# Patient Record
Sex: FEMALE | Race: WHITE | NOT HISPANIC OR LATINO | Employment: OTHER | URBAN - METROPOLITAN AREA
[De-identification: names, ages, dates, MRNs, and addresses within clinical notes are randomized per-mention and may not be internally consistent; named-entity substitution may affect disease eponyms.]

---

## 2019-08-07 ENCOUNTER — HOSPITAL ENCOUNTER (OUTPATIENT)
Dept: TELEMEDICINE | Facility: HOSPITAL | Age: 54
Discharge: HOME | End: 2019-08-07

## 2019-08-07 DIAGNOSIS — G45.8 ACUTE CEREBROVASCULAR INSUFFICIENCY TRANSIENT FOCAL NEUROLOGIC DEFICIT: ICD-10-CM

## 2019-08-07 PROBLEM — I63.312 THROMBOTIC STROKE INVOLVING LEFT MIDDLE CEREBRAL ARTERY: Status: ACTIVE | Noted: 2019-08-07

## 2019-08-07 PROCEDURE — G0427 INPT/ED TELECONSULT70: HCPCS | Mod: GT,,, | Performed by: PSYCHIATRY & NEUROLOGY

## 2019-08-07 PROCEDURE — G0427 PR INPT TELEHEALTH CON 70/>M: ICD-10-PCS | Mod: GT,,, | Performed by: PSYCHIATRY & NEUROLOGY

## 2019-08-07 NOTE — SUBJECTIVE & OBJECTIVE
Woke up with symptoms?: no    Recent bleeding noted: no  Does the patient take any Blood Thinners? no  Medications: Antiplatelets:  aspirin    Past Medical History: Stroke, HTN, kidney stones    Past Surgical History: no major surgeries within the last 2 weeks    Family History: no relevant history    Social History: no smoking, no drinking, no drugs    Allergies: Allergies have not been reviewed     Review of Systems   Constitutional: Negative for appetite change.   HENT: Negative for congestion.    Eyes: Negative for discharge.   Respiratory: Negative for shortness of breath.    Cardiovascular: Negative for chest pain.   Gastrointestinal: Negative for abdominal pain.   Endocrine: Negative for cold intolerance.   Genitourinary: Negative for difficulty urinating.   Musculoskeletal: Negative for joint swelling.   Skin: Negative for color change.     Objective:   Vitals:  /141 mmHg, HR 87, RR 12, O2 98%    CT READ: Yes  No hemmorhage. No mass effect. No early infarct signs.     Physical Exam   Constitutional: No distress.   HENT:   Head: Normocephalic.   Right Ear: External ear normal.   Left Ear: External ear normal.   Eyes: Conjunctivae are normal.   Neck: Normal range of motion.   Pulmonary/Chest: Effort normal. No stridor.   Abdominal: There is no tenderness.   Skin: Skin is dry. No rash noted.

## 2019-08-07 NOTE — CONSULTS
[facility]  Vascular Neurology  Comprehensive Stroke Center  Tele-Consultation Note      Consults    Consulting Provider: SCARLET HALL  Current Providers  No providers found    Patient Location: [facility] - TELEMEDICINE ED [facility] Emergency Department  Spoke hospital nurse at bedside with patient assisting consultant.     Patient information was obtained from patient.         Assessment/Plan:     STROKE DOCUMENTATION     Acute Stroke Times:   Acute Stroke Times   Last Known Normal Time: 1400  Stroke Team Called Time: 1514  Stroke Team Arrival Time: 1519  CT Interpretation Time: 1519    NIH Scale:  1a. Level of Consciousness: 0-->Alert, keenly responsive  1b. LOC Questions: 0-->Answers both questions correctly  1c. LOC Commands: 0-->Performs both tasks correctly  2. Best Gaze: 0-->Normal  3. Visual: 0-->No visual loss  4. Facial Palsy: 0-->Normal symmetrical movements  5a. Motor Arm, Left: 0-->No drift, limb holds 90 (or 45) degrees for full 10 secs  5b. Motor Arm, Right: 0-->No drift, limb holds 90 (or 45) degrees for full 10 secs  6a. Motor Leg, Left: 0-->No drift, leg holds 30 degree position for full 5 secs  6b. Motor Leg, Right: 1-->Drift, leg falls by the end of the 5-sec period but does not hit bed(unable to ambulate, stand)  7. Limb Ataxia: 0-->Absent  8. Sensory: 0-->Normal, no sensory loss  9. Best Language: 1-->Mild-to-moderate aphasia, some obvious loss of fluency or facility of comprehension, without significant limitation on ideas expressed or form of expression. Reduction of speech and/or comprehension, however, makes conversation. . . (see row details)  10. Dysarthria: 1-->Mild-to-moderate dysarthria, patient slurs at least some words and, at worst, can be understood with some difficulty  11. Extinction and Inattention (formerly Neglect): 0-->No abnormality  Total (NIH Stroke Scale): 3     Modified [place]    [place] Coma Scale:     ABCD2 Score:    [id number] Score:   HAS -BLED Score:   ICH Score:   Hunt & Hull Classification:       Diagnoses:   Acute cerebrovascular insufficiency transient focal neurologic deficit  Transient aphasia and R hemiparesis. IMproved in front of me on video. First unable to ambulate, then with ability to walk. Aphasia resolved initially. At this point, highest on DDx is TIA. Hypertensive emergency w/ focal deficit is also possible, especially since lowering her BP seemed to improve her symptoms which would be atypical for an acute neurovascular event.    Antithrombotics for secondary stroke prevention: Antiplatelets: Aspirin: 325 mg daily  Clopidogrel: 300 mg loading dose x 1, now  Clopidogrel: 75 mg daily    Statins for secondary stroke prevention and hyperlipidemia, if present:   Statins: Atorvastatin- 40 mg daily    Aggressive risk factor modification: HTN     Rehab efforts: The patient has been evaluated by a stroke team provider and the therapy needs have been fully considered based off the presenting complaints and exam findings. The following therapy evaluations are needed: PT evaluate and treat, OT evaluate and treat, SLP evaluate and treat    Diagnostics ordered/pending: CTA Head to assess vasculature , CTA Neck/Arch to assess vasculature, HgbA1C to assess blood glucose levels, Lipid Profile to assess cholesterol levels, MRI head without contrast to assess brain parenchyma    VTE prophylaxis: Enoxaparin 40 mg SQ every 24 hours    BP parameters: TIA: SBP <220 until imaging confirmation of no infarct             There were no vitals taken for this visit.  Alteplase Eligible?: Yes  Alteplase Recommendation: Alteplase not recommended due to Symptoms resolved   Possible Interventional Revascularization Candidate? No; No significant neurological deficit    Disposition Recommendation: transfer to nearest appropriate facility     Subjective:     History of Present Illness:  54F w/ acute onset of R sided  weakness, aphasia, and R sided vision loss. The aforementioned symptoms have never happened before. There are no identified triggers or modifying factors. There have been no recurrent events. There are no other associated symptoms. She has had a prior stroke.          Woke up with symptoms?: no    Recent bleeding noted: no  Does the patient take any Blood Thinners? no  Medications: Antiplatelets:  aspirin    Past Medical History: Stroke, HTN, kidney stones    Past Surgical History: no major surgeries within the last 2 weeks    Family History: no relevant history    Social History: no smoking, no drinking, no drugs    Allergies: Allergies have not been reviewed     Review of Systems   Constitutional: Negative for appetite change.   HENT: Negative for congestion.    Eyes: Negative for discharge.   Respiratory: Negative for shortness of breath.    Cardiovascular: Negative for chest pain.   Gastrointestinal: Negative for abdominal pain.   Endocrine: Negative for cold intolerance.   Genitourinary: Negative for difficulty urinating.   Musculoskeletal: Negative for joint swelling.   Skin: Negative for color change.     Objective:   Vitals:  /141 mmHg, HR 87, RR 12, O2 98%    CT READ: Yes  No hemmorhage. No mass effect. No early infarct signs.     Physical Exam   Constitutional: No distress.   HENT:   Head: Normocephalic.   Right Ear: External ear normal.   Left Ear: External ear normal.   Eyes: Conjunctivae are normal.   Neck: Normal range of motion.   Pulmonary/Chest: Effort normal. No stridor.   Abdominal: There is no tenderness.   Skin: Skin is dry. No rash noted.             Recommended the emergency room physician to have a brief discussion with the patient and/or family if available regarding the risks and benefits of treatment, and to briefly document the occurrence of that discussion in his clinical encounter note.     The attending portion of this evaluation, treatment, and documentation was performed per  Sukhdeep Da Silva MD via audiovisual.    Billing code:  (moderate to severe stroke, large areas of edema, some mimics)    · This patient has a critical neurological condition/illness, with high morbidity and mortality.  · There is a high probability for acute neurological change leading to clinical and possibly life-threatening deterioration requiring highest level of physician preparedness for urgent intervention.  · Care was coordinated with other physicians involved in the patient's care.  · Radiologic studies and laboratory data were reviewed and interpreted, and plan of care was re-assessed based on the results.  · Diagnosis, treatment options and prognosis may have been discussed with the patient and/or family members or caregiver.  · Further advanced medical management and further evaluation is warranted for his care.      In your opinion, this was a: Tier 1 Van Positive    Consult End Time: 3:45 PM     Sukhdeep Da Silva MD  Comprehensive Stroke Center  Vascular Neurology   [facility]

## 2019-08-07 NOTE — HPI
54F w/ acute onset of R sided weakness, aphasia, and R sided vision loss. The aforementioned symptoms have never happened before. There are no identified triggers or modifying factors. There have been no recurrent events. There are no other associated symptoms. She has had a prior stroke.

## 2019-08-07 NOTE — ASSESSMENT & PLAN NOTE
Transient aphasia and R hemiparesis. IMproved in front of me on video. First unable to ambulate, then with ability to walk. Aphasia resolved initially. At this point, highest on DDx is TIA. Hypertensive emergency w/ focal deficit is also possible, especially since lowering her BP seemed to improve her symptoms which would be atypical for an acute neurovascular event.    Antithrombotics for secondary stroke prevention: Antiplatelets: Aspirin: 325 mg daily  Clopidogrel: 300 mg loading dose x 1, now  Clopidogrel: 75 mg daily    Statins for secondary stroke prevention and hyperlipidemia, if present:   Statins: Atorvastatin- 40 mg daily    Aggressive risk factor modification: HTN     Rehab efforts: The patient has been evaluated by a stroke team provider and the therapy needs have been fully considered based off the presenting complaints and exam findings. The following therapy evaluations are needed: PT evaluate and treat, OT evaluate and treat, SLP evaluate and treat    Diagnostics ordered/pending: CTA Head to assess vasculature , CTA Neck/Arch to assess vasculature, HgbA1C to assess blood glucose levels, Lipid Profile to assess cholesterol levels, MRI head without contrast to assess brain parenchyma    VTE prophylaxis: Enoxaparin 40 mg SQ every 24 hours    BP parameters: TIA: SBP <220 until imaging confirmation of no infarct

## 2020-07-13 ENCOUNTER — HISTORICAL (OUTPATIENT)
Dept: ADMINISTRATIVE | Facility: HOSPITAL | Age: 55
End: 2020-07-13

## 2020-08-12 ENCOUNTER — HISTORICAL (OUTPATIENT)
Dept: ADMINISTRATIVE | Facility: HOSPITAL | Age: 55
End: 2020-08-12

## 2021-05-05 ENCOUNTER — HISTORICAL (OUTPATIENT)
Dept: ADMINISTRATIVE | Facility: HOSPITAL | Age: 56
End: 2021-05-05

## 2021-05-05 ENCOUNTER — HOSPITAL ENCOUNTER (INPATIENT)
Facility: HOSPITAL | Age: 56
LOS: 43 days | Discharge: HOME | DRG: 057 | End: 2021-06-17
Attending: EMERGENCY MEDICINE | Admitting: EMERGENCY MEDICINE
Payer: MEDICARE

## 2021-05-05 LAB
ANION GAP SERPL CALCULATED.3IONS-SCNC: 12 MMOL/L
BACTERIA #/AREA URNS HPF: ABNORMAL /HPF
BASOPHILS # BLD AUTO: 0.06 X10E3/UL (ref 0–0.2)
BASOPHILS NFR BLD AUTO: 0.6 % (ref 0–1)
BILIRUB UR QL STRIP: NEGATIVE MG/DL
BUN SERPL-MCNC: 59 MG/DL (ref 7–17)
CALCIUM SERPL-MCNC: 9.4 MG/DL (ref 8.5–10.1)
CHLORIDE SERPL-SCNC: 105 MMOL/L (ref 98–107)
CLARITY UR: ABNORMAL
CLARITY UR: ABNORMAL
CO2 SERPL-SCNC: 30 MMOL/L (ref 21–32)
COLOR UR: YELLOW
COLOR UR: YELLOW
CREAT SERPL-MCNC: 1.7 MG/DL (ref 0.55–1.3)
EOSINOPHIL # BLD AUTO: 0.44 X10E3/UL (ref 0–0.5)
EOSINOPHIL NFR BLD AUTO: 4.1 % (ref 1–4)
ERYTHROCYTE [DISTWIDTH] IN BLOOD BY AUTOMATED COUNT: 14.9 % (ref 11.5–14.5)
GLUCOSE SERPL-MCNC: 175 MG/DL (ref 74–106)
GLUCOSE UR STRIP-MCNC: NORMAL MG/DL
HCT VFR BLD AUTO: 46.1 % (ref 38–47)
HGB BLD-MCNC: 14.9 G/DL (ref 12–16)
KETONES UR STRIP-SCNC: NEGATIVE MG/DL
LEUKOCYTE ESTERASE UR QL STRIP: ABNORMAL LEU/UL
LYMPHOCYTES # BLD AUTO: 3.78 X10E3/UL (ref 1–4.8)
LYMPHOCYTES NFR BLD AUTO: 35.1 % (ref 27–41)
MCH RBC QN AUTO: 30.2 PG (ref 27–31)
MCHC RBC AUTO-ENTMCNC: 32.3 G/DL (ref 32–36)
MCV RBC AUTO: 93 FL (ref 80–96)
MONOCYTES # BLD AUTO: 1.04 X10E3/UL (ref 0–0.8)
MONOCYTES NFR BLD AUTO: 9.6 % (ref 2–6)
MPC BLD CALC-MCNC: 9.7 FL (ref 9.4–12.4)
NEUTROPHILS # BLD AUTO: 5.46 X10E3/UL (ref 1.8–7.7)
NEUTROPHILS NFR BLD AUTO: 50.6 % (ref 53–65)
NITRITE UR QL STRIP: NEGATIVE MG/DL
PH UR STRIP: 7 PH UNITS (ref 5–8)
PLATELET # BLD AUTO: 304 X10E3/UL (ref 150–400)
POTASSIUM SERPL-SCNC: 3.7 MMOL/L (ref 3.5–5.1)
PROT UR QL STRIP: NEGATIVE MG/DL
RBC # BLD AUTO: 4.94 X10E6/UL (ref 4.2–5.4)
RBC # UR STRIP: NEGATIVE ERY/UL
RBC #/AREA URNS HPF: ABNORMAL /HPF (ref 0–3)
SODIUM SERPL-SCNC: 143 MMOL/L (ref 136–145)
SP GR UR STRIP: 1.02 (ref 1–1.03)
SQUAMOUS #/AREA URNS LPF: ABNORMAL /LPF
UROBILINOGEN UR STRIP-ACNC: 0.2 MG/DL
WBC # BLD AUTO: 10.78 X10E3/UL (ref 4.5–11)
WBC #/AREA URNS HPF: ABNORMAL /HPF (ref 0–5)

## 2021-05-05 PROCEDURE — 80048 BASIC METABOLIC PNL TOTAL CA: CPT

## 2021-05-05 PROCEDURE — 81001 URINALYSIS AUTO W/SCOPE: CPT

## 2021-05-05 PROCEDURE — 99990 CHARGE CONVERSION: CPT

## 2021-05-05 PROCEDURE — 94760 N-INVAS EAR/PLS OXIMETRY 1: CPT

## 2021-05-05 PROCEDURE — 87086 URINE CULTURE/COLONY COUNT: CPT

## 2021-05-05 PROCEDURE — 36415 COLL VENOUS BLD VENIPUNCTURE: CPT

## 2021-05-05 PROCEDURE — 82962 GLUCOSE BLOOD TEST: CPT

## 2021-05-05 PROCEDURE — 97167 OT EVAL HIGH COMPLEX 60 MIN: CPT | Mod: GO

## 2021-05-05 PROCEDURE — 85025 COMPLETE CBC W/AUTO DIFF WBC: CPT

## 2021-05-05 PROCEDURE — 87077 CULTURE AEROBIC IDENTIFY: CPT

## 2021-05-05 PROCEDURE — 87186 SC STD MICRODIL/AGAR DIL: CPT

## 2021-05-05 PROCEDURE — 97163 PT EVAL HIGH COMPLEX 45 MIN: CPT | Mod: GP

## 2021-05-06 ENCOUNTER — HISTORICAL (OUTPATIENT)
Dept: ADMINISTRATIVE | Facility: HOSPITAL | Age: 56
End: 2021-05-06

## 2021-05-06 LAB
ANION GAP SERPL CALCULATED.3IONS-SCNC: 10 MMOL/L
BUN SERPL-MCNC: 43 MG/DL (ref 7–17)
CALCIUM SERPL-MCNC: 8.5 MG/DL (ref 8.5–10.1)
CHLORIDE SERPL-SCNC: 107 MMOL/L (ref 98–107)
CO2 SERPL-SCNC: 31 MMOL/L (ref 21–32)
CREAT SERPL-MCNC: 1.3 MG/DL (ref 0.55–1.3)
GLUCOSE SERPL-MCNC: 125 MG/DL (ref 70–105)
GLUCOSE SERPL-MCNC: 133 MG/DL (ref 74–106)
GLUCOSE SERPL-MCNC: 163 MG/DL (ref 70–105)
POTASSIUM SERPL-SCNC: 3.8 MMOL/L (ref 3.5–5.1)
SODIUM SERPL-SCNC: 144 MMOL/L (ref 136–145)

## 2021-05-06 PROCEDURE — 97530 THERAPEUTIC ACTIVITIES: CPT | Mod: GP

## 2021-05-06 PROCEDURE — 36415 COLL VENOUS BLD VENIPUNCTURE: CPT

## 2021-05-06 PROCEDURE — 97112 NEUROMUSCULAR REEDUCATION: CPT | Mod: GO

## 2021-05-06 PROCEDURE — 80048 BASIC METABOLIC PNL TOTAL CA: CPT

## 2021-05-06 PROCEDURE — 94760 N-INVAS EAR/PLS OXIMETRY 1: CPT

## 2021-05-06 PROCEDURE — 97535 SELF CARE MNGMENT TRAINING: CPT | Mod: GO

## 2021-05-06 PROCEDURE — 82962 GLUCOSE BLOOD TEST: CPT

## 2021-05-06 PROCEDURE — 92523 SPEECH SOUND LANG COMPREHEN: CPT | Mod: GN

## 2021-05-06 PROCEDURE — 97116 GAIT TRAINING THERAPY: CPT | Mod: GP

## 2021-05-06 PROCEDURE — 92610 EVALUATE SWALLOWING FUNCTION: CPT | Mod: GN

## 2021-05-06 PROCEDURE — 99990 CHARGE CONVERSION: CPT | Mod: GO

## 2021-05-07 ENCOUNTER — HISTORICAL (OUTPATIENT)
Dept: ADMINISTRATIVE | Facility: HOSPITAL | Age: 56
End: 2021-05-07

## 2021-05-07 LAB
GLUCOSE SERPL-MCNC: 118 MG/DL (ref 70–105)
GLUCOSE SERPL-MCNC: 135 MG/DL (ref 70–105)
GLUCOSE SERPL-MCNC: 203 MG/DL (ref 70–105)
GLUCOSE SERPL-MCNC: 214 MG/DL (ref 70–105)

## 2021-05-07 PROCEDURE — 99990 CHARGE CONVERSION: CPT

## 2021-05-07 PROCEDURE — 36415 COLL VENOUS BLD VENIPUNCTURE: CPT

## 2021-05-07 PROCEDURE — 92526 ORAL FUNCTION THERAPY: CPT | Mod: GN

## 2021-05-07 PROCEDURE — 97535 SELF CARE MNGMENT TRAINING: CPT | Mod: GO

## 2021-05-07 PROCEDURE — 94760 N-INVAS EAR/PLS OXIMETRY 1: CPT

## 2021-05-07 PROCEDURE — 97530 THERAPEUTIC ACTIVITIES: CPT | Mod: GP

## 2021-05-07 PROCEDURE — 82962 GLUCOSE BLOOD TEST: CPT

## 2021-05-07 PROCEDURE — 97112 NEUROMUSCULAR REEDUCATION: CPT | Mod: GP

## 2021-05-08 ENCOUNTER — HISTORICAL (OUTPATIENT)
Dept: ADMINISTRATIVE | Facility: HOSPITAL | Age: 56
End: 2021-05-08

## 2021-05-08 LAB
GLUCOSE SERPL-MCNC: 111 MG/DL (ref 70–105)
GLUCOSE SERPL-MCNC: 113 MG/DL (ref 70–105)
GLUCOSE SERPL-MCNC: 123 MG/DL (ref 70–105)
GLUCOSE SERPL-MCNC: 173 MG/DL (ref 70–105)
REPORT: NORMAL

## 2021-05-08 PROCEDURE — 82962 GLUCOSE BLOOD TEST: CPT

## 2021-05-08 PROCEDURE — 99990 CHARGE CONVERSION: CPT

## 2021-05-08 PROCEDURE — 94760 N-INVAS EAR/PLS OXIMETRY 1: CPT

## 2021-05-08 PROCEDURE — 36415 COLL VENOUS BLD VENIPUNCTURE: CPT

## 2021-05-09 ENCOUNTER — HISTORICAL (OUTPATIENT)
Dept: ADMINISTRATIVE | Facility: HOSPITAL | Age: 56
End: 2021-05-09

## 2021-05-09 PROCEDURE — 99990 CHARGE CONVERSION: CPT

## 2021-05-09 PROCEDURE — 82962 GLUCOSE BLOOD TEST: CPT

## 2021-05-09 PROCEDURE — 94760 N-INVAS EAR/PLS OXIMETRY 1: CPT

## 2021-05-10 ENCOUNTER — HISTORICAL (OUTPATIENT)
Dept: ADMINISTRATIVE | Facility: HOSPITAL | Age: 56
End: 2021-05-10

## 2021-05-10 LAB
GLUCOSE SERPL-MCNC: 101 MG/DL (ref 70–105)
GLUCOSE SERPL-MCNC: 102 MG/DL (ref 70–105)
GLUCOSE SERPL-MCNC: 117 MG/DL (ref 70–105)
GLUCOSE SERPL-MCNC: 119 MG/DL (ref 70–105)
GLUCOSE SERPL-MCNC: 121 MG/DL (ref 70–105)
GLUCOSE SERPL-MCNC: 140 MG/DL (ref 70–105)
GLUCOSE SERPL-MCNC: 141 MG/DL (ref 70–105)
GLUCOSE SERPL-MCNC: 169 MG/DL (ref 70–105)

## 2021-05-10 PROCEDURE — 97530 THERAPEUTIC ACTIVITIES: CPT | Mod: GO

## 2021-05-10 PROCEDURE — 94760 N-INVAS EAR/PLS OXIMETRY 1: CPT

## 2021-05-10 PROCEDURE — 92507 TX SP LANG VOICE COMM INDIV: CPT | Mod: GN

## 2021-05-10 PROCEDURE — 82962 GLUCOSE BLOOD TEST: CPT

## 2021-05-10 PROCEDURE — 99990 CHARGE CONVERSION: CPT | Mod: GN

## 2021-05-10 PROCEDURE — 97112 NEUROMUSCULAR REEDUCATION: CPT | Mod: GO

## 2021-05-10 PROCEDURE — 36415 COLL VENOUS BLD VENIPUNCTURE: CPT

## 2021-05-11 ENCOUNTER — HISTORICAL (OUTPATIENT)
Dept: ADMINISTRATIVE | Facility: HOSPITAL | Age: 56
End: 2021-05-11

## 2021-05-11 LAB
GLUCOSE SERPL-MCNC: 119 MG/DL (ref 70–105)
GLUCOSE SERPL-MCNC: 133 MG/DL (ref 70–105)
GLUCOSE SERPL-MCNC: 142 MG/DL (ref 70–105)
GLUCOSE SERPL-MCNC: 175 MG/DL (ref 70–105)

## 2021-05-11 PROCEDURE — 99990 CHARGE CONVERSION: CPT | Mod: GO

## 2021-05-11 PROCEDURE — 97530 THERAPEUTIC ACTIVITIES: CPT | Mod: GO

## 2021-05-11 PROCEDURE — 94760 N-INVAS EAR/PLS OXIMETRY 1: CPT

## 2021-05-11 PROCEDURE — 97116 GAIT TRAINING THERAPY: CPT | Mod: GP

## 2021-05-11 PROCEDURE — 36415 COLL VENOUS BLD VENIPUNCTURE: CPT

## 2021-05-11 PROCEDURE — 97112 NEUROMUSCULAR REEDUCATION: CPT | Mod: GO

## 2021-05-11 PROCEDURE — 92526 ORAL FUNCTION THERAPY: CPT | Mod: GN

## 2021-05-11 PROCEDURE — 82962 GLUCOSE BLOOD TEST: CPT

## 2021-05-12 ENCOUNTER — HISTORICAL (OUTPATIENT)
Dept: ADMINISTRATIVE | Facility: HOSPITAL | Age: 56
End: 2021-05-12

## 2021-05-12 LAB
ANION GAP SERPL CALCULATED.3IONS-SCNC: 11 MMOL/L
BASOPHILS # BLD AUTO: 0.04 X10E3/UL (ref 0–0.2)
BASOPHILS NFR BLD AUTO: 0.5 % (ref 0–1)
BUN SERPL-MCNC: 25 MG/DL (ref 7–17)
CALCIUM SERPL-MCNC: 9 MG/DL (ref 8.5–10.1)
CHLORIDE SERPL-SCNC: 101 MMOL/L (ref 98–107)
CO2 SERPL-SCNC: 31 MMOL/L (ref 21–32)
CREAT SERPL-MCNC: 1.15 MG/DL (ref 0.55–1.3)
EOSINOPHIL # BLD AUTO: 0.36 X10E3/UL (ref 0–0.5)
EOSINOPHIL NFR BLD AUTO: 4.7 % (ref 1–4)
ERYTHROCYTE [DISTWIDTH] IN BLOOD BY AUTOMATED COUNT: 14.2 % (ref 11.5–14.5)
GLUCOSE SERPL-MCNC: 118 MG/DL (ref 70–105)
GLUCOSE SERPL-MCNC: 127 MG/DL (ref 70–105)
GLUCOSE SERPL-MCNC: 146 MG/DL (ref 70–105)
GLUCOSE SERPL-MCNC: 149 MG/DL (ref 70–105)
GLUCOSE SERPL-MCNC: 168 MG/DL (ref 74–106)
HCT VFR BLD AUTO: 37.8 % (ref 38–47)
HGB BLD-MCNC: 12.5 G/DL (ref 12–16)
LYMPHOCYTES # BLD AUTO: 2.82 X10E3/UL (ref 1–4.8)
LYMPHOCYTES NFR BLD AUTO: 36.4 % (ref 27–41)
MCH RBC QN AUTO: 30.4 PG (ref 27–31)
MCHC RBC AUTO-ENTMCNC: 33.1 G/DL (ref 32–36)
MCV RBC AUTO: 92 FL (ref 80–96)
MONOCYTES # BLD AUTO: 0.78 X10E3/UL (ref 0–0.8)
MONOCYTES NFR BLD AUTO: 10.1 % (ref 2–6)
MPC BLD CALC-MCNC: 10.1 FL (ref 9.4–12.4)
NEUTROPHILS # BLD AUTO: 3.74 X10E3/UL (ref 1.8–7.7)
NEUTROPHILS NFR BLD AUTO: 48.3 % (ref 53–65)
PLATELET # BLD AUTO: 224 X10E3/UL (ref 150–400)
POTASSIUM SERPL-SCNC: 4.3 MMOL/L (ref 3.5–5.1)
RBC # BLD AUTO: 4.11 X10E6/UL (ref 4.2–5.4)
SODIUM SERPL-SCNC: 139 MMOL/L (ref 136–145)
WBC # BLD AUTO: 7.74 X10E3/UL (ref 4.5–11)

## 2021-05-12 PROCEDURE — 82962 GLUCOSE BLOOD TEST: CPT

## 2021-05-12 PROCEDURE — 80048 BASIC METABOLIC PNL TOTAL CA: CPT

## 2021-05-12 PROCEDURE — 99990 CHARGE CONVERSION: CPT | Mod: GO

## 2021-05-12 PROCEDURE — 36415 COLL VENOUS BLD VENIPUNCTURE: CPT

## 2021-05-12 PROCEDURE — 97535 SELF CARE MNGMENT TRAINING: CPT | Mod: GO

## 2021-05-12 PROCEDURE — 94760 N-INVAS EAR/PLS OXIMETRY 1: CPT

## 2021-05-12 PROCEDURE — 92507 TX SP LANG VOICE COMM INDIV: CPT | Mod: GN

## 2021-05-12 PROCEDURE — 97530 THERAPEUTIC ACTIVITIES: CPT | Mod: GO

## 2021-05-12 PROCEDURE — 97116 GAIT TRAINING THERAPY: CPT | Mod: GP

## 2021-05-12 PROCEDURE — 85025 COMPLETE CBC W/AUTO DIFF WBC: CPT

## 2021-05-13 ENCOUNTER — HISTORICAL (OUTPATIENT)
Dept: ADMINISTRATIVE | Facility: HOSPITAL | Age: 56
End: 2021-05-13

## 2021-05-13 LAB
BACTERIA #/AREA URNS HPF: ABNORMAL /HPF
BILIRUB UR QL STRIP: NEGATIVE MG/DL
CLARITY UR: ABNORMAL
CLARITY UR: ABNORMAL
COLOR UR: YELLOW
COLOR UR: YELLOW
GLUCOSE SERPL-MCNC: 103 MG/DL (ref 70–105)
GLUCOSE SERPL-MCNC: 106 MG/DL (ref 70–105)
GLUCOSE SERPL-MCNC: 107 MG/DL (ref 70–105)
GLUCOSE SERPL-MCNC: 184 MG/DL (ref 70–105)
GLUCOSE UR STRIP-MCNC: NORMAL MG/DL
KETONES UR STRIP-SCNC: NEGATIVE MG/DL
LEUKOCYTE ESTERASE UR QL STRIP: ABNORMAL LEU/UL
NITRITE UR QL STRIP: NEGATIVE MG/DL
PH UR STRIP: 7 PH UNITS (ref 5–8)
PROT UR QL STRIP: NEGATIVE MG/DL
RBC # UR STRIP: NEGATIVE ERY/UL
RBC #/AREA URNS HPF: ABNORMAL /HPF (ref 0–3)
SP GR UR STRIP: 1.02 (ref 1–1.03)
SQUAMOUS #/AREA URNS LPF: ABNORMAL /LPF
UROBILINOGEN UR STRIP-ACNC: 4 MG/DL
WBC #/AREA URNS HPF: ABNORMAL /HPF (ref 0–5)

## 2021-05-13 PROCEDURE — 99990 CHARGE CONVERSION: CPT | Mod: GP

## 2021-05-13 PROCEDURE — 87086 URINE CULTURE/COLONY COUNT: CPT

## 2021-05-13 PROCEDURE — 81001 URINALYSIS AUTO W/SCOPE: CPT

## 2021-05-13 PROCEDURE — 36415 COLL VENOUS BLD VENIPUNCTURE: CPT

## 2021-05-13 PROCEDURE — 92526 ORAL FUNCTION THERAPY: CPT | Mod: GN

## 2021-05-13 PROCEDURE — 97535 SELF CARE MNGMENT TRAINING: CPT | Mod: GO

## 2021-05-13 PROCEDURE — 82962 GLUCOSE BLOOD TEST: CPT

## 2021-05-13 PROCEDURE — 97116 GAIT TRAINING THERAPY: CPT | Mod: GP

## 2021-05-13 PROCEDURE — 97112 NEUROMUSCULAR REEDUCATION: CPT | Mod: GP

## 2021-05-13 PROCEDURE — 94760 N-INVAS EAR/PLS OXIMETRY 1: CPT

## 2021-05-14 ENCOUNTER — HISTORICAL (OUTPATIENT)
Dept: ADMINISTRATIVE | Facility: HOSPITAL | Age: 56
End: 2021-05-14

## 2021-05-14 LAB
GLUCOSE SERPL-MCNC: 106 MG/DL (ref 70–105)
GLUCOSE SERPL-MCNC: 115 MG/DL (ref 70–105)
GLUCOSE SERPL-MCNC: 154 MG/DL (ref 70–105)
GLUCOSE SERPL-MCNC: 171 MG/DL (ref 70–105)

## 2021-05-14 PROCEDURE — 82962 GLUCOSE BLOOD TEST: CPT

## 2021-05-14 PROCEDURE — 99990 CHARGE CONVERSION: CPT | Mod: GP

## 2021-05-14 PROCEDURE — 97112 NEUROMUSCULAR REEDUCATION: CPT | Mod: GO

## 2021-05-14 PROCEDURE — 97116 GAIT TRAINING THERAPY: CPT | Mod: GP

## 2021-05-14 PROCEDURE — 97535 SELF CARE MNGMENT TRAINING: CPT | Mod: GO

## 2021-05-14 PROCEDURE — 92526 ORAL FUNCTION THERAPY: CPT | Mod: GN

## 2021-05-14 PROCEDURE — 94760 N-INVAS EAR/PLS OXIMETRY 1: CPT

## 2021-05-14 PROCEDURE — 36415 COLL VENOUS BLD VENIPUNCTURE: CPT

## 2021-05-15 ENCOUNTER — HISTORICAL (OUTPATIENT)
Dept: ADMINISTRATIVE | Facility: HOSPITAL | Age: 56
End: 2021-05-15

## 2021-05-15 DIAGNOSIS — M62.81 MUSCLE WEAKNESS: ICD-10-CM

## 2021-05-15 DIAGNOSIS — G45.8 ACUTE CEREBROVASCULAR INSUFFICIENCY TRANSIENT FOCAL NEUROLOGIC DEFICIT: ICD-10-CM

## 2021-05-15 DIAGNOSIS — I10 ESSENTIAL HYPERTENSION: ICD-10-CM

## 2021-05-15 DIAGNOSIS — E11.40 TYPE 2 DIABETES MELLITUS WITH DIABETIC NEUROPATHY, WITHOUT LONG-TERM CURRENT USE OF INSULIN: ICD-10-CM

## 2021-05-15 DIAGNOSIS — R10.9 FLANK PAIN: Primary | ICD-10-CM

## 2021-05-15 PROCEDURE — 99990 CHARGE CONVERSION: CPT

## 2021-05-15 PROCEDURE — 82962 GLUCOSE BLOOD TEST: CPT

## 2021-05-15 PROCEDURE — 94760 N-INVAS EAR/PLS OXIMETRY 1: CPT

## 2021-05-15 RX ORDER — GLUCAGON 1 MG
1 KIT INJECTION
Status: DISCONTINUED | OUTPATIENT
Start: 2021-05-15 | End: 2021-06-17 | Stop reason: HOSPADM

## 2021-05-15 RX ORDER — CITALOPRAM 20 MG/1
40 TABLET, FILM COATED ORAL DAILY
Status: DISCONTINUED | OUTPATIENT
Start: 2021-05-16 | End: 2021-06-17 | Stop reason: HOSPADM

## 2021-05-15 RX ORDER — CITALOPRAM 40 MG/1
40 TABLET, FILM COATED ORAL DAILY
Status: ON HOLD | COMMUNITY
End: 2021-06-17 | Stop reason: SDUPTHER

## 2021-05-15 RX ORDER — POLYETHYLENE GLYCOL 3350 17 G/17G
17 POWDER, FOR SOLUTION ORAL DAILY
Status: DISCONTINUED | OUTPATIENT
Start: 2021-05-16 | End: 2021-06-17 | Stop reason: HOSPADM

## 2021-05-15 RX ORDER — GABAPENTIN 400 MG/1
800 CAPSULE ORAL 4 TIMES DAILY
Status: DISCONTINUED | OUTPATIENT
Start: 2021-05-16 | End: 2021-06-17 | Stop reason: HOSPADM

## 2021-05-15 RX ORDER — ACETAMINOPHEN 325 MG/1
650 TABLET ORAL EVERY 6 HOURS PRN
Status: DISCONTINUED | OUTPATIENT
Start: 2021-05-16 | End: 2021-06-17 | Stop reason: HOSPADM

## 2021-05-15 RX ORDER — ACETAMINOPHEN 325 MG/1
325 TABLET ORAL EVERY 6 HOURS PRN
Status: ON HOLD | COMMUNITY
End: 2021-06-17 | Stop reason: HOSPADM

## 2021-05-15 RX ORDER — ONDANSETRON 2 MG/ML
4 INJECTION INTRAMUSCULAR; INTRAVENOUS EVERY 6 HOURS PRN
Status: DISCONTINUED | OUTPATIENT
Start: 2021-05-16 | End: 2021-06-17 | Stop reason: HOSPADM

## 2021-05-15 RX ORDER — MICONAZOLE NITRATE 2 %
POWDER (GRAM) TOPICAL 2 TIMES DAILY
Status: DISCONTINUED | OUTPATIENT
Start: 2021-05-16 | End: 2021-06-17 | Stop reason: HOSPADM

## 2021-05-15 RX ORDER — AMOXICILLIN 250 MG/1
500 CAPSULE ORAL EVERY 8 HOURS
Status: DISCONTINUED | OUTPATIENT
Start: 2021-05-16 | End: 2021-05-20

## 2021-05-15 RX ORDER — VALSARTAN 160 MG/1
320 TABLET ORAL DAILY
Status: DISCONTINUED | OUTPATIENT
Start: 2021-05-16 | End: 2021-06-17 | Stop reason: HOSPADM

## 2021-05-15 RX ORDER — LORAZEPAM 0.5 MG/1
0.5 TABLET ORAL 3 TIMES DAILY
Status: DISCONTINUED | OUTPATIENT
Start: 2021-05-16 | End: 2021-06-17 | Stop reason: HOSPADM

## 2021-05-15 RX ORDER — BISACODYL 10 MG
10 SUPPOSITORY, RECTAL RECTAL DAILY PRN
Status: DISCONTINUED | OUTPATIENT
Start: 2021-05-16 | End: 2021-06-17 | Stop reason: HOSPADM

## 2021-05-15 RX ORDER — HYDROCHLOROTHIAZIDE 25 MG/1
25 TABLET ORAL DAILY
Status: ON HOLD | COMMUNITY
End: 2021-06-17 | Stop reason: HOSPADM

## 2021-05-15 RX ORDER — AMLODIPINE BESYLATE 10 MG/1
10 TABLET ORAL DAILY
Status: ON HOLD | COMMUNITY
End: 2021-06-17 | Stop reason: HOSPADM

## 2021-05-15 RX ORDER — ATORVASTATIN CALCIUM 40 MG/1
40 TABLET, FILM COATED ORAL NIGHTLY
Status: ON HOLD | COMMUNITY
End: 2021-06-17 | Stop reason: SDUPTHER

## 2021-05-15 RX ORDER — LORAZEPAM 0.5 MG/1
0.5 TABLET ORAL NIGHTLY
COMMUNITY
End: 2021-08-18 | Stop reason: SDUPTHER

## 2021-05-15 RX ORDER — HYDRALAZINE HYDROCHLORIDE 50 MG/1
50 TABLET, FILM COATED ORAL 3 TIMES DAILY
Status: ON HOLD | COMMUNITY
End: 2021-06-17 | Stop reason: HOSPADM

## 2021-05-15 RX ORDER — GABAPENTIN 800 MG/1
800 TABLET ORAL 4 TIMES DAILY
Status: ON HOLD | COMMUNITY
End: 2021-06-17 | Stop reason: HOSPADM

## 2021-05-15 RX ORDER — HYDRALAZINE HYDROCHLORIDE 25 MG/1
25 TABLET, FILM COATED ORAL EVERY 12 HOURS
Status: DISCONTINUED | OUTPATIENT
Start: 2021-05-16 | End: 2021-06-17 | Stop reason: HOSPADM

## 2021-05-15 RX ORDER — INSULIN ASPART 100 [IU]/ML
0-12 INJECTION, SOLUTION INTRAVENOUS; SUBCUTANEOUS SEE ADMIN INSTRUCTIONS
Status: DISCONTINUED | OUTPATIENT
Start: 2021-05-16 | End: 2021-05-18

## 2021-05-15 RX ORDER — MENTHOL AND ZINC OXIDE .44; 20.625 G/100G; G/100G
OINTMENT TOPICAL 2 TIMES DAILY PRN
Status: DISCONTINUED | OUTPATIENT
Start: 2021-05-15 | End: 2021-06-17 | Stop reason: HOSPADM

## 2021-05-15 RX ORDER — AMLODIPINE BESYLATE 5 MG/1
5 TABLET ORAL DAILY
Status: DISCONTINUED | OUTPATIENT
Start: 2021-05-16 | End: 2021-06-17 | Stop reason: HOSPADM

## 2021-05-15 RX ORDER — VALSARTAN 320 MG/1
320 TABLET ORAL DAILY
Status: ON HOLD | COMMUNITY
End: 2021-06-17 | Stop reason: HOSPADM

## 2021-05-15 RX ORDER — HYDROCHLOROTHIAZIDE 12.5 MG/1
12.5 TABLET ORAL DAILY
Status: DISCONTINUED | OUTPATIENT
Start: 2021-05-16 | End: 2021-06-17 | Stop reason: HOSPADM

## 2021-05-15 RX ORDER — ATORVASTATIN CALCIUM 40 MG/1
40 TABLET, FILM COATED ORAL NIGHTLY
Status: DISCONTINUED | OUTPATIENT
Start: 2021-05-16 | End: 2021-06-17 | Stop reason: HOSPADM

## 2021-05-16 LAB
BASOPHILS # BLD AUTO: 0.03 K/UL (ref 0–0.2)
BASOPHILS NFR BLD AUTO: 0.4 % (ref 0–1)
DIFFERENTIAL METHOD BLD: ABNORMAL
EOSINOPHIL # BLD AUTO: 0.26 K/UL (ref 0–0.5)
EOSINOPHIL NFR BLD AUTO: 3.4 % (ref 1–4)
ERYTHROCYTE [DISTWIDTH] IN BLOOD BY AUTOMATED COUNT: 14.8 % (ref 11.5–14.5)
GLUCOSE SERPL-MCNC: 109 MG/DL (ref 70–105)
GLUCOSE SERPL-MCNC: 117 MG/DL (ref 70–105)
GLUCOSE SERPL-MCNC: 127 MG/DL (ref 70–105)
GLUCOSE SERPL-MCNC: 147 MG/DL (ref 70–105)
GLUCOSE SERPL-MCNC: 161 MG/DL (ref 70–105)
GLUCOSE SERPL-MCNC: 190 MG/DL (ref 70–105)
GLUCOSE SERPL-MCNC: 194 MG/DL (ref 70–105)
GLUCOSE SERPL-MCNC: 195 MG/DL (ref 70–105)
HCT VFR BLD AUTO: 40.1 % (ref 38–47)
HGB BLD-MCNC: 13.1 G/DL (ref 12–16)
LYMPHOCYTES # BLD AUTO: 2.67 K/UL (ref 1–4.8)
LYMPHOCYTES NFR BLD AUTO: 34.6 % (ref 27–41)
MCH RBC QN AUTO: 30.5 PG (ref 27–31)
MCHC RBC AUTO-ENTMCNC: 32.7 G/DL (ref 32–36)
MCV RBC AUTO: 93.3 FL (ref 80–96)
MONOCYTES # BLD AUTO: 0.9 K/UL (ref 0–0.8)
MONOCYTES NFR BLD AUTO: 11.7 % (ref 2–6)
MPC BLD CALC-MCNC: 10 FL (ref 9.4–12.4)
NEUTROPHILS # BLD AUTO: 3.85 K/UL (ref 1.8–7.7)
NEUTROPHILS NFR BLD AUTO: 49.9 % (ref 53–65)
PLATELET # BLD AUTO: 168 K/UL (ref 150–400)
RBC # BLD AUTO: 4.3 M/UL (ref 4.2–5.4)
REPORT: NO GROWTH
REPORT: NORMAL
WBC # BLD AUTO: 7.71 K/UL (ref 4.5–11)

## 2021-05-16 PROCEDURE — 94760 N-INVAS EAR/PLS OXIMETRY 1: CPT

## 2021-05-16 PROCEDURE — 25000003 PHARM REV CODE 250: Performed by: NURSE PRACTITIONER

## 2021-05-16 PROCEDURE — 36415 COLL VENOUS BLD VENIPUNCTURE: CPT | Performed by: EMERGENCY MEDICINE

## 2021-05-16 PROCEDURE — 25000003 PHARM REV CODE 250

## 2021-05-16 PROCEDURE — 11000004 HC SNF PRIVATE

## 2021-05-16 PROCEDURE — 99990 CHARGE CONVERSION: CPT

## 2021-05-16 PROCEDURE — 82962 GLUCOSE BLOOD TEST: CPT

## 2021-05-16 PROCEDURE — 63600175 PHARM REV CODE 636 W HCPCS

## 2021-05-16 PROCEDURE — 27000221 HC OXYGEN, UP TO 24 HOURS

## 2021-05-16 PROCEDURE — 85025 COMPLETE CBC W/AUTO DIFF WBC: CPT | Performed by: EMERGENCY MEDICINE

## 2021-05-16 RX ORDER — CALCIUM CARBONATE 200(500)MG
500 TABLET,CHEWABLE ORAL 3 TIMES DAILY PRN
Status: DISCONTINUED | OUTPATIENT
Start: 2021-05-16 | End: 2021-06-17 | Stop reason: HOSPADM

## 2021-05-16 RX ADMIN — POLYETHYLENE GLYCOL 3350 17 G: 17 POWDER, FOR SOLUTION ORAL at 09:05

## 2021-05-16 RX ADMIN — GABAPENTIN 800 MG: 400 CAPSULE ORAL at 05:05

## 2021-05-16 RX ADMIN — ONDANSETRON 4 MG: 2 INJECTION INTRAMUSCULAR; INTRAVENOUS at 08:05

## 2021-05-16 RX ADMIN — ONDANSETRON 4 MG: 2 INJECTION INTRAMUSCULAR; INTRAVENOUS at 06:05

## 2021-05-16 RX ADMIN — AMOXICILLIN 500 MG: 250 CAPSULE ORAL at 09:05

## 2021-05-16 RX ADMIN — GABAPENTIN 800 MG: 400 CAPSULE ORAL at 09:05

## 2021-05-16 RX ADMIN — LORAZEPAM 0.5 MG: 0.5 TABLET ORAL at 09:05

## 2021-05-16 RX ADMIN — MICONAZOLE NITRATE: 20 POWDER TOPICAL at 09:05

## 2021-05-16 RX ADMIN — AMOXICILLIN 500 MG: 250 CAPSULE ORAL at 02:05

## 2021-05-16 RX ADMIN — ANTACID TABLETS 500 MG: 500 TABLET, CHEWABLE ORAL at 11:05

## 2021-05-16 RX ADMIN — AMOXICILLIN 500 MG: 250 CAPSULE ORAL at 05:05

## 2021-05-16 RX ADMIN — GABAPENTIN 800 MG: 400 CAPSULE ORAL at 02:05

## 2021-05-16 RX ADMIN — ATORVASTATIN CALCIUM 40 MG: 40 TABLET, FILM COATED ORAL at 09:05

## 2021-05-16 RX ADMIN — CITALOPRAM HYDROBROMIDE 40 MG: 20 TABLET ORAL at 09:05

## 2021-05-16 RX ADMIN — LORAZEPAM 0.5 MG: 0.5 TABLET ORAL at 02:05

## 2021-05-17 LAB
GLUCOSE SERPL-MCNC: 122 MG/DL (ref 70–110)
GLUCOSE SERPL-MCNC: 187 MG/DL (ref 70–110)

## 2021-05-17 PROCEDURE — 82962 GLUCOSE BLOOD TEST: CPT

## 2021-05-17 PROCEDURE — 27000221 HC OXYGEN, UP TO 24 HOURS

## 2021-05-17 PROCEDURE — 97116 GAIT TRAINING THERAPY: CPT

## 2021-05-17 PROCEDURE — 99900035 HC TECH TIME PER 15 MIN (STAT)

## 2021-05-17 PROCEDURE — 25000003 PHARM REV CODE 250: Performed by: NURSE PRACTITIONER

## 2021-05-17 PROCEDURE — 92526 ORAL FUNCTION THERAPY: CPT

## 2021-05-17 PROCEDURE — 94760 N-INVAS EAR/PLS OXIMETRY 1: CPT

## 2021-05-17 PROCEDURE — 97530 THERAPEUTIC ACTIVITIES: CPT

## 2021-05-17 PROCEDURE — 11000004 HC SNF PRIVATE

## 2021-05-17 PROCEDURE — 97112 NEUROMUSCULAR REEDUCATION: CPT

## 2021-05-17 PROCEDURE — 99990 CHARGE CONVERSION: CPT

## 2021-05-17 PROCEDURE — 63600175 PHARM REV CODE 636 W HCPCS

## 2021-05-17 PROCEDURE — 25000003 PHARM REV CODE 250

## 2021-05-17 PROCEDURE — 94761 N-INVAS EAR/PLS OXIMETRY MLT: CPT

## 2021-05-17 RX ADMIN — GABAPENTIN 800 MG: 400 CAPSULE ORAL at 09:05

## 2021-05-17 RX ADMIN — INSULIN ASPART 2 UNITS: 100 INJECTION, SOLUTION INTRAVENOUS; SUBCUTANEOUS at 04:05

## 2021-05-17 RX ADMIN — GABAPENTIN 800 MG: 400 CAPSULE ORAL at 05:05

## 2021-05-17 RX ADMIN — VALSARTAN 320 MG: 160 TABLET, FILM COATED ORAL at 09:05

## 2021-05-17 RX ADMIN — AMOXICILLIN 500 MG: 250 CAPSULE ORAL at 02:05

## 2021-05-17 RX ADMIN — CITALOPRAM HYDROBROMIDE 40 MG: 20 TABLET ORAL at 09:05

## 2021-05-17 RX ADMIN — AMOXICILLIN 500 MG: 250 CAPSULE ORAL at 05:05

## 2021-05-17 RX ADMIN — LORAZEPAM 0.5 MG: 0.5 TABLET ORAL at 09:05

## 2021-05-17 RX ADMIN — HYDRALAZINE HYDROCHLORIDE 25 MG: 25 TABLET, FILM COATED ORAL at 09:05

## 2021-05-17 RX ADMIN — AMLODIPINE BESYLATE 5 MG: 5 TABLET ORAL at 09:05

## 2021-05-17 RX ADMIN — MICONAZOLE NITRATE: 20 POWDER TOPICAL at 09:05

## 2021-05-17 RX ADMIN — LORAZEPAM 0.5 MG: 0.5 TABLET ORAL at 02:05

## 2021-05-17 RX ADMIN — ANTACID TABLETS 500 MG: 500 TABLET, CHEWABLE ORAL at 06:05

## 2021-05-17 RX ADMIN — ATORVASTATIN CALCIUM 40 MG: 40 TABLET, FILM COATED ORAL at 09:05

## 2021-05-17 RX ADMIN — GABAPENTIN 800 MG: 400 CAPSULE ORAL at 01:05

## 2021-05-17 RX ADMIN — HYDROCHLOROTHIAZIDE 12.5 MG: 12.5 TABLET ORAL at 09:05

## 2021-05-17 RX ADMIN — POLYETHYLENE GLYCOL 3350 17 G: 17 POWDER, FOR SOLUTION ORAL at 09:05

## 2021-05-17 RX ADMIN — AMOXICILLIN 500 MG: 250 CAPSULE ORAL at 09:05

## 2021-05-18 LAB — GLUCOSE SERPL-MCNC: 115 MG/DL (ref 70–110)

## 2021-05-18 PROCEDURE — 94761 N-INVAS EAR/PLS OXIMETRY MLT: CPT

## 2021-05-18 PROCEDURE — 82962 GLUCOSE BLOOD TEST: CPT

## 2021-05-18 PROCEDURE — 99990 CHARGE CONVERSION: CPT

## 2021-05-18 PROCEDURE — 27000221 HC OXYGEN, UP TO 24 HOURS

## 2021-05-18 PROCEDURE — 96372 THER/PROPH/DIAG INJ SC/IM: CPT

## 2021-05-18 PROCEDURE — 63600175 PHARM REV CODE 636 W HCPCS: Performed by: EMERGENCY MEDICINE

## 2021-05-18 PROCEDURE — 11000004 HC SNF PRIVATE

## 2021-05-18 PROCEDURE — 25000003 PHARM REV CODE 250

## 2021-05-18 RX ORDER — INSULIN ASPART 100 [IU]/ML
0-12 INJECTION, SOLUTION INTRAVENOUS; SUBCUTANEOUS
Status: DISCONTINUED | OUTPATIENT
Start: 2021-05-18 | End: 2021-06-17 | Stop reason: HOSPADM

## 2021-05-18 RX ADMIN — AMOXICILLIN 500 MG: 250 CAPSULE ORAL at 09:05

## 2021-05-18 RX ADMIN — HYDRALAZINE HYDROCHLORIDE 25 MG: 25 TABLET, FILM COATED ORAL at 09:05

## 2021-05-18 RX ADMIN — AMOXICILLIN 500 MG: 250 CAPSULE ORAL at 03:05

## 2021-05-18 RX ADMIN — ATORVASTATIN CALCIUM 40 MG: 40 TABLET, FILM COATED ORAL at 09:05

## 2021-05-18 RX ADMIN — AMOXICILLIN 500 MG: 250 CAPSULE ORAL at 06:05

## 2021-05-18 RX ADMIN — INSULIN ASPART 2 UNITS: 100 INJECTION, SOLUTION INTRAVENOUS; SUBCUTANEOUS at 09:05

## 2021-05-18 RX ADMIN — MICONAZOLE NITRATE: 20 POWDER TOPICAL at 09:05

## 2021-05-18 RX ADMIN — LORAZEPAM 0.5 MG: 0.5 TABLET ORAL at 03:05

## 2021-05-18 RX ADMIN — GABAPENTIN 800 MG: 400 CAPSULE ORAL at 09:05

## 2021-05-18 RX ADMIN — Medication: at 10:05

## 2021-05-18 RX ADMIN — GABAPENTIN 800 MG: 400 CAPSULE ORAL at 04:05

## 2021-05-18 RX ADMIN — LORAZEPAM 0.5 MG: 0.5 TABLET ORAL at 09:05

## 2021-05-19 LAB
ANION GAP SERPL CALCULATED.3IONS-SCNC: 11 MMOL/L (ref 7–16)
BUN SERPL-MCNC: 26 MG/DL (ref 7–18)
BUN/CREAT SERPL: 24 (ref 6–20)
CALCIUM SERPL-MCNC: 9 MG/DL (ref 8.5–10.1)
CHLORIDE SERPL-SCNC: 104 MMOL/L (ref 98–107)
CO2 SERPL-SCNC: 28 MMOL/L (ref 21–32)
CREAT SERPL-MCNC: 1.08 MG/DL (ref 0.55–1.02)
GLUCOSE SERPL-MCNC: 111 MG/DL (ref 74–106)
GLUCOSE SERPL-MCNC: 112 MG/DL (ref 70–105)
GLUCOSE SERPL-MCNC: 115 MG/DL (ref 70–105)
GLUCOSE SERPL-MCNC: 115 MG/DL (ref 70–105)
GLUCOSE SERPL-MCNC: 118 MG/DL (ref 70–105)
GLUCOSE SERPL-MCNC: 118 MG/DL (ref 70–110)
GLUCOSE SERPL-MCNC: 121 MG/DL (ref 70–105)
GLUCOSE SERPL-MCNC: 122 MG/DL (ref 70–105)
GLUCOSE SERPL-MCNC: 123 MG/DL (ref 70–105)
GLUCOSE SERPL-MCNC: 123 MG/DL (ref 70–110)
GLUCOSE SERPL-MCNC: 134 MG/DL (ref 70–105)
GLUCOSE SERPL-MCNC: 173 MG/DL (ref 70–105)
GLUCOSE SERPL-MCNC: 173 MG/DL (ref 70–110)
POTASSIUM SERPL-SCNC: 4.3 MMOL/L (ref 3.5–5.1)
SODIUM SERPL-SCNC: 139 MMOL/L (ref 136–145)

## 2021-05-19 PROCEDURE — 92526 ORAL FUNCTION THERAPY: CPT

## 2021-05-19 PROCEDURE — 97116 GAIT TRAINING THERAPY: CPT | Mod: CQ

## 2021-05-19 PROCEDURE — 97530 THERAPEUTIC ACTIVITIES: CPT

## 2021-05-19 PROCEDURE — 97112 NEUROMUSCULAR REEDUCATION: CPT

## 2021-05-19 PROCEDURE — 25000003 PHARM REV CODE 250

## 2021-05-19 PROCEDURE — 96372 THER/PROPH/DIAG INJ SC/IM: CPT

## 2021-05-19 PROCEDURE — 36415 COLL VENOUS BLD VENIPUNCTURE: CPT | Performed by: EMERGENCY MEDICINE

## 2021-05-19 PROCEDURE — 82962 GLUCOSE BLOOD TEST: CPT

## 2021-05-19 PROCEDURE — 11000004 HC SNF PRIVATE

## 2021-05-19 PROCEDURE — 27000221 HC OXYGEN, UP TO 24 HOURS

## 2021-05-19 PROCEDURE — 94761 N-INVAS EAR/PLS OXIMETRY MLT: CPT

## 2021-05-19 PROCEDURE — 63600175 PHARM REV CODE 636 W HCPCS: Performed by: EMERGENCY MEDICINE

## 2021-05-19 PROCEDURE — 80048 BASIC METABOLIC PNL TOTAL CA: CPT | Performed by: EMERGENCY MEDICINE

## 2021-05-19 RX ADMIN — ATORVASTATIN CALCIUM 40 MG: 40 TABLET, FILM COATED ORAL at 09:05

## 2021-05-19 RX ADMIN — GABAPENTIN 800 MG: 400 CAPSULE ORAL at 09:05

## 2021-05-19 RX ADMIN — GABAPENTIN 800 MG: 400 CAPSULE ORAL at 04:05

## 2021-05-19 RX ADMIN — MICONAZOLE NITRATE: 20 POWDER TOPICAL at 09:05

## 2021-05-19 RX ADMIN — HYDRALAZINE HYDROCHLORIDE 25 MG: 25 TABLET, FILM COATED ORAL at 09:05

## 2021-05-19 RX ADMIN — ACETAMINOPHEN 650 MG: 325 TABLET ORAL at 01:05

## 2021-05-19 RX ADMIN — AMOXICILLIN 500 MG: 250 CAPSULE ORAL at 02:05

## 2021-05-19 RX ADMIN — INSULIN ASPART 4 UNITS: 100 INJECTION, SOLUTION INTRAVENOUS; SUBCUTANEOUS at 09:05

## 2021-05-19 RX ADMIN — CITALOPRAM HYDROBROMIDE 40 MG: 20 TABLET ORAL at 09:05

## 2021-05-19 RX ADMIN — AMOXICILLIN 500 MG: 250 CAPSULE ORAL at 09:05

## 2021-05-19 RX ADMIN — LORAZEPAM 0.5 MG: 0.5 TABLET ORAL at 09:05

## 2021-05-19 RX ADMIN — AMOXICILLIN 500 MG: 250 CAPSULE ORAL at 05:05

## 2021-05-19 RX ADMIN — LORAZEPAM 0.5 MG: 0.5 TABLET ORAL at 02:05

## 2021-05-19 RX ADMIN — INSULIN ASPART 4 UNITS: 100 INJECTION, SOLUTION INTRAVENOUS; SUBCUTANEOUS at 04:05

## 2021-05-19 RX ADMIN — GABAPENTIN 800 MG: 400 CAPSULE ORAL at 01:05

## 2021-05-19 RX ADMIN — POLYETHYLENE GLYCOL 3350 17 G: 17 POWDER, FOR SOLUTION ORAL at 09:05

## 2021-05-20 PROBLEM — I10 HYPERTENSION: Status: ACTIVE | Noted: 2021-05-20

## 2021-05-20 PROBLEM — G47.00 INSOMNIA: Status: ACTIVE | Noted: 2021-05-20

## 2021-05-20 PROBLEM — G60.9 IDIOPATHIC PERIPHERAL NEUROPATHY: Status: ACTIVE | Noted: 2021-05-20

## 2021-05-20 PROBLEM — R13.10 DYSPHAGIA: Status: ACTIVE | Noted: 2021-05-20

## 2021-05-20 PROBLEM — F41.1 GENERALIZED ANXIETY DISORDER: Status: ACTIVE | Noted: 2021-05-20

## 2021-05-20 PROBLEM — N30.00 ACUTE CYSTITIS WITHOUT HEMATURIA: Status: ACTIVE | Noted: 2021-05-20

## 2021-05-20 PROBLEM — H93.13 TINNITUS, BILATERAL: Status: ACTIVE | Noted: 2021-05-20

## 2021-05-20 PROBLEM — E11.49 TYPE 2 DIABETES MELLITUS WITH NEUROLOGIC COMPLICATION: Status: ACTIVE | Noted: 2021-05-20

## 2021-05-20 PROBLEM — F32.A DEPRESSIVE DISORDER: Status: ACTIVE | Noted: 2021-05-20

## 2021-05-20 LAB
BACTERIA #/AREA URNS HPF: ABNORMAL /HPF
BILIRUB UR QL STRIP: NEGATIVE
CLARITY UR: ABNORMAL
COLOR UR: YELLOW
GLUCOSE SERPL-MCNC: 110 MG/DL (ref 70–105)
GLUCOSE SERPL-MCNC: 112 MG/DL (ref 70–110)
GLUCOSE SERPL-MCNC: 134 MG/DL (ref 70–110)
GLUCOSE SERPL-MCNC: 137 MG/DL (ref 70–105)
GLUCOSE SERPL-MCNC: 143 MG/DL (ref 70–105)
GLUCOSE SERPL-MCNC: 144 MG/DL (ref 70–105)
GLUCOSE SERPL-MCNC: 154 MG/DL (ref 70–105)
GLUCOSE SERPL-MCNC: 158 MG/DL (ref 70–105)
GLUCOSE SERPL-MCNC: 187 MG/DL (ref 70–105)
GLUCOSE SERPL-MCNC: 231 MG/DL (ref 70–105)
GLUCOSE SERPL-MCNC: 231 MG/DL (ref 70–110)
GLUCOSE SERPL-MCNC: 234 MG/DL (ref 70–105)
GLUCOSE SERPL-MCNC: 234 MG/DL (ref 70–110)
GLUCOSE SERPL-MCNC: 254 MG/DL (ref 70–105)
GLUCOSE UR STRIP-MCNC: NEGATIVE MG/DL
KETONES UR STRIP-SCNC: NEGATIVE MG/DL
LEUKOCYTE ESTERASE UR QL STRIP: ABNORMAL
NITRITE UR QL STRIP: NEGATIVE
PH UR STRIP: 6 PH UNITS
PROT UR QL STRIP: NEGATIVE
RBC # UR STRIP: ABNORMAL /UL
RBC #/AREA URNS HPF: ABNORMAL /HPF
RENAL EPI CELLS #/AREA URNS LPF: ABNORMAL /LPF
SP GR UR STRIP: 1.02
TRICHOMONAS #/AREA URNS HPF: ABNORMAL /HPF
UROBILINOGEN UR STRIP-ACNC: 1 MG/DL
WBC #/AREA URNS HPF: ABNORMAL /HPF
YEAST #/AREA URNS HPF: ABNORMAL /HPF

## 2021-05-20 PROCEDURE — 87086 URINE CULTURE/COLONY COUNT: CPT | Performed by: EMERGENCY MEDICINE

## 2021-05-20 PROCEDURE — 63600175 PHARM REV CODE 636 W HCPCS: Performed by: EMERGENCY MEDICINE

## 2021-05-20 PROCEDURE — 25000003 PHARM REV CODE 250: Performed by: EMERGENCY MEDICINE

## 2021-05-20 PROCEDURE — 99900035 HC TECH TIME PER 15 MIN (STAT)

## 2021-05-20 PROCEDURE — 81001 URINALYSIS AUTO W/SCOPE: CPT | Performed by: EMERGENCY MEDICINE

## 2021-05-20 PROCEDURE — 92526 ORAL FUNCTION THERAPY: CPT

## 2021-05-20 PROCEDURE — 25000003 PHARM REV CODE 250: Performed by: NURSE PRACTITIONER

## 2021-05-20 PROCEDURE — 97116 GAIT TRAINING THERAPY: CPT

## 2021-05-20 PROCEDURE — 97530 THERAPEUTIC ACTIVITIES: CPT

## 2021-05-20 PROCEDURE — 97535 SELF CARE MNGMENT TRAINING: CPT

## 2021-05-20 PROCEDURE — 27000221 HC OXYGEN, UP TO 24 HOURS

## 2021-05-20 PROCEDURE — 81003 URINALYSIS AUTO W/O SCOPE: CPT | Performed by: EMERGENCY MEDICINE

## 2021-05-20 PROCEDURE — 27000944

## 2021-05-20 PROCEDURE — 94761 N-INVAS EAR/PLS OXIMETRY MLT: CPT

## 2021-05-20 PROCEDURE — 82962 GLUCOSE BLOOD TEST: CPT

## 2021-05-20 PROCEDURE — 97112 NEUROMUSCULAR REEDUCATION: CPT

## 2021-05-20 PROCEDURE — 11000004 HC SNF PRIVATE

## 2021-05-20 PROCEDURE — 25000003 PHARM REV CODE 250

## 2021-05-20 RX ORDER — CEPHALEXIN 250 MG/1
500 CAPSULE ORAL EVERY 6 HOURS
Status: DISCONTINUED | OUTPATIENT
Start: 2021-05-20 | End: 2021-05-20

## 2021-05-20 RX ORDER — PHENAZOPYRIDINE HYDROCHLORIDE 100 MG/1
100 TABLET, FILM COATED ORAL
Status: DISCONTINUED | OUTPATIENT
Start: 2021-05-20 | End: 2021-05-30

## 2021-05-20 RX ORDER — SULFAMETHOXAZOLE AND TRIMETHOPRIM 800; 160 MG/1; MG/1
1 TABLET ORAL 2 TIMES DAILY
Status: DISCONTINUED | OUTPATIENT
Start: 2021-05-20 | End: 2021-05-24

## 2021-05-20 RX ADMIN — ATORVASTATIN CALCIUM 40 MG: 40 TABLET, FILM COATED ORAL at 08:05

## 2021-05-20 RX ADMIN — GABAPENTIN 800 MG: 400 CAPSULE ORAL at 08:05

## 2021-05-20 RX ADMIN — ACETAMINOPHEN 650 MG: 325 TABLET ORAL at 06:05

## 2021-05-20 RX ADMIN — LORAZEPAM 0.5 MG: 0.5 TABLET ORAL at 09:05

## 2021-05-20 RX ADMIN — AMLODIPINE BESYLATE 5 MG: 5 TABLET ORAL at 09:05

## 2021-05-20 RX ADMIN — GABAPENTIN 800 MG: 400 CAPSULE ORAL at 12:05

## 2021-05-20 RX ADMIN — LORAZEPAM 0.5 MG: 0.5 TABLET ORAL at 02:05

## 2021-05-20 RX ADMIN — INSULIN ASPART 6 UNITS: 100 INJECTION, SOLUTION INTRAVENOUS; SUBCUTANEOUS at 08:05

## 2021-05-20 RX ADMIN — VALSARTAN 320 MG: 160 TABLET, FILM COATED ORAL at 09:05

## 2021-05-20 RX ADMIN — SULFAMETHOXAZOLE AND TRIMETHOPRIM 1 TABLET: 800; 160 TABLET ORAL at 08:05

## 2021-05-20 RX ADMIN — INSULIN ASPART 2 UNITS: 100 INJECTION, SOLUTION INTRAVENOUS; SUBCUTANEOUS at 12:05

## 2021-05-20 RX ADMIN — LORAZEPAM 0.5 MG: 0.5 TABLET ORAL at 08:05

## 2021-05-20 RX ADMIN — CITALOPRAM HYDROBROMIDE 40 MG: 20 TABLET ORAL at 09:05

## 2021-05-20 RX ADMIN — PHENAZOPYRIDINE 100 MG: 100 TABLET ORAL at 04:05

## 2021-05-20 RX ADMIN — HYDRALAZINE HYDROCHLORIDE 25 MG: 25 TABLET, FILM COATED ORAL at 08:05

## 2021-05-20 RX ADMIN — HYDROCHLOROTHIAZIDE 12.5 MG: 12.5 TABLET ORAL at 09:05

## 2021-05-20 RX ADMIN — AMOXICILLIN 500 MG: 250 CAPSULE ORAL at 05:05

## 2021-05-20 RX ADMIN — PHENAZOPYRIDINE 100 MG: 100 TABLET ORAL at 12:05

## 2021-05-20 RX ADMIN — GABAPENTIN 800 MG: 400 CAPSULE ORAL at 04:05

## 2021-05-20 RX ADMIN — INSULIN ASPART 2 UNITS: 100 INJECTION, SOLUTION INTRAVENOUS; SUBCUTANEOUS at 04:05

## 2021-05-20 RX ADMIN — POLYETHYLENE GLYCOL 3350 17 G: 17 POWDER, FOR SOLUTION ORAL at 09:05

## 2021-05-20 RX ADMIN — MICONAZOLE NITRATE: 20 POWDER TOPICAL at 09:05

## 2021-05-20 RX ADMIN — GABAPENTIN 800 MG: 400 CAPSULE ORAL at 09:05

## 2021-05-20 RX ADMIN — ACETAMINOPHEN 650 MG: 325 TABLET ORAL at 01:05

## 2021-05-20 RX ADMIN — HYDRALAZINE HYDROCHLORIDE 25 MG: 25 TABLET, FILM COATED ORAL at 09:05

## 2021-05-20 RX ADMIN — MICONAZOLE NITRATE: 20 POWDER TOPICAL at 08:05

## 2021-05-21 LAB
GLUCOSE SERPL-MCNC: 140 MG/DL (ref 70–105)
GLUCOSE SERPL-MCNC: 140 MG/DL (ref 70–105)
GLUCOSE SERPL-MCNC: 249 MG/DL (ref 70–105)
GLUCOSE SERPL-MCNC: 332 MG/DL (ref 70–105)

## 2021-05-21 PROCEDURE — 11000004 HC SNF PRIVATE

## 2021-05-21 PROCEDURE — 27000221 HC OXYGEN, UP TO 24 HOURS

## 2021-05-21 PROCEDURE — 63600175 PHARM REV CODE 636 W HCPCS: Performed by: EMERGENCY MEDICINE

## 2021-05-21 PROCEDURE — 97530 THERAPEUTIC ACTIVITIES: CPT

## 2021-05-21 PROCEDURE — 97116 GAIT TRAINING THERAPY: CPT | Mod: CQ

## 2021-05-21 PROCEDURE — 94761 N-INVAS EAR/PLS OXIMETRY MLT: CPT

## 2021-05-21 PROCEDURE — 25000003 PHARM REV CODE 250

## 2021-05-21 PROCEDURE — 97535 SELF CARE MNGMENT TRAINING: CPT

## 2021-05-21 PROCEDURE — 82962 GLUCOSE BLOOD TEST: CPT

## 2021-05-21 PROCEDURE — 25000003 PHARM REV CODE 250: Performed by: EMERGENCY MEDICINE

## 2021-05-21 PROCEDURE — 25000003 PHARM REV CODE 250: Performed by: NURSE PRACTITIONER

## 2021-05-21 PROCEDURE — 99900035 HC TECH TIME PER 15 MIN (STAT)

## 2021-05-21 PROCEDURE — 27000944

## 2021-05-21 PROCEDURE — 92526 ORAL FUNCTION THERAPY: CPT

## 2021-05-21 PROCEDURE — 97112 NEUROMUSCULAR REEDUCATION: CPT | Mod: CQ

## 2021-05-21 PROCEDURE — 97112 NEUROMUSCULAR REEDUCATION: CPT

## 2021-05-21 RX ADMIN — MICONAZOLE NITRATE: 20 POWDER TOPICAL at 08:05

## 2021-05-21 RX ADMIN — HYDROCHLOROTHIAZIDE 12.5 MG: 12.5 TABLET ORAL at 08:05

## 2021-05-21 RX ADMIN — LORAZEPAM 0.5 MG: 0.5 TABLET ORAL at 08:05

## 2021-05-21 RX ADMIN — PHENAZOPYRIDINE 100 MG: 100 TABLET ORAL at 04:05

## 2021-05-21 RX ADMIN — PHENAZOPYRIDINE 100 MG: 100 TABLET ORAL at 12:05

## 2021-05-21 RX ADMIN — SULFAMETHOXAZOLE AND TRIMETHOPRIM 1 TABLET: 800; 160 TABLET ORAL at 08:05

## 2021-05-21 RX ADMIN — ATORVASTATIN CALCIUM 40 MG: 40 TABLET, FILM COATED ORAL at 08:05

## 2021-05-21 RX ADMIN — HYDRALAZINE HYDROCHLORIDE 25 MG: 25 TABLET, FILM COATED ORAL at 08:05

## 2021-05-21 RX ADMIN — LORAZEPAM 0.5 MG: 0.5 TABLET ORAL at 03:05

## 2021-05-21 RX ADMIN — INSULIN ASPART 8 UNITS: 100 INJECTION, SOLUTION INTRAVENOUS; SUBCUTANEOUS at 08:05

## 2021-05-21 RX ADMIN — CITALOPRAM HYDROBROMIDE 40 MG: 20 TABLET ORAL at 08:05

## 2021-05-21 RX ADMIN — POLYETHYLENE GLYCOL 3350 17 G: 17 POWDER, FOR SOLUTION ORAL at 08:05

## 2021-05-21 RX ADMIN — AMLODIPINE BESYLATE 5 MG: 5 TABLET ORAL at 08:05

## 2021-05-21 RX ADMIN — GABAPENTIN 800 MG: 400 CAPSULE ORAL at 04:05

## 2021-05-21 RX ADMIN — PHENAZOPYRIDINE 100 MG: 100 TABLET ORAL at 07:05

## 2021-05-21 RX ADMIN — GABAPENTIN 800 MG: 400 CAPSULE ORAL at 08:05

## 2021-05-21 RX ADMIN — INSULIN ASPART 4 UNITS: 100 INJECTION, SOLUTION INTRAVENOUS; SUBCUTANEOUS at 04:05

## 2021-05-21 RX ADMIN — GABAPENTIN 800 MG: 400 CAPSULE ORAL at 12:05

## 2021-05-21 RX ADMIN — VALSARTAN 320 MG: 160 TABLET, FILM COATED ORAL at 08:05

## 2021-05-22 LAB
GLUCOSE SERPL-MCNC: 129 MG/DL (ref 70–105)
GLUCOSE SERPL-MCNC: 150 MG/DL (ref 70–105)
GLUCOSE SERPL-MCNC: 308 MG/DL (ref 70–105)
UA COMPLETE W REFLEX CULTURE PNL UR: ABNORMAL

## 2021-05-22 PROCEDURE — 82962 GLUCOSE BLOOD TEST: CPT

## 2021-05-22 PROCEDURE — 25000003 PHARM REV CODE 250: Performed by: EMERGENCY MEDICINE

## 2021-05-22 PROCEDURE — 27000221 HC OXYGEN, UP TO 24 HOURS

## 2021-05-22 PROCEDURE — 11000004 HC SNF PRIVATE

## 2021-05-22 PROCEDURE — 63600175 PHARM REV CODE 636 W HCPCS: Performed by: EMERGENCY MEDICINE

## 2021-05-22 PROCEDURE — 94761 N-INVAS EAR/PLS OXIMETRY MLT: CPT

## 2021-05-22 PROCEDURE — 25000003 PHARM REV CODE 250

## 2021-05-22 PROCEDURE — 25000003 PHARM REV CODE 250: Performed by: NURSE PRACTITIONER

## 2021-05-22 RX ADMIN — LORAZEPAM 0.5 MG: 0.5 TABLET ORAL at 01:05

## 2021-05-22 RX ADMIN — LORAZEPAM 0.5 MG: 0.5 TABLET ORAL at 09:05

## 2021-05-22 RX ADMIN — HYDRALAZINE HYDROCHLORIDE 25 MG: 25 TABLET, FILM COATED ORAL at 08:05

## 2021-05-22 RX ADMIN — GABAPENTIN 800 MG: 400 CAPSULE ORAL at 12:05

## 2021-05-22 RX ADMIN — PHENAZOPYRIDINE 100 MG: 100 TABLET ORAL at 05:05

## 2021-05-22 RX ADMIN — PHENAZOPYRIDINE 100 MG: 100 TABLET ORAL at 07:05

## 2021-05-22 RX ADMIN — INSULIN ASPART 8 UNITS: 100 INJECTION, SOLUTION INTRAVENOUS; SUBCUTANEOUS at 09:05

## 2021-05-22 RX ADMIN — HYDROCHLOROTHIAZIDE 12.5 MG: 12.5 TABLET ORAL at 09:05

## 2021-05-22 RX ADMIN — PHENAZOPYRIDINE 100 MG: 100 TABLET ORAL at 12:05

## 2021-05-22 RX ADMIN — MICONAZOLE NITRATE: 20 POWDER TOPICAL at 08:05

## 2021-05-22 RX ADMIN — HYDRALAZINE HYDROCHLORIDE 25 MG: 25 TABLET, FILM COATED ORAL at 09:05

## 2021-05-22 RX ADMIN — GABAPENTIN 800 MG: 400 CAPSULE ORAL at 08:05

## 2021-05-22 RX ADMIN — POLYETHYLENE GLYCOL 3350 17 G: 17 POWDER, FOR SOLUTION ORAL at 09:05

## 2021-05-22 RX ADMIN — LORAZEPAM 0.5 MG: 0.5 TABLET ORAL at 08:05

## 2021-05-22 RX ADMIN — MICONAZOLE NITRATE: 20 POWDER TOPICAL at 09:05

## 2021-05-22 RX ADMIN — GABAPENTIN 800 MG: 400 CAPSULE ORAL at 09:05

## 2021-05-22 RX ADMIN — ATORVASTATIN CALCIUM 40 MG: 40 TABLET, FILM COATED ORAL at 08:05

## 2021-05-22 RX ADMIN — SULFAMETHOXAZOLE AND TRIMETHOPRIM 1 TABLET: 800; 160 TABLET ORAL at 08:05

## 2021-05-22 RX ADMIN — ACETAMINOPHEN 650 MG: 325 TABLET ORAL at 05:05

## 2021-05-22 RX ADMIN — CITALOPRAM HYDROBROMIDE 40 MG: 20 TABLET ORAL at 09:05

## 2021-05-22 RX ADMIN — AMLODIPINE BESYLATE 5 MG: 5 TABLET ORAL at 09:05

## 2021-05-22 RX ADMIN — GABAPENTIN 800 MG: 400 CAPSULE ORAL at 05:05

## 2021-05-22 RX ADMIN — SULFAMETHOXAZOLE AND TRIMETHOPRIM 1 TABLET: 800; 160 TABLET ORAL at 09:05

## 2021-05-22 RX ADMIN — VALSARTAN 320 MG: 160 TABLET, FILM COATED ORAL at 09:05

## 2021-05-23 LAB
BASOPHILS # BLD AUTO: 0.07 K/UL (ref 0–0.2)
BASOPHILS NFR BLD AUTO: 1 % (ref 0–1)
DIFFERENTIAL METHOD BLD: ABNORMAL
EOSINOPHIL # BLD AUTO: 0.44 K/UL (ref 0–0.5)
EOSINOPHIL NFR BLD AUTO: 6.2 % (ref 1–4)
ERYTHROCYTE [DISTWIDTH] IN BLOOD BY AUTOMATED COUNT: 15.3 % (ref 11.5–14.5)
GLUCOSE SERPL-MCNC: 111 MG/DL (ref 70–105)
GLUCOSE SERPL-MCNC: 145 MG/DL (ref 70–105)
GLUCOSE SERPL-MCNC: 155 MG/DL (ref 70–105)
GLUCOSE SERPL-MCNC: 166 MG/DL (ref 70–105)
HCT VFR BLD AUTO: 36.5 % (ref 38–47)
HGB BLD-MCNC: 11.9 G/DL (ref 12–16)
LYMPHOCYTES # BLD AUTO: 2.3 K/UL (ref 1–4.8)
LYMPHOCYTES NFR BLD AUTO: 32.5 % (ref 27–41)
MCH RBC QN AUTO: 30.7 PG (ref 27–31)
MCHC RBC AUTO-ENTMCNC: 32.6 G/DL (ref 32–36)
MCV RBC AUTO: 94.3 FL (ref 80–96)
MONOCYTES # BLD AUTO: 0.82 K/UL (ref 0–0.8)
MONOCYTES NFR BLD AUTO: 11.6 % (ref 2–6)
MPC BLD CALC-MCNC: 11.2 FL (ref 9.4–12.4)
NEUTROPHILS # BLD AUTO: 3.45 K/UL (ref 1.8–7.7)
NEUTROPHILS NFR BLD AUTO: 48.7 % (ref 53–65)
PLATELET # BLD AUTO: 158 K/UL (ref 150–400)
RBC # BLD AUTO: 3.87 M/UL (ref 4.2–5.4)
WBC # BLD AUTO: 7.08 K/UL (ref 4.5–11)

## 2021-05-23 PROCEDURE — 36415 COLL VENOUS BLD VENIPUNCTURE: CPT | Performed by: EMERGENCY MEDICINE

## 2021-05-23 PROCEDURE — 27000221 HC OXYGEN, UP TO 24 HOURS

## 2021-05-23 PROCEDURE — 25000003 PHARM REV CODE 250: Performed by: EMERGENCY MEDICINE

## 2021-05-23 PROCEDURE — 82962 GLUCOSE BLOOD TEST: CPT

## 2021-05-23 PROCEDURE — 25000003 PHARM REV CODE 250: Performed by: NURSE PRACTITIONER

## 2021-05-23 PROCEDURE — 85025 COMPLETE CBC W/AUTO DIFF WBC: CPT | Performed by: EMERGENCY MEDICINE

## 2021-05-23 PROCEDURE — 11000004 HC SNF PRIVATE

## 2021-05-23 PROCEDURE — 25000003 PHARM REV CODE 250

## 2021-05-23 PROCEDURE — 94761 N-INVAS EAR/PLS OXIMETRY MLT: CPT

## 2021-05-23 PROCEDURE — 99900035 HC TECH TIME PER 15 MIN (STAT)

## 2021-05-23 PROCEDURE — 63600175 PHARM REV CODE 636 W HCPCS: Performed by: EMERGENCY MEDICINE

## 2021-05-23 RX ADMIN — ATORVASTATIN CALCIUM 40 MG: 40 TABLET, FILM COATED ORAL at 08:05

## 2021-05-23 RX ADMIN — HYDROCHLOROTHIAZIDE 12.5 MG: 12.5 TABLET ORAL at 10:05

## 2021-05-23 RX ADMIN — GABAPENTIN 800 MG: 400 CAPSULE ORAL at 05:05

## 2021-05-23 RX ADMIN — Medication: at 03:05

## 2021-05-23 RX ADMIN — VALSARTAN 320 MG: 160 TABLET, FILM COATED ORAL at 09:05

## 2021-05-23 RX ADMIN — LORAZEPAM 0.5 MG: 0.5 TABLET ORAL at 09:05

## 2021-05-23 RX ADMIN — MICONAZOLE NITRATE: 20 POWDER TOPICAL at 10:05

## 2021-05-23 RX ADMIN — HYDRALAZINE HYDROCHLORIDE 25 MG: 25 TABLET, FILM COATED ORAL at 08:05

## 2021-05-23 RX ADMIN — SULFAMETHOXAZOLE AND TRIMETHOPRIM 1 TABLET: 800; 160 TABLET ORAL at 09:05

## 2021-05-23 RX ADMIN — CITALOPRAM HYDROBROMIDE 40 MG: 20 TABLET ORAL at 10:05

## 2021-05-23 RX ADMIN — HYDRALAZINE HYDROCHLORIDE 25 MG: 25 TABLET, FILM COATED ORAL at 10:05

## 2021-05-23 RX ADMIN — AMLODIPINE BESYLATE 5 MG: 5 TABLET ORAL at 10:05

## 2021-05-23 RX ADMIN — LORAZEPAM 0.5 MG: 0.5 TABLET ORAL at 08:05

## 2021-05-23 RX ADMIN — LORAZEPAM 0.5 MG: 0.5 TABLET ORAL at 03:05

## 2021-05-23 RX ADMIN — MICONAZOLE NITRATE: 20 POWDER TOPICAL at 09:05

## 2021-05-23 RX ADMIN — PHENAZOPYRIDINE 100 MG: 100 TABLET ORAL at 05:05

## 2021-05-23 RX ADMIN — ACETAMINOPHEN 650 MG: 325 TABLET ORAL at 03:05

## 2021-05-23 RX ADMIN — SULFAMETHOXAZOLE AND TRIMETHOPRIM 1 TABLET: 800; 160 TABLET ORAL at 08:05

## 2021-05-23 RX ADMIN — PHENAZOPYRIDINE 100 MG: 100 TABLET ORAL at 08:05

## 2021-05-23 RX ADMIN — GABAPENTIN 800 MG: 400 CAPSULE ORAL at 08:05

## 2021-05-23 RX ADMIN — PHENAZOPYRIDINE 100 MG: 100 TABLET ORAL at 12:05

## 2021-05-23 RX ADMIN — GABAPENTIN 800 MG: 400 CAPSULE ORAL at 12:05

## 2021-05-23 RX ADMIN — GABAPENTIN 800 MG: 400 CAPSULE ORAL at 09:05

## 2021-05-23 RX ADMIN — INSULIN ASPART 2 UNITS: 100 INJECTION, SOLUTION INTRAVENOUS; SUBCUTANEOUS at 08:05

## 2021-05-23 RX ADMIN — POLYETHYLENE GLYCOL 3350 17 G: 17 POWDER, FOR SOLUTION ORAL at 09:05

## 2021-05-23 RX ADMIN — INSULIN ASPART 2 UNITS: 100 INJECTION, SOLUTION INTRAVENOUS; SUBCUTANEOUS at 11:05

## 2021-05-24 LAB
GLUCOSE SERPL-MCNC: 105 MG/DL (ref 70–105)
GLUCOSE SERPL-MCNC: 111 MG/DL (ref 70–105)
GLUCOSE SERPL-MCNC: 112 MG/DL (ref 70–105)
GLUCOSE SERPL-MCNC: 114 MG/DL (ref 70–105)

## 2021-05-24 PROCEDURE — 25000003 PHARM REV CODE 250: Performed by: EMERGENCY MEDICINE

## 2021-05-24 PROCEDURE — 97116 GAIT TRAINING THERAPY: CPT

## 2021-05-24 PROCEDURE — 25000003 PHARM REV CODE 250: Performed by: NURSE PRACTITIONER

## 2021-05-24 PROCEDURE — 94761 N-INVAS EAR/PLS OXIMETRY MLT: CPT

## 2021-05-24 PROCEDURE — 97530 THERAPEUTIC ACTIVITIES: CPT

## 2021-05-24 PROCEDURE — 99900035 HC TECH TIME PER 15 MIN (STAT)

## 2021-05-24 PROCEDURE — 92526 ORAL FUNCTION THERAPY: CPT

## 2021-05-24 PROCEDURE — 97112 NEUROMUSCULAR REEDUCATION: CPT

## 2021-05-24 PROCEDURE — 27000944

## 2021-05-24 PROCEDURE — 11000004 HC SNF PRIVATE

## 2021-05-24 PROCEDURE — 82962 GLUCOSE BLOOD TEST: CPT

## 2021-05-24 PROCEDURE — 25000003 PHARM REV CODE 250

## 2021-05-24 PROCEDURE — 27000221 HC OXYGEN, UP TO 24 HOURS

## 2021-05-24 PROCEDURE — 63600175 PHARM REV CODE 636 W HCPCS: Performed by: NURSE PRACTITIONER

## 2021-05-24 RX ADMIN — ACETAMINOPHEN 650 MG: 325 TABLET ORAL at 02:05

## 2021-05-24 RX ADMIN — POLYETHYLENE GLYCOL 3350 17 G: 17 POWDER, FOR SOLUTION ORAL at 10:05

## 2021-05-24 RX ADMIN — HYDROCHLOROTHIAZIDE 12.5 MG: 12.5 TABLET ORAL at 10:05

## 2021-05-24 RX ADMIN — LORAZEPAM 0.5 MG: 0.5 TABLET ORAL at 10:05

## 2021-05-24 RX ADMIN — PHENAZOPYRIDINE 100 MG: 100 TABLET ORAL at 01:05

## 2021-05-24 RX ADMIN — GABAPENTIN 800 MG: 400 CAPSULE ORAL at 01:05

## 2021-05-24 RX ADMIN — PIPERACILLIN AND TAZOBACTAM 2.25 G: 2; .25 INJECTION, POWDER, LYOPHILIZED, FOR SOLUTION INTRAVENOUS at 01:05

## 2021-05-24 RX ADMIN — HYDRALAZINE HYDROCHLORIDE 25 MG: 25 TABLET, FILM COATED ORAL at 10:05

## 2021-05-24 RX ADMIN — PHENAZOPYRIDINE 100 MG: 100 TABLET ORAL at 10:05

## 2021-05-24 RX ADMIN — MICONAZOLE NITRATE: 20 POWDER TOPICAL at 12:05

## 2021-05-24 RX ADMIN — AMLODIPINE BESYLATE 5 MG: 5 TABLET ORAL at 10:05

## 2021-05-24 RX ADMIN — ATORVASTATIN CALCIUM 40 MG: 40 TABLET, FILM COATED ORAL at 08:05

## 2021-05-24 RX ADMIN — GABAPENTIN 800 MG: 400 CAPSULE ORAL at 10:05

## 2021-05-24 RX ADMIN — GABAPENTIN 800 MG: 400 CAPSULE ORAL at 04:05

## 2021-05-24 RX ADMIN — MICONAZOLE NITRATE: 20 POWDER TOPICAL at 08:05

## 2021-05-24 RX ADMIN — VALSARTAN 320 MG: 160 TABLET, FILM COATED ORAL at 10:05

## 2021-05-24 RX ADMIN — SULFAMETHOXAZOLE AND TRIMETHOPRIM 1 TABLET: 800; 160 TABLET ORAL at 10:05

## 2021-05-24 RX ADMIN — PHENAZOPYRIDINE 100 MG: 100 TABLET ORAL at 04:05

## 2021-05-24 RX ADMIN — LORAZEPAM 0.5 MG: 0.5 TABLET ORAL at 08:05

## 2021-05-24 RX ADMIN — GABAPENTIN 800 MG: 400 CAPSULE ORAL at 08:05

## 2021-05-24 RX ADMIN — PIPERACILLIN AND TAZOBACTAM 2.25 G: 2; .25 INJECTION, POWDER, LYOPHILIZED, FOR SOLUTION INTRAVENOUS at 04:05

## 2021-05-24 RX ADMIN — LORAZEPAM 0.5 MG: 0.5 TABLET ORAL at 03:05

## 2021-05-24 RX ADMIN — CITALOPRAM HYDROBROMIDE 40 MG: 20 TABLET ORAL at 10:05

## 2021-05-25 LAB
GLUCOSE SERPL-MCNC: 103 MG/DL (ref 70–105)
GLUCOSE SERPL-MCNC: 113 MG/DL (ref 70–105)
GLUCOSE SERPL-MCNC: 116 MG/DL (ref 70–105)
GLUCOSE SERPL-MCNC: 188 MG/DL (ref 70–105)

## 2021-05-25 PROCEDURE — 63600175 PHARM REV CODE 636 W HCPCS: Performed by: EMERGENCY MEDICINE

## 2021-05-25 PROCEDURE — 11000004 HC SNF PRIVATE

## 2021-05-25 PROCEDURE — 27000944

## 2021-05-25 PROCEDURE — 94761 N-INVAS EAR/PLS OXIMETRY MLT: CPT

## 2021-05-25 PROCEDURE — 97112 NEUROMUSCULAR REEDUCATION: CPT | Mod: CQ

## 2021-05-25 PROCEDURE — 82962 GLUCOSE BLOOD TEST: CPT

## 2021-05-25 PROCEDURE — 97535 SELF CARE MNGMENT TRAINING: CPT

## 2021-05-25 PROCEDURE — 92526 ORAL FUNCTION THERAPY: CPT

## 2021-05-25 PROCEDURE — 63600175 PHARM REV CODE 636 W HCPCS: Performed by: NURSE PRACTITIONER

## 2021-05-25 PROCEDURE — 25000003 PHARM REV CODE 250: Performed by: NURSE PRACTITIONER

## 2021-05-25 PROCEDURE — 25000003 PHARM REV CODE 250

## 2021-05-25 PROCEDURE — 25000003 PHARM REV CODE 250: Performed by: EMERGENCY MEDICINE

## 2021-05-25 PROCEDURE — 27000221 HC OXYGEN, UP TO 24 HOURS

## 2021-05-25 PROCEDURE — 97112 NEUROMUSCULAR REEDUCATION: CPT

## 2021-05-25 RX ADMIN — ATORVASTATIN CALCIUM 40 MG: 40 TABLET, FILM COATED ORAL at 08:05

## 2021-05-25 RX ADMIN — AMLODIPINE BESYLATE 5 MG: 5 TABLET ORAL at 08:05

## 2021-05-25 RX ADMIN — GABAPENTIN 800 MG: 400 CAPSULE ORAL at 08:05

## 2021-05-25 RX ADMIN — INSULIN ASPART 2 UNITS: 100 INJECTION, SOLUTION INTRAVENOUS; SUBCUTANEOUS at 08:05

## 2021-05-25 RX ADMIN — CITALOPRAM HYDROBROMIDE 40 MG: 20 TABLET ORAL at 08:05

## 2021-05-25 RX ADMIN — GABAPENTIN 800 MG: 400 CAPSULE ORAL at 04:05

## 2021-05-25 RX ADMIN — VALSARTAN 320 MG: 160 TABLET, FILM COATED ORAL at 08:05

## 2021-05-25 RX ADMIN — PIPERACILLIN AND TAZOBACTAM 2.25 G: 2; .25 INJECTION, POWDER, LYOPHILIZED, FOR SOLUTION INTRAVENOUS at 12:05

## 2021-05-25 RX ADMIN — POLYETHYLENE GLYCOL 3350 17 G: 17 POWDER, FOR SOLUTION ORAL at 08:05

## 2021-05-25 RX ADMIN — LORAZEPAM 0.5 MG: 0.5 TABLET ORAL at 02:05

## 2021-05-25 RX ADMIN — PIPERACILLIN AND TAZOBACTAM 2.25 G: 2; .25 INJECTION, POWDER, LYOPHILIZED, FOR SOLUTION INTRAVENOUS at 04:05

## 2021-05-25 RX ADMIN — HYDROCHLOROTHIAZIDE 12.5 MG: 12.5 TABLET ORAL at 08:05

## 2021-05-25 RX ADMIN — ACETAMINOPHEN 650 MG: 325 TABLET ORAL at 06:05

## 2021-05-25 RX ADMIN — PIPERACILLIN AND TAZOBACTAM 2.25 G: 2; .25 INJECTION, POWDER, LYOPHILIZED, FOR SOLUTION INTRAVENOUS at 08:05

## 2021-05-25 RX ADMIN — ACETAMINOPHEN 650 MG: 325 TABLET ORAL at 04:05

## 2021-05-25 RX ADMIN — HYDRALAZINE HYDROCHLORIDE 25 MG: 25 TABLET, FILM COATED ORAL at 08:05

## 2021-05-25 RX ADMIN — PHENAZOPYRIDINE 100 MG: 100 TABLET ORAL at 04:05

## 2021-05-25 RX ADMIN — GABAPENTIN 800 MG: 400 CAPSULE ORAL at 02:05

## 2021-05-25 RX ADMIN — PHENAZOPYRIDINE 100 MG: 100 TABLET ORAL at 02:05

## 2021-05-25 RX ADMIN — PHENAZOPYRIDINE 100 MG: 100 TABLET ORAL at 08:05

## 2021-05-25 RX ADMIN — MICONAZOLE NITRATE: 20 POWDER TOPICAL at 08:05

## 2021-05-25 RX ADMIN — LORAZEPAM 0.5 MG: 0.5 TABLET ORAL at 08:05

## 2021-05-26 LAB
ANION GAP SERPL CALCULATED.3IONS-SCNC: 12 MMOL/L (ref 7–16)
BUN SERPL-MCNC: 24 MG/DL (ref 7–18)
BUN/CREAT SERPL: 16 (ref 6–20)
CALCIUM SERPL-MCNC: 8.8 MG/DL (ref 8.5–10.1)
CHLORIDE SERPL-SCNC: 102 MMOL/L (ref 98–107)
CO2 SERPL-SCNC: 30 MMOL/L (ref 21–32)
CREAT SERPL-MCNC: 1.48 MG/DL (ref 0.55–1.02)
GLUCOSE SERPL-MCNC: 112 MG/DL (ref 70–105)
GLUCOSE SERPL-MCNC: 114 MG/DL (ref 70–105)
GLUCOSE SERPL-MCNC: 116 MG/DL (ref 74–106)
GLUCOSE SERPL-MCNC: 143 MG/DL (ref 70–105)
GLUCOSE SERPL-MCNC: 271 MG/DL (ref 70–105)
POTASSIUM SERPL-SCNC: 4.6 MMOL/L (ref 3.5–5.1)
SODIUM SERPL-SCNC: 139 MMOL/L (ref 136–145)

## 2021-05-26 PROCEDURE — 36415 COLL VENOUS BLD VENIPUNCTURE: CPT | Performed by: EMERGENCY MEDICINE

## 2021-05-26 PROCEDURE — 63600175 PHARM REV CODE 636 W HCPCS: Performed by: EMERGENCY MEDICINE

## 2021-05-26 PROCEDURE — 63600175 PHARM REV CODE 636 W HCPCS: Performed by: NURSE PRACTITIONER

## 2021-05-26 PROCEDURE — 97112 NEUROMUSCULAR REEDUCATION: CPT

## 2021-05-26 PROCEDURE — 27000221 HC OXYGEN, UP TO 24 HOURS

## 2021-05-26 PROCEDURE — 80048 BASIC METABOLIC PNL TOTAL CA: CPT | Performed by: EMERGENCY MEDICINE

## 2021-05-26 PROCEDURE — 25000003 PHARM REV CODE 250: Performed by: NURSE PRACTITIONER

## 2021-05-26 PROCEDURE — 92526 ORAL FUNCTION THERAPY: CPT

## 2021-05-26 PROCEDURE — 25000003 PHARM REV CODE 250: Performed by: EMERGENCY MEDICINE

## 2021-05-26 PROCEDURE — 94761 N-INVAS EAR/PLS OXIMETRY MLT: CPT

## 2021-05-26 PROCEDURE — 11000004 HC SNF PRIVATE

## 2021-05-26 PROCEDURE — 97116 GAIT TRAINING THERAPY: CPT

## 2021-05-26 PROCEDURE — 27000944

## 2021-05-26 PROCEDURE — 97530 THERAPEUTIC ACTIVITIES: CPT

## 2021-05-26 PROCEDURE — 82962 GLUCOSE BLOOD TEST: CPT

## 2021-05-26 PROCEDURE — 25000003 PHARM REV CODE 250

## 2021-05-26 RX ADMIN — HYDRALAZINE HYDROCHLORIDE 25 MG: 25 TABLET, FILM COATED ORAL at 09:05

## 2021-05-26 RX ADMIN — LORAZEPAM 0.5 MG: 0.5 TABLET ORAL at 03:05

## 2021-05-26 RX ADMIN — LORAZEPAM 0.5 MG: 0.5 TABLET ORAL at 09:05

## 2021-05-26 RX ADMIN — MICONAZOLE NITRATE: 20 POWDER TOPICAL at 09:05

## 2021-05-26 RX ADMIN — PHENAZOPYRIDINE 100 MG: 100 TABLET ORAL at 08:05

## 2021-05-26 RX ADMIN — PIPERACILLIN AND TAZOBACTAM 2.25 G: 2; .25 INJECTION, POWDER, LYOPHILIZED, FOR SOLUTION INTRAVENOUS at 08:05

## 2021-05-26 RX ADMIN — PIPERACILLIN AND TAZOBACTAM 2.25 G: 2; .25 INJECTION, POWDER, LYOPHILIZED, FOR SOLUTION INTRAVENOUS at 04:05

## 2021-05-26 RX ADMIN — HYDROCHLOROTHIAZIDE 12.5 MG: 12.5 TABLET ORAL at 09:05

## 2021-05-26 RX ADMIN — GABAPENTIN 800 MG: 400 CAPSULE ORAL at 09:05

## 2021-05-26 RX ADMIN — VALSARTAN 320 MG: 160 TABLET, FILM COATED ORAL at 09:05

## 2021-05-26 RX ADMIN — INSULIN ASPART 6 UNITS: 100 INJECTION, SOLUTION INTRAVENOUS; SUBCUTANEOUS at 09:05

## 2021-05-26 RX ADMIN — PHENAZOPYRIDINE 100 MG: 100 TABLET ORAL at 05:05

## 2021-05-26 RX ADMIN — PHENAZOPYRIDINE 100 MG: 100 TABLET ORAL at 12:05

## 2021-05-26 RX ADMIN — POLYETHYLENE GLYCOL 3350 17 G: 17 POWDER, FOR SOLUTION ORAL at 09:05

## 2021-05-26 RX ADMIN — PIPERACILLIN AND TAZOBACTAM 2.25 G: 2; .25 INJECTION, POWDER, LYOPHILIZED, FOR SOLUTION INTRAVENOUS at 12:05

## 2021-05-26 RX ADMIN — ATORVASTATIN CALCIUM 40 MG: 40 TABLET, FILM COATED ORAL at 09:05

## 2021-05-26 RX ADMIN — GABAPENTIN 800 MG: 400 CAPSULE ORAL at 12:05

## 2021-05-26 RX ADMIN — GABAPENTIN 800 MG: 400 CAPSULE ORAL at 05:05

## 2021-05-26 RX ADMIN — AMLODIPINE BESYLATE 5 MG: 5 TABLET ORAL at 09:05

## 2021-05-26 RX ADMIN — CITALOPRAM HYDROBROMIDE 40 MG: 20 TABLET ORAL at 09:05

## 2021-05-27 LAB
GLUCOSE SERPL-MCNC: 117 MG/DL (ref 70–105)
GLUCOSE SERPL-MCNC: 232 MG/DL (ref 70–105)

## 2021-05-27 PROCEDURE — 25000003 PHARM REV CODE 250: Performed by: EMERGENCY MEDICINE

## 2021-05-27 PROCEDURE — 97535 SELF CARE MNGMENT TRAINING: CPT

## 2021-05-27 PROCEDURE — 27000944

## 2021-05-27 PROCEDURE — 63600175 PHARM REV CODE 636 W HCPCS

## 2021-05-27 PROCEDURE — 97112 NEUROMUSCULAR REEDUCATION: CPT

## 2021-05-27 PROCEDURE — 63600175 PHARM REV CODE 636 W HCPCS: Performed by: EMERGENCY MEDICINE

## 2021-05-27 PROCEDURE — 82962 GLUCOSE BLOOD TEST: CPT

## 2021-05-27 PROCEDURE — 25000003 PHARM REV CODE 250

## 2021-05-27 PROCEDURE — 11000004 HC SNF PRIVATE

## 2021-05-27 PROCEDURE — 27000221 HC OXYGEN, UP TO 24 HOURS

## 2021-05-27 PROCEDURE — 97530 THERAPEUTIC ACTIVITIES: CPT

## 2021-05-27 PROCEDURE — 97110 THERAPEUTIC EXERCISES: CPT | Mod: CQ

## 2021-05-27 PROCEDURE — 94761 N-INVAS EAR/PLS OXIMETRY MLT: CPT

## 2021-05-27 RX ADMIN — LORAZEPAM 0.5 MG: 0.5 TABLET ORAL at 03:05

## 2021-05-27 RX ADMIN — INSULIN ASPART 2 UNITS: 100 INJECTION, SOLUTION INTRAVENOUS; SUBCUTANEOUS at 09:05

## 2021-05-27 RX ADMIN — INSULIN ASPART 4 UNITS: 100 INJECTION, SOLUTION INTRAVENOUS; SUBCUTANEOUS at 04:05

## 2021-05-27 RX ADMIN — ATORVASTATIN CALCIUM 40 MG: 40 TABLET, FILM COATED ORAL at 09:05

## 2021-05-27 RX ADMIN — CITALOPRAM HYDROBROMIDE 40 MG: 20 TABLET ORAL at 09:05

## 2021-05-27 RX ADMIN — LORAZEPAM 0.5 MG: 0.5 TABLET ORAL at 09:05

## 2021-05-27 RX ADMIN — PIPERACILLIN AND TAZOBACTAM 2.25 G: 2; .25 INJECTION, POWDER, LYOPHILIZED, FOR SOLUTION INTRAVENOUS at 04:05

## 2021-05-27 RX ADMIN — GABAPENTIN 800 MG: 400 CAPSULE ORAL at 09:05

## 2021-05-27 RX ADMIN — PHENAZOPYRIDINE 100 MG: 100 TABLET ORAL at 04:05

## 2021-05-27 RX ADMIN — GABAPENTIN 800 MG: 400 CAPSULE ORAL at 04:05

## 2021-05-27 RX ADMIN — PIPERACILLIN AND TAZOBACTAM 2.25 G: 2; .25 INJECTION, POWDER, LYOPHILIZED, FOR SOLUTION INTRAVENOUS at 12:05

## 2021-05-27 RX ADMIN — PIPERACILLIN AND TAZOBACTAM 2.25 G: 2; .25 INJECTION, POWDER, LYOPHILIZED, FOR SOLUTION INTRAVENOUS at 08:05

## 2021-05-27 RX ADMIN — HYDRALAZINE HYDROCHLORIDE 25 MG: 25 TABLET, FILM COATED ORAL at 09:05

## 2021-05-27 RX ADMIN — MICONAZOLE NITRATE: 20 POWDER TOPICAL at 09:05

## 2021-05-27 RX ADMIN — ONDANSETRON 4 MG: 2 INJECTION INTRAMUSCULAR; INTRAVENOUS at 09:05

## 2021-05-28 LAB
BASOPHILS # BLD AUTO: 0.06 K/UL (ref 0–0.2)
BASOPHILS NFR BLD AUTO: 0.9 % (ref 0–1)
EOSINOPHIL # BLD AUTO: 0.42 K/UL (ref 0–0.5)
EOSINOPHIL NFR BLD AUTO: 6.3 % (ref 1–4)
ERYTHROCYTE [DISTWIDTH] IN BLOOD BY AUTOMATED COUNT: 15.9 % (ref 11.5–14.5)
GLUCOSE SERPL-MCNC: 122 MG/DL (ref 70–105)
GLUCOSE SERPL-MCNC: 131 MG/DL (ref 70–105)
GLUCOSE SERPL-MCNC: 198 MG/DL (ref 70–105)
GLUCOSE SERPL-MCNC: 201 MG/DL (ref 70–105)
HCT VFR BLD AUTO: 38.9 % (ref 38–47)
HGB BLD-MCNC: 12 G/DL (ref 12–16)
LYMPHOCYTES # BLD AUTO: 2.95 K/UL (ref 1–4.8)
LYMPHOCYTES NFR BLD AUTO: 44.1 % (ref 27–41)
MCH RBC QN AUTO: 30.6 PG (ref 27–31)
MCHC RBC AUTO-ENTMCNC: 30.8 G/DL (ref 32–36)
MCV RBC AUTO: 99.2 FL (ref 80–96)
MONOCYTES # BLD AUTO: 0.75 K/UL (ref 0–0.8)
MONOCYTES NFR BLD AUTO: 11.2 % (ref 2–6)
MPC BLD CALC-MCNC: 11.6 FL (ref 9.4–12.4)
NEUTROPHILS # BLD AUTO: 2.51 K/UL (ref 1.8–7.7)
NEUTROPHILS NFR BLD AUTO: 37.5 % (ref 53–65)
PLATELET # BLD AUTO: 148 K/UL (ref 150–400)
RBC # BLD AUTO: 3.92 M/UL (ref 4.2–5.4)
WBC # BLD AUTO: 6.69 K/UL (ref 4.5–11)

## 2021-05-28 PROCEDURE — 82962 GLUCOSE BLOOD TEST: CPT

## 2021-05-28 PROCEDURE — 25000003 PHARM REV CODE 250: Performed by: NURSE PRACTITIONER

## 2021-05-28 PROCEDURE — 63600175 PHARM REV CODE 636 W HCPCS: Performed by: EMERGENCY MEDICINE

## 2021-05-28 PROCEDURE — 36415 COLL VENOUS BLD VENIPUNCTURE: CPT | Performed by: EMERGENCY MEDICINE

## 2021-05-28 PROCEDURE — 11000004 HC SNF PRIVATE

## 2021-05-28 PROCEDURE — 97112 NEUROMUSCULAR REEDUCATION: CPT

## 2021-05-28 PROCEDURE — 92526 ORAL FUNCTION THERAPY: CPT

## 2021-05-28 PROCEDURE — 94761 N-INVAS EAR/PLS OXIMETRY MLT: CPT

## 2021-05-28 PROCEDURE — 25000003 PHARM REV CODE 250: Performed by: EMERGENCY MEDICINE

## 2021-05-28 PROCEDURE — 27000944

## 2021-05-28 PROCEDURE — 27000221 HC OXYGEN, UP TO 24 HOURS

## 2021-05-28 PROCEDURE — 25000003 PHARM REV CODE 250

## 2021-05-28 PROCEDURE — 97116 GAIT TRAINING THERAPY: CPT

## 2021-05-28 PROCEDURE — 85025 COMPLETE CBC W/AUTO DIFF WBC: CPT | Performed by: EMERGENCY MEDICINE

## 2021-05-28 RX ORDER — CLOPIDOGREL BISULFATE 75 MG/1
75 TABLET ORAL DAILY
Status: DISCONTINUED | OUTPATIENT
Start: 2021-05-28 | End: 2021-06-17 | Stop reason: HOSPADM

## 2021-05-28 RX ADMIN — PIPERACILLIN AND TAZOBACTAM 2.25 G: 2; .25 INJECTION, POWDER, LYOPHILIZED, FOR SOLUTION INTRAVENOUS at 08:05

## 2021-05-28 RX ADMIN — PIPERACILLIN AND TAZOBACTAM 2.25 G: 2; .25 INJECTION, POWDER, LYOPHILIZED, FOR SOLUTION INTRAVENOUS at 12:05

## 2021-05-28 RX ADMIN — PIPERACILLIN AND TAZOBACTAM 2.25 G: 2; .25 INJECTION, POWDER, LYOPHILIZED, FOR SOLUTION INTRAVENOUS at 04:05

## 2021-05-28 RX ADMIN — GABAPENTIN 800 MG: 400 CAPSULE ORAL at 05:05

## 2021-05-28 RX ADMIN — MICONAZOLE NITRATE: 20 POWDER TOPICAL at 08:05

## 2021-05-28 RX ADMIN — LORAZEPAM 0.5 MG: 0.5 TABLET ORAL at 09:05

## 2021-05-28 RX ADMIN — HYDRALAZINE HYDROCHLORIDE 25 MG: 25 TABLET, FILM COATED ORAL at 09:05

## 2021-05-28 RX ADMIN — INSULIN ASPART 4 UNITS: 100 INJECTION, SOLUTION INTRAVENOUS; SUBCUTANEOUS at 09:05

## 2021-05-28 RX ADMIN — POLYETHYLENE GLYCOL 3350 17 G: 17 POWDER, FOR SOLUTION ORAL at 09:05

## 2021-05-28 RX ADMIN — GABAPENTIN 800 MG: 400 CAPSULE ORAL at 12:05

## 2021-05-28 RX ADMIN — PHENAZOPYRIDINE 100 MG: 100 TABLET ORAL at 12:05

## 2021-05-28 RX ADMIN — LORAZEPAM 0.5 MG: 0.5 TABLET ORAL at 03:05

## 2021-05-28 RX ADMIN — INSULIN ASPART 2 UNITS: 100 INJECTION, SOLUTION INTRAVENOUS; SUBCUTANEOUS at 04:05

## 2021-05-28 RX ADMIN — CITALOPRAM HYDROBROMIDE 40 MG: 20 TABLET ORAL at 09:05

## 2021-05-28 RX ADMIN — HYDRALAZINE HYDROCHLORIDE 25 MG: 25 TABLET, FILM COATED ORAL at 08:05

## 2021-05-28 RX ADMIN — CLOPIDOGREL BISULFATE 75 MG: 75 TABLET, FILM COATED ORAL at 12:05

## 2021-05-28 RX ADMIN — HYDROCHLOROTHIAZIDE 12.5 MG: 12.5 TABLET ORAL at 09:05

## 2021-05-28 RX ADMIN — PHENAZOPYRIDINE 100 MG: 100 TABLET ORAL at 05:05

## 2021-05-28 RX ADMIN — VALSARTAN 320 MG: 160 TABLET, FILM COATED ORAL at 09:05

## 2021-05-28 RX ADMIN — AMLODIPINE BESYLATE 5 MG: 5 TABLET ORAL at 09:05

## 2021-05-28 RX ADMIN — GABAPENTIN 800 MG: 400 CAPSULE ORAL at 09:05

## 2021-05-28 RX ADMIN — PHENAZOPYRIDINE 100 MG: 100 TABLET ORAL at 09:05

## 2021-05-28 RX ADMIN — ATORVASTATIN CALCIUM 40 MG: 40 TABLET, FILM COATED ORAL at 08:05

## 2021-05-28 RX ADMIN — MICONAZOLE NITRATE: 20 POWDER TOPICAL at 09:05

## 2021-05-28 RX ADMIN — LORAZEPAM 0.5 MG: 0.5 TABLET ORAL at 08:05

## 2021-05-28 RX ADMIN — ACETAMINOPHEN 650 MG: 325 TABLET ORAL at 02:05

## 2021-05-28 RX ADMIN — GABAPENTIN 800 MG: 400 CAPSULE ORAL at 08:05

## 2021-05-29 LAB
GLUCOSE SERPL-MCNC: 124 MG/DL (ref 70–105)
GLUCOSE SERPL-MCNC: 166 MG/DL (ref 70–105)
GLUCOSE SERPL-MCNC: 184 MG/DL (ref 70–105)

## 2021-05-29 PROCEDURE — 25000003 PHARM REV CODE 250

## 2021-05-29 PROCEDURE — 25000003 PHARM REV CODE 250: Performed by: NURSE PRACTITIONER

## 2021-05-29 PROCEDURE — 63600175 PHARM REV CODE 636 W HCPCS: Performed by: EMERGENCY MEDICINE

## 2021-05-29 PROCEDURE — 25000003 PHARM REV CODE 250: Performed by: EMERGENCY MEDICINE

## 2021-05-29 PROCEDURE — 11000004 HC SNF PRIVATE

## 2021-05-29 PROCEDURE — 94761 N-INVAS EAR/PLS OXIMETRY MLT: CPT

## 2021-05-29 PROCEDURE — 27000221 HC OXYGEN, UP TO 24 HOURS

## 2021-05-29 PROCEDURE — 82962 GLUCOSE BLOOD TEST: CPT

## 2021-05-29 RX ADMIN — LORAZEPAM 0.5 MG: 0.5 TABLET ORAL at 03:05

## 2021-05-29 RX ADMIN — PHENAZOPYRIDINE 100 MG: 100 TABLET ORAL at 09:05

## 2021-05-29 RX ADMIN — PIPERACILLIN AND TAZOBACTAM 2.25 G: 2; .25 INJECTION, POWDER, LYOPHILIZED, FOR SOLUTION INTRAVENOUS at 12:05

## 2021-05-29 RX ADMIN — ATORVASTATIN CALCIUM 40 MG: 40 TABLET, FILM COATED ORAL at 09:05

## 2021-05-29 RX ADMIN — CITALOPRAM HYDROBROMIDE 40 MG: 20 TABLET ORAL at 09:05

## 2021-05-29 RX ADMIN — GABAPENTIN 800 MG: 400 CAPSULE ORAL at 09:05

## 2021-05-29 RX ADMIN — GABAPENTIN 800 MG: 400 CAPSULE ORAL at 01:05

## 2021-05-29 RX ADMIN — POLYETHYLENE GLYCOL 3350 17 G: 17 POWDER, FOR SOLUTION ORAL at 09:05

## 2021-05-29 RX ADMIN — LORAZEPAM 0.5 MG: 0.5 TABLET ORAL at 09:05

## 2021-05-29 RX ADMIN — PIPERACILLIN AND TAZOBACTAM 2.25 G: 2; .25 INJECTION, POWDER, LYOPHILIZED, FOR SOLUTION INTRAVENOUS at 10:05

## 2021-05-29 RX ADMIN — GABAPENTIN 800 MG: 400 CAPSULE ORAL at 05:05

## 2021-05-29 RX ADMIN — INSULIN ASPART 2 UNITS: 100 INJECTION, SOLUTION INTRAVENOUS; SUBCUTANEOUS at 09:05

## 2021-05-29 RX ADMIN — INSULIN ASPART 2 UNITS: 100 INJECTION, SOLUTION INTRAVENOUS; SUBCUTANEOUS at 12:05

## 2021-05-29 RX ADMIN — HYDRALAZINE HYDROCHLORIDE 25 MG: 25 TABLET, FILM COATED ORAL at 09:05

## 2021-05-29 RX ADMIN — CLOPIDOGREL BISULFATE 75 MG: 75 TABLET, FILM COATED ORAL at 09:05

## 2021-05-29 RX ADMIN — HYDROCHLOROTHIAZIDE 12.5 MG: 12.5 TABLET ORAL at 09:05

## 2021-05-29 RX ADMIN — MICONAZOLE NITRATE: 20 POWDER TOPICAL at 10:05

## 2021-05-29 RX ADMIN — AMLODIPINE BESYLATE 5 MG: 5 TABLET ORAL at 09:05

## 2021-05-29 RX ADMIN — MICONAZOLE NITRATE: 20 POWDER TOPICAL at 09:05

## 2021-05-29 RX ADMIN — VALSARTAN 320 MG: 160 TABLET, FILM COATED ORAL at 09:05

## 2021-05-29 RX ADMIN — INSULIN ASPART 2 UNITS: 100 INJECTION, SOLUTION INTRAVENOUS; SUBCUTANEOUS at 03:05

## 2021-05-30 LAB
BASOPHILS # BLD AUTO: 0.04 K/UL (ref 0–0.2)
BASOPHILS NFR BLD AUTO: 0.6 % (ref 0–1)
DIFFERENTIAL METHOD BLD: ABNORMAL
EOSINOPHIL # BLD AUTO: 0.43 K/UL (ref 0–0.5)
EOSINOPHIL NFR BLD AUTO: 6.1 % (ref 1–4)
ERYTHROCYTE [DISTWIDTH] IN BLOOD BY AUTOMATED COUNT: 14.8 % (ref 11.5–14.5)
GLUCOSE SERPL-MCNC: 124 MG/DL (ref 70–105)
GLUCOSE SERPL-MCNC: 126 MG/DL (ref 70–105)
GLUCOSE SERPL-MCNC: 163 MG/DL (ref 70–105)
GLUCOSE SERPL-MCNC: 166 MG/DL (ref 70–105)
HCT VFR BLD AUTO: 34.3 % (ref 38–47)
HGB BLD-MCNC: 11.1 G/DL (ref 12–16)
LYMPHOCYTES # BLD AUTO: 2.54 K/UL (ref 1–4.8)
LYMPHOCYTES NFR BLD AUTO: 36 % (ref 27–41)
MCH RBC QN AUTO: 31.1 PG (ref 27–31)
MCHC RBC AUTO-ENTMCNC: 32.4 G/DL (ref 32–36)
MCV RBC AUTO: 96.1 FL (ref 80–96)
MONOCYTES # BLD AUTO: 0.9 K/UL (ref 0–0.8)
MONOCYTES NFR BLD AUTO: 12.8 % (ref 2–6)
MPC BLD CALC-MCNC: 9.8 FL (ref 9.4–12.4)
NEUTROPHILS # BLD AUTO: 3.14 K/UL (ref 1.8–7.7)
NEUTROPHILS NFR BLD AUTO: 44.5 % (ref 53–65)
PLATELET # BLD AUTO: 115 K/UL (ref 150–400)
RBC # BLD AUTO: 3.57 M/UL (ref 4.2–5.4)
WBC # BLD AUTO: 7.05 K/UL (ref 4.5–11)

## 2021-05-30 PROCEDURE — 63600175 PHARM REV CODE 636 W HCPCS: Performed by: EMERGENCY MEDICINE

## 2021-05-30 PROCEDURE — 94761 N-INVAS EAR/PLS OXIMETRY MLT: CPT

## 2021-05-30 PROCEDURE — 27000221 HC OXYGEN, UP TO 24 HOURS

## 2021-05-30 PROCEDURE — 99900035 HC TECH TIME PER 15 MIN (STAT)

## 2021-05-30 PROCEDURE — 36415 COLL VENOUS BLD VENIPUNCTURE: CPT | Performed by: EMERGENCY MEDICINE

## 2021-05-30 PROCEDURE — 82962 GLUCOSE BLOOD TEST: CPT

## 2021-05-30 PROCEDURE — 27000944

## 2021-05-30 PROCEDURE — 25000003 PHARM REV CODE 250

## 2021-05-30 PROCEDURE — 85025 COMPLETE CBC W/AUTO DIFF WBC: CPT | Performed by: EMERGENCY MEDICINE

## 2021-05-30 PROCEDURE — 25000003 PHARM REV CODE 250: Performed by: NURSE PRACTITIONER

## 2021-05-30 PROCEDURE — 11000004 HC SNF PRIVATE

## 2021-05-30 RX ADMIN — CLOPIDOGREL BISULFATE 75 MG: 75 TABLET, FILM COATED ORAL at 09:05

## 2021-05-30 RX ADMIN — GABAPENTIN 800 MG: 400 CAPSULE ORAL at 05:05

## 2021-05-30 RX ADMIN — HYDRALAZINE HYDROCHLORIDE 25 MG: 25 TABLET, FILM COATED ORAL at 09:05

## 2021-05-30 RX ADMIN — LORAZEPAM 0.5 MG: 0.5 TABLET ORAL at 03:05

## 2021-05-30 RX ADMIN — LORAZEPAM 0.5 MG: 0.5 TABLET ORAL at 09:05

## 2021-05-30 RX ADMIN — MICONAZOLE NITRATE: 20 POWDER TOPICAL at 09:05

## 2021-05-30 RX ADMIN — INSULIN ASPART 2 UNITS: 100 INJECTION, SOLUTION INTRAVENOUS; SUBCUTANEOUS at 09:05

## 2021-05-30 RX ADMIN — AMLODIPINE BESYLATE 5 MG: 5 TABLET ORAL at 09:05

## 2021-05-30 RX ADMIN — ATORVASTATIN CALCIUM 40 MG: 40 TABLET, FILM COATED ORAL at 09:05

## 2021-05-30 RX ADMIN — GABAPENTIN 800 MG: 400 CAPSULE ORAL at 09:05

## 2021-05-30 RX ADMIN — GABAPENTIN 800 MG: 400 CAPSULE ORAL at 01:05

## 2021-05-30 RX ADMIN — HYDROCHLOROTHIAZIDE 12.5 MG: 12.5 TABLET ORAL at 09:05

## 2021-05-30 RX ADMIN — VALSARTAN 320 MG: 160 TABLET, FILM COATED ORAL at 09:05

## 2021-05-30 RX ADMIN — INSULIN ASPART 2 UNITS: 100 INJECTION, SOLUTION INTRAVENOUS; SUBCUTANEOUS at 04:05

## 2021-05-30 RX ADMIN — POLYETHYLENE GLYCOL 3350 17 G: 17 POWDER, FOR SOLUTION ORAL at 09:05

## 2021-05-30 RX ADMIN — CITALOPRAM HYDROBROMIDE 40 MG: 20 TABLET ORAL at 09:05

## 2021-05-30 RX ADMIN — ACETAMINOPHEN 650 MG: 325 TABLET ORAL at 04:05

## 2021-05-31 LAB
GLUCOSE SERPL-MCNC: 110 MG/DL (ref 70–105)
GLUCOSE SERPL-MCNC: 120 MG/DL (ref 70–105)
GLUCOSE SERPL-MCNC: 172 MG/DL (ref 70–105)
GLUCOSE SERPL-MCNC: 205 MG/DL (ref 70–105)

## 2021-05-31 PROCEDURE — 27000221 HC OXYGEN, UP TO 24 HOURS

## 2021-05-31 PROCEDURE — 82962 GLUCOSE BLOOD TEST: CPT

## 2021-05-31 PROCEDURE — 94761 N-INVAS EAR/PLS OXIMETRY MLT: CPT

## 2021-05-31 PROCEDURE — 97116 GAIT TRAINING THERAPY: CPT

## 2021-05-31 PROCEDURE — 97112 NEUROMUSCULAR REEDUCATION: CPT

## 2021-05-31 PROCEDURE — 27000944

## 2021-05-31 PROCEDURE — 25000003 PHARM REV CODE 250: Performed by: NURSE PRACTITIONER

## 2021-05-31 PROCEDURE — 63600175 PHARM REV CODE 636 W HCPCS: Performed by: EMERGENCY MEDICINE

## 2021-05-31 PROCEDURE — 11000004 HC SNF PRIVATE

## 2021-05-31 PROCEDURE — 25000003 PHARM REV CODE 250

## 2021-05-31 PROCEDURE — 99900035 HC TECH TIME PER 15 MIN (STAT)

## 2021-05-31 PROCEDURE — 97530 THERAPEUTIC ACTIVITIES: CPT

## 2021-05-31 RX ADMIN — VALSARTAN 320 MG: 160 TABLET, FILM COATED ORAL at 08:05

## 2021-05-31 RX ADMIN — GABAPENTIN 800 MG: 400 CAPSULE ORAL at 05:05

## 2021-05-31 RX ADMIN — LORAZEPAM 0.5 MG: 0.5 TABLET ORAL at 08:05

## 2021-05-31 RX ADMIN — ATORVASTATIN CALCIUM 40 MG: 40 TABLET, FILM COATED ORAL at 08:05

## 2021-05-31 RX ADMIN — ACETAMINOPHEN 650 MG: 325 TABLET ORAL at 03:05

## 2021-05-31 RX ADMIN — AMLODIPINE BESYLATE 5 MG: 5 TABLET ORAL at 08:05

## 2021-05-31 RX ADMIN — GABAPENTIN 800 MG: 400 CAPSULE ORAL at 08:05

## 2021-05-31 RX ADMIN — INSULIN ASPART 2 UNITS: 100 INJECTION, SOLUTION INTRAVENOUS; SUBCUTANEOUS at 08:05

## 2021-05-31 RX ADMIN — MICONAZOLE NITRATE: 20 POWDER TOPICAL at 08:05

## 2021-05-31 RX ADMIN — POLYETHYLENE GLYCOL 3350 17 G: 17 POWDER, FOR SOLUTION ORAL at 08:05

## 2021-05-31 RX ADMIN — LORAZEPAM 0.5 MG: 0.5 TABLET ORAL at 03:05

## 2021-05-31 RX ADMIN — GABAPENTIN 800 MG: 400 CAPSULE ORAL at 01:05

## 2021-05-31 RX ADMIN — INSULIN ASPART 4 UNITS: 100 INJECTION, SOLUTION INTRAVENOUS; SUBCUTANEOUS at 04:05

## 2021-05-31 RX ADMIN — CLOPIDOGREL BISULFATE 75 MG: 75 TABLET, FILM COATED ORAL at 08:05

## 2021-05-31 RX ADMIN — HYDRALAZINE HYDROCHLORIDE 25 MG: 25 TABLET, FILM COATED ORAL at 09:05

## 2021-05-31 RX ADMIN — HYDRALAZINE HYDROCHLORIDE 25 MG: 25 TABLET, FILM COATED ORAL at 08:05

## 2021-05-31 RX ADMIN — CITALOPRAM HYDROBROMIDE 40 MG: 20 TABLET ORAL at 08:05

## 2021-05-31 RX ADMIN — HYDROCHLOROTHIAZIDE 12.5 MG: 12.5 TABLET ORAL at 08:05

## 2021-06-01 LAB
GLUCOSE SERPL-MCNC: 128 MG/DL (ref 70–105)
GLUCOSE SERPL-MCNC: 175 MG/DL (ref 70–105)
GLUCOSE SERPL-MCNC: 185 MG/DL (ref 70–105)

## 2021-06-01 PROCEDURE — 63600175 PHARM REV CODE 636 W HCPCS: Performed by: EMERGENCY MEDICINE

## 2021-06-01 PROCEDURE — 97535 SELF CARE MNGMENT TRAINING: CPT

## 2021-06-01 PROCEDURE — 82962 GLUCOSE BLOOD TEST: CPT

## 2021-06-01 PROCEDURE — 27000221 HC OXYGEN, UP TO 24 HOURS

## 2021-06-01 PROCEDURE — 25000003 PHARM REV CODE 250: Performed by: NURSE PRACTITIONER

## 2021-06-01 PROCEDURE — 99900035 HC TECH TIME PER 15 MIN (STAT)

## 2021-06-01 PROCEDURE — 36415 COLL VENOUS BLD VENIPUNCTURE: CPT | Performed by: EMERGENCY MEDICINE

## 2021-06-01 PROCEDURE — 92526 ORAL FUNCTION THERAPY: CPT

## 2021-06-01 PROCEDURE — 97112 NEUROMUSCULAR REEDUCATION: CPT

## 2021-06-01 PROCEDURE — 80048 BASIC METABOLIC PNL TOTAL CA: CPT | Performed by: EMERGENCY MEDICINE

## 2021-06-01 PROCEDURE — 11000004 HC SNF PRIVATE

## 2021-06-01 PROCEDURE — 97530 THERAPEUTIC ACTIVITIES: CPT | Mod: CQ

## 2021-06-01 PROCEDURE — 94761 N-INVAS EAR/PLS OXIMETRY MLT: CPT

## 2021-06-01 PROCEDURE — 27000944

## 2021-06-01 PROCEDURE — 25000003 PHARM REV CODE 250

## 2021-06-01 RX ORDER — DIPHENHYDRAMINE HCL 25 MG
25 CAPSULE ORAL ONCE
Status: COMPLETED | OUTPATIENT
Start: 2021-06-01 | End: 2021-06-01

## 2021-06-01 RX ORDER — DIPHENHYDRAMINE HCL 25 MG
25 CAPSULE ORAL EVERY 6 HOURS PRN
Status: DISCONTINUED | OUTPATIENT
Start: 2021-06-01 | End: 2021-06-17 | Stop reason: HOSPADM

## 2021-06-01 RX ADMIN — CITALOPRAM HYDROBROMIDE 40 MG: 20 TABLET ORAL at 09:06

## 2021-06-01 RX ADMIN — DIPHENHYDRAMINE HYDROCHLORIDE 25 MG: 25 CAPSULE ORAL at 02:06

## 2021-06-01 RX ADMIN — GABAPENTIN 800 MG: 400 CAPSULE ORAL at 09:06

## 2021-06-01 RX ADMIN — AMLODIPINE BESYLATE 5 MG: 5 TABLET ORAL at 09:06

## 2021-06-01 RX ADMIN — HYDRALAZINE HYDROCHLORIDE 25 MG: 25 TABLET, FILM COATED ORAL at 09:06

## 2021-06-01 RX ADMIN — GABAPENTIN 800 MG: 400 CAPSULE ORAL at 05:06

## 2021-06-01 RX ADMIN — HYDROCHLOROTHIAZIDE 12.5 MG: 12.5 TABLET ORAL at 09:06

## 2021-06-01 RX ADMIN — POLYETHYLENE GLYCOL 3350 17 G: 17 POWDER, FOR SOLUTION ORAL at 09:06

## 2021-06-01 RX ADMIN — LORAZEPAM 0.5 MG: 0.5 TABLET ORAL at 03:06

## 2021-06-01 RX ADMIN — MICONAZOLE NITRATE: 20 POWDER TOPICAL at 09:06

## 2021-06-01 RX ADMIN — ATORVASTATIN CALCIUM 40 MG: 40 TABLET, FILM COATED ORAL at 09:06

## 2021-06-01 RX ADMIN — GABAPENTIN 800 MG: 400 CAPSULE ORAL at 01:06

## 2021-06-01 RX ADMIN — ACETAMINOPHEN 650 MG: 325 TABLET ORAL at 12:06

## 2021-06-01 RX ADMIN — VALSARTAN 320 MG: 160 TABLET, FILM COATED ORAL at 09:06

## 2021-06-01 RX ADMIN — LORAZEPAM 0.5 MG: 0.5 TABLET ORAL at 09:06

## 2021-06-01 RX ADMIN — CLOPIDOGREL BISULFATE 75 MG: 75 TABLET, FILM COATED ORAL at 09:06

## 2021-06-01 RX ADMIN — DIPHENHYDRAMINE HYDROCHLORIDE 25 MG: 25 CAPSULE ORAL at 12:06

## 2021-06-01 RX ADMIN — INSULIN ASPART 2 UNITS: 100 INJECTION, SOLUTION INTRAVENOUS; SUBCUTANEOUS at 09:06

## 2021-06-01 RX ADMIN — INSULIN ASPART 2 UNITS: 100 INJECTION, SOLUTION INTRAVENOUS; SUBCUTANEOUS at 05:06

## 2021-06-02 LAB
ANION GAP SERPL CALCULATED.3IONS-SCNC: 12 MMOL/L (ref 7–16)
BUN SERPL-MCNC: 16 MG/DL (ref 7–18)
BUN/CREAT SERPL: 15 (ref 6–20)
CALCIUM SERPL-MCNC: 9.1 MG/DL (ref 8.5–10.1)
CHLORIDE SERPL-SCNC: 102 MMOL/L (ref 98–107)
CO2 SERPL-SCNC: 29 MMOL/L (ref 21–32)
CREAT SERPL-MCNC: 1.05 MG/DL (ref 0.55–1.02)
GLUCOSE SERPL-MCNC: 115 MG/DL (ref 70–105)
GLUCOSE SERPL-MCNC: 156 MG/DL (ref 70–105)
GLUCOSE SERPL-MCNC: 160 MG/DL (ref 74–106)
GLUCOSE SERPL-MCNC: 170 MG/DL (ref 70–105)
POTASSIUM SERPL-SCNC: 4.8 MMOL/L (ref 3.5–5.1)
SODIUM SERPL-SCNC: 138 MMOL/L (ref 136–145)

## 2021-06-02 PROCEDURE — 11000004 HC SNF PRIVATE

## 2021-06-02 PROCEDURE — 25000003 PHARM REV CODE 250: Performed by: NURSE PRACTITIONER

## 2021-06-02 PROCEDURE — 97010 HOT OR COLD PACKS THERAPY: CPT

## 2021-06-02 PROCEDURE — 63600175 PHARM REV CODE 636 W HCPCS: Performed by: EMERGENCY MEDICINE

## 2021-06-02 PROCEDURE — 99900035 HC TECH TIME PER 15 MIN (STAT)

## 2021-06-02 PROCEDURE — 97530 THERAPEUTIC ACTIVITIES: CPT | Mod: CQ

## 2021-06-02 PROCEDURE — 97112 NEUROMUSCULAR REEDUCATION: CPT

## 2021-06-02 PROCEDURE — 97116 GAIT TRAINING THERAPY: CPT | Mod: CQ

## 2021-06-02 PROCEDURE — 25000003 PHARM REV CODE 250

## 2021-06-02 PROCEDURE — 94761 N-INVAS EAR/PLS OXIMETRY MLT: CPT

## 2021-06-02 PROCEDURE — 27000221 HC OXYGEN, UP TO 24 HOURS

## 2021-06-02 PROCEDURE — 27000944

## 2021-06-02 PROCEDURE — 82962 GLUCOSE BLOOD TEST: CPT

## 2021-06-02 PROCEDURE — 92526 ORAL FUNCTION THERAPY: CPT

## 2021-06-02 RX ADMIN — LORAZEPAM 0.5 MG: 0.5 TABLET ORAL at 02:06

## 2021-06-02 RX ADMIN — CLOPIDOGREL BISULFATE 75 MG: 75 TABLET, FILM COATED ORAL at 09:06

## 2021-06-02 RX ADMIN — LORAZEPAM 0.5 MG: 0.5 TABLET ORAL at 09:06

## 2021-06-02 RX ADMIN — ACETAMINOPHEN 650 MG: 325 TABLET ORAL at 04:06

## 2021-06-02 RX ADMIN — ATORVASTATIN CALCIUM 40 MG: 40 TABLET, FILM COATED ORAL at 08:06

## 2021-06-02 RX ADMIN — INSULIN ASPART 2 UNITS: 100 INJECTION, SOLUTION INTRAVENOUS; SUBCUTANEOUS at 04:06

## 2021-06-02 RX ADMIN — POLYETHYLENE GLYCOL 3350 17 G: 17 POWDER, FOR SOLUTION ORAL at 09:06

## 2021-06-02 RX ADMIN — MICONAZOLE NITRATE: 20 POWDER TOPICAL at 09:06

## 2021-06-02 RX ADMIN — INSULIN ASPART 2 UNITS: 100 INJECTION, SOLUTION INTRAVENOUS; SUBCUTANEOUS at 08:06

## 2021-06-02 RX ADMIN — VALSARTAN 320 MG: 160 TABLET, FILM COATED ORAL at 09:06

## 2021-06-02 RX ADMIN — MICONAZOLE NITRATE: 20 POWDER TOPICAL at 08:06

## 2021-06-02 RX ADMIN — GABAPENTIN 800 MG: 400 CAPSULE ORAL at 02:06

## 2021-06-02 RX ADMIN — GABAPENTIN 800 MG: 400 CAPSULE ORAL at 09:06

## 2021-06-02 RX ADMIN — HYDRALAZINE HYDROCHLORIDE 25 MG: 25 TABLET, FILM COATED ORAL at 09:06

## 2021-06-02 RX ADMIN — LORAZEPAM 0.5 MG: 0.5 TABLET ORAL at 08:06

## 2021-06-02 RX ADMIN — GABAPENTIN 800 MG: 400 CAPSULE ORAL at 04:06

## 2021-06-02 RX ADMIN — HYDRALAZINE HYDROCHLORIDE 25 MG: 25 TABLET, FILM COATED ORAL at 08:06

## 2021-06-02 RX ADMIN — HYDROCHLOROTHIAZIDE 12.5 MG: 12.5 TABLET ORAL at 09:06

## 2021-06-02 RX ADMIN — GABAPENTIN 800 MG: 400 CAPSULE ORAL at 08:06

## 2021-06-02 RX ADMIN — CITALOPRAM HYDROBROMIDE 40 MG: 20 TABLET ORAL at 09:06

## 2021-06-02 RX ADMIN — AMLODIPINE BESYLATE 5 MG: 5 TABLET ORAL at 09:06

## 2021-06-03 LAB
ANION GAP SERPL CALCULATED.3IONS-SCNC: 9 MMOL/L (ref 7–16)
BACTERIA #/AREA URNS HPF: ABNORMAL /HPF
BASOPHILS # BLD AUTO: 0.02 K/UL (ref 0–0.2)
BASOPHILS NFR BLD AUTO: 0.4 % (ref 0–1)
BILIRUB UR QL STRIP: NEGATIVE
BUN SERPL-MCNC: 19 MG/DL (ref 7–18)
BUN/CREAT SERPL: 17 (ref 6–20)
CALCIUM SERPL-MCNC: 8.9 MG/DL (ref 8.5–10.1)
CHLORIDE SERPL-SCNC: 101 MMOL/L (ref 98–107)
CLARITY UR: ABNORMAL
CO2 SERPL-SCNC: 32 MMOL/L (ref 21–32)
COLOR UR: YELLOW
CREAT SERPL-MCNC: 1.15 MG/DL (ref 0.55–1.02)
DIFFERENTIAL METHOD BLD: ABNORMAL
EOSINOPHIL # BLD AUTO: 0.29 K/UL (ref 0–0.5)
EOSINOPHIL NFR BLD AUTO: 5.4 % (ref 1–4)
ERYTHROCYTE [DISTWIDTH] IN BLOOD BY AUTOMATED COUNT: 15 % (ref 11.5–14.5)
GLUCOSE SERPL-MCNC: 107 MG/DL (ref 70–105)
GLUCOSE SERPL-MCNC: 119 MG/DL (ref 74–106)
GLUCOSE SERPL-MCNC: 126 MG/DL (ref 70–105)
GLUCOSE SERPL-MCNC: 149 MG/DL (ref 70–105)
GLUCOSE SERPL-MCNC: 257 MG/DL (ref 70–105)
GLUCOSE UR STRIP-MCNC: NEGATIVE MG/DL
HCT VFR BLD AUTO: 37.2 % (ref 38–47)
HGB BLD-MCNC: 12 G/DL (ref 12–16)
KETONES UR STRIP-SCNC: NEGATIVE MG/DL
LEUKOCYTE ESTERASE UR QL STRIP: ABNORMAL
LYMPHOCYTES # BLD AUTO: 2.16 K/UL (ref 1–4.8)
LYMPHOCYTES NFR BLD AUTO: 40.4 % (ref 27–41)
MCH RBC QN AUTO: 31 PG (ref 27–31)
MCHC RBC AUTO-ENTMCNC: 32.3 G/DL (ref 32–36)
MCV RBC AUTO: 96.1 FL (ref 80–96)
MONOCYTES # BLD AUTO: 0.96 K/UL (ref 0–0.8)
MONOCYTES NFR BLD AUTO: 18 % (ref 2–6)
MPC BLD CALC-MCNC: 10.5 FL (ref 9.4–12.4)
NEUTROPHILS # BLD AUTO: 1.91 K/UL (ref 1.8–7.7)
NEUTROPHILS NFR BLD AUTO: 35.8 % (ref 53–65)
NITRITE UR QL STRIP: NEGATIVE
PH UR STRIP: 7.5 PH UNITS
PLATELET # BLD AUTO: 110 K/UL (ref 150–400)
POTASSIUM SERPL-SCNC: 4.4 MMOL/L (ref 3.5–5.1)
PROT UR QL STRIP: NEGATIVE
RBC # BLD AUTO: 3.87 M/UL (ref 4.2–5.4)
RBC # UR STRIP: NEGATIVE /UL
RBC #/AREA URNS HPF: ABNORMAL /HPF
SODIUM SERPL-SCNC: 138 MMOL/L (ref 136–145)
SP GR UR STRIP: 1.02
SQUAMOUS #/AREA URNS LPF: ABNORMAL /LPF
UROBILINOGEN UR STRIP-ACNC: 0.2 MG/DL
WBC # BLD AUTO: 5.34 K/UL (ref 4.5–11)
WBC #/AREA URNS HPF: ABNORMAL /HPF

## 2021-06-03 PROCEDURE — 94761 N-INVAS EAR/PLS OXIMETRY MLT: CPT

## 2021-06-03 PROCEDURE — 80048 BASIC METABOLIC PNL TOTAL CA: CPT | Performed by: EMERGENCY MEDICINE

## 2021-06-03 PROCEDURE — 81003 URINALYSIS AUTO W/O SCOPE: CPT | Performed by: EMERGENCY MEDICINE

## 2021-06-03 PROCEDURE — 85025 COMPLETE CBC W/AUTO DIFF WBC: CPT | Performed by: EMERGENCY MEDICINE

## 2021-06-03 PROCEDURE — 25000003 PHARM REV CODE 250: Performed by: EMERGENCY MEDICINE

## 2021-06-03 PROCEDURE — 82962 GLUCOSE BLOOD TEST: CPT

## 2021-06-03 PROCEDURE — 87086 URINE CULTURE/COLONY COUNT: CPT | Performed by: EMERGENCY MEDICINE

## 2021-06-03 PROCEDURE — 63600175 PHARM REV CODE 636 W HCPCS: Performed by: EMERGENCY MEDICINE

## 2021-06-03 PROCEDURE — 97112 NEUROMUSCULAR REEDUCATION: CPT | Mod: CQ

## 2021-06-03 PROCEDURE — 97112 NEUROMUSCULAR REEDUCATION: CPT

## 2021-06-03 PROCEDURE — 27000944

## 2021-06-03 PROCEDURE — 97116 GAIT TRAINING THERAPY: CPT | Mod: CQ

## 2021-06-03 PROCEDURE — 97535 SELF CARE MNGMENT TRAINING: CPT

## 2021-06-03 PROCEDURE — 81001 URINALYSIS AUTO W/SCOPE: CPT | Performed by: EMERGENCY MEDICINE

## 2021-06-03 PROCEDURE — 27000221 HC OXYGEN, UP TO 24 HOURS

## 2021-06-03 PROCEDURE — 25000003 PHARM REV CODE 250: Performed by: NURSE PRACTITIONER

## 2021-06-03 PROCEDURE — 36415 COLL VENOUS BLD VENIPUNCTURE: CPT | Performed by: EMERGENCY MEDICINE

## 2021-06-03 PROCEDURE — 25000003 PHARM REV CODE 250

## 2021-06-03 PROCEDURE — 92526 ORAL FUNCTION THERAPY: CPT

## 2021-06-03 PROCEDURE — 11000004 HC SNF PRIVATE

## 2021-06-03 RX ORDER — LORAZEPAM 0.5 MG/1
0.5 TABLET ORAL EVERY 6 HOURS PRN
Status: DISCONTINUED | OUTPATIENT
Start: 2021-06-03 | End: 2021-06-17 | Stop reason: HOSPADM

## 2021-06-03 RX ORDER — LORAZEPAM 0.5 MG/1
0.5 TABLET ORAL NIGHTLY
Status: DISCONTINUED | OUTPATIENT
Start: 2021-06-03 | End: 2021-06-17 | Stop reason: HOSPADM

## 2021-06-03 RX ADMIN — AMLODIPINE BESYLATE 5 MG: 5 TABLET ORAL at 08:06

## 2021-06-03 RX ADMIN — PIPERACILLIN AND TAZOBACTAM 2.25 G: 2; .25 INJECTION, POWDER, LYOPHILIZED, FOR SOLUTION INTRAVENOUS at 06:06

## 2021-06-03 RX ADMIN — HYDROCHLOROTHIAZIDE 12.5 MG: 12.5 TABLET ORAL at 08:06

## 2021-06-03 RX ADMIN — MICONAZOLE NITRATE: 20 POWDER TOPICAL at 08:06

## 2021-06-03 RX ADMIN — CLOPIDOGREL BISULFATE 75 MG: 75 TABLET, FILM COATED ORAL at 08:06

## 2021-06-03 RX ADMIN — LORAZEPAM 0.5 MG: 0.5 TABLET ORAL at 08:06

## 2021-06-03 RX ADMIN — CITALOPRAM HYDROBROMIDE 40 MG: 20 TABLET ORAL at 08:06

## 2021-06-03 RX ADMIN — HYDRALAZINE HYDROCHLORIDE 25 MG: 25 TABLET, FILM COATED ORAL at 08:06

## 2021-06-03 RX ADMIN — GABAPENTIN 800 MG: 400 CAPSULE ORAL at 12:06

## 2021-06-03 RX ADMIN — LORAZEPAM 0.5 MG: 0.5 TABLET ORAL at 03:06

## 2021-06-03 RX ADMIN — ATORVASTATIN CALCIUM 40 MG: 40 TABLET, FILM COATED ORAL at 08:06

## 2021-06-03 RX ADMIN — GABAPENTIN 800 MG: 400 CAPSULE ORAL at 08:06

## 2021-06-03 RX ADMIN — PIPERACILLIN AND TAZOBACTAM 2.25 G: 2; .25 INJECTION, POWDER, LYOPHILIZED, FOR SOLUTION INTRAVENOUS at 04:06

## 2021-06-03 RX ADMIN — ACETAMINOPHEN 650 MG: 325 TABLET ORAL at 05:06

## 2021-06-03 RX ADMIN — VALSARTAN 320 MG: 160 TABLET, FILM COATED ORAL at 08:06

## 2021-06-03 RX ADMIN — POLYETHYLENE GLYCOL 3350 17 G: 17 POWDER, FOR SOLUTION ORAL at 08:06

## 2021-06-03 RX ADMIN — INSULIN ASPART 4 UNITS: 100 INJECTION, SOLUTION INTRAVENOUS; SUBCUTANEOUS at 08:06

## 2021-06-03 RX ADMIN — GABAPENTIN 800 MG: 400 CAPSULE ORAL at 04:06

## 2021-06-04 LAB
GLUCOSE SERPL-MCNC: 113 MG/DL (ref 70–105)
GLUCOSE SERPL-MCNC: 120 MG/DL (ref 70–105)
GLUCOSE SERPL-MCNC: 127 MG/DL (ref 70–105)
GLUCOSE SERPL-MCNC: 156 MG/DL (ref 70–105)

## 2021-06-04 PROCEDURE — 11000004 HC SNF PRIVATE

## 2021-06-04 PROCEDURE — 97535 SELF CARE MNGMENT TRAINING: CPT

## 2021-06-04 PROCEDURE — 25000003 PHARM REV CODE 250: Performed by: NURSE PRACTITIONER

## 2021-06-04 PROCEDURE — 92526 ORAL FUNCTION THERAPY: CPT

## 2021-06-04 PROCEDURE — 94761 N-INVAS EAR/PLS OXIMETRY MLT: CPT

## 2021-06-04 PROCEDURE — 63600175 PHARM REV CODE 636 W HCPCS: Performed by: EMERGENCY MEDICINE

## 2021-06-04 PROCEDURE — 25000003 PHARM REV CODE 250

## 2021-06-04 PROCEDURE — 27000221 HC OXYGEN, UP TO 24 HOURS

## 2021-06-04 PROCEDURE — 97530 THERAPEUTIC ACTIVITIES: CPT

## 2021-06-04 PROCEDURE — 27000944

## 2021-06-04 PROCEDURE — 97110 THERAPEUTIC EXERCISES: CPT

## 2021-06-04 PROCEDURE — 25000003 PHARM REV CODE 250: Performed by: EMERGENCY MEDICINE

## 2021-06-04 PROCEDURE — 82962 GLUCOSE BLOOD TEST: CPT

## 2021-06-04 PROCEDURE — 97116 GAIT TRAINING THERAPY: CPT

## 2021-06-04 RX ADMIN — LACTOBACILLUS TAB 2 TABLET: TAB at 05:06

## 2021-06-04 RX ADMIN — POLYETHYLENE GLYCOL 3350 17 G: 17 POWDER, FOR SOLUTION ORAL at 09:06

## 2021-06-04 RX ADMIN — LACTOBACILLUS TAB 2 TABLET: TAB at 11:06

## 2021-06-04 RX ADMIN — LORAZEPAM 0.5 MG: 0.5 TABLET ORAL at 09:06

## 2021-06-04 RX ADMIN — LACTOBACILLUS TAB 2 TABLET: TAB at 07:06

## 2021-06-04 RX ADMIN — MICONAZOLE NITRATE: 20 POWDER TOPICAL at 01:06

## 2021-06-04 RX ADMIN — GABAPENTIN 800 MG: 400 CAPSULE ORAL at 05:06

## 2021-06-04 RX ADMIN — GABAPENTIN 800 MG: 400 CAPSULE ORAL at 09:06

## 2021-06-04 RX ADMIN — VALSARTAN 320 MG: 160 TABLET, FILM COATED ORAL at 09:06

## 2021-06-04 RX ADMIN — PIPERACILLIN AND TAZOBACTAM 2.25 G: 2; .25 INJECTION, POWDER, LYOPHILIZED, FOR SOLUTION INTRAVENOUS at 05:06

## 2021-06-04 RX ADMIN — HYDROCHLOROTHIAZIDE 12.5 MG: 12.5 TABLET ORAL at 09:06

## 2021-06-04 RX ADMIN — PIPERACILLIN AND TAZOBACTAM 2.25 G: 2; .25 INJECTION, POWDER, LYOPHILIZED, FOR SOLUTION INTRAVENOUS at 01:06

## 2021-06-04 RX ADMIN — HYDRALAZINE HYDROCHLORIDE 25 MG: 25 TABLET, FILM COATED ORAL at 09:06

## 2021-06-04 RX ADMIN — PIPERACILLIN AND TAZOBACTAM 2.25 G: 2; .25 INJECTION, POWDER, LYOPHILIZED, FOR SOLUTION INTRAVENOUS at 09:06

## 2021-06-04 RX ADMIN — AMLODIPINE BESYLATE 5 MG: 5 TABLET ORAL at 09:06

## 2021-06-04 RX ADMIN — ATORVASTATIN CALCIUM 40 MG: 40 TABLET, FILM COATED ORAL at 09:06

## 2021-06-04 RX ADMIN — GABAPENTIN 800 MG: 400 CAPSULE ORAL at 01:06

## 2021-06-04 RX ADMIN — LORAZEPAM 0.5 MG: 0.5 TABLET ORAL at 03:06

## 2021-06-04 RX ADMIN — CITALOPRAM HYDROBROMIDE 40 MG: 20 TABLET ORAL at 09:06

## 2021-06-04 RX ADMIN — CLOPIDOGREL BISULFATE 75 MG: 75 TABLET, FILM COATED ORAL at 09:06

## 2021-06-04 RX ADMIN — ACETAMINOPHEN 650 MG: 325 TABLET ORAL at 06:06

## 2021-06-04 RX ADMIN — INSULIN ASPART 2 UNITS: 100 INJECTION, SOLUTION INTRAVENOUS; SUBCUTANEOUS at 09:06

## 2021-06-04 RX ADMIN — MICONAZOLE NITRATE: 20 POWDER TOPICAL at 09:06

## 2021-06-05 LAB
GLUCOSE SERPL-MCNC: 105 MG/DL (ref 70–105)
GLUCOSE SERPL-MCNC: 140 MG/DL (ref 70–105)
GLUCOSE SERPL-MCNC: 147 MG/DL (ref 70–105)

## 2021-06-05 PROCEDURE — 25000003 PHARM REV CODE 250: Performed by: NURSE PRACTITIONER

## 2021-06-05 PROCEDURE — 25000003 PHARM REV CODE 250: Performed by: EMERGENCY MEDICINE

## 2021-06-05 PROCEDURE — 27000221 HC OXYGEN, UP TO 24 HOURS

## 2021-06-05 PROCEDURE — 63600175 PHARM REV CODE 636 W HCPCS: Performed by: EMERGENCY MEDICINE

## 2021-06-05 PROCEDURE — 11000004 HC SNF PRIVATE

## 2021-06-05 PROCEDURE — 82962 GLUCOSE BLOOD TEST: CPT

## 2021-06-05 PROCEDURE — 25000003 PHARM REV CODE 250

## 2021-06-05 PROCEDURE — 94761 N-INVAS EAR/PLS OXIMETRY MLT: CPT

## 2021-06-05 PROCEDURE — 27000944

## 2021-06-05 RX ADMIN — HYDROCHLOROTHIAZIDE 12.5 MG: 12.5 TABLET ORAL at 08:06

## 2021-06-05 RX ADMIN — GABAPENTIN 800 MG: 400 CAPSULE ORAL at 05:06

## 2021-06-05 RX ADMIN — LACTOBACILLUS TAB 2 TABLET: TAB at 05:06

## 2021-06-05 RX ADMIN — POLYETHYLENE GLYCOL 3350 17 G: 17 POWDER, FOR SOLUTION ORAL at 08:06

## 2021-06-05 RX ADMIN — CLOPIDOGREL BISULFATE 75 MG: 75 TABLET, FILM COATED ORAL at 08:06

## 2021-06-05 RX ADMIN — LORAZEPAM 0.5 MG: 0.5 TABLET ORAL at 08:06

## 2021-06-05 RX ADMIN — LORAZEPAM 0.5 MG: 0.5 TABLET ORAL at 03:06

## 2021-06-05 RX ADMIN — GABAPENTIN 800 MG: 400 CAPSULE ORAL at 10:06

## 2021-06-05 RX ADMIN — HYDRALAZINE HYDROCHLORIDE 25 MG: 25 TABLET, FILM COATED ORAL at 08:06

## 2021-06-05 RX ADMIN — CITALOPRAM HYDROBROMIDE 40 MG: 20 TABLET ORAL at 08:06

## 2021-06-05 RX ADMIN — MICONAZOLE NITRATE: 20 POWDER TOPICAL at 10:06

## 2021-06-05 RX ADMIN — ATORVASTATIN CALCIUM 40 MG: 40 TABLET, FILM COATED ORAL at 10:06

## 2021-06-05 RX ADMIN — ANTACID TABLETS 500 MG: 500 TABLET, CHEWABLE ORAL at 05:06

## 2021-06-05 RX ADMIN — PIPERACILLIN AND TAZOBACTAM 2.25 G: 2; .25 INJECTION, POWDER, LYOPHILIZED, FOR SOLUTION INTRAVENOUS at 01:06

## 2021-06-05 RX ADMIN — AMLODIPINE BESYLATE 5 MG: 5 TABLET ORAL at 08:06

## 2021-06-05 RX ADMIN — VALSARTAN 320 MG: 160 TABLET, FILM COATED ORAL at 08:06

## 2021-06-05 RX ADMIN — LORAZEPAM 0.5 MG: 0.5 TABLET ORAL at 10:06

## 2021-06-05 RX ADMIN — LACTOBACILLUS TAB 2 TABLET: TAB at 08:06

## 2021-06-05 RX ADMIN — GABAPENTIN 800 MG: 400 CAPSULE ORAL at 08:06

## 2021-06-06 LAB
BASOPHILS # BLD AUTO: 0.03 K/UL (ref 0–0.2)
BASOPHILS NFR BLD AUTO: 0.4 % (ref 0–1)
EOSINOPHIL # BLD AUTO: 0.34 K/UL (ref 0–0.5)
EOSINOPHIL NFR BLD AUTO: 5 % (ref 1–4)
ERYTHROCYTE [DISTWIDTH] IN BLOOD BY AUTOMATED COUNT: 15.2 % (ref 11.5–14.5)
GLUCOSE SERPL-MCNC: 105 MG/DL (ref 70–105)
GLUCOSE SERPL-MCNC: 123 MG/DL (ref 70–105)
GLUCOSE SERPL-MCNC: 138 MG/DL (ref 70–105)
GLUCOSE SERPL-MCNC: 155 MG/DL (ref 70–105)
HCT VFR BLD AUTO: 37.8 % (ref 38–47)
HGB BLD-MCNC: 12.2 G/DL (ref 12–16)
LYMPHOCYTES # BLD AUTO: 2.78 K/UL (ref 1–4.8)
LYMPHOCYTES NFR BLD AUTO: 40.7 % (ref 27–41)
MCH RBC QN AUTO: 30.8 PG (ref 27–31)
MCHC RBC AUTO-ENTMCNC: 32.3 G/DL (ref 32–36)
MCV RBC AUTO: 95.5 FL (ref 80–96)
MONOCYTES # BLD AUTO: 0.74 K/UL (ref 0–0.8)
MONOCYTES NFR BLD AUTO: 10.8 % (ref 2–6)
MPC BLD CALC-MCNC: 9.9 FL (ref 9.4–12.4)
NEUTROPHILS # BLD AUTO: 2.94 K/UL (ref 1.8–7.7)
NEUTROPHILS NFR BLD AUTO: 43.1 % (ref 53–65)
PLATELET # BLD AUTO: 142 K/UL (ref 150–400)
RBC # BLD AUTO: 3.96 M/UL (ref 4.2–5.4)
UA COMPLETE W REFLEX CULTURE PNL UR: ABNORMAL
UA COMPLETE W REFLEX CULTURE PNL UR: ABNORMAL
WBC # BLD AUTO: 6.83 K/UL (ref 4.5–11)

## 2021-06-06 PROCEDURE — 25000003 PHARM REV CODE 250: Performed by: EMERGENCY MEDICINE

## 2021-06-06 PROCEDURE — 25000003 PHARM REV CODE 250

## 2021-06-06 PROCEDURE — 63600175 PHARM REV CODE 636 W HCPCS: Performed by: EMERGENCY MEDICINE

## 2021-06-06 PROCEDURE — 63600175 PHARM REV CODE 636 W HCPCS: Performed by: NURSE PRACTITIONER

## 2021-06-06 PROCEDURE — 25000003 PHARM REV CODE 250: Performed by: NURSE PRACTITIONER

## 2021-06-06 PROCEDURE — 36415 COLL VENOUS BLD VENIPUNCTURE: CPT | Performed by: EMERGENCY MEDICINE

## 2021-06-06 PROCEDURE — 11000004 HC SNF PRIVATE

## 2021-06-06 PROCEDURE — 94761 N-INVAS EAR/PLS OXIMETRY MLT: CPT

## 2021-06-06 PROCEDURE — 27000944

## 2021-06-06 PROCEDURE — 85025 COMPLETE CBC W/AUTO DIFF WBC: CPT | Performed by: EMERGENCY MEDICINE

## 2021-06-06 PROCEDURE — 27000221 HC OXYGEN, UP TO 24 HOURS

## 2021-06-06 PROCEDURE — 82962 GLUCOSE BLOOD TEST: CPT

## 2021-06-06 RX ORDER — HYDROMORPHONE HYDROCHLORIDE 2 MG/ML
1 INJECTION, SOLUTION INTRAMUSCULAR; INTRAVENOUS; SUBCUTANEOUS ONCE
Status: DISCONTINUED | OUTPATIENT
Start: 2021-06-06 | End: 2021-06-06

## 2021-06-06 RX ORDER — KETOROLAC TROMETHAMINE 30 MG/ML
15 INJECTION, SOLUTION INTRAMUSCULAR; INTRAVENOUS ONCE
Status: COMPLETED | OUTPATIENT
Start: 2021-06-06 | End: 2021-06-06

## 2021-06-06 RX ORDER — PHENAZOPYRIDINE HYDROCHLORIDE 100 MG/1
100 TABLET, FILM COATED ORAL
Status: DISCONTINUED | OUTPATIENT
Start: 2021-06-06 | End: 2021-06-07

## 2021-06-06 RX ORDER — HYDROMORPHONE HYDROCHLORIDE 2 MG/ML
1 INJECTION, SOLUTION INTRAMUSCULAR; INTRAVENOUS; SUBCUTANEOUS ONCE
Status: COMPLETED | OUTPATIENT
Start: 2021-06-06 | End: 2021-06-06

## 2021-06-06 RX ORDER — DOCUSATE SODIUM 100 MG/1
100 CAPSULE, LIQUID FILLED ORAL DAILY
Status: DISCONTINUED | OUTPATIENT
Start: 2021-06-06 | End: 2021-06-17 | Stop reason: HOSPADM

## 2021-06-06 RX ORDER — TAMSULOSIN HYDROCHLORIDE 0.4 MG/1
0.4 CAPSULE ORAL DAILY
Status: DISCONTINUED | OUTPATIENT
Start: 2021-06-06 | End: 2021-06-06

## 2021-06-06 RX ADMIN — PIPERACILLIN AND TAZOBACTAM 2.25 G: 2; .25 INJECTION, POWDER, LYOPHILIZED, FOR SOLUTION INTRAVENOUS at 01:06

## 2021-06-06 RX ADMIN — POLYETHYLENE GLYCOL 3350 17 G: 17 POWDER, FOR SOLUTION ORAL at 09:06

## 2021-06-06 RX ADMIN — HYDRALAZINE HYDROCHLORIDE 25 MG: 25 TABLET, FILM COATED ORAL at 09:06

## 2021-06-06 RX ADMIN — DOCUSATE SODIUM 100 MG: 100 CAPSULE, LIQUID FILLED ORAL at 10:06

## 2021-06-06 RX ADMIN — LACTOBACILLUS TAB 2 TABLET: TAB at 09:06

## 2021-06-06 RX ADMIN — HYDROMORPHONE HYDROCHLORIDE 1 MG: 2 INJECTION, SOLUTION INTRAMUSCULAR; INTRAVENOUS; SUBCUTANEOUS at 01:06

## 2021-06-06 RX ADMIN — VALSARTAN 320 MG: 160 TABLET, FILM COATED ORAL at 09:06

## 2021-06-06 RX ADMIN — PHENAZOPYRIDINE 100 MG: 100 TABLET ORAL at 05:06

## 2021-06-06 RX ADMIN — LACTOBACILLUS TAB 2 TABLET: TAB at 05:06

## 2021-06-06 RX ADMIN — LORAZEPAM 0.5 MG: 0.5 TABLET ORAL at 08:06

## 2021-06-06 RX ADMIN — PROMETHAZINE HYDROCHLORIDE 25 MG: 25 INJECTION INTRAMUSCULAR; INTRAVENOUS at 03:06

## 2021-06-06 RX ADMIN — GABAPENTIN 800 MG: 400 CAPSULE ORAL at 08:06

## 2021-06-06 RX ADMIN — KETOROLAC TROMETHAMINE 15 MG: 30 INJECTION, SOLUTION INTRAMUSCULAR at 10:06

## 2021-06-06 RX ADMIN — ACETAMINOPHEN 650 MG: 325 TABLET ORAL at 10:06

## 2021-06-06 RX ADMIN — PIPERACILLIN AND TAZOBACTAM 2.25 G: 2; .25 INJECTION, POWDER, LYOPHILIZED, FOR SOLUTION INTRAVENOUS at 10:06

## 2021-06-06 RX ADMIN — GABAPENTIN 800 MG: 400 CAPSULE ORAL at 09:06

## 2021-06-06 RX ADMIN — GABAPENTIN 800 MG: 400 CAPSULE ORAL at 12:06

## 2021-06-06 RX ADMIN — CITALOPRAM HYDROBROMIDE 40 MG: 20 TABLET ORAL at 09:06

## 2021-06-06 RX ADMIN — MICONAZOLE NITRATE: 20 POWDER TOPICAL at 09:06

## 2021-06-06 RX ADMIN — LORAZEPAM 0.5 MG: 0.5 TABLET ORAL at 09:06

## 2021-06-06 RX ADMIN — LACTOBACILLUS TAB 2 TABLET: TAB at 12:06

## 2021-06-06 RX ADMIN — LORAZEPAM 0.5 MG: 0.5 TABLET ORAL at 02:06

## 2021-06-06 RX ADMIN — HYDRALAZINE HYDROCHLORIDE 25 MG: 25 TABLET, FILM COATED ORAL at 08:06

## 2021-06-06 RX ADMIN — INSULIN ASPART 2 UNITS: 100 INJECTION, SOLUTION INTRAVENOUS; SUBCUTANEOUS at 04:06

## 2021-06-06 RX ADMIN — MICONAZOLE NITRATE: 20 POWDER TOPICAL at 08:06

## 2021-06-06 RX ADMIN — ATORVASTATIN CALCIUM 40 MG: 40 TABLET, FILM COATED ORAL at 08:06

## 2021-06-06 RX ADMIN — CLOPIDOGREL BISULFATE 75 MG: 75 TABLET, FILM COATED ORAL at 09:06

## 2021-06-06 RX ADMIN — HYDROCHLOROTHIAZIDE 12.5 MG: 12.5 TABLET ORAL at 09:06

## 2021-06-06 RX ADMIN — GABAPENTIN 800 MG: 400 CAPSULE ORAL at 05:06

## 2021-06-06 RX ADMIN — PIPERACILLIN AND TAZOBACTAM 2.25 G: 2; .25 INJECTION, POWDER, LYOPHILIZED, FOR SOLUTION INTRAVENOUS at 05:06

## 2021-06-06 RX ADMIN — AMLODIPINE BESYLATE 5 MG: 5 TABLET ORAL at 09:06

## 2021-06-07 LAB
GLUCOSE SERPL-MCNC: 103 MG/DL (ref 70–105)
GLUCOSE SERPL-MCNC: 108 MG/DL (ref 70–105)
GLUCOSE SERPL-MCNC: 119 MG/DL (ref 70–105)
GLUCOSE SERPL-MCNC: 122 MG/DL (ref 70–105)
GLUCOSE SERPL-MCNC: 189 MG/DL (ref 70–105)

## 2021-06-07 PROCEDURE — 97116 GAIT TRAINING THERAPY: CPT

## 2021-06-07 PROCEDURE — 27000944

## 2021-06-07 PROCEDURE — 97112 NEUROMUSCULAR REEDUCATION: CPT

## 2021-06-07 PROCEDURE — 25000003 PHARM REV CODE 250: Performed by: NURSE PRACTITIONER

## 2021-06-07 PROCEDURE — 97535 SELF CARE MNGMENT TRAINING: CPT

## 2021-06-07 PROCEDURE — 25000003 PHARM REV CODE 250

## 2021-06-07 PROCEDURE — 11000004 HC SNF PRIVATE

## 2021-06-07 PROCEDURE — 63600175 PHARM REV CODE 636 W HCPCS: Performed by: EMERGENCY MEDICINE

## 2021-06-07 PROCEDURE — 25000003 PHARM REV CODE 250: Performed by: EMERGENCY MEDICINE

## 2021-06-07 PROCEDURE — 94761 N-INVAS EAR/PLS OXIMETRY MLT: CPT

## 2021-06-07 PROCEDURE — 92526 ORAL FUNCTION THERAPY: CPT

## 2021-06-07 PROCEDURE — 27000221 HC OXYGEN, UP TO 24 HOURS

## 2021-06-07 RX ORDER — HYDROCODONE BITARTRATE AND ACETAMINOPHEN 5; 325 MG/1; MG/1
1 TABLET ORAL EVERY 6 HOURS PRN
Status: DISCONTINUED | OUTPATIENT
Start: 2021-06-07 | End: 2021-06-17 | Stop reason: HOSPADM

## 2021-06-07 RX ORDER — PHENAZOPYRIDINE HYDROCHLORIDE 100 MG/1
200 TABLET, FILM COATED ORAL
Status: DISCONTINUED | OUTPATIENT
Start: 2021-06-07 | End: 2021-06-15

## 2021-06-07 RX ORDER — PHENAZOPYRIDINE HYDROCHLORIDE 100 MG/1
100 TABLET, FILM COATED ORAL ONCE
Status: COMPLETED | OUTPATIENT
Start: 2021-06-07 | End: 2021-06-07

## 2021-06-07 RX ADMIN — ACETAMINOPHEN 650 MG: 325 TABLET ORAL at 04:06

## 2021-06-07 RX ADMIN — AMLODIPINE BESYLATE 5 MG: 5 TABLET ORAL at 08:06

## 2021-06-07 RX ADMIN — MICONAZOLE NITRATE: 20 POWDER TOPICAL at 08:06

## 2021-06-07 RX ADMIN — PIPERACILLIN AND TAZOBACTAM 2.25 G: 2; .25 INJECTION, POWDER, LYOPHILIZED, FOR SOLUTION INTRAVENOUS at 01:06

## 2021-06-07 RX ADMIN — LORAZEPAM 0.5 MG: 0.5 TABLET ORAL at 02:06

## 2021-06-07 RX ADMIN — LORAZEPAM 0.5 MG: 0.5 TABLET ORAL at 08:06

## 2021-06-07 RX ADMIN — DOCUSATE SODIUM 100 MG: 100 CAPSULE, LIQUID FILLED ORAL at 08:06

## 2021-06-07 RX ADMIN — PHENAZOPYRIDINE 100 MG: 100 TABLET ORAL at 12:06

## 2021-06-07 RX ADMIN — PHENAZOPYRIDINE 100 MG: 100 TABLET ORAL at 04:06

## 2021-06-07 RX ADMIN — CLOPIDOGREL BISULFATE 75 MG: 75 TABLET, FILM COATED ORAL at 08:06

## 2021-06-07 RX ADMIN — MICONAZOLE NITRATE: 20 POWDER TOPICAL at 09:06

## 2021-06-07 RX ADMIN — HYDROCODONE BITARTRATE AND ACETAMINOPHEN 1 TABLET: 5; 325 TABLET ORAL at 08:06

## 2021-06-07 RX ADMIN — ATORVASTATIN CALCIUM 40 MG: 40 TABLET, FILM COATED ORAL at 08:06

## 2021-06-07 RX ADMIN — HYDRALAZINE HYDROCHLORIDE 25 MG: 25 TABLET, FILM COATED ORAL at 08:06

## 2021-06-07 RX ADMIN — LACTOBACILLUS TAB 2 TABLET: TAB at 12:06

## 2021-06-07 RX ADMIN — PIPERACILLIN AND TAZOBACTAM 2.25 G: 2; .25 INJECTION, POWDER, LYOPHILIZED, FOR SOLUTION INTRAVENOUS at 10:06

## 2021-06-07 RX ADMIN — PIPERACILLIN AND TAZOBACTAM 2.25 G: 2; .25 INJECTION, POWDER, LYOPHILIZED, FOR SOLUTION INTRAVENOUS at 05:06

## 2021-06-07 RX ADMIN — GABAPENTIN 800 MG: 400 CAPSULE ORAL at 12:06

## 2021-06-07 RX ADMIN — GABAPENTIN 800 MG: 400 CAPSULE ORAL at 08:06

## 2021-06-07 RX ADMIN — LACTOBACILLUS TAB 2 TABLET: TAB at 08:06

## 2021-06-07 RX ADMIN — PHENAZOPYRIDINE 100 MG: 100 TABLET ORAL at 05:06

## 2021-06-07 RX ADMIN — GABAPENTIN 800 MG: 400 CAPSULE ORAL at 04:06

## 2021-06-07 RX ADMIN — HYDROCHLOROTHIAZIDE 12.5 MG: 12.5 TABLET ORAL at 08:06

## 2021-06-07 RX ADMIN — VALSARTAN 320 MG: 160 TABLET, FILM COATED ORAL at 08:06

## 2021-06-07 RX ADMIN — HYDROCODONE BITARTRATE AND ACETAMINOPHEN 1 TABLET: 5; 325 TABLET ORAL at 02:06

## 2021-06-07 RX ADMIN — LACTOBACILLUS TAB 2 TABLET: TAB at 04:06

## 2021-06-07 RX ADMIN — PHENAZOPYRIDINE 100 MG: 100 TABLET ORAL at 08:06

## 2021-06-07 RX ADMIN — CITALOPRAM HYDROBROMIDE 40 MG: 20 TABLET ORAL at 08:06

## 2021-06-08 PROBLEM — R10.9 FLANK PAIN: Status: ACTIVE | Noted: 2021-06-08
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
## 2021-06-08 LAB
GLUCOSE SERPL-MCNC: 111 MG/DL (ref 70–105)
GLUCOSE SERPL-MCNC: 111 MG/DL (ref 70–105)
GLUCOSE SERPL-MCNC: 123 MG/DL (ref 70–105)
GLUCOSE SERPL-MCNC: 91 MG/DL (ref 70–105)

## 2021-06-08 PROCEDURE — 25000003 PHARM REV CODE 250: Performed by: EMERGENCY MEDICINE

## 2021-06-08 PROCEDURE — 94761 N-INVAS EAR/PLS OXIMETRY MLT: CPT

## 2021-06-08 PROCEDURE — 25000003 PHARM REV CODE 250: Performed by: NURSE PRACTITIONER

## 2021-06-08 PROCEDURE — 63600175 PHARM REV CODE 636 W HCPCS: Performed by: EMERGENCY MEDICINE

## 2021-06-08 PROCEDURE — 27000944

## 2021-06-08 PROCEDURE — 11000004 HC SNF PRIVATE

## 2021-06-08 PROCEDURE — 97112 NEUROMUSCULAR REEDUCATION: CPT

## 2021-06-08 PROCEDURE — 97116 GAIT TRAINING THERAPY: CPT

## 2021-06-08 PROCEDURE — 92526 ORAL FUNCTION THERAPY: CPT

## 2021-06-08 PROCEDURE — 97530 THERAPEUTIC ACTIVITIES: CPT

## 2021-06-08 PROCEDURE — 27000221 HC OXYGEN, UP TO 24 HOURS

## 2021-06-08 PROCEDURE — 25000003 PHARM REV CODE 250

## 2021-06-08 RX ADMIN — LORAZEPAM 0.5 MG: 0.5 TABLET ORAL at 03:06

## 2021-06-08 RX ADMIN — DOCUSATE SODIUM 100 MG: 100 CAPSULE, LIQUID FILLED ORAL at 08:06

## 2021-06-08 RX ADMIN — HYDROCODONE BITARTRATE AND ACETAMINOPHEN 1 TABLET: 5; 325 TABLET ORAL at 10:06

## 2021-06-08 RX ADMIN — GABAPENTIN 800 MG: 400 CAPSULE ORAL at 05:06

## 2021-06-08 RX ADMIN — GABAPENTIN 800 MG: 400 CAPSULE ORAL at 10:06

## 2021-06-08 RX ADMIN — PHENAZOPYRIDINE 200 MG: 100 TABLET ORAL at 05:06

## 2021-06-08 RX ADMIN — PIPERACILLIN AND TAZOBACTAM 2.25 G: 2; .25 INJECTION, POWDER, LYOPHILIZED, FOR SOLUTION INTRAVENOUS at 09:06

## 2021-06-08 RX ADMIN — PIPERACILLIN AND TAZOBACTAM 2.25 G: 2; .25 INJECTION, POWDER, LYOPHILIZED, FOR SOLUTION INTRAVENOUS at 05:06

## 2021-06-08 RX ADMIN — POLYETHYLENE GLYCOL 3350 17 G: 17 POWDER, FOR SOLUTION ORAL at 08:06

## 2021-06-08 RX ADMIN — HYDRALAZINE HYDROCHLORIDE 25 MG: 25 TABLET, FILM COATED ORAL at 08:06

## 2021-06-08 RX ADMIN — CITALOPRAM HYDROBROMIDE 40 MG: 20 TABLET ORAL at 08:06

## 2021-06-08 RX ADMIN — MICONAZOLE NITRATE: 20 POWDER TOPICAL at 08:06

## 2021-06-08 RX ADMIN — ATORVASTATIN CALCIUM 40 MG: 40 TABLET, FILM COATED ORAL at 10:06

## 2021-06-08 RX ADMIN — HYDROCODONE BITARTRATE AND ACETAMINOPHEN 1 TABLET: 5; 325 TABLET ORAL at 04:06

## 2021-06-08 RX ADMIN — AMLODIPINE BESYLATE 5 MG: 5 TABLET ORAL at 08:06

## 2021-06-08 RX ADMIN — MICONAZOLE NITRATE: 20 POWDER TOPICAL at 09:06

## 2021-06-08 RX ADMIN — GABAPENTIN 800 MG: 400 CAPSULE ORAL at 12:06

## 2021-06-08 RX ADMIN — LORAZEPAM 0.5 MG: 0.5 TABLET ORAL at 10:06

## 2021-06-08 RX ADMIN — CLOPIDOGREL BISULFATE 75 MG: 75 TABLET, FILM COATED ORAL at 08:06

## 2021-06-08 RX ADMIN — PIPERACILLIN AND TAZOBACTAM 2.25 G: 2; .25 INJECTION, POWDER, LYOPHILIZED, FOR SOLUTION INTRAVENOUS at 01:06

## 2021-06-08 RX ADMIN — LACTOBACILLUS TAB 2 TABLET: TAB at 05:06

## 2021-06-08 RX ADMIN — LORAZEPAM 0.5 MG: 0.5 TABLET ORAL at 08:06

## 2021-06-08 RX ADMIN — VALSARTAN 320 MG: 160 TABLET, FILM COATED ORAL at 08:06

## 2021-06-08 RX ADMIN — HYDRALAZINE HYDROCHLORIDE 25 MG: 25 TABLET, FILM COATED ORAL at 10:06

## 2021-06-08 RX ADMIN — PHENAZOPYRIDINE 200 MG: 100 TABLET ORAL at 12:06

## 2021-06-08 RX ADMIN — LACTOBACILLUS TAB 2 TABLET: TAB at 12:06

## 2021-06-08 RX ADMIN — ACETAMINOPHEN 650 MG: 325 TABLET ORAL at 12:06

## 2021-06-08 RX ADMIN — GABAPENTIN 800 MG: 400 CAPSULE ORAL at 08:06

## 2021-06-08 RX ADMIN — LACTOBACILLUS TAB 2 TABLET: TAB at 08:06

## 2021-06-08 RX ADMIN — PHENAZOPYRIDINE 200 MG: 100 TABLET ORAL at 08:06

## 2021-06-08 RX ADMIN — HYDROCODONE BITARTRATE AND ACETAMINOPHEN 1 TABLET: 5; 325 TABLET ORAL at 08:06

## 2021-06-08 RX ADMIN — HYDROCHLOROTHIAZIDE 12.5 MG: 12.5 TABLET ORAL at 08:06

## 2021-06-09 LAB
ANION GAP SERPL CALCULATED.3IONS-SCNC: 11 MMOL/L (ref 7–16)
BUN SERPL-MCNC: 23 MG/DL (ref 7–18)
BUN/CREAT SERPL: 19 (ref 6–20)
CALCIUM SERPL-MCNC: 8.7 MG/DL (ref 8.5–10.1)
CHLORIDE SERPL-SCNC: 102 MMOL/L (ref 98–107)
CO2 SERPL-SCNC: 31 MMOL/L (ref 21–32)
CREAT SERPL-MCNC: 1.19 MG/DL (ref 0.55–1.02)
GLUCOSE SERPL-MCNC: 100 MG/DL (ref 74–106)
GLUCOSE SERPL-MCNC: 126 MG/DL (ref 70–105)
GLUCOSE SERPL-MCNC: 127 MG/DL (ref 70–105)
GLUCOSE SERPL-MCNC: 171 MG/DL (ref 70–105)
GLUCOSE SERPL-MCNC: 95 MG/DL (ref 70–105)
POTASSIUM SERPL-SCNC: 4.6 MMOL/L (ref 3.5–5.1)
SODIUM SERPL-SCNC: 139 MMOL/L (ref 136–145)

## 2021-06-09 PROCEDURE — 27000944

## 2021-06-09 PROCEDURE — 82962 GLUCOSE BLOOD TEST: CPT

## 2021-06-09 PROCEDURE — 11000004 HC SNF PRIVATE

## 2021-06-09 PROCEDURE — 97112 NEUROMUSCULAR REEDUCATION: CPT

## 2021-06-09 PROCEDURE — 97530 THERAPEUTIC ACTIVITIES: CPT

## 2021-06-09 PROCEDURE — 25000003 PHARM REV CODE 250: Performed by: EMERGENCY MEDICINE

## 2021-06-09 PROCEDURE — 97116 GAIT TRAINING THERAPY: CPT

## 2021-06-09 PROCEDURE — 25000003 PHARM REV CODE 250: Performed by: NURSE PRACTITIONER

## 2021-06-09 PROCEDURE — 97535 SELF CARE MNGMENT TRAINING: CPT

## 2021-06-09 PROCEDURE — 63600175 PHARM REV CODE 636 W HCPCS: Performed by: EMERGENCY MEDICINE

## 2021-06-09 PROCEDURE — 36415 COLL VENOUS BLD VENIPUNCTURE: CPT | Performed by: EMERGENCY MEDICINE

## 2021-06-09 PROCEDURE — 25000003 PHARM REV CODE 250

## 2021-06-09 PROCEDURE — 80048 BASIC METABOLIC PNL TOTAL CA: CPT | Performed by: EMERGENCY MEDICINE

## 2021-06-09 PROCEDURE — 94761 N-INVAS EAR/PLS OXIMETRY MLT: CPT

## 2021-06-09 PROCEDURE — 92526 ORAL FUNCTION THERAPY: CPT

## 2021-06-09 PROCEDURE — 27000221 HC OXYGEN, UP TO 24 HOURS

## 2021-06-09 RX ADMIN — HYDRALAZINE HYDROCHLORIDE 25 MG: 25 TABLET, FILM COATED ORAL at 09:06

## 2021-06-09 RX ADMIN — LORAZEPAM 0.5 MG: 0.5 TABLET ORAL at 09:06

## 2021-06-09 RX ADMIN — LORAZEPAM 0.5 MG: 0.5 TABLET ORAL at 03:06

## 2021-06-09 RX ADMIN — GABAPENTIN 800 MG: 400 CAPSULE ORAL at 09:06

## 2021-06-09 RX ADMIN — LACTOBACILLUS TAB 2 TABLET: TAB at 08:06

## 2021-06-09 RX ADMIN — VALSARTAN 320 MG: 160 TABLET, FILM COATED ORAL at 09:06

## 2021-06-09 RX ADMIN — CITALOPRAM HYDROBROMIDE 40 MG: 20 TABLET ORAL at 09:06

## 2021-06-09 RX ADMIN — GABAPENTIN 800 MG: 400 CAPSULE ORAL at 05:06

## 2021-06-09 RX ADMIN — PIPERACILLIN AND TAZOBACTAM 2.25 G: 2; .25 INJECTION, POWDER, LYOPHILIZED, FOR SOLUTION INTRAVENOUS at 03:06

## 2021-06-09 RX ADMIN — PIPERACILLIN AND TAZOBACTAM 2.25 G: 2; .25 INJECTION, POWDER, LYOPHILIZED, FOR SOLUTION INTRAVENOUS at 09:06

## 2021-06-09 RX ADMIN — HYDROCHLOROTHIAZIDE 12.5 MG: 12.5 TABLET ORAL at 09:06

## 2021-06-09 RX ADMIN — PHENAZOPYRIDINE 200 MG: 100 TABLET ORAL at 12:06

## 2021-06-09 RX ADMIN — GABAPENTIN 800 MG: 400 CAPSULE ORAL at 08:06

## 2021-06-09 RX ADMIN — PHENAZOPYRIDINE 200 MG: 100 TABLET ORAL at 08:06

## 2021-06-09 RX ADMIN — HYDROCODONE BITARTRATE AND ACETAMINOPHEN 1 TABLET: 5; 325 TABLET ORAL at 10:06

## 2021-06-09 RX ADMIN — ATORVASTATIN CALCIUM 40 MG: 40 TABLET, FILM COATED ORAL at 08:06

## 2021-06-09 RX ADMIN — CLOPIDOGREL BISULFATE 75 MG: 75 TABLET, FILM COATED ORAL at 09:06

## 2021-06-09 RX ADMIN — MICONAZOLE NITRATE: 20 POWDER TOPICAL at 09:06

## 2021-06-09 RX ADMIN — AMLODIPINE BESYLATE 5 MG: 5 TABLET ORAL at 09:06

## 2021-06-09 RX ADMIN — INSULIN ASPART 2 UNITS: 100 INJECTION, SOLUTION INTRAVENOUS; SUBCUTANEOUS at 09:06

## 2021-06-09 RX ADMIN — ACETAMINOPHEN 650 MG: 325 TABLET ORAL at 08:06

## 2021-06-09 RX ADMIN — PHENAZOPYRIDINE 200 MG: 100 TABLET ORAL at 05:06

## 2021-06-09 RX ADMIN — PIPERACILLIN AND TAZOBACTAM 2.25 G: 2; .25 INJECTION, POWDER, LYOPHILIZED, FOR SOLUTION INTRAVENOUS at 05:06

## 2021-06-09 RX ADMIN — GABAPENTIN 800 MG: 400 CAPSULE ORAL at 12:06

## 2021-06-09 RX ADMIN — LACTOBACILLUS TAB 2 TABLET: TAB at 05:06

## 2021-06-09 RX ADMIN — DOCUSATE SODIUM 100 MG: 100 CAPSULE, LIQUID FILLED ORAL at 09:06

## 2021-06-09 RX ADMIN — LACTOBACILLUS TAB 2 TABLET: TAB at 12:06

## 2021-06-09 RX ADMIN — POLYETHYLENE GLYCOL 3350 17 G: 17 POWDER, FOR SOLUTION ORAL at 09:06

## 2021-06-09 RX ADMIN — HYDROCODONE BITARTRATE AND ACETAMINOPHEN 1 TABLET: 5; 325 TABLET ORAL at 04:06

## 2021-06-09 RX ADMIN — HYDRALAZINE HYDROCHLORIDE 25 MG: 25 TABLET, FILM COATED ORAL at 08:06

## 2021-06-10 PROBLEM — Z79.899 DVT PROPHYLAXIS: Status: ACTIVE | Noted: 2021-06-10

## 2021-06-10 LAB
ANION GAP SERPL CALCULATED.3IONS-SCNC: 13 MMOL/L (ref 7–16)
BASOPHILS # BLD AUTO: 0.03 K/UL (ref 0–0.2)
BASOPHILS NFR BLD AUTO: 0.5 % (ref 0–1)
BUN SERPL-MCNC: 25 MG/DL (ref 7–18)
BUN/CREAT SERPL: 20 (ref 6–20)
CALCIUM SERPL-MCNC: 8.9 MG/DL (ref 8.5–10.1)
CHLORIDE SERPL-SCNC: 101 MMOL/L (ref 98–107)
CO2 SERPL-SCNC: 29 MMOL/L (ref 21–32)
CREAT SERPL-MCNC: 1.25 MG/DL (ref 0.55–1.02)
DIFFERENTIAL METHOD BLD: ABNORMAL
EOSINOPHIL # BLD AUTO: 0.29 K/UL (ref 0–0.5)
EOSINOPHIL NFR BLD AUTO: 5.3 % (ref 1–4)
ERYTHROCYTE [DISTWIDTH] IN BLOOD BY AUTOMATED COUNT: 14.8 % (ref 11.5–14.5)
GLUCOSE SERPL-MCNC: 129 MG/DL (ref 70–105)
GLUCOSE SERPL-MCNC: 149 MG/DL (ref 74–106)
GLUCOSE SERPL-MCNC: 157 MG/DL (ref 70–105)
GLUCOSE SERPL-MCNC: 226 MG/DL (ref 70–105)
GLUCOSE SERPL-MCNC: 98 MG/DL (ref 70–105)
HCT VFR BLD AUTO: 36.1 % (ref 38–47)
HGB BLD-MCNC: 11.6 G/DL (ref 12–16)
LYMPHOCYTES # BLD AUTO: 2.21 K/UL (ref 1–4.8)
LYMPHOCYTES NFR BLD AUTO: 40.3 % (ref 27–41)
MCH RBC QN AUTO: 31.2 PG (ref 27–31)
MCHC RBC AUTO-ENTMCNC: 32.1 G/DL (ref 32–36)
MCV RBC AUTO: 97 FL (ref 80–96)
MONOCYTES # BLD AUTO: 0.62 K/UL (ref 0–0.8)
MONOCYTES NFR BLD AUTO: 11.3 % (ref 2–6)
MPC BLD CALC-MCNC: 9.2 FL (ref 9.4–12.4)
NEUTROPHILS # BLD AUTO: 2.33 K/UL (ref 1.8–7.7)
NEUTROPHILS NFR BLD AUTO: 42.6 % (ref 53–65)
PLATELET # BLD AUTO: 126 K/UL (ref 150–400)
POTASSIUM SERPL-SCNC: 4.6 MMOL/L (ref 3.5–5.1)
RBC # BLD AUTO: 3.72 M/UL (ref 4.2–5.4)
SODIUM SERPL-SCNC: 138 MMOL/L (ref 136–145)
WBC # BLD AUTO: 5.48 K/UL (ref 4.5–11)

## 2021-06-10 PROCEDURE — 92526 ORAL FUNCTION THERAPY: CPT

## 2021-06-10 PROCEDURE — 97112 NEUROMUSCULAR REEDUCATION: CPT

## 2021-06-10 PROCEDURE — 36415 COLL VENOUS BLD VENIPUNCTURE: CPT | Performed by: EMERGENCY MEDICINE

## 2021-06-10 PROCEDURE — 85025 COMPLETE CBC W/AUTO DIFF WBC: CPT | Performed by: EMERGENCY MEDICINE

## 2021-06-10 PROCEDURE — 25000003 PHARM REV CODE 250: Performed by: NURSE PRACTITIONER

## 2021-06-10 PROCEDURE — 25000003 PHARM REV CODE 250

## 2021-06-10 PROCEDURE — 97116 GAIT TRAINING THERAPY: CPT

## 2021-06-10 PROCEDURE — 97535 SELF CARE MNGMENT TRAINING: CPT

## 2021-06-10 PROCEDURE — 11000004 HC SNF PRIVATE

## 2021-06-10 PROCEDURE — 25000003 PHARM REV CODE 250: Performed by: EMERGENCY MEDICINE

## 2021-06-10 PROCEDURE — 97530 THERAPEUTIC ACTIVITIES: CPT

## 2021-06-10 PROCEDURE — 63600175 PHARM REV CODE 636 W HCPCS: Performed by: EMERGENCY MEDICINE

## 2021-06-10 PROCEDURE — 99900035 HC TECH TIME PER 15 MIN (STAT)

## 2021-06-10 PROCEDURE — 27000944

## 2021-06-10 PROCEDURE — 97140 MANUAL THERAPY 1/> REGIONS: CPT

## 2021-06-10 PROCEDURE — 82962 GLUCOSE BLOOD TEST: CPT

## 2021-06-10 PROCEDURE — 94761 N-INVAS EAR/PLS OXIMETRY MLT: CPT

## 2021-06-10 PROCEDURE — 27000221 HC OXYGEN, UP TO 24 HOURS

## 2021-06-10 RX ADMIN — LORAZEPAM 0.5 MG: 0.5 TABLET ORAL at 08:06

## 2021-06-10 RX ADMIN — DIPHENHYDRAMINE HYDROCHLORIDE 25 MG: 25 CAPSULE ORAL at 12:06

## 2021-06-10 RX ADMIN — HYDROCODONE BITARTRATE AND ACETAMINOPHEN 1 TABLET: 5; 325 TABLET ORAL at 02:06

## 2021-06-10 RX ADMIN — HYDRALAZINE HYDROCHLORIDE 25 MG: 25 TABLET, FILM COATED ORAL at 09:06

## 2021-06-10 RX ADMIN — HYDROCODONE BITARTRATE AND ACETAMINOPHEN 1 TABLET: 5; 325 TABLET ORAL at 12:06

## 2021-06-10 RX ADMIN — HYDRALAZINE HYDROCHLORIDE 25 MG: 25 TABLET, FILM COATED ORAL at 08:06

## 2021-06-10 RX ADMIN — LACTOBACILLUS TAB 2 TABLET: TAB at 12:06

## 2021-06-10 RX ADMIN — PHENAZOPYRIDINE 200 MG: 100 TABLET ORAL at 05:06

## 2021-06-10 RX ADMIN — LORAZEPAM 0.5 MG: 0.5 TABLET ORAL at 09:06

## 2021-06-10 RX ADMIN — GABAPENTIN 800 MG: 400 CAPSULE ORAL at 05:06

## 2021-06-10 RX ADMIN — LACTOBACILLUS TAB 2 TABLET: TAB at 08:06

## 2021-06-10 RX ADMIN — HYDROCODONE BITARTRATE AND ACETAMINOPHEN 1 TABLET: 5; 325 TABLET ORAL at 08:06

## 2021-06-10 RX ADMIN — GABAPENTIN 800 MG: 400 CAPSULE ORAL at 08:06

## 2021-06-10 RX ADMIN — VALSARTAN 320 MG: 160 TABLET, FILM COATED ORAL at 09:06

## 2021-06-10 RX ADMIN — LORAZEPAM 0.5 MG: 0.5 TABLET ORAL at 03:06

## 2021-06-10 RX ADMIN — INSULIN ASPART 4 UNITS: 100 INJECTION, SOLUTION INTRAVENOUS; SUBCUTANEOUS at 04:06

## 2021-06-10 RX ADMIN — MICONAZOLE NITRATE: 20 POWDER TOPICAL at 08:06

## 2021-06-10 RX ADMIN — HYDROCHLOROTHIAZIDE 12.5 MG: 12.5 TABLET ORAL at 09:06

## 2021-06-10 RX ADMIN — MICONAZOLE NITRATE: 20 POWDER TOPICAL at 09:06

## 2021-06-10 RX ADMIN — AMLODIPINE BESYLATE 5 MG: 5 TABLET ORAL at 09:06

## 2021-06-10 RX ADMIN — CITALOPRAM HYDROBROMIDE 40 MG: 20 TABLET ORAL at 09:06

## 2021-06-10 RX ADMIN — PIPERACILLIN AND TAZOBACTAM 2.25 G: 2; .25 INJECTION, POWDER, LYOPHILIZED, FOR SOLUTION INTRAVENOUS at 03:06

## 2021-06-10 RX ADMIN — DOCUSATE SODIUM 100 MG: 100 CAPSULE, LIQUID FILLED ORAL at 09:06

## 2021-06-10 RX ADMIN — PIPERACILLIN AND TAZOBACTAM 2.25 G: 2; .25 INJECTION, POWDER, LYOPHILIZED, FOR SOLUTION INTRAVENOUS at 09:06

## 2021-06-10 RX ADMIN — CLOPIDOGREL BISULFATE 75 MG: 75 TABLET, FILM COATED ORAL at 09:06

## 2021-06-10 RX ADMIN — INSULIN ASPART 2 UNITS: 100 INJECTION, SOLUTION INTRAVENOUS; SUBCUTANEOUS at 08:06

## 2021-06-10 RX ADMIN — PHENAZOPYRIDINE 200 MG: 100 TABLET ORAL at 12:06

## 2021-06-10 RX ADMIN — GABAPENTIN 800 MG: 400 CAPSULE ORAL at 09:06

## 2021-06-10 RX ADMIN — PHENAZOPYRIDINE 200 MG: 100 TABLET ORAL at 08:06

## 2021-06-10 RX ADMIN — LACTOBACILLUS TAB 2 TABLET: TAB at 05:06

## 2021-06-10 RX ADMIN — POLYETHYLENE GLYCOL 3350 17 G: 17 POWDER, FOR SOLUTION ORAL at 09:06

## 2021-06-10 RX ADMIN — DIPHENHYDRAMINE HYDROCHLORIDE 25 MG: 25 CAPSULE ORAL at 08:06

## 2021-06-10 RX ADMIN — ATORVASTATIN CALCIUM 40 MG: 40 TABLET, FILM COATED ORAL at 08:06

## 2021-06-10 RX ADMIN — GABAPENTIN 800 MG: 400 CAPSULE ORAL at 12:06

## 2021-06-11 LAB
BACTERIA #/AREA URNS HPF: ABNORMAL /HPF
BILIRUB UR QL STRIP: ABNORMAL
CLARITY UR: CLEAR
COLOR UR: ABNORMAL
GLUCOSE SERPL-MCNC: 100 MG/DL (ref 70–105)
GLUCOSE SERPL-MCNC: 145 MG/DL (ref 70–105)
GLUCOSE SERPL-MCNC: 146 MG/DL (ref 70–105)
GLUCOSE SERPL-MCNC: 227 MG/DL (ref 70–105)
GLUCOSE UR STRIP-MCNC: 100 MG/DL
KETONES UR STRIP-SCNC: ABNORMAL MG/DL
LEUKOCYTE ESTERASE UR QL STRIP: ABNORMAL
NITRITE UR QL STRIP: POSITIVE
PH UR STRIP: 6 PH UNITS
PROT UR QL STRIP: ABNORMAL
RBC # UR STRIP: NEGATIVE /UL
RBC #/AREA URNS HPF: ABNORMAL /HPF
SP GR UR STRIP: 1.01
SQUAMOUS #/AREA URNS LPF: ABNORMAL /LPF
UROBILINOGEN UR STRIP-ACNC: 2 MG/DL
WBC #/AREA URNS HPF: ABNORMAL /HPF

## 2021-06-11 PROCEDURE — 11000004 HC SNF PRIVATE

## 2021-06-11 PROCEDURE — 25000003 PHARM REV CODE 250: Performed by: EMERGENCY MEDICINE

## 2021-06-11 PROCEDURE — 99900035 HC TECH TIME PER 15 MIN (STAT)

## 2021-06-11 PROCEDURE — 97530 THERAPEUTIC ACTIVITIES: CPT

## 2021-06-11 PROCEDURE — 25000003 PHARM REV CODE 250: Performed by: NURSE PRACTITIONER

## 2021-06-11 PROCEDURE — 97535 SELF CARE MNGMENT TRAINING: CPT

## 2021-06-11 PROCEDURE — 87186 SC STD MICRODIL/AGAR DIL: CPT | Performed by: EMERGENCY MEDICINE

## 2021-06-11 PROCEDURE — 92526 ORAL FUNCTION THERAPY: CPT

## 2021-06-11 PROCEDURE — 63600175 PHARM REV CODE 636 W HCPCS: Performed by: EMERGENCY MEDICINE

## 2021-06-11 PROCEDURE — 82962 GLUCOSE BLOOD TEST: CPT

## 2021-06-11 PROCEDURE — 27000221 HC OXYGEN, UP TO 24 HOURS

## 2021-06-11 PROCEDURE — 27000944

## 2021-06-11 PROCEDURE — 97112 NEUROMUSCULAR REEDUCATION: CPT

## 2021-06-11 PROCEDURE — 81001 URINALYSIS AUTO W/SCOPE: CPT | Performed by: EMERGENCY MEDICINE

## 2021-06-11 PROCEDURE — 97110 THERAPEUTIC EXERCISES: CPT

## 2021-06-11 PROCEDURE — 81003 URINALYSIS AUTO W/O SCOPE: CPT | Performed by: EMERGENCY MEDICINE

## 2021-06-11 PROCEDURE — 25000003 PHARM REV CODE 250

## 2021-06-11 PROCEDURE — 94761 N-INVAS EAR/PLS OXIMETRY MLT: CPT

## 2021-06-11 PROCEDURE — 97116 GAIT TRAINING THERAPY: CPT

## 2021-06-11 RX ADMIN — LACTOBACILLUS TAB 2 TABLET: TAB at 12:06

## 2021-06-11 RX ADMIN — INSULIN ASPART 4 UNITS: 100 INJECTION, SOLUTION INTRAVENOUS; SUBCUTANEOUS at 08:06

## 2021-06-11 RX ADMIN — CITALOPRAM HYDROBROMIDE 40 MG: 20 TABLET ORAL at 08:06

## 2021-06-11 RX ADMIN — CLOPIDOGREL BISULFATE 75 MG: 75 TABLET, FILM COATED ORAL at 08:06

## 2021-06-11 RX ADMIN — LACTOBACILLUS TAB 2 TABLET: TAB at 04:06

## 2021-06-11 RX ADMIN — POLYETHYLENE GLYCOL 3350 17 G: 17 POWDER, FOR SOLUTION ORAL at 08:06

## 2021-06-11 RX ADMIN — LORAZEPAM 0.5 MG: 0.5 TABLET ORAL at 08:06

## 2021-06-11 RX ADMIN — HYDROCODONE BITARTRATE AND ACETAMINOPHEN 1 TABLET: 5; 325 TABLET ORAL at 08:06

## 2021-06-11 RX ADMIN — HYDROCODONE BITARTRATE AND ACETAMINOPHEN 1 TABLET: 5; 325 TABLET ORAL at 02:06

## 2021-06-11 RX ADMIN — GABAPENTIN 800 MG: 400 CAPSULE ORAL at 04:06

## 2021-06-11 RX ADMIN — VALSARTAN 320 MG: 160 TABLET, FILM COATED ORAL at 08:06

## 2021-06-11 RX ADMIN — HYDRALAZINE HYDROCHLORIDE 25 MG: 25 TABLET, FILM COATED ORAL at 08:06

## 2021-06-11 RX ADMIN — PHENAZOPYRIDINE 200 MG: 100 TABLET ORAL at 04:06

## 2021-06-11 RX ADMIN — PHENAZOPYRIDINE 200 MG: 100 TABLET ORAL at 12:06

## 2021-06-11 RX ADMIN — GABAPENTIN 800 MG: 400 CAPSULE ORAL at 08:06

## 2021-06-11 RX ADMIN — GABAPENTIN 800 MG: 400 CAPSULE ORAL at 12:06

## 2021-06-11 RX ADMIN — HYDROCODONE BITARTRATE AND ACETAMINOPHEN 1 TABLET: 5; 325 TABLET ORAL at 05:06

## 2021-06-11 RX ADMIN — MICONAZOLE NITRATE: 20 POWDER TOPICAL at 08:06

## 2021-06-11 RX ADMIN — DOCUSATE SODIUM 100 MG: 100 CAPSULE, LIQUID FILLED ORAL at 08:06

## 2021-06-11 RX ADMIN — ATORVASTATIN CALCIUM 40 MG: 40 TABLET, FILM COATED ORAL at 08:06

## 2021-06-11 RX ADMIN — LACTOBACILLUS TAB 2 TABLET: TAB at 08:06

## 2021-06-11 RX ADMIN — LORAZEPAM 0.5 MG: 0.5 TABLET ORAL at 03:06

## 2021-06-11 RX ADMIN — AMLODIPINE BESYLATE 5 MG: 5 TABLET ORAL at 08:06

## 2021-06-11 RX ADMIN — HYDROCHLOROTHIAZIDE 12.5 MG: 12.5 TABLET ORAL at 08:06

## 2021-06-11 RX ADMIN — PHENAZOPYRIDINE 200 MG: 100 TABLET ORAL at 08:06

## 2021-06-11 RX ADMIN — DIPHENHYDRAMINE HYDROCHLORIDE 25 MG: 25 CAPSULE ORAL at 05:06

## 2021-06-11 RX ADMIN — MICONAZOLE NITRATE: 20 POWDER TOPICAL at 09:06

## 2021-06-12 LAB
GLUCOSE SERPL-MCNC: 107 MG/DL (ref 70–105)
GLUCOSE SERPL-MCNC: 192 MG/DL (ref 70–105)
GLUCOSE SERPL-MCNC: 224 MG/DL (ref 70–105)

## 2021-06-12 PROCEDURE — 25000003 PHARM REV CODE 250: Performed by: NURSE PRACTITIONER

## 2021-06-12 PROCEDURE — 27000221 HC OXYGEN, UP TO 24 HOURS

## 2021-06-12 PROCEDURE — 25000003 PHARM REV CODE 250

## 2021-06-12 PROCEDURE — 63600175 PHARM REV CODE 636 W HCPCS: Performed by: EMERGENCY MEDICINE

## 2021-06-12 PROCEDURE — 25000003 PHARM REV CODE 250: Performed by: EMERGENCY MEDICINE

## 2021-06-12 PROCEDURE — 99900035 HC TECH TIME PER 15 MIN (STAT)

## 2021-06-12 PROCEDURE — 82962 GLUCOSE BLOOD TEST: CPT

## 2021-06-12 PROCEDURE — 94761 N-INVAS EAR/PLS OXIMETRY MLT: CPT

## 2021-06-12 PROCEDURE — 27000944

## 2021-06-12 PROCEDURE — 11000004 HC SNF PRIVATE

## 2021-06-12 RX ADMIN — GABAPENTIN 800 MG: 400 CAPSULE ORAL at 09:06

## 2021-06-12 RX ADMIN — GABAPENTIN 800 MG: 400 CAPSULE ORAL at 04:06

## 2021-06-12 RX ADMIN — DOCUSATE SODIUM 100 MG: 100 CAPSULE, LIQUID FILLED ORAL at 08:06

## 2021-06-12 RX ADMIN — LACTOBACILLUS TAB 2 TABLET: TAB at 08:06

## 2021-06-12 RX ADMIN — ATORVASTATIN CALCIUM 40 MG: 40 TABLET, FILM COATED ORAL at 09:06

## 2021-06-12 RX ADMIN — HYDROCHLOROTHIAZIDE 12.5 MG: 12.5 TABLET ORAL at 08:06

## 2021-06-12 RX ADMIN — HYDRALAZINE HYDROCHLORIDE 25 MG: 25 TABLET, FILM COATED ORAL at 08:06

## 2021-06-12 RX ADMIN — CITALOPRAM HYDROBROMIDE 40 MG: 20 TABLET ORAL at 08:06

## 2021-06-12 RX ADMIN — LORAZEPAM 0.5 MG: 0.5 TABLET ORAL at 09:06

## 2021-06-12 RX ADMIN — AMLODIPINE BESYLATE 5 MG: 5 TABLET ORAL at 08:06

## 2021-06-12 RX ADMIN — INSULIN ASPART 4 UNITS: 100 INJECTION, SOLUTION INTRAVENOUS; SUBCUTANEOUS at 05:06

## 2021-06-12 RX ADMIN — CLOPIDOGREL BISULFATE 75 MG: 75 TABLET, FILM COATED ORAL at 08:06

## 2021-06-12 RX ADMIN — LORAZEPAM 0.5 MG: 0.5 TABLET ORAL at 08:06

## 2021-06-12 RX ADMIN — MICONAZOLE NITRATE: 20 POWDER TOPICAL at 09:06

## 2021-06-12 RX ADMIN — PHENAZOPYRIDINE 200 MG: 100 TABLET ORAL at 08:06

## 2021-06-12 RX ADMIN — MICONAZOLE NITRATE: 20 POWDER TOPICAL at 08:06

## 2021-06-12 RX ADMIN — GABAPENTIN 800 MG: 400 CAPSULE ORAL at 08:06

## 2021-06-12 RX ADMIN — INSULIN ASPART 2 UNITS: 100 INJECTION, SOLUTION INTRAVENOUS; SUBCUTANEOUS at 09:06

## 2021-06-12 RX ADMIN — HYDRALAZINE HYDROCHLORIDE 25 MG: 25 TABLET, FILM COATED ORAL at 09:06

## 2021-06-12 RX ADMIN — POLYETHYLENE GLYCOL 3350 17 G: 17 POWDER, FOR SOLUTION ORAL at 08:06

## 2021-06-12 RX ADMIN — LACTOBACILLUS TAB 2 TABLET: TAB at 04:06

## 2021-06-12 RX ADMIN — HYDROCODONE BITARTRATE AND ACETAMINOPHEN 1 TABLET: 5; 325 TABLET ORAL at 09:06

## 2021-06-12 RX ADMIN — VALSARTAN 320 MG: 160 TABLET, FILM COATED ORAL at 08:06

## 2021-06-12 RX ADMIN — PHENAZOPYRIDINE 200 MG: 100 TABLET ORAL at 04:06

## 2021-06-13 LAB
BASOPHILS # BLD AUTO: 0.03 K/UL (ref 0–0.2)
BASOPHILS NFR BLD AUTO: 0.5 % (ref 0–1)
DIFFERENTIAL METHOD BLD: ABNORMAL
EOSINOPHIL # BLD AUTO: 0.28 K/UL (ref 0–0.5)
EOSINOPHIL NFR BLD AUTO: 4.7 % (ref 1–4)
ERYTHROCYTE [DISTWIDTH] IN BLOOD BY AUTOMATED COUNT: 14.5 % (ref 11.5–14.5)
GLUCOSE SERPL-MCNC: 112 MG/DL (ref 70–105)
GLUCOSE SERPL-MCNC: 129 MG/DL (ref 70–105)
GLUCOSE SERPL-MCNC: 209 MG/DL (ref 70–105)
GLUCOSE SERPL-MCNC: 229 MG/DL (ref 70–105)
HCT VFR BLD AUTO: 35.2 % (ref 38–47)
HGB BLD-MCNC: 11.1 G/DL (ref 12–16)
LYMPHOCYTES # BLD AUTO: 2.32 K/UL (ref 1–4.8)
LYMPHOCYTES NFR BLD AUTO: 38.9 % (ref 27–41)
MCH RBC QN AUTO: 30.7 PG (ref 27–31)
MCHC RBC AUTO-ENTMCNC: 31.5 G/DL (ref 32–36)
MCV RBC AUTO: 97.5 FL (ref 80–96)
MONOCYTES # BLD AUTO: 0.85 K/UL (ref 0–0.8)
MONOCYTES NFR BLD AUTO: 14.2 % (ref 2–6)
MPC BLD CALC-MCNC: 9.6 FL (ref 9.4–12.4)
NEUTROPHILS # BLD AUTO: 2.49 K/UL (ref 1.8–7.7)
NEUTROPHILS NFR BLD AUTO: 41.7 % (ref 53–65)
PLATELET # BLD AUTO: 135 K/UL (ref 150–400)
RBC # BLD AUTO: 3.61 M/UL (ref 4.2–5.4)
WBC # BLD AUTO: 5.97 K/UL (ref 4.5–11)

## 2021-06-13 PROCEDURE — 25000003 PHARM REV CODE 250

## 2021-06-13 PROCEDURE — 36415 COLL VENOUS BLD VENIPUNCTURE: CPT | Performed by: EMERGENCY MEDICINE

## 2021-06-13 PROCEDURE — 25000003 PHARM REV CODE 250: Performed by: EMERGENCY MEDICINE

## 2021-06-13 PROCEDURE — 25000003 PHARM REV CODE 250: Performed by: NURSE PRACTITIONER

## 2021-06-13 PROCEDURE — 27000221 HC OXYGEN, UP TO 24 HOURS

## 2021-06-13 PROCEDURE — 27000944

## 2021-06-13 PROCEDURE — 85025 COMPLETE CBC W/AUTO DIFF WBC: CPT | Performed by: EMERGENCY MEDICINE

## 2021-06-13 PROCEDURE — 11000004 HC SNF PRIVATE

## 2021-06-13 PROCEDURE — 94761 N-INVAS EAR/PLS OXIMETRY MLT: CPT

## 2021-06-13 PROCEDURE — 63600175 PHARM REV CODE 636 W HCPCS: Performed by: EMERGENCY MEDICINE

## 2021-06-13 RX ADMIN — ATORVASTATIN CALCIUM 40 MG: 40 TABLET, FILM COATED ORAL at 09:06

## 2021-06-13 RX ADMIN — PHENAZOPYRIDINE 200 MG: 100 TABLET ORAL at 04:06

## 2021-06-13 RX ADMIN — PHENAZOPYRIDINE 200 MG: 100 TABLET ORAL at 08:06

## 2021-06-13 RX ADMIN — HYDROCHLOROTHIAZIDE 12.5 MG: 12.5 TABLET ORAL at 08:06

## 2021-06-13 RX ADMIN — HYDRALAZINE HYDROCHLORIDE 25 MG: 25 TABLET, FILM COATED ORAL at 09:06

## 2021-06-13 RX ADMIN — LORAZEPAM 0.5 MG: 0.5 TABLET ORAL at 03:06

## 2021-06-13 RX ADMIN — LACTOBACILLUS TAB 2 TABLET: TAB at 12:06

## 2021-06-13 RX ADMIN — POLYETHYLENE GLYCOL 3350 17 G: 17 POWDER, FOR SOLUTION ORAL at 09:06

## 2021-06-13 RX ADMIN — HYDROCODONE BITARTRATE AND ACETAMINOPHEN 1 TABLET: 5; 325 TABLET ORAL at 04:06

## 2021-06-13 RX ADMIN — INSULIN ASPART 4 UNITS: 100 INJECTION, SOLUTION INTRAVENOUS; SUBCUTANEOUS at 09:06

## 2021-06-13 RX ADMIN — PHENAZOPYRIDINE 200 MG: 100 TABLET ORAL at 12:06

## 2021-06-13 RX ADMIN — MICONAZOLE NITRATE: 20 POWDER TOPICAL at 09:06

## 2021-06-13 RX ADMIN — HYDROCODONE BITARTRATE AND ACETAMINOPHEN 1 TABLET: 5; 325 TABLET ORAL at 02:06

## 2021-06-13 RX ADMIN — AMLODIPINE BESYLATE 5 MG: 5 TABLET ORAL at 08:06

## 2021-06-13 RX ADMIN — VALSARTAN 320 MG: 160 TABLET, FILM COATED ORAL at 08:06

## 2021-06-13 RX ADMIN — GABAPENTIN 800 MG: 400 CAPSULE ORAL at 08:06

## 2021-06-13 RX ADMIN — HYDROCODONE BITARTRATE AND ACETAMINOPHEN 1 TABLET: 5; 325 TABLET ORAL at 09:06

## 2021-06-13 RX ADMIN — INSULIN ASPART 4 UNITS: 100 INJECTION, SOLUTION INTRAVENOUS; SUBCUTANEOUS at 04:06

## 2021-06-13 RX ADMIN — LACTOBACILLUS TAB 2 TABLET: TAB at 08:06

## 2021-06-13 RX ADMIN — LORAZEPAM 0.5 MG: 0.5 TABLET ORAL at 09:06

## 2021-06-13 RX ADMIN — HYDRALAZINE HYDROCHLORIDE 25 MG: 25 TABLET, FILM COATED ORAL at 08:06

## 2021-06-13 RX ADMIN — CITALOPRAM HYDROBROMIDE 40 MG: 20 TABLET ORAL at 08:06

## 2021-06-13 RX ADMIN — GABAPENTIN 800 MG: 400 CAPSULE ORAL at 12:06

## 2021-06-13 RX ADMIN — CLOPIDOGREL BISULFATE 75 MG: 75 TABLET, FILM COATED ORAL at 08:06

## 2021-06-13 RX ADMIN — GABAPENTIN 800 MG: 400 CAPSULE ORAL at 04:06

## 2021-06-13 RX ADMIN — LORAZEPAM 0.5 MG: 0.5 TABLET ORAL at 08:06

## 2021-06-13 RX ADMIN — LACTOBACILLUS TAB 2 TABLET: TAB at 04:06

## 2021-06-13 RX ADMIN — GABAPENTIN 800 MG: 400 CAPSULE ORAL at 09:06

## 2021-06-13 RX ADMIN — DOCUSATE SODIUM 100 MG: 100 CAPSULE, LIQUID FILLED ORAL at 08:06

## 2021-06-14 LAB
GLUCOSE SERPL-MCNC: 113 MG/DL (ref 70–105)
GLUCOSE SERPL-MCNC: 140 MG/DL (ref 70–105)
GLUCOSE SERPL-MCNC: 180 MG/DL (ref 70–105)
GLUCOSE SERPL-MCNC: 186 MG/DL (ref 70–105)
UA COMPLETE W REFLEX CULTURE PNL UR: ABNORMAL
UA COMPLETE W REFLEX CULTURE PNL UR: ABNORMAL

## 2021-06-14 PROCEDURE — 27000944

## 2021-06-14 PROCEDURE — 11000004 HC SNF PRIVATE

## 2021-06-14 PROCEDURE — 97112 NEUROMUSCULAR REEDUCATION: CPT | Mod: CQ

## 2021-06-14 PROCEDURE — 82962 GLUCOSE BLOOD TEST: CPT

## 2021-06-14 PROCEDURE — 25000003 PHARM REV CODE 250

## 2021-06-14 PROCEDURE — 25000003 PHARM REV CODE 250: Performed by: EMERGENCY MEDICINE

## 2021-06-14 PROCEDURE — 97530 THERAPEUTIC ACTIVITIES: CPT | Mod: CQ

## 2021-06-14 PROCEDURE — 97116 GAIT TRAINING THERAPY: CPT | Mod: CQ

## 2021-06-14 PROCEDURE — 99900035 HC TECH TIME PER 15 MIN (STAT)

## 2021-06-14 PROCEDURE — 27000221 HC OXYGEN, UP TO 24 HOURS

## 2021-06-14 PROCEDURE — 92526 ORAL FUNCTION THERAPY: CPT

## 2021-06-14 PROCEDURE — 25000003 PHARM REV CODE 250: Performed by: NURSE PRACTITIONER

## 2021-06-14 PROCEDURE — 63600175 PHARM REV CODE 636 W HCPCS: Performed by: EMERGENCY MEDICINE

## 2021-06-14 PROCEDURE — 94761 N-INVAS EAR/PLS OXIMETRY MLT: CPT

## 2021-06-14 RX ADMIN — ATORVASTATIN CALCIUM 40 MG: 40 TABLET, FILM COATED ORAL at 08:06

## 2021-06-14 RX ADMIN — LORAZEPAM 0.5 MG: 0.5 TABLET ORAL at 08:06

## 2021-06-14 RX ADMIN — PHENAZOPYRIDINE 200 MG: 100 TABLET ORAL at 04:06

## 2021-06-14 RX ADMIN — GABAPENTIN 800 MG: 400 CAPSULE ORAL at 04:06

## 2021-06-14 RX ADMIN — PHENAZOPYRIDINE 200 MG: 100 TABLET ORAL at 08:06

## 2021-06-14 RX ADMIN — PHENAZOPYRIDINE 200 MG: 100 TABLET ORAL at 12:06

## 2021-06-14 RX ADMIN — LACTOBACILLUS TAB 2 TABLET: TAB at 04:06

## 2021-06-14 RX ADMIN — GABAPENTIN 800 MG: 400 CAPSULE ORAL at 08:06

## 2021-06-14 RX ADMIN — LORAZEPAM 0.5 MG: 0.5 TABLET ORAL at 02:06

## 2021-06-14 RX ADMIN — LACTOBACILLUS TAB 2 TABLET: TAB at 08:06

## 2021-06-14 RX ADMIN — AMLODIPINE BESYLATE 5 MG: 5 TABLET ORAL at 08:06

## 2021-06-14 RX ADMIN — HYDRALAZINE HYDROCHLORIDE 25 MG: 25 TABLET, FILM COATED ORAL at 08:06

## 2021-06-14 RX ADMIN — LACTOBACILLUS TAB 2 TABLET: TAB at 12:06

## 2021-06-14 RX ADMIN — HYDROCODONE BITARTRATE AND ACETAMINOPHEN 1 TABLET: 5; 325 TABLET ORAL at 08:06

## 2021-06-14 RX ADMIN — DOCUSATE SODIUM 100 MG: 100 CAPSULE, LIQUID FILLED ORAL at 08:06

## 2021-06-14 RX ADMIN — CLOPIDOGREL BISULFATE 75 MG: 75 TABLET, FILM COATED ORAL at 08:06

## 2021-06-14 RX ADMIN — GABAPENTIN 800 MG: 400 CAPSULE ORAL at 12:06

## 2021-06-14 RX ADMIN — INSULIN ASPART 2 UNITS: 100 INJECTION, SOLUTION INTRAVENOUS; SUBCUTANEOUS at 08:06

## 2021-06-14 RX ADMIN — HYDROCHLOROTHIAZIDE 12.5 MG: 12.5 TABLET ORAL at 08:06

## 2021-06-14 RX ADMIN — INSULIN ASPART 2 UNITS: 100 INJECTION, SOLUTION INTRAVENOUS; SUBCUTANEOUS at 04:06

## 2021-06-14 RX ADMIN — CITALOPRAM HYDROBROMIDE 40 MG: 20 TABLET ORAL at 08:06

## 2021-06-14 RX ADMIN — VALSARTAN 320 MG: 160 TABLET, FILM COATED ORAL at 08:06

## 2021-06-14 RX ADMIN — MICONAZOLE NITRATE: 20 POWDER TOPICAL at 08:06

## 2021-06-14 RX ADMIN — POLYETHYLENE GLYCOL 3350 17 G: 17 POWDER, FOR SOLUTION ORAL at 08:06

## 2021-06-15 LAB
GLUCOSE SERPL-MCNC: 120 MG/DL (ref 70–105)
GLUCOSE SERPL-MCNC: 138 MG/DL (ref 70–105)
GLUCOSE SERPL-MCNC: 171 MG/DL (ref 70–105)
GLUCOSE SERPL-MCNC: 178 MG/DL (ref 70–105)

## 2021-06-15 PROCEDURE — 97535 SELF CARE MNGMENT TRAINING: CPT

## 2021-06-15 PROCEDURE — 97116 GAIT TRAINING THERAPY: CPT | Mod: CQ

## 2021-06-15 PROCEDURE — 80048 BASIC METABOLIC PNL TOTAL CA: CPT | Performed by: EMERGENCY MEDICINE

## 2021-06-15 PROCEDURE — 25000003 PHARM REV CODE 250: Performed by: NURSE PRACTITIONER

## 2021-06-15 PROCEDURE — 63600175 PHARM REV CODE 636 W HCPCS: Performed by: EMERGENCY MEDICINE

## 2021-06-15 PROCEDURE — 94761 N-INVAS EAR/PLS OXIMETRY MLT: CPT

## 2021-06-15 PROCEDURE — 25000003 PHARM REV CODE 250: Performed by: EMERGENCY MEDICINE

## 2021-06-15 PROCEDURE — 97112 NEUROMUSCULAR REEDUCATION: CPT

## 2021-06-15 PROCEDURE — 27000944

## 2021-06-15 PROCEDURE — 36415 COLL VENOUS BLD VENIPUNCTURE: CPT | Performed by: EMERGENCY MEDICINE

## 2021-06-15 PROCEDURE — 97530 THERAPEUTIC ACTIVITIES: CPT | Mod: CQ

## 2021-06-15 PROCEDURE — 97035 APP MDLTY 1+ULTRASOUND EA 15: CPT

## 2021-06-15 PROCEDURE — 25000003 PHARM REV CODE 250

## 2021-06-15 PROCEDURE — 82962 GLUCOSE BLOOD TEST: CPT

## 2021-06-15 PROCEDURE — 99900035 HC TECH TIME PER 15 MIN (STAT)

## 2021-06-15 PROCEDURE — 27000221 HC OXYGEN, UP TO 24 HOURS

## 2021-06-15 PROCEDURE — 11000004 HC SNF PRIVATE

## 2021-06-15 RX ORDER — NITROFURANTOIN 25; 75 MG/1; MG/1
100 CAPSULE ORAL NIGHTLY
Status: DISCONTINUED | OUTPATIENT
Start: 2021-06-15 | End: 2021-06-17 | Stop reason: HOSPADM

## 2021-06-15 RX ADMIN — LORAZEPAM 0.5 MG: 0.5 TABLET ORAL at 09:06

## 2021-06-15 RX ADMIN — POLYETHYLENE GLYCOL 3350 17 G: 17 POWDER, FOR SOLUTION ORAL at 08:06

## 2021-06-15 RX ADMIN — INSULIN ASPART 2 UNITS: 100 INJECTION, SOLUTION INTRAVENOUS; SUBCUTANEOUS at 11:06

## 2021-06-15 RX ADMIN — GABAPENTIN 800 MG: 400 CAPSULE ORAL at 04:06

## 2021-06-15 RX ADMIN — GABAPENTIN 800 MG: 400 CAPSULE ORAL at 08:06

## 2021-06-15 RX ADMIN — VALSARTAN 320 MG: 160 TABLET, FILM COATED ORAL at 08:06

## 2021-06-15 RX ADMIN — CLOPIDOGREL BISULFATE 75 MG: 75 TABLET, FILM COATED ORAL at 08:06

## 2021-06-15 RX ADMIN — LORAZEPAM 0.5 MG: 0.5 TABLET ORAL at 08:06

## 2021-06-15 RX ADMIN — HYDRALAZINE HYDROCHLORIDE 25 MG: 25 TABLET, FILM COATED ORAL at 08:06

## 2021-06-15 RX ADMIN — LACTOBACILLUS TAB 2 TABLET: TAB at 08:06

## 2021-06-15 RX ADMIN — NITROFURANTOIN MONOHYDRATE/MACROCRYSTALLINE 100 MG: 25; 75 CAPSULE ORAL at 09:06

## 2021-06-15 RX ADMIN — HYDROCHLOROTHIAZIDE 12.5 MG: 12.5 TABLET ORAL at 08:06

## 2021-06-15 RX ADMIN — LACTOBACILLUS TAB 2 TABLET: TAB at 04:06

## 2021-06-15 RX ADMIN — LORAZEPAM 0.5 MG: 0.5 TABLET ORAL at 03:06

## 2021-06-15 RX ADMIN — AMLODIPINE BESYLATE 5 MG: 5 TABLET ORAL at 08:06

## 2021-06-15 RX ADMIN — PHENAZOPYRIDINE 200 MG: 100 TABLET ORAL at 08:06

## 2021-06-15 RX ADMIN — HYDROCODONE BITARTRATE AND ACETAMINOPHEN 1 TABLET: 5; 325 TABLET ORAL at 09:06

## 2021-06-15 RX ADMIN — DOCUSATE SODIUM 100 MG: 100 CAPSULE, LIQUID FILLED ORAL at 08:06

## 2021-06-15 RX ADMIN — LACTOBACILLUS TAB 2 TABLET: TAB at 12:06

## 2021-06-15 RX ADMIN — HYDROCODONE BITARTRATE AND ACETAMINOPHEN 1 TABLET: 5; 325 TABLET ORAL at 05:06

## 2021-06-15 RX ADMIN — INSULIN ASPART 2 UNITS: 100 INJECTION, SOLUTION INTRAVENOUS; SUBCUTANEOUS at 09:06

## 2021-06-15 RX ADMIN — GABAPENTIN 800 MG: 400 CAPSULE ORAL at 09:06

## 2021-06-15 RX ADMIN — HYDRALAZINE HYDROCHLORIDE 25 MG: 25 TABLET, FILM COATED ORAL at 09:06

## 2021-06-15 RX ADMIN — MICONAZOLE NITRATE: 20 POWDER TOPICAL at 08:06

## 2021-06-15 RX ADMIN — HYDROCODONE BITARTRATE AND ACETAMINOPHEN 1 TABLET: 5; 325 TABLET ORAL at 03:06

## 2021-06-15 RX ADMIN — GABAPENTIN 800 MG: 400 CAPSULE ORAL at 12:06

## 2021-06-15 RX ADMIN — ATORVASTATIN CALCIUM 40 MG: 40 TABLET, FILM COATED ORAL at 09:06

## 2021-06-15 RX ADMIN — MICONAZOLE NITRATE: 20 POWDER TOPICAL at 09:06

## 2021-06-15 RX ADMIN — CITALOPRAM HYDROBROMIDE 40 MG: 20 TABLET ORAL at 08:06

## 2021-06-16 LAB
ANION GAP SERPL CALCULATED.3IONS-SCNC: 13 MMOL/L (ref 7–16)
BUN SERPL-MCNC: 24 MG/DL (ref 7–18)
BUN/CREAT SERPL: 20 (ref 6–20)
CALCIUM SERPL-MCNC: 8.8 MG/DL (ref 8.5–10.1)
CHLORIDE SERPL-SCNC: 100 MMOL/L (ref 98–107)
CO2 SERPL-SCNC: 30 MMOL/L (ref 21–32)
CREAT SERPL-MCNC: 1.2 MG/DL (ref 0.55–1.02)
GLUCOSE SERPL-MCNC: 102 MG/DL (ref 70–105)
GLUCOSE SERPL-MCNC: 121 MG/DL (ref 74–106)
GLUCOSE SERPL-MCNC: 181 MG/DL (ref 70–105)
GLUCOSE SERPL-MCNC: 188 MG/DL (ref 70–105)
GLUCOSE SERPL-MCNC: 200 MG/DL (ref 70–105)
POTASSIUM SERPL-SCNC: 4.4 MMOL/L (ref 3.5–5.1)
SODIUM SERPL-SCNC: 139 MMOL/L (ref 136–145)

## 2021-06-16 PROCEDURE — 25000003 PHARM REV CODE 250: Performed by: EMERGENCY MEDICINE

## 2021-06-16 PROCEDURE — 97116 GAIT TRAINING THERAPY: CPT | Mod: CQ

## 2021-06-16 PROCEDURE — 92526 ORAL FUNCTION THERAPY: CPT

## 2021-06-16 PROCEDURE — 97530 THERAPEUTIC ACTIVITIES: CPT | Mod: CQ

## 2021-06-16 PROCEDURE — 97112 NEUROMUSCULAR REEDUCATION: CPT

## 2021-06-16 PROCEDURE — 97535 SELF CARE MNGMENT TRAINING: CPT

## 2021-06-16 PROCEDURE — 11000004 HC SNF PRIVATE

## 2021-06-16 PROCEDURE — 99900035 HC TECH TIME PER 15 MIN (STAT)

## 2021-06-16 PROCEDURE — 97035 APP MDLTY 1+ULTRASOUND EA 15: CPT

## 2021-06-16 PROCEDURE — 27000944

## 2021-06-16 PROCEDURE — 25000003 PHARM REV CODE 250: Performed by: NURSE PRACTITIONER

## 2021-06-16 PROCEDURE — 63600175 PHARM REV CODE 636 W HCPCS: Performed by: EMERGENCY MEDICINE

## 2021-06-16 PROCEDURE — 25000003 PHARM REV CODE 250

## 2021-06-16 PROCEDURE — 27000221 HC OXYGEN, UP TO 24 HOURS

## 2021-06-16 PROCEDURE — 94761 N-INVAS EAR/PLS OXIMETRY MLT: CPT

## 2021-06-16 PROCEDURE — 82962 GLUCOSE BLOOD TEST: CPT

## 2021-06-16 RX ADMIN — GABAPENTIN 800 MG: 400 CAPSULE ORAL at 08:06

## 2021-06-16 RX ADMIN — HYDRALAZINE HYDROCHLORIDE 25 MG: 25 TABLET, FILM COATED ORAL at 08:06

## 2021-06-16 RX ADMIN — VALSARTAN 320 MG: 160 TABLET, FILM COATED ORAL at 08:06

## 2021-06-16 RX ADMIN — LORAZEPAM 0.5 MG: 0.5 TABLET ORAL at 08:06

## 2021-06-16 RX ADMIN — ATORVASTATIN CALCIUM 40 MG: 40 TABLET, FILM COATED ORAL at 08:06

## 2021-06-16 RX ADMIN — HYDROCHLOROTHIAZIDE 12.5 MG: 12.5 TABLET ORAL at 08:06

## 2021-06-16 RX ADMIN — GABAPENTIN 800 MG: 400 CAPSULE ORAL at 01:06

## 2021-06-16 RX ADMIN — LACTOBACILLUS TAB 2 TABLET: TAB at 07:06

## 2021-06-16 RX ADMIN — MICONAZOLE NITRATE: 20 POWDER TOPICAL at 08:06

## 2021-06-16 RX ADMIN — LACTOBACILLUS TAB 2 TABLET: TAB at 11:06

## 2021-06-16 RX ADMIN — AMLODIPINE BESYLATE 5 MG: 5 TABLET ORAL at 08:06

## 2021-06-16 RX ADMIN — NITROFURANTOIN MONOHYDRATE/MACROCRYSTALLINE 100 MG: 25; 75 CAPSULE ORAL at 08:06

## 2021-06-16 RX ADMIN — INSULIN ASPART 2 UNITS: 100 INJECTION, SOLUTION INTRAVENOUS; SUBCUTANEOUS at 11:06

## 2021-06-16 RX ADMIN — CITALOPRAM HYDROBROMIDE 40 MG: 20 TABLET ORAL at 08:06

## 2021-06-16 RX ADMIN — HYDROCODONE BITARTRATE AND ACETAMINOPHEN 1 TABLET: 5; 325 TABLET ORAL at 08:06

## 2021-06-16 RX ADMIN — INSULIN ASPART 2 UNITS: 100 INJECTION, SOLUTION INTRAVENOUS; SUBCUTANEOUS at 08:06

## 2021-06-16 RX ADMIN — CLOPIDOGREL BISULFATE 75 MG: 75 TABLET, FILM COATED ORAL at 08:06

## 2021-06-16 RX ADMIN — HYDROCODONE BITARTRATE AND ACETAMINOPHEN 1 TABLET: 5; 325 TABLET ORAL at 03:06

## 2021-06-16 RX ADMIN — ACETAMINOPHEN 650 MG: 325 TABLET ORAL at 04:06

## 2021-06-16 RX ADMIN — POLYETHYLENE GLYCOL 3350 17 G: 17 POWDER, FOR SOLUTION ORAL at 08:06

## 2021-06-16 RX ADMIN — GABAPENTIN 800 MG: 400 CAPSULE ORAL at 04:06

## 2021-06-16 RX ADMIN — LORAZEPAM 0.5 MG: 0.5 TABLET ORAL at 02:06

## 2021-06-16 RX ADMIN — LACTOBACILLUS TAB 2 TABLET: TAB at 04:06

## 2021-06-16 RX ADMIN — INSULIN ASPART 2 UNITS: 100 INJECTION, SOLUTION INTRAVENOUS; SUBCUTANEOUS at 04:06

## 2021-06-16 RX ADMIN — DOCUSATE SODIUM 100 MG: 100 CAPSULE, LIQUID FILLED ORAL at 08:06

## 2021-06-17 VITALS
BODY MASS INDEX: 33.05 KG/M2 | SYSTOLIC BLOOD PRESSURE: 122 MMHG | HEART RATE: 81 BPM | RESPIRATION RATE: 18 BRPM | TEMPERATURE: 98 F | HEIGHT: 64 IN | WEIGHT: 193.56 LBS | DIASTOLIC BLOOD PRESSURE: 83 MMHG | OXYGEN SATURATION: 94 %

## 2021-06-17 PROBLEM — N30.00 ACUTE CYSTITIS WITHOUT HEMATURIA: Status: RESOLVED | Noted: 2021-05-20 | Resolved: 2021-06-17

## 2021-06-17 LAB
ANION GAP SERPL CALCULATED.3IONS-SCNC: 10 MMOL/L (ref 7–16)
BASOPHILS # BLD AUTO: 0.03 K/UL (ref 0–0.2)
BASOPHILS NFR BLD AUTO: 0.5 % (ref 0–1)
BUN SERPL-MCNC: 20 MG/DL (ref 7–18)
BUN/CREAT SERPL: 19 (ref 6–20)
CALCIUM SERPL-MCNC: 9.2 MG/DL (ref 8.5–10.1)
CHLORIDE SERPL-SCNC: 100 MMOL/L (ref 98–107)
CO2 SERPL-SCNC: 30 MMOL/L (ref 21–32)
CREAT SERPL-MCNC: 1.03 MG/DL (ref 0.55–1.02)
DIFFERENTIAL METHOD BLD: ABNORMAL
EOSINOPHIL # BLD AUTO: 0.35 K/UL (ref 0–0.5)
EOSINOPHIL NFR BLD AUTO: 5.9 % (ref 1–4)
ERYTHROCYTE [DISTWIDTH] IN BLOOD BY AUTOMATED COUNT: 14.4 % (ref 11.5–14.5)
GLUCOSE SERPL-MCNC: 101 MG/DL (ref 70–105)
GLUCOSE SERPL-MCNC: 117 MG/DL (ref 74–106)
GLUCOSE SERPL-MCNC: 162 MG/DL (ref 70–105)
HCT VFR BLD AUTO: 35.4 % (ref 38–47)
HGB BLD-MCNC: 11.4 G/DL (ref 12–16)
LYMPHOCYTES # BLD AUTO: 2.16 K/UL (ref 1–4.8)
LYMPHOCYTES NFR BLD AUTO: 36.3 % (ref 27–41)
MCH RBC QN AUTO: 31.1 PG (ref 27–31)
MCHC RBC AUTO-ENTMCNC: 32.2 G/DL (ref 32–36)
MCV RBC AUTO: 96.5 FL (ref 80–96)
MONOCYTES # BLD AUTO: 0.8 K/UL (ref 0–0.8)
MONOCYTES NFR BLD AUTO: 13.4 % (ref 2–6)
MPC BLD CALC-MCNC: 9.7 FL (ref 9.4–12.4)
NEUTROPHILS # BLD AUTO: 2.61 K/UL (ref 1.8–7.7)
NEUTROPHILS NFR BLD AUTO: 43.9 % (ref 53–65)
PLATELET # BLD AUTO: 119 K/UL (ref 150–400)
POTASSIUM SERPL-SCNC: 4.4 MMOL/L (ref 3.5–5.1)
RBC # BLD AUTO: 3.67 M/UL (ref 4.2–5.4)
SODIUM SERPL-SCNC: 136 MMOL/L (ref 136–145)
WBC # BLD AUTO: 5.95 K/UL (ref 4.5–11)

## 2021-06-17 PROCEDURE — 94761 N-INVAS EAR/PLS OXIMETRY MLT: CPT

## 2021-06-17 PROCEDURE — 99900035 HC TECH TIME PER 15 MIN (STAT)

## 2021-06-17 PROCEDURE — 97116 GAIT TRAINING THERAPY: CPT | Mod: CQ

## 2021-06-17 PROCEDURE — 97112 NEUROMUSCULAR REEDUCATION: CPT

## 2021-06-17 PROCEDURE — 63600175 PHARM REV CODE 636 W HCPCS: Performed by: EMERGENCY MEDICINE

## 2021-06-17 PROCEDURE — 25000003 PHARM REV CODE 250: Performed by: EMERGENCY MEDICINE

## 2021-06-17 PROCEDURE — 97110 THERAPEUTIC EXERCISES: CPT | Mod: CQ

## 2021-06-17 PROCEDURE — 27000221 HC OXYGEN, UP TO 24 HOURS

## 2021-06-17 PROCEDURE — 80048 BASIC METABOLIC PNL TOTAL CA: CPT | Performed by: EMERGENCY MEDICINE

## 2021-06-17 PROCEDURE — 25000003 PHARM REV CODE 250: Performed by: NURSE PRACTITIONER

## 2021-06-17 PROCEDURE — 36415 COLL VENOUS BLD VENIPUNCTURE: CPT | Performed by: EMERGENCY MEDICINE

## 2021-06-17 PROCEDURE — 25000003 PHARM REV CODE 250

## 2021-06-17 PROCEDURE — 82962 GLUCOSE BLOOD TEST: CPT

## 2021-06-17 PROCEDURE — 85025 COMPLETE CBC W/AUTO DIFF WBC: CPT | Performed by: EMERGENCY MEDICINE

## 2021-06-17 RX ORDER — LORAZEPAM 0.5 MG/1
TABLET ORAL
Qty: 40 TABLET | Refills: 0 | Status: ON HOLD | OUTPATIENT
Start: 2021-06-17 | End: 2022-10-19 | Stop reason: HOSPADM

## 2021-06-17 RX ORDER — GABAPENTIN 400 MG/1
800 CAPSULE ORAL 4 TIMES DAILY
Qty: 240 CAPSULE | Refills: 11 | Status: ON HOLD | OUTPATIENT
Start: 2021-06-17 | End: 2022-03-01 | Stop reason: HOSPADM

## 2021-06-17 RX ORDER — CLOPIDOGREL BISULFATE 75 MG/1
75 TABLET ORAL DAILY
Qty: 30 TABLET | Refills: 11 | Status: SHIPPED | OUTPATIENT
Start: 2021-06-18 | End: 2021-08-18 | Stop reason: SDUPTHER

## 2021-06-17 RX ORDER — NITROFURANTOIN 25; 75 MG/1; MG/1
100 CAPSULE ORAL NIGHTLY
Qty: 30 CAPSULE | Refills: 0 | Status: SHIPPED | OUTPATIENT
Start: 2021-06-17 | End: 2021-08-18 | Stop reason: SDUPTHER

## 2021-06-17 RX ORDER — HYDROCHLOROTHIAZIDE 12.5 MG/1
12.5 TABLET ORAL DAILY
Qty: 30 TABLET | Refills: 11 | Status: ON HOLD | OUTPATIENT
Start: 2021-06-18 | End: 2022-03-01 | Stop reason: HOSPADM

## 2021-06-17 RX ORDER — HYDRALAZINE HYDROCHLORIDE 25 MG/1
25 TABLET, FILM COATED ORAL EVERY 12 HOURS
Qty: 60 TABLET | Refills: 11 | Status: SHIPPED | OUTPATIENT
Start: 2021-06-17 | End: 2022-04-12 | Stop reason: CLARIF

## 2021-06-17 RX ORDER — AMLODIPINE BESYLATE 5 MG/1
5 TABLET ORAL DAILY
Qty: 30 TABLET | Refills: 11 | Status: SHIPPED | OUTPATIENT
Start: 2021-06-18 | End: 2022-02-15 | Stop reason: CLARIF

## 2021-06-17 RX ORDER — DOCUSATE SODIUM 100 MG/1
100 CAPSULE, LIQUID FILLED ORAL DAILY
Qty: 30 CAPSULE | Refills: 0 | Status: ON HOLD | OUTPATIENT
Start: 2021-06-18 | End: 2022-11-10 | Stop reason: HOSPADM

## 2021-06-17 RX ORDER — MICONAZOLE NITRATE 2 %
POWDER (GRAM) TOPICAL 2 TIMES DAILY
Qty: 85 G | Refills: 0 | Status: SHIPPED | OUTPATIENT
Start: 2021-06-17 | End: 2022-04-12 | Stop reason: CLARIF

## 2021-06-17 RX ORDER — BISACODYL 10 MG
10 SUPPOSITORY, RECTAL RECTAL DAILY PRN
Qty: 12 SUPPOSITORY | Refills: 0 | Status: ON HOLD | OUTPATIENT
Start: 2021-06-17 | End: 2022-03-01 | Stop reason: HOSPADM

## 2021-06-17 RX ORDER — VALSARTAN 320 MG/1
320 TABLET ORAL DAILY
Qty: 90 TABLET | Refills: 3 | Status: SHIPPED | OUTPATIENT
Start: 2021-06-18 | End: 2022-10-19

## 2021-06-17 RX ORDER — HYDROCODONE BITARTRATE AND ACETAMINOPHEN 5; 325 MG/1; MG/1
1 TABLET ORAL
Qty: 7 TABLET | Refills: 0 | Status: SHIPPED | OUTPATIENT
Start: 2021-06-17 | End: 2022-02-15 | Stop reason: CLARIF

## 2021-06-17 RX ORDER — ATORVASTATIN CALCIUM 40 MG/1
40 TABLET, FILM COATED ORAL NIGHTLY
Qty: 30 TABLET | Refills: 0 | Status: SHIPPED | OUTPATIENT
Start: 2021-06-17 | End: 2022-02-15 | Stop reason: CLARIF

## 2021-06-17 RX ORDER — CITALOPRAM 40 MG/1
40 TABLET, FILM COATED ORAL DAILY
Qty: 30 TABLET | Refills: 0 | Status: ON HOLD | OUTPATIENT
Start: 2021-06-17 | End: 2022-03-01 | Stop reason: HOSPADM

## 2021-06-17 RX ADMIN — DOCUSATE SODIUM 100 MG: 100 CAPSULE, LIQUID FILLED ORAL at 09:06

## 2021-06-17 RX ADMIN — POLYETHYLENE GLYCOL 3350 17 G: 17 POWDER, FOR SOLUTION ORAL at 09:06

## 2021-06-17 RX ADMIN — VALSARTAN 320 MG: 160 TABLET, FILM COATED ORAL at 09:06

## 2021-06-17 RX ADMIN — HYDROCHLOROTHIAZIDE 12.5 MG: 12.5 TABLET ORAL at 09:06

## 2021-06-17 RX ADMIN — CITALOPRAM HYDROBROMIDE 40 MG: 20 TABLET ORAL at 09:06

## 2021-06-17 RX ADMIN — HYDRALAZINE HYDROCHLORIDE 25 MG: 25 TABLET, FILM COATED ORAL at 09:06

## 2021-06-17 RX ADMIN — MICONAZOLE NITRATE: 20 POWDER TOPICAL at 09:06

## 2021-06-17 RX ADMIN — CLOPIDOGREL BISULFATE 75 MG: 75 TABLET, FILM COATED ORAL at 09:06

## 2021-06-17 RX ADMIN — INSULIN ASPART 2 UNITS: 100 INJECTION, SOLUTION INTRAVENOUS; SUBCUTANEOUS at 11:06

## 2021-06-17 RX ADMIN — AMLODIPINE BESYLATE 5 MG: 5 TABLET ORAL at 09:06

## 2021-06-17 RX ADMIN — GABAPENTIN 800 MG: 400 CAPSULE ORAL at 09:06

## 2021-06-17 RX ADMIN — LORAZEPAM 0.5 MG: 0.5 TABLET ORAL at 09:06

## 2021-06-17 RX ADMIN — HYDROCODONE BITARTRATE AND ACETAMINOPHEN 1 TABLET: 5; 325 TABLET ORAL at 03:06

## 2021-06-17 RX ADMIN — LACTOBACILLUS TAB 2 TABLET: TAB at 09:06

## 2021-08-18 ENCOUNTER — HOSPITAL ENCOUNTER (EMERGENCY)
Facility: HOSPITAL | Age: 56
Discharge: HOME | End: 2021-08-18
Payer: MEDICARE

## 2021-08-18 VITALS
HEIGHT: 64 IN | RESPIRATION RATE: 18 BRPM | WEIGHT: 194 LBS | DIASTOLIC BLOOD PRESSURE: 90 MMHG | OXYGEN SATURATION: 94 % | HEART RATE: 95 BPM | SYSTOLIC BLOOD PRESSURE: 131 MMHG | BODY MASS INDEX: 33.12 KG/M2

## 2021-08-18 DIAGNOSIS — S09.90XA INJURY OF HEAD, INITIAL ENCOUNTER: ICD-10-CM
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
## 2021-08-18 PROCEDURE — 99284 EMERGENCY DEPT VISIT MOD MDM: CPT

## 2021-08-18 PROCEDURE — 99283 EMERGENCY DEPT VISIT LOW MDM: CPT | Mod: GF | Performed by: NURSE PRACTITIONER

## 2021-08-19 ENCOUNTER — TELEPHONE (OUTPATIENT)
Dept: EMERGENCY MEDICINE | Facility: HOSPITAL | Age: 56
End: 2021-08-19

## 2021-10-21 ENCOUNTER — LAB REQUISITION (OUTPATIENT)
Dept: LAB | Facility: HOSPITAL | Age: 56
End: 2021-10-21
Attending: NURSE PRACTITIONER
Payer: MEDICARE

## 2021-10-21 DIAGNOSIS — I11.9 HYPERTENSIVE HEART DISEASE WITHOUT HEART FAILURE: ICD-10-CM

## 2021-10-21 LAB
ALBUMIN SERPL BCP-MCNC: 3.5 G/DL (ref 3.5–5)
ALBUMIN/GLOB SERPL: 1.3 {RATIO}
ALP SERPL-CCNC: 120 U/L (ref 46–118)
ALT SERPL W P-5'-P-CCNC: 33 U/L (ref 13–56)
ANION GAP SERPL CALCULATED.3IONS-SCNC: 10 MMOL/L (ref 7–16)
AST SERPL W P-5'-P-CCNC: 36 U/L (ref 15–37)
BACTERIA #/AREA URNS HPF: ABNORMAL /HPF
BASOPHILS # BLD AUTO: 0.02 K/UL (ref 0–0.2)
BASOPHILS NFR BLD AUTO: 0.3 % (ref 0–1)
BILIRUB SERPL-MCNC: 0.9 MG/DL (ref 0–1.2)
BILIRUB UR QL STRIP: NEGATIVE
BUN SERPL-MCNC: 19 MG/DL (ref 7–18)
BUN/CREAT SERPL: 21 (ref 6–20)
CALCIUM SERPL-MCNC: 8.7 MG/DL (ref 8.5–10.1)
CHLORIDE SERPL-SCNC: 105 MMOL/L (ref 98–107)
CLARITY UR: ABNORMAL
CO2 SERPL-SCNC: 30 MMOL/L (ref 21–32)
COLOR UR: YELLOW
CREAT SERPL-MCNC: 0.89 MG/DL (ref 0.55–1.02)
DIFFERENTIAL METHOD BLD: ABNORMAL
EOSINOPHIL # BLD AUTO: 0.42 K/UL (ref 0–0.5)
EOSINOPHIL NFR BLD AUTO: 6.2 % (ref 1–4)
ERYTHROCYTE [DISTWIDTH] IN BLOOD BY AUTOMATED COUNT: 13.9 % (ref 11.5–14.5)
GLOBULIN SER-MCNC: 2.6 G/DL (ref 2–4)
GLUCOSE SERPL-MCNC: 95 MG/DL (ref 74–106)
GLUCOSE UR STRIP-MCNC: NEGATIVE MG/DL
HCT VFR BLD AUTO: 37.2 % (ref 38–47)
HGB BLD-MCNC: 12.4 G/DL (ref 12–16)
KETONES UR STRIP-SCNC: NEGATIVE MG/DL
LEUKOCYTE ESTERASE UR QL STRIP: ABNORMAL
LYMPHOCYTES # BLD AUTO: 2.55 K/UL (ref 1–4.8)
LYMPHOCYTES NFR BLD AUTO: 37.4 % (ref 27–41)
MCH RBC QN AUTO: 30.9 PG (ref 27–31)
MCHC RBC AUTO-ENTMCNC: 33.3 G/DL (ref 32–36)
MCV RBC AUTO: 92.8 FL (ref 80–96)
MONOCYTES # BLD AUTO: 0.61 K/UL (ref 0–0.8)
MONOCYTES NFR BLD AUTO: 9 % (ref 2–6)
MPC BLD CALC-MCNC: 10 FL (ref 9.4–12.4)
NEUTROPHILS # BLD AUTO: 3.21 K/UL (ref 1.8–7.7)
NEUTROPHILS NFR BLD AUTO: 47.1 % (ref 53–65)
NITRITE UR QL STRIP: NEGATIVE
PH UR STRIP: 5 PH UNITS
PLATELET # BLD AUTO: 157 K/UL (ref 150–400)
POTASSIUM SERPL-SCNC: 3.7 MMOL/L (ref 3.5–5.1)
PROT SERPL-MCNC: 6.1 G/DL (ref 6.4–8.2)
PROT UR QL STRIP: NEGATIVE
RBC # BLD AUTO: 4.01 M/UL (ref 4.2–5.4)
RBC # UR STRIP: NEGATIVE /UL
RBC #/AREA URNS HPF: ABNORMAL /HPF
SODIUM SERPL-SCNC: 141 MMOL/L (ref 136–145)
SP GR UR STRIP: 1.02
SQUAMOUS #/AREA URNS LPF: ABNORMAL /LPF
UROBILINOGEN UR STRIP-ACNC: 0.2 MG/DL
WBC # BLD AUTO: 6.81 K/UL (ref 4.5–11)
WBC #/AREA URNS HPF: ABNORMAL /HPF

## 2021-10-21 PROCEDURE — 81003 URINALYSIS AUTO W/O SCOPE: CPT | Performed by: NURSE PRACTITIONER

## 2021-10-21 PROCEDURE — 81001 URINALYSIS AUTO W/SCOPE: CPT | Performed by: NURSE PRACTITIONER

## 2021-10-21 PROCEDURE — 85025 COMPLETE CBC W/AUTO DIFF WBC: CPT | Performed by: NURSE PRACTITIONER

## 2021-10-21 PROCEDURE — 80061 LIPID PANEL: CPT | Performed by: NURSE PRACTITIONER

## 2021-10-21 PROCEDURE — 86140 C-REACTIVE PROTEIN: CPT | Performed by: NURSE PRACTITIONER

## 2021-10-21 PROCEDURE — 80053 COMPREHEN METABOLIC PANEL: CPT | Performed by: NURSE PRACTITIONER

## 2021-10-22 LAB
CHOLEST SERPL-MCNC: 122 MG/DL (ref 0–200)
CHOLEST/HDLC SERPL: 4.2 {RATIO}
CRP SERPL-MCNC: 0.87 MG/DL (ref 0–0.8)
HDLC SERPL-MCNC: 29 MG/DL (ref 40–60)
LDLC SERPL CALC-MCNC: 44 MG/DL
LDLC/HDLC SERPL: 1.5 {RATIO}
NONHDLC SERPL-MCNC: 93 MG/DL
TRIGL SERPL-MCNC: 245 MG/DL (ref 35–150)
VLDLC SERPL-MCNC: 49 MG/DL

## 2021-12-27 ENCOUNTER — LAB REQUISITION (OUTPATIENT)
Dept: LAB | Facility: HOSPITAL | Age: 56
End: 2021-12-27
Attending: NURSE PRACTITIONER
Payer: MEDICARE

## 2021-12-27 DIAGNOSIS — I11.9 HYPERTENSIVE HEART DISEASE WITHOUT HEART FAILURE: ICD-10-CM

## 2021-12-27 LAB
ALBUMIN SERPL BCP-MCNC: 3.6 G/DL (ref 3.5–5)
ALBUMIN/GLOB SERPL: 1.3 {RATIO}
ALP SERPL-CCNC: 135 U/L (ref 46–118)
ALT SERPL W P-5'-P-CCNC: 32 U/L (ref 13–56)
ANION GAP SERPL CALCULATED.3IONS-SCNC: 14 MMOL/L (ref 7–16)
AST SERPL W P-5'-P-CCNC: 45 U/L (ref 15–37)
BACTERIA #/AREA URNS HPF: ABNORMAL /HPF
BASOPHILS # BLD AUTO: 0.02 K/UL (ref 0–0.2)
BASOPHILS NFR BLD AUTO: 0.3 % (ref 0–1)
BILIRUB SERPL-MCNC: 0.8 MG/DL (ref 0–1.2)
BILIRUB UR QL STRIP: NEGATIVE
BUN SERPL-MCNC: 12 MG/DL (ref 7–18)
BUN/CREAT SERPL: 16 (ref 6–20)
CALCIUM SERPL-MCNC: 8.8 MG/DL (ref 8.5–10.1)
CHLORIDE SERPL-SCNC: 102 MMOL/L (ref 98–107)
CLARITY UR: CLEAR
CO2 SERPL-SCNC: 28 MMOL/L (ref 21–32)
COLOR UR: YELLOW
CREAT SERPL-MCNC: 0.77 MG/DL (ref 0.55–1.02)
DIFFERENTIAL METHOD BLD: ABNORMAL
EOSINOPHIL # BLD AUTO: 0.43 K/UL (ref 0–0.5)
EOSINOPHIL NFR BLD AUTO: 5.8 % (ref 1–4)
ERYTHROCYTE [DISTWIDTH] IN BLOOD BY AUTOMATED COUNT: 13.6 % (ref 11.5–14.5)
GLOBULIN SER-MCNC: 2.7 G/DL (ref 2–4)
GLUCOSE SERPL-MCNC: 125 MG/DL (ref 74–106)
GLUCOSE UR STRIP-MCNC: NEGATIVE MG/DL
HCT VFR BLD AUTO: 41.1 % (ref 38–47)
HGB BLD-MCNC: 13.6 G/DL (ref 12–16)
KETONES UR STRIP-SCNC: NEGATIVE MG/DL
LEUKOCYTE ESTERASE UR QL STRIP: ABNORMAL
LYMPHOCYTES # BLD AUTO: 2.01 K/UL (ref 1–4.8)
LYMPHOCYTES NFR BLD AUTO: 27.2 % (ref 27–41)
MCH RBC QN AUTO: 29.4 PG (ref 27–31)
MCHC RBC AUTO-ENTMCNC: 33.1 G/DL (ref 32–36)
MCV RBC AUTO: 88.8 FL (ref 80–96)
MONOCYTES # BLD AUTO: 0.61 K/UL (ref 0–0.8)
MONOCYTES NFR BLD AUTO: 8.3 % (ref 2–6)
MPC BLD CALC-MCNC: 10 FL (ref 9.4–12.4)
NEUTROPHILS # BLD AUTO: 4.32 K/UL (ref 1.8–7.7)
NEUTROPHILS NFR BLD AUTO: 58.4 % (ref 53–65)
NITRITE UR QL STRIP: NEGATIVE
PH UR STRIP: 6 PH UNITS
PLATELET # BLD AUTO: 140 K/UL (ref 150–400)
POTASSIUM SERPL-SCNC: 4 MMOL/L (ref 3.5–5.1)
PROT SERPL-MCNC: 6.3 G/DL (ref 6.4–8.2)
PROT UR QL STRIP: NEGATIVE
RBC # BLD AUTO: 4.63 M/UL (ref 4.2–5.4)
RBC # UR STRIP: NEGATIVE /UL
RBC #/AREA URNS HPF: ABNORMAL /HPF
SODIUM SERPL-SCNC: 140 MMOL/L (ref 136–145)
SP GR UR STRIP: 1.01
SQUAMOUS #/AREA URNS LPF: ABNORMAL /LPF
UROBILINOGEN UR STRIP-ACNC: 1 MG/DL
WBC # BLD AUTO: 7.39 K/UL (ref 4.5–11)
WBC #/AREA URNS HPF: ABNORMAL /HPF

## 2021-12-27 PROCEDURE — 81003 URINALYSIS AUTO W/O SCOPE: CPT | Performed by: NURSE PRACTITIONER

## 2021-12-27 PROCEDURE — 80053 COMPREHEN METABOLIC PANEL: CPT | Performed by: NURSE PRACTITIONER

## 2021-12-27 PROCEDURE — 85025 COMPLETE CBC W/AUTO DIFF WBC: CPT | Performed by: NURSE PRACTITIONER

## 2021-12-27 PROCEDURE — 81001 URINALYSIS AUTO W/SCOPE: CPT | Performed by: NURSE PRACTITIONER

## 2022-02-15 ENCOUNTER — HOSPITAL ENCOUNTER (INPATIENT)
Facility: HOSPITAL | Age: 57
LOS: 14 days | Discharge: HOME | DRG: 556 | End: 2022-03-01
Attending: EMERGENCY MEDICINE | Admitting: EMERGENCY MEDICINE
Payer: MEDICARE

## 2022-02-15 DIAGNOSIS — I10 HYPERTENSION, UNSPECIFIED TYPE: ICD-10-CM

## 2022-02-15 DIAGNOSIS — G45.8 ACUTE CEREBROVASCULAR INSUFFICIENCY TRANSIENT FOCAL NEUROLOGIC DEFICIT: ICD-10-CM

## 2022-02-15 DIAGNOSIS — M62.81 MUSCLE WEAKNESS: Primary | ICD-10-CM

## 2022-02-15 DIAGNOSIS — R53.1 GENERALIZED WEAKNESS: ICD-10-CM

## 2022-02-15 DIAGNOSIS — R21 RASH: ICD-10-CM

## 2022-02-15 PROBLEM — R10.12 LUQ PAIN: Status: ACTIVE | Noted: 2022-02-15

## 2022-02-15 PROCEDURE — 25000003 PHARM REV CODE 250

## 2022-02-15 PROCEDURE — 11000004 HC SNF PRIVATE

## 2022-02-15 RX ORDER — TEMAZEPAM 30 MG/1
30 CAPSULE ORAL NIGHTLY PRN
Status: DISCONTINUED | OUTPATIENT
Start: 2022-02-16 | End: 2022-02-17

## 2022-02-15 RX ORDER — ATORVASTATIN CALCIUM 40 MG/1
40 TABLET, FILM COATED ORAL NIGHTLY
Status: DISCONTINUED | OUTPATIENT
Start: 2022-02-16 | End: 2022-02-15

## 2022-02-15 RX ORDER — OMEPRAZOLE 40 MG/1
40 CAPSULE, DELAYED RELEASE ORAL NIGHTLY
Status: ON HOLD | COMMUNITY
End: 2022-04-15 | Stop reason: HOSPADM

## 2022-02-15 RX ORDER — SERTRALINE HYDROCHLORIDE 50 MG/1
100 TABLET, FILM COATED ORAL NIGHTLY
Status: DISCONTINUED | OUTPATIENT
Start: 2022-02-15 | End: 2022-03-01 | Stop reason: HOSPADM

## 2022-02-15 RX ORDER — HYDRALAZINE HYDROCHLORIDE 25 MG/1
25 TABLET, FILM COATED ORAL 3 TIMES DAILY
Status: DISCONTINUED | OUTPATIENT
Start: 2022-02-15 | End: 2022-03-01 | Stop reason: HOSPADM

## 2022-02-15 RX ORDER — HYDRALAZINE HYDROCHLORIDE 25 MG/1
25 TABLET, FILM COATED ORAL 3 TIMES DAILY
Status: DISCONTINUED | OUTPATIENT
Start: 2022-02-16 | End: 2022-02-15

## 2022-02-15 RX ORDER — TALC
6 POWDER (GRAM) TOPICAL NIGHTLY PRN
Status: DISCONTINUED | OUTPATIENT
Start: 2022-02-15 | End: 2022-03-01 | Stop reason: HOSPADM

## 2022-02-15 RX ORDER — ACETAMINOPHEN 325 MG/1
650 TABLET ORAL EVERY 6 HOURS PRN
Status: DISCONTINUED | OUTPATIENT
Start: 2022-02-15 | End: 2022-03-01 | Stop reason: HOSPADM

## 2022-02-15 RX ORDER — CLOPIDOGREL BISULFATE 75 MG/1
75 TABLET ORAL DAILY
Status: DISCONTINUED | OUTPATIENT
Start: 2022-02-16 | End: 2022-03-01 | Stop reason: HOSPADM

## 2022-02-15 RX ORDER — SERTRALINE HYDROCHLORIDE 50 MG/1
100 TABLET, FILM COATED ORAL NIGHTLY
Status: DISCONTINUED | OUTPATIENT
Start: 2022-02-16 | End: 2022-02-15

## 2022-02-15 RX ORDER — PANTOPRAZOLE SODIUM 40 MG/1
40 TABLET, DELAYED RELEASE ORAL DAILY
Status: DISCONTINUED | OUTPATIENT
Start: 2022-02-16 | End: 2022-03-01 | Stop reason: HOSPADM

## 2022-02-15 RX ORDER — HYDROCODONE BITARTRATE AND ACETAMINOPHEN 10; 325 MG/1; MG/1
1 TABLET ORAL EVERY 8 HOURS PRN
Status: DISCONTINUED | OUTPATIENT
Start: 2022-02-15 | End: 2022-03-01 | Stop reason: HOSPADM

## 2022-02-15 RX ORDER — CLOPIDOGREL BISULFATE 75 MG/1
75 TABLET ORAL DAILY
COMMUNITY
End: 2022-10-17 | Stop reason: CLARIF

## 2022-02-15 RX ORDER — GABAPENTIN 400 MG/1
800 CAPSULE ORAL 4 TIMES DAILY
Status: DISCONTINUED | OUTPATIENT
Start: 2022-02-16 | End: 2022-02-15

## 2022-02-15 RX ORDER — SERTRALINE HYDROCHLORIDE 100 MG/1
100 TABLET, FILM COATED ORAL NIGHTLY
COMMUNITY
End: 2022-10-17 | Stop reason: CLARIF

## 2022-02-15 RX ORDER — VALSARTAN 160 MG/1
160 TABLET ORAL DAILY
Status: DISCONTINUED | OUTPATIENT
Start: 2022-02-16 | End: 2022-03-01 | Stop reason: HOSPADM

## 2022-02-15 RX ORDER — ATORVASTATIN CALCIUM 40 MG/1
40 TABLET, FILM COATED ORAL NIGHTLY
Status: DISCONTINUED | OUTPATIENT
Start: 2022-02-15 | End: 2022-03-01 | Stop reason: HOSPADM

## 2022-02-15 RX ORDER — LORAZEPAM 0.5 MG/1
0.5 TABLET ORAL 2 TIMES DAILY
Status: DISCONTINUED | OUTPATIENT
Start: 2022-02-16 | End: 2022-02-17

## 2022-02-15 RX ORDER — TEMAZEPAM 30 MG/1
30 CAPSULE ORAL NIGHTLY
Status: ON HOLD | COMMUNITY
End: 2023-01-04 | Stop reason: HOSPADM

## 2022-02-15 RX ORDER — LORAZEPAM 0.5 MG/1
1 TABLET ORAL NIGHTLY
Status: DISCONTINUED | OUTPATIENT
Start: 2022-02-15 | End: 2022-02-18

## 2022-02-15 RX ORDER — GABAPENTIN 400 MG/1
800 CAPSULE ORAL 4 TIMES DAILY
Status: DISCONTINUED | OUTPATIENT
Start: 2022-02-15 | End: 2022-02-25

## 2022-02-15 RX ORDER — ATORVASTATIN CALCIUM 40 MG/1
40 TABLET, FILM COATED ORAL NIGHTLY
COMMUNITY

## 2022-02-15 RX ADMIN — LORAZEPAM 1 MG: 0.5 TABLET ORAL at 11:02

## 2022-02-15 RX ADMIN — GABAPENTIN 800 MG: 400 CAPSULE ORAL at 11:02

## 2022-02-15 RX ADMIN — TEMAZEPAM 30 MG: 15 CAPSULE ORAL at 11:02

## 2022-02-15 RX ADMIN — HYDRALAZINE HYDROCHLORIDE 25 MG: 25 TABLET, FILM COATED ORAL at 11:02

## 2022-02-15 RX ADMIN — SERTRALINE HYDROCHLORIDE 100 MG: 50 TABLET ORAL at 11:02

## 2022-02-15 RX ADMIN — ATORVASTATIN CALCIUM 40 MG: 40 TABLET, FILM COATED ORAL at 11:02

## 2022-02-16 ENCOUNTER — TELEPHONE (OUTPATIENT)
Dept: EMERGENCY MEDICINE | Facility: HOSPITAL | Age: 57
End: 2022-02-16
Payer: MEDICARE

## 2022-02-16 PROCEDURE — 27000944

## 2022-02-16 PROCEDURE — 11000004 HC SNF PRIVATE

## 2022-02-16 PROCEDURE — 25000003 PHARM REV CODE 250

## 2022-02-16 PROCEDURE — 97161 PT EVAL LOW COMPLEX 20 MIN: CPT

## 2022-02-16 PROCEDURE — 97166 OT EVAL MOD COMPLEX 45 MIN: CPT

## 2022-02-16 PROCEDURE — 94761 N-INVAS EAR/PLS OXIMETRY MLT: CPT

## 2022-02-16 PROCEDURE — 99305 1ST NF CARE MODERATE MDM 35: CPT | Mod: AI,,, | Performed by: EMERGENCY MEDICINE

## 2022-02-16 PROCEDURE — 99305 PR NURSING FACILITY CARE, INIT, MOD SEVERITY: ICD-10-PCS | Mod: AI,,, | Performed by: EMERGENCY MEDICINE

## 2022-02-16 PROCEDURE — 27000221 HC OXYGEN, UP TO 24 HOURS

## 2022-02-16 RX ADMIN — GABAPENTIN 800 MG: 400 CAPSULE ORAL at 08:02

## 2022-02-16 RX ADMIN — GABAPENTIN 800 MG: 400 CAPSULE ORAL at 09:02

## 2022-02-16 RX ADMIN — LORAZEPAM 0.5 MG: 0.5 TABLET ORAL at 09:02

## 2022-02-16 RX ADMIN — GABAPENTIN 800 MG: 400 CAPSULE ORAL at 01:02

## 2022-02-16 RX ADMIN — LORAZEPAM 0.5 MG: 0.5 TABLET ORAL at 08:02

## 2022-02-16 RX ADMIN — GABAPENTIN 800 MG: 400 CAPSULE ORAL at 05:02

## 2022-02-16 RX ADMIN — TEMAZEPAM 30 MG: 15 CAPSULE ORAL at 08:02

## 2022-02-16 RX ADMIN — VALSARTAN 160 MG: 160 TABLET, FILM COATED ORAL at 09:02

## 2022-02-16 RX ADMIN — HYDRALAZINE HYDROCHLORIDE 25 MG: 25 TABLET, FILM COATED ORAL at 03:02

## 2022-02-16 RX ADMIN — HYDRALAZINE HYDROCHLORIDE 25 MG: 25 TABLET, FILM COATED ORAL at 08:02

## 2022-02-16 RX ADMIN — LORAZEPAM 1 MG: 0.5 TABLET ORAL at 08:02

## 2022-02-16 RX ADMIN — PANTOPRAZOLE SODIUM 40 MG: 40 TABLET, DELAYED RELEASE ORAL at 09:02

## 2022-02-16 RX ADMIN — HYDRALAZINE HYDROCHLORIDE 25 MG: 25 TABLET, FILM COATED ORAL at 09:02

## 2022-02-16 RX ADMIN — CLOPIDOGREL BISULFATE 75 MG: 75 TABLET, FILM COATED ORAL at 09:02

## 2022-02-16 RX ADMIN — SERTRALINE HYDROCHLORIDE 100 MG: 50 TABLET ORAL at 08:02

## 2022-02-16 RX ADMIN — ATORVASTATIN CALCIUM 40 MG: 40 TABLET, FILM COATED ORAL at 08:02

## 2022-02-16 NOTE — SUBJECTIVE & OBJECTIVE
Interval History:     Review of Systems   Constitutional: Positive for activity change and fatigue. Negative for chills and fever.   HENT: Negative.    Respiratory: Negative for cough and shortness of breath.    Cardiovascular: Negative for chest pain, palpitations and leg swelling.   Gastrointestinal: Positive for abdominal pain, constipation and nausea. Negative for diarrhea and vomiting.   Genitourinary: Positive for dysuria and frequency. Negative for hematuria.   Musculoskeletal: Positive for myalgias.   Neurological: Positive for weakness (generalized).   Psychiatric/Behavioral: Negative for confusion.     Objective:     Vital Signs (Most Recent):    Vital Signs (24h Range):  Temp:  [98.3 °F (36.8 °C)] 98.3 °F (36.8 °C)  Pulse:  [73-88] 73  Resp:  [18-73] 18  SpO2:  [94 %-97 %] 97 %  BP: (149-188)/() 188/120        There is no height or weight on file to calculate BMI.    Intake/Output Summary (Last 24 hours) at 2/15/2022 2351  Last data filed at 2/15/2022 2200  Gross per 24 hour   Intake 240 ml   Output --   Net 240 ml      Physical Exam  Vitals reviewed.   Constitutional:       Appearance: Normal appearance. She is ill-appearing.   HENT:      Head: Normocephalic and atraumatic.      Nose: Nose normal.      Mouth/Throat:      Mouth: Mucous membranes are dry.   Eyes:      Extraocular Movements: Extraocular movements intact.      Conjunctiva/sclera: Conjunctivae normal.      Pupils: Pupils are equal, round, and reactive to light.   Cardiovascular:      Rate and Rhythm: Normal rate and regular rhythm.      Pulses: Normal pulses.      Heart sounds: Normal heart sounds.   Pulmonary:      Effort: Pulmonary effort is normal.      Breath sounds: Normal breath sounds.   Abdominal:      General: Bowel sounds are normal.      Palpations: Abdomen is soft.      Tenderness: There is no abdominal tenderness.   Musculoskeletal:         General: No tenderness. Normal range of motion.      Cervical back: Normal range of  motion and neck supple.      Right lower leg: No edema.      Left lower leg: No edema.   Skin:     General: Skin is warm and dry.   Neurological:      Mental Status: She is alert and oriented to person, place, and time.      Motor: Weakness (Chronic left sided weakness with generalized weakness noted) present.   Psychiatric:         Mood and Affect: Mood normal.         Significant Labs:   All pertinent labs within the past 24 hours have been reviewed.  CBC:   Recent Labs   Lab 02/15/22  1631   WBC 5.90   HGB 12.9   HCT 39.5   *     CMP:   Recent Labs   Lab 02/15/22  1633      K 3.9      CO2 27      BUN 13   CREATININE 0.77   CALCIUM 9.0   PROT 6.7   ALBUMIN 3.6   BILITOT 0.9   ALKPHOS 110   AST 68*   ALT 34   ANIONGAP 13   EGFRNONAA 82     Urine Studies:   Recent Labs   Lab 02/15/22  1643   COLORU Yellow   APPEARANCEUA Clear   PHUR 6.0   SPECGRAV 1.015   PROTEINUA Negative   GLUCUA Negative   KETONESU Negative   BILIRUBINUA Negative   OCCULTUA Negative   NITRITE Negative   UROBILINOGEN 0.2   LEUKOCYTESUR Negative       Significant Imaging: I have reviewed all pertinent imaging results/findings within the past 24 hours.

## 2022-02-16 NOTE — PT/OT/SLP EVAL
"Occupational Therapy   Evaluation    Name: Herlinda Valdovinos  MRN: [id number]  Admitting Diagnosis:  Generalized weakness  Recent Surgery: * No surgery found *      Recommendations:     Discharge Recommendations: outpatient PT,outpatient OT  Discharge Equipment Recommendations:     Barriers to discharge:       Assessment:     Herlinda Valdovinos is a 57 y.o. female with a medical diagnosis of Generalized weakness.  She presents with weakness and limited endurance. Performance deficits affecting function: weakness,impaired endurance,impaired sensation,impaired self care skills,impaired functional mobilty,gait instability,impaired balance,decreased coordination,decreased upper extremity function,decreased lower extremity function,pain.      Rehab Prognosis: Good; patient would benefit from acute skilled OT services to address these deficits and reach maximum level of function.       Plan:     Patient to be seen 5 x/week to address the above listed problems via self-care/home management,therapeutic activities,therapeutic exercises,neuromuscular re-education  · Plan of Care Expires: 03/09/22  · Plan of Care Reviewed with: spouse,patient    Subjective     Chief Complaint: Low endurance  Patient/Family Comments/goals: "Walk without a walker and not need as much help taking care of myself."     Occupational Profile:  Living Environment: Lives in 1 level home with spouse with ramp to enter.   Previous level of function: Received assisted for all ADLs per pt/spouse report.   Roles and Routines: Spouse performed all IADLs.   Equipment Used at Home:  3-in-1 commode,oxygen,wheelchair,walker, rolling  Assistance upon Discharge: TBD based on progress.     Pain/Comfort:  · Pain Rating 1: 8/10 (Pain with movement only. No pain at rest.)  · Location - Side 1: Bilateral  · Location 1: shoulder  · Pain Addressed 1: Cessation of Activity    Patients cultural, spiritual, Adventist conflicts given the current situation: no    Objective: "     Communicated with: Nurse prior to session.  Patient found supine with   upon OT entry to room.    General Precautions: Standard,     Orthopedic Precautions:    Braces:    Respiratory Status: pt on room air at time of eval, indicating O2 use only at night.     Occupational Performance:    Bed Mobility:    · Patient completed Supine to Sit with stand by assistance  · Patient completed Sit to Supine with stand by assistance    Functional Mobility/Transfers:  · Patient completed Sit <> Stand Transfer with minimum assistance  with  rolling walker   · Functional Mobility: x 10 feet with Rw with CGA-Mack    Activities of Daily Living:  · Upper Body Dressing: minimum assistance seated  · Lower Body Dressing: moderate assistance seated/standing  · Toileting: moderate assistance in bathroom     Physical Exam:  Sensation:    -       Intact  Dominant hand:    -       R UE  Upper Extremity Range of Motion:     -       Right Upper Extremity: up to 75% full AROM  -       Left Upper Extremity: up to 50% full AROM  Upper Extremity Strength:    -       Right Upper Extremity: 3-/5  -       Left Upper Extremity: 2+/5    AMPAC 6 Click ADL:  AMPAC Total Score: 14    Treatment & Education:  Educated in OT POC with good understanding.   Education:    Patient left sitting edge of bed with call button in reach and spouse present    GOALS:   Multidisciplinary Problems     Occupational Therapy Goals        Problem: Occupational Therapy Goal    Goal Priority Disciplines Outcome Interventions   Occupational Therapy Goal     OT, PT/OT Ongoing, Progressing    Description: Goals to be met by: 2 weeks     Patient will increase functional independence with ADLs by performing:    UE Dressing with Stand-by Assistance.  LE Dressing with Stand-by Assistance.  Toileting from bedside commode with Contact Guard Assistance for hygiene and clothing management.   Bathing from  edge of bed with Stand-by Assistance.  Toilet transfer to bedside commode with  Stand-by Assistance.  Tolerate OOB seated activity x 30 mins with 2 or less rest breaks needed.     Goals to be met by: 4 weeks     Patient will increase functional independence with ADLs by performing:    UE Dressing with Set-up Assistance.  LE Dressing with Set-up Assistance.  Toileting from toilet with Supervision for hygiene and clothing management.   Bathing from  shower chair/bench with Set-up Assistance.  Toilet transfer to toilet with Supervision.  Tolerate OOB seated activity x 1 hour mins with 2 or less rest breaks needed.                    History:     Past Medical History:   Diagnosis Date    Cerebral hemorrhage     Chronic anxiety     Dehydration     Depression     Diabetes mellitus     Dysphagia     Due to and following non-traumatic intracerebral hemorrhage    Hearing loss     History of CVA (cerebrovascular accident)     Hypertension     Insomnia     Intrapontine hemorrhage     Left hemiparesis     Peripheral neuropathy     Transient cerebral ischemia        Past Surgical History:   Procedure Laterality Date    APPENDECTOMY      CHOLECYSTECTOMY      HYSTERECTOMY      JOINT REPLACEMENT      Arthroscopy of knee    LITHOTRIPSY      Renal lithotripsy    percutaneious insertion of ureteric stent         Time Tracking:     OT Date of Treatment: 02/16/22  OT Start Time: 1148  OT Stop Time: 1200  OT Total Time (min): 12 min    Billable Minutes:Evaluation 12     MARCUS Levy, OTR/L    2/16/2022

## 2022-02-16 NOTE — PLAN OF CARE
Problem: Physical Therapy Goal  Goal: Physical Therapy Goal  Description: STG:  Patient will perform all bed mobility with CGA  Patient will perform sit to stand and SPT with CGA  Patient will ambulate 100 feet with RW assistive device with CGA assistance without LOB.     LTG  Patient will perform bed mobility with mod I.  Patient will perform sit to stand and SPT with supervision  Patient will ambuate 200 feet with RW assistive device with supervision assistance without LOB.   Patient will perform 15 min of LE exercise without rest break to increase LE strength to 4/5.     Outcome: Ongoing, Progressing

## 2022-02-16 NOTE — PLAN OF CARE
Problem: Occupational Therapy Goal  Goal: Occupational Therapy Goal  Description: Goals to be met by: 2 weeks     Patient will increase functional independence with ADLs by performing:    UE Dressing with Stand-by Assistance.  LE Dressing with Stand-by Assistance.  Toileting from bedside commode with Contact Guard Assistance for hygiene and clothing management.   Bathing from  edge of bed with Stand-by Assistance.  Toilet transfer to bedside commode with Stand-by Assistance.  Tolerate OOB seated activity x 30 mins with 2 or less rest breaks needed.     Goals to be met by: 4 weeks     Patient will increase functional independence with ADLs by performing:    UE Dressing with Set-up Assistance.  LE Dressing with Set-up Assistance.  Toileting from toilet with Supervision for hygiene and clothing management.   Bathing from  shower chair/bench with Set-up Assistance.  Toilet transfer to toilet with Supervision.  Tolerate OOB seated activity x 1 hour mins with 2 or less rest breaks needed.   Outcome: Ongoing, Progressing

## 2022-02-16 NOTE — PROGRESS NOTES
[facility]  [facility] Medicine  Progress Note    Patient Name: Herlinda Valdovinos  MRN: [id number]  Patient Class: IP- Swing   Admission Date: 2/15/2022  Length of Stay: 1 days  Attending Physician: Alis Grider MD  Primary Care Provider: Vini Hernandez NP        Subjective:     Principal Problem:Generalized weakness        HPI:  56 yo WF admitted to [facility] swing bed for generalized muscle weakness. Patient with hx of CVA and left side residual weakness reports that she was discharged from PT/OT 2 weeks ago. States the generalized weakness has been progressively worsening since. She has been walking with a walker at home, but reports she was unable to stand without assistance today. Work-up in ED with . WBC 5.90. She did complain of LLQ pain and burning on urination. UA negative and CT abdomen/pelvis negative for acute findings; reveals multiple nonobstructing renal calculi.       Full Code  J&J Covid-19 vaccine  VTE - OCTAVIA      Overview/Hospital Course:  No notes on file    Interval History:     Review of Systems   Constitutional: Positive for activity change and fatigue. Negative for chills and fever.   HENT: Negative.    Respiratory: Negative for cough and shortness of breath.    Cardiovascular: Negative for chest pain, palpitations and leg swelling.   Gastrointestinal: Positive for abdominal pain, constipation and nausea. Negative for diarrhea and vomiting.   Genitourinary: Positive for dysuria and frequency. Negative for hematuria.   Musculoskeletal: Positive for myalgias.   Neurological: Positive for weakness (generalized).   Psychiatric/Behavioral: Negative for confusion.     Objective:     Vital Signs (Most Recent):    Vital Signs (24h Range):  Temp:  [98.3 °F (36.8 °C)] 98.3 °F (36.8 °C)  Pulse:  [73-88] 73  Resp:  [18-73] 18  SpO2:  [94 %-97 %] 97 %  BP: (149-188)/() 188/120        There is no height or weight on file to calculate BMI.    Intake/Output Summary  (Last 24 hours) at 2/15/2022 2351  Last data filed at 2/15/2022 2200  Gross per 24 hour   Intake 240 ml   Output --   Net 240 ml      Physical Exam  Vitals reviewed.   Constitutional:       Appearance: Normal appearance. She is ill-appearing.   HENT:      Head: Normocephalic and atraumatic.      Nose: Nose normal.      Mouth/Throat:      Mouth: Mucous membranes are dry.   Eyes:      Extraocular Movements: Extraocular movements intact.      Conjunctiva/sclera: Conjunctivae normal.      Pupils: Pupils are equal, round, and reactive to light.   Cardiovascular:      Rate and Rhythm: Normal rate and regular rhythm.      Pulses: Normal pulses.      Heart sounds: Normal heart sounds.   Pulmonary:      Effort: Pulmonary effort is normal.      Breath sounds: Normal breath sounds.   Abdominal:      General: Bowel sounds are normal.      Palpations: Abdomen is soft.      Tenderness: There is no abdominal tenderness.   Musculoskeletal:         General: No tenderness. Normal range of motion.      Cervical back: Normal range of motion and neck supple.      Right lower leg: No edema.      Left lower leg: No edema.   Skin:     General: Skin is warm and dry.   Neurological:      Mental Status: She is alert and oriented to person, place, and time.      Motor: Weakness (Chronic left sided weakness with generalized weakness noted) present.   Psychiatric:         Mood and Affect: Mood normal.         Significant Labs:   All pertinent labs within the past 24 hours have been reviewed.  CBC:   Recent Labs   Lab 02/15/22  1631   WBC 5.90   HGB 12.9   HCT 39.5   *     CMP:   Recent Labs   Lab 02/15/22  1633      K 3.9      CO2 27      BUN 13   CREATININE 0.77   CALCIUM 9.0   PROT 6.7   ALBUMIN 3.6   BILITOT 0.9   ALKPHOS 110   AST 68*   ALT 34   ANIONGAP 13   EGFRNONAA 82     Urine Studies:   Recent Labs   Lab 02/15/22  1643   COLORU Yellow   APPEARANCEUA Clear   PHUR 6.0   SPECGRAV 1.015   PROTEINUA Negative    GLUCUA Negative   KETONESU Negative   BILIRUBINUA Negative   OCCULTUA Negative   NITRITE Negative   UROBILINOGEN 0.2   LEUKOCYTESUR Negative       Significant Imaging: I have reviewed all pertinent imaging results/findings within the past 24 hours.      Assessment/Plan:      * Generalized weakness  - PT/OT  - Chronic left sided weakness d/t hx of CVA      Hypertension  - Hydralazine 25 mg TID  - Valsartan 160 mg daily      Generalized anxiety disorder  - Sertraline 100 mg daily  - Lorazepam 0.5 mg in am and afternoon, 1 mg qhs         Insomnia  - Temazepam 30 mg qhs    Idiopathic peripheral neuropathy  - Gabapentin 800 mg       VTE Risk Mitigation (From admission, onward)         Ordered     IP VTE LOW RISK PATIENT  Once         02/15/22 2251     Place OCTAVIA hose  Until discontinued         02/15/22 2251                Discharge Planning   POOL:      Code Status: Full Code   Is the patient medically ready for discharge?:     Reason for patient still in hospital (select all that apply): PT / OT recommendations                     KEVAN Guthrie  Department of Hospital Medicine   [facility]

## 2022-02-16 NOTE — NURSING
Report given to oncoming nurse, pictures taken of patient bottom and left side, they need to be uploaded, resp even and unlabored, refused teds at this moment, nadn, safety measures ongoing.

## 2022-02-16 NOTE — HPI
PT  IS A 57 YR OLD WF ADMITTED TO [facility] SWING BED FOR PT/OT/REHABILITATION FOR MUSCLE WEAKNESS AND MEDICAL MANAGEMENT. PT WAS ADMITTED FROM [facility] ED. PT HAD COMPLETED PT/OT/REHABILITATION OUTPATIENT 2 WEEKS AGO FOR L SIDED WEAKNESS DUE TO CVA; BUT HAS NOTED PERSISTENT MUSCLE WEAKNESS AND DEBILITY, AND WILL REQUIRE SWING BED FOR REHABILITATION.  PT WILL BE EVALUATED PER PT/OT AND A TREATMENT PLAN WILL BE INITIATED. THE PHARMACIST WILL REVIEW MEDICATIONS AND MAKE RECOMMENDATIONS. PT WILL SEE THE DIETICIAN  FOR NUTRITIONAL SUPPORT WITH WEIGHT 74.1 KG AND ALBUMIN OF 3.6. PT'S ABNORMAL ADMITTING LABS ARE: CMP: AST 68, CBC: K, UA: NEG. PT WILL HAVE WEEKLY LABS.    Past Medical History:   Diagnosis Date    Cerebral hemorrhage     Chronic anxiety     Dehydration     Depression     Diabetes mellitus     Dysphagia     Due to and following non-traumatic intracerebral hemorrhage    Hearing loss     History of CVA (cerebrovascular accident)     Hypertension     Insomnia     Intrapontine hemorrhage     Left hemiparesis     Peripheral neuropathy     Transient cerebral ischemia       chest X-ray     PT CODE STATUS IS FULL  PT COVID STATUS IS NEG  PT HAS HAD COVID VACCINE J & J  PT VTE PPX IS ASA/PLAVIX/TEDS/EARLY AMBULATION  56 yo WF admitted to [facility] swing bed for generalized muscle weakness. Patient with hx of CVA and left side residual weakness reports that she was discharged from PT/OT 2 weeks ago. States the generalized weakness has been progressively worsening since. She has been walking with a walker at home, but reports she was unable to stand without assistance today. Work-up in ED with . WBC 5.90. She did complain of LLQ pain and burning on urination. UA negative and CT abdomen/pelvis negative for acute findings; reveals multiple nonobstructing renal calculi.       Full Code  J&J Covid-19 vaccine  VTE - OCTAVIA

## 2022-02-16 NOTE — PLAN OF CARE
Problem: Skin Injury Risk Increased  Goal: Skin Health and Integrity  Outcome: Ongoing, Progressing  Intervention: Optimize Skin Protection  Flowsheets (Taken 2/16/2022 5521)  Pressure Reduction Techniques: frequent weight shift encouraged  Skin Protection: incontinence pads utilized  Head of Bed (HOB) Positioning: HOB elevated

## 2022-02-16 NOTE — PT/OT/SLP EVAL
Physical Therapy Evaluation    Patient Name:  Herlinda Valdovinos   MRN:  [id number]    Recommendations:     Discharge Recommendations:  home health PT,home health OT   Discharge Equipment Recommendations: none   Barriers to discharge: None    Assessment:     Herlinda Valdovinos is a 57 y.o. female admitted with a medical diagnosis of Generalized weakness.  She presents with the following impairments/functional limitations:  weakness,impaired endurance,impaired self care skills,impaired functional mobilty,gait instability,impaired balance,decreased lower extremity function Patient reports she has undergone a decline in her functional mobility since home health ended 2 weeks ago.    Rehab Prognosis: Good; patient would benefit from acute skilled PT services to address these deficits and reach maximum level of function.    Recent Surgery: * No surgery found *      Plan:     During this hospitalization, patient to be seen 5 x/week to address the identified rehab impairments via gait training,therapeutic activities,therapeutic exercises and progress toward the following goals:    · Plan of Care Expires:  03/16/22    Subjective     Chief Complaint: weakness and difficulty with mobility   Patient/Family Comments/goals: get stronger   Pain/Comfort:  · Pain Rating 1: 0/10  · Pain Rating Post-Intervention 1: 0/10    Patients cultural, spiritual, Rastafari conflicts given the current situation: no    Living Environment:  Patient lives at home with  and was using a RW with 's assistance to walk at home  Prior to admission, patients level of function was amb with assistance and RW.  Equipment used at home: walker, rolling.  DME owned (not currently used): rolling walker.  Upon discharge, patient will have assistance from .    Objective:     Communicated with nurse prior to session.  Patient found supine with oxygen  upon PT entry to room.    General Precautions: Standard, fall   Orthopedic Precautions:     Braces:    Respiratory Status: Nasal cannula, flow 2 L/min    Exams:  · RLE ROM: WFL  · RLE Strength: -4/5  · LLE ROM: WFL  · LLE Strength: 4/5    Functional Mobility:  · Bed Mobility:     · Supine to Sit: minimum assistance  · Sit to Supine: minimum assistance  · Transfers:     · Sit to Stand:  minimum assistance with rolling walker  · Gait: patient ambulated 20 feet with RW and CGA without deviaito and without LOB.     Therapeutic Activities and Exercises:   eval only      AM-PAC 6 CLICK MOBILITY  Total Score:16     Patient left sitting edge of bed with call button in reach and  present.    GOALS:   Multidisciplinary Problems     Physical Therapy Goals        Problem: Physical Therapy Goal    Goal Priority Disciplines Outcome Goal Variances Interventions   Physical Therapy Goal     PT, PT/OT Ongoing, Progressing     Description: STG:  Patient will perform all bed mobility with CGA  Patient will perform sit to stand and SPT with CGA  Patient will ambulate 100 feet with RW assistive device with CGA assistance without LOB.     LTG  Patient will perform bed mobility with mod I.  Patient will perform sit to stand and SPT with supervision  Patient will ambuate 200 feet with RW assistive device with supervision assistance without LOB.   Patient will perform 15 min of LE exercise without rest break to increase LE strength to 4/5.                      History:     Past Medical History:   Diagnosis Date    Cerebral hemorrhage     Chronic anxiety     Dehydration     Depression     Diabetes mellitus     Dysphagia     Due to and following non-traumatic intracerebral hemorrhage    Hearing loss     History of CVA (cerebrovascular accident)     Hypertension     Insomnia     Intrapontine hemorrhage     Left hemiparesis     Peripheral neuropathy     Transient cerebral ischemia        Past Surgical History:   Procedure Laterality Date    APPENDECTOMY      CHOLECYSTECTOMY      HYSTERECTOMY      JOINT  REPLACEMENT      Arthroscopy of knee    LITHOTRIPSY      Renal lithotripsy    percutaneious insertion of ureteric stent         Time Tracking:     PT Received On: 02/16/22  PT Start Time: 1149     PT Stop Time: 1208  PT Total Time (min): 19 min     Billable Minutes: Evaluation 19 02/16/2022

## 2022-02-16 NOTE — PLAN OF CARE
[facility]  Initial Discharge Assessment       Primary Care Provider: Vini Hernandez NP    Admission Diagnosis: Generalized weakness [R53.1]    Admission Date: 2/15/2022  Expected Discharge Date:           Payor: MEDICARE / Plan: MEDICARE PART A & B / Product Type: Government /     Extended Emergency Contact Information  Primary Emergency Contact: Micheal Kaur  Mobile Phone: [phone]  Relation: Spouse  Preferred language: English   needed? No    Discharge Plan A: (P) Home with family,Home Health  Discharge Plan B: (P) Home with family,Home Health      [facility] [place], [place] - [address]  [address]  [place] [address]  Phone: [phone] Fax: [phone]      Initial Assessment (most recent)       Adult Discharge Assessment - 02/16/22 1438          Discharge Assessment    Assessment Type Discharge Planning Assessment (P)      Source of Information patient (P)      Lives With spouse (P)      Do you expect to return to your current living situation? Yes (P)      Do you have help at home or someone to help you manage your care at home? Yes (P)      Who are your caregiver(s) and their phone number(s)?  (P)      Prior to hospitilization cognitive status: Alert/Oriented (P)      Current cognitive status: Alert/Oriented (P)      Walking or Climbing Stairs Difficulty ambulation difficulty, requires equipment (P)      Dressing/Bathing Difficulty bathing difficulty, assistance 1 person;dressing difficulty, assistance 1 person (P)      Equipment Currently Used at Home bedside commode;walker, rolling (P)      Do you currently have service(s) that help you manage your care at home? Yes (P)      Name and Contact number of agency [facility] (P)      Is the pt/caregiver preference to resume services with current agency Yes (P)      Who is going to help you get home at discharge?  (P)      How do you get to doctors appointments? family or  friend will provide (P)      Discharge Plan A Home with family;Home Health (P)      Discharge Plan B Home with family;Home Health (P)      DME Needed Upon Discharge  none (P)      Discharge Plan discussed with: Patient (P)                              D/C plan is back home with  and [facility]. Pt has all DME

## 2022-02-17 PROCEDURE — 25000003 PHARM REV CODE 250

## 2022-02-17 PROCEDURE — 97535 SELF CARE MNGMENT TRAINING: CPT

## 2022-02-17 PROCEDURE — 97112 NEUROMUSCULAR REEDUCATION: CPT

## 2022-02-17 PROCEDURE — 94761 N-INVAS EAR/PLS OXIMETRY MLT: CPT

## 2022-02-17 PROCEDURE — 25000003 PHARM REV CODE 250: Performed by: EMERGENCY MEDICINE

## 2022-02-17 PROCEDURE — 11000004 HC SNF PRIVATE

## 2022-02-17 PROCEDURE — 27000944

## 2022-02-17 PROCEDURE — 99900035 HC TECH TIME PER 15 MIN (STAT)

## 2022-02-17 PROCEDURE — 97530 THERAPEUTIC ACTIVITIES: CPT | Mod: CQ

## 2022-02-17 PROCEDURE — 97116 GAIT TRAINING THERAPY: CPT | Mod: CQ

## 2022-02-17 PROCEDURE — 27000221 HC OXYGEN, UP TO 24 HOURS

## 2022-02-17 RX ORDER — LORAZEPAM 0.5 MG/1
0.5 TABLET ORAL 2 TIMES DAILY
Status: DISCONTINUED | OUTPATIENT
Start: 2022-02-17 | End: 2022-03-01 | Stop reason: HOSPADM

## 2022-02-17 RX ADMIN — PANTOPRAZOLE SODIUM 40 MG: 40 TABLET, DELAYED RELEASE ORAL at 09:02

## 2022-02-17 RX ADMIN — ATORVASTATIN CALCIUM 40 MG: 40 TABLET, FILM COATED ORAL at 08:02

## 2022-02-17 RX ADMIN — GABAPENTIN 800 MG: 400 CAPSULE ORAL at 09:02

## 2022-02-17 RX ADMIN — CLOPIDOGREL BISULFATE 75 MG: 75 TABLET, FILM COATED ORAL at 09:02

## 2022-02-17 RX ADMIN — LORAZEPAM 0.5 MG: 0.5 TABLET ORAL at 09:02

## 2022-02-17 RX ADMIN — SERTRALINE HYDROCHLORIDE 100 MG: 50 TABLET ORAL at 08:02

## 2022-02-17 RX ADMIN — ACETAMINOPHEN 650 MG: 325 TABLET ORAL at 02:02

## 2022-02-17 RX ADMIN — GABAPENTIN 800 MG: 400 CAPSULE ORAL at 05:02

## 2022-02-17 RX ADMIN — Medication 6 MG: at 10:02

## 2022-02-17 RX ADMIN — LORAZEPAM 1 MG: 0.5 TABLET ORAL at 08:02

## 2022-02-17 RX ADMIN — HYDRALAZINE HYDROCHLORIDE 25 MG: 25 TABLET, FILM COATED ORAL at 03:02

## 2022-02-17 RX ADMIN — GABAPENTIN 800 MG: 400 CAPSULE ORAL at 08:02

## 2022-02-17 RX ADMIN — HYDRALAZINE HYDROCHLORIDE 25 MG: 25 TABLET, FILM COATED ORAL at 09:02

## 2022-02-17 RX ADMIN — LORAZEPAM 0.5 MG: 0.5 TABLET ORAL at 03:02

## 2022-02-17 RX ADMIN — GABAPENTIN 800 MG: 400 CAPSULE ORAL at 01:02

## 2022-02-17 RX ADMIN — HYDRALAZINE HYDROCHLORIDE 25 MG: 25 TABLET, FILM COATED ORAL at 08:02

## 2022-02-17 RX ADMIN — VALSARTAN 160 MG: 160 TABLET, FILM COATED ORAL at 09:02

## 2022-02-17 NOTE — PROGRESS NOTES
[facility] - Medical Surgical Unit  [facility] Medicine  Progress Note    Patient Name: Herlinda Valdovinos  MRN: [id number]  Patient Class: IP- Swing   Admission Date: 2/15/2022  Length of Stay: 1 days  Attending Physician: Alis Grider MD  Primary Care Provider: Vini Hernandez NP        Subjective:     Principal Problem:Generalized weakness        HPI:  PT  IS A 57 YR OLD WF ADMITTED TO [facility] SWING BED FOR PT/OT/REHABILITATION FOR MUSCLE WEAKNESS AND MEDICAL MANAGEMENT. PT WAS ADMITTED FROM [facility] ED. PT HAD COMPLETED PT/OT/REHABILITATION OUTPATIENT 2 WEEKS AGO FOR L SIDED WEAKNESS DUE TO CVA; BUT HAS NOTED PERSISTENT MUSCLE WEAKNESS AND DEBILITY, AND WILL REQUIRE SWING BED FOR REHABILITATION.  PT WILL BE EVALUATED PER PT/OT AND A TREATMENT PLAN WILL BE INITIATED. THE PHARMACIST WILL REVIEW MEDICATIONS AND MAKE RECOMMENDATIONS. PT WILL SEE THE DIETICIAN  FOR NUTRITIONAL SUPPORT WITH WEIGHT 74.1 KG AND ALBUMIN OF 3.6. PT'S ABNORMAL ADMITTING LABS ARE: CMP: AST 68, CBC: K, UA: NEG. PT WILL HAVE WEEKLY LABS.    Past Medical History:   Diagnosis Date    Cerebral hemorrhage     Chronic anxiety     Dehydration     Depression     Diabetes mellitus     Dysphagia     Due to and following non-traumatic intracerebral hemorrhage    Hearing loss     History of CVA (cerebrovascular accident)     Hypertension     Insomnia     Intrapontine hemorrhage     Left hemiparesis     Peripheral neuropathy     Transient cerebral ischemia       chest X-ray     PT CODE STATUS IS FULL  PT COVID STATUS IS NEG  PT HAS HAD COVID VACCINE J & J  PT VTE PPX IS ASA/PLAVIX/TEDS/EARLY AMBULATION  56 yo WF admitted to [facility] swing bed for generalized muscle weakness. Patient with hx of CVA and left side residual weakness reports that she was discharged from PT/OT 2 weeks ago. States the generalized weakness has been progressively worsening since. She has been walking with a walker at home, but reports she was unable to stand  without assistance today. Work-up in ED with . WBC 5.90. She did complain of LLQ pain and burning on urination. UA negative and CT abdomen/pelvis negative for acute findings; reveals multiple nonobstructing renal calculi.       Full Code  J&J Covid-19 vaccine  VTE - OCTAVIA      Overview/Hospital Course:  No notes on file    Past Medical History:   Diagnosis Date    Cerebral hemorrhage     Chronic anxiety     Dehydration     Depression     Diabetes mellitus     Dysphagia     Due to and following non-traumatic intracerebral hemorrhage    Hearing loss     History of CVA (cerebrovascular accident)     Hypertension     Insomnia     Intrapontine hemorrhage     Left hemiparesis     Peripheral neuropathy     Transient cerebral ischemia        Past Surgical History:   Procedure Laterality Date    APPENDECTOMY      CHOLECYSTECTOMY      HYSTERECTOMY      JOINT REPLACEMENT      Arthroscopy of knee    LITHOTRIPSY      Renal lithotripsy    percutaneious insertion of ureteric stent         Review of patient's allergies indicates:   Allergen Reactions    Codeine     Compazine [prochlorperazine]     Lamisil [terbinafine]        No current facility-administered medications on file prior to encounter.     Current Outpatient Medications on File Prior to Encounter   Medication Sig    atorvastatin (LIPITOR) 40 MG tablet Take 40 mg by mouth every evening.    bisacodyL (DULCOLAX) 10 mg Supp Place 1 suppository (10 mg total) rectally daily as needed.    citalopram (CELEXA) 40 MG tablet Take 1 tablet (40 mg total) by mouth once daily.    clopidogreL (PLAVIX) 75 mg tablet Take 75 mg by mouth once daily.    docusate sodium (COLACE) 100 MG capsule Take 1 capsule (100 mg total) by mouth once daily.    gabapentin (NEURONTIN) 400 MG capsule Take 2 capsules (800 mg total) by mouth 4 (four) times daily. (Patient taking differently: Take 800 mg by mouth 5 (five) times daily.)    hydrALAZINE (APRESOLINE) 25 MG tablet  Take 1 tablet (25 mg total) by mouth every 12 (twelve) hours. (Patient taking differently: Take 25 mg by mouth 3 (three) times daily.)    hydroCHLOROthiazide (HYDRODIURIL) 12.5 MG Tab Take 1 tablet (12.5 mg total) by mouth once daily.    HYDROcodone-acetaminophen (NORCO)  mg per tablet Take 1 tablet by mouth every 8 (eight) hours as needed for Pain.    LORazepam (ATIVAN) 0.5 MG tablet Take 1 am and afternoon and 2 hs    miconazole NITRATE 2 % (MICOTIN) 2 % top powder Apply topically 2 (two) times daily.    omeprazole (PRILOSEC) 40 MG capsule Take 40 mg by mouth every evening.    sertraline (ZOLOFT) 100 MG tablet Take 100 mg by mouth every evening.    temazepam (RESTORIL) 30 mg capsule Take 30 mg by mouth every evening.    valsartan (DIOVAN) 320 MG tablet Take 1 tablet (320 mg total) by mouth once daily. (Patient taking differently: Take 160 mg by mouth once daily.)     Family History    None       Tobacco Use    Smoking status: Never Smoker    Smokeless tobacco: Never Used   Substance and Sexual Activity    Alcohol use: Never    Drug use: Never    Sexual activity: Not on file     Review of Systems   Constitutional: Negative for appetite change and fatigue.   HENT: Negative.    Eyes: Negative.    Respiratory: Negative.    Cardiovascular: Negative.    Gastrointestinal: Negative.    Endocrine: Negative.    Musculoskeletal: Positive for arthralgias.   Skin: Negative.    Allergic/Immunologic: Negative.    Neurological: Positive for weakness (GENERALIZED).   Hematological: Negative.    Psychiatric/Behavioral: Negative.      Objective:     Vital Signs (Most Recent):  Temp: 98.7 °F (37.1 °C) (02/16/22 2007)  Pulse: 80 (02/16/22 2007)  Resp: 20 (02/16/22 2007)  BP: (!) 157/106 (02/16/22 2007)  SpO2: 95 % (02/16/22 2007) Vital Signs (24h Range):  Temp:  [98 °F (36.7 °C)-98.7 °F (37.1 °C)] 98.7 °F (37.1 °C)  Pulse:  [71-80] 80  Resp:  [18-20] 20  SpO2:  [95 %-97 %] 95 %  BP: (157-168)/(100-106) 157/106      Weight: 74.1 kg (163 lb 4.8 oz)  Body mass index is 28.03 kg/m².    Physical Exam  Vitals reviewed. Exam conducted with a chaperone present.   Constitutional:       Appearance: She is ill-appearing.   HENT:      Head: Normocephalic and atraumatic.      Right Ear: Tympanic membrane normal.      Left Ear: Tympanic membrane normal.      Nose: Nose normal.      Mouth/Throat:      Mouth: Mucous membranes are dry.   Eyes:      Extraocular Movements: Extraocular movements intact.      Pupils: Pupils are equal, round, and reactive to light.   Cardiovascular:      Rate and Rhythm: Normal rate and regular rhythm.      Pulses: Normal pulses.      Heart sounds: Normal heart sounds.   Pulmonary:      Effort: Pulmonary effort is normal.      Breath sounds: Normal breath sounds.   Abdominal:      General: Bowel sounds are normal. There is no distension.      Palpations: Abdomen is soft.   Musculoskeletal:         General: Normal range of motion.      Cervical back: Normal range of motion and neck supple.   Skin:     General: Skin is warm.      Capillary Refill: Capillary refill takes less than 2 seconds.   Neurological:      General: No focal deficit present.      Mental Status: She is alert and oriented to person, place, and time.      Motor: Weakness (GENERALIZED) present.   Psychiatric:         Mood and Affect: Mood normal.         Behavior: Behavior normal.         Thought Content: Thought content normal.         Judgment: Judgment normal.           CRANIAL NERVES     CN III, IV, VI   Pupils are equal, round, and reactive to light.       Significant Labs:   All pertinent labs within the past 24 hours have been reviewed.  BMP:   Recent Labs   Lab 02/15/22  1633         K 3.9      CO2 27   BUN 13   CREATININE 0.77   CALCIUM 9.0   MG 2.2     CMP:   Recent Labs   Lab 02/15/22  1633      K 3.9      CO2 27      BUN 13   CREATININE 0.77   CALCIUM 9.0   PROT 6.7   ALBUMIN 3.6   BILITOT 0.9    ALKPHOS 110   AST 68*   ALT 34   ANIONGAP 13   EGFRNONAA 82       Significant Imaging: I have reviewed all pertinent imaging results/findings within the past 24 hours.  CXR: I have reviewed all pertinent results/findings within the past 24 hours and my personal findings are:  NO ACUTE CHANGE      Assessment/Plan:      * Generalized weakness   PT/OT/REHABILITATION  PT/OT TO EVALUATE AND INITIATE TREATMENT PLAN  CHALLENGES TO PT/OT INCLUDE IRVIN, PRIOR CVA WITH L SIDED WEAKNESS      DVT prophylaxis  PT IS AT RISK FOR VTE  PLAVIX/TEDS/EARLY AMBULATION      Idiopathic peripheral neuropathy  PT HAS NEUROPATHY   GABAPENTIN IS EFFECTIVE      Insomnia  PT HAS INSOMNIA AND THIS IS CONTROLLED WITH MEDICATION.  LORAZEPAM HS   TEMAZEPAM HS PRN  AVOID HOSPITAL DELIRIUM  STRICT SCHEDULE    Hypertension  PT HAS INADEQUATE BP CONTROL ON MEDICATION.  CURRENT MEDS INCLUDE HYDRALAZINE 25 MG TID AND VALSARTAN 160 MG DAILY  WILL CONSIDER DOSAGE ADJUSTMENT        Type 2 diabetes mellitus with neurologic complication  PT HAS DM2  DIABETIC DIET        Generalized anxiety disorder  PT HAS IRVIN  ZOLOFT 100 MG DAILY  LORAZEPAM 0.5 MG AM AND AFTERNOON; 1 MG HS  REASSURANCE          VTE Risk Mitigation (From admission, onward)         Ordered     IP VTE LOW RISK PATIENT  Once         02/15/22 2251     Place OCTAVIA hose  Until discontinued         02/15/22 2251                Discharge Planning   POOL:      Code Status: Full Code   Is the patient medically ready for discharge?:     Reason for patient still in hospital (select all that apply): Patient trending condition, Laboratory test, Treatment, Imaging, Consult recommendations and PT / OT recommendations  Discharge Plan A: Home with family,Home Health                  Alis Grider MD  Department of Hospital Medicine   [facility] - Medical Surgical Unit

## 2022-02-17 NOTE — ASSESSMENT & PLAN NOTE
PT HAS INSOMNIA AND THIS IS CONTROLLED WITH MEDICATION.  LORAZEPAM HS   TEMAZEPAM HS PRN  AVOID HOSPITAL DELIRIUM  STRICT SCHEDULE

## 2022-02-17 NOTE — SUBJECTIVE & OBJECTIVE
Past Medical History:   Diagnosis Date    Cerebral hemorrhage     Chronic anxiety     Dehydration     Depression     Diabetes mellitus     Dysphagia     Due to and following non-traumatic intracerebral hemorrhage    Hearing loss     History of CVA (cerebrovascular accident)     Hypertension     Insomnia     Intrapontine hemorrhage     Left hemiparesis     Peripheral neuropathy     Transient cerebral ischemia        Past Surgical History:   Procedure Laterality Date    APPENDECTOMY      CHOLECYSTECTOMY      HYSTERECTOMY      JOINT REPLACEMENT      Arthroscopy of knee    LITHOTRIPSY      Renal lithotripsy    percutaneious insertion of ureteric stent         Review of patient's allergies indicates:   Allergen Reactions    Codeine     Compazine [prochlorperazine]     Lamisil [terbinafine]        No current facility-administered medications on file prior to encounter.     Current Outpatient Medications on File Prior to Encounter   Medication Sig    atorvastatin (LIPITOR) 40 MG tablet Take 40 mg by mouth every evening.    bisacodyL (DULCOLAX) 10 mg Supp Place 1 suppository (10 mg total) rectally daily as needed.    citalopram (CELEXA) 40 MG tablet Take 1 tablet (40 mg total) by mouth once daily.    clopidogreL (PLAVIX) 75 mg tablet Take 75 mg by mouth once daily.    docusate sodium (COLACE) 100 MG capsule Take 1 capsule (100 mg total) by mouth once daily.    gabapentin (NEURONTIN) 400 MG capsule Take 2 capsules (800 mg total) by mouth 4 (four) times daily. (Patient taking differently: Take 800 mg by mouth 5 (five) times daily.)    hydrALAZINE (APRESOLINE) 25 MG tablet Take 1 tablet (25 mg total) by mouth every 12 (twelve) hours. (Patient taking differently: Take 25 mg by mouth 3 (three) times daily.)    hydroCHLOROthiazide (HYDRODIURIL) 12.5 MG Tab Take 1 tablet (12.5 mg total) by mouth once daily.    HYDROcodone-acetaminophen (NORCO)  mg per tablet Take 1 tablet by mouth every 8  (eight) hours as needed for Pain.    LORazepam (ATIVAN) 0.5 MG tablet Take 1 am and afternoon and 2 hs    miconazole NITRATE 2 % (MICOTIN) 2 % top powder Apply topically 2 (two) times daily.    omeprazole (PRILOSEC) 40 MG capsule Take 40 mg by mouth every evening.    sertraline (ZOLOFT) 100 MG tablet Take 100 mg by mouth every evening.    temazepam (RESTORIL) 30 mg capsule Take 30 mg by mouth every evening.    valsartan (DIOVAN) 320 MG tablet Take 1 tablet (320 mg total) by mouth once daily. (Patient taking differently: Take 160 mg by mouth once daily.)     Family History    None       Tobacco Use    Smoking status: Never Smoker    Smokeless tobacco: Never Used   Substance and Sexual Activity    Alcohol use: Never    Drug use: Never    Sexual activity: Not on file     Review of Systems   Constitutional: Negative for appetite change and fatigue.   HENT: Negative.    Eyes: Negative.    Respiratory: Negative.    Cardiovascular: Negative.    Gastrointestinal: Negative.    Endocrine: Negative.    Musculoskeletal: Positive for arthralgias.   Skin: Negative.    Allergic/Immunologic: Negative.    Neurological: Positive for weakness (GENERALIZED).   Hematological: Negative.    Psychiatric/Behavioral: Negative.      Objective:     Vital Signs (Most Recent):  Temp: 98.7 °F (37.1 °C) (02/16/22 2007)  Pulse: 80 (02/16/22 2007)  Resp: 20 (02/16/22 2007)  BP: (!) 157/106 (02/16/22 2007)  SpO2: 95 % (02/16/22 2007) Vital Signs (24h Range):  Temp:  [98 °F (36.7 °C)-98.7 °F (37.1 °C)] 98.7 °F (37.1 °C)  Pulse:  [71-80] 80  Resp:  [18-20] 20  SpO2:  [95 %-97 %] 95 %  BP: (157-168)/(100-106) 157/106     Weight: 74.1 kg (163 lb 4.8 oz)  Body mass index is 28.03 kg/m².    Physical Exam  Vitals reviewed. Exam conducted with a chaperone present.   Constitutional:       Appearance: She is ill-appearing.   HENT:      Head: Normocephalic and atraumatic.      Right Ear: Tympanic membrane normal.      Left Ear: Tympanic membrane  normal.      Nose: Nose normal.      Mouth/Throat:      Mouth: Mucous membranes are dry.   Eyes:      Extraocular Movements: Extraocular movements intact.      Pupils: Pupils are equal, round, and reactive to light.   Cardiovascular:      Rate and Rhythm: Normal rate and regular rhythm.      Pulses: Normal pulses.      Heart sounds: Normal heart sounds.   Pulmonary:      Effort: Pulmonary effort is normal.      Breath sounds: Normal breath sounds.   Abdominal:      General: Bowel sounds are normal. There is no distension.      Palpations: Abdomen is soft.   Musculoskeletal:         General: Normal range of motion.      Cervical back: Normal range of motion and neck supple.   Skin:     General: Skin is warm.      Capillary Refill: Capillary refill takes less than 2 seconds.   Neurological:      General: No focal deficit present.      Mental Status: She is alert and oriented to person, place, and time.      Motor: Weakness (GENERALIZED) present.   Psychiatric:         Mood and Affect: Mood normal.         Behavior: Behavior normal.         Thought Content: Thought content normal.         Judgment: Judgment normal.           CRANIAL NERVES     CN III, IV, VI   Pupils are equal, round, and reactive to light.       Significant Labs:   All pertinent labs within the past 24 hours have been reviewed.  BMP:   Recent Labs   Lab 02/15/22  1633         K 3.9      CO2 27   BUN 13   CREATININE 0.77   CALCIUM 9.0   MG 2.2     CMP:   Recent Labs   Lab 02/15/22  1633      K 3.9      CO2 27      BUN 13   CREATININE 0.77   CALCIUM 9.0   PROT 6.7   ALBUMIN 3.6   BILITOT 0.9   ALKPHOS 110   AST 68*   ALT 34   ANIONGAP 13   EGFRNONAA 82       Significant Imaging: I have reviewed all pertinent imaging results/findings within the past 24 hours.  CXR: I have reviewed all pertinent results/findings within the past 24 hours and my personal findings are:  NO ACUTE CHANGE

## 2022-02-17 NOTE — ASSESSMENT & PLAN NOTE
PT/OT/REHABILITATION  PT/OT TO EVALUATE AND INITIATE TREATMENT PLAN  CHALLENGES TO PT/OT INCLUDE IRVIN, PRIOR CVA WITH L SIDED WEAKNESS

## 2022-02-17 NOTE — PT/OT/SLP PROGRESS
Physical Therapy Treatment    Patient Name:  Herlinda Valdovinos   MRN:  [id number]    Recommendations:     Discharge Recommendations:  outpatient PT,outpatient OT   Discharge Equipment Recommendations: none   Barriers to discharge: None    Assessment:     Herlinda Valdovinos is a 57 y.o. female admitted with a medical diagnosis of Generalized weakness.  She presents with the following impairments/functional limitations:  weakness,impaired endurance,impaired sensation,impaired self care skills,impaired functional mobilty,impaired balance,gait instability,decreased coordination,decreased lower extremity function,decreased upper extremity function,pain Patient was agreeable to perform therapy today.    Rehab Prognosis: Good; patient would benefit from acute skilled PT services to address these deficits and reach maximum level of function.    Recent Surgery: * No surgery found *      Plan:     During this hospitalization, patient to be seen 5 x/week to address the identified rehab impairments via gait training,therapeutic activities,therapeutic exercises and progress toward the following goals:    · Plan of Care Expires:  03/16/22    Subjective     Chief Complaint: weakness and difficulty with mobility   Patient/Family Comments/goals: get stronger   Pain/Comfort:  · Pain Rating 1: 0/10  · Pain Rating Post-Intervention 1: 0/10      Objective:     Communicated with PT prior to session.  Patient found supine with   upon PT entry to room.     General Precautions: Standard, fall   Orthopedic Precautions:    Braces: N/A  Respiratory Status: Room air     Functional Mobility:  · Bed Mobility:     · Supine to Sit: minimum assistance  · Transfers:     · Sit to Stand:  supervision with rolling walker  · Gait: Patient ambulated 260 ft to PT gym with CGA, patient participated in therapy before ambulating back to room with OT      AM-PAC 6 CLICK MOBILITY  Turning over in bed (including adjusting bedclothes, sheets and blankets)?:  3  Sitting down on and standing up from a chair with arms (e.g., wheelchair, bedside commode, etc.): 3  Moving from lying on back to sitting on the side of the bed?: 3  Moving to and from a bed to a chair (including a wheelchair)?: 3  Need to walk in hospital room?: 3  Climbing 3-5 steps with a railing?: 1  Basic Mobility Total Score: 16       Therapeutic Activities and Exercises:   Patient performed static sitting activities and static standing activities   Patient performed therapeutic activities in both quadruped and kneeling positions      Patient left in therapy gym with OT with OT present..    GOALS:   Multidisciplinary Problems     Physical Therapy Goals        Problem: Physical Therapy Goal    Goal Priority Disciplines Outcome Goal Variances Interventions   Physical Therapy Goal     PT, PT/OT Ongoing, Progressing     Description: STG:  Patient will perform all bed mobility with CGA  Patient will perform sit to stand and SPT with CGA  Patient will ambulate 100 feet with RW assistive device with CGA assistance without LOB.     LTG  Patient will perform bed mobility with mod I.  Patient will perform sit to stand and SPT with supervision  Patient will ambuate 200 feet with RW assistive device with supervision assistance without LOB.   Patient will perform 15 min of LE exercise without rest break to increase LE strength to 4/5.                      Time Tracking:     PT Received On: 02/17/22  PT Start Time: 1028     PT Stop Time: 1105  PT Total Time (min): 37 min     Billable Minutes: Gait Training 18 and Therapeutic Activity 19    Treatment Type: Treatment  PT/PTA: PTA     PTA Visit Number: 1 02/17/2022

## 2022-02-17 NOTE — ASSESSMENT & PLAN NOTE
PT HAS INADEQUATE BP CONTROL ON MEDICATION.  CURRENT MEDS INCLUDE HYDRALAZINE 25 MG TID AND VALSARTAN 160 MG DAILY  WILL CONSIDER DOSAGE ADJUSTMENT

## 2022-02-17 NOTE — PT/OT/SLP PROGRESS
Occupational Therapy   Treatment    Name: Herlinda Valdovinos  MRN: [id number]  Admitting Diagnosis:  Generalized weakness       Recommendations:     Discharge Recommendations: outpatient PT,outpatient OT  Discharge Equipment Recommendations:     Barriers to discharge:       Assessment:     Herlinda Valdovinos is a 57 y.o. female with a medical diagnosis of Generalized weakness.  She presents with weakness and limited endurance. Performance deficits affecting function are weakness,impaired endurance,impaired self care skills,impaired functional mobilty,gait instability,impaired balance,visual deficits,decreased coordination,decreased upper extremity function,decreased lower extremity function,pain.     Rehab Prognosis:  Good; patient would benefit from acute skilled OT services to address these deficits and reach maximum level of function.       Plan:     Patient to be seen 5 x/week to address the above listed problems via self-care/home management,therapeutic activities,therapeutic exercises,neuromuscular re-education  · Plan of Care Expires: 03/09/22  · Plan of Care Reviewed with: spouse,patient    Subjective     Pain/Comfort:  · Pain Rating 1: 6/10  · Location - Side 1: Bilateral  · Location 1: shoulder  · Pain Rating Post-Intervention 1: 0/10    Objective:     Communicated with: Nurse prior to session.  Patient found supine with  (spouse present) upon OT entry to room.    General Precautions: Standard,     Orthopedic Precautions:    Braces:    Respiratory Status: Room air     Occupational Performance:     Bed Mobility:    · Patient completed Supine to Sit with minimum assistance     Functional Mobility/Transfers:  · Patient completed Sit <> Stand Transfer with contact guard assistance  with  no assistive device   · Patient completed Bed <> Chair Transfer using Step Transfer technique with minimum assistance with rolling walker  · Patient completed Chair <> Mat Step Transfer technique with minimum assistance with  rolling walker  · Functional Mobility: Ambulated to/from therapy gym with RW with CGA.     Activities of Daily Living:  · Lower Body Dressing: minimum assistance sitting EOB      AMPA 6 Click ADL:      Treatment & Education:  Performed neuro re-ed on mat in gym including challenging righting reaction in tall kneeling on mat requiring Juanis to assume position then intermittent Min-ModA to maintain position. Righting reactions noted but delayed with forward righting. Challenged trunk strength and balance in tall kneeling with alternating forward reaches. Performed dynamic standing and postural righting with use of mirror requiring Juanis to assume upright standing posture. Pt tends to stand with B knees flexed, requiring intermittent cueing to extend knees fully while also maintaining upright stand posture. Pt has tendency for slight L lateral lean in standing, righting posture with use of mirror and min tactile cues.     Patient left sitting edge of bed with call button in reach and spouse presentEducation:      GOALS:   Multidisciplinary Problems     Occupational Therapy Goals        Problem: Occupational Therapy Goal    Goal Priority Disciplines Outcome Interventions   Occupational Therapy Goal     OT, PT/OT Ongoing, Progressing    Description: Goals to be met by: 2 weeks     Patient will increase functional independence with ADLs by performing:    UE Dressing with Stand-by Assistance.  LE Dressing with Stand-by Assistance.  Toileting from bedside commode with Contact Guard Assistance for hygiene and clothing management.   Bathing from  edge of bed with Stand-by Assistance.  Toilet transfer to bedside commode with Stand-by Assistance.  Tolerate OOB seated activity x 30 mins with 2 or less rest breaks needed.     Goals to be met by: 4 weeks     Patient will increase functional independence with ADLs by performing:    UE Dressing with Set-up Assistance.  LE Dressing with Set-up Assistance.  Toileting from toilet with  Supervision for hygiene and clothing management.   Bathing from  shower chair/bench with Set-up Assistance.  Toilet transfer to toilet with Supervision.  Tolerate OOB seated activity x 1 hour mins with 2 or less rest breaks needed.                    Time Tracking:     OT Date of Treatment: 02/17/22  OT Start Time: 1028  OT Stop Time: 1126  OT Total Time (min): 58 min    Billable Minutes:Self Care/Home Management 10  Neuromuscular Re-education 48     MARCUS Levy, OTR/L    OT/HEMAL: OT          2/17/2022

## 2022-02-18 PROCEDURE — 94761 N-INVAS EAR/PLS OXIMETRY MLT: CPT

## 2022-02-18 PROCEDURE — 27000944

## 2022-02-18 PROCEDURE — 27000221 HC OXYGEN, UP TO 24 HOURS

## 2022-02-18 PROCEDURE — 97530 THERAPEUTIC ACTIVITIES: CPT | Mod: CQ

## 2022-02-18 PROCEDURE — 97116 GAIT TRAINING THERAPY: CPT | Mod: CQ

## 2022-02-18 PROCEDURE — 25000003 PHARM REV CODE 250: Performed by: EMERGENCY MEDICINE

## 2022-02-18 PROCEDURE — 25000003 PHARM REV CODE 250

## 2022-02-18 PROCEDURE — 97112 NEUROMUSCULAR REEDUCATION: CPT

## 2022-02-18 PROCEDURE — 11000004 HC SNF PRIVATE

## 2022-02-18 RX ORDER — TEMAZEPAM 30 MG/1
30 CAPSULE ORAL NIGHTLY
Status: DISCONTINUED | OUTPATIENT
Start: 2022-02-18 | End: 2022-03-01 | Stop reason: HOSPADM

## 2022-02-18 RX ADMIN — GABAPENTIN 800 MG: 400 CAPSULE ORAL at 05:02

## 2022-02-18 RX ADMIN — LORAZEPAM 0.5 MG: 0.5 TABLET ORAL at 09:02

## 2022-02-18 RX ADMIN — HYDRALAZINE HYDROCHLORIDE 25 MG: 25 TABLET, FILM COATED ORAL at 02:02

## 2022-02-18 RX ADMIN — GABAPENTIN 800 MG: 400 CAPSULE ORAL at 09:02

## 2022-02-18 RX ADMIN — PANTOPRAZOLE SODIUM 40 MG: 40 TABLET, DELAYED RELEASE ORAL at 09:02

## 2022-02-18 RX ADMIN — HYDRALAZINE HYDROCHLORIDE 25 MG: 25 TABLET, FILM COATED ORAL at 09:02

## 2022-02-18 RX ADMIN — LORAZEPAM 0.5 MG: 0.5 TABLET ORAL at 02:02

## 2022-02-18 RX ADMIN — ATORVASTATIN CALCIUM 40 MG: 40 TABLET, FILM COATED ORAL at 09:02

## 2022-02-18 RX ADMIN — CLOPIDOGREL BISULFATE 75 MG: 75 TABLET, FILM COATED ORAL at 09:02

## 2022-02-18 RX ADMIN — TEMAZEPAM 30 MG: 30 CAPSULE ORAL at 09:02

## 2022-02-18 RX ADMIN — GABAPENTIN 800 MG: 400 CAPSULE ORAL at 02:02

## 2022-02-18 RX ADMIN — SERTRALINE HYDROCHLORIDE 100 MG: 50 TABLET ORAL at 09:02

## 2022-02-18 RX ADMIN — VALSARTAN 160 MG: 160 TABLET, FILM COATED ORAL at 09:02

## 2022-02-18 NOTE — NURSING
Patient request for her night medication Temazepam 30 mg, however medication had been discontinued. Patient stated she has to take this medication every night in order to go to sleep. Offered patient other insomnia medication that was a available but initially she declined. Discussed situation with KEVAN Arnold, no new orders given stated she could not override orders of Dr. Grider. Explained situation to patient again and she agreed to take the melatonin to see if it would help her sleep. Patient also request her home medication be given to her , explained to patient that the pharmacy has her home medication and pharmacy is locked at this time, but he can retrieve medication tomorrow.    done

## 2022-02-18 NOTE — PLAN OF CARE
Plan of care reviewed, status is ongoing.   Problem: Adult Inpatient Plan of Care  Goal: Plan of Care Review  Outcome: Ongoing, Progressing  Goal: Patient-Specific Goal (Individualized)  Outcome: Ongoing, Progressing  Flowsheets (Taken 2/18/2022 1347)  Anxieties, Fears or Concerns: to be able to sleep at night  Individualized Care Needs: to get stronger  Patient-Specific Goals (Include Timeframe): to sleep tonight and wake up refreshed  Goal: Absence of Hospital-Acquired Illness or Injury  Outcome: Ongoing, Progressing  Goal: Optimal Comfort and Wellbeing  Outcome: Ongoing, Progressing     Problem: Fall Injury Risk  Goal: Absence of Fall and Fall-Related Injury  Outcome: Ongoing, Progressing  Intervention: Identify and Manage Contributors  Flowsheets (Taken 2/18/2022 1344)  Self-Care Promotion:   independence encouraged   BADL personal objects within reach   BADL personal routines maintained   meal set-up provided   safe use of adaptive equipment encouraged  Medication Review/Management:   medications reviewed   high-risk medications identified  Intervention: Promote Injury-Free Environment  Flowsheets (Taken 2/18/2022 1347)  Safety Promotion/Fall Prevention:   assistive device/personal item within reach   Fall Risk reviewed with patient/family   medications reviewed   muscle strengthening facilitated   nonskid shoes/socks when out of bed   room near unit station   side rails raised x 3   instructed to call staff for mobility

## 2022-02-18 NOTE — CONSULTS
"[facility]  Adult Nutrition  Consult Note    SUMMARY     Recommendations    Recommendation/Intervention: 1. Ensure Clear one carton po BID  Goals: Meet EEN> 75%  Nutrition Goal Status: new    Assessment and Plan    No new Assessment & Plan notes have been filed under this hospital service since the last note was generated.  Service: Nutrition       Malnutrition Assessment  Malnutrition Type: chronic illness (CVA)                               Severe Weight Loss (Malnutrition): greater than 20% in 1 year    Reason for Assessment    Reason For Assessment: consult  Diagnosis: stroke/CVA (acute illness)  Relevant Medical History: Anxiety, Depression, Dysphagia, [place] HTN, CVA, neuropathy  Interdisciplinary Rounds: did not attend  General Information Comments: Pt with hx of CVA ~ 10 months ago, since then she has lost ~50#.  She denied any difficulty swallowing, but admits she has a decreased appetite.  She tried Ensure Clear and liked it.    Nutrition Risk Screen    Nutrition Risk Screen: no indicators present    Nutrition/Diet History    Patient Reported Diet/Restrictions/Preferences: general  Typical Food/Fluid Intake: 75% since admit  Spiritual, Cultural Beliefs, [employer] Practices, Values that Affect Care: no  Supplemental Drinks or Food Habits: Ensure Clear  Food Allergies: NKFA  Factors Affecting Nutritional Intake: decreased appetite    Anthropometrics    Temp: 98.1 °F (36.7 °C)  Height Method: Stated  Height: 5' 4" (162.6 cm)  Height (inches): 64 in  Weight Method: Bed Scale  Weight: 74 kg (163 lb 2.3 oz)  Weight (lb): 163.14 lb  Ideal Body Weight (IBW), Female: 120 lb  % Ideal Body Weight, Female (lb): 135.95 %  BMI (Calculated): 28  BMI Grade: 25 - 29.9 - overweight  Weight Loss: unintentional  Usual Body Weight (UBW), k kg  % Usual Body Weight: 74.15  % Weight Change From Usual Weight: -26 %       Lab/Procedures/Meds    Pertinent Labs Reviewed: reviewed  Pertinent Medications " Reviewed: reviewed  Pertinent Medications Comments: Zoloft, Lipitor, Protonix, Plavix, Hydralazine, Restoril, Diovan    Physical Findings/Assessment         Estimated/Assessed Needs    Weight Used For Calorie Calculations: 73.9 kg (163 lb)  Energy Calorie Requirements (kcal): 1848  Energy Need Method: Kcal/kg  Protein Requirements: 74-82  Weight Used For Protein Calculations: 73.9 kg (163 lb)     Estimated Fluid Requirement Method: RDA Method  RDA Method (mL): 1848         Nutrition Prescription Ordered    Current Diet Order: Cardiac    Evaluation of Received Nutrient/Fluid Intake    Energy Calories Required: meeting needs  Protein Required: meeting needs  Fluid Required: meeting needs  Tolerance: tolerating  % Intake of Estimated Energy Needs: %  % Meal Intake: 75 - 100 %    Nutrition Risk    Level of Risk/Frequency of Follow-up: low - moderate       Monitor and Evaluation    Food and Nutrient Intake: food and beverage intake  Anthropometric Measurements: weight  Nutrition-Focused Physical Findings: overall appearance       Nutrition Follow-Up    RD Follow-up?: Yes

## 2022-02-18 NOTE — PT/OT/SLP PROGRESS
Physical Therapy Treatment    Patient Name:  Herlinda Valdovinos   MRN:  [id number]    Recommendations:     Discharge Recommendations:  outpatient PT,outpatient OT   Discharge Equipment Recommendations: none   Barriers to discharge: None    Assessment:     Herlinda Valdovinos is a 57 y.o. female admitted with a medical diagnosis of Generalized weakness.  She presents with the following impairments/functional limitations:  weakness,impaired endurance,impaired self care skills,impaired functional mobilty,impaired balance,gait instability,visual deficits,decreased coordination,decreased lower extremity function,decreased upper extremity function,pain Patient was agreeable to perform therapy today.    Rehab Prognosis: Good; patient would benefit from acute skilled PT services to address these deficits and reach maximum level of function.    Recent Surgery: * No surgery found *      Plan:     During this hospitalization, patient to be seen 5 x/week to address the identified rehab impairments via gait training,therapeutic activities,therapeutic exercises and progress toward the following goals:    · Plan of Care Expires:  03/16/22    Subjective     Chief Complaint: weakness and difficulty with mobility   Patient/Family Comments/goals: get stronger   Pain/Comfort:  Pain Rating 1: 0/10  Pain Rating Post-Intervention 1: 0/10      Objective:     Communicated with PT prior to session.  Patient found in therapy gym with OT with   upon PT entry to room.     General Precautions: Standard, fall   Orthopedic Precautions:    Braces: N/A  Respiratory Status: Room air     Functional Mobility:  · Transfers:     · Sit to Stand:  supervision with rolling walker  · Gait: Patient ambulated 260 ft with CGA      AM-PAC 6 CLICK MOBILITY  Turning over in bed (including adjusting bedclothes, sheets and blankets)?: 3  Sitting down on and standing up from a chair with arms (e.g., wheelchair, bedside commode, etc.): 3  Moving from lying on back to  sitting on the side of the bed?: 3  Moving to and from a bed to a chair (including a wheelchair)?: 3  Need to walk in hospital room?: 3  Climbing 3-5 steps with a railing?: 1  Basic Mobility Total Score: 16       Therapeutic Activities and Exercises:   Patient performed static sitting activities and static standing activities   Patient performed therapeutic activities in both quadruped and kneeling positions     Patient done nu step for 5 minutes     Patient left sitting edge of bed with  present..    GOALS:   Multidisciplinary Problems     Physical Therapy Goals        Problem: Physical Therapy Goal    Goal Priority Disciplines Outcome Goal Variances Interventions   Physical Therapy Goal     PT, PT/OT Ongoing, Progressing     Description: STG:  Patient will perform all bed mobility with CGA  Patient will perform sit to stand and SPT with CGA  Patient will ambulate 100 feet with RW assistive device with CGA assistance without LOB.     LTG  Patient will perform bed mobility with mod I.  Patient will perform sit to stand and SPT with supervision  Patient will ambuate 200 feet with RW assistive device with supervision assistance without LOB.   Patient will perform 15 min of LE exercise without rest break to increase LE strength to 4/5.                      Time Tracking:     PT Received On: 02/18/22  PT Start Time: 1059     PT Stop Time: 1129  PT Total Time (min): 30 min     Billable Minutes: Gait Training 15 and Therapeutic Activity 15    Treatment Type: Treatment  PT/PTA: PTA     PTA Visit Number: 2     02/18/2022

## 2022-02-18 NOTE — PLAN OF CARE
Problem: Adult Inpatient Plan of Care  Goal: Plan of Care Review  Outcome: Ongoing, Progressing  Goal: Patient-Specific Goal (Individualized)  Outcome: Ongoing, Progressing  Goal: Absence of Hospital-Acquired Illness or Injury  Outcome: Ongoing, Progressing  Goal: Optimal Comfort and Wellbeing  Outcome: Ongoing, Progressing  Goal: Readiness for Transition of Care  Outcome: Ongoing, Progressing     Problem: Diabetes Comorbidity  Goal: Blood Glucose Level Within Targeted Range  Outcome: Ongoing, Progressing     Problem: Skin Injury Risk Increased  Goal: Skin Health and Integrity  Outcome: Ongoing, Progressing     Problem: Fall Injury Risk  Goal: Absence of Fall and Fall-Related Injury  Outcome: Ongoing, Progressing

## 2022-02-18 NOTE — NURSING
Patient is resting in no distress. Denies any needs at this time. Call light near. And family member at bedside.

## 2022-02-18 NOTE — PT/OT/SLP PROGRESS
Occupational Therapy   Treatment    Name: Herlinda Valdovinos  MRN: [id number]  Admitting Diagnosis:  Generalized weakness       Recommendations:     Discharge Recommendations: outpatient PT,outpatient OT  Discharge Equipment Recommendations:     Barriers to discharge:       Assessment:     Herlinda Valdovinos is a 57 y.o. female with a medical diagnosis of Generalized weakness.  She presents with weakness and limited endurance. Performance deficits affecting function are weakness,impaired endurance,impaired self care skills,impaired functional mobilty,gait instability,impaired balance,visual deficits,decreased coordination,decreased upper extremity function,decreased lower extremity function,pain.     Rehab Prognosis:  Good; patient would benefit from acute skilled OT services to address these deficits and reach maximum level of function.       Plan:     Patient to be seen 5 x/week to address the above listed problems via self-care/home management,therapeutic activities,therapeutic exercises,neuromuscular re-education  · Plan of Care Expires: 03/09/22  · Plan of Care Reviewed with: spouse,patient    Subjective     Pain/Comfort:  Pain Rating 1: 0/10  Pain Rating Post-Intervention 1: 3/10  Location - Side 2: Bilateral  Location 2: shoulder  Pain Addressed 2: Cessation of Activity    Objective:     Communicated with: Nurse prior to session.  Patient found supine with  (spouse present) upon OT entry to room.    General Precautions: Standard,     Orthopedic Precautions:    Braces:    Respiratory Status: Room air     Occupational Performance:     Bed Mobility:    · Patient completed Supine to Sit with stand by assistance     Functional Mobility/Transfers:  · Patient completed Sit <> Stand Transfer with contact guard assistance  with  no assistive device   · Patient completed Bed <> Chair Transfer using Step Transfer technique with minimum assistance with rolling walker  · Patient completed Chair <> Mat Step Transfer technique  with minimum assistance with rolling walker  · Functional Mobility: Ambulated to/from therapy gym with RW with Juanis due to LOB x 1.     Activities of Daily Living:    [facility] 6 Click ADL:      Treatment & Education:  Performed neuro re-ed on mat in gym including challenging righting reaction in tall kneeling on mat requiring Juanis to assume position then intermittent Min-ModA to maintain position. Righting reactions noted but delayed with forward righting. Challenged trunk strength and balance in tall kneeling with alternating forward reaches. Performed dynamic standing and postural righting with use of mirror requiring CGA to assume upright standing posture. Demonstrates good carryover from previous intervention with pt successfully assuming upright posture of B knee extension, holding shoulders up and back without cues needed. Tolerated use of Scifit bike x 5 mins challenging bilateral integrative use of UE/LEs with fair tolerance due to fatigue.     Patient left sitting edge of bed with call button in reach and spouse presentEducation:      GOALS:   Multidisciplinary Problems     Occupational Therapy Goals        Problem: Occupational Therapy Goal    Goal Priority Disciplines Outcome Interventions   Occupational Therapy Goal     OT, PT/OT Ongoing, Progressing    Description: Goals to be met by: 2 weeks     Patient will increase functional independence with ADLs by performing:    UE Dressing with Stand-by Assistance.  LE Dressing with Stand-by Assistance.  Toileting from bedside commode with Contact Guard Assistance for hygiene and clothing management.   Bathing from  edge of bed with Stand-by Assistance.  Toilet transfer to bedside commode with Stand-by Assistance.  Tolerate OOB seated activity x 30 mins with 2 or less rest breaks needed.     Goals to be met by: 4 weeks     Patient will increase functional independence with ADLs by performing:    UE Dressing with Set-up Assistance.  LE Dressing with Set-up  Assistance.  Toileting from toilet with Supervision for hygiene and clothing management.   Bathing from  shower chair/bench with Set-up Assistance.  Toilet transfer to toilet with Supervision.  Tolerate OOB seated activity x 1 hour mins with 2 or less rest breaks needed.                    Time Tracking:     OT Date of Treatment: 02/18/22  OT Start Time: 1044  OT Stop Time: 1129  OT Total Time (min): 45 min    Billable Minutes:Neuromuscular Re-education 45     MARCUS Levy, OTR/L    OT/HEMAL: OT          2/18/2022

## 2022-02-19 PROCEDURE — 94761 N-INVAS EAR/PLS OXIMETRY MLT: CPT

## 2022-02-19 PROCEDURE — 25000003 PHARM REV CODE 250: Performed by: EMERGENCY MEDICINE

## 2022-02-19 PROCEDURE — 27000944

## 2022-02-19 PROCEDURE — 25000003 PHARM REV CODE 250: Performed by: NURSE PRACTITIONER

## 2022-02-19 PROCEDURE — 11000004 HC SNF PRIVATE

## 2022-02-19 PROCEDURE — 25000003 PHARM REV CODE 250

## 2022-02-19 RX ORDER — DOCUSATE SODIUM 100 MG/1
100 CAPSULE, LIQUID FILLED ORAL DAILY PRN
Status: DISCONTINUED | OUTPATIENT
Start: 2022-02-19 | End: 2022-02-19

## 2022-02-19 RX ORDER — BISACODYL 5 MG
5 TABLET, DELAYED RELEASE (ENTERIC COATED) ORAL DAILY PRN
Status: DISCONTINUED | OUTPATIENT
Start: 2022-02-19 | End: 2022-03-01 | Stop reason: HOSPADM

## 2022-02-19 RX ADMIN — GABAPENTIN 800 MG: 400 CAPSULE ORAL at 05:02

## 2022-02-19 RX ADMIN — GABAPENTIN 800 MG: 400 CAPSULE ORAL at 08:02

## 2022-02-19 RX ADMIN — ATORVASTATIN CALCIUM 40 MG: 40 TABLET, FILM COATED ORAL at 08:02

## 2022-02-19 RX ADMIN — SERTRALINE HYDROCHLORIDE 100 MG: 50 TABLET ORAL at 08:02

## 2022-02-19 RX ADMIN — TEMAZEPAM 30 MG: 30 CAPSULE ORAL at 08:02

## 2022-02-19 RX ADMIN — CLOPIDOGREL BISULFATE 75 MG: 75 TABLET, FILM COATED ORAL at 08:02

## 2022-02-19 RX ADMIN — BISACODYL 5 MG: 5 TABLET, COATED ORAL at 10:02

## 2022-02-19 RX ADMIN — PANTOPRAZOLE SODIUM 40 MG: 40 TABLET, DELAYED RELEASE ORAL at 08:02

## 2022-02-19 RX ADMIN — LORAZEPAM 0.5 MG: 0.5 TABLET ORAL at 03:02

## 2022-02-19 RX ADMIN — LORAZEPAM 0.5 MG: 0.5 TABLET ORAL at 08:02

## 2022-02-19 RX ADMIN — HYDRALAZINE HYDROCHLORIDE 25 MG: 25 TABLET, FILM COATED ORAL at 08:02

## 2022-02-19 RX ADMIN — HYDRALAZINE HYDROCHLORIDE 25 MG: 25 TABLET, FILM COATED ORAL at 03:02

## 2022-02-19 RX ADMIN — ACETAMINOPHEN 650 MG: 325 TABLET ORAL at 11:02

## 2022-02-19 RX ADMIN — GABAPENTIN 800 MG: 400 CAPSULE ORAL at 01:02

## 2022-02-19 RX ADMIN — VALSARTAN 160 MG: 160 TABLET, FILM COATED ORAL at 08:02

## 2022-02-20 PROCEDURE — 27000221 HC OXYGEN, UP TO 24 HOURS

## 2022-02-20 PROCEDURE — 27000944

## 2022-02-20 PROCEDURE — 25000003 PHARM REV CODE 250: Performed by: NURSE PRACTITIONER

## 2022-02-20 PROCEDURE — 25000003 PHARM REV CODE 250

## 2022-02-20 PROCEDURE — 25000003 PHARM REV CODE 250: Performed by: EMERGENCY MEDICINE

## 2022-02-20 PROCEDURE — 94761 N-INVAS EAR/PLS OXIMETRY MLT: CPT

## 2022-02-20 PROCEDURE — 11000004 HC SNF PRIVATE

## 2022-02-20 PROCEDURE — 63700000 PHARM REV CODE 250 ALT 637 W/O HCPCS: Performed by: NURSE PRACTITIONER

## 2022-02-20 RX ORDER — CALCIUM CARBONATE 200(500)MG
1000 TABLET,CHEWABLE ORAL 3 TIMES DAILY PRN
Status: DISCONTINUED | OUTPATIENT
Start: 2022-02-21 | End: 2022-03-01 | Stop reason: HOSPADM

## 2022-02-20 RX ORDER — FLUCONAZOLE 100 MG/1
200 TABLET ORAL ONCE
Status: COMPLETED | OUTPATIENT
Start: 2022-02-20 | End: 2022-02-20

## 2022-02-20 RX ADMIN — LORAZEPAM 0.5 MG: 0.5 TABLET ORAL at 08:02

## 2022-02-20 RX ADMIN — CALCIUM CARBONATE (ANTACID) CHEW TAB 500 MG 1000 MG: 500 CHEW TAB at 11:02

## 2022-02-20 RX ADMIN — SERTRALINE HYDROCHLORIDE 100 MG: 50 TABLET ORAL at 09:02

## 2022-02-20 RX ADMIN — BISACODYL 5 MG: 5 TABLET, COATED ORAL at 08:02

## 2022-02-20 RX ADMIN — ATORVASTATIN CALCIUM 40 MG: 40 TABLET, FILM COATED ORAL at 09:02

## 2022-02-20 RX ADMIN — LORAZEPAM 0.5 MG: 0.5 TABLET ORAL at 03:02

## 2022-02-20 RX ADMIN — GABAPENTIN 800 MG: 400 CAPSULE ORAL at 09:02

## 2022-02-20 RX ADMIN — FLUCONAZOLE 200 MG: 100 TABLET ORAL at 06:02

## 2022-02-20 RX ADMIN — TEMAZEPAM 30 MG: 30 CAPSULE ORAL at 09:02

## 2022-02-20 RX ADMIN — GABAPENTIN 800 MG: 400 CAPSULE ORAL at 08:02

## 2022-02-20 RX ADMIN — PANTOPRAZOLE SODIUM 40 MG: 40 TABLET, DELAYED RELEASE ORAL at 08:02

## 2022-02-20 RX ADMIN — HYDRALAZINE HYDROCHLORIDE 25 MG: 25 TABLET, FILM COATED ORAL at 03:02

## 2022-02-20 RX ADMIN — GABAPENTIN 800 MG: 400 CAPSULE ORAL at 04:02

## 2022-02-20 RX ADMIN — VALSARTAN 160 MG: 160 TABLET, FILM COATED ORAL at 08:02

## 2022-02-20 RX ADMIN — HYDRALAZINE HYDROCHLORIDE 25 MG: 25 TABLET, FILM COATED ORAL at 08:02

## 2022-02-20 RX ADMIN — GABAPENTIN 800 MG: 400 CAPSULE ORAL at 01:02

## 2022-02-20 RX ADMIN — HYDRALAZINE HYDROCHLORIDE 25 MG: 25 TABLET, FILM COATED ORAL at 09:02

## 2022-02-20 RX ADMIN — CLOPIDOGREL BISULFATE 75 MG: 75 TABLET, FILM COATED ORAL at 08:02

## 2022-02-20 NOTE — PLAN OF CARE
Plan of care reviewed, status is ongoing.   Problem: Adult Inpatient Plan of Care  Goal: Plan of Care Review  Outcome: Ongoing, Progressing  Goal: Patient-Specific Goal (Individualized)  Outcome: Ongoing, Progressing  Goal: Absence of Hospital-Acquired Illness or Injury  Outcome: Ongoing, Progressing  Goal: Optimal Comfort and Wellbeing  Outcome: Ongoing, Progressing     Problem: Fall Injury Risk  Goal: Absence of Fall and Fall-Related Injury  Outcome: Ongoing, Progressing

## 2022-02-20 NOTE — NURSING
Patient expressed need to have a bowel movement. PRN colace already ordered. Patient stated she usually take colace everyday for stool softness, but not as a laxative. Patient stated she would need dulcolax to help her have a bowel movement. Spoke with KEVAN Lin, he stated it was okay for patient to get dulcolax. Order placed and reviewed. Will continue to monitor.

## 2022-02-21 PROCEDURE — 94761 N-INVAS EAR/PLS OXIMETRY MLT: CPT

## 2022-02-21 PROCEDURE — 11000004 HC SNF PRIVATE

## 2022-02-21 PROCEDURE — 97116 GAIT TRAINING THERAPY: CPT | Mod: CQ

## 2022-02-21 PROCEDURE — 27000944

## 2022-02-21 PROCEDURE — 25000003 PHARM REV CODE 250

## 2022-02-21 PROCEDURE — 97112 NEUROMUSCULAR REEDUCATION: CPT

## 2022-02-21 PROCEDURE — 27000221 HC OXYGEN, UP TO 24 HOURS

## 2022-02-21 PROCEDURE — 25000003 PHARM REV CODE 250: Performed by: EMERGENCY MEDICINE

## 2022-02-21 PROCEDURE — 99900035 HC TECH TIME PER 15 MIN (STAT)

## 2022-02-21 PROCEDURE — 97110 THERAPEUTIC EXERCISES: CPT | Mod: CQ

## 2022-02-21 PROCEDURE — 25000003 PHARM REV CODE 250: Performed by: NURSE PRACTITIONER

## 2022-02-21 RX ORDER — POLYETHYLENE GLYCOL 3350 17 G/17G
17 POWDER, FOR SOLUTION ORAL DAILY PRN
Status: DISCONTINUED | OUTPATIENT
Start: 2022-02-21 | End: 2022-03-01 | Stop reason: HOSPADM

## 2022-02-21 RX ORDER — LACTULOSE 10 G/15ML
20 SOLUTION ORAL DAILY PRN
Status: DISCONTINUED | OUTPATIENT
Start: 2022-02-21 | End: 2022-03-01 | Stop reason: HOSPADM

## 2022-02-21 RX ADMIN — GABAPENTIN 800 MG: 400 CAPSULE ORAL at 01:02

## 2022-02-21 RX ADMIN — GABAPENTIN 800 MG: 400 CAPSULE ORAL at 08:02

## 2022-02-21 RX ADMIN — POLYETHYLENE GLYCOL 3350 17 G: 17 POWDER, FOR SOLUTION ORAL at 10:02

## 2022-02-21 RX ADMIN — ATORVASTATIN CALCIUM 40 MG: 40 TABLET, FILM COATED ORAL at 08:02

## 2022-02-21 RX ADMIN — HYDRALAZINE HYDROCHLORIDE 25 MG: 25 TABLET, FILM COATED ORAL at 02:02

## 2022-02-21 RX ADMIN — LORAZEPAM 0.5 MG: 0.5 TABLET ORAL at 02:02

## 2022-02-21 RX ADMIN — TEMAZEPAM 30 MG: 30 CAPSULE ORAL at 08:02

## 2022-02-21 RX ADMIN — LACTULOSE 20 G: 20 SOLUTION ORAL at 06:02

## 2022-02-21 RX ADMIN — SERTRALINE HYDROCHLORIDE 100 MG: 50 TABLET ORAL at 08:02

## 2022-02-21 RX ADMIN — VALSARTAN 160 MG: 160 TABLET, FILM COATED ORAL at 08:02

## 2022-02-21 RX ADMIN — LORAZEPAM 0.5 MG: 0.5 TABLET ORAL at 08:02

## 2022-02-21 RX ADMIN — GABAPENTIN 800 MG: 400 CAPSULE ORAL at 04:02

## 2022-02-21 RX ADMIN — PANTOPRAZOLE SODIUM 40 MG: 40 TABLET, DELAYED RELEASE ORAL at 08:02

## 2022-02-21 RX ADMIN — BISACODYL 5 MG: 5 TABLET, COATED ORAL at 10:02

## 2022-02-21 RX ADMIN — HYDRALAZINE HYDROCHLORIDE 25 MG: 25 TABLET, FILM COATED ORAL at 08:02

## 2022-02-21 RX ADMIN — CLOPIDOGREL BISULFATE 75 MG: 75 TABLET, FILM COATED ORAL at 08:02

## 2022-02-21 NOTE — PT/OT/SLP PROGRESS
Physical Therapy Treatment    Patient Name:  Herlinda Valdovinos   MRN:  [id number]    Recommendations:     Discharge Recommendations:  outpatient PT, outpatient OT   Discharge Equipment Recommendations: none   Barriers to discharge: None    Assessment:     Herlinda Valdovinos is a 57 y.o. female admitted with a medical diagnosis of Generalized weakness.  She presents with the following impairments/functional limitations:    weakness,impaired endurance,impaired self care skills,impaired functional mobilty,impaired balance,gait instability,visual deficits,decreased coordination,decreased lower extremity function,decreased upper extremity function,pain. Patient was agreeable to perform therapy today.    Rehab Prognosis: Good; patient would benefit from acute skilled PT services to address these deficits and reach maximum level of function.    Recent Surgery: * No surgery found *      Plan:     During this hospitalization, patient to be seen 5 x/week to address the identified rehab impairments via gait training, therapeutic activities, therapeutic exercises and progress toward the following goals:    · Plan of Care Expires:  03/16/22    Subjective     Chief Complaint: weakness and difficulty with mobility  Patient/Family Comments/goals: get stronger  Pain/Comfort:  · Pain Rating 1: 0/10  · Pain Rating Post-Intervention 1: 0/10      Objective:     Communicated with OT prior to session.  Patient found supine with   upon PT entry to room.     General Precautions: Standard, fall   Orthopedic Precautions:    Braces: N/A  Respiratory Status: Room air     Functional Mobility:  · Bed Mobility:     · Supine to Sit: minimum assistance  · Sit to Supine: supervision  · Transfers:     · Sit to Stand:  supervision with rolling walker  · Gait: Patient ambulated 260 ft to therapy gym using RW and SBA.      AM-PAC 6 CLICK MOBILITY  Turning over in bed (including adjusting bedclothes, sheets and blankets)?: 3  Sitting down on and standing up  from a chair with arms (e.g., wheelchair, bedside commode, etc.): 3  Moving from lying on back to sitting on the side of the bed?: 3  Moving to and from a bed to a chair (including a wheelchair)?: 3  Need to walk in hospital room?: 3  Climbing 3-5 steps with a railing?: 1  Basic Mobility Total Score: 16       Therapeutic Activities and Exercises:   NuStep x 8 minutes  Chair squats x 10 with verbal cues for technique  Dynamic standing balance activity in // bars with throwing/catching ball with no UE support.    Patient left supine with call button in reach and  present..    GOALS:   Multidisciplinary Problems     Physical Therapy Goals        Problem: Physical Therapy Goal    Goal Priority Disciplines Outcome Goal Variances Interventions   Physical Therapy Goal     PT, PT/OT Ongoing, Progressing     Description: STG:  Patient will perform all bed mobility with CGA  Patient will perform sit to stand and SPT with CGA  Patient will ambulate 100 feet with RW assistive device with CGA assistance without LOB.     LTG  Patient will perform bed mobility with mod I.  Patient will perform sit to stand and SPT with supervision  Patient will ambuate 200 feet with RW assistive device with supervision assistance without LOB.   Patient will perform 15 min of LE exercise without rest break to increase LE strength to 4/5.                      Time Tracking:     PT Received On: 02/21/22  PT Start Time: 1333     PT Stop Time: 1404  PT Total Time (min): 31 min     Billable Minutes: Gait Training 12, Therapeutic Activity 5 and Therapeutic Exercise 14    Treatment Type: Treatment  PT/PTA: PTA     PTA Visit Number: 3     02/21/2022

## 2022-02-21 NOTE — PLAN OF CARE
Problem: Adult Inpatient Plan of Care  Goal: Plan of Care Review  Outcome: Met  Goal: Patient-Specific Goal (Individualized)  Outcome: Met  Goal: Absence of Hospital-Acquired Illness or Injury  Outcome: Met  Goal: Optimal Comfort and Wellbeing  Outcome: Met  Goal: Readiness for Transition of Care  Outcome: Met     Problem: Diabetes Comorbidity  Goal: Blood Glucose Level Within Targeted Range  Outcome: Met     Problem: Skin Injury Risk Increased  Goal: Skin Health and Integrity  Outcome: Met     Problem: Fall Injury Risk  Goal: Absence of Fall and Fall-Related Injury  Outcome: Met

## 2022-02-21 NOTE — PT/OT/SLP PROGRESS
Occupational Therapy   Treatment    Name: Herlinda Valdovinos  MRN: [id number]  Admitting Diagnosis:  Generalized weakness       Recommendations:     Discharge Recommendations: outpatient PT, outpatient OT  Discharge Equipment Recommendations:     Barriers to discharge:       Assessment:     Herlinda Valdovinos is a 57 y.o. female with a medical diagnosis of Generalized weakness.  She presents with weakness and limited endurance. Performance deficits affecting function are weakness, impaired endurance, impaired self care skills, impaired functional mobilty, gait instability, impaired balance, visual deficits, decreased coordination, decreased upper extremity function, decreased lower extremity function, pain. Pt limited on this day by fatigue with pt attributing to poor sleep over night as well as abdominal discomfort due to need for BM which pt has not had for multiple days. Nurse and MD are aware.     Rehab Prognosis:  Good; patient would benefit from acute skilled OT services to address these deficits and reach maximum level of function.       Plan:     Patient to be seen 5 x/week to address the above listed problems via self-care/home management, therapeutic activities, therapeutic exercises, neuromuscular re-education  · Plan of Care Expires: 03/09/22  · Plan of Care Reviewed with: spouse, patient    Subjective     Pain/Comfort:  Pain Rating 1: 0/10    Objective:     Communicated with: Nurse prior to session.  Patient found supine with  (spouse present) upon OT entry to room.    General Precautions: Standard,     Orthopedic Precautions:    Braces:    Respiratory Status: Room air     Occupational Performance:     Bed Mobility:    · Patient completed Supine to Sit with minimum assistance     Functional Mobility/Transfers:  · Patient completed Sit <> Stand Transfer with contact guard assistance  with  no assistive device   · Patient completed Bed <> Chair Transfer using Step Transfer technique with contact guard  assistance with rolling walker  · Patient completed Chair <> Mat Step Transfer technique with contact guard assistance with rolling walker  Functional Mobility: Ambulated to therapy gym with RW with CGA, unable to ambulate back to room due to fatigue.     Activities of Daily Living:    [facility] 6 Click ADL:      Treatment & Education:  Performed dynamic standing in parallel bars challenging balance requiring CGA-Juanis with LOB x 1 and righting reaction with fair initiation noted, although pt unable to recover balance without assist. Righting reactions noted but delayed with forward righting. Challenged trunk strength and balance in tall kneeling with alternating forward reaches. Performed dynamic standing and further postural righting requiring CGA-Juanis to assume upright standing posture. Tolerated use of Scifit bike x 8 mins challenging bilateral integrative use of UE/LEs with fair tolerance due to fatigue.     Patient left sitting edge of bed with call button in reach and spouse presentEducation:      GOALS:   Multidisciplinary Problems     Occupational Therapy Goals        Problem: Occupational Therapy Goal    Goal Priority Disciplines Outcome Interventions   Occupational Therapy Goal     OT, PT/OT Ongoing, Progressing    Description: Goals to be met by: 2 weeks     Patient will increase functional independence with ADLs by performing:    UE Dressing with Stand-by Assistance.  LE Dressing with Stand-by Assistance.  Toileting from bedside commode with Contact Guard Assistance for hygiene and clothing management.   Bathing from  edge of bed with Stand-by Assistance.  Toilet transfer to bedside commode with Stand-by Assistance.  Tolerate OOB seated activity x 30 mins with 2 or less rest breaks needed.     Goals to be met by: 4 weeks     Patient will increase functional independence with ADLs by performing:    UE Dressing with Set-up Assistance.  LE Dressing with Set-up Assistance.  Toileting from toilet with Supervision  for hygiene and clothing management.   Bathing from  shower chair/bench with Set-up Assistance.  Toilet transfer to toilet with Supervision.  Tolerate OOB seated activity x 1 hour mins with 2 or less rest breaks needed.                    Time Tracking:     OT Date of Treatment: 02/21/22  OT Start Time: 1331  OT Stop Time: 1404  OT Total Time (min): 33 min    Billable Minutes:Neuromuscular Re-education 33     MARCUS Levy, OTR/L    OT/HEMAL: OT          2/21/2022

## 2022-02-21 NOTE — PLAN OF CARE
Problem: Fall Injury Risk  Goal: Absence of Fall and Fall-Related Injury  Outcome: Ongoing, Progressing  Intervention: Promote Injury-Free Environment  Flowsheets (Taken 2/21/2022 1059)  Safety Promotion/Fall Prevention:   assistive device/personal item within reach   nonskid shoes/socks when out of bed   instructed to call staff for mobility

## 2022-02-22 PROCEDURE — 27000221 HC OXYGEN, UP TO 24 HOURS

## 2022-02-22 PROCEDURE — 97535 SELF CARE MNGMENT TRAINING: CPT

## 2022-02-22 PROCEDURE — 25000003 PHARM REV CODE 250: Performed by: EMERGENCY MEDICINE

## 2022-02-22 PROCEDURE — 94761 N-INVAS EAR/PLS OXIMETRY MLT: CPT

## 2022-02-22 PROCEDURE — 99900035 HC TECH TIME PER 15 MIN (STAT)

## 2022-02-22 PROCEDURE — 27000944

## 2022-02-22 PROCEDURE — 97116 GAIT TRAINING THERAPY: CPT | Mod: CQ

## 2022-02-22 PROCEDURE — 97110 THERAPEUTIC EXERCISES: CPT | Mod: CQ

## 2022-02-22 PROCEDURE — 25000003 PHARM REV CODE 250

## 2022-02-22 PROCEDURE — 97112 NEUROMUSCULAR REEDUCATION: CPT

## 2022-02-22 PROCEDURE — 11000004 HC SNF PRIVATE

## 2022-02-22 RX ADMIN — TEMAZEPAM 30 MG: 30 CAPSULE ORAL at 08:02

## 2022-02-22 RX ADMIN — SERTRALINE HYDROCHLORIDE 100 MG: 50 TABLET ORAL at 08:02

## 2022-02-22 RX ADMIN — LORAZEPAM 0.5 MG: 0.5 TABLET ORAL at 03:02

## 2022-02-22 RX ADMIN — HYDRALAZINE HYDROCHLORIDE 25 MG: 25 TABLET, FILM COATED ORAL at 03:02

## 2022-02-22 RX ADMIN — GABAPENTIN 800 MG: 400 CAPSULE ORAL at 09:02

## 2022-02-22 RX ADMIN — CLOPIDOGREL BISULFATE 75 MG: 75 TABLET, FILM COATED ORAL at 09:02

## 2022-02-22 RX ADMIN — GABAPENTIN 800 MG: 400 CAPSULE ORAL at 12:02

## 2022-02-22 RX ADMIN — HYDRALAZINE HYDROCHLORIDE 25 MG: 25 TABLET, FILM COATED ORAL at 08:02

## 2022-02-22 RX ADMIN — ATORVASTATIN CALCIUM 40 MG: 40 TABLET, FILM COATED ORAL at 08:02

## 2022-02-22 RX ADMIN — VALSARTAN 160 MG: 160 TABLET, FILM COATED ORAL at 09:02

## 2022-02-22 RX ADMIN — LORAZEPAM 0.5 MG: 0.5 TABLET ORAL at 09:02

## 2022-02-22 RX ADMIN — GABAPENTIN 800 MG: 400 CAPSULE ORAL at 08:02

## 2022-02-22 RX ADMIN — HYDRALAZINE HYDROCHLORIDE 25 MG: 25 TABLET, FILM COATED ORAL at 09:02

## 2022-02-22 RX ADMIN — PANTOPRAZOLE SODIUM 40 MG: 40 TABLET, DELAYED RELEASE ORAL at 09:02

## 2022-02-22 RX ADMIN — GABAPENTIN 800 MG: 400 CAPSULE ORAL at 05:02

## 2022-02-22 NOTE — PT/OT/SLP PROGRESS
Occupational Therapy   Treatment    Name: Herlinda Valdovinos  MRN: [id number]  Admitting Diagnosis:  Generalized weakness       Recommendations:     Discharge Recommendations: outpatient PT, outpatient OT  Discharge Equipment Recommendations:     Barriers to discharge:       Assessment:     Herlinda Valdovinos is a 57 y.o. female with a medical diagnosis of Generalized weakness.  She presents with weakness and limited endurance. Performance deficits affecting function are weakness, impaired endurance, impaired self care skills, impaired functional mobilty, gait instability, impaired balance, visual deficits, decreased coordination, decreased upper extremity function, decreased lower extremity function, pain.     Rehab Prognosis:  Good; patient would benefit from acute skilled OT services to address these deficits and reach maximum level of function.       Plan:     Patient to be seen 5 x/week to address the above listed problems via self-care/home management, therapeutic activities, therapeutic exercises, neuromuscular re-education  · Plan of Care Expires: 03/09/22  · Plan of Care Reviewed with: spouse, patient    Subjective     Pain/Comfort:       Objective:     Communicated with: Nurse prior to session.  Patient found supine with  (spouse present) upon OT entry to room.    General Precautions: Standard,     Orthopedic Precautions:    Braces:    Respiratory Status: Room air     Occupational Performance:     Bed Mobility:    · Patient completed Supine to Sit with stand by assistance     Functional Mobility/Transfers:  · Patient completed Sit <> Stand Transfer with contact guard assistance  with  no assistive device   · Patient completed Bed <> Chair Transfer using Step Transfer technique with contact guard assistance and minimum assistance with rolling walker  · Patient completed Chair <> Mat Step Transfer technique with contact guard assistance and minimum assistance with rolling walker  · Functional Mobility: Ambulated  to/from therapy gym with RW with CGA.     Activities of Daily Living:  · Lower Body Dressing: stand by assistance and contact guard assistance [place] socks then donning shoes while sitting EOB      [facility] 6 Click ADL:      Treatment & Education:  Performed series of dynamic standing with B UE integrative coordination including standing edge of mat while dribbling large ball with R then L hand with pt requiring intermittent Juanis to maintain safe balance. Performed further dynamic stand and weight shift while standing challenging unsupported balance with Juanis to maintain/correct posture and balance. Completed more neuro re-ed on mat in gym including challenging righting reaction in tall kneeling on mat requiring Juanis to assume position then intermittent Min-ModA to maintain position. Righting reactions noted but still delayed with forward righting. Challenged trunk strength and balance in tall kneeling with alternating forward reaches.     Patient left sitting edge of bed with call button in reach and spouse presentEducation:      GOALS:   Multidisciplinary Problems     Occupational Therapy Goals        Problem: Occupational Therapy Goal    Goal Priority Disciplines Outcome Interventions   Occupational Therapy Goal     OT, PT/OT Ongoing, Progressing    Description: Goals to be met by: 2 weeks     Patient will increase functional independence with ADLs by performing:    UE Dressing with Stand-by Assistance.  LE Dressing with Stand-by Assistance.  Toileting from bedside commode with Contact Guard Assistance for hygiene and clothing management.   Bathing from  edge of bed with Stand-by Assistance.  Toilet transfer to bedside commode with Stand-by Assistance.  Tolerate OOB seated activity x 30 mins with 2 or less rest breaks needed.     Goals to be met by: 4 weeks     Patient will increase functional independence with ADLs by performing:    UE Dressing with Set-up Assistance.  LE Dressing with Set-up  Assistance.  Toileting from toilet with Supervision for hygiene and clothing management.   Bathing from  shower chair/bench with Set-up Assistance.  Toilet transfer to toilet with Supervision.  Tolerate OOB seated activity x 1 hour mins with 2 or less rest breaks needed.                    Time Tracking:     OT Date of Treatment: 02/22/22  OT Start Time: 1054  OT Stop Time: 1139  OT Total Time (min): 45 min    Billable Minutes:Self Care/Home Management 10  Neuromuscular Re-education 35     MARCUS Levy, OTR/L    OT/HEMAL: OT          2/22/2022

## 2022-02-22 NOTE — PLAN OF CARE
Problem: Adult Inpatient Plan of Care  Goal: Plan of Care Review  2/21/2022 2007 by Juana Nelson RN  Outcome: Ongoing, Progressing  2/21/2022 2006 by Juana Nelson RN  Reactivated  Goal: Patient-Specific Goal (Individualized)  2/21/2022 2007 by Juana Nelson RN  Outcome: Ongoing, Progressing  2/21/2022 2006 by Juana Nelson RN  Reactivated  Goal: Absence of Hospital-Acquired Illness or Injury  2/21/2022 2007 by Juana Nelson RN  Outcome: Ongoing, Progressing  2/21/2022 2006 by Juana Nelson RN  Reactivated  Goal: Optimal Comfort and Wellbeing  2/21/2022 2007 by Juana Nelson RN  Outcome: Ongoing, Progressing  2/21/2022 2006 by Juana Nelson RN  Reactivated  Goal: Readiness for Transition of Care  2/21/2022 2007 by Juana Nelson RN  Outcome: Ongoing, Progressing  2/21/2022 2006 by Juana Nelson RN  Reactivated

## 2022-02-22 NOTE — PT/OT/SLP PROGRESS
Physical Therapy Treatment    Patient Name:  Herlinda Valdovinos   MRN:  [id number]    Recommendations:     Discharge Recommendations:  outpatient PT, outpatient OT   Discharge Equipment Recommendations: none   Barriers to discharge: None    Assessment:     Herlinda Valdovinos is a 57 y.o. female admitted with a medical diagnosis of Generalized weakness.  She presents with the following impairments/functional limitations:  weakness, impaired endurance, impaired self care skills, impaired functional mobilty, impaired balance, gait instability, visual deficits, decreased coordination, decreased lower extremity function, decreased upper extremity function, pain Patient was agreeable to perform therapy today.    Rehab Prognosis: Good; patient would benefit from acute skilled PT services to address these deficits and reach maximum level of function.    Recent Surgery: * No surgery found *      Plan:     During this hospitalization, patient to be seen 5 x/week to address the identified rehab impairments via gait training, therapeutic activities, therapeutic exercises and progress toward the following goals:    · Plan of Care Expires:  03/16/22    Subjective     Chief Complaint: weakness and difficulty with mobility  Patient/Family Comments/goals: get stronger  Pain/Comfort:  Pain Rating 1: 0/10  Pain Rating Post-Intervention 1: 0/10      Objective:     Communicated with OT prior to session.  Patient found supine with   upon PT entry to room.     General Precautions: Standard, fall   Orthopedic Precautions:    Braces: N/A  Respiratory Status: Room air     Functional Mobility:  · Bed Mobility:     · Supine to Sit: contact guard assistance  · Transfers:     · Sit to Stand:  supervision with rolling walker  · Gait: Patient ambulated 260 ft twice with supervision-CGA and RW      AM-PAC 6 CLICK MOBILITY  Turning over in bed (including adjusting bedclothes, sheets and blankets)?: 3  Sitting down on and standing up from a chair with  arms (e.g., wheelchair, bedside commode, etc.): 3  Moving from lying on back to sitting on the side of the bed?: 3  Moving to and from a bed to a chair (including a wheelchair)?: 3  Need to walk in hospital room?: 3  Climbing 3-5 steps with a railing?: 1  Basic Mobility Total Score: 16       Therapeutic Activities and Exercises:  Patient performed static sitting exercises   Exercises for trunk and core in quadruped position   squats x 10 with HHA  Dynamic standing balance activity with throwing/catching ball 2 x 10 and twisting with ball while standing 2 x 10 with no UE support.    Patient left supine with call button in reach and  present..    GOALS:   Multidisciplinary Problems     Physical Therapy Goals        Problem: Physical Therapy Goal    Goal Priority Disciplines Outcome Goal Variances Interventions   Physical Therapy Goal     PT, PT/OT Ongoing, Progressing     Description: STG:  Patient will perform all bed mobility with CGA  Patient will perform sit to stand and SPT with CGA  Patient will ambulate 100 feet with RW assistive device with CGA assistance without LOB.     LTG  Patient will perform bed mobility with mod I.  Patient will perform sit to stand and SPT with supervision  Patient will ambuate 200 feet with RW assistive device with supervision assistance without LOB.   Patient will perform 15 min of LE exercise without rest break to increase LE strength to 4/5.                      Time Tracking:     PT Received On: 02/22/22  PT Start Time: 1054     PT Stop Time: 1140  PT Total Time (min): 46 min     Billable Minutes: Gait Training 15 and Therapeutic Exercise 31    Treatment Type: Treatment  PT/PTA: PTA     PTA Visit Number: 4     02/22/2022

## 2022-02-22 NOTE — PLAN OF CARE
Problem: Skin Injury Risk Increased  Goal: Skin Health and Integrity  Outcome: Ongoing, Progressing  Intervention: Optimize Skin Protection  Flowsheets (Taken 2/22/2022 1732)  Pressure Reduction Techniques:   frequent weight shift encouraged   weight shift assistance provided  Skin Protection: incontinence pads utilized  Head of Bed (HOB) Positioning: HOB elevated     Problem: Fall Injury Risk  Goal: Absence of Fall and Fall-Related Injury  Outcome: Ongoing, Progressing  Intervention: Identify and Manage Contributors  Flowsheets (Taken 2/22/2022 1732)  Self-Care Promotion:   independence encouraged   BADL personal objects within reach   BADL personal routines maintained   meal set-up provided   safe use of adaptive equipment encouraged  Intervention: Promote Injury-Free Environment  Flowsheets (Taken 2/22/2022 1732)  Safety Promotion/Fall Prevention:   assistive device/personal item within reach   Fall Risk reviewed with patient/family   Fall Risk signage in place   nonskid shoes/socks when out of bed   room near unit station   instructed to call staff for mobility     Problem: Fall Injury Risk  Goal: Absence of Fall and Fall-Related Injury  Outcome: Ongoing, Progressing  Intervention: Identify and Manage Contributors  Flowsheets (Taken 2/22/2022 1732)  Self-Care Promotion:   independence encouraged   BADL personal objects within reach   BADL personal routines maintained   meal set-up provided   safe use of adaptive equipment encouraged

## 2022-02-23 PROBLEM — R21 RASH: Status: ACTIVE | Noted: 2022-02-23

## 2022-02-23 LAB
ANION GAP SERPL CALCULATED.3IONS-SCNC: 10 MMOL/L (ref 7–16)
BASOPHILS # BLD AUTO: 0.02 K/UL (ref 0–0.2)
BASOPHILS NFR BLD AUTO: 0.3 % (ref 0–1)
BUN SERPL-MCNC: 13 MG/DL (ref 7–18)
BUN/CREAT SERPL: 13 (ref 6–20)
CALCIUM SERPL-MCNC: 8.5 MG/DL (ref 8.5–10.1)
CHLORIDE SERPL-SCNC: 106 MMOL/L (ref 98–107)
CO2 SERPL-SCNC: 30 MMOL/L (ref 21–32)
CREAT SERPL-MCNC: 1.04 MG/DL (ref 0.55–1.02)
DIFFERENTIAL METHOD BLD: ABNORMAL
EOSINOPHIL # BLD AUTO: 0.51 K/UL (ref 0–0.5)
EOSINOPHIL NFR BLD AUTO: 7.6 % (ref 1–4)
ERYTHROCYTE [DISTWIDTH] IN BLOOD BY AUTOMATED COUNT: 15.5 % (ref 11.5–14.5)
GLUCOSE SERPL-MCNC: 110 MG/DL (ref 74–106)
HCT VFR BLD AUTO: 37 % (ref 38–47)
HGB BLD-MCNC: 11.8 G/DL (ref 12–16)
LYMPHOCYTES # BLD AUTO: 2.44 K/UL (ref 1–4.8)
LYMPHOCYTES NFR BLD AUTO: 36.5 % (ref 27–41)
MCH RBC QN AUTO: 29.2 PG (ref 27–31)
MCHC RBC AUTO-ENTMCNC: 31.9 G/DL (ref 32–36)
MCV RBC AUTO: 91.6 FL (ref 80–96)
MONOCYTES # BLD AUTO: 0.84 K/UL (ref 0–0.8)
MONOCYTES NFR BLD AUTO: 12.6 % (ref 2–6)
MPC BLD CALC-MCNC: 10.4 FL (ref 9.4–12.4)
NEUTROPHILS # BLD AUTO: 2.88 K/UL (ref 1.8–7.7)
NEUTROPHILS NFR BLD AUTO: 43 % (ref 53–65)
PLATELET # BLD AUTO: 137 K/UL (ref 150–400)
POTASSIUM SERPL-SCNC: 3.9 MMOL/L (ref 3.5–5.1)
RBC # BLD AUTO: 4.04 M/UL (ref 4.2–5.4)
SODIUM SERPL-SCNC: 142 MMOL/L (ref 136–145)
WBC # BLD AUTO: 6.69 K/UL (ref 4.5–11)

## 2022-02-23 PROCEDURE — 97112 NEUROMUSCULAR REEDUCATION: CPT

## 2022-02-23 PROCEDURE — 27000221 HC OXYGEN, UP TO 24 HOURS

## 2022-02-23 PROCEDURE — 27000944

## 2022-02-23 PROCEDURE — 94761 N-INVAS EAR/PLS OXIMETRY MLT: CPT

## 2022-02-23 PROCEDURE — 80048 BASIC METABOLIC PNL TOTAL CA: CPT

## 2022-02-23 PROCEDURE — 11000004 HC SNF PRIVATE

## 2022-02-23 PROCEDURE — 97116 GAIT TRAINING THERAPY: CPT | Mod: CQ

## 2022-02-23 PROCEDURE — 25000003 PHARM REV CODE 250

## 2022-02-23 PROCEDURE — 36415 COLL VENOUS BLD VENIPUNCTURE: CPT

## 2022-02-23 PROCEDURE — 97110 THERAPEUTIC EXERCISES: CPT | Mod: CQ

## 2022-02-23 PROCEDURE — 25000003 PHARM REV CODE 250: Performed by: EMERGENCY MEDICINE

## 2022-02-23 PROCEDURE — 85025 COMPLETE CBC W/AUTO DIFF WBC: CPT

## 2022-02-23 PROCEDURE — 99900035 HC TECH TIME PER 15 MIN (STAT)

## 2022-02-23 RX ORDER — DOXYLAMINE SUCCINATE 25 MG
TABLET ORAL 2 TIMES DAILY
Status: DISCONTINUED | OUTPATIENT
Start: 2022-02-23 | End: 2022-03-01 | Stop reason: HOSPADM

## 2022-02-23 RX ORDER — MENTHOL AND ZINC OXIDE .44; 20.625 G/100G; G/100G
OINTMENT TOPICAL DAILY PRN
Status: DISCONTINUED | OUTPATIENT
Start: 2022-02-23 | End: 2022-03-01 | Stop reason: HOSPADM

## 2022-02-23 RX ADMIN — CLOPIDOGREL BISULFATE 75 MG: 75 TABLET, FILM COATED ORAL at 08:02

## 2022-02-23 RX ADMIN — MICONAZOLE NITRATE: 20 CREAM TOPICAL at 01:02

## 2022-02-23 RX ADMIN — GABAPENTIN 800 MG: 400 CAPSULE ORAL at 08:02

## 2022-02-23 RX ADMIN — ACETAMINOPHEN 650 MG: 325 TABLET ORAL at 02:02

## 2022-02-23 RX ADMIN — VALSARTAN 160 MG: 160 TABLET, FILM COATED ORAL at 08:02

## 2022-02-23 RX ADMIN — PANTOPRAZOLE SODIUM 40 MG: 40 TABLET, DELAYED RELEASE ORAL at 08:02

## 2022-02-23 RX ADMIN — LORAZEPAM 0.5 MG: 0.5 TABLET ORAL at 02:02

## 2022-02-23 RX ADMIN — POLYETHYLENE GLYCOL 3350 17 G: 17 POWDER, FOR SOLUTION ORAL at 08:02

## 2022-02-23 RX ADMIN — TEMAZEPAM 30 MG: 30 CAPSULE ORAL at 09:02

## 2022-02-23 RX ADMIN — LORAZEPAM 0.5 MG: 0.5 TABLET ORAL at 08:02

## 2022-02-23 RX ADMIN — HYDRALAZINE HYDROCHLORIDE 25 MG: 25 TABLET, FILM COATED ORAL at 02:02

## 2022-02-23 RX ADMIN — GABAPENTIN 800 MG: 400 CAPSULE ORAL at 04:02

## 2022-02-23 RX ADMIN — MICONAZOLE NITRATE 2 %: 20 CREAM TOPICAL at 09:02

## 2022-02-23 RX ADMIN — SERTRALINE HYDROCHLORIDE 100 MG: 50 TABLET ORAL at 09:02

## 2022-02-23 RX ADMIN — HYDRALAZINE HYDROCHLORIDE 25 MG: 25 TABLET, FILM COATED ORAL at 08:02

## 2022-02-23 RX ADMIN — GABAPENTIN 800 MG: 400 CAPSULE ORAL at 09:02

## 2022-02-23 RX ADMIN — ATORVASTATIN CALCIUM 40 MG: 40 TABLET, FILM COATED ORAL at 09:02

## 2022-02-23 RX ADMIN — HYDRALAZINE HYDROCHLORIDE 25 MG: 25 TABLET, FILM COATED ORAL at 09:02

## 2022-02-23 RX ADMIN — GABAPENTIN 800 MG: 400 CAPSULE ORAL at 01:02

## 2022-02-23 RX ADMIN — LACTULOSE 20 G: 20 SOLUTION ORAL at 01:02

## 2022-02-23 NOTE — SUBJECTIVE & OBJECTIVE
Interval History:     Review of Systems   Constitutional:  Negative for fever.   HENT:  Negative for trouble swallowing.    Eyes:  Negative for visual disturbance.   Respiratory:  Negative for shortness of breath.    Cardiovascular:  Negative for chest pain, palpitations and leg swelling.   Gastrointestinal:  Negative for abdominal pain, diarrhea, nausea and vomiting.   Genitourinary:  Negative for decreased urine volume, dysuria, frequency and hematuria.   Musculoskeletal:  Positive for arthralgias.   Skin:  Positive for rash (itching with rash under breasts and buttocks). Negative for color change.   Neurological:  Positive for weakness. Negative for dizziness, numbness and headaches.   Objective:     Vital Signs (Most Recent):  Temp: 97.8 °F (36.6 °C) (02/23/22 0705)  Pulse: 83 (02/23/22 0705)  Resp: 18 (02/23/22 0705)  BP: (!) 153/96 (02/23/22 1405)  SpO2: 95 % (02/23/22 0705)   Vital Signs (24h Range):  Temp:  [97.8 °F (36.6 °C)-97.9 °F (36.6 °C)] 97.8 °F (36.6 °C)  Pulse:  [82-87] 83  Resp:  [18] 18  SpO2:  [92 %-97 %] 95 %  BP: (112-153)/(72-96) 153/96     Weight: 72.8 kg (160 lb 6.4 oz)  Body mass index is 27.53 kg/m².    Intake/Output Summary (Last 24 hours) at 2/23/2022 1406  Last data filed at 2/23/2022 0834  Gross per 24 hour   Intake 480 ml   Output --   Net 480 ml      Physical Exam  Vitals and nursing note reviewed.   Constitutional:       General: She is not in acute distress.     Appearance: She is obese.   HENT:      Head: Normocephalic and atraumatic.      Mouth/Throat:      Mouth: Mucous membranes are moist.   Eyes:      Extraocular Movements: Extraocular movements intact.      Pupils: Pupils are equal, round, and reactive to light.   Cardiovascular:      Rate and Rhythm: Normal rate and regular rhythm.      Heart sounds: Normal heart sounds.   Pulmonary:      Effort: Pulmonary effort is normal. No respiratory distress.      Breath sounds: Normal breath sounds.   Abdominal:      General: Bowel  sounds are normal. There is no distension.      Palpations: Abdomen is soft.      Tenderness: There is no abdominal tenderness.   Musculoskeletal:         General: Normal range of motion.      Cervical back: Neck supple.   Skin:     General: Skin is warm and dry.      Capillary Refill: Capillary refill takes less than 2 seconds.      Findings: Rash (excoriation under breast. patchy excoriation of hips and buttocks.) present.   Neurological:      Mental Status: She is alert and oriented to person, place, and time. Mental status is at baseline.      Cranial Nerves: No cranial nerve deficit.       Significant Labs: All pertinent labs within the past 24 hours have been reviewed.    Significant Imaging: I have reviewed all pertinent imaging results/findings within the past 24 hours.

## 2022-02-23 NOTE — PROGRESS NOTES
[facility] - Medical Surgical Unit  [facility] Medicine  Progress Note    Patient Name: Herlinda Valdovinos  MRN: [id number]  Patient Class: IP- Swing   Admission Date: 2/15/2022  Length of Stay: 8 days  Attending Physician: Alis Grider MD  Primary Care Provider: Vini Hernandez NP        Subjective:     Principal Problem:Generalized weakness        HPI:  PT  IS A 57 YR OLD WF ADMITTED TO [facility] SWING BED FOR PT/OT/REHABILITATION FOR MUSCLE WEAKNESS AND MEDICAL MANAGEMENT. PT WAS ADMITTED FROM [facility] ED. PT HAD COMPLETED PT/OT/REHABILITATION OUTPATIENT 2 WEEKS AGO FOR L SIDED WEAKNESS DUE TO CVA; BUT HAS NOTED PERSISTENT MUSCLE WEAKNESS AND DEBILITY, AND WILL REQUIRE SWING BED FOR REHABILITATION.  PT WILL BE EVALUATED PER PT/OT AND A TREATMENT PLAN WILL BE INITIATED. THE PHARMACIST WILL REVIEW MEDICATIONS AND MAKE RECOMMENDATIONS. PT WILL SEE THE DIETICIAN  FOR NUTRITIONAL SUPPORT WITH WEIGHT 74.1 KG AND ALBUMIN OF 3.6. PT'S ABNORMAL ADMITTING LABS ARE: CMP: AST 68, CBC: K, UA: NEG. PT WILL HAVE WEEKLY LABS.    Past Medical History:   Diagnosis Date    Cerebral hemorrhage     Chronic anxiety     Dehydration     Depression     Diabetes mellitus     Dysphagia     Due to and following non-traumatic intracerebral hemorrhage    Hearing loss     History of CVA (cerebrovascular accident)     Hypertension     Insomnia     Intrapontine hemorrhage     Left hemiparesis     Peripheral neuropathy     Transient cerebral ischemia       chest X-ray     PT CODE STATUS IS FULL  PT COVID STATUS IS NEG  PT HAS HAD COVID VACCINE J & J  PT VTE PPX IS ASA/PLAVIX/TEDS/EARLY AMBULATION  58 yo WF admitted to [facility] swing bed for generalized muscle weakness. Patient with hx of CVA and left side residual weakness reports that she was discharged from PT/OT 2 weeks ago. States the generalized weakness has been progressively worsening since. She has been walking with a walker at home, but reports she was unable to stand  without assistance today. Work-up in ED with . WBC 5.90. She did complain of LLQ pain and burning on urination. UA negative and CT abdomen/pelvis negative for acute findings; reveals multiple nonobstructing renal calculi.       Full Code  J&J Covid-19 vaccine  VTE - OCTAVIA      Overview/Hospital Course:  02/23/2022:  Weekly swing bed round  Labs and vital signs stable.  Today, the patient complains of excoriation underneath the breast and along the buttocks.  Reports she has been scratching her left buttock when sleeping.  On exam, it appears that the brief may be causing some irritation throughout the hips and buttocks.  Will add nystatin cream to be used under breast and change brief type. Patient improving with PT/OT.  Ambulated 260 ft twice with a rolling walker and supervision.  Plan to continue therapy and anticipate discharge next week.      Interval History:     Review of Systems   Constitutional:  Negative for fever.   HENT:  Negative for trouble swallowing.    Eyes:  Negative for visual disturbance.   Respiratory:  Negative for shortness of breath.    Cardiovascular:  Negative for chest pain, palpitations and leg swelling.   Gastrointestinal:  Negative for abdominal pain, diarrhea, nausea and vomiting.   Genitourinary:  Negative for decreased urine volume, dysuria, frequency and hematuria.   Musculoskeletal:  Positive for arthralgias.   Skin:  Positive for rash (itching with rash under breasts and buttocks). Negative for color change.   Neurological:  Positive for weakness. Negative for dizziness, numbness and headaches.   Objective:     Vital Signs (Most Recent):  Temp: 97.8 °F (36.6 °C) (02/23/22 0705)  Pulse: 83 (02/23/22 0705)  Resp: 18 (02/23/22 0705)  BP: (!) 153/96 (02/23/22 1405)  SpO2: 95 % (02/23/22 0705)   Vital Signs (24h Range):  Temp:  [97.8 °F (36.6 °C)-97.9 °F (36.6 °C)] 97.8 °F (36.6 °C)  Pulse:  [82-87] 83  Resp:  [18] 18  SpO2:  [92 %-97 %] 95 %  BP: (112-153)/(72-96) 153/96      Weight: 72.8 kg (160 lb 6.4 oz)  Body mass index is 27.53 kg/m².    Intake/Output Summary (Last 24 hours) at 2/23/2022 1406  Last data filed at 2/23/2022 0834  Gross per 24 hour   Intake 480 ml   Output --   Net 480 ml      Physical Exam  Vitals and nursing note reviewed.   Constitutional:       General: She is not in acute distress.     Appearance: She is obese.   HENT:      Head: Normocephalic and atraumatic.      Mouth/Throat:      Mouth: Mucous membranes are moist.   Eyes:      Extraocular Movements: Extraocular movements intact.      Pupils: Pupils are equal, round, and reactive to light.   Cardiovascular:      Rate and Rhythm: Normal rate and regular rhythm.      Heart sounds: Normal heart sounds.   Pulmonary:      Effort: Pulmonary effort is normal. No respiratory distress.      Breath sounds: Normal breath sounds.   Abdominal:      General: Bowel sounds are normal. There is no distension.      Palpations: Abdomen is soft.      Tenderness: There is no abdominal tenderness.   Musculoskeletal:         General: Normal range of motion.      Cervical back: Neck supple.   Skin:     General: Skin is warm and dry.      Capillary Refill: Capillary refill takes less than 2 seconds.      Findings: Rash (excoriation under breast. patchy excoriation of hips and buttocks.) present.   Neurological:      Mental Status: She is alert and oriented to person, place, and time. Mental status is at baseline.      Cranial Nerves: No cranial nerve deficit.       Significant Labs: All pertinent labs within the past 24 hours have been reviewed.    Significant Imaging: I have reviewed all pertinent imaging results/findings within the past 24 hours.      Assessment/Plan:      * Generalized weakness   PT/OT/REHABILITATION  PT/OT TO EVALUATE AND INITIATE TREATMENT PLAN  CHALLENGES TO PT/OT INCLUDE IRVIN, PRIOR CVA WITH L SIDED WEAKNESS      DVT prophylaxis  PT IS AT RISK FOR VTE  PLAVIX/TEDS/EARLY AMBULATION      Idiopathic peripheral  neuropathy  PT HAS NEUROPATHY   GABAPENTIN IS EFFECTIVE      Insomnia  PT HAS INSOMNIA AND THIS IS CONTROLLED WITH MEDICATION.  LORAZEPAM HS   TEMAZEPAM HS PRN  AVOID HOSPITAL DELIRIUM  STRICT SCHEDULE    Hypertension  PT HAS INADEQUATE BP CONTROL ON MEDICATION.  CURRENT MEDS INCLUDE HYDRALAZINE 25 MG TID AND VALSARTAN 160 MG DAILY  WILL CONSIDER DOSAGE ADJUSTMENT        Type 2 diabetes mellitus with neurologic complication  PT HAS DM2  DIABETIC DIET        Generalized anxiety disorder  PT HAS IRVIN  ZOLOFT 100 MG DAILY  LORAZEPAM 0.5 MG AM AND AFTERNOON; 1 MG HS  REASSURANCE        VTE Risk Mitigation (From admission, onward)         Ordered     IP VTE LOW RISK PATIENT  Once         02/15/22 2251     Place OCTAVIA hose  Until discontinued         02/15/22 2251                Discharge Planning   POOL: 3/1/2022     Code Status: Full Code   Is the patient medically ready for discharge?:     Reason for patient still in hospital (select all that apply): PT / OT recommendations  Discharge Plan A: Home with family, Home Health                  KEVAN Mayorga  Department of Hospital Medicine   [facility] - Medical Surgical Unit

## 2022-02-23 NOTE — HOSPITAL COURSE
02/23/2022:  Weekly swing bed round  Labs and vital signs stable.  Today, the patient complains of excoriation underneath the breast and along the buttocks.  Reports she has been scratching her left buttock when sleeping.  On exam, it appears that the brief may be causing some irritation throughout the hips and buttocks.  Will add nystatin cream to be used under breast and change brief type. Patient improving with PT/OT.  Ambulated 260 ft twice with a rolling walker and supervision.  Plan to continue therapy and anticipate discharge next week.    03/01/2022 Hospital day 14. Patient was admitted to the hospital for swing bed admission for PT/OT rehabilitation for generalized weakness and difficulty with ambulation.  Patient had acute CVA in April 2021. Patient received PT/OT rehabilitation, 2 weeks after completing the rehabilitation, patient's weakness progressed, causing the need for admission for therapies.  While here patient was found to be on Neurontin 800 mg 4-5 times daily.  Patient was tapered down to Neurontin 800 mg b.i.d..  Patient was also informed by pharmacy to not take temazepam and lorazepam together at night due to increased sedation and risk for falls.  Patient has done well with therapies.  She ambulated 260 ft with rolling walker with SBA to CGA.  Patient will continue to have outpatient PT/OT rehabilitation starting on March 7th at 10:15 a.m. here at [facility].  Patient does have history of general anxiety disorder.  This is managed well with daily Zoloft and lorazepam.  Patient currently denies any SI/HI.

## 2022-02-23 NOTE — PROGRESS NOTES
Wt: 160# noted.   Meal intake ~85% noted.  She likes the Ensure Clear.  She has some irritation on L hip from scratching. She has yeast areas under hear breasts.  RDN reviewed role of Yamil.  Lab reviewed.  Rec 1. Yamil one packet po BID mixed with 240ml H20 po BID.

## 2022-02-23 NOTE — PT/OT/SLP PROGRESS
Occupational Therapy   Treatment    Name: Herlinda Valdovinos  MRN: [id number]  Admitting Diagnosis:  Generalized weakness       Recommendations:     Discharge Recommendations: outpatient PT, outpatient OT  Discharge Equipment Recommendations:     Barriers to discharge:       Assessment:     Herlinda Valdovinos is a 57 y.o. female with a medical diagnosis of Generalized weakness.  She presents with weakness and limited endurance. Performance deficits affecting function are weakness, impaired endurance, impaired self care skills, impaired functional mobilty, gait instability, impaired balance, visual deficits, decreased coordination, decreased upper extremity function, decreased lower extremity function, pain.     Rehab Prognosis:  Good; patient would benefit from acute skilled OT services to address these deficits and reach maximum level of function.       Plan:     Patient to be seen 5 x/week to address the above listed problems via self-care/home management, therapeutic activities, therapeutic exercises, neuromuscular re-education  · Plan of Care Expires: 03/09/22  · Plan of Care Reviewed with: spouse, patient    Subjective     Pain/Comfort:  Pain Rating 1: 0/10    Objective:     Communicated with: Nurse prior to session.  Patient found supine upon OT entry to room.    General Precautions: Standard,     Orthopedic Precautions:    Braces:    Respiratory Status: Room air     Occupational Performance:     Bed Mobility:    · Patient completed Supine to Sit with stand by assistance     Functional Mobility/Transfers:  · Patient completed Sit <> Stand Transfer with stand by assistance and contact guard assistance  with  no assistive device   · Patient completed Bed <> Chair Transfer using Step Transfer technique with stand by assistance and contact guard assistance with rolling walker  · Patient completed Chair <> Mat Step Transfer technique with stand by assistance and contact guard assistance with rolling walker  · Functional  Mobility: Ambulated to/from therapy gym with RW with CGA-SBA.     Activities of Daily Living:    [facility] 6 Click ADL:      Treatment & Education:  Performed series of dynamic standing with B UE integrative coordination including standing edge of mat while catching/throwing large ball with UEs requiring intermittent Juanis to maintain safe balance. Performed further dynamic stand and weight shift while standing challenging unsupported balance with Juanis to maintain/correct posture and balance. Completed more neuro re-ed edge of mat in gym including challenging righting reaction bilaterally requiring Juanis to assume position then intermittent Min-ModA to maintain positions safely. Righting reactions noted but still delayed with lateral righting.     Patient left up in chair with call button in reachEducation:      GOALS:   Multidisciplinary Problems     Occupational Therapy Goals        Problem: Occupational Therapy Goal    Goal Priority Disciplines Outcome Interventions   Occupational Therapy Goal     OT, PT/OT Ongoing, Progressing    Description: Goals to be met by: 2 weeks     Patient will increase functional independence with ADLs by performing:    UE Dressing with Stand-by Assistance.  LE Dressing with Stand-by Assistance.  Toileting from bedside commode with Contact Guard Assistance for hygiene and clothing management.   Bathing from  edge of bed with Stand-by Assistance.  Toilet transfer to bedside commode with Stand-by Assistance.  Tolerate OOB seated activity x 30 mins with 2 or less rest breaks needed.     Goals to be met by: 4 weeks     Patient will increase functional independence with ADLs by performing:    UE Dressing with Set-up Assistance.  LE Dressing with Set-up Assistance.  Toileting from toilet with Supervision for hygiene and clothing management.   Bathing from  shower chair/bench with Set-up Assistance.  Toilet transfer to toilet with Supervision.  Tolerate OOB seated activity x 1 hour mins with 2 or  less rest breaks needed.                    Time Tracking:     OT Date of Treatment: 02/23/22  OT Start Time: 1105  OT Stop Time: 1153  OT Total Time (min): 48 min    Billable Minutes:Neuromuscular Re-education 48     MARCUS Levy, OTR/L    OT/HEMAL: OT          2/23/2022

## 2022-02-23 NOTE — PLAN OF CARE
"Plan of care reviewed, status is ongoing.   Problem: Adult Inpatient Plan of Care  Goal: Plan of Care Review  Outcome: Ongoing, Progressing  Goal: Patient-Specific Goal (Individualized)  Outcome: Ongoing, Progressing  Flowsheets (Taken 2/23/2022 8228)  Anxieties, Fears or Concerns: "itchy" places under breast and to buttocks  Individualized Care Needs: infected areas to get healed  Patient-Specific Goals (Include Timeframe): to get stronger  Goal: Absence of Hospital-Acquired Illness or Injury  Outcome: Ongoing, Progressing  Goal: Optimal Comfort and Wellbeing  Outcome: Ongoing, Progressing  Goal: Readiness for Transition of Care  Outcome: Ongoing, Progressing     Problem: Fall Injury Risk  Goal: Absence of Fall and Fall-Related Injury  Outcome: Ongoing, Progressing     "

## 2022-02-23 NOTE — PT/OT/SLP PROGRESS
Physical Therapy Treatment    Patient Name:  Herlinda Valdovinos   MRN:  [id number]    Recommendations:     Discharge Recommendations:  outpatient PT, outpatient OT   Discharge Equipment Recommendations: none   Barriers to discharge: None    Assessment:     Herlinda Valdovinos is a 57 y.o. female admitted with a medical diagnosis of Generalized weakness.  She presents with the following impairments/functional limitations:  weakness, impaired endurance, impaired self care skills, impaired functional mobilty, impaired balance, gait instability, visual deficits, decreased coordination, decreased lower extremity function, decreased upper extremity function, pain Patient was agreeable to perform therapy today.    Rehab Prognosis: Good; patient would benefit from acute skilled PT services to address these deficits and reach maximum level of function.    Recent Surgery: * No surgery found *      Plan:     During this hospitalization, patient to be seen 5 x/week to address the identified rehab impairments via gait training, therapeutic activities, therapeutic exercises and progress toward the following goals:    · Plan of Care Expires:  03/16/22    Subjective     Chief Complaint: weakness and difficulty with mobility  Patient/Family Comments/goals: get stronger  Pain/Comfort:  Pain Rating 1: 0/10  Pain Rating Post-Intervention 1: 0/10      Objective:     Communicated with OT prior to session.  Patient found supine with   upon PT entry to room.     General Precautions: Standard, fall   Orthopedic Precautions:    Braces: N/A  Respiratory Status: Room air     Functional Mobility:  · Bed Mobility:     · Supine to Sit: contact guard assistance  · Transfers:     · Sit to Stand:  supervision with rolling walker  · Gait: Patient ambulated 260 ft twice with supervision-CGA and RW      AM-PAC 6 CLICK MOBILITY  Turning over in bed (including adjusting bedclothes, sheets and blankets)?: 3  Sitting down on and standing up from a chair with  arms (e.g., wheelchair, bedside commode, etc.): 3  Moving from lying on back to sitting on the side of the bed?: 3  Moving to and from a bed to a chair (including a wheelchair)?: 3  Need to walk in hospital room?: 3  Climbing 3-5 steps with a railing?: 1  Basic Mobility Total Score: 16       Therapeutic Activities and Exercises:  Patient performed static sitting exercises   Exercises for trunk and core in quadruped position   squats x 10   Standing balance exercises: Ball tosses while standing, SLS, and weight shifting.    Patient left up in chair with call button in reach..    GOALS:   Multidisciplinary Problems     Physical Therapy Goals        Problem: Physical Therapy Goal    Goal Priority Disciplines Outcome Goal Variances Interventions   Physical Therapy Goal     PT, PT/OT Ongoing, Progressing     Description: STG:  Patient will perform all bed mobility with CGA  Patient will perform sit to stand and SPT with CGA  Patient will ambulate 100 feet with RW assistive device with CGA assistance without LOB.     LTG  Patient will perform bed mobility with mod I.  Patient will perform sit to stand and SPT with supervision  Patient will ambuate 200 feet with RW assistive device with supervision assistance without LOB.   Patient will perform 15 min of LE exercise without rest break to increase LE strength to 4/5.                      Time Tracking:     PT Received On: 02/23/22  PT Start Time: 1105     PT Stop Time: 1155  PT Total Time (min): 50 min     Billable Minutes: Gait Training 20 and Therapeutic Exercise 30    Treatment Type: Treatment  PT/PTA: PTA     PTA Visit Number: 5 02/23/2022   Family

## 2022-02-23 NOTE — PLAN OF CARE
Problem: Adult Inpatient Plan of Care  Goal: Plan of Care Review  Outcome: Ongoing, Progressing  Goal: Patient-Specific Goal (Individualized)  Outcome: Ongoing, Progressing  Goal: Absence of Hospital-Acquired Illness or Injury  Outcome: Ongoing, Progressing  Goal: Optimal Comfort and Wellbeing  Outcome: Ongoing, Progressing  Goal: Readiness for Transition of Care  Outcome: Ongoing, Progressing     Problem: Skin Injury Risk Increased  Goal: Skin Health and Integrity  Outcome: Ongoing, Progressing     Problem: Fall Injury Risk  Goal: Absence of Fall and Fall-Related Injury  Outcome: Ongoing, Progressing     Problem: Fall Injury Risk  Goal: Absence of Fall and Fall-Related Injury  Outcome: Ongoing, Progressing

## 2022-02-24 PROCEDURE — 11000004 HC SNF PRIVATE

## 2022-02-24 PROCEDURE — 97530 THERAPEUTIC ACTIVITIES: CPT

## 2022-02-24 PROCEDURE — 25000003 PHARM REV CODE 250

## 2022-02-24 PROCEDURE — 94761 N-INVAS EAR/PLS OXIMETRY MLT: CPT

## 2022-02-24 PROCEDURE — 27000221 HC OXYGEN, UP TO 24 HOURS

## 2022-02-24 PROCEDURE — 27000944

## 2022-02-24 PROCEDURE — 25000003 PHARM REV CODE 250: Performed by: EMERGENCY MEDICINE

## 2022-02-24 PROCEDURE — 97112 NEUROMUSCULAR REEDUCATION: CPT

## 2022-02-24 PROCEDURE — 97116 GAIT TRAINING THERAPY: CPT

## 2022-02-24 RX ORDER — DIPHENHYDRAMINE HCL 25 MG
25 CAPSULE ORAL ONCE
Status: COMPLETED | OUTPATIENT
Start: 2022-02-24 | End: 2022-02-24

## 2022-02-24 RX ADMIN — HYDRALAZINE HYDROCHLORIDE 25 MG: 25 TABLET, FILM COATED ORAL at 09:02

## 2022-02-24 RX ADMIN — LORAZEPAM 0.5 MG: 0.5 TABLET ORAL at 09:02

## 2022-02-24 RX ADMIN — Medication: at 09:02

## 2022-02-24 RX ADMIN — VALSARTAN 160 MG: 160 TABLET, FILM COATED ORAL at 11:02

## 2022-02-24 RX ADMIN — PANTOPRAZOLE SODIUM 40 MG: 40 TABLET, DELAYED RELEASE ORAL at 09:02

## 2022-02-24 RX ADMIN — LORAZEPAM 0.5 MG: 0.5 TABLET ORAL at 03:02

## 2022-02-24 RX ADMIN — GABAPENTIN 800 MG: 400 CAPSULE ORAL at 01:02

## 2022-02-24 RX ADMIN — MICONAZOLE NITRATE 2 %: 20 CREAM TOPICAL at 09:02

## 2022-02-24 RX ADMIN — ATORVASTATIN CALCIUM 40 MG: 40 TABLET, FILM COATED ORAL at 09:02

## 2022-02-24 RX ADMIN — TEMAZEPAM 30 MG: 30 CAPSULE ORAL at 09:02

## 2022-02-24 RX ADMIN — GABAPENTIN 800 MG: 400 CAPSULE ORAL at 05:02

## 2022-02-24 RX ADMIN — DIPHENHYDRAMINE HYDROCHLORIDE 25 MG: 25 CAPSULE ORAL at 03:02

## 2022-02-24 RX ADMIN — SERTRALINE HYDROCHLORIDE 100 MG: 50 TABLET ORAL at 09:02

## 2022-02-24 RX ADMIN — GABAPENTIN 800 MG: 400 CAPSULE ORAL at 09:02

## 2022-02-24 RX ADMIN — MICONAZOLE NITRATE: 20 CREAM TOPICAL at 09:02

## 2022-02-24 RX ADMIN — POLYETHYLENE GLYCOL 3350 17 G: 17 POWDER, FOR SOLUTION ORAL at 09:02

## 2022-02-24 RX ADMIN — ACETAMINOPHEN 650 MG: 325 TABLET ORAL at 02:02

## 2022-02-24 RX ADMIN — HYDRALAZINE HYDROCHLORIDE 25 MG: 25 TABLET, FILM COATED ORAL at 03:02

## 2022-02-24 RX ADMIN — CLOPIDOGREL BISULFATE 75 MG: 75 TABLET, FILM COATED ORAL at 09:02

## 2022-02-24 NOTE — PT/OT/SLP PROGRESS
Physical Therapy Treatment    Patient Name:  Herlinda Valdovinos   MRN:  [id number]    Recommendations:     Discharge Recommendations:  outpatient PT, outpatient OT   Discharge Equipment Recommendations: none   Barriers to discharge: None    Assessment:     Herlinda Valdovinos is a 57 y.o. female admitted with a medical diagnosis of Generalized weakness.  She presents with the following impairments/functional limitations:  weakness, impaired endurance, impaired self care skills, impaired functional mobilty, gait instability, impaired balance Improving overall and will benefit from outpatient PT after discharge.    Rehab Prognosis: Good; patient would benefit from acute skilled PT services to address these deficits and reach maximum level of function.    Recent Surgery: * No surgery found *      Plan:     During this hospitalization, patient to be seen 5 x/week to address the identified rehab impairments via gait training, therapeutic activities, therapeutic exercises and progress toward the following goals:    · Plan of Care Expires:  03/16/22    Subjective     Chief Complaint: balance deficits  Patient/Family Comments/goals: return home with   Pain/Comfort:  · Pain Rating 1: 0/10  · Pain Rating Post-Intervention 1: 0/10      Objective:     Communicated with nurse prior to session.  Patient found supine with peripheral IV upon PT entry to room.     General Precautions: Standard, fall   Orthopedic Precautions:    Braces:    Respiratory Status: Room air     Functional Mobility:  · Bed Mobility:     · Supine to Sit: modified independence  · Sit to Supine: modified independence  · Transfers:     · Sit to Stand:  stand by assistance with rolling walker  · Gait: Patient ambulated 260 feet with RW with SBA to CGA with verbal cues for left foot clearance. Patient also ambulated 85 feet with SC and min assist and verbal cues for quality of gait and balance reactions.       AM-PAC 6 CLICK MOBILITY  Turning over in bed  (including adjusting bedclothes, sheets and blankets)?: 4  Sitting down on and standing up from a chair with arms (e.g., wheelchair, bedside commode, etc.): 3  Moving from lying on back to sitting on the side of the bed?: 4  Moving to and from a bed to a chair (including a wheelchair)?: 3  Need to walk in hospital room?: 3  Climbing 3-5 steps with a railing?: 2  Basic Mobility Total Score: 19       Therapeutic Activities and Exercises:   Standing balance activities x 10 min.  Patient performed standing high marches x 20 and standing left LE abd x 10 with CGA and min assist for upright stance.       Patient left supine with call button in reach and  present..    GOALS:   Multidisciplinary Problems     Physical Therapy Goals        Problem: Physical Therapy Goal    Goal Priority Disciplines Outcome Goal Variances Interventions   Physical Therapy Goal     PT, PT/OT Ongoing, Progressing     Description: STG:  Patient will perform all bed mobility with CGA  Patient will perform sit to stand and SPT with CGA  Patient will ambulate 100 feet with RW assistive device with CGA assistance without LOB.     LTG  Patient will perform bed mobility with mod I.  Patient will perform sit to stand and SPT with supervision  Patient will ambuate 200 feet with RW assistive device with supervision assistance without LOB.   Patient will perform 15 min of LE exercise without rest break to increase LE strength to 4/5.                      Time Tracking:     PT Received On: 02/24/22  PT Start Time: 1015     PT Stop Time: 1100  PT Total Time (min): 45 min     Billable Minutes: Gait Training 15 and Therapeutic Activity 12    Treatment Type: Treatment, 6th Visit  PT/PTA: PT     PTA Visit Number: 0     02/24/2022

## 2022-02-24 NOTE — PLAN OF CARE
Problem: Physical Therapy Goal  Goal: Physical Therapy Goal  Description: STG:  Patient will perform all bed mobility with CGA - met  Patient will perform sit to stand and SPT with CGA - met  Patient will ambulate 100 feet with RW assistive device with CGA assistance without LOB.  - met    LTG  Patient will perform bed mobility with mod I. - met  Patient will perform sit to stand and SPT with supervision - progressing  Patient will ambuate 200 feet with RW assistive device with supervision assistance without LOB. - progressing  Patient will perform 15 min of LE exercise without rest break to increase LE strength to 4/5. - progressing    Outcome: Ongoing, Progressing

## 2022-02-24 NOTE — PT/OT/SLP PROGRESS
Occupational Therapy   Treatment    Name: Herlinda Valdovinos  MRN: [id number]  Admitting Diagnosis:  Generalized weakness       Recommendations:     Discharge Recommendations: outpatient PT, outpatient OT  Discharge Equipment Recommendations:     Barriers to discharge:       Assessment:     Herlinda Valdovinos is a 57 y.o. female with a medical diagnosis of Generalized weakness.  She presents with weakness and limited endurance. Performance deficits affecting function are weakness, impaired endurance, impaired self care skills, impaired functional mobilty, gait instability, impaired balance, visual deficits, decreased coordination, decreased upper extremity function, decreased lower extremity function, pain.     Rehab Prognosis:  Good; patient would benefit from acute skilled OT services to address these deficits and reach maximum level of function.       Plan:     Patient to be seen 5 x/week to address the above listed problems via self-care/home management, therapeutic activities, therapeutic exercises, neuromuscular re-education  · Plan of Care Expires: 03/09/22  · Plan of Care Reviewed with: spouse, patient    Subjective     Pain/Comfort:       Objective:     Communicated with: Nurse prior to session.  Patient found supine upon OT entry to room.    General Precautions: Standard,     Orthopedic Precautions:    Braces:    Respiratory Status: Room air     Occupational Performance:     Bed Mobility:    · Patient completed Supine to Sit with stand by assistance     Functional Mobility/Transfers:  · Patient completed Sit <> Stand Transfer with stand by assistance and contact guard assistance  with  no assistive device   · Patient completed Bed <> Chair Transfer using Step Transfer technique with stand by assistance and contact guard assistance with rolling walker  · Patient completed Chair <> Mat Step Transfer technique with stand by assistance and contact guard assistance with rolling walker  · Functional Mobility:  Ambulated to/from therapy gym with RW with CGA-SBA.     Activities of Daily Living:    [facility] 6 Click ADL:      Treatment & Education:  Performed series of dynamic standing with B UE integrative coordination including standing edge reaching forward while maintaining safe stand/step balance, LOB noted L lateral requiring Min-ModA to help correct.  Performed further dynamic stand and weight shift while standing challenging unsupported balance with Juanis to maintain/correct posture and balance. Completed more neuro re-ed edge of mat in gym including challenging righting reaction bilaterally requiring Juanis to assume position then intermittent Min-ModA to maintain positions safely. Further righting reaction training in quadruped then tall-kneeling while on mat requiring ModA intermittently to maintain. Righting reactions noted but still delayed.    Patient left supine with call button in reachEducation:      GOALS:   Multidisciplinary Problems     Occupational Therapy Goals        Problem: Occupational Therapy Goal    Goal Priority Disciplines Outcome Interventions   Occupational Therapy Goal     OT, PT/OT Ongoing, Progressing    Description: Goals to be met by: 2 weeks     Patient will increase functional independence with ADLs by performing:    UE Dressing with Stand-by Assistance.  LE Dressing with Stand-by Assistance.  Toileting from bedside commode with Contact Guard Assistance for hygiene and clothing management.   Bathing from  edge of bed with Stand-by Assistance.  Toilet transfer to bedside commode with Stand-by Assistance.  Tolerate OOB seated activity x 30 mins with 2 or less rest breaks needed.     Goals to be met by: 4 weeks     Patient will increase functional independence with ADLs by performing:    UE Dressing with Set-up Assistance.  LE Dressing with Set-up Assistance.  Toileting from toilet with Supervision for hygiene and clothing management.   Bathing from  shower chair/bench with Set-up  Assistance.  Toilet transfer to toilet with Supervision.  Tolerate OOB seated activity x 1 hour mins with 2 or less rest breaks needed.                    Time Tracking:     OT Date of Treatment: 02/24/22  OT Start Time: 1019  OT Stop Time: 1054  OT Total Time (min): 35 min    Billable Minutes:Neuromuscular Re-education 35     MARCUS Levy, OTR/L    OT/HEMAL: OT          2/24/2022

## 2022-02-24 NOTE — PLAN OF CARE
Problem: Adult Inpatient Plan of Care  Goal: Plan of Care Review  Outcome: Ongoing, Progressing  Goal: Patient-Specific Goal (Individualized)  Outcome: Ongoing, Progressing  Goal: Absence of Hospital-Acquired Illness or Injury  Outcome: Ongoing, Progressing  Goal: Optimal Comfort and Wellbeing  Outcome: Ongoing, Progressing  Goal: Readiness for Transition of Care  Outcome: Ongoing, Progressing     Problem: Fall Injury Risk  Goal: Absence of Fall and Fall-Related Injury  Outcome: Ongoing, Progressing     Problem: Fall Injury Risk  Goal: Absence of Fall and Fall-Related Injury  Outcome: Ongoing, Progressing     Problem: Skin Injury Risk Increased  Goal: Skin Health and Integrity  Outcome: Ongoing, Progressing

## 2022-02-24 NOTE — PLAN OF CARE
"Plan of care reviewed, status is ongoing.   Problem: Adult Inpatient Plan of Care  Goal: Plan of Care Review  Outcome: Ongoing, Progressing  Goal: Patient-Specific Goal (Individualized)  Flowsheets (Taken 2/24/2022 1702)  Anxieties, Fears or Concerns: itching all over, pt states, "I hope I'm not having an allergic reaction to something."  Individualized Care Needs: to stop itching  Patient-Specific Goals (Include Timeframe): to get home  Goal: Absence of Hospital-Acquired Illness or Injury  Outcome: Ongoing, Progressing  Goal: Optimal Comfort and Wellbeing  Outcome: Ongoing, Progressing     Problem: Fall Injury Risk  Goal: Absence of Fall and Fall-Related Injury  Outcome: Ongoing, Progressing     "

## 2022-02-25 PROCEDURE — 25000003 PHARM REV CODE 250

## 2022-02-25 PROCEDURE — 97535 SELF CARE MNGMENT TRAINING: CPT

## 2022-02-25 PROCEDURE — 94761 N-INVAS EAR/PLS OXIMETRY MLT: CPT

## 2022-02-25 PROCEDURE — 27000221 HC OXYGEN, UP TO 24 HOURS

## 2022-02-25 PROCEDURE — 27000944

## 2022-02-25 PROCEDURE — 25000003 PHARM REV CODE 250: Performed by: EMERGENCY MEDICINE

## 2022-02-25 PROCEDURE — 99900035 HC TECH TIME PER 15 MIN (STAT)

## 2022-02-25 PROCEDURE — 25000003 PHARM REV CODE 250: Performed by: NURSE PRACTITIONER

## 2022-02-25 PROCEDURE — 11000004 HC SNF PRIVATE

## 2022-02-25 PROCEDURE — 97116 GAIT TRAINING THERAPY: CPT | Mod: CQ

## 2022-02-25 PROCEDURE — 97112 NEUROMUSCULAR REEDUCATION: CPT

## 2022-02-25 RX ORDER — GABAPENTIN 400 MG/1
800 CAPSULE ORAL 3 TIMES DAILY
Status: DISCONTINUED | OUTPATIENT
Start: 2022-02-25 | End: 2022-02-28

## 2022-02-25 RX ADMIN — MICONAZOLE NITRATE: 20 CREAM TOPICAL at 08:02

## 2022-02-25 RX ADMIN — POLYETHYLENE GLYCOL 3350 17 G: 17 POWDER, FOR SOLUTION ORAL at 08:02

## 2022-02-25 RX ADMIN — GABAPENTIN 800 MG: 400 CAPSULE ORAL at 08:02

## 2022-02-25 RX ADMIN — TEMAZEPAM 30 MG: 30 CAPSULE ORAL at 08:02

## 2022-02-25 RX ADMIN — GABAPENTIN 800 MG: 400 CAPSULE ORAL at 01:02

## 2022-02-25 RX ADMIN — VALSARTAN 160 MG: 160 TABLET, FILM COATED ORAL at 08:02

## 2022-02-25 RX ADMIN — Medication: at 08:02

## 2022-02-25 RX ADMIN — HYDRALAZINE HYDROCHLORIDE 25 MG: 25 TABLET, FILM COATED ORAL at 08:02

## 2022-02-25 RX ADMIN — LORAZEPAM 0.5 MG: 0.5 TABLET ORAL at 08:02

## 2022-02-25 RX ADMIN — BISACODYL 5 MG: 5 TABLET, COATED ORAL at 03:02

## 2022-02-25 RX ADMIN — CLOPIDOGREL BISULFATE 75 MG: 75 TABLET, FILM COATED ORAL at 08:02

## 2022-02-25 RX ADMIN — ACETAMINOPHEN 650 MG: 325 TABLET ORAL at 05:02

## 2022-02-25 RX ADMIN — PANTOPRAZOLE SODIUM 40 MG: 40 TABLET, DELAYED RELEASE ORAL at 08:02

## 2022-02-25 RX ADMIN — ACETAMINOPHEN 650 MG: 325 TABLET ORAL at 03:02

## 2022-02-25 RX ADMIN — LORAZEPAM 0.5 MG: 0.5 TABLET ORAL at 02:02

## 2022-02-25 RX ADMIN — ATORVASTATIN CALCIUM 40 MG: 40 TABLET, FILM COATED ORAL at 08:02

## 2022-02-25 RX ADMIN — HYDRALAZINE HYDROCHLORIDE 25 MG: 25 TABLET, FILM COATED ORAL at 02:02

## 2022-02-25 RX ADMIN — SERTRALINE HYDROCHLORIDE 100 MG: 50 TABLET ORAL at 08:02

## 2022-02-25 NOTE — PLAN OF CARE
I called and left detailed VM for pt's PCP TERESA Hernandez NP to fax order for out patient rehab to the rehab fax number with expected d/c of 3/1.

## 2022-02-25 NOTE — PT/OT/SLP PROGRESS
Physical Therapy Treatment    Patient Name:  Herlinda Valdovinos   MRN:  [id number]    Recommendations:     Discharge Recommendations:  outpatient PT, outpatient OT   Discharge Equipment Recommendations: none   Barriers to discharge: None    Assessment:     Herlinda Valdovinos is a 57 y.o. female admitted with a medical diagnosis of Generalized weakness.  She presents with the following impairments/functional limitations:  weakness, impaired endurance, impaired self care skills, impaired functional mobilty, impaired balance, gait instability Patient agreeable to work with therapy today.     Rehab Prognosis: Good; patient would benefit from acute skilled PT services to address these deficits and reach maximum level of function.    Recent Surgery: * No surgery found *      Plan:     During this hospitalization, patient to be seen 5 x/week to address the identified rehab impairments via gait training, therapeutic activities, therapeutic exercises and progress toward the following goals:    · Plan of Care Expires:  03/16/22    Subjective     Chief Complaint: balance deficits  Patient/Family Comments/goals: return home with   Pain/Comfort:  Pain Rating 1: 0/10  Pain Rating Post-Intervention 1: 0/10      Objective:     Communicated with nurse prior to session.  Patient found in therapy gym with OT with peripheral IV upon PT entry to room.     General Precautions: Standard, fall   Orthopedic Precautions:    Braces: N/A  Respiratory Status: Room air     Functional Mobility:  · Bed Mobility:     · Sit to Supine: modified independence  · Transfers:     · Sit to Stand:  stand by assistance with rolling walker  · Gait: Patient ambulated 260 feet with RW with SBA to CGA      AM-PAC 6 CLICK MOBILITY  Turning over in bed (including adjusting bedclothes, sheets and blankets)?: 4  Sitting down on and standing up from a chair with arms (e.g., wheelchair, bedside commode, etc.): 3  Moving from lying on back to sitting on the side of  the bed?: 4  Moving to and from a bed to a chair (including a wheelchair)?: 3  Need to walk in hospital room?: 3  Climbing 3-5 steps with a railing?: 2  Basic Mobility Total Score: 19       Therapeutic Activities and Exercises:  Patient performed sitting balance activities and balance activities in quadruped position   Patient performed sit to stands and ambulation       Patient left supine with call button in reach and  present..    GOALS:   Multidisciplinary Problems     Physical Therapy Goals        Problem: Physical Therapy Goal    Goal Priority Disciplines Outcome Goal Variances Interventions   Physical Therapy Goal     PT, PT/OT Ongoing, Progressing     Description: STG:  Patient will perform all bed mobility with CGA  Patient will perform sit to stand and SPT with CGA  Patient will ambulate 100 feet with RW assistive device with CGA assistance without LOB.     LTG  Patient will perform bed mobility with mod I.  Patient will perform sit to stand and SPT with supervision  Patient will ambuate 200 feet with RW assistive device with supervision assistance without LOB.   Patient will perform 15 min of LE exercise without rest break to increase LE strength to 4/5.                      Time Tracking:     PT Received On: 02/25/22  PT Start Time: 1050     PT Stop Time: 1106  PT Total Time (min): 16 min     Billable Minutes: Gait Training 16    Treatment Type: Treatment  PT/PTA: PTA     PTA Visit Number: 1     02/25/2022

## 2022-02-25 NOTE — PLAN OF CARE
Plan of care reviewed, status is ongoing.   Problem: Adult Inpatient Plan of Care  Goal: Plan of Care Review  Outcome: Ongoing, Progressing  Goal: Patient-Specific Goal (Individualized)  Outcome: Ongoing, Progressing  Goal: Absence of Hospital-Acquired Illness or Injury  Outcome: Ongoing, Progressing  Goal: Optimal Comfort and Wellbeing  Outcome: Ongoing, Progressing  Goal: Readiness for Transition of Care  Outcome: Ongoing, Progressing     Problem: Fall Injury Risk  Goal: Absence of Fall and Fall-Related Injury  Outcome: Ongoing, Progressing

## 2022-02-25 NOTE — PT/OT/SLP PROGRESS
Occupational Therapy   Treatment    Name: Herlinda Valdovinos  MRN: [id number]  Admitting Diagnosis:  Generalized weakness       Recommendations:     Discharge Recommendations: outpatient PT, outpatient OT  Discharge Equipment Recommendations:     Barriers to discharge:       Assessment:     Herlinda Valdovinos is a 57 y.o. female with a medical diagnosis of Generalized weakness.  She presents with weakness and limited endurance. Performance deficits affecting function are weakness, impaired endurance, impaired self care skills, impaired functional mobilty, gait instability, impaired balance, visual deficits, decreased coordination, decreased upper extremity function, decreased lower extremity function, pain.     Rehab Prognosis:  Good; patient would benefit from acute skilled OT services to address these deficits and reach maximum level of function.       Plan:     Patient to be seen 5 x/week to address the above listed problems via self-care/home management, therapeutic activities, therapeutic exercises, neuromuscular re-education  · Plan of Care Expires: 03/09/22  · Plan of Care Reviewed with: spouse, patient    Subjective     Pain/Comfort:  Pain Rating 1: 0/10    Objective:     Communicated with: Nurse prior to session.  Patient found supine with  (spouse present) upon OT entry to room.    General Precautions: Standard,     Orthopedic Precautions:    Braces:    Respiratory Status: Room air     Occupational Performance:     Bed Mobility:    · Patient completed Supine to Sit with stand by assistance     Functional Mobility/Transfers:  · Patient completed Sit <> Stand Transfer with contact guard assistance  with  no assistive device   · Patient completed Bed <> Chair Transfer using Step Transfer technique with contact guard assistance and minimum assistance with rolling walker  · Patient completed Chair <> Mat Step Transfer technique with contact guard assistance and minimum assistance with rolling walker  · Functional  Mobility: Ambulated to therapy gym with RW with CGA-SBA.     Activities of Daily Living:  · Lower Body Dressing: stand by assistance Donning socks and shoes while sitting EOB with mod encouragement needed      [facility] 6 Click ADL:      Treatment & Education:  Performed series of dynamic standing with B UE integrative coordination including standing edge of mat challenging weight shift and reach outside base of support requiring SBA-CGA. Completed more neuro re-ed on mat in gym including challenging righting reaction in tall kneeling on mat requiring Juanis to assume position then intermittent Min-ModA to maintain position. Righting reactions noted but still delayed with forward righting. Challenged trunk strength and balance in tall kneeling with alternating forward reaches.     Patient left with PTA     GOALS:   Multidisciplinary Problems     Occupational Therapy Goals        Problem: Occupational Therapy Goal    Goal Priority Disciplines Outcome Interventions   Occupational Therapy Goal     OT, PT/OT Ongoing, Progressing    Description: Goals to be met by: 2 weeks     Patient will increase functional independence with ADLs by performing:    UE Dressing with Stand-by Assistance.  LE Dressing with Stand-by Assistance.  Toileting from bedside commode with Contact Guard Assistance for hygiene and clothing management.   Bathing from  edge of bed with Stand-by Assistance.  Toilet transfer to bedside commode with Stand-by Assistance.  Tolerate OOB seated activity x 30 mins with 2 or less rest breaks needed.     Goals to be met by: 4 weeks     Patient will increase functional independence with ADLs by performing:    UE Dressing with Set-up Assistance.  LE Dressing with Set-up Assistance.  Toileting from toilet with Supervision for hygiene and clothing management.   Bathing from  shower chair/bench with Set-up Assistance.  Toilet transfer to toilet with Supervision.  Tolerate OOB seated activity x 1 hour mins with 2 or less  rest breaks needed.                    Time Tracking:     OT Date of Treatment: 02/25/22  OT Start Time: 1025  OT Stop Time: 1058  OT Total Time (min): 33 min    Billable Minutes:Self Care/Home Management 11  Neuromuscular Re-education 22     MARCUS Levy, OTR/L    OT/HEMAL: OT          2/25/2022

## 2022-02-25 NOTE — PLAN OF CARE
[facility]  Discharge Assessment    Primary Care Provider: Vini Hernandez NP       Expected discharge date of 3/1, home with out pt rehab discussed with pt's  and posted in room.

## 2022-02-26 PROCEDURE — 27000221 HC OXYGEN, UP TO 24 HOURS

## 2022-02-26 PROCEDURE — 25000003 PHARM REV CODE 250: Performed by: EMERGENCY MEDICINE

## 2022-02-26 PROCEDURE — 25000003 PHARM REV CODE 250

## 2022-02-26 PROCEDURE — 27000944

## 2022-02-26 PROCEDURE — 11000004 HC SNF PRIVATE

## 2022-02-26 PROCEDURE — 94761 N-INVAS EAR/PLS OXIMETRY MLT: CPT

## 2022-02-26 RX ORDER — GUAIFENESIN 600 MG/1
600 TABLET, EXTENDED RELEASE ORAL 2 TIMES DAILY
Status: DISCONTINUED | OUTPATIENT
Start: 2022-02-26 | End: 2022-03-01 | Stop reason: HOSPADM

## 2022-02-26 RX ADMIN — PANTOPRAZOLE SODIUM 40 MG: 40 TABLET, DELAYED RELEASE ORAL at 08:02

## 2022-02-26 RX ADMIN — ATORVASTATIN CALCIUM 40 MG: 40 TABLET, FILM COATED ORAL at 08:02

## 2022-02-26 RX ADMIN — GUAIFENESIN 600 MG: 600 TABLET, EXTENDED RELEASE ORAL at 08:02

## 2022-02-26 RX ADMIN — SERTRALINE HYDROCHLORIDE 100 MG: 50 TABLET ORAL at 08:02

## 2022-02-26 RX ADMIN — GABAPENTIN 800 MG: 400 CAPSULE ORAL at 08:02

## 2022-02-26 RX ADMIN — HYDRALAZINE HYDROCHLORIDE 25 MG: 25 TABLET, FILM COATED ORAL at 08:02

## 2022-02-26 RX ADMIN — MICONAZOLE NITRATE: 20 CREAM TOPICAL at 08:02

## 2022-02-26 RX ADMIN — LORAZEPAM 0.5 MG: 0.5 TABLET ORAL at 02:02

## 2022-02-26 RX ADMIN — ACETAMINOPHEN 650 MG: 325 TABLET ORAL at 03:02

## 2022-02-26 RX ADMIN — TEMAZEPAM 30 MG: 30 CAPSULE ORAL at 08:02

## 2022-02-26 RX ADMIN — LORAZEPAM 0.5 MG: 0.5 TABLET ORAL at 08:02

## 2022-02-26 RX ADMIN — VALSARTAN 160 MG: 160 TABLET, FILM COATED ORAL at 08:02

## 2022-02-26 RX ADMIN — GABAPENTIN 800 MG: 400 CAPSULE ORAL at 02:02

## 2022-02-26 RX ADMIN — CLOPIDOGREL BISULFATE 75 MG: 75 TABLET, FILM COATED ORAL at 08:02

## 2022-02-26 RX ADMIN — ACETAMINOPHEN 650 MG: 325 TABLET ORAL at 02:02

## 2022-02-26 RX ADMIN — HYDRALAZINE HYDROCHLORIDE 25 MG: 25 TABLET, FILM COATED ORAL at 02:02

## 2022-02-27 PROCEDURE — 27000221 HC OXYGEN, UP TO 24 HOURS

## 2022-02-27 PROCEDURE — 25000003 PHARM REV CODE 250

## 2022-02-27 PROCEDURE — 25000003 PHARM REV CODE 250: Performed by: EMERGENCY MEDICINE

## 2022-02-27 PROCEDURE — 11000004 HC SNF PRIVATE

## 2022-02-27 PROCEDURE — 94761 N-INVAS EAR/PLS OXIMETRY MLT: CPT

## 2022-02-27 PROCEDURE — 27000944

## 2022-02-27 RX ADMIN — HYDRALAZINE HYDROCHLORIDE 25 MG: 25 TABLET, FILM COATED ORAL at 08:02

## 2022-02-27 RX ADMIN — MICONAZOLE NITRATE: 20 CREAM TOPICAL at 09:02

## 2022-02-27 RX ADMIN — TEMAZEPAM 30 MG: 30 CAPSULE ORAL at 09:02

## 2022-02-27 RX ADMIN — HYDRALAZINE HYDROCHLORIDE 25 MG: 25 TABLET, FILM COATED ORAL at 03:02

## 2022-02-27 RX ADMIN — MICONAZOLE NITRATE: 20 CREAM TOPICAL at 08:02

## 2022-02-27 RX ADMIN — VALSARTAN 160 MG: 160 TABLET, FILM COATED ORAL at 08:02

## 2022-02-27 RX ADMIN — GABAPENTIN 800 MG: 400 CAPSULE ORAL at 09:02

## 2022-02-27 RX ADMIN — ATORVASTATIN CALCIUM 40 MG: 40 TABLET, FILM COATED ORAL at 09:02

## 2022-02-27 RX ADMIN — PANTOPRAZOLE SODIUM 40 MG: 40 TABLET, DELAYED RELEASE ORAL at 08:02

## 2022-02-27 RX ADMIN — CLOPIDOGREL BISULFATE 75 MG: 75 TABLET, FILM COATED ORAL at 08:02

## 2022-02-27 RX ADMIN — HYDRALAZINE HYDROCHLORIDE 25 MG: 25 TABLET, FILM COATED ORAL at 09:02

## 2022-02-27 RX ADMIN — SERTRALINE HYDROCHLORIDE 100 MG: 50 TABLET ORAL at 09:02

## 2022-02-27 RX ADMIN — LORAZEPAM 0.5 MG: 0.5 TABLET ORAL at 08:02

## 2022-02-27 RX ADMIN — LORAZEPAM 0.5 MG: 0.5 TABLET ORAL at 03:02

## 2022-02-27 RX ADMIN — GUAIFENESIN 600 MG: 600 TABLET, EXTENDED RELEASE ORAL at 09:02

## 2022-02-27 RX ADMIN — GABAPENTIN 800 MG: 400 CAPSULE ORAL at 08:02

## 2022-02-27 RX ADMIN — HYDROCODONE BITARTRATE AND ACETAMINOPHEN 1 TABLET: 10; 325 TABLET ORAL at 09:02

## 2022-02-27 RX ADMIN — GABAPENTIN 800 MG: 400 CAPSULE ORAL at 03:02

## 2022-02-27 RX ADMIN — GUAIFENESIN 600 MG: 600 TABLET, EXTENDED RELEASE ORAL at 08:02

## 2022-02-27 NOTE — NURSING
Received patient sitting up in bed watching tv. NAD noted. Resp even et unlabored. Voiced no concerns at this time. Bed low. CB in reach. Will cont to monitor.

## 2022-02-28 LAB — SARS-COV+SARS-COV-2 AG RESP QL IA.RAPID: NEGATIVE

## 2022-02-28 PROCEDURE — 25000003 PHARM REV CODE 250: Performed by: EMERGENCY MEDICINE

## 2022-02-28 PROCEDURE — 87426 SARSCOV CORONAVIRUS AG IA: CPT | Performed by: EMERGENCY MEDICINE

## 2022-02-28 PROCEDURE — 94761 N-INVAS EAR/PLS OXIMETRY MLT: CPT

## 2022-02-28 PROCEDURE — 27000944

## 2022-02-28 PROCEDURE — 27000221 HC OXYGEN, UP TO 24 HOURS

## 2022-02-28 PROCEDURE — 97110 THERAPEUTIC EXERCISES: CPT | Mod: CQ

## 2022-02-28 PROCEDURE — 97112 NEUROMUSCULAR REEDUCATION: CPT

## 2022-02-28 PROCEDURE — 97116 GAIT TRAINING THERAPY: CPT | Mod: CQ

## 2022-02-28 PROCEDURE — 11000004 HC SNF PRIVATE

## 2022-02-28 PROCEDURE — 25000003 PHARM REV CODE 250

## 2022-02-28 PROCEDURE — 99900035 HC TECH TIME PER 15 MIN (STAT)

## 2022-02-28 RX ORDER — GABAPENTIN 400 MG/1
800 CAPSULE ORAL 2 TIMES DAILY
Status: DISCONTINUED | OUTPATIENT
Start: 2022-02-28 | End: 2022-03-01 | Stop reason: HOSPADM

## 2022-02-28 RX ADMIN — GABAPENTIN 800 MG: 400 CAPSULE ORAL at 08:02

## 2022-02-28 RX ADMIN — Medication: at 08:02

## 2022-02-28 RX ADMIN — SERTRALINE HYDROCHLORIDE 100 MG: 50 TABLET ORAL at 08:02

## 2022-02-28 RX ADMIN — HYDRALAZINE HYDROCHLORIDE 25 MG: 25 TABLET, FILM COATED ORAL at 08:02

## 2022-02-28 RX ADMIN — ACETAMINOPHEN 650 MG: 325 TABLET ORAL at 03:02

## 2022-02-28 RX ADMIN — POLYETHYLENE GLYCOL 3350 17 G: 17 POWDER, FOR SOLUTION ORAL at 08:02

## 2022-02-28 RX ADMIN — HYDROCODONE BITARTRATE AND ACETAMINOPHEN 1 TABLET: 10; 325 TABLET ORAL at 10:02

## 2022-02-28 RX ADMIN — LORAZEPAM 0.5 MG: 0.5 TABLET ORAL at 08:02

## 2022-02-28 RX ADMIN — VALSARTAN 160 MG: 160 TABLET, FILM COATED ORAL at 08:02

## 2022-02-28 RX ADMIN — GUAIFENESIN 600 MG: 600 TABLET, EXTENDED RELEASE ORAL at 08:02

## 2022-02-28 RX ADMIN — LORAZEPAM 0.5 MG: 0.5 TABLET ORAL at 02:02

## 2022-02-28 RX ADMIN — TEMAZEPAM 30 MG: 30 CAPSULE ORAL at 09:02

## 2022-02-28 RX ADMIN — PANTOPRAZOLE SODIUM 40 MG: 40 TABLET, DELAYED RELEASE ORAL at 08:02

## 2022-02-28 RX ADMIN — HYDRALAZINE HYDROCHLORIDE 25 MG: 25 TABLET, FILM COATED ORAL at 02:02

## 2022-02-28 RX ADMIN — ATORVASTATIN CALCIUM 40 MG: 40 TABLET, FILM COATED ORAL at 08:02

## 2022-02-28 RX ADMIN — MICONAZOLE NITRATE: 20 CREAM TOPICAL at 08:02

## 2022-02-28 RX ADMIN — CLOPIDOGREL BISULFATE 75 MG: 75 TABLET, FILM COATED ORAL at 08:02

## 2022-02-28 NOTE — PT/OT/SLP PROGRESS
Physical Therapy Treatment    Patient Name:  Herlinda Valdovinos   MRN:  [id number]    Recommendations:     Discharge Recommendations:  outpatient PT, outpatient OT   Discharge Equipment Recommendations: none   Barriers to discharge: None    Assessment:     Herlinda Valdovinos is a 57 y.o. female admitted with a medical diagnosis of Generalized weakness.  She presents with the following impairments/functional limitations:  weakness, impaired endurance, impaired self care skills, impaired functional mobilty, impaired balance, gait instability Patient agreeable to work with therapy today.     Rehab Prognosis: Good; patient would benefit from acute skilled PT services to address these deficits and reach maximum level of function.    Recent Surgery: * No surgery found *      Plan:     During this hospitalization, patient to be seen 5 x/week to address the identified rehab impairments via gait training, therapeutic activities, therapeutic exercises and progress toward the following goals:    · Plan of Care Expires:  03/16/22    Subjective     Chief Complaint: balance deficits  Patient/Family Comments/goals: return home with   Pain/Comfort:  Pain Rating 1: 0/10  Pain Rating Post-Intervention 1: 0/10      Objective:     Communicated with nurse prior to session.  Patient found supine with peripheral IV upon PT entry to room.     General Precautions: Standard, fall   Orthopedic Precautions:    Braces: N/A  Respiratory Status: Room air     Functional Mobility:  · Bed Mobility:     · Supine to Sit: modified independence  · Sit to Supine: modified independence  · Transfers:     · Sit to Stand:  modified independence with rolling walker  · Gait: Patient ambulated 260 feet with RW with SBA to CGA, patient then walked 260 feet with SC with SBA to CGA      AM-PAC 6 CLICK MOBILITY  Turning over in bed (including adjusting bedclothes, sheets and blankets)?: 4  Sitting down on and standing up from a chair with arms (e.g., wheelchair,  bedside commode, etc.): 3  Moving from lying on back to sitting on the side of the bed?: 4  Moving to and from a bed to a chair (including a wheelchair)?: 3  Need to walk in hospital room?: 3  Climbing 3-5 steps with a railing?: 2  Basic Mobility Total Score: 19       Therapeutic Activities and Exercises:  Patient performed sitting balance activities, standing balance activities, and balance activities in quadruped position   Patient performed sit to stands and ambulation     Patient left supine with call button in reach..    GOALS:   Multidisciplinary Problems     Physical Therapy Goals        Problem: Physical Therapy Goal    Goal Priority Disciplines Outcome Goal Variances Interventions   Physical Therapy Goal     PT, PT/OT Ongoing, Progressing     Description: STG:  Patient will perform all bed mobility with CGA  Patient will perform sit to stand and SPT with CGA  Patient will ambulate 100 feet with RW assistive device with CGA assistance without LOB.     LTG  Patient will perform bed mobility with mod I.  Patient will perform sit to stand and SPT with supervision  Patient will ambuate 200 feet with RW assistive device with supervision assistance without LOB.   Patient will perform 15 min of LE exercise without rest break to increase LE strength to 4/5.                      Time Tracking:     PT Received On: 02/28/22  PT Start Time: 1301     PT Stop Time: 1401  PT Total Time (min): 60 min     Billable Minutes: Gait Training 30 and Therapeutic Exercise 30    Treatment Type: Treatment  PT/PTA: PTA     PTA Visit Number: 2     02/28/2022

## 2022-02-28 NOTE — PLAN OF CARE
[facility]  Discharge Assessment    Primary Care Provider: Vini Hernandez NP     Planning D/C home with  tomorrow. Pt signed pt's choice form for [facility]. Pt's PCP faxed order to Brianne in Rehab and she has set up for pt's 1st visit. Pt will not need any D[facility]

## 2022-02-28 NOTE — PT/OT/SLP PROGRESS
Occupational Therapy   Treatment    Name: Herlinda Valdovinos  MRN: [id number]  Admitting Diagnosis:  Generalized weakness       Recommendations:     Discharge Recommendations: outpatient PT, outpatient OT  Discharge Equipment Recommendations:     Barriers to discharge:       Assessment:     Herlinda Valdovinos is a 57 y.o. female with a medical diagnosis of Generalized weakness.  She presents with significantly improved weakness and endurance resulting in planned d/c for 3/1/22. Performance deficits affecting function are weakness, impaired endurance, impaired self care skills, impaired functional mobilty, gait instability, impaired balance, visual deficits, decreased coordination, decreased upper extremity function, decreased lower extremity function, pain.     Rehab Prognosis:  Good; patient would benefit from acute skilled OT services to address these deficits and reach maximum level of function.       Plan:     Patient to be seen 5 x/week to address the above listed problems via self-care/home management, therapeutic activities, therapeutic exercises, neuromuscular re-education  · Plan of Care Expires: 03/09/22  · Plan of Care Reviewed with: spouse, patient    Subjective     Pain/Comfort:  Pain Rating 1: 0/10    Objective:     Communicated with: Nurse prior to session.  Patient found supine upon OT entry to room.    General Precautions: Standard,     Orthopedic Precautions:    Braces:    Respiratory Status: Room air     Occupational Performance:     Bed Mobility:    · Patient completed Supine to Sit with supervision     Functional Mobility/Transfers:  · Patient completed Sit <> Stand Transfer with stand by assistance  with  no assistive device   · Patient completed Bed <> Chair Transfer using Step Transfer technique with stand by assistance with rolling walker  · Patient completed Chair <> Mat Step Transfer technique with stand by assistance with rolling walker  · Functional Mobility: Ambulated to therapy gym with RW  with SBA, returned to room from therapy gym with cane with CGA-Juanis.     Activities of Daily Living:    [facility] 6 Click ADL:      Treatment & Education:  Performed series of dynamic standing with B UE integrative coordination including standing edge reaching forward while maintaining safe stand/step balance, significantly less LOB noted requiring CGA-Juanis to correct. Performed further dynamic stand and weight shift while standing challenging unsupported balance with intermittent CGA to maintain/correct posture and balance. Performed dynamic stand balance challenging maintaining balance while turning head L and R as well as looking up and down. Slight LOB noted with pt improving significantly in ability to correct balance without physical assist. Intermittent CGA needed.     Patient left supine with call button in reachEducation:      GOALS:   Multidisciplinary Problems     Occupational Therapy Goals        Problem: Occupational Therapy Goal    Goal Priority Disciplines Outcome Interventions   Occupational Therapy Goal     OT, PT/OT Ongoing, Progressing    Description: Goals to be met by: 2 weeks     Patient will increase functional independence with ADLs by performing:    UE Dressing with Stand-by Assistance.  LE Dressing with Stand-by Assistance.  Toileting from bedside commode with Contact Guard Assistance for hygiene and clothing management.   Bathing from  edge of bed with Stand-by Assistance.  Toilet transfer to bedside commode with Stand-by Assistance.  Tolerate OOB seated activity x 30 mins with 2 or less rest breaks needed.     Goals to be met by: 4 weeks     Patient will increase functional independence with ADLs by performing:    UE Dressing with Set-up Assistance.  LE Dressing with Set-up Assistance.  Toileting from toilet with Supervision for hygiene and clothing management.   Bathing from  shower chair/bench with Set-up Assistance.  Toilet transfer to toilet with Supervision.  Tolerate OOB seated activity  x 1 hour mins with 2 or less rest breaks needed.                    Time Tracking:     OT Date of Treatment: 02/28/22  OT Start Time: 1315  OT Stop Time: 1400  OT Total Time (min): 45 min    Billable Minutes:Neuromuscular Re-education 45     MARCUS Levy, OTR/L    OT/HEMAL: OT          2/28/2022

## 2022-03-01 VITALS
HEIGHT: 64 IN | RESPIRATION RATE: 18 BRPM | HEART RATE: 78 BPM | SYSTOLIC BLOOD PRESSURE: 131 MMHG | WEIGHT: 160.38 LBS | DIASTOLIC BLOOD PRESSURE: 87 MMHG | BODY MASS INDEX: 27.38 KG/M2 | TEMPERATURE: 98 F | OXYGEN SATURATION: 95 %

## 2022-03-01 DIAGNOSIS — R53.1 WEAKNESS: Primary | ICD-10-CM

## 2022-03-01 PROCEDURE — 97112 NEUROMUSCULAR REEDUCATION: CPT

## 2022-03-01 PROCEDURE — 27000221 HC OXYGEN, UP TO 24 HOURS

## 2022-03-01 PROCEDURE — 97110 THERAPEUTIC EXERCISES: CPT | Mod: CQ

## 2022-03-01 PROCEDURE — 94761 N-INVAS EAR/PLS OXIMETRY MLT: CPT

## 2022-03-01 PROCEDURE — 25000003 PHARM REV CODE 250: Performed by: EMERGENCY MEDICINE

## 2022-03-01 PROCEDURE — 25000003 PHARM REV CODE 250

## 2022-03-01 PROCEDURE — 97116 GAIT TRAINING THERAPY: CPT | Mod: CQ

## 2022-03-01 PROCEDURE — 97112 NEUROMUSCULAR REEDUCATION: CPT | Mod: CQ

## 2022-03-01 RX ORDER — DOXYLAMINE SUCCINATE 25 MG
TABLET ORAL 2 TIMES DAILY
Qty: 42.5 G | Refills: 0 | Status: SHIPPED | OUTPATIENT
Start: 2022-03-01 | End: 2022-04-12 | Stop reason: CLARIF

## 2022-03-01 RX ORDER — HYDRALAZINE HYDROCHLORIDE 25 MG/1
25 TABLET, FILM COATED ORAL 3 TIMES DAILY
Qty: 90 TABLET | Refills: 0 | Status: SHIPPED | OUTPATIENT
Start: 2022-03-01 | End: 2022-10-17

## 2022-03-01 RX ORDER — GABAPENTIN 400 MG/1
800 CAPSULE ORAL 2 TIMES DAILY
Qty: 60 CAPSULE | Refills: 0 | Status: SHIPPED | OUTPATIENT
Start: 2022-03-01 | End: 2022-10-19

## 2022-03-01 RX ADMIN — GUAIFENESIN 600 MG: 600 TABLET, EXTENDED RELEASE ORAL at 09:03

## 2022-03-01 RX ADMIN — MICONAZOLE NITRATE 2 %: 20 CREAM TOPICAL at 09:03

## 2022-03-01 RX ADMIN — VALSARTAN 160 MG: 160 TABLET, FILM COATED ORAL at 09:03

## 2022-03-01 RX ADMIN — PANTOPRAZOLE SODIUM 40 MG: 40 TABLET, DELAYED RELEASE ORAL at 09:03

## 2022-03-01 RX ADMIN — LORAZEPAM 0.5 MG: 0.5 TABLET ORAL at 09:03

## 2022-03-01 RX ADMIN — HYDROCODONE BITARTRATE AND ACETAMINOPHEN 1 TABLET: 10; 325 TABLET ORAL at 11:03

## 2022-03-01 RX ADMIN — CLOPIDOGREL BISULFATE 75 MG: 75 TABLET, FILM COATED ORAL at 09:03

## 2022-03-01 RX ADMIN — HYDRALAZINE HYDROCHLORIDE 25 MG: 25 TABLET, FILM COATED ORAL at 09:03

## 2022-03-01 RX ADMIN — GABAPENTIN 800 MG: 400 CAPSULE ORAL at 09:03

## 2022-03-01 NOTE — PLAN OF CARE
Problem: Adult Inpatient Plan of Care  Goal: Plan of Care Review  Outcome: Ongoing, Progressing  Goal: Optimal Comfort and Wellbeing  Outcome: Ongoing, Progressing  Intervention: Provide Person-Centered Care  Flowsheets (Taken 2/28/2022 1806)  Trust Relationship/Rapport:   care explained   choices provided   questions answered   thoughts/feelings acknowledged     Problem: Skin Injury Risk Increased  Goal: Skin Health and Integrity  Outcome: Ongoing, Progressing  Intervention: Optimize Skin Protection  Flowsheets (Taken 2/28/2022 1806)  Pressure Reduction Techniques: frequent weight shift encouraged  Head of Bed (HOB) Positioning: HOB at 30 degrees     Problem: Fall Injury Risk  Goal: Absence of Fall and Fall-Related Injury  Outcome: Ongoing, Progressing  Intervention: Promote Injury-Free Environment  Flowsheets (Taken 2/28/2022 1806)  Safety Promotion/Fall Prevention:   assistive device/personal item within reach   side rails raised x 2   instructed to call staff for mobility

## 2022-03-01 NOTE — DISCHARGE SUMMARY
[facility]  Hospital Medicine  Discharge Summary      Patient Name: Herlinda Valdovinos  MRN: [id number]  Patient Class: IP- Swing  Admission Date: 2/15/2022  Hospital Length of Stay: 14 days  Discharge Date and Time:  03/01/2022 1:05 PM  Attending Physician: Alis Grider MD   Discharging Provider: KEVAN Fang  Primary Care Provider: iVni Hernandez NP      HPI:   PT  IS A 57 YR OLD WF ADMITTED TO [facility] SWING BED FOR PT/OT/REHABILITATION FOR MUSCLE WEAKNESS AND MEDICAL MANAGEMENT. PT WAS ADMITTED FROM [facility] ED. PT HAD COMPLETED PT/OT/REHABILITATION OUTPATIENT 2 WEEKS AGO FOR L SIDED WEAKNESS DUE TO CVA; BUT HAS NOTED PERSISTENT MUSCLE WEAKNESS AND DEBILITY, AND WILL REQUIRE SWING BED FOR REHABILITATION.  PT WILL BE EVALUATED PER PT/OT AND A TREATMENT PLAN WILL BE INITIATED. THE PHARMACIST WILL REVIEW MEDICATIONS AND MAKE RECOMMENDATIONS. PT WILL SEE THE DIETICIAN  FOR NUTRITIONAL SUPPORT WITH WEIGHT 74.1 KG AND ALBUMIN OF 3.6. PT'S ABNORMAL ADMITTING LABS ARE: CMP: AST 68, CBC: K, UA: NEG. PT WILL HAVE WEEKLY LABS.    Past Medical History:   Diagnosis Date    Cerebral hemorrhage     Chronic anxiety     Dehydration     Depression     Diabetes mellitus     Dysphagia     Due to and following non-traumatic intracerebral hemorrhage    Hearing loss     History of CVA (cerebrovascular accident)     Hypertension     Insomnia     Intrapontine hemorrhage     Left hemiparesis     Peripheral neuropathy     Transient cerebral ischemia       chest X-ray     PT CODE STATUS IS FULL  PT COVID STATUS IS NEG  PT HAS HAD COVID VACCINE J & J  PT VTE PPX IS ASA/PLAVIX/TEDS/EARLY AMBULATION  56 yo WF admitted to [facility] swing bed for generalized muscle weakness. Patient with hx of CVA and left side residual weakness reports that she was discharged from PT/OT 2 weeks ago. States the generalized weakness has been progressively worsening since. She has been walking with a walker at  home, but reports she was unable to stand without assistance today. Work-up in ED with . WBC 5.90. She did complain of LLQ pain and burning on urination. UA negative and CT abdomen/pelvis negative for acute findings; reveals multiple nonobstructing renal calculi.       Full Code  J&J Covid-19 vaccine  VTE - OCTAVIA      * No surgery found *      Hospital Course:   02/23/2022:  Weekly swing bed round  Labs and vital signs stable.  Today, the patient complains of excoriation underneath the breast and along the buttocks.  Reports she has been scratching her left buttock when sleeping.  On exam, it appears that the brief may be causing some irritation throughout the hips and buttocks.  Will add nystatin cream to be used under breast and change brief type. Patient improving with PT/OT.  Ambulated 260 ft twice with a rolling walker and supervision.  Plan to continue therapy and anticipate discharge next week.    03/01/2022 Hospital day 14. Patient was admitted to the hospital for swing bed admission for PT/OT rehabilitation for generalized weakness and difficulty with ambulation.  Patient had acute CVA in April 2021. Patient received PT/OT rehabilitation, 2 weeks after completing the rehabilitation, patient's weakness progressed, causing the need for admission for therapies.  While here patient was found to be on Neurontin 800 mg 4-5 times daily.  Patient was tapered down to Neurontin 800 mg b.i.d..  Patient was also informed by pharmacy to not take temazepam and lorazepam together at night due to increased sedation and risk for falls.  Patient has done well with therapies.  She ambulated 260 ft with rolling walker with SBA to CGA.  Patient will continue to have outpatient PT/OT rehabilitation starting on March 7th at 10:15 a.m. here at [facility].  Patient does have history of general anxiety disorder.  This is managed well with daily Zoloft and lorazepam.  Patient currently denies any SI/HI.       Goals of Care  Treatment Preferences:  Code Status: Full Code      Consults:   Consults (From admission, onward)        Status Ordering Provider     Inpatient consult to Registered Dietitian/Nutritionist  Once        Provider:  (Not yet assigned)    Completed [facility]          * Generalized weakness   PT/OT/REHABILITATION  PT/OT TO EVALUATE AND INITIATE TREATMENT PLAN  CHALLENGES TO PT/OT INCLUDE IRVIN, PRIOR CVA WITH L SIDED WEAKNESS    03/01/2022.  Patient was admitted for generalized weakness and difficulty ambulating.  Patient worked with PT/OT therapies.  Patient's symptoms have improved, ambulating 260 ft with rolling walker.  Patient will get outpatient PT/OT rehabilitation [facility] at [facility].      DVT prophylaxis  PT IS AT RISK FOR VTE  PLAVIX/TEDS/EARLY AMBULATION    03/01/2022.  Patient is currently on Plavix daily.  No current signs of DVTs.  Condition stable.    Idiopathic peripheral neuropathy  PT HAS NEUROPATHY   GABAPENTIN IS EFFECTIVE    03/01/2022.  Patient has chronic neuropathy.  Patient is taking Neurontin 100 mg p.o. b.i.d., with good results.  Condition stable.      Insomnia  PT HAS INSOMNIA AND THIS IS CONTROLLED WITH MEDICATION.  LORAZEPAM HS   TEMAZEPAM HS PRN  AVOID HOSPITAL DELIRIUM  STRICT SCHEDULE    3/1/22 patient has history of chronic insomnia.  Patient's condition is stable.  Patient is currently taking Restoril nightly.    Hypertension  PT HAS INADEQUATE BP CONTROL ON MEDICATION.  CURRENT MEDS INCLUDE HYDRALAZINE 25 MG TID AND VALSARTAN 160 MG DAILY  WILL CONSIDER DOSAGE ADJUSTMENT    3/1/22 patient has history of hypertension.  Patient is currently taking hydralazine and valsartan.  Blood pressure remains well controlled at 131/87 this morning.  Hypertension is stable.      Type 2 diabetes mellitus with neurologic complication  PT HAS DM2  DIABETIC DIET      3/1/22-Pt is diet controlled, stable     Generalized anxiety disorder  PT HAS IRVIN  ZOLOFT 100 MG DAILY  LORAZEPAM 0.5 MG AM  AND AFTERNOON; 1 MG HS  REASSURANCE    3/1/22 Stable      Final Active Diagnoses:    Diagnosis Date Noted POA    PRINCIPAL PROBLEM:  Generalized weakness [R53.1] 02/15/2022 Yes    Rash [R21] 02/23/2022 Yes    DVT prophylaxis [Z29.9] 06/10/2021 Not Applicable    Hypertension [I10] 05/20/2021 Yes    Generalized anxiety disorder [F41.1] 05/20/2021 Yes    Insomnia [G47.00] 05/20/2021 Yes    Idiopathic peripheral neuropathy [G60.9] 05/20/2021 Yes    Type 2 diabetes mellitus with neurologic complication [E11.49] 05/20/2021 Yes      Problems Resolved During this Admission:       Discharged Condition: good    Disposition: Home or Self Care    Follow Up:   Follow-up Information     Vini Hernandez NP Follow up in 1 week(s).    Specialties: Gerontology, Family Medicine, Emergency Medicine  Why: HOME VISIT WITHIN 1 WEEK  Contact information:  [address] [place] [id number]  [phone]                       Patient Instructions:      Diet Cardiac     Notify your health care provider if you experience any of the following:  temperature >100.4     Notify your health care provider if you experience any of the following:  severe uncontrolled pain     Notify your health care provider if you experience any of the following:  redness, tenderness, or signs of infection (pain, swelling, redness, odor or green/yellow discharge around incision site)     Notify your health care provider if you experience any of the following:  severe persistent headache     Notify your health care provider if you experience any of the following:  increased confusion or weakness     No dressing needed     Activity as tolerated       Significant Diagnostic Studies: Labs: BMP: No results for input(s): GLU, NA, K, CL, CO2, BUN, CREATININE, CALCIUM, MG in the last 48 hours., CMP No results for input(s): NA, K, CL, CO2, GLU, BUN, CREATININE, CALCIUM, PROT, ALBUMIN, BILITOT, ALKPHOS, AST, ALT, ANIONGAP, ESTGFRAFRICA, EGFRNONAA in the last 48 hours. and  CBC No results for input(s): WBC, HGB, HCT, PLT in the last 48 hours.  Radiology: X-Ray: CXR: X-Ray Chest 1 View (CXR): No results found for this visit on 02/15/22.    Pending Diagnostic Studies:     None         Medications:  Reconciled Home Medications:      Medication List      CHANGE how you take these medications    gabapentin 400 MG capsule  Commonly known as: NEURONTIN  Take 2 capsules (800 mg total) by mouth 2 (two) times daily.  What changed: when to take this     * hydrALAZINE 25 MG tablet  Commonly known as: APRESOLINE  Take 1 tablet (25 mg total) by mouth every 12 (twelve) hours.  What changed: when to take this     * hydrALAZINE 25 MG tablet  Commonly known as: APRESOLINE  Take 1 tablet (25 mg total) by mouth 3 (three) times daily.  What changed: You were already taking a medication with the same name, and this prescription was added. Make sure you understand how and when to take each.     * miconazole NITRATE 2 % 2 % top powder  Commonly known as: MICOTIN  Apply topically 2 (two) times daily.  What changed: Another medication with the same name was added. Make sure you understand how and when to take each.     * miconazole 2 % cream  Commonly known as: MICOTIN  Apply topically 2 (two) times daily.  What changed: You were already taking a medication with the same name, and this prescription was added. Make sure you understand how and when to take each.     valsartan 320 MG tablet  Commonly known as: DIOVAN  Take 1 tablet (320 mg total) by mouth once daily.  What changed: how much to take         * This list has 4 medication(s) that are the same as other medications prescribed for you. Read the directions carefully, and ask your doctor or other care provider to review them with you.            CONTINUE taking these medications    atorvastatin 40 MG tablet  Commonly known as: LIPITOR  Take 40 mg by mouth every evening.     clopidogreL 75 mg tablet  Commonly known as: PLAVIX  Take 75 mg by mouth once  daily.     docusate sodium 100 MG capsule  Commonly known as: COLACE  Take 1 capsule (100 mg total) by mouth once daily.     HYDROcodone-acetaminophen  mg per tablet  Commonly known as: NORCO  Take 1 tablet by mouth every 8 (eight) hours as needed for Pain.     LORazepam 0.5 MG tablet  Commonly known as: ATIVAN  Take 1 am and afternoon and 2 hs     omeprazole 40 MG capsule  Commonly known as: PRILOSEC  Take 40 mg by mouth every evening.     sertraline 100 MG tablet  Commonly known as: ZOLOFT  Take 100 mg by mouth every evening.     temazepam 30 mg capsule  Commonly known as: RESTORIL  Take 30 mg by mouth every evening.        STOP taking these medications    bisacodyL 10 mg Supp  Commonly known as: DULCOLAX     citalopram 40 MG tablet  Commonly known as: CeleXA     hydroCHLOROthiazide 12.5 MG Tab  Commonly known as: HYDRODIURIL            Indwelling Lines/Drains at time of discharge:   Lines/Drains/Airways     None                 Time spent on the discharge of patient: 30 minutes         KEVAN Fang  Department of Hospital Medicine  [facility] - [facility] Unit

## 2022-03-01 NOTE — ASSESSMENT & PLAN NOTE
PT HAS IRVIN  ZOLOFT 100 MG DAILY  LORAZEPAM 0.5 MG AM AND AFTERNOON; 1 MG HS  REASSURANCE    3/1/22 Stable

## 2022-03-01 NOTE — ASSESSMENT & PLAN NOTE
PT/OT/REHABILITATION  PT/OT TO EVALUATE AND INITIATE TREATMENT PLAN  CHALLENGES TO PT/OT INCLUDE IRVIN, PRIOR CVA WITH L SIDED WEAKNESS    03/01/2022.  Patient was admitted for generalized weakness and difficulty ambulating.  Patient worked with PT/OT therapies.  Patient's symptoms have improved, ambulating 260 ft with rolling walker.  Patient will get outpatient PT/OT rehabilitation here at [facility].

## 2022-03-01 NOTE — PT/OT/SLP PROGRESS
Physical Therapy Treatment    Patient Name:  Herlinda Valdovinos   MRN:  [id number]    Recommendations:     Discharge Recommendations:  outpatient OT, outpatient PT   Discharge Equipment Recommendations: none   Barriers to discharge: None    Assessment:     Herlinda Valdovinos is a 57 y.o. female admitted with a medical diagnosis of Generalized weakness.  She presents with the following impairments/functional limitations:    weakness, impaired endurance, impaired self care skills, impaired functional mobilty, impaired balance, gait instability Patient agreeable to work with therapy today.    Rehab Prognosis: Good; patient would benefit from acute skilled PT services to address these deficits and reach maximum level of function.    Recent Surgery: * No surgery found *      Plan:     During this hospitalization, patient to be seen 5 x/week to address the identified rehab impairments via therapeutic activities, therapeutic exercises, neuromuscular re-education, gait training and progress toward the following goals:    · Plan of Care Expires:  03/16/22    Subjective     Chief Complaint: balance deficits  Patient/Family Comments/goals: return home with   Pain/Comfort:  · Pain Rating 1: 0/10  · Pain Rating Post-Intervention 1: 0/10      Objective:     Communicated with nurse prior to session.  Patient found supine with   upon PT entry to room.     General Precautions: Standard, fall   Orthopedic Precautions:    Braces: N/A  Respiratory Status: Room air     Functional Mobility:  · Bed Mobility:     · Supine to Sit: modified independence  · Transfers:     · Sit to Stand:  modified independence with rolling walker  · Gait: Patient ambulated 260 ft to therapy gym using RW and SBA. After tx, pt ambulated 260 ft using SC with CGA to ModA due to moments of LOB when pt gets distracted.  · Balance: Patient performed dynamic standing balance activities with side steps, high steps in place, and hitting balloon with racket back and  forth. Patient exhibited fair to fair + standing balance.      AM-PAC 6 CLICK MOBILITY  Turning over in bed (including adjusting bedclothes, sheets and blankets)?: 4  Sitting down on and standing up from a chair with arms (e.g., wheelchair, bedside commode, etc.): 4  Moving from lying on back to sitting on the side of the bed?: 4  Moving to and from a bed to a chair (including a wheelchair)?: 3  Need to walk in hospital room?: 3  Climbing 3-5 steps with a railing?: 2  Basic Mobility Total Score: 20       Therapeutic Activities and Exercises:  Nu Step x 6 mins and sit to stands    Patient left sitting edge of bed with call button in reach and  present..    GOALS:   Multidisciplinary Problems     Physical Therapy Goals        Problem: Physical Therapy Goal    Goal Priority Disciplines Outcome Goal Variances Interventions   Physical Therapy Goal     PT, PT/OT Ongoing, Progressing     Description: STG:  Patient will perform all bed mobility with CGA  Patient will perform sit to stand and SPT with CGA  Patient will ambulate 100 feet with RW assistive device with CGA assistance without LOB.     LTG  Patient will perform bed mobility with mod I.  Patient will perform sit to stand and SPT with supervision  Patient will ambuate 200 feet with RW assistive device with supervision assistance without LOB.   Patient will perform 15 min of LE exercise without rest break to increase LE strength to 4/5.                      Time Tracking:     PT Received On: 03/01/22  PT Start Time: 1030     PT Stop Time: 1112  PT Total Time (min): 42 min     Billable Minutes: Gait Training 17, Therapeutic Exercise 8 and Neuromuscular Re-education 17    Treatment Type: Treatment  PT/PTA: PTA     PTA Visit Number: 3     03/01/2022

## 2022-03-01 NOTE — NURSING
D/C instructions given to patient and discussed. Patient voices understanding.  assisting pt home.

## 2022-03-01 NOTE — ASSESSMENT & PLAN NOTE
PT HAS INSOMNIA AND THIS IS CONTROLLED WITH MEDICATION.  LORAZEPAM HS   TEMAZEPAM HS PRN  AVOID HOSPITAL DELIRIUM  STRICT SCHEDULE    3/1/22 patient has history of chronic insomnia.  Patient's condition is stable.  Patient is currently taking Restoril nightly.

## 2022-03-01 NOTE — PLAN OF CARE
[facility]  Discharge Final Note    Primary Care Provider: Vini Hernandez NP    Expected Discharge Date: 3/1/2022    Final Discharge Note (most recent)       Final Note - 03/01/22 1051          Final Note    Assessment Type Final Discharge Note (P)                      Important Message from Medicare  Important Message from Medicare regarding Discharge Appeal Rights: Given to patient/caregiver, Explained to patient/caregiver, Signed/date by patient/caregiver     Date IMM was signed: 02/28/22  Time IMM was signed: 1450    Pt will be D/C home later today. Pt has been set up with OP PT here at [facility]. Pt will not need any more DME.

## 2022-03-01 NOTE — ASSESSMENT & PLAN NOTE
PT IS AT RISK FOR VTE  PLAVIX/TEDS/EARLY AMBULATION    03/01/2022.  Patient is currently on Plavix daily.  No current signs of DVTs.  Condition stable.

## 2022-03-01 NOTE — PT/OT/SLP PROGRESS
Occupational Therapy   Treatment    Name: Herlinda Valdovinos  MRN: [id number]  Admitting Diagnosis:  Generalized weakness       Recommendations:     Discharge Recommendations: outpatient PT, outpatient OT  Discharge Equipment Recommendations:     Barriers to discharge:       Assessment:     Herlinda Valdovinos is a 57 y.o. female with a medical diagnosis of Generalized weakness.  She presents with significantly improved weakness and endurance resulting in planned d/c for today. Performance deficits affecting function are weakness, impaired endurance, impaired self care skills, impaired functional mobilty, gait instability, impaired balance, visual deficits, decreased coordination, decreased upper extremity function, decreased lower extremity function, pain.     Rehab Prognosis:  Good; patient would benefit from acute skilled OT services to address these deficits and reach maximum level of function.       Plan:     Patient to be seen 5 x/week to address the above listed problems via self-care/home management, therapeutic activities, therapeutic exercises, neuromuscular re-education  · Plan of Care Expires: 03/09/22  · Plan of Care Reviewed with: spouse, patient    Subjective     Pain/Comfort:  Pain Rating 1: 0/10    Objective:     Communicated with: Nurse prior to session.  Patient found supine upon OT entry to room.    General Precautions: Standard,     Orthopedic Precautions:    Braces:    Respiratory Status: Room air     Occupational Performance:     Bed Mobility:    · Patient completed Supine to Sit with modified independence  · Patient completed Sit to Supine with modified independence     Functional Mobility/Transfers:  · Patient completed Sit <> Stand Transfer with supervision  with  no assistive device and rolling walker   · Patient completed Bed <> Chair Transfer using Step Transfer technique with supervision with rolling walker  · Patient completed Chair <> Mat Step Transfer technique with supervision with  rolling walker  · Functional Mobility: Ambulated to therapy gym with RW with SBA, returned to room from therapy gym with cane with Juanis and LOB x 2.     Activities of Daily Living:    [facility] 6 Click ADL:      Treatment & Education:  Performed series of dynamic standing with B UE integrative coordination including standing edge reaching forward while maintaining safe stand/step balance, significantly less LOB noted requiring CGA-Juanis to correct. Performed further dynamic stand and weight shift while standing challenging unsupported balance with intermittent CGA to maintain/correct posture and balance. Performed quadruped on mat, progressing to tall kneeling, all requiring SBA with significant improvement noted in neuro performance on mat. Tolerated use of sci fit x 5 mins for bilateral integrative UE/LE training. Performed dynamic stand balance with R UE movement, hitting balloon with tennis racket, no LOB and SBA needed.     Patient left supine with call button in reachEducation:      GOALS:   Multidisciplinary Problems     Occupational Therapy Goals        Problem: Occupational Therapy Goal    Goal Priority Disciplines Outcome Interventions   Occupational Therapy Goal     OT, PT/OT Ongoing, Progressing    Description: Goals to be met by: 2 weeks     Patient will increase functional independence with ADLs by performing:    UE Dressing with Stand-by Assistance.  LE Dressing with Stand-by Assistance.  Toileting from bedside commode with Contact Guard Assistance for hygiene and clothing management.   Bathing from  edge of bed with Stand-by Assistance.  Toilet transfer to bedside commode with Stand-by Assistance.  Tolerate OOB seated activity x 30 mins with 2 or less rest breaks needed.     Goals to be met by: 4 weeks     Patient will increase functional independence with ADLs by performing:    UE Dressing with Set-up Assistance.  LE Dressing with Set-up Assistance.  Toileting from toilet with Supervision for hygiene and  clothing management.   Bathing from  shower chair/bench with Set-up Assistance.  Toilet transfer to toilet with Supervision.  Tolerate OOB seated activity x 1 hour mins with 2 or less rest breaks needed.                    Time Tracking:     OT Date of Treatment: 03/01/22  OT Start Time: 1028  OT Stop Time: 1115  OT Total Time (min): 47 min    Billable Minutes:Neuromuscular Re-education 47     MARCUS Levy, OTR/L    OT/HEMAL: OT          3/1/2022

## 2022-03-01 NOTE — PLAN OF CARE
Problem: Adult Inpatient Plan of Care  Goal: Plan of Care Review  Outcome: Adequate for Care Transition  Goal: Patient-Specific Goal (Individualized)  Outcome: Adequate for Care Transition  Goal: Absence of Hospital-Acquired Illness or Injury  Outcome: Adequate for Care Transition  Goal: Optimal Comfort and Wellbeing  Outcome: Adequate for Care Transition  Goal: Readiness for Transition of Care  Outcome: Adequate for Care Transition     Problem: Fall Injury Risk  Goal: Absence of Fall and Fall-Related Injury  Outcome: Adequate for Care Transition     Problem: Fall Injury Risk  Goal: Absence of Fall and Fall-Related Injury  Outcome: Adequate for Care Transition     Problem: Skin Injury Risk Increased  Goal: Skin Health and Integrity  Outcome: Adequate for Care Transition      [Negative] : Heme/Lymph

## 2022-03-01 NOTE — DISCHARGE INSTRUCTIONS
FOLLOW UP WITH [facility]SH[facility] COSTELLO WITH IN 1 WEEK HOME VISIT  INCREASE REST AND FLUIDS   PT/OT OUTPATIENT MARCH 7 @ 1015 AM  FALL PRECAUTIONS   Male

## 2022-03-07 ENCOUNTER — CLINICAL SUPPORT (OUTPATIENT)
Dept: REHABILITATION | Facility: HOSPITAL | Age: 57
End: 2022-03-07
Payer: MEDICARE

## 2022-03-07 DIAGNOSIS — Z74.09 IMPAIRED MOBILITY: ICD-10-CM

## 2022-03-07 DIAGNOSIS — R27.8 DECREASED COORDINATION: ICD-10-CM

## 2022-03-07 DIAGNOSIS — R53.1 WEAKNESS: ICD-10-CM

## 2022-03-07 DIAGNOSIS — R29.898 LEFT ARM WEAKNESS: ICD-10-CM

## 2022-03-07 DIAGNOSIS — R53.1 WEAKNESS: Primary | ICD-10-CM

## 2022-03-07 DIAGNOSIS — M79.602 PAIN IN LEFT ARM: ICD-10-CM

## 2022-03-07 DIAGNOSIS — G45.8 ACUTE CEREBROVASCULAR INSUFFICIENCY TRANSIENT FOCAL NEUROLOGIC DEFICIT: Primary | ICD-10-CM

## 2022-03-07 PROCEDURE — 97165 OT EVAL LOW COMPLEX 30 MIN: CPT

## 2022-03-07 PROCEDURE — 97162 PT EVAL MOD COMPLEX 30 MIN: CPT

## 2022-03-07 NOTE — PLAN OF CARE
[facility] OUTPATIENT REHABILITATION  Physical Therapy Initial Evaluation       Name: Herlinda Valdovinos  Clinic Number: [id number]    Therapy Diagnosis:   Encounter Diagnoses   Name Primary?    Weakness     Acute cerebrovascular insufficiency transient focal neurologic deficit Yes     Physician: Vini Hernandez NP    Physician Orders: PT Eval and Treat  Medical Diagnosis from Referral: CVA  Evaluation Date: 3/7/2022  Progress Report Due 4/7/22  Plan of Care Expiration: 3/7/2022 to 4/15/22  Updated Plan of Care Due: 4/15/22  Visit # / Visits authorized: 1/12    Time In: 1025  Time Out: 1100  Total Billable Time: 35 minutes    Precautions: Standard and Fall    Subjective     Date of onset: 4/27/21    History of current condition - Herlinda reports: she had a CVA in 4/27/21, and had swing bed x 56 days.       Medical History:   Past Medical History:   Diagnosis Date    Cerebral hemorrhage     Chronic anxiety     Dehydration     Depression     Diabetes mellitus     Dysphagia     Due to and following non-traumatic intracerebral hemorrhage    Hearing loss     History of CVA (cerebrovascular accident)     Hypertension     Insomnia     Intrapontine hemorrhage     Left hemiparesis     Peripheral neuropathy     Transient cerebral ischemia        Surgical History:   Herlinda Valdovinos  has a past surgical history that includes Appendectomy; Joint replacement; Cholecystectomy; Hysterectomy; percutaneious insertion of ureteric stent; and Lithotripsy.    Medications:   Herlinda has a current medication list which includes the following prescription(s): atorvastatin, clopidogrel, docusate sodium, gabapentin, hydralazine, hydralazine, hydrocodone-acetaminophen, lorazepam, miconazole, miconazole nitrate 2 %, omeprazole, sertraline, temazepam, valsartan, [DISCONTINUED] citalopram, and [DISCONTINUED] hydrochlorothiazide.    Allergies:   Review of patient's allergies indicates:   Allergen Reactions    Codeine      Compazine [prochlorperazine]     Lamisil [terbinafine]         Imaging: MRI studies:     Prior Therapy: swing bed x 56 days then home health until about 6 weeks ago.  She then had swing bed therapy x 2 weeks and this ended 1 week ago.   Prior Level of Function: independent  Current Level of Function: amb with RW and QC with CGA  History of Falls: yes with most recent about a month ago. Several over the past year.   Social History: Patient; lives with their spouse; House  Steps/Stairs: she lives on one story to avoid stairs.  Ramp at entrance.   Occupation: retired   Driving: No, but was prior to onset of condition  Durable Medical Equipment: rollator, RW, QC, BSC, shower chair  Dominant Extremity: right  Affected Extremity: left    Pain:  Current 0/10, worst 10/10, best 0/10   Location: bilateral shoulders, left hand, left knee, and head   Description: Aching  Aggravating Factors: unpredictable  Easing Factors: pain medication and rest    Pts goals: be able to ambulate with SC.     Objective     Range of Motion/Strength:     See OT eval for UE ROM and Strength    LE:  Hip and ankle WFL in all planes  Knee Right Left Pain/Dysfunction with Movement   AROM/PROM      flexion  135  120    extension  -6  -3      L/E MMT Right Left Pain/Dysfunction with Movement   Hip Flexion 4+/5 4-/5    Hip Abduction 4+/5 3+/5    Hip Adduction 5/5 4/5    Knee Flexion 4+/5 3+/5    Knee Extension 4+/5 3+/5    Ankle DF 4+/5 3/5    Ankle PF 4+/5 3+/5      Posture: forward head posture and decreased lordosis   Palpation: insignficant  Sensation: WFL  DTRs: NT    Flexibility: WFL    Gait Analysis: patient ambulated 50 feet with WBQC with CGA and one LOB which she recovered with CGA.  She also demonstrated discontinuity of steps during turns    TU.37 seconds     Balance: Tinetti     Transfers: SBA sit to stand;  Supervision supine to sit    Other:     CMS Impairment/Limitation/Restriction for FOTO Intake Survey    Therapist  reviewed FOTO scores for Herlinda Valdovinos on 3/7/2022.   FOTO documents entered into [employer] - see Media section.    Functional Score: 41%  Category: Mobility     TREATMENT     Home Exercises and Patient Education Provided    Education provided: Eval results, HEP, and Plan of care     Written Home Exercises Provided: yes.  Exercises were reviewed and Herlinda was able to demonstrate them prior to the end of the session.  Herlinda demonstrated good  understanding of the education provided.     See EMR under below for exercises provided 3/7/2022.    Assessment     Herlinda is a 57 y.o. female referred to outpatient physical therapy with a medical diagnosis of CVA. Pt presents to PT with left side weakness difficulty with balance and gait and need for skilled PT.    Pt prognosis is Good.   Pt will benefit from skilled outpatient Physical Therapy to address the deficits stated above and in the chart below, provide pt/family education, and to maximize pt's level of independence.     Plan of care discussed with patient: Yes  Pt's spiritual, cultural and educational needs considered and pt agreeable to plan of care and goals as stated below:     Anticipated Barriers for therapy: none    Decision Making/ Complexity Score: moderate    Goals:    Short Term Goals:  1. Patient will demonstrate independence with home exercise program to ensure carryover of treatment.  2. Patient will perform supine to/from sit with independence to improve independence and safety with bed mobility.  3. Patient will perform sit to/from stand with modified independence to improve independence and safety with transfers.  4. Patient will improve left lower extremity strength to 4/5 to improve independence and safety with bed mobility, transfers, and gait.  5. Patient will improve Tinetti Balance + Gait score to 12/28 to improve dynamic standing balance and lower fall risk.   6. Patient will ambulate 200 feet with a large-base quad cane with modified independence  to improve independence and safety with gait.     Long Term Goals:  1. Patient will perform sit to/from stand with independence to improve independence and safety with transfers.  2. Patient will improve left lower extremity strength to 4+/5 to improve independence and safety with bed mobility, transfers, and gait.  3. Patient will improve Tinetti Balance + Gait score to 16/28 to improve dynamic standing balance and lower fall risk.   4. Patient will ambulate 300 feet with a single point cane with modified independence including up/down curbs and ramps to improve independence and safety with community ambulation.    Plan     Plan of care Certification: 3/7/2022 to 4/15/22.    Outpatient Physical Therapy 2 times weekly for 6 weeks to include the following interventions: Gait Training, Manual Therapy, Moist Heat/ Ice, Neuromuscular Re-ed, Patient Education, Therapeutic Activities and Therapeutic Exercise.     OSVALDO MEMBRENO, PT, ATP  3/7/2022    I CERTIFY THE NEED FOR THESE SERVICES FURNISHED UNDER THIS PLAN OF TREATMENT AND WHILE UNDER MY CARE.    Physician's comments:      Physician's Signature: _________________________________________  Date: __________________________

## 2022-03-07 NOTE — PLAN OF CARE
"Name: Herlinda Valdovinos  MRN: [id number]   Physician: Vini Hernandez NP     Diagnosis:   Encounter Diagnoses   Name Primary?    Weakness     Impaired mobility     Left arm weakness     Decreased coordination     Pain in left arm         Onset date: April 2021    Date of service: 3/7/2022  Start time: 1100  End time: 1146    Orders: Eval and Treat    Subjective:  Patient goals: "Get back to normal. I want to go back to work if I can."   Chief Complaint: Difficulty with ADLs, bilateral use of UEs, difficulty with dexterity and coordination of L UE.      Past Medical History:   Diagnosis Date    Cerebral hemorrhage     Chronic anxiety     Dehydration     Depression     Diabetes mellitus     Dysphagia     Due to and following non-traumatic intracerebral hemorrhage    Hearing loss     History of CVA (cerebrovascular accident)     Hypertension     Insomnia     Intrapontine hemorrhage     Left hemiparesis     Peripheral neuropathy     Transient cerebral ischemia       Current Outpatient Medications   Medication Sig    atorvastatin (LIPITOR) 40 MG tablet Take 40 mg by mouth every evening.    clopidogreL (PLAVIX) 75 mg tablet Take 75 mg by mouth once daily.    docusate sodium (COLACE) 100 MG capsule Take 1 capsule (100 mg total) by mouth once daily.    gabapentin (NEURONTIN) 400 MG capsule Take 2 capsules (800 mg total) by mouth 2 (two) times daily.    hydrALAZINE (APRESOLINE) 25 MG tablet Take 1 tablet (25 mg total) by mouth every 12 (twelve) hours. (Patient taking differently: Take 25 mg by mouth 3 (three) times daily.)    hydrALAZINE (APRESOLINE) 25 MG tablet Take 1 tablet (25 mg total) by mouth 3 (three) times daily.    HYDROcodone-acetaminophen (NORCO)  mg per tablet Take 1 tablet by mouth every 8 (eight) hours as needed for Pain.    LORazepam (ATIVAN) 0.5 MG tablet Take 1 am and afternoon and 2 hs    miconazole (MICOTIN) 2 % cream Apply topically 2 (two) times daily.    miconazole " NITRATE 2 % (MICOTIN) 2 % top powder Apply topically 2 (two) times daily.    omeprazole (PRILOSEC) 40 MG capsule Take 40 mg by mouth every evening.    sertraline (ZOLOFT) 100 MG tablet Take 100 mg by mouth every evening.    temazepam (RESTORIL) 30 mg capsule Take 30 mg by mouth every evening.    valsartan (DIOVAN) 320 MG tablet Take 1 tablet (320 mg total) by mouth once daily. (Patient taking differently: Take 160 mg by mouth once daily.)     No current facility-administered medications for this visit.     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     DME: Manual wheelchair, Walker, Small based Quad cane, Rollator, BSC and Shower chair    Home access: Ramp    Review of patient's allergies indicates:   Allergen Reactions    Codeine     Compazine [prochlorperazine]     Lamisil [terbinafine]        Past treatment includes: OT/PT Swingbed 2/15/22-3/1/22    Precautions:  Pain: 1/10 at rest. 9/10 with movement. Pain established using the Numbers pain scale.   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  Driving status: Unable    Objective  Cognitive Exam  Oriented: Person, Place, Time and Situation  Behaviors: Follows multistep  commands  Follows Commands/attention: Follows multistep  commands  Communication: clear/fluent  Memory: No Deficits noted  Coping skills/emotional control: Appropriate to situation    Visual/perceptual:  Tracking: intact  Saccades: intact  Acuity: impaired  R/L discrimination: intact  Visual field: impaired  Motor Planning Praxis: impaired  Comments:     Physical Exam:    Joint Evaluation AROM     Left Right   Shoulder flex 0-180 95* WFL   Shoulder Abd 0-180 65* WFL   Shoulder ER 0-90 Unable d/t pain WFL   Shoulder IR 0-90 35* WFL   Shoulder Extension 0-80 60* WFL   Shoulder  Horizontal adduction 0-90 65* WFL   Elbow flex/ext 0-150 WFL WFL   Wrist flex 0-80 WFL WFL   Wrist ext 0-70 WFL WFL   Supination 0-80 WFL WFL   Pronation 0-80 WFL WFL   UD WFL WFL   RD WFL WFL     Fist: Achieves full composite flexion L UE    Comments:    Strength      Left Right   Shoulder flex F- G   Shoulder abd F- G   Shoulder ER P- G   Shoulder IR F- G   Shoulder Extension F- G   Shoulder Horizontal adduction F- G   Elbow flex F G   Elbow ext F G   Wrist flex F G   Wrist ext F G   Supination F G   Pronation F G   UD F G   RD F G        Strength: R 31# L 5#    Lateral Pinch strength: R 13# L 7#  3 point pinch strength: R 11# L 4#  Tip pinch strength: R 6# L 3#    Fine motor coordination: R= Good L= Fair-    Gross motor coordination: R= Good L= Fair-    Purdue Pegboard Test:  R= 11 pegs compared to 15.6 normative value based on pt's age and gender  L= 0 pegs compared to 14.74 normative value based on pt's age and gender  Both R & L= 8 compared to 12.06 normative value based on pt's age and gender  R+L+Both= 19 compared to 42.5 normative value based on pt's age and gender    Balance:   Static Sit Good  Dynamic sit Fair  Static Stand Fair  Dynamic stand Fair-    Functional Status:    PLOF- Prior to CVA, pt was Independent, working full time as RN.     Current- Pt lives with , who assists with ADLs PRN, with most difficulty noted in FMC including opening small packages, buttoning, zipping, clasping. Pt has significant difficulty donning bra, buttoning shirt/pants, zipping jackets/pants and performing hair and make up tasks.       Functional Mobility:  Bed mobility: Mod I  Roll to left: Mod I  Roll to right: Mod I  Supine to sit: Mod I  Sit to supine: Mod I  Transfers to bed: Min A  Transfers to toilet: Min A  Car transfers: Min A  Wheelchair mobility: Min A    ADL's:  Feeding: Mod I  Grooming: Min A  Hygiene: Min A  UB Dressing: Min A  LB Dressing: Min A  Toileting: Min A  Bathing: Min  A    IADL's:  Homecare: Max A  Cooking: Max A  Laundry: Max A  Yard work: Max A  Use of telephone: Mod I  Money management: Mod I  Medication management: Mod I  Comments:     TODAY'S TREATMENT    1. Herlinda performed and was provided with L hand exercises to perform daily to improve  strength and dexterity. Exercises were reviewed and Herlinda was able to demonstrate them prior to the end of the session.   2. Pt was provided educational information, including OT POC.     ASSESSMENT:  OT diagnosis: L hemiparesis, L arm pain and Decreased fine motor coordination    Assessment  Herlinda Valdovinos is a 57 y.o. female with a medical diagnosis of   Encounter Diagnoses   Name Primary?    Weakness     Impaired mobility     Left arm weakness     Decreased coordination     Pain in left arm     referred to occupational therapy for L UE weakness. She presents with L hemiparesis following CVA resulting in significant difficulty with FMC, GMC of L UE, affecting level of independence. Pt requires assist from  for most aspects of self care, mobility, and t/fs. 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    Herlinda can benefit from outpatient Occupational therapy and a home program to address the stated goals to improve impairments and functional limitations. Treatment will be directed at improving the following impairments. Rehab potential is good.    PROBLEM LIST/IMPAIRMENTS:   pain, decreased range of motion, decreased muscle strength, impaired function and decreased work ability    LTG GOALS:  Time frame: 6 weeks  Increase ROM/Strength to perform ADL's and functional activities including successfully playing piano with no pain, UB dressing will improve to Mod I, LB dressing will improve to Mod I, Grooming will improve to  Mod I, Bathing will improve to  Mod I and Simple home care will improve to  Mod I    STG Goals:  Time  frame: 3 weeks  Improve L UE FMC to fair+, Improve L UE  strength by 1-3#, Improve L UE shoulder AROM by 5 degrees, and Improve Purdue Pegboard L UE test score by 2 points.      PLAN:    Patient/caregiver understands and agrees with plan of care.    Outpatient occupational therapy 2  times weekly for 6 weeks  to include: pt ed, hep, therapeutic exercises, therapeutic activity, ADLs,  neuromuscular re-education, joint mobilizations, modalities prn.    MARCUS Levy, OTR/L  3/7/22       I certify the need for these services furnished under this plan of treatment and while under my care.____________________________________ Physician/Referring _______________________Practitioner Date of Signature

## 2022-03-14 ENCOUNTER — CLINICAL SUPPORT (OUTPATIENT)
Dept: REHABILITATION | Facility: HOSPITAL | Age: 57
End: 2022-03-14
Payer: MEDICARE

## 2022-03-14 DIAGNOSIS — R29.898 LEFT ARM WEAKNESS: Primary | ICD-10-CM

## 2022-03-14 DIAGNOSIS — R27.8 DECREASED COORDINATION: ICD-10-CM

## 2022-03-14 DIAGNOSIS — M79.602 PAIN IN LEFT ARM: ICD-10-CM

## 2022-03-14 DIAGNOSIS — G45.8 ACUTE CEREBROVASCULAR INSUFFICIENCY TRANSIENT FOCAL NEUROLOGIC DEFICIT: Primary | ICD-10-CM

## 2022-03-14 PROCEDURE — 97110 THERAPEUTIC EXERCISES: CPT

## 2022-03-14 PROCEDURE — 97110 THERAPEUTIC EXERCISES: CPT | Mod: CQ

## 2022-03-14 PROCEDURE — 97112 NEUROMUSCULAR REEDUCATION: CPT | Mod: CQ

## 2022-03-14 PROCEDURE — 97112 NEUROMUSCULAR REEDUCATION: CPT

## 2022-03-14 NOTE — PROGRESS NOTES
"  Physical Therapy Treatment Note     [id number]    Therapy Diagnosis: No diagnosis found.  Physician: Vini Hernandez NP    Visit Date: 3/14/2022    Physician Orders: PT Eval and Treat  Medical Diagnosis from Referral: CVA  Evaluation Date: 3/7/2022  Progress Report Due 4/7/22  Plan of Care Expiration: 3/7/2022 to 4/15/22  Updated Plan of Care Due: 4/15/22  Visit # / Visits authorized: 2/12  PTA Visit #: 1    Time In: 1100  Time Out: 1143  Total Billable Time: 43 minutes    Precautions: Standard and Fall    Subjective     Pt reports: no new complaints this visit.  She was compliant with home exercise program.  Response to previous treatment: none yet  Functional change: ongoing    Pain: 0/10  Location: bilateral shoulders, left hand, left knee, and head      Objective     Herlinda received therapeutic exercises to develop strength and core stabilization for 20 minutes including:  Nu Step x 7 min  Glute Sets 2 x 10  Supine hip abduction with red band 2 x 10  Supine marching with red band 2 x 10  SLR 2 x 10  Bridging 2 x 10  Chair squats x 10    Herlinda participated in neuromuscular re-education activities to improve: Balance for 23 minutes. The following activities were included:  Midline anterior cone touches 2 x 10 each LE  Side steps in // bars x 3 laps  Modified tandem 2 x 30"   Narrow stance w/ eyes close 2 x 20"  Stepping over object with focus on heel strike x 10 each LE  A-P tandem walking in // bars x 3 laps    Herlinda participated in gait training to improve functional mobility and safety for 0  minutes, including:  Not this visit    Home Exercises Provided and Patient Education Provided     Education provided: HEP provided on eval    Written Home Exercises Provided: Patient instructed to cont prior HEP.  Exercises were reviewed and Herlinda was able to demonstrate them prior to the end of the session.  Herlinda demonstrated good  understanding of the education provided.     See EMR " under evalution for exercises provided 3/7/2022.    Assessment     Patient able to perform all exercises with no pain. Patient stated she was very tired but felt good after tx.    Herlinda Is progressing well towards her goals.   Pt prognosis is Good.     Pt will continue to benefit from skilled outpatient physical therapy to address the deficits listed in the problem list box on initial evaluation, provide pt/family education and to maximize pt's level of independence in the home and community environment.     Pt's spiritual, cultural and educational needs considered and pt agreeable to plan of care and goals.     Anticipated barriers to physical therapy: none    Goals:     Short Term Goals:  1. Patient will demonstrate independence with home exercise program to ensure carryover of treatment.  2. Patient will perform supine to/from sit with independence to improve independence and safety with bed mobility.  3. Patient will perform sit to/from stand with modified independence to improve independence and safety with transfers.  4. Patient will improve left lower extremity strength to 4/5 to improve independence and safety with bed mobility, transfers, and gait.  5. Patient will improve Tinetti Balance + Gait score to 12/28 to improve dynamic standing balance and lower fall risk.   6. Patient will ambulate 200 feet with a large-base quad cane with modified independence to improve independence and safety with gait.      Long Term Goals:  1. Patient will perform sit to/from stand with independence to improve independence and safety with transfers.  2. Patient will improve left lower extremity strength to 4+/5 to improve independence and safety with bed mobility, transfers, and gait.  3. Patient will improve Tinetti Balance + Gait score to 16/28 to improve dynamic standing balance and lower fall risk.   4. Patient will ambulate 300 feet with a single point cane with modified independence including up/down curbs and ramps to  improve independence and safety with community ambulation.    Plan     Will continue with POC as appropriate.    Srinivasa Redmond, NICHOLAS  3/14/2022

## 2022-03-14 NOTE — PROGRESS NOTES
"[id number]   Date of Note: 03/14/2022   Referring Physician:  Vini Hernandez NP  Diagnosis:    Encounter Diagnoses   Name Primary?    Left arm weakness Yes    Decreased coordination     Pain in left arm         Start Time: 1015  End Time: 1100  Total Time: 45 min  Visit #: 2/17      Subjective:   Pt reports, "I'm feeling ok, not hurting really. I've fallen twice since I was here last week." Pt reports falling in bedroom and in closet, uncertain of what caused the fall. Pt reports not tripping or loosing balance, just "falling back for some reason." After speaking with pt at length, pt feels weakness could be due to lack of nutrition. Pt reports little hunger and "hates to ask" for help for meals. Provided extensive education for importance of nutritional intake for energy expenditure during therapy sessions as well as energy needed for safe mobility and ADL performance in home environment. Pt verbalized understanding. OTR provided pt with examples of easy meals and snacks including easy meals/snacks for pt to retrieve/prepare with little assistance needed.       Pain: 0 out of 10     Objective:   Patient seen by OT this session. Treatment  consist of the following:  Therapeutic exercises and Neuromuscular re-education    Treatment: Therapeutic exercises x 10 min and Neuromuscular re-education x 35 min    Therapeutic Exercise:  - Performed 3 sets x 10 reps chest press, horizontal abd/add, elbow flex/ext with 3# dowel steffanie. Rest breaks needed. No pain reported.     Neuro Red-education:  - Performed trunk strengthening and mobility training on mat, due to difficulty with bed mobility and performing various self care tasks requiring trunk strength and mobility. Supine-sit on mat 2 sets x 10 reps. Lateral trunk flexion to weight through each elbow, then return to sitting, 2 sets x 10 reps. Seated forward flexion from posterior lean position 2 sets x 10 reps tolerating fair with " trunk weakness noted. Performed L UE functional reach over head to cabinet to retrieve then replace cones x 5, 2 sets while standing requiring CGA-SBA. Performed wall slides in standing, 1 set x 10 reps with L UE, tolerating with no c/o pain. Improved ROM and coordination noted.      Assessment:  Pt will continue to benefit from skilled OT intervention.    Patient continues to demonstrate limitation  with  Joint mobility, Stiffness, Decreased fine motor coordination, Decreased gross motor coordination, Decreased functional use and Decreased strength. Tolerated tx well. Rest breaks needed due to fatigue. Discussed nutritional importance following pt admitting her caloric and nutritional intake is poor. Provided extensive education on importance of nutritional intake for energy expenditure during therapy sessions as well as energy needed for safe mobility and ADL performance in home environment to prevent falls and injuries. Pt verbalized understanding. OTR provided pt with examples of easy meals and snacks including easy meals/snacks for pt to retrieve/prepare with little assistance needed. Pt verbalized understanding and agreed to improve nutritional performance. Educated in HEP of L UE strengthening and functional use with pt demonstrating good understanding.         New/Revised Goals: Continue POC  1.   2.   3.   4.        Plan:  Continue 2x week x 6 weeks  during the certification period from  3/7/22 to 4/25/22  in pursuit of  OT goals.      Kiana Rasmussen, MARCUS, OTR/L  3/14/2022

## 2022-03-18 ENCOUNTER — CLINICAL SUPPORT (OUTPATIENT)
Dept: REHABILITATION | Facility: HOSPITAL | Age: 57
End: 2022-03-18
Payer: MEDICARE

## 2022-03-18 DIAGNOSIS — R29.898 LEFT ARM WEAKNESS: Primary | ICD-10-CM

## 2022-03-18 DIAGNOSIS — R27.8 DECREASED COORDINATION: ICD-10-CM

## 2022-03-18 DIAGNOSIS — M79.602 PAIN IN LEFT ARM: ICD-10-CM

## 2022-03-18 DIAGNOSIS — G45.8 ACUTE CEREBROVASCULAR INSUFFICIENCY TRANSIENT FOCAL NEUROLOGIC DEFICIT: Primary | ICD-10-CM

## 2022-03-18 PROCEDURE — 97112 NEUROMUSCULAR REEDUCATION: CPT | Mod: CQ

## 2022-03-18 PROCEDURE — 97110 THERAPEUTIC EXERCISES: CPT | Mod: CQ

## 2022-03-18 PROCEDURE — 97112 NEUROMUSCULAR REEDUCATION: CPT

## 2022-03-18 NOTE — PROGRESS NOTES
"  Physical Therapy Treatment Note     [id number]    Therapy Diagnosis: No diagnosis found.  Physician: Vini Hernandez NP    Visit Date: 3/18/2022    Physician Orders: PT Eval and Treat  Medical Diagnosis from Referral: CVA  Evaluation Date: 3/7/2022  Progress Report Due 4/7/22  Plan of Care Expiration: 3/7/2022 to 4/15/22  Updated Plan of Care Due: 4/15/22  Visit # / Visits authorized: 3/12  PTA Visit #: 2    Time In: 1107  Time Out: 1146  Total Billable Time: 39 minutes    Precautions: Standard and Fall    Subjective     Pt reports: no new complaints this visit.  She was compliant with home exercise program.  Response to previous treatment: none yet  Functional change: ongoing    Pain: 0/10  Location: bilateral shoulders, left hand, left knee, and head      Objective     Herlinda received therapeutic exercises to develop strength and core stabilization for 16 minutes including:  Nu Step x 7 min  Glute Sets 2 x 10  Supine hip abduction with red band 2 x 10  Supine marching with red band 2 x 10  SLR 2 x 10  Bridging 2 x 10  Chair squats x 10    Herlinda participated in neuromuscular re-education activities to improve: Balance for 23 minutes. The following activities were included:  Midline anterior cone touches 2 x 10 each LE  Side steps in // bars x 3 laps  Modified tandem 2 x 30"   Narrow stance w/ eyes close 2 x 20"  Stepping over object with focus on heel strike x 10 each LE  A-P tandem walking in // bars x 3 laps    Herlinda participated in gait training to improve functional mobility and safety for 0  minutes, including:  Not this visit    Home Exercises Provided and Patient Education Provided     Education provided: HEP provided on eval    Written Home Exercises Provided: Patient instructed to cont prior HEP.  Exercises were reviewed and Herlinda was able to demonstrate them prior to the end of the session.  Herlinda demonstrated good  understanding of the education provided.     See EMR " under evalution for exercises provided 3/7/2022.    Assessment     Patient able to perform all exercises with no pain. Patient stated she was very tired but felt good after tx.    Herlinda Is progressing well towards her goals.   Pt prognosis is Good.     Pt will continue to benefit from skilled outpatient physical therapy to address the deficits listed in the problem list box on initial evaluation, provide pt/family education and to maximize pt's level of independence in the home and community environment.     Pt's spiritual, cultural and educational needs considered and pt agreeable to plan of care and goals.     Anticipated barriers to physical therapy: none    Goals:     Short Term Goals:  1. Patient will demonstrate independence with home exercise program to ensure carryover of treatment.  2. Patient will perform supine to/from sit with independence to improve independence and safety with bed mobility.  3. Patient will perform sit to/from stand with modified independence to improve independence and safety with transfers.  4. Patient will improve left lower extremity strength to 4/5 to improve independence and safety with bed mobility, transfers, and gait.  5. Patient will improve Tinetti Balance + Gait score to 12/28 to improve dynamic standing balance and lower fall risk.   6. Patient will ambulate 200 feet with a large-base quad cane with modified independence to improve independence and safety with gait.      Long Term Goals:  1. Patient will perform sit to/from stand with independence to improve independence and safety with transfers.  2. Patient will improve left lower extremity strength to 4+/5 to improve independence and safety with bed mobility, transfers, and gait.  3. Patient will improve Tinetti Balance + Gait score to 16/28 to improve dynamic standing balance and lower fall risk.   4. Patient will ambulate 300 feet with a single point cane with modified independence including up/down curbs and ramps to  improve independence and safety with community ambulation.    Plan     Will continue with POC as appropriate.    Srinivasa Redmond, NICHOLAS  3/18/2022

## 2022-03-18 NOTE — PROGRESS NOTES
"[id number]   Date of Note: 03/18/2022   Referring Physician:  Vini Hernandez NP  Diagnosis:    Encounter Diagnoses   Name Primary?    Left arm weakness Yes    Decreased coordination     Pain in left arm         Start Time: 1030  End Time: 1103  Total Time: 33 min  Visit #: 3/17      Subjective:   Pt reports, "I'm feeling good." No further reports of falls at this time.     Pain: 0 out of 10     Objective:   Patient seen by OT this session. Treatment  consist of the following:  Neuro Re-education     Treatment: Neuromuscular re-education x 33 min    Neuro Red-education:  - Performed L UE FMC with use of green theraputty to locate small items and remove from putty. Successfully performed finding 15/15 items.   - Performed L UE Bilateral integration, challening pt in threading task with L hand threading string through holes on small octagon-shaped board.   - Performed L UE hand eye coordination, grasp/release and dexterity task challenging ability to retrieve medium sized pegs from box and place in holes on a board located on table top. Performed with 50% accuracy due to difficulty with L UE coordination.     Assessment:  Pt will continue to benefit from skilled OT intervention.    Patient continues to demonstrate limitation  with  Joint mobility, Stiffness, Decreased fine motor coordination, Decreased gross motor coordination, Decreased functional use and Decreased strength. Tolerated tx well. Rest breaks needed due to fatigue. Tolerated tx well with improvement noted in L UE coordination. Educated in HEP of L UE strengthening and functional use with pt demonstrating good understanding.         New/Revised Goals: Continue POC  1.   2.   3.   4.        Plan:  Continue 2x week x 6 weeks  during the certification period from  3/7/22 to 4/25/22  in pursuit of  OT goals.      MARCUS Levy, OTR/L  3/18/2022      "

## 2022-03-21 ENCOUNTER — CLINICAL SUPPORT (OUTPATIENT)
Dept: REHABILITATION | Facility: HOSPITAL | Age: 57
End: 2022-03-21
Payer: MEDICARE

## 2022-03-21 DIAGNOSIS — M62.81 MUSCLE WEAKNESS: Primary | ICD-10-CM

## 2022-03-21 DIAGNOSIS — R29.898 LEFT ARM WEAKNESS: Primary | ICD-10-CM

## 2022-03-21 DIAGNOSIS — M79.602 PAIN IN LEFT ARM: ICD-10-CM

## 2022-03-21 DIAGNOSIS — R27.8 DECREASED COORDINATION: ICD-10-CM

## 2022-03-21 PROCEDURE — 97110 THERAPEUTIC EXERCISES: CPT | Mod: CQ

## 2022-03-21 PROCEDURE — 97112 NEUROMUSCULAR REEDUCATION: CPT

## 2022-03-21 PROCEDURE — 97112 NEUROMUSCULAR REEDUCATION: CPT | Mod: CQ

## 2022-03-21 NOTE — PROGRESS NOTES
"[id number]   Date of Note: 03/21/2022   Referring Physician:  Vini Hernandez NP  Diagnosis:    Encounter Diagnoses   Name Primary?    Left arm weakness Yes    Decreased coordination     Pain in left arm         Start Time: 1015  End Time: 1100  Total Time: 45 min  Visit #: 4/17      Subjective:   Pt reports, "My blood pressure is up today." OTR assessed BP to be 134/98. OTR explained to pt that treatment would be cautious to ensure BP does not increase due to h/o hemorraghic CVA.     Pain: 0 out of 10     Objective:   Patient seen by OT this session. Treatment  consist of the following:  Neuro Re-education     Treatment: Neuromuscular re-education x 45 min    Neuro Red-education:  - Performed L UE FMC with use of green theraputty to locate small items and remove from putty. Successfully performed finding 25/25 items with extended time needed.   - Performed Bilateral Integration of UEs with green putty challenging pushing, pulling, squeezing and pinching, tolerating with no pain and improved strength/bilateral integrative use.   - Performed L UE hand eye coordination, grasp/release and dexterity task challenging ability to retrieve medium sized pegs from box and place in holes on a board located in vertical position, challenging wrist extension and further coordination. Performed with 50% accuracy due to difficulty with L UE coordination.     Assessment:  Pt will continue to benefit from skilled OT intervention.    Patient continues to demonstrate limitation  with  Joint mobility, Stiffness, Decreased fine motor coordination, Decreased gross motor coordination, Decreased functional use and Decreased strength. Tolerated tx well. Rest breaks needed due to fatigue. Extended time needed on this day for L UE tasks. Tolerated tx well with improvement noted in L UE coordination. Educated in HEP of L UE strengthening and functional use with pt demonstrating good understanding. " Limited strenuous activity during tx due to BP reading. Notified PTA of pt's BP prior to beginning tx with PTA.         New/Revised Goals: Continue POC  1.   2.   3.   4.        Plan:  Continue 2x week x 6 weeks  during the certification period from  3/7/22 to 4/25/22  in pursuit of  OT goals.      MARCUS Levy, OTR/L  3/21/2022

## 2022-03-21 NOTE — PROGRESS NOTES
"  Physical Therapy Treatment Note     [id number]    Therapy Diagnosis: No diagnosis found.  Physician: Vini Hernandez NP    Visit Date: 3/21/2022    Physician Orders: PT Eval and Treat  Medical Diagnosis from Referral: CVA  Evaluation Date: 3/7/2022  Progress Report Due 4/7/22  Plan of Care Expiration: 3/7/2022 to 4/15/22  Updated Plan of Care Due: 4/15/22  Visit # / Visits authorized: 4/12  PTA Visit #: 3    Time In: 1100  Time Out: 1150  Total Billable Time: 50 minutes    Precautions: Standard and Fall    Subjective     Pt reports: no new complaints this visit.  She was compliant with home exercise program.  Response to previous treatment: none yet  Functional change: ongoing    Pain: 0/10  Location: bilateral shoulders, left hand, left knee, and head      Objective     Hrelinda received therapeutic exercises to develop strength and core stabilization for 25 minutes including:  Nu Step x 7 min  Glute Sets 2 x 10  Supine hip abduction with red band 2 x 10  Supine marching with red band 2 x 10  SLR 2 x 10  Bridging 2 x 10  Chair squats x 10    Herlinda participated in neuromuscular re-education activities to improve: Balance for 25 minutes. The following activities were included:  Midline anterior cone touches 2 x 10 each LE  Side steps in // bars x 3 laps  Modified tandem 2 x 30"   Narrow stance w/ eyes close 2 x 20"  Stepping over object with focus on heel strike x 10 each LE  A-P tandem walking in // bars x 3 laps    Herlinda participated in gait training to improve functional mobility and safety for 0  minutes, including:  Not this visit    Home Exercises Provided and Patient Education Provided     Education provided: HEP provided on eval    Written Home Exercises Provided: Patient instructed to cont prior HEP.  Exercises were reviewed and Herlinda was able to demonstrate them prior to the end of the session.  Herlinda demonstrated good  understanding of the education provided.     See EMR " under evalution for exercises provided 3/7/2022.    Assessment     Patient able to perform all exercises with no pain. Patient stated she felt like her leg was getting really tired but feeling good.     Herlinda Is progressing well towards her goals.   Pt prognosis is Good.     Pt will continue to benefit from skilled outpatient physical therapy to address the deficits listed in the problem list box on initial evaluation, provide pt/family education and to maximize pt's level of independence in the home and community environment.     Pt's spiritual, cultural and educational needs considered and pt agreeable to plan of care and goals.     Anticipated barriers to physical therapy: none    Goals:     Short Term Goals:  1. Patient will demonstrate independence with home exercise program to ensure carryover of treatment.  2. Patient will perform supine to/from sit with independence to improve independence and safety with bed mobility.  3. Patient will perform sit to/from stand with modified independence to improve independence and safety with transfers.  4. Patient will improve left lower extremity strength to 4/5 to improve independence and safety with bed mobility, transfers, and gait.  5. Patient will improve Tinetti Balance + Gait score to 12/28 to improve dynamic standing balance and lower fall risk.   6. Patient will ambulate 200 feet with a large-base quad cane with modified independence to improve independence and safety with gait.      Long Term Goals:  1. Patient will perform sit to/from stand with independence to improve independence and safety with transfers.  2. Patient will improve left lower extremity strength to 4+/5 to improve independence and safety with bed mobility, transfers, and gait.  3. Patient will improve Tinetti Balance + Gait score to 16/28 to improve dynamic standing balance and lower fall risk.   4. Patient will ambulate 300 feet with a single point cane with modified independence including  up/down curbs and ramps to improve independence and safety with community ambulation.    Plan     Will continue with POC as appropriate.    Sneha Lubin, PTA  3/21/2022

## 2022-03-24 NOTE — H&P
[facility] - Medical Surgical Unit  [facility] Medicine  HISTORY AND PHYSICAL    Patient Name: Herlinda Valdovinos  MRN: [id number]  Patient Class: IP- Swing   Admission Date: 2/15/2022  Length of Stay: 14 days  Attending Physician: No att. providers found  Primary Care Provider: Vini Hernandez NP        Subjective:     Principal Problem:Generalized weakness        HPI:  PT  IS A 57 YR OLD WF ADMITTED TO [facility] SWING BED FOR PT/OT/REHABILITATION FOR MUSCLE WEAKNESS AND MEDICAL MANAGEMENT. PT WAS ADMITTED FROM [facility] ED. PT HAD COMPLETED PT/OT/REHABILITATION OUTPATIENT 2 WEEKS AGO FOR L SIDED WEAKNESS DUE TO CVA; BUT HAS NOTED PERSISTENT MUSCLE WEAKNESS AND DEBILITY, AND WILL REQUIRE SWING BED FOR REHABILITATION.  PT WILL BE EVALUATED PER PT/OT AND A TREATMENT PLAN WILL BE INITIATED. THE PHARMACIST WILL REVIEW MEDICATIONS AND MAKE RECOMMENDATIONS. PT WILL SEE THE DIETICIAN  FOR NUTRITIONAL SUPPORT WITH WEIGHT 74.1 KG AND ALBUMIN OF 3.6. PT'S ABNORMAL ADMITTING LABS ARE: CMP: AST 68, CBC: K, UA: NEG. PT WILL HAVE WEEKLY LABS.    Past Medical History:   Diagnosis Date    Cerebral hemorrhage     Chronic anxiety     Dehydration     Depression     Diabetes mellitus     Dysphagia     Due to and following non-traumatic intracerebral hemorrhage    Hearing loss     History of CVA (cerebrovascular accident)     Hypertension     Insomnia     Intrapontine hemorrhage     Left hemiparesis     Peripheral neuropathy     Transient cerebral ischemia       chest X-ray     PT CODE STATUS IS FULL  PT COVID STATUS IS NEG  PT HAS HAD COVID VACCINE J & J  PT VTE PPX IS ASA/PLAVIX/TEDS/EARLY AMBULATION  58 yo WF admitted to [facility] swing bed for generalized muscle weakness. Patient with hx of CVA and left side residual weakness reports that she was discharged from PT/OT 2 weeks ago. States the generalized weakness has been progressively worsening since. She has been walking with a walker at home, but reports she was unable  to stand without assistance today. Work-up in ED with . WBC 5.90. She did complain of LLQ pain and burning on urination. UA negative and CT abdomen/pelvis negative for acute findings; reveals multiple nonobstructing renal calculi.       Full Code  J&J Covid-19 vaccine  VTE - OCTAVIA      Overview/Hospital Course:  02/23/2022:  Weekly swing bed round  Labs and vital signs stable.  Today, the patient complains of excoriation underneath the breast and along the buttocks.  Reports she has been scratching her left buttock when sleeping.  On exam, it appears that the brief may be causing some irritation throughout the hips and buttocks.  Will add nystatin cream to be used under breast and change brief type. Patient improving with PT/OT.  Ambulated 260 ft twice with a rolling walker and supervision.  Plan to continue therapy and anticipate discharge next week.    03/01/2022 Hospital day 14. Patient was admitted to the hospital for swing bed admission for PT/OT rehabilitation for generalized weakness and difficulty with ambulation.  Patient had acute CVA in April 2021. Patient received PT/OT rehabilitation, 2 weeks after completing the rehabilitation, patient's weakness progressed, causing the need for admission for therapies.  While here patient was found to be on Neurontin 800 mg 4-5 times daily.  Patient was tapered down to Neurontin 800 mg b.i.d..  Patient was also informed by pharmacy to not take temazepam and lorazepam together at night due to increased sedation and risk for falls.  Patient has done well with therapies.  She ambulated 260 ft with rolling walker with SBA to CGA.  Patient will continue to have outpatient PT/OT rehabilitation starting on March 7th at 10:15 a.m. here at [facility].  Patient does have history of general anxiety disorder.  This is managed well with daily Zoloft and lorazepam.  Patient currently denies any SI/HI.      No new subjective & objective note has been filed under this hospital  service since the last note was generated.      Assessment/Plan:      * Generalized weakness   PT/OT/REHABILITATION  PT/OT TO EVALUATE AND INITIATE TREATMENT PLAN  CHALLENGES TO PT/OT INCLUDE IRVIN, PRIOR CVA WITH L SIDED WEAKNESS    03/01/2022.  Patient was admitted for generalized weakness and difficulty ambulating.  Patient worked with PT/OT therapies.  Patient's symptoms have improved, ambulating 260 ft with rolling walker.  Patient will get outpatient PT/OT rehabilitation here at [facility].      DVT prophylaxis  PT IS AT RISK FOR VTE  PLAVIX/TEDS/EARLY AMBULATION    03/01/2022.  Patient is currently on Plavix daily.  No current signs of DVTs.  Condition stable.    Idiopathic peripheral neuropathy  PT HAS NEUROPATHY   GABAPENTIN IS EFFECTIVE    03/01/2022.  Patient has chronic neuropathy.  Patient is taking Neurontin 100 mg p.o. b.i.d., with good results.  Condition stable.      Insomnia  PT HAS INSOMNIA AND THIS IS CONTROLLED WITH MEDICATION.  LORAZEPAM HS   TEMAZEPAM HS PRN  AVOID HOSPITAL DELIRIUM  STRICT SCHEDULE    3/1/22 patient has history of chronic insomnia.  Patient's condition is stable.  Patient is currently taking Restoril nightly.    Hypertension  PT HAS INADEQUATE BP CONTROL ON MEDICATION.  CURRENT MEDS INCLUDE HYDRALAZINE 25 MG TID AND VALSARTAN 160 MG DAILY  WILL CONSIDER DOSAGE ADJUSTMENT    3/1/22 patient has history of hypertension.  Patient is currently taking hydralazine and valsartan.  Blood pressure remains well controlled at 131/87 this morning.  Hypertension is stable.      Type 2 diabetes mellitus with neurologic complication  PT HAS DM2  DIABETIC DIET      3/1/22-Pt is diet controlled, stable     Generalized anxiety disorder  PT HAS IRVIN  ZOLOFT 100 MG DAILY  LORAZEPAM 0.5 MG AM AND AFTERNOON; 1 MG HS  REASSURANCE    3/1/22 Stable      VTE Risk Mitigation (From admission, onward)         Ordered     IP VTE LOW RISK PATIENT  Once         02/15/22 5577                Discharge  Planning   POOL: 3/1/2022     Code Status: Prior   Is the patient medically ready for discharge?:     Reason for patient still in hospital (select all that apply): Patient trending condition, Laboratory test, Treatment, Imaging, Consult recommendations and PT / OT recommendations  Discharge Plan A: Home with family                  Alis Grider MD  Department of Hospital Medicine   [facility] - [facility]

## 2022-03-25 ENCOUNTER — CLINICAL SUPPORT (OUTPATIENT)
Dept: REHABILITATION | Facility: HOSPITAL | Age: 57
End: 2022-03-25
Payer: MEDICARE

## 2022-03-25 DIAGNOSIS — R27.8 DECREASED COORDINATION: ICD-10-CM

## 2022-03-25 DIAGNOSIS — R29.898 LEFT ARM WEAKNESS: Primary | ICD-10-CM

## 2022-03-25 DIAGNOSIS — G45.8 ACUTE CEREBROVASCULAR INSUFFICIENCY TRANSIENT FOCAL NEUROLOGIC DEFICIT: Primary | ICD-10-CM

## 2022-03-25 DIAGNOSIS — M79.602 PAIN IN LEFT ARM: ICD-10-CM

## 2022-03-25 PROCEDURE — 97110 THERAPEUTIC EXERCISES: CPT | Mod: CQ

## 2022-03-25 PROCEDURE — 97112 NEUROMUSCULAR REEDUCATION: CPT

## 2022-03-25 PROCEDURE — 97110 THERAPEUTIC EXERCISES: CPT

## 2022-03-25 PROCEDURE — 97112 NEUROMUSCULAR REEDUCATION: CPT | Mod: CQ

## 2022-03-25 NOTE — PROGRESS NOTES
"  Physical Therapy Treatment Note     [id number]    Therapy Diagnosis: No diagnosis found.  Physician: Vini Hernandez NP    Visit Date: 3/25/2022    Physician Orders: PT Eval and Treat  Medical Diagnosis from Referral: CVA  Evaluation Date: 3/7/2022  Progress Report Due 4/7/22  Plan of Care Expiration: 3/7/2022 to 4/15/22  Updated Plan of Care Due: 4/15/22  Visit # / Visits authorized: 5/12  PTA Visit #: 4    Time In: 1059  Time Out: 1143  Total Billable Time: 44 minutes    Precautions: Standard and Fall    Subjective     Pt reports: "normal soreness"  She was compliant with home exercise program.  Response to previous treatment: improving  Functional change: ongoing    Pain: 0/10  Location: bilateral shoulders, left hand, left knee, and head      Objective     Herlinda received therapeutic exercises to develop strength and core stabilization for 20 minutes including:  Nu Step x 7 min  Glute Sets x 25  Supine hip abduction with red band x 25  Supine marching with red band x 25  SLR 2 x 10  Bridging 2 x 10  Chair squats x 15    Herlinda participated in neuromuscular re-education activities to improve: Balance for 24 minutes. The following activities were included:  Midline anterior cone touches to 2 cones x10 each LE  Side steps in // bars x 3 laps  Modified tandem 2 x 30"   Narrow stance w/ eyes close 2 x 20"  Stepping over object with focus on heel strike x 10 each LE alternating  A-P tandem walking in // bars x 3 laps      Home Exercises Provided and Patient Education Provided     Education provided: HEP provided on eval    Written Home Exercises Provided: Patient instructed to cont prior HEP.  Exercises were reviewed and Herlinda was able to demonstrate them prior to the end of the session.  Herlinda demonstrated good  understanding of the education provided.     See EMR under evalution for exercises provided 3/7/2022.    Assessment     Patient able to perform all exercises with no pain. "     Herlinda Is progressing well towards her goals.   Pt prognosis is Good.     Pt will continue to benefit from skilled outpatient physical therapy to address the deficits listed in the problem list box on initial evaluation, provide pt/family education and to maximize pt's level of independence in the home and community environment.     Pt's spiritual, cultural and educational needs considered and pt agreeable to plan of care and goals.     Anticipated barriers to physical therapy: none    Goals:     Short Term Goals:  1. Patient will demonstrate independence with home exercise program to ensure carryover of treatment.  2. Patient will perform supine to/from sit with independence to improve independence and safety with bed mobility.  3. Patient will perform sit to/from stand with modified independence to improve independence and safety with transfers.  4. Patient will improve left lower extremity strength to 4/5 to improve independence and safety with bed mobility, transfers, and gait.  5. Patient will improve Tinetti Balance + Gait score to 12/28 to improve dynamic standing balance and lower fall risk.   6. Patient will ambulate 200 feet with a large-base quad cane with modified independence to improve independence and safety with gait.      Long Term Goals:  1. Patient will perform sit to/from stand with independence to improve independence and safety with transfers.  2. Patient will improve left lower extremity strength to 4+/5 to improve independence and safety with bed mobility, transfers, and gait.  3. Patient will improve Tinetti Balance + Gait score to 16/28 to improve dynamic standing balance and lower fall risk.   4. Patient will ambulate 300 feet with a single point cane with modified independence including up/down curbs and ramps to improve independence and safety with community ambulation.    Plan     Will continue with POC as appropriate.    Srinivasa Redmond, NICHOLAS  3/25/2022

## 2022-03-25 NOTE — PROGRESS NOTES
"[id number]   Date of Note: 03/25/2022   Referring Physician:  Vini Hernandez NP  Diagnosis:    Encounter Diagnoses   Name Primary?    Left arm weakness Yes    Decreased coordination     Pain in left arm         Start Time: 1012   End Time: 1100  Total Time: 48 min  Visit #: 5/17      Subjective:   Pt reports, "I'm doing good, just tired. I went shopping with my niece yesterday."     Pain: 0 out of 10     Objective:   Patient seen by OT this session. Treatment  consist of the following:  Neuro Re-education and Therapeutic exercise    Treatment: Neuromuscular re-education x 30 min and Therapeutic exercise 18 mins    Neuro Red-education:  - Performed Bilateral Integration of UEs and L UE hand eye coordination through playing piano, challenging coordination, dexterity, proprioceptive input with good tolerance. Demonstrates fair ability to play piano with primary difficulty with awareness of L hand, feeling how much pressure is being placed through digits, and ROM/reaching distance of L UE/digits.    Therapeutic exercise:  - Performed L UE strengthening training with green theraputty, instructing in digit extension exercises to improve ability to reach piano keys. Pt demonstrated good understanding and carryover. Issued use of green theraputty to continue exercise at home with HEP.       Assessment:  Pt will continue to benefit from skilled OT intervention.    Patient continues to demonstrate limitation  with  Joint mobility, Stiffness, Decreased fine motor coordination, Decreased gross motor coordination, Decreased functional use and Decreased strength. Tolerated tx well. Rest breaks needed due to fatigue. Extended time needed on this day for L UE tasks. Tolerated tx well with improvement noted in L UE coordination. Educated in HEP of L UE strengthening and functional use with pt demonstrating good understanding, issuing use of green theraputty for home use.       New/Revised " Goals: Continue POC  1.   2.   3.   4.        Plan:  Continue 2x week x 6 weeks  during the certification period from  3/7/22 to 4/25/22  in pursuit of  OT goals.      MARCUS Levy, OTR/L  3/25/2022

## 2022-03-28 ENCOUNTER — CLINICAL SUPPORT (OUTPATIENT)
Dept: REHABILITATION | Facility: HOSPITAL | Age: 57
End: 2022-03-28
Payer: MEDICARE

## 2022-03-28 DIAGNOSIS — M79.602 PAIN IN LEFT ARM: ICD-10-CM

## 2022-03-28 DIAGNOSIS — R29.898 LEFT ARM WEAKNESS: Primary | ICD-10-CM

## 2022-03-28 DIAGNOSIS — G45.8 ACUTE CEREBROVASCULAR INSUFFICIENCY TRANSIENT FOCAL NEUROLOGIC DEFICIT: Primary | ICD-10-CM

## 2022-03-28 PROCEDURE — 97116 GAIT TRAINING THERAPY: CPT

## 2022-03-28 PROCEDURE — 97110 THERAPEUTIC EXERCISES: CPT

## 2022-03-28 PROCEDURE — 97530 THERAPEUTIC ACTIVITIES: CPT

## 2022-03-28 PROCEDURE — 97112 NEUROMUSCULAR REEDUCATION: CPT

## 2022-03-28 NOTE — PROGRESS NOTES
".aw  [id number]   Date of Note: 03/28/2022   Referring Physician:  Vini Hernandez NP  Diagnosis:    Encounter Diagnoses   Name Primary?    Left arm weakness Yes    Pain in left arm         Start Time: 1017   End Time: 1100  Total Time: 43 min  Visit #: 6/17      Subjective:   Pt reports, "I'm doing better!"     Pain: 0 out of 10     Objective:   Patient seen by OT this session. Treatment  consist of the following:  Neuro Re-education and Therapeutic exercise    Treatment: Therapeutic Activities x 20 min and Therapeutic exercise 12 mins    Therapeutic Activities/Neuro Red-education:  - Patient performed reaching activity with clothes in tree. Patient was able to adalberto 2#/4# resistive pinch pins with minimal difficulty. Patient performed green theraputty using right hand and retrieved 10/10 pegs from putty with minimal difficulty  - Patient performed reaching activity using clothes pin tree. Patient was able to adalberto 2#/4# resistive pinch pins with minimal difficulty.     Therapeutic exercise:  - Patient tolerated grade I joint mobs to Left shoulder for anterior and inferior glides. Patient   - Performed Left UE strengthening exercises to include:   - shoulder flexion, supine on mat with dowel   - elbow flexion, 2# weight   - elbow extension, red t-band   - bilateral rowing, red t-band   - wrist flexion, 2#    - wrist extension, 2#   - supination, 2#   - pronation, 2#     Assessment:  Pt will continue to benefit from skilled OT intervention.    Patient continues to demonstrate limitation  with  Joint mobility, Stiffness, Decreased fine motor coordination, Decreased gross motor coordination, Decreased functional use and Decreased strength. Tolerated tx well. Rest breaks needed due to fatigue. Extended time needed on this day for L UE tasks. Tolerated tx well with improvement noted in L UE coordination. Educated in HEP of L UE strengthening and functional use with pt " demonstrating good understanding, issuing use of green theraputty for home use.     Plan:  Continue 2x week x 6 weeks  during the certification period from  3/7/22 to 4/25/22  in pursuit of  OT goals.      Kerri Sanchez, OTR/L, CSRS  3/28/2022

## 2022-03-28 NOTE — PROGRESS NOTES
"  Physical Therapy Treatment Note     [id number]    Therapy Diagnosis:   Encounter Diagnosis   Name Primary?    Acute cerebrovascular insufficiency transient focal neurologic deficit Yes     Physician: Vini Hernandez NP    Visit Date: 3/28/2022    Physician Orders: PT Eval and Treat  Medical Diagnosis from Referral: CVA  Evaluation Date: 3/7/2022  Progress Report Due 4/7/22  Plan of Care Expiration: 3/7/2022 to 4/15/22  Updated Plan of Care Due: 4/15/22  Visit # / Visits authorized: 5/12  PTA Visit #: 4    Time In: 1110  Time Out: 1200  Total Billable Time: 50 minutes    Precautions: Standard and Fall    Subjective     Pt reports: left UE hurting due to OT.   She was compliant with home exercise program.  Response to previous treatment: improving  Functional change: ongoing    Pain: 0/10  Location: bilateral shoulders, left hand, left knee, and head      Objective     Herlinda received therapeutic exercises to develop strength and core stabilization for 20 minutes including:  Nu Step x 7 min  Manual DF stretch x 30 sec x 3  Glute Sets x 25 ( not this visit)   Supine hip abduction with red band x 25( not this visit)   Supine marching with red band x 25( not this visit)   SLR 2 x 10( not this visit)   Bridging 2 x 10  Chair squats x 20  Slant board stretch x 3 with 20 seconds each    Herlinda participated in neuromuscular re-education activities to improve: Balance for 20 minutes. The following activities were included:  Sit to stand with tactile cues for LE position and to avoid excessive LE extension and fulcrum effect transfers  PNF D1 flexion and ext x 10 with mod resist  PNF D2 felxion and ext x 10 with min resist  Midline anterior cone touches to 2 cones x10 each LE( not this visit)   Side steps in // bars x 3 laps( not this visit)   Modified tandem 2 x 30" ( not this visit)   Narrow stance w/ eyes close 2 x 20"( not this visit)   Stepping over object with focus on heel strike " x 10 each LE alternating( not this visit)   A-P tandem walking in // bars x 3 laps( not this visit)     Herlinda participated in gait training to improve functional mobility and safety for 10  minutes, including:  Patient ambulated 320 feet with RW and verbal cues for step length, heel strike, and foot clearance with supervision and no LOB.         Home Exercises Provided and Patient Education Provided     Education provided: updated HEP    Written Home Exercises Provided: yes.  Exercises were reviewed and Herlinda was able to demonstrate them prior to the end of the session.  Herlinda demonstrated good  understanding of the education provided.     See EMR under Media for exercises provided today.    Assessment     Patient able to increase activities today with moderate fatigue.     Herlinda Is progressing well towards her goals.   Pt prognosis is Good.     Pt will continue to benefit from skilled outpatient physical therapy to address the deficits listed in the problem list box on initial evaluation, provide pt/family education and to maximize pt's level of independence in the home and community environment.     Pt's spiritual, cultural and educational needs considered and pt agreeable to plan of care and goals.     Anticipated barriers to physical therapy: none    Goals:     Short Term Goals:  1. Patient will demonstrate independence with home exercise program to ensure carryover of treatment.  2. Patient will perform supine to/from sit with independence to improve independence and safety with bed mobility.  3. Patient will perform sit to/from stand with modified independence to improve independence and safety with transfers.  4. Patient will improve left lower extremity strength to 4/5 to improve independence and safety with bed mobility, transfers, and gait.  5. Patient will improve Tinetti Balance + Gait score to 12/28 to improve dynamic standing balance and lower fall risk.   6. Patient will ambulate 200 feet with a  large-base quad cane with modified independence to improve independence and safety with gait.      Long Term Goals:  1. Patient will perform sit to/from stand with independence to improve independence and safety with transfers.  2. Patient will improve left lower extremity strength to 4+/5 to improve independence and safety with bed mobility, transfers, and gait.  3. Patient will improve Tinetti Balance + Gait score to 16/28 to improve dynamic standing balance and lower fall risk.   4. Patient will ambulate 300 feet with a single point cane with modified independence including up/down curbs and ramps to improve independence and safety with community ambulation.    Plan     Will continue with Plan of care  as appropriate.    OSVALDO MEMBRENO, PT, ATP  3/28/2022

## 2022-03-31 ENCOUNTER — CLINICAL SUPPORT (OUTPATIENT)
Dept: REHABILITATION | Facility: HOSPITAL | Age: 57
End: 2022-03-31
Payer: MEDICARE

## 2022-03-31 DIAGNOSIS — M79.602 PAIN IN LEFT ARM: ICD-10-CM

## 2022-03-31 DIAGNOSIS — R29.898 LEFT ARM WEAKNESS: Primary | ICD-10-CM

## 2022-03-31 DIAGNOSIS — R27.8 DECREASED COORDINATION: ICD-10-CM

## 2022-03-31 PROCEDURE — 97110 THERAPEUTIC EXERCISES: CPT | Mod: CQ

## 2022-03-31 PROCEDURE — 97110 THERAPEUTIC EXERCISES: CPT

## 2022-03-31 PROCEDURE — 97112 NEUROMUSCULAR REEDUCATION: CPT | Mod: CQ

## 2022-03-31 PROCEDURE — 97530 THERAPEUTIC ACTIVITIES: CPT

## 2022-03-31 NOTE — PROGRESS NOTES
"  Physical Therapy Treatment Note     [id number]    Therapy Diagnosis:   No diagnosis found.  Physician: Vini Hernandez NP    Visit Date: 3/31/2022    Physician Orders: PT Eval and Treat  Medical Diagnosis from Referral: CVA  Evaluation Date: 3/7/2022  Progress Report Due 4/7/22  Plan of Care Expiration: 3/7/2022 to 4/15/22  Updated Plan of Care Due: 4/15/22  Visit # / Visits authorized: 7/12  PTA Visit #: 1    Time In: 0915  Time Out: 1000  Total Billable Time: 45 minutes    Precautions: Standard and Fall    Subjective     Pt reports: patient had complaint of feeling sleepy and weak feeling today    She was compliant with home exercise program.  Response to previous treatment: improving  Functional change: ongoing    Pain: 0/10  Location: bilateral shoulders, left hand, left knee, and head      Objective     Herlinda received therapeutic exercises to develop strength and core stabilization for 30 minutes including:  Nu Step x 7 min  Glute Sets x 25   Standing hip abduction x 20  Standing marching x 20  Hip adduction 2 x 10  SLR 2 x 10  Bridging 2 x 10  Supine marching 2 x 10  Chair squats x 20  Slant board stretch x 3 with 20 seconds each    Herlinda participated in neuromuscular re-education activities to improve: Balance for 15 minutes. The following activities were included:  Sit to stand with tactile cues for LE position and to avoid excessive LE extension and fulcrum effect transfers  Midline anterior cone touches to 2 cones x10 each LE( not this visit)   Side steps in // bars x 3 laps( not this visit)   Modified tandem 2 x 30" ( not this visit)   Narrow stance w/ eyes close 2 x 20"( not this visit)   Stepping over object with focus on heel strike x 10 each LE alternating( not this visit)   A-P tandem walking in // bars x 3 laps( not this visit)     Herlinda participated in gait training to improve functional mobility and safety for 0 minutes, including:  Patient ambulated 320 " feet with RW and verbal cues for step length, heel strike, and foot clearance with supervision and no LOB.         Home Exercises Provided and Patient Education Provided     Education provided:     Written Home Exercises Provided: Patient instructed to cont prior HEP.  Exercises were reviewed and Herlinda was able to demonstrate them prior to the end of the session.  Herlinda demonstrated good  understanding of the education provided.     See EMR under Media for exercises provided prior visit.    Assessment     Patient stated she was feeling ok following therapy today     Herlinda Is progressing well towards her goals.   Pt prognosis is Good.     Pt will continue to benefit from skilled outpatient physical therapy to address the deficits listed in the problem list box on initial evaluation, provide pt/family education and to maximize pt's level of independence in the home and community environment.     Pt's spiritual, cultural and educational needs considered and pt agreeable to plan of care and goals.     Anticipated barriers to physical therapy: none    Goals:     Short Term Goals:  1. Patient will demonstrate independence with home exercise program to ensure carryover of treatment.  2. Patient will perform supine to/from sit with independence to improve independence and safety with bed mobility.  3. Patient will perform sit to/from stand with modified independence to improve independence and safety with transfers.  4. Patient will improve left lower extremity strength to 4/5 to improve independence and safety with bed mobility, transfers, and gait.  5. Patient will improve Tinetti Balance + Gait score to 12/28 to improve dynamic standing balance and lower fall risk.   6. Patient will ambulate 200 feet with a large-base quad cane with modified independence to improve independence and safety with gait.      Long Term Goals:  1. Patient will perform sit to/from stand with independence to improve independence and safety with  transfers.  2. Patient will improve left lower extremity strength to 4+/5 to improve independence and safety with bed mobility, transfers, and gait.  3. Patient will improve Tinetti Balance + Gait score to 16/28 to improve dynamic standing balance and lower fall risk.   4. Patient will ambulate 300 feet with a single point cane with modified independence including up/down curbs and ramps to improve independence and safety with community ambulation.    Plan     Will continue with Plan of care  as appropriate.    Sneha Lubin, PTA  3/31/2022

## 2022-03-31 NOTE — PROGRESS NOTES
"[id number]   Date of Note: 03/31/2022   Referring Physician:  Vini Hernandez NP  Diagnosis:    Encounter Diagnoses   Name Primary?    Left arm weakness Yes    Decreased coordination     Pain in left arm         Start Time: 1120   End Time: 1200  Total Time: 40 min  Visit #: 7/17      Subjective:   Pt reports, "I'm not feeling too good today, my blood pressure is up again." Pt reports /112 when first waking up this morning, then 153/100 upon arrival to therapy.     Pain: 0 out of 10     Objective:   Patient seen by OT this session. Treatment  consist of the following:  Therapeutic activities and Therapeutic exercise    Treatment: Therapeutic Activities and Therapeutic exercise    Therapeutic Activities:  - Patient performed green theraputty using L hand and retrieved 10/10 pegs from putty with minimal difficulty  - Patient performed L UE ROM Arch achieving up to 90 degrees AROM with pain reported, 5/10 in R shoulder.     Therapeutic exercise:  - Patient unable to tolerate grade I joint mobs to Left shoulder on this day.   - Performed Left UE gentle PROM:   - shoulder flexion,seated   - shoulder abduction, seated   - shoulder ER, seated    Assessment:  Pt will continue to benefit from skilled OT intervention.    Patient continues to demonstrate limitation  with  Joint mobility, Stiffness, Decreased fine motor coordination, Decreased gross motor coordination, Decreased functional use and Decreased strength. Tolerated tx well. Rest breaks needed due to fatigue. Significant discomfort noted in L shoulder, rated 5-6/10 throughout tx. Pt with h/o R shoulder injury prior to CVA, resulting in significant discomfort even prior to CVA. Did not perform strenuous tasks with pt on this day due to high BP readings. Encouraged pt to notify PCP of BP readings due to extensive h/o CVA. Educated in HEP of L UE strengthening and functional use with pt demonstrating good " understanding.    Plan:  Continue 2x week x 6 weeks  during the certification period from  3/7/22 to 4/25/22  in pursuit of  OT goals.      Kiana Rasmussen, OTPURA, OTR/L    3/31/2022

## 2022-04-12 ENCOUNTER — HOSPITAL ENCOUNTER (INPATIENT)
Facility: HOSPITAL | Age: 57
LOS: 3 days | Discharge: NURSING, REHAB OR LONG-TERM CARE | DRG: 194 | End: 2022-04-15
Attending: EMERGENCY MEDICINE | Admitting: EMERGENCY MEDICINE
Payer: MEDICARE

## 2022-04-12 DIAGNOSIS — R06.00 DYSPNEA: ICD-10-CM

## 2022-04-12 DIAGNOSIS — J18.9 PNEUMONIA OF BOTH LOWER LOBES DUE TO INFECTIOUS ORGANISM: ICD-10-CM

## 2022-04-12 DIAGNOSIS — J18.9 PNEUMONIA DUE TO INFECTIOUS ORGANISM: ICD-10-CM

## 2022-04-12 DIAGNOSIS — B02.9 HERPES ZOSTER WITHOUT COMPLICATION: ICD-10-CM

## 2022-04-12 DIAGNOSIS — R09.02 HYPOXIA: ICD-10-CM

## 2022-04-12 DIAGNOSIS — N30.00 ACUTE CYSTITIS WITHOUT HEMATURIA: ICD-10-CM

## 2022-04-12 DIAGNOSIS — J18.9 PNEUMONIA OF BOTH LUNGS DUE TO INFECTIOUS ORGANISM, UNSPECIFIED PART OF LUNG: Primary | ICD-10-CM

## 2022-04-12 LAB
ALBUMIN SERPL BCP-MCNC: 3.3 G/DL (ref 3.5–5)
ALBUMIN/GLOB SERPL: 0.9 {RATIO}
ALP SERPL-CCNC: 137 U/L (ref 46–118)
ALT SERPL W P-5'-P-CCNC: 14 U/L (ref 13–56)
AMORPH PHOS CRY #/AREA URNS LPF: ABNORMAL /LPF
ANION GAP SERPL CALCULATED.3IONS-SCNC: 14 MMOL/L (ref 7–16)
AST SERPL W P-5'-P-CCNC: 23 U/L (ref 15–37)
BACTERIA #/AREA URNS HPF: ABNORMAL /HPF
BASE EXCESS ARTERIAL: 5 MMOL/L (ref -2–2)
BASOPHILS # BLD AUTO: 0.01 K/UL (ref 0–0.2)
BASOPHILS NFR BLD AUTO: 0.1 % (ref 0–1)
BILIRUB SERPL-MCNC: 0.9 MG/DL (ref 0–1.2)
BILIRUB UR QL STRIP: NEGATIVE
BUN SERPL-MCNC: 11 MG/DL (ref 7–18)
BUN/CREAT SERPL: 13 (ref 6–20)
CALCIUM SERPL-MCNC: 8.8 MG/DL (ref 8.5–10.1)
CHLORIDE SERPL-SCNC: 102 MMOL/L (ref 98–107)
CLARITY UR: ABNORMAL
CO2 SERPL-SCNC: 26 MMOL/L (ref 21–32)
COLOR UR: YELLOW
CREAT SERPL-MCNC: 0.87 MG/DL (ref 0.55–1.02)
D DIMER PPP FEU-MCNC: 0.94 ΜG/ML (ref 0–0.47)
DIFFERENTIAL METHOD BLD: ABNORMAL
EOSINOPHIL # BLD AUTO: 0.06 K/UL (ref 0–0.5)
EOSINOPHIL NFR BLD AUTO: 0.7 % (ref 1–4)
ERYTHROCYTE [DISTWIDTH] IN BLOOD BY AUTOMATED COUNT: 14.1 % (ref 11.5–14.5)
GLOBULIN SER-MCNC: 3.7 G/DL (ref 2–4)
GLUCOSE SERPL-MCNC: 137 MG/DL (ref 74–106)
GLUCOSE UR STRIP-MCNC: NEGATIVE MG/DL
HCO3 ARTERIAL: 29.1 MMOL/L (ref 18–23)
HCT VFR BLD AUTO: 37.5 % (ref 38–47)
HGB BLD-MCNC: 12.3 G/DL (ref 12–16)
KETONES UR STRIP-SCNC: NEGATIVE MG/DL
LACTATE SERPL-SCNC: 1.8 MMOL/L (ref 0.4–2)
LEUKOCYTE ESTERASE UR QL STRIP: NEGATIVE
LYMPHOCYTES # BLD AUTO: 0.54 K/UL (ref 1–4.8)
LYMPHOCYTES NFR BLD AUTO: 6.1 % (ref 27–41)
LYMPHOCYTES NFR BLD MANUAL: 6 % (ref 27–41)
MCH RBC QN AUTO: 29.1 PG (ref 27–31)
MCHC RBC AUTO-ENTMCNC: 32.8 G/DL (ref 32–36)
MCV RBC AUTO: 88.7 FL (ref 80–96)
MONOCYTES # BLD AUTO: 0.55 K/UL (ref 0–0.8)
MONOCYTES NFR BLD AUTO: 6.2 % (ref 2–6)
MONOCYTES NFR BLD MANUAL: 2 % (ref 2–6)
MPC BLD CALC-MCNC: 10.2 FL (ref 9.4–12.4)
NEUTROPHILS # BLD AUTO: 7.69 K/UL (ref 1.8–7.7)
NEUTROPHILS NFR BLD AUTO: 86.9 % (ref 53–65)
NEUTS SEG NFR BLD MANUAL: 92 % (ref 50–62)
NITRITE UR QL STRIP: POSITIVE
NRBC BLD MANUAL-RTO: ABNORMAL %
PCO2 ARTERIAL: 40
PH ARTERIAL: 7.47 (ref 7.35–7.45)
PH UR STRIP: 6 PH UNITS
PLATELET # BLD AUTO: 117 K/UL (ref 150–400)
PLATELET MORPHOLOGY: ABNORMAL
PO2 ARTERIAL: 67
POTASSIUM SERPL-SCNC: 3.8 MMOL/L (ref 3.5–5.1)
PROT SERPL-MCNC: 7 G/DL (ref 6.4–8.2)
PROT UR QL STRIP: NEGATIVE
RBC # BLD AUTO: 4.23 M/UL (ref 4.2–5.4)
RBC # UR STRIP: NEGATIVE /UL
RBC #/AREA URNS HPF: ABNORMAL /HPF
RBC MORPH BLD: NORMAL
SATURATED O2 ARTERIAL, I-STAT: 94
SODIUM SERPL-SCNC: 138 MMOL/L (ref 136–145)
SP GR UR STRIP: 1.02
SQUAMOUS #/AREA URNS LPF: ABNORMAL /LPF
UROBILINOGEN UR STRIP-ACNC: 1 MG/DL
WBC # BLD AUTO: 8.85 K/UL (ref 4.5–11)
WBC #/AREA URNS HPF: ABNORMAL /HPF

## 2022-04-12 PROCEDURE — 27000944

## 2022-04-12 PROCEDURE — 99900035 HC TECH TIME PER 15 MIN (STAT)

## 2022-04-12 PROCEDURE — 99285 EMERGENCY DEPT VISIT HI MDM: CPT | Mod: 25

## 2022-04-12 PROCEDURE — 81001 URINALYSIS AUTO W/SCOPE: CPT | Performed by: EMERGENCY MEDICINE

## 2022-04-12 PROCEDURE — 94640 AIRWAY INHALATION TREATMENT: CPT

## 2022-04-12 PROCEDURE — 85378 FIBRIN DEGRADE SEMIQUANT: CPT | Performed by: EMERGENCY MEDICINE

## 2022-04-12 PROCEDURE — C9113 INJ PANTOPRAZOLE SODIUM, VIA: HCPCS

## 2022-04-12 PROCEDURE — P9612 CATHETERIZE FOR URINE SPEC: HCPCS

## 2022-04-12 PROCEDURE — 25000003 PHARM REV CODE 250: Performed by: EMERGENCY MEDICINE

## 2022-04-12 PROCEDURE — 25000003 PHARM REV CODE 250

## 2022-04-12 PROCEDURE — 27000221 HC OXYGEN, UP TO 24 HOURS

## 2022-04-12 PROCEDURE — 96361 HYDRATE IV INFUSION ADD-ON: CPT | Mod: GZ

## 2022-04-12 PROCEDURE — 80053 COMPREHEN METABOLIC PANEL: CPT | Performed by: EMERGENCY MEDICINE

## 2022-04-12 PROCEDURE — 36600 WITHDRAWAL OF ARTERIAL BLOOD: CPT

## 2022-04-12 PROCEDURE — 94761 N-INVAS EAR/PLS OXIMETRY MLT: CPT

## 2022-04-12 PROCEDURE — 85025 COMPLETE CBC W/AUTO DIFF WBC: CPT | Performed by: EMERGENCY MEDICINE

## 2022-04-12 PROCEDURE — 11000001 HC ACUTE MED/SURG PRIVATE ROOM

## 2022-04-12 PROCEDURE — 63600175 PHARM REV CODE 636 W HCPCS: Performed by: EMERGENCY MEDICINE

## 2022-04-12 PROCEDURE — 25000242 PHARM REV CODE 250 ALT 637 W/ HCPCS: Performed by: EMERGENCY MEDICINE

## 2022-04-12 PROCEDURE — 99284 EMERGENCY DEPT VISIT MOD MDM: CPT | Performed by: EMERGENCY MEDICINE

## 2022-04-12 PROCEDURE — 93005 ELECTROCARDIOGRAM TRACING: CPT

## 2022-04-12 PROCEDURE — 83605 ASSAY OF LACTIC ACID: CPT | Performed by: EMERGENCY MEDICINE

## 2022-04-12 PROCEDURE — 25500020 PHARM REV CODE 255: Performed by: EMERGENCY MEDICINE

## 2022-04-12 PROCEDURE — 36415 COLL VENOUS BLD VENIPUNCTURE: CPT | Performed by: EMERGENCY MEDICINE

## 2022-04-12 PROCEDURE — 82803 BLOOD GASES ANY COMBINATION: CPT

## 2022-04-12 PROCEDURE — 87040 BLOOD CULTURE FOR BACTERIA: CPT | Performed by: EMERGENCY MEDICINE

## 2022-04-12 PROCEDURE — 96374 THER/PROPH/DIAG INJ IV PUSH: CPT

## 2022-04-12 PROCEDURE — 96375 TX/PRO/DX INJ NEW DRUG ADDON: CPT

## 2022-04-12 PROCEDURE — 63600175 PHARM REV CODE 636 W HCPCS

## 2022-04-12 PROCEDURE — 93010 ELECTROCARDIOGRAM REPORT: CPT | Performed by: HOSPITALIST

## 2022-04-12 RX ORDER — AMOXICILLIN 250 MG
1 CAPSULE ORAL 2 TIMES DAILY
Status: DISCONTINUED | OUTPATIENT
Start: 2022-04-12 | End: 2022-04-15 | Stop reason: HOSPADM

## 2022-04-12 RX ORDER — METHYLPREDNISOLONE SOD SUCC 125 MG
125 VIAL (EA) INJECTION
Status: DISCONTINUED | OUTPATIENT
Start: 2022-04-12 | End: 2022-04-14

## 2022-04-12 RX ORDER — ACETAMINOPHEN 325 MG/1
650 TABLET ORAL EVERY 4 HOURS PRN
Status: DISCONTINUED | OUTPATIENT
Start: 2022-04-12 | End: 2022-04-15 | Stop reason: HOSPADM

## 2022-04-12 RX ORDER — PANTOPRAZOLE SODIUM 40 MG/10ML
40 INJECTION, POWDER, LYOPHILIZED, FOR SOLUTION INTRAVENOUS DAILY
Status: DISCONTINUED | OUTPATIENT
Start: 2022-04-12 | End: 2022-04-13

## 2022-04-12 RX ORDER — LORAZEPAM 0.5 MG/1
0.5 TABLET ORAL 2 TIMES DAILY
Status: DISCONTINUED | OUTPATIENT
Start: 2022-04-12 | End: 2022-04-12

## 2022-04-12 RX ORDER — BENZONATATE 100 MG/1
100 CAPSULE ORAL 3 TIMES DAILY PRN
Status: DISCONTINUED | OUTPATIENT
Start: 2022-04-12 | End: 2022-04-15 | Stop reason: HOSPADM

## 2022-04-12 RX ORDER — TEMAZEPAM 30 MG/1
30 CAPSULE ORAL NIGHTLY
Status: DISCONTINUED | OUTPATIENT
Start: 2022-04-12 | End: 2022-04-15

## 2022-04-12 RX ORDER — IPRATROPIUM BROMIDE AND ALBUTEROL SULFATE 2.5; .5 MG/3ML; MG/3ML
3 SOLUTION RESPIRATORY (INHALATION) ONCE
Status: COMPLETED | OUTPATIENT
Start: 2022-04-12 | End: 2022-04-12

## 2022-04-12 RX ORDER — METHYLPREDNISOLONE SOD SUCC 125 MG
125 VIAL (EA) INJECTION
Status: DISCONTINUED | OUTPATIENT
Start: 2022-04-12 | End: 2022-04-12

## 2022-04-12 RX ORDER — TALC
6 POWDER (GRAM) TOPICAL NIGHTLY PRN
Status: DISCONTINUED | OUTPATIENT
Start: 2022-04-12 | End: 2022-04-15 | Stop reason: HOSPADM

## 2022-04-12 RX ORDER — HYDRALAZINE HYDROCHLORIDE 25 MG/1
25 TABLET, FILM COATED ORAL 3 TIMES DAILY
Status: DISCONTINUED | OUTPATIENT
Start: 2022-04-12 | End: 2022-04-15 | Stop reason: HOSPADM

## 2022-04-12 RX ORDER — PANTOPRAZOLE SODIUM 40 MG/10ML
INJECTION, POWDER, LYOPHILIZED, FOR SOLUTION INTRAVENOUS
Status: COMPLETED
Start: 2022-04-12 | End: 2022-04-12

## 2022-04-12 RX ORDER — IPRATROPIUM BROMIDE AND ALBUTEROL SULFATE 2.5; .5 MG/3ML; MG/3ML
3 SOLUTION RESPIRATORY (INHALATION) EVERY 6 HOURS PRN
Status: DISCONTINUED | OUTPATIENT
Start: 2022-04-12 | End: 2022-04-13

## 2022-04-12 RX ORDER — DOCUSATE SODIUM 100 MG/1
100 CAPSULE, LIQUID FILLED ORAL DAILY
Status: DISCONTINUED | OUTPATIENT
Start: 2022-04-13 | End: 2022-04-15 | Stop reason: HOSPADM

## 2022-04-12 RX ORDER — CLOPIDOGREL BISULFATE 75 MG/1
75 TABLET ORAL DAILY
Status: DISCONTINUED | OUTPATIENT
Start: 2022-04-13 | End: 2022-04-15 | Stop reason: HOSPADM

## 2022-04-12 RX ORDER — SODIUM CHLORIDE 9 MG/ML
INJECTION, SOLUTION INTRAVENOUS
Status: COMPLETED
Start: 2022-04-12 | End: 2022-04-12

## 2022-04-12 RX ORDER — ACYCLOVIR 400 MG/1
800 TABLET ORAL
Status: DISCONTINUED | OUTPATIENT
Start: 2022-04-12 | End: 2022-04-12

## 2022-04-12 RX ORDER — ONDANSETRON 2 MG/ML
4 INJECTION INTRAMUSCULAR; INTRAVENOUS
Status: COMPLETED | OUTPATIENT
Start: 2022-04-12 | End: 2022-04-12

## 2022-04-12 RX ORDER — PANTOPRAZOLE SODIUM 40 MG/1
40 TABLET, DELAYED RELEASE ORAL DAILY
Status: DISCONTINUED | OUTPATIENT
Start: 2022-04-13 | End: 2022-04-12

## 2022-04-12 RX ORDER — ACETAMINOPHEN 500 MG
1000 TABLET ORAL
Status: COMPLETED | OUTPATIENT
Start: 2022-04-12 | End: 2022-04-12

## 2022-04-12 RX ORDER — SODIUM CHLORIDE 9 MG/ML
INJECTION, SOLUTION INTRAVENOUS CONTINUOUS
Status: DISCONTINUED | OUTPATIENT
Start: 2022-04-12 | End: 2022-04-14

## 2022-04-12 RX ORDER — VALSARTAN 160 MG/1
160 TABLET ORAL DAILY
Status: DISCONTINUED | OUTPATIENT
Start: 2022-04-13 | End: 2022-04-15 | Stop reason: HOSPADM

## 2022-04-12 RX ORDER — LOPERAMIDE HYDROCHLORIDE 2 MG/1
4 CAPSULE ORAL ONCE
Status: DISCONTINUED | OUTPATIENT
Start: 2022-04-12 | End: 2022-04-15 | Stop reason: HOSPADM

## 2022-04-12 RX ORDER — POLYETHYLENE GLYCOL 3350 17 G/17G
17 POWDER, FOR SOLUTION ORAL DAILY
Status: DISCONTINUED | OUTPATIENT
Start: 2022-04-13 | End: 2022-04-15 | Stop reason: HOSPADM

## 2022-04-12 RX ORDER — ACETAMINOPHEN 325 MG/1
650 TABLET ORAL EVERY 8 HOURS PRN
Status: DISCONTINUED | OUTPATIENT
Start: 2022-04-12 | End: 2022-04-12

## 2022-04-12 RX ORDER — VALACYCLOVIR HYDROCHLORIDE 500 MG/1
1000 TABLET, FILM COATED ORAL 3 TIMES DAILY
Status: DISCONTINUED | OUTPATIENT
Start: 2022-04-12 | End: 2022-04-15 | Stop reason: HOSPADM

## 2022-04-12 RX ORDER — SERTRALINE HYDROCHLORIDE 50 MG/1
100 TABLET, FILM COATED ORAL NIGHTLY
Status: DISCONTINUED | OUTPATIENT
Start: 2022-04-12 | End: 2022-04-15 | Stop reason: HOSPADM

## 2022-04-12 RX ORDER — GABAPENTIN 400 MG/1
800 CAPSULE ORAL 2 TIMES DAILY
Status: DISCONTINUED | OUTPATIENT
Start: 2022-04-12 | End: 2022-04-15 | Stop reason: HOSPADM

## 2022-04-12 RX ORDER — ONDANSETRON 2 MG/ML
4 INJECTION INTRAMUSCULAR; INTRAVENOUS EVERY 8 HOURS PRN
Status: DISCONTINUED | OUTPATIENT
Start: 2022-04-12 | End: 2022-04-15 | Stop reason: HOSPADM

## 2022-04-12 RX ORDER — SODIUM CHLORIDE 0.9 % (FLUSH) 0.9 %
10 SYRINGE (ML) INJECTION
Status: DISCONTINUED | OUTPATIENT
Start: 2022-04-12 | End: 2022-04-15 | Stop reason: HOSPADM

## 2022-04-12 RX ORDER — ATORVASTATIN CALCIUM 40 MG/1
40 TABLET, FILM COATED ORAL NIGHTLY
Status: DISCONTINUED | OUTPATIENT
Start: 2022-04-12 | End: 2022-04-15 | Stop reason: HOSPADM

## 2022-04-12 RX ORDER — LORAZEPAM 0.5 MG/1
0.5 TABLET ORAL DAILY
Status: DISCONTINUED | OUTPATIENT
Start: 2022-04-13 | End: 2022-04-14

## 2022-04-12 RX ADMIN — AZITHROMYCIN MONOHYDRATE 500 MG: 500 INJECTION, POWDER, LYOPHILIZED, FOR SOLUTION INTRAVENOUS at 01:04

## 2022-04-12 RX ADMIN — TEMAZEPAM 30 MG: 30 CAPSULE ORAL at 10:04

## 2022-04-12 RX ADMIN — ACETAMINOPHEN 1000 MG: 500 TABLET ORAL at 01:04

## 2022-04-12 RX ADMIN — SERTRALINE HYDROCHLORIDE 100 MG: 50 TABLET ORAL at 08:04

## 2022-04-12 RX ADMIN — PANTOPRAZOLE SODIUM 40 MG: 40 INJECTION, POWDER, FOR SOLUTION INTRAVENOUS at 11:04

## 2022-04-12 RX ADMIN — VALACYCLOVIR HYDROCHLORIDE 1000 MG: 500 TABLET, FILM COATED ORAL at 08:04

## 2022-04-12 RX ADMIN — SODIUM CHLORIDE: 9 INJECTION, SOLUTION INTRAVENOUS at 05:04

## 2022-04-12 RX ADMIN — IPRATROPIUM BROMIDE AND ALBUTEROL SULFATE 3 ML: .5; 3 SOLUTION RESPIRATORY (INHALATION) at 11:04

## 2022-04-12 RX ADMIN — METHYLPREDNISOLONE SODIUM SUCCINATE 125 MG: 125 INJECTION, POWDER, FOR SOLUTION INTRAMUSCULAR; INTRAVENOUS at 08:04

## 2022-04-12 RX ADMIN — IOPAMIDOL 100 ML: 755 INJECTION, SOLUTION INTRAVENOUS at 12:04

## 2022-04-12 RX ADMIN — SODIUM CHLORIDE 500 ML: 9 INJECTION, SOLUTION INTRAVENOUS at 11:04

## 2022-04-12 RX ADMIN — ATORVASTATIN CALCIUM 40 MG: 40 TABLET, FILM COATED ORAL at 08:04

## 2022-04-12 RX ADMIN — GABAPENTIN 800 MG: 400 CAPSULE ORAL at 08:04

## 2022-04-12 RX ADMIN — BENZONATATE 100 MG: 100 CAPSULE ORAL at 08:04

## 2022-04-12 RX ADMIN — ACETAMINOPHEN 650 MG: 325 TABLET ORAL at 08:04

## 2022-04-12 RX ADMIN — ONDANSETRON 4 MG: 2 INJECTION INTRAMUSCULAR; INTRAVENOUS at 11:04

## 2022-04-12 RX ADMIN — HYDRALAZINE HYDROCHLORIDE 25 MG: 25 TABLET, FILM COATED ORAL at 08:04

## 2022-04-12 RX ADMIN — IPRATROPIUM BROMIDE AND ALBUTEROL SULFATE 3 ML: 2.5; .5 SOLUTION RESPIRATORY (INHALATION) at 05:04

## 2022-04-12 RX ADMIN — IPRATROPIUM BROMIDE AND ALBUTEROL SULFATE 3 ML: 2.5; .5 SOLUTION RESPIRATORY (INHALATION) at 10:04

## 2022-04-12 RX ADMIN — SENNOSIDES AND DOCUSATE SODIUM 1 TABLET: 50; 8.6 TABLET ORAL at 08:04

## 2022-04-12 NOTE — LETTER
Patient: Sukhdeep Hutchison  YOB: 1980  Date: 4/12/2022 Time:  Location: [facility]    Leaving the Hospital Against Medical Advice    Chart #:[id number]    This will certify that I, the undersigned,    ______________________________________________________________________    A patient in the above named medical center, having requested discharge and removal from the medical [place] against the advice of my attending physician(s), hereby release [facility], its physicians, officers and employees, severally and individually, from any and all liability of any nature whatsoever for any injury or harm or complication of any kind that may result directly or indirectly, by reason of my terminating my stay as a patient at [facility] and my departure from [facility], and hereby waive any and all rights of action I may now have or later acquire as a result of my voluntary departure from [facility] and the termination of my stay as a patient therein.    This release is made with the full knowledge of the danger that may result from the action which I am taking.      Date:_______________________                         ___________________________                                                                                    Patient/Legal Representative    Witness:        ____________________________                          ___________________________  Nurse                                                                        Physician

## 2022-04-12 NOTE — PLAN OF CARE
Patient admitted today with Pneumonia.  Care Plan initiated.  Problem: Adult Inpatient Plan of Care  Goal: Plan of Care Review  Outcome: Ongoing, Progressing  Goal: Patient-Specific Goal (Individualized)  Outcome: Ongoing, Progressing  Goal: Absence of Hospital-Acquired Illness or Injury  Outcome: Ongoing, Progressing  Goal: Optimal Comfort and Wellbeing  Outcome: Ongoing, Progressing  Goal: Readiness for Transition of Care  Outcome: Ongoing, Progressing     Problem: Fall Injury Risk  Goal: Absence of Fall and Fall-Related Injury  Outcome: Ongoing, Progressing     Problem: Skin Injury Risk Increased  Goal: Skin Health and Integrity  Outcome: Ongoing, Progressing     Problem: Impaired Wound Healing  Goal: Optimal Wound Healing  Outcome: Ongoing, Progressing

## 2022-04-12 NOTE — ED PROVIDER NOTES
Encounter Date: 2022       History     Chief Complaint   Patient presents with    Fever     C/o sudden onset of fever and wheezing yesterday at 3pm     PT IS A 57 YR OLD WF REFERRED PER SUNSHINE COSTELLO NP FOR PNEUMONIA, DEHYDRATION AND HYPOXIA (89%) AND RECURRENT SHINGLES. PT RELATES SHE HAD SUDDEN ONSET AT 1500 YESTERDAY WHEEZING WITH PROD COUGH CLEAR AND CLOUDY, FEVER, CHILL, N/V X 3, MALAISE LAST PM. PT RECENTLY TRAVELLED TO [place] FOR A FAMILY  AND HAS NOTED FATIGUE SINCE THAT TIME. PT DENIES EXPOSURE TO ILL PERSONS. PT WAS TREATED WITH ROCEPHIN, TORADOL AND DECADRON PTA IM  PER NP AT HOME VISIT.  PT HAS HX HEMORRHAGIC CVA  WITH SWING BED AT [facility] WITH MARKED IMPROVEMENT; BUT DID REQUIRE RECENT 2 WEEK [facility] SWING BED  AFTER OUTPATIENT PT/OT WAS COMPLETE FOR MUSCLE WEAKNESS.        Review of patient's allergies indicates:   Allergen Reactions    Codeine     Compazine [prochlorperazine]     Lamisil [terbinafine]      Past Medical History:   Diagnosis Date    Cerebral hemorrhage     Chronic anxiety     Dehydration     Depression     Diabetes mellitus     Dysphagia     Due to and following non-traumatic intracerebral hemorrhage    Hearing loss     History of CVA (cerebrovascular accident)     Hypertension     Insomnia     Intrapontine hemorrhage     Left hemiparesis     Peripheral neuropathy     Transient cerebral ischemia      Past Surgical History:   Procedure Laterality Date    APPENDECTOMY      CHOLECYSTECTOMY      HYSTERECTOMY      JOINT REPLACEMENT      Arthroscopy of knee    LITHOTRIPSY      Renal lithotripsy    percutaneious insertion of ureteric stent       History reviewed. No pertinent family history.  Social History     Tobacco Use    Smoking status: Never Smoker    Smokeless tobacco: Never Used   Substance Use Topics    Alcohol use: Never    Drug use: Never     Review of Systems   Constitutional: Positive for activity change, appetite change, fatigue and fever.    HENT: Negative.    Eyes: Negative.    Respiratory: Positive for cough, shortness of breath and wheezing.    Cardiovascular: Negative for chest pain, palpitations and leg swelling.   Gastrointestinal: Positive for nausea and vomiting. Negative for diarrhea.   Endocrine: Negative.    Genitourinary: Negative.    Musculoskeletal: Positive for myalgias.   Skin: Positive for rash (L GROIN WITH OPEN LESIONS).   Allergic/Immunologic: Immunocompromised state: DM2.   Neurological: Positive for weakness.   Hematological: Negative.    Psychiatric/Behavioral: The patient is nervous/anxious.        Physical Exam     Initial Vitals [04/12/22 1123]   BP Pulse Resp Temp SpO2   (!) 175/105 (!) 112 20 99 °F (37.2 °C) (!) 93 %      MAP       --         Physical Exam    Constitutional: She appears well-developed and well-nourished. She is cooperative.   HENT:   Head: Normocephalic and atraumatic.   Eyes: Conjunctivae and EOM are normal. Pupils are equal, round, and reactive to light.   Neck: Trachea normal. Neck supple.   Cardiovascular: Regular rhythm, normal heart sounds and intact distal pulses.   No murmur heard.  Pulses:       Radial pulses are 3+ on the right side and 3+ on the left side.        Dorsalis pedis pulses are 3+ on the right side and 3+ on the left side.   Pulmonary/Chest: She has wheezes. She has rhonchi. She exhibits no tenderness.   Abdominal: Abdomen is soft. Bowel sounds are normal. She exhibits no distension. There is no abdominal tenderness.   Musculoskeletal:         General: Normal range of motion.      Right shoulder: Normal.      Left shoulder: Normal.      Right upper arm: Normal.      Left upper arm: Normal.      Right elbow: Normal.      Left elbow: Normal.      Right forearm: Normal.      Left forearm: Normal.      Right wrist: Normal.      Left wrist: Normal.      Right hand: Normal.      Left hand: Normal.      Cervical back: Neck supple.     Lymphadenopathy:     She has no cervical adenopathy.      She has no axillary adenopathy.   Neurological: She is alert and oriented to person, place, and time. She has normal strength. No cranial nerve deficit or sensory deficit. She displays a negative Romberg sign. GCS eye subscore is 4. GCS verbal subscore is 5. GCS motor subscore is 6.   Reflex Scores:       Patellar reflexes are 2+ on the right side and 2+ on the left side.  Skin: Skin is warm and dry. Capillary refill takes less than 2 seconds. Rash (L GROIN) noted.   Psychiatric: She has a normal mood and affect. Her speech is normal and behavior is normal. Judgment and thought content normal. Cognition and memory are normal.         Medical Screening Exam   See Full Note    ED Course   Procedures  Labs Reviewed   COMPREHENSIVE METABOLIC PANEL - Abnormal; Notable for the following components:       Result Value    Glucose 137 (*)     Albumin 3.3 (*)     Alk Phos 137 (*)     All other components within normal limits   URINALYSIS - Abnormal; Notable for the following components:    Clarity, UA Cloudy (*)     Nitrites, UA Positive (*)     All other components within normal limits   CBC WITH DIFFERENTIAL - Abnormal; Notable for the following components:    Hematocrit 37.5 (*)     Platelet Count 117 (*)     Neutrophils % 86.9 (*)     Lymphocytes % 6.1 (*)     Lymphocytes, Absolute 0.54 (*)     Monocytes % 6.2 (*)     Eosinophils % 0.7 (*)     All other components within normal limits   D DIMER, QUANTITATIVE - Abnormal; Notable for the following components:    D-Dimer 0.94 (*)     All other components within normal limits   MANUAL DIFFERENTIAL - Abnormal; Notable for the following components:    Segmented Neutrophils, Man % 92 (*)     Lymphocytes, Man % 6 (*)     Platelet Morphology Decreased (*)     All other components within normal limits   URINALYSIS, MICROSCOPIC - Abnormal; Notable for the following components:    Amorphous Crystals, UA Few (*)     All other components within normal limits   LACTIC ACID, PLASMA -  Normal   CULTURE, BLOOD   CULTURE, BLOOD   CBC W/ AUTO DIFFERENTIAL    Narrative:     The following orders were created for panel order CBC auto differential.  Procedure                               Abnormality         Status                     ---------                               -----------         ------                     CBC with Differential[[id number]]        Abnormal            Final result               Manual Differential[[id number]]          Abnormal            Final result                 Please view results for these tests on the individual orders.   EXTRA TUBES    Narrative:     The following orders were created for panel order EXTRA TUBES.  Procedure                               Abnormality         Status                     ---------                               -----------         ------                     Light Green Top Hold[[id number]]                             In process                   Please view results for these tests on the individual orders.   LIGHT GREEN TOP HOLD     EKG Readings: (Independently Interpreted)   Initial Reading: No STEMI. Rhythm: Sinus Tachycardia. Ectopy: No Ectopy. Conduction: Normal. T Waves Flipped: AVR, AVL, V1 and V2. Q Waves: AVR. Clinical Impression: Sinus Tachycardia     ECG Results          EKG 12-lead (In process)  Result time 04/12/22 12:28:23    In process by Interface, Lab In [facility] (04/12/22 12:28:23)                 Narrative:    Test Reason : R06.00,    Vent. Rate : 111 BPM     Atrial Rate : 111 BPM     P-R Int : 132 ms          QRS Dur : 082 ms      QT Int : 342 ms       P-R-T Axes : 056 041 075 degrees     QTc Int : 465 ms    Sinus tachycardia  Cannot rule out Anterior infarct (cited on or before 15-FEB-2022)  Abnormal ECG  When compared with ECG of 15-FEB-2022 15:53,  Criteria for Inferior infarct are no longer Present  Questionable change in initial forces of Anterior leads    Referred By: [email or web address]   SELF           Confirmed By:                              Imaging Results          CTA Chest Non-Coronary (PE Study) (Final result)  Result time 04/12/22 12:59:29    Final result by Issa Weinstein II, MD (04/12/22 12:59:29)                 Impression:      No evidence of pulmonary thromboembolism.  Bilateral pulmonary airspace densities likely pneumonitis.      Electronically signed by: Issa Weinstein  Date:    04/12/2022  Time:    12:59             Narrative:    EXAMINATION:  CTA CHEST NON CORONARY    CLINICAL HISTORY:  Pulmonary embolism (PE) suspected, positive D-dimer;    TECHNIQUE:  Axial CT imaging of the chest is performed with intravenous contrast. Contrast dose is 100 cc Isovue 370.    CT dose reduction technique used - Dose Rite and tube current modulation.    COMPARISON:  None available    FINDINGS:  No thrombus or other abnormality is identified in the pulmonary arteries or veins.  The pulmonary vessel caliber is within normal limits.  The heart, mediastinum and great vessels appear within normal limits.    Bilateral pulmonary airspace densities present the most prominent in the inferior upper lobes.  Remaining pulmonary parenchyma shows no evidence of airspace disease or abnormal density.  No effusion or pneumothorax is present.                               X-Ray Chest 1 View (Final result)  Result time 04/12/22 12:38:08    Final result by Issa Weinstein II, MD (04/12/22 12:38:08)                 Impression:      Slightly increased interstitial pulmonary density could indicate pneumonitis.      Electronically signed by: Issa Weinstein  Date:    04/12/2022  Time:    12:38             Narrative:    EXAMINATION:  XR CHEST 1 VIEW    CLINICAL HISTORY:  DYSPNEA;    COMPARISON:  [facility] February 2022    FINDINGS:  The heart and mediastinum are stable in size and configuration.  The pulmonary vascularity is normal in caliber.  There is slight increased interstitial density.  No other lung infiltrates, effusions, pneumothorax or other  abnormality is demonstrated.                              X-Rays:   Independently Interpreted Readings:   Chest X-Ray: BILATERAL INCREASED INTERSTITIAL PULMONARY DENSITIES  C/W PNEUMONITIS  PT'S PRESENTATION IS CONSISTENT WITH PNUEMONIA   Other Readings:  CT CHEST NO PE   BILATERAL PULMONARY DENSITIES    Medications   pantoprazole injection 40 mg (40 mg Intravenous Given 4/12/22 1155)   azithromycin (ZITHROMAX) 500 mg in dextrose 5 % 250 mL IVPB (500 mg Intravenous New Bag 4/12/22 1340)   sodium chloride 0.9% bolus 500 mL (0 mLs Intravenous Stopped 4/12/22 1230)   albuterol-ipratropium 2.5 mg-0.5 mg/3 mL nebulizer solution 3 mL (3 mLs Nebulization Given 4/12/22 1150)   ondansetron injection 4 mg (4 mg Intravenous Given 4/12/22 1150)   iopamidoL (ISOVUE-370) injection 100 mL (100 mLs Intravenous Given 4/12/22 1256)   acetaminophen tablet 1,000 mg (1,000 mg Oral Given 4/12/22 1340)     Medical Decision Making:   Clinical Tests:   Lab Tests: Ordered and Reviewed  The following lab test(s) were unremarkable: CMP and CBC       <> Summary of Lab: EXCEPT , ALK PHOS 137, ALBUMIN 3.3, D DIMER 0.94  NORMAL LACTIC ACID 1.8  UA POSITIVE FOR UTI  ABG ON 2 L NC 67 O2/40 CO2/7.47 PH      Radiological Study: Ordered and Reviewed  Medical Tests: Ordered and Reviewed  ED Management:  PT PRESENTS WITH  PNEUMONIA SYMPTOMOLOGY WITH SUDDEN ONSET YESTERDAY WITH RECENT TRAVEL TO [place]. PT IMPROVED MILDLY AND WILL BE ADMITTED TO INPATIENT  PT REPEAT EXAM POST DUONEB PERSISTENT RHONCHI AND WHEEZING  PT WILL BE ADMITTED FOR PNEUMONIA/ HYPOXIA/ UTI/BRONCHOSPASM FOR ROCEPHIN/Z MAX/IVF/PROTONIX/ZOFRAN AND DUONEB TREATMENTS WITH IV STEROIDS                   Clinical Impression:   Final diagnoses:  [R06.00] Dyspnea  [J18.9] Pneumonia of both lungs due to infectious organism, unspecified part of lung (Primary)  [N30.00] Acute cystitis without hematuria  [R09.02] Hypoxia  [B02.9] Herpes zoster without complication          ED Disposition  Condition    Admit               Alis Grider MD  04/12/22 2472

## 2022-04-13 PROBLEM — J18.9 PNEUMONIA DUE TO INFECTIOUS ORGANISM: Status: ACTIVE | Noted: 2022-04-13

## 2022-04-13 PROBLEM — B02.9 HERPES ZOSTER WITHOUT COMPLICATION: Status: ACTIVE | Noted: 2022-04-13

## 2022-04-13 LAB
ANION GAP SERPL CALCULATED.3IONS-SCNC: 11 MMOL/L (ref 7–16)
BASOPHILS # BLD AUTO: 0 K/UL (ref 0–0.2)
BASOPHILS NFR BLD AUTO: 0 % (ref 0–1)
BUN SERPL-MCNC: 14 MG/DL (ref 7–18)
BUN/CREAT SERPL: 18 (ref 6–20)
CALCIUM SERPL-MCNC: 8.8 MG/DL (ref 8.5–10.1)
CHLORIDE SERPL-SCNC: 104 MMOL/L (ref 98–107)
CO2 SERPL-SCNC: 28 MMOL/L (ref 21–32)
CREAT SERPL-MCNC: 0.79 MG/DL (ref 0.55–1.02)
DIFFERENTIAL METHOD BLD: ABNORMAL
EOSINOPHIL # BLD AUTO: 0.01 K/UL (ref 0–0.5)
EOSINOPHIL NFR BLD AUTO: 0.1 % (ref 1–4)
EOSINOPHIL NFR BLD MANUAL: 1 % (ref 1–4)
ERYTHROCYTE [DISTWIDTH] IN BLOOD BY AUTOMATED COUNT: 14 % (ref 11.5–14.5)
GLUCOSE SERPL-MCNC: 226 MG/DL (ref 74–106)
HCO3 UR-SCNC: 29.1 MMOL/L (ref 21–28)
HCT VFR BLD AUTO: 37.1 % (ref 38–47)
HGB BLD-MCNC: 12 G/DL (ref 12–16)
LYMPHOCYTES # BLD AUTO: 0.68 K/UL (ref 1–4.8)
LYMPHOCYTES NFR BLD AUTO: 7.4 % (ref 27–41)
LYMPHOCYTES NFR BLD MANUAL: 7 % (ref 27–41)
MCH RBC QN AUTO: 28.5 PG (ref 27–31)
MCHC RBC AUTO-ENTMCNC: 32.3 G/DL (ref 32–36)
MCV RBC AUTO: 88.1 FL (ref 80–96)
MONOCYTES # BLD AUTO: 0.12 K/UL (ref 0–0.8)
MONOCYTES NFR BLD AUTO: 1.3 % (ref 2–6)
MONOCYTES NFR BLD MANUAL: 2 % (ref 2–6)
MPC BLD CALC-MCNC: 9.9 FL (ref 9.4–12.4)
NEUTROPHILS # BLD AUTO: 8.32 K/UL (ref 1.8–7.7)
NEUTROPHILS NFR BLD AUTO: 91.2 % (ref 53–65)
NEUTS BAND NFR BLD MANUAL: 5 % (ref 1–5)
NEUTS SEG NFR BLD MANUAL: 85 % (ref 50–62)
NRBC BLD MANUAL-RTO: ABNORMAL %
PCO2 BLDA: 40 MMHG (ref 35–48)
PH SMN: 7.47 [PH] (ref 7.35–7.45)
PLATELET # BLD AUTO: 129 K/UL (ref 150–400)
PLATELET MORPHOLOGY: ABNORMAL
PO2 BLDA: 67 MMHG (ref 83–108)
POC BASE EXCESS: 5 MMOL/L (ref -2–3)
POC CO2: 30.3 MMOL/L
POC SATURATED O2: 94 %
POTASSIUM SERPL-SCNC: 3.8 MMOL/L (ref 3.5–5.1)
RBC # BLD AUTO: 4.21 M/UL (ref 4.2–5.4)
RBC MORPH BLD: NORMAL
SODIUM SERPL-SCNC: 139 MMOL/L (ref 136–145)
WBC # BLD AUTO: 9.13 K/UL (ref 4.5–11)

## 2022-04-13 PROCEDURE — 11000001 HC ACUTE MED/SURG PRIVATE ROOM

## 2022-04-13 PROCEDURE — 25000242 PHARM REV CODE 250 ALT 637 W/ HCPCS: Performed by: EMERGENCY MEDICINE

## 2022-04-13 PROCEDURE — 25000003 PHARM REV CODE 250: Performed by: EMERGENCY MEDICINE

## 2022-04-13 PROCEDURE — 27000221 HC OXYGEN, UP TO 24 HOURS

## 2022-04-13 PROCEDURE — 85025 COMPLETE CBC W/AUTO DIFF WBC: CPT | Performed by: EMERGENCY MEDICINE

## 2022-04-13 PROCEDURE — 99900035 HC TECH TIME PER 15 MIN (STAT)

## 2022-04-13 PROCEDURE — 63600175 PHARM REV CODE 636 W HCPCS: Performed by: EMERGENCY MEDICINE

## 2022-04-13 PROCEDURE — 80048 BASIC METABOLIC PNL TOTAL CA: CPT | Performed by: EMERGENCY MEDICINE

## 2022-04-13 PROCEDURE — C9113 INJ PANTOPRAZOLE SODIUM, VIA: HCPCS | Performed by: EMERGENCY MEDICINE

## 2022-04-13 PROCEDURE — 27000944

## 2022-04-13 PROCEDURE — 99232 PR SUBSEQUENT HOSPITAL CARE,LEVL II: ICD-10-PCS | Mod: ,,, | Performed by: EMERGENCY MEDICINE

## 2022-04-13 PROCEDURE — 94640 AIRWAY INHALATION TREATMENT: CPT

## 2022-04-13 PROCEDURE — 94761 N-INVAS EAR/PLS OXIMETRY MLT: CPT

## 2022-04-13 PROCEDURE — 99232 SBSQ HOSP IP/OBS MODERATE 35: CPT | Mod: ,,, | Performed by: EMERGENCY MEDICINE

## 2022-04-13 PROCEDURE — 36415 COLL VENOUS BLD VENIPUNCTURE: CPT | Performed by: EMERGENCY MEDICINE

## 2022-04-13 RX ORDER — AZITHROMYCIN 250 MG/1
500 TABLET, FILM COATED ORAL DAILY
Status: DISCONTINUED | OUTPATIENT
Start: 2022-04-14 | End: 2022-04-15 | Stop reason: HOSPADM

## 2022-04-13 RX ORDER — IPRATROPIUM BROMIDE AND ALBUTEROL SULFATE 2.5; .5 MG/3ML; MG/3ML
3 SOLUTION RESPIRATORY (INHALATION) EVERY 4 HOURS PRN
Status: DISCONTINUED | OUTPATIENT
Start: 2022-04-13 | End: 2022-04-15 | Stop reason: HOSPADM

## 2022-04-13 RX ORDER — PANTOPRAZOLE SODIUM 40 MG/1
40 TABLET, DELAYED RELEASE ORAL DAILY
Status: DISCONTINUED | OUTPATIENT
Start: 2022-04-14 | End: 2022-04-15 | Stop reason: HOSPADM

## 2022-04-13 RX ADMIN — VALACYCLOVIR HYDROCHLORIDE 1000 MG: 500 TABLET, FILM COATED ORAL at 09:04

## 2022-04-13 RX ADMIN — SERTRALINE HYDROCHLORIDE 100 MG: 50 TABLET ORAL at 08:04

## 2022-04-13 RX ADMIN — SODIUM CHLORIDE: 9 INJECTION, SOLUTION INTRAVENOUS at 09:04

## 2022-04-13 RX ADMIN — HYDRALAZINE HYDROCHLORIDE 25 MG: 25 TABLET, FILM COATED ORAL at 09:04

## 2022-04-13 RX ADMIN — IPRATROPIUM BROMIDE AND ALBUTEROL SULFATE 3 ML: 2.5; .5 SOLUTION RESPIRATORY (INHALATION) at 06:04

## 2022-04-13 RX ADMIN — SENNOSIDES AND DOCUSATE SODIUM 1 TABLET: 50; 8.6 TABLET ORAL at 08:04

## 2022-04-13 RX ADMIN — CLOPIDOGREL BISULFATE 75 MG: 75 TABLET, FILM COATED ORAL at 09:04

## 2022-04-13 RX ADMIN — IPRATROPIUM BROMIDE AND ALBUTEROL SULFATE 3 ML: 2.5; .5 SOLUTION RESPIRATORY (INHALATION) at 01:04

## 2022-04-13 RX ADMIN — IPRATROPIUM BROMIDE AND ALBUTEROL SULFATE 3 ML: 2.5; .5 SOLUTION RESPIRATORY (INHALATION) at 07:04

## 2022-04-13 RX ADMIN — SODIUM CHLORIDE: 9 INJECTION, SOLUTION INTRAVENOUS at 05:04

## 2022-04-13 RX ADMIN — LORAZEPAM 0.5 MG: 0.5 TABLET ORAL at 09:04

## 2022-04-13 RX ADMIN — AZITHROMYCIN MONOHYDRATE 500 MG: 500 INJECTION, POWDER, LYOPHILIZED, FOR SOLUTION INTRAVENOUS at 09:04

## 2022-04-13 RX ADMIN — VALACYCLOVIR HYDROCHLORIDE 1000 MG: 500 TABLET, FILM COATED ORAL at 03:04

## 2022-04-13 RX ADMIN — METHYLPREDNISOLONE SODIUM SUCCINATE 125 MG: 125 INJECTION, POWDER, FOR SOLUTION INTRAMUSCULAR; INTRAVENOUS at 09:04

## 2022-04-13 RX ADMIN — PANTOPRAZOLE SODIUM 40 MG: 40 INJECTION, POWDER, FOR SOLUTION INTRAVENOUS at 09:04

## 2022-04-13 RX ADMIN — GABAPENTIN 800 MG: 400 CAPSULE ORAL at 09:04

## 2022-04-13 RX ADMIN — CEFTRIAXONE 1 G: 1 INJECTION, POWDER, FOR SOLUTION INTRAMUSCULAR; INTRAVENOUS at 04:04

## 2022-04-13 RX ADMIN — ATORVASTATIN CALCIUM 40 MG: 40 TABLET, FILM COATED ORAL at 08:04

## 2022-04-13 RX ADMIN — POLYETHYLENE GLYCOL 3350 17 G: 17 POWDER, FOR SOLUTION ORAL at 09:04

## 2022-04-13 RX ADMIN — TEMAZEPAM 30 MG: 30 CAPSULE ORAL at 08:04

## 2022-04-13 RX ADMIN — VALSARTAN 160 MG: 160 TABLET, FILM COATED ORAL at 09:04

## 2022-04-13 RX ADMIN — VALACYCLOVIR HYDROCHLORIDE 1000 MG: 500 TABLET, FILM COATED ORAL at 08:04

## 2022-04-13 RX ADMIN — SENNOSIDES AND DOCUSATE SODIUM 1 TABLET: 50; 8.6 TABLET ORAL at 09:04

## 2022-04-13 RX ADMIN — HYDRALAZINE HYDROCHLORIDE 25 MG: 25 TABLET, FILM COATED ORAL at 08:04

## 2022-04-13 RX ADMIN — GABAPENTIN 800 MG: 400 CAPSULE ORAL at 08:04

## 2022-04-13 RX ADMIN — ACETAMINOPHEN 650 MG: 325 TABLET ORAL at 07:04

## 2022-04-13 RX ADMIN — DOCUSATE SODIUM 100 MG: 100 CAPSULE, LIQUID FILLED ORAL at 09:04

## 2022-04-13 RX ADMIN — HYDRALAZINE HYDROCHLORIDE 25 MG: 25 TABLET, FILM COATED ORAL at 03:04

## 2022-04-13 NOTE — HPI
PT IS A 57 YR OLD WF ADMITTED TO [facility] ACUTE CARE WITH PNEUMONIA , HYPOXIA (89% PTA 92% IN ED AND 98% ON 2 L NC) AND UTI FOR IV ANTIBIOTICS (ROCEPHIN AND ZITHROMAX), RESPIRATORY CARE WITH DUONEBS, O2 TITRATED AND INCENTIVE SPIROMETRY, AND TREATMENT OF RECURRENT HERPES ZOSTER. PT HAS HX HEMORRHAGIC CVA AND REQUIRED PROLONGED SWING BED REHABILITATION LAST YEAR AND AN ADDITIONAL 2 WEEK [facility] SWING BED HOSPITALIZATION THIS YEAR. PT HAS BEEN IMPROVING WITH OUTPATIENT PT/OT REHABILITATION AT [facility].

## 2022-04-13 NOTE — PROGRESS NOTES
Pharmacist Intervention IV to PO Note    Herlinda Valdovinos is a 57 y.o. female being treated with IV medication azithromycin  pantoprazole    Patient Data:    Vital Signs (Most Recent):  Temp: 98 °F (36.7 °C) (04/13/22 0742)  Pulse: 92 (04/13/22 0742)  Resp: 18 (04/13/22 0742)  BP: (!) 151/95 (04/13/22 0742)  SpO2: (!) 87 % (RT was notified) (04/13/22 0742)   Vital Signs (72h Range):  Temp:  [98 °F (36.7 °C)-99 °F (37.2 °C)]   Pulse:  []   Resp:  [16-22]   BP: (124-175)/()   SpO2:  [87 %-98 %]      CBC:  Recent Labs   Lab 04/12/22  1146 04/13/22  0550   WBC 8.85 9.13   RBC 4.23 4.21   HGB 12.3 12.0   HCT 37.5* 37.1*   * 129*   MCV 88.7 88.1   MCH 29.1 28.5   MCHC 32.8 32.3     CMP:     Recent Labs   Lab 04/12/22  1146 04/13/22  0550   * 226*   CALCIUM 8.8 8.8   ALBUMIN 3.3*  --    PROT 7.0  --     139   K 3.8 3.8   CO2 26 28    104   BUN 11 14   CREATININE 0.87 0.79   ALKPHOS 137*  --    ALT 14  --    AST 23  --    BILITOT 0.9  --        Dietary Orders:  Diet Orders  Report           Diet clear liquid: Clear Liquid starting at 04/12 1544            Based on the following criteria, this patient qualifies for intravenous to oral conversion:  [x] The patients gastrointestinal tract is functioning (tolerating medications via oral or enteral route for 24 hours and tolerating food or enteral feeds for 24 hours).  [x] The patient is hemodynamically stable for 24 hours (heart rate <100 beats per minute, systolic blood pressure >99 mm Hg, and respiratory rate <20 breaths per minute).  [x] The patient shows clinical improvement (afebrile for at least 24 hours and white blood cell count downtrending or normalized). Additionally, the patient must be non-neutropenic (absolute neutrophil count >500 cells/mm3).  [x] For antimicrobials, the patient has received IV therapy for at least 24 hours.    IV medication azithromycin 500mg IV daily and pantoprazole 40mg IV daily will be changed to oral  medication azithromycin 500mg by mouth daily and pantoprazole 40mg by mouth daily.    Pharmacist's Name: Zina Morales, TjD

## 2022-04-13 NOTE — PLAN OF CARE
Problem: Adult Inpatient Plan of Care  Goal: Plan of Care Review  Outcome: Ongoing, Progressing  Goal: Patient-Specific Goal (Individualized)  Outcome: Ongoing, Progressing  Goal: Absence of Hospital-Acquired Illness or Injury  Outcome: Ongoing, Progressing  Goal: Optimal Comfort and Wellbeing  Outcome: Ongoing, Progressing  Goal: Readiness for Transition of Care  Outcome: Ongoing, Progressing     Problem: Diabetes Comorbidity  Goal: Blood Glucose Level Within Targeted Range  Outcome: Ongoing, Progressing     Problem: Fall Injury Risk  Goal: Absence of Fall and Fall-Related Injury  Outcome: Ongoing, Progressing     Problem: Skin Injury Risk Increased  Goal: Skin Health and Integrity  Outcome: Ongoing, Progressing     Problem: Impaired Wound Healing  Goal: Optimal Wound Healing  Outcome: Ongoing, Progressing     Problem: Infection  Goal: Absence of Infection Signs and Symptoms  Outcome: Ongoing, Progressing

## 2022-04-13 NOTE — ASSESSMENT & PLAN NOTE
PT HAS PNEUMONIA WITH HYPOXIA PTA WITH O2 SAT 89% PTA, 92% ON RA AND 98% ON 2 L NC  PT TREATMENT REGIMEN -  ROCEPHIN/ZITHROMAX, STEROID THERAPY, DUONEBS, INCENTIVE SPIROMETRY, O2 TITRATED PER RT

## 2022-04-13 NOTE — PLAN OF CARE
[facility]  Initial Discharge Assessment       Primary Care Provider: Vini Hernandez NP    Admission Diagnosis: Dyspnea [R06.00]  Hypoxia [R09.02]  Acute cystitis without hematuria [N30.00]  Herpes zoster without complication [B02.9]  Pneumonia of both lungs due to infectious organism, unspecified part of lung [J18.9]    Admission Date: 4/12/2022  Expected Discharge Date: 4/15/2022         Payor: MEDICARE / Plan: MEDICARE PART A & B / Product Type: Government /     Extended Emergency Contact Information  Primary Emergency Contact: Micheal Kaur  Mobile Phone: [phone]  Relation: Spouse  Preferred language: English   needed? No              [facility] - [place], [place] - [address] .  [address] [place] [address]  Phone: [phone] Fax: [phone]      Initial Assessment (most recent)       Adult Discharge Assessment - 04/13/22 1052          Discharge Assessment    Assessment Type Discharge Planning Assessment (P)      Source of Information patient (P)      Lives With spouse (P)      Prior to hospitilization cognitive status: Alert/Oriented (P)      Current cognitive status: Alert/Oriented (P)      Equipment Currently Used at Home walker, rolling;bedside commode;wheelchair;shower chair (P)      Do you currently have service(s) that help you manage your care at home? No (P)      Discharge Plan discussed with: Patient (P)                    Pt lives at home with her . Pt has had [facility] in the past but has recently been going to Op rehab. Pt has all DME needed. Pt wants to go to  at D/C from acute.

## 2022-04-13 NOTE — H&P
[facility]  [facility] Medicine  History & Physical    Patient Name: Herlinda Valdovinos  MRN: [id number]  Patient Class: IP- Inpatient  Admission Date: 4/12/2022  Attending Physician: Alis Grider MD  Primary Care Provider: Vini Hernandez NP         Patient information was obtained from patient, past medical records and ER records.     Subjective:     Principal Problem:<principal problem not specified>    Chief Complaint:   Chief Complaint   Patient presents with    Fever     C/o sudden onset of fever and wheezing yesterday at 3pm    Pneumonia        HPI: PT IS A 57 YR OLD WF ADMITTED TO [facility] ACUTE CARE WITH PNEUMONIA , HYPOXIA (89% PTA 92% IN ED AND 98% ON 2 L NC) AND UTI FOR IV ANTIBIOTICS (ROCEPHIN AND ZITHROMAX), RESPIRATORY CARE WITH DUONEBS, O2 TITRATED AND INCENTIVE SPIROMETRY, AND TREATMENT OF RECURRENT HERPES ZOSTER. PT HAS HX HEMORRHAGIC CVA AND REQUIRED PROLONGED SWING BED REHABILITATION LAST YEAR AND AN ADDITIONAL 2 WEEK [facility] SWING BED HOSPITALIZATION THIS YEAR. PT HAS BEEN IMPROVING WITH OUTPATIENT PT/OT REHABILITATION AT [facility].      Past Medical History:   Diagnosis Date    Cerebral hemorrhage     Chronic anxiety     Dehydration     Depression     Dysphagia     Due to and following non-traumatic intracerebral hemorrhage    Hearing loss     History of CVA (cerebrovascular accident)     Hypertension     Insomnia     Intrapontine hemorrhage     Left hemiparesis     Peripheral neuropathy     Transient cerebral ischemia        Past Surgical History:   Procedure Laterality Date    APPENDECTOMY      CHOLECYSTECTOMY      HYSTERECTOMY      LITHOTRIPSY      Renal lithotripsy    percutaneious insertion of ureteric stent         Review of patient's allergies indicates:   Allergen Reactions    Lamisil [terbinafine] Anaphylaxis    Codeine Itching    Compazine [prochlorperazine] Itching       No current facility-administered medications on file prior to encounter.      Current Outpatient Medications on File Prior to Encounter   Medication Sig    atorvastatin (LIPITOR) 40 MG tablet Take 40 mg by mouth every evening.    clopidogreL (PLAVIX) 75 mg tablet Take 75 mg by mouth once daily.    docusate sodium (COLACE) 100 MG capsule Take 1 capsule (100 mg total) by mouth once daily.    hydrALAZINE (APRESOLINE) 25 MG tablet Take 1 tablet (25 mg total) by mouth 3 (three) times daily.    LORazepam (ATIVAN) 0.5 MG tablet Take 1 am and afternoon and 2 hs    omeprazole (PRILOSEC) 40 MG capsule Take 40 mg by mouth every evening.    sertraline (ZOLOFT) 100 MG tablet Take 100 mg by mouth every evening.    temazepam (RESTORIL) 30 mg capsule Take 30 mg by mouth every evening.    valsartan (DIOVAN) 320 MG tablet Take 1 tablet (320 mg total) by mouth once daily. (Patient taking differently: Take 160 mg by mouth once daily.)    gabapentin (NEURONTIN) 400 MG capsule Take 2 capsules (800 mg total) by mouth 2 (two) times daily.    HYDROcodone-acetaminophen (NORCO)  mg per tablet Take 1 tablet by mouth every 8 (eight) hours as needed for Pain.    [DISCONTINUED] citalopram (CELEXA) 40 MG tablet Take 1 tablet (40 mg total) by mouth once daily.    [DISCONTINUED] hydroCHLOROthiazide (HYDRODIURIL) 12.5 MG Tab Take 1 tablet (12.5 mg total) by mouth once daily.     Family History    None       Tobacco Use    Smoking status: Never Smoker    Smokeless tobacco: Never Used   Substance and Sexual Activity    Alcohol use: Never    Drug use: Never    Sexual activity: Not on file     Review of Systems   Constitutional:  Positive for activity change and fatigue.   HENT: Negative.     Eyes: Negative.    Respiratory:  Positive for cough, shortness of breath and wheezing.    Cardiovascular: Negative.    Gastrointestinal:  Positive for nausea and vomiting.   Endocrine: Negative.    Genitourinary: Negative.    Allergic/Immunologic: Negative.    Neurological:  Positive for weakness.   Objective:      Vital Signs (Most Recent):  Temp: 98 °F (36.7 °C) (04/13/22 0742)  Pulse: 92 (04/13/22 0742)  Resp: 18 (04/13/22 0742)  BP: (!) 151/95 (04/13/22 0742)  SpO2: (!) 87 % (RT was notified) (04/13/22 0742)   Vital Signs (24h Range):  Temp:  [98 °F (36.7 °C)-99 °F (37.2 °C)] 98 °F (36.7 °C)  Pulse:  [] 92  Resp:  [16-22] 18  SpO2:  [87 %-98 %] 87 %  BP: (124-175)/() 151/95     Weight: 76.1 kg (167 lb 12.8 oz)  Body mass index is 28.8 kg/m².    Physical Exam  Vitals and nursing note reviewed. Exam conducted with a chaperone present.   Constitutional:       Appearance: She is ill-appearing.   HENT:      Head: Normocephalic and atraumatic.      Right Ear: Tympanic membrane normal.      Left Ear: Tympanic membrane normal.      Nose: Nose normal.      Mouth/Throat:      Mouth: Mucous membranes are dry.   Eyes:      Extraocular Movements: Extraocular movements intact.      Pupils: Pupils are equal, round, and reactive to light.   Cardiovascular:      Rate and Rhythm: Regular rhythm. Tachycardia present.      Pulses: Normal pulses.      Heart sounds: Normal heart sounds.   Pulmonary:      Effort: Pulmonary effort is normal.      Breath sounds: Wheezing and rhonchi present.   Abdominal:      General: Bowel sounds are normal. There is no distension.      Palpations: Abdomen is soft.      Tenderness: There is no abdominal tenderness.   Musculoskeletal:         General: No swelling or tenderness. Normal range of motion.      Cervical back: Normal range of motion and neck supple.   Skin:     General: Skin is warm.      Capillary Refill: Capillary refill takes less than 2 seconds.      Findings: Erythema (facial) and lesion (L GROIN C/W HERPES ZOSTER WITH OPEN WOUNDS) present.   Neurological:      General: No focal deficit present.      Mental Status: She is alert and oriented to person, place, and time.      Motor: Weakness (GENERALIZED) present.   Psychiatric:         Mood and Affect: Mood normal.         Behavior:  Behavior normal.         Thought Content: Thought content normal.         Judgment: Judgment normal.         CRANIAL NERVES     CN III, IV, VI   Pupils are equal, round, and reactive to light.     Significant Labs: All pertinent labs within the past 24 hours have been reviewed.  ABGs:   Recent Labs   Lab 04/12/22  1152   PH 7.47*   PCO2 40   HCO3 29.1*   POCSATURATED 94   PO2 67*     CBC:   Recent Labs   Lab 04/12/22  1146 04/13/22  0550   WBC 8.85 9.13   HGB 12.3 12.0   HCT 37.5* 37.1*   * 129*     CMP:   Recent Labs   Lab 04/12/22  1146 04/13/22  0550    139   K 3.8 3.8    104   CO2 26 28   * 226*   BUN 11 14   CREATININE 0.87 0.79   CALCIUM 8.8 8.8   PROT 7.0  --    ALBUMIN 3.3*  --    BILITOT 0.9  --    ALKPHOS 137*  --    AST 23  --    ALT 14  --    ANIONGAP 14 11   EGFRNONAA 72 80       Significant Imaging: I have reviewed all pertinent imaging results/findings within the past 24 hours.  CT: I have reviewed all pertinent results/findings within the past 24 hours and my personal findings are:  B PULMONARY DENSITIES  CXR: I have reviewed all pertinent results/findings within the past 24 hours and my personal findings are:  B PULMONARY DENISTIES    Assessment/Plan:     Herpes zoster without complication  PT HAS RECURRENT HERPES ZOSTER   [facility]  CONTACT ISOLATION      Pneumonia due to infectious organism  PT HAS PNEUMONIA WITH HYPOXIA PTA WITH O2 SAT 89% PTA, 92% ON RA AND 98% ON 2 L NC  PT TREATMENT REGIMEN -  ROCEPHIN/ZITHROMAX, STEROID THERAPY, DUONEBS, INCENTIVE SPIROMETRY, O2 TITRATED PER RT      DVT prophylaxis  TEDS, PLAVIX, EARLY AMBULATION      Acute cystitis without hematuria  ROCEPHIN  AWAIT C & S        VTE Risk Mitigation (From admission, onward)         Ordered     Place OCTAVIA hose  Until discontinued         04/12/22 1545     IP VTE LOW RISK PATIENT  Once         04/12/22 1545                   Alis Grider MD  Department of Hospital Medicine   [facility]  Surgical Unit

## 2022-04-13 NOTE — SUBJECTIVE & OBJECTIVE
Past Medical History:   Diagnosis Date    Cerebral hemorrhage     Chronic anxiety     Dehydration     Depression     Dysphagia     Due to and following non-traumatic intracerebral hemorrhage    Hearing loss     History of CVA (cerebrovascular accident)     Hypertension     Insomnia     Intrapontine hemorrhage     Left hemiparesis     Peripheral neuropathy     Transient cerebral ischemia        Past Surgical History:   Procedure Laterality Date    APPENDECTOMY      CHOLECYSTECTOMY      HYSTERECTOMY      LITHOTRIPSY      Renal lithotripsy    percutaneious insertion of ureteric stent         Review of patient's allergies indicates:   Allergen Reactions    Lamisil [terbinafine] Anaphylaxis    Codeine Itching    Compazine [prochlorperazine] Itching       No current facility-administered medications on file prior to encounter.     Current Outpatient Medications on File Prior to Encounter   Medication Sig    atorvastatin (LIPITOR) 40 MG tablet Take 40 mg by mouth every evening.    clopidogreL (PLAVIX) 75 mg tablet Take 75 mg by mouth once daily.    docusate sodium (COLACE) 100 MG capsule Take 1 capsule (100 mg total) by mouth once daily.    hydrALAZINE (APRESOLINE) 25 MG tablet Take 1 tablet (25 mg total) by mouth 3 (three) times daily.    LORazepam (ATIVAN) 0.5 MG tablet Take 1 am and afternoon and 2 hs    omeprazole (PRILOSEC) 40 MG capsule Take 40 mg by mouth every evening.    sertraline (ZOLOFT) 100 MG tablet Take 100 mg by mouth every evening.    temazepam (RESTORIL) 30 mg capsule Take 30 mg by mouth every evening.    valsartan (DIOVAN) 320 MG tablet Take 1 tablet (320 mg total) by mouth once daily. (Patient taking differently: Take 160 mg by mouth once daily.)    gabapentin (NEURONTIN) 400 MG capsule Take 2 capsules (800 mg total) by mouth 2 (two) times daily.    HYDROcodone-acetaminophen (NORCO)  mg per tablet Take 1 tablet by mouth every 8 (eight) hours as needed for Pain.    [DISCONTINUED] citalopram  (CELEXA) 40 MG tablet Take 1 tablet (40 mg total) by mouth once daily.    [DISCONTINUED] hydroCHLOROthiazide (HYDRODIURIL) 12.5 MG Tab Take 1 tablet (12.5 mg total) by mouth once daily.     Family History    None       Tobacco Use    Smoking status: Never Smoker    Smokeless tobacco: Never Used   Substance and Sexual Activity    Alcohol use: Never    Drug use: Never    Sexual activity: Not on file     Review of Systems   Constitutional:  Positive for activity change and fatigue.   HENT: Negative.     Eyes: Negative.    Respiratory:  Positive for cough, shortness of breath and wheezing.    Cardiovascular: Negative.    Gastrointestinal:  Positive for nausea and vomiting.   Endocrine: Negative.    Genitourinary: Negative.    Allergic/Immunologic: Negative.    Neurological:  Positive for weakness.   Objective:     Vital Signs (Most Recent):  Temp: 98 °F (36.7 °C) (04/13/22 0742)  Pulse: 92 (04/13/22 0742)  Resp: 18 (04/13/22 0742)  BP: (!) 151/95 (04/13/22 0742)  SpO2: (!) 87 % (RT was notified) (04/13/22 0742)   Vital Signs (24h Range):  Temp:  [98 °F (36.7 °C)-99 °F (37.2 °C)] 98 °F (36.7 °C)  Pulse:  [] 92  Resp:  [16-22] 18  SpO2:  [87 %-98 %] 87 %  BP: (124-175)/() 151/95     Weight: 76.1 kg (167 lb 12.8 oz)  Body mass index is 28.8 kg/m².    Physical Exam  Vitals and nursing note reviewed. Exam conducted with a chaperone present.   Constitutional:       Appearance: She is ill-appearing.   HENT:      Head: Normocephalic and atraumatic.      Right Ear: Tympanic membrane normal.      Left Ear: Tympanic membrane normal.      Nose: Nose normal.      Mouth/Throat:      Mouth: Mucous membranes are dry.   Eyes:      Extraocular Movements: Extraocular movements intact.      Pupils: Pupils are equal, round, and reactive to light.   Cardiovascular:      Rate and Rhythm: Regular rhythm. Tachycardia present.      Pulses: Normal pulses.      Heart sounds: Normal heart sounds.   Pulmonary:      Effort: Pulmonary  effort is normal.      Breath sounds: Wheezing and rhonchi present.   Abdominal:      General: Bowel sounds are normal. There is no distension.      Palpations: Abdomen is soft.      Tenderness: There is no abdominal tenderness.   Musculoskeletal:         General: No swelling or tenderness. Normal range of motion.      Cervical back: Normal range of motion and neck supple.   Skin:     General: Skin is warm.      Capillary Refill: Capillary refill takes less than 2 seconds.      Findings: Erythema (facial) and lesion (L GROIN C/W HERPES ZOSTER WITH OPEN WOUNDS) present.   Neurological:      General: No focal deficit present.      Mental Status: She is alert and oriented to person, place, and time.      Motor: Weakness (GENERALIZED) present.   Psychiatric:         Mood and Affect: Mood normal.         Behavior: Behavior normal.         Thought Content: Thought content normal.         Judgment: Judgment normal.         CRANIAL NERVES     CN III, IV, VI   Pupils are equal, round, and reactive to light.     Significant Labs: All pertinent labs within the past 24 hours have been reviewed.  ABGs:   Recent Labs   Lab 04/12/22  1152   PH 7.47*   PCO2 40   HCO3 29.1*   POCSATURATED 94   PO2 67*     CBC:   Recent Labs   Lab 04/12/22  1146 04/13/22  0550   WBC 8.85 9.13   HGB 12.3 12.0   HCT 37.5* 37.1*   * 129*     CMP:   Recent Labs   Lab 04/12/22  1146 04/13/22  0550    139   K 3.8 3.8    104   CO2 26 28   * 226*   BUN 11 14   CREATININE 0.87 0.79   CALCIUM 8.8 8.8   PROT 7.0  --    ALBUMIN 3.3*  --    BILITOT 0.9  --    ALKPHOS 137*  --    AST 23  --    ALT 14  --    ANIONGAP 14 11   EGFRNONAA 72 80       Significant Imaging: I have reviewed all pertinent imaging results/findings within the past 24 hours.  CT: I have reviewed all pertinent results/findings within the past 24 hours and my personal findings are:  B PULMONARY DENSITIES  CXR: I have reviewed all pertinent results/findings within the  past 24 hours and my personal findings are:  B PULMONARY DENISTIES

## 2022-04-14 LAB
ANION GAP SERPL CALCULATED.3IONS-SCNC: 12 MMOL/L (ref 7–16)
BASOPHILS # BLD AUTO: 0 K/UL (ref 0–0.2)
BASOPHILS # BLD AUTO: 0 K/UL (ref 0–0.2)
BASOPHILS NFR BLD AUTO: 0 % (ref 0–1)
BASOPHILS NFR BLD AUTO: 0 % (ref 0–1)
BUN SERPL-MCNC: 18 MG/DL (ref 7–18)
BUN/CREAT SERPL: 25 (ref 6–20)
CALCIUM SERPL-MCNC: 8.7 MG/DL (ref 8.5–10.1)
CHLORIDE SERPL-SCNC: 104 MMOL/L (ref 98–107)
CO2 SERPL-SCNC: 27 MMOL/L (ref 21–32)
CREAT SERPL-MCNC: 0.71 MG/DL (ref 0.55–1.02)
DIFFERENTIAL METHOD BLD: ABNORMAL
EOSINOPHIL # BLD AUTO: 0 K/UL (ref 0–0.5)
EOSINOPHIL # BLD AUTO: 0 K/UL (ref 0–0.5)
EOSINOPHIL NFR BLD AUTO: 0 % (ref 1–4)
EOSINOPHIL NFR BLD AUTO: 0 % (ref 1–4)
EOSINOPHIL NFR BLD MANUAL: 1 % (ref 1–4)
ERYTHROCYTE [DISTWIDTH] IN BLOOD BY AUTOMATED COUNT: 14.1 % (ref 11.5–14.5)
ERYTHROCYTE [DISTWIDTH] IN BLOOD BY AUTOMATED COUNT: 14.1 % (ref 11.5–14.5)
GLUCOSE SERPL-MCNC: 192 MG/DL (ref 74–106)
HCT VFR BLD AUTO: 36.5 % (ref 38–47)
HCT VFR BLD AUTO: 36.5 % (ref 38–47)
HGB BLD-MCNC: 11.8 G/DL (ref 12–16)
HGB BLD-MCNC: 11.8 G/DL (ref 12–16)
LYMPHOCYTES # BLD AUTO: 0.79 K/UL (ref 1–4.8)
LYMPHOCYTES # BLD AUTO: 0.79 K/UL (ref 1–4.8)
LYMPHOCYTES NFR BLD AUTO: 7.9 % (ref 27–41)
LYMPHOCYTES NFR BLD AUTO: 7.9 % (ref 27–41)
LYMPHOCYTES NFR BLD MANUAL: 9 % (ref 27–41)
MCH RBC QN AUTO: 29 PG (ref 27–31)
MCH RBC QN AUTO: 29 PG (ref 27–31)
MCHC RBC AUTO-ENTMCNC: 32.3 G/DL (ref 32–36)
MCHC RBC AUTO-ENTMCNC: 32.3 G/DL (ref 32–36)
MCV RBC AUTO: 89.7 FL (ref 80–96)
MCV RBC AUTO: 89.7 FL (ref 80–96)
MONOCYTES # BLD AUTO: 0.39 K/UL (ref 0–0.8)
MONOCYTES # BLD AUTO: 0.39 K/UL (ref 0–0.8)
MONOCYTES NFR BLD AUTO: 3.9 % (ref 2–6)
MONOCYTES NFR BLD AUTO: 3.9 % (ref 2–6)
MONOCYTES NFR BLD MANUAL: 3 % (ref 2–6)
MPC BLD CALC-MCNC: 9.9 FL (ref 9.4–12.4)
MPC BLD CALC-MCNC: 9.9 FL (ref 9.4–12.4)
NEUTROPHILS # BLD AUTO: 8.83 K/UL (ref 1.8–7.7)
NEUTROPHILS # BLD AUTO: 8.83 K/UL (ref 1.8–7.7)
NEUTROPHILS NFR BLD AUTO: 88.2 % (ref 53–65)
NEUTROPHILS NFR BLD AUTO: 88.2 % (ref 53–65)
NEUTS BAND NFR BLD MANUAL: 7 % (ref 1–5)
NEUTS SEG NFR BLD MANUAL: 80 % (ref 50–62)
NRBC BLD MANUAL-RTO: ABNORMAL %
PLATELET # BLD AUTO: 144 K/UL (ref 150–400)
PLATELET # BLD AUTO: 144 K/UL (ref 150–400)
PLATELET MORPHOLOGY: ABNORMAL
POTASSIUM SERPL-SCNC: 4.2 MMOL/L (ref 3.5–5.1)
RBC # BLD AUTO: 4.07 M/UL (ref 4.2–5.4)
RBC # BLD AUTO: 4.07 M/UL (ref 4.2–5.4)
RBC MORPH BLD: NORMAL
SODIUM SERPL-SCNC: 139 MMOL/L (ref 136–145)
WBC # BLD AUTO: 10.01 K/UL (ref 4.5–11)
WBC # BLD AUTO: 10.01 K/UL (ref 4.5–11)

## 2022-04-14 PROCEDURE — 36415 COLL VENOUS BLD VENIPUNCTURE: CPT | Performed by: EMERGENCY MEDICINE

## 2022-04-14 PROCEDURE — 25000242 PHARM REV CODE 250 ALT 637 W/ HCPCS: Performed by: EMERGENCY MEDICINE

## 2022-04-14 PROCEDURE — 94640 AIRWAY INHALATION TREATMENT: CPT

## 2022-04-14 PROCEDURE — 85025 COMPLETE CBC W/AUTO DIFF WBC: CPT | Performed by: EMERGENCY MEDICINE

## 2022-04-14 PROCEDURE — 11000001 HC ACUTE MED/SURG PRIVATE ROOM

## 2022-04-14 PROCEDURE — 94761 N-INVAS EAR/PLS OXIMETRY MLT: CPT

## 2022-04-14 PROCEDURE — 99232 PR SUBSEQUENT HOSPITAL CARE,LEVL II: ICD-10-PCS | Mod: ,,, | Performed by: EMERGENCY MEDICINE

## 2022-04-14 PROCEDURE — 27000221 HC OXYGEN, UP TO 24 HOURS

## 2022-04-14 PROCEDURE — 80048 BASIC METABOLIC PNL TOTAL CA: CPT | Performed by: EMERGENCY MEDICINE

## 2022-04-14 PROCEDURE — 99900035 HC TECH TIME PER 15 MIN (STAT)

## 2022-04-14 PROCEDURE — 63600175 PHARM REV CODE 636 W HCPCS: Performed by: EMERGENCY MEDICINE

## 2022-04-14 PROCEDURE — 63700000 PHARM REV CODE 250 ALT 637 W/O HCPCS: Performed by: EMERGENCY MEDICINE

## 2022-04-14 PROCEDURE — 25000003 PHARM REV CODE 250

## 2022-04-14 PROCEDURE — 99232 SBSQ HOSP IP/OBS MODERATE 35: CPT | Mod: ,,, | Performed by: EMERGENCY MEDICINE

## 2022-04-14 PROCEDURE — 27000944

## 2022-04-14 PROCEDURE — 25000003 PHARM REV CODE 250: Performed by: EMERGENCY MEDICINE

## 2022-04-14 RX ORDER — CLONIDINE HYDROCHLORIDE 0.1 MG/1
0.1 TABLET ORAL
Status: COMPLETED | OUTPATIENT
Start: 2022-04-14 | End: 2022-04-14

## 2022-04-14 RX ORDER — LORAZEPAM 0.5 MG/1
0.5 TABLET ORAL 2 TIMES DAILY
Status: DISCONTINUED | OUTPATIENT
Start: 2022-04-14 | End: 2022-04-15 | Stop reason: HOSPADM

## 2022-04-14 RX ADMIN — HYDRALAZINE HYDROCHLORIDE 25 MG: 25 TABLET, FILM COATED ORAL at 03:04

## 2022-04-14 RX ADMIN — SERTRALINE HYDROCHLORIDE 100 MG: 50 TABLET ORAL at 08:04

## 2022-04-14 RX ADMIN — BENZONATATE 100 MG: 100 CAPSULE ORAL at 01:04

## 2022-04-14 RX ADMIN — ACETAMINOPHEN 650 MG: 325 TABLET ORAL at 06:04

## 2022-04-14 RX ADMIN — METHYLPREDNISOLONE SODIUM SUCCINATE 125 MG: 125 INJECTION, POWDER, FOR SOLUTION INTRAMUSCULAR; INTRAVENOUS at 08:04

## 2022-04-14 RX ADMIN — POLYETHYLENE GLYCOL 3350 17 G: 17 POWDER, FOR SOLUTION ORAL at 08:04

## 2022-04-14 RX ADMIN — LORAZEPAM 0.5 MG: 0.5 TABLET ORAL at 08:04

## 2022-04-14 RX ADMIN — AZITHROMYCIN MONOHYDRATE 500 MG: 250 TABLET ORAL at 08:04

## 2022-04-14 RX ADMIN — HYDRALAZINE HYDROCHLORIDE 25 MG: 25 TABLET, FILM COATED ORAL at 08:04

## 2022-04-14 RX ADMIN — CLONIDINE HYDROCHLORIDE 0.1 MG: 0.1 TABLET ORAL at 07:04

## 2022-04-14 RX ADMIN — CEFTRIAXONE 1 G: 1 INJECTION, POWDER, FOR SOLUTION INTRAMUSCULAR; INTRAVENOUS at 05:04

## 2022-04-14 RX ADMIN — GABAPENTIN 800 MG: 400 CAPSULE ORAL at 08:04

## 2022-04-14 RX ADMIN — VALACYCLOVIR HYDROCHLORIDE 1000 MG: 500 TABLET, FILM COATED ORAL at 03:04

## 2022-04-14 RX ADMIN — PANTOPRAZOLE SODIUM 40 MG: 40 TABLET, DELAYED RELEASE ORAL at 08:04

## 2022-04-14 RX ADMIN — METHYLPREDNISOLONE SODIUM SUCCINATE 80 MG: 40 INJECTION, POWDER, FOR SOLUTION INTRAMUSCULAR; INTRAVENOUS at 09:04

## 2022-04-14 RX ADMIN — CLOPIDOGREL BISULFATE 75 MG: 75 TABLET, FILM COATED ORAL at 08:04

## 2022-04-14 RX ADMIN — VALACYCLOVIR HYDROCHLORIDE 1000 MG: 500 TABLET, FILM COATED ORAL at 08:04

## 2022-04-14 RX ADMIN — IPRATROPIUM BROMIDE AND ALBUTEROL SULFATE 3 ML: 2.5; .5 SOLUTION RESPIRATORY (INHALATION) at 07:04

## 2022-04-14 RX ADMIN — IPRATROPIUM BROMIDE AND ALBUTEROL SULFATE 3 ML: 2.5; .5 SOLUTION RESPIRATORY (INHALATION) at 05:04

## 2022-04-14 RX ADMIN — SENNOSIDES AND DOCUSATE SODIUM 1 TABLET: 50; 8.6 TABLET ORAL at 08:04

## 2022-04-14 RX ADMIN — DOCUSATE SODIUM 100 MG: 100 CAPSULE, LIQUID FILLED ORAL at 08:04

## 2022-04-14 RX ADMIN — VALSARTAN 160 MG: 160 TABLET, FILM COATED ORAL at 08:04

## 2022-04-14 RX ADMIN — TEMAZEPAM 30 MG: 30 CAPSULE ORAL at 08:04

## 2022-04-14 RX ADMIN — ONDANSETRON 4 MG: 2 INJECTION INTRAMUSCULAR; INTRAVENOUS at 02:04

## 2022-04-14 RX ADMIN — ATORVASTATIN CALCIUM 40 MG: 40 TABLET, FILM COATED ORAL at 08:04

## 2022-04-14 NOTE — PLAN OF CARE
Problem: Adult Inpatient Plan of Care  Goal: Plan of Care Review  Outcome: Ongoing, Progressing  Goal: Patient-Specific Goal (Individualized)  Outcome: Ongoing, Progressing  Goal: Absence of Hospital-Acquired Illness or Injury  Outcome: Ongoing, Progressing  Intervention: Identify and Manage Fall Risk  Flowsheets (Taken 4/13/2022 2228)  Safety Promotion/Fall Prevention:   assistive device/personal item within reach   bed alarm set   commode/urinal/bedpan at bedside   Fall Risk reviewed with patient/family   Fall Risk signage in place   lighting adjusted   medications reviewed  Intervention: Prevent Skin Injury  Flowsheets (Taken 4/13/2022 2228)  Body Position:   position maintained   position changed independently  Skin Protection: adhesive use limited  Intervention: Prevent and Manage VTE (Venous Thromboembolism) Risk  Flowsheets (Taken 4/13/2022 2228)  VTE Prevention/Management: remove, assess skin, and reapply compression stockings  Range of Motion: active ROM (range of motion) encouraged  Goal: Optimal Comfort and Wellbeing  Outcome: Ongoing, Progressing  Goal: Readiness for Transition of Care  Outcome: Ongoing, Progressing     Problem: Fall Injury Risk  Goal: Absence of Fall and Fall-Related Injury  Outcome: Ongoing, Progressing     Problem: Infection  Goal: Absence of Infection Signs and Symptoms  Outcome: Ongoing, Progressing  Intervention: Prevent or Manage Infection  Flowsheets (Taken 4/13/2022 2228)  Infection Management: aseptic technique maintained  Isolation Precautions: precautions maintained     Problem: Fluid Imbalance (Pneumonia)  Goal: Fluid Balance  Outcome: Ongoing, Progressing  Intervention: Monitor and Manage Fluid Balance  Flowsheets (Taken 4/13/2022 2228)  Fluid/Electrolyte Management: fluids provided

## 2022-04-14 NOTE — PROGRESS NOTES
"[facility] - Medical Surgical Unit  [facility] Medicine  Progress Note    Patient Name: Herlinda Valdovinos  MRN: [id number]  Patient Class: IP- Inpatient   Admission Date: 4/12/2022  Length of Stay: 2 days  Attending Physician: Alis Grider MD  Primary Care Provider: Vini Hernandez NP        Subjective:     Principal Problem:Pneumonia due to infectious organism        HPI:  PT IS A 57 YR OLD WF ADMITTED TO [facility] ACUTE CARE WITH PNEUMONIA , HYPOXIA (89% PTA 92% IN ED AND 98% ON 2 L NC) AND UTI FOR IV ANTIBIOTICS (ROCEPHIN AND ZITHROMAX), RESPIRATORY CARE WITH DUONEBS, O2 TITRATED AND INCENTIVE SPIROMETRY, AND TREATMENT OF RECURRENT HERPES ZOSTER. PT HAS HX HEMORRHAGIC CVA AND REQUIRED PROLONGED SWING BED REHABILITATION LAST YEAR AND AN ADDITIONAL 2 WEEK [facility] SWING BED HOSPITALIZATION THIS YEAR. PT HAS BEEN IMPROVING WITH OUTPATIENT PT/OT REHABILITATION AT [facility].      Overview/Hospital Course:  04/13/2022 HOSPITAL DAY 1  PT IS A 57 YR OLD WF ADMITTED TO [facility] FOR TREATMENT FOR CAP, UTI, BRONCHOSPASM, AND RECURRENT HERPES ZOSTER WITH ROCEPHIN, ZITHROMAX, SOLUMEDROL,DUONEBS, O2 AND INCENTIVE SPIROMETRY. AND VALTREX. PT HAS IMPROVED MILDLY WITH STABLE O2 SATS 94-97% EXCEPT FOR EARLY AM 87% SAT WHICH IMPROVED WITH RESPIRATORY INTERVENTION WITH A DUONEB TREATMENT. PT'  PT'S LABS ARE STABLE; .    04/14/2022 HOSPITAL DAY 2  PT CONTINUES TREATMENT FOR CAP ,UTI, AND RECURRENT HERPES ZOSTER WITH CONTINUED IMPROVEMENT  WITH O2 SAT 94-97% ON RA.  PT IS ON DAY 3 ROCEPHIN AND ZITHROMAX;SOLUMEDROL WILL BE TAPERED. PT STATES HERPES ZOSTER NEUROPATHIC PAIN IS IMPROVED BUT REQUESTS A TOPICAL PREPARATION AS WELL. LABS ARE STABLE WITH  WITH HX "PREDIABETIC"; DIET CONTROLLED. PT'S DIET WILL BE ADVANCED TO CARDIAC DIET.          Interval History:PT CONTINUES DAY 3 IV ABX FOR CAP, UTI AND TREATMENT FOR HERPES ZOSTER. PT IS IMPROVING MILDLY WITH SAT 94-97% ON 3 L NC. LABS ARE STABLE.    Review of Systems   Constitutional:  " Positive for activity change and fatigue. Negative for appetite change.   HENT: Negative.     Eyes: Negative.    Respiratory:  Positive for cough and shortness of breath.    Cardiovascular: Negative.    Gastrointestinal: Negative.    Endocrine: Negative.    Musculoskeletal:  Positive for arthralgias. Back pain: L GROIN DT HERPES ZOSTER.  Skin:  Positive for rash.   Allergic/Immunologic: Negative.    Neurological:  Negative for weakness.   Hematological: Negative.    Psychiatric/Behavioral: Negative.     Objective:     Vital Signs (Most Recent):  Temp: 97.8 °F (36.6 °C) (04/14/22 0738)  Pulse: 88 (04/14/22 0738)  Resp: 18 (04/14/22 0738)  BP: (!) 160/94 (04/14/22 0738)  SpO2: 97 % (04/14/22 0738)   Vital Signs (24h Range):  Temp:  [97.8 °F (36.6 °C)-98.9 °F (37.2 °C)] 97.8 °F (36.6 °C)  Pulse:  [] 88  Resp:  [16-20] 18  SpO2:  [92 %-97 %] 97 %  BP: (138-160)/() 160/94     Weight: 76.1 kg (167 lb 12.8 oz)  Body mass index is 28.8 kg/m².    Intake/Output Summary (Last 24 hours) at 4/14/2022 1106  Last data filed at 4/14/2022 0730  Gross per 24 hour   Intake 1080 ml   Output --   Net 1080 ml      Physical Exam  Vitals and nursing note reviewed. Exam conducted with a chaperone present.   Constitutional:       Appearance: She is ill-appearing.   HENT:      Head: Normocephalic and atraumatic.      Right Ear: Tympanic membrane normal.      Left Ear: Tympanic membrane normal.      Nose: Nose normal.      Mouth/Throat:      Mouth: Mucous membranes are dry.   Eyes:      Extraocular Movements: Extraocular movements intact.      Pupils: Pupils are equal, round, and reactive to light.   Cardiovascular:      Rate and Rhythm: Normal rate and regular rhythm.      Pulses: Normal pulses.      Heart sounds: Normal heart sounds.   Pulmonary:      Breath sounds: Wheezing and rhonchi present.   Abdominal:      General: Bowel sounds are normal.      Palpations: Abdomen is soft.      Tenderness: There is no abdominal tenderness.    Musculoskeletal:         General: Normal range of motion.      Cervical back: Normal range of motion.   Skin:     General: Skin is warm.      Capillary Refill: Capillary refill takes less than 2 seconds.      Findings: Erythema (FACIAL FLUSHING SUSPECT DT STEROID THERAPY) and rash (L GROIN DT HERPES ZOSTER IS UNCHANGED) present.   Neurological:      General: No focal deficit present.      Mental Status: She is alert and oriented to person, place, and time.      Motor: Weakness present.   Psychiatric:         Mood and Affect: Mood normal.         Thought Content: Thought content normal.         Judgment: Judgment normal.       Significant Labs: All pertinent labs within the past 24 hours have been reviewed.  BMP:   Recent Labs   Lab 04/14/22  0726   *      K 4.2      CO2 27   BUN 18   CREATININE 0.71   CALCIUM 8.7     CBC:   Recent Labs   Lab 04/13/22  0550 04/14/22  0726 04/14/22  0728   WBC 9.13 10.01 10.01   HGB 12.0 11.8* 11.8*   HCT 37.1* 36.5* 36.5*   * 144* 144*       Significant Imaging: I have reviewed all pertinent imaging results/findings within the past 24 hours.NONE      Assessment/Plan:      * Pneumonia due to infectious organism  PT HAS PNEUMONIA WITH HYPOXIA PTA WITH O2 SAT 89% PTA, 92% ON RA AND 98% ON 2 L NC  PT TREATMENT REGIMEN -  ROCEPHIN/ZITHROMAX, STEROID THERAPY, DUONEBS, INCENTIVE SPIROMETRY, O2 TITRATED PER RT  04/13/2022  STABLE ON CURRENT REGIMEN  04/14/2022  PT IS ON DAY 3 ROCEPHIN/ZITHROMAX AND SOLUMEDROL WITH IMPROVEMENT NOTED AND STABLE SAT 94-97% ON 3 L NC  PT HAS CONTINUED COUGH AND DYSPNEA  WITH EXERTION      Herpes zoster without complication  PT HAS RECURRENT HERPES ZOSTER   VALTREX  CONTACT ISOLATION  04/13/2022  CONTINUE HZ TREATMENT  04/14/2022  CONTINUE VALTREX      DVT prophylaxis  TEDS, PLAVIX, EARLY AMBULATION  04/13/2022  CONTINUE VTE PPX  0414/2022  CONTINUE VTE PPX      Acute cystitis without hematuria  ROCEPHIN  AWAIT C &  S  04/13/2022  CONTINUE ROCEPHIN  C AND S IS PENDING  04/14/2022  CONTINUE ROCEPHIN  CHECK ON UR  CULTURE        VTE Risk Mitigation (From admission, onward)         Ordered     Place OCTAVIA hose  Until discontinued         04/12/22 1545     IP VTE LOW RISK PATIENT  Once         04/12/22 1545                Discharge Planning   POOL: 4/16/2022     Code Status: Full Code   Is the patient medically ready for discharge?:     Reason for patient still in hospital (select all that apply): Patient trending condition, Laboratory test, Treatment and Consult recommendations                     Alis Grider MD  Department of Hospital Medicine   [facility] - Medical Surgical Unit

## 2022-04-14 NOTE — ASSESSMENT & PLAN NOTE
PT HAS PNEUMONIA WITH HYPOXIA PTA WITH O2 SAT 89% PTA, 92% ON RA AND 98% ON 2 L NC  PT TREATMENT REGIMEN -  ROCEPHIN/ZITHROMAX, STEROID THERAPY, DUONEBS, INCENTIVE SPIROMETRY, O2 TITRATED PER RT  04/13/2022  STABLE ON CURRENT REGIMEN  04/14/2022  PT IS ON DAY 3 ROCEPHIN/ZITHROMAX AND SOLUMEDROL WITH IMPROVEMENT NOTED AND STABLE SAT 94-97% ON 3 L NC  PT HAS CONTINUED COUGH AND DYSPNEA  WITH EXERTION

## 2022-04-14 NOTE — PROGRESS NOTES
[facility] - Medical Surgical Unit  [facility] Medicine  Progress Note    Patient Name: Herlinda Valdovinos  MRN: [id number]  Patient Class: IP- Inpatient   Admission Date: 4/12/2022  Length of Stay: 2 days  Attending Physician: Alis Grider MD  Primary Care Provider: Vini Hernandez NP        Subjective:     Principal Problem:Pneumonia due to infectious organism        HPI:  PT IS A 57 YR OLD WF ADMITTED TO [facility] ACUTE CARE WITH PNEUMONIA , HYPOXIA (89% PTA 92% IN ED AND 98% ON 2 L NC) AND UTI FOR IV ANTIBIOTICS (ROCEPHIN AND ZITHROMAX), RESPIRATORY CARE WITH DUONEBS, O2 TITRATED AND INCENTIVE SPIROMETRY, AND TREATMENT OF RECURRENT HERPES ZOSTER. PT HAS HX HEMORRHAGIC CVA AND REQUIRED PROLONGED SWING BED REHABILITATION LAST YEAR AND AN ADDITIONAL 2 WEEK [facility] SWING BED HOSPITALIZATION THIS YEAR. PT HAS BEEN IMPROVING WITH OUTPATIENT PT/OT REHABILITATION AT [facility].      Overview/Hospital Course:  04/13/2022 HOSPITAL DAY 1  PT IS A 57 YR OLD WF ADMITTED TO [facility] FOR TREATMENT FOR CAP, UTI, BRONCHOSPASM, AND RECURRENT HERPES ZOSTER WITH ROCEPHIN, ZITHROMAX, SOLUMEDROL,DUONEBS, O2 AND INCENTIVE SPIROMETRY. AND VALTREX. PT HAS IMPROVED MILDLY WITH STABLE O2 SATS 94-97% EXCEPT FOR EARLY AM 87% SAT WHICH IMPROVED WITH RESPIRATORY INTERVENTION WITH A DUONEB TREATMENT. PT;S LABS ARE STABLE; .      No new subjective & objective note has been filed under this hospital service since the last note was generated.      Assessment/Plan:      * Pneumonia due to infectious organism  PT HAS PNEUMONIA WITH HYPOXIA PTA WITH O2 SAT 89% PTA, 92% ON RA AND 98% ON 2 L NC  PT TREATMENT REGIMEN -  ROCEPHIN/ZITHROMAX, STEROID THERAPY, DUONEBS, INCENTIVE SPIROMETRY, O2 TITRATED PER RT  04/13/2022  STABLE ON CURRENT REGIMEN      Herpes zoster without complication  PT HAS RECURRENT HERPES ZOSTER   VALTREX  CONTACT ISOLATION  04/13/2022  CONTINUE HZ TREATMENT      DVT prophylaxis  TEDS, PLAVIX, EARLY AMBULATION  04/13/2022  CONTINUE VTE  PPX      Acute cystitis without hematuria  ROCEPHIN  AWAIT C & S  04/13/2022  CONTINUE ROCEPHIN  C AND S IS PENDING        VTE Risk Mitigation (From admission, onward)         Ordered     Place OCTAVIA hose  Until discontinued         04/12/22 1545     IP VTE LOW RISK PATIENT  Once         04/12/22 1545                Discharge Planning   POOL: 4/15/2022     Code Status: Full Code   Is the patient medically ready for discharge?:     Reason for patient still in hospital (select all that apply): Patient trending condition, Laboratory test, Treatment, Imaging and Consult recommendations                     Alis Grider MD  Department of Hospital Medicine   [facility] - Medical Surgical Unit

## 2022-04-14 NOTE — ASSESSMENT & PLAN NOTE
PT HAS RECURRENT HERPES ZOSTER   VALTREX  CONTACT ISOLATION  04/13/2022  CONTINUE HZ TREATMENT  04/14/2022  CONTINUE VALTREX

## 2022-04-14 NOTE — SUBJECTIVE & OBJECTIVE
Interval History: PT CONTINUES TREATMENT FOR CAP,UTI AND HERPES ZOSTER WITH MILD INTERVAL IMPROVEMENT.    Review of Systems   Constitutional:  Positive for activity change and fatigue.   HENT: Negative.     Eyes: Negative.    Respiratory:  Positive for cough, shortness of breath and wheezing.    Cardiovascular:  Negative for chest pain.   Gastrointestinal: Negative.    Endocrine: Negative.    Genitourinary: Negative.    Musculoskeletal: Negative.    Skin:  Positive for rash (L GROIN).   Allergic/Immunologic: Negative.    Neurological:  Positive for weakness.   Psychiatric/Behavioral: Negative.     Objective:     Vital Signs (Most Recent):  Temp: 98.8 °F (37.1 °C) (04/13/22 1915)  Pulse: 84 (04/13/22 1938)  Resp: 20 (04/13/22 1938)  BP: (!) 151/99 (04/13/22 1915)  SpO2: 97 % (04/13/22 1938)   Vital Signs (24h Range):  Temp:  [98 °F (36.7 °C)-98.8 °F (37.1 °C)] 98.8 °F (37.1 °C)  Pulse:  [] 84  Resp:  [16-20] 20  SpO2:  [87 %-98 %] 97 %  BP: (136-151)/(90-99) 151/99     Weight: 76.1 kg (167 lb 12.8 oz)  Body mass index is 28.8 kg/m².    Intake/Output Summary (Last 24 hours) at 4/13/2022 2346  Last data filed at 4/13/2022 1700  Gross per 24 hour   Intake 900 ml   Output --   Net 900 ml      Physical Exam  Vitals and nursing note reviewed. Exam conducted with a chaperone present.   Constitutional:       Appearance: Normal appearance. She is ill-appearing.   HENT:      Head: Normocephalic and atraumatic.      Right Ear: Tympanic membrane normal.      Left Ear: Tympanic membrane normal.      Nose: Nose normal.      Mouth/Throat:      Mouth: Mucous membranes are moist.   Eyes:      Extraocular Movements: Extraocular movements intact.      Pupils: Pupils are equal, round, and reactive to light.   Cardiovascular:      Rate and Rhythm: Normal rate and regular rhythm.      Pulses: Normal pulses.      Heart sounds: Normal heart sounds.   Pulmonary:      Effort: Pulmonary effort is normal.      Breath sounds: Wheezing and  rhonchi present.   Abdominal:      General: Bowel sounds are normal. There is no distension.      Palpations: Abdomen is soft.      Tenderness: There is no abdominal tenderness.   Musculoskeletal:         General: Normal range of motion.      Cervical back: Normal range of motion and neck supple.   Skin:     General: Skin is warm.      Capillary Refill: Capillary refill takes less than 2 seconds.      Findings: Lesion (L GROIN ZOSTER RASH WITH OPEN WOUNDS) present.   Neurological:      General: No focal deficit present.      Mental Status: She is alert and oriented to person, place, and time.   Psychiatric:         Mood and Affect: Mood normal.         Thought Content: Thought content normal.         Judgment: Judgment normal.       Significant Labs: All pertinent labs within the past 24 hours have been reviewed.  BMP:   Recent Labs   Lab 04/13/22  0550   *      K 3.8      CO2 28   BUN 14   CREATININE 0.79   CALCIUM 8.8     CBC:   Recent Labs   Lab 04/12/22  1146 04/13/22  0550   WBC 8.85 9.13   HGB 12.3 12.0   HCT 37.5* 37.1*   * 129*       Significant Imaging: I have reviewed all pertinent imaging results/findings within the past 24 hours.  CT: I have reviewed all pertinent results/findings within the past 24 hours and my personal findings are:  B OPACITIES  CXR: I have reviewed all pertinent results/findings within the past 24 hours and my personal findings are:  B OPACITIES

## 2022-04-14 NOTE — SUBJECTIVE & OBJECTIVE
Interval History:PT CONTINUES DAY 3 IV ABX FOR CAP, UTI AND TREATMENT FOR HERPES ZOSTER. PT IS IMPROVING MILDLY WITH SAT 94-97% ON 3 L NC. LABS ARE STABLE.    Review of Systems   Constitutional:  Positive for activity change and fatigue. Negative for appetite change.   HENT: Negative.     Eyes: Negative.    Respiratory:  Positive for cough and shortness of breath.    Cardiovascular: Negative.    Gastrointestinal: Negative.    Endocrine: Negative.    Musculoskeletal:  Positive for arthralgias. Back pain: L GROIN DT HERPES ZOSTER.  Skin:  Positive for rash.   Allergic/Immunologic: Negative.    Neurological:  Negative for weakness.   Hematological: Negative.    Psychiatric/Behavioral: Negative.     Objective:     Vital Signs (Most Recent):  Temp: 97.8 °F (36.6 °C) (04/14/22 0738)  Pulse: 88 (04/14/22 0738)  Resp: 18 (04/14/22 0738)  BP: (!) 160/94 (04/14/22 0738)  SpO2: 97 % (04/14/22 0738)   Vital Signs (24h Range):  Temp:  [97.8 °F (36.6 °C)-98.9 °F (37.2 °C)] 97.8 °F (36.6 °C)  Pulse:  [] 88  Resp:  [16-20] 18  SpO2:  [92 %-97 %] 97 %  BP: (138-160)/() 160/94     Weight: 76.1 kg (167 lb 12.8 oz)  Body mass index is 28.8 kg/m².    Intake/Output Summary (Last 24 hours) at 4/14/2022 1106  Last data filed at 4/14/2022 0730  Gross per 24 hour   Intake 1080 ml   Output --   Net 1080 ml      Physical Exam  Vitals and nursing note reviewed. Exam conducted with a chaperone present.   Constitutional:       Appearance: She is ill-appearing.   HENT:      Head: Normocephalic and atraumatic.      Right Ear: Tympanic membrane normal.      Left Ear: Tympanic membrane normal.      Nose: Nose normal.      Mouth/Throat:      Mouth: Mucous membranes are dry.   Eyes:      Extraocular Movements: Extraocular movements intact.      Pupils: Pupils are equal, round, and reactive to light.   Cardiovascular:      Rate and Rhythm: Normal rate and regular rhythm.      Pulses: Normal pulses.      Heart sounds: Normal heart sounds.    Pulmonary:      Breath sounds: Wheezing and rhonchi present.   Abdominal:      General: Bowel sounds are normal.      Palpations: Abdomen is soft.      Tenderness: There is no abdominal tenderness.   Musculoskeletal:         General: Normal range of motion.      Cervical back: Normal range of motion.   Skin:     General: Skin is warm.      Capillary Refill: Capillary refill takes less than 2 seconds.      Findings: Erythema (FACIAL FLUSHING SUSPECT DT STEROID THERAPY) and rash (L GROIN DT HERPES ZOSTER IS UNCHANGED) present.   Neurological:      General: No focal deficit present.      Mental Status: She is alert and oriented to person, place, and time.      Motor: Weakness present.   Psychiatric:         Mood and Affect: Mood normal.         Thought Content: Thought content normal.         Judgment: Judgment normal.       Significant Labs: All pertinent labs within the past 24 hours have been reviewed.  BMP:   Recent Labs   Lab 04/14/22  0726   *      K 4.2      CO2 27   BUN 18   CREATININE 0.71   CALCIUM 8.7     CBC:   Recent Labs   Lab 04/13/22  0550 04/14/22  0726 04/14/22  0728   WBC 9.13 10.01 10.01   HGB 12.0 11.8* 11.8*   HCT 37.1* 36.5* 36.5*   * 144* 144*       Significant Imaging: I have reviewed all pertinent imaging results/findings within the past 24 hours.NONE

## 2022-04-14 NOTE — HOSPITAL COURSE
"04/13/2022 HOSPITAL DAY 1  PT IS A 57 YR OLD WF ADMITTED TO [facility] FOR TREATMENT FOR CAP, UTI, BRONCHOSPASM, AND RECURRENT HERPES ZOSTER WITH ROCEPHIN, ZITHROMAX, SOLUMEDROL,DUONEBS, O2 AND INCENTIVE SPIROMETRY. AND VALTREX. PT HAS IMPROVED MILDLY WITH STABLE O2 SATS 94-97% EXCEPT FOR EARLY AM 87% SAT WHICH IMPROVED WITH RESPIRATORY INTERVENTION WITH A DUONEB TREATMENT. PT'  PT'S LABS ARE STABLE; .    04/14/2022 HOSPITAL DAY 2  PT CONTINUES TREATMENT FOR CAP ,UTI, AND RECURRENT HERPES ZOSTER WITH CONTINUED IMPROVEMENT  WITH O2 SAT 94-97% ON RA.  PT IS ON DAY 3 ROCEPHIN AND ZITHROMAX;SOLUMEDROL WILL BE TAPERED. PT STATES HERPES ZOSTER NEUROPATHIC PAIN IS IMPROVED BUT REQUESTS A TOPICAL PREPARATION AS WELL. LABS ARE STABLE WITH  WITH HX "PREDIABETIC"; DIET CONTROLLED. PT'S DIET WILL BE ADVANCED TO CARDIAC DIET.    04/15/2022 HOSPITAL DAY 3  DISCHARGE  PT IS A 57 YR OLD WF ADMITTED TO [facility] INPATIENT FOR CAP, HYPOXIA, UTI AND HERPES ZOSTER ON 04/12/2022 AND TREATED WITH ROCEPHIN/ZITHROMAX, STEROIDS, RESPIRATORY TREATMENT WITH DUONEBS, O2 TITRATED PER RT, AND INCENTIVE SPIROMETRY. PT INITIALLY HAD O2 SAT 89% AND IS NOW 95-97% ON 3 L NC. PT'S BLOOD CULTURES WERE NEGATIVE; URINE DID NOT MEET CRITERIA FOR CULTURE. PT HAS RECURRENT HERPES ZOSTER TREATED WITH VALTREX. PT IS PREDIABETIC AND HAS HAD HYPERGLYCEMIA EXACERBATED PER STEROID THERAPY. PT IMPROVED; BUT  WILL NEED SWING BED REFERRAL FOR IV ANTIBIOTICS, RESPIRATORY CARE AND MEDICAL MANAGEMENT. IN ADDITION;  PT WILL BE EVALUATED PER PT/RT FOR MUSCLE WEAKNESS AND WILL REQUIRE REHABILITATION. LABS ARE STABLE  "

## 2022-04-14 NOTE — ASSESSMENT & PLAN NOTE
ROCEPHIN  AWAIT C & S  04/13/2022  CONTINUE ROCEPHIN  C AND S IS PENDING  04/14/2022  CONTINUE ROCEPHIN  CHECK ON UR  CULTURE

## 2022-04-14 NOTE — ASSESSMENT & PLAN NOTE
PT HAS PNEUMONIA WITH HYPOXIA PTA WITH O2 SAT 89% PTA, 92% ON RA AND 98% ON 2 L NC  PT TREATMENT REGIMEN -  ROCEPHIN/ZITHROMAX, STEROID THERAPY, DUONEBS, INCENTIVE SPIROMETRY, O2 TITRATED PER RT  04/13/2022  STABLE ON CURRENT REGIMEN

## 2022-04-14 NOTE — PLAN OF CARE
Problem: Adult Inpatient Plan of Care  Goal: Plan of Care Review  Outcome: Ongoing, Progressing  Goal: Patient-Specific Goal (Individualized)  Outcome: Ongoing, Progressing  Goal: Absence of Hospital-Acquired Illness or Injury  Outcome: Ongoing, Progressing  Goal: Optimal Comfort and Wellbeing  Outcome: Ongoing, Progressing  Goal: Readiness for Transition of Care  Outcome: Ongoing, Progressing     Problem: Diabetes Comorbidity  Goal: Blood Glucose Level Within Targeted Range  Outcome: Ongoing, Progressing     Problem: Fall Injury Risk  Goal: Absence of Fall and Fall-Related Injury  Outcome: Ongoing, Progressing     Problem: Skin Injury Risk Increased  Goal: Skin Health and Integrity  Outcome: Ongoing, Progressing     Problem: Impaired Wound Healing  Goal: Optimal Wound Healing  Outcome: Ongoing, Progressing     Problem: Infection  Goal: Absence of Infection Signs and Symptoms  Outcome: Ongoing, Progressing     Problem: Fluid Imbalance (Pneumonia)  Goal: Fluid Balance  Outcome: Ongoing, Progressing     Problem: Infection (Pneumonia)  Goal: Resolution of Infection Signs and Symptoms  Outcome: Ongoing, Progressing     Problem: Respiratory Compromise (Pneumonia)  Goal: Effective Oxygenation and Ventilation  Outcome: Ongoing, Progressing

## 2022-04-14 NOTE — PROGRESS NOTES
[facility]  [facility] Medicine  Progress Note    Patient Name: Herlinda Valdovinos  MRN: [id number]  Patient Class: IP- Inpatient   Admission Date: 4/12/2022  Length of Stay: 1 days  Attending Physician: Alis Grider MD  Primary Care Provider: Vini Hernandez NP        Subjective:     Principal Problem:<principal problem not specified>        HPI:  PT IS A 57 YR OLD WF ADMITTED TO [facility] ACUTE CARE WITH PNEUMONIA , HYPOXIA (89% PTA 92% IN ED AND 98% ON 2 L NC) AND UTI FOR IV ANTIBIOTICS (ROCEPHIN AND ZITHROMAX), RESPIRATORY CARE WITH DUONEBS, O2 TITRATED AND INCENTIVE SPIROMETRY, AND TREATMENT OF RECURRENT HERPES ZOSTER. PT HAS HX HEMORRHAGIC CVA AND REQUIRED PROLONGED SWING BED REHABILITATION LAST YEAR AND AN ADDITIONAL 2 WEEK [facility] SWING BED HOSPITALIZATION THIS YEAR. PT HAS BEEN IMPROVING WITH OUTPATIENT PT/OT REHABILITATION AT [facility].      Overview/Hospital Course:  04/13/2022 HOSPITAL DAY 1  PT IS A 57 YR OLD WF ADMITTED TO [facility] FOR TREATMENT FOR CAP, UTI, BRONCHOSPASM, AND RECURRENT HERPES ZOSTER WITH ROCEPHIN, ZITHROMAX, SOLUMEDROL,DUONEBS, O2 AND INCENTIVE SPIROMETRY. AND VALTREX. PT HAS IMPROVED MILDLY WITH STABLE O2 SATS 94-97% EXCEPT FOR EARLY AM 87% SAT WHICH IMPROVED WITH RESPIRATORY INTERVENTION WITH A DUONEB TREATMENT. PT;S LABS ARE STABLE; .      Interval History: PT CONTINUES TREATMENT FOR CAP,UTI AND HERPES ZOSTER WITH MILD INTERVAL IMPROVEMENT.    Review of Systems   Constitutional:  Positive for activity change and fatigue.   HENT: Negative.     Eyes: Negative.    Respiratory:  Positive for cough, shortness of breath and wheezing.    Cardiovascular:  Negative for chest pain.   Gastrointestinal: Negative.    Endocrine: Negative.    Genitourinary: Negative.    Musculoskeletal: Negative.    Skin:  Positive for rash (L GROIN).   Allergic/Immunologic: Negative.    Neurological:  Positive for weakness.   Psychiatric/Behavioral: Negative.     Objective:     Vital Signs (Most  Recent):  Temp: 98.8 °F (37.1 °C) (04/13/22 1915)  Pulse: 84 (04/13/22 1938)  Resp: 20 (04/13/22 1938)  BP: (!) 151/99 (04/13/22 1915)  SpO2: 97 % (04/13/22 1938)   Vital Signs (24h Range):  Temp:  [98 °F (36.7 °C)-98.8 °F (37.1 °C)] 98.8 °F (37.1 °C)  Pulse:  [] 84  Resp:  [16-20] 20  SpO2:  [87 %-98 %] 97 %  BP: (136-151)/(90-99) 151/99     Weight: 76.1 kg (167 lb 12.8 oz)  Body mass index is 28.8 kg/m².    Intake/Output Summary (Last 24 hours) at 4/13/2022 2346  Last data filed at 4/13/2022 1700  Gross per 24 hour   Intake 900 ml   Output --   Net 900 ml      Physical Exam  Vitals and nursing note reviewed. Exam conducted with a chaperone present.   Constitutional:       Appearance: Normal appearance. She is ill-appearing.   HENT:      Head: Normocephalic and atraumatic.      Right Ear: Tympanic membrane normal.      Left Ear: Tympanic membrane normal.      Nose: Nose normal.      Mouth/Throat:      Mouth: Mucous membranes are moist.   Eyes:      Extraocular Movements: Extraocular movements intact.      Pupils: Pupils are equal, round, and reactive to light.   Cardiovascular:      Rate and Rhythm: Normal rate and regular rhythm.      Pulses: Normal pulses.      Heart sounds: Normal heart sounds.   Pulmonary:      Effort: Pulmonary effort is normal.      Breath sounds: Wheezing and rhonchi present.   Abdominal:      General: Bowel sounds are normal. There is no distension.      Palpations: Abdomen is soft.      Tenderness: There is no abdominal tenderness.   Musculoskeletal:         General: Normal range of motion.      Cervical back: Normal range of motion and neck supple.   Skin:     General: Skin is warm.      Capillary Refill: Capillary refill takes less than 2 seconds.      Findings: Lesion (L GROIN ZOSTER RASH WITH OPEN WOUNDS) present.   Neurological:      General: No focal deficit present.      Mental Status: She is alert and oriented to person, place, and time.   Psychiatric:         Mood and  Affect: Mood normal.         Thought Content: Thought content normal.         Judgment: Judgment normal.       Significant Labs: All pertinent labs within the past 24 hours have been reviewed.  BMP:   Recent Labs   Lab 04/13/22  0550   *      K 3.8      CO2 28   BUN 14   CREATININE 0.79   CALCIUM 8.8     CBC:   Recent Labs   Lab 04/12/22  1146 04/13/22  0550   WBC 8.85 9.13   HGB 12.3 12.0   HCT 37.5* 37.1*   * 129*       Significant Imaging: I have reviewed all pertinent imaging results/findings within the past 24 hours.  CT: I have reviewed all pertinent results/findings within the past 24 hours and my personal findings are:  B OPACITIES  CXR: I have reviewed all pertinent results/findings within the past 24 hours and my personal findings are:  B OPACITIES      Assessment/Plan:      Herpes zoster without complication  PT HAS RECURRENT HERPES ZOSTER   VALTREX  CONTACT ISOLATION  04/13/2022  CONTINUE HZ TREATMENT      Pneumonia due to infectious organism  PT HAS PNEUMONIA WITH HYPOXIA PTA WITH O2 SAT 89% PTA, 92% ON RA AND 98% ON 2 L NC  PT TREATMENT REGIMEN -  ROCEPHIN/ZITHROMAX, STEROID THERAPY, DUONEBS, INCENTIVE SPIROMETRY, O2 TITRATED PER RT  04/13/2022  STABLE ON CURRENT REGIMEN      DVT prophylaxis  TEDS, PLAVIX, EARLY AMBULATION  04/13/2022  CONTINUE VTE PPX      Acute cystitis without hematuria  ROCEPHIN  AWAIT C & S  04/13/2022  CONTINUE ROCEPHIN  C AND S IS PENDING        VTE Risk Mitigation (From admission, onward)         Ordered     Place OCTAVIA hose  Until discontinued         04/12/22 1545     IP VTE LOW RISK PATIENT  Once         04/12/22 1545                Discharge Planning   POOL: 4/15/2022     Code Status: Full Code   Is the patient medically ready for discharge?:     Reason for patient still in hospital (select all that apply): Patient trending condition, Laboratory test, Treatment, Imaging and Consult recommendations                     Alis Grider MD  Department  of [facility] Medicine   [facility]

## 2022-04-15 ENCOUNTER — HOSPITAL ENCOUNTER (INPATIENT)
Facility: HOSPITAL | Age: 57
LOS: 7 days | Discharge: HOME | DRG: 195 | End: 2022-04-22
Attending: EMERGENCY MEDICINE | Admitting: EMERGENCY MEDICINE
Payer: MEDICARE

## 2022-04-15 VITALS
SYSTOLIC BLOOD PRESSURE: 151 MMHG | BODY MASS INDEX: 28.65 KG/M2 | TEMPERATURE: 98 F | RESPIRATION RATE: 18 BRPM | OXYGEN SATURATION: 97 % | HEIGHT: 64 IN | DIASTOLIC BLOOD PRESSURE: 93 MMHG | WEIGHT: 167.81 LBS | HEART RATE: 76 BPM

## 2022-04-15 DIAGNOSIS — J18.9 PNEUMONIA DUE TO INFECTIOUS ORGANISM: ICD-10-CM

## 2022-04-15 DIAGNOSIS — M62.81 MUSCLE WEAKNESS: ICD-10-CM

## 2022-04-15 DIAGNOSIS — J18.9 PNEUMONIA OF BOTH LOWER LOBES DUE TO INFECTIOUS ORGANISM: ICD-10-CM

## 2022-04-15 DIAGNOSIS — I10 HYPERTENSION, UNSPECIFIED TYPE: ICD-10-CM

## 2022-04-15 DIAGNOSIS — E11.40 TYPE 2 DIABETES MELLITUS WITH DIABETIC NEUROPATHY, WITHOUT LONG-TERM CURRENT USE OF INSULIN: ICD-10-CM

## 2022-04-15 DIAGNOSIS — G45.8 ACUTE CEREBROVASCULAR INSUFFICIENCY TRANSIENT FOCAL NEUROLOGIC DEFICIT: ICD-10-CM

## 2022-04-15 DIAGNOSIS — B02.9 HERPES ZOSTER WITHOUT COMPLICATION: Primary | ICD-10-CM

## 2022-04-15 DIAGNOSIS — J18.9 PNEUMONIA: ICD-10-CM

## 2022-04-15 PROCEDURE — 27000944

## 2022-04-15 PROCEDURE — 94664 DEMO&/EVAL PT USE INHALER: CPT

## 2022-04-15 PROCEDURE — 27000221 HC OXYGEN, UP TO 24 HOURS

## 2022-04-15 PROCEDURE — 27000173 HC ACAPELLA DEVICE DH OR DM

## 2022-04-15 PROCEDURE — 99900035 HC TECH TIME PER 15 MIN (STAT)

## 2022-04-15 PROCEDURE — 99239 HOSP IP/OBS DSCHRG MGMT >30: CPT | Mod: ,,, | Performed by: EMERGENCY MEDICINE

## 2022-04-15 PROCEDURE — 94761 N-INVAS EAR/PLS OXIMETRY MLT: CPT

## 2022-04-15 PROCEDURE — 99239 PR HOSPITAL DISCHARGE DAY,>30 MIN: ICD-10-PCS | Mod: ,,, | Performed by: EMERGENCY MEDICINE

## 2022-04-15 PROCEDURE — 63600175 PHARM REV CODE 636 W HCPCS: Performed by: NURSE PRACTITIONER

## 2022-04-15 PROCEDURE — 25000242 PHARM REV CODE 250 ALT 637 W/ HCPCS: Performed by: NURSE PRACTITIONER

## 2022-04-15 PROCEDURE — 63700000 PHARM REV CODE 250 ALT 637 W/O HCPCS: Performed by: EMERGENCY MEDICINE

## 2022-04-15 PROCEDURE — 99305 PR NURSING FACILITY CARE, INIT, MOD SEVERITY: ICD-10-PCS | Mod: AI,,, | Performed by: EMERGENCY MEDICINE

## 2022-04-15 PROCEDURE — 94640 AIRWAY INHALATION TREATMENT: CPT

## 2022-04-15 PROCEDURE — 25000003 PHARM REV CODE 250: Performed by: EMERGENCY MEDICINE

## 2022-04-15 PROCEDURE — 63600175 PHARM REV CODE 636 W HCPCS: Performed by: EMERGENCY MEDICINE

## 2022-04-15 PROCEDURE — 11000004 HC SNF PRIVATE

## 2022-04-15 PROCEDURE — 25000003 PHARM REV CODE 250: Performed by: NURSE PRACTITIONER

## 2022-04-15 PROCEDURE — 25000003 PHARM REV CODE 250

## 2022-04-15 PROCEDURE — 25000242 PHARM REV CODE 250 ALT 637 W/ HCPCS: Performed by: EMERGENCY MEDICINE

## 2022-04-15 PROCEDURE — 99305 1ST NF CARE MODERATE MDM 35: CPT | Mod: AI,,, | Performed by: EMERGENCY MEDICINE

## 2022-04-15 RX ORDER — SERTRALINE HYDROCHLORIDE 50 MG/1
100 TABLET, FILM COATED ORAL NIGHTLY
Status: DISCONTINUED | OUTPATIENT
Start: 2022-04-15 | End: 2022-04-22 | Stop reason: HOSPADM

## 2022-04-15 RX ORDER — SODIUM CHLORIDE 0.9 % (FLUSH) 0.9 %
10 SYRINGE (ML) INJECTION
Status: DISCONTINUED | OUTPATIENT
Start: 2022-04-15 | End: 2022-04-22 | Stop reason: HOSPADM

## 2022-04-15 RX ORDER — POLYETHYLENE GLYCOL 3350 17 G/17G
17 POWDER, FOR SOLUTION ORAL DAILY
Status: DISCONTINUED | OUTPATIENT
Start: 2022-04-16 | End: 2022-04-22 | Stop reason: HOSPADM

## 2022-04-15 RX ORDER — TEMAZEPAM 30 MG/1
30 CAPSULE ORAL NIGHTLY PRN
Status: DISCONTINUED | OUTPATIENT
Start: 2022-04-15 | End: 2022-04-15 | Stop reason: HOSPADM

## 2022-04-15 RX ORDER — NYSTATIN 100000 [USP'U]/ML
500000 SUSPENSION ORAL ONCE
Status: COMPLETED | OUTPATIENT
Start: 2022-04-15 | End: 2022-04-15

## 2022-04-15 RX ORDER — NYSTATIN 100000 [USP'U]/ML
500000 SUSPENSION ORAL 4 TIMES DAILY
Status: DISCONTINUED | OUTPATIENT
Start: 2022-04-15 | End: 2022-04-22 | Stop reason: HOSPADM

## 2022-04-15 RX ORDER — TALC
6 POWDER (GRAM) TOPICAL NIGHTLY PRN
Status: DISCONTINUED | OUTPATIENT
Start: 2022-04-15 | End: 2022-04-22 | Stop reason: HOSPADM

## 2022-04-15 RX ORDER — AMLODIPINE BESYLATE 5 MG/1
5 TABLET ORAL DAILY
Status: DISCONTINUED | OUTPATIENT
Start: 2022-04-15 | End: 2022-04-15 | Stop reason: HOSPADM

## 2022-04-15 RX ORDER — ACETAMINOPHEN 325 MG/1
650 TABLET ORAL EVERY 4 HOURS PRN
Status: DISCONTINUED | OUTPATIENT
Start: 2022-04-15 | End: 2022-04-22 | Stop reason: HOSPADM

## 2022-04-15 RX ORDER — BENZONATATE 100 MG/1
100 CAPSULE ORAL 3 TIMES DAILY PRN
Status: DISCONTINUED | OUTPATIENT
Start: 2022-04-15 | End: 2022-04-22 | Stop reason: HOSPADM

## 2022-04-15 RX ORDER — LORAZEPAM 0.5 MG/1
0.5 TABLET ORAL 2 TIMES DAILY
Status: DISCONTINUED | OUTPATIENT
Start: 2022-04-15 | End: 2022-04-22 | Stop reason: HOSPADM

## 2022-04-15 RX ORDER — IPRATROPIUM BROMIDE AND ALBUTEROL SULFATE 2.5; .5 MG/3ML; MG/3ML
3 SOLUTION RESPIRATORY (INHALATION)
Status: DISCONTINUED | OUTPATIENT
Start: 2022-04-15 | End: 2022-04-22 | Stop reason: HOSPADM

## 2022-04-15 RX ORDER — DOCUSATE SODIUM 100 MG/1
100 CAPSULE, LIQUID FILLED ORAL DAILY
Status: DISCONTINUED | OUTPATIENT
Start: 2022-04-16 | End: 2022-04-22 | Stop reason: HOSPADM

## 2022-04-15 RX ORDER — CLOPIDOGREL BISULFATE 75 MG/1
75 TABLET ORAL DAILY
Status: DISCONTINUED | OUTPATIENT
Start: 2022-04-16 | End: 2022-04-22 | Stop reason: HOSPADM

## 2022-04-15 RX ORDER — AZITHROMYCIN 250 MG/1
500 TABLET, FILM COATED ORAL DAILY
Status: COMPLETED | OUTPATIENT
Start: 2022-04-16 | End: 2022-04-16

## 2022-04-15 RX ORDER — VALACYCLOVIR HYDROCHLORIDE 500 MG/1
1000 TABLET, FILM COATED ORAL 3 TIMES DAILY
Status: COMPLETED | OUTPATIENT
Start: 2022-04-15 | End: 2022-04-19

## 2022-04-15 RX ORDER — TEMAZEPAM 30 MG/1
30 CAPSULE ORAL NIGHTLY PRN
Status: DISCONTINUED | OUTPATIENT
Start: 2022-04-15 | End: 2022-04-22 | Stop reason: HOSPADM

## 2022-04-15 RX ORDER — ATORVASTATIN CALCIUM 40 MG/1
40 TABLET, FILM COATED ORAL NIGHTLY
Status: DISCONTINUED | OUTPATIENT
Start: 2022-04-15 | End: 2022-04-22 | Stop reason: HOSPADM

## 2022-04-15 RX ORDER — VALSARTAN 160 MG/1
160 TABLET ORAL DAILY
Status: DISCONTINUED | OUTPATIENT
Start: 2022-04-16 | End: 2022-04-22 | Stop reason: HOSPADM

## 2022-04-15 RX ORDER — GABAPENTIN 400 MG/1
800 CAPSULE ORAL 2 TIMES DAILY
Status: DISCONTINUED | OUTPATIENT
Start: 2022-04-15 | End: 2022-04-22 | Stop reason: HOSPADM

## 2022-04-15 RX ORDER — AMLODIPINE BESYLATE 5 MG/1
5 TABLET ORAL DAILY
Status: DISCONTINUED | OUTPATIENT
Start: 2022-04-16 | End: 2022-04-22 | Stop reason: HOSPADM

## 2022-04-15 RX ORDER — HYDRALAZINE HYDROCHLORIDE 25 MG/1
25 TABLET, FILM COATED ORAL 3 TIMES DAILY
Status: DISCONTINUED | OUTPATIENT
Start: 2022-04-15 | End: 2022-04-22 | Stop reason: HOSPADM

## 2022-04-15 RX ORDER — ZOLPIDEM TARTRATE 5 MG/1
5 TABLET ORAL NIGHTLY PRN
Status: DISCONTINUED | OUTPATIENT
Start: 2022-04-15 | End: 2022-04-15

## 2022-04-15 RX ORDER — PANTOPRAZOLE SODIUM 40 MG/1
40 TABLET, DELAYED RELEASE ORAL DAILY
Status: DISCONTINUED | OUTPATIENT
Start: 2022-04-16 | End: 2022-04-22 | Stop reason: HOSPADM

## 2022-04-15 RX ORDER — IPRATROPIUM BROMIDE AND ALBUTEROL SULFATE 2.5; .5 MG/3ML; MG/3ML
3 SOLUTION RESPIRATORY (INHALATION)
Status: DISCONTINUED | OUTPATIENT
Start: 2022-04-15 | End: 2022-04-15 | Stop reason: HOSPADM

## 2022-04-15 RX ORDER — ACETAMINOPHEN 325 MG/1
650 TABLET ORAL ONCE
Status: COMPLETED | OUTPATIENT
Start: 2022-04-15 | End: 2022-04-15

## 2022-04-15 RX ORDER — IPRATROPIUM BROMIDE AND ALBUTEROL SULFATE 2.5; .5 MG/3ML; MG/3ML
3 SOLUTION RESPIRATORY (INHALATION) EVERY 4 HOURS PRN
Status: DISCONTINUED | OUTPATIENT
Start: 2022-04-15 | End: 2022-04-22 | Stop reason: HOSPADM

## 2022-04-15 RX ORDER — ONDANSETRON 2 MG/ML
4 INJECTION INTRAMUSCULAR; INTRAVENOUS EVERY 8 HOURS PRN
Status: DISCONTINUED | OUTPATIENT
Start: 2022-04-15 | End: 2022-04-22 | Stop reason: HOSPADM

## 2022-04-15 RX ORDER — AMOXICILLIN 250 MG
1 CAPSULE ORAL 2 TIMES DAILY
Status: DISCONTINUED | OUTPATIENT
Start: 2022-04-15 | End: 2022-04-22 | Stop reason: HOSPADM

## 2022-04-15 RX ADMIN — DOCUSATE SODIUM 100 MG: 100 CAPSULE, LIQUID FILLED ORAL at 09:04

## 2022-04-15 RX ADMIN — PANTOPRAZOLE SODIUM 40 MG: 40 TABLET, DELAYED RELEASE ORAL at 09:04

## 2022-04-15 RX ADMIN — LORAZEPAM 0.5 MG: 0.5 TABLET ORAL at 05:04

## 2022-04-15 RX ADMIN — CEFTRIAXONE 1 G: 1 INJECTION, POWDER, FOR SOLUTION INTRAMUSCULAR; INTRAVENOUS at 05:04

## 2022-04-15 RX ADMIN — IPRATROPIUM BROMIDE AND ALBUTEROL SULFATE 3 ML: 2.5; .5 SOLUTION RESPIRATORY (INHALATION) at 01:04

## 2022-04-15 RX ADMIN — GABAPENTIN 800 MG: 400 CAPSULE ORAL at 09:04

## 2022-04-15 RX ADMIN — SERTRALINE HYDROCHLORIDE 100 MG: 50 TABLET ORAL at 09:04

## 2022-04-15 RX ADMIN — TEMAZEPAM 30 MG: 30 CAPSULE ORAL at 09:04

## 2022-04-15 RX ADMIN — SENNOSIDES AND DOCUSATE SODIUM 1 TABLET: 50; 8.6 TABLET ORAL at 09:04

## 2022-04-15 RX ADMIN — METHYLPREDNISOLONE SODIUM SUCCINATE 80 MG: 40 INJECTION, POWDER, FOR SOLUTION INTRAMUSCULAR; INTRAVENOUS at 10:04

## 2022-04-15 RX ADMIN — IPRATROPIUM BROMIDE AND ALBUTEROL SULFATE 3 ML: 2.5; .5 SOLUTION RESPIRATORY (INHALATION) at 12:04

## 2022-04-15 RX ADMIN — NYSTATIN 500000 UNITS: 500000 SUSPENSION ORAL at 04:04

## 2022-04-15 RX ADMIN — CLOPIDOGREL BISULFATE 75 MG: 75 TABLET, FILM COATED ORAL at 09:04

## 2022-04-15 RX ADMIN — NYSTATIN 500000 UNITS: 500000 SUSPENSION ORAL at 09:04

## 2022-04-15 RX ADMIN — BENZONATATE 100 MG: 100 CAPSULE ORAL at 04:04

## 2022-04-15 RX ADMIN — VALACYCLOVIR HYDROCHLORIDE 1000 MG: 500 TABLET, FILM COATED ORAL at 09:04

## 2022-04-15 RX ADMIN — POLYETHYLENE GLYCOL 3350 17 G: 17 POWDER, FOR SOLUTION ORAL at 09:04

## 2022-04-15 RX ADMIN — IPRATROPIUM BROMIDE AND ALBUTEROL SULFATE 3 ML: 2.5; .5 SOLUTION RESPIRATORY (INHALATION) at 07:04

## 2022-04-15 RX ADMIN — HYDRALAZINE HYDROCHLORIDE 25 MG: 25 TABLET, FILM COATED ORAL at 09:04

## 2022-04-15 RX ADMIN — ACETAMINOPHEN 650 MG: 325 TABLET ORAL at 04:04

## 2022-04-15 RX ADMIN — ATORVASTATIN CALCIUM 40 MG: 40 TABLET, FILM COATED ORAL at 09:04

## 2022-04-15 RX ADMIN — LORAZEPAM 0.5 MG: 0.5 TABLET ORAL at 09:04

## 2022-04-15 RX ADMIN — IPRATROPIUM BROMIDE AND ALBUTEROL SULFATE 3 ML: 2.5; .5 SOLUTION RESPIRATORY (INHALATION) at 08:04

## 2022-04-15 RX ADMIN — VALSARTAN 160 MG: 160 TABLET, FILM COATED ORAL at 09:04

## 2022-04-15 RX ADMIN — AMLODIPINE BESYLATE 5 MG: 5 TABLET ORAL at 09:04

## 2022-04-15 RX ADMIN — AZITHROMYCIN MONOHYDRATE 500 MG: 250 TABLET ORAL at 09:04

## 2022-04-15 NOTE — PLAN OF CARE
Problem: Fall Injury Risk  Goal: Absence of Fall and Fall-Related Injury  Outcome: Ongoing, Progressing     Problem: Skin Injury Risk Increased  Goal: Skin Health and Integrity  Outcome: Ongoing, Progressing     Problem: Impaired Wound Healing  Goal: Optimal Wound Healing  Outcome: Ongoing, Progressing

## 2022-04-15 NOTE — PLAN OF CARE
Problem: Adult Inpatient Plan of Care  Goal: Plan of Care Review  Outcome: Ongoing, Progressing  Goal: Patient-Specific Goal (Individualized)  Outcome: Ongoing, Progressing  Goal: Absence of Hospital-Acquired Illness or Injury  Outcome: Ongoing, Progressing  Goal: Optimal Comfort and Wellbeing  Outcome: Ongoing, Progressing  Goal: Readiness for Transition of Care  Outcome: Ongoing, Progressing     Problem: Fluid Imbalance (Pneumonia)  Goal: Fluid Balance  Outcome: Ongoing, Progressing     Problem: Infection (Pneumonia)  Goal: Resolution of Infection Signs and Symptoms  Outcome: Ongoing, Progressing     Problem: Respiratory Compromise (Pneumonia)  Goal: Effective Oxygenation and Ventilation  Outcome: Ongoing, Progressing     Problem: Impaired Wound Healing  Goal: Optimal Wound Healing  Outcome: Ongoing, Progressing     Problem: Skin Injury Risk Increased  Goal: Skin Health and Integrity  Outcome: Ongoing, Progressing

## 2022-04-15 NOTE — PLAN OF CARE
[facility]  Discharge Final Note    Primary Care Provider: Vini Hernandez NP    Expected Discharge Date: 4/15/2022    Final Discharge Note (most recent)       Final Note - 04/15/22 1350          Final Note    Assessment Type Final Discharge Note (P)                      Important Message from Medicare  Important Message from Medicare regarding Discharge Appeal Rights: Given to patient/caregiver, Explained to patient/caregiver, Signed/date by patient/caregiver, Other (comments)     Date IMM was signed: 04/15/22  Time IMM was signed: 1200      Pt being D/C from Acute Care and will be admitted to  here at [facility].

## 2022-04-15 NOTE — PLAN OF CARE
Problem: Adult Inpatient Plan of Care  Goal: Plan of Care Review  Outcome: Ongoing, Progressing  Goal: Patient-Specific Goal (Individualized)  Outcome: Ongoing, Progressing  Goal: Absence of Hospital-Acquired Illness or Injury  Outcome: Ongoing, Progressing  Intervention: Identify and Manage Fall Risk  Flowsheets (Taken 4/15/2022 0122)  Safety Promotion/Fall Prevention:   assistive device/personal item within reach   bed alarm set   commode/urinal/bedpan at bedside   Fall Risk reviewed with patient/family   Fall Risk signage in place   high risk medications identified   lighting adjusted   medications reviewed   nonskid shoes/socks when out of bed   side rails raised x 2  Intervention: Prevent Skin Injury  Flowsheets (Taken 4/15/2022 0122)  Body Position:   position maintained   position changed independently  Skin Protection: skin sealant/moisture barrier applied  Intervention: Prevent and Manage VTE (Venous Thromboembolism) Risk  Flowsheets (Taken 4/15/2022 0122)  VTE Prevention/Management:   fluids promoted   ROM (active) performed   remove, assess skin, and reapply compression stockings  Range of Motion: active ROM (range of motion) encouraged  Intervention: Prevent Infection  Flowsheets (Taken 4/15/2022 0122)  Infection Prevention:   rest/sleep promoted   hand hygiene promoted  Goal: Optimal Comfort and Wellbeing  Outcome: Ongoing, Progressing  Goal: Readiness for Transition of Care  Outcome: Ongoing, Progressing     Problem: Fall Injury Risk  Goal: Absence of Fall and Fall-Related Injury  Outcome: Ongoing, Progressing  Intervention: Identify and Manage Contributors  Flowsheets (Taken 4/15/2022 0122)  Self-Care Promotion: independence encouraged  Medication Review/Management: medications reviewed  Intervention: Promote Injury-Free Environment  Flowsheets (Taken 4/15/2022 0122)  Safety Promotion/Fall Prevention:   assistive device/personal item within reach   bed alarm set   commode/urinal/bedpan at bedside    Fall Risk reviewed with patient/family   Fall Risk signage in place   high risk medications identified   lighting adjusted   medications reviewed   nonskid shoes/socks when out of bed   side rails raised x 2     Problem: Infection  Goal: Absence of Infection Signs and Symptoms  Outcome: Ongoing, Progressing  Intervention: Prevent or Manage Infection  Flowsheets (Taken 4/15/2022 0122)  Infection Management: aseptic technique maintained  Isolation Precautions: precautions maintained     Problem: Fluid Imbalance (Pneumonia)  Goal: Fluid Balance  Outcome: Ongoing, Progressing     Problem: Infection (Pneumonia)  Goal: Resolution of Infection Signs and Symptoms  Outcome: Ongoing, Progressing  Intervention: Prevent Infection Progression  Flowsheets (Taken 4/15/2022 0122)  Infection Management: aseptic technique maintained  Isolation Precautions: precautions maintained

## 2022-04-15 NOTE — HPI
"PT IS A 57 YR OLD WF ADMITTED TO [facility] SWING BED FOR TREATMENT OF PNEUMONIA WITH IV ANTIBIOTICS, RESPIRATORY CARE AND MEDICAL MANAGEMENT. PT WAS REFERRED FROM [facility] WHERE SHE WAS ADMITTED FOR PNEUMONIA, HYPOXIA, UTI AND HERPES ZOSTER ON 04/12/2022.  PT IMPROVED; BUT WILL NEED CONTINUED IV ANTIBIOTICS AND RESPIRATORY CARE. IN ADDITION; PT HAS PERSISTENT MUSCLE WEAKNESS AND DEBILITY, AND WILL REQUIRE SWING BED FOR REHABILITATION. PT HAS RECURRENT HERPES ZOSTER TREATED WITH VALTREX. PT HAS HX "PREDIABETES AND HAS HAD HYPERGLYCEMIA WITH STEROID THERAPY AND WILL BE TREATED WITH SSI AND DIABETIC DIET.  PT WILL BE EVALUATED PER PT/OT AND A TREATMENT PLAN WILL BE INITIATED. THE PHARMACIST WILL REVIEW MEDICATIONS AND MAKE RECOMMENDATIONS. PT WILL SEE THE DIETICIAN  FOR NUTRITIONAL SUPPORT WITH WEIGHT 76.1 KG AND ALBUMIN OF 3.3. PT'S ABNORMAL ADMITTING LABS ARE: CMP:,BUN 20, CBC:WNL.PT WILL HAVE WEEKLY LABS.    Past Medical History:   Diagnosis Date    Cerebral hemorrhage     Chronic anxiety     Dehydration     Depression     Dysphagia     Due to and following non-traumatic intracerebral hemorrhage    Hearing loss     History of CVA (cerebrovascular accident)     Hypertension     Insomnia     Intrapontine hemorrhage     Left hemiparesis     Peripheral neuropathy     Transient cerebral ischemia         PT CODE STATUS IS FULL.  PT COVID STATUS IS NEG  PT HAS COVID VACCINE LAST YR   PT VTE PPX IS PLAVIX/TEDS    "

## 2022-04-16 LAB
ANION GAP SERPL CALCULATED.3IONS-SCNC: 16 MMOL/L (ref 7–16)
BASOPHILS # BLD AUTO: 0.01 K/UL (ref 0–0.2)
BASOPHILS NFR BLD AUTO: 0.1 % (ref 0–1)
BUN SERPL-MCNC: 20 MG/DL (ref 7–18)
BUN/CREAT SERPL: 24 (ref 6–20)
CALCIUM SERPL-MCNC: 9.1 MG/DL (ref 8.5–10.1)
CHLORIDE SERPL-SCNC: 98 MMOL/L (ref 98–107)
CO2 SERPL-SCNC: 28 MMOL/L (ref 21–32)
CREAT SERPL-MCNC: 0.84 MG/DL (ref 0.55–1.02)
DIFFERENTIAL METHOD BLD: ABNORMAL
EOSINOPHIL # BLD AUTO: 0 K/UL (ref 0–0.5)
EOSINOPHIL NFR BLD AUTO: 0 % (ref 1–4)
ERYTHROCYTE [DISTWIDTH] IN BLOOD BY AUTOMATED COUNT: 13.8 % (ref 11.5–14.5)
GLUCOSE SERPL-MCNC: 279 MG/DL (ref 74–106)
HCT VFR BLD AUTO: 43 % (ref 38–47)
HGB BLD-MCNC: 13.9 G/DL (ref 12–16)
LYMPHOCYTES # BLD AUTO: 1.01 K/UL (ref 1–4.8)
LYMPHOCYTES NFR BLD AUTO: 11.9 % (ref 27–41)
LYMPHOCYTES NFR BLD MANUAL: 10 % (ref 27–41)
MCH RBC QN AUTO: 28.8 PG (ref 27–31)
MCHC RBC AUTO-ENTMCNC: 32.3 G/DL (ref 32–36)
MCV RBC AUTO: 89.2 FL (ref 80–96)
MONOCYTES # BLD AUTO: 0.44 K/UL (ref 0–0.8)
MONOCYTES NFR BLD AUTO: 5.2 % (ref 2–6)
MONOCYTES NFR BLD MANUAL: 6 % (ref 2–6)
MPC BLD CALC-MCNC: 9.6 FL (ref 9.4–12.4)
NEUTROPHILS # BLD AUTO: 7.03 K/UL (ref 1.8–7.7)
NEUTROPHILS NFR BLD AUTO: 82.8 % (ref 53–65)
NEUTS BAND NFR BLD MANUAL: 8 % (ref 1–5)
NEUTS SEG NFR BLD MANUAL: 76 % (ref 50–62)
NRBC BLD MANUAL-RTO: ABNORMAL %
PLATELET # BLD AUTO: 177 K/UL (ref 150–400)
PLATELET MORPHOLOGY: NORMAL
POTASSIUM SERPL-SCNC: 4 MMOL/L (ref 3.5–5.1)
RBC # BLD AUTO: 4.82 M/UL (ref 4.2–5.4)
RBC MORPH BLD: NORMAL
SODIUM SERPL-SCNC: 138 MMOL/L (ref 136–145)
WBC # BLD AUTO: 8.49 K/UL (ref 4.5–11)

## 2022-04-16 PROCEDURE — 27000944

## 2022-04-16 PROCEDURE — 25000003 PHARM REV CODE 250: Performed by: NURSE PRACTITIONER

## 2022-04-16 PROCEDURE — 36415 COLL VENOUS BLD VENIPUNCTURE: CPT | Performed by: NURSE PRACTITIONER

## 2022-04-16 PROCEDURE — 27000221 HC OXYGEN, UP TO 24 HOURS

## 2022-04-16 PROCEDURE — 94640 AIRWAY INHALATION TREATMENT: CPT

## 2022-04-16 PROCEDURE — 99900035 HC TECH TIME PER 15 MIN (STAT)

## 2022-04-16 PROCEDURE — 80048 BASIC METABOLIC PNL TOTAL CA: CPT | Performed by: NURSE PRACTITIONER

## 2022-04-16 PROCEDURE — 63700000 PHARM REV CODE 250 ALT 637 W/O HCPCS: Performed by: NURSE PRACTITIONER

## 2022-04-16 PROCEDURE — 94761 N-INVAS EAR/PLS OXIMETRY MLT: CPT

## 2022-04-16 PROCEDURE — 94664 DEMO&/EVAL PT USE INHALER: CPT

## 2022-04-16 PROCEDURE — 63600175 PHARM REV CODE 636 W HCPCS: Performed by: NURSE PRACTITIONER

## 2022-04-16 PROCEDURE — 25000242 PHARM REV CODE 250 ALT 637 W/ HCPCS: Performed by: NURSE PRACTITIONER

## 2022-04-16 PROCEDURE — 11000004 HC SNF PRIVATE

## 2022-04-16 PROCEDURE — 85025 COMPLETE CBC W/AUTO DIFF WBC: CPT | Performed by: NURSE PRACTITIONER

## 2022-04-16 RX ORDER — AZITHROMYCIN 250 MG/1
500 TABLET, FILM COATED ORAL DAILY
Status: COMPLETED | OUTPATIENT
Start: 2022-04-17 | End: 2022-04-18

## 2022-04-16 RX ADMIN — AZITHROMYCIN MONOHYDRATE 500 MG: 250 TABLET ORAL at 09:04

## 2022-04-16 RX ADMIN — PANTOPRAZOLE SODIUM 40 MG: 40 TABLET, DELAYED RELEASE ORAL at 09:04

## 2022-04-16 RX ADMIN — VALACYCLOVIR HYDROCHLORIDE 1000 MG: 500 TABLET, FILM COATED ORAL at 08:04

## 2022-04-16 RX ADMIN — VALSARTAN 160 MG: 160 TABLET, FILM COATED ORAL at 09:04

## 2022-04-16 RX ADMIN — LORAZEPAM 0.5 MG: 0.5 TABLET ORAL at 09:04

## 2022-04-16 RX ADMIN — HYDRALAZINE HYDROCHLORIDE 25 MG: 25 TABLET, FILM COATED ORAL at 02:04

## 2022-04-16 RX ADMIN — GABAPENTIN 800 MG: 400 CAPSULE ORAL at 09:04

## 2022-04-16 RX ADMIN — NYSTATIN 500000 UNITS: 500000 SUSPENSION ORAL at 08:04

## 2022-04-16 RX ADMIN — ATORVASTATIN CALCIUM 40 MG: 40 TABLET, FILM COATED ORAL at 08:04

## 2022-04-16 RX ADMIN — SERTRALINE HYDROCHLORIDE 100 MG: 50 TABLET ORAL at 08:04

## 2022-04-16 RX ADMIN — DOCUSATE SODIUM 100 MG: 100 CAPSULE, LIQUID FILLED ORAL at 09:04

## 2022-04-16 RX ADMIN — LORAZEPAM 0.5 MG: 0.5 TABLET ORAL at 03:04

## 2022-04-16 RX ADMIN — CLOPIDOGREL BISULFATE 75 MG: 75 TABLET, FILM COATED ORAL at 09:04

## 2022-04-16 RX ADMIN — VALACYCLOVIR HYDROCHLORIDE 1000 MG: 500 TABLET, FILM COATED ORAL at 09:04

## 2022-04-16 RX ADMIN — NYSTATIN 500000 UNITS: 500000 SUSPENSION ORAL at 01:04

## 2022-04-16 RX ADMIN — METHYLPREDNISOLONE SODIUM SUCCINATE 60 MG: 40 INJECTION, POWDER, FOR SOLUTION INTRAMUSCULAR; INTRAVENOUS at 10:04

## 2022-04-16 RX ADMIN — VALACYCLOVIR HYDROCHLORIDE 1000 MG: 500 TABLET, FILM COATED ORAL at 02:04

## 2022-04-16 RX ADMIN — SENNOSIDES AND DOCUSATE SODIUM 1 TABLET: 50; 8.6 TABLET ORAL at 08:04

## 2022-04-16 RX ADMIN — METHYLPREDNISOLONE SODIUM SUCCINATE 80 MG: 40 INJECTION, POWDER, FOR SOLUTION INTRAMUSCULAR; INTRAVENOUS at 11:04

## 2022-04-16 RX ADMIN — NYSTATIN 500000 UNITS: 500000 SUSPENSION ORAL at 05:04

## 2022-04-16 RX ADMIN — NYSTATIN 500000 UNITS: 500000 SUSPENSION ORAL at 09:04

## 2022-04-16 RX ADMIN — CEFTRIAXONE 1 G: 1 INJECTION, POWDER, FOR SOLUTION INTRAMUSCULAR; INTRAVENOUS at 05:04

## 2022-04-16 RX ADMIN — HYDRALAZINE HYDROCHLORIDE 25 MG: 25 TABLET, FILM COATED ORAL at 08:04

## 2022-04-16 RX ADMIN — SENNOSIDES AND DOCUSATE SODIUM 1 TABLET: 50; 8.6 TABLET ORAL at 09:04

## 2022-04-16 RX ADMIN — IPRATROPIUM BROMIDE AND ALBUTEROL SULFATE 3 ML: 2.5; .5 SOLUTION RESPIRATORY (INHALATION) at 07:04

## 2022-04-16 RX ADMIN — GABAPENTIN 800 MG: 400 CAPSULE ORAL at 08:04

## 2022-04-16 RX ADMIN — AMLODIPINE BESYLATE 5 MG: 5 TABLET ORAL at 09:04

## 2022-04-16 RX ADMIN — POLYETHYLENE GLYCOL 3350 17 G: 17 POWDER, FOR SOLUTION ORAL at 09:04

## 2022-04-16 RX ADMIN — TEMAZEPAM 30 MG: 30 CAPSULE ORAL at 08:04

## 2022-04-16 RX ADMIN — IPRATROPIUM BROMIDE AND ALBUTEROL SULFATE 3 ML: 2.5; .5 SOLUTION RESPIRATORY (INHALATION) at 01:04

## 2022-04-16 RX ADMIN — HYDRALAZINE HYDROCHLORIDE 25 MG: 25 TABLET, FILM COATED ORAL at 09:04

## 2022-04-16 NOTE — PLAN OF CARE
Problem: Fluid Imbalance (Pneumonia)  Goal: Fluid Balance  Outcome: Ongoing, Progressing  Intervention: Monitor and Manage Fluid Balance  Flowsheets (Taken 4/16/2022 0550)  Fluid/Electrolyte Management: fluids provided     Problem: Infection (Pneumonia)  Goal: Resolution of Infection Signs and Symptoms  Outcome: Ongoing, Progressing  Intervention: Prevent Infection Progression  Flowsheets (Taken 4/16/2022 0550)  Isolation Precautions: precautions maintained     Problem: Respiratory Compromise (Pneumonia)  Goal: Effective Oxygenation and Ventilation  Outcome: Ongoing, Progressing  Intervention: Promote Airway Secretion Clearance  Flowsheets (Taken 4/16/2022 0550)  Breathing Techniques/Airway Clearance: deep/controlled cough encouraged     Problem: Impaired Wound Healing  Goal: Optimal Wound Healing  Outcome: Ongoing, Progressing  Intervention: Promote Wound Healing  Flowsheets (Taken 4/16/2022 0550)  Activity Management: Rolling - L1     Problem: Skin Injury Risk Increased  Goal: Skin Health and Integrity  Outcome: Ongoing, Progressing  Intervention: Optimize Skin Protection  Flowsheets (Taken 4/16/2022 0550)  Head of Bed (HOB) Positioning: HOB elevated

## 2022-04-17 PROCEDURE — 25000242 PHARM REV CODE 250 ALT 637 W/ HCPCS: Performed by: NURSE PRACTITIONER

## 2022-04-17 PROCEDURE — 25000003 PHARM REV CODE 250: Performed by: NURSE PRACTITIONER

## 2022-04-17 PROCEDURE — 11000004 HC SNF PRIVATE

## 2022-04-17 PROCEDURE — 27000221 HC OXYGEN, UP TO 24 HOURS

## 2022-04-17 PROCEDURE — 94664 DEMO&/EVAL PT USE INHALER: CPT

## 2022-04-17 PROCEDURE — 94640 AIRWAY INHALATION TREATMENT: CPT

## 2022-04-17 PROCEDURE — 99900035 HC TECH TIME PER 15 MIN (STAT)

## 2022-04-17 PROCEDURE — 63700000 PHARM REV CODE 250 ALT 637 W/O HCPCS: Performed by: NURSE PRACTITIONER

## 2022-04-17 PROCEDURE — 94761 N-INVAS EAR/PLS OXIMETRY MLT: CPT

## 2022-04-17 PROCEDURE — 63600175 PHARM REV CODE 636 W HCPCS: Performed by: NURSE PRACTITIONER

## 2022-04-17 RX ADMIN — SERTRALINE HYDROCHLORIDE 100 MG: 50 TABLET ORAL at 08:04

## 2022-04-17 RX ADMIN — METHYLPREDNISOLONE SODIUM SUCCINATE 40 MG: 40 INJECTION, POWDER, FOR SOLUTION INTRAMUSCULAR; INTRAVENOUS at 09:04

## 2022-04-17 RX ADMIN — HYDRALAZINE HYDROCHLORIDE 25 MG: 25 TABLET, FILM COATED ORAL at 08:04

## 2022-04-17 RX ADMIN — NYSTATIN 500000 UNITS: 500000 SUSPENSION ORAL at 08:04

## 2022-04-17 RX ADMIN — POLYETHYLENE GLYCOL 3350 17 G: 17 POWDER, FOR SOLUTION ORAL at 08:04

## 2022-04-17 RX ADMIN — NYSTATIN 500000 UNITS: 500000 SUSPENSION ORAL at 01:04

## 2022-04-17 RX ADMIN — PANTOPRAZOLE SODIUM 40 MG: 40 TABLET, DELAYED RELEASE ORAL at 08:04

## 2022-04-17 RX ADMIN — VALSARTAN 160 MG: 160 TABLET, FILM COATED ORAL at 08:04

## 2022-04-17 RX ADMIN — AMLODIPINE BESYLATE 5 MG: 5 TABLET ORAL at 08:04

## 2022-04-17 RX ADMIN — DOCUSATE SODIUM 100 MG: 100 CAPSULE, LIQUID FILLED ORAL at 08:04

## 2022-04-17 RX ADMIN — VALACYCLOVIR HYDROCHLORIDE 1000 MG: 500 TABLET, FILM COATED ORAL at 03:04

## 2022-04-17 RX ADMIN — IPRATROPIUM BROMIDE AND ALBUTEROL SULFATE 3 ML: 2.5; .5 SOLUTION RESPIRATORY (INHALATION) at 07:04

## 2022-04-17 RX ADMIN — CEFTRIAXONE 1 G: 1 INJECTION, POWDER, FOR SOLUTION INTRAMUSCULAR; INTRAVENOUS at 05:04

## 2022-04-17 RX ADMIN — GABAPENTIN 800 MG: 400 CAPSULE ORAL at 08:04

## 2022-04-17 RX ADMIN — LORAZEPAM 0.5 MG: 0.5 TABLET ORAL at 03:04

## 2022-04-17 RX ADMIN — METHYLPREDNISOLONE SODIUM SUCCINATE 60 MG: 40 INJECTION, POWDER, FOR SOLUTION INTRAMUSCULAR; INTRAVENOUS at 01:04

## 2022-04-17 RX ADMIN — SENNOSIDES AND DOCUSATE SODIUM 1 TABLET: 50; 8.6 TABLET ORAL at 08:04

## 2022-04-17 RX ADMIN — NYSTATIN 500000 UNITS: 500000 SUSPENSION ORAL at 05:04

## 2022-04-17 RX ADMIN — HYDRALAZINE HYDROCHLORIDE 25 MG: 25 TABLET, FILM COATED ORAL at 03:04

## 2022-04-17 RX ADMIN — VALACYCLOVIR HYDROCHLORIDE 1000 MG: 500 TABLET, FILM COATED ORAL at 08:04

## 2022-04-17 RX ADMIN — CLOPIDOGREL BISULFATE 75 MG: 75 TABLET, FILM COATED ORAL at 08:04

## 2022-04-17 RX ADMIN — ATORVASTATIN CALCIUM 40 MG: 40 TABLET, FILM COATED ORAL at 08:04

## 2022-04-17 RX ADMIN — AZITHROMYCIN MONOHYDRATE 500 MG: 250 TABLET ORAL at 08:04

## 2022-04-17 RX ADMIN — LORAZEPAM 0.5 MG: 0.5 TABLET ORAL at 08:04

## 2022-04-17 RX ADMIN — ACETAMINOPHEN 650 MG: 325 TABLET ORAL at 01:04

## 2022-04-17 RX ADMIN — TEMAZEPAM 30 MG: 30 CAPSULE ORAL at 08:04

## 2022-04-17 RX ADMIN — IPRATROPIUM BROMIDE AND ALBUTEROL SULFATE 3 ML: 2.5; .5 SOLUTION RESPIRATORY (INHALATION) at 08:04

## 2022-04-17 RX ADMIN — IPRATROPIUM BROMIDE AND ALBUTEROL SULFATE 3 ML: 2.5; .5 SOLUTION RESPIRATORY (INHALATION) at 01:04

## 2022-04-17 NOTE — NURSING
Multiple attemps to start IV on patient unsuccessful.  Called ED RN to try and unable to find a vein at this time.   3 attempts unsuccessful but will notify SUHAIL Pritchett NP of no IV access at this time.

## 2022-04-17 NOTE — PLAN OF CARE
Problem: Fluid Imbalance (Pneumonia)  Goal: Fluid Balance  Outcome: Ongoing, Progressing  Intervention: Monitor and Manage Fluid Balance  Flowsheets (Taken 4/17/2022 0257)  Fluid/Electrolyte Management: fluids provided     Problem: Infection (Pneumonia)  Goal: Resolution of Infection Signs and Symptoms  Outcome: Ongoing, Progressing     Problem: Respiratory Compromise (Pneumonia)  Goal: Effective Oxygenation and Ventilation  Outcome: Ongoing, Progressing  Intervention: Promote Airway Secretion Clearance  Flowsheets (Taken 4/17/2022 0257)  Breathing Techniques/Airway Clearance: deep/controlled cough encouraged  Cough And Deep Breathing: done with encouragement     Problem: Skin Injury Risk Increased  Goal: Skin Health and Integrity  Outcome: Ongoing, Progressing  Intervention: Optimize Skin Protection  Flowsheets (Taken 4/17/2022 0257)  Pressure Reduction Techniques:   frequent weight shift encouraged   heels elevated off bed  Head of Bed (HOB) Positioning: HOB elevated

## 2022-04-17 NOTE — PLAN OF CARE
Patient complained of sacral pain, overlay added for additional padding for bed surface.  Patient reports that it has resolved the pain.  No changes to care plan.

## 2022-04-18 LAB
BACTERIA BLD CULT: NORMAL
BACTERIA BLD CULT: NORMAL
GLUCOSE SERPL-MCNC: 171 MG/DL (ref 70–105)
GLUCOSE SERPL-MCNC: 238 MG/DL (ref 70–105)
GLUCOSE SERPL-MCNC: 254 MG/DL (ref 70–105)

## 2022-04-18 PROCEDURE — 99900035 HC TECH TIME PER 15 MIN (STAT)

## 2022-04-18 PROCEDURE — 25000003 PHARM REV CODE 250: Performed by: EMERGENCY MEDICINE

## 2022-04-18 PROCEDURE — 97166 OT EVAL MOD COMPLEX 45 MIN: CPT

## 2022-04-18 PROCEDURE — 27000944

## 2022-04-18 PROCEDURE — 11000004 HC SNF PRIVATE

## 2022-04-18 PROCEDURE — 63600175 PHARM REV CODE 636 W HCPCS: Performed by: EMERGENCY MEDICINE

## 2022-04-18 PROCEDURE — 63600175 PHARM REV CODE 636 W HCPCS: Performed by: NURSE PRACTITIONER

## 2022-04-18 PROCEDURE — 94664 DEMO&/EVAL PT USE INHALER: CPT

## 2022-04-18 PROCEDURE — 94640 AIRWAY INHALATION TREATMENT: CPT

## 2022-04-18 PROCEDURE — 83036 HEMOGLOBIN GLYCOSYLATED A1C: CPT | Performed by: EMERGENCY MEDICINE

## 2022-04-18 PROCEDURE — 25000003 PHARM REV CODE 250: Performed by: NURSE PRACTITIONER

## 2022-04-18 PROCEDURE — 63700000 PHARM REV CODE 250 ALT 637 W/O HCPCS: Performed by: NURSE PRACTITIONER

## 2022-04-18 PROCEDURE — 27000221 HC OXYGEN, UP TO 24 HOURS

## 2022-04-18 PROCEDURE — 97161 PT EVAL LOW COMPLEX 20 MIN: CPT

## 2022-04-18 PROCEDURE — 82962 GLUCOSE BLOOD TEST: CPT

## 2022-04-18 PROCEDURE — 36415 COLL VENOUS BLD VENIPUNCTURE: CPT | Performed by: EMERGENCY MEDICINE

## 2022-04-18 PROCEDURE — 25000242 PHARM REV CODE 250 ALT 637 W/ HCPCS: Performed by: NURSE PRACTITIONER

## 2022-04-18 PROCEDURE — 94761 N-INVAS EAR/PLS OXIMETRY MLT: CPT

## 2022-04-18 RX ORDER — IBUPROFEN 200 MG
24 TABLET ORAL
Status: DISCONTINUED | OUTPATIENT
Start: 2022-04-18 | End: 2022-04-22 | Stop reason: HOSPADM

## 2022-04-18 RX ORDER — CLONIDINE HYDROCHLORIDE 0.1 MG/1
0.1 TABLET ORAL ONCE
Status: COMPLETED | OUTPATIENT
Start: 2022-04-18 | End: 2022-04-18

## 2022-04-18 RX ORDER — DEXTROSE MONOHYDRATE 100 MG/ML
25 INJECTION, SOLUTION INTRAVENOUS
Status: DISCONTINUED | OUTPATIENT
Start: 2022-04-18 | End: 2022-04-21 | Stop reason: SDUPTHER

## 2022-04-18 RX ORDER — IBUPROFEN 200 MG
16 TABLET ORAL
Status: DISCONTINUED | OUTPATIENT
Start: 2022-04-18 | End: 2022-04-22 | Stop reason: HOSPADM

## 2022-04-18 RX ORDER — GLUCAGON 1 MG
1 KIT INJECTION
Status: DISCONTINUED | OUTPATIENT
Start: 2022-04-18 | End: 2022-04-22 | Stop reason: HOSPADM

## 2022-04-18 RX ORDER — INSULIN ASPART 100 [IU]/ML
0-5 INJECTION, SOLUTION INTRAVENOUS; SUBCUTANEOUS
Status: DISCONTINUED | OUTPATIENT
Start: 2022-04-18 | End: 2022-04-22 | Stop reason: HOSPADM

## 2022-04-18 RX ADMIN — ACETAMINOPHEN 650 MG: 325 TABLET ORAL at 06:04

## 2022-04-18 RX ADMIN — LORAZEPAM 0.5 MG: 0.5 TABLET ORAL at 09:04

## 2022-04-18 RX ADMIN — VALACYCLOVIR HYDROCHLORIDE 1000 MG: 500 TABLET, FILM COATED ORAL at 09:04

## 2022-04-18 RX ADMIN — METHYLPREDNISOLONE SODIUM SUCCINATE 40 MG: 40 INJECTION, POWDER, FOR SOLUTION INTRAMUSCULAR; INTRAVENOUS at 10:04

## 2022-04-18 RX ADMIN — PANTOPRAZOLE SODIUM 40 MG: 40 TABLET, DELAYED RELEASE ORAL at 09:04

## 2022-04-18 RX ADMIN — NYSTATIN 500000 UNITS: 500000 SUSPENSION ORAL at 08:04

## 2022-04-18 RX ADMIN — AMLODIPINE BESYLATE 5 MG: 5 TABLET ORAL at 09:04

## 2022-04-18 RX ADMIN — LORAZEPAM 0.5 MG: 0.5 TABLET ORAL at 03:04

## 2022-04-18 RX ADMIN — GABAPENTIN 800 MG: 400 CAPSULE ORAL at 09:04

## 2022-04-18 RX ADMIN — CEFTRIAXONE 1 G: 1 INJECTION, POWDER, FOR SOLUTION INTRAMUSCULAR; INTRAVENOUS at 04:04

## 2022-04-18 RX ADMIN — INSULIN ASPART 1 UNITS: 100 INJECTION, SOLUTION INTRAVENOUS; SUBCUTANEOUS at 09:04

## 2022-04-18 RX ADMIN — IPRATROPIUM BROMIDE AND ALBUTEROL SULFATE 3 ML: 2.5; .5 SOLUTION RESPIRATORY (INHALATION) at 07:04

## 2022-04-18 RX ADMIN — GABAPENTIN 800 MG: 400 CAPSULE ORAL at 08:04

## 2022-04-18 RX ADMIN — CLONIDINE HYDROCHLORIDE 0.1 MG: 0.1 TABLET ORAL at 08:04

## 2022-04-18 RX ADMIN — NYSTATIN 500000 UNITS: 500000 SUSPENSION ORAL at 01:04

## 2022-04-18 RX ADMIN — TEMAZEPAM 30 MG: 30 CAPSULE ORAL at 08:04

## 2022-04-18 RX ADMIN — ATORVASTATIN CALCIUM 40 MG: 40 TABLET, FILM COATED ORAL at 08:04

## 2022-04-18 RX ADMIN — POLYETHYLENE GLYCOL 3350 17 G: 17 POWDER, FOR SOLUTION ORAL at 09:04

## 2022-04-18 RX ADMIN — INSULIN ASPART 3 UNITS: 100 INJECTION, SOLUTION INTRAVENOUS; SUBCUTANEOUS at 04:04

## 2022-04-18 RX ADMIN — SERTRALINE HYDROCHLORIDE 100 MG: 50 TABLET ORAL at 08:04

## 2022-04-18 RX ADMIN — NYSTATIN 500000 UNITS: 500000 SUSPENSION ORAL at 04:04

## 2022-04-18 RX ADMIN — HYDRALAZINE HYDROCHLORIDE 25 MG: 25 TABLET, FILM COATED ORAL at 03:04

## 2022-04-18 RX ADMIN — AZITHROMYCIN MONOHYDRATE 500 MG: 250 TABLET ORAL at 09:04

## 2022-04-18 RX ADMIN — SENNOSIDES AND DOCUSATE SODIUM 1 TABLET: 50; 8.6 TABLET ORAL at 09:04

## 2022-04-18 RX ADMIN — HYDRALAZINE HYDROCHLORIDE 25 MG: 25 TABLET, FILM COATED ORAL at 08:04

## 2022-04-18 RX ADMIN — CLOPIDOGREL BISULFATE 75 MG: 75 TABLET, FILM COATED ORAL at 09:04

## 2022-04-18 RX ADMIN — VALACYCLOVIR HYDROCHLORIDE 1000 MG: 500 TABLET, FILM COATED ORAL at 03:04

## 2022-04-18 RX ADMIN — NYSTATIN 500000 UNITS: 500000 SUSPENSION ORAL at 09:04

## 2022-04-18 RX ADMIN — SENNOSIDES AND DOCUSATE SODIUM 1 TABLET: 50; 8.6 TABLET ORAL at 08:04

## 2022-04-18 RX ADMIN — HYDRALAZINE HYDROCHLORIDE 25 MG: 25 TABLET, FILM COATED ORAL at 09:04

## 2022-04-18 RX ADMIN — DOCUSATE SODIUM 100 MG: 100 CAPSULE, LIQUID FILLED ORAL at 09:04

## 2022-04-18 RX ADMIN — VALACYCLOVIR HYDROCHLORIDE 1000 MG: 500 TABLET, FILM COATED ORAL at 08:04

## 2022-04-18 RX ADMIN — VALSARTAN 160 MG: 160 TABLET, FILM COATED ORAL at 09:04

## 2022-04-18 NOTE — SUBJECTIVE & OBJECTIVE
Past Medical History:   Diagnosis Date    Cerebral hemorrhage     Chronic anxiety     Dehydration     Depression     Dysphagia     Due to and following non-traumatic intracerebral hemorrhage    Hearing loss     History of CVA (cerebrovascular accident)     Hypertension     Insomnia     Intrapontine hemorrhage     Left hemiparesis     Peripheral neuropathy     Transient cerebral ischemia        Past Surgical History:   Procedure Laterality Date    APPENDECTOMY      CHOLECYSTECTOMY      HYSTERECTOMY      LITHOTRIPSY      Renal lithotripsy    percutaneious insertion of ureteric stent         Review of patient's allergies indicates:   Allergen Reactions    Lamisil [terbinafine] Anaphylaxis    Codeine Itching    Compazine [prochlorperazine] Itching       No current facility-administered medications on file prior to encounter.     Current Outpatient Medications on File Prior to Encounter   Medication Sig    atorvastatin (LIPITOR) 40 MG tablet Take 40 mg by mouth every evening.    clopidogreL (PLAVIX) 75 mg tablet Take 75 mg by mouth once daily.    docusate sodium (COLACE) 100 MG capsule Take 1 capsule (100 mg total) by mouth once daily.    gabapentin (NEURONTIN) 400 MG capsule Take 2 capsules (800 mg total) by mouth 2 (two) times daily.    hydrALAZINE (APRESOLINE) 25 MG tablet Take 1 tablet (25 mg total) by mouth 3 (three) times daily.    LORazepam (ATIVAN) 0.5 MG tablet Take 1 am and afternoon and 2 hs    sertraline (ZOLOFT) 100 MG tablet Take 100 mg by mouth every evening.    temazepam (RESTORIL) 30 mg capsule Take 30 mg by mouth every evening.    valsartan (DIOVAN) 320 MG tablet Take 1 tablet (320 mg total) by mouth once daily. (Patient taking differently: Take 160 mg by mouth once daily.)    [DISCONTINUED] citalopram (CELEXA) 40 MG tablet Take 1 tablet (40 mg total) by mouth once daily.    [DISCONTINUED] hydroCHLOROthiazide (HYDRODIURIL) 12.5 MG Tab Take 1 tablet (12.5 mg total) by  mouth once daily.    [DISCONTINUED] omeprazole (PRILOSEC) 40 MG capsule Take 40 mg by mouth every evening.     Family History    None       Tobacco Use    Smoking status: Never Smoker    Smokeless tobacco: Never Used   Substance and Sexual Activity    Alcohol use: Never    Drug use: Never    Sexual activity: Not on file     Review of Systems   Constitutional:  Positive for activity change, fatigue and fever. Negative for appetite change.   HENT: Negative.     Eyes: Negative.    Respiratory:  Positive for cough and shortness of breath.    Cardiovascular: Negative.    Gastrointestinal: Negative.    Endocrine: Negative.    Genitourinary: Negative.    Skin:  Positive for rash (L GROIN WITH HERPES ZOSTER).   Allergic/Immunologic: Positive for immunocompromised state (PRE DIABETIC).   Neurological:  Positive for weakness.   Hematological: Negative.    Psychiatric/Behavioral: Negative.     Objective:     Vital Signs (Most Recent):  Temp: 98 °F (36.7 °C) (04/17/22 0701)  Pulse: 82 (04/17/22 2021)  Resp: 18 (04/17/22 2021)  BP: (!) 149/88 (04/17/22 1100)  SpO2: 95 % (04/17/22 2021)   Vital Signs (24h Range):  Temp:  [98 °F (36.7 °C)-98.8 °F (37.1 °C)] 98 °F (36.7 °C)  Pulse:  [64-93] 82  Resp:  [18-20] 18  SpO2:  [93 %-96 %] 95 %  BP: (149-176)/() 149/88     Weight: 76.1 kg (167 lb 12.8 oz)  Body mass index is 28.8 kg/m².    Physical Exam  Vitals reviewed.   Constitutional:       General: She is not in acute distress.     Appearance: Normal appearance. She is ill-appearing.   HENT:      Head: Normocephalic and atraumatic.      Right Ear: Tympanic membrane normal.      Left Ear: Tympanic membrane normal.      Nose: Nose normal.      Mouth/Throat:      Mouth: Mucous membranes are moist.   Eyes:      Extraocular Movements: Extraocular movements intact.      Pupils: Pupils are equal, round, and reactive to light.   Cardiovascular:      Rate and Rhythm: Normal rate and regular rhythm.      Pulses: Normal pulses.       Heart sounds: Normal heart sounds.   Pulmonary:      Effort: Pulmonary effort is normal.      Breath sounds: Wheezing and rhonchi present.   Abdominal:      General: Bowel sounds are normal. There is no distension.      Palpations: Abdomen is soft.      Tenderness: There is no abdominal tenderness.   Musculoskeletal:         General: No swelling or tenderness. Normal range of motion.      Cervical back: Normal range of motion and neck supple.   Skin:     General: Skin is warm.      Capillary Refill: Capillary refill takes less than 2 seconds.      Findings: Lesion (L GROIN WITH RASH C/W HERPES ZOSTER) present.   Neurological:      General: No focal deficit present.      Mental Status: She is alert and oriented to person, place, and time.      Motor: Weakness present.   Psychiatric:         Mood and Affect: Mood normal.         Thought Content: Thought content normal.         Judgment: Judgment normal.         CRANIAL NERVES     CN III, IV, VI   Pupils are equal, round, and reactive to light.     Significant Labs: All pertinent labs within the past 24 hours have been reviewed.  BMP:   Recent Labs   Lab 04/16/22  0931   *      K 4.0   CL 98   CO2 28   BUN 20*   CREATININE 0.84   CALCIUM 9.1     CBC:   Recent Labs   Lab 04/16/22  0931   WBC 8.49   HGB 13.9   HCT 43.0          Significant Imaging: I have reviewed all pertinent imaging results/findings within the past 24 hours.  CXR: I have reviewed all pertinent results/findings within the past 24 hours and my personal findings are:  BILATERAL INFILTRATES

## 2022-04-18 NOTE — PLAN OF CARE
[facility]  Initial Discharge Assessment       Primary Care Provider: Vini Hernandez NP    Admission Diagnosis: Pneumonia [J18.9]    Admission Date: 4/15/2022  Expected Discharge Date:          Payor: MEDICARE / Plan: MEDICARE PART A & B / Product Type: Government /     Extended Emergency Contact Information  Primary Emergency Contact: Micheal Kaur  Mobile Phone: [phone]  Relation: Spouse  Preferred language: English   needed? No    Discharge Plan A: (P) Home, Home Health         [place] - [address]  [address]  [place] [address]  Phone: [phone] Fax: [phone]      Initial Assessment (most recent)       Adult Discharge Assessment - 04/18/22 0852          Discharge Assessment    Assessment Type Discharge Planning Assessment (P)      Source of Information patient (P)      Lives With spouse (P)      Prior to hospitilization cognitive status: Alert/Oriented (P)      Current cognitive status: Alert/Oriented (P)      Equipment Currently Used at Home wheelchair;walker, rolling;oxygen;shower chair;bedside commode (P)      Do you currently have service(s) that help you manage your care at home? No (P)      Discharge Plan A Home;Home Health (P)      Discharge Plan discussed with: Patient (P)                       D/C plan is back home with her . Pt has had [facility] in the past but is not current as pt was doing OP PT before admit to hospital. Pt has all DME needed including home O2

## 2022-04-18 NOTE — ASSESSMENT & PLAN NOTE
ROCEPHIN  AWAIT C & S  04/13/2022  CONTINUE ROCEPHIN  C AND S IS PENDING  04/14/2022  CONTINUE ROCEPHIN  CHECK ON UR  CULTURE  04/15/2022  URINE DID NOT MEET CULTURE CRITERIA

## 2022-04-18 NOTE — DISCHARGE SUMMARY
"[facility] - Medical Surgical Unit  Hospital Medicine  Discharge Summary      Patient Name: Herlinda Valdovinos  MRN: [id number]  Patient Class: IP- Inpatient  Admission Date: 4/12/2022  Hospital Length of Stay: 3 days  Discharge Date and Time: 04/15/2022 1115  Attending Physician: No att. providers found   Discharging Provider: Alis Grider MD  Primary Care Provider: Vini Hernandez NP      HPI:   PT IS A 57 YR OLD WF ADMITTED TO [facility] ACUTE CARE WITH PNEUMONIA , HYPOXIA (89% PTA 92% IN ED AND 98% ON 2 L NC) AND UTI FOR IV ANTIBIOTICS (ROCEPHIN AND ZITHROMAX), RESPIRATORY CARE WITH DUONEBS, O2 TITRATED AND INCENTIVE SPIROMETRY, AND TREATMENT OF RECURRENT HERPES ZOSTER. PT HAS HX HEMORRHAGIC CVA AND REQUIRED PROLONGED SWING BED REHABILITATION LAST YEAR AND AN ADDITIONAL 2 WEEK [facility] SWING BED HOSPITALIZATION THIS YEAR. PT HAS BEEN IMPROVING WITH OUTPATIENT PT/OT REHABILITATION AT [facility].      * No surgery found *      Hospital Course:   04/13/2022 HOSPITAL DAY 1  PT IS A 57 YR OLD WF ADMITTED TO [facility] FOR TREATMENT FOR CAP, UTI, BRONCHOSPASM, AND RECURRENT HERPES ZOSTER WITH ROCEPHIN, ZITHROMAX, SOLUMEDROL,DUONEBS, O2 AND INCENTIVE SPIROMETRY. AND VALTREX. PT HAS IMPROVED MILDLY WITH STABLE O2 SATS 94-97% EXCEPT FOR EARLY AM 87% SAT WHICH IMPROVED WITH RESPIRATORY INTERVENTION WITH A DUONEB TREATMENT. PT'  PT'S LABS ARE STABLE; .    04/14/2022 HOSPITAL DAY 2  PT CONTINUES TREATMENT FOR CAP ,UTI, AND RECURRENT HERPES ZOSTER WITH CONTINUED IMPROVEMENT  WITH O2 SAT 94-97% ON RA.  PT IS ON DAY 3 ROCEPHIN AND ZITHROMAX;SOLUMEDROL WILL BE TAPERED. PT STATES HERPES ZOSTER NEUROPATHIC PAIN IS IMPROVED BUT REQUESTS A TOPICAL PREPARATION AS WELL. LABS ARE STABLE WITH  WITH HX "PREDIABETIC"; DIET CONTROLLED. PT'S DIET WILL BE ADVANCED TO CARDIAC DIET.    04/15/2022 HOSPITAL DAY 3  DISCHARGE  PT IS A 57 YR OLD WF ADMITTED TO [facility] INPATIENT FOR CAP, HYPOXIA, UTI AND HERPES ZOSTER ON 04/12/2022 AND TREATED WITH " ROCEPHIN/ZITHROMAX, STEROIDS, RESPIRATORY TREATMENT WITH DUONEBS, O2 TITRATED PER RT, AND INCENTIVE SPIROMETRY. PT INITIALLY HAD O2 SAT 89% AND IS NOW 95-97% ON 3 L NC. PT'S BLOOD CULTURES WERE NEGATIVE; URINE DID NOT MEET CRITERIA FOR CULTURE. PT HAS RECURRENT HERPES ZOSTER TREATED WITH VALTREX. PT IS PREDIABETIC AND HAS HAD HYPERGLYCEMIA EXACERBATED PER STEROID THERAPY. PT IMPROVED; BUT  WILL NEED SWING BED REFERRAL FOR IV ANTIBIOTICS, RESPIRATORY CARE AND MEDICAL MANAGEMENT. IN ADDITION;  PT WILL BE EVALUATED PER PT/RT FOR MUSCLE WEAKNESS AND WILL REQUIRE REHABILITATION. LABS ARE STABLE       Goals of Care Treatment Preferences:  Code Status: Full Code      Consults:     * Pneumonia due to infectious organism  PT HAS PNEUMONIA WITH HYPOXIA PTA WITH O2 SAT 89% PTA, 92% ON RA AND 98% ON 2 L NC  PT TREATMENT REGIMEN -  ROCEPHIN/ZITHROMAX, STEROID THERAPY, DUONEBS, INCENTIVE SPIROMETRY, O2 TITRATED PER RT  04/13/2022  STABLE ON CURRENT REGIMEN  04/14/2022  PT IS ON DAY 3 ROCEPHIN/ZITHROMAX AND SOLUMEDROL WITH IMPROVEMENT NOTED AND STABLE SAT 94-97% ON 3 L NC  PT HAS CONTINUED COUGH AND DYSPNEA  WITH EXERTION  04/15/2022  PT IS DAY 4 ROCEPHIN/ZITHROMAX AND SOLUMEDROL WITH IMPROVEMENT BUT PT WILL NEED SWING BED PLACEMENT FOR FURTHER IV ANTIBIOTICS FOR 7 DAYS.   PT WILL CONTINUE RESPIRATORY THERAPY TREATMENT WITH DUONEBS, O2 TITRATION AND INCENTIVE SPIROMETRY.  PT HAS CONTINUED COUGH AND DYSPNEA WITH SAT 95-97% ON 3 L NC      Herpes zoster without complication  PT HAS RECURRENT HERPES ZOSTER   VALTREX  CONTACT ISOLATION  04/13/2022  CONTINUE HZ TREATMENT  04/14/2022  CONTINUE VALTREX  04/15/2022  CONTINUE VALTREX      DVT prophylaxis  TEDS, PLAVIX, EARLY AMBULATION  04/13/2022  CONTINUE VTE PPX  0414/2022  CONTINUE VTE PPX  04/15/2022  CONTINUE VTE PPX      Acute cystitis without hematuria  ROCEPHIN  AWAIT C & S  04/13/2022  CONTINUE ROCEPHIN  C AND S IS PENDING  04/14/2022  CONTINUE ROCEPHIN  CHECK ON UR   "CULTURE  04/15/2022  URINE DID NOT MEET CULTURE CRITERIA      Type 2 diabetes mellitus with neurologic complication  PT HAS "PREDIABETES" WITH HYPERGLYCEMIA DUE TO STEROID THERAPY  WILL CONSIDER SSI AND DIABETIC DIET      Muscle weakness  PT HAS MUSCLE WEAKNESS AND WILL NEED PT/OT EVALUATION FOR REHABILITATION        Final Active Diagnoses:    Diagnosis Date Noted POA    PRINCIPAL PROBLEM:  Pneumonia due to infectious organism [J18.9] 04/13/2022 Yes    Herpes zoster without complication [B02.9] 04/13/2022 Yes    DVT prophylaxis [Z29.9] 06/10/2021 Not Applicable    Acute cystitis without hematuria [N30.00] 05/20/2021 Yes    Type 2 diabetes mellitus with neurologic complication [E11.49] 05/20/2021 Yes    Muscle weakness [M62.81] 05/15/2021 Yes      Problems Resolved During this Admission:       Discharged Condition: stable    Disposition: Swing Bed    Follow Up:    Patient Instructions:   No discharge procedures on file.    Significant Diagnostic Studies: Labs:   BMP:   Recent Labs   Lab 04/16/22  0931   *      K 4.0   CL 98   CO2 28   BUN 20*   CREATININE 0.84   CALCIUM 9.1    and CMP   Recent Labs   Lab 04/16/22  0931      K 4.0   CL 98   CO2 28   *   BUN 20*   CREATININE 0.84   CALCIUM 9.1   ANIONGAP 16   EGFRNONAA 75     Microbiology: Blood Culture No results found for: LABBLOO  Radiology: X-Ray: CXR: X-Ray Chest 1 View (CXR):   Results for orders placed or performed during the hospital encounter of 04/12/22   X-Ray Chest 1 View    Narrative    EXAMINATION:  XR CHEST 1 VIEW    CLINICAL HISTORY:  DYSPNEA;    COMPARISON:  [facility] of February 2022    FINDINGS:  The heart and mediastinum are stable in size and configuration.  The pulmonary vascularity is normal in caliber.  There is slight increased interstitial density.  No other lung infiltrates, effusions, pneumothorax or other abnormality is demonstrated.      Impression    Slightly increased interstitial pulmonary density could " indicate pneumonitis.      Electronically signed by: Issa Weinstein  Date:    04/12/2022  Time:    12:38       Pending Diagnostic Studies:     Procedure Component Value Units Date/Time    EXTRA TUBES [[id number]] Collected: 04/12/22 1152    Order Status: Sent Lab Status: In process Updated: 04/12/22 1152    Specimen: Blood, Venous     Narrative:      The following orders were created for panel order EXTRA TUBES.  Procedure                               Abnormality         Status                     ---------                               -----------         ------                     Light Green Top Hold[[id number]]                             In process                   Please view results for these tests on the individual orders.         Medications:  Reconciled Home Medications:      Medication List      CHANGE how you take these medications    valsartan 320 MG tablet  Commonly known as: DIOVAN  Take 1 tablet (320 mg total) by mouth once daily.  What changed: how much to take        CONTINUE taking these medications    atorvastatin 40 MG tablet  Commonly known as: LIPITOR  Take 40 mg by mouth every evening.     clopidogreL 75 mg tablet  Commonly known as: PLAVIX  Take 75 mg by mouth once daily.     docusate sodium 100 MG capsule  Commonly known as: COLACE  Take 1 capsule (100 mg total) by mouth once daily.     gabapentin 400 MG capsule  Commonly known as: NEURONTIN  Take 2 capsules (800 mg total) by mouth 2 (two) times daily.     hydrALAZINE 25 MG tablet  Commonly known as: APRESOLINE  Take 1 tablet (25 mg total) by mouth 3 (three) times daily.     LORazepam 0.5 MG tablet  Commonly known as: ATIVAN  Take 1 am and afternoon and 2 hs     sertraline 100 MG tablet  Commonly known as: ZOLOFT  Take 100 mg by mouth every evening.     temazepam 30 mg capsule  Commonly known as: RESTORIL  Take 30 mg by mouth every evening.        STOP taking these medications    citalopram 40 MG tablet  Commonly known as: CeleXA      hydroCHLOROthiazide 12.5 MG Tab  Commonly known as: HYDRODIURIL     HYDROcodone-acetaminophen  mg per tablet  Commonly known as: NORCO     omeprazole 40 MG capsule  Commonly known as: PRILOSEC            Indwelling Lines/Drains at time of discharge:   Lines/Drains/Airways     None                 Time spent on the discharge of patient: 45 minutes         Alis Grider MD  Department of Hospital Medicine  [facility] - Medical Surgical Unit

## 2022-04-18 NOTE — H&P
"[facility] - Medical Surgical Unit  [facility] Medicine  History & Physical    Patient Name: Herlinda Valdovinos  MRN: [id number]  Patient Class: IP- Swing  Admission Date: 4/15/2022  Attending Physician: Alis Grider MD  Primary Care Provider: Vini Hernandez NP         Patient information was obtained from patient and past medical records.     Subjective:     Principal Problem:Pneumonia due to infectious organism    Chief Complaint:   Chief Complaint   Patient presents with    Pneumonia        HPI:   PT IS A 57 YR OLD WF ADMITTED TO [facility] SWING BED FOR TREATMENT OF PNEUMONIA WITH IV ANTIBIOTICS, RESPIRATORY CARE AND MEDICAL MANAGEMENT. PT WAS REFERRED FROM [facility] WHERE SHE WAS ADMITTED FOR PNEUMONIA, HYPOXIA, UTI AND HERPES ZOSTER ON 04/12/2022.  PT IMPROVED; BUT WILL NEED CONTINUED IV ANTIBIOTICS AND RESPIRATORY CARE. IN ADDITION; PT HAS PERSISTENT MUSCLE WEAKNESS AND DEBILITY, AND WILL REQUIRE SWING BED FOR REHABILITATION. PT HAS RECURRENT HERPES ZOSTER TREATED WITH VALTREX. PT HAS HX "PREDIABETES AND HAS HAD HYPERGLYCEMIA WITH STEROID THERAPY AND WILL BE TREATED WITH SSI AND DIABETIC DIET.  PT WILL BE EVALUATED PER PT/OT AND A TREATMENT PLAN WILL BE INITIATED. THE PHARMACIST WILL REVIEW MEDICATIONS AND MAKE RECOMMENDATIONS. PT WILL SEE THE DIETICIAN  FOR NUTRITIONAL SUPPORT WITH WEIGHT 76.1 KG AND ALBUMIN OF 3.3. PT'S ABNORMAL ADMITTING LABS ARE: CMP:,BUN 20, CBC:WNL.PT WILL HAVE WEEKLY LABS.    Past Medical History:   Diagnosis Date    Cerebral hemorrhage     Chronic anxiety     Dehydration     Depression     Dysphagia     Due to and following non-traumatic intracerebral hemorrhage    Hearing loss     History of CVA (cerebrovascular accident)     Hypertension     Insomnia     Intrapontine hemorrhage     Left hemiparesis     Peripheral neuropathy     Transient cerebral ischemia         PT CODE STATUS IS FULL.  PT COVID STATUS IS NEG  PT HAS COVID VACCINE LAST YR   PT VTE PPX IS " PLAVIX/TEDS        Past Medical History:   Diagnosis Date    Cerebral hemorrhage     Chronic anxiety     Dehydration     Depression     Dysphagia     Due to and following non-traumatic intracerebral hemorrhage    Hearing loss     History of CVA (cerebrovascular accident)     Hypertension     Insomnia     Intrapontine hemorrhage     Left hemiparesis     Peripheral neuropathy     Transient cerebral ischemia        Past Surgical History:   Procedure Laterality Date    APPENDECTOMY      CHOLECYSTECTOMY      HYSTERECTOMY      LITHOTRIPSY      Renal lithotripsy    percutaneious insertion of ureteric stent         Review of patient's allergies indicates:   Allergen Reactions    Lamisil [terbinafine] Anaphylaxis    Codeine Itching    Compazine [prochlorperazine] Itching       No current facility-administered medications on file prior to encounter.     Current Outpatient Medications on File Prior to Encounter   Medication Sig    atorvastatin (LIPITOR) 40 MG tablet Take 40 mg by mouth every evening.    clopidogreL (PLAVIX) 75 mg tablet Take 75 mg by mouth once daily.    docusate sodium (COLACE) 100 MG capsule Take 1 capsule (100 mg total) by mouth once daily.    gabapentin (NEURONTIN) 400 MG capsule Take 2 capsules (800 mg total) by mouth 2 (two) times daily.    hydrALAZINE (APRESOLINE) 25 MG tablet Take 1 tablet (25 mg total) by mouth 3 (three) times daily.    LORazepam (ATIVAN) 0.5 MG tablet Take 1 am and afternoon and 2 hs    sertraline (ZOLOFT) 100 MG tablet Take 100 mg by mouth every evening.    temazepam (RESTORIL) 30 mg capsule Take 30 mg by mouth every evening.    valsartan (DIOVAN) 320 MG tablet Take 1 tablet (320 mg total) by mouth once daily. (Patient taking differently: Take 160 mg by mouth once daily.)    [DISCONTINUED] citalopram (CELEXA) 40 MG tablet Take 1 tablet (40 mg total) by mouth once daily.    [DISCONTINUED] hydroCHLOROthiazide (HYDRODIURIL) 12.5 MG Tab Take 1 tablet  (12.5 mg total) by mouth once daily.    [DISCONTINUED] omeprazole (PRILOSEC) 40 MG capsule Take 40 mg by mouth every evening.     Family History    None       Tobacco Use    Smoking status: Never Smoker    Smokeless tobacco: Never Used   Substance and Sexual Activity    Alcohol use: Never    Drug use: Never    Sexual activity: Not on file     Review of Systems   Constitutional:  Positive for activity change, fatigue and fever. Negative for appetite change.   HENT: Negative.     Eyes: Negative.    Respiratory:  Positive for cough and shortness of breath.    Cardiovascular: Negative.    Gastrointestinal: Negative.    Endocrine: Negative.    Genitourinary: Negative.    Skin:  Positive for rash (L GROIN WITH HERPES ZOSTER).   Allergic/Immunologic: Positive for immunocompromised state (PRE DIABETIC).   Neurological:  Positive for weakness.   Hematological: Negative.    Psychiatric/Behavioral: Negative.     Objective:     Vital Signs (Most Recent):  Temp: 98 °F (36.7 °C) (04/17/22 0701)  Pulse: 82 (04/17/22 2021)  Resp: 18 (04/17/22 2021)  BP: (!) 149/88 (04/17/22 1100)  SpO2: 95 % (04/17/22 2021)   Vital Signs (24h Range):  Temp:  [98 °F (36.7 °C)-98.8 °F (37.1 °C)] 98 °F (36.7 °C)  Pulse:  [64-93] 82  Resp:  [18-20] 18  SpO2:  [93 %-96 %] 95 %  BP: (149-176)/() 149/88     Weight: 76.1 kg (167 lb 12.8 oz)  Body mass index is 28.8 kg/m².    Physical Exam  Vitals reviewed.   Constitutional:       General: She is not in acute distress.     Appearance: Normal appearance. She is ill-appearing.   HENT:      Head: Normocephalic and atraumatic.      Right Ear: Tympanic membrane normal.      Left Ear: Tympanic membrane normal.      Nose: Nose normal.      Mouth/Throat:      Mouth: Mucous membranes are moist.   Eyes:      Extraocular Movements: Extraocular movements intact.      Pupils: Pupils are equal, round, and reactive to light.   Cardiovascular:      Rate and Rhythm: Normal rate and regular rhythm.      Pulses:  Normal pulses.      Heart sounds: Normal heart sounds.   Pulmonary:      Effort: Pulmonary effort is normal.      Breath sounds: Wheezing and rhonchi present.   Abdominal:      General: Bowel sounds are normal. There is no distension.      Palpations: Abdomen is soft.      Tenderness: There is no abdominal tenderness.   Musculoskeletal:         General: No swelling or tenderness. Normal range of motion.      Cervical back: Normal range of motion and neck supple.   Skin:     General: Skin is warm.      Capillary Refill: Capillary refill takes less than 2 seconds.      Findings: Lesion (L GROIN WITH RASH C/W HERPES ZOSTER) present.   Neurological:      General: No focal deficit present.      Mental Status: She is alert and oriented to person, place, and time.      Motor: Weakness present.   Psychiatric:         Mood and Affect: Mood normal.         Thought Content: Thought content normal.         Judgment: Judgment normal.         CRANIAL NERVES     CN III, IV, VI   Pupils are equal, round, and reactive to light.     Significant Labs: All pertinent labs within the past 24 hours have been reviewed.  BMP:   Recent Labs   Lab 04/16/22  0931   *      K 4.0   CL 98   CO2 28   BUN 20*   CREATININE 0.84   CALCIUM 9.1     CBC:   Recent Labs   Lab 04/16/22  0931   WBC 8.49   HGB 13.9   HCT 43.0          Significant Imaging: I have reviewed all pertinent imaging results/findings within the past 24 hours.  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

## 2022-04-18 NOTE — ASSESSMENT & PLAN NOTE
"PT HAS HYPERGLYCEMIA WITH TREATMENT INCLUDING STEROID THERAPY  PT STATES SHE IS "PREDIABETIC"  WILL OBTAIN HGB A1C  WILL CONSIDER SSI AND DIABETIC DIET  "

## 2022-04-18 NOTE — ASSESSMENT & PLAN NOTE
CONTINUE ROCEPHIN/ZITHROMAX FOR 7 DAYS  RESPIRATORY CARE WITH DUONEBS, O2 TITRATED AND INCENTIVE SPIROMETRY  O2 SAT 95-97%  ON 3 L NC O2

## 2022-04-18 NOTE — PT/OT/SLP EVAL
Physical Therapy Evaluation    Patient Name:  Herlinda Valdovinos   MRN:  [id number]    Recommendations:     Discharge Recommendations:  outpatient PT   Discharge Equipment Recommendations: none   Barriers to discharge: muscle weakness, decreased endurance, difficulty walking, impaired functional mobility    Assessment:     Herlinda Valdovinos is a 57 y.o. female admitted with a medical diagnosis of Pneumonia due to infectious organism.  She presents with the following impairments/functional limitations:  weakness, impaired endurance, impaired functional mobilty, gait instability, impaired balance, decreased coordination, decreased lower extremity function, decreased safety awareness, impaired coordination.    Rehab Prognosis: Good; patient would benefit from acute skilled PT services to address these deficits and reach maximum level of function.    Recent Surgery: * No surgery found *      Plan:     During this hospitalization, patient to be seen 5 x/week (x 1 week) to address the identified rehab impairments via gait training, therapeutic activities, therapeutic exercises, neuromuscular re-education and progress toward the following goals:    · Plan of Care Expires:  04/25/22    Subjective     Chief Complaint: Patient states her chief complaint currently is generalized weakness.  She reports occasional pain in mid back.   Patient/Family Comments/goals: improve strength to discharge home and transition back into outpatient physical therapy  Pain/Comfort:  · Pain Rating 1: 0/10    Patients cultural, spiritual, Adventist conflicts given the current situation: no    Living Environment:  Patient lives with her spouse who is available to assist as needed.   Prior to admission, patients level of function was assist needed with ambulation with [facility].  Equipment used at home: bedside commode, wheelchair, oxygen, shower chair, cane, straight, walker, rolling.    Upon discharge, patient will have assistance from spouse and  daughter.    Objective:     Communicated with RN, OT prior to session.  Patient found supine with oxygen  upon PT entry to room.    General Precautions: Standard, fall   Orthopedic Precautions:    Braces:    Respiratory Status: Nasal cannula, flow 2 L/min    Exams:  · Gross Motor Coordination:  impaired LLE with decreased balance strategies noted  · Sensation:    · -       Impaired  light/touch LLE  · RLE ROM: WFL  · RLE Strength: WFL  · LLE ROM: WFL  · LLE Strength: Deficits: observed greater than 3+/5    Functional Mobility:  · Bed Mobility:     · Supine to Sit: stand by assistance  · Transfers:     · Sit to Stand:  contact guard assistance and minimum assistance with hand-held assist  · Gait: Patient took 2-3 steps at bedside with hand held assistance to inhibit posterior loss of balance   · Balance: poor plus dynamic standing balance with LOB tending to be posteriorly     AM-PAC 6 CLICK MOBILITY  Total Score:14     Patient left sitting edge of bed with all lines intact. Patient's  is present.    GOALS:   Multidisciplinary Problems     Physical Therapy Goals        Problem: Physical Therapy    Goal Priority Disciplines Outcome Goal Variances Interventions   Physical Therapy Goal     PT, PT/OT Ongoing, Progressing     Description: Goals to be met by: 1 week    Patient will increase functional independence with mobility by performing:    STG: Sit to stand transfer with Stand-by Assistance  STG:  Bed to chair transfer with Contact Guard Assistance using Rolling Walker  LTG: Gait  x 100 feet with Contact Guard Assistance using Rolling Walker.   LTG: Stand for 8 minutes with Stand-by Assistance using Rolling Walker to aid with self care tasks.  LTG:  Patient will be independent with HEP                   History:     Past Medical History:   Diagnosis Date    Cerebral hemorrhage     Chronic anxiety     Dehydration     Depression     Dysphagia     Due to and following non-traumatic intracerebral hemorrhage     Hearing loss     History of CVA (cerebrovascular accident)     Hypertension     Insomnia     Intrapontine hemorrhage     Left hemiparesis     Peripheral neuropathy     Transient cerebral ischemia        Past Surgical History:   Procedure Laterality Date    APPENDECTOMY      CHOLECYSTECTOMY      HYSTERECTOMY      LITHOTRIPSY      Renal lithotripsy    percutaneious insertion of ureteric stent         Time Tracking:     PT Received On: 04/18/22  PT Start Time: 1140     PT Stop Time: 1155  PT Total Time (min): 15 min     Billable Minutes: Evaluation 15 minutes      04/18/2022

## 2022-04-18 NOTE — SUBJECTIVE & OBJECTIVE
Interval History: Ms. Kaur is alert and oriented x 3. She is fatigued still but states is beginning to feel some better. She states that she is now able to cough some. She denies dysphagia. She notes that her dyspnea on exertion is improving. Her case was discussed with Dr. Grider today.     Review of Systems   Constitutional:  Positive for activity change and fatigue. Negative for appetite change and fever.   HENT: Negative.     Eyes: Negative.    Respiratory:  Positive for cough and shortness of breath.    Cardiovascular: Negative.    Gastrointestinal: Negative.    Endocrine: Negative.    Genitourinary: Negative.    Skin:  Positive for rash (Left groin rash-herpes zoster).   Allergic/Immunologic: Positive for immunocompromised state.   Neurological:  Positive for weakness.   Hematological: Negative.    Psychiatric/Behavioral: Negative.     Objective:     Vital Signs (Most Recent):  Temp: 98 °F (36.7 °C) (04/17/22 0701)  Pulse: 82 (04/17/22 2021)  Resp: 18 (04/17/22 2021)  BP: (!) 149/88 (04/17/22 1100)  SpO2: 95 % (04/17/22 2021)   Vital Signs (24h Range):  Temp:  [98 °F (36.7 °C)-98.8 °F (37.1 °C)] 98 °F (36.7 °C)  Pulse:  [64-93] 82  Resp:  [18-20] 18  SpO2:  [93 %-96 %] 95 %  BP: (149-176)/() 149/88     Weight: 76.1 kg (167 lb 12.8 oz)  Body mass index is 28.8 kg/m².    Intake/Output Summary (Last 24 hours) at 4/17/2022 2259  Last data filed at 4/17/2022 2200  Gross per 24 hour   Intake 1351.67 ml   Output --   Net 1351.67 ml      Physical Exam  Vitals reviewed.   Constitutional:       General: She is not in acute distress.     Appearance: Normal appearance. She is ill-appearing. She is not toxic-appearing.   HENT:      Head: Normocephalic and atraumatic.      Nose: Nose normal.      Mouth/Throat:      Mouth: Mucous membranes are moist.   Eyes:      Extraocular Movements: Extraocular movements intact.      Pupils: Pupils are equal, round, and reactive to light.   Cardiovascular:      Rate and Rhythm:  Normal rate and regular rhythm.      Pulses: Normal pulses.      Heart sounds: Normal heart sounds.   Pulmonary:      Effort: Pulmonary effort is normal.      Breath sounds: Wheezing (mild coarse wheezes bilat) and rhonchi (improving) present.   Abdominal:      General: Bowel sounds are normal. There is no distension.      Palpations: Abdomen is soft.      Tenderness: There is no abdominal tenderness.   Musculoskeletal:         General: No swelling or tenderness. Normal range of motion.      Cervical back: Normal range of motion and neck supple.   Skin:     General: Skin is warm.      Capillary Refill: Capillary refill takes less than 2 seconds.      Findings: Lesion (Left groin -herpes zoster (improving)) present.   Neurological:      General: No focal deficit present.      Mental Status: She is alert and oriented to person, place, and time.      Motor: Weakness (generalized) present.   Psychiatric:         Mood and Affect: Mood normal.         Thought Content: Thought content normal.         Judgment: Judgment normal.       Significant Labs: All pertinent labs within the past 24 hours have been reviewed.  Recent Lab Results       None            Significant Imaging: I have reviewed all pertinent imaging results/findings within the past 24 hours.

## 2022-04-18 NOTE — PT/OT/SLP EVAL
"Occupational Therapy   Evaluation    Name: Herlinda Valdovinos  MRN: [id number]  Admitting Diagnosis:  Pneumonia due to infectious organism  Recent Surgery: * No surgery found *      Recommendations:     Discharge Recommendations: outpatient OT, outpatient PT  Discharge Equipment Recommendations:     Barriers to discharge:       Assessment:     Herlinda Valdovinos is a 57 y.o. female with a medical diagnosis of Pneumonia due to infectious organism.  She presents with significant weakness following hospitalization for PNA, shingles, and UTI. Performance deficits affecting function: weakness, impaired endurance, impaired self care skills, impaired functional mobilty, gait instability, impaired balance, decreased upper extremity function, decreased coordination, decreased lower extremity function, impaired coordination, impaired fine motor, impaired cardiopulmonary response to activity.      Rehab Prognosis: Good; patient would benefit from acute skilled OT services to address these deficits and reach maximum level of function.       Plan:     Patient to be seen 5 x/week to address the above listed problems via self-care/home management, therapeutic activities, therapeutic exercises, neuromuscular re-education  · Plan of Care Expires: 05/02/22  · Plan of Care Reviewed with: patient, spouse    Subjective     Chief Complaint: Weakness and LOB  Patient/Family Comments/goals: Return home to [facility]    Occupational Profile:  Living Environment: Lives in 1 level home with spouse  Previous level of function: Required set-up/SUP, intermittent Juanis needed on "bad" days.   Roles and Routines:  performs IADLs  Equipment Used at Home:  bedside commode, cane, straight, cane, quad, walker, rolling, wheelchair  Assistance upon Discharge: TBD    Pain/Comfort:  · Pain Rating 1: 0/10  · Pain Rating Post-Intervention 1: 0/10    Patients cultural, spiritual, Taoist conflicts given the current situation: no    Objective:     Communicated " with: Nurse prior to session.  Patient found supine with oxygen, peripheral IV upon OT entry to room.    General Precautions: Standard, fall   Orthopedic Precautions:    Braces:    Respiratory Status: Nasal cannula, flow 2 L/min    Occupational Performance:    Bed Mobility:    · Patient completed Supine to Sit with stand by assistance    Functional Mobility/Transfers:  · Patient completed Sit <> Stand Transfer with contact guard assistance  with  no assistive device   · Patient completed Bed <> Chair Transfer using Step Transfer technique with minimum assistance with hand-held assist    Activities of Daily Living:  · Upper Body Dressing: moderate assistance seated  · Lower Body Dressing: maximal assistance seated/standin    Cognitive/Visual Perceptual:  Cognitive/Psychosocial Skills:     -       Oriented to: Person, Place, Time and Situation   -       Follows Commands/attention:Follows two-step commands    Physical Exam:  Dominant hand:    -       R UE  Upper Extremity Range of Motion:     -       Right Upper Extremity: WFL  -       Left Upper Extremity: up to 90 degrees AROM shoulder flexion  Upper Extremity Strength:    -       Right Upper Extremity: 3+/5  -       Left Upper Extremity: 3-/5   Strength:    -       Right Upper Extremity: Fair+/good-  -       Left Upper Extremity: Fair-  Fine Motor Coordination:    -       Impaired  Left hand, finger to nose fair-, Left hand thumb/finger opposition skills fair-, Left hand, manipulation of objects fair-    AMPAC 6 Click ADL:  AMPAC Total Score:      Treatment & Education:  · Pt educated on OT role/POC.   · Importance of OOB activity with staff assistance.  · Importance of sitting up in the chair throughout the day as tolerated, especially for meals   · Safety during functional t/f and mobility  · Importance of assisting with self-care activities  Education:    Patient left sitting edge of bed with call button in reach and spouse present    GOALS:    Multidisciplinary Problems     Occupational Therapy Goals        Problem: Occupational Therapy    Goal Priority Disciplines Outcome Interventions   Occupational Therapy Goal     OT, PT/OT Ongoing, Progressing    Description: Goals to be met by: 1 week     Patient will increase functional independence with ADLs by performing:    UE Dressing with Set-up Assistance.  LE Dressing with Contact Guard Assistance.  Toileting from toilet with Contact Guard Assistance for hygiene and clothing management.   Toilet transfer to toilet with Stand-by Assistance.    Goals to be met by: 2 weeks     Patient will increase functional independence with ADLs by performing:    UE Dressing with Set-up Assistance.  LE Dressing with Set-up Assistance.  Toileting from toilet with Supervision for hygiene and clothing management.   Toilet transfer to toilet with Supervision.                           History:     Past Medical History:   Diagnosis Date    Cerebral hemorrhage     Chronic anxiety     Dehydration     Depression     Dysphagia     Due to and following non-traumatic intracerebral hemorrhage    Hearing loss     History of CVA (cerebrovascular accident)     Hypertension     Insomnia     Intrapontine hemorrhage     Left hemiparesis     Peripheral neuropathy     Transient cerebral ischemia        Past Surgical History:   Procedure Laterality Date    APPENDECTOMY      CHOLECYSTECTOMY      HYSTERECTOMY      LITHOTRIPSY      Renal lithotripsy    percutaneious insertion of ureteric stent         Time Tracking:     OT Date of Treatment: 04/18/22  OT Start Time: 1140  OT Stop Time: 1156  OT Total Time (min): 16 min    Billable Minutes:Evaluation 16     MARCUS Levy, OTR/L    4/18/2022

## 2022-04-18 NOTE — HOSPITAL COURSE
4/16/22. CXR repeated yesterday shows mild worsening. She is afebrile. Her WBC is 8490 with 82% neutrophils. Her o2 sats are ranging 96-98% on O2 at 3L/min per NC. Her lungs sounds are improving. Rocephin and azithromycin are extended to a 7 day course. Solumedrol is being tapered.    04/22/22 - Hospital Day 7 - Discharge Summary   Patient completed course of Rocephin and Zithromax yesterday. She remains afebrile. Lungs CTAB. O2 98% on 2L NC. WBC 14.89. BG ranges 123-238. HgA1c pending for PCP to follow. Patient has reached the maximum benefit of this hospital stay and will be discharged home today. She refused home health and will return to outpatient PT through  [facility] next week. She will continue Prednisone for 2 days. Rx provided for rescue inhaler and duonebs for patient's nebulizer she has at home. Dr. Grider evaluated patient on rounds and participated in the discharge.

## 2022-04-18 NOTE — ASSESSMENT & PLAN NOTE
TEDS, PLAVIX, EARLY AMBULATION  04/13/2022  CONTINUE VTE PPX  0414/2022  CONTINUE VTE PPX  04/15/2022  CONTINUE VTE PPX

## 2022-04-18 NOTE — ASSESSMENT & PLAN NOTE
"PT HAS "PREDIABETES" WITH HYPERGLYCEMIA DUE TO STEROID THERAPY  WILL CONSIDER SSI AND DIABETIC DIET    "

## 2022-04-18 NOTE — PLAN OF CARE
Problem: Fluid Imbalance (Pneumonia)  Goal: Fluid Balance  Outcome: Ongoing, Progressing  Intervention: Monitor and Manage Fluid Balance  Flowsheets (Taken 4/18/2022 0645)  Fluid/Electrolyte Management: fluids provided     Problem: Skin Injury Risk Increased  Goal: Skin Health and Integrity  Outcome: Ongoing, Progressing  Intervention: Optimize Skin Protection  Flowsheets (Taken 4/18/2022 0645)  Pressure Reduction Techniques: frequent weight shift encouraged  Skin Protection: incontinence pads utilized  Head of Bed (HOB) Positioning: HOB elevated

## 2022-04-18 NOTE — PLAN OF CARE
Problem: Physical Therapy  Goal: Physical Therapy Goal  Description: Goals to be met by: 1 week    Patient will increase functional independence with mobility by performing:    STG: Sit to stand transfer with Stand-by Assistance  STG:  Bed to chair transfer with Contact Guard Assistance using Rolling Walker  LTG: Gait  x 100 feet with Contact Guard Assistance using Rolling Walker.   LTG: Stand for 8 minutes with Stand-by Assistance using Rolling Walker to aid with self care tasks.  LTG:  Patient will be independent with HEP  Outcome: Ongoing, Progressing

## 2022-04-18 NOTE — ASSESSMENT & PLAN NOTE
PT HAS PNEUMONIA WITH HYPOXIA PTA WITH O2 SAT 89% PTA, 92% ON RA AND 98% ON 2 L NC  PT TREATMENT REGIMEN -  ROCEPHIN/ZITHROMAX, STEROID THERAPY, DUONEBS, INCENTIVE SPIROMETRY, O2 TITRATED PER RT  04/13/2022  STABLE ON CURRENT REGIMEN  04/14/2022  PT IS ON DAY 3 ROCEPHIN/ZITHROMAX AND SOLUMEDROL WITH IMPROVEMENT NOTED AND STABLE SAT 94-97% ON 3 L NC  PT HAS CONTINUED COUGH AND DYSPNEA  WITH EXERTION  04/15/2022  PT IS DAY 4 ROCEPHIN/ZITHROMAX AND SOLUMEDROL WITH IMPROVEMENT BUT PT WILL NEED SWING BED PLACEMENT FOR FURTHER IV ANTIBIOTICS FOR 7 DAYS.   PT WILL CONTINUE RESPIRATORY THERAPY TREATMENT WITH DUONEBS, O2 TITRATION AND INCENTIVE SPIROMETRY.  PT HAS CONTINUED COUGH AND DYSPNEA WITH SAT 95-97% ON 3 L NC

## 2022-04-18 NOTE — ASSESSMENT & PLAN NOTE
PT HAS RECURRENT HERPES ZOSTER   VALTREX  CONTACT ISOLATION  04/13/2022  CONTINUE HZ TREATMENT  04/14/2022  CONTINUE VALTREX  04/15/2022  CONTINUE VALTREX

## 2022-04-18 NOTE — PLAN OF CARE
Problem: Occupational Therapy  Goal: Occupational Therapy Goal  Description: Goals to be met by: 1 week     Patient will increase functional independence with ADLs by performing:    UE Dressing with Set-up Assistance.  LE Dressing with Contact Guard Assistance.  Toileting from toilet with Contact Guard Assistance for hygiene and clothing management.   Toilet transfer to toilet with Stand-by Assistance.    Goals to be met by: 2 weeks     Patient will increase functional independence with ADLs by performing:    UE Dressing with Set-up Assistance.  LE Dressing with Set-up Assistance.  Toileting from toilet with Supervision for hygiene and clothing management.   Toilet transfer to toilet with Supervision.          Outcome: Ongoing, Progressing     Kiana Rasmussen, OTD, OTR/L  4/18/2022

## 2022-04-19 LAB
EST. AVERAGE GLUCOSE BLD GHB EST-MCNC: 97 MG/DL
GLUCOSE SERPL-MCNC: 120 MG/DL (ref 70–105)
GLUCOSE SERPL-MCNC: 214 MG/DL (ref 70–105)
GLUCOSE SERPL-MCNC: 235 MG/DL (ref 70–105)
GLUCOSE SERPL-MCNC: 241 MG/DL (ref 70–105)
HBA1C MFR BLD HPLC: 5.5 % (ref 4.5–6.6)

## 2022-04-19 PROCEDURE — 27000944

## 2022-04-19 PROCEDURE — 27000173 HC ACAPELLA DEVICE DH OR DM

## 2022-04-19 PROCEDURE — 97530 THERAPEUTIC ACTIVITIES: CPT | Mod: CQ

## 2022-04-19 PROCEDURE — 94761 N-INVAS EAR/PLS OXIMETRY MLT: CPT

## 2022-04-19 PROCEDURE — 25000003 PHARM REV CODE 250: Performed by: EMERGENCY MEDICINE

## 2022-04-19 PROCEDURE — 97112 NEUROMUSCULAR REEDUCATION: CPT

## 2022-04-19 PROCEDURE — 97535 SELF CARE MNGMENT TRAINING: CPT

## 2022-04-19 PROCEDURE — 11000004 HC SNF PRIVATE

## 2022-04-19 PROCEDURE — 94640 AIRWAY INHALATION TREATMENT: CPT

## 2022-04-19 PROCEDURE — 25000242 PHARM REV CODE 250 ALT 637 W/ HCPCS: Performed by: NURSE PRACTITIONER

## 2022-04-19 PROCEDURE — 27000221 HC OXYGEN, UP TO 24 HOURS

## 2022-04-19 PROCEDURE — 25000003 PHARM REV CODE 250: Performed by: NURSE PRACTITIONER

## 2022-04-19 PROCEDURE — 82962 GLUCOSE BLOOD TEST: CPT

## 2022-04-19 PROCEDURE — 94664 DEMO&/EVAL PT USE INHALER: CPT

## 2022-04-19 PROCEDURE — 99900035 HC TECH TIME PER 15 MIN (STAT)

## 2022-04-19 PROCEDURE — 63600175 PHARM REV CODE 636 W HCPCS: Performed by: NURSE PRACTITIONER

## 2022-04-19 PROCEDURE — 63600175 PHARM REV CODE 636 W HCPCS: Performed by: EMERGENCY MEDICINE

## 2022-04-19 RX ORDER — PREDNISONE 20 MG/1
40 TABLET ORAL DAILY
Status: COMPLETED | OUTPATIENT
Start: 2022-04-20 | End: 2022-04-22

## 2022-04-19 RX ADMIN — LORAZEPAM 0.5 MG: 0.5 TABLET ORAL at 08:04

## 2022-04-19 RX ADMIN — INSULIN ASPART 1 UNITS: 100 INJECTION, SOLUTION INTRAVENOUS; SUBCUTANEOUS at 08:04

## 2022-04-19 RX ADMIN — VALACYCLOVIR HYDROCHLORIDE 1000 MG: 500 TABLET, FILM COATED ORAL at 03:04

## 2022-04-19 RX ADMIN — SENNOSIDES AND DOCUSATE SODIUM 1 TABLET: 50; 8.6 TABLET ORAL at 08:04

## 2022-04-19 RX ADMIN — CLOPIDOGREL BISULFATE 75 MG: 75 TABLET, FILM COATED ORAL at 08:04

## 2022-04-19 RX ADMIN — NYSTATIN 500000 UNITS: 500000 SUSPENSION ORAL at 08:04

## 2022-04-19 RX ADMIN — VALSARTAN 160 MG: 160 TABLET, FILM COATED ORAL at 08:04

## 2022-04-19 RX ADMIN — IPRATROPIUM BROMIDE AND ALBUTEROL SULFATE 3 ML: 2.5; .5 SOLUTION RESPIRATORY (INHALATION) at 01:04

## 2022-04-19 RX ADMIN — HYDRALAZINE HYDROCHLORIDE 25 MG: 25 TABLET, FILM COATED ORAL at 08:04

## 2022-04-19 RX ADMIN — IPRATROPIUM BROMIDE AND ALBUTEROL SULFATE 3 ML: 2.5; .5 SOLUTION RESPIRATORY (INHALATION) at 07:04

## 2022-04-19 RX ADMIN — POLYETHYLENE GLYCOL 3350 17 G: 17 POWDER, FOR SOLUTION ORAL at 08:04

## 2022-04-19 RX ADMIN — NYSTATIN 500000 UNITS: 500000 SUSPENSION ORAL at 01:04

## 2022-04-19 RX ADMIN — LORAZEPAM 0.5 MG: 0.5 TABLET ORAL at 03:04

## 2022-04-19 RX ADMIN — NYSTATIN 500000 UNITS: 500000 SUSPENSION ORAL at 04:04

## 2022-04-19 RX ADMIN — HYDRALAZINE HYDROCHLORIDE 25 MG: 25 TABLET, FILM COATED ORAL at 03:04

## 2022-04-19 RX ADMIN — DOCUSATE SODIUM 100 MG: 100 CAPSULE, LIQUID FILLED ORAL at 08:04

## 2022-04-19 RX ADMIN — PANTOPRAZOLE SODIUM 40 MG: 40 TABLET, DELAYED RELEASE ORAL at 08:04

## 2022-04-19 RX ADMIN — INSULIN ASPART 2 UNITS: 100 INJECTION, SOLUTION INTRAVENOUS; SUBCUTANEOUS at 05:04

## 2022-04-19 RX ADMIN — INSULIN ASPART 2 UNITS: 100 INJECTION, SOLUTION INTRAVENOUS; SUBCUTANEOUS at 04:04

## 2022-04-19 RX ADMIN — VALACYCLOVIR HYDROCHLORIDE 1000 MG: 500 TABLET, FILM COATED ORAL at 08:04

## 2022-04-19 RX ADMIN — METHYLPREDNISOLONE SODIUM SUCCINATE 40 MG: 40 INJECTION, POWDER, FOR SOLUTION INTRAMUSCULAR; INTRAVENOUS at 10:04

## 2022-04-19 RX ADMIN — AMLODIPINE BESYLATE 5 MG: 5 TABLET ORAL at 08:04

## 2022-04-19 RX ADMIN — IPRATROPIUM BROMIDE AND ALBUTEROL SULFATE 3 ML: 2.5; .5 SOLUTION RESPIRATORY (INHALATION) at 08:04

## 2022-04-19 RX ADMIN — SERTRALINE HYDROCHLORIDE 100 MG: 50 TABLET ORAL at 08:04

## 2022-04-19 RX ADMIN — ATORVASTATIN CALCIUM 40 MG: 40 TABLET, FILM COATED ORAL at 08:04

## 2022-04-19 RX ADMIN — GABAPENTIN 800 MG: 400 CAPSULE ORAL at 08:04

## 2022-04-19 RX ADMIN — TEMAZEPAM 30 MG: 30 CAPSULE ORAL at 08:04

## 2022-04-19 NOTE — PLAN OF CARE
Problem: Adult Inpatient Plan of Care  Goal: Plan of Care Review  Outcome: Ongoing, Progressing  Goal: Patient-Specific Goal (Individualized)  Outcome: Ongoing, Progressing  Goal: Absence of Hospital-Acquired Illness or Injury  Outcome: Ongoing, Progressing  Intervention: Identify and Manage Fall Risk  Flowsheets (Taken 4/18/2022 2133)  Safety Promotion/Fall Prevention:   Fall Risk reviewed with patient/family   Fall Risk signage in place   bed alarm set   assistive device/personal item within reach   lighting adjusted   medications reviewed   nonskid shoes/socks when out of bed   side rails raised x 2  Intervention: Prevent Skin Injury  Flowsheets (Taken 4/18/2022 2133)  Body Position:   position maintained   position changed independently  Skin Protection: incontinence pads utilized  Intervention: Prevent and Manage VTE (Venous Thromboembolism) Risk  Flowsheets (Taken 4/18/2022 2133)  VTE Prevention/Management: remove, assess skin, and reapply compression stockings  Range of Motion: active ROM (range of motion) encouraged  Intervention: Prevent Infection  Flowsheets (Taken 4/18/2022 2133)  Infection Prevention:   rest/sleep promoted   hand hygiene promoted  Goal: Optimal Comfort and Wellbeing  Outcome: Ongoing, Progressing  Goal: Readiness for Transition of Care  Outcome: Ongoing, Progressing     Problem: Diabetes Comorbidity  Goal: Blood Glucose Level Within Targeted Range  Outcome: Ongoing, Progressing  Intervention: Monitor and Manage Glycemia  Flowsheets (Taken 4/18/2022 2133)  Glycemic Management: blood glucose monitored     Problem: Fluid Imbalance (Pneumonia)  Goal: Fluid Balance  Outcome: Ongoing, Progressing  Intervention: Monitor and Manage Fluid Balance  Flowsheets (Taken 4/18/2022 2133)  Fluid/Electrolyte Management: fluids provided     Problem: Infection  Goal: Absence of Infection Signs and Symptoms  Outcome: Ongoing, Progressing  Intervention: Prevent or Manage Infection  Flowsheets (Taken  4/18/2022 2133)  Infection Management: aseptic technique maintained  Isolation Precautions: precautions maintained

## 2022-04-19 NOTE — PT/OT/SLP PROGRESS
Occupational Therapy   Treatment    Name: Herlinda Valdovinos  MRN: [id number]  Admitting Diagnosis:  Pneumonia due to infectious organism       Recommendations:     Discharge Recommendations: outpatient OT, outpatient PT  Discharge Equipment Recommendations:     Barriers to discharge:       Assessment:     Herlinda Valdovinos is a 57 y.o. female with a medical diagnosis of Pneumonia due to infectious organism.  She presents with improving endurance and strength evident in improved participation and tolerance for OOB activity on this day. Performance deficits affecting function are weakness, impaired endurance, impaired self care skills, impaired functional mobilty, gait instability, impaired balance, decreased upper extremity function, decreased coordination, decreased lower extremity function, impaired coordination, impaired fine motor, impaired cardiopulmonary response to activity.     Rehab Prognosis:  Good; patient would benefit from acute skilled OT services to address these deficits and reach maximum level of function.       Plan:     Patient to be seen 5 x/week to address the above listed problems via self-care/home management, therapeutic activities, therapeutic exercises, neuromuscular re-education  · Plan of Care Expires: 05/02/22  · Plan of Care Reviewed with: patient, spouse    Subjective     Pain/Comfort:  · Pain Rating 1: 0/10  · Pain Rating Post-Intervention 1: 0/10    Objective:     Communicated with: Nurse prior to session.  Patient found supine with oxygen, peripheral IV upon OT entry to room.    General Precautions: Standard, fall   Orthopedic Precautions:    Braces:    Respiratory Status: Nasal cannula, flow 2 L/min     Occupational Performance:     Bed Mobility:    · Patient completed Supine to Sit with stand by assistance  · Patient completed Sit to Supine with stand by assistance     Functional Mobility/Transfers:  · Patient completed Sit <> Stand Transfer with contact guard assistance  with  no  assistive device   · Functional Mobility: Did not perform.     Activities of Daily Living:  · Lower Body Dressing: minimum assistance to thread LEs through brief and CGA-Juanis to pull over hips. Juanis needed for safe dynamic standing balance.       [facility] 6 Click ADL:      Treatment & Education:  Performed LB ADL training with pt experiencing UI episode, resulting in need for changing brief. Doffed brief with CGA-SBA, donning brief with uJanis to thread LEs through brief and Juanis to maintain safe standing balance, CGA-Juanis to pull over hips while standing. Performed series of dynamic standing challenging functional reach patters needed for safe LB ADL performance. Pt required intermittent Min-ModA to correct balance loss when reaching outside base of support. Challenged righting reaction, reach outside base of support, trunk flexion/extension while standing and maintaining safe balance requiring Min-ModA due to intermittent LOB.     Patient left supine with call button in reach and  presentEducation:      GOALS:   Multidisciplinary Problems     Occupational Therapy Goals        Problem: Occupational Therapy    Goal Priority Disciplines Outcome Interventions   Occupational Therapy Goal     OT, PT/OT Ongoing, Progressing    Description: Goals to be met by: 1 week     Patient will increase functional independence with ADLs by performing:    UE Dressing with Set-up Assistance.  LE Dressing with Contact Guard Assistance.  Toileting from toilet with Contact Guard Assistance for hygiene and clothing management.   Toilet transfer to toilet with Stand-by Assistance.    Goals to be met by: 2 weeks     Patient will increase functional independence with ADLs by performing:    UE Dressing with Set-up Assistance.  LE Dressing with Set-up Assistance.  Toileting from toilet with Supervision for hygiene and clothing management.   Toilet transfer to toilet with Supervision.                           Time Tracking:     OT Date of  Treatment: 04/19/22  OT Start Time: 0942  OT Stop Time: 1011  OT Total Time (min): 29 min    Billable Minutes:Self Care/Home Management 10  Neuromuscular Re-education 19     MARCUS Levy, OTR/L    OT/HEMAL: OT          4/19/2022

## 2022-04-19 NOTE — PT/OT/SLP PROGRESS
Physical Therapy Treatment    Patient Name:  Herlinda Valdovinos   MRN:  [id number]    Recommendations:     Discharge Recommendations:  outpatient PT, outpatient OT   Discharge Equipment Recommendations: none   Barriers to discharge: muscle weakness    Assessment:     Herlinda Valdovinos is a 57 y.o. female admitted with a medical diagnosis of Pneumonia due to infectious organism.  She presents with the following impairments/functional limitations:  weakness, impaired balance, impaired coordination, impaired cardiopulmonary response to activity, impaired endurance, impaired functional mobilty, impaired self care skills, impaired fine motor, gait instability, decreased lower extremity function, decreased upper extremity function, decreased coordination Patient was agreeable to perform therapy today.     Rehab Prognosis: Good; patient would benefit from acute skilled PT services to address these deficits and reach maximum level of function.    Recent Surgery: * No surgery found *      Plan:     During this hospitalization, patient to be seen 5 x/week to address the identified rehab impairments via gait training, therapeutic activities, therapeutic exercises, neuromuscular re-education and progress toward the following goals:    · Plan of Care Expires:  04/25/22    Subjective     Chief Complaint: muscle weakness   Patient/Family Comments/goals: return to outpatient PT and OT   Pain/Comfort:  · Pain Rating 1: 0/10  · Pain Rating Post-Intervention 1: 0/10      Objective:     Communicated with OT prior to session.  Patient found supine with oxygen upon PT entry to room.     General Precautions: Standard, fall   Orthopedic Precautions:    Braces:    Respiratory Status: Nasal cannula, flow 2 L/min     Functional Mobility:  · Bed Mobility:     · Supine to Sit: stand by assistance  · Sit to Supine: stand by assistance  · Transfers:     · Sit to Stand:  contact guard assistance with hand-held assist  · Toilet Transfer: contact guard  assistance with  hand-held assist  using  Stand Pivot      AM-PAC 6 CLICK MOBILITY  Turning over in bed (including adjusting bedclothes, sheets and blankets)?: 3  Sitting down on and standing up from a chair with arms (e.g., wheelchair, bedside commode, etc.): 3  Moving from lying on back to sitting on the side of the bed?: 3  Moving to and from a bed to a chair (including a wheelchair)?: 2  Need to walk in hospital room?: 2  Climbing 3-5 steps with a railing?: 1  Basic Mobility Total Score: 14       Therapeutic Activities and Exercises:   Patient performed LAQ x 20 on BLE and standing balance activities for 1 minute before stating she needed to use the restroom. Patient    had a bowel movent and stated that it took everything out of her. Patient was ready to lay back down and rest for tomorrow's treatment     Patient left HOB elevated with call button in reach..    GOALS:   Multidisciplinary Problems     Physical Therapy Goals        Problem: Physical Therapy    Goal Priority Disciplines Outcome Goal Variances Interventions   Physical Therapy Goal     PT, PT/OT Ongoing, Progressing     Description: Goals to be met by: 1 week    Patient will increase functional independence with mobility by performing:    STG: Sit to stand transfer with Stand-by Assistance  STG:  Bed to chair transfer with Contact Guard Assistance using Rolling Walker  LTG: Gait  x 100 feet with Contact Guard Assistance using Rolling Walker.   LTG: Stand for 8 minutes with Stand-by Assistance using Rolling Walker to aid with self care tasks.  LTG:  Patient will be independent with HEP                   Time Tracking:     PT Received On: 04/19/22  PT Start Time: 1520     PT Stop Time: 1543  PT Total Time (min): 23 min     Billable Minutes: Therapeutic Activity 23    Treatment Type: Treatment  PT/PTA: PTA     PTA Visit Number: 1     04/19/2022

## 2022-04-20 LAB
GLUCOSE SERPL-MCNC: 103 MG/DL (ref 70–105)
GLUCOSE SERPL-MCNC: 176 MG/DL (ref 70–105)
GLUCOSE SERPL-MCNC: 210 MG/DL (ref 70–105)
GLUCOSE SERPL-MCNC: 280 MG/DL (ref 70–105)

## 2022-04-20 PROCEDURE — 63600175 PHARM REV CODE 636 W HCPCS: Performed by: EMERGENCY MEDICINE

## 2022-04-20 PROCEDURE — 97116 GAIT TRAINING THERAPY: CPT | Mod: CQ

## 2022-04-20 PROCEDURE — 25000003 PHARM REV CODE 250: Performed by: EMERGENCY MEDICINE

## 2022-04-20 PROCEDURE — 94761 N-INVAS EAR/PLS OXIMETRY MLT: CPT

## 2022-04-20 PROCEDURE — 27000173 HC ACAPELLA DEVICE DH OR DM

## 2022-04-20 PROCEDURE — 11000004 HC SNF PRIVATE

## 2022-04-20 PROCEDURE — 82962 GLUCOSE BLOOD TEST: CPT

## 2022-04-20 PROCEDURE — 94664 DEMO&/EVAL PT USE INHALER: CPT

## 2022-04-20 PROCEDURE — 97535 SELF CARE MNGMENT TRAINING: CPT

## 2022-04-20 PROCEDURE — 27000221 HC OXYGEN, UP TO 24 HOURS

## 2022-04-20 PROCEDURE — 94640 AIRWAY INHALATION TREATMENT: CPT

## 2022-04-20 PROCEDURE — 63600175 PHARM REV CODE 636 W HCPCS: Performed by: NURSE PRACTITIONER

## 2022-04-20 PROCEDURE — 99900035 HC TECH TIME PER 15 MIN (STAT)

## 2022-04-20 PROCEDURE — 27000944

## 2022-04-20 PROCEDURE — 25000242 PHARM REV CODE 250 ALT 637 W/ HCPCS: Performed by: NURSE PRACTITIONER

## 2022-04-20 PROCEDURE — 25000003 PHARM REV CODE 250: Performed by: NURSE PRACTITIONER

## 2022-04-20 RX ADMIN — HYDRALAZINE HYDROCHLORIDE 25 MG: 25 TABLET, FILM COATED ORAL at 08:04

## 2022-04-20 RX ADMIN — SERTRALINE HYDROCHLORIDE 100 MG: 50 TABLET ORAL at 08:04

## 2022-04-20 RX ADMIN — INSULIN ASPART 3 UNITS: 100 INJECTION, SOLUTION INTRAVENOUS; SUBCUTANEOUS at 04:04

## 2022-04-20 RX ADMIN — INSULIN ASPART 1 UNITS: 100 INJECTION, SOLUTION INTRAVENOUS; SUBCUTANEOUS at 09:04

## 2022-04-20 RX ADMIN — NYSTATIN 500000 UNITS: 500000 SUSPENSION ORAL at 08:04

## 2022-04-20 RX ADMIN — SENNOSIDES AND DOCUSATE SODIUM 1 TABLET: 50; 8.6 TABLET ORAL at 08:04

## 2022-04-20 RX ADMIN — GABAPENTIN 800 MG: 400 CAPSULE ORAL at 08:04

## 2022-04-20 RX ADMIN — PREDNISONE 40 MG: 20 TABLET ORAL at 08:04

## 2022-04-20 RX ADMIN — IPRATROPIUM BROMIDE AND ALBUTEROL SULFATE 3 ML: 2.5; .5 SOLUTION RESPIRATORY (INHALATION) at 09:04

## 2022-04-20 RX ADMIN — TEMAZEPAM 30 MG: 30 CAPSULE ORAL at 08:04

## 2022-04-20 RX ADMIN — AMLODIPINE BESYLATE 5 MG: 5 TABLET ORAL at 08:04

## 2022-04-20 RX ADMIN — LORAZEPAM 0.5 MG: 0.5 TABLET ORAL at 02:04

## 2022-04-20 RX ADMIN — PANTOPRAZOLE SODIUM 40 MG: 40 TABLET, DELAYED RELEASE ORAL at 08:04

## 2022-04-20 RX ADMIN — IPRATROPIUM BROMIDE AND ALBUTEROL SULFATE 3 ML: 2.5; .5 SOLUTION RESPIRATORY (INHALATION) at 03:04

## 2022-04-20 RX ADMIN — NYSTATIN 500000 UNITS: 500000 SUSPENSION ORAL at 04:04

## 2022-04-20 RX ADMIN — ATORVASTATIN CALCIUM 40 MG: 40 TABLET, FILM COATED ORAL at 08:04

## 2022-04-20 RX ADMIN — HYDRALAZINE HYDROCHLORIDE 25 MG: 25 TABLET, FILM COATED ORAL at 02:04

## 2022-04-20 RX ADMIN — LORAZEPAM 0.5 MG: 0.5 TABLET ORAL at 08:04

## 2022-04-20 RX ADMIN — CLOPIDOGREL BISULFATE 75 MG: 75 TABLET, FILM COATED ORAL at 08:04

## 2022-04-20 RX ADMIN — ACETAMINOPHEN 650 MG: 325 TABLET ORAL at 04:04

## 2022-04-20 RX ADMIN — DOCUSATE SODIUM 100 MG: 100 CAPSULE, LIQUID FILLED ORAL at 08:04

## 2022-04-20 RX ADMIN — POLYETHYLENE GLYCOL 3350 17 G: 17 POWDER, FOR SOLUTION ORAL at 08:04

## 2022-04-20 RX ADMIN — NYSTATIN 500000 UNITS: 500000 SUSPENSION ORAL at 01:04

## 2022-04-20 RX ADMIN — VALSARTAN 160 MG: 160 TABLET, FILM COATED ORAL at 08:04

## 2022-04-20 NOTE — PROGRESS NOTES
"[facility]  Adult Nutrition  Consult Note    SUMMARY     Recommendations         Assessment and Plan    No new Assessment & Plan notes have been filed under this hospital service since the last note was generated.  Service: Nutrition       Malnutrition Assessment  Malnutrition Type:  (Alb 3.3, suspect inaccurate. Based on assessment this patient is not malnourished.)                                    Reason for Assessment    Reason For Assessment: consult (swing bed patient)  Diagnosis:  (Pneumonia, UTI, shingles)  Relevant Medical History: CVA, chronic anxiety, depression, dysphagia, [place]. HLD, HTN  Interdisciplinary Rounds: attended  General Information Comments: RDN visited pt this a.m.  Pt denied need to modify texture of diet.  She likes Ensure clear.  Meal intake ~85%.  She has 2 areas to her L abd and L upper buttocks that are healing.    Nutrition Risk Screen    Nutrition Risk Screen: no indicators present    Nutrition/Diet History    Patient Reported Diet/Restrictions/Preferences: general  Spiritual, Cultural Beliefs, Islam Practices, Values that Affect Care: no  Food Allergies: NKFA  Factors Affecting Nutritional Intake: depression    Anthropometrics    Temp: 97.8 °F (36.6 °C)  Height Method: Stated  Height: 5' 4" (162.6 cm)  Height (inches): 64 in  Weight Method: Bed Scale  Weight: 76 kg (167 lb 8.8 oz)  Weight (lb): 167.55 lb  Ideal Body Weight (IBW), Female: 120 lb  % Ideal Body Weight, Female (lb): 139.63 %  BMI (Calculated): 28.7  BMI Grade: 25 - 29.9 - overweight       Lab/Procedures/Meds    Pertinent Labs Reviewed: reviewed  Pertinent Labs Comments: B-279, Alb 3.3, BUN 20  Pertinent Medications Reviewed: reviewed  Pertinent Medications Comments: Lipitor, Colace, Nystatin SS, Protonix, Prednisone, Zoloft, Norvasc, Ativan    Physical Findings/Assessment         Estimated/Assessed Needs    Weight Used For Calorie Calculations: 54.4 kg (120 lb)  Energy Calorie " Requirements (kcal): 1629-8700  Energy Need Method: Kcal/kg  Protein Requirements: 55-65  Weight Used For Protein Calculations: 54.4 kg (120 lb)     Estimated Fluid Requirement Method: RDA Method  RDA Method (mL): 1632         Nutrition Prescription Ordered         Evaluation of Received Nutrient/Fluid Intake       % Intake of Estimated Energy Needs: 75 - 100 %  % Meal Intake: 75 - 100 %    Nutrition Risk            Monitor and Evaluation            Nutrition Follow-Up    RD Follow-up?: Yes

## 2022-04-20 NOTE — PLAN OF CARE
[facility] - Swing Bed   Interdisciplinary Team Meeting    Patient: Herlinda Valdovinos   Today's Date: 4/20/2022   Estimated D/C Date: 4/25/2022        Physician: Alis Grider MD Nurse Practitioner: Graciela Solorzano NP   Pharmacy: Zina Morales, PharmD Unit Director: IRENE Barbosa   : Mery Mcclelland RN Physical/Occupational Therapy: Kiana Rasmussen OTR/L   Speech Therapy: Kiana Rasmussen OTR/L Activity Therapy: Izzy Lai LPN   Nursing: Winsome Schneider RN  Respiratory: Marilou Bill, RT Dietary: Cesar Heaton RD and Juan Aguiar  Other:      Nursing  New Symptoms/Problems: none  Last Bowel Movement: 04/19/22  Urine: continent Diarrhea: No   Constipated: No Bowel: continent [place]: No   Isolation: yes     O2 Device: Nasal Cannula O2 Flow: 2L  SpO2: 98%   Nutrition: Diabetic  Speech/Swallowing: No current speech or swallowing issues  Aspiration Precautions: No  Cognition: WNL    Physical Therapy  Physical Therapy/Gait: 10' ELOS: Plan to DC 4/25/2022    Transfers: Contact Guard Assistance Range of Motion/Restrictions: none     Occupational Therapy  Eating/Grooming: Independent Toileting: Contact Guard Assistance   Bathing: Minimal Assistance Dressing (Upper Body): Minimal Assistance   Dressing (Lower Body): Minimal Assistance      Activity Therapy  Level of participation: Active participation      Tx Plan/Recommendations reviewed with family and/or patient on (date) 4/19.  Additional family Conference/Training: none  D/C Plan/Recommendations: Home with family  & OP PT    POOL: 4/25/2022    Pharmacy  Medication Changes (see MD orders in chart): Yes  MD/NP: Alis Grider MD Labs Reviewed: Yes New Lab Orders: No   Lab Concerns: no

## 2022-04-20 NOTE — PT/OT/SLP PROGRESS
Physical Therapy Treatment    Patient Name:  Herlinda Valdovinos   MRN:  [id number]    Recommendations:     Discharge Recommendations:  outpatient PT, outpatient OT   Discharge Equipment Recommendations: none   Barriers to discharge: muscle weakness    Assessment:     Herlinda Valdovinos is a 57 y.o. female admitted with a medical diagnosis of Pneumonia due to infectious organism.  She presents with the following impairments/functional limitations:  weakness, impaired balance, impaired coordination, impaired cardiopulmonary response to activity, impaired endurance, impaired functional mobilty, impaired self care skills, impaired fine motor, gait instability, decreased lower extremity function, decreased upper extremity function, decreased coordination Patient was agreeable to perform therapy today. Patient had much approved balance upon visit today.     Rehab Prognosis: Good; patient would benefit from acute skilled PT services to address these deficits and reach maximum level of function.    Recent Surgery: * No surgery found *      Plan:     During this hospitalization, patient to be seen 5 x/week to address the identified rehab impairments via gait training, therapeutic activities, therapeutic exercises, neuromuscular re-education and progress toward the following goals:    · Plan of Care Expires:  04/25/22    Subjective     Chief Complaint: muscle weakness   Patient/Family Comments/goals: return to outpatient PT and OT   Pain/Comfort:  Pain Rating 1: 0/10  Pain Rating Post-Intervention 1: 0/10      Objective:     Communicated with OT prior to session.  Patient found standing in room with OT with oxygen upon PT entry to room.     General Precautions: Standard, fall   Orthopedic Precautions:    Braces:    Respiratory Status: Nasal cannula, flow 2 L/min     Functional Mobility:  · Bed Mobility:     · Sit to Supine: stand by assistance  · Transfers:     · Sit to Stand:  stand by assistance with rolling walker  · Gait:  Patient ambulated 250' with RW and supervision. Patient has improved balance upon visit      AM-PAC 6 CLICK MOBILITY  Turning over in bed (including adjusting bedclothes, sheets and blankets)?: 3  Sitting down on and standing up from a chair with arms (e.g., wheelchair, bedside commode, etc.): 3  Moving from lying on back to sitting on the side of the bed?: 3  Moving to and from a bed to a chair (including a wheelchair)?: 2  Need to walk in hospital room?: 2  Climbing 3-5 steps with a railing?: 1  Basic Mobility Total Score: 14       Therapeutic Activities and Exercises:   Patient performed standing balance activities and ambulation today.     Patient left supine with call button in reach and  present..    GOALS:   Multidisciplinary Problems     Physical Therapy Goals        Problem: Physical Therapy    Goal Priority Disciplines Outcome Goal Variances Interventions   Physical Therapy Goal     PT, PT/OT Ongoing, Progressing     Description: Goals to be met by: 1 week    Patient will increase functional independence with mobility by performing:    STG: Sit to stand transfer with Stand-by Assistance  STG:  Bed to chair transfer with Contact Guard Assistance using Rolling Walker  LTG: Gait  x 100 feet with Contact Guard Assistance using Rolling Walker.   LTG: Stand for 8 minutes with Stand-by Assistance using Rolling Walker to aid with self care tasks.  LTG:  Patient will be independent with HEP                   Time Tracking:     PT Received On: 04/20/22  PT Start Time: 1404     PT Stop Time: 1429  PT Total Time (min): 25 min     Billable Minutes: Gait Training 25    Treatment Type: Treatment  PT/PTA: PTA     PTA Visit Number: 2     04/20/2022

## 2022-04-20 NOTE — PLAN OF CARE
Problem: Adult Inpatient Plan of Care  Goal: Plan of Care Review  Outcome: Ongoing, Progressing  Goal: Patient-Specific Goal (Individualized)  Outcome: Ongoing, Progressing  Goal: Absence of Hospital-Acquired Illness or Injury  Outcome: Ongoing, Progressing  Intervention: Identify and Manage Fall Risk  Flowsheets (Taken 4/19/2022 2150)  Safety Promotion/Fall Prevention:   assistive device/personal item within reach   bed alarm set   commode/urinal/bedpan at bedside   Fall Risk reviewed with patient/family   Fall Risk signage in place   nonskid shoes/socks when out of bed   side rails raised x 2   instructed to call staff for mobility  Intervention: Prevent Skin Injury  Flowsheets (Taken 4/19/2022 2150)  Body Position:   position maintained   position changed independently  Skin Protection: adhesive use limited  Intervention: Prevent and Manage VTE (Venous Thromboembolism) Risk  Flowsheets (Taken 4/19/2022 2150)  VTE Prevention/Management:   fluids promoted   ROM (active) performed   remove, assess skin, and reapply compression stockings  Range of Motion: active ROM (range of motion) encouraged  Intervention: Prevent Infection  Flowsheets (Taken 4/19/2022 2150)  Infection Prevention:   rest/sleep promoted   hand hygiene promoted  Goal: Optimal Comfort and Wellbeing  Outcome: Ongoing, Progressing  Goal: Readiness for Transition of Care  Outcome: Ongoing, Progressing     Problem: Fluid Imbalance (Pneumonia)  Goal: Fluid Balance  Outcome: Ongoing, Progressing  Intervention: Monitor and Manage Fluid Balance  Flowsheets (Taken 4/19/2022 2150)  Fluid/Electrolyte Management: fluids provided     Problem: Infection  Goal: Absence of Infection Signs and Symptoms  Outcome: Ongoing, Progressing  Intervention: Prevent or Manage Infection  Flowsheets (Taken 4/19/2022 2150)  Infection Management: aseptic technique maintained  Isolation Precautions: precautions maintained

## 2022-04-20 NOTE — PLAN OF CARE
Problem: Adult Inpatient Plan of Care  Goal: Plan of Care Review  Outcome: Ongoing, Progressing  Goal: Patient-Specific Goal (Individualized)  Outcome: Ongoing, Progressing  Goal: Absence of Hospital-Acquired Illness or Injury  Outcome: Ongoing, Progressing  Goal: Optimal Comfort and Wellbeing  Outcome: Ongoing, Progressing     Problem: Fluid Imbalance (Pneumonia)  Goal: Fluid Balance  Outcome: Ongoing, Progressing     Problem: Infection (Pneumonia)  Goal: Resolution of Infection Signs and Symptoms  Outcome: Ongoing, Progressing     Problem: Respiratory Compromise (Pneumonia)  Goal: Effective Oxygenation and Ventilation  Outcome: Ongoing, Progressing

## 2022-04-21 LAB
GLUCOSE SERPL-MCNC: 103 MG/DL (ref 70–105)
GLUCOSE SERPL-MCNC: 193 MG/DL (ref 70–105)
GLUCOSE SERPL-MCNC: 223 MG/DL (ref 70–105)
GLUCOSE SERPL-MCNC: 323 MG/DL (ref 70–105)

## 2022-04-21 PROCEDURE — 99900035 HC TECH TIME PER 15 MIN (STAT)

## 2022-04-21 PROCEDURE — 82962 GLUCOSE BLOOD TEST: CPT

## 2022-04-21 PROCEDURE — 97116 GAIT TRAINING THERAPY: CPT | Mod: CQ

## 2022-04-21 PROCEDURE — 63600175 PHARM REV CODE 636 W HCPCS: Performed by: EMERGENCY MEDICINE

## 2022-04-21 PROCEDURE — 94664 DEMO&/EVAL PT USE INHALER: CPT

## 2022-04-21 PROCEDURE — 97535 SELF CARE MNGMENT TRAINING: CPT

## 2022-04-21 PROCEDURE — 94640 AIRWAY INHALATION TREATMENT: CPT

## 2022-04-21 PROCEDURE — 27000221 HC OXYGEN, UP TO 24 HOURS

## 2022-04-21 PROCEDURE — 94761 N-INVAS EAR/PLS OXIMETRY MLT: CPT

## 2022-04-21 PROCEDURE — 27000944

## 2022-04-21 PROCEDURE — 25000242 PHARM REV CODE 250 ALT 637 W/ HCPCS: Performed by: NURSE PRACTITIONER

## 2022-04-21 PROCEDURE — 63600175 PHARM REV CODE 636 W HCPCS: Performed by: NURSE PRACTITIONER

## 2022-04-21 PROCEDURE — 25000003 PHARM REV CODE 250: Performed by: EMERGENCY MEDICINE

## 2022-04-21 PROCEDURE — 11000004 HC SNF PRIVATE

## 2022-04-21 PROCEDURE — 25000003 PHARM REV CODE 250: Performed by: NURSE PRACTITIONER

## 2022-04-21 RX ADMIN — CLOPIDOGREL BISULFATE 75 MG: 75 TABLET, FILM COATED ORAL at 08:04

## 2022-04-21 RX ADMIN — NYSTATIN 500000 UNITS: 500000 SUSPENSION ORAL at 04:04

## 2022-04-21 RX ADMIN — NYSTATIN 500000 UNITS: 500000 SUSPENSION ORAL at 01:04

## 2022-04-21 RX ADMIN — SERTRALINE HYDROCHLORIDE 100 MG: 50 TABLET ORAL at 08:04

## 2022-04-21 RX ADMIN — SENNOSIDES AND DOCUSATE SODIUM 1 TABLET: 50; 8.6 TABLET ORAL at 08:04

## 2022-04-21 RX ADMIN — ATORVASTATIN CALCIUM 40 MG: 40 TABLET, FILM COATED ORAL at 08:04

## 2022-04-21 RX ADMIN — PANTOPRAZOLE SODIUM 40 MG: 40 TABLET, DELAYED RELEASE ORAL at 08:04

## 2022-04-21 RX ADMIN — AMLODIPINE BESYLATE 5 MG: 5 TABLET ORAL at 08:04

## 2022-04-21 RX ADMIN — LORAZEPAM 0.5 MG: 0.5 TABLET ORAL at 03:04

## 2022-04-21 RX ADMIN — HYDRALAZINE HYDROCHLORIDE 25 MG: 25 TABLET, FILM COATED ORAL at 03:04

## 2022-04-21 RX ADMIN — HYDRALAZINE HYDROCHLORIDE 25 MG: 25 TABLET, FILM COATED ORAL at 08:04

## 2022-04-21 RX ADMIN — NYSTATIN 500000 UNITS: 500000 SUSPENSION ORAL at 08:04

## 2022-04-21 RX ADMIN — INSULIN ASPART 1 UNITS: 100 INJECTION, SOLUTION INTRAVENOUS; SUBCUTANEOUS at 09:04

## 2022-04-21 RX ADMIN — LORAZEPAM 0.5 MG: 0.5 TABLET ORAL at 08:04

## 2022-04-21 RX ADMIN — INSULIN ASPART 4 UNITS: 100 INJECTION, SOLUTION INTRAVENOUS; SUBCUTANEOUS at 04:04

## 2022-04-21 RX ADMIN — TEMAZEPAM 30 MG: 30 CAPSULE ORAL at 08:04

## 2022-04-21 RX ADMIN — PREDNISONE 40 MG: 20 TABLET ORAL at 08:04

## 2022-04-21 RX ADMIN — POLYETHYLENE GLYCOL 3350 17 G: 17 POWDER, FOR SOLUTION ORAL at 08:04

## 2022-04-21 RX ADMIN — GABAPENTIN 800 MG: 400 CAPSULE ORAL at 08:04

## 2022-04-21 RX ADMIN — VALSARTAN 160 MG: 160 TABLET, FILM COATED ORAL at 08:04

## 2022-04-21 RX ADMIN — DOCUSATE SODIUM 100 MG: 100 CAPSULE, LIQUID FILLED ORAL at 08:04

## 2022-04-21 RX ADMIN — IPRATROPIUM BROMIDE AND ALBUTEROL SULFATE 3 ML: 2.5; .5 SOLUTION RESPIRATORY (INHALATION) at 09:04

## 2022-04-21 RX ADMIN — IPRATROPIUM BROMIDE AND ALBUTEROL SULFATE 3 ML: 2.5; .5 SOLUTION RESPIRATORY (INHALATION) at 02:04

## 2022-04-21 RX ADMIN — IPRATROPIUM BROMIDE AND ALBUTEROL SULFATE 3 ML: 2.5; .5 SOLUTION RESPIRATORY (INHALATION) at 07:04

## 2022-04-21 NOTE — PT/OT/SLP PROGRESS
Physical Therapy Treatment    Patient Name:  Herlinda Valdovinos   MRN:  [id number]    Recommendations:     Discharge Recommendations:  outpatient PT, outpatient OT   Discharge Equipment Recommendations: none   Barriers to discharge: muscle weakness    Assessment:     Herlinda Valdovinos is a 57 y.o. female admitted with a medical diagnosis of Pneumonia due to infectious organism.  She presents with the following impairments/functional limitations:  weakness, impaired balance, impaired coordination, impaired cardiopulmonary response to activity, impaired endurance, impaired functional mobilty, impaired self care skills, impaired fine motor, gait instability, decreased lower extremity function, decreased upper extremity function, decreased coordination Patient was agreeable to perform therapy today. Patient had much approved balance upon visit today.     Rehab Prognosis: Good; patient would benefit from acute skilled PT services to address these deficits and reach maximum level of function.    Recent Surgery: * No surgery found *      Plan:     During this hospitalization, patient to be seen 5 x/week to address the identified rehab impairments via gait training, therapeutic activities, therapeutic exercises, neuromuscular re-education and progress toward the following goals:    · Plan of Care Expires:  04/25/22    Subjective     Chief Complaint: muscle weakness   Patient/Family Comments/goals: return to outpatient PT and OT   Pain/Comfort:  Pain Rating 1: 0/10  Pain Rating Post-Intervention 1: 0/10      Objective:     Communicated with OT prior to session.  Patient found supine with oxygen upon PT entry to room.     General Precautions: Standard, fall   Orthopedic Precautions:    Braces:    Respiratory Status: Nasal cannula, flow 2 L/min     Functional Mobility:  · Bed Mobility:     · Sit to Supine: modified independence  · Transfers:     · Sit to Stand:  modified independence with rolling walker  · Gait: Patient ambulated  250' with RW and supervision. Patient has improved balance upon visit      AM-PAC 6 CLICK MOBILITY  Turning over in bed (including adjusting bedclothes, sheets and blankets)?: 3  Sitting down on and standing up from a chair with arms (e.g., wheelchair, bedside commode, etc.): 3  Moving from lying on back to sitting on the side of the bed?: 3  Moving to and from a bed to a chair (including a wheelchair)?: 2  Need to walk in hospital room?: 2  Climbing 3-5 steps with a railing?: 1  Basic Mobility Total Score: 14       Therapeutic Activities and Exercises:   Patient performed standing balance activities and ambulation today due to stating she would like to not overdo it with going home tomorrow and starting outpatient back next week.     Patient left supine with call button in reach..    GOALS:   Multidisciplinary Problems     Physical Therapy Goals        Problem: Physical Therapy    Goal Priority Disciplines Outcome Goal Variances Interventions   Physical Therapy Goal     PT, PT/OT Ongoing, Progressing     Description: Goals to be met by: 1 week    Patient will increase functional independence with mobility by performing:    STG: Sit to stand transfer with Stand-by Assistance  STG:  Bed to chair transfer with Contact Guard Assistance using Rolling Walker  LTG: Gait  x 100 feet with Contact Guard Assistance using Rolling Walker.   LTG: Stand for 8 minutes with Stand-by Assistance using Rolling Walker to aid with self care tasks.  LTG:  Patient will be independent with HEP                   Time Tracking:     PT Received On: 04/21/22  PT Start Time: 1319     PT Stop Time: 1331  PT Total Time (min): 12 min     Billable Minutes: Gait Training 12    Treatment Type: Treatment  PT/PTA: PTA     PTA Visit Number: 3     04/21/2022

## 2022-04-21 NOTE — PLAN OF CARE
Plan of care reviewed, status is ongoing.   Problem: Adult Inpatient Plan of Care  Goal: Plan of Care Review  Outcome: Ongoing, Progressing  Goal: Patient-Specific Goal (Individualized)  Outcome: Ongoing, Progressing  Goal: Absence of Hospital-Acquired Illness or Injury  Outcome: Ongoing, Progressing  Goal: Optimal Comfort and Wellbeing  Outcome: Ongoing, Progressing  Goal: Readiness for Transition of Care  Outcome: Ongoing, Progressing     Problem: Infection (Pneumonia)  Goal: Resolution of Infection Signs and Symptoms  Outcome: Ongoing, Progressing

## 2022-04-21 NOTE — PT/OT/SLP PROGRESS
Occupational Therapy   Treatment    Name: Herlinda Valdovinos  MRN: [id number]  Admitting Diagnosis:  Pneumonia due to infectious organism       Recommendations:     Discharge Recommendations: outpatient OT, outpatient PT  Discharge Equipment Recommendations:     Barriers to discharge:       Assessment:     Herlinda Valdovinos is a 57 y.o. female with a medical diagnosis of Pneumonia due to infectious organism.  She presents with improving endurance and strength evident in improved participation and tolerance for OOB activity on this day, improved balance as well as improved ADL performance. Performance deficits affecting function are weakness, impaired endurance, impaired self care skills, impaired functional mobilty, gait instability, impaired balance, decreased upper extremity function, decreased coordination, decreased lower extremity function, impaired coordination, impaired fine motor, impaired cardiopulmonary response to activity.     Rehab Prognosis:  Good; patient would benefit from acute skilled OT services to address these deficits and reach maximum level of function.       Plan:     Patient to be seen 5 x/week to address the above listed problems via self-care/home management, therapeutic activities, therapeutic exercises, neuromuscular re-education  · Plan of Care Expires: 05/02/22  · Plan of Care Reviewed with: patient, spouse    Subjective     Pain/Comfort:  Pain Rating 1: 0/10  Pain Rating Post-Intervention 1: 0/10    Objective:     Communicated with: Nurse prior to session.  Patient found supine with oxygen, peripheral IV upon OT entry to room.     General Precautions: Standard, fall   Orthopedic Precautions:    Braces:    Respiratory Status: Nasal cannula, flow 2 L/min     Occupational Performance:     Bed Mobility:    · Patient completed Supine to Sit with supervision  · Patient completed Sit to Supine with supervision     Functional Mobility/Transfers:  · Patient completed Sit <> Stand Transfer with  contact guard assistance  with  no assistive device   · Functional Mobility: x 10 feet in room with RW with CGA.     Activities of Daily Living:  · Lower Body Dressing: stand by assistance to thread LEs through brief and CGA-SBA to pull over hips.    · Toileting: contact guard assistance and minimum assistance to perform clohting management and jose care while standing to ensure no LOB.       [facility] 6 Click ADL: 22    Treatment & Education:  Performed LB ADL training sitting EOB, requiring extended time due to slow movements. Improved dynamic standing balance noted with clothing management. Performed clothing management and jose care associated with toileting requiring CGA-Juanis to maintain safe standing balance. Extended time needed due to slow movements. Educated in implications for safety and ADL performance in home environment with pt verbalizing and demonstrating good understanding.     Patient left sitting edge of bed with call button in reach and  presentEducation:      GOALS:   Multidisciplinary Problems     Occupational Therapy Goals        Problem: Occupational Therapy    Goal Priority Disciplines Outcome Interventions   Occupational Therapy Goal     OT, PT/OT Ongoing, Progressing    Description: Goals to be met by: 1 week     Patient will increase functional independence with ADLs by performing:    UE Dressing with Set-up Assistance.  LE Dressing with Contact Guard Assistance.  Toileting from toilet with Contact Guard Assistance for hygiene and clothing management.   Toilet transfer to toilet with Stand-by Assistance.    Goals to be met by: 2 weeks     Patient will increase functional independence with ADLs by performing:    UE Dressing with Set-up Assistance.  LE Dressing with Set-up Assistance.  Toileting from toilet with Supervision for hygiene and clothing management.   Toilet transfer to toilet with Supervision.                           Time Tracking:     OT Date of Treatment: 04/21/22  OT  Start Time: 0931  OT Stop Time: 0947  OT Total Time (min): 16 min    Billable Minutes:Self Care/Home Management 16     Kiana Rasmussen, MARCUS, OTR/L    OT/HEMAL: OT        4/22/2022

## 2022-04-21 NOTE — PLAN OF CARE
Problem: Adult Inpatient Plan of Care  Goal: Plan of Care Review  Outcome: Ongoing, Progressing  Goal: Absence of Hospital-Acquired Illness or Injury  Outcome: Ongoing, Progressing  Goal: Optimal Comfort and Wellbeing  Outcome: Ongoing, Progressing     Problem: Fluid Imbalance (Pneumonia)  Goal: Fluid Balance  Outcome: Ongoing, Progressing     Problem: Respiratory Compromise (Pneumonia)  Goal: Effective Oxygenation and Ventilation  Outcome: Ongoing, Progressing     Problem: Infection  Goal: Absence of Infection Signs and Symptoms  Outcome: Ongoing, Progressing
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
## 2022-04-22 VITALS
HEART RATE: 98 BPM | WEIGHT: 167.56 LBS | OXYGEN SATURATION: 98 % | RESPIRATION RATE: 18 BRPM | HEIGHT: 64 IN | TEMPERATURE: 98 F | BODY MASS INDEX: 28.6 KG/M2 | SYSTOLIC BLOOD PRESSURE: 118 MMHG | DIASTOLIC BLOOD PRESSURE: 80 MMHG

## 2022-04-22 PROBLEM — B02.9 HERPES ZOSTER WITHOUT COMPLICATION: Status: RESOLVED | Noted: 2022-04-13 | Resolved: 2022-04-22

## 2022-04-22 PROBLEM — J18.9 PNEUMONIA DUE TO INFECTIOUS ORGANISM: Status: RESOLVED | Noted: 2022-04-13 | Resolved: 2022-04-22

## 2022-04-22 LAB
ANION GAP SERPL CALCULATED.3IONS-SCNC: 10 MMOL/L (ref 7–16)
ANISOCYTOSIS BLD QL SMEAR: ABNORMAL
BASOPHILS # BLD AUTO: 0 K/UL (ref 0–0.2)
BASOPHILS NFR BLD AUTO: 0 % (ref 0–1)
BUN SERPL-MCNC: 29 MG/DL (ref 7–18)
BUN/CREAT SERPL: 34 (ref 6–20)
CALCIUM SERPL-MCNC: 8 MG/DL (ref 8.5–10.1)
CHLORIDE SERPL-SCNC: 104 MMOL/L (ref 98–107)
CO2 SERPL-SCNC: 32 MMOL/L (ref 21–32)
CREAT SERPL-MCNC: 0.85 MG/DL (ref 0.55–1.02)
DIFFERENTIAL METHOD BLD: ABNORMAL
EOSINOPHIL # BLD AUTO: 0.06 K/UL (ref 0–0.5)
EOSINOPHIL NFR BLD AUTO: 0.4 % (ref 1–4)
EOSINOPHIL NFR BLD MANUAL: 1 % (ref 1–4)
ERYTHROCYTE [DISTWIDTH] IN BLOOD BY AUTOMATED COUNT: 14.7 % (ref 11.5–14.5)
GLUCOSE SERPL-MCNC: 123 MG/DL (ref 74–106)
GLUCOSE SERPL-MCNC: 99 MG/DL (ref 70–105)
HCT VFR BLD AUTO: 40.8 % (ref 38–47)
HGB BLD-MCNC: 13 G/DL (ref 12–16)
LYMPHOCYTES # BLD AUTO: 2.86 K/UL (ref 1–4.8)
LYMPHOCYTES NFR BLD AUTO: 19.2 % (ref 27–41)
LYMPHOCYTES NFR BLD MANUAL: 18 % (ref 27–41)
MCH RBC QN AUTO: 29 PG (ref 27–31)
MCHC RBC AUTO-ENTMCNC: 31.9 G/DL (ref 32–36)
MCV RBC AUTO: 90.9 FL (ref 80–96)
MONOCYTES # BLD AUTO: 0.99 K/UL (ref 0–0.8)
MONOCYTES NFR BLD AUTO: 6.6 % (ref 2–6)
MONOCYTES NFR BLD MANUAL: 8 % (ref 2–6)
MPC BLD CALC-MCNC: 9.7 FL (ref 9.4–12.4)
NEUTROPHILS # BLD AUTO: 10.98 K/UL (ref 1.8–7.7)
NEUTROPHILS NFR BLD AUTO: 73.8 % (ref 53–65)
NEUTS SEG NFR BLD MANUAL: 73 % (ref 50–62)
NRBC BLD MANUAL-RTO: ABNORMAL %
PLATELET # BLD AUTO: 171 K/UL (ref 150–400)
PLATELET MORPHOLOGY: NORMAL
POTASSIUM SERPL-SCNC: 4.5 MMOL/L (ref 3.5–5.1)
RBC # BLD AUTO: 4.49 M/UL (ref 4.2–5.4)
SODIUM SERPL-SCNC: 141 MMOL/L (ref 136–145)
WBC # BLD AUTO: 14.89 K/UL (ref 4.5–11)

## 2022-04-22 PROCEDURE — 80048 BASIC METABOLIC PNL TOTAL CA: CPT | Performed by: NURSE PRACTITIONER

## 2022-04-22 PROCEDURE — 36415 COLL VENOUS BLD VENIPUNCTURE: CPT | Performed by: NURSE PRACTITIONER

## 2022-04-22 PROCEDURE — 94640 AIRWAY INHALATION TREATMENT: CPT

## 2022-04-22 PROCEDURE — 97530 THERAPEUTIC ACTIVITIES: CPT

## 2022-04-22 PROCEDURE — 25000242 PHARM REV CODE 250 ALT 637 W/ HCPCS: Performed by: NURSE PRACTITIONER

## 2022-04-22 PROCEDURE — 85025 COMPLETE CBC W/AUTO DIFF WBC: CPT | Performed by: NURSE PRACTITIONER

## 2022-04-22 PROCEDURE — 82962 GLUCOSE BLOOD TEST: CPT

## 2022-04-22 PROCEDURE — 27000944

## 2022-04-22 PROCEDURE — 94761 N-INVAS EAR/PLS OXIMETRY MLT: CPT

## 2022-04-22 PROCEDURE — 63600175 PHARM REV CODE 636 W HCPCS: Performed by: NURSE PRACTITIONER

## 2022-04-22 PROCEDURE — 25000003 PHARM REV CODE 250: Performed by: NURSE PRACTITIONER

## 2022-04-22 PROCEDURE — 27000221 HC OXYGEN, UP TO 24 HOURS

## 2022-04-22 PROCEDURE — 94664 DEMO&/EVAL PT USE INHALER: CPT

## 2022-04-22 PROCEDURE — 99900035 HC TECH TIME PER 15 MIN (STAT)

## 2022-04-22 PROCEDURE — 25000003 PHARM REV CODE 250: Performed by: EMERGENCY MEDICINE

## 2022-04-22 RX ORDER — NYSTATIN 100000 [USP'U]/ML
500000 SUSPENSION ORAL 4 TIMES DAILY
Qty: 140 ML | Refills: 0 | Status: SHIPPED | OUTPATIENT
Start: 2022-04-22 | End: 2022-04-29

## 2022-04-22 RX ORDER — IPRATROPIUM BROMIDE AND ALBUTEROL SULFATE 2.5; .5 MG/3ML; MG/3ML
3 SOLUTION RESPIRATORY (INHALATION)
Qty: 150 ML | Refills: 0 | Status: ON HOLD | OUTPATIENT
Start: 2022-04-22 | End: 2023-01-04 | Stop reason: HOSPADM

## 2022-04-22 RX ORDER — PREDNISONE 10 MG/1
40 TABLET ORAL DAILY
Qty: 8 TABLET | Refills: 0 | Status: SHIPPED | OUTPATIENT
Start: 2022-04-22 | End: 2022-04-24

## 2022-04-22 RX ORDER — AMLODIPINE BESYLATE 5 MG/1
5 TABLET ORAL DAILY
Qty: 30 TABLET | Refills: 0 | OUTPATIENT
Start: 2022-04-23 | End: 2022-10-19

## 2022-04-22 RX ORDER — FLUCONAZOLE 150 MG/1
150 TABLET ORAL DAILY
Qty: 3 TABLET | Refills: 0 | Status: SHIPPED | OUTPATIENT
Start: 2022-04-22 | End: 2022-04-25

## 2022-04-22 RX ORDER — ALBUTEROL SULFATE 90 UG/1
2 AEROSOL, METERED RESPIRATORY (INHALATION) EVERY 6 HOURS PRN
Qty: 18 G | Refills: 0 | OUTPATIENT
Start: 2022-04-22 | End: 2022-10-19

## 2022-04-22 RX ORDER — PANTOPRAZOLE SODIUM 40 MG/1
40 TABLET, DELAYED RELEASE ORAL DAILY
Qty: 30 TABLET | Refills: 0 | Status: SHIPPED | OUTPATIENT
Start: 2022-04-23 | End: 2022-05-23

## 2022-04-22 RX ADMIN — LORAZEPAM 0.5 MG: 0.5 TABLET ORAL at 09:04

## 2022-04-22 RX ADMIN — AMLODIPINE BESYLATE 5 MG: 5 TABLET ORAL at 09:04

## 2022-04-22 RX ADMIN — DOCUSATE SODIUM 100 MG: 100 CAPSULE, LIQUID FILLED ORAL at 09:04

## 2022-04-22 RX ADMIN — PANTOPRAZOLE SODIUM 40 MG: 40 TABLET, DELAYED RELEASE ORAL at 09:04

## 2022-04-22 RX ADMIN — HYDRALAZINE HYDROCHLORIDE 25 MG: 25 TABLET, FILM COATED ORAL at 09:04

## 2022-04-22 RX ADMIN — GABAPENTIN 800 MG: 400 CAPSULE ORAL at 09:04

## 2022-04-22 RX ADMIN — VALSARTAN 160 MG: 160 TABLET, FILM COATED ORAL at 09:04

## 2022-04-22 RX ADMIN — IPRATROPIUM BROMIDE AND ALBUTEROL SULFATE 3 ML: 2.5; .5 SOLUTION RESPIRATORY (INHALATION) at 08:04

## 2022-04-22 RX ADMIN — SENNOSIDES AND DOCUSATE SODIUM 1 TABLET: 50; 8.6 TABLET ORAL at 09:04

## 2022-04-22 RX ADMIN — PREDNISONE 40 MG: 20 TABLET ORAL at 09:04

## 2022-04-22 RX ADMIN — CLOPIDOGREL BISULFATE 75 MG: 75 TABLET, FILM COATED ORAL at 09:04

## 2022-04-22 RX ADMIN — NYSTATIN 500000 UNITS: 500000 SUSPENSION ORAL at 09:04

## 2022-04-22 NOTE — DISCHARGE SUMMARY
"[facility]  Hospital Medicine  Discharge Summary      Patient Name: Herlinda Valdovinos  MRN: [id number]  Patient Class: IP- Swing  Admission Date: 4/15/2022  Hospital Length of Stay: 7 days  Discharge Date and Time:  04/22/2022 2:36 PM  Attending Physician: No att. providers found   Discharging Provider: KEVAN Guthrie  Primary Care Provider: Vini Hernandez NP      HPI:   PT IS A 57 YR OLD WF ADMITTED TO [facility] SWING BED FOR TREATMENT OF PNEUMONIA WITH IV ANTIBIOTICS, RESPIRATORY CARE AND MEDICAL MANAGEMENT. PT WAS REFERRED FROM [facility] WHERE SHE WAS ADMITTED FOR PNEUMONIA, HYPOXIA, UTI AND HERPES ZOSTER ON 04/12/2022.  PT IMPROVED; BUT WILL NEED CONTINUED IV ANTIBIOTICS AND RESPIRATORY CARE. IN ADDITION; PT HAS PERSISTENT MUSCLE WEAKNESS AND DEBILITY, AND WILL REQUIRE SWING BED FOR REHABILITATION. PT HAS RECURRENT HERPES ZOSTER TREATED WITH VALTREX. PT HAS HX "PREDIABETES AND HAS HAD HYPERGLYCEMIA WITH STEROID THERAPY AND WILL BE TREATED WITH SSI AND DIABETIC DIET.  PT WILL BE EVALUATED PER PT/OT AND A TREATMENT PLAN WILL BE INITIATED. THE PHARMACIST WILL REVIEW MEDICATIONS AND MAKE RECOMMENDATIONS. PT WILL SEE THE DIETICIAN  FOR NUTRITIONAL SUPPORT WITH WEIGHT 76.1 KG AND ALBUMIN OF 3.3. PT'S ABNORMAL ADMITTING LABS ARE: CMP:,BUN 20, CBC:WNL.PT WILL HAVE WEEKLY LABS.    Past Medical History:   Diagnosis Date    Cerebral hemorrhage     Chronic anxiety     Dehydration     Depression     Dysphagia     Due to and following non-traumatic intracerebral hemorrhage    Hearing loss     History of CVA (cerebrovascular accident)     Hypertension     Insomnia     Intrapontine hemorrhage     Left hemiparesis     Peripheral neuropathy     Transient cerebral ischemia         PT CODE STATUS IS FULL.  PT COVID STATUS IS NEG  PT HAS COVID VACCINE LAST YR   PT VTE PPX IS PLAVIX/TEDS        * No surgery found *      Hospital Course:   4/16/22. CXR repeated yesterday shows mild " worsening. She is afebrile. Her WBC is 8490 with 82% neutrophils. Her o2 sats are ranging 96-98% on O2 at 3L/min per NC. Her lungs sounds are improving. Rocephin and azithromycin are extended to a 7 day course. Solumedrol is being tapered.    04/22/22 - Hospital Day 7 - Discharge Summary   Patient completed course of Rocephin and Zithromax yesterday. She remains afebrile. Lungs CTAB. O2 98% on 2L NC. WBC 14.89. BG ranges 123-238. HgA1c pending for PCP to follow. Patient has reached the maximum benefit of this hospital stay and will be discharged home today. She refused home health and will return to outpatient PT through  [facility] next week. She will continue Prednisone for 2 days. Rx provided for rescue inhaler and duonebs for patient's nebulizer she has at home. Dr. Grider evaluated patient on rounds and participated in the discharge.       Goals of Care Treatment Preferences:  Code Status: Full Code      Consults:     No new Assessment & Plan notes have been filed under this hospital service since the last note was generated.  Service: Hospital Medicine    Final Active Diagnoses:    Diagnosis Date Noted POA    Generalized weakness [R53.1] 02/15/2022 Yes    Type 2 diabetes mellitus with neurologic complication [E11.49] 05/20/2021 Yes      Problems Resolved During this Admission:    Diagnosis Date Noted Date Resolved POA    PRINCIPAL PROBLEM:  Pneumonia due to infectious organism [J18.9] 04/13/2022 04/22/2022 Yes    Herpes zoster without complication [B02.9] 04/13/2022 04/22/2022 Yes       Discharged Condition: stable    Disposition: Home or Self Care    Follow Up:   Follow-up Information     Vini Hernandez NP Follow up.    Specialties: Gerontology, Family Medicine, Emergency Medicine  Why: Office closed today, I left them a voicemail asking them to call her at home with the date and time of her appointment.  Contact information:  [address] [place] [address] [place] [address] [phone]                       Patient  Instructions:      Diet diabetic     Diet Cardiac     Notify your health care provider if you experience any of the following:  temperature >100.4     Notify your health care provider if you experience any of the following:  severe uncontrolled pain     Notify your health care provider if you experience any of the following:  difficulty breathing or increased cough     Notify your health care provider if you experience any of the following:  worsening rash     Activity as tolerated       Significant Diagnostic Studies: Labs:   CBC   Recent Labs   Lab 04/22/22  0419   WBC 14.89*   HGB 13.0   HCT 40.8          Pending Diagnostic Studies:     None         Medications:  Reconciled Home Medications:      Medication List      START taking these medications    albuterol 90 mcg/actuation inhaler  Commonly known as: PROVENTIL HFA  Inhale 2 puffs into the lungs every 6 (six) hours as needed for Wheezing. Rescue     albuterol-ipratropium 2.5 mg-0.5 mg/3 mL nebulizer solution  Commonly known as: DUO-NEB  Take 3 mLs by nebulization every 6 (six) hours while awake. Rescue     amLODIPine 5 MG tablet  Commonly known as: NORVASC  Take 1 tablet (5 mg total) by mouth once daily.  Start taking on: April 23, 2022     fluconazole 150 MG Tab  Commonly known as: DIFLUCAN  Take 1 tablet (150 mg total) by mouth once daily. for 3 days     nystatin 100,000 unit/mL suspension  Commonly known as: MYCOSTATIN  Take 5 mLs (500,000 Units total) by mouth 4 (four) times daily. for 7 days     pantoprazole 40 MG tablet  Commonly known as: PROTONIX  Take 1 tablet (40 mg total) by mouth once daily.  Start taking on: April 23, 2022     predniSONE 10 MG tablet  Commonly known as: DELTASONE  Take 4 tablets (40 mg total) by mouth once daily. for 2 days        CHANGE how you take these medications    valsartan 320 MG tablet  Commonly known as: DIOVAN  Take 1 tablet (320 mg total) by mouth once daily.  What changed: how much to take        CONTINUE  taking these medications    atorvastatin 40 MG tablet  Commonly known as: LIPITOR  Take 40 mg by mouth every evening.     clopidogreL 75 mg tablet  Commonly known as: PLAVIX  Take 75 mg by mouth once daily.     docusate sodium 100 MG capsule  Commonly known as: COLACE  Take 1 capsule (100 mg total) by mouth once daily.     gabapentin 400 MG capsule  Commonly known as: NEURONTIN  Take 2 capsules (800 mg total) by mouth 2 (two) times daily.     hydrALAZINE 25 MG tablet  Commonly known as: APRESOLINE  Take 1 tablet (25 mg total) by mouth 3 (three) times daily.     LORazepam 0.5 MG tablet  Commonly known as: ATIVAN  Take 1 am and afternoon and 2 hs     sertraline 100 MG tablet  Commonly known as: ZOLOFT  Take 100 mg by mouth every evening.     temazepam 30 mg capsule  Commonly known as: RESTORIL  Take 30 mg by mouth every evening.        STOP taking these medications    citalopram 40 MG tablet  Commonly known as: CeleXA     hydroCHLOROthiazide 12.5 MG Tab  Commonly known as: HYDRODIURIL     HYDROcodone-acetaminophen  mg per tablet  Commonly known as: NORCO     omeprazole 40 MG capsule  Commonly known as: PRILOSEC            Indwelling Lines/Drains at time of discharge:   Lines/Drains/Airways     None                 Time spent on the discharge of patient: 60 minutes         KEVAN Guthrie  Department of Hospital Medicine  [facility]

## 2022-04-22 NOTE — PLAN OF CARE
Problem: Adult Inpatient Plan of Care  Goal: Plan of Care Review  Outcome: Ongoing, Progressing  Goal: Absence of Hospital-Acquired Illness or Injury  Outcome: Ongoing, Progressing     Problem: Diabetes Comorbidity  Goal: Blood Glucose Level Within Targeted Range  Outcome: Ongoing, Progressing     Problem: Infection (Pneumonia)  Goal: Resolution of Infection Signs and Symptoms  Outcome: Ongoing, Progressing     Problem: Respiratory Compromise (Pneumonia)  Goal: Effective Oxygenation and Ventilation  Outcome: Ongoing, Progressing

## 2022-04-22 NOTE — PLAN OF CARE
[facility]  Discharge Final Note    Primary Care Provider: Vini Hernandez NP    Expected Discharge Date: 4/22/2022    Final Discharge Note (most recent)       Final Note - 04/22/22 1009          Final Note    Assessment Type Final Discharge Note (P)                      Important Message from Medicare             Contact Info       Vini Hernandez NP   Specialty: Gerontology, Family Medicine, Emergency Medicine   Relationship: PCP - General    [address] [place] [id number]   Phone: [phone]       Next Steps: Follow up    Instructions: Office closed today, I left them a voicemail asking them to call her at home with the date and time of her appointment.          Pt is to be D/C home today. Pt has all DME needed. Pt wants to cont rehab as out patient. I checked with rehab and they do not need a new order for the OP rehab as pt was coming before admit to hospital.

## 2022-04-22 NOTE — PT/OT/SLP PROGRESS
Occupational Therapy   Treatment    Name: Herlinda Valdovinos  MRN: [id number]  Admitting Diagnosis:  Pneumonia due to infectious organism       Recommendations:     Discharge Recommendations: outpatient OT, outpatient PT  Discharge Equipment Recommendations:     Barriers to discharge:       Assessment:     Herlinda Valdovinos is a 57 y.o. female with a medical diagnosis of Pneumonia due to infectious organism.  She presents with improving endurance and strength evident in improved participation and tolerance for OOB activity on this day. D/c home planned for this day with pt and spouse agreeable. Pt to follow up with OP services.  Performance deficits affecting function are weakness, impaired endurance, impaired self care skills, impaired functional mobilty, gait instability, impaired balance, decreased upper extremity function, decreased coordination, decreased lower extremity function, impaired coordination, impaired fine motor, impaired cardiopulmonary response to activity.     Rehab Prognosis:  Good; patient would benefit from acute skilled OT services to address these deficits and reach maximum level of function.       Plan:     Patient to be seen 5 x/week to address the above listed problems via self-care/home management, therapeutic activities, therapeutic exercises, neuromuscular re-education  · Plan of Care Expires: 05/02/22  · Plan of Care Reviewed with: patient, spouse    Subjective     Pain/Comfort:  Pain Rating 1: 0/10  Pain Rating Post-Intervention 1: 0/10    Objective:     Communicated with: Nurse prior to session.  Patient found supine with oxygen, peripheral IV upon OT entry to room.    General Precautions: Standard, fall   Orthopedic Precautions:    Braces:    Respiratory Status: Nasal cannula, flow 2 L/min     Occupational Performance:     Bed Mobility:    · Patient completed Supine to Sit with modified independence  · Patient completed Sit to Supine with modified independence     Functional  Mobility/Transfers:  · Patient completed Sit <> Stand Transfer with stand by assistance  with  no assistive device and rolling walker   · Functional Mobility: x 30 feet with RW with CGA-SBA    Activities of Daily Living:  · Pt already dressed for d/c upon entering room for tx.       [facility] 6 Click ADL:      Treatment & Education:  Performed series of dynamic standing challenging functional reach patters needed for safe LB ADL performance. Pt required intermittent CGA-Juanis to correct balance loss when reaching outside base of support. Challenged righting reaction, reach outside base of support, trunk flexion/extension while standing and maintaining safe balance requiring CGA-Juanis due to intermittent LOB. Utilized RW for balance assist during dynamic standing with greatly improved performance since last tx.     Patient left sitting edge of bed with call button in reach and  presentEducation:      GOALS:   Multidisciplinary Problems     Occupational Therapy Goals        Problem: Occupational Therapy    Goal Priority Disciplines Outcome Interventions   Occupational Therapy Goal     OT, PT/OT Ongoing, Progressing    Description: Goals to be met by: 1 week     Patient will increase functional independence with ADLs by performing:    UE Dressing with Set-up Assistance.  LE Dressing with Contact Guard Assistance.  Toileting from toilet with Contact Guard Assistance for hygiene and clothing management.   Toilet transfer to toilet with Stand-by Assistance.    Goals to be met by: 2 weeks     Patient will increase functional independence with ADLs by performing:    UE Dressing with Set-up Assistance.  LE Dressing with Set-up Assistance.  Toileting from toilet with Supervision for hygiene and clothing management.   Toilet transfer to toilet with Supervision.                           Time Tracking:     OT Date of Treatment: 04/22/22  OT Start Time: 1000  OT Stop Time: 1021  OT Total Time (min): 21 min    Billable  Minutes:Therapeutic Activity 21     MARCUS Levy, OTR/L    OT/HEMAL: OT          4/22/2022

## 2022-04-22 NOTE — DISCHARGE INSTRUCTIONS
INCREASE REST AND FLUIDS  CARDIAC AND DIABETIC DIET  USE NEBULIZER AND INHALER AS NEEDED  MEDICATIONS AS DIRECTED  CHECK BLOOD SUGARS AND KEEP A RECORD OF THIS AND DISCUSS WITH SUNSHINE COSTELLO

## 2022-04-25 ENCOUNTER — CLINICAL SUPPORT (OUTPATIENT)
Dept: REHABILITATION | Facility: HOSPITAL | Age: 57
End: 2022-04-25
Payer: MEDICARE

## 2022-04-25 DIAGNOSIS — M62.81 MUSCLE WEAKNESS: Primary | ICD-10-CM

## 2022-04-25 DIAGNOSIS — R27.8 DECREASED COORDINATION: ICD-10-CM

## 2022-04-25 DIAGNOSIS — R29.898 LEFT ARM WEAKNESS: Primary | ICD-10-CM

## 2022-04-25 DIAGNOSIS — M79.602 PAIN IN LEFT ARM: ICD-10-CM

## 2022-04-25 PROCEDURE — 97110 THERAPEUTIC EXERCISES: CPT

## 2022-04-25 PROCEDURE — 97164 PT RE-EVAL EST PLAN CARE: CPT

## 2022-04-25 NOTE — PROGRESS NOTES
Physical Therapy Treatment Note     [id number]    Therapy Diagnosis:   Encounter Diagnosis   Name Primary?    Muscle weakness Yes     Physician: Vini Hernandez NP    Visit Date: 4/25/2022    Physician Orders: PT Eval and Treat  Medical Diagnosis from Referral: CVA  Evaluation Date: 3/7/2022  Progress Report Due 5/25/22  Plan of Care Expiration: 3/7/2022 to 4/15/22  Updated Plan of Care Due: 05/26/22  Visit # / Visits authorized: 8/12  PTA Visit #: 0    Time In: 1452  Time Out: 1520  Total Billable Time: 32 minutes    Precautions: Standard and Fall    Subjective     Pt reports: patient had complaint of feeling sleepy and weak feeling today    She was compliant with home exercise program.  Response to previous treatment: improving  Functional change: ongoing    Pain: 0/10  Location: bilateral shoulders, left hand, left knee, and head      Objective   Reevaluation performed today due to recent hospitalization due to pneumonia which prevented outpatient therapy attendance.  Will resume treatment on next visit.     Patient reassessed due to recent hospitalization   TUG test in 47.56 seconds  Tinnetti: 17/28  Right LE strength -4/5 and left LE strength hip -4/5 and knee and ankle musclature +3/5.  Gait: She is ambulating with a RW with supervision with occasional foot drag on left but she can correct this.  She has shortened step length and decreased heel strike on left.    Transfers:  She required min assist with supine to sit edge of bed but was independent with sit to supine.  She requires SBA with sit to stand with RW.       Home Exercises Provided and Patient Education Provided     Education provided: Updated HEP    Written Home Exercises Provided: yes.  Exercises were reviewed and Herlinda was able to demonstrate them prior to the end of the session.  Herlinda demonstrated good  understanding of the education provided.     See EMR under below for exercises provided  22.    Assessment     Patient received a reassessment today due to previous Plan of care has  while she was hospitalized for pneumonia.  She has demonstrated progress since her original evaluation on 3/7/22 but has not met all of her goals due to weakness and inability to attend therapy secondary to hospitalization for pneumonia. I feel continued PT is indicated to address goals below,      Herlinda Is progressing well towards her goals.   Pt prognosis is Good.     Pt will continue to benefit from skilled outpatient physical therapy to address the deficits listed in the problem list box on initial evaluation, provide pt/family education and to maximize pt's level of independence in the home and community environment.     Pt's spiritual, cultural and educational needs considered and pt agreeable to plan of care and goals.     Anticipated barriers to physical therapy: none    Goals:     Short Term Goals:  1. Patient will demonstrate independence with home exercise program to ensure carryover of treatment. -ongoing  2. Patient will perform supine to/from sit with independence to improve independence and safety with bed mobility. - not met  3. Patient will perform sit to/from stand with modified independence to improve independence and safety with transfers. - not met  4. Patient will improve left lower extremity strength to 4/5 to improve independence and safety with bed mobility, transfers, and gait. - not met  5. Patient will improve Tinetti Balance + Gait score to  to improve dynamic standing balance and lower fall risk. - met   6. Patient will ambulate 200 feet with a large-base quad cane with modified independence to improve independence and safety with gait. - not met     Long Term Goals:  1. Patient will perform sit to/from stand with independence to improve independence and safety with transfers.  2. Patient will improve left lower extremity strength to 4+/5 to improve independence and safety with  bed mobility, transfers, and gait.  3. Patient will improve Tinetti Balance + Gait score to 20/28 to improve dynamic standing balance and lower fall risk.   4. Patient will ambulate 300 feet with a quad cane with modified independence including up/down curbs and ramps to improve independence and safety with community ambulation.    Reasons for Recertification of Therapy: goals not met and patient with recent hospitalization preventing therapy attendance.     Plan     Updated Certification Period: 4/25/2022 to 6/3/22  Recommended Treatment Plan: 2 times per week for 6 weeks: Gait Training, Manual Therapy, Moist Heat/ Ice, Neuromuscular Re-ed, Patient Education, Therapeutic Activities and Therapeutic Exercise  Other Recommendations: Progress to HEP when progress stops or Plan of care is complete    OSVALDO MEMBRENO, PT  4/25/2022

## 2022-04-25 NOTE — PLAN OF CARE
Physical Therapy Treatment Note      [id number]     Therapy Diagnosis:        Encounter Diagnosis   Name Primary?    Muscle weakness Yes      Physician: Vini Hernandez NP     Visit Date: 2022     Physician Orders: PT Eval and Treat  Medical Diagnosis from Referral: CVA  Evaluation Date: 3/7/2022  Progress Report Due 22  Plan of Care Expiration: 3/7/2022 to 4/15/22  Updated Plan of Care Due: 22  Visit # / Visits authorized:   PTA Visit #: 0     Time In: 1452  Time Out: 1520  Total Billable Time: 32 minutes     Precautions: Standard and Fall     Subjective      Pt reports: patient had complaint of feeling sleepy and weak feeling today    She was compliant with home exercise program.  Response to previous treatment: improving  Functional change: ongoing     Pain: 0/10  Location: bilateral shoulders, left hand, left knee, and head       Objective         Patient reassessed due to recent hospitalization   TUG test in 47.56 seconds  Tinnetti:   Right LE strength -4/5 and left LE strength hip -4/5 and knee and ankle musclature +3/5.  Gait: She is ambulating with a RW with supervision with occasional foot drag on left but she can correct this.  She has shortened step length and decreased heel strike on left.    Transfers:  She required min assist with supine to sit edge of bed but was independent with sit to supine.  She requires SBA with sit to stand with RW.         Home Exercises Provided and Patient Education Provided      Education provided: Updated HEP     Written Home Exercises Provided: yes.  Exercises were reviewed and Herlinda was able to demonstrate them prior to the end of the session.  Herlinda demonstrated good  understanding of the education provided.      See EMR under below for exercises provided 22.    Assessment      Patient received a reassessment today due to previous Plan of care has  while she was hospitalized for pneumonia.   She has demonstrated progress since her original evaluation on 3/7/22 but has not met all of her goals due to weakness and inability to attend therapy secondary to hospitalization for pneumonia. I feel continued PT is indicated to address goals below,       Herlinda Is progressing well towards her goals.   Pt prognosis is Good.      Pt will continue to benefit from skilled outpatient physical therapy to address the deficits listed in the problem list box on initial evaluation, provide pt/family education and to maximize pt's level of independence in the home and community environment.      Pt's spiritual, cultural and educational needs considered and pt agreeable to plan of care and goals.     Anticipated barriers to physical therapy: none     Goals:     Short Term Goals:  1. Patient will demonstrate independence with home exercise program to ensure carryover of treatment. -ongoing  2. Patient will perform supine to/from sit with independence to improve independence and safety with bed mobility. - not met  3. Patient will perform sit to/from stand with modified independence to improve independence and safety with transfers. - not met  4. Patient will improve left lower extremity strength to 4/5 to improve independence and safety with bed mobility, transfers, and gait. - not met  5. Patient will improve Tinetti Balance + Gait score to 12/28 to improve dynamic standing balance and lower fall risk. - met   6. Patient will ambulate 200 feet with a large-base quad cane with modified independence to improve independence and safety with gait. - not met     Long Term Goals:  1. Patient will perform sit to/from stand with independence to improve independence and safety with transfers.  2. Patient will improve left lower extremity strength to 4+/5 to improve independence and safety with bed mobility, transfers, and gait.  3. Patient will improve Tinetti Balance + Gait score to 20/28 to improve dynamic standing balance and lower  fall risk.   4. Patient will ambulate 300 feet with a quad cane with modified independence including up/down curbs and ramps to improve independence and safety with community ambulation.     Reasons for Recertification of Therapy: goals not met and patient with recent hospitalization preventing therapy attendance.      Plan      Updated Certification Period: 4/25/2022 to 6/3/22  Recommended Treatment Plan: 2 times per week for 6 weeks: Gait Training, Manual Therapy, Moist Heat/ Ice, Neuromuscular Re-ed, Patient Education, Therapeutic Activities and Therapeutic Exercise  Other Recommendations: Progress to HEP when progress stops or Plan of care is complete     OSVALDO MEMBRENO, PT  4/25/2022        I CERTIFY THE NEED FOR THESE SERVICES FURNISHED UNDER THIS PLAN OF TREATMENT AND WHILE UNDER MY CARE.     Physician's comments:        Physician's Signature: ___________________________________________________

## 2022-04-25 NOTE — PLAN OF CARE
OCCUPATIONAL THERAPY UPDATED PLAN OF TREATMENT    Patient name: Herlinda Valdovinos  Onset Date:  April 2021  SOC Date:  3/7/22  Primary Diagnosis:  CVA with L weakness  Treatment Diagnosis:  L UE weakness  Certification Period:  4/25/22 to 6/6/22  Precautions:  Fall Risk; Universal  Visits from SOC:  8  Functional Level Prior to SOC:  Pt lives with , who assists with ADLs PRN, with most difficulty noted with FMC including opening small packages, buttoning, zipping, clasping. Pt has significant difficulty donning bra, buttoning shirt/pants, zipping jackets/pants and performing hair and make up tasks.     Updated Assessment: Since last tx, pt has been hospitalized d/t PNA and shingles. New measurements taken on this day.   - L active shoulder flexion 70*  - L active shoulder extension WFL  - L active shoulder abduction 55*  - L active shoulder IR WFL  - L active shoulder ER 45*  - L  15#; R  30#  - L Tripod pinch 6#; R Tripod pinch 11#  - L Lateral pinch 9#; R Lateral pinch 9#    Previous Short Term Goals Status:   Improve L UE FMC to fair+.  Improve L UE  strength by 1-3#.  Improve L UE shoulder AROM by 5 degrees.  Improve Purdue Pegboard L UE test score by 2 points.    New Short Term Goals (to be achieved by end of cert. Period):  Continue current STGs with addition of pain goal.  Decrease L shoulder pain from 5/10 with activity to 2/10 or less.      Long Term Goal Status: Continue per initial plan of care    Reasons for Recertification of Therapy:  Pt requires continued skilled OT services to address and improve L UE strength, decrease pain in L shoulder, improve ADL performance, increase functional independence, decrease caregiver burden and improve quality of life.     Recommended Treatment Plan: Therapeutic Exercise, Functional Activities, Patient Education, Home Exercise Program, Ultrasound/Phonophoresis, Moist Heat/Ice/Paraffin and Sensory/Neuromuscular Reeducation    Therapist's Name:  Kiana Rasmussen, OTD, OTR/L       Date: 04/25/2022     I CERTIFY THE NEED FOR THESE SERVICES FURNISHED UNDER THIS PLAN OF TREATMENT AND WHILE UNDER MY CARE    Physician's comments: ________________________________________________________________________________________________________________________________________________      Physician's Name (print): ___________________________________    Physician's Signature: _______________________________________________    Date: ________________________

## 2022-04-25 NOTE — PROGRESS NOTES
"[id number]   Date of Note: 04/25/2022   Referring Physician:  Vini Hernandez NP  Diagnosis:    Encounter Diagnoses   Name Primary?    Left arm weakness Yes    Decreased coordination     Pain in left arm         Start Time: 1406   End Time: 1449  Total Time: 43 min  Visit #: 8/17      Subjective:   Pt reports, "I'm tired but I'm feeling better," after hospital stay, resulting in time away from OP services.      Pain: 0 out of 10     Objective:   Patient seen by OT this session. Treatment  consist of the following:  Therapeutic exercise    Treatment:     Therapeutic exercise:  - Performed Left UE gentle AAROM with pulley:   - shoulder flexion, seated, no pain.   - shoulder abduction, seated, no pain.   - Performed L UE wall slides 2 sets x 10 reps, achieving optimal shoulder flexion, pain 5/10.   - Performed L UE  and digit strengthening with green theraputty, challenging pincer and lateral pinch. Digit extension with putty loop, challenging optimal digit extension and abduction in prep for improved piano playing. No pain reported.     Performed updated assessment with following measurements:  - L active shoulder flexion 70*  - L active shoulder extension WFL  - L active shoulder abduction 55*  - L active shoulder IR WFL  - L active shoulder ER 45*  - L  15#; R  30#  - L Tripod pinch 6#; R Tripod pinch 11#    Assessment:  Pt will continue to benefit from skilled OT intervention.    Patient continues to demonstrate limitation  with  Joint mobility, Stiffness, Decreased fine motor coordination, Decreased gross motor coordination, Decreased functional use and Decreased strength. Tolerated tx well. Rest breaks needed due to fatigue. Pt hospitalized since last OT visit d/t PNA and Shingles. No decline noted in function since last tx day. Performed updated POC on this day. Educated in HEP of L UE strengthening and functional use with pt demonstrating good " understanding.    Plan:  Continue 2x week x 6 weeks during the certification period from 4/25/22 to 6/6/22 in pursuit of  OT goals.      Kiana Rasmussen, OTPURA, OTR/L    4/25/2022

## 2022-04-29 ENCOUNTER — CLINICAL SUPPORT (OUTPATIENT)
Dept: REHABILITATION | Facility: HOSPITAL | Age: 57
End: 2022-04-29
Payer: MEDICARE

## 2022-04-29 DIAGNOSIS — R27.8 DECREASED COORDINATION: ICD-10-CM

## 2022-04-29 DIAGNOSIS — R29.898 LEFT ARM WEAKNESS: Primary | ICD-10-CM

## 2022-04-29 DIAGNOSIS — G45.8 ACUTE CEREBROVASCULAR INSUFFICIENCY TRANSIENT FOCAL NEUROLOGIC DEFICIT: Primary | ICD-10-CM

## 2022-04-29 DIAGNOSIS — M79.602 PAIN IN LEFT ARM: ICD-10-CM

## 2022-04-29 PROCEDURE — 97116 GAIT TRAINING THERAPY: CPT | Mod: CQ

## 2022-04-29 PROCEDURE — 97112 NEUROMUSCULAR REEDUCATION: CPT

## 2022-04-29 PROCEDURE — 97110 THERAPEUTIC EXERCISES: CPT | Mod: CQ

## 2022-04-29 NOTE — PROGRESS NOTES
[id number]   Date of Note: 04/29/2022   Referring Physician:  Vini Hernandez NP  Diagnosis:    Encounter Diagnoses   Name Primary?    Left arm weakness Yes    Decreased coordination     Pain in left arm         Start Time: 1315   End Time: 1400  Total Time: 45 min  Visit #: 9/17      Subjective:   Pt reports falling 2x in the past 2 days, hitting side of head and hurting L knee.  Pt presents with increased forward flexed trunk and head posture, significant UB weakness noted.     Pain: 0 out of 10     Objective:   Patient seen by OT this session. Treatment  consist of the following:  Neuro Re-education    Treatment:     Neuro Re-education:  - Performed Left UE gentle AAROM with pulley:   - shoulder flexion, seated, no pain.   - shoulder abduction, seated, no pain.   - Assumed quadruped on mat table requiring Min-ModA to assume position and ModA to maintain position less than 10 seconds.  - Sat edge of mat from supine position requiring Min-ModA, performing dynamic sitting challenging trunk strength and upright sitting posture. Min cues to continue and maintain upright posture.    - Performed bilateral integrative training of UEs challenging pt to catch and release medium sized ball while sitting unsupported edge of mat. Intermittent LOB noted with improved righting reaction as pt corrected balance without assistance. Dynamic sitting tasks challenging due to UB weakness on this day.   - Performed dynamic standing challenging upright posture with chin tuck, challenging balance further by changing direction of eye gaze and head turning, eliciting LOB x 3 requiring ModA to correct.   - Performed L UE  and digit strengthening with green theraputty, challenging pincer and lateral pinch.     Assessment:  Pt will continue to benefit from skilled OT intervention.    Patient continues to demonstrate limitation  with  Joint mobility, Stiffness, Decreased fine motor coordination,  Decreased gross motor coordination, Decreased functional use and Decreased strength. Tolerated tx well. Extended rest breaks needed due to fatigue. Pt presents with increased rounding of B shoulders and forward head posture, with tx session targeting UB strength, balance, and posture .Tolerated quadruped position with increased assistance needed in comparison to previous quadruped sessions. Significant UB weakness noted. Educated in HEP of L UE strengthening and functional use with pt demonstrating good understanding.    Plan:  Continue 2x week x 6 weeks during the certification period from 4/25/22 to 6/6/22 in pursuit of  OT goals.      Kiana Rasmussen, MARCUS, OTR/L    4/29/2022

## 2022-04-29 NOTE — PROGRESS NOTES
Physical Therapy Treatment Note     [id number]    Therapy Diagnosis:   No diagnosis found.  Physician: Vini Hernandez NP    Visit Date: 4/29/2022    Physician Orders: PT Eval and Treat  Medical Diagnosis from Referral: CVA  Evaluation Date: 3/7/2022  Progress Report Due 4/7/22  Plan of Care Expiration: 3/7/2022 to 4/15/22  Updated Plan of Care Due: 4/15/22  Visit # / Visits authorized: 9/12  PTA Visit #: 1    Time In: 1402  Time Out: 1437  Total Billable Time: 35 minutes    Precautions: Standard and Fall    Subjective     Pt reports: she has fallen twice in the past two days and is hurting and sore today   She was compliant with home exercise program.  Response to previous treatment: improving  Functional change: ongoing    Pain: 5/10  Location: left knee      Objective     Herlinda received therapeutic exercises to develop strength and core stabilization for 25 minutes including:  Nu Step x 7 min  SAQ 2 x 10 (per HEP)  SLR 2 x 10  Bridging 2 x 10  Supine HL hip abduction with red band 2 x 10  Supine hip adduction 2 x 10  Supine marching 2 x 10  LAQ 2 x 10  Sit to stands 2 x 5    Herlinda participated in neuromuscular re-education activities to improve: Balance for 0 minutes. The following activities were included:  Not this visit    Herlinda participated in gait training to improve functional mobility and safety for 10 minutes, including:  Patient ambulated 85 feet with RW and verbal cues for step length and foot clearance with CGA.       Home Exercises Provided and Patient Education Provided     Education provided:     Written Home Exercises Provided: Patient instructed to cont prior HEP.  Exercises were reviewed and Herlinda was able to demonstrate them prior to the end of the session.  Herlinda demonstrated good  understanding of the education provided.     See EMR under Media for exercises provided prior visit.    Assessment     Patient requested to scale back exercises this date due to  two recent falls that she has increased pain and soreness from. Patient able to perform exercises.    Herlinda Is progressing well towards her goals.   Pt prognosis is Good.     Pt will continue to benefit from skilled outpatient physical therapy to address the deficits listed in the problem list box on initial evaluation, provide pt/family education and to maximize pt's level of independence in the home and community environment.     Pt's spiritual, cultural and educational needs considered and pt agreeable to plan of care and goals.     Anticipated barriers to physical therapy: none    Goals:    Short Term Goals:  1. Patient will demonstrate independence with home exercise program to ensure carryover of treatment. -ongoing  2. Patient will perform supine to/from sit with independence to improve independence and safety with bed mobility. - not met  3. Patient will perform sit to/from stand with modified independence to improve independence and safety with transfers. - not met  4. Patient will improve left lower extremity strength to 4/5 to improve independence and safety with bed mobility, transfers, and gait. - not met  5. Patient will improve Tinetti Balance + Gait score to 12/28 to improve dynamic standing balance and lower fall risk. - met   6. Patient will ambulate 200 feet with a large-base quad cane with modified independence to improve independence and safety with gait. - not met     Long Term Goals:  1. Patient will perform sit to/from stand with independence to improve independence and safety with transfers.  2. Patient will improve left lower extremity strength to 4+/5 to improve independence and safety with bed mobility, transfers, and gait.  3. Patient will improve Tinetti Balance + Gait score to 20/28 to improve dynamic standing balance and lower fall risk.   4. Patient will ambulate 300 feet with a quad cane with modified independence including up/down curbs and ramps to improve independence and safety  with community ambulation.    Plan     Will continue with Plan of care  as appropriate.    Srinivasa Redmond, PTA  4/29/2022

## 2022-05-04 ENCOUNTER — CLINICAL SUPPORT (OUTPATIENT)
Dept: REHABILITATION | Facility: HOSPITAL | Age: 57
End: 2022-05-04
Payer: MEDICARE

## 2022-05-04 DIAGNOSIS — G45.8 ACUTE CEREBROVASCULAR INSUFFICIENCY TRANSIENT FOCAL NEUROLOGIC DEFICIT: Primary | ICD-10-CM

## 2022-05-04 DIAGNOSIS — M79.602 PAIN IN LEFT ARM: ICD-10-CM

## 2022-05-04 DIAGNOSIS — R29.898 LEFT ARM WEAKNESS: Primary | ICD-10-CM

## 2022-05-04 DIAGNOSIS — R27.8 DECREASED COORDINATION: ICD-10-CM

## 2022-05-04 PROCEDURE — 97110 THERAPEUTIC EXERCISES: CPT | Mod: CQ

## 2022-05-04 PROCEDURE — 97110 THERAPEUTIC EXERCISES: CPT

## 2022-05-04 PROCEDURE — 97116 GAIT TRAINING THERAPY: CPT | Mod: CQ

## 2022-05-04 NOTE — PROGRESS NOTES
"  Physical Therapy Treatment Note     [id number]    Therapy Diagnosis:   No diagnosis found.  Physician: Vini Hernandez NP    Visit Date: 5/4/2022    Physician Orders: PT Eval and Treat  Medical Diagnosis from Referral: CVA  Evaluation Date: 3/7/2022  Progress Report Due 4/7/22  Plan of Care Expiration: 3/7/2022 to 4/15/22  Updated Plan of Care Due: 4/15/22  Visit # / Visits authorized: 10/12  PTA Visit #: 2    Time In: 1101  Time Out: 1139  Total Billable Time: 38 minutes    Precautions: Standard and Fall    Subjective     Pt reports: she is feeling better today with no pain  She was compliant with home exercise program.  Response to previous treatment: improving  Functional change: ongoing    Pain: 0/10  Location: left knee      Objective     Herlinda received therapeutic exercises to develop strength and core stabilization for 30 minutes including:  Nu Step x 7 min  SAQ 3 x 10 (per HEP)  SLR 2 x 10  Bridging 2 x 10  Supine HL hip abduction with red band 2 x 10 (seated this visit)  LAQ 2 x 10  Standing march 2 x 10  Chair squats 2 x 10  2" anterior step ups x 10    Herlinda participated in neuromuscular re-education activities to improve: Balance for 0 minutes. The following activities were included:  Not this visit    Herlinda participated in gait training to improve functional mobility and safety for 8 minutes, including:  Patient ambulated 200 feet with RW and verbal cues for step length and foot clearance with CGA.      Home Exercises Provided and Patient Education Provided     Education provided:     Written Home Exercises Provided: Patient instructed to cont prior HEP.  Exercises were reviewed and Herlinda was able to demonstrate them prior to the end of the session.  Herlinda demonstrated good  understanding of the education provided.     See EMR under Media for exercises provided prior visit.    Assessment     Patient feeling better this visit and able to ambulate into therapy gym " using RW and CGA from . Patient able to perform exercises with no complaint and mild/mod fatigue noted.    Herlinda Is progressing well towards her goals.   Pt prognosis is Good.     Pt will continue to benefit from skilled outpatient physical therapy to address the deficits listed in the problem list box on initial evaluation, provide pt/family education and to maximize pt's level of independence in the home and community environment.     Pt's spiritual, cultural and educational needs considered and pt agreeable to plan of care and goals.     Anticipated barriers to physical therapy: none    Goals:    Short Term Goals:  1. Patient will demonstrate independence with home exercise program to ensure carryover of treatment. -ongoing  2. Patient will perform supine to/from sit with independence to improve independence and safety with bed mobility. - not met  3. Patient will perform sit to/from stand with modified independence to improve independence and safety with transfers. - not met  4. Patient will improve left lower extremity strength to 4/5 to improve independence and safety with bed mobility, transfers, and gait. - not met  5. Patient will improve Tinetti Balance + Gait score to 12/28 to improve dynamic standing balance and lower fall risk. - met   6. Patient will ambulate 200 feet with a large-base quad cane with modified independence to improve independence and safety with gait. - not met     Long Term Goals:  1. Patient will perform sit to/from stand with independence to improve independence and safety with transfers.  2. Patient will improve left lower extremity strength to 4+/5 to improve independence and safety with bed mobility, transfers, and gait.  3. Patient will improve Tinetti Balance + Gait score to 20/28 to improve dynamic standing balance and lower fall risk.   4. Patient will ambulate 300 feet with a quad cane with modified independence including up/down curbs and ramps to improve independence  and safety with community ambulation.    Plan     Will continue with Plan of care  as appropriate.    Srinivasa Redmond, NICHOLAS  5/4/2022

## 2022-05-06 ENCOUNTER — CLINICAL SUPPORT (OUTPATIENT)
Dept: REHABILITATION | Facility: HOSPITAL | Age: 57
End: 2022-05-06
Payer: MEDICARE

## 2022-05-06 DIAGNOSIS — M79.602 PAIN IN LEFT ARM: ICD-10-CM

## 2022-05-06 DIAGNOSIS — M62.81 MUSCLE WEAKNESS: Primary | ICD-10-CM

## 2022-05-06 DIAGNOSIS — R29.898 LEFT ARM WEAKNESS: Primary | ICD-10-CM

## 2022-05-06 DIAGNOSIS — R27.8 DECREASED COORDINATION: ICD-10-CM

## 2022-05-06 PROCEDURE — 97116 GAIT TRAINING THERAPY: CPT

## 2022-05-06 PROCEDURE — 97110 THERAPEUTIC EXERCISES: CPT

## 2022-05-06 PROCEDURE — 97530 THERAPEUTIC ACTIVITIES: CPT

## 2022-05-06 NOTE — PROGRESS NOTES
"[id number]   Date of Note: 05/04/2022   Referring Physician:  Vini Hernandez NP  Diagnosis:    Encounter Diagnoses   Name Primary?    Left arm weakness Yes    Decreased coordination     Pain in left arm         Start Time: 1312   End Time: 1358  Total Time: 46 min  Visit #: 11/17      Subjective:   Pt reports "I'm feeling much better." Pt also reports improved ability to lift L UE overhead with limited pain.     Pain: 0 out of 10     Objective:   Patient seen by OT this session. Treatment  consist of the following:  Therapeutic activity    Treatment:     Therapeutic Activity:   - Performed L UE  strengthening/FMC with blue theraputty challenging gross grasp, pincer, tripod and lateral pinch, utilized to remove 12 small items from putty, no assist needed. Extended time required due to slow movements.   - Performed L UE active shoulder flex, abduction, extension, IR/ER through use of ROM arch, challenging large shoulder movement of UE away from the body. Significant weakness noted. Completed task with Juanis.  - Performed bilateral integrative UE tasks challenging manual dexterity and bilateral coordination of UEs, requiring Juanis for completion of task due to fatigue from previous tasks.      Assessment:  Pt will continue to benefit from skilled OT intervention.    Patient continues to demonstrate limitation  with  Joint mobility, Stiffness, Decreased fine motor coordination, Decreased gross motor coordination, Decreased functional use and Decreased strength. Tolerated tx well. Extended rest breaks needed due to fatigue. Pt presents with improved strength and tolerance on this day in comparison to previous tx session. Progressing well with L UE strength, ROM, and coordination. Educated in HEP of L UE strengthening and functional use with pt demonstrating good understanding.    Plan:  Continue 2x week x 6 weeks during the certification period from 4/25/22 to 6/6/22 in " pursuit of  OT goals.      Kiana Rasmussen, MARCUS, OTR/L    5/6/2022

## 2022-05-06 NOTE — PROGRESS NOTES
"  Physical Therapy Treatment Note     [id number]    Therapy Diagnosis:   Encounter Diagnosis   Name Primary?    Muscle weakness Yes     Physician: Vini Hernandez NP    Visit Date: 5/6/2022    Physician Orders: PT Eval and Treat  Medical Diagnosis from Referral: CVA  Evaluation Date: 3/7/2022  Progress Report Due 4/7/22  Plan of Care Expiration 4/25/2022 to 6/3/22  Updated Plan of Care Due: 5/26/22  Visit # / Visits authorized: 11/20  PTA Visit #: 0    Time In: 1400  Time Out: 1447  Total Billable Time: 47 minutes    Precautions: Standard and Fall    Subjective     Pt reports: she ambulated in with SC and  for assistance. She reports she is trying to use her SC more but she is frustrated with feeling "wobbly"   She was compliant with home exercise program.  Response to previous treatment: improving  Functional change: ongoing    Pain: 0/10  Location: left knee      Objective     Herlinda received therapeutic exercises to develop strength and core stabilization for 23 minutes including:  Nu Step x 7 min  SAQ 3 x 10 (per HEP)  SLR 2 x 10  Bridging 2 x 10  Supine HL hip abduction with red band 2 x 10   LAQ 2 x 10  Standing march 2 x 10  Chair squats 2 x 10  2" anterior step ups x 10    Herlinda participated in neuromuscular re-education activities to improve: Balance for 4 minutes. The following activities were included:  PNF D2 flexion and extension x 10 on left with mod manual resist    Herlinda participated in gait training to improve functional mobility and safety for 20 minutes, including:  Patient ambulated 350 feet with SC and frequent verbal cues for step length and foot clearance with CGA on level surfaces.  She demonstrated 3 LOB during turns which required min assist to correct and 2 LOB on straight direction at end due to fatigue.       Home Exercises Provided and Patient Education Provided     Education provided: updated HEP and importance of normalization of gait "     Written Home Exercises Provided: yes.  Exercises were reviewed and Herlinda was able to demonstrate them prior to the end of the session.  Herlinda demonstrated good  understanding of the education provided.     See EMR under below for exercises provided 5/6/22.    Assessment     Patient improving her overall gait but still with some instability with SC.  Emphasized gait training today with improved step length and foot clearance.     Herlinda Is progressing well towards her goals.   Pt prognosis is Good.     Pt will continue to benefit from skilled outpatient physical therapy to address the deficits listed in the problem list box on initial evaluation, provide pt/family education and to maximize pt's level of independence in the home and community environment.     Pt's spiritual, cultural and educational needs considered and pt agreeable to plan of care and goals.     Anticipated barriers to physical therapy: none    Goals:    Short Term Goals:  1. Patient will demonstrate independence with home exercise program to ensure carryover of treatment. -ongoing  2. Patient will perform supine to/from sit with independence to improve independence and safety with bed mobility. - not met  3. Patient will perform sit to/from stand with modified independence to improve independence and safety with transfers. - not met  4. Patient will improve left lower extremity strength to 4/5 to improve independence and safety with bed mobility, transfers, and gait. - not met  5. Patient will improve Tinetti Balance + Gait score to 12/28 to improve dynamic standing balance and lower fall risk. - met   6. Patient will ambulate 200 feet with a large-base quad cane with modified independence to improve independence and safety with gait. - not met     Long Term Goals:  1. Patient will perform sit to/from stand with independence to improve independence and safety with transfers.  2. Patient will improve left lower extremity strength to 4+/5 to improve  independence and safety with bed mobility, transfers, and gait.  3. Patient will improve Tinetti Balance + Gait score to 20/28 to improve dynamic standing balance and lower fall risk.   4. Patient will ambulate 300 feet with a quad cane with modified independence including up/down curbs and ramps to improve independence and safety with community ambulation.    Plan     Will continue with Plan of care  as appropriate.    OSVALDO MEMBRENO, PT, ATP  5/6/2022

## 2022-05-09 ENCOUNTER — CLINICAL SUPPORT (OUTPATIENT)
Dept: REHABILITATION | Facility: HOSPITAL | Age: 57
End: 2022-05-09
Payer: MEDICARE

## 2022-05-09 DIAGNOSIS — M79.602 PAIN IN LEFT ARM: ICD-10-CM

## 2022-05-09 DIAGNOSIS — G45.8 ACUTE CEREBROVASCULAR INSUFFICIENCY TRANSIENT FOCAL NEUROLOGIC DEFICIT: Primary | ICD-10-CM

## 2022-05-09 DIAGNOSIS — R29.898 LEFT ARM WEAKNESS: Primary | ICD-10-CM

## 2022-05-09 DIAGNOSIS — R27.8 DECREASED COORDINATION: ICD-10-CM

## 2022-05-09 PROCEDURE — 97530 THERAPEUTIC ACTIVITIES: CPT

## 2022-05-09 PROCEDURE — 97110 THERAPEUTIC EXERCISES: CPT | Mod: CQ

## 2022-05-09 PROCEDURE — 97110 THERAPEUTIC EXERCISES: CPT

## 2022-05-09 NOTE — PROGRESS NOTES
[id number]   Date of Note: 05/04/2022   Referring Physician:  Vini Hernandez NP  Diagnosis:    Encounter Diagnoses   Name Primary?    Left arm weakness Yes    Decreased coordination     Pain in left arm         Start Time: 1312   End Time: 1358  Total Time: 46 min  Visit #: 11/17      Subjective:   Pt reports no complaints.    Pain: 0 out of 10     Objective:   Patient seen by OT this session. Treatment  consist of the following:  Therapeutic activity and therapeutic exercise    Treatment:     Therapeutic Activity:   - Performed L UE  strengthening/FMC with blue theraputty challenging gross grasp, pincer, tripod and lateral pinch, utilized to remove 10 small items from putty, no assist needed.   - Patient performed overhead reaching activity through use of ROM arch, challenging large shoulder movement of UE away from the body. Completed task with SBA.    Therapeutic Exercise:   - Performed L UE active shoulder flex using dowel x 20 reps, shoulder abduction using dowel x 20 reps, tricep extension using green t-band, bicep flexion using green t-band.  - Patient performed power web x 20 reps using red power web.  - Pulleys x 5 min    Assessment:  Pt will continue to benefit from skilled OT intervention.    Patient continues to demonstrate limitation  with  Joint mobility, Stiffness, Decreased fine motor coordination, Decreased gross motor coordination, Decreased functional use and Decreased strength. Tolerated tx well. Extended rest breaks needed due to fatigue. Pt presents with improved strength and tolerance on this day in comparison to previous tx session. Progressing well with L UE strength, ROM, and coordination. Educated in HEP of L UE strengthening and functional use with pt demonstrating good understanding.    Plan:  Continue 2x week x 6 weeks during the certification period from 4/25/22 to 6/6/22 in pursuit of  OT goals.      Kerri Sanchez, OTR/L,  CSRS    5/9/2022

## 2022-05-09 NOTE — PROGRESS NOTES
"  Physical Therapy Treatment Note     Name: Herlinda [facility] Number: [id number]    Physician: Vini Hernandez NP    Visit Date: 5/9/2022    Physician Orders: PT Eval and Treat  Medical Diagnosis from Referral: CVA  Evaluation Date: 3/7/2022  Progress Report Due 4/7/22  Plan of Care Expiration 4/25/2022 to 6/3/22  Updated Plan of Care Due: 5/26/22  Visit # / Visits authorized: 12/20  PTA Visit #: 1    Time In: 1450  Time Out: 1526  Total Billable Time: 36 minutes    Precautions: Standard and Fall    Subjective     Pt reports: she has been feeling dizzy when she walks since lunch. BP was 135/85 before session.  She was compliant with home exercise program.  Response to previous treatment: improving  Functional change: ongoing    Pain: 0/10  Location: left knee      Objective     Herlinda received therapeutic exercises to develop strength and core stabilization for 30 minutes including:  Nu Step x 7 min  SAQ 3 x 10 (per HEP)  SLR 3 x 10  Bridging 2 x 10  Supine HL hip abduction with red band 2 x 10   LAQ 2 x 10  Standing march 2 x 10  Chair squats 2 x 10  2" anterior step ups x 10 (not this visit)    Herlinda participated in neuromuscular re-education activities to improve: Balance for 0 minutes. The following activities were included:  PNF D2 flexion and extension x 10 on left with mod manual resist (not this visit)    Herlinda participated in gait training to improve functional mobility and safety for 6 minutes, including:  Patient ambulated 40 ft and 10 ft using SPC with min/modA due to moments of LOB secondary to patient saying she was feeling "drunk" and dizzy while standing this visit.    Home Exercises Provided and Patient Education Provided     Education provided: updated HEP and importance of normalization of gait     Written Home Exercises Provided: yes.  Exercises were reviewed and Herlinda was able to demonstrate them prior to the end of the session.  Herlinda demonstrated good  understanding of the education " provided.     See EMR under below for exercises provided 5/6/22.    Assessment     Patient improving her overall gait but still with some instability with SC.     Herlinda Is progressing well towards her goals.   Pt prognosis is Good.     Pt will continue to benefit from skilled outpatient physical therapy to address the deficits listed in the problem list box on initial evaluation, provide pt/family education and to maximize pt's level of independence in the home and community environment.     Pt's spiritual, cultural and educational needs considered and pt agreeable to plan of care and goals.     Anticipated barriers to physical therapy: none    Goals:    Short Term Goals:  1. Patient will demonstrate independence with home exercise program to ensure carryover of treatment. -ongoing  2. Patient will perform supine to/from sit with independence to improve independence and safety with bed mobility. - not met  3. Patient will perform sit to/from stand with modified independence to improve independence and safety with transfers. - not met  4. Patient will improve left lower extremity strength to 4/5 to improve independence and safety with bed mobility, transfers, and gait. - not met  5. Patient will improve Tinetti Balance + Gait score to 12/28 to improve dynamic standing balance and lower fall risk. - met   6. Patient will ambulate 200 feet with a large-base quad cane with modified independence to improve independence and safety with gait. - not met     Long Term Goals:  1. Patient will perform sit to/from stand with independence to improve independence and safety with transfers.  2. Patient will improve left lower extremity strength to 4+/5 to improve independence and safety with bed mobility, transfers, and gait.  3. Patient will improve Tinetti Balance + Gait score to 20/28 to improve dynamic standing balance and lower fall risk.   4. Patient will ambulate 300 feet with a quad cane with modified independence including  up/down curbs and ramps to improve independence and safety with community ambulation.    Plan     Will continue with Plan of care  as appropriate.    Srinivasa Redmond, PTA  5/9/2022

## 2022-05-11 ENCOUNTER — CLINICAL SUPPORT (OUTPATIENT)
Dept: REHABILITATION | Facility: HOSPITAL | Age: 57
End: 2022-05-11
Payer: MEDICARE

## 2022-05-11 DIAGNOSIS — M79.602 PAIN IN LEFT ARM: ICD-10-CM

## 2022-05-11 DIAGNOSIS — R27.8 DECREASED COORDINATION: ICD-10-CM

## 2022-05-11 DIAGNOSIS — R53.1 GENERALIZED WEAKNESS: Primary | ICD-10-CM

## 2022-05-11 DIAGNOSIS — R29.898 LEFT ARM WEAKNESS: Primary | ICD-10-CM

## 2022-05-11 PROCEDURE — 97116 GAIT TRAINING THERAPY: CPT | Mod: CQ

## 2022-05-11 PROCEDURE — 97110 THERAPEUTIC EXERCISES: CPT | Mod: CQ

## 2022-05-11 PROCEDURE — 97530 THERAPEUTIC ACTIVITIES: CPT

## 2022-05-11 NOTE — PROGRESS NOTES
"[id number]   Date of Note: 05/04/2022   Referring Physician:  Vini Hernandez NP  Diagnosis:    Encounter Diagnoses   Name Primary?    Left arm weakness Yes    Decreased coordination     Pain in left arm         Start Time: 1313   End Time: 1355  Total Time: 42 min  Visit #: 13/17      Subjective:   Pt reports "I'm feeling good. I went fishing again yesterday." Pt reports difficulty casting, but overall enjoys fishing trips.    Pain: 0 out of 10     Objective:   Patient seen by OT this session. Treatment  consist of the following:  Therapeutic activity    Treatment:     Therapeutic Activity:   - Performed L UE  strengthening/FMC with blue theraputty challenging gross grasp, pincer, tripod and lateral pinch, utilized to remove 12 small items from putty, no assist needed. Extended time required due to slow movements.   - Performed bilateral integrative UE tasks challenging manual dexterity and bilateral coordination of UEs, requiring Juanis for completion of task due to fatigue from previous tasks.    - Performed FMC challenging translation of in hand items with L UE, with poor ability palm to fingers, fair+ fingers to palm.     Assessment:  Pt will continue to benefit from skilled OT intervention.    Patient continues to demonstrate limitation  with  Joint mobility, Stiffness, Decreased fine motor coordination, Decreased gross motor coordination, Decreased functional use and Decreased strength. Tolerated tx well. Extended rest breaks needed due to fatigue. Pt presents with improved strength and tolerance on this day in comparison to previous tx session. Progressing well with L UE strength, ROM, and coordination. Educated in HEP of L UE strengthening and functional use with pt demonstrating good understanding.    Plan:  Continue 2x week x 6 weeks during the certification period from 4/25/22 to 6/6/22 in pursuit of  OT goals.      MARCUS Levy, " OTR/L    5/11/2022

## 2022-05-11 NOTE — PROGRESS NOTES
"  Physical Therapy Treatment Note     [id number]    Physician: Vini Hernandez NP    Visit Date: 5/11/2022    Physician Orders: PT Eval and Treat  Medical Diagnosis from Referral: CVA  Evaluation Date: 3/7/2022  Progress Report Due 4/7/22  Plan of Care Expiration 4/25/2022 to 6/3/22  Updated Plan of Care Due: 5/26/22  Visit # / Visits authorized: 13/20  PTA Visit #: 2    Time In: 1109  Time Out: 1149  Total Billable Time: 40 minutes    Precautions: Standard and Fall    Subjective     Pt reports: she has been feeling dizzy when she walks since lunch. BP was 135/85 before session.  She was compliant with home exercise program.  Response to previous treatment: improving  Functional change: ongoing    Pain: 0/10  Location: left knee      Objective     Herlinda received therapeutic exercises to develop strength and core stabilization for 25 minutes including:  Nu Step x 7 min  SAQ 3 x 10   SLR 3 x 10  Bridging 2 x 10  Supine HL hip abduction with red band 2 x 10   LAQ 2 x 10  Standing march 2 x 10  Standing hip abduction 2 x 10  Chair squats 2 x 10  2" anterior step ups x 10    Herlinda participated in neuromuscular re-education activities to improve: Balance for 0 minutes. The following activities were included:  PNF D2 flexion and extension x 10 on left with mod manual resist (not this visit)    Herlinda participated in gait training to improve functional mobility and safety for 15 minutes, including:   Patient ambulated 200 ft using SC with min/modA due to moments of LOB     Home Exercises Provided and Patient Education Provided     Education provided:     Written Home Exercises Provided: Patient instructed to cont prior HEP.  Exercises were reviewed and Herlinda was able to demonstrate them prior to the end of the session.  Herlinda demonstrated good  understanding of the education provided.     See EMR under below for exercises provided 5/6/22.    Assessment     Patient improving her overall " gait but still with some instability with SC.     Herlinda Is progressing well towards her goals.   Pt prognosis is Good.     Pt will continue to benefit from skilled outpatient physical therapy to address the deficits listed in the problem list box on initial evaluation, provide pt/family education and to maximize pt's level of independence in the home and community environment.     Pt's spiritual, cultural and educational needs considered and pt agreeable to plan of care and goals.     Anticipated barriers to physical therapy: none    Goals:    Short Term Goals:  1. Patient will demonstrate independence with home exercise program to ensure carryover of treatment. -ongoing  2. Patient will perform supine to/from sit with independence to improve independence and safety with bed mobility. - not met  3. Patient will perform sit to/from stand with modified independence to improve independence and safety with transfers. - not met  4. Patient will improve left lower extremity strength to 4/5 to improve independence and safety with bed mobility, transfers, and gait. - not met  5. Patient will improve Tinetti Balance + Gait score to 12/28 to improve dynamic standing balance and lower fall risk. - met   6. Patient will ambulate 200 feet with a large-base quad cane with modified independence to improve independence and safety with gait. - not met     Long Term Goals:  1. Patient will perform sit to/from stand with independence to improve independence and safety with transfers.  2. Patient will improve left lower extremity strength to 4+/5 to improve independence and safety with bed mobility, transfers, and gait.  3. Patient will improve Tinetti Balance + Gait score to 20/28 to improve dynamic standing balance and lower fall risk.   4. Patient will ambulate 300 feet with a quad cane with modified independence including up/down curbs and ramps to improve independence and safety with community ambulation.    Plan     Will continue  with Plan of care  as appropriate.    Sneha Lubin, PTA  5/11/2022

## 2022-05-16 ENCOUNTER — CLINICAL SUPPORT (OUTPATIENT)
Dept: REHABILITATION | Facility: HOSPITAL | Age: 57
End: 2022-05-16
Payer: MEDICARE

## 2022-05-16 DIAGNOSIS — M79.602 PAIN IN LEFT ARM: ICD-10-CM

## 2022-05-16 DIAGNOSIS — R29.898 LEFT ARM WEAKNESS: Primary | ICD-10-CM

## 2022-05-16 DIAGNOSIS — M62.81 MUSCLE WEAKNESS: Primary | ICD-10-CM

## 2022-05-16 DIAGNOSIS — R27.8 DECREASED COORDINATION: ICD-10-CM

## 2022-05-16 PROCEDURE — 97110 THERAPEUTIC EXERCISES: CPT | Mod: CQ

## 2022-05-16 PROCEDURE — 97530 THERAPEUTIC ACTIVITIES: CPT

## 2022-05-16 PROCEDURE — 97116 GAIT TRAINING THERAPY: CPT | Mod: CQ

## 2022-05-16 NOTE — PROGRESS NOTES
"[id number]   Date of Note: 05/04/2022   Referring Physician:  Vini Hernandez NP  Diagnosis:    Encounter Diagnoses   Name Primary?    Left arm weakness Yes    Decreased coordination     Pain in left arm         Start Time: 1450   End Time: 1540  Total Time: 50 min  Visit #: 14/17      Subjective:   Pt reports "I'm feeling good." Pt reports plans for another fishing trip tomorrow.     Pain: 0 out of 10     Objective:   Patient seen by OT this session. Treatment  consist of the following:  Therapeutic activity    Treatment:     Therapeutic Activity:   - Performed L UE  strengthening/FMC with blue theraputty challenging gross grasp, pincer, tripod and lateral pinch, utilized to remove 12 small items from putty, no assist needed. Extended time required due to slow movements.   - Performed bilateral integrative UE tasks challenging manual dexterity and bilateral coordination of UEs, requiring SBA.    - Performed FMC with GMC of L UE while standing challenging pt to place pegs on peg board on vertical surface placed at eye level. Required CGA, extended time, successfully placed 6/12 without dropping or using R hand to assist.     Assessment:  Pt will continue to benefit from skilled OT intervention.    Patient continues to demonstrate limitation  with  Joint mobility, Stiffness, Decreased fine motor coordination, Decreased gross motor coordination, Decreased functional use and Decreased strength. Tolerated tx well. Extended rest breaks needed due to fatigue. Progressing well with L UE strength, ROM, and coordination. Educated in HEP of L UE strengthening and functional use with pt demonstrating good understanding.    Plan:  Continue 2x week x 6 weeks during the certification period from 4/25/22 to 6/6/22 in pursuit of  OT goals.      MARCUS Levy, OTR/L    5/16/2022          "

## 2022-05-16 NOTE — PROGRESS NOTES
"  Physical Therapy Treatment Note     Name: Herlinda [facility] Number: [id number]    Physician: Vini Hernandez NP    Visit Date: 5/16/2022    Physician Orders: PT Eval and Treat  Medical Diagnosis from Referral: CVA  Evaluation Date: 3/7/2022  Progress Report Due 4/7/22  Plan of Care Expiration 4/25/2022 to 6/3/22  Updated Plan of Care Due: 5/26/22  Visit # / Visits authorized: 14/20  PTA Visit #: 3    Time In: 1410  Time Out: 1444  Total Billable Time: 34 minutes    Precautions: Standard and Fall    Subjective     Pt reports: she almost fell yesterday but did not hit the ground. Patient stated her hip has been hurting as of late unrelated to the near fall.  She was compliant with home exercise program.  Response to previous treatment: improving  Functional change: ongoing    Pain: 6/10  Location: left hip      Objective     Herlinda received therapeutic exercises to develop strength and core stabilization for 22 minutes including:  Nu Step x 5 min  SAQ 3 x 10 (not this visit)  SLR 3 x 10 (not this visit)  Bridging 2 x 10 (not this visit)  Supine HL hip abduction with red band 2 x 10 (not this visit)  LAQ 2 x 10 x 1.5lb weight  Standing march 2 x 10  Standing hip abduction 2 x 10  Chair squats 2 x 10  2" anterior step ups x 10 (not this visit)    Herlinda participated in neuromuscular re-education activities to improve: Balance for 0 minutes. The following activities were included:  PNF D2 flexion and extension x 10 on left with mod manual resist (not this visit)    Herlinda participated in gait training to improve functional mobility and safety for 12 minutes, including:   Patient ambulated 300 ft using RW with SBA/CGA due to moments of LOB. Patient using RW today due to moments of dizziness over the weekend.    Home Exercises Provided and Patient Education Provided     Education provided:     Written Home Exercises Provided: Patient instructed to cont prior HEP.  Exercises were reviewed and Herlinda was able to " demonstrate them prior to the end of the session.  Herlinda demonstrated good  understanding of the education provided.     See EMR under below for exercises provided 5/6/22.    Assessment     Patient able to perform all exercises with no complaint. Patient required rest breaks between exercises exhibiting min/mod fatigue.     Herlinda Is progressing well towards her goals.   Pt prognosis is Good.     Pt will continue to benefit from skilled outpatient physical therapy to address the deficits listed in the problem list box on initial evaluation, provide pt/family education and to maximize pt's level of independence in the home and community environment.     Pt's spiritual, cultural and educational needs considered and pt agreeable to plan of care and goals.     Anticipated barriers to physical therapy: none    Goals:    Short Term Goals:  1. Patient will demonstrate independence with home exercise program to ensure carryover of treatment. -ongoing  2. Patient will perform supine to/from sit with independence to improve independence and safety with bed mobility. - not met  3. Patient will perform sit to/from stand with modified independence to improve independence and safety with transfers. - not met  4. Patient will improve left lower extremity strength to 4/5 to improve independence and safety with bed mobility, transfers, and gait. - not met  5. Patient will improve Tinetti Balance + Gait score to 12/28 to improve dynamic standing balance and lower fall risk. - met   6. Patient will ambulate 200 feet with a large-base quad cane with modified independence to improve independence and safety with gait. - not met     Long Term Goals:  1. Patient will perform sit to/from stand with independence to improve independence and safety with transfers.  2. Patient will improve left lower extremity strength to 4+/5 to improve independence and safety with bed mobility, transfers, and gait.  3. Patient will improve Tinetti Balance + Gait  score to 20/28 to improve dynamic standing balance and lower fall risk.   4. Patient will ambulate 300 feet with a quad cane with modified independence including up/down curbs and ramps to improve independence and safety with community ambulation.    Plan     Will continue with Plan of care  as appropriate.    Srinivasa Redmond, PTA  5/16/2022

## 2022-05-18 ENCOUNTER — CLINICAL SUPPORT (OUTPATIENT)
Dept: REHABILITATION | Facility: HOSPITAL | Age: 57
End: 2022-05-18
Payer: MEDICARE

## 2022-05-18 DIAGNOSIS — M79.602 PAIN IN LEFT ARM: ICD-10-CM

## 2022-05-18 DIAGNOSIS — R27.8 DECREASED COORDINATION: ICD-10-CM

## 2022-05-18 DIAGNOSIS — R29.898 LEFT ARM WEAKNESS: Primary | ICD-10-CM

## 2022-05-18 DIAGNOSIS — M62.81 MUSCLE WEAKNESS: Primary | ICD-10-CM

## 2022-05-18 PROCEDURE — 97112 NEUROMUSCULAR REEDUCATION: CPT | Mod: CQ

## 2022-05-18 PROCEDURE — 97116 GAIT TRAINING THERAPY: CPT | Mod: CQ

## 2022-05-18 PROCEDURE — 97530 THERAPEUTIC ACTIVITIES: CPT

## 2022-05-18 PROCEDURE — 97110 THERAPEUTIC EXERCISES: CPT | Mod: CQ

## 2022-05-18 NOTE — PROGRESS NOTES
[id number]   Date of Note: 05/04/2022   Referring Physician:  Vini Hernandez NP  Diagnosis:    Encounter Diagnoses   Name Primary?    Left arm weakness Yes    Decreased coordination     Pain in left arm         Start Time: 1315   End Time: 1400  Total Time: 45 min  Visit #: 15/17      Subjective:   Pt reports L lower quadrant pain this tx, rated 7/10 with significantly increased edema noted in abdomen. Encouraged pt to notify her PCP. Pt agreed.     Pain: 7 out of 10     Objective:   Patient seen by OT this session. Treatment  consist of the following:  Therapeutic activity    Treatment:     Therapeutic Activity:   - Performed L UE  strengthening/FMC with blue theraputty challenging gross grasp, pincer, tripod and lateral pinch, utilized to remove 12 small items from putty, no assist needed. Extended time required due to slow movements.   - Performed bilateral integrative UE tasks challenging manual dexterity and bilateral coordination of UEs, requiring SBA.    - Performed FMC with GMC of L UE while standing challenging pt to place pegs on peg board on vertical surface placed at eye level. Required CGA, extended time, successfully placed 5/12 without dropping or using R hand to assist.     Assessment:  Pt will continue to benefit from skilled OT intervention.    Patient continues to demonstrate limitation  with  Joint mobility, Stiffness, Decreased fine motor coordination, Decreased gross motor coordination, Decreased functional use and Decreased strength. Tolerated tx well. Extended rest breaks needed due to fatigue. Progressing well with L UE strength, ROM, and coordination. Pt limited this day, however, due to L lower quadrant pain rated 7/10. Pt agreed to discuss with PCP due to level of pain and noted edema/increased size of abdomen from previous tx. Educated in HEP of L UE strengthening and functional use with pt demonstrating good  understanding.    Plan:  Continue 2x week x 6 weeks during the certification period from 4/25/22 to 6/6/22 in pursuit of  OT goals.      Kiana Rasmussen, OTPURA, OTR/L    5/18/2022

## 2022-05-18 NOTE — PROGRESS NOTES
"  Physical Therapy Treatment Note     Name: Herlinda [facility] Number: [id number]    Physician: Vini Hernandez NP    Visit Date: 5/18/2022    Physician Orders: PT Eval and Treat  Medical Diagnosis from Referral: CVA  Evaluation Date: 3/7/2022  Progress Report Due 4/7/22  Plan of Care Expiration 4/25/2022 to 6/3/22  Updated Plan of Care Due: 5/26/22  Visit # / Visits authorized: 15/20  PTA Visit #: 4    Time In: 1401  Time Out: 1443  Total Billable Time: 42 minutes    Precautions: Standard and Fall    Subjective     Pt reports: her hip is the same today  She was compliant with home exercise program.  Response to previous treatment: improving  Functional change: ongoing    Pain: 6/10  Location: left hip    Objective     Herlinda received therapeutic exercises to develop strength and core stabilization for 22 minutes including:  Nu Step x 5 min (not this visit)  SAQ 3 x 10  SLR 2 x 10  Bridging 2 x 10  Supine HL hip abduction with red band 2 x 10  LAQ 2 x 10 x 1.5lb weight  Theraball rollouts straight forward x 10  Standing march 2 x 10 (not this visit)  Standing hip abduction 2 x 10  Chair squats 2 x 10  2" anterior step ups x 10 (not this visit)    Herlinda participated in neuromuscular re-education activities to improve: Balance for 10 minutes. The following activities were included:  PNF D2 flexion and extension x 10 on left with mod manual resist  Static balance on theraball with CGA and verbal cues on how to self correct  Dynamic balance on theraball with marching and trunk turning    Herlinda participated in gait training to improve functional mobility and safety for 10 minutes, including:   Patient ambulated 300 ft using RW with CGA due to moments of LOB. Patient using RW today due to moments of dizziness and unsteadiness.    Home Exercises Provided and Patient Education Provided     Education provided:     Written Home Exercises Provided: Patient instructed to cont prior HEP.  Exercises were reviewed and Herlinda was " able to demonstrate them prior to the end of the session.  Herlinda demonstrated good  understanding of the education provided.     See EMR under below for exercises provided 5/6/22.    Assessment     Patient able to perform all exercises with no complaint. Patient was less fatigued between exercises this date.  Herlinda Is progressing well towards her goals.   Pt prognosis is Good.     Pt will continue to benefit from skilled outpatient physical therapy to address the deficits listed in the problem list box on initial evaluation, provide pt/family education and to maximize pt's level of independence in the home and community environment.     Pt's spiritual, cultural and educational needs considered and pt agreeable to plan of care and goals.     Anticipated barriers to physical therapy: none    Goals:    Short Term Goals:  1. Patient will demonstrate independence with home exercise program to ensure carryover of treatment. -ongoing  2. Patient will perform supine to/from sit with independence to improve independence and safety with bed mobility. - not met  3. Patient will perform sit to/from stand with modified independence to improve independence and safety with transfers. - not met  4. Patient will improve left lower extremity strength to 4/5 to improve independence and safety with bed mobility, transfers, and gait. - not met  5. Patient will improve Tinetti Balance + Gait score to 12/28 to improve dynamic standing balance and lower fall risk. - met   6. Patient will ambulate 200 feet with a large-base quad cane with modified independence to improve independence and safety with gait. - not met     Long Term Goals:  1. Patient will perform sit to/from stand with independence to improve independence and safety with transfers.  2. Patient will improve left lower extremity strength to 4+/5 to improve independence and safety with bed mobility, transfers, and gait.  3. Patient will improve Tinetti Balance + Gait  score to 20/28 to improve dynamic standing balance and lower fall risk.   4. Patient will ambulate 300 feet with a quad cane with modified independence including up/down curbs and ramps to improve independence and safety with community ambulation.    Plan     Will continue with Plan of care  as appropriate.    Srinivasa Redmond, PTA  5/18/2022

## 2022-06-08 ENCOUNTER — CLINICAL SUPPORT (OUTPATIENT)
Dept: REHABILITATION | Facility: HOSPITAL | Age: 57
End: 2022-06-08
Payer: MEDICARE

## 2022-06-08 DIAGNOSIS — M79.602 PAIN IN LEFT ARM: ICD-10-CM

## 2022-06-08 DIAGNOSIS — R27.8 DECREASED COORDINATION: ICD-10-CM

## 2022-06-08 DIAGNOSIS — G45.8 ACUTE CEREBROVASCULAR INSUFFICIENCY TRANSIENT FOCAL NEUROLOGIC DEFICIT: Primary | ICD-10-CM

## 2022-06-08 DIAGNOSIS — R29.898 LEFT ARM WEAKNESS: Primary | ICD-10-CM

## 2022-06-08 DIAGNOSIS — R53.1 GENERALIZED WEAKNESS: ICD-10-CM

## 2022-06-08 PROCEDURE — 97110 THERAPEUTIC EXERCISES: CPT

## 2022-06-08 PROCEDURE — 97110 THERAPEUTIC EXERCISES: CPT | Mod: CQ

## 2022-06-08 NOTE — PROGRESS NOTES
"[id number]   Date of Note: 05/04/2022   Referring Physician:  Vini Hernandez NP  Diagnosis:    Encounter Diagnoses   Name Primary?    Left arm weakness Yes    Decreased coordination     Pain in left arm         Start Time: 1315   End Time: 1358  Total Time: 43 min  Visit #: 16/17      Subjective:   Pt has not attended therapy session since 5/18/22, due to unstable BP readings and flare up of shingles. Pt currently under care of PCP for adjusting BP meds. Pt reports continued pain at site of shingles, although sores are not open or weeping.  Pt reports BP has been elevated this day but reports having BP meds prior to coming to therapy. Pt tearful at times during this session, attributed to "not feeling good" which pt attributes to discomfort from healing shingles.     Pain: 6 out of 10     Objective:   Patient seen by OT this session. Treatment  consist of the following:  Therapeutic exercise    Treatment:     Therapeutic Activity:   - Performed recumbent arm bike x 5 mins continuous UE motion, extended rest break needed at end. Tolerated well.   - Performed L UE chest press 2 sets x 10 reps with 1# dumbbell, pain rated 6/10 L shoulder.   - Performed L UE shoulder press 2 sets x 10 reps with 1# dumbbell, pain rated 6/10 L shoulder.     Assessment:  Pt will continue to benefit from skilled OT intervention.    Patient continues to demonstrate limitation  with  Joint mobility, Stiffness, Decreased fine motor coordination, Decreased gross motor coordination, Decreased functional use and Decreased strength. Tolerated tx well. Extended rest breaks needed due to fatigue. Progressing fair with L UE strength, ROM, and coordination. Pt limited this day due to weakness and general fatigue following recent weeks of unstable BP, shingles exacerbation. Educated in HEP of L UE strengthening and functional use with pt demonstrating good understanding.    Plan:  Continue 2x week x 6 weeks " during the certification period from 4/25/22 to 6/6/22 in pursuit of  OT goals.      Kiana Rasmussen, MARCUS, OTR/L    6/8/2022

## 2022-06-08 NOTE — PROGRESS NOTES
"  Physical Therapy Treatment Note     [id number]    Physician: Vini Hernandez NP    Visit Date: 6/8/2022    Physician Orders: PT Eval and Treat  Medical Diagnosis from Referral: CVA  Evaluation Date: 3/7/2022  Progress Report Due 4/7/22  Plan of Care Expiration 4/25/2022 to 6/3/22  Updated Plan of Care Due: 5/26/22  Visit # / Visits authorized: 16/20  PTA Visit #: 5    Time In: 1400  Time Out: 1424  Total Billable Time: 24 minutes    Precautions: Standard and Fall    Subjective     Pt reports: the middle of her back has been hurting  She was compliant with home exercise program.  Response to previous treatment: improving  Functional change: ongoing    Pain: 0/10  Location: left hip    Objective     Herlinda received therapeutic exercises to develop strength and core stabilization for 24 minutes including:  Nu Step x 6 min  SAQ 3 x 10  SLR 2 x 10  Bridging 2 x 10  Supine hip abduction with red band 2 x 10  LAQ 3 x 10 x   Theraball rollouts straight forward x 10 (not this visit)  Standing march 2 x 10 (not this visit)  Standing hip abduction 2 x 10 (not this visit)  Sit to stands 2 x 10 (not this visit)  2" anterior step ups x 10 (not this visit)    Herlinda participated in neuromuscular re-education activities to improve: Balance for 0 minutes. The following activities were included:  PNF D2 flexion and extension x 10 on left with mod manual resist (not this visit)  Static balance on theraball with CGA and verbal cues on how to self correct (not this visit)  Dynamic balance on theraball with marching and trunk turning (not this visit)    Herlinda participated in gait training to improve functional mobility and safety for 0 minutes, including:   Patient ambulated 300 ft using RW with CGA due to moments of LOB. Patient using RW today due to moments of dizziness and unsteadiness.    Home Exercises Provided and Patient Education Provided     Education provided:     Written Home Exercises " Provided: Patient instructed to cont prior HEP.  Exercises were reviewed and Herlinda was able to demonstrate them prior to the end of the session.  Herlinda demonstrated good  understanding of the education provided.     See EMR under below for exercises provided 5/6/22.    Assessment     Patient unable to complete full therapy session due to rise in BP and feeling dizzy and fatigue. Patient's BP was taken after some exercises reading at 161/112. Therapy session was ended and pt called her  to come pick her up due to elevated BP and feeling of dizziness and fatigue.    Herlinda Is progressing well towards her goals.   Pt prognosis is Good.     Pt will continue to benefit from skilled outpatient physical therapy to address the deficits listed in the problem list box on initial evaluation, provide pt/family education and to maximize pt's level of independence in the home and community environment.     Pt's spiritual, cultural and educational needs considered and pt agreeable to plan of care and goals.     Anticipated barriers to physical therapy: none    Goals:    Short Term Goals:  1. Patient will demonstrate independence with home exercise program to ensure carryover of treatment. -ongoing  2. Patient will perform supine to/from sit with independence to improve independence and safety with bed mobility. - not met  3. Patient will perform sit to/from stand with modified independence to improve independence and safety with transfers. - not met  4. Patient will improve left lower extremity strength to 4/5 to improve independence and safety with bed mobility, transfers, and gait. - not met  5. Patient will improve Tinetti Balance + Gait score to 12/28 to improve dynamic standing balance and lower fall risk. - met   6. Patient will ambulate 200 feet with a large-base quad cane with modified independence to improve independence and safety with gait. - not met     Long Term Goals:  1. Patient will perform sit to/from stand  with independence to improve independence and safety with transfers.  2. Patient will improve left lower extremity strength to 4+/5 to improve independence and safety with bed mobility, transfers, and gait.  3. Patient will improve Tinetti Balance + Gait score to 20/28 to improve dynamic standing balance and lower fall risk.   4. Patient will ambulate 300 feet with a quad cane with modified independence including up/down curbs and ramps to improve independence and safety with community ambulation.    Plan     Will continue with Plan of care  as appropriate.    Srinivasa Redmond, NICHOLAS  6/8/2022

## 2022-06-10 ENCOUNTER — CLINICAL SUPPORT (OUTPATIENT)
Dept: REHABILITATION | Facility: HOSPITAL | Age: 57
End: 2022-06-10
Payer: MEDICARE

## 2022-06-10 DIAGNOSIS — R27.8 DECREASED COORDINATION: ICD-10-CM

## 2022-06-10 DIAGNOSIS — R53.1 GENERALIZED WEAKNESS: Primary | ICD-10-CM

## 2022-06-10 DIAGNOSIS — R29.898 LEFT ARM WEAKNESS: Primary | ICD-10-CM

## 2022-06-10 DIAGNOSIS — M79.602 PAIN IN LEFT ARM: ICD-10-CM

## 2022-06-10 PROCEDURE — 97110 THERAPEUTIC EXERCISES: CPT

## 2022-06-10 NOTE — PROGRESS NOTES
[facility]  Physical Therapy Plan of Care Note    Name: Herlinda Valdovinos  [facility] Number: [id number]    Therapy Diagnosis:   Encounter Diagnosis   Name Primary?    Generalized weakness Yes     Physician: Vini Hernandez NP    Visit Date: 6/10/2022       Physician Orders: PT Eval and Treat  Medical Diagnosis from Referral: CVA  Evaluation Date: 3/7/2022  Progress Report Due 4/7/22  Plan of Care Expiration 4/25/2022 to 6/3/22  Updated Plan of Care Due: 5/26/22  Visit # / Visits authorized: 16/20  PTA Visit #: 5     FOTO:     Precautions: Standard, Fall and BP    Time in: 9:30 AM  Time out: 10: 11 AM     SUBJECTIVE     Update: Patient states she states the outpatient has been really good for her, she thinks she still needs supervision to be safe to get her strength back. Patient feels like she has made progress but has set backs which limit her.     OBJECTIVE     Patient reassessed due to POC expiration and patient has missed last 3-4 visits due to being unstable due to BP elevation.     TUG test in 47.56 seconds  Tinnetti: 17/28  Right LE strength -4/5 and left LE strength hip -4/5 and knee and ankle musclature +3/5.  Gait: She is ambulating with a RW with supervision with occasional foot drag on left but she can correct this.  She has shortened step length and decreased heel strike on left.    Transfers:  She required CGA with supine to sit edge of bed but was independent with sit to supine.  She requires SBA with sit to stand with RW.         Update:     Tug : 38 seconds  Tinnetti: 18 / 28  Right LE strength 4+/5 and left LE strength hip -4/5 and knee and ankle musclature 4-/5.  Gait: She is ambulating with a RW with supervision with occasional foot drag on left but she can correct this.  She has shortened step length and decreased heel strike on left.    Transfers:  She required supervision with supine to sit edge of bed and was independent with sit to supine.  She is Supervision with  sit to stand with RW.       ASSESSMENT     Update:     Herlinda is progressing fairly with with physical therapy , with no complaints of pain or discomfort.  A progress note was completed today with significant improvements in bed mobility and transfers compared to initial evaluation, please see exam for details. Herlinda continues to have difficulty with , due to regressions and set backs related to hospitalization and blood pressure..  The above impairments and functional deficits will continue to be addressed over the course of this plan of care. Herlinda was educated on continued progression of PT, expectations for the duration of care, updates on goals set at initial evaluation, and home exercise program.  Handouts were not issued during today's visit with instructions on what exercise to perform up until next visit.     Previous Short Term Goals Status:   ongoing / Met  New Short Term Goals Status: continue  Long Term Goal Status: continue per initial plan of care; modified ambulation goal from quad cane to FWW  Reasons for Recertification of Therapy:   Patient missed last few visits and had decreased PT sessions due to elevated blood pressure and dizzy ness.     GOALS    Goals:     Short Term Goals:  1. Patient will demonstrate independence with home exercise program to ensure carryover of treatment. -MET  2. Patient will perform supine to/from sit with independence to improve independence and safety with bed mobility. - ongoing  3. Patient will perform sit to/from stand with modified independence to improve independence and safety with transfers. -  met  4. Patient will improve left lower extremity strength to 4/5 to improve independence and safety with bed mobility, transfers, and gait. - met  5. Patient will improve Tinetti Balance + Gait score to 12/28 to improve dynamic standing balance and lower fall risk. - met   6. Patient will ambulate 200 feet with a FWW with modified independence to improve independence and  safety with gait. - met met     Long Term Goals:  1. Patient will perform sit to/from stand with independence to improve independence and safety with transfers. - MET  2. Patient will improve left lower extremity strength to 4+/5 to improve independence and safety with bed mobility, transfers, and gait.- MET  3. Patient will improve Tinetti Balance + Gait score to 20/28 to improve dynamic standing balance and lower fall risk.   4. Patient will ambulate 300 feet with a FWW with modified independence including up/down curbs and ramps to improve independence and safety with community ambulation.         PLAN     Updated Certification Period: 6/13/2022 to 7/1/2022  Recommended Treatment Plan: 2 times per week for 2 weeks:  Manual Therapy, Neuromuscular Re-ed, Therapeutic Activities and Therapeutic Exercise     Other Recommendations:     Sunitha Martinez, PT    I CERTIFY THE NEED FOR THESE SERVICES FURNISHED UNDER THIS PLAN OF TREATMENT AND WHILE UNDER MY CARE  Physician's comments:      Physician's Signature: ___________________________________________________

## 2022-06-10 NOTE — PLAN OF CARE
OCCUPATIONAL THERAPY UPDATED PLAN OF TREATMENT    Patient name: Herlinda Valdovinos  Onset Date:  April '21  SOC Date:  3/7/22  Primary Diagnosis:  CVA with L hemiplegia  Treatment Diagnosis:  L UE weakness  Certification Period:  6/10/22 to 6/24/22  Precautions:  Universal  Visits from SOC:  17  Functional Level Prior to SOC:    Prior to CVA, pt was Independent, working full time as RN.   Prior to this episode of care, pt lives with , who assists with ADLs PRN, with most difficulty noted in FMC including opening small packages, buttoning, zipping, clasping. Pt has significant difficulty donning bra, buttoning shirt/pants, zipping jackets/pants and performing hair and make up tasks.     Updated Assessment:   - L active shoulder flexion 75*  - L active shoulder extension WFL  - L active shoulder abduction 60*  - L active shoulder IR WFL  - L active shoulder ER 45*  - L  18#; R  30#  - L Tripod pinch 7#; R Tripod pinch 11#  - L Lateral pinch 9#; R Lateral pinch 9#    Previous Short Term Goals Status:   Improve L UE FMC to fair+.  Improve L UE  strength by 1-3#.  Improve L UE shoulder AROM by 5 degrees.  Improve Purdue Pegboard L UE test score by 2 points.  Decrease L shoulder pain from 5/10 with activity to 2/10 or less.      Long Term Goal Status: Continue per initial plan of care    Reasons for Recertification of Therapy:  Pt requires continued skilled OT services to address and improve L UE strength, decrease pain in L shoulder, improve ADL performance, increase functional independence, decrease caregiver burden and improve quality of life.     Recommended Treatment Plan: Therapeutic Exercise, Functional Activities, Patient Education, Home Exercise Program, ADL Training, Moist Heat/Ice/Paraffin and Sensory/Neuromuscular Reeducation    Therapist's Name: MARCUS Levy, OTR/L       Date: 06/10/2022     I CERTIFY THE NEED FOR THESE SERVICES FURNISHED UNDER THIS PLAN OF TREATMENT AND WHILE UNDER  MY CARE    Physician's comments: ________________________________________________________________________________________________________________________________________________      Physician's Name (print): ___________________________________    Physician's Signature: _______________________________________________    Date: ________________________

## 2022-06-10 NOTE — PROGRESS NOTES
[id number]   Date of Note: 05/04/2022   Referring Physician:  Vini Hernandez NP  Diagnosis:    Encounter Diagnoses   Name Primary?    Left arm weakness Yes    Decreased coordination     Pain in left arm         Start Time: 849   End Time: 927  Total Time: 38 min  Visit #: 17/21      Subjective:   Pt reports feeling much better from shingles pain.     Pain: 0 out of 10     Objective:   Patient seen by OT this session. Treatment  consist of the following:  Therapeutic exercise    Treatment:     Therapeutic exercise:   - Performed recumbent arm bike x 10 mins continuous UE motion, extended rest break needed at end. Tolerated well.   - Performed L UE chest press 2 sets x 10 reps with 1# dumbbell, pain rated 4/10 L shoulder.   - Performed L UE shoulder press 2 sets x 10 reps with 1# dumbbell, pain rated 4/10 L shoulder.   - Performed B UE /pinch strengthening with blue theraputty, tolerating well.     Assessment:  Pt will continue to benefit from skilled OT intervention.  Patient continues to demonstrate limitation  with  Joint mobility, Stiffness, Decreased fine motor coordination, Decreased gross motor coordination, Decreased functional use and Decreased strength. Tolerated tx well. Extended rest breaks needed due to fatigue. Progressing with L UE strength, ROM, and coordination. Significant improvement noted in tolerance. Provided blue theraputty for home use for addition to HEP. Demonstrated good carryover.      Plan:  Continue 2x week x 2 weeks during the certification period from 6/10/22 to 6/24/22 in pursuit of  OT goals.      MARCUS Levy, OTR/L    6/10/2022

## 2022-06-20 ENCOUNTER — CLINICAL SUPPORT (OUTPATIENT)
Dept: REHABILITATION | Facility: HOSPITAL | Age: 57
End: 2022-06-20
Payer: MEDICARE

## 2022-06-20 DIAGNOSIS — M62.81 MUSCLE WEAKNESS: ICD-10-CM

## 2022-06-20 DIAGNOSIS — R27.8 DECREASED COORDINATION: ICD-10-CM

## 2022-06-20 DIAGNOSIS — R29.898 LEFT ARM WEAKNESS: Primary | ICD-10-CM

## 2022-06-20 DIAGNOSIS — G45.8 ACUTE CEREBROVASCULAR INSUFFICIENCY TRANSIENT FOCAL NEUROLOGIC DEFICIT: Primary | ICD-10-CM

## 2022-06-20 DIAGNOSIS — M79.602 PAIN IN LEFT ARM: ICD-10-CM

## 2022-06-20 PROCEDURE — 97116 GAIT TRAINING THERAPY: CPT | Mod: CQ

## 2022-06-20 PROCEDURE — 97110 THERAPEUTIC EXERCISES: CPT

## 2022-06-20 PROCEDURE — 97530 THERAPEUTIC ACTIVITIES: CPT

## 2022-06-20 PROCEDURE — 97112 NEUROMUSCULAR REEDUCATION: CPT | Mod: CQ

## 2022-06-20 PROCEDURE — 97110 THERAPEUTIC EXERCISES: CPT | Mod: CQ

## 2022-06-20 NOTE — PROGRESS NOTES
[id number]   Date of Note: 05/04/2022   Referring Physician:  Vini Hernandez NP  Diagnosis:    Encounter Diagnoses   Name Primary?    Left arm weakness Yes    Decreased coordination     Pain in left arm         Start Time: 1310   End Time: 1357  Total Time: 47 min  Visit #: 18/21      Subjective:   Pt reports feeling much better from shingles pain.     Pain: 0 out of 10     Objective:   Patient seen by OT this session. Treatment  consist of the following:  Therapeutic exercise and Therapeutic activity    Treatment:     Therapeutic exercise:   - Performed recumbent arm bike x 10 mins continuous UE motion, extended rest break needed at end. Tolerated well.   - Performed L UE AROM shoulder flexion 2 sets x 10 reps.      Therapeutic activity:  - Performed L UE FMC including grasp and release of pegs, placing them in peg board on table top with set-up and extended time needed. Dropped pegs intermittently but self-corrected with L hand to retrieve and place peg in proper position in peg board. Fair+/Good- FMC L UE this day.   - Performed further L UE FMC challenging ability to manipulate dressing board including buttoning, zipping, snapping, and hooking. Performed with set-up and extended time. No physical assist needed, extended time due slow movements and difficulty with buttoning task. Fair+ FMC noted with this task.      Assessment:  Pt will continue to benefit from skilled OT intervention.  Patient continues to demonstrate limitation  with  Joint mobility, Stiffness, Decreased fine motor coordination, Decreased gross motor coordination, Decreased functional use and Decreased strength. Tolerated tx well. Progressing with L UE strength, ROM, and coordination. Discussed upcoming d/c plan for HEP with pt agreeable to d/c with L UE HEP next session. Demonstrated good carryover.      Plan:  Continue 2x week x 2 weeks during the certification period from 6/10/22 to 6/24/22 in  pursuit of  OT goals.      MARCUS Levy, OTR/L    6/20/2022

## 2022-06-20 NOTE — PROGRESS NOTES
Physical Therapy Treatment Note     [id number]    Physician: Vini Hernandez NP    Visit Date: 6/20/2022    Physician Orders: PT Eval and Treat  Medical Diagnosis from Referral: CVA  Evaluation Date: 3/7/2022  Progress Report Due 7/1/22  Plan of Care Expiration 6/13/2022 to 7/1/2022  Updated Plan of Care Due: 7/1/2022  Visit # / Visits authorized: 18/20  PTA Visit #: 1    Time In: 1400  Time Out: 1439  Total Billable Time: 39 minutes    Precautions: Standard and Fall    Subjective     Pt reports: the middle of her back has been hurting  She was compliant with home exercise program.  Response to previous treatment: improving  Functional change: ongoing    Pain: 0/10  Location: left hip    Objective     Herlinda received therapeutic exercises to develop strength and core stabilization for 21 minutes including:  Nu Step x 6 min (not today)  SAQ 3 x 10 (not today)  SLR 2 x 10  Bridging 2 x 10  Supine hip abduction with red band 2 x 10  LAQ 3 x 10  Standing march 2 x 10 (not this visit)  Standing hip abduction 2 x 10 (not this visit)  Sit to stands 2 x 10    Herlinda participated in neuromuscular re-education activities to improve: Balance for 10 minutes. The following activities were included:  Quadruped fire hydrant 2 x 10 ea  Quadruped elbow touches 2 x 10 ea  Quadruped to Tall kneeling x 1 min    Herlinda participated in gait training to improve functional mobility and safety for 8 minutes, including:   Patient ambulated 300 ft using RW with SBA and verbal cues for stride length and even symmetrical steps.    Home Exercises Provided and Patient Education Provided     Education provided:     Written Home Exercises Provided: Patient instructed to cont prior HEP.  Exercises were reviewed and Herlinda was able to demonstrate them prior to the end of the session.  Herlinda demonstrated good  understanding of the education provided.     See EMR under below for exercises provided 5/6/22.    Assessment      Patient able to perform all exercises with no complaints and increased time due to fatigue. Patient stated she felt good after treatment with no dizziness or other negative effects.    Herlinda Is progressing well towards her goals.   Pt prognosis is Good.     Pt will continue to benefit from skilled outpatient physical therapy to address the deficits listed in the problem list box on initial evaluation, provide pt/family education and to maximize pt's level of independence in the home and community environment.     Pt's spiritual, cultural and educational needs considered and pt agreeable to plan of care and goals.     Anticipated barriers to physical therapy: none    Goals:    Short Term Goals:  1. Patient will demonstrate independence with home exercise program to ensure carryover of treatment. -ongoing  2. Patient will perform supine to/from sit with independence to improve independence and safety with bed mobility. - not met  3. Patient will perform sit to/from stand with modified independence to improve independence and safety with transfers. - not met  4. Patient will improve left lower extremity strength to 4/5 to improve independence and safety with bed mobility, transfers, and gait. - not met  5. Patient will improve Tinetti Balance + Gait score to 12/28 to improve dynamic standing balance and lower fall risk. - met   6. Patient will ambulate 200 feet with a large-base quad cane with modified independence to improve independence and safety with gait. - not met     Long Term Goals:  1. Patient will perform sit to/from stand with independence to improve independence and safety with transfers.  2. Patient will improve left lower extremity strength to 4+/5 to improve independence and safety with bed mobility, transfers, and gait.  3. Patient will improve Tinetti Balance + Gait score to 20/28 to improve dynamic standing balance and lower fall risk.   4. Patient will ambulate 300 feet with a quad cane with  modified independence including up/down curbs and ramps to improve independence and safety with community ambulation.    Plan     Will continue with Plan of care  as appropriate.    Srinivasa Redmond, PTA  6/20/2022     Yes

## 2022-06-22 ENCOUNTER — CLINICAL SUPPORT (OUTPATIENT)
Dept: REHABILITATION | Facility: HOSPITAL | Age: 57
End: 2022-06-22
Payer: MEDICARE

## 2022-06-22 DIAGNOSIS — R29.898 LEFT ARM WEAKNESS: Primary | ICD-10-CM

## 2022-06-22 DIAGNOSIS — M79.602 PAIN IN LEFT ARM: ICD-10-CM

## 2022-06-22 DIAGNOSIS — R27.8 DECREASED COORDINATION: ICD-10-CM

## 2022-06-22 PROCEDURE — 97110 THERAPEUTIC EXERCISES: CPT

## 2022-06-22 PROCEDURE — 97530 THERAPEUTIC ACTIVITIES: CPT

## 2022-06-22 NOTE — PROGRESS NOTES
"[id number]   Date of Note: 05/04/2022   Referring Physician:  Vini Hernandez NP  Diagnosis:    Encounter Diagnoses   Name Primary?    Left arm weakness Yes    Decreased coordination     Pain in left arm         Start Time: 1310   End Time: 1357  Total Time: 47 min  Visit #: 19/21      Subjective:   Pt reports feeling "great."     Pain: 0 out of 10     Objective:   Patient seen by OT this session. Treatment  consist of the following:  Therapeutic exercise and Therapeutic activity    Treatment:     Therapeutic exercise:   - Performed L UE AROM shoulder flexion 2 sets x 10 reps. No pain.   - Performed distal L UE strengthening 2# resistance 2 sets x 10 reps. No pain.   - Educated, demonstrated HEP for L UE strengthening with good understanding/carryover.      Therapeutic activity:  - Performed L UE FMC including grasp and release of small pegs, placing them in small peg board on table top with set-up and extended time needed. Dropped pegs intermittently but self-corrected with L hand to retrieve and place peg in proper position in peg board. Fair+/Good- FMC L UE.       Assessment:  Tolerated tx well. Demonstrates significant progression with L UE strength, ROM, and coordination, meeting goals at this time. Discharge this day to HEP for L UE. Demonstrates good understanding and carryover of HEP.      Plan:  Discharge from skilled OT services.     MARCUS Levy, OTR/L    6/22/2022          "

## 2022-06-22 NOTE — PROGRESS NOTES
Physical Therapy Discharge Note     [id number]    Physician: Vini Hernandez NP    Visit Date: 6/22/2022    Physician Orders: PT Eval and Treat  Medical Diagnosis from Referral: CVA  Evaluation Date: 3/7/2022  Progress Report Due 7/1/22  Plan of Care Expiration 6/13/2022 to 7/1/2022  Updated Plan of Care Due: 7/1/2022  Visit # / Visits authorized: 19/20  PTA Visit #: 1    Time In: 2:00 PM  Time Out: 2:30 PM  Total Billable Time: 30 minutes    Precautions: Standard and Fall    Subjective     Pt reports: the middle of her back has been hurting  She was compliant with home exercise program.  Response to previous treatment: improving  Functional change: ongoing    Pain: 0/10  Location: left hip    Objective       TUG test in 21 seconds; improvement from 38 seconds seconds    Tinnetti: 21/28    Right LE strength 4+/5 and left LE strength hip 4+/5 and knee and ankle musclature 4+/5.    Gait: She is ambulating with a RW with supervision with occasional foot drag on left but she can correct this.  She has shortened step length and decreased heel strike on left.      Transfers:  She required supervision with supine to sit edge of bed and was independent with sit to supine.  She is Supervision with sit to stand with RW.       Herlinda received therapeutic exercises to develop strength and core stabilization for 21 minutes including:  Nu Step x 6 min   SAQ 3 x 10 (not today)  SLR 2 x 10 (not today)  Bridging 2 x 10 (not today)  Supine hip abduction with red band 2 x 10 (not today)  LAQ 3 x 10 (not today)  Standing march 2 x 10 (not this visit)  Standing hip abduction 2 x 10 (not this visit)  Sit to stands 3 x 10    Herlinda participated in neuromuscular re-education activities to improve: Balance for 0 minutes. The following activities were included:  Quadruped fire hydrant 2 x 10 ea  Quadruped elbow touches 2 x 10 ea  Quadruped to Tall kneeling x 1 min    Herlinda participated in gait training  to improve functional mobility and safety for 10 minutes, including:   Patient ambulated 500 ft using RW with SBA and verbal cues for stride length and even symmetrical steps.    Home Exercises Provided and Patient Education Provided     Education provided:     Written Home Exercises Provided: Patient instructed to cont prior HEP.  Exercises were reviewed and Herlinda was able to demonstrate them prior to the end of the session.  Herlinda demonstrated good  understanding of the education provided.     See EMR under below for exercises provided 5/6/22.    Assessment     Herlinda Aruna present to physical therapy today for final assessment an discharge.  Herlinda has me most t all goals set at initial evaluation, unless listed below, and will continue to work at home towards personal goal(s) at of: functional endurance and strength.  Herlinda is independent with home exercise program and was given handouts throughout this episode of care to reference for continued wellness and physical fitness . Contact information was given to patient in case any questions arise in the future or if therapy is needed.      Discharge reason: Patient has completed the physician's prescription      Herlinda Is progressing well towards her goals.   Pt prognosis is Good.     Pt will continue to benefit from skilled outpatient physical therapy to address the deficits listed in the problem list box on initial evaluation, provide pt/family education and to maximize pt's level of independence in the home and community environment.     Pt's spiritual, cultural and educational needs considered and pt agreeable to plan of care and goals.     Anticipated barriers to physical therapy: none    Goals:    Short Term Goals:  1. Patient will demonstrate independence with home exercise program to ensure carryover of treatment. -Met  2. Patient will perform supine to/from sit with independence to improve independence and safety with bed mobility. - Met  3. Patient will  perform sit to/from stand with modified independence to improve independence and safety with transfers. -  met  4. Patient will improve left lower extremity strength to 4/5 to improve independence and safety with bed mobility, transfers, and gait. - met  5. Patient will improve Tinetti Balance + Gait score to 12/28 to improve dynamic standing balance and lower fall risk. - met   6. Patient will ambulate 200 feet with a large-base quad cane with modified independence to improve independence and safety with gait. - not met     Long Term Goals:  1. Patient will perform sit to/from stand with independence to improve independence and safety with transfers.  2. Patient will improve left lower extremity strength to 4+/5 to improve independence and safety with bed mobility, transfers, and gait.  3. Patient will improve Tinetti Balance + Gait score to 20/28 to improve dynamic standing balance and lower fall risk.   4. Patient will ambulate 300 feet with a quad cane with modified independence including up/down curbs and ramps to improve independence and safety with community ambulation. - not met    Plan     Will continue with Plan of care  as appropriate.    Sunitha Martinez, PT  6/22/2022

## 2022-06-22 NOTE — PLAN OF CARE
Occupational Therapy Upper Extremity Discharge Report    Referring practitioner: Vini Hernandez NP  Date of initial OT visit: 3/7/22  Patient seen for 19 sessions  Work status: off work    Subjective     Patient's response to therapy: Pt demonstrates significant improvement, reporting improved function in home environment, less assist needed from , improved FMC/GMC with BADLs per pt report.        Objective     - Current condition: Pt performs TB self care set-up/Tiffany in home environment. Significant improvement in L UE strength and functional use since SOC.     Test measurements:     L UE shoulder flexion up to 100* AROM  L UE shoulder MMT 3/5     Strength: R 31# L 20#     Lateral Pinch strength: R 13# L 10#  3 point pinch strength: R 11# L 8#  Tip pinch strength: R 6# L 4#     Fine motor coordination: R= Good L= Fair+/Good-     Gross motor coordination: R= Good L= Fair+/Good-    Assessment    Pt met all short and long term goals, resulting in need for discharge from skilled OT services.      Plan  Discharge from skilled OT services at this time.       MARCUS Levy, OTR/L  6/22/2022      No signature required.

## 2022-08-17 DIAGNOSIS — R29.6 REPEATED FALLS: Primary | ICD-10-CM
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
## 2022-10-12 DIAGNOSIS — R29.898 LOWER EXTREMITY WEAKNESS: Primary | ICD-10-CM

## 2022-10-17 ENCOUNTER — HOSPITAL ENCOUNTER (OUTPATIENT)
Facility: HOSPITAL | Age: 57
Discharge: STILL ADMITTED OR ADMINISTRATIVE | End: 2022-10-19
Attending: EMERGENCY MEDICINE | Admitting: EMERGENCY MEDICINE
Payer: MEDICARE

## 2022-10-17 DIAGNOSIS — I10 HYPERTENSION, UNSPECIFIED TYPE: ICD-10-CM

## 2022-10-17 DIAGNOSIS — R29.6 FALLS FREQUENTLY: ICD-10-CM

## 2022-10-17 DIAGNOSIS — E86.0 DEHYDRATION: ICD-10-CM

## 2022-10-17 DIAGNOSIS — R53.1 WEAKNESS: Primary | ICD-10-CM

## 2022-10-17 DIAGNOSIS — M62.81 MUSCLE WEAKNESS: ICD-10-CM

## 2022-10-17 PROBLEM — Z86.73 HISTORY OF CVA (CEREBROVASCULAR ACCIDENT): Status: RESOLVED | Noted: 2022-10-17 | Resolved: 2022-10-17

## 2022-10-17 PROBLEM — I63.9 STROKE: Status: RESOLVED | Noted: 2022-10-17 | Resolved: 2022-10-17

## 2022-10-17 PROBLEM — E07.9 THYROID DISEASE: Status: ACTIVE | Noted: 2022-10-17

## 2022-10-17 PROBLEM — Z86.73 HISTORY OF CVA (CEREBROVASCULAR ACCIDENT): Status: ACTIVE | Noted: 2022-10-17

## 2022-10-17 PROBLEM — I63.9 STROKE: Status: ACTIVE | Noted: 2022-10-17

## 2022-10-17 LAB
ALBUMIN SERPL BCP-MCNC: 3.9 G/DL (ref 3.5–5)
ALBUMIN/GLOB SERPL: 1.1 {RATIO}
ALP SERPL-CCNC: 119 U/L (ref 46–118)
ALT SERPL W P-5'-P-CCNC: 26 U/L (ref 13–56)
ANION GAP SERPL CALCULATED.3IONS-SCNC: 12 MMOL/L (ref 7–16)
AST SERPL W P-5'-P-CCNC: 52 U/L (ref 15–37)
BACTERIA #/AREA URNS HPF: NORMAL /HPF
BASOPHILS # BLD AUTO: 0.02 K/UL (ref 0–0.2)
BASOPHILS NFR BLD AUTO: 0.3 % (ref 0–1)
BILIRUB SERPL-MCNC: 1.2 MG/DL (ref ?–1.2)
BILIRUB UR QL STRIP: NEGATIVE
BUN SERPL-MCNC: 13 MG/DL (ref 7–18)
BUN/CREAT SERPL: 12 (ref 6–20)
CALCIUM SERPL-MCNC: 9 MG/DL (ref 8.5–10.1)
CHLORIDE SERPL-SCNC: 99 MMOL/L (ref 98–107)
CK SERPL-CCNC: 400 U/L (ref 26–192)
CLARITY UR: CLEAR
CO2 SERPL-SCNC: 32 MMOL/L (ref 21–32)
COLOR UR: YELLOW
CREAT SERPL-MCNC: 1.08 MG/DL (ref 0.55–1.02)
DIFFERENTIAL METHOD BLD: ABNORMAL
EGFR (NO RACE VARIABLE) (RUSH/TITUS): 60 ML/MIN/1.73M²
EOSINOPHIL # BLD AUTO: 0.42 K/UL (ref 0–0.5)
EOSINOPHIL NFR BLD AUTO: 5.8 % (ref 1–4)
ERYTHROCYTE [DISTWIDTH] IN BLOOD BY AUTOMATED COUNT: 14.6 % (ref 11.5–14.5)
FLUAV AG UPPER RESP QL IA.RAPID: NEGATIVE
FLUBV AG UPPER RESP QL IA.RAPID: NEGATIVE
GLOBULIN SER-MCNC: 3.4 G/DL (ref 2–4)
GLUCOSE SERPL-MCNC: 119 MG/DL (ref 74–106)
GLUCOSE UR STRIP-MCNC: NEGATIVE MG/DL
HCT VFR BLD AUTO: 42.6 % (ref 38–47)
HGB BLD-MCNC: 14.6 G/DL (ref 12–16)
KETONES UR STRIP-SCNC: NEGATIVE MG/DL
LACTATE SERPL-SCNC: 0.6 MMOL/L (ref 0.4–2)
LEUKOCYTE ESTERASE UR QL STRIP: NEGATIVE
LYMPHOCYTES # BLD AUTO: 1.38 K/UL (ref 1–4.8)
LYMPHOCYTES NFR BLD AUTO: 19.1 % (ref 27–41)
MAGNESIUM SERPL-MCNC: 1.8 MG/DL (ref 1.7–2.3)
MCH RBC QN AUTO: 28.6 PG (ref 27–31)
MCHC RBC AUTO-ENTMCNC: 34.3 G/DL (ref 32–36)
MCV RBC AUTO: 83.4 FL (ref 80–96)
MONOCYTES # BLD AUTO: 0.4 K/UL (ref 0–0.8)
MONOCYTES NFR BLD AUTO: 5.5 % (ref 2–6)
MPC BLD CALC-MCNC: 9.5 FL (ref 9.4–12.4)
NEUTROPHILS # BLD AUTO: 4.99 K/UL (ref 1.8–7.7)
NEUTROPHILS NFR BLD AUTO: 69.3 % (ref 53–65)
NITRITE UR QL STRIP: NEGATIVE
PH UR STRIP: 6 PH UNITS
PLATELET # BLD AUTO: 146 K/UL (ref 150–400)
POTASSIUM SERPL-SCNC: 3.3 MMOL/L (ref 3.5–5.1)
PROT SERPL-MCNC: 7.3 G/DL (ref 6.4–8.2)
PROT UR QL STRIP: NEGATIVE
RBC # BLD AUTO: 5.11 M/UL (ref 4.2–5.4)
RBC # UR STRIP: ABNORMAL /UL
RBC #/AREA URNS HPF: NORMAL /HPF
SARS-COV+SARS-COV-2 AG RESP QL IA.RAPID: NEGATIVE
SODIUM SERPL-SCNC: 140 MMOL/L (ref 136–145)
SP GR UR STRIP: 1.01
SQUAMOUS #/AREA URNS LPF: NORMAL /LPF
TROPONIN I SERPL HS-MCNC: 7.4 PG/ML
UROBILINOGEN UR STRIP-ACNC: 1 MG/DL
WBC # BLD AUTO: 7.21 K/UL (ref 4.5–11)
WBC #/AREA URNS HPF: NORMAL /HPF

## 2022-10-17 PROCEDURE — 80053 COMPREHEN METABOLIC PANEL: CPT | Performed by: NURSE PRACTITIONER

## 2022-10-17 PROCEDURE — 87428 SARSCOV & INF VIR A&B AG IA: CPT | Performed by: NURSE PRACTITIONER

## 2022-10-17 PROCEDURE — 84484 ASSAY OF TROPONIN QUANT: CPT | Performed by: NURSE PRACTITIONER

## 2022-10-17 PROCEDURE — 82306 VITAMIN D 25 HYDROXY: CPT | Performed by: NURSE PRACTITIONER

## 2022-10-17 PROCEDURE — 99285 EMERGENCY DEPT VISIT HI MDM: CPT | Mod: 25

## 2022-10-17 PROCEDURE — 25000003 PHARM REV CODE 250: Performed by: NURSE PRACTITIONER

## 2022-10-17 PROCEDURE — 82550 ASSAY OF CK (CPK): CPT | Performed by: NURSE PRACTITIONER

## 2022-10-17 PROCEDURE — 99284 EMERGENCY DEPT VISIT MOD MDM: CPT | Mod: GF | Performed by: NURSE PRACTITIONER

## 2022-10-17 PROCEDURE — 81001 URINALYSIS AUTO W/SCOPE: CPT | Performed by: NURSE PRACTITIONER

## 2022-10-17 PROCEDURE — G0378 HOSPITAL OBSERVATION PER HR: HCPCS

## 2022-10-17 PROCEDURE — 99219 PR INITIAL OBSERVATION CARE,LEVL II: CPT | Performed by: EMERGENCY MEDICINE

## 2022-10-17 PROCEDURE — 84630 ASSAY OF ZINC: CPT | Mod: 90 | Performed by: NURSE PRACTITIONER

## 2022-10-17 PROCEDURE — 93005 ELECTROCARDIOGRAM TRACING: CPT

## 2022-10-17 PROCEDURE — 99900035 HC TECH TIME PER 15 MIN (STAT)

## 2022-10-17 PROCEDURE — 94761 N-INVAS EAR/PLS OXIMETRY MLT: CPT

## 2022-10-17 PROCEDURE — 93010 ELECTROCARDIOGRAM REPORT: CPT | Performed by: INTERNAL MEDICINE

## 2022-10-17 PROCEDURE — 36415 COLL VENOUS BLD VENIPUNCTURE: CPT | Performed by: NURSE PRACTITIONER

## 2022-10-17 PROCEDURE — 84443 ASSAY THYROID STIM HORMONE: CPT | Performed by: NURSE PRACTITIONER

## 2022-10-17 PROCEDURE — 83735 ASSAY OF MAGNESIUM: CPT | Performed by: NURSE PRACTITIONER

## 2022-10-17 PROCEDURE — 83605 ASSAY OF LACTIC ACID: CPT | Performed by: NURSE PRACTITIONER

## 2022-10-17 PROCEDURE — 85025 COMPLETE CBC W/AUTO DIFF WBC: CPT | Performed by: NURSE PRACTITIONER

## 2022-10-17 RX ORDER — GABAPENTIN 400 MG/1
800 CAPSULE ORAL 2 TIMES DAILY
Status: DISCONTINUED | OUTPATIENT
Start: 2022-10-17 | End: 2022-10-19 | Stop reason: HOSPADM

## 2022-10-17 RX ORDER — ACETAMINOPHEN 500 MG
1000 TABLET ORAL
Status: COMPLETED | OUTPATIENT
Start: 2022-10-17 | End: 2022-10-17

## 2022-10-17 RX ORDER — TIZANIDINE 4 MG/1
4 TABLET ORAL EVERY 8 HOURS PRN
Status: DISCONTINUED | OUTPATIENT
Start: 2022-10-17 | End: 2022-10-19 | Stop reason: HOSPADM

## 2022-10-17 RX ORDER — SERTRALINE HYDROCHLORIDE 50 MG/1
TABLET, FILM COATED ORAL
Status: ON HOLD | COMMUNITY
End: 2023-01-25 | Stop reason: HOSPADM

## 2022-10-17 RX ORDER — PANTOPRAZOLE SODIUM 40 MG/1
40 TABLET, DELAYED RELEASE ORAL DAILY
Status: DISCONTINUED | OUTPATIENT
Start: 2022-10-18 | End: 2022-10-19 | Stop reason: HOSPADM

## 2022-10-17 RX ORDER — HYDROCODONE BITARTRATE AND ACETAMINOPHEN 10; 325 MG/1; MG/1
1 TABLET ORAL 4 TIMES DAILY PRN
Status: ON HOLD | COMMUNITY
Start: 2022-10-14 | End: 2023-01-25 | Stop reason: HOSPADM

## 2022-10-17 RX ORDER — CITALOPRAM 20 MG/1
40 TABLET, FILM COATED ORAL DAILY
Status: DISCONTINUED | OUTPATIENT
Start: 2022-10-18 | End: 2022-10-17

## 2022-10-17 RX ORDER — TIZANIDINE 4 MG/1
TABLET ORAL
Status: ON HOLD | COMMUNITY
Start: 2022-09-06 | End: 2023-01-04 | Stop reason: HOSPADM

## 2022-10-17 RX ORDER — ACETAMINOPHEN 325 MG/1
TABLET ORAL
COMMUNITY

## 2022-10-17 RX ORDER — CLONIDINE HYDROCHLORIDE 0.1 MG/1
0.1 TABLET ORAL EVERY 8 HOURS PRN
Status: DISCONTINUED | OUTPATIENT
Start: 2022-10-17 | End: 2022-10-19 | Stop reason: HOSPADM

## 2022-10-17 RX ORDER — ATORVASTATIN CALCIUM 20 MG/1
TABLET, FILM COATED ORAL
COMMUNITY
End: 2022-10-17 | Stop reason: CLARIF

## 2022-10-17 RX ORDER — SERTRALINE HYDROCHLORIDE 50 MG/1
50 TABLET, FILM COATED ORAL DAILY
Status: DISCONTINUED | OUTPATIENT
Start: 2022-10-18 | End: 2022-10-19 | Stop reason: HOSPADM

## 2022-10-17 RX ORDER — VALSARTAN 320 MG/1
TABLET ORAL
COMMUNITY
End: 2022-10-17 | Stop reason: CLARIF

## 2022-10-17 RX ORDER — VALSARTAN 160 MG/1
160 TABLET ORAL DAILY
Status: DISCONTINUED | OUTPATIENT
Start: 2022-10-18 | End: 2022-10-19 | Stop reason: HOSPADM

## 2022-10-17 RX ORDER — ROSUVASTATIN CALCIUM 10 MG/1
10 TABLET, COATED ORAL
Status: ON HOLD | COMMUNITY
End: 2022-10-19 | Stop reason: HOSPADM

## 2022-10-17 RX ORDER — ZOLPIDEM TARTRATE 5 MG/1
5 TABLET ORAL NIGHTLY PRN
Status: DISCONTINUED | OUTPATIENT
Start: 2022-10-17 | End: 2022-10-17

## 2022-10-17 RX ORDER — CLOPIDOGREL BISULFATE 75 MG/1
TABLET ORAL
Status: ON HOLD | COMMUNITY
End: 2023-01-25 | Stop reason: HOSPADM

## 2022-10-17 RX ORDER — CITALOPRAM 40 MG/1
TABLET, FILM COATED ORAL
Status: ON HOLD | COMMUNITY
End: 2022-10-19 | Stop reason: HOSPADM

## 2022-10-17 RX ORDER — TEMAZEPAM 30 MG/1
CAPSULE ORAL
COMMUNITY
End: 2022-10-17 | Stop reason: CLARIF

## 2022-10-17 RX ORDER — LORAZEPAM 0.5 MG/1
0.5 TABLET ORAL NIGHTLY PRN
Status: DISCONTINUED | OUTPATIENT
Start: 2022-10-17 | End: 2022-10-18

## 2022-10-17 RX ORDER — DOCUSATE SODIUM 100 MG/1
100 CAPSULE, LIQUID FILLED ORAL DAILY
Status: DISCONTINUED | OUTPATIENT
Start: 2022-10-18 | End: 2022-10-19 | Stop reason: HOSPADM

## 2022-10-17 RX ORDER — AMLODIPINE BESYLATE 5 MG/1
TABLET ORAL
COMMUNITY
End: 2022-10-17 | Stop reason: CLARIF

## 2022-10-17 RX ORDER — ACYCLOVIR 50 MG/G
OINTMENT TOPICAL
COMMUNITY
End: 2022-10-17 | Stop reason: CLARIF

## 2022-10-17 RX ORDER — CALCIUM CARBONATE 200(500)MG
500 TABLET,CHEWABLE ORAL 2 TIMES DAILY PRN
Status: DISCONTINUED | OUTPATIENT
Start: 2022-10-17 | End: 2022-10-19 | Stop reason: HOSPADM

## 2022-10-17 RX ORDER — GABAPENTIN 800 MG/1
800 TABLET ORAL 3 TIMES DAILY
Status: ON HOLD | COMMUNITY
End: 2023-01-25 | Stop reason: HOSPADM

## 2022-10-17 RX ORDER — HYDROCODONE BITARTRATE AND ACETAMINOPHEN 10; 325 MG/1; MG/1
1 TABLET ORAL EVERY 4 HOURS PRN
Status: DISCONTINUED | OUTPATIENT
Start: 2022-10-17 | End: 2022-10-19 | Stop reason: HOSPADM

## 2022-10-17 RX ORDER — AMLODIPINE BESYLATE 5 MG/1
5 TABLET ORAL DAILY
Status: DISCONTINUED | OUTPATIENT
Start: 2022-10-18 | End: 2022-10-18

## 2022-10-17 RX ORDER — TEMAZEPAM 30 MG/1
30 CAPSULE ORAL NIGHTLY PRN
Status: DISCONTINUED | OUTPATIENT
Start: 2022-10-17 | End: 2022-10-19 | Stop reason: HOSPADM

## 2022-10-17 RX ORDER — ATORVASTATIN CALCIUM 40 MG/1
40 TABLET, FILM COATED ORAL NIGHTLY
Status: DISCONTINUED | OUTPATIENT
Start: 2022-10-17 | End: 2022-10-19 | Stop reason: HOSPADM

## 2022-10-17 RX ORDER — POTASSIUM CHLORIDE 20 MEQ/1
20 TABLET, EXTENDED RELEASE ORAL DAILY
Status: DISCONTINUED | OUTPATIENT
Start: 2022-10-18 | End: 2022-10-18

## 2022-10-17 RX ORDER — HYDRALAZINE HYDROCHLORIDE 25 MG/1
TABLET, FILM COATED ORAL
COMMUNITY
End: 2022-10-17 | Stop reason: CLARIF

## 2022-10-17 RX ORDER — IPRATROPIUM BROMIDE AND ALBUTEROL SULFATE 2.5; .5 MG/3ML; MG/3ML
3 SOLUTION RESPIRATORY (INHALATION) EVERY 6 HOURS PRN
Status: DISCONTINUED | OUTPATIENT
Start: 2022-10-17 | End: 2022-10-19

## 2022-10-17 RX ORDER — SODIUM CHLORIDE 9 MG/ML
INJECTION, SOLUTION INTRAVENOUS CONTINUOUS
Status: DISPENSED | OUTPATIENT
Start: 2022-10-17 | End: 2022-10-18

## 2022-10-17 RX ORDER — LORAZEPAM 0.5 MG/1
TABLET ORAL
Status: ON HOLD | COMMUNITY
End: 2022-10-19 | Stop reason: HOSPADM

## 2022-10-17 RX ORDER — ACETAMINOPHEN 325 MG/1
650 TABLET ORAL EVERY 6 HOURS PRN
Status: DISCONTINUED | OUTPATIENT
Start: 2022-10-17 | End: 2022-10-19 | Stop reason: HOSPADM

## 2022-10-17 RX ORDER — ACYCLOVIR 400 MG/1
TABLET ORAL
COMMUNITY
End: 2022-10-17 | Stop reason: CLARIF

## 2022-10-17 RX ORDER — CLOPIDOGREL BISULFATE 75 MG/1
75 TABLET ORAL ONCE
Status: COMPLETED | OUTPATIENT
Start: 2022-10-17 | End: 2022-10-17

## 2022-10-17 RX ORDER — ALBUTEROL SULFATE 90 UG/1
AEROSOL, METERED RESPIRATORY (INHALATION)
Status: ON HOLD | COMMUNITY
End: 2022-10-19 | Stop reason: HOSPADM

## 2022-10-17 RX ORDER — LEVOTHYROXINE SODIUM 125 UG/1
TABLET ORAL
Status: ON HOLD | COMMUNITY
Start: 2022-09-26 | End: 2023-01-25 | Stop reason: HOSPADM

## 2022-10-17 RX ORDER — PANTOPRAZOLE SODIUM 40 MG/1
TABLET, DELAYED RELEASE ORAL
COMMUNITY
End: 2022-10-17 | Stop reason: CLARIF

## 2022-10-17 RX ADMIN — SODIUM CHLORIDE 250 ML: 9 INJECTION, SOLUTION INTRAVENOUS at 03:10

## 2022-10-17 RX ADMIN — ACETAMINOPHEN 1000 MG: 500 TABLET ORAL at 03:10

## 2022-10-17 RX ADMIN — LORAZEPAM 0.5 MG: 0.5 TABLET ORAL at 10:10

## 2022-10-17 RX ADMIN — ATORVASTATIN CALCIUM 40 MG: 40 TABLET, FILM COATED ORAL at 09:10

## 2022-10-17 RX ADMIN — CLOPIDOGREL BISULFATE 75 MG: 75 TABLET, FILM COATED ORAL at 05:10

## 2022-10-17 RX ADMIN — GABAPENTIN 800 MG: 400 CAPSULE ORAL at 09:10

## 2022-10-17 RX ADMIN — SODIUM CHLORIDE: 9 INJECTION, SOLUTION INTRAVENOUS at 06:10

## 2022-10-17 NOTE — H&P
[facility] - Medical Surgical Unit  [facility] Medicine  History & Physical    Patient Name: Herlinda Valdovinos  MRN: [id number]  Patient Class: OP- Observation  Admission Date: 10/17/2022  Attending Physician: Alis Grider MD   Primary Care Provider: Vini Hernandez NP         Patient information was obtained from ER records.     Subjective:     Principal Problem:Weakness    Chief Complaint:   Chief Complaint   Patient presents with    Fall     weakness        HPI: Patient presented to [facility] ED today with complaints of generalized weakness. 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 Patient finished her outpatient therapy in June of this year. Patient reports for the past several weeks she has went from walking to unable to get up without max assistance. Patient reports she has had several falls over the past 2 weeks and after speaking with her PCP (Vini Hernandez), it was recommended patient come to ED for evaluation for her falls and weakness. Patient states she did not want to come due to fear of COVID but her spouse encouraged her to come today due to her weakness progressively worsening. On evaluation in the ED, patient does appear acutely ill and dry, patient awake, alert and oriented x4. Patient was sitting up in wheelchair on arrival. Patient's labs revealed WBC 7.21, H/H 14.6/42.6, Na 140, K 3.3, Cl 99, BUN 13, Creat 1.08, Glucose 119, Mg 1.8, , Troponin (neg), Urine (neg), Lactic 0.6, COVID/Flu (neg). Temp 98.0, HR 86, R 18, /98, O2 Sats 96%. Dr. Grider spoke with patient prior to admission and accepted patient to observation for weakness and dehydration. Patient reports she has not been eating well and has noticed a weight loss. Denies any N/V/D. Will have PT/OT evaluate patient also. Admit orders and med rec discussed with Dr. Grider.     Full Code        Past Medical History:    Diagnosis Date    Cerebral hemorrhage     Chronic anxiety     Dehydration     Depression     Dysphagia     Due to and following non-traumatic intracerebral hemorrhage    Hearing loss     History of CVA (cerebrovascular accident)     Hypertension     Insomnia     Intrapontine hemorrhage     Left hemiparesis     Peripheral neuropathy     Stroke     Thyroid disease     Transient cerebral ischemia        Past Surgical History:   Procedure Laterality Date    APPENDECTOMY      CHOLECYSTECTOMY      HYSTERECTOMY      LITHOTRIPSY      Renal lithotripsy    percutaneious insertion of ureteric stent         Review of patient's allergies indicates:   Allergen Reactions    Lamisil [terbinafine] Anaphylaxis    Codeine Itching    Compazine [prochlorperazine] Itching       No current facility-administered medications on file prior to encounter.     Current Outpatient Medications on File Prior to Encounter   Medication Sig    acetaminophen (TYLENOL) 325 MG tablet 2 tablets PRN    albuterol (PROVENTIL/VENTOLIN HFA) 90 mcg/actuation inhaler albuterol sulfate HFA 90 mcg/actuation aerosol inhaler    albuterol-ipratropium (DUO-NEB) 2.5 mg-0.5 mg/3 mL nebulizer solution Take 3 mLs by nebulization every 6 (six) hours while awake. Rescue    atorvastatin (LIPITOR) 40 MG tablet Take 40 mg by mouth every evening.    citalopram (CELEXA) 40 MG tablet 0.5 tablet    clopidogreL (PLAVIX) 75 mg tablet Plavix 75 mg tablet   Take 1 po QD to prevent stroke    docusate sodium (COLACE) 100 MG capsule Take 1 capsule (100 mg total) by mouth once daily.    gabapentin (NEURONTIN) 800 MG tablet gabapentin 800 mg tablet    HYDROcodone-acetaminophen (NORCO)  mg per tablet Take 1 tablet by mouth 4 (four) times daily as needed.    levothyroxine (SYNTHROID) 125 MCG tablet     LORazepam (ATIVAN) 0.5 MG tablet Take 1 am and afternoon and 2 hs    LORazepam (ATIVAN) 0.5 MG tablet 1 tablet at bedtime as needed    rosuvastatin  (CRESTOR) 10 MG tablet Take 10 mg by mouth.    sertraline (ZOLOFT) 50 MG tablet sertraline 50 mg tablet    temazepam (RESTORIL) 30 mg capsule Take 30 mg by mouth every evening.    tiZANidine (ZANAFLEX) 4 MG tablet     [DISCONTINUED] acyclovir (ZOVIRAX) 400 MG tablet acyclovir 400 mg tablet    [DISCONTINUED] acyclovir 5% (ZOVIRAX) 5 % ointment acyclovir 5 % topical ointment    [DISCONTINUED] amLODIPine (NORVASC) 5 MG tablet amlodipine 5 mg tablet    [DISCONTINUED] atorvastatin (LIPITOR) 20 MG tablet atorvastatin 20 mg tablet   Take 1 tablet every day by oral route.    [DISCONTINUED] clopidogreL (PLAVIX) 75 mg tablet Take 75 mg by mouth once daily.    [DISCONTINUED] hydrALAZINE (APRESOLINE) 25 MG tablet Take 1 tablet (25 mg total) by mouth 3 (three) times daily.    [DISCONTINUED] hydrALAZINE (APRESOLINE) 25 MG tablet hydralazine 25 mg tablet   Take 1 tablet 3 times a day by oral route.    [DISCONTINUED] hydroCHLOROthiazide (HYDRODIURIL) 12.5 MG Tab Take 1 tablet (12.5 mg total) by mouth once daily.    [DISCONTINUED] omeprazole (PRILOSEC) 40 MG capsule Take 40 mg by mouth every evening.    [DISCONTINUED] pantoprazole (PROTONIX) 40 MG tablet pantoprazole 40 mg tablet,delayed release    [DISCONTINUED] sertraline (ZOLOFT) 100 MG tablet Take 100 mg by mouth every evening.    [DISCONTINUED] temazepam (RESTORIL) 30 mg capsule 1 capsule at bedtime as needed    [DISCONTINUED] valsartan (DIOVAN) 320 MG tablet valsartan 320 mg tablet     Family History    None       Tobacco Use    Smoking status: Never    Smokeless tobacco: Never   Substance and Sexual Activity    Alcohol use: Never    Drug use: Never    Sexual activity: Not on file     Review of Systems   Constitutional:  Positive for activity change, appetite change, fatigue and unexpected weight change.   HENT: Negative.     Respiratory: Negative.     Cardiovascular: Negative.    Gastrointestinal: Negative.    Musculoskeletal:  Positive for arthralgias.    Skin:  Positive for color change (pale).   Neurological:  Positive for weakness.   Psychiatric/Behavioral: Negative.     All other systems reviewed and are negative.  Objective:     Vital Signs (Most Recent):  Temp: 98 °F (36.7 °C) (10/17/22 1654)  Pulse: 86 (10/17/22 1654)  Resp: 18 (10/17/22 1654)  BP: (!) 173/107 (10/17/22 1654)  SpO2: (!) 94 % (10/17/22 1654)   Vital Signs (24h Range):  Temp:  [98 °F (36.7 °C)-98.1 °F (36.7 °C)] 98 °F (36.7 °C)  Pulse:  [75-86] 86  Resp:  [18] 18  SpO2:  [94 %-96 %] 94 %  BP: (127-175)/() 173/107     Weight: 71.7 kg (158 lb)  Body mass index is 27.12 kg/m².    Physical Exam  Vitals reviewed.   Constitutional:       Appearance: She is ill-appearing.   HENT:      Head: Normocephalic and atraumatic.      Mouth/Throat:      Mouth: Mucous membranes are dry.   Cardiovascular:      Rate and Rhythm: Normal rate and regular rhythm.      Pulses: Normal pulses.      Heart sounds: Normal heart sounds.   Pulmonary:      Effort: Pulmonary effort is normal.      Breath sounds: Normal breath sounds.   Musculoskeletal:         General: Normal range of motion.      Cervical back: Normal range of motion and neck supple.   Skin:     General: Skin is warm.      Capillary Refill: Capillary refill takes less than 2 seconds.      Coloration: Skin is pale.   Neurological:      General: No focal deficit present.      Mental Status: She is alert and oriented to person, place, and time. Mental status is at baseline.      Motor: Weakness present.   Psychiatric:         Mood and Affect: Mood normal.         Behavior: Behavior normal.         Thought Content: Thought content normal.         Judgment: Judgment normal.           Significant Labs: All pertinent labs within the past 24 hours have been reviewed.  Recent Lab Results         10/17/22  1403   10/17/22  1330   10/17/22  1328        Influenza B, Molecular     Negative       Albumin/Globulin Ratio 1.1           Albumin 3.9           Alkaline  Phosphatase 119           ALT 26           Anion Gap 12           Appearance, UA   Clear         AST 52           Bacteria, UA   None Seen To Occasional         Baso # 0.02           Basophil % 0.3           Bilirubin (UA)   Negative         BILIRUBIN TOTAL 1.2           BUN 13           BUN/CREAT RATIO 12           Calcium 9.0           Chloride 99           CO2 32           Color, UA   Yellow         COVID-19 Ag     Negative                  Creatinine 1.08           Differential Type Auto           eGFR 60           Eos # 0.42           Eosinophil % 5.8           Globulin, Total 3.4           Glucose 119           Glucose, UA   Negative         Hematocrit 42.6           Hemoglobin 14.6           Influenza A     Negative       Ketones, UA   Negative         Lactate, Marty 0.6           Leukocytes, UA   Negative         Lymph # 1.38           Lymph % 19.1           Magnesium 1.8           MCH 28.6           MCHC 34.3           MCV 83.4           Mono # 0.40           Mono % 5.5           MPV 9.5           Neutrophils, Abs 4.99           Neutrophils Relative 69.3           NITRITE UA   Negative         Occult Blood UA   Small         pH, UA   6.0         Platelets 146           Potassium 3.3           PROTEIN TOTAL 7.3           Protein, UA   Negative         RBC 5.11           RBC, UA   0-3         RDW 14.6           Sodium 140           Specific Trade, UA   1.015         Squam Epithel, UA   None Seen To Occasional         Troponin I High Sensitivity 7.4           UROBILINOGEN UA   1.0         WBC, UA   None Seen         WBC 7.21                   Significant Imaging: I have reviewed all pertinent imaging results/findings within the past 24 hours.    Assessment/Plan:     * Weakness  PT/OT evaluation screening      Thyroid disease  - Continue synthroid  - Check TSH      Falls frequently  - Fall precautions  - PT/OT screening      Dehydration  Monitor labs (BMP)  Bolus fluids and continue IV fluids at 100ml/hr  for 24 hours.      Hypertension  Monitor blood pressure every 4 hours  - Continue home medications Diovan and Norvasc  - Clonidine as needed         VTE Risk Mitigation (From admission, onward)    None             KEVAN Alejo  Department of Hospital Medicine   [facility] - Medical Surgical Unit

## 2022-10-17 NOTE — HPI
Patient presented to [facility] ED today with complaints of generalized weakness. 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 Patient finished her outpatient therapy in June of this year. Patient reports for the past several weeks she has went from walking to unable to get up without max assistance. Patient reports she has had several falls over the past 2 weeks and after speaking with her PCP (Vini Hernandez), it was recommended patient come to ED for evaluation for her falls and weakness. Patient states she did not want to come due to fear of COVID but her spouse encouraged her to come today due to her weakness progressively worsening. On evaluation in the ED, patient does appear acutely ill and dry, patient awake, alert and oriented x4. Patient was sitting up in wheelchair on arrival. Patient's labs revealed WBC 7.21, H/H 14.6/42.6, Na 140, K 3.3, Cl 99, BUN 13, Creat 1.08, Glucose 119, Mg 1.8, , Troponin (neg), Urine (neg), Lactic 0.6, COVID/Flu (neg). Temp 98.0, HR 86, R 18, /98, O2 Sats 96%. Dr. Grider spoke with patient prior to admission and accepted patient to observation for weakness and dehydration. Patient reports she has not been eating well and has noticed a weight loss. Denies any N/V/D. Will have PT/OT evaluate patient also. Admit orders and med rec discussed with Dr. Grider.     Full Code

## 2022-10-17 NOTE — ED PROVIDER NOTES
Encounter Date: 10/17/2022       History     Chief Complaint   Patient presents with    Fall     weakness     Spouse presents patient to the ED today with complaints of weakness. Patient reports she was supposed to come to the hospital last week for her weakness but did not come due to fear of COVID. Patient and her spouse reports progressively worsening of generalized weakness, decreased appetite and weight loss. Patient reports she has fallen at least 4 times over the past week. Patient has a PMH of CVA.     The history is provided by the patient and the spouse.   Review of patient's allergies indicates:   Allergen Reactions    Lamisil [terbinafine] Anaphylaxis    Codeine Itching    Compazine [prochlorperazine] Itching     Past Medical History:   Diagnosis Date    Cerebral hemorrhage     Chronic anxiety     Dehydration     Depression     Dysphagia     Due to and following non-traumatic intracerebral hemorrhage    Hearing loss     History of CVA (cerebrovascular accident)     Hypertension     Insomnia     Intrapontine hemorrhage     Left hemiparesis     Peripheral neuropathy     Transient cerebral ischemia      Past Surgical History:   Procedure Laterality Date    APPENDECTOMY      CHOLECYSTECTOMY      HYSTERECTOMY      LITHOTRIPSY      Renal lithotripsy    percutaneious insertion of ureteric stent       No family history on file.  Social History     Tobacco Use    Smoking status: Never    Smokeless tobacco: Never   Substance Use Topics    Alcohol use: Never    Drug use: Never     Review of Systems   Constitutional:  Positive for activity change, appetite change, fatigue and unexpected weight change.   Respiratory: Negative.     Cardiovascular: Negative.    Musculoskeletal:  Positive for arthralgias.   Skin:  Positive for color change.   Neurological:  Positive for weakness.   Psychiatric/Behavioral: Negative.     All other systems reviewed and are negative.    Physical Exam     Initial  Vitals   BP Pulse Resp Temp SpO2   10/17/22 1234 10/17/22 1234 10/17/22 1234 10/17/22 1238 10/17/22 1234   136/84 85 18 98.1 °F (36.7 °C) 95 %      MAP       --                Physical Exam    Vitals reviewed.  Constitutional: She appears well-developed and well-nourished. She has a sickly appearance.   HENT:   Head: Normocephalic and atraumatic.   Eyes: Pupils are equal, round, and reactive to light.   Neck: Neck supple.   Normal range of motion.  Cardiovascular:  Normal rate, regular rhythm, normal heart sounds and intact distal pulses.           Pulmonary/Chest: Breath sounds normal.   Musculoskeletal:      Cervical back: Normal range of motion and neck supple.      Comments: Generalized weakness     Neurological: She is alert and oriented to person, place, and time. She has normal strength.   Skin: Skin is warm. Capillary refill takes less than 2 seconds. There is pallor.   Psychiatric: She has a normal mood and affect. Her behavior is normal. Judgment and thought content normal.       Medical Screening Exam   See Full Note    ED Course   Procedures  Labs Reviewed   COMPREHENSIVE METABOLIC PANEL - Abnormal; Notable for the following components:       Result Value    Potassium 3.3 (*)     Glucose 119 (*)     Creatinine 1.08 (*)     Alk Phos 119 (*)     AST 52 (*)     All other components within normal limits   CBC WITH DIFFERENTIAL - Abnormal; Notable for the following components:    RDW 14.6 (*)     Platelet Count 146 (*)     Neutrophils % 69.3 (*)     Lymphocytes % 19.1 (*)     Eosinophils % 5.8 (*)     All other components within normal limits   CK - Abnormal; Notable for the following components:     (*)     All other components within normal limits   URINALYSIS, REFLEX TO URINE CULTURE - Abnormal; Notable for the following components:    Blood, UA Small (*)     All other components within normal limits   TROPONIN I - Normal   MAGNESIUM - Normal   LACTIC ACID, PLASMA - Normal   SARS-COV2 (COVID) W/ FLU  ANTIGEN - Normal    Narrative:     Negative SARS-CoV results should not be used as the sole basis for treatment or patient management decisions; negative results should be considered in the context of a patient's recent exposures, history and the presene of clinical signs and symptoms consistent with COVID-19.  Negative results should be treated as presumptive and confirmed by molecular assay, if necessary for patient management.   URINALYSIS, MICROSCOPIC - Normal   CBC W/ AUTO DIFFERENTIAL    Narrative:     The following orders were created for panel order CBC auto differential.  Procedure                               Abnormality         Status                     ---------                               -----------         ------                     CBC with Differential[[id number]]        Abnormal            Final result                 Please view results for these tests on the individual orders.        ECG Results              EKG 12-lead (In process)  Result time 10/17/22 14:29:57      In process by Yadira, Lab In [facility] (10/17/22 14:29:57)                   Narrative:    Test Reason : R53.1,    Vent. Rate : 075 BPM     Atrial Rate : 075 BPM     P-R Int : 172 ms          QRS Dur : 086 ms      QT Int : 416 ms       P-R-T Axes : 065 055 075 degrees     QTc Int : 464 ms    Normal sinus rhythm  Cannot rule out Anterior infarct (cited on or before 15-FEB-2022)  Abnormal ECG  When compared with ECG of 12-APR-2022 11:24,  No significant change was found    Referred By: [email or web address]   SELF           Confirmed By:                                   Imaging Results              CT Head Without Contrast (Final result)  Result time 10/17/22 13:24:06      Final result by Issa Weinstein II, MD (10/17/22 13:24:06)                   Impression:      No evidence of abnormality demonstrated.      Electronically signed by: Issa Weinstein  Date:    10/17/2022  Time:    13:24               Narrative:    EXAMINATION:  CT HEAD  WITHOUT CONTRAST    CLINICAL HISTORY:  Dizziness, persistent/recurrent, cardiac or vascular cause suspected;    TECHNIQUE:  Axial CT imaging of the brain is performed without contrast with 3 mm increments.    CT dose reduction technique used - Dose Rite and tube current modulation.    COMPARISON:  18 August 2021    FINDINGS:  No evidence of hemorrhage, mass, mass effect, midline shift or acute infarct seen.  The brain parenchyma attenuation and differentiation appears within normal limits. The ventricles and cisterns are normal in caliber.  No cranial or skull base abnormality is identified.                                       X-Ray Chest AP Portable (Final result)  Result time 10/17/22 13:21:41      Final result by Issa Weinstein II, MD (10/17/22 13:21:41)                   Impression:      Findings suggest mild cardiac decompensation and / or pneumonitis.      Electronically signed by: Issa Weinstein  Date:    10/17/2022  Time:    13:21               Narrative:    EXAMINATION:  XR CHEST AP PORTABLE    CLINICAL HISTORY:  Weakness    COMPARISON:  15 April 2022    FINDINGS:  The heart and mediastinum are stable in size and configuration.  The pulmonary vascularity is slightly increased with bilateral increased interstitial lung density.  No other lung infiltrates, effusions, pneumothorax or other abnormality is demonstrated.                                       Medications - No data to display  Medical Decision Making:   ED Management:  Patient is known to hospital, patient was admitted to swing bed after her hemorraghic stroke in mid 2021. Patient reports she continued outpatient therapy after discharge. Patient reports she has started becoming weaker and has went from walking, able to attend [facility], etc to unable to get up by herself. Spouse reports he is unable to get her up due to his health problems. Spoke with hospital medicine, Dr. Grider, will work patient up for possible admission.   [id number] - Dr. Grider  spoke with patient, will admit patient to observation for weakness and dehydration.                  Clinical Impression:   Final diagnoses:  [R53.1] Weakness (Primary)  [R29.6] Falls frequently  [E86.0] Dehydration        ED Disposition Condition    Observation Stable

## 2022-10-17 NOTE — SUBJECTIVE & OBJECTIVE
Past Medical History:   Diagnosis Date    Cerebral hemorrhage     Chronic anxiety     Dehydration     Depression     Dysphagia     Due to and following non-traumatic intracerebral hemorrhage    Hearing loss     History of CVA (cerebrovascular accident)     Hypertension     Insomnia     Intrapontine hemorrhage     Left hemiparesis     Peripheral neuropathy     Stroke     Thyroid disease     Transient cerebral ischemia        Past Surgical History:   Procedure Laterality Date    APPENDECTOMY      CHOLECYSTECTOMY      HYSTERECTOMY      LITHOTRIPSY      Renal lithotripsy    percutaneious insertion of ureteric stent         Review of patient's allergies indicates:   Allergen Reactions    Lamisil [terbinafine] Anaphylaxis    Codeine Itching    Compazine [prochlorperazine] Itching       No current facility-administered medications on file prior to encounter.     Current Outpatient Medications on File Prior to Encounter   Medication Sig    acetaminophen (TYLENOL) 325 MG tablet 2 tablets PRN    albuterol (PROVENTIL/VENTOLIN HFA) 90 mcg/actuation inhaler albuterol sulfate HFA 90 mcg/actuation aerosol inhaler    albuterol-ipratropium (DUO-NEB) 2.5 mg-0.5 mg/3 mL nebulizer solution Take 3 mLs by nebulization every 6 (six) hours while awake. Rescue    atorvastatin (LIPITOR) 40 MG tablet Take 40 mg by mouth every evening.    citalopram (CELEXA) 40 MG tablet 0.5 tablet    clopidogreL (PLAVIX) 75 mg tablet Plavix 75 mg tablet   Take 1 po QD to prevent stroke    docusate sodium (COLACE) 100 MG capsule Take 1 capsule (100 mg total) by mouth once daily.    gabapentin (NEURONTIN) 800 MG tablet gabapentin 800 mg tablet    HYDROcodone-acetaminophen (NORCO)  mg per tablet Take 1 tablet by mouth 4 (four) times daily as needed.    levothyroxine (SYNTHROID) 125 MCG tablet     LORazepam (ATIVAN) 0.5 MG tablet Take 1 am and afternoon and 2 hs    LORazepam (ATIVAN) 0.5 MG tablet 1 tablet at bedtime as needed    rosuvastatin (CRESTOR) 10  MG tablet Take 10 mg by mouth.    sertraline (ZOLOFT) 50 MG tablet sertraline 50 mg tablet    temazepam (RESTORIL) 30 mg capsule Take 30 mg by mouth every evening.    tiZANidine (ZANAFLEX) 4 MG tablet     [DISCONTINUED] acyclovir (ZOVIRAX) 400 MG tablet acyclovir 400 mg tablet    [DISCONTINUED] acyclovir 5% (ZOVIRAX) 5 % ointment acyclovir 5 % topical ointment    [DISCONTINUED] amLODIPine (NORVASC) 5 MG tablet amlodipine 5 mg tablet    [DISCONTINUED] atorvastatin (LIPITOR) 20 MG tablet atorvastatin 20 mg tablet   Take 1 tablet every day by oral route.    [DISCONTINUED] clopidogreL (PLAVIX) 75 mg tablet Take 75 mg by mouth once daily.    [DISCONTINUED] hydrALAZINE (APRESOLINE) 25 MG tablet Take 1 tablet (25 mg total) by mouth 3 (three) times daily.    [DISCONTINUED] hydrALAZINE (APRESOLINE) 25 MG tablet hydralazine 25 mg tablet   Take 1 tablet 3 times a day by oral route.    [DISCONTINUED] hydroCHLOROthiazide (HYDRODIURIL) 12.5 MG Tab Take 1 tablet (12.5 mg total) by mouth once daily.    [DISCONTINUED] omeprazole (PRILOSEC) 40 MG capsule Take 40 mg by mouth every evening.    [DISCONTINUED] pantoprazole (PROTONIX) 40 MG tablet pantoprazole 40 mg tablet,delayed release    [DISCONTINUED] sertraline (ZOLOFT) 100 MG tablet Take 100 mg by mouth every evening.    [DISCONTINUED] temazepam (RESTORIL) 30 mg capsule 1 capsule at bedtime as needed    [DISCONTINUED] valsartan (DIOVAN) 320 MG tablet valsartan 320 mg tablet     Family History    None       Tobacco Use    Smoking status: Never    Smokeless tobacco: Never   Substance and Sexual Activity    Alcohol use: Never    Drug use: Never    Sexual activity: Not on file     Review of Systems   Constitutional:  Positive for activity change, appetite change, fatigue and unexpected weight change.   HENT: Negative.     Respiratory: Negative.     Cardiovascular: Negative.    Gastrointestinal: Negative.    Musculoskeletal:  Positive for arthralgias.   Skin:  Positive for color change  (pale).   Neurological:  Positive for weakness.   Psychiatric/Behavioral: Negative.     All other systems reviewed and are negative.  Objective:     Vital Signs (Most Recent):  Temp: 98 °F (36.7 °C) (10/17/22 1654)  Pulse: 86 (10/17/22 1654)  Resp: 18 (10/17/22 1654)  BP: (!) 173/107 (10/17/22 1654)  SpO2: (!) 94 % (10/17/22 1654)   Vital Signs (24h Range):  Temp:  [98 °F (36.7 °C)-98.1 °F (36.7 °C)] 98 °F (36.7 °C)  Pulse:  [75-86] 86  Resp:  [18] 18  SpO2:  [94 %-96 %] 94 %  BP: (127-175)/() 173/107     Weight: 71.7 kg (158 lb)  Body mass index is 27.12 kg/m².    Physical Exam  Vitals reviewed.   Constitutional:       Appearance: She is ill-appearing.   HENT:      Head: Normocephalic and atraumatic.      Mouth/Throat:      Mouth: Mucous membranes are dry.   Cardiovascular:      Rate and Rhythm: Normal rate and regular rhythm.      Pulses: Normal pulses.      Heart sounds: Normal heart sounds.   Pulmonary:      Effort: Pulmonary effort is normal.      Breath sounds: Normal breath sounds.   Musculoskeletal:         General: Normal range of motion.      Cervical back: Normal range of motion and neck supple.   Skin:     General: Skin is warm.      Capillary Refill: Capillary refill takes less than 2 seconds.      Coloration: Skin is pale.   Neurological:      General: No focal deficit present.      Mental Status: She is alert and oriented to person, place, and time. Mental status is at baseline.      Motor: Weakness present.   Psychiatric:         Mood and Affect: Mood normal.         Behavior: Behavior normal.         Thought Content: Thought content normal.         Judgment: Judgment normal.           Significant Labs: All pertinent labs within the past 24 hours have been reviewed.  Recent Lab Results         10/17/22  1403   10/17/22  1330   10/17/22  1328        Influenza B, Molecular     Negative       Albumin/Globulin Ratio 1.1           Albumin 3.9           Alkaline Phosphatase 119           ALT 26            Anion Gap 12           Appearance, UA   Clear         AST 52           Bacteria, UA   None Seen To Occasional         Baso # 0.02           Basophil % 0.3           Bilirubin (UA)   Negative         BILIRUBIN TOTAL 1.2           BUN 13           BUN/CREAT RATIO 12           Calcium 9.0           Chloride 99           CO2 32           Color, UA   Yellow         COVID-19 Ag     Negative                  Creatinine 1.08           Differential Type Auto           eGFR 60           Eos # 0.42           Eosinophil % 5.8           Globulin, Total 3.4           Glucose 119           Glucose, UA   Negative         Hematocrit 42.6           Hemoglobin 14.6           Influenza A     Negative       Ketones, UA   Negative         Lactate, Marty 0.6           Leukocytes, UA   Negative         Lymph # 1.38           Lymph % 19.1           Magnesium 1.8           MCH 28.6           MCHC 34.3           MCV 83.4           Mono # 0.40           Mono % 5.5           MPV 9.5           Neutrophils, Abs 4.99           Neutrophils Relative 69.3           NITRITE UA   Negative         Occult Blood UA   Small         pH, UA   6.0         Platelets 146           Potassium 3.3           PROTEIN TOTAL 7.3           Protein, UA   Negative         RBC 5.11           RBC, UA   0-3         RDW 14.6           Sodium 140           Specific [place], UA   1.015         Squam Epithel, UA   None Seen To Occasional         Troponin I High Sensitivity 7.4           UROBILINOGEN UA   1.0         WBC, UA   None Seen         WBC 7.21                   Significant Imaging: I have reviewed all pertinent imaging results/findings within the past 24 hours.

## 2022-10-17 NOTE — PLAN OF CARE
Problem: Adult Inpatient Plan of Care  Goal: Plan of Care Review  Outcome: Ongoing, Progressing  Flowsheets (Taken 10/17/2022 1658)  Plan of Care Reviewed With: patient  Goal: Patient-Specific Goal (Individualized)  Outcome: Ongoing, Progressing  Goal: Absence of Hospital-Acquired Illness or Injury  Outcome: Ongoing, Progressing  Intervention: Prevent Skin Injury  Flowsheets (Taken 10/17/2022 1658)  Body Position: position changed independently  Skin Protection: adhesive use limited  Intervention: Prevent Infection  Flowsheets (Taken 10/17/2022 1658)  Infection Prevention: hand hygiene promoted  Goal: Optimal Comfort and Wellbeing  Outcome: Ongoing, Progressing  Intervention: Monitor Pain and Promote Comfort  Flowsheets (Taken 10/17/2022 1658)  Pain Management Interventions: care clustered  Intervention: Provide Person-Centered Care  Flowsheets (Taken 10/17/2022 1658)  Trust Relationship/Rapport: care explained  Goal: Readiness for Transition of Care  Outcome: Ongoing, Progressing  Intervention: Mutually Develop Transition Plan  Flowsheets (Taken 10/17/2022 1658)  Equipment Currently Used at Home: wheelchair  Transportation Anticipated: family or friend will provide     Problem: Diabetes Comorbidity  Goal: Blood Glucose Level Within Targeted Range  Outcome: Ongoing, Progressing  Intervention: Monitor and Manage Glycemia  Flowsheets (Taken 10/17/2022 1658)  Glycemic Management: blood glucose monitored     Problem: Skin Injury Risk Increased  Goal: Skin Health and Integrity  Outcome: Ongoing, Progressing  Intervention: Optimize Skin Protection  Flowsheets (Taken 10/17/2022 1658)  Skin Protection: adhesive use limited  Head of Bed (HOB) Positioning: HOB elevated  Intervention: Promote and Optimize Oral Intake  Flowsheets (Taken 10/17/2022 1658)  Oral Nutrition Promotion: safe use of adaptive equipment encouraged

## 2022-10-17 NOTE — NURSING
Admitted to room 1103 from ER patient alert -  at bedside - BP elevated reported to JOSE CRUZ Hill NP - made comfortable in bed - call light in reach

## 2022-10-17 NOTE — Clinical Note
Diagnosis: Weakness [[id number]]   Future Attending Provider: ASUNCION NOVA [[id number]]   Admitting Provider:: ASUNCION NOVA [[id number]]

## 2022-10-17 NOTE — ASSESSMENT & PLAN NOTE
Monitor blood pressure every 4 hours  - Continue home medications Diovan and Norvasc  - Clonidine as needed

## 2022-10-18 LAB
25(OH)D3 SERPL-MCNC: 38.2 NG/ML
ANION GAP SERPL CALCULATED.3IONS-SCNC: 11 MMOL/L (ref 7–16)
BASOPHILS # BLD AUTO: 0.02 K/UL (ref 0–0.2)
BASOPHILS NFR BLD AUTO: 0.3 % (ref 0–1)
BUN SERPL-MCNC: 10 MG/DL (ref 7–18)
BUN/CREAT SERPL: 12 (ref 6–20)
CALCIUM SERPL-MCNC: 8.1 MG/DL (ref 8.5–10.1)
CHLORIDE SERPL-SCNC: 101 MMOL/L (ref 98–107)
CO2 SERPL-SCNC: 30 MMOL/L (ref 21–32)
CREAT SERPL-MCNC: 0.86 MG/DL (ref 0.55–1.02)
DIFFERENTIAL METHOD BLD: ABNORMAL
EGFR (NO RACE VARIABLE) (RUSH/TITUS): 79 ML/MIN/1.73M²
EOSINOPHIL # BLD AUTO: 0.42 K/UL (ref 0–0.5)
EOSINOPHIL NFR BLD AUTO: 7 % (ref 1–4)
ERYTHROCYTE [DISTWIDTH] IN BLOOD BY AUTOMATED COUNT: 14.4 % (ref 11.5–14.5)
GLUCOSE SERPL-MCNC: 94 MG/DL (ref 74–106)
HCT VFR BLD AUTO: 37.2 % (ref 38–47)
HGB BLD-MCNC: 12.9 G/DL (ref 12–16)
LYMPHOCYTES # BLD AUTO: 1.87 K/UL (ref 1–4.8)
LYMPHOCYTES NFR BLD AUTO: 31.2 % (ref 27–41)
MCH RBC QN AUTO: 28.8 PG (ref 27–31)
MCHC RBC AUTO-ENTMCNC: 34.7 G/DL (ref 32–36)
MCV RBC AUTO: 83 FL (ref 80–96)
MONOCYTES # BLD AUTO: 0.53 K/UL (ref 0–0.8)
MONOCYTES NFR BLD AUTO: 8.8 % (ref 2–6)
MPC BLD CALC-MCNC: 9.5 FL (ref 9.4–12.4)
NEUTROPHILS # BLD AUTO: 3.15 K/UL (ref 1.8–7.7)
NEUTROPHILS NFR BLD AUTO: 52.7 % (ref 53–65)
PLATELET # BLD AUTO: 126 K/UL (ref 150–400)
POTASSIUM SERPL-SCNC: 3.1 MMOL/L (ref 3.5–5.1)
RBC # BLD AUTO: 4.48 M/UL (ref 4.2–5.4)
SODIUM SERPL-SCNC: 139 MMOL/L (ref 136–145)
TSH SERPL DL<=0.005 MIU/L-ACNC: 1.88 UIU/ML (ref 0.36–3.74)
WBC # BLD AUTO: 5.99 K/UL (ref 4.5–11)

## 2022-10-18 PROCEDURE — 25000003 PHARM REV CODE 250: Performed by: EMERGENCY MEDICINE

## 2022-10-18 PROCEDURE — G0378 HOSPITAL OBSERVATION PER HR: HCPCS

## 2022-10-18 PROCEDURE — 96361 HYDRATE IV INFUSION ADD-ON: CPT

## 2022-10-18 PROCEDURE — 99900038 HC OT GENERIC THERAPY SCREENING (STAT)

## 2022-10-18 PROCEDURE — 25000003 PHARM REV CODE 250: Performed by: NURSE PRACTITIONER

## 2022-10-18 PROCEDURE — 85025 COMPLETE CBC W/AUTO DIFF WBC: CPT | Performed by: NURSE PRACTITIONER

## 2022-10-18 PROCEDURE — 94761 N-INVAS EAR/PLS OXIMETRY MLT: CPT

## 2022-10-18 PROCEDURE — 99225 PR SUBSEQUENT OBSERVATION CARE,LEVEL II: CPT | Performed by: EMERGENCY MEDICINE

## 2022-10-18 PROCEDURE — 80048 BASIC METABOLIC PNL TOTAL CA: CPT | Performed by: NURSE PRACTITIONER

## 2022-10-18 PROCEDURE — 96360 HYDRATION IV INFUSION INIT: CPT

## 2022-10-18 RX ORDER — AMLODIPINE BESYLATE 5 MG/1
10 TABLET ORAL DAILY
Status: DISCONTINUED | OUTPATIENT
Start: 2022-10-19 | End: 2022-10-19 | Stop reason: HOSPADM

## 2022-10-18 RX ORDER — LORAZEPAM 0.5 MG/1
0.5 TABLET ORAL SEE ADMIN INSTRUCTIONS
Status: DISCONTINUED | OUTPATIENT
Start: 2022-10-18 | End: 2022-10-19 | Stop reason: HOSPADM

## 2022-10-18 RX ORDER — POTASSIUM CHLORIDE 20 MEQ/1
40 TABLET, EXTENDED RELEASE ORAL DAILY
Status: DISCONTINUED | OUTPATIENT
Start: 2022-10-18 | End: 2022-10-19 | Stop reason: HOSPADM

## 2022-10-18 RX ORDER — CLOPIDOGREL BISULFATE 75 MG/1
75 TABLET ORAL DAILY
Status: DISCONTINUED | OUTPATIENT
Start: 2022-10-19 | End: 2022-10-19 | Stop reason: HOSPADM

## 2022-10-18 RX ORDER — AMLODIPINE BESYLATE 5 MG/1
5 TABLET ORAL ONCE
Status: COMPLETED | OUTPATIENT
Start: 2022-10-18 | End: 2022-10-18

## 2022-10-18 RX ORDER — LORAZEPAM 0.5 MG/1
0.5 TABLET ORAL DAILY PRN
Status: DISCONTINUED | OUTPATIENT
Start: 2022-10-18 | End: 2022-10-18

## 2022-10-18 RX ADMIN — PANTOPRAZOLE SODIUM 40 MG: 40 TABLET, DELAYED RELEASE ORAL at 08:10

## 2022-10-18 RX ADMIN — AMLODIPINE BESYLATE 5 MG: 5 TABLET ORAL at 08:10

## 2022-10-18 RX ADMIN — SODIUM CHLORIDE: 9 INJECTION, SOLUTION INTRAVENOUS at 04:10

## 2022-10-18 RX ADMIN — DOCUSATE SODIUM 100 MG: 100 CAPSULE, LIQUID FILLED ORAL at 08:10

## 2022-10-18 RX ADMIN — LEVOTHYROXINE SODIUM 125 MCG: 25 TABLET ORAL at 05:10

## 2022-10-18 RX ADMIN — HYDROCODONE BITARTRATE AND ACETAMINOPHEN 1 TABLET: 10; 325 TABLET ORAL at 05:10

## 2022-10-18 RX ADMIN — TEMAZEPAM 30 MG: 30 CAPSULE ORAL at 09:10

## 2022-10-18 RX ADMIN — GABAPENTIN 800 MG: 400 CAPSULE ORAL at 08:10

## 2022-10-18 RX ADMIN — AMLODIPINE BESYLATE 5 MG: 5 TABLET ORAL at 04:10

## 2022-10-18 RX ADMIN — GABAPENTIN 800 MG: 400 CAPSULE ORAL at 09:10

## 2022-10-18 RX ADMIN — ATORVASTATIN CALCIUM 40 MG: 40 TABLET, FILM COATED ORAL at 09:10

## 2022-10-18 RX ADMIN — SERTRALINE HYDROCHLORIDE 50 MG: 50 TABLET ORAL at 08:10

## 2022-10-18 RX ADMIN — LORAZEPAM 0.5 MG: 0.5 TABLET ORAL at 02:10

## 2022-10-18 RX ADMIN — SODIUM CHLORIDE: 9 INJECTION, SOLUTION INTRAVENOUS at 02:10

## 2022-10-18 RX ADMIN — VALSARTAN 160 MG: 160 TABLET, FILM COATED ORAL at 08:10

## 2022-10-18 RX ADMIN — POTASSIUM CHLORIDE 40 MEQ: 1500 TABLET, EXTENDED RELEASE ORAL at 08:10

## 2022-10-18 NOTE — PT/OT/SLP EVAL
PT screen completed. Pt would benefit from participation in skilled PT services due to weakness and difficulty ambulating/performing ADLs. Pt currently requires extensive assistance for ADLs and mobility, compared to being independent prior to hospitalization. Recommend PT evaluation and treat.      Sunitha Martinez, PT, DPT    10/18/2022

## 2022-10-18 NOTE — PLAN OF CARE
Problem: Adult Inpatient Plan of Care  Goal: Plan of Care Review  Outcome: Ongoing, Progressing  Flowsheets (Taken 10/18/2022 1716)  Plan of Care Reviewed With: patient  Goal: Patient-Specific Goal (Individualized)  Outcome: Ongoing, Progressing  Goal: Absence of Hospital-Acquired Illness or Injury  Outcome: Ongoing, Progressing  Goal: Optimal Comfort and Wellbeing  Outcome: Ongoing, Progressing  Intervention: Monitor Pain and Promote Comfort  Flowsheets (Taken 10/18/2022 1716)  Pain Management Interventions: care clustered  Intervention: Provide Person-Centered Care  Flowsheets (Taken 10/18/2022 1716)  Trust Relationship/Rapport: care explained  Goal: Readiness for Transition of Care  Outcome: Ongoing, Progressing  Intervention: Mutually Develop Transition Plan  Flowsheets (Taken 10/18/2022 1716)  Equipment Currently Used at Home: none  Transportation Anticipated: family or friend will provide     Problem: Diabetes Comorbidity  Goal: Blood Glucose Level Within Targeted Range  Outcome: Ongoing, Progressing  Intervention: Monitor and Manage Glycemia  Flowsheets (Taken 10/18/2022 1716)  Glycemic Management: blood glucose monitored     Problem: Skin Injury Risk Increased  Goal: Skin Health and Integrity  Outcome: Ongoing, Progressing  Intervention: Promote and Optimize Oral Intake  Flowsheets (Taken 10/18/2022 1716)  Oral Nutrition Promotion: safe use of adaptive equipment encouraged     Problem: Impaired Wound Healing  Goal: Optimal Wound Healing  Outcome: Ongoing, Progressing  Intervention: Promote Wound Healing  Flowsheets (Taken 10/18/2022 1716)  Oral Nutrition Promotion: safe use of adaptive equipment encouraged  Pain Management Interventions: care clustered

## 2022-10-18 NOTE — PT/OT/SLP PROGRESS
OT screen completed. Pt would benefit from skilled  OT to address balance, weakness, safety, t/f and ADL deficits. Pt  has experience a significant change in status and would benefit from OT services.    Shahrzad Man, OT, OTD

## 2022-10-19 ENCOUNTER — HOSPITAL ENCOUNTER (INPATIENT)
Facility: HOSPITAL | Age: 57
LOS: 22 days | Discharge: HOME | DRG: 556 | End: 2022-11-10
Attending: EMERGENCY MEDICINE | Admitting: EMERGENCY MEDICINE
Payer: MEDICARE

## 2022-10-19 VITALS
HEIGHT: 64 IN | SYSTOLIC BLOOD PRESSURE: 145 MMHG | TEMPERATURE: 98 F | OXYGEN SATURATION: 96 % | BODY MASS INDEX: 27.48 KG/M2 | DIASTOLIC BLOOD PRESSURE: 98 MMHG | RESPIRATION RATE: 16 BRPM | HEART RATE: 71 BPM | WEIGHT: 160.94 LBS

## 2022-10-19 DIAGNOSIS — M62.81 MUSCLE WEAKNESS: ICD-10-CM

## 2022-10-19 DIAGNOSIS — I10 HYPERTENSION, UNSPECIFIED TYPE: ICD-10-CM

## 2022-10-19 DIAGNOSIS — E11.40 TYPE 2 DIABETES MELLITUS WITH DIABETIC NEUROPATHY, WITHOUT LONG-TERM CURRENT USE OF INSULIN: Primary | ICD-10-CM

## 2022-10-19 PROBLEM — E78.5 DYSLIPIDEMIA: Status: ACTIVE | Noted: 2022-10-19

## 2022-10-19 PROBLEM — E86.0 DEHYDRATION: Status: RESOLVED | Noted: 2022-10-17 | Resolved: 2022-10-19

## 2022-10-19 PROBLEM — E78.5 HYPERLIPIDEMIA: Status: ACTIVE | Noted: 2022-10-19

## 2022-10-19 PROBLEM — R09.02 HYPOXIC EPISODE: Status: ACTIVE | Noted: 2022-10-19

## 2022-10-19 PROBLEM — R09.02 HYPOXIC EPISODE: Status: RESOLVED | Noted: 2022-10-19 | Resolved: 2022-10-19

## 2022-10-19 PROBLEM — I61.9 CEREBRAL HEMORRHAGE: Status: ACTIVE | Noted: 2022-10-19

## 2022-10-19 PROBLEM — I63.9 CEREBRAL INFARCTION: Status: ACTIVE | Noted: 2022-10-19

## 2022-10-19 PROCEDURE — 25000003 PHARM REV CODE 250: Performed by: EMERGENCY MEDICINE

## 2022-10-19 PROCEDURE — G0378 HOSPITAL OBSERVATION PER HR: HCPCS

## 2022-10-19 PROCEDURE — 27000221 HC OXYGEN, UP TO 24 HOURS

## 2022-10-19 PROCEDURE — 99217 PR OBSERVATION CARE DISCHARGE: CPT | Performed by: EMERGENCY MEDICINE

## 2022-10-19 PROCEDURE — 94761 N-INVAS EAR/PLS OXIMETRY MLT: CPT

## 2022-10-19 PROCEDURE — 99305 PR NURSING FACILITY CARE, INIT, MOD SEVERITY: ICD-10-PCS | Mod: AI,,, | Performed by: EMERGENCY MEDICINE

## 2022-10-19 PROCEDURE — 25000003 PHARM REV CODE 250: Performed by: NURSE PRACTITIONER

## 2022-10-19 PROCEDURE — 99305 1ST NF CARE MODERATE MDM 35: CPT | Mod: AI,,, | Performed by: EMERGENCY MEDICINE

## 2022-10-19 PROCEDURE — 11000004 HC SNF PRIVATE

## 2022-10-19 RX ORDER — CALCIUM CARBONATE 200(500)MG
500 TABLET,CHEWABLE ORAL 2 TIMES DAILY PRN
Status: DISCONTINUED | OUTPATIENT
Start: 2022-10-19 | End: 2022-11-10 | Stop reason: HOSPADM

## 2022-10-19 RX ORDER — POTASSIUM CHLORIDE 20 MEQ/1
40 TABLET, EXTENDED RELEASE ORAL DAILY
Qty: 30 TABLET | Refills: 0 | Status: ON HOLD | OUTPATIENT
Start: 2022-10-20 | End: 2022-11-10 | Stop reason: HOSPADM

## 2022-10-19 RX ORDER — PANTOPRAZOLE SODIUM 40 MG/1
40 TABLET, DELAYED RELEASE ORAL DAILY
Qty: 30 TABLET | Refills: 0 | Status: SHIPPED | OUTPATIENT
Start: 2022-10-19 | End: 2022-12-31

## 2022-10-19 RX ORDER — ACETAMINOPHEN 325 MG/1
650 TABLET ORAL EVERY 6 HOURS PRN
Status: DISCONTINUED | OUTPATIENT
Start: 2022-10-19 | End: 2022-11-10 | Stop reason: HOSPADM

## 2022-10-19 RX ORDER — ATORVASTATIN CALCIUM 40 MG/1
40 TABLET, FILM COATED ORAL NIGHTLY
Status: DISCONTINUED | OUTPATIENT
Start: 2022-10-19 | End: 2022-11-10 | Stop reason: HOSPADM

## 2022-10-19 RX ORDER — LORAZEPAM 0.5 MG/1
0.5 TABLET ORAL SEE ADMIN INSTRUCTIONS
Status: DISCONTINUED | OUTPATIENT
Start: 2022-10-19 | End: 2022-11-10 | Stop reason: HOSPADM

## 2022-10-19 RX ORDER — GABAPENTIN 400 MG/1
800 CAPSULE ORAL 2 TIMES DAILY
Status: DISCONTINUED | OUTPATIENT
Start: 2022-10-19 | End: 2022-10-28

## 2022-10-19 RX ORDER — HYDROCODONE BITARTRATE AND ACETAMINOPHEN 10; 325 MG/1; MG/1
1 TABLET ORAL EVERY 4 HOURS PRN
Status: DISCONTINUED | OUTPATIENT
Start: 2022-10-19 | End: 2022-11-10 | Stop reason: HOSPADM

## 2022-10-19 RX ORDER — ZOLPIDEM TARTRATE 5 MG/1
5 TABLET ORAL NIGHTLY PRN
Status: DISCONTINUED | OUTPATIENT
Start: 2022-10-19 | End: 2022-10-19

## 2022-10-19 RX ORDER — AMLODIPINE BESYLATE 10 MG/1
10 TABLET ORAL DAILY
Qty: 30 TABLET | Refills: 11 | Status: ON HOLD | OUTPATIENT
Start: 2022-10-20 | End: 2022-11-10 | Stop reason: SDUPTHER

## 2022-10-19 RX ORDER — IPRATROPIUM BROMIDE AND ALBUTEROL SULFATE 2.5; .5 MG/3ML; MG/3ML
3 SOLUTION RESPIRATORY (INHALATION) EVERY 6 HOURS PRN
Status: DISCONTINUED | OUTPATIENT
Start: 2022-10-19 | End: 2022-11-10 | Stop reason: HOSPADM

## 2022-10-19 RX ORDER — VALSARTAN 160 MG/1
160 TABLET ORAL DAILY
Status: DISCONTINUED | OUTPATIENT
Start: 2022-10-20 | End: 2022-11-10 | Stop reason: HOSPADM

## 2022-10-19 RX ORDER — CLONIDINE HYDROCHLORIDE 0.1 MG/1
0.1 TABLET ORAL EVERY 8 HOURS PRN
Status: DISCONTINUED | OUTPATIENT
Start: 2022-10-19 | End: 2022-11-10 | Stop reason: HOSPADM

## 2022-10-19 RX ORDER — IPRATROPIUM BROMIDE AND ALBUTEROL SULFATE 2.5; .5 MG/3ML; MG/3ML
3 SOLUTION RESPIRATORY (INHALATION) EVERY 6 HOURS PRN
Status: DISCONTINUED | OUTPATIENT
Start: 2022-10-19 | End: 2022-10-19 | Stop reason: HOSPADM

## 2022-10-19 RX ORDER — AMLODIPINE BESYLATE 5 MG/1
10 TABLET ORAL DAILY
Status: DISCONTINUED | OUTPATIENT
Start: 2022-10-20 | End: 2022-11-10 | Stop reason: HOSPADM

## 2022-10-19 RX ORDER — PANTOPRAZOLE SODIUM 40 MG/1
40 TABLET, DELAYED RELEASE ORAL DAILY
Status: DISCONTINUED | OUTPATIENT
Start: 2022-10-20 | End: 2022-11-10 | Stop reason: HOSPADM

## 2022-10-19 RX ORDER — CLONIDINE HYDROCHLORIDE 0.1 MG/1
0.1 TABLET ORAL EVERY 8 HOURS PRN
Qty: 90 TABLET | Refills: 0 | Status: ON HOLD
Start: 2022-10-19 | End: 2023-01-04 | Stop reason: HOSPADM

## 2022-10-19 RX ORDER — LORAZEPAM 0.5 MG/1
0.5 TABLET ORAL SEE ADMIN INSTRUCTIONS
Qty: 30 TABLET | Refills: 0 | Status: ON HOLD
Start: 2022-10-19 | End: 2023-01-25 | Stop reason: HOSPADM

## 2022-10-19 RX ORDER — TEMAZEPAM 15 MG/1
30 CAPSULE ORAL NIGHTLY PRN
Status: DISCONTINUED | OUTPATIENT
Start: 2022-10-19 | End: 2022-10-25

## 2022-10-19 RX ORDER — SERTRALINE HYDROCHLORIDE 50 MG/1
50 TABLET, FILM COATED ORAL DAILY
Status: DISCONTINUED | OUTPATIENT
Start: 2022-10-20 | End: 2022-11-10 | Stop reason: HOSPADM

## 2022-10-19 RX ORDER — POTASSIUM CHLORIDE 20 MEQ/1
40 TABLET, EXTENDED RELEASE ORAL DAILY
Status: DISCONTINUED | OUTPATIENT
Start: 2022-10-20 | End: 2022-10-26

## 2022-10-19 RX ORDER — AMOXICILLIN 250 MG
1 CAPSULE ORAL 2 TIMES DAILY PRN
Status: DISCONTINUED | OUTPATIENT
Start: 2022-10-19 | End: 2022-10-25

## 2022-10-19 RX ORDER — TIZANIDINE 4 MG/1
4 TABLET ORAL EVERY 8 HOURS PRN
Status: DISCONTINUED | OUTPATIENT
Start: 2022-10-19 | End: 2022-11-10 | Stop reason: HOSPADM

## 2022-10-19 RX ORDER — VALSARTAN 160 MG/1
160 TABLET ORAL DAILY
Qty: 90 TABLET | Refills: 3 | Status: ON HOLD | OUTPATIENT
Start: 2022-10-20 | End: 2022-11-10 | Stop reason: SDUPTHER

## 2022-10-19 RX ORDER — CLOPIDOGREL BISULFATE 75 MG/1
75 TABLET ORAL DAILY
Status: DISCONTINUED | OUTPATIENT
Start: 2022-10-20 | End: 2022-11-10 | Stop reason: HOSPADM

## 2022-10-19 RX ORDER — TALC
6 POWDER (GRAM) TOPICAL NIGHTLY PRN
Status: DISCONTINUED | OUTPATIENT
Start: 2022-10-19 | End: 2022-10-19

## 2022-10-19 RX ADMIN — VALSARTAN 160 MG: 160 TABLET, FILM COATED ORAL at 09:10

## 2022-10-19 RX ADMIN — TEMAZEPAM 30 MG: 15 CAPSULE ORAL at 10:10

## 2022-10-19 RX ADMIN — POTASSIUM CHLORIDE 40 MEQ: 1500 TABLET, EXTENDED RELEASE ORAL at 09:10

## 2022-10-19 RX ADMIN — GABAPENTIN 800 MG: 400 CAPSULE ORAL at 09:10

## 2022-10-19 RX ADMIN — PANTOPRAZOLE SODIUM 40 MG: 40 TABLET, DELAYED RELEASE ORAL at 09:10

## 2022-10-19 RX ADMIN — ATORVASTATIN CALCIUM 40 MG: 40 TABLET, FILM COATED ORAL at 10:10

## 2022-10-19 RX ADMIN — DOCUSATE SODIUM 100 MG: 100 CAPSULE, LIQUID FILLED ORAL at 09:10

## 2022-10-19 RX ADMIN — HYDROCODONE BITARTRATE AND ACETAMINOPHEN 1 TABLET: 10; 325 TABLET ORAL at 01:10

## 2022-10-19 RX ADMIN — LEVOTHYROXINE SODIUM 125 MCG: 25 TABLET ORAL at 06:10

## 2022-10-19 RX ADMIN — SERTRALINE HYDROCHLORIDE 50 MG: 50 TABLET ORAL at 09:10

## 2022-10-19 RX ADMIN — AMLODIPINE BESYLATE 10 MG: 5 TABLET ORAL at 09:10

## 2022-10-19 RX ADMIN — HYDROCODONE BITARTRATE AND ACETAMINOPHEN 1 TABLET: 10; 325 TABLET ORAL at 10:10

## 2022-10-19 RX ADMIN — CLOPIDOGREL BISULFATE 75 MG: 75 TABLET, FILM COATED ORAL at 09:10

## 2022-10-19 RX ADMIN — GABAPENTIN 800 MG: 400 CAPSULE ORAL at 10:10

## 2022-10-19 NOTE — HOSPITAL COURSE
10/18/2022 HOSPITAL DAY 1  PT IS A 57 YR OLD WF ADMITTED TO [facility] DUE TO MUSCLE WEAKNESS AND DEHYDRATION WITH HX SEVERAL FALLS AT HOME. PT WAS STARTED ON IVF HYDRATION WITH IMPROVEMENT NOTED. PT WILL BE SCREENED PER PT/OT FOR REFERRAL TO SWING BED FOR REHABILITATION. PT HAS HYPOKALEMIA 3.1 AND SUPPLEMENTATION WAS ORDERED.    10/19/22 Nurse reports at 060 this am that patient's O2 sat dropped to 88% around 0100 this morning. She was asymptomatic with no chest pain or dyspnea. She was started on O2 at 2L/min per NC. Upon assessment this morning, Ms Kaur notes that she sleeps using O2 at home most nights. Her O2 sat this am is 96%. She denies dyspnea or pain. She says that she slept good last night. Lungs sounds are noted with pleural rub on left upper and lower lobes. Her initial chest xray showed cardiac decompensation v. Pneumonitis. Repeat CXR ordered this am and is pending.    10/19/2022 HOSPITAL DAY 2  DISCHARGE  PT IS A 57 YR OLD WF ADMITTED TO [facility] DUE TO MUSCLE WEAKNESS AND DEHYDRATION WITH HX SEVERAL FALLS AT HOME. PT WAS STARTED ON IVF HYDRATION WITH IMPROVEMENT NOTED. PT HAS BEEN SCREENED PER PT/OT FOR REFERRAL TO SWING BED FOR REHABILITATION. PT HAS BEEN SCREENED PER PT/OT WITH RECOMMENDATION FOR REHABILITATION DUE TO WEAKNESS AND DIFFICULTY AMBULATING AND PERFORMING ADLS. PT REQUIRED EXTENSIVE ASSISTANCE FOR ADLS AND MOBILITY COMPARED TO BEING INDEPENDENT PRIOR TO RECENT DECLINE.  PT WILL BE DISCHARGED TO [facility] FOR FOR PT/OT/REHABILITATION.

## 2022-10-19 NOTE — PLAN OF CARE
Problem: Adult Inpatient Plan of Care  Goal: Plan of Care Review  Outcome: Ongoing, Progressing  Flowsheets (Taken 10/19/2022 0457)  Plan of Care Reviewed With: patient  Goal: Patient-Specific Goal (Individualized)  Outcome: Ongoing, Progressing  Goal: Absence of Hospital-Acquired Illness or Injury  Outcome: Ongoing, Progressing  Intervention: Identify and Manage Fall Risk  Flowsheets (Taken 10/19/2022 0457)  Safety Promotion/Fall Prevention:   assistive device/personal item within reach   medications reviewed   lighting adjusted   side rails raised x 3   nonskid shoes/socks when out of bed  Intervention: Prevent Skin Injury  Flowsheets (Taken 10/19/2022 0457)  Body Position:   position changed independently   turned  Skin Protection:   tubing/devices free from skin contact   incontinence pads utilized  Intervention: Prevent and Manage VTE (Venous Thromboembolism) Risk  Flowsheets (Taken 10/19/2022 0457)  VTE Prevention/Management: remove, assess skin, and reapply compression stockings  Range of Motion:   active ROM (range of motion) encouraged   ROM (range of motion) performed  Intervention: Prevent Infection  Flowsheets (Taken 10/19/2022 0457)  Infection Prevention:   equipment surfaces disinfected   hand hygiene promoted   personal protective equipment utilized   rest/sleep promoted   single patient room provided  Goal: Optimal Comfort and Wellbeing  Outcome: Ongoing, Progressing  Intervention: Monitor Pain and Promote Comfort  Flowsheets (Taken 10/19/2022 0457)  Pain Management Interventions:   care clustered   medication offered   relaxation techniques promoted   quiet environment facilitated   pain management plan reviewed with patient/caregiver  Intervention: Provide Person-Centered Care  Flowsheets (Taken 10/19/2022 0457)  Trust Relationship/Rapport: care explained  Goal: Readiness for Transition of Care  Outcome: Ongoing, Progressing     Problem: Diabetes Comorbidity  Goal: Blood Glucose Level Within  Targeted Range  Outcome: Ongoing, Progressing     Problem: Skin Injury Risk Increased  Goal: Skin Health and Integrity  Outcome: Ongoing, Progressing  Intervention: Optimize Skin Protection  Flowsheets (Taken 10/19/2022 6498)  Pressure Reduction Techniques: frequent weight shift encouraged  Skin Protection:   tubing/devices free from skin contact   incontinence pads utilized  Head of Bed (HOB) Positioning:   HOB elevated   HOB at 20-30 degrees     Problem: Impaired Wound Healing  Goal: Optimal Wound Healing  Outcome: Ongoing, Progressing

## 2022-10-19 NOTE — CONSULTS
"  [facility] Unit  Adult Nutrition  Consult Note    SUMMARY     Recommendations    Recommendation/Intervention: 1. Ensure Clear one carton po BID to supplement intake.  Goals: Meet EEN >75%  Nutrition Goal Status: goal met    Assessment and Plan    No new Assessment & Plan notes have been filed under this hospital service since the last note was generated.  Service: Nutrition       Malnutrition Assessment  Malnutrition Type: chronic illness                               Severe Weight Loss (Malnutrition):  (24% WL in 18 months)    Reason for Assessment    Reason For Assessment: consult (swing bed pt)  Diagnosis:  (Weakness, dehydration)  Relevant Medical History: CVA , Anxiety, Depression, Dysphagia, [place], HLD, Hypothyroidism, HTN  Interdisciplinary Rounds: attended  General Information Comments: PT known to me from previous admit.  She has lost ~50# since her CVA ~18 months ago.  Her intake has impoved since she was admitted.  She has poor dentition but she denied need to modify texture of diet.    Nutrition Risk Screen    Nutrition Risk Screen:  (Multiple medical problems)    Nutrition/Diet History    Patient Reported Diet/Restrictions/Preferences: general  Spiritual, Cultural Beliefs, [employer]al Practices, Values that Affect Care: no  Supplemental Drinks or Food Habits: Ensure Clear  Food Allergies: NKFA  Factors Affecting Nutritional Intake: decreased appetite (PTA)    Anthropometrics    Temp: 98.1 °F (36.7 °C)  Height Method: Stated  Height: 5' 4" (162.6 cm)  Height (inches): 64 in  Weight Method: Standard Scale  Weight: 73 kg (160 lb 15 oz)  Weight (lb): 160.94 lb  Ideal Body Weight (IBW), Female: 120 lb  % Ideal Body Weight, Female (lb): 134.12 %  BMI (Calculated): 27.6  BMI Grade: 25 - 29.9 - overweight  Usual Body Weight (UBW), k kg  % Usual Body Weight: 76.2  % Weight Change From Usual Weight: -23.96 %       Lab/Procedures/Meds    Pertinent Labs Reviewed: " reviewed  Pertinent Labs Comments: K 3.1, A lb 3.9-WNL, B-119  Pertinent Medications Reviewed: reviewed  Pertinent Medications Comments: Norvasc, Lipitor, Synthroid, Protonix, KCl, Zoloft    Physical Findings/Assessment         Estimated/Assessed Needs    Weight Used For Calorie Calculations: 72.6 kg (160 lb)  Energy Calorie Requirements (kcal): 1814  Energy Need Method: Kcal/kg  Protein Requirements: 73-80  Weight Used For Protein Calculations: 72.6 kg (160 lb)     Estimated Fluid Requirement Method: RDA Method  RDA Method (mL): 1814         Nutrition Prescription Ordered    Current Diet Order: Cardiac    Evaluation of Received Nutrient/Fluid Intake    Energy Calories Required: meeting needs  Protein Required: meeting needs  Fluid Required: meeting needs  Tolerance: tolerating  % Intake of Estimated Energy Needs: 75 - 100 %  % Meal Intake: 75 - 100 %    Nutrition Risk    Level of Risk/Frequency of Follow-up: moderate       Monitor and Evaluation    Food and Nutrient Intake: food and beverage intake  Anthropometric Measurements: weight  Biochemical Data, Medical Tests and Procedures: electrolyte and renal panel  Nutrition-Focused Physical Findings: overall appearance (skin tears noted)       Nutrition Follow-Up    RD Follow-up?: Yes

## 2022-10-19 NOTE — PROGRESS NOTES
[facility]  Hospital Medicine  Progress Note    Patient Name: Herlinda Valdovinos  MRN: [id number]  Patient Class: OP- Observation   Admission Date: 10/17/2022  Length of Stay: 0 days  Attending Physician: Alis Grider MD  Primary Care Provider: Vini Hernandez NP        Subjective:     Principal Problem:Weakness        HPI:  Patient presented to [facility] ED today with complaints of generalized weakness. 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 Patient finished her outpatient therapy in June of this year. Patient reports for the past several weeks she has went from walking to unable to get up without max assistance. Patient reports she has had several falls over the past 2 weeks and after speaking with her PCP (Vini Hernandez), it was recommended patient come to ED for evaluation for her falls and weakness. Patient states she did not want to come due to fear of COVID but her spouse encouraged her to come today due to her weakness progressively worsening. On evaluation in the ED, patient does appear acutely ill and dry, patient awake, alert and oriented x4. Patient was sitting up in wheelchair on arrival. Patient's labs revealed WBC 7.21, H/H 14.6/42.6, Na 140, K 3.3, Cl 99, BUN 13, Creat 1.08, Glucose 119, Mg 1.8, , Troponin (neg), Urine (neg), Lactic 0.6, COVID/Flu (neg). Temp 98.0, HR 86, R 18, /98, O2 Sats 96%. Dr. Grider spoke with patient prior to admission and accepted patient to observation for weakness and dehydration. Patient reports she has not been eating well and has noticed a weight loss. Denies any N/V/D. Will have PT/OT evaluate patient also. Admit orders and med rec discussed with Dr. Grider.     Full Code        Overview/Hospital Course:  10/19/22 Nurse reports at 060 this am that patient's O2 sat dropped to 88% around 0100 this morning. She was asymptomatic  with no chest pain or dyspnea. She was started on O2 at 2L/min per NC. Upon assessment this morning, Ms Kaur notes that she sleeps using O2 at home most nights. Her O2 sat this am is 96%. She denies dyspnea or pain. She says that she slept good last night. Lungs sounds are noted with pleural rub on left upper and lower lobes. Her initial chest xray showed cardiac decompensation v. Pneumonitis. Repeat CXR ordered this am and is pending.      Interval History: as noted    Review of Systems   Constitutional:  Positive for activity change, appetite change and fatigue.   HENT: Negative.     Respiratory: Negative.  Negative for cough and shortness of breath.    Cardiovascular: Negative.  Negative for chest pain.   Gastrointestinal: Negative.    Musculoskeletal:  Positive for arthralgias.   Skin:  Positive for color change (pale).   Neurological:  Positive for weakness (generalized).   Psychiatric/Behavioral: Negative.     All other systems reviewed and are negative.  Objective:     Vital Signs (Most Recent):  Temp: 98 °F (36.7 °C) (10/19/22 0435)  Pulse: 73 (10/19/22 0535)  Resp: 16 (10/19/22 0535)  BP: 130/89 (10/19/22 0435)  SpO2: 96 % (10/19/22 0535) Vital Signs (24h Range):  Temp:  [97.9 °F (36.6 °C)-98.4 °F (36.9 °C)] 98 °F (36.7 °C)  Pulse:  [73-88] 73  Resp:  [16-20] 16  SpO2:  [88 %-96 %] 96 %  BP: (130-180)/() 130/89     Weight:  (PT IS ON A NON WEIGH BED AND PT IS NOT ABLE TO STAND)  Body mass index is 27.12 kg/m².    Intake/Output Summary (Last 24 hours) at 10/19/2022 0633  Last data filed at 10/18/2022 1809  Gross per 24 hour   Intake 720 ml   Output --   Net 720 ml      Physical Exam  Vitals reviewed.   Constitutional:       Appearance: She is ill-appearing.   HENT:      Head: Normocephalic and atraumatic.      Mouth/Throat:      Mouth: Mucous membranes are dry.   Eyes:      Extraocular Movements: Extraocular movements intact.   Cardiovascular:      Rate and Rhythm: Normal rate and regular rhythm.       Pulses: Normal pulses.      Heart sounds: Murmur (grade I loudest at left sternal border 5th intercostal space) heard.   Pulmonary:      Effort: Pulmonary effort is normal.      Comments: Pleural rub left upper and lower lobe areas noted  Musculoskeletal:         General: Normal range of motion.      Cervical back: Normal range of motion and neck supple.   Skin:     General: Skin is warm.      Capillary Refill: Capillary refill takes less than 2 seconds.      Coloration: Skin is pale.   Neurological:      General: No focal deficit present.      Mental Status: She is alert and oriented to person, place, and time. Mental status is at baseline.      Motor: Weakness present.   Psychiatric:         Mood and Affect: Mood normal.         Behavior: Behavior normal.         Thought Content: Thought content normal.         Judgment: Judgment normal.       Significant Labs: All pertinent labs within the past 24 hours have been reviewed.    Significant Imaging: I have reviewed all pertinent imaging results/findings within the past 24 hours.  CXR pending      Assessment/Plan:      * Weakness  PT/OT evaluation screening      Hypoxic episode  0100 O2 sat 88% on RA while sleeping  Placed on O2 at 2L/min per NC  0600 O2 sat 96% this morning  Denies dyspnea or chest pain  Pleural rub on left noted  CXR on adm showed cardiac decompensation v. Pneumonitis  Repeat CXR pending this am  Duonebs q6h  Incentive spirometer      Thyroid disease  - Continue synthroid  - Check TSH      Falls frequently  - Fall precautions  - PT/OT screening      Dehydration  Monitor labs (BMP)  Bolus fluids and continue IV fluids at 100ml/hr for 24 hours.      Hypertension  Monitor blood pressure every 4 hours  - Continue home medications Diovan and Norvasc  - Clonidine as needed         VTE Risk Mitigation (From admission, onward)         Ordered     Place OCTAVIA hose  Until discontinued         10/17/22 1729     IP VTE LOW RISK PATIENT  Once         10/17/22  1729                Discharge Planning   POOL:      Code Status: Full Code   Is the patient medically ready for discharge?:     Reason for patient still in hospital (select all that apply): Treatment                     Sherri Pritchett NP  Department of Hospital Medicine   [facility]

## 2022-10-19 NOTE — ASSESSMENT & PLAN NOTE
0100 O2 sat 88% on RA while sleeping  Placed on O2 at 2L/min per NC  0600 O2 sat 96% this morning  Denies dyspnea or chest pain  Pleural rub on left noted  CXR on adm showed cardiac decompensation v. Pneumonitis  Repeat CXR pending this am  Duonebs q6h  Incentive spirometer

## 2022-10-19 NOTE — SUBJECTIVE & OBJECTIVE
Interval History: as noted    Review of Systems   Constitutional:  Positive for activity change, appetite change and fatigue.   HENT: Negative.     Respiratory: Negative.  Negative for cough and shortness of breath.    Cardiovascular: Negative.  Negative for chest pain.   Gastrointestinal: Negative.    Musculoskeletal:  Positive for arthralgias.   Skin:  Positive for color change (pale).   Neurological:  Positive for weakness (generalized).   Psychiatric/Behavioral: Negative.     All other systems reviewed and are negative.  Objective:     Vital Signs (Most Recent):  Temp: 98 °F (36.7 °C) (10/19/22 0435)  Pulse: 73 (10/19/22 0535)  Resp: 16 (10/19/22 0535)  BP: 130/89 (10/19/22 0435)  SpO2: 96 % (10/19/22 0535) Vital Signs (24h Range):  Temp:  [97.9 °F (36.6 °C)-98.4 °F (36.9 °C)] 98 °F (36.7 °C)  Pulse:  [73-88] 73  Resp:  [16-20] 16  SpO2:  [88 %-96 %] 96 %  BP: (130-180)/() 130/89     Weight:  (PT IS ON A NON WEIGH BED AND PT IS NOT ABLE TO STAND)  Body mass index is 27.12 kg/m².    Intake/Output Summary (Last 24 hours) at 10/19/2022 0629  Last data filed at 10/18/2022 1809  Gross per 24 hour   Intake 720 ml   Output --   Net 720 ml      Physical Exam  Vitals reviewed.   Constitutional:       Appearance: She is ill-appearing.   HENT:      Head: Normocephalic and atraumatic.      Mouth/Throat:      Mouth: Mucous membranes are dry.   Eyes:      Extraocular Movements: Extraocular movements intact.   Cardiovascular:      Rate and Rhythm: Normal rate and regular rhythm.      Pulses: Normal pulses.      Heart sounds: Murmur (grade I loudest at left sternal border 5th intercostal space) heard.   Pulmonary:      Effort: Pulmonary effort is normal.      Comments: Pleural rub left upper and lower lobe areas noted  Musculoskeletal:         General: Normal range of motion.      Cervical back: Normal range of motion and neck supple.   Skin:     General: Skin is warm.      Capillary Refill: Capillary refill takes less  than 2 seconds.      Coloration: Skin is pale.   Neurological:      General: No focal deficit present.      Mental Status: She is alert and oriented to person, place, and time. Mental status is at baseline.      Motor: Weakness present.   Psychiatric:         Mood and Affect: Mood normal.         Behavior: Behavior normal.         Thought Content: Thought content normal.         Judgment: Judgment normal.       Significant Labs: All pertinent labs within the past 24 hours have been reviewed.    Significant Imaging: I have reviewed all pertinent imaging results/findings within the past 24 hours.  CXR pending

## 2022-10-20 PROBLEM — G89.4 CHRONIC PAIN DISORDER: Status: ACTIVE | Noted: 2022-10-20

## 2022-10-20 PROBLEM — E87.6 HYPOKALEMIA: Status: ACTIVE | Noted: 2022-10-20

## 2022-10-20 PROBLEM — L89.312 PRESSURE INJURY OF RIGHT BUTTOCK, STAGE 2: Status: ACTIVE | Noted: 2022-10-20

## 2022-10-20 LAB
BACTERIA #/AREA URNS HPF: ABNORMAL /HPF
BILIRUB UR QL STRIP: NEGATIVE
CLARITY UR: ABNORMAL
COLOR UR: YELLOW
GLUCOSE SERPL-MCNC: 189 MG/DL (ref 70–105)
GLUCOSE UR STRIP-MCNC: NEGATIVE MG/DL
KETONES UR STRIP-SCNC: NEGATIVE MG/DL
LEUKOCYTE ESTERASE UR QL STRIP: ABNORMAL
MUCOUS THREADS #/AREA URNS HPF: ABNORMAL /HPF
NITRITE UR QL STRIP: NEGATIVE
PH UR STRIP: 6 PH UNITS
POTASSIUM SERPL-SCNC: 3.4 MMOL/L (ref 3.5–5.1)
PROT UR QL STRIP: NEGATIVE
RBC # UR STRIP: ABNORMAL /UL
RBC #/AREA URNS HPF: ABNORMAL /HPF
SP GR UR STRIP: 1.01
SQUAMOUS #/AREA URNS LPF: ABNORMAL /LPF
UROBILINOGEN UR STRIP-ACNC: 0.2 MG/DL
WBC #/AREA URNS HPF: ABNORMAL /HPF

## 2022-10-20 PROCEDURE — 97161 PT EVAL LOW COMPLEX 20 MIN: CPT

## 2022-10-20 PROCEDURE — 25000003 PHARM REV CODE 250: Performed by: NURSE PRACTITIONER

## 2022-10-20 PROCEDURE — 99900035 HC TECH TIME PER 15 MIN (STAT)

## 2022-10-20 PROCEDURE — 36415 COLL VENOUS BLD VENIPUNCTURE: CPT | Performed by: EMERGENCY MEDICINE

## 2022-10-20 PROCEDURE — 11000004 HC SNF PRIVATE

## 2022-10-20 PROCEDURE — 81001 URINALYSIS AUTO W/SCOPE: CPT | Performed by: NURSE PRACTITIONER

## 2022-10-20 PROCEDURE — 94761 N-INVAS EAR/PLS OXIMETRY MLT: CPT

## 2022-10-20 PROCEDURE — 82962 GLUCOSE BLOOD TEST: CPT

## 2022-10-20 PROCEDURE — 27000221 HC OXYGEN, UP TO 24 HOURS

## 2022-10-20 PROCEDURE — 84132 ASSAY OF SERUM POTASSIUM: CPT | Performed by: EMERGENCY MEDICINE

## 2022-10-20 PROCEDURE — 97166 OT EVAL MOD COMPLEX 45 MIN: CPT

## 2022-10-20 RX ADMIN — GABAPENTIN 800 MG: 400 CAPSULE ORAL at 09:10

## 2022-10-20 RX ADMIN — POTASSIUM CHLORIDE 40 MEQ: 1500 TABLET, EXTENDED RELEASE ORAL at 09:10

## 2022-10-20 RX ADMIN — TIZANIDINE 4 MG: 4 TABLET ORAL at 04:10

## 2022-10-20 RX ADMIN — HYDROCODONE BITARTRATE AND ACETAMINOPHEN 1 TABLET: 10; 325 TABLET ORAL at 06:10

## 2022-10-20 RX ADMIN — PANTOPRAZOLE SODIUM 40 MG: 40 TABLET, DELAYED RELEASE ORAL at 09:10

## 2022-10-20 RX ADMIN — ATORVASTATIN CALCIUM 40 MG: 40 TABLET, FILM COATED ORAL at 09:10

## 2022-10-20 RX ADMIN — AMLODIPINE BESYLATE 10 MG: 5 TABLET ORAL at 09:10

## 2022-10-20 RX ADMIN — LEVOTHYROXINE SODIUM 125 MCG: 25 TABLET ORAL at 05:10

## 2022-10-20 RX ADMIN — TEMAZEPAM 30 MG: 15 CAPSULE ORAL at 09:10

## 2022-10-20 RX ADMIN — VALSARTAN 160 MG: 160 TABLET, FILM COATED ORAL at 09:10

## 2022-10-20 RX ADMIN — SERTRALINE HYDROCHLORIDE 50 MG: 50 TABLET ORAL at 09:10

## 2022-10-20 RX ADMIN — CLOPIDOGREL BISULFATE 75 MG: 75 TABLET, FILM COATED ORAL at 09:10

## 2022-10-20 NOTE — ASSESSMENT & PLAN NOTE
PT HAS ANXIETY DISORDER EXACERBATED BY FAMILY DYNAMICS PER PT'S STATEMENTS  CONTINUE LORAZEPAM; WILL AVOID HS DOSING AS PT IS ON TEMAZEPAM HS

## 2022-10-20 NOTE — PROGRESS NOTES
[facility]  Hospital Medicine  Progress Note    Patient Name: Herlinda Valdovinos  MRN: [id number]  Patient Class: OP- Observation   Admission Date: 10/17/2022  Length of Stay: 0 days  Attending Physician: Alis Grider MD  Primary Care Provider: Vini Hernandez NP        Subjective:     Principal Problem:Muscle weakness        HPI:  Patient presented to [facility] ED today with complaints of generalized weakness. 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 Patient finished her outpatient therapy in June of this year. Patient reports for the past several weeks she has went from walking to unable to get up without max assistance. Patient reports she has had several falls over the past 2 weeks and after speaking with her PCP (Vini Hernandez), it was recommended patient come to ED for evaluation for her falls and weakness. Patient states she did not want to come due to fear of COVID but her spouse encouraged her to come today due to her weakness progressively worsening. On evaluation in the ED, patient does appear acutely ill and dry, patient awake, alert and oriented x4. Patient was sitting up in wheelchair on arrival. Patient's labs revealed WBC 7.21, H/H 14.6/42.6, Na 140, K 3.3, Cl 99, BUN 13, Creat 1.08, Glucose 119, Mg 1.8, , Troponin (neg), Urine (neg), Lactic 0.6, COVID/Flu (neg). Temp 98.0, HR 86, R 18, /98, O2 Sats 96%. Dr. Grider spoke with patient prior to admission and accepted patient to observation for weakness and dehydration. Patient reports she has not been eating well and has noticed a weight loss. Denies any N/V/D. Will have PT/OT evaluate patient also. Admit orders and med rec discussed with Dr. Grider.     Full Code        Overview/Hospital Course:  10/18/2022 HOSPITAL DAY 1  PT IS A 57 YR OLD WF ADMITTED TO [facility] DUE TO MUSCLE WEAKNESS AND  DEHYDRATION WITH HX SEVERAL FALLS AT HOME. PT WAS STARTED ON IVF HYDRATION WITH IMPROVEMENT NOTED. PT WILL BE SCREENED PER PT/OT FOR REFERRAL TO SWING BED FOR REHABILITATION. PT HAS HYPOKALEMIA 3.1 AND SUPPLEMENTATION WAS ORDERED.    10/19/22 Nurse reports at 060 this am that patient's O2 sat dropped to 88% around 0100 this morning. She was asymptomatic with no chest pain or dyspnea. She was started on O2 at 2L/min per NC. Upon assessment this morning, Ms Kaur notes that she sleeps using O2 at home most nights. Her O2 sat this am is 96%. She denies dyspnea or pain. She says that she slept good last night. Lungs sounds are noted with pleural rub on left upper and lower lobes. Her initial chest xray showed cardiac decompensation v. Pneumonitis. Repeat CXR ordered this am and is pending.          Interval History: PT IS MILDLY IMPROVED WITH IVF HYDRATION. PT WILL BE SCREENED PER PT/OT TEAM FOR REQUIRED REFERRAL TO SWING BED. LABS ARE STABLE ; K IS 3.1 AND WILL BE SUPPLEMENTED.    Review of Systems   Constitutional:  Positive for activity change, appetite change, fatigue and unexpected weight change.   HENT: Negative.     Eyes: Negative.    Respiratory: Negative.     Cardiovascular: Negative.    Gastrointestinal: Negative.    Endocrine: Negative.    Genitourinary: Negative.    Musculoskeletal:  Positive for arthralgias.   Skin:  Positive for color change (pale) and wound (BUTTOCK R,  ABRASION R CHEST).   Allergic/Immunologic: Positive for immunocompromised state.   Neurological:  Positive for weakness.   Psychiatric/Behavioral: Negative.     All other systems reviewed and are negative.  Objective:     Vital Signs (Most Recent):  Temp: 98.1 °F (36.7 °C) (10/19/22 0705)  Pulse: 71 (10/19/22 0705)  Resp: 16 (10/19/22 0705)  BP: (!) 145/98 (10/19/22 1200)  SpO2: 96 % (10/19/22 0705)   Vital Signs (24h Range):  Temp:  [98 °F (36.7 °C)-98.2 °F (36.8 °C)] 98.2 °F (36.8 °C)  Pulse:  [71-87] 78  Resp:  [16-18] 18  SpO2:  [88  %-98 %] 98 %  BP: (130-153)/(89-98) 137/96     Weight: 73 kg (160 lb 15 oz)  Body mass index is 27.62 kg/m².  No intake or output data in the 24 hours ending 10/20/22 0038   Physical Exam  Vitals reviewed.   Constitutional:       Appearance: She is ill-appearing.   HENT:      Head: Normocephalic and atraumatic.      Nose: Nose normal.      Mouth/Throat:      Mouth: Mucous membranes are dry.   Eyes:      Extraocular Movements: Extraocular movements intact.      Pupils: Pupils are equal, round, and reactive to light.   Cardiovascular:      Rate and Rhythm: Normal rate and regular rhythm.      Pulses: Normal pulses.      Heart sounds: Normal heart sounds.   Pulmonary:      Effort: Pulmonary effort is normal. No respiratory distress.      Breath sounds: Normal breath sounds. No wheezing, rhonchi or rales.   Abdominal:      General: Abdomen is flat. There is no distension.      Palpations: Abdomen is soft.      Tenderness: There is no abdominal tenderness.   Musculoskeletal:         General: Swelling (MINIMAL B FEET/ANKLES) present. No tenderness. Normal range of motion.      Cervical back: Normal range of motion and neck supple.   Skin:     General: Skin is warm and dry.      Capillary Refill: Capillary refill takes less than 2 seconds.      Coloration: Skin is pale.      Findings: Lesion (R BUTTOCK 2.5 CM OPEN WOUND STAGE 2 DECUBITUS ULCER NOTED) present.   Neurological:      General: No focal deficit present.      Mental Status: She is alert and oriented to person, place, and time. Mental status is at baseline.      Motor: Weakness present.   Psychiatric:         Mood and Affect: Mood normal.         Behavior: Behavior normal.       Significant Labs: All pertinent labs within the past 24 hours have been reviewed.  BMP:   Recent Labs   Lab 10/18/22  0505   GLU 94      K 3.1*      CO2 30   BUN 10   CREATININE 0.86   CALCIUM 8.1*     CBC:   Recent Labs   Lab 10/18/22  0505   WBC 5.99   HGB 12.9   HCT 37.2*    *       Significant Imaging: I have reviewed all pertinent imaging results/findings within the past 24 hours.CXR MILD INCREASE IN  INTERSTITIAL MARKINGS      Assessment/Plan:      * Muscle weakness  PT  HAS MUSCLE WEAKNESS  PT/OT WILL SCREEN PT FOR REHABILITATION FOR SWING BED  REFERRAL      Hypokalemia  PT HAS HYPOKALEMIA 3.1  K SUPPLEMENTATION ORDERED       Pressure injury of right buttock, stage 2  PT HAS DECUBITUS ULCER   WOUND CARE  DECUBITUS PRECAUTIONS      Chronic pain disorder  PT HAS CHRONIC PAIN DISORDER  GABAPENTIN, NORCO      Thyroid disease  - Continue synthroid  - Check TSH  10/18/2022   TSH 1.880  CONTINUE SYNTHROID  MCG DAILY      Falls frequently  - Fall precautions  - PT/OT screening  10/18/2022   CONTINUE POC      DVT prophylaxis  PT HAS VTE RISK  VTE PPX  PLAVIX/TEDS/EARLY AMBULATION      Hypertension  Monitor blood pressure every 4 hours  - Continue home medications Diovan and Norvasc  - Clonidine as needed   10/18/2022    CONTINUE NORVASC AND DIOVAN  CLONIDINE PRN  /90    Generalized anxiety disorder  PT HAS ANXIETY DISORDER  LORASEPAM  WILL RECOMMEND SHIRIN FOLLOW UP      VTE Risk Mitigation (From admission, onward)         Ordered     IP VTE LOW RISK PATIENT  Once         10/17/22 1729                Discharge Planning   POOL: 10/19/2022     Code Status: Full Code   Is the patient medically ready for discharge?:     Reason for patient still in hospital (select all that apply): Patient trending condition, Laboratory test, Treatment, Consult recommendations and PT / OT recommendations                     Alis Grider MD  Department of Hospital Medicine   [facility]

## 2022-10-20 NOTE — PLAN OF CARE
[facility] - Medical Surgical Unit  Initial Discharge Assessment       Primary Care Provider: Vini Hernandez NP    Admission Diagnosis: Muscle weakness [M62.81]    Admission Date: 10/19/2022  Expected Discharge Date:          Payor: MEDICARE / Plan: MEDICARE PART A & B / Product Type: Government /     Extended Emergency Contact Information  Primary Emergency Contact: Micheal Kaur  Mobile Phone: [phone]  Relation: Spouse  Preferred language: English   needed? No    Discharge Plan A: (P) Home with family, Home Health         [place], [place] - [address] [facility]  [address] [facility]  [place] [address]  Phone: [phone] Fax: [phone]      Initial Assessment (most recent)       Adult Discharge Assessment - 10/20/22 1113          Discharge Assessment    Assessment Type Discharge Planning Assessment (P)      Source of Information patient (P)      Lives With significant other (P)      Prior to hospitilization cognitive status: Alert/Oriented (P)      Current cognitive status: Alert/Oriented (P)      Equipment Currently Used at Home oxygen;walker, rolling;bedside commode;hospital bed (P)      Patient currently being followed by outpatient case management? No (P)      Do you currently have service(s) that help you manage your care at home? No (P)      Discharge Plan A Home with family;Home Health (P)      DME Needed Upon Discharge  none (P)      Discharge Plan discussed with: Patient (P)                    Pt lives with her ex- who is her PCG. Pt has had HH in the past but it is not current. Pt has all DME needed.

## 2022-10-20 NOTE — SUBJECTIVE & OBJECTIVE
Interval History: PT HAS BEEN SCREENED PER PT/OT AND WILL BE DISCHARGED TO [facility] FOR REHABILITATION.    Review of Systems   Constitutional:  Positive for activity change, appetite change and fatigue.   HENT: Negative.     Eyes: Negative.    Respiratory: Negative.  Negative for cough and shortness of breath.    Cardiovascular: Negative.  Negative for chest pain.   Gastrointestinal: Negative.    Endocrine: Negative.    Genitourinary: Negative.    Musculoskeletal:  Positive for arthralgias.   Skin:  Positive for color change (pale) and wound (BUTTOCK ,  L CHEST).   Allergic/Immunologic: Positive for immunocompromised state.   Neurological:  Positive for weakness (generalized).   Psychiatric/Behavioral: Negative.     All other systems reviewed and are negative.  Objective:     Vital Signs (Most Recent):  Temp: 98.1 °F (36.7 °C) (10/19/22 0705)  Pulse: 71 (10/19/22 0705)  Resp: 16 (10/19/22 0705)  BP: (!) 145/98 (10/19/22 1200)  SpO2: 96 % (10/19/22 0705)   Vital Signs (24h Range):  Temp:  [98 °F (36.7 °C)-98.2 °F (36.8 °C)] 98.2 °F (36.8 °C)  Pulse:  [71-79] 78  Resp:  [16-18] 18  SpO2:  [91 %-98 %] 98 %  BP: (130-145)/(89-98) 137/96     Weight: 73 kg (160 lb 15 oz)  Body mass index is 27.62 kg/m².  No intake or output data in the 24 hours ending 10/20/22 0110   Physical Exam  Vitals and nursing note reviewed. Exam conducted with a chaperone present.   Constitutional:       Appearance: She is ill-appearing.   HENT:      Head: Normocephalic and atraumatic.      Mouth/Throat:      Mouth: Mucous membranes are dry.   Eyes:      Extraocular Movements: Extraocular movements intact.   Neck:      Vascular: Carotid bruit present.   Cardiovascular:      Rate and Rhythm: Normal rate and regular rhythm.      Pulses: Normal pulses.      Heart sounds: Murmur (grade I loudest at left sternal border 5th intercostal space) heard.   Pulmonary:      Effort: Pulmonary effort is normal. No respiratory distress.       Breath sounds: Normal breath sounds. No rales.      Comments: Pleural rub left upper and lower lobe areas noted  Abdominal:      General: Abdomen is flat. Bowel sounds are normal. There is no distension.      Palpations: Abdomen is soft.      Tenderness: There is no abdominal tenderness.   Musculoskeletal:         General: Swelling (MINIMAL B FEET) present. No tenderness. Normal range of motion.      Cervical back: Normal range of motion and neck supple.   Skin:     General: Skin is warm.      Capillary Refill: Capillary refill takes less than 2 seconds.      Coloration: Skin is pale.      Findings: Lesion (L CHEST SKIN ABRASION, BUTTOCK SMALL AREA OF BREAKDOWN NOTEDR BUTTOCK, ERYTHEMA NOTED OF SACRUM AND BUTTOCKS) present.   Neurological:      General: No focal deficit present.      Mental Status: She is alert and oriented to person, place, and time.      Motor: Weakness present.   Psychiatric:         Mood and Affect: Mood normal.         Behavior: Behavior normal.         Thought Content: Thought content normal.       Significant Labs: All pertinent labs within the past 24 hours have been reviewed.  BMP:   Recent Labs   Lab 10/18/22  0505   GLU 94      K 3.1*      CO2 30   BUN 10   CREATININE 0.86   CALCIUM 8.1*     CBC:   Recent Labs   Lab 10/18/22  0505   WBC 5.99   HGB 12.9   HCT 37.2*   *       Significant Imaging: I have reviewed all pertinent imaging results/findings within the past 24 hours.NONE

## 2022-10-20 NOTE — SUBJECTIVE & OBJECTIVE
Past Medical History:   Diagnosis Date    Cerebral hemorrhage     Chronic anxiety     Dehydration     Depression     Dysphagia     Due to and following non-traumatic intracerebral hemorrhage    Hearing loss     History of CVA (cerebrovascular accident)     Hypertension     Insomnia     Intrapontine hemorrhage     Left hemiparesis     Peripheral neuropathy     Stroke     Thyroid disease     Transient cerebral ischemia        Past Surgical History:   Procedure Laterality Date    APPENDECTOMY      CHOLECYSTECTOMY      HYSTERECTOMY      LITHOTRIPSY      Renal lithotripsy    percutaneious insertion of ureteric stent         Review of patient's allergies indicates:   Allergen Reactions    Lamisil [terbinafine] Anaphylaxis    Codeine Itching    Compazine [prochlorperazine] Itching       Current Facility-Administered Medications on File Prior to Encounter   Medication    [DISCONTINUED] acetaminophen tablet 650 mg    [DISCONTINUED] albuterol-ipratropium 2.5 mg-0.5 mg/3 mL nebulizer solution 3 mL    [DISCONTINUED] albuterol-ipratropium 2.5 mg-0.5 mg/3 mL nebulizer solution 3 mL    [DISCONTINUED] amLODIPine tablet 10 mg    [DISCONTINUED] atorvastatin tablet 40 mg    [DISCONTINUED] calcium carbonate 200 mg calcium (500 mg) chewable tablet 500 mg    [DISCONTINUED] cloNIDine tablet 0.1 mg    [DISCONTINUED] clopidogreL tablet 75 mg    [DISCONTINUED] docusate sodium capsule 100 mg    [DISCONTINUED] gabapentin capsule 800 mg    [DISCONTINUED] HYDROcodone-acetaminophen  mg per tablet 1 tablet    [DISCONTINUED] levothyroxine tablet 125 mcg    [DISCONTINUED] LORazepam tablet 0.5 mg    [DISCONTINUED] pantoprazole EC tablet 40 mg    [DISCONTINUED] potassium chloride SA CR tablet 40 mEq    [DISCONTINUED] sertraline tablet 50 mg    [DISCONTINUED] temazepam capsule 30 mg    [DISCONTINUED] tiZANidine tablet 4 mg    [DISCONTINUED] valsartan tablet 160 mg     Current Outpatient Medications on File Prior to Encounter   Medication Sig     acetaminophen (TYLENOL) 325 MG tablet 2 tablets PRN    [START ON 10/20/2022] amLODIPine (NORVASC) 10 MG tablet Take 1 tablet (10 mg total) by mouth once daily.    atorvastatin (LIPITOR) 40 MG tablet Take 40 mg by mouth every evening.    cloNIDine (CATAPRES) 0.1 MG tablet Take 1 tablet (0.1 mg total) by mouth every 8 (eight) hours as needed (160).    clopidogreL (PLAVIX) 75 mg tablet Plavix 75 mg tablet   Take 1 po QD to prevent stroke    docusate sodium (COLACE) 100 MG capsule Take 1 capsule (100 mg total) by mouth once daily.    gabapentin (NEURONTIN) 800 MG tablet gabapentin 800 mg tablet    HYDROcodone-acetaminophen (NORCO)  mg per tablet Take 1 tablet by mouth 4 (four) times daily as needed.    levothyroxine (SYNTHROID) 125 MCG tablet     LORazepam (ATIVAN) 0.5 MG tablet Take 1 tablet (0.5 mg total) by mouth As instructed for Anxiety.    pantoprazole (PROTONIX) 40 MG tablet Take 1 tablet (40 mg total) by mouth once daily.    [START ON 10/20/2022] potassium chloride SA (K-DUR,KLOR-CON) 20 MEQ tablet Take 2 tablets (40 mEq total) by mouth once daily.    sertraline (ZOLOFT) 50 MG tablet sertraline 50 mg tablet    temazepam (RESTORIL) 30 mg capsule Take 30 mg by mouth every evening.    tiZANidine (ZANAFLEX) 4 MG tablet     [START ON 10/20/2022] valsartan (DIOVAN) 160 MG tablet Take 1 tablet (160 mg total) by mouth once daily.    albuterol-ipratropium (DUO-NEB) 2.5 mg-0.5 mg/3 mL nebulizer solution Take 3 mLs by nebulization every 6 (six) hours while awake. Rescue    [DISCONTINUED] albuterol (PROVENTIL HFA) 90 mcg/actuation inhaler Inhale 2 puffs into the lungs every 6 (six) hours as needed for Wheezing. Rescue    [DISCONTINUED] albuterol (PROVENTIL/VENTOLIN HFA) 90 mcg/actuation inhaler albuterol sulfate HFA 90 mcg/actuation aerosol inhaler    [DISCONTINUED] amLODIPine (NORVASC) 5 MG tablet Take 1 tablet (5 mg total) by mouth once daily.    [DISCONTINUED] citalopram (CELEXA) 40 MG tablet 0.5 tablet     [DISCONTINUED] gabapentin (NEURONTIN) 400 MG capsule Take 2 capsules (800 mg total) by mouth 2 (two) times daily.    [DISCONTINUED] hydroCHLOROthiazide (HYDRODIURIL) 12.5 MG Tab Take 1 tablet (12.5 mg total) by mouth once daily.    [DISCONTINUED] LORazepam (ATIVAN) 0.5 MG tablet Take 1 am and afternoon and 2 hs    [DISCONTINUED] LORazepam (ATIVAN) 0.5 MG tablet 1 tablet at bedtime as needed    [DISCONTINUED] omeprazole (PRILOSEC) 40 MG capsule Take 40 mg by mouth every evening.    [DISCONTINUED] rosuvastatin (CRESTOR) 10 MG tablet Take 10 mg by mouth.    [DISCONTINUED] valsartan (DIOVAN) 320 MG tablet Take 1 tablet (320 mg total) by mouth once daily. (Patient taking differently: Take 160 mg by mouth once daily.)     Family History    None       Tobacco Use    Smoking status: Never    Smokeless tobacco: Never   Substance and Sexual Activity    Alcohol use: Never    Drug use: Never    Sexual activity: Not on file     Review of Systems   Constitutional:  Positive for activity change, appetite change and fatigue.   HENT: Negative.     Eyes: Negative.    Respiratory: Negative.  Negative for cough and shortness of breath.    Cardiovascular: Negative.  Negative for chest pain.   Gastrointestinal: Negative.    Endocrine: Negative.    Genitourinary: Negative.    Musculoskeletal:  Positive for arthralgias.   Skin:  Positive for color change (pale) and wound (BUTTOCK ,  L CHEST).   Allergic/Immunologic: Positive for immunocompromised state.   Neurological:  Positive for weakness (generalized).   Psychiatric/Behavioral: Negative.     All other systems reviewed and are negative.  Objective:     Vital Signs (Most Recent):  Temp: 98.2 °F (36.8 °C) (10/19/22 1915)  Pulse: 78 (10/19/22 1915)  Resp: 18 (10/19/22 2206)  BP: (!) 137/96 (10/19/22 1915)  SpO2: 98 % (10/19/22 1915)   Vital Signs (24h Range):  Temp:  [98 °F (36.7 °C)-98.2 °F (36.8 °C)] 98.2 °F (36.8 °C)  Pulse:  [71-87] 78  Resp:  [16-18] 18  SpO2:  [88 %-98 %] 98 %  BP:  (130-153)/(89-98) 137/96     Weight: 73 kg (160 lb 15 oz)  Body mass index is 27.62 kg/m².    Physical Exam  Vitals and nursing note reviewed. Exam conducted with a chaperone present.   Constitutional:       Appearance: She is ill-appearing.   HENT:      Head: Normocephalic and atraumatic.      Mouth/Throat:      Mouth: Mucous membranes are dry.   Eyes:      Extraocular Movements: Extraocular movements intact.   Neck:      Vascular: Carotid bruit present.   Cardiovascular:      Rate and Rhythm: Normal rate and regular rhythm.      Pulses: Normal pulses.      Heart sounds: Murmur (grade I loudest at left sternal border 5th intercostal space) heard.   Pulmonary:      Effort: Pulmonary effort is normal. No respiratory distress.      Breath sounds: Normal breath sounds. No rales.      Comments: Pleural rub left upper and lower lobe areas noted  Abdominal:      General: Abdomen is flat. Bowel sounds are normal. There is no distension.      Palpations: Abdomen is soft.      Tenderness: There is no abdominal tenderness.   Musculoskeletal:         General: Swelling (MINIMAL B FEET) present. No tenderness. Normal range of motion.      Cervical back: Normal range of motion and neck supple.   Skin:     General: Skin is warm.      Capillary Refill: Capillary refill takes less than 2 seconds.      Coloration: Skin is pale.      Findings: Lesion (L CHEST SKIN ABRASION, BUTTOCK SMALL AREA OF BREAKDOWN NOTEDR BUTTOCK, ERYTHEMA NOTED OF SACRUM AND BUTTOCKS) present.   Neurological:      General: No focal deficit present.      Mental Status: She is alert and oriented to person, place, and time.      Motor: Weakness present.   Psychiatric:         Mood and Affect: Mood normal.         Behavior: Behavior normal.         Thought Content: Thought content normal.           Significant Labs: All pertinent labs within the past 24 hours have been reviewed.  BMP:   Recent Labs   Lab 10/18/22  0505   GLU 94      K 3.1*      CO2 30    BUN 10   CREATININE 0.86   CALCIUM 8.1*     CMP:   Recent Labs   Lab 10/18/22  0505      K 3.1*      CO2 30   GLU 94   BUN 10   CREATININE 0.86   CALCIUM 8.1*   ANIONGAP 11   CBC : HCT 37.2, OTHERWISE NORMAL  TSH:   Recent Labs   Lab 10/17/22  1834   TSH 1.880     Urine Studies: TR BLOOD    Significant Imaging: I have reviewed all pertinent imaging results/findings on admission.  CXR: I have reviewed all pertinent results/findings within the past 24 hours and my personal findings are:  MILD INCREASE IN INTERSTITIAL MARKINGS

## 2022-10-20 NOTE — H&P
[facility] - Medical Surgical Unit  Hospital Medicine  History & Physical    Patient Name: Herlinda Valdovinos  MRN: [id number]  Patient Class: IP- Swing  Admission Date: 10/19/2022  Attending Physician: Alis Grider MD  Primary Care Provider: Vini Hernandez NP         Patient information was obtained from patient, spouse/SO and past medical records.     Subjective:     Principal Problem:Muscle weakness    Chief Complaint:   Chief Complaint   Patient presents with    Muscle Weakness        HPI:   PT IS A 57 YR OLD WF ADMITTED TO [facility] SWING BED FOR PT/OT/REHABILITATION FOR MUSCLE WEAKNESS AND MEDICAL MANAGEMENT. PT WAS REFERRED FROM [facility] WHERE SHE WAS ADMITTED ON 10/17/2022 THROUGH THE ED FOR WEAKNESS AND DEBILITY, DEHYDRATION AND WEIGHT LOSS WITH INCREASED WEAKNESS AND MALA[place] FOR 3 WEEKS WITH SEVERAL FALLS AT HOME. PT'S  HAD BEEN ILL AND UNABLE TO FULLY CARE FOR HER DURING THIS TIME AND SINCE HAS RECOVERED AND IS SUPPORTIVE. PT HAD A HEMORRHAGIC CVA IN 2021 AND HAS REQUIRED  MULTIPLE EPISODES OF INPATIENT AND OUTPATIENT REHABILITATION WITH THE MOST RECENT OUTPATIENT THERAPY COMPLETED IN 6/2022. PT HAS HYPERTENSION WHICH IS INTERMITTENTLY POORLY CONTROLLED AND PT IS UNDER CONSIDERABLE STRESS AT HOME DUE TO FAMILY DYNAMICS.  PT IMPROVED DURING HOSPITALIZATION WITH IVF HYDRATION ; BUT HAS PERSISTENT MUSCLE WEAKNESS AND DEBILITY, AND WILL REQUIRE SWING BED FOR REHABILITATION. PT HAS BEEN SCREENED PER PT/OT WITH RECOMMENDATION FOR REHABILITATION DUE TO WEAKNESS AND DIFFICULTY AMBULATING AND PERFORMING ADLS. PT REQUIRED EXTENSIVE ASSISTANCE FOR ADLS AND MOBILITY COMPARED TO BEING INDEPENDENT PRIOR TO RECENT DECLINE.  PT WILL BE EVALUATED PER PT/OT AND A TREATMENT PLAN WILL BE INITIATED. THE PHARMACIST WILL REVIEW MEDICATIONS AND MAKE RECOMMENDATIONS. PT WILL SEE THE DIETICIAN  FOR NUTRITIONAL SUPPORT WITH WEIGHT 73 KG AND ALBUMIN OF 3.9. PT'S ABNORMAL  ADMITTING LABS ARE: CMP:CA 8.1, K 3.1, AST 52 AND ALK PHOS 119, CBC:HCT 37.2,  K, UA:SMALL BLOOD. PT WILL HAVE WEEKLY LABS.    Past Medical History:   Diagnosis Date    Cerebral hemorrhage     Chronic anxiety     Dehydration     Depression     Dysphagia     Due to and following non-traumatic intracerebral hemorrhage    Hearing loss     History of CVA (cerebrovascular accident)     Hypertension     Insomnia     Intrapontine hemorrhage     Left hemiparesis     Peripheral neuropathy     Stroke     Thyroid disease     Transient cerebral ischemia       CXR: MILD PROMINENCE OF INTERSTITIAL MARKINGS      PT CODE STATUS IS FULL CODE  PT COVID STATUS IS NEG  PT VTE PPX IS PLAVIX/TEDS/EARLY AMBULATION        Past Medical History:   Diagnosis Date    Cerebral hemorrhage     Chronic anxiety     Dehydration     Depression     Dysphagia     Due to and following non-traumatic intracerebral hemorrhage    Hearing loss     History of CVA (cerebrovascular accident)     Hypertension     Insomnia     Intrapontine hemorrhage     Left hemiparesis     Peripheral neuropathy     Stroke     Thyroid disease     Transient cerebral ischemia        Past Surgical History:   Procedure Laterality Date    APPENDECTOMY      CHOLECYSTECTOMY      HYSTERECTOMY      LITHOTRIPSY      Renal lithotripsy    percutaneious insertion of ureteric stent         Review of patient's allergies indicates:   Allergen Reactions    Lamisil [terbinafine] Anaphylaxis    Codeine Itching    Compazine [prochlorperazine] Itching       Current Facility-Administered Medications on File Prior to Encounter   Medication    [DISCONTINUED] acetaminophen tablet 650 mg    [DISCONTINUED] albuterol-ipratropium 2.5 mg-0.5 mg/3 mL nebulizer solution 3 mL    [DISCONTINUED] albuterol-ipratropium 2.5 mg-0.5 mg/3 mL nebulizer solution 3 mL    [DISCONTINUED] amLODIPine tablet 10 mg    [DISCONTINUED] atorvastatin tablet 40 mg    [DISCONTINUED]  calcium carbonate 200 mg calcium (500 mg) chewable tablet 500 mg    [DISCONTINUED] cloNIDine tablet 0.1 mg    [DISCONTINUED] clopidogreL tablet 75 mg    [DISCONTINUED] docusate sodium capsule 100 mg    [DISCONTINUED] gabapentin capsule 800 mg    [DISCONTINUED] HYDROcodone-acetaminophen  mg per tablet 1 tablet    [DISCONTINUED] levothyroxine tablet 125 mcg    [DISCONTINUED] LORazepam tablet 0.5 mg    [DISCONTINUED] pantoprazole EC tablet 40 mg    [DISCONTINUED] potassium chloride SA CR tablet 40 mEq    [DISCONTINUED] sertraline tablet 50 mg    [DISCONTINUED] temazepam capsule 30 mg    [DISCONTINUED] tiZANidine tablet 4 mg    [DISCONTINUED] valsartan tablet 160 mg     Current Outpatient Medications on File Prior to Encounter   Medication Sig    acetaminophen (TYLENOL) 325 MG tablet 2 tablets PRN    [START ON 10/20/2022] amLODIPine (NORVASC) 10 MG tablet Take 1 tablet (10 mg total) by mouth once daily.    atorvastatin (LIPITOR) 40 MG tablet Take 40 mg by mouth every evening.    cloNIDine (CATAPRES) 0.1 MG tablet Take 1 tablet (0.1 mg total) by mouth every 8 (eight) hours as needed (160).    clopidogreL (PLAVIX) 75 mg tablet Plavix 75 mg tablet   Take 1 po QD to prevent stroke    docusate sodium (COLACE) 100 MG capsule Take 1 capsule (100 mg total) by mouth once daily.    gabapentin (NEURONTIN) 800 MG tablet gabapentin 800 mg tablet    HYDROcodone-acetaminophen (NORCO)  mg per tablet Take 1 tablet by mouth 4 (four) times daily as needed.    levothyroxine (SYNTHROID) 125 MCG tablet     LORazepam (ATIVAN) 0.5 MG tablet Take 1 tablet (0.5 mg total) by mouth As instructed for Anxiety.    pantoprazole (PROTONIX) 40 MG tablet Take 1 tablet (40 mg total) by mouth once daily.    [START ON 10/20/2022] potassium chloride SA (K-DUR,KLOR-CON) 20 MEQ tablet Take 2 tablets (40 mEq total) by mouth once daily.    sertraline (ZOLOFT) 50 MG tablet sertraline 50 mg tablet    temazepam (RESTORIL) 30  mg capsule Take 30 mg by mouth every evening.    tiZANidine (ZANAFLEX) 4 MG tablet     [START ON 10/20/2022] valsartan (DIOVAN) 160 MG tablet Take 1 tablet (160 mg total) by mouth once daily.    albuterol-ipratropium (DUO-NEB) 2.5 mg-0.5 mg/3 mL nebulizer solution Take 3 mLs by nebulization every 6 (six) hours while awake. Rescue    [DISCONTINUED] albuterol (PROVENTIL HFA) 90 mcg/actuation inhaler Inhale 2 puffs into the lungs every 6 (six) hours as needed for Wheezing. Rescue    [DISCONTINUED] albuterol (PROVENTIL/VENTOLIN HFA) 90 mcg/actuation inhaler albuterol sulfate HFA 90 mcg/actuation aerosol inhaler    [DISCONTINUED] amLODIPine (NORVASC) 5 MG tablet Take 1 tablet (5 mg total) by mouth once daily.    [DISCONTINUED] citalopram (CELEXA) 40 MG tablet 0.5 tablet    [DISCONTINUED] gabapentin (NEURONTIN) 400 MG capsule Take 2 capsules (800 mg total) by mouth 2 (two) times daily.    [DISCONTINUED] hydroCHLOROthiazide (HYDRODIURIL) 12.5 MG Tab Take 1 tablet (12.5 mg total) by mouth once daily.    [DISCONTINUED] LORazepam (ATIVAN) 0.5 MG tablet Take 1 am and afternoon and 2 hs    [DISCONTINUED] LORazepam (ATIVAN) 0.5 MG tablet 1 tablet at bedtime as needed    [DISCONTINUED] omeprazole (PRILOSEC) 40 MG capsule Take 40 mg by mouth every evening.    [DISCONTINUED] rosuvastatin (CRESTOR) 10 MG tablet Take 10 mg by mouth.    [DISCONTINUED] valsartan (DIOVAN) 320 MG tablet Take 1 tablet (320 mg total) by mouth once daily. (Patient taking differently: Take 160 mg by mouth once daily.)     Family History    None       Tobacco Use    Smoking status: Never    Smokeless tobacco: Never   Substance and Sexual Activity    Alcohol use: Never    Drug use: Never    Sexual activity: Not on file     Review of Systems   Constitutional:  Positive for activity change, appetite change and fatigue.   HENT: Negative.     Eyes: Negative.    Respiratory: Negative.  Negative for cough and shortness of breath.     Cardiovascular: Negative.  Negative for chest pain.   Gastrointestinal: Negative.    Endocrine: Negative.    Genitourinary: Negative.    Musculoskeletal:  Positive for arthralgias.   Skin:  Positive for color change (pale) and wound (BUTTOCK ,  L CHEST).   Allergic/Immunologic: Positive for immunocompromised state.   Neurological:  Positive for weakness (generalized).   Psychiatric/Behavioral: Negative.     All other systems reviewed and are negative.  Objective:     Vital Signs (Most Recent):  Temp: 98.2 °F (36.8 °C) (10/19/22 1915)  Pulse: 78 (10/19/22 1915)  Resp: 18 (10/19/22 2206)  BP: (!) 137/96 (10/19/22 1915)  SpO2: 98 % (10/19/22 1915)   Vital Signs (24h Range):  Temp:  [98 °F (36.7 °C)-98.2 °F (36.8 °C)] 98.2 °F (36.8 °C)  Pulse:  [71-87] 78  Resp:  [16-18] 18  SpO2:  [88 %-98 %] 98 %  BP: (130-153)/(89-98) 137/96     Weight: 73 kg (160 lb 15 oz)  Body mass index is 27.62 kg/m².    Physical Exam  Vitals and nursing note reviewed. Exam conducted with a chaperone present.   Constitutional:       Appearance: She is ill-appearing.   HENT:      Head: Normocephalic and atraumatic.      Mouth/Throat:      Mouth: Mucous membranes are dry.   Eyes:      Extraocular Movements: Extraocular movements intact.   Neck:      Vascular: Carotid bruit present.   Cardiovascular:      Rate and Rhythm: Normal rate and regular rhythm.      Pulses: Normal pulses.      Heart sounds: Murmur (grade I loudest at left sternal border 5th intercostal space) heard.   Pulmonary:      Effort: Pulmonary effort is normal. No respiratory distress.      Breath sounds: Normal breath sounds. No rales.      Comments: Pleural rub left upper and lower lobe areas noted  Abdominal:      General: Abdomen is flat. Bowel sounds are normal. There is no distension.      Palpations: Abdomen is soft.      Tenderness: There is no abdominal tenderness.   Musculoskeletal:         General: Swelling (MINIMAL B FEET) present. No tenderness. Normal range of  motion.      Cervical back: Normal range of motion and neck supple.   Skin:     General: Skin is warm.      Capillary Refill: Capillary refill takes less than 2 seconds.      Coloration: Skin is pale.      Findings: Lesion (L CHEST SKIN ABRASION, BUTTOCK SMALL AREA OF BREAKDOWN NOTEDR BUTTOCK, ERYTHEMA NOTED OF SACRUM AND BUTTOCKS) present.   Neurological:      General: No focal deficit present.      Mental Status: She is alert and oriented to person, place, and time.      Motor: Weakness present.   Psychiatric:         Mood and Affect: Mood normal.         Behavior: Behavior normal.         Thought Content: Thought content normal.           Significant Labs: All pertinent labs within the past 24 hours have been reviewed.  BMP:   Recent Labs   Lab 10/18/22  0505   GLU 94      K 3.1*      CO2 30   BUN 10   CREATININE 0.86   CALCIUM 8.1*     CMP:   Recent Labs   Lab 10/18/22  0505      K 3.1*      CO2 30   GLU 94   BUN 10   CREATININE 0.86   CALCIUM 8.1*   ANIONGAP 11   CBC : HCT 37.2, OTHERWISE NORMAL  TSH:   Recent Labs   Lab 10/17/22  1834   TSH 1.880     Urine Studies: TR BLOOD    Significant Imaging: I have reviewed all pertinent imaging results/findings on admission.  CXR: I have reviewed all pertinent results/findings within the past 24 hours and my personal findings are:  MILD INCREASE IN INTERSTITIAL MARKINGS    Assessment/Plan:     * Muscle weakness  PT/OT WILL EVALUATE AND INITIATE A TREATMENT PLAN      Hypokalemia  PT HAS HYPOKALEMIA  K IS 3.1  POTASSIUM SUPPLEMENTATION  RECHECK K      Pressure injury of right buttock, stage 2  PT HAS STAGE 2 DECUBITUS ULCER R BUTTOCK  WOUND CARE TEAM CONSULTATION  DAILY WOUND CARE  DECUBITUS PRECAUTIONS  WAFFLE MATTRESS      Chronic pain disorder  PT HAS CHRONIC PAIN DISORDER  NORCO, GABAPENTIN  PT/OT TO INCREASE MOBILITY AND AMBULATION        Hyperlipidemia  PT HAS HYPERLIPIDEMIA ANS IS S/P CVA  STATIN, LE ARE SLIGHTLY ELEVATED; WILL  MONITOR      Thyroid disease  PT HAS THYROID DISEASE  TSH 1.880  SYNTHROID 125 MCG DAILY    Hypertension  PT HAS HTN  AMLODIPINE, CLONIDINE   /96      Generalized anxiety disorder  PT HAS ANXIETY DISORDER EXACERBATED BY FAMILY DYNAMICS PER PT'S STATEMENTS  CONTINUE LORAZEPAM; WILL AVOID HS DOSING AS PT IS ON TEMAZEPAM HS      VTE Risk Mitigation (From admission, onward)         Ordered     Place OCTAVIA hose  Until discontinued         10/19/22 2117     IP VTE LOW RISK PATIENT  Once         10/19/22 2117                   Alis Grider MD  Department of Hospital Medicine   [facility] - Medical Surgical Unit

## 2022-10-20 NOTE — ASSESSMENT & PLAN NOTE
Monitor blood pressure every 4 hours  - Continue home medications Diovan and Norvasc  - Clonidine as needed   10/18/2022    CONTINUE NORVASC AND DIOVAN  CLONIDINE PRN  /90

## 2022-10-20 NOTE — SUBJECTIVE & OBJECTIVE
Interval History: PT IS MILDLY IMPROVED WITH IVF HYDRATION. PT WILL BE SCREENED PER PT/OT TEAM FOR REQUIRED REFERRAL TO SWING BED. LABS ARE STABLE ; K IS 3.1 AND WILL BE SUPPLEMENTED.    Review of Systems   Constitutional:  Positive for activity change, appetite change, fatigue and unexpected weight change.   HENT: Negative.     Eyes: Negative.    Respiratory: Negative.     Cardiovascular: Negative.    Gastrointestinal: Negative.    Endocrine: Negative.    Genitourinary: Negative.    Musculoskeletal:  Positive for arthralgias.   Skin:  Positive for color change (pale) and wound (BUTTOCK R,  ABRASION R CHEST).   Allergic/Immunologic: Positive for immunocompromised state.   Neurological:  Positive for weakness.   Psychiatric/Behavioral: Negative.     All other systems reviewed and are negative.  Objective:     Vital Signs (Most Recent):  Temp: 98.1 °F (36.7 °C) (10/19/22 0705)  Pulse: 71 (10/19/22 0705)  Resp: 16 (10/19/22 0705)  BP: (!) 145/98 (10/19/22 1200)  SpO2: 96 % (10/19/22 0705)   Vital Signs (24h Range):  Temp:  [98 °F (36.7 °C)-98.2 °F (36.8 °C)] 98.2 °F (36.8 °C)  Pulse:  [71-87] 78  Resp:  [16-18] 18  SpO2:  [88 %-98 %] 98 %  BP: (130-153)/(89-98) 137/96     Weight: 73 kg (160 lb 15 oz)  Body mass index is 27.62 kg/m².  No intake or output data in the 24 hours ending 10/20/22 0038   Physical Exam  Vitals reviewed.   Constitutional:       Appearance: She is ill-appearing.   HENT:      Head: Normocephalic and atraumatic.      Nose: Nose normal.      Mouth/Throat:      Mouth: Mucous membranes are dry.   Eyes:      Extraocular Movements: Extraocular movements intact.      Pupils: Pupils are equal, round, and reactive to light.   Cardiovascular:      Rate and Rhythm: Normal rate and regular rhythm.      Pulses: Normal pulses.      Heart sounds: Normal heart sounds.   Pulmonary:      Effort: Pulmonary effort is normal. No respiratory distress.      Breath sounds: Normal breath sounds. No wheezing, rhonchi or  rales.   Abdominal:      General: Abdomen is flat. There is no distension.      Palpations: Abdomen is soft.      Tenderness: There is no abdominal tenderness.   Musculoskeletal:         General: Swelling (MINIMAL B FEET/ANKLES) present. No tenderness. Normal range of motion.      Cervical back: Normal range of motion and neck supple.   Skin:     General: Skin is warm and dry.      Capillary Refill: Capillary refill takes less than 2 seconds.      Coloration: Skin is pale.      Findings: Lesion (R BUTTOCK 2.5 CM OPEN WOUND STAGE 2 DECUBITUS ULCER NOTED) present.   Neurological:      General: No focal deficit present.      Mental Status: She is alert and oriented to person, place, and time. Mental status is at baseline.      Motor: Weakness present.   Psychiatric:         Mood and Affect: Mood normal.         Behavior: Behavior normal.       Significant Labs: All pertinent labs within the past 24 hours have been reviewed.  BMP:   Recent Labs   Lab 10/18/22  0505   GLU 94      K 3.1*      CO2 30   BUN 10   CREATININE 0.86   CALCIUM 8.1*     CBC:   Recent Labs   Lab 10/18/22  0505   WBC 5.99   HGB 12.9   HCT 37.2*   *       Significant Imaging: I have reviewed all pertinent imaging results/findings within the past 24 hours.CXR MILD INCREASE IN  INTERSTITIAL MARKINGS

## 2022-10-20 NOTE — ASSESSMENT & PLAN NOTE
PT HAS HYPOKALEMIA 3.1  K SUPPLEMENTATION ORDERED   10/19/2022  PT WILL CONTINUE K SUPPLEMENTATION

## 2022-10-20 NOTE — ASSESSMENT & PLAN NOTE
PT HAS ANXIETY DISORDER  LORAZEPAM  WILL RECOMMEND SHIRIN FOLLOW UP  10/19/2022  PT WILL BE DISCHARGED TO CONTINUE POC

## 2022-10-20 NOTE — ASSESSMENT & PLAN NOTE
PT HAS DECUBITUS ULCER   WOUND CARE  DECUBITUS PRECAUTIONS  10/19/2022  PT WILL BE DISCHARGED  CONTINUE POC

## 2022-10-20 NOTE — DISCHARGE SUMMARY
[facility] - Medical Surgical Unit  Hospital Medicine  Discharge Summary      Patient Name: Herlinda Valdovinos  MRN: [id number]  Patient Class: OP- Observation  Admission Date: 10/17/2022  Hospital Length of Stay: 0 days  Discharge Date and Time: 10/19/2022  4:33 PM  Attending Physician: Alis Grider MD   Discharging Provider: Alis Grider MD  Primary Care Provider: Vini Hernandez NP      HPI:   Patient presented to [facility] ED today with complaints of generalized weakness. 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 Patient finished her outpatient therapy in June of this year. Patient reports for the past several weeks she has went from walking to unable to get up without max assistance. Patient reports she has had several falls over the past 2 weeks and after speaking with her PCP (Vini Hernandez), it was recommended patient come to ED for evaluation for her falls and weakness. Patient states she did not want to come due to fear of COVID but her spouse encouraged her to come today due to her weakness progressively worsening. On evaluation in the ED, patient does appear acutely ill and dry, patient awake, alert and oriented x4. Patient was sitting up in wheelchair on arrival. Patient's labs revealed WBC 7.21, H/H 14.6/42.6, Na 140, K 3.3, Cl 99, BUN 13, Creat 1.08, Glucose 119, Mg 1.8, , Troponin (neg), Urine (neg), Lactic 0.6, COVID/Flu (neg). Temp 98.0, HR 86, R 18, /98, O2 Sats 96%. Dr. Grider spoke with patient prior to admission and accepted patient to observation for weakness and dehydration. Patient reports she has not been eating well and has noticed a weight loss. Denies any N/V/D. Will have PT/OT evaluate patient also. Admit orders and med rec discussed with Dr. Grider.     Full Code        * No surgery found *      Hospital Course:   10/18/2022 HOSPITAL DAY 1  PT IS A 57 YR OLD WF  ADMITTED TO [facility] DUE TO MUSCLE WEAKNESS AND DEHYDRATION WITH HX SEVERAL FALLS AT HOME. PT WAS STARTED ON IVF HYDRATION WITH IMPROVEMENT NOTED. PT WILL BE SCREENED PER PT/OT FOR REFERRAL TO SWING BED FOR REHABILITATION. PT HAS HYPOKALEMIA 3.1 AND SUPPLEMENTATION WAS ORDERED.    10/19/22 Nurse reports at 060 this am that patient's O2 sat dropped to 88% around 0100 this morning. She was asymptomatic with no chest pain or dyspnea. She was started on O2 at 2L/min per NC. Upon assessment this morning, Ms Kaur notes that she sleeps using O2 at home most nights. Her O2 sat this am is 96%. She denies dyspnea or pain. She says that she slept good last night. Lungs sounds are noted with pleural rub on left upper and lower lobes. Her initial chest xray showed cardiac decompensation v. Pneumonitis. Repeat CXR ordered this am and is pending.    10/19/2022 HOSPITAL DAY 2  DISCHARGE  PT IS A 57 YR OLD WF ADMITTED TO [facility] DUE TO MUSCLE WEAKNESS AND DEHYDRATION WITH HX SEVERAL FALLS AT HOME. PT WAS STARTED ON IVF HYDRATION WITH IMPROVEMENT NOTED. PT HAS BEEN SCREENED PER PT/OT FOR REFERRAL TO SWING BED FOR REHABILITATION. PT HAS BEEN SCREENED PER PT/OT WITH RECOMMENDATION FOR REHABILITATION DUE TO WEAKNESS AND DIFFICULTY AMBULATING AND PERFORMING ADLS. PT REQUIRED EXTENSIVE ASSISTANCE FOR ADLS AND MOBILITY COMPARED TO BEING INDEPENDENT PRIOR TO RECENT DECLINE.  PT WILL BE DISCHARGED TO [facility]ED FOR FOR PT/OT/REHABILITATION.       Goals of Care Treatment Preferences:  Code Status: Full Code      Consults:   Consults (From admission, onward)        Status Ordering Provider     Inpatient consult to Registered Dietitian/Nutritionist  Once        Provider:  (Not yet assigned)    Completed LEVAR LEE          * Muscle weakness  PT  HAS MUSCLE WEAKNESS  PT/OT WILL SCREEN PT FOR REHABILITATION FOR SWING BED  REFERRAL    10/19/2022  PT HAS BEEN SCREENED PER PT/OT WITH  RECOMMENDATION FOR REHABILITATION DUE TO WEAKNESS AND DIFFICULTY AMBULATING AND PERFORMING ADLS. PT REQUIRED EXTENSIVE ASSISTANCE FOR ADLS AND MOBILITY COMPARED TO BEING INDEPENDENT PRIOR TO RECENT DECLINE.      Hypokalemia  PT HAS HYPOKALEMIA 3.1  K SUPPLEMENTATION ORDERED   10/19/2022  PT WILL CONTINUE K SUPPLEMENTATION      Pressure injury of right buttock, stage 2  PT HAS DECUBITUS ULCER   WOUND CARE  DECUBITUS PRECAUTIONS  10/19/2022  PT WILL BE DISCHARGED  CONTINUE POC      Chronic pain disorder  PT HAS CHRONIC PAIN DISORDER  GABAPENTIN, NORCO  10/19/2022  PT WILL BE DISCHARGED TO CONTINUE CHRONIC PAIN SYNDROME TREATMENT    Thyroid disease  - Continue synthroid  - Check TSH  10/18/2022   TSH 1.880  CONTINUE SYNTHROID  MCG DAILY  10/19/2022  CONTINUE POC ON DISCHARGE      Falls frequently  - Fall precautions  - PT/OT screening  10/18/2022   CONTINUE POC  10/19/2022  PT WILL BE DISCHARGED TO SWING BED FOR PT/OT/REHABILITATION      DVT prophylaxis  PT HAS VTE RISK  VTE PPX  PLAVIX/TEDS/EARLY AMBULATION  10/19/2022  PT WILL BE DISCHARGED TO CONTINUE VTE PPX      Hypertension  Monitor blood pressure every 4 hours  - Continue home medications Diovan and Norvasc  - Clonidine as needed   10/18/2022    CONTINUE NORVASC AND DIOVAN  CLONIDINE PRN  /90  10/19/2022  PT WILL BE DISCHARGED TO CONTINUE POC    Generalized anxiety disorder  PT HAS ANXIETY DISORDER  LORAZEPAM  WILL RECOMMEND SHIRIN FOLLOW UP  10/19/2022  PT WILL BE DISCHARGED TO CONTINUE POC    Final Active Diagnoses:    Diagnosis Date Noted POA    PRINCIPAL PROBLEM:  Muscle weakness [M62.81] 05/15/2021 Yes    Hypokalemia [E87.6] 10/20/2022 Yes    Chronic pain disorder [G89.4] 10/20/2022 Yes    Pressure injury of right buttock, stage 2 [L89.312] 10/20/2022 Yes    Falls frequently [R29.6] 10/17/2022 Not Applicable    Thyroid disease [E07.9] 10/17/2022 Yes    DVT prophylaxis [Z29.9] 06/10/2021 Not Applicable    Hypertension [I10] 05/20/2021 Yes     Generalized anxiety disorder [F41.1] 05/20/2021 Yes      Problems Resolved During this Admission:    Diagnosis Date Noted Date Resolved POA    Hypoxic episode [R09.02] 10/19/2022 10/19/2022 Yes    Dehydration [E86.0] 10/17/2022 10/19/2022 Yes    History of CVA (cerebrovascular accident) [Z86.73] 10/17/2022 10/17/2022 Not Applicable    Stroke [I63.9] 10/17/2022 10/17/2022 Unknown       Discharged Condition: stable    Disposition: Admitted as an Inpatient    Follow Up:    Patient Instructions:      Diet Cardiac     Activity as tolerated       Significant Diagnostic Studies: Labs:   BMP:   Recent Labs   Lab 10/20/22  0905   K 3.4*   , CMP   Recent Labs   Lab 10/20/22  0905   K 3.4*    and CBC No results for input(s): WBC, HGB, HCT, PLT in the last 48 hours.    Pending Diagnostic Studies:     Procedure Component Value Units Date/Time    Zinc [[id number]] Collected: 10/17/22 1834    Order Status: Sent Lab Status: In process Updated: 10/17/22 1834    Specimen: Blood          Medications:  Reconciled Home Medications:      Medication List      START taking these medications    cloNIDine 0.1 MG tablet  Commonly known as: CATAPRES  Take 1 tablet (0.1 mg total) by mouth every 8 (eight) hours as needed (160).     potassium chloride SA 20 MEQ tablet  Commonly known as: K-DUR,KLOR-CON  Take 2 tablets (40 mEq total) by mouth once daily.        CHANGE how you take these medications    amLODIPine 10 MG tablet  Commonly known as: NORVASC  Take 1 tablet (10 mg total) by mouth once daily.  What changed:   · medication strength  · how much to take     gabapentin 800 MG tablet  Commonly known as: NEURONTIN  gabapentin 800 mg tablet  What changed: Another medication with the same name was removed. Continue taking this medication, and follow the directions you see here.     LORazepam 0.5 MG tablet  Commonly known as: ATIVAN  Take 1 tablet (0.5 mg total) by mouth As instructed for Anxiety.  What changed:   · how to take this  · when  to take this  · reasons to take this  · additional instructions  · Another medication with the same name was removed. Continue taking this medication, and follow the directions you see here.     valsartan 160 MG tablet  Commonly known as: DIOVAN  Take 1 tablet (160 mg total) by mouth once daily.  What changed:   · medication strength  · how much to take        CONTINUE taking these medications    acetaminophen 325 MG tablet  Commonly known as: TYLENOL  2 tablets PRN     albuterol-ipratropium 2.5 mg-0.5 mg/3 mL nebulizer solution  Commonly known as: DUO-NEB  Take 3 mLs by nebulization every 6 (six) hours while awake. Rescue     atorvastatin 40 MG tablet  Commonly known as: LIPITOR  Take 40 mg by mouth every evening.     clopidogreL 75 mg tablet  Commonly known as: PLAVIX  Plavix 75 mg tablet   Take 1 po QD to prevent stroke     docusate sodium 100 MG capsule  Commonly known as: COLACE  Take 1 capsule (100 mg total) by mouth once daily.     HYDROcodone-acetaminophen  mg per tablet  Commonly known as: NORCO  Take 1 tablet by mouth 4 (four) times daily as needed.     levothyroxine 125 MCG tablet  Commonly known as: SYNTHROID     pantoprazole 40 MG tablet  Commonly known as: PROTONIX  Take 1 tablet (40 mg total) by mouth once daily.     sertraline 50 MG tablet  Commonly known as: ZOLOFT  sertraline 50 mg tablet     temazepam 30 mg capsule  Commonly known as: RESTORIL  Take 30 mg by mouth every evening.     tiZANidine 4 MG tablet  Commonly known as: ZANAFLEX        STOP taking these medications    albuterol 90 mcg/actuation inhaler  Commonly known as: PROVENTIL/VENTOLIN HFA     citalopram 40 MG tablet  Commonly known as: CeleXA     rosuvastatin 10 MG tablet  Commonly known as: CRESTOR            Indwelling Lines/Drains at time of discharge:   Lines/Drains/Airways     None                 Time spent on the discharge of patient: 60 minutes         Alis Grider MD  Department of Hospital Medicine  [facility] - [facility]

## 2022-10-20 NOTE — ASSESSMENT & PLAN NOTE
Monitor blood pressure every 4 hours  - Continue home medications Diovan and Norvasc  - Clonidine as needed   10/18/2022    CONTINUE NORVASC AND DIOVAN  CLONIDINE PRN  /90  10/19/2022  PT WILL BE DISCHARGED TO CONTINUE POC

## 2022-10-20 NOTE — ASSESSMENT & PLAN NOTE
PT HAS STAGE 2 DECUBITUS ULCER R BUTTOCK  WOUND CARE TEAM CONSULTATION  DAILY WOUND CARE  DECUBITUS PRECAUTIONS  WAFFLE MATTRESS

## 2022-10-20 NOTE — ASSESSMENT & PLAN NOTE
PT  HAS MUSCLE WEAKNESS  PT/OT WILL SCREEN PT FOR REHABILITATION FOR SWING BED  REFERRAL    10/19/2022  PT HAS BEEN SCREENED PER PT/OT WITH RECOMMENDATION FOR REHABILITATION DUE TO WEAKNESS AND DIFFICULTY AMBULATING AND PERFORMING ADLS. PT REQUIRED EXTENSIVE ASSISTANCE FOR ADLS AND MOBILITY COMPARED TO BEING INDEPENDENT PRIOR TO RECENT DECLINE.

## 2022-10-20 NOTE — PROGRESS NOTES
[facility] - Medical Surgical Unit  Hospital Medicine  Progress Note    Patient Name: Herlinda Valdovinos  MRN: [id number]  Patient Class: OP- Observation   Admission Date: 10/17/2022  Length of Stay: 0 days  Attending Physician: Alis Grider MD  Primary Care Provider: Vini Hernandez NP        Subjective:     Principal Problem:Muscle weakness        HPI:  Patient presented to [facility] ED today with complaints of generalized weakness. 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 Patient finished her outpatient therapy in June of this year. Patient reports for the past several weeks she has went from walking to unable to get up without max assistance. Patient reports she has had several falls over the past 2 weeks and after speaking with her PCP (Vini Hernandez), it was recommended patient come to ED for evaluation for her falls and weakness. Patient states she did not want to come due to fear of COVID but her spouse encouraged her to come today due to her weakness progressively worsening. On evaluation in the ED, patient does appear acutely ill and dry, patient awake, alert and oriented x4. Patient was sitting up in wheelchair on arrival. Patient's labs revealed WBC 7.21, H/H 14.6/42.6, Na 140, K 3.3, Cl 99, BUN 13, Creat 1.08, Glucose 119, Mg 1.8, , Troponin (neg), Urine (neg), Lactic 0.6, COVID/Flu (neg). Temp 98.0, HR 86, R 18, /98, O2 Sats 96%. Dr. Grider spoke with patient prior to admission and accepted patient to observation for weakness and dehydration. Patient reports she has not been eating well and has noticed a weight loss. Denies any N/V/D. Will have PT/OT evaluate patient also. Admit orders and med rec discussed with Dr. Grider.     Full Code      Overview/Hospital Course:  10/18/2022 HOSPITAL DAY 1  PT IS A 57 YR OLD WF ADMITTED TO [facility] DUE TO MUSCLE WEAKNESS AND  DEHYDRATION WITH HX SEVERAL FALLS AT HOME. PT WAS STARTED ON IVF HYDRATION WITH IMPROVEMENT NOTED. PT WILL BE SCREENED PER PT/OT FOR REFERRAL TO SWING BED FOR REHABILITATION. PT HAS HYPOKALEMIA 3.1 AND SUPPLEMENTATION WAS ORDERED.    10/19/22 Nurse reports at 060 this am that patient's O2 sat dropped to 88% around 0100 this morning. She was asymptomatic with no chest pain or dyspnea. She was started on O2 at 2L/min per NC. Upon assessment this morning, Ms Kaur notes that she sleeps using O2 at home most nights. Her O2 sat this am is 96%. She denies dyspnea or pain. She says that she slept good last night. Lungs sounds are noted with pleural rub on left upper and lower lobes. Her initial chest xray showed cardiac decompensation v. Pneumonitis. Repeat CXR ordered this am and is pending.    10/19/2022 HOSPITAL DAY 2  DISCHARGE  PT IS A 57 YR OLD WF ADMITTED TO [facility] DUE TO MUSCLE WEAKNESS AND DEHYDRATION WITH HX SEVERAL FALLS AT HOME. PT WAS STARTED ON IVF HYDRATION WITH IMPROVEMENT NOTED. PT HAS BEEN SCREENED PER PT/OT FOR REFERRAL TO SWING BED FOR REHABILITATION. PT HAS BEEN SCREENED PER PT/OT WITH RECOMMENDATION FOR REHABILITATION DUE TO WEAKNESS AND DIFFICULTY AMBULATING AND PERFORMING ADLS. PT REQUIRED EXTENSIVE ASSISTANCE FOR ADLS AND MOBILITY COMPARED TO BEING INDEPENDENT PRIOR TO RECENT DECLINE.  PT WILL BE DISCHARGED TO [facility] FOR FOR PT/OT/REHABILITATION.      No new subjective & objective note has been filed under this hospital service since the last note was generated.    Assessment/Plan:      * Muscle weakness  PT  HAS MUSCLE WEAKNESS  PT/OT WILL SCREEN PT FOR REHABILITATION FOR SWING BED  REFERRAL    10/19/2022  PT HAS BEEN SCREENED PER PT/OT WITH RECOMMENDATION FOR REHABILITATION DUE TO WEAKNESS AND DIFFICULTY AMBULATING AND PERFORMING ADLS. PT REQUIRED EXTENSIVE ASSISTANCE FOR ADLS AND MOBILITY COMPARED TO BEING INDEPENDENT PRIOR TO RECENT  DECLINE.      Hypokalemia  PT HAS HYPOKALEMIA 3.1  K SUPPLEMENTATION ORDERED   10/19/2022  PT WILL CONTINUE K SUPPLEMENTATION      Pressure injury of right buttock, stage 2  PT HAS DECUBITUS ULCER   WOUND CARE  DECUBITUS PRECAUTIONS  10/19/2022  PT WILL BE DISCHARGED  CONTINUE POC      Chronic pain disorder  PT HAS CHRONIC PAIN DISORDER  GABAPENTIN, NORCO  10/19/2022  PT WILL BE DISCHARGED TO CONTINUE CHRONIC PAIN SYNDROME TREATMENT    Thyroid disease  - Continue synthroid  - Check TSH  10/18/2022   TSH 1.880  CONTINUE SYNTHROID  MCG DAILY  10/19/2022  CONTINUE POC ON DISCHARGE      Falls frequently  - Fall precautions  - PT/OT screening  10/18/2022   CONTINUE POC  10/19/2022  PT WILL BE DISCHARGED TO SWING BED FOR PT/OT/REHABILITATION      DVT prophylaxis  PT HAS VTE RISK  VTE PPX  PLAVIX/TEDS/EARLY AMBULATION  10/19/2022  PT WILL BE DISCHARGED TO CONTINUE VTE PPX      Hypertension  Monitor blood pressure every 4 hours  - Continue home medications Diovan and Norvasc  - Clonidine as needed   10/18/2022    CONTINUE NORVASC AND DIOVAN  CLONIDINE PRN  /90  10/19/2022  PT WILL BE DISCHARGED TO CONTINUE POC    Generalized anxiety disorder  PT HAS ANXIETY DISORDER  LORAZEPAM  WILL RECOMMEND SHIRIN FOLLOW UP  10/19/2022  PT WILL BE DISCHARGED TO CONTINUE POC    VTE Risk Mitigation (From admission, onward)           Ordered     IP VTE LOW RISK PATIENT  Once         10/17/22 1729                    Discharge Planning   POOL: 10/19/2022     Code Status: Full Code   Is the patient medically ready for discharge?:     Reason for patient still in hospital (select all that apply): Patient trending condition, Laboratory test, Treatment, and PT / OT recommendations                     Alis Grider MD  Department of Hospital Medicine   [facility] - Medical Surgical Unit

## 2022-10-20 NOTE — HPI
PT IS A 57 YR OLD WF ADMITTED TO [facility] SWING BED FOR PT/OT/REHABILITATION FOR MUSCLE WEAKNESS AND MEDICAL MANAGEMENT. PT WAS REFERRED FROM [facility] WHERE SHE WAS ADMITTED ON 10/17/2022 THROUGH THE ED FOR WEAKNESS AND DEBILITY, DEHYDRATION AND WEIGHT LOSS WITH INCREASED WEAKNESS AND MALAISE FOR 3 WEEKS WITH SEVERAL FALLS AT HOME. PT'S  HAD BEEN ILL AND UNABLE TO FULLY CARE FOR HER DURING THIS TIME AND SINCE HAS RECOVERED AND IS SUPPORTIVE. PT HAD A HEMORRHAGIC CVA IN 2021 AND HAS REQUIRED  MULTIPLE EPISODES OF INPATIENT AND OUTPATIENT REHABILITATION WITH THE MOST RECENT OUTPATIENT THERAPY COMPLETED IN 6/2022. PT HAS HYPERTENSION WHICH IS INTERMITTENTLY POORLY CONTROLLED AND PT IS UNDER CONSIDERABLE STRESS AT HOME DUE TO FAMILY DYNAMICS.  PT IMPROVED DURING HOSPITALIZATION WITH IVF HYDRATION ; BUT HAS PERSISTENT MUSCLE WEAKNESS AND DEBILITY, AND WILL REQUIRE SWING BED FOR REHABILITATION. PT HAS BEEN SCREENED PER PT/OT WITH RECOMMENDATION FOR REHABILITATION DUE TO WEAKNESS AND DIFFICULTY AMBULATING AND PERFORMING ADLS. PT REQUIRED EXTENSIVE ASSISTANCE FOR ADLS AND MOBILITY COMPARED TO BEING INDEPENDENT PRIOR TO RECENT DECLINE.  PT WILL BE EVALUATED PER PT/OT AND A TREATMENT PLAN WILL BE INITIATED. THE PHARMACIST WILL REVIEW MEDICATIONS AND MAKE RECOMMENDATIONS. PT WILL SEE THE DIETICIAN  FOR NUTRITIONAL SUPPORT WITH WEIGHT 73 KG AND ALBUMIN OF 3.9. PT'S ABNORMAL ADMITTING LABS ARE: CMP:CA 8.1, K 3.1, AST 52 AND ALK PHOS 119, CBC:HCT 37.2,  K, UA:SMALL BLOOD. PT WILL HAVE WEEKLY LABS.    Past Medical History:   Diagnosis Date    Cerebral hemorrhage     Chronic anxiety     Dehydration     Depression     Dysphagia     Due to and following non-traumatic intracerebral hemorrhage    Hearing loss     History of CVA (cerebrovascular accident)     Hypertension     Insomnia     Intrapontine hemorrhage     Left hemiparesis     Peripheral neuropathy     Stroke     Thyroid disease      Transient cerebral ischemia       CXR: MILD PROMINENCE OF INTERSTITIAL MARKINGS      PT CODE STATUS IS FULL CODE  PT COVID STATUS IS NEG  PT VTE PPX IS PLAVIX/TEDS/EARLY AMBULATION

## 2022-10-20 NOTE — PT/OT/SLP EVAL
Physical Therapy Evaluation    Patient Name:  Herlinda Valdovinos   MRN:  [id number]    Recommendations:     Discharge Recommendations:  home with home health   Discharge Equipment Recommendations: none   Barriers to discharge: None    Assessment:     Herlinda Valdovinos is a 57 y.o. female admitted with a medical diagnosis of Muscle weakness.  She presents with the following impairments/functional limitations:  weakness, impaired endurance, impaired self care skills, gait instability, impaired functional mobility, impaired balance, decreased lower extremity function, decreased safety awareness, impaired skin. Patient is performing functionally better compared to when she was screen.    Rehab Prognosis: Good and Fair; patient would benefit from acute skilled PT services to address these deficits and reach maximum level of function.    Recent Surgery: * No surgery found *      Plan:     During this hospitalization, patient to be seen 5 x/week to address the identified rehab impairments via gait training, therapeutic activities, therapeutic exercises, neuromuscular re-education and progress toward the following goals:    Plan of Care Expires:  11/10/22    Subjective     Chief Complaint: Weakness  Patient/Family Comments/goals: Return home more functional  Pain/Comfort:       Patients cultural, spiritual, Adventist conflicts given the current situation: no    Living Environment:  Patient lives at home with . Prior to admission, patients level of function was Mod I.  Equipment used at home: walker, rolling, wheelchair, bedside commode.  DME owned (not currently used): none.  Upon discharge, patient will have assistance from .    Objective:     Communicated with OT prior to session.  Patient found supine with bed alarm  upon PT entry to room.    General Precautions: Standard,     Orthopedic Precautions:    Braces:    Respiratory Status: Room air    Exams:  RLE ROM: WFL  RLE Strength: WFL  LLE ROM: WFL  LLE  Strength: WFL    Functional Mobility:  Bed Mobility:     Scooting: minimum assistance  Bridging: minimum assistance  Supine to Sit: minimum assistance  Sit to Supine: minimum assistance  Transfers:     Sit to Stand:  minimum assistance and moderate assistance with hand-held assist  Gait: Min to Mod assitance with FWW  Balance: Poor      AM-PAC 6 CLICK MOBILITY  Total Score:15       Patient left up in chair with call button in reach.    GOALS:   Multidisciplinary Problems       Physical Therapy Goals       Not on file                    History:     Past Medical History:   Diagnosis Date    Cerebral hemorrhage     Chronic anxiety     Dehydration     Depression     Dysphagia     Due to and following non-traumatic intracerebral hemorrhage    Hearing loss     History of CVA (cerebrovascular accident)     Hypertension     Insomnia     Intrapontine hemorrhage     Left hemiparesis     Peripheral neuropathy     Stroke     Thyroid disease     Transient cerebral ischemia        Past Surgical History:   Procedure Laterality Date    APPENDECTOMY      CHOLECYSTECTOMY      HYSTERECTOMY      LITHOTRIPSY      Renal lithotripsy    percutaneious insertion of ureteric stent         Time Tracking:     PT Received On: 10/20/22  PT Start Time: 1130     PT Stop Time: 1152  PT Total Time (min): 22 min     Billable Minutes: Evaluation low      10/20/2022

## 2022-10-20 NOTE — ASSESSMENT & PLAN NOTE
- Continue synthroid  - Check TSH  10/18/2022   TSH 1.880  CONTINUE SYNTHROID  MCG DAILY  10/19/2022  CONTINUE POC ON DISCHARGE

## 2022-10-20 NOTE — ASSESSMENT & PLAN NOTE
PT HAS CHRONIC PAIN DISORDER  NORA [place]  10/19/2022  PT WILL BE DISCHARGED TO CONTINUE CHRONIC PAIN SYNDROME TREATMENT

## 2022-10-20 NOTE — ASSESSMENT & PLAN NOTE
PT HAS VTE RISK  VTE PPX  PLAVIX/TEDS/EARLY AMBULATION  10/19/2022  PT WILL BE DISCHARGED TO CONTINUE VTE PPX

## 2022-10-20 NOTE — ASSESSMENT & PLAN NOTE
- Fall precautions  - PT/OT screening  10/18/2022   CONTINUE POC  10/19/2022  PT WILL BE DISCHARGED TO SWING BED FOR PT/OT/REHABILITATION

## 2022-10-21 LAB — ZINC SERPL-MCNC: 65 MCG/DL (ref 60–106)

## 2022-10-21 PROCEDURE — 94761 N-INVAS EAR/PLS OXIMETRY MLT: CPT

## 2022-10-21 PROCEDURE — 99900035 HC TECH TIME PER 15 MIN (STAT)

## 2022-10-21 PROCEDURE — 97535 SELF CARE MNGMENT TRAINING: CPT

## 2022-10-21 PROCEDURE — 11000004 HC SNF PRIVATE

## 2022-10-21 PROCEDURE — 97116 GAIT TRAINING THERAPY: CPT | Mod: CQ

## 2022-10-21 PROCEDURE — 25000003 PHARM REV CODE 250: Performed by: NURSE PRACTITIONER

## 2022-10-21 RX ADMIN — GABAPENTIN 800 MG: 400 CAPSULE ORAL at 09:10

## 2022-10-21 RX ADMIN — ATORVASTATIN CALCIUM 40 MG: 40 TABLET, FILM COATED ORAL at 09:10

## 2022-10-21 RX ADMIN — CLOPIDOGREL BISULFATE 75 MG: 75 TABLET, FILM COATED ORAL at 09:10

## 2022-10-21 RX ADMIN — POTASSIUM CHLORIDE 40 MEQ: 1500 TABLET, EXTENDED RELEASE ORAL at 09:10

## 2022-10-21 RX ADMIN — HYDROCODONE BITARTRATE AND ACETAMINOPHEN 1 TABLET: 10; 325 TABLET ORAL at 03:10

## 2022-10-21 RX ADMIN — LEVOTHYROXINE SODIUM 125 MCG: 25 TABLET ORAL at 05:10

## 2022-10-21 RX ADMIN — TEMAZEPAM 30 MG: 15 CAPSULE ORAL at 09:10

## 2022-10-21 RX ADMIN — HYDROCODONE BITARTRATE AND ACETAMINOPHEN 1 TABLET: 10; 325 TABLET ORAL at 05:10

## 2022-10-21 RX ADMIN — SERTRALINE HYDROCHLORIDE 50 MG: 50 TABLET ORAL at 09:10

## 2022-10-21 RX ADMIN — VALSARTAN 160 MG: 160 TABLET, FILM COATED ORAL at 09:10

## 2022-10-21 RX ADMIN — AMLODIPINE BESYLATE 10 MG: 5 TABLET ORAL at 09:10

## 2022-10-21 RX ADMIN — PANTOPRAZOLE SODIUM 40 MG: 40 TABLET, DELAYED RELEASE ORAL at 09:10

## 2022-10-21 NOTE — PT/OT/SLP PROGRESS
Occupational Therapy   Treatment    Name: Herlinda Valdovinos  MRN: [id number]  Admitting Diagnosis:  Muscle weakness       Recommendations:     Discharge Recommendations: home health PT, home health OT  Discharge Equipment Recommendations:     Barriers to discharge:       Assessment:     Herlinda Valdovinos is a 57 y.o. female with a medical diagnosis of Muscle weakness.  She presents with performance deficits affecting function are weakness, impaired endurance, impaired self care skills, impaired functional mobility, gait instability, impaired balance, decreased coordination.     Rehab Prognosis:  Good; patient would benefit from acute skilled OT services to address these deficits and reach maximum level of function.       Plan:     Patient to be seen 5 x/week to address the above listed problems via self-care/home management, therapeutic activities, therapeutic exercises, neuromuscular re-education  Plan of Care Expires: 11/17/22  Plan of Care Reviewed with: patient, spouse    Subjective     Pain/Comfort:  Pain Rating 1: 0/10  Pain Rating Post-Intervention 1: 0/10    Objective:     Communicated with: Nurse prior to session.  Patient found supine with  (daughter present) upon OT entry to room.    General Precautions: Standard, fall   Orthopedic Precautions:    Braces:    Respiratory Status: Room air     Occupational Performance:     Bed Mobility:    Patient completed Scooting/Bridging with contact guard assistance  Patient completed Supine to Sit with contact guard assistance  Patient completed Sit to Supine with contact guard assistance     Functional Mobility/Transfers:  Patient completed Sit <> Stand Transfer with contact guard assistance and minimum assistance  with  rolling walker   Patient completed Bed <> Chair Transfer using Step Transfer technique with contact guard assistance and minimum assistance with rolling walker  Functional Mobility: x 25 feet with RW with CGA    Activities of Daily Living:  Upper Body  Dressing: contact guard assistance seated  Lower Body Dressing: minimum assistance sitting then standing to pull pants over hips.       [facility] 6 Click ADL: 20    Treatment & Education:  Performed UB dressing sitting EOB with set-up and CGA. Donned pants sitting EOB with Juanis to thread Les through pants and Juanis to pull over hips while standing.     Patient left supine with call button in reach and daughter present    GOALS:   Multidisciplinary Problems       Occupational Therapy Goals          Problem: Occupational Therapy    Goal Priority Disciplines Outcome Interventions   Occupational Therapy Goal     OT, PT/OT Ongoing, Progressing    Description: Goals to be met by: 2 weeks     Patient will increase functional independence with ADLs by performing:    LE Dressing with Contact Guard Assistance.  Toileting from toilet with Contact Guard Assistance for hygiene and clothing management.   Bathing from  shower chair/bench with Contact Guard Assistance.  Toilet transfer to toilet with Contact Guard Assistance.    Goals to be met by: 4 weeks      Patient will increase functional independence with ADLs by performing:    LE Dressing with Modified [place].  Toileting from toilet with Modified [place] for hygiene and clothing management.   Bathing from  shower chair/bench with Modified [place].  Toilet transfer to toilet with Modified [place].  Light IADLs Tiffany.                            Time Tracking:     OT Date of Treatment: 10/21/22  OT Start Time: 1328  OT Stop Time: 1348  OT Total Time (min): 20 min    Billable Minutes:Self Care/Home Management 10    MARCUS Levy, OTR/L    OT/HEMAL: OT          10/21/2022

## 2022-10-21 NOTE — PLAN OF CARE
Problem: Occupational Therapy  Goal: Occupational Therapy Goal  Description: Goals to be met by: 2 weeks     Patient will increase functional independence with ADLs by performing:    LE Dressing with Contact Guard Assistance.  Toileting from toilet with Contact Guard Assistance for hygiene and clothing management.   Bathing from  shower chair/bench with Contact Guard Assistance.  Toilet transfer to toilet with Contact Guard Assistance.    Goals to be met by: 4 weeks      Patient will increase functional independence with ADLs by performing:    LE Dressing with Modified [place].  Toileting from toilet with Modified [place] for hygiene and clothing management.   Bathing from  shower chair/bench with Modified [place].  Toilet transfer to toilet with Modified [place].  Light IADLs Tiffany.       Outcome: Ongoing, Progressing     Kiana Rasmussen, OTD, OTR/L  10/20/22

## 2022-10-21 NOTE — PT/OT/SLP PROGRESS
Physical Therapy Treatment    Patient Name:  Herlinda Valdovinos   MRN:  [id number]    Recommendations:     Discharge Recommendations:  home with home health   Discharge Equipment Recommendations: none   Barriers to discharge: None    Assessment:     Herlinda Valdovinos is a 57 y.o. female admitted with a medical diagnosis of Muscle weakness.  She presents with the following impairments/functional limitations:    weakness, impaired endurance, impaired self care skills, gait instability, impaired functional mobility, impaired balance, decreased lower extremity function, decreased safety awareness, impaired skin. Patient agreed to participate in therapy treatment, doing so without complaint.    Rehab Prognosis: Good and Fair; patient would benefit from acute skilled PT services to address these deficits and reach maximum level of function.    Recent Surgery: * No surgery found *      Plan:     During this hospitalization, patient to be seen 5 x/week to address the identified rehab impairments via therapeutic exercises, therapeutic activities, gait training and progress toward the following goals:    Plan of Care Expires:  11/10/22    Subjective     Chief Complaint: weakness  Patient/Family Comments/goals: return home more functional  Pain/Comfort:  Pain Rating 1: 8/10 (only while ambulating)  Location - Side 1: Left  Location 1: ankle  Pain Addressed 1: Pre-medicate for activity, Cessation of Activity  Pain Rating Post-Intervention 1: 8/10      Objective:     Communicated with OT prior to session.  Patient found HOB elevated with   upon PT entry to room.     General Precautions: Standard, fall   Orthopedic Precautions:    Braces:    Respiratory Status: Room air     Functional Mobility:  Bed Mobility:     Supine to Sit: minimum assistance  Transfers:     Sit to Stand:  minimum assistance with rolling walker  Gait: Patient ambulated 50 feet using RW and CGA      AM-PAC 6 CLICK MOBILITY  Turning over in bed (including adjusting  bedclothes, sheets and blankets)?: 3  Sitting down on and standing up from a chair with arms (e.g., wheelchair, bedside commode, etc.): 3  Moving from lying on back to sitting on the side of the bed?: 3  Moving to and from a bed to a chair (including a wheelchair)?: 3  Need to walk in hospital room?: 3  Climbing 3-5 steps with a railing?: 2  Basic Mobility Total Score: 17       Treatment & Education:  Patient performed gait and transfers only today    Patient left sitting edge of bed with call button in reach and daughter present..    GOALS:   Multidisciplinary Problems       Physical Therapy Goals       Not on file                    Time Tracking:     PT Received On: 10/21/22  PT Start Time: 1328     PT Stop Time: 1348  PT Total Time (min): 20 min     Billable Minutes: Gait Training 10    Treatment Type: Treatment  PT/PTA: PTA     PTA Visit Number: 1     10/21/2022

## 2022-10-21 NOTE — PT/OT/SLP EVAL
Occupational Therapy   Evaluation    Name: Herlinda Valdovinos  MRN: [id number]  Admitting Diagnosis:  Muscle weakness  Recent Surgery: * No surgery found *      Recommendations:     Discharge Recommendations: home health PT, home health OT  Discharge Equipment Recommendations:     Barriers to discharge:       Assessment:     Herlinda Valdovinos is a 57 y.o. female with a medical diagnosis of Muscle weakness.  She presents with weakness following recent hospitalization d/t frequent falls and increasing weakness. Pt sustained CVA April '21 with L sided weakness. Performance deficits affecting function: weakness, impaired endurance, impaired self care skills, impaired functional mobility, gait instability, impaired balance, decreased coordination.      Rehab Prognosis: Good; patient would benefit from acute skilled OT services to address these deficits and reach maximum level of function.       Plan:     Patient to be seen 5 x/week to address the above listed problems via self-care/home management, therapeutic activities, therapeutic exercises, neuromuscular re-education  Plan of Care Expires: 11/17/22  Plan of Care Reviewed with: patient, spouse    Subjective     Chief Complaint: Weakness   Patient/Family Comments/goals: Return home requiring no assistance from spouse for ADLs and some light IADLs.     Occupational Profile:  Living Environment: Lives with spouse in 1 level home.   Previous level of function: Received assist as needed from spouse.   Roles and Routines: Spouse performs most IADLs.   Equipment Used at Home:  walker, rolling, bedside commode, wheelchair  Assistance upon Discharge: Will have assist from spouse when returning home    Pain/Comfort:   None reported.    Patients cultural, spiritual, Judaism conflicts given the current situation: no    Objective:     Communicated with: Nurse prior to session.  Patient found supine with   upon OT entry to room.    General Precautions: Standard, fall   Orthopedic  Precautions:    Braces:    Respiratory Status: Nasal cannula, flow 2 L/min    Occupational Performance:    Bed Mobility:    Patient completed Scooting/Bridging with minimum assistance  Patient completed Supine to Sit with minimum assistance  Patient completed Sit to Supine with stand by assistance    Functional Mobility/Transfers:  Patient completed Sit <> Stand Transfer with minimum assistance  with  hand-held assist   Functional Mobility: x 10 feet in room with hand held assist    Activities of Daily Living:  Lower Body Dressing: moderate assistance sitting/standing  Toileting: moderate assistance in bathroom    Cognitive/Visual Perceptual:  Cognitive/Psychosocial Skills:     -       Oriented to: Person, Place, Time, and Situation   -       Follows Commands/attention:Follows multistep  commands    Physical Exam:  Upper Extremity Range of Motion:     -       Right Upper Extremity: WFL  -       Left Upper Extremity: WFL  Upper Extremity Strength:    -       Right Upper Extremity: WFL  -       Left Upper Extremity: 3/5   Strength:    -       Right Upper Extremity: WFL  -       Left Upper Extremity: Fair  Fine Motor Coordination:    -       Impaired  L UE manipulation, translation.   Gross motor coordination:   hand-eye coordination fair L UE    AMPAC 6 Click ADL:  AMPAC Total Score:  16    Treatment & Education:  Pt educated on OT role/POC.   Importance of OOB activity with staff assistance.  Importance of sitting up in the chair throughout the day as tolerated, especially for meals   Safety during functional t/f and mobility  Importance of assisting with self-care activities      Patient left supine with call button in reach    GOALS:   Multidisciplinary Problems       Occupational Therapy Goals          Problem: Occupational Therapy    Goal Priority Disciplines Outcome Interventions   Occupational Therapy Goal     OT, PT/OT Ongoing, Progressing    Description: Goals to be met by: 2 weeks     Patient will  increase functional independence with ADLs by performing:    LE Dressing with Contact Guard Assistance.  Toileting from toilet with Contact Guard Assistance for hygiene and clothing management.   Bathing from  shower chair/bench with Contact Guard Assistance.  Toilet transfer to toilet with Contact Guard Assistance.    Goals to be met by: 4 weeks      Patient will increase functional independence with ADLs by performing:    LE Dressing with Modified [place].  Toileting from toilet with Modified [place] for hygiene and clothing management.   Bathing from  shower chair/bench with Modified [place].  Toilet transfer to toilet with Modified [place].  Light IADLs [place].                            History:     Past Medical History:   Diagnosis Date    Cerebral hemorrhage     Chronic anxiety     Dehydration     Depression     Dysphagia     Due to and following non-traumatic intracerebral hemorrhage    Hearing loss     History of CVA (cerebrovascular accident)     Hypertension     Insomnia     Intrapontine hemorrhage     Left hemiparesis     Peripheral neuropathy     Stroke     Thyroid disease     Transient cerebral ischemia          Past Surgical History:   Procedure Laterality Date    APPENDECTOMY      CHOLECYSTECTOMY      HYSTERECTOMY      LITHOTRIPSY      Renal lithotripsy    percutaneious insertion of ureteric stent         Time Tracking:     OT Date of Treatment: 10/20/22  OT Start Time: 1131  OT Stop Time: 1152  OT Total Time (min): 21 min    Billable Minutes:Evaluation 21    MARCUS Levy, OTR/L    10/20/2022

## 2022-10-21 NOTE — PLAN OF CARE
Problem: Adult Inpatient Plan of Care  Goal: Plan of Care Review  Outcome: Ongoing, Progressing  Goal: Patient-Specific Goal (Individualized)  Outcome: Ongoing, Progressing  Goal: Absence of Hospital-Acquired Illness or Injury  Outcome: Ongoing, Progressing  Goal: Optimal Comfort and Wellbeing  Outcome: Ongoing, Progressing  Goal: Readiness for Transition of Care  Outcome: Ongoing, Progressing     Problem: Diabetes Comorbidity  Goal: Blood Glucose Level Within Targeted Range  Outcome: Ongoing, Progressing     Problem: Impaired Wound Healing  Goal: Optimal Wound Healing  Outcome: Ongoing, Progressing     Problem: Skin Injury Risk Increased  Goal: Skin Health and Integrity  Outcome: Ongoing, Progressing

## 2022-10-22 PROCEDURE — 27000221 HC OXYGEN, UP TO 24 HOURS

## 2022-10-22 PROCEDURE — 27000944

## 2022-10-22 PROCEDURE — 99900035 HC TECH TIME PER 15 MIN (STAT)

## 2022-10-22 PROCEDURE — 25000003 PHARM REV CODE 250: Performed by: NURSE PRACTITIONER

## 2022-10-22 PROCEDURE — 94761 N-INVAS EAR/PLS OXIMETRY MLT: CPT

## 2022-10-22 PROCEDURE — 11000004 HC SNF PRIVATE

## 2022-10-22 RX ORDER — ONDANSETRON 4 MG/1
4 TABLET, ORALLY DISINTEGRATING ORAL EVERY 6 HOURS PRN
Status: DISCONTINUED | OUTPATIENT
Start: 2022-10-22 | End: 2022-11-10 | Stop reason: HOSPADM

## 2022-10-22 RX ADMIN — ATORVASTATIN CALCIUM 40 MG: 40 TABLET, FILM COATED ORAL at 09:10

## 2022-10-22 RX ADMIN — CLOPIDOGREL BISULFATE 75 MG: 75 TABLET, FILM COATED ORAL at 09:10

## 2022-10-22 RX ADMIN — LORAZEPAM 0.5 MG: 0.5 TABLET ORAL at 05:10

## 2022-10-22 RX ADMIN — TEMAZEPAM 30 MG: 15 CAPSULE ORAL at 09:10

## 2022-10-22 RX ADMIN — GABAPENTIN 800 MG: 400 CAPSULE ORAL at 09:10

## 2022-10-22 RX ADMIN — ONDANSETRON 4 MG: 4 TABLET, ORALLY DISINTEGRATING ORAL at 10:10

## 2022-10-22 RX ADMIN — AMLODIPINE BESYLATE 10 MG: 5 TABLET ORAL at 09:10

## 2022-10-22 RX ADMIN — HYDROCODONE BITARTRATE AND ACETAMINOPHEN 1 TABLET: 10; 325 TABLET ORAL at 09:10

## 2022-10-22 RX ADMIN — SERTRALINE HYDROCHLORIDE 50 MG: 50 TABLET ORAL at 09:10

## 2022-10-22 RX ADMIN — LEVOTHYROXINE SODIUM 125 MCG: 25 TABLET ORAL at 05:10

## 2022-10-22 RX ADMIN — PANTOPRAZOLE SODIUM 40 MG: 40 TABLET, DELAYED RELEASE ORAL at 09:10

## 2022-10-22 RX ADMIN — POTASSIUM CHLORIDE 40 MEQ: 1500 TABLET, EXTENDED RELEASE ORAL at 09:10

## 2022-10-22 RX ADMIN — VALSARTAN 160 MG: 160 TABLET, FILM COATED ORAL at 09:10

## 2022-10-22 NOTE — PLAN OF CARE
Problem: Adult Inpatient Plan of Care  Goal: Optimal Comfort and Wellbeing  Outcome: Ongoing, Progressing     Problem: Impaired Wound Healing  Goal: Optimal Wound Healing  Outcome: Ongoing, Progressing     Problem: Skin Injury Risk Increased  Goal: Skin Health and Integrity  Outcome: Ongoing, Progressing

## 2022-10-23 PROCEDURE — 99900035 HC TECH TIME PER 15 MIN (STAT)

## 2022-10-23 PROCEDURE — 27000221 HC OXYGEN, UP TO 24 HOURS

## 2022-10-23 PROCEDURE — 11000004 HC SNF PRIVATE

## 2022-10-23 PROCEDURE — 25000003 PHARM REV CODE 250: Performed by: NURSE PRACTITIONER

## 2022-10-23 PROCEDURE — 27000944

## 2022-10-23 PROCEDURE — 94761 N-INVAS EAR/PLS OXIMETRY MLT: CPT

## 2022-10-23 PROCEDURE — 63600175 PHARM REV CODE 636 W HCPCS: Performed by: EMERGENCY MEDICINE

## 2022-10-23 RX ORDER — PROMETHAZINE HYDROCHLORIDE 25 MG/ML
12.5 INJECTION, SOLUTION INTRAMUSCULAR; INTRAVENOUS ONCE
Status: COMPLETED | OUTPATIENT
Start: 2022-10-23 | End: 2022-10-23

## 2022-10-23 RX ADMIN — CLOPIDOGREL BISULFATE 75 MG: 75 TABLET, FILM COATED ORAL at 09:10

## 2022-10-23 RX ADMIN — GABAPENTIN 800 MG: 400 CAPSULE ORAL at 09:10

## 2022-10-23 RX ADMIN — LEVOTHYROXINE SODIUM 125 MCG: 25 TABLET ORAL at 06:10

## 2022-10-23 RX ADMIN — ONDANSETRON 4 MG: 4 TABLET, ORALLY DISINTEGRATING ORAL at 03:10

## 2022-10-23 RX ADMIN — TEMAZEPAM 30 MG: 15 CAPSULE ORAL at 09:10

## 2022-10-23 RX ADMIN — HYDROCODONE BITARTRATE AND ACETAMINOPHEN 1 TABLET: 10; 325 TABLET ORAL at 11:10

## 2022-10-23 RX ADMIN — POTASSIUM CHLORIDE 40 MEQ: 1500 TABLET, EXTENDED RELEASE ORAL at 09:10

## 2022-10-23 RX ADMIN — PROMETHAZINE HYDROCHLORIDE 12.5 MG: 25 INJECTION INTRAMUSCULAR; INTRAVENOUS at 07:10

## 2022-10-23 RX ADMIN — SERTRALINE HYDROCHLORIDE 50 MG: 50 TABLET ORAL at 09:10

## 2022-10-23 RX ADMIN — VALSARTAN 160 MG: 160 TABLET, FILM COATED ORAL at 09:10

## 2022-10-23 RX ADMIN — AMLODIPINE BESYLATE 10 MG: 5 TABLET ORAL at 09:10

## 2022-10-23 RX ADMIN — PANTOPRAZOLE SODIUM 40 MG: 40 TABLET, DELAYED RELEASE ORAL at 09:10

## 2022-10-23 RX ADMIN — ATORVASTATIN CALCIUM 40 MG: 40 TABLET, FILM COATED ORAL at 09:10

## 2022-10-23 NOTE — NURSING
Patient has an order for a waffle overlay. There were not any in supply room. Will leave note for nilda to order some more.

## 2022-10-23 NOTE — PLAN OF CARE
Problem: Adult Inpatient Plan of Care  Goal: Plan of Care Review  Flowsheets (Taken 10/22/2022 2352)  Plan of Care Reviewed With: patient  Goal: Patient-Specific Goal (Individualized)  Flowsheets (Taken 10/22/2022 2352)  Anxieties, Fears or Concerns: none  Individualized Care Needs: To be able to become more independent  Patient-Specific Goals (Include Timeframe): To be able to perform ADL's in the next 2 weeks

## 2022-10-23 NOTE — NURSING
Called to patients room because she had vomited on her gown. She said that she was very nauseated. Zofran SL4mg po prn was given for nausea/vomiting. Dr Grider was notified. She stated to just watch her for now. Will cont to monitor.

## 2022-10-23 NOTE — NURSING
Patient walked from her room, pass the fire doors to about where the code cart is located. She used her walker with assist from this nurse and CNA. She was wearing her gait belt for safety. She was feeling a little tired so we turned around and went back to her room. Will cont to monitor.

## 2022-10-24 LAB
ANION GAP SERPL CALCULATED.3IONS-SCNC: 15 MMOL/L (ref 7–16)
BASOPHILS # BLD AUTO: 0.02 K/UL (ref 0–0.2)
BASOPHILS NFR BLD AUTO: 0.1 % (ref 0–1)
BUN SERPL-MCNC: 37 MG/DL (ref 7–18)
BUN/CREAT SERPL: 26 (ref 6–20)
CALCIUM SERPL-MCNC: 9.7 MG/DL (ref 8.5–10.1)
CHLORIDE SERPL-SCNC: 100 MMOL/L (ref 98–107)
CO2 SERPL-SCNC: 30 MMOL/L (ref 21–32)
CREAT SERPL-MCNC: 1.43 MG/DL (ref 0.55–1.02)
DIFFERENTIAL METHOD BLD: ABNORMAL
EGFR (NO RACE VARIABLE) (RUSH/TITUS): 43 ML/MIN/1.73M²
EOSINOPHIL # BLD AUTO: 0.45 K/UL (ref 0–0.5)
EOSINOPHIL NFR BLD AUTO: 2.9 % (ref 1–4)
ERYTHROCYTE [DISTWIDTH] IN BLOOD BY AUTOMATED COUNT: 15.6 % (ref 11.5–14.5)
FLUAV AG UPPER RESP QL IA.RAPID: NEGATIVE
FLUBV AG UPPER RESP QL IA.RAPID: NEGATIVE
GLUCOSE SERPL-MCNC: 134 MG/DL (ref 74–106)
HCT VFR BLD AUTO: 44.7 % (ref 38–47)
HGB BLD-MCNC: 15.3 G/DL (ref 12–16)
LYMPHOCYTES # BLD AUTO: 3.17 K/UL (ref 1–4.8)
LYMPHOCYTES NFR BLD AUTO: 20.3 % (ref 27–41)
MCH RBC QN AUTO: 28.5 PG (ref 27–31)
MCHC RBC AUTO-ENTMCNC: 34.2 G/DL (ref 32–36)
MCV RBC AUTO: 83.4 FL (ref 80–96)
MONOCYTES # BLD AUTO: 1.44 K/UL (ref 0–0.8)
MONOCYTES NFR BLD AUTO: 9.2 % (ref 2–6)
MPC BLD CALC-MCNC: 9.8 FL (ref 9.4–12.4)
NEUTROPHILS # BLD AUTO: 10.54 K/UL (ref 1.8–7.7)
NEUTROPHILS NFR BLD AUTO: 67.5 % (ref 53–65)
PLATELET # BLD AUTO: 243 K/UL (ref 150–400)
POTASSIUM SERPL-SCNC: 5.9 MMOL/L (ref 3.5–5.1)
RBC # BLD AUTO: 5.36 M/UL (ref 4.2–5.4)
SODIUM SERPL-SCNC: 139 MMOL/L (ref 136–145)
WBC # BLD AUTO: 15.62 K/UL (ref 4.5–11)

## 2022-10-24 PROCEDURE — 85025 COMPLETE CBC W/AUTO DIFF WBC: CPT | Performed by: NURSE PRACTITIONER

## 2022-10-24 PROCEDURE — 36415 COLL VENOUS BLD VENIPUNCTURE: CPT | Performed by: NURSE PRACTITIONER

## 2022-10-24 PROCEDURE — 11000004 HC SNF PRIVATE

## 2022-10-24 PROCEDURE — 94761 N-INVAS EAR/PLS OXIMETRY MLT: CPT

## 2022-10-24 PROCEDURE — 63600175 PHARM REV CODE 636 W HCPCS: Performed by: NURSE PRACTITIONER

## 2022-10-24 PROCEDURE — 99900035 HC TECH TIME PER 15 MIN (STAT)

## 2022-10-24 PROCEDURE — 27000221 HC OXYGEN, UP TO 24 HOURS

## 2022-10-24 PROCEDURE — 25000003 PHARM REV CODE 250: Performed by: NURSE PRACTITIONER

## 2022-10-24 PROCEDURE — 80048 BASIC METABOLIC PNL TOTAL CA: CPT | Performed by: NURSE PRACTITIONER

## 2022-10-24 PROCEDURE — 87804 INFLUENZA ASSAY W/OPTIC: CPT | Performed by: NURSE PRACTITIONER

## 2022-10-24 RX ORDER — ONDANSETRON 2 MG/ML
4 INJECTION INTRAMUSCULAR; INTRAVENOUS ONCE
Status: COMPLETED | OUTPATIENT
Start: 2022-10-24 | End: 2022-10-24

## 2022-10-24 RX ADMIN — ONDANSETRON 4 MG: 4 TABLET, ORALLY DISINTEGRATING ORAL at 03:10

## 2022-10-24 RX ADMIN — SODIUM CHLORIDE 500 ML: 9 INJECTION, SOLUTION INTRAVENOUS at 04:10

## 2022-10-24 RX ADMIN — PANTOPRAZOLE SODIUM 40 MG: 40 TABLET, DELAYED RELEASE ORAL at 09:10

## 2022-10-24 RX ADMIN — ATORVASTATIN CALCIUM 40 MG: 40 TABLET, FILM COATED ORAL at 08:10

## 2022-10-24 RX ADMIN — TEMAZEPAM 30 MG: 15 CAPSULE ORAL at 08:10

## 2022-10-24 RX ADMIN — CLOPIDOGREL BISULFATE 75 MG: 75 TABLET, FILM COATED ORAL at 09:10

## 2022-10-24 RX ADMIN — GABAPENTIN 800 MG: 400 CAPSULE ORAL at 09:10

## 2022-10-24 RX ADMIN — POTASSIUM CHLORIDE 40 MEQ: 1500 TABLET, EXTENDED RELEASE ORAL at 09:10

## 2022-10-24 RX ADMIN — AMLODIPINE BESYLATE 10 MG: 5 TABLET ORAL at 09:10

## 2022-10-24 RX ADMIN — VALSARTAN 160 MG: 160 TABLET, FILM COATED ORAL at 09:10

## 2022-10-24 RX ADMIN — SERTRALINE HYDROCHLORIDE 50 MG: 50 TABLET ORAL at 09:10

## 2022-10-24 RX ADMIN — LEVOTHYROXINE SODIUM 125 MCG: 25 TABLET ORAL at 05:10

## 2022-10-24 RX ADMIN — ONDANSETRON 4 MG: 2 INJECTION INTRAMUSCULAR; INTRAVENOUS at 04:10

## 2022-10-24 RX ADMIN — GABAPENTIN 800 MG: 400 CAPSULE ORAL at 08:10

## 2022-10-24 NOTE — PT/OT/SLP PROGRESS
Occupational Therapy      Patient Name:  Herlinda Valdovinos   MRN:  [id number]    Patient not seen today secondary to Nausea/vomiting. Patient was laying supine in bed upon MUNIZ arrival. Patient stated she was not feeling well that she has been nauseated/vomiting and just wasn't up to doing therapy today. After MAX encouragement, patient refused getting up to chair, sitting EOB and in bed exercises. Will follow-up 10/25/2022.    HEMAL Gusman  10/24/2022  2:05 PM

## 2022-10-24 NOTE — PLAN OF CARE
Problem: Adult Inpatient Plan of Care  Goal: Plan of Care Review  Outcome: Ongoing, Progressing  Goal: Patient-Specific Goal (Individualized)  Outcome: Ongoing, Progressing  Flowsheets (Taken 10/24/2022 3167)  Anxieties, Fears or Concerns: none  Individualized Care Needs: To be able to remain free from nausea  Patient-Specific Goals (Include Timeframe): To feel better within the next 2 days

## 2022-10-24 NOTE — PT/OT/SLP PROGRESS
Physical Therapy      Patient Name:  Herlinda Valdovinos   MRN:  [id number]    Patient not seen today secondary to patient unwilling to participate due nausea/vomiting and not feeling up to therapy treatment. Will follow-up 10/25/2022.

## 2022-10-25 LAB
BACTERIA #/AREA URNS HPF: ABNORMAL /HPF
BILIRUB UR QL STRIP: ABNORMAL
C COLI+JEJ+UPSA DNA STL QL NAA+NON-PROBE: NEGATIVE
CLARITY UR: ABNORMAL
COLOR UR: ABNORMAL
E COLI SXT1 STL QL IA: NEGATIVE
E COLI SXT2 STL QL IA: NEGATIVE
GLUCOSE UR STRIP-MCNC: NEGATIVE MG/DL
KETONES UR STRIP-SCNC: NEGATIVE MG/DL
LEUKOCYTE ESTERASE UR QL STRIP: ABNORMAL
NITRITE UR QL STRIP: NEGATIVE
NOROVIRUS GI+II RNA STL QL NAA+NON-PROBE: NEGATIVE
PH UR STRIP: 5 PH UNITS
PROT UR QL STRIP: 30
RBC # UR STRIP: ABNORMAL /UL
RBC #/AREA URNS HPF: ABNORMAL /HPF
RVA RNA STL QL NAA+NON-PROBE: NEGATIVE
S ENT+BONG DNA STL QL NAA+NON-PROBE: NEGATIVE
SHIGELLA SPECIES NAT: NEGATIVE
SP GR UR STRIP: 1.02
SQUAMOUS #/AREA URNS LPF: ABNORMAL /LPF
UROBILINOGEN UR STRIP-ACNC: 0.2 MG/DL
V CHOL+PARA+VUL DNA STL QL NAA+NON-PROBE: NEGATIVE
WBC #/AREA URNS HPF: ABNORMAL /HPF
Y ENTEROCOL DNA STL QL NAA+NON-PROBE: NEGATIVE

## 2022-10-25 PROCEDURE — 11000004 HC SNF PRIVATE

## 2022-10-25 PROCEDURE — 87186 SC STD MICRODIL/AGAR DIL: CPT | Performed by: EMERGENCY MEDICINE

## 2022-10-25 PROCEDURE — 25000003 PHARM REV CODE 250: Performed by: EMERGENCY MEDICINE

## 2022-10-25 PROCEDURE — 25000003 PHARM REV CODE 250: Performed by: NURSE PRACTITIONER

## 2022-10-25 PROCEDURE — 87077 CULTURE AEROBIC IDENTIFY: CPT | Performed by: EMERGENCY MEDICINE

## 2022-10-25 PROCEDURE — 27000221 HC OXYGEN, UP TO 24 HOURS

## 2022-10-25 PROCEDURE — 94761 N-INVAS EAR/PLS OXIMETRY MLT: CPT

## 2022-10-25 PROCEDURE — 87506 IADNA-DNA/RNA PROBE TQ 6-11: CPT | Performed by: EMERGENCY MEDICINE

## 2022-10-25 PROCEDURE — 81001 URINALYSIS AUTO W/SCOPE: CPT | Performed by: EMERGENCY MEDICINE

## 2022-10-25 PROCEDURE — 63600175 PHARM REV CODE 636 W HCPCS: Performed by: NURSE PRACTITIONER

## 2022-10-25 PROCEDURE — 63700000 PHARM REV CODE 250 ALT 637 W/O HCPCS: Performed by: EMERGENCY MEDICINE

## 2022-10-25 PROCEDURE — 87493 C DIFF AMPLIFIED PROBE: CPT | Performed by: EMERGENCY MEDICINE

## 2022-10-25 PROCEDURE — 99900035 HC TECH TIME PER 15 MIN (STAT)

## 2022-10-25 RX ORDER — ONDANSETRON 2 MG/ML
4 INJECTION INTRAMUSCULAR; INTRAVENOUS ONCE
Status: COMPLETED | OUTPATIENT
Start: 2022-10-25 | End: 2022-10-26

## 2022-10-25 RX ORDER — FLUCONAZOLE 100 MG/1
100 TABLET ORAL DAILY
Status: COMPLETED | OUTPATIENT
Start: 2022-10-25 | End: 2022-10-29

## 2022-10-25 RX ORDER — DOXYLAMINE SUCCINATE 25 MG
TABLET ORAL 2 TIMES DAILY
Status: DISCONTINUED | OUTPATIENT
Start: 2022-10-25 | End: 2022-11-10 | Stop reason: HOSPADM

## 2022-10-25 RX ORDER — TEMAZEPAM 30 MG/1
30 CAPSULE ORAL NIGHTLY PRN
Status: DISCONTINUED | OUTPATIENT
Start: 2022-10-25 | End: 2022-10-26

## 2022-10-25 RX ADMIN — ATORVASTATIN CALCIUM 40 MG: 40 TABLET, FILM COATED ORAL at 09:10

## 2022-10-25 RX ADMIN — FLUCONAZOLE 100 MG: 100 TABLET ORAL at 11:10

## 2022-10-25 RX ADMIN — POTASSIUM CHLORIDE 40 MEQ: 1500 TABLET, EXTENDED RELEASE ORAL at 09:10

## 2022-10-25 RX ADMIN — LEVOTHYROXINE SODIUM 125 MCG: 25 TABLET ORAL at 05:10

## 2022-10-25 RX ADMIN — SERTRALINE HYDROCHLORIDE 50 MG: 50 TABLET ORAL at 09:10

## 2022-10-25 RX ADMIN — GABAPENTIN 800 MG: 400 CAPSULE ORAL at 09:10

## 2022-10-25 RX ADMIN — ONDANSETRON 4 MG: 4 TABLET, ORALLY DISINTEGRATING ORAL at 06:10

## 2022-10-25 RX ADMIN — HYDROCODONE BITARTRATE AND ACETAMINOPHEN 1 TABLET: 10; 325 TABLET ORAL at 01:10

## 2022-10-25 RX ADMIN — CEFTRIAXONE 1 G: 1 INJECTION, POWDER, FOR SOLUTION INTRAMUSCULAR; INTRAVENOUS at 04:10

## 2022-10-25 RX ADMIN — TEMAZEPAM 30 MG: 30 CAPSULE ORAL at 11:10

## 2022-10-25 RX ADMIN — MICONAZOLE NITRATE: 20 CREAM TOPICAL at 01:10

## 2022-10-25 RX ADMIN — LORAZEPAM 0.5 MG: 0.5 TABLET ORAL at 05:10

## 2022-10-25 RX ADMIN — CLOPIDOGREL BISULFATE 75 MG: 75 TABLET, FILM COATED ORAL at 09:10

## 2022-10-25 RX ADMIN — PANTOPRAZOLE SODIUM 40 MG: 40 TABLET, DELAYED RELEASE ORAL at 09:10

## 2022-10-25 RX ADMIN — MICONAZOLE NITRATE: 20 CREAM TOPICAL at 09:10

## 2022-10-25 NOTE — PLAN OF CARE
Problem: Skin Injury Risk Increased  Goal: Skin Health and Integrity  Outcome: Ongoing, Progressing  Intervention: Optimize Skin Protection  Flowsheets (Taken 10/25/2022 1721)  Pressure Reduction Techniques:   frequent weight shift encouraged   weight shift assistance provided  Pressure Reduction Devices: positioning supports utilized  Head of Bed (HOB) Positioning: HOB elevated     Problem: Infection  Goal: Absence of Infection Signs and Symptoms  Outcome: Ongoing, Progressing  Intervention: Prevent or Manage Infection  Flowsheets (Taken 10/25/2022 1721)  Infection Management: aseptic technique maintained  Isolation Precautions:   precautions maintained   contact

## 2022-10-25 NOTE — PROGRESS NOTES
10/25/22 1239   Missed Time Reason   Missed Treatment Reason MD hold (Comment)   Pt placed on medical hold this day per MD, Dr. Grider due to vomiting, diarrhea, and abdominal discomfort. Will reattempt tx when MD deems pt medically appropriate.     Kiana Rasmussen, OTPURA, OTR/L  10/25/2022

## 2022-10-25 NOTE — PT/OT/SLP PROGRESS
Physical Therapy      Patient Name:  Herlinda Valdovinos   MRN:  [id number]    Pt placed on medical hold this day per MD, Dr. Grider due to vomiting, diarrhea, and abdominal discomfort. Will reattempt tx when MD deems pt medically appropriate.    RYAN Perkins  10/25/2022

## 2022-10-26 PROBLEM — E87.6 HYPOKALEMIA: Status: RESOLVED | Noted: 2022-10-20 | Resolved: 2022-10-26

## 2022-10-26 PROBLEM — R11.2 NAUSEA VOMITING AND DIARRHEA: Status: ACTIVE | Noted: 2022-10-26

## 2022-10-26 PROBLEM — R19.7 NAUSEA VOMITING AND DIARRHEA: Status: ACTIVE | Noted: 2022-10-26

## 2022-10-26 PROBLEM — N39.0 ACUTE URINARY TRACT INFECTION: Status: ACTIVE | Noted: 2022-10-26

## 2022-10-26 LAB
ALBUMIN SERPL BCP-MCNC: 3.2 G/DL (ref 3.5–5)
ALBUMIN/GLOB SERPL: 1 {RATIO}
ALP SERPL-CCNC: 120 U/L (ref 46–118)
ALT SERPL W P-5'-P-CCNC: 20 U/L (ref 13–56)
ANION GAP SERPL CALCULATED.3IONS-SCNC: 12 MMOL/L (ref 7–16)
AST SERPL W P-5'-P-CCNC: 30 U/L (ref 15–37)
BASOPHILS # BLD AUTO: 0.03 K/UL (ref 0–0.2)
BASOPHILS NFR BLD AUTO: 0.3 % (ref 0–1)
BILIRUB SERPL-MCNC: 0.7 MG/DL (ref ?–1.2)
BUN SERPL-MCNC: 51 MG/DL (ref 7–18)
BUN/CREAT SERPL: 27 (ref 6–20)
CALCIUM SERPL-MCNC: 8.7 MG/DL (ref 8.5–10.1)
CHLORIDE SERPL-SCNC: 101 MMOL/L (ref 98–107)
CO2 SERPL-SCNC: 27 MMOL/L (ref 21–32)
CREAT SERPL-MCNC: 1.88 MG/DL (ref 0.55–1.02)
DIFFERENTIAL METHOD BLD: ABNORMAL
EGFR (NO RACE VARIABLE) (RUSH/TITUS): 31 ML/MIN/1.73M²
EOSINOPHIL # BLD AUTO: 0.65 K/UL (ref 0–0.5)
EOSINOPHIL NFR BLD AUTO: 5.7 % (ref 1–4)
ERYTHROCYTE [DISTWIDTH] IN BLOOD BY AUTOMATED COUNT: 15.6 % (ref 11.5–14.5)
GLOBULIN SER-MCNC: 3.2 G/DL (ref 2–4)
GLUCOSE SERPL-MCNC: 118 MG/DL (ref 74–106)
GLUCOSE SERPL-MCNC: 121 MG/DL (ref 70–105)
HCT VFR BLD AUTO: 39 % (ref 38–47)
HGB BLD-MCNC: 12.9 G/DL (ref 12–16)
LYMPHOCYTES # BLD AUTO: 3.51 K/UL (ref 1–4.8)
LYMPHOCYTES NFR BLD AUTO: 30.6 % (ref 27–41)
MCH RBC QN AUTO: 28.5 PG (ref 27–31)
MCHC RBC AUTO-ENTMCNC: 33.1 G/DL (ref 32–36)
MCV RBC AUTO: 86.3 FL (ref 80–96)
MONOCYTES # BLD AUTO: 0.94 K/UL (ref 0–0.8)
MONOCYTES NFR BLD AUTO: 8.2 % (ref 2–6)
MPC BLD CALC-MCNC: 9.8 FL (ref 9.4–12.4)
NEUTROPHILS # BLD AUTO: 6.35 K/UL (ref 1.8–7.7)
NEUTROPHILS NFR BLD AUTO: 55.2 % (ref 53–65)
PLATELET # BLD AUTO: 201 K/UL (ref 150–400)
POTASSIUM SERPL-SCNC: 4 MMOL/L (ref 3.5–5.1)
PROT SERPL-MCNC: 6.4 G/DL (ref 6.4–8.2)
RBC # BLD AUTO: 4.52 M/UL (ref 4.2–5.4)
SODIUM SERPL-SCNC: 136 MMOL/L (ref 136–145)
WBC # BLD AUTO: 11.48 K/UL (ref 4.5–11)

## 2022-10-26 PROCEDURE — 99307 PR NURSING FAC CARE, SUBSEQ, IMPROVING: ICD-10-PCS | Mod: ,,, | Performed by: EMERGENCY MEDICINE

## 2022-10-26 PROCEDURE — 36415 COLL VENOUS BLD VENIPUNCTURE: CPT | Performed by: NURSE PRACTITIONER

## 2022-10-26 PROCEDURE — 82962 GLUCOSE BLOOD TEST: CPT

## 2022-10-26 PROCEDURE — 27000221 HC OXYGEN, UP TO 24 HOURS

## 2022-10-26 PROCEDURE — 63700000 PHARM REV CODE 250 ALT 637 W/O HCPCS: Performed by: EMERGENCY MEDICINE

## 2022-10-26 PROCEDURE — 99900035 HC TECH TIME PER 15 MIN (STAT)

## 2022-10-26 PROCEDURE — 80053 COMPREHEN METABOLIC PANEL: CPT | Performed by: NURSE PRACTITIONER

## 2022-10-26 PROCEDURE — 25000003 PHARM REV CODE 250: Performed by: NURSE PRACTITIONER

## 2022-10-26 PROCEDURE — 11000004 HC SNF PRIVATE

## 2022-10-26 PROCEDURE — 25000003 PHARM REV CODE 250: Performed by: EMERGENCY MEDICINE

## 2022-10-26 PROCEDURE — 99307 SBSQ NF CARE SF MDM 10: CPT | Mod: ,,, | Performed by: EMERGENCY MEDICINE

## 2022-10-26 PROCEDURE — 94761 N-INVAS EAR/PLS OXIMETRY MLT: CPT

## 2022-10-26 PROCEDURE — 85025 COMPLETE CBC W/AUTO DIFF WBC: CPT | Performed by: NURSE PRACTITIONER

## 2022-10-26 PROCEDURE — 63600175 PHARM REV CODE 636 W HCPCS: Performed by: NURSE PRACTITIONER

## 2022-10-26 RX ORDER — ONDANSETRON 2 MG/ML
4 INJECTION INTRAMUSCULAR; INTRAVENOUS ONCE
Status: COMPLETED | OUTPATIENT
Start: 2022-10-26 | End: 2022-10-26

## 2022-10-26 RX ORDER — TEMAZEPAM 30 MG/1
30 CAPSULE ORAL NIGHTLY PRN
Status: DISCONTINUED | OUTPATIENT
Start: 2022-10-26 | End: 2022-11-10 | Stop reason: HOSPADM

## 2022-10-26 RX ADMIN — CLOPIDOGREL BISULFATE 75 MG: 75 TABLET, FILM COATED ORAL at 09:10

## 2022-10-26 RX ADMIN — MICONAZOLE NITRATE: 20 CREAM TOPICAL at 10:10

## 2022-10-26 RX ADMIN — ONDANSETRON 4 MG: 4 TABLET, ORALLY DISINTEGRATING ORAL at 12:10

## 2022-10-26 RX ADMIN — PANTOPRAZOLE SODIUM 40 MG: 40 TABLET, DELAYED RELEASE ORAL at 09:10

## 2022-10-26 RX ADMIN — GABAPENTIN 800 MG: 400 CAPSULE ORAL at 08:10

## 2022-10-26 RX ADMIN — GABAPENTIN 800 MG: 400 CAPSULE ORAL at 09:10

## 2022-10-26 RX ADMIN — ATORVASTATIN CALCIUM 40 MG: 40 TABLET, FILM COATED ORAL at 08:10

## 2022-10-26 RX ADMIN — ONDANSETRON 4 MG: 2 INJECTION INTRAMUSCULAR; INTRAVENOUS at 05:10

## 2022-10-26 RX ADMIN — CEFTRIAXONE 1 G: 1 INJECTION, POWDER, FOR SOLUTION INTRAMUSCULAR; INTRAVENOUS at 04:10

## 2022-10-26 RX ADMIN — SODIUM CHLORIDE 500 ML: 9 INJECTION, SOLUTION INTRAVENOUS at 04:10

## 2022-10-26 RX ADMIN — ONDANSETRON 4 MG: 2 INJECTION INTRAMUSCULAR; INTRAVENOUS at 12:10

## 2022-10-26 RX ADMIN — LEVOTHYROXINE SODIUM 125 MCG: 25 TABLET ORAL at 05:10

## 2022-10-26 RX ADMIN — FLUCONAZOLE 100 MG: 100 TABLET ORAL at 09:10

## 2022-10-26 RX ADMIN — MICONAZOLE NITRATE: 20 CREAM TOPICAL at 09:10

## 2022-10-26 RX ADMIN — SERTRALINE HYDROCHLORIDE 50 MG: 50 TABLET ORAL at 09:10

## 2022-10-26 RX ADMIN — TEMAZEPAM 30 MG: 30 CAPSULE ORAL at 10:10

## 2022-10-26 RX ADMIN — HYDROCODONE BITARTRATE AND ACETAMINOPHEN 1 TABLET: 10; 325 TABLET ORAL at 04:10

## 2022-10-26 RX ADMIN — SODIUM CHLORIDE 500 ML: 9 INJECTION, SOLUTION INTRAVENOUS at 05:10

## 2022-10-26 NOTE — ASSESSMENT & PLAN NOTE
PT HAS HYPOKALEMIA  K IS 3.1  POTASSIUM SUPPLEMENTATION  RECHECK K    10/26/22 on10/24 potassium 5.9  KCl discontinued  Recheck CMP today

## 2022-10-26 NOTE — PROGRESS NOTES
10/26/22 1601   Missed Time Reason   Missed Treatment Reason MD hold (Comment)   Continue medical hold per MD request. Will follow, and reattempt tx when MD deems medically appropriate.     Kiana Rasmussen, MARCUS, OTR/L  10/26/2022

## 2022-10-26 NOTE — SUBJECTIVE & OBJECTIVE
Interval History: as noted    Review of Systems   Constitutional:  Positive for activity change, appetite change and fatigue. Negative for fever.   HENT: Negative.     Eyes: Negative.    Respiratory: Negative.  Negative for cough and shortness of breath.    Cardiovascular: Negative.  Negative for chest pain.   Gastrointestinal:  Positive for diarrhea, nausea and vomiting. Negative for abdominal pain.   Endocrine: Negative.    Genitourinary:  Positive for dysuria.   Musculoskeletal:  Positive for arthralgias.   Skin:  Positive for color change (pale, acyanotic).   Allergic/Immunologic: Positive for immunocompromised state.   Neurological:  Positive for weakness (generalized).   Psychiatric/Behavioral: Negative.     All other systems reviewed and are negative.  Objective:     Vital Signs (Most Recent):  Temp: 97.9 °F (36.6 °C) (10/26/22 0506)  Pulse: 97 (10/26/22 0506)  Resp: 17 (10/26/22 0506)  BP: 97/71 (nurse was told about it.) (10/26/22 0506)  SpO2: 95 % (10/26/22 0506) Vital Signs (24h Range):  Temp:  [97.6 °F (36.4 °C)-98 °F (36.7 °C)] 97.9 °F (36.6 °C)  Pulse:  [] 97  Resp:  [17-18] 17  SpO2:  [93 %-95 %] 95 %  BP: ()/(66-72) 97/71     Weight: 73 kg (160 lb 15 oz)  Body mass index is 27.62 kg/m².    Intake/Output Summary (Last 24 hours) at 10/26/2022 0608  Last data filed at 10/25/2022 0800  Gross per 24 hour   Intake 240 ml   Output --   Net 240 ml      Physical Exam  Vitals and nursing note reviewed. Exam conducted with a chaperone present.   Constitutional:       General: She is not in acute distress.     Appearance: She is ill-appearing.   HENT:      Head: Normocephalic and atraumatic.      Mouth/Throat:      Mouth: Mucous membranes are dry.   Eyes:      Extraocular Movements: Extraocular movements intact.   Cardiovascular:      Rate and Rhythm: Normal rate and regular rhythm.      Pulses: Normal pulses.      Heart sounds: Murmur (grade I loudest at left sternal border 5th intercostal space)  heard.   Pulmonary:      Effort: Pulmonary effort is normal. No respiratory distress.      Breath sounds: Normal breath sounds. No rales.      Comments: Pleural rub left upper and lower lobe areas noted  Abdominal:      General: Abdomen is flat. Bowel sounds are normal. There is no distension.      Palpations: Abdomen is soft.      Tenderness: There is no abdominal tenderness.   Musculoskeletal:         General: Normal range of motion.      Cervical back: Normal range of motion and neck supple.   Skin:     General: Skin is warm.      Capillary Refill: Capillary refill takes less than 2 seconds.      Coloration: Skin is pale.   Neurological:      General: No focal deficit present.      Mental Status: She is alert and oriented to person, place, and time.      Motor: Weakness present.   Psychiatric:         Mood and Affect: Mood normal.         Behavior: Behavior normal.         Thought Content: Thought content normal.       Significant Labs: All pertinent labs within the past 24 hours have been reviewed.  BMP:   Recent Labs   Lab 10/24/22  1815   *      K 5.9*      CO2 30   BUN 37*   CREATININE 1.43*   CALCIUM 9.7     CBC:   Recent Labs   Lab 10/24/22  1815   WBC 15.62*   HGB 15.3   HCT 44.7        CMP:   Recent Labs   Lab 10/24/22  1815      K 5.9*      CO2 30   *   BUN 37*   CREATININE 1.43*   CALCIUM 9.7   ANIONGAP 15     Urine Studies:   Recent Labs   Lab 10/25/22  1304   COLORU Dark Yellow   APPEARANCEUA Cloudy   PHUR 5.0   SPECGRAV 1.025   PROTEINUA 30*   GLUCUA Negative   KETONESU Negative   BILIRUBINUA Small*   OCCULTUA Moderate*   NITRITE Negative   UROBILINOGEN 0.2   LEUKOCYTESUR Moderate*   RBCUA 0-3   WBCUA 20-50*   BACTERIA Rare     Enteric pathogen negative. C. Diff pending  Urine culture pending    Significant Imaging: I have reviewed all pertinent imaging results/findings within the past 24 hours.

## 2022-10-26 NOTE — PT/OT/SLP PROGRESS
Physical Therapy      Patient Name:  Herlinda Valdovinos   MRN:  [id number]    Pt remains on medical hold this day per MD, Dr. Grider due to continued vomiting, diarrhea, and abdominal discomfort. Will reattempt tx when MD deems pt medically appropriate.    RYAN Perkins  10/26/2022

## 2022-10-26 NOTE — ASSESSMENT & PLAN NOTE
Enteric pathogen panel neg  C. Diff pending  Special contact isolation  Ondansetron po/IV  Ns bolus 500ml 10/26

## 2022-10-26 NOTE — ASSESSMENT & PLAN NOTE
PT HAS HTN  AMLODIPINE, CLONIDINE   /96    10/26/22 BP 97/71 this am. NS bolus given.  Recheck BP after bolus

## 2022-10-26 NOTE — PROGRESS NOTES
[facility] - Medical Surgical Unit  Hospital Medicine  Progress Note    Patient Name: Herlinda Valdovinos  MRN: [id number]  Patient Class: IP- Swing   Admission Date: 10/19/2022  Length of Stay: 7 days  Attending Physician: Alis Grider MD  Primary Care Provider: Vini Hernandez NP        Subjective:     Principal Problem:Muscle weakness        HPI:  PT IS A 57 YR OLD WF ADMITTED TO [facility] SWING BED FOR PT/OT/REHABILITATION FOR MUSCLE WEAKNESS AND MEDICAL MANAGEMENT. PT WAS REFERRED FROM [facility] WHERE SHE WAS ADMITTED ON 10/17/2022 THROUGH THE ED FOR WEAKNESS AND DEBILITY, DEHYDRATION AND WEIGHT LOSS WITH INCREASED WEAKNESS AND MALAISE FOR 3 WEEKS WITH SEVERAL FALLS AT HOME. PT'S  HAD BEEN ILL AND UNABLE TO FULLY CARE FOR HER DURING THIS TIME AND SINCE HAS RECOVERED AND IS SUPPORTIVE. PT HAD A HEMORRHAGIC CVA IN 2021 AND HAS REQUIRED  MULTIPLE EPISODES OF INPATIENT AND OUTPATIENT REHABILITATION WITH THE MOST RECENT OUTPATIENT THERAPY COMPLETED IN 6/2022. PT HAS HYPERTENSION WHICH IS INTERMITTENTLY POORLY CONTROLLED AND PT IS UNDER CONSIDERABLE STRESS AT HOME DUE TO FAMILY DYNAMICS.  PT IMPROVED DURING HOSPITALIZATION WITH IVF HYDRATION ; BUT HAS PERSISTENT MUSCLE WEAKNESS AND DEBILITY, AND WILL REQUIRE SWING BED FOR REHABILITATION. PT HAS BEEN SCREENED PER PT/OT WITH RECOMMENDATION FOR REHABILITATION DUE TO WEAKNESS AND DIFFICULTY AMBULATING AND PERFORMING ADLS. PT REQUIRED EXTENSIVE ASSISTANCE FOR ADLS AND MOBILITY COMPARED TO BEING INDEPENDENT PRIOR TO RECENT DECLINE.  PT WILL BE EVALUATED PER PT/OT AND A TREATMENT PLAN WILL BE INITIATED. THE PHARMACIST WILL REVIEW MEDICATIONS AND MAKE RECOMMENDATIONS. PT WILL SEE THE DIETICIAN  FOR NUTRITIONAL SUPPORT WITH WEIGHT 73 KG AND ALBUMIN OF 3.9. PT'S ABNORMAL ADMITTING LABS ARE: CMP:CA 8.1, K 3.1, AST 52 AND ALK PHOS 119, CBC:HCT 37.2,  K, UA:SMALL BLOOD. PT WILL HAVE WEEKLY LABS.    Past Medical History:    Diagnosis Date    Cerebral hemorrhage     Chronic anxiety     Dehydration     Depression     Dysphagia     Due to and following non-traumatic intracerebral hemorrhage    Hearing loss     History of CVA (cerebrovascular accident)     Hypertension     Insomnia     Intrapontine hemorrhage     Left hemiparesis     Peripheral neuropathy     Stroke     Thyroid disease     Transient cerebral ischemia       CXR: MILD PROMINENCE OF INTERSTITIAL MARKINGS      PT CODE STATUS IS FULL CODE  PT COVID STATUS IS NEG  PT VTE PPX IS PLAVIX/TEDS/EARLY AMBULATION        Overview/Hospital Course:  10/26/22 0530 Nurse reports that pt is having nausea and requesting IV ondansetron instead of po at this time. She has not vomited but has had diarrhea x 1 during the night. Enteric pathogen panel is negative. C. Diff is pending. She was started on Rocephin IVPB yesterday due to UTI. Urine culture is pending. Labs on 10/24 show a WBC > 19275 and increase in Bun and creatinine to 37 and 1.43 from 10 and 0.86 1 week ago. She is afebrile. Her HR is ranging  with O2 sats 93-95% on O2 at 2L/min per NC and BP 97/71 this am. Ondansetron IVP and NS bolus 500 ml ordered.      Interval History: as noted    Review of Systems   Constitutional:  Positive for activity change, appetite change and fatigue. Negative for fever.   HENT: Negative.     Eyes: Negative.    Respiratory: Negative.  Negative for cough and shortness of breath.    Cardiovascular: Negative.  Negative for chest pain.   Gastrointestinal:  Positive for diarrhea, nausea and vomiting. Negative for abdominal pain.   Endocrine: Negative.    Genitourinary:  Positive for dysuria.   Musculoskeletal:  Positive for arthralgias.   Skin:  Positive for color change (pale, acyanotic).   Allergic/Immunologic: Positive for immunocompromised state.   Neurological:  Positive for weakness (generalized).   Psychiatric/Behavioral: Negative.     All other systems reviewed and are  negative.  Objective:     Vital Signs (Most Recent):  Temp: 97.9 °F (36.6 °C) (10/26/22 0506)  Pulse: 97 (10/26/22 0506)  Resp: 17 (10/26/22 0506)  BP: 97/71 (nurse was told about it.) (10/26/22 0506)  SpO2: 95 % (10/26/22 0506) Vital Signs (24h Range):  Temp:  [97.6 °F (36.4 °C)-98 °F (36.7 °C)] 97.9 °F (36.6 °C)  Pulse:  [] 97  Resp:  [17-18] 17  SpO2:  [93 %-95 %] 95 %  BP: ()/(66-72) 97/71     Weight: 73 kg (160 lb 15 oz)  Body mass index is 27.62 kg/m².    Intake/Output Summary (Last 24 hours) at 10/26/2022 0608  Last data filed at 10/25/2022 0800  Gross per 24 hour   Intake 240 ml   Output --   Net 240 ml      Physical Exam  Vitals and nursing note reviewed. Exam conducted with a chaperone present.   Constitutional:       General: She is not in acute distress.     Appearance: She is ill-appearing.   HENT:      Head: Normocephalic and atraumatic.      Mouth/Throat:      Mouth: Mucous membranes are dry.   Eyes:      Extraocular Movements: Extraocular movements intact.   Cardiovascular:      Rate and Rhythm: Normal rate and regular rhythm.      Pulses: Normal pulses.      Heart sounds: Murmur (grade I loudest at left sternal border 5th intercostal space) heard.   Pulmonary:      Effort: Pulmonary effort is normal. No respiratory distress.      Breath sounds: Normal breath sounds. No rales.      Comments: Pleural rub left upper and lower lobe areas noted  Abdominal:      General: Abdomen is flat. Bowel sounds are normal. There is no distension.      Palpations: Abdomen is soft.      Tenderness: There is no abdominal tenderness.   Musculoskeletal:         General: Normal range of motion.      Cervical back: Normal range of motion and neck supple.   Skin:     General: Skin is warm.      Capillary Refill: Capillary refill takes less than 2 seconds.      Coloration: Skin is pale.   Neurological:      General: No focal deficit present.      Mental Status: She is alert and oriented to person, place, and  time.      Motor: Weakness present.   Psychiatric:         Mood and Affect: Mood normal.         Behavior: Behavior normal.         Thought Content: Thought content normal.       Significant Labs: All pertinent labs within the past 24 hours have been reviewed.  BMP:   Recent Labs   Lab 10/24/22  1815   *      K 5.9*      CO2 30   BUN 37*   CREATININE 1.43*   CALCIUM 9.7     CBC:   Recent Labs   Lab 10/24/22  1815   WBC 15.62*   HGB 15.3   HCT 44.7        CMP:   Recent Labs   Lab 10/24/22  1815      K 5.9*      CO2 30   *   BUN 37*   CREATININE 1.43*   CALCIUM 9.7   ANIONGAP 15     Urine Studies:   Recent Labs   Lab 10/25/22  1304   COLORU Dark Yellow   APPEARANCEUA Cloudy   PHUR 5.0   SPECGRAV 1.025   PROTEINUA 30*   GLUCUA Negative   KETONESU Negative   BILIRUBINUA Small*   OCCULTUA Moderate*   NITRITE Negative   UROBILINOGEN 0.2   LEUKOCYTESUR Moderate*   RBCUA 0-3   WBCUA 20-50*   BACTERIA Rare     Enteric pathogen negative. C. Diff pending  Urine culture pending    Significant Imaging: I have reviewed all pertinent imaging results/findings within the past 24 hours.      Assessment/Plan:      * Muscle weakness  PT/OT WILL EVALUATE AND INITIATE A TREATMENT PLAN      Nausea vomiting and diarrhea  Enteric pathogen panel neg  C. Diff pending  Special contact isolation  Ondansetron po/IV  Ns bolus 500ml 10/26      Acute urinary tract infection  Urine on 10/25 shows 20-50 WBCs to cath specimen  WBC > 91964  Urine culture pending  Rocephin 1 GM IVPB daily x 5 days started 10/25    Pressure injury of right buttock, stage 2  PT HAS STAGE 2 DECUBITUS ULCER R BUTTOCK  WOUND CARE TEAM CONSULTATION  DAILY WOUND CARE  DECUBITUS PRECAUTIONS  WAFFLE MATTRESS      Chronic pain disorder  PT HAS CHRONIC PAIN DISORDER  NORCO, GABAPENTIN  PT/OT TO INCREASE MOBILITY AND AMBULATION        Hyperlipidemia  PT HAS HYPERLIPIDEMIA ANS IS S/P CVA  STATIN, LE ARE SLIGHTLY ELEVATED; WILL  MONITOR      Thyroid disease  PT HAS THYROID DISEASE  TSH 1.880  SYNTHROID 125 MCG DAILY    Hypertension  PT HAS HTN  AMLODIPINE, CLONIDINE   /96    10/26/22 BP 97/71 this am. NS bolus given.  Recheck BP after bolus      Generalized anxiety disorder  PT HAS ANXIETY DISORDER EXACERBATED BY FAMILY DYNAMICS PER PT'S STATEMENTS  CONTINUE LORAZEPAM; WILL AVOID HS DOSING AS PT IS ON TEMAZEPAM HS        VTE Risk Mitigation (From admission, onward)         Ordered     Place OCTAVIA hose  Until discontinued         10/19/22 2117     IP VTE LOW RISK PATIENT  Once         10/19/22 2117                Discharge Planning   POOL: 11/10/2022     Code Status: Full Code   Is the patient medically ready for discharge?:     Reason for patient still in hospital (select all that apply): Treatment  Discharge Plan A: Home with family, Home Health                  Sherri Pritchett NP  Department of Hospital Medicine   [facility] - Medical Surgical Unit

## 2022-10-26 NOTE — PLAN OF CARE
[facility] - Medical Surgical Unit - Swing Bed   Interdisciplinary Team Meeting    Patient: Herlinda Valdovinos   Today's Date: 10/26/2022   Estimated D/C Date: 11/10/2022        Physician: Alis Grider MD Nurse Practitioner: Graciela Solorzano NP   Pharmacy: Zina Morales, PharmD Unit Director: Elmira Mcgovern RN   : Mery Mcclelland RN Physical/Occupational Therapy: Kiana Rasmussen OTR/L   Speech Therapy: Kiana Rasmussen OTR/L Activity Therapy: Izzy Lai LPN   Nursing: Elmira Mcgovern RN  Respiratory: Marilou Bill, RT Dietary: Abeba Hallie, Dietary  Other:      Nursing  New Symptoms/Problems: UTI  Last Bowel Movement: 10/26/22  Urine: continent Diarrhea: Yes   Constipated: No Bowel: incontinent [place]: No   Isolation: yes     O2 Device: Nasal Cannula O2 Flow: 2L  SpO2: 96 %   Nutrition: Cardiac  Speech/Swallowing: No current speech or swallowing issues  Aspiration Precautions: No  Cognition: WNL    Physical Therapy  Physical Therapy/Gait: on medical hold ELOS: Plan to DC 11/10/2022    Transfers:  on medical hold Range of Motion/Restrictions:      Occupational Therapy  Eating/Grooming: Supervision or Set-up Assistance Toileting: Minimal Assistance   Bathing: Minimal Assistance Dressing (Upper Body): Minimal Assistance   Dressing (Lower Body): Minimal Assistance      Activity Therapy  Level of participation: Active participation      Tx Plan/Recommendations reviewed with family and/or patient on (date) 102422.  Additional family Conference/Training:   D/C Plan/Recommendations: Home with HH and Home with family  POOL: 11/10/2022    Pharmacy  Medication Changes (see MD orders in chart): Yes  MD/NP: Alis Grider MD Labs Reviewed: Yes New Lab Orders: Yes   Lab Concerns: C-Diff

## 2022-10-26 NOTE — HOSPITAL COURSE
10/26/22 0530 Nurse reports that pt is having nausea and requesting IV ondansetron instead of po at this time. She has not vomited but has had diarrhea x 1 during the night. Enteric pathogen panel is negative. C. Diff is pending. She was started on Rocephin IVPB yesterday due to UTI. Urine culture is pending. Labs on 10/24 show a WBC > 33098 and increase in Bun and creatinine to 37 and 1.43 from 10 and 0.86 1 week ago. She is afebrile. Her HR is ranging  with O2 sats 93-95% on O2 at 2L/min per NC and BP 97/71 this am. Ondansetron IVP and NS bolus 500 ml ordered.    .10/27/2022  HOSPITAL DAY 8  PT HAS HAD N/V/D ONSET THIS WEEK AFTER A VISIT FROM A [employer] MEMBER WHO WAS REPORTED ILL THE DAY OF THE VISIT WITH SIMILAR SYMPTOMS TREATED WITH IVF, ZOFRAN/PHENERGAN AND CLEAR LIQUID DIET. PT HAD IMPROVED YESTERDAY WITH DIET ADVANCED TO BRAT; THEN ATE APPLE PIE AND NAUSEA WORSENED AND DIET WAS CHANGED BACK TO CLEAR LIQUIDS WITH BANATROL ADDED. PT WAS EVALUATED PER RD. PT IS ON CONTACT ISOLATION AND THE ENTERIC PANEL IS NEG WITH C DIF PENDING.   PT WAS ALSO DX WITH UTI DUE TO E COLI AND WILL BE TREATED WITH ROCEPHIN FOR 5 DAYS. PT/OT  IS ON MEDICAL HOLD PRESENTLY DUE TO N/V/D. PT'S BP IS STABLE;  AND WAS ELEVATED ON ADMISSION AND DECREASED YESTERDAY, IMPROVED WITH IVF BOLUS. PT HAS CANDIDA RASH OF PERINEUM TREATED WITH DIFLUCAN AND MONOSTAT WITH IMPROVEMENT; STAGE 2 DECUBITUS ULCER HAS HEALED AND  HAS STAGE 1 SKIN CHANGES OF SACRAL AND BUTTOCK AREAS.  PT IS ON 2L NC O2 WITH SAT 97%. PT IS BEING TREATED PER RESPIRATORY THERAPY WITH NEBS, INCENTIVE SPIROMETRY AND O2 MONITORING.  PT IS ON DAY 3/5 OF ROCEPHIN FOR UTI. PHARMACY IS MONITORING ALL MEDICATIONS.  PT HAS STABLE LABS EXCEPT FOR BMP: , BUN/CREAT 51/1.88, ALB 3.2, ALK PHOS 120.CBC: WBC 11.48 K. PT HAS CULTURE RESULTS REVEALING E COLI -UA, NEG ENTERIC PATHOGEN.     11/02/202 HOSPITAL DAY 14  PT IS PARTICIPATING WITH PT/OT PERFORMING BED MOBILITY WITH SBA, CGA,  AND MINIMUM ASSISTANCE, TRANSFERS WITH CGA AND MINIMUM  ASSISTANCE WITH RW, AND AMBULATING 236 FEET WITH CGA WITH RW AND VC. PT'S N/V/D SX HAVE RESOLVED. RD EVALUATED PT AND MEAL INTAKE IS %. PT'S WEIGHT IS DECREASED 2.7 KG FROM ADMISSION. PT'S C DIFF AND ENTERIC PATHOGEN CULTURES WERE NEGATIVE.    PT IS ON RA WITH O2 SAT 95-96 %. PT IS BEING TREATED PER RESPIRATORY THERAPY WITH NEBS, INCENTIVE SPIROMETRY AND O2 MONITORING.  PT COMPLETED ROCEPHIN FOR UTI DUE TO E COLI. PT HAS IMPROVEMENT IN PERINEAL RASH. THE PHARMACY IS MONITORING ALL MEDICATIONS.PT'S LABS ARE STABLE EXCEPT FOR K 3.3.    11/09/2022 HOSPITAL DAY 21  PT IS PARTICIPATING WITH PT/OT PERFORMING BED MOBILITY WITH INDEPENDENCE, TRANSFERS WITH SBA WITH RW, AND AMBULATING 300 FEET WITH CGA WITH RW AND VC.  PT PERFORMED ACTIVITIES TO IMPROVE BALANCE ISSUES.  RD CONSULTATION CONTINUES AND PT HAS LOST 10 KG AND PT'S CURRENT INTAKE IS 80% AND PT CONTINUES TO RECEIVE ENSURE BID.  PT REMAINS ON RA WITH O2 SAT 99 %. PT IS BEING TREATED PER RESPIRATORY THERAPY WITH NEBS, INCENTIVE SPIROMETRY AND O2 MONITORING.   THE PHARMACY CONTINUES TO  MONITOR ALL MEDICATIONS. PT'S LABS ARE STABLE WITH ABNORMAL BMP: , CA 8.0,CO2 35, CBC H/H 11.1/33.8, PLT 131K.    11/10/2022 HOSPITAL DAY 22  DISCHARGE  PT IS A 57 YR OLD WF ADMITTED TO [facility] SWING BED FOR PT/OT/REHABILITATION FOR MUSCLE WEAKNESS AND MEDICAL MANAGEMENT. PT WAS REFERRED FROM [facility] WHERE SHE WAS ADMITTED ON 10/17/2022 THROUGH THE ED FOR WEAKNESS AND DEBILITY, DEHYDRATION AND WEIGHT LOSS WITH INCREASED WEAKNESS AND MALAISE FOR 3 WEEKS WITH SEVERAL FALLS AT HOME. PT HAD A HEMORRHAGIC CVA IN 2021 AND HAS REQUIRED  MULTIPLE EPISODES OF INPATIENT AND OUTPATIENT REHABILITATION WITH THE MOST RECENT OUTPATIENT THERAPY COMPLETED IN 6/2022.   PT PARTICIPATED WITH PT/OT PERFORMING BED MOBILITY WITH INDEPENDENCE, TRANSFERS WITH SBA WITH RW, AND AMBULATING 300 FEET WITH CGA WITH  RW AND VC.  PT PERFORMED ACTIVITIES TO IMPROVE BALANCE ISSUES. PT WILL CONTINUE REHABILITATION WITH OUT PATIENT PT/OT AT [facility].    PT HAS HYPERTENSION WHICH INITIALLY WAS POORLY CONTROLLED BUT IMPROVED WITH MEDICATION ADJUSTMENTS. PT HAS ANXIETY WITH CONSIDERABLE STRESS DUE TO FAMILY DYNAMICS AND THIS IMPROVED WITH CONTINUATION OF LORAZEPAM, TEMAZEPAM, AND ZOLOFT AND STAFF SUPPORT. PT HAS BEEN REFERRED TO [facility] FOR TREATMENT AND SHE IS AGREEABLE TO THIS RECOMMENDATION.    PT HAD VIRAL GASTROENTERITIS CONTRACTED FROM A VISITOR DURING HOSPITALIZATION IMPROVED WITH IVF, PROBIOTICS AND DIETARY CHANGES. RD CONSULTED AND PT HAD LOST 10 KG  DURING ADMISSION BUT PT'S INTAKE IMPROVED TO 80% AND PT WILL CONTINUE ENSURE BID. PT HAD A UTI DUE TO E COLI AND WAS TREATED WITH IV ROCEPHIN FOR 5 DAYS WITH IMPROVEMENT. PT HAD A SACRAL DECUBITUS, ERYTHEMA OF BUTTOCKS AND VAGINAL CANDIDIASIS THAT RESOLVED WITH TREATMENT PER WOUND CARE TEAM AND MEDICAL MANAGEMENT.    PT REMAINS ON RA WITH O2 SAT 98%. PT WAS TREATED PER RESPIRATORY THERAPY WITH NEBS, INCENTIVE SPIROMETRY AND O2 MONITORING WITH IMPROVEMENT.  THE PHARMACY MONITORED  ALL MEDICATIONS. PT'S LABS ARE STABLE ON DISCHARGE WITH ABNORMAL BMP: , CA 8.0,CO2 35, CBC H/H 11.1/33.8, PLT 131K.    PT WAS ADVISED TO INCREASE REST AND FLUIDS. PT WAS ADVISED TO CONTINUE HOME MEDICATIONS WITH OVER THE COUNTER TYLENOL AND MIRALAX PRN. PT WAS ADVISED REGARDING FALL PRECAUTIONS. PT WAS ADVISED TO ADHERE TO A CARDIAC AND DIABETIC  DIET. PT WAS ADVISED TO FOLLOW UP WITH [place] FOR COUNSELING. PT WAS ADVISED TO FOLLOW UP WITH OUTPATIENT PT/OT FOR FURTHER REHABILITATION. PT WAS ADVISED TO FOLLOW UP WITH SUNSHINE COSTELLO NP 11/16/2022.

## 2022-10-26 NOTE — ASSESSMENT & PLAN NOTE
Urine on 10/25 shows 20-50 WBCs to cath specimen  WBC > 44214  Urine culture pending  Rocephin 1 GM IVPB daily x 5 days started 10/25

## 2022-10-27 LAB — UA COMPLETE W REFLEX CULTURE PNL UR: ABNORMAL

## 2022-10-27 PROCEDURE — 94761 N-INVAS EAR/PLS OXIMETRY MLT: CPT

## 2022-10-27 PROCEDURE — 25000003 PHARM REV CODE 250: Performed by: NURSE PRACTITIONER

## 2022-10-27 PROCEDURE — 63600175 PHARM REV CODE 636 W HCPCS: Performed by: NURSE PRACTITIONER

## 2022-10-27 PROCEDURE — 27000221 HC OXYGEN, UP TO 24 HOURS

## 2022-10-27 PROCEDURE — 99900035 HC TECH TIME PER 15 MIN (STAT)

## 2022-10-27 PROCEDURE — 25000003 PHARM REV CODE 250: Performed by: EMERGENCY MEDICINE

## 2022-10-27 PROCEDURE — 63700000 PHARM REV CODE 250 ALT 637 W/O HCPCS: Performed by: EMERGENCY MEDICINE

## 2022-10-27 PROCEDURE — 11000004 HC SNF PRIVATE

## 2022-10-27 RX ADMIN — FLUCONAZOLE 100 MG: 100 TABLET ORAL at 09:10

## 2022-10-27 RX ADMIN — MICONAZOLE NITRATE: 20 CREAM TOPICAL at 12:10

## 2022-10-27 RX ADMIN — PANTOPRAZOLE SODIUM 40 MG: 40 TABLET, DELAYED RELEASE ORAL at 09:10

## 2022-10-27 RX ADMIN — LEVOTHYROXINE SODIUM 125 MCG: 25 TABLET ORAL at 06:10

## 2022-10-27 RX ADMIN — MICONAZOLE NITRATE: 20 CREAM TOPICAL at 08:10

## 2022-10-27 RX ADMIN — GABAPENTIN 800 MG: 400 CAPSULE ORAL at 08:10

## 2022-10-27 RX ADMIN — TEMAZEPAM 30 MG: 30 CAPSULE ORAL at 10:10

## 2022-10-27 RX ADMIN — ONDANSETRON 4 MG: 4 TABLET, ORALLY DISINTEGRATING ORAL at 09:10

## 2022-10-27 RX ADMIN — GABAPENTIN 800 MG: 400 CAPSULE ORAL at 09:10

## 2022-10-27 RX ADMIN — ATORVASTATIN CALCIUM 40 MG: 40 TABLET, FILM COATED ORAL at 08:10

## 2022-10-27 RX ADMIN — CEFTRIAXONE 1 G: 1 INJECTION, POWDER, FOR SOLUTION INTRAMUSCULAR; INTRAVENOUS at 04:10

## 2022-10-27 RX ADMIN — SERTRALINE HYDROCHLORIDE 50 MG: 50 TABLET ORAL at 09:10

## 2022-10-27 RX ADMIN — ONDANSETRON 4 MG: 4 TABLET, ORALLY DISINTEGRATING ORAL at 04:10

## 2022-10-27 RX ADMIN — CLOPIDOGREL BISULFATE 75 MG: 75 TABLET, FILM COATED ORAL at 09:10

## 2022-10-27 NOTE — PROGRESS NOTES
10/27/22 1600   Missed Time Reason   Missed Treatment Reason MD hold (Comment)   Continue medical hold. Will resume treatment with MD approves.     Kiana Rasmussen, OTPURA, OTR/L  10/27/2022

## 2022-10-27 NOTE — PROGRESS NOTES
No new wt.  Pt has developed N, V, D. She has a UTI. C diff is pending.  RDN visited pt this a.m. and she c/o nausea. Diet was advanced to [place].  She has an area of skin breakdown to her L trochanter. Meal intake ~35%.  She has been on hold for therapy and unable to drink her Ensure Clear.  Her last BM was today.  RDN enc pt to voice prefs as needed. Will continue to follow.    Addendum: increased N,V Rec 1. CL adv as tolerated.

## 2022-10-27 NOTE — PT/OT/SLP PROGRESS
Physical Therapy      Patient Name:  Herlinda Valdovinos   MRN:  [id number]    To continue medical hold per MD request. Will follow, and reattempt tx when MD deems medically appropriate.     RYAN Perkins  10/27/2022

## 2022-10-28 LAB — C DIFF TOX A+B STL IA-ACNC: NEGATIVE

## 2022-10-28 PROCEDURE — 25000003 PHARM REV CODE 250: Performed by: NURSE PRACTITIONER

## 2022-10-28 PROCEDURE — 11000004 HC SNF PRIVATE

## 2022-10-28 PROCEDURE — 27000221 HC OXYGEN, UP TO 24 HOURS

## 2022-10-28 PROCEDURE — 94761 N-INVAS EAR/PLS OXIMETRY MLT: CPT

## 2022-10-28 PROCEDURE — 63700000 PHARM REV CODE 250 ALT 637 W/O HCPCS: Performed by: EMERGENCY MEDICINE

## 2022-10-28 PROCEDURE — 99900035 HC TECH TIME PER 15 MIN (STAT)

## 2022-10-28 PROCEDURE — 63600175 PHARM REV CODE 636 W HCPCS: Performed by: NURSE PRACTITIONER

## 2022-10-28 RX ORDER — GABAPENTIN 300 MG/1
300 CAPSULE ORAL 3 TIMES DAILY
Status: DISCONTINUED | OUTPATIENT
Start: 2022-10-28 | End: 2022-11-10 | Stop reason: HOSPADM

## 2022-10-28 RX ADMIN — PANTOPRAZOLE SODIUM 40 MG: 40 TABLET, DELAYED RELEASE ORAL at 09:10

## 2022-10-28 RX ADMIN — CLOPIDOGREL BISULFATE 75 MG: 75 TABLET, FILM COATED ORAL at 09:10

## 2022-10-28 RX ADMIN — SERTRALINE HYDROCHLORIDE 50 MG: 50 TABLET ORAL at 09:10

## 2022-10-28 RX ADMIN — AMLODIPINE BESYLATE 10 MG: 5 TABLET ORAL at 09:10

## 2022-10-28 RX ADMIN — LORAZEPAM 0.5 MG: 0.5 TABLET ORAL at 01:10

## 2022-10-28 RX ADMIN — GABAPENTIN 300 MG: 300 CAPSULE ORAL at 08:10

## 2022-10-28 RX ADMIN — TEMAZEPAM 30 MG: 30 CAPSULE ORAL at 09:10

## 2022-10-28 RX ADMIN — MICONAZOLE NITRATE: 20 CREAM TOPICAL at 09:10

## 2022-10-28 RX ADMIN — CEFTRIAXONE 1 G: 1 INJECTION, POWDER, FOR SOLUTION INTRAMUSCULAR; INTRAVENOUS at 04:10

## 2022-10-28 RX ADMIN — VALSARTAN 160 MG: 160 TABLET, FILM COATED ORAL at 09:10

## 2022-10-28 RX ADMIN — ATORVASTATIN CALCIUM 40 MG: 40 TABLET, FILM COATED ORAL at 08:10

## 2022-10-28 RX ADMIN — FLUCONAZOLE 100 MG: 100 TABLET ORAL at 09:10

## 2022-10-28 RX ADMIN — MICONAZOLE NITRATE: 20 CREAM TOPICAL at 08:10

## 2022-10-28 RX ADMIN — LEVOTHYROXINE SODIUM 125 MCG: 25 TABLET ORAL at 06:10

## 2022-10-28 RX ADMIN — GABAPENTIN 800 MG: 400 CAPSULE ORAL at 09:10

## 2022-10-28 NOTE — ASSESSMENT & PLAN NOTE
PT HAS HTN  AMLODIPINE, CLONIDINE   /96    10/26/22 BP 97/71 this am. NS bolus given.  Recheck BP after bolus    10/27/2022   BP HAS IMPROVED 111/74

## 2022-10-28 NOTE — PT/OT/SLP PROGRESS
Occupational Therapy      Patient Name:  Herlinda Valdovinos   MRN:  [id number]    Patient not seen today secondary to MD hold (due to sickness/N/V). Will follow-up once cleared per MD.    Kerri Sanchez, OTR/L, CSRS  10/28/2022

## 2022-10-28 NOTE — ASSESSMENT & PLAN NOTE
Enteric pathogen panel neg  C. Diff pending  Special contact isolation  Ondansetron po/IV  Ns bolus 500ml 10/26  10/27/2022  MILDLY IMPROVED AND WILL CONTINUE POC  C DIF STILL PENDING  MAY REQUIRE ADDITIONAL IVF IF SHE HAS DECREASED PO INTAKE  DIET HAD BEEN ADVANCED BUT PT ATE APPLE PIE AND NAUSEA INCREASED, HAVE CHANGED BACK TO CLEAR LIQUIDS AND WILL ADD BANATROL

## 2022-10-28 NOTE — PLAN OF CARE
Problem: Adult Inpatient Plan of Care  Goal: Plan of Care Review  Outcome: Ongoing, Progressing     Problem: Impaired Wound Healing  Goal: Optimal Wound Healing  10/28/2022 0637 by Mayra Jalloh RN  Outcome: Ongoing, Progressing  10/28/2022 0633 by Mayra Jalloh RN  Outcome: Ongoing, Progressing     Problem: Skin Injury Risk Increased  Goal: Skin Health and Integrity  10/28/2022 0637 by Mayra Jalloh RN  Outcome: Ongoing, Progressing  10/28/2022 0633 by Mayra Jalloh RN  Outcome: Ongoing, Progressing     Problem: Infection  Goal: Absence of Infection Signs and Symptoms  10/28/2022 0637 by Mayra Jalloh RN  Outcome: Ongoing, Progressing  10/28/2022 0633 by Mayra Jalloh RN  Outcome: Ongoing, Progressing

## 2022-10-28 NOTE — ASSESSMENT & PLAN NOTE
PT HAS ANXIETY DISORDER EXACERBATED BY FAMILY DYNAMICS PER PT'S STATEMENT  CONTINUE LORAZEPAM; WILL AVOID HS DOSING AS PT IS ON TEMAZEPAM HS  10/27/2022  PT HAS INTERMITTENT ANXIETY BUT OVERALL IMPROVED

## 2022-10-28 NOTE — PLAN OF CARE
Problem: Adult Inpatient Plan of Care  Goal: Plan of Care Review  Outcome: Ongoing, Progressing     Problem: Impaired Wound Healing  Goal: Optimal Wound Healing  Outcome: Ongoing, Progressing     Problem: Skin Injury Risk Increased  Goal: Skin Health and Integrity  Outcome: Ongoing, Progressing     Problem: Infection  Goal: Absence of Infection Signs and Symptoms  Outcome: Ongoing, Progressing

## 2022-10-28 NOTE — SUBJECTIVE & OBJECTIVE
Interval History: PT IS ON MEDICAL HOLD DUE TO N/V/D WITH AGE AND UTI THIS WEEK. PT HAS BEEN TREATED WITH IVF, ZOFRAN AND PHENERGAN, AND ROCEPHIN FOR ECOLI DAY 3/5 WITH MILD IMPROVEMENT.  PT 'S LABS REVEAL ELEVATED BUN/CREAT 51/1.88 TREATED WITH IVF    Review of Systems   Constitutional:  Positive for activity change, appetite change and fatigue.   HENT: Negative.     Eyes: Negative.    Respiratory: Negative.  Negative for cough and shortness of breath.    Cardiovascular: Negative.  Negative for chest pain.   Gastrointestinal:  Positive for diarrhea (IMPROVED), nausea (INTERMITTENT) and vomiting (NONE IN A FEW DAYS). Negative for abdominal distention and abdominal pain.   Endocrine: Negative.    Genitourinary: Negative.    Musculoskeletal:  Positive for arthralgias and gait problem.   Skin:  Positive for color change (pale) and rash (PERINEAL RASH C/W CANDIDA). Negative for wound (BUTTOCK ,  L CHEST).   Allergic/Immunologic: Positive for immunocompromised state.   Neurological:  Positive for weakness (generalized).   Hematological: Negative.    Psychiatric/Behavioral:  The patient is nervous/anxious.    All other systems reviewed and are negative.  Objective:     Vital Signs (Most Recent):  Temp: 98.3 °F (36.8 °C) (10/28/22 0745)  Pulse: 77 (10/28/22 0745)  Resp: 18 (10/28/22 0745)  BP: 127/85 (10/28/22 0745)  SpO2: 96 % (10/28/22 0745) Vital Signs (24h Range):  Temp:  [98.3 °F (36.8 °C)-99.1 °F (37.3 °C)] 98.3 °F (36.8 °C)  Pulse:  [77-82] 77  Resp:  [18-20] 18  SpO2:  [94 %-96 %] 96 %  BP: (123-127)/(85-90) 127/85     Weight:  (sick not able to stand to get weight)  Body mass index is 27.62 kg/m².    Intake/Output Summary (Last 24 hours) at 10/28/2022 1508  Last data filed at 10/27/2022 1753  Gross per 24 hour   Intake 120 ml   Output --   Net 120 ml      Physical Exam  Vitals and nursing note reviewed. Exam conducted with a chaperone present.   Constitutional:       Appearance: She is ill-appearing.   HENT:       Head: Normocephalic and atraumatic.      Mouth/Throat:      Mouth: Mucous membranes are dry.   Eyes:      Extraocular Movements: Extraocular movements intact.   Neck:      Vascular: Carotid bruit present.   Cardiovascular:      Rate and Rhythm: Normal rate and regular rhythm.      Pulses: Normal pulses.      Heart sounds: Murmur (grade I loudest at left sternal border 5th intercostal space) heard.   Pulmonary:      Effort: Pulmonary effort is normal. No respiratory distress.      Breath sounds: Normal breath sounds. No rales.      Comments: Pleural rub left upper and lower lobe areas noted  Abdominal:      General: Abdomen is flat. Bowel sounds are normal.      Palpations: Abdomen is soft.   Musculoskeletal:         General: Swelling (MINIMAL B FEET HAS IMPROVED) present. No tenderness. Normal range of motion.      Cervical back: Normal range of motion and neck supple.   Skin:     General: Skin is warm.      Capillary Refill: Capillary refill takes less than 2 seconds.      Coloration: Skin is pale.      Findings: Lesion (SMALL AREA OF BREAKDOWN HAS HEALED,NOW WITH STAGE 1 SKIN CHANGES; PT HAS CANDIDA TYPE RASH OF PERINEUM) present.   Neurological:      General: No focal deficit present.      Mental Status: She is alert and oriented to person, place, and time.      Motor: Weakness present.   Psychiatric:      Comments: ANXIOUS       Significant Labs: ALL LABS REVIEWED FOR PAST 24 HOURS  ABNORMAL LABS ARE:  CBC WBC 11.48 K(15.62 K)  CMP  , : BUN/CREAT 51/1.88(37/1.43), ALB 3.2, ALK PHOS 120     Culture: URINE E COLI SENSITIVE TO ROCEPHIN, ENTERIC PATHOGEN NEG, C DIF PENDING  FLU TEST NEG    Significant Imaging: I have reviewed all pertinent imaging results/findings within the past 24 hours.NONE

## 2022-10-28 NOTE — PT/OT/SLP PROGRESS
Physical Therapy      Patient Name:  Herlinda Valdovinos   MRN:  [id number]    To continue medical hold. Will resume treatment when MD approves and deems medically appropriate.

## 2022-10-28 NOTE — ASSESSMENT & PLAN NOTE
PT HAS CHRONIC PAIN DISORDER  NORCO, GABAPENTIN  PT/OT TO INCREASE MOBILITY AND AMBULATION  10/27/2022 STABLE

## 2022-10-28 NOTE — PROGRESS NOTES
[facility] - Medical Surgical Unit  Hospital Medicine  Progress Note    Patient Name: Herlinda Valdovinos  MRN: [id number]  Patient Class: IP- Swing   Admission Date: 10/19/2022  Length of Stay: 9 days  Attending Physician: Alis Grider MD  Primary Care Provider: Vini Hernandez NP        Subjective:     Principal Problem:Muscle weakness        HPI:  PT IS A 57 YR OLD WF ADMITTED TO [facility] SWING BED FOR PT/OT/REHABILITATION FOR MUSCLE WEAKNESS AND MEDICAL MANAGEMENT. PT WAS REFERRED FROM [facility] WHERE SHE WAS ADMITTED ON 10/17/2022 THROUGH THE ED FOR WEAKNESS AND DEBILITY, DEHYDRATION AND WEIGHT LOSS WITH INCREASED WEAKNESS AND MALAISE FOR 3 WEEKS WITH SEVERAL FALLS AT HOME. PT'S  HAD BEEN ILL AND UNABLE TO FULLY CARE FOR HER DURING THIS TIME AND SINCE HAS RECOVERED AND IS SUPPORTIVE. PT HAD A HEMORRHAGIC CVA IN 2021 AND HAS REQUIRED  MULTIPLE EPISODES OF INPATIENT AND OUTPATIENT REHABILITATION WITH THE MOST RECENT OUTPATIENT THERAPY COMPLETED IN 6/2022. PT HAS HYPERTENSION WHICH IS INTERMITTENTLY POORLY CONTROLLED AND PT IS UNDER CONSIDERABLE STRESS AT HOME DUE TO FAMILY DYNAMICS.  PT IMPROVED DURING HOSPITALIZATION WITH IVF HYDRATION ; BUT HAS PERSISTENT MUSCLE WEAKNESS AND DEBILITY, AND WILL REQUIRE SWING BED FOR REHABILITATION. PT HAS BEEN SCREENED PER PT/OT WITH RECOMMENDATION FOR REHABILITATION DUE TO WEAKNESS AND DIFFICULTY AMBULATING AND PERFORMING ADLS. PT REQUIRED EXTENSIVE ASSISTANCE FOR ADLS AND MOBILITY COMPARED TO BEING INDEPENDENT PRIOR TO RECENT DECLINE.  PT WILL BE EVALUATED PER PT/OT AND A TREATMENT PLAN WILL BE INITIATED. THE PHARMACIST WILL REVIEW MEDICATIONS AND MAKE RECOMMENDATIONS. PT WILL SEE THE DIETICIAN  FOR NUTRITIONAL SUPPORT WITH WEIGHT 73 KG AND ALBUMIN OF 3.9. PT'S ABNORMAL ADMITTING LABS ARE: CMP:CA 8.1, K 3.1, AST 52 AND ALK PHOS 119, CBC:HCT 37.2,  K, UA:SMALL BLOOD. PT WILL HAVE WEEKLY LABS.    Past Medical History:    Diagnosis Date    Cerebral hemorrhage     Chronic anxiety     Dehydration     Depression     Dysphagia     Due to and following non-traumatic intracerebral hemorrhage    Hearing loss     History of CVA (cerebrovascular accident)     Hypertension     Insomnia     Intrapontine hemorrhage     Left hemiparesis     Peripheral neuropathy     Stroke     Thyroid disease     Transient cerebral ischemia       CXR: MILD PROMINENCE OF INTERSTITIAL MARKINGS      PT CODE STATUS IS FULL CODE  PT COVID STATUS IS NEG  PT VTE PPX IS PLAVIX/TEDS/EARLY AMBULATION        Overview/Hospital Course:  10/26/22 0530 Nurse reports that pt is having nausea and requesting IV ondansetron instead of po at this time. She has not vomited but has had diarrhea x 1 during the night. Enteric pathogen panel is negative. C. Diff is pending. She was started on Rocephin IVPB yesterday due to UTI. Urine culture is pending. Labs on 10/24 show a WBC > 95735 and increase in Bun and creatinine to 37 and 1.43 from 10 and 0.86 1 week ago. She is afebrile. Her HR is ranging  with O2 sats 93-95% on O2 at 2L/min per NC and BP 97/71 this am. Ondansetron IVP and NS bolus 500 ml ordered.    .10/27/2022  HOSPITAL DAY 8  PT HAS HAD N/V/D ONSET THIS WEEK AFTER A VISIT FROM A [employer] MEMBER WHO WAS REPORTED ILL THE DAY OF THE VISIT WITH SIMILAR SYMPTOMS TREATED WITH IVF, ZOFRAN/PHENERGAN AND CLEAR LIQUID DIET. PT HAD IMPROVED YESTERDAY WITH DIET ADVANCED TO BRAT; THEN ATE APPLE PIE AND NAUSEA WORSENED AND DIET WAS CHANGED BACK TO CLEAR LIQUIDS WITH BANATROL ADDED. PT WAS EVALUATED PER RD. PT IS ON CONTACT ISOLATION AND THE ENTERIC PANEL IS NEG WITH C DIF PENDING.   PT WAS ALSO DX WITH UTI DUE TO E COLI AND WILL BE TREATED WITH ROCEPHIN FOR 5 DAYS. PT/OT  IS ON MEDICAL HOLD PRESENTLY DUE TO N/V/D. PT'S BP IS STABLE;  AND WAS ELEVATED ON ADMISSION AND DECREASED YESTERDAY, IMPROVED WITH IVF BOLUS. PT HAS CANDIDA RASH OF PERINEUM TREATED WITH DIFLUCAN AND  MONOSTAT WITH IMPROVEMENT; STAGE 2 DECUBITUS ULCER HAS HEALED AND  HAS STAGE 1 SKIN CHANGES OF SACRAL AND BUTTOCK AREAS.  PT IS ON 2L NC O2 WITH SAT 97%. PT IS BEING TREATED PER RESPIRATORY THERAPY WITH NEBS, INCENTIVE SPIROMETRY AND O2 MONITORING.  PT IS ON DAY 3/5 OF ROCEPHIN FOR UTI. PHARMACY IS MONITORING ALL MEDICATIONS.  PT HAS STABLE LABS EXCEPT FOR BMP: , BUN/CREAT 51/1.88, ALB 3.2, ALK PHOS 120.CBC: WBC 11.48 K. PT HAS CULTURE RESULTS REVEALING E COLI -UA, NEG ENTERIC PATHOGEN.         Interval History: PT IS ON MEDICAL HOLD DUE TO N/V/D WITH AGE AND UTI THIS WEEK. PT HAS BEEN TREATED WITH IVF, ZOFRAN AND PHENERGAN, AND ROCEPHIN FOR ECOLI DAY 3/5 WITH MILD IMPROVEMENT.  PT 'S LABS REVEAL ELEVATED BUN/CREAT 51/1.88 TREATED WITH IVF    Review of Systems   Constitutional:  Positive for activity change, appetite change and fatigue.   HENT: Negative.     Eyes: Negative.    Respiratory: Negative.  Negative for cough and shortness of breath.    Cardiovascular: Negative.  Negative for chest pain.   Gastrointestinal:  Positive for diarrhea (IMPROVED), nausea (INTERMITTENT) and vomiting (NONE IN A FEW DAYS). Negative for abdominal distention and abdominal pain.   Endocrine: Negative.    Genitourinary: Negative.    Musculoskeletal:  Positive for arthralgias and gait problem.   Skin:  Positive for color change (pale) and rash (PERINEAL RASH C/W CANDIDA). Negative for wound (BUTTOCK ,  L CHEST).   Allergic/Immunologic: Positive for immunocompromised state.   Neurological:  Positive for weakness (generalized).   Hematological: Negative.    Psychiatric/Behavioral:  The patient is nervous/anxious.    All other systems reviewed and are negative.  Objective:     Vital Signs (Most Recent):  Temp: 98.3 °F (36.8 °C) (10/28/22 0745)  Pulse: 77 (10/28/22 0745)  Resp: 18 (10/28/22 0745)  BP: 127/85 (10/28/22 0745)  SpO2: 96 % (10/28/22 0745) Vital Signs (24h Range):  Temp:  [98.3 °F (36.8 °C)-99.1 °F (37.3 °C)] 98.3 °F  (36.8 °C)  Pulse:  [77-82] 77  Resp:  [18-20] 18  SpO2:  [94 %-96 %] 96 %  BP: (123-127)/(85-90) 127/85     Weight:  (sick not able to stand to get weight)  Body mass index is 27.62 kg/m².    Intake/Output Summary (Last 24 hours) at 10/28/2022 1508  Last data filed at 10/27/2022 1753  Gross per 24 hour   Intake 120 ml   Output --   Net 120 ml      Physical Exam  Vitals and nursing note reviewed. Exam conducted with a chaperone present.   Constitutional:       Appearance: She is ill-appearing.   HENT:      Head: Normocephalic and atraumatic.      Mouth/Throat:      Mouth: Mucous membranes are dry.   Eyes:      Extraocular Movements: Extraocular movements intact.   Neck:      Vascular: Carotid bruit present.   Cardiovascular:      Rate and Rhythm: Normal rate and regular rhythm.      Pulses: Normal pulses.      Heart sounds: Murmur (grade I loudest at left sternal border 5th intercostal space) heard.   Pulmonary:      Effort: Pulmonary effort is normal. No respiratory distress.      Breath sounds: Normal breath sounds. No rales.      Comments: Pleural rub left upper and lower lobe areas noted  Abdominal:      General: Abdomen is flat. Bowel sounds are normal.      Palpations: Abdomen is soft.   Musculoskeletal:         General: Swelling (MINIMAL B FEET HAS IMPROVED) present. No tenderness. Normal range of motion.      Cervical back: Normal range of motion and neck supple.   Skin:     General: Skin is warm.      Capillary Refill: Capillary refill takes less than 2 seconds.      Coloration: Skin is pale.      Findings: Lesion (SMALL AREA OF BREAKDOWN HAS HEALED,NOW WITH STAGE 1 SKIN CHANGES; PT HAS CANDIDA TYPE RASH OF PERINEUM) present.   Neurological:      General: No focal deficit present.      Mental Status: She is alert and oriented to person, place, and time.      Motor: Weakness present.   Psychiatric:      Comments: ANXIOUS       Significant Labs: ALL LABS REVIEWED FOR PAST 24 HOURS  ABNORMAL LABS ARE:  CBC WBC  11.48 K(15.62 K)  CMP  , : BUN/CREAT 51/1.88(37/1.43), ALB 3.2, ALK PHOS 120     Culture: URINE E COLI SENSITIVE TO ROCEPHIN, ENTERIC PATHOGEN NEG, C DIF PENDING  FLU TEST NEG    Significant Imaging: I have reviewed all pertinent imaging results/findings within the past 24 hours.NONE      Assessment/Plan:      * Muscle weakness  PT/OT WILL EVALUATE AND INITIATE A TREATMENT PLAN  10/27/2022 PT IS ON MEDICAL HOLD  PT WAS AMBULATORY INITIALLY WITH BALANCE ISSUES        Nausea vomiting and diarrhea  Enteric pathogen panel neg  C. Diff pending  Special contact isolation  Ondansetron po/IV  Ns bolus 500ml 10/26  10/27/2022  MILDLY IMPROVED AND WILL CONTINUE POC  C DIF STILL PENDING  MAY REQUIRE ADDITIONAL IVF IF SHE HAS DECREASED PO INTAKE  DIET HAD BEEN ADVANCED BUT PT ATE APPLE PIE AND NAUSEA INCREASED, HAVE CHANGED BACK TO CLEAR LIQUIDS AND WILL ADD BANATROL        Acute urinary tract infection  Urine on 10/25 shows 20-50 WBCs to cath specimen  WBC > 47744  Urine culture pending  Rocephin 1 GM IVPB daily x 5 days started 10/25  10/27/2022  PT IS ON DAY 3/5 ROCEPHIN   URINE CULTURE E COLI SENSITIVE TO ROCEPHIN  WBC DECREASED 11K (15 K)    Pressure injury of right buttock, stage 2  PT HAS STAGE 2 DECUBITUS ULCER R BUTTOCK  WOUND CARE TEAM CONSULTATION  DAILY WOUND CARE  DECUBITUS PRECAUTIONS  WAFFLE MATTRESS  10/27/2022  IMPROVED WITH STAGE 2 ULCER HEALED  PT HAS STAGE 1  WITH EJ TYPE RASH OF PERINEUM  WOUND CARE  DIFLUCAN, MONOSTAT      Chronic pain disorder  PT HAS CHRONIC PAIN DISORDER  NORCO, GABAPENTIN  PT/OT TO INCREASE MOBILITY AND AMBULATION  10/27/2022 STABLE        Hyperlipidemia  PT HAS HYPERLIPIDEMIA ANS IS S/P CVA  STATIN, LE ARE SLIGHTLY ELEVATED; WILL MONITOR  10/27/2022  CONTINUE STATIN      Thyroid disease  PT HAS THYROID DISEASE  TSH 1.880  SYNTHROID 125 MCG DAILY  10/27/2022 STABLE    Hypertension  PT HAS HTN  AMLODIPINE, CLONIDINE   /96    10/26/22 BP 97/71 this am. NS bolus  given.  Recheck BP after bolus    10/27/2022   BP HAS IMPROVED 111/74    Generalized anxiety disorder  PT HAS ANXIETY DISORDER EXACERBATED BY FAMILY DYNAMICS PER PT'S STATEMENT  CONTINUE LORAZEPAM; WILL AVOID HS DOSING AS PT IS ON TEMAZEPAM HS  10/27/2022  PT HAS INTERMITTENT ANXIETY BUT OVERALL IMPROVED         VTE Risk Mitigation (From admission, onward)         Ordered     Place OCTAVIA hose  Until discontinued         10/19/22 2117     IP VTE LOW RISK PATIENT  Once         10/19/22 2117                Discharge Planning   POOL: 11/10/2022     Code Status: Full Code   Is the patient medically ready for discharge?:     Reason for patient still in hospital (select all that apply): Patient new problem, Patient trending condition, Laboratory test, Treatment, Consult recommendations and PT / OT recommendations  Discharge Plan A: Home with family, Home Health                  Alis Grider MD  Department of Hospital Medicine   [facility] - Medical Surgical Unit

## 2022-10-28 NOTE — ASSESSMENT & PLAN NOTE
PT/OT WILL EVALUATE AND INITIATE A TREATMENT PLAN  10/27/2022 PT IS ON MEDICAL HOLD  PT WAS AMBULATORY INITIALLY WITH BALANCE ISSUES

## 2022-10-28 NOTE — ASSESSMENT & PLAN NOTE
PT HAS STAGE 2 DECUBITUS ULCER R BUTTOCK  WOUND CARE TEAM CONSULTATION  DAILY WOUND CARE  DECUBITUS PRECAUTIONS  WAFFLE MATTRESS  10/27/2022  IMPROVED WITH STAGE 2 ULCER HEALED  PT HAS STAGE 1  WITH EJ TYPE RASH OF PERINEUM  WOUND CARE  DIFLUCAN, MONOSTAT

## 2022-10-28 NOTE — ASSESSMENT & PLAN NOTE
PT HAS HYPERLIPIDEMIA ANS IS S/P CVA  STATIN, LE ARE SLIGHTLY ELEVATED; WILL MONITOR  10/27/2022  CONTINUE STATIN

## 2022-10-28 NOTE — ASSESSMENT & PLAN NOTE
Urine on 10/25 shows 20-50 WBCs to cath specimen  WBC > 38109  Urine culture pending  Rocephin 1 GM IVPB daily x 5 days started 10/25  10/27/2022  PT IS ON DAY 3/5 ROCEPHIN   URINE CULTURE E COLI SENSITIVE TO ROCEPHIN  WBC DECREASED 11K (15 K)

## 2022-10-29 PROCEDURE — 25000003 PHARM REV CODE 250: Performed by: NURSE PRACTITIONER

## 2022-10-29 PROCEDURE — 94761 N-INVAS EAR/PLS OXIMETRY MLT: CPT

## 2022-10-29 PROCEDURE — 11000004 HC SNF PRIVATE

## 2022-10-29 PROCEDURE — 99900035 HC TECH TIME PER 15 MIN (STAT)

## 2022-10-29 PROCEDURE — 27000944

## 2022-10-29 PROCEDURE — 27000221 HC OXYGEN, UP TO 24 HOURS

## 2022-10-29 PROCEDURE — 63600175 PHARM REV CODE 636 W HCPCS: Performed by: NURSE PRACTITIONER

## 2022-10-29 PROCEDURE — 63700000 PHARM REV CODE 250 ALT 637 W/O HCPCS: Performed by: EMERGENCY MEDICINE

## 2022-10-29 RX ADMIN — PANTOPRAZOLE SODIUM 40 MG: 40 TABLET, DELAYED RELEASE ORAL at 09:10

## 2022-10-29 RX ADMIN — CLOPIDOGREL BISULFATE 75 MG: 75 TABLET, FILM COATED ORAL at 09:10

## 2022-10-29 RX ADMIN — GABAPENTIN 300 MG: 300 CAPSULE ORAL at 08:10

## 2022-10-29 RX ADMIN — GABAPENTIN 300 MG: 300 CAPSULE ORAL at 03:10

## 2022-10-29 RX ADMIN — LORAZEPAM 0.5 MG: 0.5 TABLET ORAL at 04:10

## 2022-10-29 RX ADMIN — MICONAZOLE NITRATE: 20 CREAM TOPICAL at 08:10

## 2022-10-29 RX ADMIN — GABAPENTIN 300 MG: 300 CAPSULE ORAL at 09:10

## 2022-10-29 RX ADMIN — ATORVASTATIN CALCIUM 40 MG: 40 TABLET, FILM COATED ORAL at 08:10

## 2022-10-29 RX ADMIN — LEVOTHYROXINE SODIUM 125 MCG: 25 TABLET ORAL at 06:10

## 2022-10-29 RX ADMIN — SERTRALINE HYDROCHLORIDE 50 MG: 50 TABLET ORAL at 09:10

## 2022-10-29 RX ADMIN — VALSARTAN 160 MG: 160 TABLET, FILM COATED ORAL at 09:10

## 2022-10-29 RX ADMIN — CEFTRIAXONE 1 G: 1 INJECTION, POWDER, FOR SOLUTION INTRAMUSCULAR; INTRAVENOUS at 03:10

## 2022-10-29 RX ADMIN — MICONAZOLE NITRATE: 20 CREAM TOPICAL at 09:10

## 2022-10-29 RX ADMIN — FLUCONAZOLE 100 MG: 100 TABLET ORAL at 09:10

## 2022-10-29 RX ADMIN — TEMAZEPAM 30 MG: 30 CAPSULE ORAL at 10:10

## 2022-10-29 RX ADMIN — AMLODIPINE BESYLATE 10 MG: 5 TABLET ORAL at 09:10

## 2022-10-29 NOTE — PLAN OF CARE
Problem: Adult Inpatient Plan of Care  Goal: Plan of Care Review  Outcome: Ongoing, Progressing  Goal: Patient-Specific Goal (Individualized)  Outcome: Ongoing, Progressing  Goal: Absence of Hospital-Acquired Illness or Injury  Outcome: Ongoing, Progressing  Goal: Optimal Comfort and Wellbeing  Outcome: Ongoing, Progressing  Goal: Readiness for Transition of Care  Outcome: Ongoing, Progressing     Problem: Diabetes Comorbidity  Goal: Blood Glucose Level Within Targeted Range  Outcome: Ongoing, Progressing     Problem: Impaired Wound Healing  Goal: Optimal Wound Healing  Outcome: Ongoing, Progressing     Problem: Skin Injury Risk Increased  Goal: Skin Health and Integrity  Outcome: Ongoing, Progressing     Problem: Infection  Goal: Absence of Infection Signs and Symptoms  Outcome: Ongoing, Progressing     Problem: Fall Injury Risk  Goal: Absence of Fall and Fall-Related Injury  Outcome: Ongoing, Progressing

## 2022-10-30 PROCEDURE — 25000003 PHARM REV CODE 250: Performed by: NURSE PRACTITIONER

## 2022-10-30 PROCEDURE — 94761 N-INVAS EAR/PLS OXIMETRY MLT: CPT

## 2022-10-30 PROCEDURE — 27000221 HC OXYGEN, UP TO 24 HOURS

## 2022-10-30 PROCEDURE — 99900035 HC TECH TIME PER 15 MIN (STAT)

## 2022-10-30 PROCEDURE — 11000004 HC SNF PRIVATE

## 2022-10-30 RX ADMIN — MICONAZOLE NITRATE: 20 CREAM TOPICAL at 08:10

## 2022-10-30 RX ADMIN — SERTRALINE HYDROCHLORIDE 50 MG: 50 TABLET ORAL at 09:10

## 2022-10-30 RX ADMIN — CLOPIDOGREL BISULFATE 75 MG: 75 TABLET, FILM COATED ORAL at 09:10

## 2022-10-30 RX ADMIN — GABAPENTIN 300 MG: 300 CAPSULE ORAL at 09:10

## 2022-10-30 RX ADMIN — AMLODIPINE BESYLATE 10 MG: 5 TABLET ORAL at 09:10

## 2022-10-30 RX ADMIN — VALSARTAN 160 MG: 160 TABLET, FILM COATED ORAL at 09:10

## 2022-10-30 RX ADMIN — GABAPENTIN 300 MG: 300 CAPSULE ORAL at 08:10

## 2022-10-30 RX ADMIN — HYDROCODONE BITARTRATE AND ACETAMINOPHEN 1 TABLET: 10; 325 TABLET ORAL at 09:10

## 2022-10-30 RX ADMIN — PANTOPRAZOLE SODIUM 40 MG: 40 TABLET, DELAYED RELEASE ORAL at 09:10

## 2022-10-30 RX ADMIN — LORAZEPAM 0.5 MG: 0.5 TABLET ORAL at 03:10

## 2022-10-30 RX ADMIN — TEMAZEPAM 30 MG: 30 CAPSULE ORAL at 08:10

## 2022-10-30 RX ADMIN — LEVOTHYROXINE SODIUM 125 MCG: 25 TABLET ORAL at 06:10

## 2022-10-30 RX ADMIN — ATORVASTATIN CALCIUM 40 MG: 40 TABLET, FILM COATED ORAL at 08:10

## 2022-10-30 RX ADMIN — MICONAZOLE NITRATE: 20 CREAM TOPICAL at 09:10

## 2022-10-30 RX ADMIN — GABAPENTIN 300 MG: 300 CAPSULE ORAL at 03:10

## 2022-10-30 NOTE — PLAN OF CARE
Problem: Skin Injury Risk Increased  Goal: Skin Health and Integrity  Outcome: Ongoing, Progressing  Intervention: Optimize Skin Protection  Flowsheets (Taken 10/30/2022 1802)  Pressure Reduction Techniques: frequent weight shift encouraged  Skin Protection: incontinence pads utilized     Problem: Fall Injury Risk  Goal: Absence of Fall and Fall-Related Injury  Outcome: Ongoing, Progressing   Reviewed with pt.

## 2022-10-31 PROCEDURE — 97110 THERAPEUTIC EXERCISES: CPT | Mod: CQ

## 2022-10-31 PROCEDURE — 99900035 HC TECH TIME PER 15 MIN (STAT)

## 2022-10-31 PROCEDURE — 97530 THERAPEUTIC ACTIVITIES: CPT

## 2022-10-31 PROCEDURE — 94761 N-INVAS EAR/PLS OXIMETRY MLT: CPT

## 2022-10-31 PROCEDURE — 25000003 PHARM REV CODE 250: Performed by: NURSE PRACTITIONER

## 2022-10-31 PROCEDURE — 11000004 HC SNF PRIVATE

## 2022-10-31 PROCEDURE — 27000221 HC OXYGEN, UP TO 24 HOURS

## 2022-10-31 PROCEDURE — 25000003 PHARM REV CODE 250: Performed by: EMERGENCY MEDICINE

## 2022-10-31 RX ORDER — LACTOBACILLUS ACIDOPHILUS 500MM CELL
1 CAPSULE ORAL
Status: DISCONTINUED | OUTPATIENT
Start: 2022-10-31 | End: 2022-11-10 | Stop reason: HOSPADM

## 2022-10-31 RX ORDER — DICLOFENAC SODIUM 10 MG/G
2 GEL TOPICAL 2 TIMES DAILY PRN
Status: DISCONTINUED | OUTPATIENT
Start: 2022-10-31 | End: 2022-11-10 | Stop reason: HOSPADM

## 2022-10-31 RX ADMIN — MICONAZOLE NITRATE: 20 CREAM TOPICAL at 08:10

## 2022-10-31 RX ADMIN — VALSARTAN 160 MG: 160 TABLET, FILM COATED ORAL at 08:10

## 2022-10-31 RX ADMIN — ATORVASTATIN CALCIUM 40 MG: 40 TABLET, FILM COATED ORAL at 08:10

## 2022-10-31 RX ADMIN — CLOPIDOGREL BISULFATE 75 MG: 75 TABLET, FILM COATED ORAL at 08:10

## 2022-10-31 RX ADMIN — LEVOTHYROXINE SODIUM 125 MCG: 25 TABLET ORAL at 06:10

## 2022-10-31 RX ADMIN — DICLOFENAC SODIUM TOPICAL GEL, 1%, 2 G: 10 GEL TOPICAL at 03:10

## 2022-10-31 RX ADMIN — AMLODIPINE BESYLATE 10 MG: 5 TABLET ORAL at 08:10

## 2022-10-31 RX ADMIN — HYDROCODONE BITARTRATE AND ACETAMINOPHEN 1 TABLET: 10; 325 TABLET ORAL at 11:10

## 2022-10-31 RX ADMIN — GABAPENTIN 300 MG: 300 CAPSULE ORAL at 08:10

## 2022-10-31 RX ADMIN — TEMAZEPAM 30 MG: 30 CAPSULE ORAL at 08:10

## 2022-10-31 RX ADMIN — PANTOPRAZOLE SODIUM 40 MG: 40 TABLET, DELAYED RELEASE ORAL at 08:10

## 2022-10-31 RX ADMIN — Medication 1 CAPSULE: at 04:10

## 2022-10-31 RX ADMIN — GABAPENTIN 300 MG: 300 CAPSULE ORAL at 03:10

## 2022-10-31 RX ADMIN — SERTRALINE HYDROCHLORIDE 50 MG: 50 TABLET ORAL at 08:10

## 2022-10-31 RX ADMIN — DICLOFENAC SODIUM TOPICAL GEL, 1%, 2 G: 10 GEL TOPICAL at 09:10

## 2022-10-31 RX ADMIN — HYDROCODONE BITARTRATE AND ACETAMINOPHEN 1 TABLET: 10; 325 TABLET ORAL at 02:10

## 2022-10-31 NOTE — PT/OT/SLP PROGRESS
Physical Therapy Treatment    Patient Name:  Herlinda Valdovinos   MRN:  [id number]    Recommendations:     Discharge Recommendations:  home with home health   Discharge Equipment Recommendations: none   Barriers to discharge: None    Assessment:     Herlinda Valdovinos is a 57 y.o. female admitted with a medical diagnosis of Muscle weakness.  She presents with the following impairments/functional limitations:  weakness .    Rehab Prognosis: Good; patient would benefit from acute skilled PT services to address these deficits and reach maximum level of function.    Recent Surgery: * No surgery found *      Received Plan of Care per Fabby Pompa PT; pt off medical hold per MD    Plan:     During this hospitalization, patient to be seen 5 x/week to address the identified rehab impairments via therapeutic exercises, therapeutic activities, gait training and progress toward the following goals:    Plan of Care Expires:  11/10/22    Subjective     Chief Complaint: weak, right knee pain as noted; RN just administered pain pill  Patient/Family Comments/goals: agrees to PT Tx as able   Pain/Comfort:  Pain Rating 1: 6/10  Location - Side 1: Right  Location 1: knee  Pain Addressed 1: Pre-medicate for activity, Reposition  Pain Rating Post-Intervention 1: 3/10      Objective:     Communicated with patient prior to session.  Patient found HOB elevated with   upon PT entry to room.     General Precautions: Standard, fall   Orthopedic Precautions:    Braces:    Respiratory Status: Room air     Functional Mobility:  Bed Mobility:     Scooting: modified independence  Bridging: modified independence  Supine to Sit: modified independence  Transfers:     Sit to Stand:  stand by assistance and contact guard assistance with rolling walker      AM-PAC 6 CLICK MOBILITY  Turning over in bed (including adjusting bedclothes, sheets and blankets)?: 4  Sitting down on and standing up from a chair with arms (e.g., wheelchair, bedside commode,  etc.): 3  Moving from lying on back to sitting on the side of the bed?: 4  Moving to and from a bed to a chair (including a wheelchair)?: 3  Need to walk in hospital room?: 3  Climbing 3-5 steps with a railing?: 1  Basic Mobility Total Score: 18       Treatment & Education:  Supine BLE exercises x 15 repetitions with assist prn: ankle pumps, hip abduction, straight leg raises, heel slides, bridging  Sitting edge of bed BLE exercises x 15 repetitions: marching, long arc quads, hip abduction, ankle rocking  Standing at walker and CGA: marching, mini squats, heel raises, hip abduction x 7-10 repetitions each  Pt with fatigue; too nervous to try to take a few steps today    Patient left sitting edge of bed with call button in reach and friend present..    GOALS:   Multidisciplinary Problems       Physical Therapy Goals       Not on file                    Time Tracking:     PT Received On: 10/31/22  PT Start Time: 1150     PT Stop Time: 1215  PT Total Time (min): 25 min     Billable Minutes: Therapeutic Exercise 25    Treatment Type: Treatment  PT/PTA: PTA     PTA Visit Number: 2     10/31/2022

## 2022-11-01 PROCEDURE — 97116 GAIT TRAINING THERAPY: CPT | Mod: CQ

## 2022-11-01 PROCEDURE — 94761 N-INVAS EAR/PLS OXIMETRY MLT: CPT

## 2022-11-01 PROCEDURE — 11000004 HC SNF PRIVATE

## 2022-11-01 PROCEDURE — 25000003 PHARM REV CODE 250: Performed by: EMERGENCY MEDICINE

## 2022-11-01 PROCEDURE — 99900035 HC TECH TIME PER 15 MIN (STAT)

## 2022-11-01 PROCEDURE — 25000003 PHARM REV CODE 250: Performed by: NURSE PRACTITIONER

## 2022-11-01 PROCEDURE — 97530 THERAPEUTIC ACTIVITIES: CPT

## 2022-11-01 PROCEDURE — 27000221 HC OXYGEN, UP TO 24 HOURS

## 2022-11-01 RX ADMIN — TEMAZEPAM 30 MG: 30 CAPSULE ORAL at 08:11

## 2022-11-01 RX ADMIN — MICONAZOLE NITRATE: 20 CREAM TOPICAL at 08:11

## 2022-11-01 RX ADMIN — GABAPENTIN 300 MG: 300 CAPSULE ORAL at 03:11

## 2022-11-01 RX ADMIN — AMLODIPINE BESYLATE 10 MG: 5 TABLET ORAL at 08:11

## 2022-11-01 RX ADMIN — ATORVASTATIN CALCIUM 40 MG: 40 TABLET, FILM COATED ORAL at 08:11

## 2022-11-01 RX ADMIN — LEVOTHYROXINE SODIUM 125 MCG: 25 TABLET ORAL at 05:11

## 2022-11-01 RX ADMIN — HYDROCODONE BITARTRATE AND ACETAMINOPHEN 1 TABLET: 10; 325 TABLET ORAL at 12:11

## 2022-11-01 RX ADMIN — SERTRALINE HYDROCHLORIDE 50 MG: 50 TABLET ORAL at 08:11

## 2022-11-01 RX ADMIN — GABAPENTIN 300 MG: 300 CAPSULE ORAL at 08:11

## 2022-11-01 RX ADMIN — HYDROCODONE BITARTRATE AND ACETAMINOPHEN 1 TABLET: 10; 325 TABLET ORAL at 08:11

## 2022-11-01 RX ADMIN — Medication 1 CAPSULE: at 12:11

## 2022-11-01 RX ADMIN — VALSARTAN 160 MG: 160 TABLET, FILM COATED ORAL at 08:11

## 2022-11-01 RX ADMIN — PANTOPRAZOLE SODIUM 40 MG: 40 TABLET, DELAYED RELEASE ORAL at 08:11

## 2022-11-01 RX ADMIN — HYDROCODONE BITARTRATE AND ACETAMINOPHEN 1 TABLET: 10; 325 TABLET ORAL at 05:11

## 2022-11-01 RX ADMIN — MICONAZOLE NITRATE: 20 CREAM TOPICAL at 09:11

## 2022-11-01 RX ADMIN — Medication 1 CAPSULE: at 05:11

## 2022-11-01 RX ADMIN — CLOPIDOGREL BISULFATE 75 MG: 75 TABLET, FILM COATED ORAL at 08:11

## 2022-11-01 RX ADMIN — Medication 1 CAPSULE: at 08:11

## 2022-11-01 NOTE — PLAN OF CARE
Problem: Adult Inpatient Plan of Care  Goal: Optimal Comfort and Wellbeing  Outcome: Ongoing, Progressing     Problem: Impaired Wound Healing  Goal: Optimal Wound Healing  Outcome: Ongoing, Progressing     Problem: Skin Injury Risk Increased  Goal: Skin Health and Integrity  Outcome: Ongoing, Progressing     Problem: Fall Injury Risk  Goal: Absence of Fall and Fall-Related Injury  Outcome: Ongoing, Progressing

## 2022-11-01 NOTE — PT/OT/SLP PROGRESS
Physical Therapy Treatment    Patient Name:  Herlinda Valdovinos   MRN:  [id number]    Recommendations:     Discharge Recommendations:  home with home health   Discharge Equipment Recommendations: none   Barriers to discharge: None    Assessment:     Herlinda Valdovinos is a 57 y.o. female admitted with a medical diagnosis of Muscle weakness.  She presents with the following impairments/functional limitations:    weakness, impaired endurance, impaired self care skills, gait instability, impaired functional mobility, impaired balance, decreased lower extremity function, decreased safety awareness, impaired skin.   Patient agreed to participate in therapy treatment, doing so without complaint.    Rehab Prognosis: Good; patient would benefit from acute skilled PT services to address these deficits and reach maximum level of function.    Recent Surgery: * No surgery found *      Plan:     During this hospitalization, patient to be seen 5 x/week to address the identified rehab impairments via gait training, therapeutic activities, therapeutic exercises and progress toward the following goals:    Plan of Care Expires:  11/10/22    Subjective     Chief Complaint: weakness, right knee pain  Patient/Family Comments/goals: agreed to participate in therapy tx  Pain/Comfort:  Pain Rating 1: 8/10 (described as sharp pain that went away)  Location - Side 1: Right  Location 1: knee      Objective:     Communicated with OT prior to session.  Patient found HOB elevated with   upon PT entry to room.     General Precautions: Standard, fall   Orthopedic Precautions:    Braces:    Respiratory Status: Room air     Functional Mobility:  Bed Mobility:     Supine to Sit: modified independence  Sit to Supine: modified independence  Transfers:     Sit to Stand:  contact guard assistance with rolling walker  Gait: Patient ambulated 175 feet using RW and CGA      AM-PAC 6 CLICK MOBILITY  Turning over in bed (including adjusting bedclothes, sheets and  blankets)?: 3  Sitting down on and standing up from a chair with arms (e.g., wheelchair, bedside commode, etc.): 3  Moving from lying on back to sitting on the side of the bed?: 3  Moving to and from a bed to a chair (including a wheelchair)?: 3  Need to walk in hospital room?: 3  Climbing 3-5 steps with a railing?: 2  Basic Mobility Total Score: 17       Treatment & Education:  Patient performed seated exercises including LAQ and hip flexion 2 x 10 BLE. Patient performed gait training and transfers listed above.    Patient left HOB elevated with call button in reach..    GOALS:   Multidisciplinary Problems       Physical Therapy Goals       Not on file                    Time Tracking:     PT Received On: 11/01/22  PT Start Time: 1350     PT Stop Time: 1410  PT Total Time (min): 20 min     Billable Minutes: Gait Training 15 and Therapeutic Exercise 5    Treatment Type: Treatment  PT/PTA: PTA     PTA Visit Number: 3     11/01/2022

## 2022-11-01 NOTE — PT/OT/SLP PROGRESS
Occupational Therapy   Treatment    Name: Herlinda Valdovinos  MRN: [id number]  Admitting Diagnosis:  Muscle weakness       Recommendations:     Discharge Recommendations: home health PT, home health OT  Discharge Equipment Recommendations:     Barriers to discharge:       Assessment:     Herlinda Valdovinos is a 57 y.o. female with a medical diagnosis of Muscle weakness.  She presents with continued difficulty with dynamic standing balance, maintaining safe balance necessary for ADL performance, resulting in potential for high fall risk and risk of injury. Performance deficits affecting function are weakness, impaired endurance, impaired self care skills, impaired functional mobility, gait instability, impaired balance, decreased coordination.     Rehab Prognosis:  Good; patient would benefit from acute skilled OT services to address these deficits and reach maximum level of function.       Plan:     Patient to be seen 5 x/week to address the above listed problems via self-care/home management, therapeutic activities, therapeutic exercises, neuromuscular re-education  Plan of Care Expires: 11/17/22  Plan of Care Reviewed with: patient, spouse    Subjective     Pain/Comfort:  None at start of tx.   8/10 at end of tx, nurse notified    Objective:     Communicated with: Nurse prior to session.  Patient found supine with spouse present upon OT entry to room.    General Precautions: Standard, fall   Orthopedic Precautions:    Braces:    Respiratory Status: Room air     Occupational Performance:     Bed Mobility:    Patient completed Supine to Sit with supervision  Patient completed Sit to Supine with supervision     Functional Mobility/Transfers:  Patient completed Sit <> Stand Transfer with contact guard assistance and minimum assistance  with  rolling walker     Activities of Daily Living:      [facility] 6 Click ADL:      Treatment & Education:  Performed dynamic standing challenging balance needed for improved ADL performance  safety. Required intermittent Juanis to maintain safe standing balance. Limited tolerance noted this day d/t R knee pain rated 8/10. Notified nurse. OTR found significant tightness in R knee, difficulty extending. Assisted with stretching R knee, instructing in stretching technique when in bed, specifically having pillow under R ankle to allow optimal relaxation of hamstrings. Verbalized good understanding.      Patient left supine with call button in reach    GOALS:   Multidisciplinary Problems       Occupational Therapy Goals          Problem: Occupational Therapy    Goal Priority Disciplines Outcome Interventions   Occupational Therapy Goal     OT, PT/OT Ongoing, Progressing    Description: Goals to be met by: 2 weeks     Patient will increase functional independence with ADLs by performing:    LE Dressing with Contact Guard Assistance.  Toileting from toilet with Contact Guard Assistance for hygiene and clothing management.   Bathing from  shower chair/bench with Contact Guard Assistance.  Toilet transfer to toilet with Contact Guard Assistance.    Goals to be met by: 4 weeks      Patient will increase functional independence with ADLs by performing:    LE Dressing with Modified [place].  Toileting from toilet with Modified [place] for hygiene and clothing management.   Bathing from  shower chair/bench with Modified [place].  Toilet transfer to toilet with Modified [place].  Light IADLs Tiffany.                            Time Tracking:     OT Date of Treatment: 10/31/22  OT Start Time: 1118  OT Stop Time: 1137  OT Total Time (min): 19 min    Billable Minutes:Therapeutic Activity 19    MARCUS Levy, OTR/L      OT/HEMAL: OT        10/31/2022

## 2022-11-02 LAB
ANION GAP SERPL CALCULATED.3IONS-SCNC: 9 MMOL/L (ref 7–16)
BASOPHILS # BLD AUTO: 0.02 K/UL (ref 0–0.2)
BASOPHILS NFR BLD AUTO: 0.3 % (ref 0–1)
BUN SERPL-MCNC: 14 MG/DL (ref 7–18)
BUN/CREAT SERPL: 15 (ref 6–20)
CALCIUM SERPL-MCNC: 8.2 MG/DL (ref 8.5–10.1)
CHLORIDE SERPL-SCNC: 107 MMOL/L (ref 98–107)
CO2 SERPL-SCNC: 30 MMOL/L (ref 21–32)
CREAT SERPL-MCNC: 0.91 MG/DL (ref 0.55–1.02)
DIFFERENTIAL METHOD BLD: ABNORMAL
EGFR (NO RACE VARIABLE) (RUSH/TITUS): 74 ML/MIN/1.73M²
EOSINOPHIL # BLD AUTO: 0.57 K/UL (ref 0–0.5)
EOSINOPHIL NFR BLD AUTO: 8.9 % (ref 1–4)
ERYTHROCYTE [DISTWIDTH] IN BLOOD BY AUTOMATED COUNT: 14.8 % (ref 11.5–14.5)
GLUCOSE SERPL-MCNC: 131 MG/DL (ref 74–106)
HCT VFR BLD AUTO: 34.6 % (ref 38–47)
HGB BLD-MCNC: 11.6 G/DL (ref 12–16)
LYMPHOCYTES # BLD AUTO: 2.87 K/UL (ref 1–4.8)
LYMPHOCYTES NFR BLD AUTO: 45 % (ref 27–41)
MCH RBC QN AUTO: 28.6 PG (ref 27–31)
MCHC RBC AUTO-ENTMCNC: 33.5 G/DL (ref 32–36)
MCV RBC AUTO: 85.2 FL (ref 80–96)
MONOCYTES # BLD AUTO: 0.65 K/UL (ref 0–0.8)
MONOCYTES NFR BLD AUTO: 10.2 % (ref 2–6)
MPC BLD CALC-MCNC: 10 FL (ref 9.4–12.4)
NEUTROPHILS # BLD AUTO: 2.27 K/UL (ref 1.8–7.7)
NEUTROPHILS NFR BLD AUTO: 35.6 % (ref 53–65)
PLATELET # BLD AUTO: 175 K/UL (ref 150–400)
POTASSIUM SERPL-SCNC: 3.3 MMOL/L (ref 3.5–5.1)
RBC # BLD AUTO: 4.06 M/UL (ref 4.2–5.4)
SODIUM SERPL-SCNC: 143 MMOL/L (ref 136–145)
WBC # BLD AUTO: 6.38 K/UL (ref 4.5–11)

## 2022-11-02 PROCEDURE — 25000003 PHARM REV CODE 250: Performed by: EMERGENCY MEDICINE

## 2022-11-02 PROCEDURE — 97116 GAIT TRAINING THERAPY: CPT | Mod: CQ

## 2022-11-02 PROCEDURE — 99900035 HC TECH TIME PER 15 MIN (STAT)

## 2022-11-02 PROCEDURE — 80048 BASIC METABOLIC PNL TOTAL CA: CPT | Performed by: NURSE PRACTITIONER

## 2022-11-02 PROCEDURE — 94761 N-INVAS EAR/PLS OXIMETRY MLT: CPT

## 2022-11-02 PROCEDURE — 99307 PR NURSING FAC CARE, SUBSEQ, IMPROVING: ICD-10-PCS | Mod: ,,, | Performed by: EMERGENCY MEDICINE

## 2022-11-02 PROCEDURE — 85025 COMPLETE CBC W/AUTO DIFF WBC: CPT | Performed by: NURSE PRACTITIONER

## 2022-11-02 PROCEDURE — 27000221 HC OXYGEN, UP TO 24 HOURS

## 2022-11-02 PROCEDURE — 36415 COLL VENOUS BLD VENIPUNCTURE: CPT | Performed by: NURSE PRACTITIONER

## 2022-11-02 PROCEDURE — 11000004 HC SNF PRIVATE

## 2022-11-02 PROCEDURE — 25000003 PHARM REV CODE 250: Performed by: NURSE PRACTITIONER

## 2022-11-02 PROCEDURE — 97530 THERAPEUTIC ACTIVITIES: CPT

## 2022-11-02 PROCEDURE — 99307 SBSQ NF CARE SF MDM 10: CPT | Mod: ,,, | Performed by: EMERGENCY MEDICINE

## 2022-11-02 PROCEDURE — 97110 THERAPEUTIC EXERCISES: CPT | Mod: CQ

## 2022-11-02 RX ORDER — POTASSIUM CHLORIDE 20 MEQ/1
20 TABLET, EXTENDED RELEASE ORAL ONCE
Status: COMPLETED | OUTPATIENT
Start: 2022-11-02 | End: 2022-11-02

## 2022-11-02 RX ORDER — BISACODYL 5 MG
5 TABLET, DELAYED RELEASE (ENTERIC COATED) ORAL DAILY PRN
Status: DISCONTINUED | OUTPATIENT
Start: 2022-11-02 | End: 2022-11-10 | Stop reason: HOSPADM

## 2022-11-02 RX ADMIN — SERTRALINE HYDROCHLORIDE 50 MG: 50 TABLET ORAL at 09:11

## 2022-11-02 RX ADMIN — AMLODIPINE BESYLATE 10 MG: 5 TABLET ORAL at 09:11

## 2022-11-02 RX ADMIN — HYDROCODONE BITARTRATE AND ACETAMINOPHEN 1 TABLET: 10; 325 TABLET ORAL at 06:11

## 2022-11-02 RX ADMIN — TEMAZEPAM 30 MG: 30 CAPSULE ORAL at 09:11

## 2022-11-02 RX ADMIN — VALSARTAN 160 MG: 160 TABLET, FILM COATED ORAL at 09:11

## 2022-11-02 RX ADMIN — GABAPENTIN 300 MG: 300 CAPSULE ORAL at 09:11

## 2022-11-02 RX ADMIN — Medication 1 CAPSULE: at 09:11

## 2022-11-02 RX ADMIN — POTASSIUM CHLORIDE 20 MEQ: 1500 TABLET, EXTENDED RELEASE ORAL at 04:11

## 2022-11-02 RX ADMIN — LEVOTHYROXINE SODIUM 125 MCG: 25 TABLET ORAL at 05:11

## 2022-11-02 RX ADMIN — MICONAZOLE NITRATE: 20 CREAM TOPICAL at 09:11

## 2022-11-02 RX ADMIN — PANTOPRAZOLE SODIUM 40 MG: 40 TABLET, DELAYED RELEASE ORAL at 09:11

## 2022-11-02 RX ADMIN — HYDROCODONE BITARTRATE AND ACETAMINOPHEN 1 TABLET: 10; 325 TABLET ORAL at 09:11

## 2022-11-02 RX ADMIN — Medication 1 CAPSULE: at 12:11

## 2022-11-02 RX ADMIN — BISACODYL 5 MG: 5 TABLET, COATED ORAL at 12:11

## 2022-11-02 RX ADMIN — Medication 1 CAPSULE: at 06:11

## 2022-11-02 RX ADMIN — GABAPENTIN 300 MG: 300 CAPSULE ORAL at 04:11

## 2022-11-02 RX ADMIN — CLOPIDOGREL BISULFATE 75 MG: 75 TABLET, FILM COATED ORAL at 09:11

## 2022-11-02 RX ADMIN — HYDROCODONE BITARTRATE AND ACETAMINOPHEN 1 TABLET: 10; 325 TABLET ORAL at 01:11

## 2022-11-02 RX ADMIN — ATORVASTATIN CALCIUM 40 MG: 40 TABLET, FILM COATED ORAL at 09:11

## 2022-11-02 NOTE — PT/OT/SLP PROGRESS
Physical Therapy Treatment    Patient Name:  Herlinda Valdovinos   MRN:  [id number]    Recommendations:     Discharge Recommendations:  home with home health   Discharge Equipment Recommendations: none   Barriers to discharge: Inaccessible home and Decreased caregiver support    Assessment:     Herlinda Valdovinos is a 57 y.o. female admitted with a medical diagnosis of Muscle weakness.  She presents with the following impairments/functional limitations:  weakness. Pt. Is progressing well towards goals and displays good motivation/ participation with PT.     Rehab Prognosis: Good; patient would benefit from acute skilled PT services to address these deficits and reach maximum level of function.    Recent Surgery: * No surgery found *      Plan:     During this hospitalization, patient to be seen 5 x/week to address the identified rehab impairments via gait training, therapeutic activities, therapeutic exercises and progress toward the following goals:    Plan of Care Expires:  11/10/22    Subjective     Chief Complaint: Mild headache  Patient/Family Comments/goals: Pt. States she is happy with her progress.   Pain/Comfort:  Pain Rating 1: 2/10  Location 1: head  Pain Addressed 1: Distraction      Objective:     Communicated with Nursing and Supervising PT Sunitha Martinez for POC prior to session.  Patient found supine with   upon PT entry to room.     General Precautions: Standard, fall   Orthopedic Precautions:    Braces:    Respiratory Status: Room air     Functional Mobility:  Bed Mobility:     Rolling Left:  stand by assistance  Rolling Right: stand by assistance  Bridging: contact guard assistance  Supine to Sit: contact guard assistance  Sit to Supine: minimum assistance  Transfers:     Sit to Stand:  contact guard assistance and minimum assistance with rolling walker  Gait: Pt amb. With CGA x 236 feet with RW and VC for step length and upright posture. Adjusted walker to allow increased shoulder comfort.       AM-PAC 6  CLICK MOBILITY  Turning over in bed (including adjusting bedclothes, sheets and blankets)?: 3  Sitting down on and standing up from a chair with arms (e.g., wheelchair, bedside commode, etc.): 3  Moving from lying on back to sitting on the side of the bed?: 3  Moving to and from a bed to a chair (including a wheelchair)?: 3  Need to walk in hospital room?: 3  Climbing 3-5 steps with a railing?: 2  Basic Mobility Total Score: 17       Treatment & Education:  Pt. Participated in mobility per above and B LE PREs: LAQs, marching and Hip ABD/ ADD 2 x 10 reps each with focus on full ROM and upright posture and trunk rotation, sitting balance at EOB x 6 mins total and reaching across midline activity x 3 mins.    Patient left HOB elevated with call button in reach..    GOALS:   Multidisciplinary Problems       Physical Therapy Goals       Not on file                    Time Tracking:     PT Received On: 11/02/22  PT Start Time: 1039     PT Stop Time: 1115  PT Total Time (min): 36 min     Billable Minutes: Gait Training 18 and Therapeutic Activity 18    Treatment Type: Treatment  PT/PTA: PTA     PTA Visit Number: 4     11/02/2022

## 2022-11-02 NOTE — PT/OT/SLP PROGRESS
Occupational Therapy   Treatment    Name: Herlinda Valdovinos  MRN: [id number]  Admitting Diagnosis:  Muscle weakness       Recommendations:     Discharge Recommendations: home health PT, home health OT  Discharge Equipment Recommendations:     Barriers to discharge:       Assessment:     Herlinda Valdovinos is a 57 y.o. female with a medical diagnosis of Muscle weakness.  She presents with continued difficulty with dynamic standing balance, maintaining safe balance necessary for ADL performance, resulting in potential for high fall risk and risk of injury. Performance deficits affecting function are weakness, impaired endurance, impaired self care skills, impaired functional mobility, gait instability, impaired balance, decreased coordination.     Rehab Prognosis:  Good; patient would benefit from acute skilled OT services to address these deficits and reach maximum level of function.       Plan:     Patient to be seen 5 x/week to address the above listed problems via self-care/home management, therapeutic activities, therapeutic exercises, neuromuscular re-education  Plan of Care Expires: 11/17/22  Plan of Care Reviewed with: patient, spouse    Subjective     Pain/Comfort:  None     Objective:     Communicated with: Nurse prior to session.  Patient found supine with daughter present upon OT entry to room.    General Precautions: Standard, fall   Orthopedic Precautions:    Braces:    Respiratory Status: Room air     Occupational Performance:     Bed Mobility:    Patient completed Supine to Sit with supervision  Patient completed Sit to Supine with supervision     Functional Mobility/Transfers:  Patient completed Sit <> Stand Transfer with contact guard assistance and minimum assistance  with  hand-held assist / RW    Activities of Daily Living:      [facility] 6 Click ADL:      Treatment & Education:  Performed dynamic standing challenging balance needed for improved ADL performance safety. Required intermittent Juanis to  maintain safe standing balance. Challenged functional reaching patterns needed for clothing management associated with dressing and toileting, reaching outside base of support challenging ability to right self with LOB. Demonstrated good participation requiring Juanis to maintain safe balance when reaching outside base of support or performing dynamic standing with functional reaching patterns.     Patient left supine with call button in reach    GOALS:   Multidisciplinary Problems       Occupational Therapy Goals          Problem: Occupational Therapy    Goal Priority Disciplines Outcome Interventions   Occupational Therapy Goal     OT, PT/OT Ongoing, Progressing    Description: Goals to be met by: 2 weeks     Patient will increase functional independence with ADLs by performing:    LE Dressing with Contact Guard Assistance.  Toileting from toilet with Contact Guard Assistance for hygiene and clothing management.   Bathing from  shower chair/bench with Contact Guard Assistance.  Toilet transfer to toilet with Contact Guard Assistance.    Goals to be met by: 4 weeks      Patient will increase functional independence with ADLs by performing:    LE Dressing with Modified Luce.  Toileting from toilet with Modified Luce for hygiene and clothing management.   Bathing from  shower chair/bench with Modified Luce.  Toilet transfer to toilet with Modified Luce.  Light IADLs Tiffany.                            Time Tracking:     OT Date of Treatment: 11/01/22  OT Start Time: 1450  OT Stop Time: 1525  OT Total Time (min): 35 min    Billable Minutes:Therapeutic Activity 35    MARCUS Levy, OTR/L      OT/HEMAL: OT        11/1/2022

## 2022-11-02 NOTE — PROGRESS NOTES
Wt: 155# noted. N,V,D is now resolved.  Her last BM was 10/29.  Diet advanced to Cardiac.  Meal intake %  C diff negative.  Pt is receiving a Probiotic.  She R banatrol but is drinking Ensue Clear BID.  Continue POC.

## 2022-11-02 NOTE — PLAN OF CARE
[facility] - Medical Surgical Unit - Swing Bed   Interdisciplinary Team Meeting    Patient: Herlinda Valdovinos   Today's Date: 11/2/2022   Estimated D/C Date: 11/10/2022        Physician: Alis Grider MD Nurse Practitioner: Felicia Hill NP   Pharmacy: Zina Morales, PharmD Unit Director: Elmira Mcgovern RN   : Mery Mcclelland RN Physical/Occupational Therapy: Kiana Rasmussen OTR/L   Speech Therapy: Kiana Rasmussen OTR/L Activity Therapy: Izzy Lai LPN   Nursing: Elmira Mcgovern RN  Respiratory: Marilou Bill, RT Dietary: Cesar Heaton RD  Other:      Nursing  New Symptoms/Problems: none  Last Bowel Movement: 09/29/22  Urine: continent Diarrhea: No   Constipated: No Bowel: continent [place]: No   Isolation: No     O2 Device: Nasal Cannula O2 Flow: 2L  SpO2: 96 %   Nutrition: Cardiac  Speech/Swallowing: No current speech or swallowing issues  Aspiration Precautions: No  Cognition: WNL    Physical Therapy  Physical Therapy/Gait: 175' ELOS: Plan to DC 11/10/2022    Transfers: Contact Guard Assistance Range of Motion/Restrictions:      Occupational Therapy  Eating/Grooming: Supervision or Set-up Assistance Toileting: Minimal Assistance   Bathing: Minimal Assistance Dressing (Upper Body): Supervision or Set-up Assistance   Dressing (Lower Body): Minimal Assistance      Activity Therapy  Level of participation: Active participation      Tx Plan/Recommendations reviewed with family and/or patient on (date) [id number].  Additional family Conference/Training:   D/C Plan/Recommendations: Home with HH and Home with family  POOL: 11/10/2022    Pharmacy  Medication Changes (see MD orders in chart): No  MD/NP: Alis Grider MD Labs Reviewed: Yes New Lab Orders: No   Lab Concerns: none

## 2022-11-03 PROCEDURE — 27000221 HC OXYGEN, UP TO 24 HOURS

## 2022-11-03 PROCEDURE — 94761 N-INVAS EAR/PLS OXIMETRY MLT: CPT

## 2022-11-03 PROCEDURE — 25000003 PHARM REV CODE 250: Performed by: NURSE PRACTITIONER

## 2022-11-03 PROCEDURE — 11000004 HC SNF PRIVATE

## 2022-11-03 PROCEDURE — 97535 SELF CARE MNGMENT TRAINING: CPT

## 2022-11-03 PROCEDURE — 97116 GAIT TRAINING THERAPY: CPT

## 2022-11-03 PROCEDURE — 25000003 PHARM REV CODE 250: Performed by: EMERGENCY MEDICINE

## 2022-11-03 PROCEDURE — 99900035 HC TECH TIME PER 15 MIN (STAT)

## 2022-11-03 RX ADMIN — TEMAZEPAM 30 MG: 30 CAPSULE ORAL at 09:11

## 2022-11-03 RX ADMIN — HYDROCODONE BITARTRATE AND ACETAMINOPHEN 1 TABLET: 10; 325 TABLET ORAL at 09:11

## 2022-11-03 RX ADMIN — PANTOPRAZOLE SODIUM 40 MG: 40 TABLET, DELAYED RELEASE ORAL at 09:11

## 2022-11-03 RX ADMIN — Medication 1 CAPSULE: at 04:11

## 2022-11-03 RX ADMIN — SERTRALINE HYDROCHLORIDE 50 MG: 50 TABLET ORAL at 09:11

## 2022-11-03 RX ADMIN — AMLODIPINE BESYLATE 10 MG: 5 TABLET ORAL at 09:11

## 2022-11-03 RX ADMIN — HYDROCODONE BITARTRATE AND ACETAMINOPHEN 1 TABLET: 10; 325 TABLET ORAL at 08:11

## 2022-11-03 RX ADMIN — ATORVASTATIN CALCIUM 40 MG: 40 TABLET, FILM COATED ORAL at 08:11

## 2022-11-03 RX ADMIN — Medication 1 CAPSULE: at 09:11

## 2022-11-03 RX ADMIN — GABAPENTIN 300 MG: 300 CAPSULE ORAL at 08:11

## 2022-11-03 RX ADMIN — LEVOTHYROXINE SODIUM 125 MCG: 25 TABLET ORAL at 06:11

## 2022-11-03 RX ADMIN — GABAPENTIN 300 MG: 300 CAPSULE ORAL at 09:11

## 2022-11-03 RX ADMIN — CLOPIDOGREL BISULFATE 75 MG: 75 TABLET, FILM COATED ORAL at 09:11

## 2022-11-03 RX ADMIN — MICONAZOLE NITRATE: 20 CREAM TOPICAL at 09:11

## 2022-11-03 RX ADMIN — GABAPENTIN 300 MG: 300 CAPSULE ORAL at 03:11

## 2022-11-03 RX ADMIN — BISACODYL 5 MG: 5 TABLET, COATED ORAL at 09:11

## 2022-11-03 RX ADMIN — VALSARTAN 160 MG: 160 TABLET, FILM COATED ORAL at 09:11

## 2022-11-03 NOTE — ASSESSMENT & PLAN NOTE
Enteric pathogen panel neg  C. Diff pending  Special contact isolation  Ondansetron po/IV  Ns bolus 500ml 10/26  10/27/2022  MILDLY IMPROVED AND WILL CONTINUE POC  C DIF STILL PENDING  MAY REQUIRE ADDITIONAL IVF IF SHE HAS DECREASED PO INTAKE  DIET HAD BEEN ADVANCED BUT PT ATE APPLE PIE AND NAUSEA INCREASED, HAVE CHANGED BACK TO CLEAR LIQUIDS AND WILL ADD BANATROL  11/02/2022 RESOLVED

## 2022-11-03 NOTE — ASSESSMENT & PLAN NOTE
PT HAS HTN  AMLODIPINE, CLONIDINE   /96    10/26/22 BP 97/71 this am. NS bolus given.  Recheck BP after bolus    10/27/2022   BP HAS IMPROVED 111/74  11/02/2022  BP /68

## 2022-11-03 NOTE — ASSESSMENT & PLAN NOTE
PT HAS HYPERLIPIDEMIA ANS IS S/P CVA  STATIN, LE ARE SLIGHTLY ELEVATED; WILL MONITOR  10/27/2022  CONTINUE STATIN  11/02/2022  CONTINUE STATIN

## 2022-11-03 NOTE — ASSESSMENT & PLAN NOTE
PT HAS ANXIETY DISORDER EXACERBATED BY FAMILY DYNAMICS PER PT'S STATEMENT  CONTINUE LORAZEPAM; WILL AVOID HS DOSING AS PT IS ON TEMAZEPAM HS  10/27/2022  PT HAS INTERMITTENT ANXIETY BUT OVERALL IMPROVED   11/02/2022  STABLE

## 2022-11-03 NOTE — ASSESSMENT & PLAN NOTE
PT HAS STAGE 2 DECUBITUS ULCER R BUTTOCK  WOUND CARE TEAM CONSULTATION  DAILY WOUND CARE  DECUBITUS PRECAUTIONS  WAFFLE MATTRESS  10/27/2022  IMPROVED WITH STAGE 2 ULCER HEALED  PT HAS STAGE 1  WITH JE TYPE RASH OF PERINEUM  WOUND CARE  DIFLUCARDEN, MONOSTAT  11/02/2022  RESOLVED

## 2022-11-03 NOTE — PT/OT/SLP PROGRESS
Physical Therapy Treatment    Patient Name:  Herlinda Valdovinos   MRN:  [id number]    Recommendations:     Discharge Recommendations:  home with home health   Discharge Equipment Recommendations: none   Barriers to discharge: None    Assessment:     Herlinda Valdovinos is a 57 y.o. female admitted with a medical diagnosis of Muscle weakness.  She presents with the following impairments/functional limitations:  weakness, impaired endurance, gait instability, impaired functional mobility, impaired balance, decreased lower extremity function, decreased safety awareness. Patient had good performance with ambulation today; decreased endurance ~150 feet in ambulation.     Rehab Prognosis: Good; patient would benefit from acute skilled PT services to address these deficits and reach maximum level of function.    Recent Surgery: * No surgery found *      Plan:     During this hospitalization, patient to be seen 5 x/week to address the identified rehab impairments via gait training, therapeutic activities, therapeutic exercises, neuromuscular re-education and progress toward the following goals:    Plan of Care Expires:  11/10/22    Subjective     Chief Complaint: None  Patient/Family Comments/goals: Doing well.  Pain/Comfort:         Objective:     Communicated with OT prior to session.  Patient found supine with bed alarm upon PT entry to room.     General Precautions: Standard, fall   Orthopedic Precautions:    Braces:    Respiratory Status: Room air     Bed Mobility:     Rolling Left:  stand by assistance  Rolling Right: stand by assistance  Bridging: contact guard assistance  Supine to Sit: contact guard assistance  Sit to Supine: minimum assistance  Transfers:     Sit to Stand:  contact guard assistance and minimum assistance with rolling walker  Gait: Pt amb. With CGA x 250 feet with RW and VC for step length and upright posture. Adjusted walker to allow increased shoulder comfort.       AM-PAC 6 CLICK MOBILITY  Turning  over in bed (including adjusting bedclothes, sheets and blankets)?: 4  Sitting down on and standing up from a chair with arms (e.g., wheelchair, bedside commode, etc.): 4  Moving from lying on back to sitting on the side of the bed?: 3  Moving to and from a bed to a chair (including a wheelchair)?: 3  Need to walk in hospital room?: 3  Climbing 3-5 steps with a railing?: 2  Basic Mobility Total Score: 19       Patient left supine with all lines intact and call button in reach..    GOALS:   Multidisciplinary Problems       Physical Therapy Goals       Not on file                    Time Tracking:     PT Received On: 11/03/22  PT Start Time: 0949     PT Stop Time: 1013  PT Total Time (min): 24 min     Billable Minutes: Gait Training 24 min    Treatment Type: 6th Visit  PT/PTA: PT     PTA Visit Number: 0     11/03/2022

## 2022-11-03 NOTE — ASSESSMENT & PLAN NOTE
Urine on 10/25 shows 20-50 WBCs to cath specimen  WBC > 72825  Urine culture pending  Rocephin 1 GM IVPB daily x 5 days started 10/25  10/27/2022  PT IS ON DAY 3/5 ROCEPHIN   URINE CULTURE E COLI SENSITIVE TO ROCEPHIN  WBC DECREASED 11K (15 K)  11/02/2022  PT COMPETED ROCEPHIN AND IS ASYMPTOMATIC WITH WBC 6K

## 2022-11-03 NOTE — PROGRESS NOTES
[facility] - Medical Surgical Unit  Hospital Medicine  Progress Note    Patient Name: Herlinda Valdovinos  MRN: [id number]  Patient Class: IP- Swing   Admission Date: 10/19/2022  Length of Stay: 15 days  Attending Physician: Alis Grider MD  Primary Care Provider: Vini Hernandez NP        Subjective:     Principal Problem:Muscle weakness        HPI:  PT IS A 57 YR OLD WF ADMITTED TO [facility] SWING BED FOR PT/OT/REHABILITATION FOR MUSCLE WEAKNESS AND MEDICAL MANAGEMENT. PT WAS REFERRED FROM [facility] WHERE SHE WAS ADMITTED ON 10/17/2022 THROUGH THE ED FOR WEAKNESS AND DEBILITY, DEHYDRATION AND WEIGHT LOSS WITH INCREASED WEAKNESS AND MALAISE FOR 3 WEEKS WITH SEVERAL FALLS AT HOME. PT'S  HAD BEEN ILL AND UNABLE TO FULLY CARE FOR HER DURING THIS TIME AND SINCE HAS RECOVERED AND IS SUPPORTIVE. PT HAD A HEMORRHAGIC CVA IN 2021 AND HAS REQUIRED  MULTIPLE EPISODES OF INPATIENT AND OUTPATIENT REHABILITATION WITH THE MOST RECENT OUTPATIENT THERAPY COMPLETED IN 6/2022. PT HAS HYPERTENSION WHICH IS INTERMITTENTLY POORLY CONTROLLED AND PT IS UNDER CONSIDERABLE STRESS AT HOME DUE TO FAMILY DYNAMICS.  PT IMPROVED DURING HOSPITALIZATION WITH IVF HYDRATION ; BUT HAS PERSISTENT MUSCLE WEAKNESS AND DEBILITY, AND WILL REQUIRE SWING BED FOR REHABILITATION. PT HAS BEEN SCREENED PER PT/OT WITH RECOMMENDATION FOR REHABILITATION DUE TO WEAKNESS AND DIFFICULTY AMBULATING AND PERFORMING ADLS. PT REQUIRED EXTENSIVE ASSISTANCE FOR ADLS AND MOBILITY COMPARED TO BEING INDEPENDENT PRIOR TO RECENT DECLINE.  PT WILL BE EVALUATED PER PT/OT AND A TREATMENT PLAN WILL BE INITIATED. THE PHARMACIST WILL REVIEW MEDICATIONS AND MAKE RECOMMENDATIONS. PT WILL SEE THE DIETICIAN  FOR NUTRITIONAL SUPPORT WITH WEIGHT 73 KG AND ALBUMIN OF 3.9. PT'S ABNORMAL ADMITTING LABS ARE: CMP:CA 8.1, K 3.1, AST 52 AND ALK PHOS 119, CBC:HCT 37.2,  K, UA:SMALL BLOOD. PT WILL HAVE WEEKLY LABS.    Past Medical History:    Diagnosis Date    Cerebral hemorrhage     Chronic anxiety     Dehydration     Depression     Dysphagia     Due to and following non-traumatic intracerebral hemorrhage    Hearing loss     History of CVA (cerebrovascular accident)     Hypertension     Insomnia     Intrapontine hemorrhage     Left hemiparesis     Peripheral neuropathy     Stroke     Thyroid disease     Transient cerebral ischemia       CXR: MILD PROMINENCE OF INTERSTITIAL MARKINGS      PT CODE STATUS IS FULL CODE  PT COVID STATUS IS NEG  PT VTE PPX IS PLAVIX/TEDS/EARLY AMBULATION        Overview/Hospital Course:  10/26/22 0530 Nurse reports that pt is having nausea and requesting IV ondansetron instead of po at this time. She has not vomited but has had diarrhea x 1 during the night. Enteric pathogen panel is negative. C. Diff is pending. She was started on Rocephin IVPB yesterday due to UTI. Urine culture is pending. Labs on 10/24 show a WBC > 84198 and increase in Bun and creatinine to 37 and 1.43 from 10 and 0.86 1 week ago. She is afebrile. Her HR is ranging  with O2 sats 93-95% on O2 at 2L/min per NC and BP 97/71 this am. Ondansetron IVP and NS bolus 500 ml ordered.    .10/27/2022  HOSPITAL DAY 8  PT HAS HAD N/V/D ONSET THIS WEEK AFTER A VISIT FROM A [employer] MEMBER WHO WAS REPORTED ILL THE DAY OF THE VISIT WITH SIMILAR SYMPTOMS TREATED WITH IVF, ZOFRAN/PHENERGAN AND CLEAR LIQUID DIET. PT HAD IMPROVED YESTERDAY WITH DIET ADVANCED TO BRAT; THEN ATE APPLE PIE AND NAUSEA WORSENED AND DIET WAS CHANGED BACK TO CLEAR LIQUIDS WITH BANATROL ADDED. PT WAS EVALUATED PER RD. PT IS ON CONTACT ISOLATION AND THE ENTERIC PANEL IS NEG WITH C DIF PENDING.   PT WAS ALSO DX WITH UTI DUE TO E COLI AND WILL BE TREATED WITH ROCEPHIN FOR 5 DAYS. PT/OT  IS ON MEDICAL HOLD PRESENTLY DUE TO N/V/D. PT'S BP IS STABLE;  AND WAS ELEVATED ON ADMISSION AND DECREASED YESTERDAY, IMPROVED WITH IVF BOLUS. PT HAS CANDIDA RASH OF PERINEUM TREATED WITH DIFLUCAN AND  MONOSTAT WITH IMPROVEMENT; STAGE 2 DECUBITUS ULCER HAS HEALED AND  HAS STAGE 1 SKIN CHANGES OF SACRAL AND BUTTOCK AREAS.  PT IS ON 2L NC O2 WITH SAT 97%. PT IS BEING TREATED PER RESPIRATORY THERAPY WITH NEBS, INCENTIVE SPIROMETRY AND O2 MONITORING.  PT IS ON DAY 3/5 OF ROCEPHIN FOR UTI. PHARMACY IS MONITORING ALL MEDICATIONS.  PT HAS STABLE LABS EXCEPT FOR BMP: , BUN/CREAT 51/1.88, ALB 3.2, ALK PHOS 120.CBC: WBC 11.48 K. PT HAS CULTURE RESULTS REVEALING E COLI -UA, NEG ENTERIC PATHOGEN.     11/02/202 HOSPITAL DAY 14  PT IS PARTICIPATING WITH PT/OT PERFORMING BED MOBILITY WITH SBA, CGA, AND MINIMUM ASSISTANCE, TRANSFERS WITH CGA AND MINIMUM  ASSISTANCE WITH RW, AND AMBULATING 236 FEET WITH CGA WITH RW AND VC. PT'S N/V/D SX HAVE RESOLVED. RD EVALUATED PT AND MEAL INTAKE IS %. PT'S WEIGHT IS DECREASED 2.7 KG FROM ADMISSION. PT'S C DIFF AND ENTERIC PATHOGEN CULTURES WERE NEGATIVE.    PT IS ON RA WITH O2 SAT 95-96 %. PT IS BEING TREATED PER RESPIRATORY THERAPY WITH NEBS, INCENTIVE SPIROMETRY AND O2 MONITORING.  PT COMPLETED ROCEPHIN FOR UTI DUE TO E COLI. PT HAS IMPROVEMENT IN PERINEAL RASH. THE PHARMACY IS MONITORING ALL MEDICATIONS.PT'S LABS ARE STABLE EXCEPT FOR K 3.3.        Interval History:   PT IS PARTICIPATING WITH PT/OT  AMBULATING 236 FEET WITH CGA WITH RW AND VC. PT'S N/V/D HAS RESOLVED EXCEPT FOR OCCASIONAL NAUSEA (BASELINE FOR PT).  PT IS ON RA WITH O2 SAT 95-96 %. PT IS BEING TREATED PER RESPIRATORY THERAPY WITH NEBS, INCENTIVE SPIROMETRY AND O2 MONITORING.  PT COMPLETED ROCEPHIN FOR UTI DUE TO E COLI. PT HAS IMPROVEMENT IN PERINEAL RASH. THE PHARMACY IS MONITORING ALL MEDICATIONS.LABS ARE STABLE EXCEPT FOR K 3.3    Review of Systems   Constitutional:  Positive for activity change and fatigue.   HENT: Negative.     Eyes: Negative.    Respiratory: Negative.  Negative for cough and shortness of breath.    Cardiovascular: Negative.  Negative for chest pain.   Gastrointestinal:  Negative for  abdominal distention and abdominal pain. Diarrhea: IMPROVED. Nausea: INTERMITTENT. Vomiting: NONE IN A FEW DAYS.  Endocrine: Negative.    Genitourinary: Negative.    Musculoskeletal:  Positive for arthralgias and gait problem.   Skin:  Positive for rash (PERINEAL RASH C/W CANDIDA IS IMPROVED) and wound (IMPROVED). Color change: pale.  Allergic/Immunologic: Positive for immunocompromised state.   Neurological:  Positive for weakness (generalized).   Hematological: Negative.    All other systems reviewed and are negative.  Objective:     Vital Signs (Most Recent):  Temp: 97.2 °F (36.2 °C) (11/02/22 1925)  Pulse: 70 (11/02/22 1925)  Resp: 18 (11/02/22 2101)  BP: 107/68 (11/02/22 1925)  SpO2: (!) 92 % (11/02/22 1925)   Vital Signs (24h Range):  Temp:  [97.2 °F (36.2 °C)-97.7 °F (36.5 °C)] 97.2 °F (36.2 °C)  Pulse:  [70-72] 70  Resp:  [18-20] 18  SpO2:  [92 %-96 %] 92 %  BP: (102-107)/(63-68) 107/68     Weight: 70.7 kg (155 lb 12.8 oz)  Body mass index is 26.74 kg/m².    Intake/Output Summary (Last 24 hours) at 11/2/2022 2355  Last data filed at 11/2/2022 1841  Gross per 24 hour   Intake 360 ml   Output 1 ml   Net 359 ml      Physical Exam  Vitals and nursing note reviewed. Exam conducted with a chaperone present.   Constitutional:       Appearance: She is ill-appearing.   HENT:      Head: Normocephalic and atraumatic.      Mouth/Throat:      Mouth: Mucous membranes are dry.   Eyes:      Extraocular Movements: Extraocular movements intact.   Neck:      Vascular: Carotid bruit present.   Cardiovascular:      Rate and Rhythm: Normal rate and regular rhythm.      Pulses: Normal pulses.      Heart sounds: Murmur (grade I loudest at left sternal border 5th intercostal space) heard.   Pulmonary:      Effort: Pulmonary effort is normal. No respiratory distress.      Breath sounds: Normal breath sounds. No rales.      Comments: Pleural rub left upper and lower lobe areas noted  Abdominal:      General: Abdomen is flat. Bowel  sounds are normal. There is no distension.      Palpations: Abdomen is soft.      Tenderness: There is no abdominal tenderness. There is no guarding.   Musculoskeletal:         General: No swelling (MINIMAL B FEET HAS IMPROVED) or tenderness. Normal range of motion.      Cervical back: Normal range of motion and neck supple.   Skin:     General: Skin is warm and dry.      Capillary Refill: Capillary refill takes less than 2 seconds.      Coloration: Skin is pale.      Findings: Lesion (IMPROVED) present. No rash.   Neurological:      General: No focal deficit present.      Mental Status: She is alert and oriented to person, place, and time.      Motor: Weakness present.   Psychiatric:         Mood and Affect: Mood normal.         Behavior: Behavior normal.         Thought Content: Thought content normal.         Judgment: Judgment normal.      Comments: ANXIOUS       Significant Labs: All pertinent labs within the past 24 hours have been reviewed.  BMP:   Recent Labs   Lab 11/02/22 0454   *      K 3.3*      CO2 30   BUN 14   CREATININE 0.91   CALCIUM 8.2*     CBC:   Recent Labs   Lab 11/02/22 0454   WBC 6.38   HGB 11.6*   HCT 34.6*          Significant Imaging: I have reviewed all pertinent imaging results/findings within the past 24 hours.      Assessment/Plan:      * Muscle weakness  PT/OT WILL EVALUATE AND INITIATE A TREATMENT PLAN  10/27/2022 PT IS ON MEDICAL HOLD  PT WAS AMBULATORY INITIALLY WITH BALANCE ISSUES  11/02/2022  PT IS PARTICIPATING WITH PT/OT PERFORMING BED MOBILITY WITH SBA, CGA, AND MINIMUM ASSISTANCE, TRANSFERS WITH CGA AND MINIMUM  ASSISTANCE WITH RW, AND AMBULATING 236 FEET WITH CGA WITH RW AND VC.        Nausea vomiting and diarrhea  Enteric pathogen panel neg  C. Diff pending  Special contact isolation  Ondansetron po/IV  Ns bolus 500ml 10/26  10/27/2022  MILDLY IMPROVED AND WILL CONTINUE POC  C DIF STILL PENDING  MAY REQUIRE ADDITIONAL IVF IF SHE HAS DECREASED PO  INTAKE  DIET HAD BEEN ADVANCED BUT PT ATE APPLE PIE AND NAUSEA INCREASED, HAVE CHANGED BACK TO CLEAR LIQUIDS AND WILL ADD BANATROL  11/02/2022 RESOLVED        Acute urinary tract infection  Urine on 10/25 shows 20-50 WBCs to cath specimen  WBC > 44552  Urine culture pending  Rocephin 1 GM IVPB daily x 5 days started 10/25  10/27/2022  PT IS ON DAY 3/5 ROCEPHIN   URINE CULTURE E COLI SENSITIVE TO ROCEPHIN  WBC DECREASED 11K (15 K)  11/02/2022  PT COMPETED ROCEPHIN AND IS ASYMPTOMATIC WITH WBC 6K    Pressure injury of right buttock, stage 2  PT HAS STAGE 2 DECUBITUS ULCER R BUTTOCK  WOUND CARE TEAM CONSULTATION  DAILY WOUND CARE  DECUBITUS PRECAUTIONS  WAFFLE MATTRESS  10/27/2022  IMPROVED WITH STAGE 2 ULCER HEALED  PT HAS STAGE 1  WITH EJ TYPE RASH OF PERINEUM  WOUND CARE  DIFLUCAN, MONOSTAT  11/02/2022  RESOLVED      Chronic pain disorder  PT HAS CHRONIC PAIN DISORDER  NORCO, GABAPENTIN  PT/OT TO INCREASE MOBILITY AND AMBULATION  10/27/2022 STABLE  11/02/2022 STABLE        Hyperlipidemia  PT HAS HYPERLIPIDEMIA ANS IS S/P CVA  STATIN, LE ARE SLIGHTLY ELEVATED; WILL MONITOR  10/27/2022  CONTINUE STATIN  11/02/2022  CONTINUE STATIN      Thyroid disease  PT HAS THYROID DISEASE  TSH 1.880  SYNTHROID 125 MCG DAILY  10/27/2022 STABLE  11/02/2022 STABLE    Hypertension  PT HAS HTN  AMLODIPINE, CLONIDINE   /96    10/26/22 BP 97/71 this am. NS bolus given.  Recheck BP after bolus    10/27/2022   BP HAS IMPROVED 111/74  11/02/2022  BP /68    Generalized anxiety disorder  PT HAS ANXIETY DISORDER EXACERBATED BY FAMILY DYNAMICS PER PT'S STATEMENT  CONTINUE LORAZEPAM; WILL AVOID HS DOSING AS PT IS ON TEMAZEPAM HS  10/27/2022  PT HAS INTERMITTENT ANXIETY BUT OVERALL IMPROVED   11/02/2022  STABLE        VTE Risk Mitigation (From admission, onward)         Ordered     Place OCTAVIA hose  Until discontinued         10/19/22 2117     IP VTE LOW RISK PATIENT  Once         10/19/22 2117                Discharge Planning    POOL: 11/10/2022     Code Status: Full Code   Is the patient medically ready for discharge?:     Reason for patient still in hospital (select all that apply): Patient trending condition, Laboratory test, Treatment, Consult recommendations and PT / OT recommendations  Discharge Plan A: Home with family, Home Health                  Alis Grider MD  Department of Hospital Medicine   [facility] - Medical Surgical Unit

## 2022-11-03 NOTE — ASSESSMENT & PLAN NOTE
PT/OT WILL EVALUATE AND INITIATE A TREATMENT PLAN  10/27/2022 PT IS ON MEDICAL HOLD  PT WAS AMBULATORY INITIALLY WITH BALANCE ISSUES  11/02/2022  PT IS PARTICIPATING WITH PT/OT PERFORMING BED MOBILITY WITH SBA, CGA, AND MINIMUM ASSISTANCE, TRANSFERS WITH CGA AND MINIMUM  ASSISTANCE WITH RW, AND AMBULATING 236 FEET WITH CGA WITH RW AND VC.

## 2022-11-03 NOTE — SUBJECTIVE & OBJECTIVE
Interval History:   PT IS PARTICIPATING WITH PT/OT  AMBULATING 236 FEET WITH CGA WITH RW AND VC. PT'S N/V/D HAS RESOLVED EXCEPT FOR OCCASIONAL NAUSEA (BASELINE FOR PT).  PT IS ON RA WITH O2 SAT 95-96 %. PT IS BEING TREATED PER RESPIRATORY THERAPY WITH NEBS, INCENTIVE SPIROMETRY AND O2 MONITORING.  PT COMPLETED ROCEPHIN FOR UTI DUE TO E COLI. PT HAS IMPROVEMENT IN PERINEAL RASH. THE PHARMACY IS MONITORING ALL MEDICATIONS.LABS ARE STABLE EXCEPT FOR K 3.3    Review of Systems   Constitutional:  Positive for activity change and fatigue.   HENT: Negative.     Eyes: Negative.    Respiratory: Negative.  Negative for cough and shortness of breath.    Cardiovascular: Negative.  Negative for chest pain.   Gastrointestinal:  Negative for abdominal distention and abdominal pain. Diarrhea: IMPROVED. Nausea: INTERMITTENT. Vomiting: NONE IN A FEW DAYS.  Endocrine: Negative.    Genitourinary: Negative.    Musculoskeletal:  Positive for arthralgias and gait problem.   Skin:  Positive for rash (PERINEAL RASH C/W CANDIDA IS IMPROVED) and wound (IMPROVED). Color change: pale.  Allergic/Immunologic: Positive for immunocompromised state.   Neurological:  Positive for weakness (generalized).   Hematological: Negative.    All other systems reviewed and are negative.  Objective:     Vital Signs (Most Recent):  Temp: 97.2 °F (36.2 °C) (11/02/22 1925)  Pulse: 70 (11/02/22 1925)  Resp: 18 (11/02/22 2101)  BP: 107/68 (11/02/22 1925)  SpO2: (!) 92 % (11/02/22 1925)   Vital Signs (24h Range):  Temp:  [97.2 °F (36.2 °C)-97.7 °F (36.5 °C)] 97.2 °F (36.2 °C)  Pulse:  [70-72] 70  Resp:  [18-20] 18  SpO2:  [92 %-96 %] 92 %  BP: (102-107)/(63-68) 107/68     Weight: 70.7 kg (155 lb 12.8 oz)  Body mass index is 26.74 kg/m².    Intake/Output Summary (Last 24 hours) at 11/2/2022 2355  Last data filed at 11/2/2022 1841  Gross per 24 hour   Intake 360 ml   Output 1 ml   Net 359 ml      Physical Exam  Vitals and nursing note reviewed. Exam conducted with a  chaperone present.   Constitutional:       Appearance: She is ill-appearing.   HENT:      Head: Normocephalic and atraumatic.      Mouth/Throat:      Mouth: Mucous membranes are dry.   Eyes:      Extraocular Movements: Extraocular movements intact.   Neck:      Vascular: Carotid bruit present.   Cardiovascular:      Rate and Rhythm: Normal rate and regular rhythm.      Pulses: Normal pulses.      Heart sounds: Murmur (grade I loudest at left sternal border 5th intercostal space) heard.   Pulmonary:      Effort: Pulmonary effort is normal. No respiratory distress.      Breath sounds: Normal breath sounds. No rales.      Comments: Pleural rub left upper and lower lobe areas noted  Abdominal:      General: Abdomen is flat. Bowel sounds are normal. There is no distension.      Palpations: Abdomen is soft.      Tenderness: There is no abdominal tenderness. There is no guarding.   Musculoskeletal:         General: No swelling (MINIMAL B FEET HAS IMPROVED) or tenderness. Normal range of motion.      Cervical back: Normal range of motion and neck supple.   Skin:     General: Skin is warm and dry.      Capillary Refill: Capillary refill takes less than 2 seconds.      Coloration: Skin is pale.      Findings: Lesion (IMPROVED) present. No rash.   Neurological:      General: No focal deficit present.      Mental Status: She is alert and oriented to person, place, and time.      Motor: Weakness present.   Psychiatric:         Mood and Affect: Mood normal.         Behavior: Behavior normal.         Thought Content: Thought content normal.         Judgment: Judgment normal.      Comments: ANXIOUS       Significant Labs: All pertinent labs within the past 24 hours have been reviewed.  BMP:   Recent Labs   Lab 11/02/22  0454   *      K 3.3*      CO2 30   BUN 14   CREATININE 0.91   CALCIUM 8.2*     CBC:   Recent Labs   Lab 11/02/22  0454   WBC 6.38   HGB 11.6*   HCT 34.6*          Significant Imaging: I  have reviewed all pertinent imaging results/findings within the past 24 hours.

## 2022-11-04 LAB — GLUCOSE SERPL-MCNC: 124 MG/DL (ref 70–105)

## 2022-11-04 PROCEDURE — 97535 SELF CARE MNGMENT TRAINING: CPT

## 2022-11-04 PROCEDURE — 97116 GAIT TRAINING THERAPY: CPT | Mod: CQ

## 2022-11-04 PROCEDURE — 25000003 PHARM REV CODE 250: Performed by: EMERGENCY MEDICINE

## 2022-11-04 PROCEDURE — 27000221 HC OXYGEN, UP TO 24 HOURS

## 2022-11-04 PROCEDURE — 99900035 HC TECH TIME PER 15 MIN (STAT)

## 2022-11-04 PROCEDURE — 25000003 PHARM REV CODE 250: Performed by: NURSE PRACTITIONER

## 2022-11-04 PROCEDURE — 94761 N-INVAS EAR/PLS OXIMETRY MLT: CPT

## 2022-11-04 PROCEDURE — 11000004 HC SNF PRIVATE

## 2022-11-04 PROCEDURE — 82962 GLUCOSE BLOOD TEST: CPT

## 2022-11-04 RX ADMIN — CLOPIDOGREL BISULFATE 75 MG: 75 TABLET, FILM COATED ORAL at 08:11

## 2022-11-04 RX ADMIN — ATORVASTATIN CALCIUM 40 MG: 40 TABLET, FILM COATED ORAL at 09:11

## 2022-11-04 RX ADMIN — AMLODIPINE BESYLATE 10 MG: 5 TABLET ORAL at 08:11

## 2022-11-04 RX ADMIN — GABAPENTIN 300 MG: 300 CAPSULE ORAL at 03:11

## 2022-11-04 RX ADMIN — PANTOPRAZOLE SODIUM 40 MG: 40 TABLET, DELAYED RELEASE ORAL at 08:11

## 2022-11-04 RX ADMIN — HYDROCODONE BITARTRATE AND ACETAMINOPHEN 1 TABLET: 10; 325 TABLET ORAL at 03:11

## 2022-11-04 RX ADMIN — MICONAZOLE NITRATE: 20 CREAM TOPICAL at 09:11

## 2022-11-04 RX ADMIN — TEMAZEPAM 30 MG: 30 CAPSULE ORAL at 09:11

## 2022-11-04 RX ADMIN — LEVOTHYROXINE SODIUM 125 MCG: 25 TABLET ORAL at 05:11

## 2022-11-04 RX ADMIN — VALSARTAN 160 MG: 160 TABLET, FILM COATED ORAL at 08:11

## 2022-11-04 RX ADMIN — Medication 1 CAPSULE: at 08:11

## 2022-11-04 RX ADMIN — Medication 1 CAPSULE: at 04:11

## 2022-11-04 RX ADMIN — Medication 1 CAPSULE: at 11:11

## 2022-11-04 RX ADMIN — HYDROCODONE BITARTRATE AND ACETAMINOPHEN 1 TABLET: 10; 325 TABLET ORAL at 05:11

## 2022-11-04 RX ADMIN — SERTRALINE HYDROCHLORIDE 50 MG: 50 TABLET ORAL at 08:11

## 2022-11-04 RX ADMIN — HYDROCODONE BITARTRATE AND ACETAMINOPHEN 1 TABLET: 10; 325 TABLET ORAL at 09:11

## 2022-11-04 RX ADMIN — GABAPENTIN 300 MG: 300 CAPSULE ORAL at 08:11

## 2022-11-04 RX ADMIN — DICLOFENAC SODIUM TOPICAL GEL, 1%, 2 G: 10 GEL TOPICAL at 09:11

## 2022-11-04 RX ADMIN — MICONAZOLE NITRATE: 20 CREAM TOPICAL at 08:11

## 2022-11-04 RX ADMIN — GABAPENTIN 300 MG: 300 CAPSULE ORAL at 09:11

## 2022-11-04 NOTE — PT/OT/SLP PROGRESS
Occupational Therapy   Treatment    Name: Herlinda Valdovinos  MRN: [id number]  Admitting Diagnosis:  Muscle weakness       Recommendations:     Discharge Recommendations: home health PT, home health OT  Discharge Equipment Recommendations:     Barriers to discharge:       Assessment:     Herlinda Valdovinos is a 57 y.o. female with a medical diagnosis of Muscle weakness.  She presents with performance deficits affecting function are weakness, impaired endurance, impaired self care skills, impaired functional mobility, gait instability, impaired balance, decreased coordination.     Rehab Prognosis:  Good; patient would benefit from acute skilled OT services to address these deficits and reach maximum level of function.       Plan:     Patient to be seen 5 x/week to address the above listed problems via self-care/home management, therapeutic activities, therapeutic exercises, neuromuscular re-education  Plan of Care Expires: 11/17/22  Plan of Care Reviewed with: patient, spouse    Subjective     Pain/Comfort:   None reported    Objective:     Communicated with: Nurse prior to session.  Patient found supine upon OT entry to room.    General Precautions: Standard, fall   Orthopedic Precautions:    Braces:    Respiratory Status: Room air     Occupational Performance:     Bed Mobility:    Patient completed Supine to Sit with supervision  Patient completed Sit to Supine with supervision     Functional Mobility/Transfers:  Patient completed Sit <> Stand Transfer with contact guard assistance and minimum assistance  with  rolling walker   Functional Mobility: x 75 feet with RW with CGA-Juanis    Activities of Daily Living:  Lower Body Dressing: contact guard assistance and minimum assistance sitting/standing EOB  Toileting: minimum assistance set-up jose care and Juanis clothing management      [facility] 6 Click ADL:      Treatment & Education:  Performed toileting training from bed position as pt was found with UI episode. Required  set-up anterior and posterior jose care, Juanis clothing management. Performed LB dressing donning pants sitting EOB with CGA, pulling over hips while standing with Juanis for balance assist and pulling pants up posteriorly.     Patient left supine with call button in reach    GOALS:   Multidisciplinary Problems       Occupational Therapy Goals          Problem: Occupational Therapy    Goal Priority Disciplines Outcome Interventions   Occupational Therapy Goal     OT, PT/OT Ongoing, Progressing    Description: Goals to be met by: 2 weeks     Patient will increase functional independence with ADLs by performing:    LE Dressing with Contact Guard Assistance.  Toileting from toilet with Contact Guard Assistance for hygiene and clothing management.   Bathing from  shower chair/bench with Contact Guard Assistance.  Toilet transfer to toilet with Contact Guard Assistance.    Goals to be met by: 4 weeks      Patient will increase functional independence with ADLs by performing:    LE Dressing with Modified Desha.  Toileting from toilet with Modified Desha for hygiene and clothing management.   Bathing from  shower chair/bench with Modified Desha.  Toilet transfer to toilet with Modified Desha.  Light IADLs Tiffany.                            Time Tracking:     OT Date of Treatment: 11/03/22  OT Start Time: 0952  OT Stop Time: 1011  OT Total Time (min): 19 min    Billable Minutes:Self Care/Home Management 10    Kiana Rasmussen, MARCUS, OTR/L      OT/HEMAL: OT       11/3/2022

## 2022-11-04 NOTE — PLAN OF CARE
Problem: Adult Inpatient Plan of Care  Goal: Plan of Care Review  Outcome: Ongoing, Progressing  Goal: Patient-Specific Goal (Individualized)  Outcome: Ongoing, Progressing  Goal: Absence of Hospital-Acquired Illness or Injury  Outcome: Ongoing, Progressing  Intervention: Prevent and Manage VTE (Venous Thromboembolism) Risk  Flowsheets (Taken 11/4/2022 1412)  Activity Management: Ambulated in gomez - L4  Goal: Optimal Comfort and Wellbeing  Outcome: Ongoing, Progressing  Intervention: Monitor Pain and Promote Comfort  Flowsheets (Taken 11/4/2022 1412)  Pain Management Interventions: care clustered  Intervention: Provide Person-Centered Care  Flowsheets (Taken 11/4/2022 1412)  Trust Relationship/Rapport: care explained  Goal: Readiness for Transition of Care  Outcome: Ongoing, Progressing  Intervention: Mutually Develop Transition Plan  Flowsheets (Taken 11/4/2022 1412)  Transportation Anticipated: family or friend will provide     Problem: Diabetes Comorbidity  Goal: Blood Glucose Level Within Targeted Range  Outcome: Ongoing, Progressing  Intervention: Monitor and Manage Glycemia  Flowsheets (Taken 11/4/2022 1412)  Glycemic Management: blood glucose monitored     Problem: Impaired Wound Healing  Goal: Optimal Wound Healing  Outcome: Ongoing, Progressing  Intervention: Promote Wound Healing  Flowsheets (Taken 11/4/2022 1412)  Oral Nutrition Promotion: safe use of adaptive equipment encouraged  Sleep/Rest Enhancement: awakenings minimized  Activity Management: Ambulated in gomez - L4  Pain Management Interventions: care clustered     Problem: Skin Injury Risk Increased  Goal: Skin Health and Integrity  Outcome: Ongoing, Progressing  Intervention: Promote and Optimize Oral Intake  Flowsheets (Taken 11/4/2022 1412)  Oral Nutrition Promotion: safe use of adaptive equipment encouraged     Problem: Infection  Goal: Absence of Infection Signs and Symptoms  Outcome: Ongoing, Progressing  Intervention: Prevent or Manage  Infection  Flowsheets (Taken 11/4/2022 1412)  Infection Management: aseptic technique maintained     Problem: Fall Injury Risk  Goal: Absence of Fall and Fall-Related Injury  Outcome: Ongoing, Progressing  Intervention: Identify and Manage Contributors  Flowsheets (Taken 11/4/2022 1412)  Self-Care Promotion: independence encouraged  Medication Review/Management: medications reviewed

## 2022-11-04 NOTE — PLAN OF CARE
Swing Bed Recertification    Patient Name: Herlinda Valdovinos                                                            MRN: [id number]   : 1965   Diagnosis: Muscle weakness [M62.81]     I certify that continued skilled inpatient care is necessary for the following reasons: Patient requires continued skilled PT and OT at least five days per week due to the patient's functional deficits. Patient requires continued daily skilled nursing care to meet the patient's medical needs, promote recovery, and ensure medical safety.     Estimated discharge date: 11/10/2022

## 2022-11-04 NOTE — PT/OT/SLP PROGRESS
Physical Therapy Treatment    Patient Name:  Herlinda Valdovinos   MRN:  [id number]    Recommendations:     Discharge Recommendations:  home with home health   Discharge Equipment Recommendations: none   Barriers to discharge: None    Assessment:     Herlinda Valdovinos is a 57 y.o. female admitted with a medical diagnosis of Muscle weakness.  She presents with the following impairments/functional limitations:    weakness, impaired endurance, gait instability, impaired functional mobility, impaired balance, decreased lower extremity function, decreased safety awareness.  Patient agreed to participate in therapy tx. Patient felt weak and tired during ambulation today with BP and SaO2 taken throughout treatment with normal readings.    Rehab Prognosis: Good; patient would benefit from acute skilled PT services to address these deficits and reach maximum level of function.    Recent Surgery: * No surgery found *      Plan:     During this hospitalization, patient to be seen 5 x/week to address the identified rehab impairments via gait training, therapeutic activities, therapeutic exercises and progress toward the following goals:    Plan of Care Expires:  11/10/22    Subjective     Chief Complaint: weakness  Patient/Family Comments/goals: agreeable to PT  Pain/Comfort:  Pain Rating 1: 0/10      Objective:     Communicated with OT prior to session.  Patient found supine with   upon PT entry to room.     General Precautions: Standard, fall   Orthopedic Precautions:    Braces:    Respiratory Status: Room air     Functional Mobility:  Bed Mobility:     Supine to Sit: stand by assistance  Sit to Supine: stand by assistance  Transfers:     Sit to Stand:  contact guard assistance with rolling walker  Gait: Patient ambulated 175 ft using RW and CGA with verbal cues for upright posture. Patient stated she was feeling weak and her gait was faltering so she took brief rest break and patient's BP and SaO2 were taken reading 113/84 and  Please let the patient know her INR was 1.5. Please have her reschedule to have her INR checked here in the clinic. Her creatinine was 1.4 she can continue the Bumex.     Harmony Ramirez MD PGY-3 96% respectively. After ambulating another 70 feet before needing to rest again, patient's BP was taken again reading 123/93.      AM-PAC 6 CLICK MOBILITY  Turning over in bed (including adjusting bedclothes, sheets and blankets)?: 4  Sitting down on and standing up from a chair with arms (e.g., wheelchair, bedside commode, etc.): 3  Moving from lying on back to sitting on the side of the bed?: 3  Moving to and from a bed to a chair (including a wheelchair)?: 3  Need to walk in hospital room?: 3  Climbing 3-5 steps with a railing?: 2  Basic Mobility Total Score: 18       Treatment & Education:  Patient performed ambulation and transfers today, listed above.    Patient left HOB elevated with call button in reach and  present..    GOALS:   Multidisciplinary Problems       Physical Therapy Goals       Not on file                    Time Tracking:     PT Received On: 11/04/22  PT Start Time: 0940     PT Stop Time: 1010  PT Total Time (min): 30 min     Billable Minutes: Gait Training 15    Treatment Type: Treatment  PT/PTA: PTA     PTA Visit Number: 1     11/04/2022

## 2022-11-04 NOTE — PT/OT/SLP PROGRESS
Occupational Therapy   Treatment    Name: Herlinda Valdovinos  MRN: [id number]  Admitting Diagnosis:  Muscle weakness       Recommendations:     Discharge Recommendations: home health PT, home health OT  Discharge Equipment Recommendations:     Barriers to discharge:       Assessment:     Herlinda Valdovinos is a 57 y.o. female with a medical diagnosis of Muscle weakness.  She presents with continued difficulty with dynamic standing balance, maintaining safe balance necessary for ADL performance, resulting in potential for high fall risk and risk of injury. Performance deficits affecting function are weakness, impaired endurance, impaired self care skills, impaired functional mobility, gait instability, impaired balance, decreased coordination.     Rehab Prognosis:  Good; patient would benefit from acute skilled OT services to address these deficits and reach maximum level of function.       Plan:     Patient to be seen 5 x/week to address the above listed problems via self-care/home management, therapeutic activities, therapeutic exercises, neuromuscular re-education  Plan of Care Expires: 11/17/22  Plan of Care Reviewed with: patient, spouse    Subjective     Pain/Comfort:  None     Objective:     Communicated with: Nurse prior to session.  Patient found supine upon OT entry to room.    General Precautions: Standard, fall   Orthopedic Precautions:    Braces:    Respiratory Status: Room air     Occupational Performance:     Bed Mobility:    Patient completed Supine to Sit with supervision  Patient completed Sit to Supine with supervision     Functional Mobility/Transfers:  Patient completed Sit <> Stand Transfer with stand by assistance and contact guard assistance  with  rolling walker     Activities of Daily Living:      [facility] 6 Click ADL:      Treatment & Education:  Performed series of dynamic standing tasks challenging balance needed for improved ADL performance safety. Required intermittent CGA-Juanis to maintain safe  standing balance. Challenged functional reaching patterns needed for clothing management associated with dressing and toileting, reaching outside base of support challenging ability to right self with LOB. Demonstrated good participation requiring Juanis to maintain safe balance when reaching outside base of support or performing dynamic standing with functional reaching patterns. Intermittent seated rest breaks needed d/t muscular fatigue.     Patient left supine with call button in reach    GOALS:   Multidisciplinary Problems       Occupational Therapy Goals          Problem: Occupational Therapy    Goal Priority Disciplines Outcome Interventions   Occupational Therapy Goal     OT, PT/OT Ongoing, Progressing    Description: Goals to be met by: 2 weeks     Patient will increase functional independence with ADLs by performing:    LE Dressing with Contact Guard Assistance.  Toileting from toilet with Contact Guard Assistance for hygiene and clothing management.   Bathing from  shower chair/bench with Contact Guard Assistance.  Toilet transfer to toilet with Contact Guard Assistance.    Goals to be met by: 4 weeks      Patient will increase functional independence with ADLs by performing:    LE Dressing with Modified [place].  Toileting from toilet with Modified [place] for hygiene and clothing management.   Bathing from  shower chair/bench with Modified [place].  Toilet transfer to toilet with Modified [place].  Light IADLs Tiffany.                            Time Tracking:     OT Date of Treatment: 11/02/22  OT Start Time: 1540  OT Stop Time: 1621  OT Total Time (min): 41 min    Billable Minutes:Therapeutic Activity 41    MARCUS Levy, OTR/L      OT/HEMAL: OT        11/2/2022

## 2022-11-05 PROCEDURE — 25000003 PHARM REV CODE 250: Performed by: EMERGENCY MEDICINE

## 2022-11-05 PROCEDURE — 94761 N-INVAS EAR/PLS OXIMETRY MLT: CPT

## 2022-11-05 PROCEDURE — 25000003 PHARM REV CODE 250: Performed by: NURSE PRACTITIONER

## 2022-11-05 PROCEDURE — 99900035 HC TECH TIME PER 15 MIN (STAT)

## 2022-11-05 PROCEDURE — 27000221 HC OXYGEN, UP TO 24 HOURS

## 2022-11-05 PROCEDURE — 11000004 HC SNF PRIVATE

## 2022-11-05 RX ADMIN — HYDROCODONE BITARTRATE AND ACETAMINOPHEN 1 TABLET: 10; 325 TABLET ORAL at 03:11

## 2022-11-05 RX ADMIN — SERTRALINE HYDROCHLORIDE 50 MG: 50 TABLET ORAL at 08:11

## 2022-11-05 RX ADMIN — Medication 1 CAPSULE: at 11:11

## 2022-11-05 RX ADMIN — MICONAZOLE NITRATE: 20 CREAM TOPICAL at 08:11

## 2022-11-05 RX ADMIN — TEMAZEPAM 30 MG: 30 CAPSULE ORAL at 08:11

## 2022-11-05 RX ADMIN — LEVOTHYROXINE SODIUM 125 MCG: 25 TABLET ORAL at 05:11

## 2022-11-05 RX ADMIN — GABAPENTIN 300 MG: 300 CAPSULE ORAL at 08:11

## 2022-11-05 RX ADMIN — VALSARTAN 160 MG: 160 TABLET, FILM COATED ORAL at 08:11

## 2022-11-05 RX ADMIN — ATORVASTATIN CALCIUM 40 MG: 40 TABLET, FILM COATED ORAL at 08:11

## 2022-11-05 RX ADMIN — Medication 1 CAPSULE: at 08:11

## 2022-11-05 RX ADMIN — HYDROCODONE BITARTRATE AND ACETAMINOPHEN 1 TABLET: 10; 325 TABLET ORAL at 05:11

## 2022-11-05 RX ADMIN — CLOPIDOGREL BISULFATE 75 MG: 75 TABLET, FILM COATED ORAL at 08:11

## 2022-11-05 RX ADMIN — GABAPENTIN 300 MG: 300 CAPSULE ORAL at 03:11

## 2022-11-05 RX ADMIN — Medication 1 CAPSULE: at 04:11

## 2022-11-05 RX ADMIN — PANTOPRAZOLE SODIUM 40 MG: 40 TABLET, DELAYED RELEASE ORAL at 08:11

## 2022-11-05 RX ADMIN — AMLODIPINE BESYLATE 10 MG: 5 TABLET ORAL at 08:11

## 2022-11-05 NOTE — PT/OT/SLP PROGRESS
Occupational Therapy   Treatment    Name: Herlinda Valdovinos  MRN: [id number]  Admitting Diagnosis:  Muscle weakness       Recommendations:     Discharge Recommendations: home health PT, home health OT  Discharge Equipment Recommendations:     Barriers to discharge:       Assessment:     Herlinda Valdovinos is a 57 y.o. female with a medical diagnosis of Muscle weakness.  She presents with performance deficits affecting function are weakness, impaired endurance, impaired self care skills, impaired functional mobility, gait instability, impaired balance, decreased coordination.     Rehab Prognosis:  Good; patient would benefit from acute skilled OT services to address these deficits and reach maximum level of function.       Plan:     Patient to be seen 5 x/week to address the above listed problems via self-care/home management, therapeutic activities, therapeutic exercises, neuromuscular re-education  Plan of Care Expires: 11/17/22  Plan of Care Reviewed with: patient, spouse    Subjective     Pain/Comfort:   None reported    Objective:     Communicated with: Nurse prior to session.  Patient found supine upon OT entry to room.    General Precautions: Standard, fall   Orthopedic Precautions:    Braces:    Respiratory Status: Room air     Occupational Performance:     Bed Mobility:    Patient completed Supine to Sit with supervision  Patient completed Sit to Supine with supervision     Functional Mobility/Transfers:  Patient completed Sit <> Stand Transfer with contact guard assistance and minimum assistance  with  rolling walker   Functional Mobility: x 100 feet with RW with CGA    Activities of Daily Living:  Lower Body Dressing: contact guard assistance and minimum assistance sitting/standing EOB      [facility] 6 Click ADL:      Treatment & Education:  Performed LB dressing donning pants sitting EOB with CGA, pulling over hips while standing with Juanis for balance assist and pulling pants up posteriorly. Performed functional  mobility in hallway with pt demonstrating increasing fatigue and weakness. Assessed BP: 113/84 seated; 123/93 standing. Pt becoming increasingly diaphoretic. Notified RN Herlinda who assessed blood glucose level, found to be 124. Returned pt to room to rest. Nurse to continue to monitor.     Patient left supine with call button in reach    GOALS:   Multidisciplinary Problems       Occupational Therapy Goals          Problem: Occupational Therapy    Goal Priority Disciplines Outcome Interventions   Occupational Therapy Goal     OT, PT/OT Ongoing, Progressing    Description: Goals to be met by: 2 weeks     Patient will increase functional independence with ADLs by performing:    LE Dressing with Contact Guard Assistance.  Toileting from toilet with Contact Guard Assistance for hygiene and clothing management.   Bathing from  shower chair/bench with Contact Guard Assistance.  Toilet transfer to toilet with Contact Guard Assistance.    Goals to be met by: 4 weeks      Patient will increase functional independence with ADLs by performing:    LE Dressing with Modified [place].  Toileting from toilet with Modified [place] for hygiene and clothing management.   Bathing from  shower chair/bench with Modified [place].  Toilet transfer to toilet with Modified [place].  Light IADLs Tiffany.                            Time Tracking:     OT Date of Treatment: 11/04/22  OT Start Time: 0940  OT Stop Time: 1010  OT Total Time (min): 30 min    Billable Minutes:Self Care/Home Management 15    MARCUS Levy, OTR/L      OT/HEMAL: OT       11/4/2022

## 2022-11-05 NOTE — PLAN OF CARE
Problem: Adult Inpatient Plan of Care  Goal: Plan of Care Review  Outcome: Ongoing, Progressing  Flowsheets (Taken 11/5/2022 1444)  Plan of Care Reviewed With: patient  Goal: Patient-Specific Goal (Individualized)  Outcome: Ongoing, Progressing  Goal: Absence of Hospital-Acquired Illness or Injury  Outcome: Ongoing, Progressing  Intervention: Prevent Skin Injury  Flowsheets (Taken 11/5/2022 1444)  Skin Protection: adhesive use limited  Intervention: Prevent Infection  Flowsheets (Taken 11/5/2022 1444)  Infection Prevention: hand hygiene promoted  Goal: Optimal Comfort and Wellbeing  Outcome: Ongoing, Progressing  Intervention: Monitor Pain and Promote Comfort  Flowsheets (Taken 11/5/2022 1444)  Pain Management Interventions: care clustered  Intervention: Provide Person-Centered Care  Flowsheets (Taken 11/5/2022 1444)  Trust Relationship/Rapport: care explained  Goal: Readiness for Transition of Care  Outcome: Ongoing, Progressing  Intervention: Mutually Develop Transition Plan  Flowsheets (Taken 11/5/2022 1444)  Equipment Currently Used at Home:   walker, rolling   bedside commode  Transportation Anticipated: family or friend will provide     Problem: Diabetes Comorbidity  Goal: Blood Glucose Level Within Targeted Range  Outcome: Ongoing, Progressing  Intervention: Monitor and Manage Glycemia  Flowsheets (Taken 11/5/2022 1444)  Glycemic Management: blood glucose monitored     Problem: Impaired Wound Healing  Goal: Optimal Wound Healing  Outcome: Ongoing, Progressing  Intervention: Promote Wound Healing  Flowsheets (Taken 11/5/2022 1444)  Oral Nutrition Promotion: safe use of adaptive equipment encouraged  Sleep/Rest Enhancement: awakenings minimized  Pain Management Interventions: care clustered     Problem: Skin Injury Risk Increased  Goal: Skin Health and Integrity  Outcome: Ongoing, Progressing  Intervention: Optimize Skin Protection  Flowsheets (Taken 11/5/2022 1444)  Skin Protection: adhesive use  limited  Intervention: Promote and Optimize Oral Intake  Flowsheets (Taken 11/5/2022 1444)  Oral Nutrition Promotion: safe use of adaptive equipment encouraged     Problem: Infection  Goal: Absence of Infection Signs and Symptoms  Outcome: Ongoing, Progressing     Problem: Fall Injury Risk  Goal: Absence of Fall and Fall-Related Injury  Outcome: Ongoing, Progressing  Intervention: Identify and Manage Contributors  Flowsheets (Taken 11/5/2022 1444)  Self-Care Promotion: independence encouraged  Medication Review/Management: medications reviewed

## 2022-11-06 PROCEDURE — 11000004 HC SNF PRIVATE

## 2022-11-06 PROCEDURE — 99900035 HC TECH TIME PER 15 MIN (STAT)

## 2022-11-06 PROCEDURE — 25000003 PHARM REV CODE 250: Performed by: EMERGENCY MEDICINE

## 2022-11-06 PROCEDURE — 25000003 PHARM REV CODE 250: Performed by: NURSE PRACTITIONER

## 2022-11-06 PROCEDURE — 27000221 HC OXYGEN, UP TO 24 HOURS

## 2022-11-06 PROCEDURE — 94761 N-INVAS EAR/PLS OXIMETRY MLT: CPT

## 2022-11-06 RX ADMIN — MICONAZOLE NITRATE: 20 CREAM TOPICAL at 08:11

## 2022-11-06 RX ADMIN — Medication 1 CAPSULE: at 08:11

## 2022-11-06 RX ADMIN — LORAZEPAM 0.5 MG: 0.5 TABLET ORAL at 12:11

## 2022-11-06 RX ADMIN — SERTRALINE HYDROCHLORIDE 50 MG: 50 TABLET ORAL at 08:11

## 2022-11-06 RX ADMIN — VALSARTAN 160 MG: 160 TABLET, FILM COATED ORAL at 08:11

## 2022-11-06 RX ADMIN — MICONAZOLE NITRATE: 20 CREAM TOPICAL at 09:11

## 2022-11-06 RX ADMIN — GABAPENTIN 300 MG: 300 CAPSULE ORAL at 08:11

## 2022-11-06 RX ADMIN — ATORVASTATIN CALCIUM 40 MG: 40 TABLET, FILM COATED ORAL at 08:11

## 2022-11-06 RX ADMIN — CLOPIDOGREL BISULFATE 75 MG: 75 TABLET, FILM COATED ORAL at 08:11

## 2022-11-06 RX ADMIN — GABAPENTIN 300 MG: 300 CAPSULE ORAL at 03:11

## 2022-11-06 RX ADMIN — PANTOPRAZOLE SODIUM 40 MG: 40 TABLET, DELAYED RELEASE ORAL at 08:11

## 2022-11-06 RX ADMIN — HYDROCODONE BITARTRATE AND ACETAMINOPHEN 1 TABLET: 10; 325 TABLET ORAL at 05:11

## 2022-11-06 RX ADMIN — Medication 1 CAPSULE: at 11:11

## 2022-11-06 RX ADMIN — Medication 1 CAPSULE: at 05:11

## 2022-11-06 RX ADMIN — TEMAZEPAM 30 MG: 30 CAPSULE ORAL at 09:11

## 2022-11-06 RX ADMIN — AMLODIPINE BESYLATE 10 MG: 5 TABLET ORAL at 08:11

## 2022-11-06 RX ADMIN — LEVOTHYROXINE SODIUM 125 MCG: 25 TABLET ORAL at 05:11

## 2022-11-06 RX ADMIN — HYDROCODONE BITARTRATE AND ACETAMINOPHEN 1 TABLET: 10; 325 TABLET ORAL at 08:11

## 2022-11-06 NOTE — PLAN OF CARE
Problem: Adult Inpatient Plan of Care  Goal: Plan of Care Review  Outcome: Ongoing, Progressing  Goal: Optimal Comfort and Wellbeing  Outcome: Ongoing, Progressing  Intervention: Monitor Pain and Promote Comfort  Flowsheets (Taken 11/5/2022 2209)  Pain Management Interventions:   medication offered   pain management plan reviewed with patient/caregiver  Intervention: Provide Person-Centered Care  Flowsheets (Taken 11/5/2022 2209)  Trust Relationship/Rapport: care explained     Problem: Skin Injury Risk Increased  Goal: Skin Health and Integrity  Outcome: Ongoing, Progressing  Intervention: Optimize Skin Protection  Flowsheets (Taken 11/5/2022 2209)  Pressure Reduction Techniques: frequent weight shift encouraged  Skin Protection: adhesive use limited  Head of Bed (HOB) Positioning: HOB at 20-30 degrees     Problem: Fall Injury Risk  Goal: Absence of Fall and Fall-Related Injury  Outcome: Ongoing, Progressing  Intervention: Identify and Manage Contributors  Flowsheets (Taken 11/5/2022 2209)  Self-Care Promotion: independence encouraged  Medication Review/Management: medications reviewed  Intervention: Promote Injury-Free Environment  Flowsheets (Taken 11/5/2022 2209)  Safety Promotion/Fall Prevention:   assistive device/personal item within reach   side rails raised x 2   instructed to call staff for mobility

## 2022-11-06 NOTE — PLAN OF CARE
Problem: Adult Inpatient Plan of Care  Goal: Plan of Care Review  Outcome: Ongoing, Progressing  Goal: Patient-Specific Goal (Individualized)  Outcome: Ongoing, Progressing  Goal: Absence of Hospital-Acquired Illness or Injury  Outcome: Ongoing, Progressing  Intervention: Identify and Manage Fall Risk  Flowsheets (Taken 11/6/2022 1728)  Safety Promotion/Fall Prevention: assistive device/personal item within reach  Intervention: Prevent and Manage VTE (Venous Thromboembolism) Risk  Flowsheets (Taken 11/6/2022 1728)  Activity Management: Ambulated to bathroom - L4  Goal: Optimal Comfort and Wellbeing  Outcome: Ongoing, Progressing  Intervention: Monitor Pain and Promote Comfort  Flowsheets (Taken 11/6/2022 1728)  Pain Management Interventions: care clustered  Goal: Readiness for Transition of Care  Outcome: Ongoing, Progressing  Intervention: Mutually Develop Transition Plan  Flowsheets (Taken 11/6/2022 1728)  Equipment Currently Used at Home:   walker, rolling   bedside commode  Transportation Anticipated: family or friend will provide     Problem: Diabetes Comorbidity  Goal: Blood Glucose Level Within Targeted Range  Outcome: Ongoing, Progressing     Problem: Impaired Wound Healing  Goal: Optimal Wound Healing  Outcome: Ongoing, Progressing  Intervention: Promote Wound Healing  Flowsheets (Taken 11/6/2022 1728)  Oral Nutrition Promotion: safe use of adaptive equipment encouraged  Sleep/Rest Enhancement: relaxation techniques promoted  Activity Management: Ambulated to bathroom - L4  Pain Management Interventions: care clustered     Problem: Skin Injury Risk Increased  Goal: Skin Health and Integrity  Outcome: Ongoing, Progressing  Intervention: Promote and Optimize Oral Intake  Flowsheets (Taken 11/6/2022 1728)  Oral Nutrition Promotion: safe use of adaptive equipment encouraged     Problem: Infection  Goal: Absence of Infection Signs and Symptoms  Outcome: Ongoing, Progressing     Problem: Fall Injury Risk  Goal:  Absence of Fall and Fall-Related Injury  Outcome: Ongoing, Progressing  Intervention: Identify and Manage Contributors  Flowsheets (Taken 11/6/2022 1728)  Self-Care Promotion: independence encouraged  Medication Review/Management: medications reviewed  Intervention: Promote Injury-Free Environment  Flowsheets (Taken 11/6/2022 1728)  Safety Promotion/Fall Prevention: assistive device/personal item within reach

## 2022-11-07 PROCEDURE — 25000003 PHARM REV CODE 250: Performed by: EMERGENCY MEDICINE

## 2022-11-07 PROCEDURE — 97116 GAIT TRAINING THERAPY: CPT | Mod: CQ

## 2022-11-07 PROCEDURE — 25000003 PHARM REV CODE 250: Performed by: NURSE PRACTITIONER

## 2022-11-07 PROCEDURE — 94761 N-INVAS EAR/PLS OXIMETRY MLT: CPT

## 2022-11-07 PROCEDURE — 27000221 HC OXYGEN, UP TO 24 HOURS

## 2022-11-07 PROCEDURE — 99900035 HC TECH TIME PER 15 MIN (STAT)

## 2022-11-07 PROCEDURE — 11000004 HC SNF PRIVATE

## 2022-11-07 PROCEDURE — 97530 THERAPEUTIC ACTIVITIES: CPT

## 2022-11-07 RX ADMIN — Medication 1 CAPSULE: at 04:11

## 2022-11-07 RX ADMIN — PANTOPRAZOLE SODIUM 40 MG: 40 TABLET, DELAYED RELEASE ORAL at 09:11

## 2022-11-07 RX ADMIN — DICLOFENAC SODIUM TOPICAL GEL, 1%, 2 G: 10 GEL TOPICAL at 09:11

## 2022-11-07 RX ADMIN — AMLODIPINE BESYLATE 10 MG: 5 TABLET ORAL at 09:11

## 2022-11-07 RX ADMIN — MICONAZOLE NITRATE: 20 CREAM TOPICAL at 09:11

## 2022-11-07 RX ADMIN — SERTRALINE HYDROCHLORIDE 50 MG: 50 TABLET ORAL at 09:11

## 2022-11-07 RX ADMIN — Medication 1 CAPSULE: at 07:11

## 2022-11-07 RX ADMIN — GABAPENTIN 300 MG: 300 CAPSULE ORAL at 09:11

## 2022-11-07 RX ADMIN — CLOPIDOGREL BISULFATE 75 MG: 75 TABLET, FILM COATED ORAL at 09:11

## 2022-11-07 RX ADMIN — GABAPENTIN 300 MG: 300 CAPSULE ORAL at 08:11

## 2022-11-07 RX ADMIN — ATORVASTATIN CALCIUM 40 MG: 40 TABLET, FILM COATED ORAL at 08:11

## 2022-11-07 RX ADMIN — Medication 1 CAPSULE: at 11:11

## 2022-11-07 RX ADMIN — HYDROCODONE BITARTRATE AND ACETAMINOPHEN 1 TABLET: 10; 325 TABLET ORAL at 08:11

## 2022-11-07 RX ADMIN — LEVOTHYROXINE SODIUM 125 MCG: 25 TABLET ORAL at 06:11

## 2022-11-07 RX ADMIN — TEMAZEPAM 30 MG: 30 CAPSULE ORAL at 08:11

## 2022-11-07 RX ADMIN — MICONAZOLE NITRATE 1 APPLICATION: 20 CREAM TOPICAL at 08:11

## 2022-11-07 RX ADMIN — GABAPENTIN 300 MG: 300 CAPSULE ORAL at 02:11

## 2022-11-07 RX ADMIN — HYDROCODONE BITARTRATE AND ACETAMINOPHEN 1 TABLET: 10; 325 TABLET ORAL at 06:11

## 2022-11-07 RX ADMIN — VALSARTAN 160 MG: 160 TABLET, FILM COATED ORAL at 09:11

## 2022-11-07 NOTE — PT/OT/SLP PROGRESS
Physical Therapy Treatment    Patient Name:  Herlinda Valdovinos   MRN:  [id number]    Recommendations:     Discharge Recommendations:  home health OT, home health PT   Discharge Equipment Recommendations: none   Barriers to discharge: None    Assessment:     Herlinda Valdovinos is a 57 y.o. female admitted with a medical diagnosis of Muscle weakness.  She presents with the following impairments/functional limitations:    weakness, impaired endurance, impaired self care skills, impaired functional mobility, gait instability, impaired balance, decreased coordination.  Patient with improved gait distance today, but began fatiguing last 100 feet requiring verbal cues for foot clearance, posture and straightening knees.    Rehab Prognosis: Good; patient would benefit from acute skilled PT services to address these deficits and reach maximum level of function.    Recent Surgery: * No surgery found *      Plan:     During this hospitalization, patient to be seen 5 x/week to address the identified rehab impairments via therapeutic exercises, therapeutic activities, gait training and progress toward the following goals:    Plan of Care Expires:  11/10/22    Subjective     Chief Complaint: BLE weakness  Patient/Family Comments/goals: agreed to PT tx  Pain/Comfort:  Pain Rating 1: 0/10      Objective:     Communicated with OT prior to session.  Patient found supine with   upon PT entry to room.     General Precautions: Standard, fall   Orthopedic Precautions:    Braces:    Respiratory Status: Room air     Functional Mobility:  Bed Mobility:     Supine to Sit: stand by assistance  Transfers:     Sit to Stand:  contact guard assistance with rolling walker  Gait: Patient ambulated 340 feet using RW and CGA with verbal cues to stay inside walker, upright posture, and foot clearance as patient fatigued.      AM-PAC 6 CLICK MOBILITY  Turning over in bed (including adjusting bedclothes, sheets and blankets)?: 4  Sitting down on and  standing up from a chair with arms (e.g., wheelchair, bedside commode, etc.): 3  Moving from lying on back to sitting on the side of the bed?: 4  Moving to and from a bed to a chair (including a wheelchair)?: 3  Need to walk in hospital room?: 3  Climbing 3-5 steps with a railing?: 2  Basic Mobility Total Score: 19       Treatment & Education:  Patient performed transfers and gait today, listed above.    Patient left sitting edge of bed with call button in reach.    GOALS:   Multidisciplinary Problems       Physical Therapy Goals       Not on file                    Time Tracking:     PT Received On: 11/07/22  PT Start Time: 0937     PT Stop Time: 0957  PT Total Time (min): 20 min     Billable Minutes: Gait Training 10    Treatment Type: Treatment  PT/PTA: PTA     PTA Visit Number: 2     11/07/2022

## 2022-11-07 NOTE — PT/OT/SLP PROGRESS
Occupational Therapy   Treatment    Name: Herlinda Valdovinos  MRN: [id number]  Admitting Diagnosis:  Muscle weakness       Recommendations:     Discharge Recommendations: home health PT, home health OT  Discharge Equipment Recommendations:     Barriers to discharge:       Assessment:     Herlinda Valdovinos is a 57 y.o. female with a medical diagnosis of Muscle weakness.  She presents with performance deficits affecting function are weakness, impaired endurance, impaired self care skills, impaired functional mobility, gait instability, impaired balance, decreased coordination.     Rehab Prognosis:  Good; patient would benefit from acute skilled OT services to address these deficits and reach maximum level of function.       Plan:     Patient to be seen 5 x/week to address the above listed problems via self-care/home management, therapeutic activities, therapeutic exercises, neuromuscular re-education  Plan of Care Expires: 11/17/22  Plan of Care Reviewed with: patient, spouse    Subjective     Pain/Comfort:  Pain Rating 1: 0/10None reported    Objective:     Communicated with: Nurse prior to session.  Patient found supine upon OT entry to room.    General Precautions: Standard, fall   Orthopedic Precautions:    Braces:    Respiratory Status: Room air     Occupational Performance:     Bed Mobility:    Patient completed Supine to Sit with supervision  Patient completed Sit to Supine with supervision     Functional Mobility/Transfers:  Patient completed Sit <> Stand Transfer with stand by assistance and contact guard assistance  with  rolling walker   Functional Mobility: x 200 feet with RW with CGA-SBA    Activities of Daily Living:    [facility] 6 Click ADL:      Treatment & Education:  Performed functional mobility in room/hallway with significant improvement in balance. Mod cues needed for technique as pt attempts to forward flex and not fully extend B knees. With cues, mobility improved. Endurance improved this day as well.  Challenged dynamic balance needed for improved BAdL safety and performance, tolerated x 5 mins continuous stand with CGA for simulating reaching patterns needed for improved clothing management.     Patient left sitting edge of bed with call button in reach    GOALS:   Multidisciplinary Problems       Occupational Therapy Goals          Problem: Occupational Therapy    Goal Priority Disciplines Outcome Interventions   Occupational Therapy Goal     OT, PT/OT Ongoing, Progressing    Description: Goals to be met by: 2 weeks     Patient will increase functional independence with ADLs by performing:    LE Dressing with Contact Guard Assistance.  Toileting from toilet with Contact Guard Assistance for hygiene and clothing management.   Bathing from  shower chair/bench with Contact Guard Assistance.  Toilet transfer to toilet with Contact Guard Assistance.    Goals to be met by: 4 weeks      Patient will increase functional independence with ADLs by performing:    LE Dressing with Modified [place].  Toileting from toilet with Modified [place] for hygiene and clothing management.   Bathing from  shower chair/bench with Modified [place].  Toilet transfer to toilet with Modified [place].  Light IADLs [place].                            Time Tracking:     OT Date of Treatment: 11/07/22  OT Start Time: 0935  OT Stop Time: 0958  OT Total Time (min): 23 min    Billable Minutes:Therapeutic Activity 10    MARCUS Levy, OTR/L      OT/HEMAL: OT       11/7/2022

## 2022-11-07 NOTE — PLAN OF CARE
Problem: Fall Injury Risk  Goal: Absence of Fall and Fall-Related Injury  Outcome: Ongoing, Progressing  Intervention: Promote Injury-Free Environment  Flowsheets (Taken 11/7/2022 1940)  Safety Promotion/Fall Prevention:   assistive device/personal item within reach   nonskid shoes/socks when out of bed   side rails raised x 3   instructed to call staff for mobility

## 2022-11-08 PROCEDURE — 25000003 PHARM REV CODE 250: Performed by: EMERGENCY MEDICINE

## 2022-11-08 PROCEDURE — 97535 SELF CARE MNGMENT TRAINING: CPT

## 2022-11-08 PROCEDURE — 97110 THERAPEUTIC EXERCISES: CPT | Mod: CQ

## 2022-11-08 PROCEDURE — 97112 NEUROMUSCULAR REEDUCATION: CPT

## 2022-11-08 PROCEDURE — 27000221 HC OXYGEN, UP TO 24 HOURS

## 2022-11-08 PROCEDURE — 99900035 HC TECH TIME PER 15 MIN (STAT)

## 2022-11-08 PROCEDURE — 97116 GAIT TRAINING THERAPY: CPT | Mod: CQ

## 2022-11-08 PROCEDURE — 97530 THERAPEUTIC ACTIVITIES: CPT

## 2022-11-08 PROCEDURE — 25000003 PHARM REV CODE 250: Performed by: NURSE PRACTITIONER

## 2022-11-08 PROCEDURE — 94761 N-INVAS EAR/PLS OXIMETRY MLT: CPT

## 2022-11-08 PROCEDURE — 11000004 HC SNF PRIVATE

## 2022-11-08 RX ADMIN — Medication 1 CAPSULE: at 07:11

## 2022-11-08 RX ADMIN — CLOPIDOGREL BISULFATE 75 MG: 75 TABLET, FILM COATED ORAL at 09:11

## 2022-11-08 RX ADMIN — AMLODIPINE BESYLATE 10 MG: 5 TABLET ORAL at 09:11

## 2022-11-08 RX ADMIN — MICONAZOLE NITRATE: 20 CREAM TOPICAL at 09:11

## 2022-11-08 RX ADMIN — GABAPENTIN 300 MG: 300 CAPSULE ORAL at 03:11

## 2022-11-08 RX ADMIN — PANTOPRAZOLE SODIUM 40 MG: 40 TABLET, DELAYED RELEASE ORAL at 09:11

## 2022-11-08 RX ADMIN — Medication 1 CAPSULE: at 04:11

## 2022-11-08 RX ADMIN — MICONAZOLE NITRATE 1 APPLICATION: 20 CREAM TOPICAL at 09:11

## 2022-11-08 RX ADMIN — HYDROCODONE BITARTRATE AND ACETAMINOPHEN 1 TABLET: 10; 325 TABLET ORAL at 01:11

## 2022-11-08 RX ADMIN — VALSARTAN 160 MG: 160 TABLET, FILM COATED ORAL at 09:11

## 2022-11-08 RX ADMIN — HYDROCODONE BITARTRATE AND ACETAMINOPHEN 1 TABLET: 10; 325 TABLET ORAL at 09:11

## 2022-11-08 RX ADMIN — LEVOTHYROXINE SODIUM 125 MCG: 25 TABLET ORAL at 06:11

## 2022-11-08 RX ADMIN — ATORVASTATIN CALCIUM 40 MG: 40 TABLET, FILM COATED ORAL at 09:11

## 2022-11-08 RX ADMIN — TEMAZEPAM 30 MG: 30 CAPSULE ORAL at 09:11

## 2022-11-08 RX ADMIN — DICLOFENAC SODIUM TOPICAL GEL, 1%, 2 G: 10 GEL TOPICAL at 09:11

## 2022-11-08 RX ADMIN — HYDROCODONE BITARTRATE AND ACETAMINOPHEN 1 TABLET: 10; 325 TABLET ORAL at 02:11

## 2022-11-08 RX ADMIN — GABAPENTIN 300 MG: 300 CAPSULE ORAL at 09:11

## 2022-11-08 RX ADMIN — SERTRALINE HYDROCHLORIDE 50 MG: 50 TABLET ORAL at 09:11

## 2022-11-08 RX ADMIN — Medication 1 CAPSULE: at 11:11

## 2022-11-08 NOTE — PLAN OF CARE
[facility] - Medical Surgical Unit  Discharge Assessment    Primary Care Provider: Vini Hernandez NP     Discharge Assessment (most recent)       BRIEF DISCHARGE ASSESSMENT - 10/27/22 1029          Discharge Planning    Support Systems Spouse/significant other;Children;Family members     Current Living Arrangements home/apartment/condo     Patient/Family Anticipates Transition to home with family     Patient/Family Anticipated Services at Transition home health care     DME Needed Upon Discharge  none     Discharge Plan A Home with family;Home Health                   I visited with pt to discuss plan to D/C home on 11/10. Pt said she does not want HHPT but instead wants OP PT here at [facility]. Pt's choice form signed. Pt also signed IMM.

## 2022-11-08 NOTE — PT/OT/SLP PROGRESS
Physical Therapy Treatment    Patient Name:  Herlinda Valdovinos   MRN:  [id number]    Recommendations:     Discharge Recommendations:  home health OT, home health PT   Discharge Equipment Recommendations: none   Barriers to discharge: None    Assessment:     Herlinda Valdovinos is a 57 y.o. female admitted with a medical diagnosis of Muscle weakness.  She presents with the following impairments/functional limitations:    weakness, impaired endurance, impaired self care skills, impaired functional mobility, gait instability, impaired balance, decreased coordination.  Patient with improved endurance and quality of gait today.    Rehab Prognosis: Good; patient would benefit from acute skilled PT services to address these deficits and reach maximum level of function.    Recent Surgery: * No surgery found *      Plan:     During this hospitalization, patient to be seen 5 x/week to address the identified rehab impairments via gait training, therapeutic activities, therapeutic exercises and progress toward the following goals:    Plan of Care Expires:  11/10/22    Subjective     Chief Complaint: weakness  Patient/Family Comments/goals: return home; get stronger  Pain/Comfort:  Pain Rating 1: 0/10      Objective:     Communicated with OT prior to session.  Patient found up in chair with   upon PT entry to room.     General Precautions: Standard, fall   Orthopedic Precautions:    Braces:    Respiratory Status: Room air     Functional Mobility:  Transfers:     Sit to Stand:  stand by assistance with rolling walker  Bed to Chair: contact guard assistance with  rolling walker  using  Step Transfer  Gait: Patient ambulated 290 feet using RW and CGA with verbal cues for upright posture and foot clearance.      AM-PAC 6 CLICK MOBILITY  Turning over in bed (including adjusting bedclothes, sheets and blankets)?: 4  Sitting down on and standing up from a chair with arms (e.g., wheelchair, bedside commode, etc.): 3  Moving from lying on  back to sitting on the side of the bed?: 4  Moving to and from a bed to a chair (including a wheelchair)?: 3  Need to walk in hospital room?: 3  Climbing 3-5 steps with a railing?: 2  Basic Mobility Total Score: 19       Treatment & Education:  Patient performed BLE strengthening exercises including:  Hip flexion x 30  Hip adduction x 30  Hip abduction with red theraband x 30  LAQ x 30  Sit to stands x 10 continuous    Patient left sitting edge of bed with call button in reach and  present..    GOALS:   Multidisciplinary Problems       Physical Therapy Goals       Not on file                    Time Tracking:     PT Received On: 11/08/22  PT Start Time: 0934     PT Stop Time: 1003  PT Total Time (min): 29 min     Billable Minutes: Gait Training 14 and Therapeutic Exercise 15    Treatment Type: Treatment  PT/PTA: PTA     PTA Visit Number: 3     11/08/2022

## 2022-11-08 NOTE — PLAN OF CARE
Problem: Fall Injury Risk  Goal: Absence of Fall and Fall-Related Injury  Outcome: Ongoing, Progressing  Intervention: Promote Injury-Free Environment  Flowsheets (Taken 11/8/2022 3912)  Safety Promotion/Fall Prevention:   assistive device/personal item within reach   nonskid shoes/socks when out of bed   side rails raised x 3   instructed to call staff for mobility

## 2022-11-09 DIAGNOSIS — R29.898 LOWER EXTREMITY WEAKNESS: ICD-10-CM

## 2022-11-09 DIAGNOSIS — R29.6 FREQUENT FALLS: ICD-10-CM

## 2022-11-09 DIAGNOSIS — Z86.73 HISTORY OF CVA (CEREBROVASCULAR ACCIDENT): Primary | ICD-10-CM

## 2022-11-09 LAB
ANION GAP SERPL CALCULATED.3IONS-SCNC: 6 MMOL/L (ref 7–16)
BASOPHILS # BLD AUTO: 0.02 K/UL (ref 0–0.2)
BASOPHILS NFR BLD AUTO: 0.3 % (ref 0–1)
BUN SERPL-MCNC: 14 MG/DL (ref 7–18)
BUN/CREAT SERPL: 15 (ref 6–20)
CALCIUM SERPL-MCNC: 8 MG/DL (ref 8.5–10.1)
CHLORIDE SERPL-SCNC: 104 MMOL/L (ref 98–107)
CO2 SERPL-SCNC: 35 MMOL/L (ref 21–32)
CREAT SERPL-MCNC: 0.94 MG/DL (ref 0.55–1.02)
DIFFERENTIAL METHOD BLD: ABNORMAL
EGFR (NO RACE VARIABLE) (RUSH/TITUS): 71 ML/MIN/1.73M²
EOSINOPHIL # BLD AUTO: 0.39 K/UL (ref 0–0.5)
EOSINOPHIL NFR BLD AUTO: 6.7 % (ref 1–4)
ERYTHROCYTE [DISTWIDTH] IN BLOOD BY AUTOMATED COUNT: 15.2 % (ref 11.5–14.5)
GLUCOSE SERPL-MCNC: 122 MG/DL (ref 74–106)
HCT VFR BLD AUTO: 33.8 % (ref 38–47)
HGB BLD-MCNC: 11.1 G/DL (ref 12–16)
LYMPHOCYTES # BLD AUTO: 2.34 K/UL (ref 1–4.8)
LYMPHOCYTES NFR BLD AUTO: 40 % (ref 27–41)
MCH RBC QN AUTO: 28.5 PG (ref 27–31)
MCHC RBC AUTO-ENTMCNC: 32.8 G/DL (ref 32–36)
MCV RBC AUTO: 86.7 FL (ref 80–96)
MONOCYTES # BLD AUTO: 0.71 K/UL (ref 0–0.8)
MONOCYTES NFR BLD AUTO: 12.1 % (ref 2–6)
MPC BLD CALC-MCNC: 10.6 FL (ref 9.4–12.4)
NEUTROPHILS # BLD AUTO: 2.39 K/UL (ref 1.8–7.7)
NEUTROPHILS NFR BLD AUTO: 40.9 % (ref 53–65)
PLATELET # BLD AUTO: 131 K/UL (ref 150–400)
POTASSIUM SERPL-SCNC: 3.7 MMOL/L (ref 3.5–5.1)
RBC # BLD AUTO: 3.9 M/UL (ref 4.2–5.4)
SODIUM SERPL-SCNC: 141 MMOL/L (ref 136–145)
WBC # BLD AUTO: 5.85 K/UL (ref 4.5–11)

## 2022-11-09 PROCEDURE — 97112 NEUROMUSCULAR REEDUCATION: CPT | Mod: CQ

## 2022-11-09 PROCEDURE — 99307 SBSQ NF CARE SF MDM 10: CPT | Mod: ,,, | Performed by: EMERGENCY MEDICINE

## 2022-11-09 PROCEDURE — 25000003 PHARM REV CODE 250: Performed by: NURSE PRACTITIONER

## 2022-11-09 PROCEDURE — 85025 COMPLETE CBC W/AUTO DIFF WBC: CPT | Performed by: NURSE PRACTITIONER

## 2022-11-09 PROCEDURE — 97116 GAIT TRAINING THERAPY: CPT | Mod: CQ

## 2022-11-09 PROCEDURE — 97112 NEUROMUSCULAR REEDUCATION: CPT

## 2022-11-09 PROCEDURE — 99900035 HC TECH TIME PER 15 MIN (STAT)

## 2022-11-09 PROCEDURE — 80048 BASIC METABOLIC PNL TOTAL CA: CPT | Performed by: NURSE PRACTITIONER

## 2022-11-09 PROCEDURE — 99307 PR NURSING FAC CARE, SUBSEQ, IMPROVING: ICD-10-PCS | Mod: ,,, | Performed by: EMERGENCY MEDICINE

## 2022-11-09 PROCEDURE — 36415 COLL VENOUS BLD VENIPUNCTURE: CPT | Performed by: NURSE PRACTITIONER

## 2022-11-09 PROCEDURE — 27000221 HC OXYGEN, UP TO 24 HOURS

## 2022-11-09 PROCEDURE — 25000003 PHARM REV CODE 250: Performed by: EMERGENCY MEDICINE

## 2022-11-09 PROCEDURE — 11000004 HC SNF PRIVATE

## 2022-11-09 PROCEDURE — 94761 N-INVAS EAR/PLS OXIMETRY MLT: CPT

## 2022-11-09 RX ADMIN — GABAPENTIN 300 MG: 300 CAPSULE ORAL at 02:11

## 2022-11-09 RX ADMIN — HYDROCODONE BITARTRATE AND ACETAMINOPHEN 1 TABLET: 10; 325 TABLET ORAL at 05:11

## 2022-11-09 RX ADMIN — TEMAZEPAM 30 MG: 30 CAPSULE ORAL at 08:11

## 2022-11-09 RX ADMIN — MICONAZOLE NITRATE: 20 CREAM TOPICAL at 08:11

## 2022-11-09 RX ADMIN — MICONAZOLE NITRATE: 20 CREAM TOPICAL at 09:11

## 2022-11-09 RX ADMIN — CLOPIDOGREL BISULFATE 75 MG: 75 TABLET, FILM COATED ORAL at 09:11

## 2022-11-09 RX ADMIN — GABAPENTIN 300 MG: 300 CAPSULE ORAL at 09:11

## 2022-11-09 RX ADMIN — DICLOFENAC SODIUM TOPICAL GEL, 1%, 2 G: 10 GEL TOPICAL at 09:11

## 2022-11-09 RX ADMIN — ATORVASTATIN CALCIUM 40 MG: 40 TABLET, FILM COATED ORAL at 08:11

## 2022-11-09 RX ADMIN — AMLODIPINE BESYLATE 10 MG: 5 TABLET ORAL at 09:11

## 2022-11-09 RX ADMIN — SERTRALINE HYDROCHLORIDE 50 MG: 50 TABLET ORAL at 09:11

## 2022-11-09 RX ADMIN — LEVOTHYROXINE SODIUM 125 MCG: 25 TABLET ORAL at 05:11

## 2022-11-09 RX ADMIN — HYDROCODONE BITARTRATE AND ACETAMINOPHEN 1 TABLET: 10; 325 TABLET ORAL at 06:11

## 2022-11-09 RX ADMIN — Medication 1 CAPSULE: at 11:11

## 2022-11-09 RX ADMIN — VALSARTAN 160 MG: 160 TABLET, FILM COATED ORAL at 09:11

## 2022-11-09 RX ADMIN — HYDROCODONE BITARTRATE AND ACETAMINOPHEN 1 TABLET: 10; 325 TABLET ORAL at 11:11

## 2022-11-09 RX ADMIN — Medication 1 CAPSULE: at 04:11

## 2022-11-09 RX ADMIN — PANTOPRAZOLE SODIUM 40 MG: 40 TABLET, DELAYED RELEASE ORAL at 09:11

## 2022-11-09 RX ADMIN — GABAPENTIN 300 MG: 300 CAPSULE ORAL at 08:11

## 2022-11-09 RX ADMIN — Medication 1 CAPSULE: at 07:11

## 2022-11-09 NOTE — ASSESSMENT & PLAN NOTE
PT HAS HTN  AMLODIPINE, CLONIDINE   /96    10/26/22 BP 97/71 this am. NS bolus given.  Recheck BP after bolus    10/27/2022   BP HAS IMPROVED 111/74  11/02/2022  BP /68  11/09/2022  /78

## 2022-11-09 NOTE — ASSESSMENT & PLAN NOTE
PT HAS ANXIETY DISORDER EXACERBATED BY FAMILY DYNAMICS PER PT'S STATEMENT  CONTINUE LORAZEPAM; WILL AVOID HS DOSING AS PT IS ON TEMAZEPAM HS  10/27/2022  PT HAS INTERMITTENT ANXIETY BUT OVERALL IMPROVED   11/02/2022  STABLE  11/09/2022  STABLE   SHIRIN RECOMMENDATION OF DC

## 2022-11-09 NOTE — ASSESSMENT & PLAN NOTE
PT HAS THYROID DISEASE  TSH 1.880  SYNTHROID 125 MCG DAILY  10/27/2022 STABLE  11/02/2022 STABLE  11/09/2022 STABLE

## 2022-11-09 NOTE — ASSESSMENT & PLAN NOTE
PT HAS CHRONIC PAIN DISORDER  NORCO, GABAPENTIN  PT/OT TO INCREASE MOBILITY AND AMBULATION  10/27/2022 STABLE  11/02/2022 STABLE  11/09/2022 STABLE

## 2022-11-09 NOTE — ASSESSMENT & PLAN NOTE
Urine on 10/25 shows 20-50 WBCs to cath specimen  WBC > 61087  Urine culture pending  Rocephin 1 GM IVPB daily x 5 days started 10/25  10/27/2022  PT IS ON DAY 3/5 ROCEPHIN   URINE CULTURE E COLI SENSITIVE TO ROCEPHIN  WBC DECREASED 11K (15 K)  11/02/2022  PT COMPETED ROCEPHIN AND IS ASYMPTOMATIC WITH WBC 6K  11/09/2022 RESOLVED

## 2022-11-09 NOTE — ASSESSMENT & PLAN NOTE
PT HAS HYPERLIPIDEMIA ANS IS S/P CVA  STATIN, LE ARE SLIGHTLY ELEVATED; WILL MONITOR  10/27/2022  CONTINUE STATIN  11/02/2022  CONTINUE STATIN  11/09/2022  CONTINUE STATIN

## 2022-11-09 NOTE — PLAN OF CARE
Problem: Fall Injury Risk  Goal: Absence of Fall and Fall-Related Injury  Outcome: Ongoing, Progressing  Intervention: Promote Injury-Free Environment  Flowsheets (Taken 11/9/2022 1613)  Safety Promotion/Fall Prevention:   assistive device/personal item within reach   side rails raised x 3   nonskid shoes/socks when out of bed   instructed to call staff for mobility

## 2022-11-09 NOTE — PROGRESS NOTES
Wt: 139# ? Accurate or R/T loss of LBM.  Intake remains good ~80% and pt receives Ensure Clear apple one carton po BID.  Lab reviewed.  Her last BM was 11/8.  RDN visited pt today and she had some foodservice complaints.  RDN will address these complaints wit SUHAIL GARZA.

## 2022-11-09 NOTE — PLAN OF CARE
[facility] - Medical Surgical Unit - Swing Bed   Interdisciplinary Team Meeting    Patient: Herlinda Valdovinos   Today's Date: 11/9/2022   Estimated D/C Date: 11/10/2022        Physician: Alis Grider MD Nurse Practitioner: Felicia Hill NP   Pharmacy: Zina Branstetter, PharmD Unit Director: IRENE Barbosa   : Mery Mcclelland RN Physical/Occupational Therapy: Kiana Rasmussen OTR/L   Speech Therapy: Kiana Rasmussen OTR/L Activity Therapy: Izzy Lai LPN   Nursing: IRENE Barbosa  Respiratory:Cattissa Bhavnaey Dietary: Cesar Heaton RD and Juan Aguiar  Other:      Nursing  New Symptoms/Problems: none  Last Bowel Movement: 11/08/22  Urine: continent Diarrhea: No   Constipated: No Bowel: continent [place]: No   Isolation: No     O2 Device: Nasal Cannula O2 Flow: 2L  SpO2: 95 %   Nutrition: Cardiac  Speech/Swallowing: No current speech or swallowing issues  Aspiration Precautions: No  Cognition: WNL    Physical Therapy  Physical Therapy/Gait: 290' ELOS: Plan to DC 11/10/2022    Transfers: Contact Guard Assistance Range of Motion/Restrictions: none     Occupational Therapy  Eating/Grooming: Modified Independent Toileting: Contact Guard Assistance   Bathing: Supervision or Set-up Assistance Dressing (Upper Body): Supervision or Set-up Assistance   Dressing (Lower Body): Supervision or Set-up Assistance      Activity Therapy  Level of participation: Active participation      Tx Plan/Recommendations reviewed with family and/or patient on (date) [id number].  Additional family Conference/Training:   D/C Plan/Recommendations: Home with family  POOL: 11/10/2022    Pharmacy  Medication Changes (see MD orders in chart): No  MD/NP: Alis Grider MD Labs Reviewed: Yes New Lab Orders: No   Lab Concerns:

## 2022-11-09 NOTE — ASSESSMENT & PLAN NOTE
PT/OT WILL EVALUATE AND INITIATE A TREATMENT PLAN  10/27/2022 PT IS ON MEDICAL HOLD  PT WAS AMBULATORY INITIALLY WITH BALANCE ISSUES  11/02/2022  PT IS PARTICIPATING WITH PT/OT PERFORMING BED MOBILITY WITH SBA, CGA, AND MINIMUM ASSISTANCE, TRANSFERS WITH CGA AND MINIMUM  ASSISTANCE WITH RW, AND AMBULATING 236 FEET WITH CGA WITH RW AND VC.  11/09/2022  PT IS PARTICIPATING WITH PT/OT PERFORMING BED MOBILITY WITH INDEPENDENCE, TRANSFERS WITH SBA WITH RW, AND AMBULATING 300 FEET WITH CGA WITH RW AND VC.  PT PERFORMED ACTIVITIES TO IMPROVE BALANCE ISSUES

## 2022-11-09 NOTE — PLAN OF CARE
[facility] - Medical Surgical Unit  Discharge Assessment        Planning D/C home tomorrow. Pt wants out patient PT. I called and spoke to Nellie at [facility]'s office. They will fax order to rehab.

## 2022-11-09 NOTE — PT/OT/SLP PROGRESS
Occupational Therapy   Treatment    Name: Herlinda Valdovinos  MRN: [id number]  Admitting Diagnosis:  Muscle weakness       Recommendations:     Discharge Recommendations: home health PT, home health OT  Discharge Equipment Recommendations:     Barriers to discharge:       Assessment:     Herlinda Valdovinos is a 57 y.o. female with a medical diagnosis of Muscle weakness.  She presents with performance deficits affecting function are weakness, impaired endurance, impaired self care skills, impaired functional mobility, gait instability, impaired balance, decreased coordination.     Rehab Prognosis:  Good; patient would benefit from acute skilled OT services to address these deficits and reach maximum level of function.       Plan:     Patient to be seen 5 x/week to address the above listed problems via self-care/home management, therapeutic activities, therapeutic exercises, neuromuscular re-education  Plan of Care Expires: 11/17/22  Plan of Care Reviewed with: patient, spouse    Subjective     Pain/Comfort:   None    Objective:     Communicated with: Nurse prior to session.  Patient found supine upon OT entry to room.    General Precautions: Standard, fall   Orthopedic Precautions:    Braces:    Respiratory Status: Room air     Occupational Performance:     Bed Mobility:    Patient completed Scooting/Bridging with modified independence  Patient completed Supine to Sit with modified independence     Functional Mobility/Transfers:  Patient completed Sit <> Stand Transfer with stand by assistance  with  rolling walker   Patient completed Bed <> Chair Transfer using Step Transfer technique with stand by assistance and contact guard assistance with rolling walker  Patient completed Toilet Transfer Step Transfer technique with contact guard assistance with  rolling walker  Functional Mobility: x 10 feet in room with RW with CGA      Activities of Daily Living:  Lower Body Dressing: stand by assistance  sitting/standing  Toileting: contact guard assistance in bathroom with RW and RTS      AMPAC 6 Click ADL:      Treatment & Education:  Performed LB dressing with SBA to thread lower extremities through pants then stand to pull over hips. Performed all aspects of toileting with CGA with greatest difficulty with maintaining safe balance during clothing management and t/f performance. Performed x 10 minutes on SciFit, level 1 resistance. Significant weakness noted throughout scit fit exercise. Multiple rest breaks needed.     Patient left up in chair in therapy gym with  PTA present    GOALS:   Multidisciplinary Problems       Occupational Therapy Goals          Problem: Occupational Therapy    Goal Priority Disciplines Outcome Interventions   Occupational Therapy Goal     OT, PT/OT Ongoing, Progressing    Description: Goals to be met by: 2 weeks     Patient will increase functional independence with ADLs by performing:    LE Dressing with Contact Guard Assistance.  Toileting from toilet with Contact Guard Assistance for hygiene and clothing management.   Bathing from  shower chair/bench with Contact Guard Assistance.  Toilet transfer to toilet with Contact Guard Assistance.    Goals to be met by: 4 weeks      Patient will increase functional independence with ADLs by performing:    LE Dressing with Modified [place].  Toileting from toilet with Modified [place] for hygiene and clothing management.   Bathing from  shower chair/bench with Modified [place].  Toilet transfer to toilet with Modified [place].  Light IADLs Tiffany.                            Time Tracking:     OT Date of Treatment: 11/08/22  OT Start Time: 0900  OT Stop Time: 0933  OT Total Time (min): 33 min    Billable Minutes:Self Care/Home Management 18  Neuromuscular Re-education 15    OT/HEMAL: OT        MARCUS Levy, OTR/L  11/8/2022

## 2022-11-09 NOTE — PROGRESS NOTES
[facility] - Medical Surgical Unit  Hospital Medicine  Progress Note    Patient Name: Herlinda Valdovinos  MRN: [id number]  Patient Class: IP- Swing   Admission Date: 10/19/2022  Length of Stay: 21 days  Attending Physician: Alis Grider MD  Primary Care Provider: Vini Hernandez NP        Subjective:     Principal Problem:Muscle weakness        HPI:  PT IS A 57 YR OLD WF ADMITTED TO [facility] SWING BED FOR PT/OT/REHABILITATION FOR MUSCLE WEAKNESS AND MEDICAL MANAGEMENT. PT WAS REFERRED FROM [facility] WHERE SHE WAS ADMITTED ON 10/17/2022 THROUGH THE ED FOR WEAKNESS AND DEBILITY, DEHYDRATION AND WEIGHT LOSS WITH INCREASED WEAKNESS AND MALAISE FOR 3 WEEKS WITH SEVERAL FALLS AT HOME. PT'S  HAD BEEN ILL AND UNABLE TO FULLY CARE FOR HER DURING THIS TIME AND SINCE HAS RECOVERED AND IS SUPPORTIVE. PT HAD A HEMORRHAGIC CVA IN 2021 AND HAS REQUIRED  MULTIPLE EPISODES OF INPATIENT AND OUTPATIENT REHABILITATION WITH THE MOST RECENT OUTPATIENT THERAPY COMPLETED IN 6/2022. PT HAS HYPERTENSION WHICH IS INTERMITTENTLY POORLY CONTROLLED AND PT IS UNDER CONSIDERABLE STRESS AT HOME DUE TO FAMILY DYNAMICS.  PT IMPROVED DURING HOSPITALIZATION WITH IVF HYDRATION ; BUT HAS PERSISTENT MUSCLE WEAKNESS AND DEBILITY, AND WILL REQUIRE SWING BED FOR REHABILITATION. PT HAS BEEN SCREENED PER PT/OT WITH RECOMMENDATION FOR REHABILITATION DUE TO WEAKNESS AND DIFFICULTY AMBULATING AND PERFORMING ADLS. PT REQUIRED EXTENSIVE ASSISTANCE FOR ADLS AND MOBILITY COMPARED TO BEING INDEPENDENT PRIOR TO RECENT DECLINE.  PT WILL BE EVALUATED PER PT/OT AND A TREATMENT PLAN WILL BE INITIATED. THE PHARMACIST WILL REVIEW MEDICATIONS AND MAKE RECOMMENDATIONS. PT WILL SEE THE DIETICIAN  FOR NUTRITIONAL SUPPORT WITH WEIGHT 73 KG AND ALBUMIN OF 3.9. PT'S ABNORMAL ADMITTING LABS ARE: CMP:CA 8.1, K 3.1, AST 52 AND ALK PHOS 119, CBC:HCT 37.2,  K, UA:SMALL BLOOD. PT WILL HAVE WEEKLY LABS.    Past Medical History:    Diagnosis Date    Cerebral hemorrhage     Chronic anxiety     Dehydration     Depression     Dysphagia     Due to and following non-traumatic intracerebral hemorrhage    Hearing loss     History of CVA (cerebrovascular accident)     Hypertension     Insomnia     Intrapontine hemorrhage     Left hemiparesis     Peripheral neuropathy     Stroke     Thyroid disease     Transient cerebral ischemia       CXR: MILD PROMINENCE OF INTERSTITIAL MARKINGS      PT CODE STATUS IS FULL CODE  PT COVID STATUS IS NEG  PT VTE PPX IS PLAVIX/TEDS/EARLY AMBULATION        Overview/Hospital Course:  10/26/22 0530 Nurse reports that pt is having nausea and requesting IV ondansetron instead of po at this time. She has not vomited but has had diarrhea x 1 during the night. Enteric pathogen panel is negative. C. Diff is pending. She was started on Rocephin IVPB yesterday due to UTI. Urine culture is pending. Labs on 10/24 show a WBC > 59812 and increase in Bun and creatinine to 37 and 1.43 from 10 and 0.86 1 week ago. She is afebrile. Her HR is ranging  with O2 sats 93-95% on O2 at 2L/min per NC and BP 97/71 this am. Ondansetron IVP and NS bolus 500 ml ordered.    .10/27/2022  HOSPITAL DAY 8  PT HAS HAD N/V/D ONSET THIS WEEK AFTER A VISIT FROM A [employer] MEMBER WHO WAS REPORTED ILL THE DAY OF THE VISIT WITH SIMILAR SYMPTOMS TREATED WITH IVF, ZOFRAN/PHENERGAN AND CLEAR LIQUID DIET. PT HAD IMPROVED YESTERDAY WITH DIET ADVANCED TO BRAT; THEN ATE APPLE PIE AND NAUSEA WORSENED AND DIET WAS CHANGED BACK TO CLEAR LIQUIDS WITH BANATROL ADDED. PT WAS EVALUATED PER RD. PT IS ON CONTACT ISOLATION AND THE ENTERIC PANEL IS NEG WITH C DIF PENDING.   PT WAS ALSO DX WITH UTI DUE TO E COLI AND WILL BE TREATED WITH ROCEPHIN FOR 5 DAYS. PT/OT  IS ON MEDICAL HOLD PRESENTLY DUE TO N/V/D. PT'S BP IS STABLE;  AND WAS ELEVATED ON ADMISSION AND DECREASED YESTERDAY, IMPROVED WITH IVF BOLUS. PT HAS CANDIDA RASH OF PERINEUM TREATED WITH DIFLUCAN AND  MONOSTAT WITH IMPROVEMENT; STAGE 2 DECUBITUS ULCER HAS HEALED AND  HAS STAGE 1 SKIN CHANGES OF SACRAL AND BUTTOCK AREAS.  PT IS ON 2L NC O2 WITH SAT 97%. PT IS BEING TREATED PER RESPIRATORY THERAPY WITH NEBS, INCENTIVE SPIROMETRY AND O2 MONITORING.  PT IS ON DAY 3/5 OF ROCEPHIN FOR UTI. PHARMACY IS MONITORING ALL MEDICATIONS.  PT HAS STABLE LABS EXCEPT FOR BMP: , BUN/CREAT 51/1.88, ALB 3.2, ALK PHOS 120.CBC: WBC 11.48 K. PT HAS CULTURE RESULTS REVEALING E COLI -UA, NEG ENTERIC PATHOGEN.     11/02/202 HOSPITAL DAY 14  PT IS PARTICIPATING WITH PT/OT PERFORMING BED MOBILITY WITH SBA, CGA, AND MINIMUM ASSISTANCE, TRANSFERS WITH CGA AND MINIMUM  ASSISTANCE WITH RW, AND AMBULATING 236 FEET WITH CGA WITH RW AND VC. PT'S N/V/D SX HAVE RESOLVED. RD EVALUATED PT AND MEAL INTAKE IS %. PT'S WEIGHT IS DECREASED 2.7 KG FROM ADMISSION. PT'S C DIFF AND ENTERIC PATHOGEN CULTURES WERE NEGATIVE.    PT IS ON RA WITH O2 SAT 95-96 %. PT IS BEING TREATED PER RESPIRATORY THERAPY WITH NEBS, INCENTIVE SPIROMETRY AND O2 MONITORING.  PT COMPLETED ROCEPHIN FOR UTI DUE TO E COLI. PT HAS IMPROVEMENT IN PERINEAL RASH. THE PHARMACY IS MONITORING ALL MEDICATIONS.PT'S LABS ARE STABLE EXCEPT FOR K 3.3.    11/09/2022 HOSPITAL DAY 21  PT IS PARTICIPATING WITH PT/OT PERFORMING BED MOBILITY WITH INDEPENDENCE, TRANSFERS WITH SBA WITH RW, AND AMBULATING 300 FEET WITH CGA WITH RW AND VC.  PT PERFORMED ACTIVITIES TO IMPROVE BALANCE ISSUES.  RD CONSULTATION CONTINUES AND PT HAS LOST 10 KG AND PT'S CURRENT INTAKE IS 80% AND PT CONTINUES TO RECEIVE ENSURE BID.  PT REMAINS ON RA WITH O2 SAT 99 %. PT IS BEING TREATED PER RESPIRATORY THERAPY WITH NEBS, INCENTIVE SPIROMETRY AND O2 MONITORING.   THE PHARMACY CONTINUES TO  MONITOR ALL MEDICATIONS. PT'S LABS ARE STABLE WITH ABNORMAL BMP: , CA 8.0,CO2 35, CBC H/H 11.1/33.8, PLT 131K.        Interval History: PT IS PARTICIPATING WITH PT/OT PERFORMING BED MOBILITY WITH INDEPENDENCE, TRANSFERS WITH SBA  WITH RW, AND AMBULATING 300 FEET WITH CGA WITH RW AND VC.  PT PERFORMED ACTIVITIES TO IMPROVE BALANCE ISSUES.  RD CONSULTATION CONTINUES AND PT HAS LOST 10 KG AND PT'S CURRENT INTAKE IS 80% AND PT CONTINUES TO RECEIVE ENSURE BID.  PT REMAINS ON RA WITH O2 SAT 99 %. PT IS BEING TREATED PER RESPIRATORY THERAPY WITH NEBS, INCENTIVE SPIROMETRY AND O2 MONITORING.   THE PHARMACY CONTINUES TO  MONITOR ALL MEDICATIONS. PT'S LABS ARE STABLE WITH ABNORMAL BMP , CA 8.0.CO2 35, CBC H/H 11.1/33.8, PLT 131K.    Review of Systems   Constitutional:  Positive for activity change and fatigue.   HENT: Negative.     Eyes: Negative.    Respiratory: Negative.  Negative for cough and shortness of breath.    Cardiovascular: Negative.  Negative for chest pain.   Gastrointestinal:  Negative for abdominal distention and abdominal pain. Diarrhea: IMPROVED. Nausea: INTERMITTENT. Vomiting: NONE IN A FEW DAYS.  Endocrine: Negative.    Genitourinary: Negative.    Musculoskeletal:  Positive for arthralgias and gait problem.   Skin:  Color change: pale. Rash: PERINEAL RASH C/W CANDIDA IS IMPROVED. Wound: IMPROVED.   Allergic/Immunologic: Positive for immunocompromised state.   Neurological:  Positive for weakness (generalized).   Hematological: Negative.    All other systems reviewed and are negative.  Objective:     Vital Signs (Most Recent):  Temp: 97.4 °F (36.3 °C) (11/09/22 0800)  Pulse: 90 (11/09/22 0800)  Resp: 20 (11/09/22 1125)  BP: 107/78 (11/09/22 0800)  SpO2: 99 % (11/09/22 0800) Vital Signs (24h Range):  Temp:  [97.4 °F (36.3 °C)-98.3 °F (36.8 °C)] 97.4 °F (36.3 °C)  Pulse:  [89-91] 90  Resp:  [18-20] 20  SpO2:  [95 %-99 %] 99 %  BP: (107-118)/(68-78) 107/78     Weight: 63.4 kg (139 lb 12.8 oz)  Body mass index is 24 kg/m².    Intake/Output Summary (Last 24 hours) at 11/9/2022 1645  Last data filed at 11/9/2022 1233  Gross per 24 hour   Intake 1140 ml   Output --   Net 1140 ml      Physical Exam  Vitals and nursing note reviewed.  Exam conducted with a chaperone present.   Constitutional:       Appearance: She is ill-appearing.   HENT:      Head: Normocephalic and atraumatic.      Mouth/Throat:      Mouth: Mucous membranes are dry.   Eyes:      Extraocular Movements: Extraocular movements intact.   Neck:      Vascular: Carotid bruit present.   Cardiovascular:      Rate and Rhythm: Normal rate and regular rhythm.      Pulses: Normal pulses.      Heart sounds: Murmur (grade I loudest at left sternal border 5th intercostal space) heard.   Pulmonary:      Effort: Pulmonary effort is normal. No respiratory distress.      Breath sounds: Normal breath sounds. No rales.      Comments: Pleural rub left upper and lower lobe areas noted  Abdominal:      General: Abdomen is flat. Bowel sounds are normal. There is no distension.      Palpations: Abdomen is soft.      Tenderness: There is no abdominal tenderness. There is no guarding.   Musculoskeletal:         General: No swelling (MINIMAL B FEET HAS IMPROVED) or tenderness. Normal range of motion.      Cervical back: Normal range of motion and neck supple.   Skin:     General: Skin is warm and dry.      Capillary Refill: Capillary refill takes less than 2 seconds.      Coloration: Skin is pale.      Findings: No rash. Lesion: IMPROVED.  Neurological:      General: No focal deficit present.      Mental Status: She is alert and oriented to person, place, and time.      Motor: Weakness present.   Psychiatric:         Mood and Affect: Mood normal.         Behavior: Behavior normal.         Thought Content: Thought content normal.         Judgment: Judgment normal.      Comments: CALM       Significant Labs: All pertinent labs within the past 24 hours have been reviewed.  BMP:   Recent Labs   Lab 11/09/22  0525   *      K 3.7      CO2 35*   BUN 14   CREATININE 0.94   CALCIUM 8.0*     CBC:   Recent Labs   Lab 11/09/22  0525   WBC 5.85   HGB 11.1*   HCT 33.8*   *       Significant  Imaging: I have reviewed all pertinent imaging results/findings within the past 24 hours.NONE      Assessment/Plan:      * Muscle weakness  PT/OT WILL EVALUATE AND INITIATE A TREATMENT PLAN  10/27/2022 PT IS ON MEDICAL HOLD  PT WAS AMBULATORY INITIALLY WITH BALANCE ISSUES  11/02/2022  PT IS PARTICIPATING WITH PT/OT PERFORMING BED MOBILITY WITH SBA, CGA, AND MINIMUM ASSISTANCE, TRANSFERS WITH CGA AND MINIMUM  ASSISTANCE WITH RW, AND AMBULATING 236 FEET WITH CGA WITH RW AND VC.  11/09/2022  PT IS PARTICIPATING WITH PT/OT PERFORMING BED MOBILITY WITH INDEPENDENCE, TRANSFERS WITH SBA WITH RW, AND AMBULATING 300 FEET WITH CGA WITH RW AND VC.  PT PERFORMED ACTIVITIES TO IMPROVE BALANCE ISSUES        Nausea vomiting and diarrhea  Enteric pathogen panel neg  C. Diff pending  Special contact isolation  Ondansetron po/IV  Ns bolus 500ml 10/26  10/27/2022  MILDLY IMPROVED AND WILL CONTINUE POC  C DIF STILL PENDING  MAY REQUIRE ADDITIONAL IVF IF SHE HAS DECREASED PO INTAKE  DIET HAD BEEN ADVANCED BUT PT ATE APPLE PIE AND NAUSEA INCREASED, HAVE CHANGED BACK TO CLEAR LIQUIDS AND WILL ADD BANATROL  11/02/2022 RESOLVED  11/09/2022 PT IS TOLERATING A DIET WITH 80% INTAKE WITH OUT N/V/D        Acute urinary tract infection  Urine on 10/25 shows 20-50 WBCs to cath specimen  WBC > 81358  Urine culture pending  Rocephin 1 GM IVPB daily x 5 days started 10/25  10/27/2022  PT IS ON DAY 3/5 ROCEPHIN   URINE CULTURE E COLI SENSITIVE TO ROCEPHIN  WBC DECREASED 11K (15 K)  11/02/2022  PT COMPETED ROCEPHIN AND IS ASYMPTOMATIC WITH WBC 6K  11/09/2022 RESOLVED    Pressure injury of right buttock, stage 2  PT HAS STAGE 2 DECUBITUS ULCER R BUTTOCK  WOUND CARE TEAM CONSULTATION  DAILY WOUND CARE  DECUBITUS PRECAUTIONS  WAFFLE MATTRESS  10/27/2022  IMPROVED WITH STAGE 2 ULCER HEALED  PT HAS STAGE 1  WITH EJ TYPE RASH OF PERINEUM  WOUND CARE  DIFLUCAN, MONOSTAT  11/02/2022  RESOLVED  11/09/2022 NO RECURRENCE      Chronic pain disorder  PT HAS  CHRONIC PAIN DISORDER  NORCO, GABAPENTIN  PT/OT TO INCREASE MOBILITY AND AMBULATION  10/27/2022 STABLE  11/02/2022 STABLE  11/09/2022 STABLE        Hyperlipidemia  PT HAS HYPERLIPIDEMIA ANS IS S/P CVA  STATIN, LE ARE SLIGHTLY ELEVATED; WILL MONITOR  10/27/2022  CONTINUE STATIN  11/02/2022  CONTINUE STATIN  11/09/2022  CONTINUE STATIN      Thyroid disease  PT HAS THYROID DISEASE  TSH 1.880  SYNTHROID 125 MCG DAILY  10/27/2022 STABLE  11/02/2022 STABLE  11/09/2022 STABLE    Hypertension  PT HAS HTN  AMLODIPINE, CLONIDINE   /96    10/26/22 BP 97/71 this am. NS bolus given.  Recheck BP after bolus    10/27/2022   BP HAS IMPROVED 111/74  11/02/2022  BP /68  11/09/2022  /78    Generalized anxiety disorder  PT HAS ANXIETY DISORDER EXACERBATED BY FAMILY DYNAMICS PER PT'S STATEMENT  CONTINUE LORAZEPAM; WILL AVOID HS DOSING AS PT IS ON TEMAZEPAM HS  10/27/2022  PT HAS INTERMITTENT ANXIETY BUT OVERALL IMPROVED   11/02/2022  STABLE  11/09/2022  STABLE   SHIRIN RECOMMENDATION OF DC        VTE Risk Mitigation (From admission, onward)         Ordered     Place OCTAVIA hose  Until discontinued         10/19/22 2117     IP VTE LOW RISK PATIENT  Once         10/19/22 2117                Discharge Planning   POOL: 11/10/2022     Code Status: Full Code   Is the patient medically ready for discharge?:     Reason for patient still in hospital (select all that apply): Patient trending condition, Laboratory test, Treatment, Imaging, Consult recommendations, PT / OT recommendations and Pending disposition  Discharge Plan A: Home with family, Rehab                  Alis Grider MD  Department of Hospital Medicine   [facility] - Medical Surgical Unit

## 2022-11-09 NOTE — ASSESSMENT & PLAN NOTE
Enteric pathogen panel neg  C. Diff pending  Special contact isolation  Ondansetron po/IV  Ns bolus 500ml 10/26  10/27/2022  MILDLY IMPROVED AND WILL CONTINUE POC  C DIF STILL PENDING  MAY REQUIRE ADDITIONAL IVF IF SHE HAS DECREASED PO INTAKE  DIET HAD BEEN ADVANCED BUT PT ATE APPLE PIE AND NAUSEA INCREASED, HAVE CHANGED BACK TO CLEAR LIQUIDS AND WILL ADD BANATROL  11/02/2022 RESOLVED  11/09/2022 PT IS TOLERATING A DIET WITH 80% INTAKE WITH OUT N/V/D

## 2022-11-09 NOTE — ASSESSMENT & PLAN NOTE
PT HAS STAGE 2 DECUBITUS ULCER R BUTTOCK  WOUND CARE TEAM CONSULTATION  DAILY WOUND CARE  DECUBITUS PRECAUTIONS  WAFFLE MATTRESS  10/27/2022  IMPROVED WITH STAGE 2 ULCER HEALED  PT HAS STAGE 1  WITH EJ TYPE RASH OF PERINEUM  WOUND CARE  DIFLUCAN, MONOSTAT  11/02/2022  RESOLVED  11/09/2022 NO RECURRENCE

## 2022-11-09 NOTE — SUBJECTIVE & OBJECTIVE
Interval History: PT IS PARTICIPATING WITH PT/OT PERFORMING BED MOBILITY WITH INDEPENDENCE, TRANSFERS WITH SBA WITH RW, AND AMBULATING 300 FEET WITH CGA WITH RW AND VC.  PT PERFORMED ACTIVITIES TO IMPROVE BALANCE ISSUES.  RD CONSULTATION CONTINUES AND PT HAS LOST 10 KG AND PT'S CURRENT INTAKE IS 80% AND PT CONTINUES TO RECEIVE ENSURE BID.  PT REMAINS ON RA WITH O2 SAT 99 %. PT IS BEING TREATED PER RESPIRATORY THERAPY WITH NEBS, INCENTIVE SPIROMETRY AND O2 MONITORING.   THE PHARMACY CONTINUES TO  MONITOR ALL MEDICATIONS. PT'S LABS ARE STABLE WITH ABNORMAL BMP , CA 8.0.CO2 35, CBC H/H 11.1/33.8, PLT 131K.    Review of Systems   Constitutional:  Positive for activity change and fatigue.   HENT: Negative.     Eyes: Negative.    Respiratory: Negative.  Negative for cough and shortness of breath.    Cardiovascular: Negative.  Negative for chest pain.   Gastrointestinal:  Negative for abdominal distention and abdominal pain. Diarrhea: IMPROVED. Nausea: INTERMITTENT. Vomiting: NONE IN A FEW DAYS.  Endocrine: Negative.    Genitourinary: Negative.    Musculoskeletal:  Positive for arthralgias and gait problem.   Skin:  Color change: pale. Rash: PERINEAL RASH C/W CANDIDA IS IMPROVED. Wound: IMPROVED.   Allergic/Immunologic: Positive for immunocompromised state.   Neurological:  Positive for weakness (generalized).   Hematological: Negative.    All other systems reviewed and are negative.  Objective:     Vital Signs (Most Recent):  Temp: 97.4 °F (36.3 °C) (11/09/22 0800)  Pulse: 90 (11/09/22 0800)  Resp: 20 (11/09/22 1125)  BP: 107/78 (11/09/22 0800)  SpO2: 99 % (11/09/22 0800) Vital Signs (24h Range):  Temp:  [97.4 °F (36.3 °C)-98.3 °F (36.8 °C)] 97.4 °F (36.3 °C)  Pulse:  [89-91] 90  Resp:  [18-20] 20  SpO2:  [95 %-99 %] 99 %  BP: (107-118)/(68-78) 107/78     Weight: 63.4 kg (139 lb 12.8 oz)  Body mass index is 24 kg/m².    Intake/Output Summary (Last 24 hours) at 11/9/2022 1645  Last data filed at 11/9/2022  1233  Gross per 24 hour   Intake 1140 ml   Output --   Net 1140 ml      Physical Exam  Vitals and nursing note reviewed. Exam conducted with a chaperone present.   Constitutional:       Appearance: She is ill-appearing.   HENT:      Head: Normocephalic and atraumatic.      Mouth/Throat:      Mouth: Mucous membranes are dry.   Eyes:      Extraocular Movements: Extraocular movements intact.   Neck:      Vascular: Carotid bruit present.   Cardiovascular:      Rate and Rhythm: Normal rate and regular rhythm.      Pulses: Normal pulses.      Heart sounds: Murmur (grade I loudest at left sternal border 5th intercostal space) heard.   Pulmonary:      Effort: Pulmonary effort is normal. No respiratory distress.      Breath sounds: Normal breath sounds. No rales.      Comments: Pleural rub left upper and lower lobe areas noted  Abdominal:      General: Abdomen is flat. Bowel sounds are normal. There is no distension.      Palpations: Abdomen is soft.      Tenderness: There is no abdominal tenderness. There is no guarding.   Musculoskeletal:         General: No swelling (MINIMAL B FEET HAS IMPROVED) or tenderness. Normal range of motion.      Cervical back: Normal range of motion and neck supple.   Skin:     General: Skin is warm and dry.      Capillary Refill: Capillary refill takes less than 2 seconds.      Coloration: Skin is pale.      Findings: No rash. Lesion: IMPROVED.  Neurological:      General: No focal deficit present.      Mental Status: She is alert and oriented to person, place, and time.      Motor: Weakness present.   Psychiatric:         Mood and Affect: Mood normal.         Behavior: Behavior normal.         Thought Content: Thought content normal.         Judgment: Judgment normal.      Comments: CALM       Significant Labs: All pertinent labs within the past 24 hours have been reviewed.  BMP:   Recent Labs   Lab 11/09/22  0525   *      K 3.7      CO2 35*   BUN 14   CREATININE 0.94    CALCIUM 8.0*     CBC:   Recent Labs   Lab 11/09/22  0525   WBC 5.85   HGB 11.1*   HCT 33.8*   *       Significant Imaging: I have reviewed all pertinent imaging results/findings within the past 24 hours.NONE

## 2022-11-09 NOTE — PT/OT/SLP PROGRESS
Physical Therapy Treatment    Patient Name:  Herlinda Valdovinos   MRN:  [id number]    Recommendations:     Discharge Recommendations:  home health OT, home health PT   Discharge Equipment Recommendations: none   Barriers to discharge: None    Assessment:     Herlinda Valdovinos is a 57 y.o. female admitted with a medical diagnosis of Muscle weakness.  She presents with the following impairments/functional limitations:    weakness, impaired endurance, impaired self care skills, impaired functional mobility, gait instability, impaired balance, decreased coordination. Patient agreed to PT tx.    Rehab Prognosis: Good; patient would benefit from acute skilled PT services to address these deficits and reach maximum level of function.    Recent Surgery: * No surgery found *      Plan:     During this hospitalization, patient to be seen 5 x/week to address the identified rehab impairments via therapeutic exercises, therapeutic activities, gait training and progress toward the following goals:    Plan of Care Expires:  11/10/22    Subjective     Chief Complaint: weakness  Patient/Family Comments/goals: return home; come for outpatient therapy  Pain/Comfort:  Pain Rating 1: 0/10  Pain Rating Post-Intervention 1: 0/10      Objective:     Communicated with OT prior to session.  Patient found supine with   upon PT entry to room.     General Precautions: Standard, fall   Orthopedic Precautions:    Braces:    Respiratory Status: Room air     Functional Mobility:  Bed Mobility:     Supine to Sit: modified independence  Transfers:     Sit to Stand:  stand by assistance with rolling walker  Gait: Patient ambulated 300 feet using RW and CGA with verbal cues for foot clearance as patient fatigued.  Balance: Patient performed dynamic standing balance activities including throwing/catching theraball with CGA/Juanis due to occasional LOB posteriorly. Patient exhibited fair+ standing balance.      AM-PAC 6 CLICK MOBILITY  Turning over in bed  (including adjusting bedclothes, sheets and blankets)?: 4  Sitting down on and standing up from a chair with arms (e.g., wheelchair, bedside commode, etc.): 4  Moving from lying on back to sitting on the side of the bed?: 4  Moving to and from a bed to a chair (including a wheelchair)?: 4  Need to walk in hospital room?: 3  Climbing 3-5 steps with a railing?: 2  Basic Mobility Total Score: 21       Treatment & Education:  Patient performed transfers, gait training, and dynamic standing balance activities listed above    Patient left supine with call button in reach..    GOALS:   Multidisciplinary Problems       Physical Therapy Goals       Not on file                    Time Tracking:     PT Received On: 11/09/22  PT Start Time: 0954     PT Stop Time: 1048  PT Total Time (min): 54 min     Billable Minutes: Gait Training 10 and Neuromuscular Re-education 15    Treatment Type: Treatment  PT/PTA: PTA     PTA Visit Number: 4     11/09/2022

## 2022-11-10 VITALS
BODY MASS INDEX: 27.04 KG/M2 | HEIGHT: 64 IN | OXYGEN SATURATION: 98 % | SYSTOLIC BLOOD PRESSURE: 106 MMHG | HEART RATE: 79 BPM | RESPIRATION RATE: 20 BRPM | TEMPERATURE: 98 F | DIASTOLIC BLOOD PRESSURE: 68 MMHG | WEIGHT: 158.38 LBS

## 2022-11-10 PROBLEM — N39.0 ACUTE URINARY TRACT INFECTION: Status: RESOLVED | Noted: 2022-10-26 | Resolved: 2022-11-10

## 2022-11-10 PROBLEM — L89.312 PRESSURE INJURY OF RIGHT BUTTOCK, STAGE 2: Status: RESOLVED | Noted: 2022-10-20 | Resolved: 2022-11-10

## 2022-11-10 PROBLEM — R11.2 NAUSEA VOMITING AND DIARRHEA: Status: RESOLVED | Noted: 2022-10-26 | Resolved: 2022-11-10

## 2022-11-10 PROBLEM — R19.7 NAUSEA VOMITING AND DIARRHEA: Status: RESOLVED | Noted: 2022-10-26 | Resolved: 2022-11-10

## 2022-11-10 PROCEDURE — 99900035 HC TECH TIME PER 15 MIN (STAT)

## 2022-11-10 PROCEDURE — 25000003 PHARM REV CODE 250: Performed by: NURSE PRACTITIONER

## 2022-11-10 PROCEDURE — 97530 THERAPEUTIC ACTIVITIES: CPT | Mod: CQ

## 2022-11-10 PROCEDURE — 97110 THERAPEUTIC EXERCISES: CPT | Mod: CQ

## 2022-11-10 PROCEDURE — 94761 N-INVAS EAR/PLS OXIMETRY MLT: CPT

## 2022-11-10 PROCEDURE — 27000221 HC OXYGEN, UP TO 24 HOURS

## 2022-11-10 PROCEDURE — 97116 GAIT TRAINING THERAPY: CPT | Mod: CQ

## 2022-11-10 PROCEDURE — 25000003 PHARM REV CODE 250: Performed by: EMERGENCY MEDICINE

## 2022-11-10 PROCEDURE — 99316 NF DSCHRG MGMT 30 MIN+: CPT | Mod: ,,, | Performed by: EMERGENCY MEDICINE

## 2022-11-10 PROCEDURE — 99316 PR NURSING FAC DISCHRGE DAY,MORE 30 MIN: ICD-10-PCS | Mod: ,,, | Performed by: EMERGENCY MEDICINE

## 2022-11-10 RX ORDER — VALSARTAN 160 MG/1
160 TABLET ORAL DAILY
Qty: 30 TABLET | Refills: 0 | OUTPATIENT
Start: 2022-11-10 | End: 2022-12-10

## 2022-11-10 RX ORDER — AMLODIPINE BESYLATE 10 MG/1
10 TABLET ORAL DAILY
Qty: 30 TABLET | Refills: 0 | Status: SHIPPED | OUTPATIENT
Start: 2022-11-10 | End: 2022-12-10

## 2022-11-10 RX ORDER — AMLODIPINE BESYLATE 10 MG/1
10 TABLET ORAL DAILY
Qty: 30 TABLET | Refills: 0 | OUTPATIENT
Start: 2022-11-10 | End: 2022-12-10

## 2022-11-10 RX ORDER — VALSARTAN 160 MG/1
160 TABLET ORAL DAILY
Qty: 30 TABLET | Refills: 0 | Status: SHIPPED | OUTPATIENT
Start: 2022-11-10 | End: 2022-12-10

## 2022-11-10 RX ADMIN — MICONAZOLE NITRATE: 20 CREAM TOPICAL at 08:11

## 2022-11-10 RX ADMIN — PANTOPRAZOLE SODIUM 40 MG: 40 TABLET, DELAYED RELEASE ORAL at 08:11

## 2022-11-10 RX ADMIN — SERTRALINE HYDROCHLORIDE 50 MG: 50 TABLET ORAL at 08:11

## 2022-11-10 RX ADMIN — Medication 1 CAPSULE: at 11:11

## 2022-11-10 RX ADMIN — Medication 1 CAPSULE: at 08:11

## 2022-11-10 RX ADMIN — CLOPIDOGREL BISULFATE 75 MG: 75 TABLET, FILM COATED ORAL at 08:11

## 2022-11-10 RX ADMIN — HYDROCODONE BITARTRATE AND ACETAMINOPHEN 1 TABLET: 10; 325 TABLET ORAL at 01:11

## 2022-11-10 RX ADMIN — GABAPENTIN 300 MG: 300 CAPSULE ORAL at 08:11

## 2022-11-10 RX ADMIN — HYDROCODONE BITARTRATE AND ACETAMINOPHEN 1 TABLET: 10; 325 TABLET ORAL at 04:11

## 2022-11-10 RX ADMIN — VALSARTAN 160 MG: 160 TABLET, FILM COATED ORAL at 08:11

## 2022-11-10 RX ADMIN — LEVOTHYROXINE SODIUM 125 MCG: 25 TABLET ORAL at 06:11

## 2022-11-10 RX ADMIN — DICLOFENAC SODIUM TOPICAL GEL, 1%, 2 G: 10 GEL TOPICAL at 08:11

## 2022-11-10 RX ADMIN — AMLODIPINE BESYLATE 10 MG: 5 TABLET ORAL at 08:11

## 2022-11-10 NOTE — PLAN OF CARE
Patient to be discharged home with  at this time.  Problem: Adult Inpatient Plan of Care  Goal: Plan of Care Review  Outcome: Adequate for Care Transition  Goal: Patient-Specific Goal (Individualized)  Outcome: Adequate for Care Transition  Goal: Absence of Hospital-Acquired Illness or Injury  Outcome: Adequate for Care Transition  Goal: Optimal Comfort and Wellbeing  Outcome: Adequate for Care Transition  Goal: Readiness for Transition of Care  Outcome: Adequate for Care Transition     Problem: Diabetes Comorbidity  Goal: Blood Glucose Level Within Targeted Range  Outcome: Adequate for Care Transition     Problem: Impaired Wound Healing  Goal: Optimal Wound Healing  Outcome: Adequate for Care Transition     Problem: Skin Injury Risk Increased  Goal: Skin Health and Integrity  Outcome: Adequate for Care Transition     Problem: Infection  Goal: Absence of Infection Signs and Symptoms  Outcome: Adequate for Care Transition     Problem: Fall Injury Risk  Goal: Absence of Fall and Fall-Related Injury  Outcome: Adequate for Care Transition

## 2022-11-10 NOTE — PLAN OF CARE
Problem: Fall Injury Risk  Goal: Absence of Fall and Fall-Related Injury  Outcome: Ongoing, Progressing  Intervention: Promote Injury-Free Environment  Flowsheets (Taken 11/10/2022 1141)  Safety Promotion/Fall Prevention:   assistive device/personal item within reach   side rails raised x 3   instructed to call staff for mobility   nonskid shoes/socks when out of bed

## 2022-11-10 NOTE — DISCHARGE INSTRUCTIONS
PT WAS ADVISED TO INCREASE REST AND FLUIDS. PT WAS ADVISED TO CONTINUE HOME MEDICATIONS WITH OVER THE COUNTER TYLENOL AND MIRALAX PRN. PT WAS ADVISED REGARDING FALL PRECAUTIONS.  PT WAS ADVISED TO ADHERE TO A CARDIAC AND DIABETIC DIET. PT WAS ADVISED TO FOLLOW UP WITH SHIRIN FOR COUNSELING.  PT WAS ADVISED TO FOLLOW UP WITH OUTPATIENT PT/OT FOR FURTHER REHABILITATION  PT WAS ADVISED TO FOLLOW UP WITH SUNSHINE COSTELLO NP 11/16/2022.

## 2022-11-10 NOTE — PT/OT/SLP PROGRESS
Physical Therapy Treatment    Patient Name:  Herlinda Valdovinos   MRN:  [id number]    Recommendations:     Discharge Recommendations:  home health PT, home health OT   Discharge Equipment Recommendations: walker, rolling   Barriers to discharge: None    Assessment:     Herlinda Valdovinos is a 57 y.o. female admitted with a medical diagnosis of Muscle weakness.  She presents with the following impairments/functional limitations:  weakness, impaired endurance, gait instability, impaired functional mobility, impaired balance, decreased lower extremity function, decreased safety awareness. Pt. Has made good progress toward stength and mobility goals.    Rehab Prognosis: Good; patient would benefit from acute skilled PT services to address these deficits and reach maximum level of function.    Recent Surgery: * No surgery found *      Plan:     During this hospitalization, patient to be seen 5 x/week to address the identified rehab impairments via gait training, therapeutic activities, therapeutic exercises and progress toward the following goals:    Plan of Care Expires:  11/10/22    Subjective     Chief Complaint: R knee weakness/ fatigue  Patient/Family Comments/goals: Pt. States she is ready to get home and start outpt. Therapy.  Pain/Comfort:  Pain Rating 1: 0/10  Pain Rating Post-Intervention 2: 0/10      Objective:     Communicated with OT prior to session.  Patient found  Up with OT  with   upon PT entry to room.     General Precautions: Standard, fall   Orthopedic Precautions:    Braces: N/A  Respiratory Status: Room air     Functional Mobility:  Transfers:     Sit to Stand:  contact guard assistance with rolling walker  Bed to Chair: minimum assistance with  no AD  using  Step Transfer  Chair to mat: contact guard assistance and minimum assistance with  rolling walker  using  Step Transfer  Gait: Pt. Amb. X 300 feet with RW with CGA to Min A and short standing rest to complete.   Balance: Pt. Participated in  standing dynamic balancewith bilateral HHA for lateral wt. Shifts on foam pad x 1 min and sidestepping activity to Left and Right x 5 feet each with Bilat. HHA ; and rebounder acivity with 1# ball x 3 mins with CGA to Min A to maintain balance.      AM-PAC 6 CLICK MOBILITY          Treatment & Education:  Pt. Participated in mobility per above and seated PREs 3 x 10 reps each of Marching, Laqs  and side kicks. Performed Nu-step x 3 .5 mins @ level one     Patient left  sitting EOB  with call button in reach and nursing present..    GOALS:   Multidisciplinary Problems       Physical Therapy Goals       Not on file                    Time Tracking:     PT Received On: 11/10/22  PT Start Time: 1016     PT Stop Time: 1102  PT Total Time (min): 46 min     Billable Minutes: Gait Training 15, Therapeutic Activity 16, and Therapeutic Exercise 15    Treatment Type: Treatment  PT/PTA: PTA     PTA Visit Number: 5     11/10/2022

## 2022-11-10 NOTE — PLAN OF CARE
Problem: Adult Inpatient Plan of Care  Goal: Plan of Care Review  Outcome: Ongoing, Progressing     Problem: Impaired Wound Healing  Goal: Optimal Wound Healing  Outcome: Ongoing, Progressing     Problem: Skin Injury Risk Increased  Goal: Skin Health and Integrity  Outcome: Ongoing, Progressing     Problem: Fall Injury Risk  Goal: Absence of Fall and Fall-Related Injury  Outcome: Ongoing, Progressing  Intervention: Promote Injury-Free Environment  Flowsheets (Taken 11/10/2022 1225)  Safety Promotion/Fall Prevention:   side rails raised x 2   instructed to call staff for mobility

## 2022-11-11 NOTE — ASSESSMENT & PLAN NOTE
PT HAS A RISK OF VTE  VTE PPX  PT HAS BEEN TREATED WITH PLAVIX, TEDS AND IMPROVED WITH PT/OT AND AMBULATED

## 2022-11-11 NOTE — DISCHARGE SUMMARY
0[facility] - Medical Surgical Unit  Hospital Medicine  Discharge Summary      Patient Name: Herlinda Valdovinos  MRN: [id number]  [facility]: [id number]  Patient Class: IP- Swing  Admission Date: 10/19/2022  Hospital Length of Stay: 22 days  Discharge Date and Time: 11/10/2022  2:45 PM  Attending Physician: Wendi att. providers found   Discharging Provider: Alis Grider MD  Primary Care Provider: Vini Hernandez NP    Primary Care Team: Networked reference to record PCT     HPI:   PT IS A 57 YR OLD WF ADMITTED TO [facility] SWING BED FOR PT/OT/REHABILITATION FOR MUSCLE WEAKNESS AND MEDICAL MANAGEMENT. PT WAS REFERRED FROM [facility] WHERE SHE WAS ADMITTED ON 10/17/2022 THROUGH THE ED FOR WEAKNESS AND DEBILITY, DEHYDRATION AND WEIGHT LOSS WITH INCREASED WEAKNESS AND MALAISE FOR 3 WEEKS WITH SEVERAL FALLS AT HOME. PT'S  HAD BEEN ILL AND UNABLE TO FULLY CARE FOR HER DURING THIS TIME AND SINCE HAS RECOVERED AND IS SUPPORTIVE. PT HAD A HEMORRHAGIC CVA IN 2021 AND HAS REQUIRED  MULTIPLE EPISODES OF INPATIENT AND OUTPATIENT REHABILITATION WITH THE MOST RECENT OUTPATIENT THERAPY COMPLETED IN 6/2022. PT HAS HYPERTENSION WHICH IS INTERMITTENTLY POORLY CONTROLLED AND PT IS UNDER CONSIDERABLE STRESS AT HOME DUE TO FAMILY DYNAMICS.  PT IMPROVED DURING HOSPITALIZATION WITH IVF HYDRATION ; BUT HAS PERSISTENT MUSCLE WEAKNESS AND DEBILITY, AND WILL REQUIRE SWING BED FOR REHABILITATION. PT HAS BEEN SCREENED PER PT/OT WITH RECOMMENDATION FOR REHABILITATION DUE TO WEAKNESS AND DIFFICULTY AMBULATING AND PERFORMING ADLS. PT REQUIRED EXTENSIVE ASSISTANCE FOR ADLS AND MOBILITY COMPARED TO BEING INDEPENDENT PRIOR TO RECENT DECLINE.  PT WILL BE EVALUATED PER PT/OT AND A TREATMENT PLAN WILL BE INITIATED. THE PHARMACIST WILL REVIEW MEDICATIONS AND MAKE RECOMMENDATIONS. PT WILL SEE THE DIETICIAN  FOR NUTRITIONAL SUPPORT WITH WEIGHT 73 KG AND ALBUMIN OF 3.9. PT'S ABNORMAL ADMITTING LABS ARE: CMP:CA 8.1,  K 3.1, AST 52 AND ALK PHOS 119, CBC:HCT 37.2,  K, UA:SMALL BLOOD. PT WILL HAVE WEEKLY LABS.    Past Medical History:   Diagnosis Date    Cerebral hemorrhage     Chronic anxiety     Dehydration     Depression     Dysphagia     Due to and following non-traumatic intracerebral hemorrhage    Hearing loss     History of CVA (cerebrovascular accident)     Hypertension     Insomnia     Intrapontine hemorrhage     Left hemiparesis     Peripheral neuropathy     Stroke     Thyroid disease     Transient cerebral ischemia       CXR: MILD PROMINENCE OF INTERSTITIAL MARKINGS      PT CODE STATUS IS FULL CODE  PT COVID STATUS IS NEG  PT VTE PPX IS PLAVIX/TEDS/EARLY AMBULATION        * No surgery found *      Hospital Course:   10/26/22 0530 Nurse reports that pt is having nausea and requesting IV ondansetron instead of po at this time. She has not vomited but has had diarrhea x 1 during the night. Enteric pathogen panel is negative. C. Diff is pending. She was started on Rocephin IVPB yesterday due to UTI. Urine culture is pending. Labs on 10/24 show a WBC > 54702 and increase in Bun and creatinine to 37 and 1.43 from 10 and 0.86 1 week ago. She is afebrile. Her HR is ranging  with O2 sats 93-95% on O2 at 2L/min per NC and BP 97/71 this am. Ondansetron IVP and NS bolus 500 ml ordered.    .10/27/2022  HOSPITAL DAY 8  PT HAS HAD N/V/D ONSET THIS WEEK AFTER A VISIT FROM A [employer] MEMBER WHO WAS REPORTED ILL THE DAY OF THE VISIT WITH SIMILAR SYMPTOMS TREATED WITH IVF, ZOFRAN/PHENERGAN AND CLEAR LIQUID DIET. PT HAD IMPROVED YESTERDAY WITH DIET ADVANCED TO BRAT; THEN ATE APPLE PIE AND NAUSEA WORSENED AND DIET WAS CHANGED BACK TO CLEAR LIQUIDS WITH BANATROL ADDED. PT WAS EVALUATED PER RD. PT IS ON CONTACT ISOLATION AND THE ENTERIC PANEL IS NEG WITH C DIF PENDING.   PT WAS ALSO DX WITH UTI DUE TO E COLI AND WILL BE TREATED WITH ROCEPHIN FOR 5 DAYS. PT/OT  IS ON MEDICAL HOLD PRESENTLY DUE TO N/V/D. PT'S BP IS  STABLE;  AND WAS ELEVATED ON ADMISSION AND DECREASED YESTERDAY, IMPROVED WITH IVF BOLUS. PT HAS CANDIDA RASH OF PERINEUM TREATED WITH DIFLUCAN AND MONOSTAT WITH IMPROVEMENT; STAGE 2 DECUBITUS ULCER HAS HEALED AND  HAS STAGE 1 SKIN CHANGES OF SACRAL AND BUTTOCK AREAS.  PT IS ON 2L NC O2 WITH SAT 97%. PT IS BEING TREATED PER RESPIRATORY THERAPY WITH NEBS, INCENTIVE SPIROMETRY AND O2 MONITORING.  PT IS ON DAY 3/5 OF ROCEPHIN FOR UTI. PHARMACY IS MONITORING ALL MEDICATIONS.  PT HAS STABLE LABS EXCEPT FOR BMP: , BUN/CREAT 51/1.88, ALB 3.2, ALK PHOS 120.CBC: WBC 11.48 K. PT HAS CULTURE RESULTS REVEALING E COLI -UA, NEG ENTERIC PATHOGEN.     11/02/202 HOSPITAL DAY 14  PT IS PARTICIPATING WITH PT/OT PERFORMING BED MOBILITY WITH SBA, CGA, AND MINIMUM ASSISTANCE, TRANSFERS WITH CGA AND MINIMUM  ASSISTANCE WITH RW, AND AMBULATING 236 FEET WITH CGA WITH RW AND VC. PT'S N/V/D SX HAVE RESOLVED. RD EVALUATED PT AND MEAL INTAKE IS %. PT'S WEIGHT IS DECREASED 2.7 KG FROM ADMISSION. PT'S C DIFF AND ENTERIC PATHOGEN CULTURES WERE NEGATIVE.    PT IS ON RA WITH O2 SAT 95-96 %. PT IS BEING TREATED PER RESPIRATORY THERAPY WITH NEBS, INCENTIVE SPIROMETRY AND O2 MONITORING.  PT COMPLETED ROCEPHIN FOR UTI DUE TO E COLI. PT HAS IMPROVEMENT IN PERINEAL RASH. THE PHARMACY IS MONITORING ALL MEDICATIONS.PT'S LABS ARE STABLE EXCEPT FOR K 3.3.    11/09/2022 HOSPITAL DAY 21  PT IS PARTICIPATING WITH PT/OT PERFORMING BED MOBILITY WITH INDEPENDENCE, TRANSFERS WITH SBA WITH RW, AND AMBULATING 300 FEET WITH CGA WITH RW AND VC.  PT PERFORMED ACTIVITIES TO IMPROVE BALANCE ISSUES.  RD CONSULTATION CONTINUES AND PT HAS LOST 10 KG AND PT'S CURRENT INTAKE IS 80% AND PT CONTINUES TO RECEIVE ENSURE BID.  PT REMAINS ON RA WITH O2 SAT 99 %. PT IS BEING TREATED PER RESPIRATORY THERAPY WITH NEBS, INCENTIVE SPIROMETRY AND O2 MONITORING.   THE PHARMACY CONTINUES TO  MONITOR ALL MEDICATIONS. PT'S LABS ARE STABLE WITH ABNORMAL BMP: , CA 8.0,CO2 35,  CBC H/H 11.1/33.8, PLT 131K.    11/10/2022 HOSPITAL DAY 22  DISCHARGE  PT IS A 57 YR OLD WF ADMITTED TO [facility] SWING BED FOR PT/OT/REHABILITATION FOR MUSCLE WEAKNESS AND MEDICAL MANAGEMENT. PT WAS REFERRED FROM [facility] WHERE SHE WAS ADMITTED ON 10/17/2022 THROUGH THE ED FOR WEAKNESS AND DEBILITY, DEHYDRATION AND WEIGHT LOSS WITH INCREASED WEAKNESS AND MALAISE FOR 3 WEEKS WITH SEVERAL FALLS AT HOME. PT HAD A HEMORRHAGIC CVA IN 2021 AND HAS REQUIRED  MULTIPLE EPISODES OF INPATIENT AND OUTPATIENT REHABILITATION WITH THE MOST RECENT OUTPATIENT THERAPY COMPLETED IN 6/2022.   PT PARTICIPATED WITH PT/OT PERFORMING BED MOBILITY WITH INDEPENDENCE, TRANSFERS WITH SBA WITH RW, AND AMBULATING 300 FEET WITH CGA WITH RW AND VC.  PT PERFORMED ACTIVITIES TO IMPROVE BALANCE ISSUES. PT WILL CONTINUE REHABILITATION WITH OUT PATIENT PT/OT AT [facility].    PT HAS HYPERTENSION WHICH INITIALLY WAS POORLY CONTROLLED BUT IMPROVED WITH MEDICATION ADJUSTMENTS. PT HAS ANXIETY WITH CONSIDERABLE STRESS DUE TO FAMILY DYNAMICS AND THIS IMPROVED WITH CONTINUATION OF LORAZEPAM, TEMAZEPAM, AND ZOLOFT AND STAFF SUPPORT. PT HAS BEEN REFERRED TO [facility] FOR TREATMENT AND SHE IS AGREEABLE TO THIS RECOMMENDATION.    PT HAD VIRAL GASTROENTERITIS CONTRACTED FROM A VISITOR DURING HOSPITALIZATION IMPROVED WITH IVF, PROBIOTICS AND DIETARY CHANGES. RD CONSULTED AND PT HAD LOST 10 KG  DURING ADMISSION BUT PT'S INTAKE IMPROVED TO 80% AND PT WILL CONTINUE ENSURE BID. PT HAD A UTI DUE TO E COLI AND WAS TREATED WITH IV ROCEPHIN FOR 5 DAYS WITH IMPROVEMENT. PT HAD A SACRAL DECUBITUS, ERYTHEMA OF BUTTOCKS AND VAGINAL CANDIDIASIS THAT RESOLVED WITH TREATMENT PER WOUND CARE TEAM AND MEDICAL MANAGEMENT.    PT REMAINS ON RA WITH O2 SAT 98%. PT WAS TREATED PER RESPIRATORY THERAPY WITH NEBS, INCENTIVE SPIROMETRY AND O2 MONITORING WITH IMPROVEMENT.  THE PHARMACY MONITORED  ALL MEDICATIONS. PT'S LABS ARE  STABLE ON DISCHARGE WITH ABNORMAL BMP: , CA 8.0,CO2 35, CBC H/H 11.1/33.8, PLT 131K.    PT WAS ADVISED TO INCREASE REST AND FLUIDS. PT WAS ADVISED TO CONTINUE HOME MEDICATIONS WITH OVER THE COUNTER TYLENOL AND MIRALAX PRN. PT WAS ADVISED REGARDING FALL PRECAUTIONS. PT WAS ADVISED TO ADHERE TO A CARDIAC AND DIABETIC  DIET. PT WAS ADVISED TO FOLLOW UP WITH SHIRIN FOR COUNSELING. PT WAS ADVISED TO FOLLOW UP WITH OUTPATIENT PT/OT FOR FURTHER REHABILITATION. PT WAS ADVISED TO FOLLOW UP WITH SUNSHINE COSTELLO NP 11/16/2022.           Goals of Care Treatment Preferences:  Code Status: Full Code      Consults:     * Muscle weakness  PT/OT WILL EVALUATE AND INITIATE A TREATMENT PLAN  10/27/2022 PT IS ON MEDICAL HOLD  PT WAS AMBULATORY INITIALLY WITH BALANCE ISSUES  11/02/2022  PT IS PARTICIPATING WITH PT/OT PERFORMING BED MOBILITY WITH SBA, CGA, AND MINIMUM ASSISTANCE, TRANSFERS WITH CGA AND MINIMUM  ASSISTANCE WITH RW, AND AMBULATING 236 FEET WITH CGA WITH RW AND VC.  11/09/2022  PT IS PARTICIPATING WITH PT/OT PERFORMING BED MOBILITY WITH INDEPENDENCE, TRANSFERS WITH SBA WITH RW, AND AMBULATING 300 FEET WITH CGA WITH RW AND VC.  PT PERFORMED ACTIVITIES TO IMPROVE BALANCE ISSUES  11/10/2022  PT PARTICIPATED WITH PT/OT PERFORMING BED MOBILITY WITH INDEPENDENCE, TRANSFERS WITH SBA WITH RW, AND AMBULATING 300 FEET WITH CGA WITH RW AND VC.  PT PERFORMED ACTIVITIES TO IMPROVE BALANCE ISSUES. PT WILL CONTINUE REHABILITATION WITH OUT PATIENT PT/OT AT [facility].          Chronic pain disorder  PT HAS CHRONIC PAIN DISORDER  NORCO, GABAPENTIN  PT/OT TO INCREASE MOBILITY AND AMBULATION  10/27/2022 STABLE  11/02/2022 STABLE  11/09/2022 STABLE  11/10/2022  PT WILL CONTINUE CHRONIC PAIN DISORDER MEDICATIONS  NORCO AND GABAPENTIN        Hyperlipidemia  PT HAS HYPERLIPIDEMIA ANS IS S/P CVA  STATIN, LE ARE SLIGHTLY ELEVATED; WILL MONITOR  10/27/2022  CONTINUE STATIN  11/02/2022  CONTINUE STATIN  11/09/2022  CONTINUE  STATIN  11/10/2022  PT WILL CONTINUE STATIN ON DISCHARGE AND CARDIAC -HH DIET RECOMMENDED    Thyroid disease  PT HAS THYROID DISEASE  TSH 1.880  SYNTHROID 125 MCG DAILY  10/27/2022 STABLE  11/02/2022 STABLE  11/09/2022 STABLE  11/10/2022  PT WILL CONTINUE SYNTHROID ON DISCHARGE    DVT prophylaxis  PT HAS A RISK OF VTE  VTE PPX  PT HAS BEEN TREATED WITH PLAVIX, TEDS AND IMPROVED WITH PT/OT AND AMBULATED      Hypertension  PT HAS HTN  AMLODIPINE, CLONIDINE   /96    10/26/22 BP 97/71 this am. NS bolus given.  Recheck BP after bolus    10/27/2022   BP HAS IMPROVED 111/74  11/02/2022  BP /68  11/09/2022  /78  11/10/2022  PT HAS HYPERTENSION WHICH INITIALLY WAS POORLY CONTROLLED BUT IMPROVED WITH MEDICATION ADJUSTMENTS.        Generalized anxiety disorder  PT HAS ANXIETY DISORDER EXACERBATED BY FAMILY DYNAMICS PER PT'S STATEMENT  CONTINUE LORAZEPAM; WILL AVOID HS DOSING AS PT IS ON TEMAZEPAM HS  10/27/2022  PT HAS INTERMITTENT ANXIETY BUT OVERALL IMPROVED   11/02/2022  STABLE  11/09/2022  STABLE   [facility] RECOMMENDATION OF DC  11/10/2022  PT HAS ANXIETY WITH CONSIDERABLE STRESS DUE TO FAMILY DYNAMICS AND THIS IMPROVED WITH CONTINUATION OF LORAZEPAM, TEMAZEPAM, AND ZOLOFT AND STAFF SUPPORT. PT HAS BEEN REFERRED TO [facility] FOR TREATMENT AND SHE IS AGREEABLE TO THIS RECOMMENDATION        Final Active Diagnoses:    Diagnosis Date Noted POA    PRINCIPAL PROBLEM:  Muscle weakness [M62.81] 05/15/2021 Yes    Chronic pain disorder [G89.4] 10/20/2022 Yes    Hyperlipidemia [E78.5] 10/19/2022 Yes    Thyroid disease [E07.9] 10/17/2022 Yes    DVT prophylaxis [Z29.9] 06/10/2021 Not Applicable    Hypertension [I10] 05/20/2021 Yes    Generalized anxiety disorder [F41.1] 05/20/2021 Yes      Problems Resolved During this Admission:    Diagnosis Date Noted Date Resolved POA    Acute urinary tract infection [N39.0] 10/26/2022 11/10/2022 No    Nausea vomiting and diarrhea [R11.2, R19.7] 10/26/2022  11/10/2022 No    Pressure injury of right buttock, stage 2 [L89.312] 10/20/2022 11/10/2022 Yes    Hypokalemia [E87.6] 10/20/2022 10/26/2022 Yes       Discharged Condition: stable    Disposition: Home or Self Care    Follow Up:   Follow-up Information     Vini Hernandez NP Follow up on 11/16/2022.    Specialties: Gerontology, Family Medicine, Emergency Medicine  Why: @10:00  Contact information:  [address] [place] [address]  [phone]                       Patient Instructions:      Diet diabetic     Diet Cardiac     Activity as tolerated       Significant Diagnostic Studies: Labs: All labs within the past 24 hours have been reviewed    Pending Diagnostic Studies:     None         Medications:  Reconciled Home Medications:      Medication List      CONTINUE taking these medications    acetaminophen 325 MG tablet  Commonly known as: TYLENOL  2 tablets PRN     albuterol-ipratropium 2.5 mg-0.5 mg/3 mL nebulizer solution  Commonly known as: DUO-NEB  Take 3 mLs by nebulization every 6 (six) hours while awake. Rescue     amLODIPine 10 MG tablet  Commonly known as: NORVASC  Take 1 tablet (10 mg total) by mouth once daily.     atorvastatin 40 MG tablet  Commonly known as: LIPITOR  Take 40 mg by mouth every evening.     cloNIDine 0.1 MG tablet  Commonly known as: CATAPRES  Take 1 tablet (0.1 mg total) by mouth every 8 (eight) hours as needed (160).     clopidogreL 75 mg tablet  Commonly known as: PLAVIX  Plavix 75 mg tablet   Take 1 po QD to prevent stroke     gabapentin 800 MG tablet  Commonly known as: NEURONTIN  gabapentin 800 mg tablet     HYDROcodone-acetaminophen  mg per tablet  Commonly known as: NORCO  Take 1 tablet by mouth 4 (four) times daily as needed.     levothyroxine 125 MCG tablet  Commonly known as: SYNTHROID     LORazepam 0.5 MG tablet  Commonly known as: ATIVAN  Take 1 tablet (0.5 mg total) by mouth As instructed for Anxiety.     pantoprazole 40 MG tablet  Commonly known as: PROTONIX  Take 1  tablet (40 mg total) by mouth once daily.     sertraline 50 MG tablet  Commonly known as: ZOLOFT  sertraline 50 mg tablet     temazepam 30 mg capsule  Commonly known as: RESTORIL  Take 30 mg by mouth every evening.     tiZANidine 4 MG tablet  Commonly known as: ZANAFLEX     valsartan 160 MG tablet  Commonly known as: DIOVAN  Take 1 tablet (160 mg total) by mouth once daily.        STOP taking these medications    docusate sodium 100 MG capsule  Commonly known as: COLACE     potassium chloride SA 20 MEQ tablet  Commonly known as: K-DUR,KLOR-CON            Indwelling Lines/Drains at time of discharge:   Lines/Drains/Airways     None                 Time spent on the discharge of patient: 60 minutes         Alis Grider MD  Department of Hospital Medicine  [facility] - Medical Surgical Unit

## 2022-11-11 NOTE — ASSESSMENT & PLAN NOTE
PT HAS HYPERLIPIDEMIA ANS IS S/P CVA  STATIN, LE ARE SLIGHTLY ELEVATED; WILL MONITOR  10/27/2022  CONTINUE STATIN  11/02/2022  CONTINUE STATIN  11/09/2022  CONTINUE STATIN  11/10/2022  PT WILL CONTINUE STATIN ON DISCHARGE AND CARDIAC -HH DIET RECOMMENDED

## 2022-11-11 NOTE — ASSESSMENT & PLAN NOTE
PT HAS ANXIETY DISORDER EXACERBATED BY FAMILY DYNAMICS PER PT'S STATEMENT  CONTINUE LORAZEPAM; WILL AVOID HS DOSING AS PT IS ON TEMAZEPAM HS  10/27/2022  PT HAS INTERMITTENT ANXIETY BUT OVERALL IMPROVED   11/02/2022  STABLE  11/09/2022  STABLE   [facility] RECOMMENDATION OF DC  11/10/2022  PT HAS ANXIETY WITH CONSIDERABLE STRESS DUE TO FAMILY DYNAMICS AND THIS IMPROVED WITH CONTINUATION OF LORAZEPAM, TEMAZEPAM, AND ZOLOFT AND STAFF SUPPORT. PT HAS BEEN REFERRED TO [facility] FOR TREATMENT AND SHE IS AGREEABLE TO THIS RECOMMENDATION

## 2022-11-11 NOTE — ASSESSMENT & PLAN NOTE
PT/OT WILL EVALUATE AND INITIATE A TREATMENT PLAN  10/27/2022 PT IS ON MEDICAL HOLD  PT WAS AMBULATORY INITIALLY WITH BALANCE ISSUES  11/02/2022  PT IS PARTICIPATING WITH PT/OT PERFORMING BED MOBILITY WITH SBA, CGA, AND MINIMUM ASSISTANCE, TRANSFERS WITH CGA AND MINIMUM  ASSISTANCE WITH RW, AND AMBULATING 236 FEET WITH CGA WITH RW AND VC.  11/09/2022  PT IS PARTICIPATING WITH PT/OT PERFORMING BED MOBILITY WITH INDEPENDENCE, TRANSFERS WITH SBA WITH RW, AND AMBULATING 300 FEET WITH CGA WITH RW AND VC.  PT PERFORMED ACTIVITIES TO IMPROVE BALANCE ISSUES  11/10/2022  PT PARTICIPATED WITH PT/OT PERFORMING BED MOBILITY WITH INDEPENDENCE, TRANSFERS WITH SBA WITH RW, AND AMBULATING 300 FEET WITH CGA WITH RW AND VC.  PT PERFORMED ACTIVITIES TO IMPROVE BALANCE ISSUES. PT WILL CONTINUE REHABILITATION WITH OUT PATIENT PT/OT AT [facility].

## 2022-11-11 NOTE — ASSESSMENT & PLAN NOTE
PT HAS HTN  AMLODIPINE, CLONIDINE   /96    10/26/22 BP 97/71 this am. NS bolus given.  Recheck BP after bolus    10/27/2022   BP HAS IMPROVED 111/74  11/02/2022  BP /68  11/09/2022  /78  11/10/2022  PT HAS HYPERTENSION WHICH INITIALLY WAS POORLY CONTROLLED BUT IMPROVED WITH MEDICATION ADJUSTMENTS.

## 2022-11-11 NOTE — ASSESSMENT & PLAN NOTE
PT HAS THYROID DISEASE  TSH 1.880  SYNTHROID 125 MCG DAILY  10/27/2022 STABLE  11/02/2022 STABLE  11/09/2022 STABLE  11/10/2022  PT WILL CONTINUE SYNTHROID ON DISCHARGE

## 2022-11-11 NOTE — ASSESSMENT & PLAN NOTE
PT HAS CHRONIC PAIN DISORDER  NORCO, GABAPENTIN  PT/OT TO INCREASE MOBILITY AND AMBULATION  10/27/2022 STABLE  11/02/2022 STABLE  11/09/2022 STABLE  11/10/2022  PT WILL CONTINUE CHRONIC PAIN DISORDER MEDICATIONS  NORCO AND GABAPENTIN

## 2022-11-17 ENCOUNTER — CLINICAL SUPPORT (OUTPATIENT)
Dept: REHABILITATION | Facility: HOSPITAL | Age: 57
End: 2022-11-17
Payer: MEDICARE

## 2022-11-17 DIAGNOSIS — R26.9 ABNORMALITY OF GAIT: ICD-10-CM

## 2022-11-17 DIAGNOSIS — R29.6 FREQUENT FALLS: ICD-10-CM

## 2022-11-17 DIAGNOSIS — Z86.73 HISTORY OF CVA (CEREBROVASCULAR ACCIDENT): ICD-10-CM

## 2022-11-17 DIAGNOSIS — M62.81 MUSCLE WEAKNESS: Primary | ICD-10-CM

## 2022-11-17 DIAGNOSIS — G89.4 CHRONIC PAIN DISORDER: ICD-10-CM

## 2022-11-17 DIAGNOSIS — R29.898 LOWER EXTREMITY WEAKNESS: ICD-10-CM

## 2022-11-17 DIAGNOSIS — R29.6 FALLS FREQUENTLY: ICD-10-CM

## 2022-11-17 PROCEDURE — 97161 PT EVAL LOW COMPLEX 20 MIN: CPT

## 2022-11-21 NOTE — PLAN OF CARE
[facility]   Physical Therapy Initial Evaluation     Date: 11/17/2022     [id number]    Therapy Diagnosis:   Encounter Diagnoses   Name Primary?    Frequent falls     Lower extremity weakness     History of CVA (cerebrovascular accident)     Muscle weakness Yes    Falls frequently     Chronic pain disorder     Abnormality of gait      Physician: Vini Hernandez, NP     Physician Orders: PT Eval and Treat  Medical Diagnosis from Referral: Frequent falls [R29.6], Lower extremity weakness [R29.898], History of CVA (cerebrovascular accident) [Z86.73]  Evaluation Date: 11/17/2022  Authorization Period Expiration: 11/9/2023  Plan of Care Expiration: 12/30/2022    Progress Update: 12/16/2022    Visit # / Visits authorized: 1 / 1 (eval)     FOTO: Visit #1 - Scored : 1 / 3     PRECAUTIONS: Standard Precautions and Safety/fall precautions     MD Visit: TBD    Time In: 1:45 PM  Time Out: 2:20 PM  Total Appointment Time (timed & untimed codes): 35 minutes    SUBJECTIVE     Date of onset: 10/17/2022    History of current condition - Herlinda is a 57 y.o. female whom reports 10/17/2022 came to [facility] for weakness; patient had fallen 4 times in past week. Patient participated in swing bed until 11/10/2022.   Herlinda's current exercise regiment includes: none.  Seeking Physical Therapy for lower extremity weakness and balance.    Recently patient felt okay Monday but Tuesday felt rotten; patient state she has trouble with getting up. Patients MD can to her house on wednesday.     Mechanism of injury: Falls  Falls: Many     _______________________________________________________    Medical History:   Past Medical History:   Diagnosis Date    Cerebral hemorrhage     Chronic anxiety     Dehydration     Depression     Dysphagia     Due to and following non-traumatic intracerebral hemorrhage    Hearing loss     History of CVA (cerebrovascular accident)     Hypertension      Hypokalemia 10/20/2022    Insomnia     Intrapontine hemorrhage     Left hemiparesis     Peripheral neuropathy     Stroke     Thyroid disease     Transient cerebral ischemia        Surgical History:   Herlinda Valdovinos  has a past surgical history that includes Appendectomy; Cholecystectomy; Hysterectomy; percutaneious insertion of ureteric stent; and Lithotripsy.    Medications:   Herlinda has a current medication list which includes the following prescription(s): acetaminophen, albuterol-ipratropium, amlodipine, atorvastatin, clonidine, clopidogrel, gabapentin, hydrocodone-acetaminophen, levothyroxine, lorazepam, pantoprazole, sertraline, temazepam, tizanidine, valsartan, [DISCONTINUED] hydrochlorothiazide, and [DISCONTINUED] omeprazole.    Allergies:   Review of patient's allergies indicates:   Allergen Reactions    Lamisil [terbinafine] Anaphylaxis    Codeine Itching    Compazine [prochlorperazine] Itching        OBJECTIVE   (x = not tested due to pain and/or inability to obtain test position)    RANGE OF MOTION:      Hip   Range of Motion Right   Left   Goal   Hip Flexion (120) 60 60 120   Hip Abduction (45) Not tested Not tested  30   Hip Extension (20) Not tested  Not tested  15   Hip internal rotaiton  (45) X X 40   Hip external rotation  (45) X X 40    (*) pain and/or dysfunction      Sensation:  Sensation is intact to light touch      Posture:  Pt presents with postural abnormalities which include: forward head, rounded shoulders, and ankle/foot pronation    Gait Analysis: The patient ambulated with the following assistive device: rolling walker; the pt presents with the following gait abnormalities: decreased step length, lilli, and arm swing; increased forward flexed posture, trunk sway -       Balance:    RIGHT   LEFT   Goal   Closed 50  60 seconds   Tandem 3 5 20 seconds     Single Leg X X 20 seconds        Functional Tests  Outcome Norms GOAL   Timed Up and Go 22 <13.5 sec 16   Five Time Sit to Stand  18 60s: <11.4 sec  70s: <12.6 sec  80s: 14.8 sec 14   6 minutes walk x 60s: >538f, 572m  70s: >471f, 527m  80s: >417f, 392m        FUNCTION:     CMS Impairment/Limitation/Restriction for FOTO gait Survey    Therapist reviewed FOTO scores for Herlinda Valdovinos on 11/17/2022.   FOTO documents entered into [employer] - see Media section.    Limitation Score: %         TREATMENT   Total Treatment time separate from Evaluation:      Herlinda received the treatments listed below:      THERAPEUTIC EXERCISES: to develop strength, endurance, ROM, flexibility, posture and core stabilization for   minutes including: x = performed today    TherEx Performed    SLR     SIT to stand     SLR            BOLD = new this visit  Plan for Next Visit:        PATIENT EDUCATION AND HOME EXERCISES     Education/Self-Care provided:  (included in treatment) minutes   Patient educated on the impairments noted above and the effects of physical therapy intervention to improve overall condition and QOL.   Patient was educated on all the above exercise prior/during/after for proper posture, positioning, and execution for safe performance with home exercise program.     Written Home Exercises Provided: yes. Prefers: Printed Copies  Exercises were reviewed and Herlinda was able to demonstrate them prior to the end of the session.  Herlinda demonstrated good understanding of the education provided. See electronic medical record under Patient Instructions for exercises provided during therapy sessions.    ASSESSMENT     Herlinda is a 57 y.o. female referred to outpatient Physical Therapy with a medical diagnosis of Frequent falls [R29.6], Lower extremity weakness [R29.898], History of CVA (cerebrovascular accident) [Z86.73]. Herlinda presents with clinical signs and symptoms that support this diagnosis with decreased knee and hip range of motion , decreased hip girdle, quad, and hamstring Strength, patellar joint hypomobility, increased joint and soft tissue edema, and  impaired functional mobility. The above impairments will be addressed through manual therapy techniques, therapeutic exercises, functional training, and modalities as necessary. Patient was treated and educated on exercises for home program, progression of therapy, and benefits of therapy to achieve full functional mobility. Patient will benefit from skilled physical therapy to meet short and long term goals and return to prior level of function.    Pt prognosis is Fair.   Pt will benefit from skilled outpatient Physical Therapy to address the deficits stated above and in the chart below, provide pt/family education, and to maximize pt's level of independence.     Plan of care discussed with patient: Yes  Pt's spiritual, cultural and educational needs considered and patient is agreeable to the plan of care and goals as stated below:     Anticipated Barriers for therapy: co-morbidities, sedentary lifestyle, chronicity of condition, adherence to treatment plan, transportation, coping style, and social background    Medical Necessity is demonstrated by the following:   History  Co-morbidities and personal factors that may impact the plan of care Co-morbidities:   Past Medical History:   Diagnosis Date    Cerebral hemorrhage     Chronic anxiety     Dehydration     Depression     Dysphagia     Due to and following non-traumatic intracerebral hemorrhage    Hearing loss     History of CVA (cerebrovascular accident)     Hypertension     Hypokalemia 10/20/2022    Insomnia     Intrapontine hemorrhage     Left hemiparesis     Peripheral neuropathy     Stroke     Thyroid disease     Transient cerebral ischemia        Personal Factors:   age  coping style  social background     high   Examination  Body Structures and Functions, activity limitations and participation restrictions that may impact the plan of care Body Regions:   lower extremities    Body Systems:    gross symmetry  ROM  strength  gross coordinated  "movement  balance  gait  transfers  transitions  motor control  motor learning    Participation Restrictions:   See above in "Current Level of Function"     Activity limitations:   Learning and applying knowledge  no deficits    General Tasks and Commands  no deficits    Communication  no deficits    Mobility  lifting and carrying objects  walking  moving around using equipment (WC)  using transportation (bus, train, plane, car)  driving (bike, car, motorcycle)    Self care  looking after one's health    Domestic Life  shopping  cooking  doing house work (cleaning house, washing dishes, laundry)  assisting others    Interactions/Relationships  no deficits    Life Areas  no deficits    Community and Social Life  community life  recreation and leisure         moderate   Clinical Presentation stable and uncomplicated low   Decision Making/ Complexity Score: low       GOALS:  SHORT TERM GOALS: 4 weeks,  Progress   Recent signs and systems trend is improving in order to progress towards long term goals.    Patient will be independent with Home Exercise Program  in order to further progress and return to maximal function.    Pain rating at Worst: 5/10 in order to progress towards increased independence with activity.    Patient will be able to correct postural deviations in sitting and standing, to decrease pain and promote postural awareness for injury prevention.       LONG TERM GOALS: 6 weeks,  Progress   Patient will return to normal activities of daily living, recreational, and work related activities with less pain and limitation.     Patient will improve active range of motion to stated goals in order to return to maximal functional potential.     Patient will improve Strength to stated goals of appropriate musculature in order to improve functional independence.     Pain Rating at Best: 1/10 to improve Quality of Life.     Patient will meet predicted functional outcome (FOTO) score: 10% to increase self-worth & " perceived functional ability.        PLAN   Plan of care Certification: 11/17/2022 to 12/30/2022    Outpatient Physical Therapy 2 times weekly for 6 weeks to include any combination of the following interventions: virtual visits, dry needling, modalities, electrical stimulation (IFC, Pre-Mod, Attended with Functional Dry Needling), Electrical Stimulation , Gait Training, Manual Therapy, Moist Heat/ Ice, Neuromuscular Re-ed, Patient Education, Self Care, Therapeutic Activities, Therapeutic Exercise, Functional Training, and Therapeutic Activites     Thank you for this referral.    Sunitha Martinez, PT      I CERTIFY THE NEED FOR THESE SERVICES FURNISHED UNDER THIS PLAN OF TREATMENT AND WHILE UNDER MY CARE   Physician's comments:     Physician's Signature: ___________________________________________________

## 2022-11-23 ENCOUNTER — CLINICAL SUPPORT (OUTPATIENT)
Dept: REHABILITATION | Facility: HOSPITAL | Age: 57
End: 2022-11-23
Payer: MEDICARE

## 2022-11-23 DIAGNOSIS — R26.9 ABNORMALITY OF GAIT: Primary | ICD-10-CM

## 2022-11-23 PROCEDURE — 97110 THERAPEUTIC EXERCISES: CPT | Mod: CQ

## 2022-11-23 NOTE — PROGRESS NOTES
Physical Therapy Treatment Note     [id number]    Therapy Diagnosis:   Encounter Diagnosis   Name Primary?    Abnormality of gait Yes     Physician: Vini Hernandez NP    Visit Date: 11/23/2022    Physician Orders: PT Eval and Treat  Medical Diagnosis from Referral: Frequent falls [R29.6], Lower extremity weakness [R29.898], History of CVA (cerebrovascular accident) [Z86.73]  Evaluation Date: 11/17/2022  Authorization Period Expiration: 11/9/2023  Plan of Care Expiration: 12/30/2022                          Progress Update: 12/16/2022                        Visit # / Visits authorized: 2 / 1 (eval)                        FOTO: Visit #1 - Scored : 1 / 3   PTA Visit #: 1    Time In: 2:10 pm  Time Out: 2:40 pm  Total Billable Time: 30 minutes    Precautions: Standard    Received Plan of Care per Sunitha Martinez, PT     Subjective     Pt reports: no c/o pain today; no pain meds taken today  She was compliant with home exercise program.  Response to previous treatment: first treatment after eval      Pain: 0/10  Location: bilateral knees      Objective     Herlinda received therapeutic exercises to develop strength, endurance, ROM, flexibility, posture, and core stabilization for 30 minutes including:    Nustep x 7 minutes   BLE exercises in // bars in standing x 10-15 repetitions each: marching, mini squats, heel/toe raises, hip abduction with frequent rest breaks  Sit to stand x 6 repetitions  Sitting BLE exercises x 20 repetitions: marching, long arc quads, hip abduction, ankle rocking   Walking practice with quad cane x 40ft    Home Exercises Provided and Patient Education Provided     Education provided: continue current home exercise program, pain free     Written Home Exercises Provided: Patient instructed to cont prior HEP.  Exercises were reviewed and Herlinda was able to demonstrate them prior to the end of the session.  Herlinda demonstrated good  understanding of the education  provided.     See EMR under Patient Instructions for exercises provided prior visit.    Assessment     Pt fatigues easily and requires frequent rest breaks  Herlinda Is progressing well towards her goals.   Pt prognosis is Good.     Pt will continue to benefit from skilled outpatient physical therapy to address the deficits listed in the problem list box on initial evaluation, provide pt/family education and to maximize pt's level of independence in the home and community environment.   Anticipated barriers to physical therapy: home exercise program compliance     Goals:  SHORT TERM GOALS: 4 weeks,  Progress   Recent signs and systems trend is improving in order to progress towards long term goals.     Patient will be independent with Home Exercise Program  in order to further progress and return to maximal function.     Pain rating at Worst: 5/10 in order to progress towards increased independence with activity.     Patient will be able to correct postural deviations in sitting and standing, to decrease pain and promote postural awareness for injury prevention.         LONG TERM GOALS: 6 weeks,  Progress   Patient will return to normal activities of daily living, recreational, and work related activities with less pain and limitation.      Patient will improve active range of motion to stated goals in order to return to maximal functional potential.      Patient will improve Strength to stated goals of appropriate musculature in order to improve functional independence.      Pain Rating at Best: 1/10 to improve Quality of Life.      Patient will meet predicted functional outcome (FOTO) score: 10% to increase self-worth & perceived functional ability.        Plan     Plan of care Certification: 11/17/2022 to 12/30/2022     Outpatient Physical Therapy 2 times weekly for 6 weeks to include any combination of the following interventions: virtual visits, dry needling, modalities, electrical stimulation (IFC, Pre-Mod,  Attended with Functional Dry Needling), Electrical Stimulation , Gait Training, Manual Therapy, Moist Heat/ Ice, Neuromuscular Re-ed, Patient Education, Self Care, Therapeutic Activities, Therapeutic Exercise, Functional Training, and Therapeutic Activites     Continue per Plan of Care and progress as pt able     Isabel Levi, PTA  11/23/2022

## 2022-11-29 ENCOUNTER — CLINICAL SUPPORT (OUTPATIENT)
Dept: REHABILITATION | Facility: HOSPITAL | Age: 57
End: 2022-11-29
Payer: MEDICARE

## 2022-11-29 DIAGNOSIS — M25.612 DECREASED SHOULDER MOBILITY, LEFT: ICD-10-CM

## 2022-11-29 DIAGNOSIS — R26.9 ABNORMALITY OF GAIT: Primary | ICD-10-CM

## 2022-11-29 DIAGNOSIS — M25.512 LEFT SHOULDER PAIN: Primary | ICD-10-CM

## 2022-11-29 PROCEDURE — 97110 THERAPEUTIC EXERCISES: CPT

## 2022-11-29 NOTE — PROGRESS NOTES
Physical Therapy Treatment Note     [id number]    Therapy Diagnosis:   Encounter Diagnosis   Name Primary?    Abnormality of gait Yes     Physician: Vini Hernandez NP    Visit Date: 11/29/2022    Physician Orders: PT Eval and Treat  Medical Diagnosis from Referral: Frequent falls [R29.6], Lower extremity weakness [R29.898], History of CVA (cerebrovascular accident) [Z86.73]  Evaluation Date: 11/17/2022  Authorization Period Expiration: 11/9/2023  Plan of Care Expiration: 12/30/2022                          Progress Update: 12/16/2022                        Visit # / Visits authorized: 2/13  ( +1 eval)                        FOTO: Visit #1 - Scored : 1 / 3   PTA Visit #: 0    Time In: 2:05 pm  Time Out: 2:35 pm  Total Billable Time: 30 minutes    Precautions: Standard    Received Plan of Care per Sunitha Martinez, PT     Subjective     Pt reports: that she is not feeling well today somewhat dizzy and weak; request BP be checked. /80     She was compliant with home exercise program.  Response to previous treatment: first treatment after eval      Pain: 0/10    Location: bilateral knees      Objective     Herlinda received therapeutic exercises to develop strength, endurance, ROM, flexibility, posture, and core stabilization for 30 minutes including:    Nustep x 6 minutes   BLE exercises in // bars in standing x 10-15 repetitions each: marching, mini squats, heel/toe raises, hip abduction with frequent rest breaks  Sit to stand x 6 repetitions  Sitting BLE exercises x 20 repetitions: marching, long arc quads, hip abduction, ankle rocking     Home Exercises Provided and Patient Education Provided     Education provided: continue current home exercise program, pain free     Written Home Exercises Provided: Patient instructed to cont prior HEP.  Exercises were reviewed and Herlinda was able to demonstrate them prior to the end of the session.  Herlinda demonstrated good  understanding of  the education provided.     See EMR under Patient Instructions for exercises provided prior visit.    Assessment     Pt fatigues easily and requires frequent rest breaks, patient visually frustrated with today's performance.     Herlinda Is progressing well towards her goals.   Pt prognosis is Good.     Pt will continue to benefit from skilled outpatient physical therapy to address the deficits listed in the problem list box on initial evaluation, provide pt/family education and to maximize pt's level of independence in the home and community environment.   Anticipated barriers to physical therapy: home exercise program compliance     Goals:  SHORT TERM GOALS: 4 weeks,  Progress   Recent signs and systems trend is improving in order to progress towards long term goals.     Patient will be independent with Home Exercise Program  in order to further progress and return to maximal function.     Pain rating at Worst: 5/10 in order to progress towards increased independence with activity.     Patient will be able to correct postural deviations in sitting and standing, to decrease pain and promote postural awareness for injury prevention.         LONG TERM GOALS: 6 weeks,  Progress   Patient will return to normal activities of daily living, recreational, and work related activities with less pain and limitation.      Patient will improve active range of motion to stated goals in order to return to maximal functional potential.      Patient will improve Strength to stated goals of appropriate musculature in order to improve functional independence.      Pain Rating at Best: 1/10 to improve Quality of Life.      Patient will meet predicted functional outcome (FOTO) score: 10% to increase self-worth & perceived functional ability.        Plan     Plan of care Certification: 11/17/2022 to 12/30/2022     Outpatient Physical Therapy 2 times weekly for 6 weeks to include any combination of the following interventions: virtual  visits, dry needling, modalities, electrical stimulation (IFC, Pre-Mod, Attended with Functional Dry Needling), Electrical Stimulation , Gait Training, Manual Therapy, Moist Heat/ Ice, Neuromuscular Re-ed, Patient Education, Self Care, Therapeutic Activities, Therapeutic Exercise, Functional Training, and Therapeutic Activites     Continue per Plan of Care and progress as pt able     Sunitha Martinez, PT  11/29/2022

## 2022-12-01 ENCOUNTER — CLINICAL SUPPORT (OUTPATIENT)
Dept: REHABILITATION | Facility: HOSPITAL | Age: 57
End: 2022-12-01
Payer: MEDICARE

## 2022-12-01 DIAGNOSIS — R27.8 DECREASED COORDINATION: ICD-10-CM

## 2022-12-01 DIAGNOSIS — R29.898 WEAKNESS OF LEFT UPPER EXTREMITY: Primary | ICD-10-CM

## 2022-12-01 DIAGNOSIS — G89.29 CHRONIC LEFT SHOULDER PAIN: ICD-10-CM

## 2022-12-01 DIAGNOSIS — M25.512 LEFT SHOULDER PAIN: ICD-10-CM

## 2022-12-01 DIAGNOSIS — M25.512 CHRONIC LEFT SHOULDER PAIN: ICD-10-CM

## 2022-12-01 DIAGNOSIS — R26.9 ABNORMALITY OF GAIT: Primary | ICD-10-CM

## 2022-12-01 DIAGNOSIS — M25.612 DECREASED SHOULDER MOBILITY, LEFT: ICD-10-CM

## 2022-12-01 DIAGNOSIS — M25.612 DECREASED RANGE OF MOTION OF LEFT SHOULDER: ICD-10-CM

## 2022-12-01 PROCEDURE — 97110 THERAPEUTIC EXERCISES: CPT | Mod: CQ

## 2022-12-01 PROCEDURE — 97165 OT EVAL LOW COMPLEX 30 MIN: CPT

## 2022-12-01 NOTE — PLAN OF CARE
Name: Herlinda Valdovinos  MRN: [id number]   Physician: Vini Hernandez NP     Diagnosis:   Encounter Diagnoses   Name Primary?    Left shoulder pain Yes    Decreased shoulder mobility, left     Decreased range of motion of left shoulder     Decreased coordination     Weakness of left upper extremity     Chronic left shoulder pain         Onset date: April 2021    Date of service: 12/1/22  Start time: 1435   End time: 1519    Orders: Eval and Treat    Subjective:  Patient goals: Regain functional use and strength of L UE.     Chief Complaint: No chief complaint on file.     Past Medical History:   Diagnosis Date    Cerebral hemorrhage     Chronic anxiety     Dehydration     Depression     Dysphagia     Due to and following non-traumatic intracerebral hemorrhage    Hearing loss     History of CVA (cerebrovascular accident)     Hypertension     Hypokalemia 10/20/2022    Insomnia     Intrapontine hemorrhage     Left hemiparesis     Peripheral neuropathy     Stroke     Thyroid disease     Transient cerebral ischemia       Current Outpatient Medications   Medication Sig    acetaminophen (TYLENOL) 325 MG tablet 2 tablets PRN    albuterol-ipratropium (DUO-NEB) 2.5 mg-0.5 mg/3 mL nebulizer solution Take 3 mLs by nebulization every 6 (six) hours while awake. Rescue    amLODIPine (NORVASC) 10 MG tablet Take 1 tablet (10 mg total) by mouth once daily.    atorvastatin (LIPITOR) 40 MG tablet Take 40 mg by mouth every evening.    cloNIDine (CATAPRES) 0.1 MG tablet Take 1 tablet (0.1 mg total) by mouth every 8 (eight) hours as needed (160).    clopidogreL (PLAVIX) 75 mg tablet Plavix 75 mg tablet   Take 1 po QD to prevent stroke    gabapentin (NEURONTIN) 800 MG tablet gabapentin 800 mg tablet    HYDROcodone-acetaminophen (NORCO)  mg per tablet Take 1 tablet by mouth 4 (four) times daily as needed.    levothyroxine (SYNTHROID) 125 MCG tablet     LORazepam (ATIVAN) 0.5 MG tablet Take 1 tablet (0.5 mg total) by mouth As  instructed for Anxiety.    pantoprazole (PROTONIX) 40 MG tablet Take 1 tablet (40 mg total) by mouth once daily.    sertraline (ZOLOFT) 50 MG tablet sertraline 50 mg tablet    temazepam (RESTORIL) 30 mg capsule Take 30 mg by mouth every evening.    tiZANidine (ZANAFLEX) 4 MG tablet     valsartan (DIOVAN) 160 MG tablet Take 1 tablet (160 mg total) by mouth once daily.     No current facility-administered medications for this visit.     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     DME: Manual wheelchair, Walker, and BSC    Review of patient's allergies indicates:   Allergen Reactions    Lamisil [terbinafine] Anaphylaxis    Codeine Itching    Compazine [prochlorperazine] Itching     Past treatment includes: OT/PT services  Inpatient, OP, and HH    Precautions:  Pain: 1/10 at rest. 10/10 with movement. Pain established using the Numbers pain scale.   Pain location: L shoulder  Pain description: Aching, shooting  Relieved by: rest    Dominant hand: right    Occupation/hobbies/homemaking: Pt enjoys visiting with family, going to [employer], performing own self care, playing piano.   Job description includes: Pt is no longer working. Formerly worked as RN.   Driving status: Not driving.     Objective  Cognitive Exam  Oriented: Person, Place, Time, and Situation  Behaviors: Follows multistep  commands  Follows Commands/attention: Follows multistep  commands  Communication: clear/fluent  Memory: No Deficits noted  Safety awareness/insight to disability: Poor; pt has experienced multiple falls at home related to LOB or over confidence in abilities.   Coping skills/emotional control: Appropriate to situation    Physical Exam:    Postural examination/scapula alignment: Head forward, Lateral weight shift of hips, Slouched posture, and Trunk deviated right  Joint integrity: Hard end feel  Skin integrity: Bruising of B UEs  Edema: None noted.     Light touch: L UE  intact  Sharp/Dull: L UE intact  Kinesthesia: L UE impaired  Proprioception: L UE impaired  Temperature: L UE intact    Joint Evaluation AROM  PROM     Left Right Left Right   Shoulder flex 0-180       Shoulder Abd 0-180       Shoulder ER 0-90       Shoulder IR 0-90       Shoulder Extension 0-80       Shoulder Horizontal adduction 0-90       Elbow flex/ext 0-150       Wrist flex 0-80       Wrist ext 0-70       Supination 0-80       Pronation 0-80       UD       RD         Fist:     Comments:    Strength      Left Right   Shoulder flex F- G   Shoulder abd F- G   Shoulder ER P+ G   Shoulder IR P+ G   Shoulder Extension F G   Shoulder Horizontal adduction P+ G   Elbow flex F G   Elbow ext F G   Wrist flex F G   Wrist ext F G   Supination F G   Pronation F G   UD F G   RD F G        Strength: R 35#  L 20#    Fine motor coordination: R Intact  L Fair-    Gross motor coordination: R Intact  L Fair-      Balance:   Static Sit  Fair+  Dynamic sit  Fair-  Static Stand  Fair-  Dynamic stand  Poor +    Functional Status:    PLOF: Prior to CVA in April '21, pt independent, working full time as RN. After previous OP therapy episode 3/7/22-6/22/22, pt performing self care tasks Tiffany without assist in the home, demonstrated L UE shoulder flexion up to 100* and L shoulder MMT 3/5 or Fair.                                    Current: Pt required ModA for all aspects of self care, decreased standing balance and endurance, affecting ability to perform own self care tasks, increased caregiver burden and decreased quality of life.       Functional Mobility:  Bed mobility: Mod I  Roll to left: Mod I  Roll to right: Mod I  Supine to sit: Mod I  Sit to supine: Mod I  Transfers to bed: Min A  Transfers to toilet: Min A  Car transfers: Min A    ADL's:  Feeding: Mod I  Grooming: Min A  Hygiene: Min A  UB Dressing: Mod A  LB Dressing: Mod A  Toileting: Mod A  Bathing: Mod A    IADL's:  Homecare: Max A  Cooking: Max A  Laundry: Max A  Yard  work: D  Use of telephone: Mod I  Money management: Mod I  Medication management: Mod I  Comments:     TODAY'S TREATMENT    1. Herlinda performed and was provided with exercises to perform 1x/day. Exercises were reviewed and Herlinda was able to demonstrate them prior to the end of the session.   2. Pt was provided educational information, including safety techniques for facilitating safe ADL performance in home.       ASSESSMENT:  OT diagnosis: L hemiparesis    Assessment  Herlinda Valdovinos is a 57 y.o. female with a medical diagnosis of   Encounter Diagnoses   Name Primary?    Left shoulder pain Yes    Decreased shoulder mobility, left     Decreased range of motion of left shoulder     Decreased coordination     Weakness of left upper extremity     Chronic left shoulder pain     referred to occupational therapy for L UE weakness and decline in function. She presents with significant weakness and limited ROM in L UE as well as increased difficulty with self care tasks, functional t/fs, increased caregiver burden and decreased quality of life associated with limited functional independence.    Herlinda can benefit from outpatient Occupational therapy and a home program to address the stated goals to improve impairments and functional limitations. Treatment will be directed at improve the following impairments. Rehab potential is good.    PROBLEM LIST/IMPAIRMENTS:   pain, decreased range of motion, decreased muscle strength, and impaired function    LTG GOALS:  Time frame: 6 weeks  Increase ROM/Strength to perform ADL's and functional activities, UB dressing will improve to Mod I, LB dressing will improve to Mod I, and Simple home care will improve to  Mod I    STG Goals:  Time frame: 3 weeks  ROM/Strength to perform ADL's and functional activities, UB dressing will improve to  Min A, LB dressing will improve to  Min A, and Simple home care will improve to  Min A      PLAN:    Patient/caregiver understands and agrees with plan  of care.    Outpatient occupational therapy 2  times weekly for 6 weeks  to include: pt ed, HEP, therapeutic exercises, therapeutic activity, ADLs,  neuromuscular re-education, joint mobilizations, modalities prn.    Kiana Rasmussen, OTPURA, OTR/L  12/1/2022      I certify the need for these services furnished under this plan of treatment and while under my care.____________________________________ Physician/Referring _______________________Practitioner Date of Signature

## 2022-12-01 NOTE — PROGRESS NOTES
Physical Therapy Treatment Note     [id number]    Therapy Diagnosis:   Encounter Diagnosis   Name Primary?    Abnormality of gait Yes     Physician: Vini Hernandez NP    Visit Date: 12/1/2022    Physician Orders: PT Eval and Treat  Medical Diagnosis from Referral: Frequent falls [R29.6], Lower extremity weakness [R29.898], History of CVA (cerebrovascular accident) [Z86.73]  Evaluation Date: 11/17/2022  Authorization Period Expiration: 11/9/2023  Plan of Care Expiration: 12/30/2022                          Progress Update: 12/16/2022                        Visit # / Visits authorized: 3/13  ( +1 eval)                        FOTO: Visit #1 - Scored : 1 / 3   PTA Visit #: 1    Time In: 1351  Time Out: 1424  Total Billable Time: 33 minutes    Precautions: Standard      Subjective     Pt reports: she is feeling better today than last visit    She was compliant with home exercise program.  Response to previous treatment: first treatment after eval      Pain: 0/10    Location: bilateral knees      Objective     Herlinda received therapeutic exercises to develop strength, endurance, ROM, flexibility, posture, and core stabilization for 33 minutes including:  Nustep x 6 minutes   LAQ 2 x 10  Seated marching 2 x 10  Standing heel/toe raises 2 x 10  Standing hip abduction 2 x 10 BLE  Standing marching 2 x 10 BLE  Sit to stand x 10 repetitions with UE assist    Home Exercises Provided and Patient Education Provided     Education provided: continue current home exercise program, pain free     Written Home Exercises Provided: Patient instructed to cont prior HEP.  Exercises were reviewed and Herlinda was able to demonstrate them prior to the end of the session.  Herlinda demonstrated good  understanding of the education provided.     See EMR under Patient Instructions for exercises provided prior visit.    Assessment     Pt fatigues easily and requires rest breaks between exercises. Patient stated  she was feeling better and had improved performance during today's treatment.     Herlinda Is progressing well towards her goals.   Pt prognosis is Good.     Pt will continue to benefit from skilled outpatient physical therapy to address the deficits listed in the problem list box on initial evaluation, provide pt/family education and to maximize pt's level of independence in the home and community environment.   Anticipated barriers to physical therapy: home exercise program compliance     Goals:  SHORT TERM GOALS: 4 weeks,  Progress   Recent signs and systems trend is improving in order to progress towards long term goals.     Patient will be independent with Home Exercise Program  in order to further progress and return to maximal function.     Pain rating at Worst: 5/10 in order to progress towards increased independence with activity.     Patient will be able to correct postural deviations in sitting and standing, to decrease pain and promote postural awareness for injury prevention.         LONG TERM GOALS: 6 weeks,  Progress   Patient will return to normal activities of daily living, recreational, and work related activities with less pain and limitation.      Patient will improve active range of motion to stated goals in order to return to maximal functional potential.      Patient will improve Strength to stated goals of appropriate musculature in order to improve functional independence.      Pain Rating at Best: 1/10 to improve Quality of Life.      Patient will meet predicted functional outcome (FOTO) score: 10% to increase self-worth & perceived functional ability.        Plan     Continue per Plan of Care and progress as pt [place], NICHOLAS  12/1/2022

## 2022-12-06 ENCOUNTER — CLINICAL SUPPORT (OUTPATIENT)
Dept: REHABILITATION | Facility: HOSPITAL | Age: 57
End: 2022-12-06
Payer: MEDICARE

## 2022-12-06 DIAGNOSIS — M25.612 DECREASED RANGE OF MOTION OF LEFT SHOULDER: Primary | ICD-10-CM

## 2022-12-06 DIAGNOSIS — M62.81 MUSCLE WEAKNESS: ICD-10-CM

## 2022-12-06 DIAGNOSIS — R26.9 ABNORMALITY OF GAIT: Primary | ICD-10-CM

## 2022-12-06 DIAGNOSIS — G45.8 ACUTE CEREBROVASCULAR INSUFFICIENCY TRANSIENT FOCAL NEUROLOGIC DEFICIT: ICD-10-CM

## 2022-12-06 PROCEDURE — 97112 NEUROMUSCULAR REEDUCATION: CPT

## 2022-12-06 PROCEDURE — 97110 THERAPEUTIC EXERCISES: CPT | Mod: CQ

## 2022-12-06 NOTE — PROGRESS NOTES
Physical Therapy Treatment Note     [id number]    Therapy Diagnosis:   Encounter Diagnoses   Name Primary?    Abnormality of gait Yes    Acute cerebrovascular insufficiency transient focal neurologic deficit     Muscle weakness      Physician: Vini Hernandez NP    Visit Date: 12/6/2022    Physician Orders: PT Eval and Treat  Medical Diagnosis from Referral: Frequent falls [R29.6], Lower extremity weakness [R29.898], History of CVA (cerebrovascular accident) [Z86.73]  Evaluation Date: 11/17/2022  Authorization Period Expiration: 11/9/2023  Plan of Care Expiration: 12/30/2022                          Progress Update: 12/16/2022                        Visit # / Visits authorized: 4/13                     PTA Visit #: 2    Time In: 1400  Time Out: 1437  Total Billable Time: 37 minutes    Precautions: Standard      Subjective     Pt reports: she is feeling good with no complaints    She was compliant with home exercise program.  Response to previous treatment: first treatment after eval      Pain: 0/10    Location: bilateral knees      Objective     Herlinda received therapeutic exercises to develop strength, endurance, ROM, flexibility, posture, and core stabilization for 37 minutes including:  Nustep x 8 min   LAQ 3 x 10  Seated marching 2 x 10 (not today)  Standing heel/toe raises 2 x 10  Standing hip abduction 2 x 10 BLE  Standing marching 2 x 10 BLE  Side steps in // bars x 3 laps  Sit to stand x 10 repetitions with UE assist    Home Exercises Provided and Patient Education Provided     Education provided: continue current home exercise program, pain free     Written Home Exercises Provided: Patient instructed to cont prior HEP.  Exercises were reviewed and Herlinda was able to demonstrate them prior to the end of the session.  Herlinda demonstrated good  understanding of the education provided.     See EMR under Patient Instructions for exercises provided prior visit.    Assessment      Pt fatigues easily and requires rest breaks between exercises. Patient able to perform all exercises with no complaint.    Herlinda Is progressing well towards her goals.   Pt prognosis is Good.     Pt will continue to benefit from skilled outpatient physical therapy to address the deficits listed in the problem list box on initial evaluation, provide pt/family education and to maximize pt's level of independence in the home and community environment.   Anticipated barriers to physical therapy: home exercise program compliance     Goals:  SHORT TERM GOALS: 4 weeks,  Progress   Recent signs and systems trend is improving in order to progress towards long term goals.     Patient will be independent with Home Exercise Program  in order to further progress and return to maximal function.     Pain rating at Worst: 5/10 in order to progress towards increased independence with activity.     Patient will be able to correct postural deviations in sitting and standing, to decrease pain and promote postural awareness for injury prevention.         LONG TERM GOALS: 6 weeks,  Progress   Patient will return to normal activities of daily living, recreational, and work related activities with less pain and limitation.      Patient will improve active range of motion to stated goals in order to return to maximal functional potential.      Patient will improve Strength to stated goals of appropriate musculature in order to improve functional independence.      Pain Rating at Best: 1/10 to improve Quality of Life.      Patient will meet predicted functional outcome (FOTO) score: 10% to increase self-worth & perceived functional ability.        Plan     Continue per Plan of Care and progress as pt [place], PTA  12/6/2022

## 2022-12-06 NOTE — PROGRESS NOTES
"[id number]   Date of Note: 12/06/2022   Referring Physician:  Vini Hernandez NP  Diagnosis:    Encounter Diagnosis   Name Primary?    Decreased range of motion of left shoulder Yes        Start Time: 1310  End Time: 1358  Total Time: 48 min  Visit #: 2/13      Subjective:   Pt reported feeling "pretty good," and reported doing "all of [her] arm exercises."     Pain:  0  out of 10     Objective:   Patient seen by OT this session. Treatment  consist of the following:  Neuromuscular re-education    Treatment: Neuromuscular re-education x 48 min    Neuromuscular Re-education:   - Performed L UE FMC facilitating dexterity, pinch, grasp, kinesthetic awareness with use of pink theraputty, finding and removing small objects with use of L UE. Performed with extended time and 75% accuracy for finding and removing objects.   - Performed bilateral integrative use of upper extremities for catching and throwing large therapy ball. Performed with 50% accuracy while seated.   - Performed dynamic standing and postural correction, facilitating midline posture and ability to maintain, as well as righting reaction d/t intermittent posterior balance loss. Challenged pt's ability to change head position while maintaining correct postural stance with pt demonstrating significant posterior LOB.   - Performed further bilateral integrative techniques also facilitating postural correction, righting, and strengthening. Demonstrated good participation, requiring intermittent Juanis to maintain safe balance when performing trunk forward flexion in standing position while maintaining B UE grasp of large therapy ball extended away from body.      Assessment:  Pt demonstrates good participation in skilled services with good carryover of balance techniques and postural facilitations. Educated pt in continuing L UE exercises at home, verbalizing good understanding. Pt will continue to benefit from skilled OT " intervention.    Patient continues to demonstrate limitation  with  Decreased fine motor coordination, Decreased gross motor coordination, Decreased functional use, Decreased strength, and Continued pain      New/Revised Goals: Continue POC  1.   2.   3.   4.        Plan:  Continue 2x week x 6 weeks  during the certification period from   12/1/2022 to 1/12/2023  in pursuit of  OT goals.      Kiana Rasmussen, OTPURA, OTR/L  12/6/2022

## 2022-12-08 ENCOUNTER — CLINICAL SUPPORT (OUTPATIENT)
Dept: REHABILITATION | Facility: HOSPITAL | Age: 57
End: 2022-12-08
Payer: MEDICARE

## 2022-12-08 DIAGNOSIS — M25.612 DECREASED RANGE OF MOTION OF LEFT SHOULDER: Primary | ICD-10-CM

## 2022-12-08 DIAGNOSIS — R26.9 ABNORMALITY OF GAIT: Primary | ICD-10-CM

## 2022-12-08 PROCEDURE — 97112 NEUROMUSCULAR REEDUCATION: CPT

## 2022-12-08 PROCEDURE — 97110 THERAPEUTIC EXERCISES: CPT | Mod: CQ

## 2022-12-08 NOTE — PROGRESS NOTES
"[id number]   Date of Note: 12/08/2022   Referring Physician:  Vini Hernandez NP  Diagnosis:    Encounter Diagnosis   Name Primary?    Decreased range of motion of left shoulder Yes        Start Time: 1400  End Time: 1450  Total Time: 50 min  Visit #: 3/13      Subjective:   Pt reported feeling "pretty good."    Pain:  0  out of 10     Objective:   Patient seen by OT this session. Treatment  consist of the following:  Neuromuscular re-education    Treatment: Neuromuscular re-education x 50 min    Neuromuscular Re-education:   - Performed L UE FMC facilitating dexterity, pinch, grasp, kinesthetic awareness with use of green theraputty, finding and removing small objects with use of L UE. Performed with extended time and 75% accuracy for finding and removing objects.   - Performed L UE activity promoting kinesthetic and proprioceptive awareness as well as stereognosis with 0% accuracy. Pt demonstrates poor discrimination of tactile input in L UE. Extended time needed for activity.   - Performed bilateral integrative techniques also facilitating postural correction, righting, and strengthening. Demonstrated good participation, requiring intermittent Juanis to maintain safe balance when performing trunk forward flexion in standing position while maintaining B UE grasp of large therapy ball extended away from body.      Assessment:  Pt demonstrates good participation in skilled services with good carryover of balance techniques and postural facilitations. Educated pt in continuing L UE exercises at home, verbalizing good understanding. Pt will continue to benefit from skilled OT intervention.    Patient continues to demonstrate limitation  with  Decreased fine motor coordination, Decreased gross motor coordination, Decreased functional use, Decreased strength, and Continued pain      New/Revised Goals: Continue POC  1.   2.   3.   4.        Plan:  Continue 2x week x 6 weeks  during " the certification period from   12/1/2022 to 1/12/2023  in pursuit of  OT goals.      MARCUS Levy, OTR/L  12/8/2022

## 2022-12-08 NOTE — PROGRESS NOTES
"  Physical Therapy Treatment Note     [id number]    Therapy Diagnosis:   Encounter Diagnosis   Name Primary?    Abnormality of gait Yes     Physician: Vini Hernandez NP    Visit Date: 12/8/2022    Physician Orders: PT Eval and Treat  Medical Diagnosis from Referral: Frequent falls [R29.6], Lower extremity weakness [R29.898], History of CVA (cerebrovascular accident) [Z86.73]  Evaluation Date: 11/17/2022  Authorization Period Expiration: 11/9/2023  Plan of Care Expiration: 12/30/2022                          Progress Update: 12/16/2022                        Visit # / Visits authorized: 5/13                     PTA Visit #: 3    Time In: 1311  Time Out: 1353  Total Billable Time: 42 minutes    Precautions: Standard      Subjective     Pt reports: she is feeling good with no complaints. Patient requested her BP be checked because she has not checked it today. It was 130/87 which she said was normal for her.    She was compliant with home exercise program.  Response to previous treatment: first treatment after eval      Pain: 0/10    Location: bilateral knees      Objective     Herlinda received therapeutic exercises to develop strength, endurance, ROM, flexibility, posture, and core stabilization for 42 minutes including:  Nustep x 8 min   LAQ 3 x 10  Seated marching 2 x 10 (not today)  Standing heel/toe raises 3 x 10  Standing hip abduction 2 x 10 BLE  Standing marching 3 x 10 BLE  Side steps in // bars x 3 laps  2" anterior step up x 10 leading with each LE  Sit to stand x 10 repetitions with UE assist    Home Exercises Provided and Patient Education Provided     Education provided: continue current home exercise program, pain free     Written Home Exercises Provided: Patient instructed to cont prior HEP.  Exercises were reviewed and Herlinda was able to demonstrate them prior to the end of the session.  Herlinda demonstrated good  understanding of the education provided.     See EMR " under Patient Instructions for exercises provided prior visit.    Assessment     Patient able to perform all exercises with moderate fatigue and no complaint of pain and discomfort.    Herlinda Is progressing well towards her goals.   Pt prognosis is Good.     Pt will continue to benefit from skilled outpatient physical therapy to address the deficits listed in the problem list box on initial evaluation, provide pt/family education and to maximize pt's level of independence in the home and community environment.   Anticipated barriers to physical therapy: home exercise program compliance     Goals:  SHORT TERM GOALS: 4 weeks,  Progress   Recent signs and systems trend is improving in order to progress towards long term goals.     Patient will be independent with Home Exercise Program  in order to further progress and return to maximal function.     Pain rating at Worst: 5/10 in order to progress towards increased independence with activity.     Patient will be able to correct postural deviations in sitting and standing, to decrease pain and promote postural awareness for injury prevention.         LONG TERM GOALS: 6 weeks,  Progress   Patient will return to normal activities of daily living, recreational, and work related activities with less pain and limitation.      Patient will improve active range of motion to stated goals in order to return to maximal functional potential.      Patient will improve Strength to stated goals of appropriate musculature in order to improve functional independence.      Pain Rating at Best: 1/10 to improve Quality of Life.      Patient will meet predicted functional outcome (FOTO) score: 10% to increase self-worth & perceived functional ability.        Plan     Continue per Plan of Care and progress as pt [place], PTA  12/8/2022

## 2022-12-31 ENCOUNTER — HOSPITAL ENCOUNTER (INPATIENT)
Facility: HOSPITAL | Age: 57
LOS: 4 days | Discharge: NURSING, REHAB OR LONG-TERM CARE | DRG: 689 | End: 2023-01-04
Attending: HOSPITALIST | Admitting: HOSPITALIST
Payer: MEDICARE

## 2022-12-31 DIAGNOSIS — J18.9 PNEUMONIA OF UPPER LOBE DUE TO INFECTIOUS ORGANISM, UNSPECIFIED LATERALITY: ICD-10-CM

## 2022-12-31 DIAGNOSIS — N39.0 URINARY TRACT INFECTION WITHOUT HEMATURIA, SITE UNSPECIFIED: Primary | ICD-10-CM

## 2022-12-31 DIAGNOSIS — R41.82 AMS (ALTERED MENTAL STATUS): ICD-10-CM

## 2022-12-31 DIAGNOSIS — N39.0 URINARY TRACT INFECTION WITHOUT HEMATURIA: ICD-10-CM

## 2022-12-31 PROBLEM — I63.9 CEREBROVASCULAR ACCIDENT: Status: ACTIVE | Noted: 2022-02-02

## 2022-12-31 LAB
ALBUMIN SERPL BCP-MCNC: 2.6 G/DL (ref 3.5–5)
ALBUMIN/GLOB SERPL: 0.6 {RATIO}
ALP SERPL-CCNC: 170 U/L (ref 46–118)
ALT SERPL W P-5'-P-CCNC: 27 U/L (ref 13–56)
AMPHET UR QL SCN: NEGATIVE
ANION GAP SERPL CALCULATED.3IONS-SCNC: 12 MMOL/L (ref 7–16)
AST SERPL W P-5'-P-CCNC: 54 U/L (ref 15–37)
BACTERIA #/AREA URNS HPF: ABNORMAL /HPF
BARBITURATES UR QL SCN: NEGATIVE
BASOPHILS # BLD AUTO: 0.02 K/UL (ref 0–0.2)
BASOPHILS NFR BLD AUTO: 0.2 % (ref 0–1)
BENZODIAZ METAB UR QL SCN: POSITIVE
BILIRUB SERPL-MCNC: 0.6 MG/DL (ref ?–1.2)
BILIRUB UR QL STRIP: NEGATIVE
BUN SERPL-MCNC: 13 MG/DL (ref 7–18)
BUN/CREAT SERPL: 17 (ref 6–20)
CALCIUM SERPL-MCNC: 8.2 MG/DL (ref 8.5–10.1)
CANNABINOIDS UR QL SCN: NEGATIVE
CHLORIDE SERPL-SCNC: 102 MMOL/L (ref 98–107)
CLARITY UR: ABNORMAL
CO2 SERPL-SCNC: 28 MMOL/L (ref 21–32)
COCAINE UR QL SCN: NEGATIVE
COLOR UR: YELLOW
CREAT SERPL-MCNC: 0.76 MG/DL (ref 0.55–1.02)
DIFFERENTIAL METHOD BLD: ABNORMAL
EGFR (NO RACE VARIABLE) (RUSH/TITUS): 92 ML/MIN/1.73M²
EOSINOPHIL # BLD AUTO: 0.46 K/UL (ref 0–0.5)
EOSINOPHIL NFR BLD AUTO: 4.9 % (ref 1–4)
EOSINOPHIL NFR BLD MANUAL: 1 % (ref 1–4)
ERYTHROCYTE [DISTWIDTH] IN BLOOD BY AUTOMATED COUNT: 14.7 % (ref 11.5–14.5)
FLUAV AG UPPER RESP QL IA.RAPID: NEGATIVE
FLUBV AG UPPER RESP QL IA.RAPID: NEGATIVE
GLOBULIN SER-MCNC: 4.2 G/DL (ref 2–4)
GLUCOSE SERPL-MCNC: 107 MG/DL (ref 74–106)
GLUCOSE SERPL-MCNC: 93 MG/DL (ref 70–105)
GLUCOSE UR STRIP-MCNC: NEGATIVE MG/DL
HCT VFR BLD AUTO: 39.2 % (ref 38–47)
HGB BLD-MCNC: 13.2 G/DL (ref 12–16)
KETONES UR STRIP-SCNC: NEGATIVE MG/DL
LEUKOCYTE ESTERASE UR QL STRIP: ABNORMAL
LYMPHOCYTES # BLD AUTO: 2.12 K/UL (ref 1–4.8)
LYMPHOCYTES NFR BLD AUTO: 22.7 % (ref 27–41)
LYMPHOCYTES NFR BLD MANUAL: 23 % (ref 27–41)
MCH RBC QN AUTO: 28.3 PG (ref 27–31)
MCHC RBC AUTO-ENTMCNC: 33.7 G/DL (ref 32–36)
MCV RBC AUTO: 83.9 FL (ref 80–96)
MONOCYTES # BLD AUTO: 0.61 K/UL (ref 0–0.8)
MONOCYTES NFR BLD AUTO: 6.5 % (ref 2–6)
MONOCYTES NFR BLD MANUAL: 4 % (ref 2–6)
MPC BLD CALC-MCNC: 9.7 FL (ref 9.4–12.4)
NEUTROPHILS # BLD AUTO: 6.11 K/UL (ref 1.8–7.7)
NEUTROPHILS NFR BLD AUTO: 65.7 % (ref 53–65)
NEUTS SEG NFR BLD MANUAL: 72 % (ref 50–62)
NITRITE UR QL STRIP: NEGATIVE
NRBC BLD MANUAL-RTO: ABNORMAL %
OPIATES UR QL SCN: POSITIVE
PCP UR QL SCN: NEGATIVE
PH UR STRIP: 6.5 PH UNITS
PLATELET # BLD AUTO: 219 K/UL (ref 150–400)
PLATELET MORPHOLOGY: NORMAL
POTASSIUM SERPL-SCNC: 4 MMOL/L (ref 3.5–5.1)
PROT SERPL-MCNC: 6.8 G/DL (ref 6.4–8.2)
PROT UR QL STRIP: NEGATIVE
RBC # BLD AUTO: 4.67 M/UL (ref 4.2–5.4)
RBC # UR STRIP: NEGATIVE /UL
RBC #/AREA URNS HPF: ABNORMAL /HPF
RBC MORPH BLD: NORMAL
SARS-COV+SARS-COV-2 AG RESP QL IA.RAPID: NEGATIVE
SODIUM SERPL-SCNC: 138 MMOL/L (ref 136–145)
SP GR UR STRIP: 1.02
SQUAMOUS #/AREA URNS LPF: ABNORMAL /LPF
TROPONIN I SERPL HS-MCNC: 5.3 PG/ML
UROBILINOGEN UR STRIP-ACNC: >=8 MG/DL
WBC # BLD AUTO: 9.32 K/UL (ref 4.5–11)
WBC #/AREA URNS HPF: ABNORMAL /HPF

## 2022-12-31 PROCEDURE — 93005 ELECTROCARDIOGRAM TRACING: CPT

## 2022-12-31 PROCEDURE — 36415 COLL VENOUS BLD VENIPUNCTURE: CPT | Performed by: NURSE PRACTITIONER

## 2022-12-31 PROCEDURE — 84484 ASSAY OF TROPONIN QUANT: CPT | Performed by: NURSE PRACTITIONER

## 2022-12-31 PROCEDURE — 27000221 HC OXYGEN, UP TO 24 HOURS

## 2022-12-31 PROCEDURE — 82962 GLUCOSE BLOOD TEST: CPT

## 2022-12-31 PROCEDURE — 99285 EMERGENCY DEPT VISIT HI MDM: CPT | Mod: 25

## 2022-12-31 PROCEDURE — 87428 SARSCOV & INF VIR A&B AG IA: CPT | Performed by: NURSE PRACTITIONER

## 2022-12-31 PROCEDURE — 81003 URINALYSIS AUTO W/O SCOPE: CPT | Mod: 59 | Performed by: NURSE PRACTITIONER

## 2022-12-31 PROCEDURE — 87040 BLOOD CULTURE FOR BACTERIA: CPT | Performed by: NURSE PRACTITIONER

## 2022-12-31 PROCEDURE — 80053 COMPREHEN METABOLIC PANEL: CPT | Performed by: NURSE PRACTITIONER

## 2022-12-31 PROCEDURE — 80307 DRUG TEST PRSMV CHEM ANLYZR: CPT | Performed by: NURSE PRACTITIONER

## 2022-12-31 PROCEDURE — 93010 ELECTROCARDIOGRAM REPORT: CPT | Performed by: HOSPITALIST

## 2022-12-31 PROCEDURE — 11000001 HC ACUTE MED/SURG PRIVATE ROOM

## 2022-12-31 PROCEDURE — 99285 EMERGENCY DEPT VISIT HI MDM: CPT | Mod: GF | Performed by: NURSE PRACTITIONER

## 2022-12-31 PROCEDURE — 94761 N-INVAS EAR/PLS OXIMETRY MLT: CPT

## 2022-12-31 PROCEDURE — 85025 COMPLETE CBC W/AUTO DIFF WBC: CPT | Performed by: NURSE PRACTITIONER

## 2022-12-31 RX ORDER — AMOXICILLIN 500 MG
CAPSULE ORAL DAILY
Status: ON HOLD | COMMUNITY
End: 2023-01-25 | Stop reason: HOSPADM

## 2022-12-31 RX ORDER — AMLODIPINE BESYLATE 5 MG/1
10 TABLET ORAL DAILY
Status: CANCELLED | OUTPATIENT
Start: 2023-01-01

## 2022-12-31 RX ORDER — ATORVASTATIN CALCIUM 40 MG/1
40 TABLET, FILM COATED ORAL NIGHTLY
Status: CANCELLED | OUTPATIENT
Start: 2023-01-01

## 2022-12-31 RX ORDER — DOCUSATE SODIUM 100 MG/1
100 CAPSULE, LIQUID FILLED ORAL 2 TIMES DAILY
Status: ON HOLD | COMMUNITY
End: 2023-01-25 | Stop reason: HOSPADM

## 2023-01-01 PROBLEM — E11.49 TYPE 2 DIABETES MELLITUS WITH NEUROLOGIC COMPLICATION: Status: RESOLVED | Noted: 2021-05-20 | Resolved: 2023-01-01

## 2023-01-01 PROBLEM — R23.8 SKIN BREAKDOWN: Status: ACTIVE | Noted: 2023-01-01

## 2023-01-01 PROBLEM — G89.4 CHRONIC PAIN DISORDER: Status: RESOLVED | Noted: 2022-10-20 | Resolved: 2023-01-01

## 2023-01-01 LAB
ANION GAP SERPL CALCULATED.3IONS-SCNC: 10 MMOL/L (ref 7–16)
BASOPHILS # BLD AUTO: 0.02 K/UL (ref 0–0.2)
BASOPHILS NFR BLD AUTO: 0.2 % (ref 0–1)
BUN SERPL-MCNC: 9 MG/DL (ref 7–18)
BUN/CREAT SERPL: 15 (ref 6–20)
CALCIUM SERPL-MCNC: 8.4 MG/DL (ref 8.5–10.1)
CHLORIDE SERPL-SCNC: 101 MMOL/L (ref 98–107)
CO2 SERPL-SCNC: 31 MMOL/L (ref 21–32)
CREAT SERPL-MCNC: 0.6 MG/DL (ref 0.55–1.02)
DIFFERENTIAL METHOD BLD: ABNORMAL
EGFR (NO RACE VARIABLE) (RUSH/TITUS): 105 ML/MIN/1.73M²
EOSINOPHIL # BLD AUTO: 0.42 K/UL (ref 0–0.5)
EOSINOPHIL NFR BLD AUTO: 4.4 % (ref 1–4)
EOSINOPHIL NFR BLD MANUAL: 2 % (ref 1–4)
ERYTHROCYTE [DISTWIDTH] IN BLOOD BY AUTOMATED COUNT: 14.5 % (ref 11.5–14.5)
GLUCOSE SERPL-MCNC: 94 MG/DL (ref 70–105)
GLUCOSE SERPL-MCNC: 99 MG/DL (ref 74–106)
HCT VFR BLD AUTO: 35.8 % (ref 38–47)
HGB BLD-MCNC: 12.2 G/DL (ref 12–16)
LYMPHOCYTES # BLD AUTO: 1.79 K/UL (ref 1–4.8)
LYMPHOCYTES NFR BLD AUTO: 18.7 % (ref 27–41)
LYMPHOCYTES NFR BLD MANUAL: 19 % (ref 27–41)
MAGNESIUM SERPL-MCNC: 1.5 MG/DL (ref 1.7–2.3)
MCH RBC QN AUTO: 28.6 PG (ref 27–31)
MCHC RBC AUTO-ENTMCNC: 34.1 G/DL (ref 32–36)
MCV RBC AUTO: 83.8 FL (ref 80–96)
MONOCYTES # BLD AUTO: 0.73 K/UL (ref 0–0.8)
MONOCYTES NFR BLD AUTO: 7.6 % (ref 2–6)
MONOCYTES NFR BLD MANUAL: 6 % (ref 2–6)
MPC BLD CALC-MCNC: 9.5 FL (ref 9.4–12.4)
NEUTROPHILS # BLD AUTO: 6.61 K/UL (ref 1.8–7.7)
NEUTROPHILS NFR BLD AUTO: 69.1 % (ref 53–65)
NEUTS BAND NFR BLD MANUAL: 3 % (ref 1–5)
NEUTS SEG NFR BLD MANUAL: 70 % (ref 50–62)
NRBC BLD MANUAL-RTO: ABNORMAL %
PLATELET # BLD AUTO: 221 K/UL (ref 150–400)
PLATELET MORPHOLOGY: NORMAL
POTASSIUM SERPL-SCNC: 3.7 MMOL/L (ref 3.5–5.1)
RBC # BLD AUTO: 4.27 M/UL (ref 4.2–5.4)
RBC MORPH BLD: NORMAL
SODIUM SERPL-SCNC: 138 MMOL/L (ref 136–145)
WBC # BLD AUTO: 9.57 K/UL (ref 4.5–11)

## 2023-01-01 PROCEDURE — 27000944

## 2023-01-01 PROCEDURE — 63600175 PHARM REV CODE 636 W HCPCS: Performed by: NURSE PRACTITIONER

## 2023-01-01 PROCEDURE — 94761 N-INVAS EAR/PLS OXIMETRY MLT: CPT

## 2023-01-01 PROCEDURE — 85025 COMPLETE CBC W/AUTO DIFF WBC: CPT | Performed by: NURSE PRACTITIONER

## 2023-01-01 PROCEDURE — 27000221 HC OXYGEN, UP TO 24 HOURS

## 2023-01-01 PROCEDURE — 80048 BASIC METABOLIC PNL TOTAL CA: CPT | Performed by: NURSE PRACTITIONER

## 2023-01-01 PROCEDURE — 83735 ASSAY OF MAGNESIUM: CPT | Performed by: NURSE PRACTITIONER

## 2023-01-01 PROCEDURE — 11000001 HC ACUTE MED/SURG PRIVATE ROOM

## 2023-01-01 PROCEDURE — 63700000 PHARM REV CODE 250 ALT 637 W/O HCPCS: Performed by: NURSE PRACTITIONER

## 2023-01-01 PROCEDURE — 36415 COLL VENOUS BLD VENIPUNCTURE: CPT | Performed by: NURSE PRACTITIONER

## 2023-01-01 PROCEDURE — 25000003 PHARM REV CODE 250: Performed by: NURSE PRACTITIONER

## 2023-01-01 RX ORDER — ATORVASTATIN CALCIUM 40 MG/1
40 TABLET, FILM COATED ORAL NIGHTLY
Status: DISCONTINUED | OUTPATIENT
Start: 2023-01-01 | End: 2023-01-04 | Stop reason: HOSPADM

## 2023-01-01 RX ORDER — HYDROCODONE BITARTRATE AND ACETAMINOPHEN 10; 325 MG/1; MG/1
1 TABLET ORAL EVERY 6 HOURS PRN
Status: DISCONTINUED | OUTPATIENT
Start: 2023-01-01 | End: 2023-01-04 | Stop reason: HOSPADM

## 2023-01-01 RX ORDER — ENOXAPARIN SODIUM 100 MG/ML
40 INJECTION SUBCUTANEOUS EVERY 12 HOURS
Status: DISCONTINUED | OUTPATIENT
Start: 2023-01-01 | End: 2023-01-04 | Stop reason: HOSPADM

## 2023-01-01 RX ORDER — TALC
6 POWDER (GRAM) TOPICAL NIGHTLY PRN
Status: DISCONTINUED | OUTPATIENT
Start: 2023-01-01 | End: 2023-01-04

## 2023-01-01 RX ORDER — ZOLPIDEM TARTRATE 5 MG/1
5 TABLET ORAL NIGHTLY PRN
Status: DISCONTINUED | OUTPATIENT
Start: 2023-01-01 | End: 2023-01-02

## 2023-01-01 RX ORDER — GABAPENTIN 400 MG/1
800 CAPSULE ORAL 3 TIMES DAILY
Status: DISCONTINUED | OUTPATIENT
Start: 2023-01-01 | End: 2023-01-04 | Stop reason: HOSPADM

## 2023-01-01 RX ORDER — CEFTRIAXONE 1 G/1
1 INJECTION, POWDER, FOR SOLUTION INTRAMUSCULAR; INTRAVENOUS
Status: DISCONTINUED | OUTPATIENT
Start: 2023-01-01 | End: 2023-01-04 | Stop reason: HOSPADM

## 2023-01-01 RX ORDER — CALCIUM CARBONATE 200(500)MG
500 TABLET,CHEWABLE ORAL 2 TIMES DAILY PRN
Status: DISCONTINUED | OUTPATIENT
Start: 2023-01-01 | End: 2023-01-04 | Stop reason: HOSPADM

## 2023-01-01 RX ORDER — SODIUM CHLORIDE 0.9 % (FLUSH) 0.9 %
10 SYRINGE (ML) INJECTION EVERY 12 HOURS PRN
Status: DISCONTINUED | OUTPATIENT
Start: 2023-01-01 | End: 2023-01-01

## 2023-01-01 RX ORDER — LIDOCAINE HYDROCHLORIDE 10 MG/ML
2.1 INJECTION INFILTRATION; PERINEURAL ONCE
Status: DISCONTINUED | OUTPATIENT
Start: 2023-01-01 | End: 2023-01-01

## 2023-01-01 RX ORDER — LORAZEPAM 0.5 MG/1
0.5 TABLET ORAL EVERY 12 HOURS PRN
Status: DISCONTINUED | OUTPATIENT
Start: 2023-01-01 | End: 2023-01-04 | Stop reason: HOSPADM

## 2023-01-01 RX ORDER — SERTRALINE HYDROCHLORIDE 50 MG/1
50 TABLET, FILM COATED ORAL NIGHTLY
Status: DISCONTINUED | OUTPATIENT
Start: 2023-01-01 | End: 2023-01-04 | Stop reason: HOSPADM

## 2023-01-01 RX ORDER — LIDOCAINE HYDROCHLORIDE 10 MG/ML
2.1 INJECTION INFILTRATION; PERINEURAL DAILY
Status: DISCONTINUED | OUTPATIENT
Start: 2023-01-01 | End: 2023-01-04 | Stop reason: HOSPADM

## 2023-01-01 RX ORDER — ACETAMINOPHEN 325 MG/1
650 TABLET ORAL EVERY 6 HOURS PRN
Status: DISCONTINUED | OUTPATIENT
Start: 2023-01-01 | End: 2023-01-04 | Stop reason: HOSPADM

## 2023-01-01 RX ORDER — AZITHROMYCIN 250 MG/1
500 TABLET, FILM COATED ORAL DAILY
Status: DISCONTINUED | OUTPATIENT
Start: 2023-01-01 | End: 2023-01-04 | Stop reason: HOSPADM

## 2023-01-01 RX ORDER — DOCUSATE SODIUM 100 MG/1
100 CAPSULE, LIQUID FILLED ORAL 2 TIMES DAILY
Status: DISCONTINUED | OUTPATIENT
Start: 2023-01-01 | End: 2023-01-04 | Stop reason: HOSPADM

## 2023-01-01 RX ORDER — PANTOPRAZOLE SODIUM 40 MG/1
40 TABLET, DELAYED RELEASE ORAL DAILY
Status: DISCONTINUED | OUTPATIENT
Start: 2023-01-01 | End: 2023-01-04 | Stop reason: HOSPADM

## 2023-01-01 RX ORDER — CEFTRIAXONE 1 G/1
1 INJECTION, POWDER, FOR SOLUTION INTRAMUSCULAR; INTRAVENOUS
Status: DISCONTINUED | OUTPATIENT
Start: 2023-01-01 | End: 2023-01-01

## 2023-01-01 RX ORDER — ONDANSETRON 4 MG/1
4 TABLET, ORALLY DISINTEGRATING ORAL EVERY 8 HOURS PRN
Status: DISCONTINUED | OUTPATIENT
Start: 2023-01-01 | End: 2023-01-04 | Stop reason: HOSPADM

## 2023-01-01 RX ORDER — AMLODIPINE BESYLATE 5 MG/1
10 TABLET ORAL DAILY
Status: DISCONTINUED | OUTPATIENT
Start: 2023-01-01 | End: 2023-01-04 | Stop reason: HOSPADM

## 2023-01-01 RX ORDER — GLUCOSAM/CHONDRO/HERB 149/HYAL 750-100 MG
1 TABLET ORAL DAILY
Status: DISCONTINUED | OUTPATIENT
Start: 2023-01-01 | End: 2023-01-04 | Stop reason: HOSPADM

## 2023-01-01 RX ORDER — CLOPIDOGREL BISULFATE 75 MG/1
75 TABLET ORAL DAILY
Status: DISCONTINUED | OUTPATIENT
Start: 2023-01-01 | End: 2023-01-04 | Stop reason: HOSPADM

## 2023-01-01 RX ORDER — VALSARTAN 160 MG/1
160 TABLET ORAL DAILY
Status: DISCONTINUED | OUTPATIENT
Start: 2023-01-01 | End: 2023-01-04 | Stop reason: HOSPADM

## 2023-01-01 RX ADMIN — GABAPENTIN 800 MG: 400 CAPSULE ORAL at 08:01

## 2023-01-01 RX ADMIN — HYDROCODONE BITARTRATE AND ACETAMINOPHEN 1 TABLET: 10; 325 TABLET ORAL at 01:01

## 2023-01-01 RX ADMIN — DOCUSATE SODIUM 100 MG: 100 CAPSULE, LIQUID FILLED ORAL at 08:01

## 2023-01-01 RX ADMIN — LEVOTHYROXINE SODIUM 125 MCG: 25 TABLET ORAL at 06:01

## 2023-01-01 RX ADMIN — SERTRALINE HYDROCHLORIDE 50 MG: 50 TABLET ORAL at 01:01

## 2023-01-01 RX ADMIN — HYDROCODONE BITARTRATE AND ACETAMINOPHEN 1 TABLET: 10; 325 TABLET ORAL at 12:01

## 2023-01-01 RX ADMIN — CEFTRIAXONE 1 G: 1 INJECTION, POWDER, FOR SOLUTION INTRAMUSCULAR; INTRAVENOUS at 12:01

## 2023-01-01 RX ADMIN — AMLODIPINE BESYLATE 10 MG: 5 TABLET ORAL at 09:01

## 2023-01-01 RX ADMIN — MELATONIN TAB 3 MG 6 MG: 3 TAB at 01:01

## 2023-01-01 RX ADMIN — AZITHROMYCIN MONOHYDRATE 500 MG: 500 INJECTION, POWDER, LYOPHILIZED, FOR SOLUTION INTRAVENOUS at 01:01

## 2023-01-01 RX ADMIN — ATORVASTATIN CALCIUM 40 MG: 40 TABLET, FILM COATED ORAL at 01:01

## 2023-01-01 RX ADMIN — MELATONIN TAB 3 MG 6 MG: 3 TAB at 10:01

## 2023-01-01 RX ADMIN — VALSARTAN 160 MG: 160 TABLET, FILM COATED ORAL at 09:01

## 2023-01-01 RX ADMIN — DOCUSATE SODIUM 100 MG: 100 CAPSULE, LIQUID FILLED ORAL at 01:01

## 2023-01-01 RX ADMIN — AZITHROMYCIN MONOHYDRATE 500 MG: 250 TABLET ORAL at 09:01

## 2023-01-01 RX ADMIN — OMEGA-3 FATTY ACIDS CAP 1000 MG 1 CAPSULE: 1000 CAP at 09:01

## 2023-01-01 RX ADMIN — ENOXAPARIN SODIUM 40 MG: 40 INJECTION SUBCUTANEOUS at 09:01

## 2023-01-01 RX ADMIN — ONDANSETRON 4 MG: 4 TABLET, ORALLY DISINTEGRATING ORAL at 11:01

## 2023-01-01 RX ADMIN — ENOXAPARIN SODIUM 40 MG: 40 INJECTION SUBCUTANEOUS at 08:01

## 2023-01-01 RX ADMIN — ATORVASTATIN CALCIUM 40 MG: 40 TABLET, FILM COATED ORAL at 08:01

## 2023-01-01 RX ADMIN — GABAPENTIN 800 MG: 400 CAPSULE ORAL at 03:01

## 2023-01-01 RX ADMIN — HYDROCODONE BITARTRATE AND ACETAMINOPHEN 1 TABLET: 10; 325 TABLET ORAL at 08:01

## 2023-01-01 RX ADMIN — DOCUSATE SODIUM 100 MG: 100 CAPSULE, LIQUID FILLED ORAL at 09:01

## 2023-01-01 RX ADMIN — CLOPIDOGREL BISULFATE 75 MG: 75 TABLET, FILM COATED ORAL at 09:01

## 2023-01-01 RX ADMIN — LIDOCAINE HYDROCHLORIDE 2.1 ML: 10 INJECTION, SOLUTION INFILTRATION; PERINEURAL at 12:01

## 2023-01-01 RX ADMIN — PANTOPRAZOLE SODIUM 40 MG: 40 TABLET, DELAYED RELEASE ORAL at 09:01

## 2023-01-01 RX ADMIN — GABAPENTIN 800 MG: 400 CAPSULE ORAL at 09:01

## 2023-01-01 RX ADMIN — SERTRALINE HYDROCHLORIDE 50 MG: 50 TABLET ORAL at 08:01

## 2023-01-01 NOTE — ASSESSMENT & PLAN NOTE
Blood cultures x2 pending  Patient received Rocephin 2gm prior to arrival per PCP  Patient started on Rocephin 1gm daily and Azithromycin 500mg daily

## 2023-01-01 NOTE — ASSESSMENT & PLAN NOTE
PT HAS NEUROPATHY   GABAPENTIN IS EFFECTIVE    03/01/2022.  Patient has chronic neuropathy.  Patient is taking Neurontin 100 mg p.o. b.i.d., with good results.  Condition stable.     No

## 2023-01-01 NOTE — HPI
Patient presented to [facility] tonight after her PCP made a house call on her due to her feeling bad and family concerned about her altered mental status. Her PCP called to patient's arrival and reported he treated patient at home for a UTI with 2gm of Rocephin IV along with a liter of NS for dehydation. PCP recommended to family and patient for her to come to the ED for evaluation. EMS reported patient was in the bed and was unable to assist with any transfer to EMS stretcher. EMS also reported patient recognized the medics and called them by name. On arrival to the ED, patient was awake, alert, oriented x3, called nursing staff by name. Patient reports she has been feeling bad for about 8 days but has not told anyone and has not been out of the bed the entire time. Patient has an order for PT/OT outpatient here at [place], the last documented visit was on 12/8/22. Patient is chronically ill appearing with debilitation. Patient's labs were unremarkable, CBC 9.32, H/H 13.2/39.2, Platelets 219, Na 138, K 4.0, Cl 102, BUN 13, Creat 0.76, Glucose 107. UDS positive for benzos and opiates. Urinalysis positive for trace leukocytes and bacteria (cath specimen). CT head was negative, chest x-ray showed infectious/ inflammatory process. Spoke with patient about admission, she was agreeable to be admitted. Spoke with on call hospitalist at [facility], Dr. Jacome, admit orders and med rec discussed. Will admit patient to inpatient for UTI, pneumonia and generalized weakness.

## 2023-01-01 NOTE — NURSING
0130 Patient complaint of pain in arm due to IV. Flushed appeared patent. Restarted azithromycin. Patient complained of great pain and burning in arm. Patient's arm red at site and forearm, no swelling. IV flushed, although appeared patent no blood return.  Removed IV. Attempted to establish IV for patient. Unsuccessful. Per NP cease attempts at this time.

## 2023-01-01 NOTE — SUBJECTIVE & OBJECTIVE
Past Medical History:   Diagnosis Date    Cerebral hemorrhage     Chronic anxiety     Dehydration     Depression     Dysphagia     Due to and following non-traumatic intracerebral hemorrhage    Hearing loss     History of CVA (cerebrovascular accident)     Hypertension     Hypokalemia 10/20/2022    Insomnia     Intrapontine hemorrhage     Left hemiparesis     Peripheral neuropathy     Stroke     Thyroid disease     Transient cerebral ischemia        Past Surgical History:   Procedure Laterality Date    APPENDECTOMY      CHOLECYSTECTOMY      HYSTERECTOMY      LITHOTRIPSY      Renal lithotripsy    percutaneious insertion of ureteric stent         Review of patient's allergies indicates:   Allergen Reactions    Lamisil [terbinafine] Anaphylaxis    Codeine Itching    Compazine [prochlorperazine] Itching       No current facility-administered medications on file prior to encounter.     Current Outpatient Medications on File Prior to Encounter   Medication Sig    atorvastatin (LIPITOR) 40 MG tablet Take 40 mg by mouth every evening.    clopidogreL (PLAVIX) 75 mg tablet Plavix 75 mg tablet   Take 1 po QD to prevent stroke    docusate sodium (COLACE) 100 MG capsule Take 100 mg by mouth 2 (two) times daily.    gabapentin (NEURONTIN) 800 MG tablet 800 mg 3 (three) times daily.    levothyroxine (SYNTHROID) 125 MCG tablet     omega-3 fatty acids/fish oil (FISH OIL-OMEGA-3 FATTY ACIDS) 300-1,000 mg capsule Take by mouth once daily.    pantoprazole (PROTONIX) 40 MG tablet Take 1 tablet (40 mg total) by mouth once daily.    sertraline (ZOLOFT) 50 MG tablet sertraline 50 mg tablet    temazepam (RESTORIL) 30 mg capsule Take 30 mg by mouth every evening.    tiZANidine (ZANAFLEX) 4 MG tablet     acetaminophen (TYLENOL) 325 MG tablet 2 tablets PRN    albuterol-ipratropium (DUO-NEB) 2.5 mg-0.5 mg/3 mL nebulizer solution Take 3 mLs by nebulization every 6 (six) hours while awake. Rescue    amLODIPine (NORVASC) 10 MG tablet Take 1 tablet  (10 mg total) by mouth once daily.    cloNIDine (CATAPRES) 0.1 MG tablet Take 1 tablet (0.1 mg total) by mouth every 8 (eight) hours as needed (160).    HYDROcodone-acetaminophen (NORCO)  mg per tablet Take 1 tablet by mouth 4 (four) times daily as needed.    LORazepam (ATIVAN) 0.5 MG tablet Take 1 tablet (0.5 mg total) by mouth As instructed for Anxiety. (Patient taking differently: Take 0.5 mg by mouth every 12 (twelve) hours as needed for Anxiety.)    valsartan (DIOVAN) 160 MG tablet Take 1 tablet (160 mg total) by mouth once daily.    [DISCONTINUED] hydroCHLOROthiazide (HYDRODIURIL) 12.5 MG Tab Take 1 tablet (12.5 mg total) by mouth once daily.    [DISCONTINUED] omeprazole (PRILOSEC) 40 MG capsule Take 40 mg by mouth every evening.     Family History    None       Tobacco Use    Smoking status: Never    Smokeless tobacco: Never   Substance and Sexual Activity    Alcohol use: Never    Drug use: Never    Sexual activity: Not on file     Review of Systems   Constitutional:  Positive for activity change and fatigue.   HENT: Negative.     Respiratory: Negative.     Cardiovascular: Negative.    Genitourinary:  Positive for dysuria.   Musculoskeletal:  Positive for arthralgias.   Skin: Negative.    Neurological:  Positive for weakness.   Psychiatric/Behavioral: Negative.     All other systems reviewed and are negative.  Objective:     Vital Signs (Most Recent):  Temp: 99.4 °F (37.4 °C) (12/31/22 2313)  Pulse: 84 (12/31/22 2313)  Resp: 18 (01/01/23 0114)  BP: (!) 142/93 (12/31/22 2313)  SpO2: 96 % (12/31/22 2313)   Vital Signs (24h Range):  Temp:  [98.3 °F (36.8 °C)-99.4 °F (37.4 °C)] 99.4 °F (37.4 °C)  Pulse:  [73-84] 84  Resp:  [16-20] 18  SpO2:  [92 %-98 %] 96 %  BP: (115-142)/(74-93) 142/93     Weight: 66.3 kg (146 lb 1.6 oz)  Body mass index is 25.08 kg/m².    Physical Exam  Vitals reviewed.   Constitutional:       General: She is awake.      Appearance: She is ill-appearing.   HENT:      Head: Normocephalic  and atraumatic.      Mouth/Throat:      Mouth: Mucous membranes are dry.   Eyes:      Pupils: Pupils are equal, round, and reactive to light.   Cardiovascular:      Rate and Rhythm: Normal rate and regular rhythm.      Pulses: Normal pulses.      Heart sounds: Normal heart sounds.   Pulmonary:      Effort: Pulmonary effort is normal.      Breath sounds: Normal breath sounds.   Musculoskeletal:      Cervical back: Normal range of motion and neck supple.      Comments: Generalized weakness   Feet:      Comments: Both heels are boggy   Skin:     General: Skin is warm and dry.      Capillary Refill: Capillary refill takes less than 2 seconds.      Coloration: Skin is pale.          Neurological:      General: No focal deficit present.      Mental Status: She is alert and oriented to person, place, and time. Mental status is at baseline.      GCS: GCS eye subscore is 4. GCS verbal subscore is 5. GCS motor subscore is 6.      Motor: Weakness, atrophy and abnormal muscle tone present.      Coordination: Coordination abnormal.   Psychiatric:         Attention and Perception: Attention and perception normal.         Mood and Affect: Mood and affect normal.         Speech: Speech normal.         Behavior: Behavior normal. Behavior is cooperative.         Thought Content: Thought content normal.         Cognition and Memory: Cognition and memory normal.         Judgment: Judgment normal.         CRANIAL NERVES     CN III, IV, VI   Pupils are equal, round, and reactive to light.     Significant Labs: All pertinent labs within the past 24 hours have been reviewed.    Significant Imaging: I have reviewed all pertinent imaging results/findings within the past 24 hours.

## 2023-01-01 NOTE — ED PROVIDER NOTES
Encounter Date: 12/31/2022       History     Chief Complaint   Patient presents with    Altered Mental Status    Weakness     Vini Hernandez NP called and reported he saw this patient today and he noted a change in her mental status, reports he checked her urine and treated her with a fluid bolus and 2gms of IV Rocephin at home but he recommended her being checked out in the ED. Patient on arrival via EMS is fully awake, alert, oriented x3, recognized staff and called each of us by our names. Patient reports she has been feeling bad for 8 days and reports she has had burning with urination but has not told anyone. Patient denies any fever.     The history is provided by the patient and the EMS personnel.   Review of patient's allergies indicates:   Allergen Reactions    Lamisil [terbinafine] Anaphylaxis    Codeine Itching    Compazine [prochlorperazine] Itching     Past Medical History:   Diagnosis Date    Cerebral hemorrhage     Chronic anxiety     Dehydration     Depression     Dysphagia     Due to and following non-traumatic intracerebral hemorrhage    Hearing loss     History of CVA (cerebrovascular accident)     Hypertension     Hypokalemia 10/20/2022    Insomnia     Intrapontine hemorrhage     Left hemiparesis     Peripheral neuropathy     Stroke     Thyroid disease     Transient cerebral ischemia      Past Surgical History:   Procedure Laterality Date    APPENDECTOMY      CHOLECYSTECTOMY      HYSTERECTOMY      LITHOTRIPSY      Renal lithotripsy    percutaneious insertion of ureteric stent       No family history on file.  Social History     Tobacco Use    Smoking status: Never    Smokeless tobacco: Never   Substance Use Topics    Alcohol use: Never    Drug use: Never     Review of Systems   Constitutional:  Positive for activity change and fatigue.   Respiratory: Negative.     Cardiovascular: Negative.    Genitourinary:  Positive for dysuria.   Skin: Negative.    Neurological:  Positive for weakness.    Psychiatric/Behavioral: Negative.     All other systems reviewed and are negative.    Physical Exam     Initial Vitals [12/31/22 2027]   BP Pulse Resp Temp SpO2   115/74 73 16 98.3 °F (36.8 °C) (!) 92 %      MAP       --         Physical Exam    Vitals reviewed.  Constitutional: She is cooperative. She has a sickly appearance.   Patient appears chronically ill.    HENT:   Head: Normocephalic and atraumatic.   Neck: Neck supple.   Normal range of motion.  Cardiovascular:  Normal rate, regular rhythm, normal heart sounds and intact distal pulses.           Pulmonary/Chest: Breath sounds normal.   Abdominal: Abdomen is soft. Bowel sounds are normal.   Musculoskeletal:      Cervical back: Normal range of motion and neck supple.      Comments: Patient is able to roll to right side in the bed but needs assistance with sitting up or to be repositioned in the bed. Patient had multiple pads underneath her that were bunched up. Generalized weakness noted along with legs appear contracted. Patient is able to move lower extremities but has not stood up in several days per patient but possibly several weeks due to last note with therapy was 3 weeks ago.      Neurological: She is alert and oriented to person, place, and time. GCS score is 15. GCS eye subscore is 4. GCS verbal subscore is 5. GCS motor subscore is 6.   Skin: Skin is warm and dry. Capillary refill takes less than 2 seconds. There is pallor.        Psychiatric: She has a normal mood and affect. Her behavior is normal. Judgment and thought content normal.       Medical Screening Exam   See Full Note    ED Course   Procedures  Labs Reviewed   COMPREHENSIVE METABOLIC PANEL - Abnormal; Notable for the following components:       Result Value    Glucose 107 (*)     Calcium 8.2 (*)     Albumin 2.6 (*)     Globulin 4.2 (*)     Alk Phos 170 (*)     AST 54 (*)     All other components within normal limits   URINALYSIS, REFLEX TO URINE CULTURE - Abnormal; Notable for the  following components:    Leukocytes, UA Trace (*)     Urobilinogen, UA >=8.0 (*)     All other components within normal limits   DRUG SCREEN, URINE (BEAKER) - Abnormal; Notable for the following components:    Benzodiazepine, Urine Positive (*)     Opiates, Urine Positive (*)     All other components within normal limits    Narrative:     The results of screening tests should be considered presumptive. Confirmatory testing is available upon request.    Cutoff Points:  PCP:         25ng/mL  AMPH:        500ng/mL  DAISY:        200ng/mL  BROOKE:        200ng/mL  THC:         50ng/mL  OMAR:         300ng/mL  OPI:         2000ng/mL   CBC WITH DIFFERENTIAL - Abnormal; Notable for the following components:    RDW 14.7 (*)     Neutrophils % 65.7 (*)     Lymphocytes % 22.7 (*)     Monocytes % 6.5 (*)     Eosinophils % 4.9 (*)     All other components within normal limits    Narrative:     This is an appended report.  These results have been appended to a previously verified report.   MANUAL DIFFERENTIAL - Abnormal; Notable for the following components:    Segmented Neutrophils, Man % 72 (*)     Lymphocytes, Man % 23 (*)     All other components within normal limits   URINALYSIS, MICROSCOPIC - Abnormal; Notable for the following components:    Bacteria, UA Occasional (*)     Squamous Epithelial Cells, UA Occasional (*)     All other components within normal limits   TROPONIN I - Normal   SARS-COV2 (COVID) W/ FLU ANTIGEN - Normal    Narrative:     Negative SARS-CoV results should not be used as the sole basis for treatment or patient management decisions; negative results should be considered in the context of a patient's recent exposures, history and the presene of clinical signs and symptoms consistent with COVID-19.  Negative results should be treated as presumptive and confirmed by molecular assay, if necessary for patient management.   CULTURE, BLOOD   CULTURE, BLOOD   CBC W/ AUTO DIFFERENTIAL    Narrative:     The following  orders were created for panel order CBC auto differential.  Procedure                               Abnormality         Status                     ---------                               -----------         ------                     CBC with Differential[[id number]]        Abnormal            Final result               Manual Differential[[id number]]          Abnormal            Final result                 Please view results for these tests on the individual orders.   POCT GLUCOSE MONITORING CONTINUOUS   POCT GLUCOSE MONITORING CONTINUOUS        ECG Results              EKG 12-lead (In process)  Result time 12/31/22 20:36:55      In process by Interface, Lab In [facility] (12/31/22 20:36:55)                   Narrative:    Test Reason : R41.82,    Vent. Rate : 071 BPM     Atrial Rate : 071 BPM     P-R Int : 170 ms          QRS Dur : 082 ms      QT Int : 410 ms       P-R-T Axes : 000 105 127 degrees     QTc Int : 445 ms    Normal sinus rhythm  Rightward axis  Possible Inferior infarct ,age undetermined  Abnormal ECG  When compared with ECG of 17-OCT-2022 14:19,  The axis Shifted right    Referred By: [email or web address]   SELF           Confirmed By:                                   Imaging Results              CT Head Without Contrast (Final result)  Result time 12/31/22 20:40:23      Final result by Curtis Crooks MD (12/31/22 20:40:23)                   Impression:      No acute intracranial abnormality.      Electronically signed by: Curtis Crooks  Date:    12/31/2022  Time:    20:40               Narrative:    EXAMINATION:  CT HEAD WITHOUT CONTRAST    CLINICAL HISTORY:  Mental status change, unknown cause;    TECHNIQUE:  Low dose axial images were obtained through the head.  Coronal and sagittal reformations were also performed. Contrast was not administered.  The CT examination was performed using one or more of the following dose reduction techniques: Automated exposure control, adjustment of the mA and kV according to  patient's size, use of acute or iterative reconstruction techniques.    COMPARISON:  10/17/2022    FINDINGS:  No acute intracranial hemorrhage.  No acute large vessel infarct.  No intra or extra-axial collection.  Mild atrophy and chronic microvascular ischemia.  No midline shift.  No herniation.  Scattered vascular calcifications.  Calvarium intact.  Diffuse left-sided paranasal sinus mucosal inflammatory changes.                                       X-Ray Chest AP Portable (Final result)  Result time 12/31/22 20:30:05      Final result by Curtis Crooks MD (12/31/22 20:30:05)                   Impression:      Some interstitial markings in the mid and upper lungs may reflect infectious/inflammatory process or edema.      Electronically signed by: Curtis Crooks  Date:    12/31/2022  Time:    20:30               Narrative:    EXAMINATION:  XR CHEST AP PORTABLE    CLINICAL HISTORY:  Altered mental status, unspecified    TECHNIQUE:  Single frontal view of the chest was performed.    COMPARISON:  10/19/2022    FINDINGS:  The cardiac silhouette is within normal limits.  There is some prominent interstitial markings of both lungs particularly the upper lungs.  No pneumothorax.                                       Medications - No data to display  Medical Decision Making:   History:   Old Records Summarized: other records.       <> Summary of Records: Reviewed notes from PT/OT from Dec 8th, patient was scheduled to have therapy twice a week for 6 weeks that would go out to Jan 12, patient's last documented visit was on Dec 8th. Patient reports she has only missed twice. Patient reports she has not walked for a while now. It was documented patient was able to stand on Dec 8th with moderate fatigue.   Initial Assessment:   Patient appears chronically ill, patient is well known to hospital, has been admitted 3 times this year to swing bed for generalized weakness. Patient awake, oriented x3 on arrival, knew staff members by  name along with EMS personnel. Patient is aware of situation, reports she has been feeling bad for a while but just didn't want to tell anyone. Patient has a history of hemorrhagic CVA along with multiple other CVAs prior to that. Patient's spouse reports patient was talking out of her head and that is why they called the NP that does house calls to come evaluate her. Patient was treated at home for a UTI tonight with 2gms of Rocephin but then recommended to come to ED for evaluation.   Clinical Tests:   Lab Tests: Ordered and Reviewed  The following lab test(s) were unremarkable: CBC, CMP, Urinalysis and Troponin       <> Summary of Lab: CBC and CMP unremarkable, Troponin within normal limits, Urinalysis positive for leukocytes (cath). UDS positive opiates and benzos.    Radiological Study: Ordered and Reviewed  Medical Tests: Ordered and Reviewed  ED Management:  Chest x-ray showed some interstitial markings in the mid and upper lungs may reflect infectious/inflammatory process or edema. Patient's head CT was negative for any acute abnormalities. Will consult [facility]. Patient has had 2gm of Rocephin tonight, her labs do not reflect dehydration.   2119 - [facility] called but doctor in an emergency and will call back.  2208 - [facility] provider continues to be in an emergency case.  2233 - Spoke with Dr. Jacome, hospitalist, will admit patient to inpatient for pneumonia, UTI and generalized weakness. Patient does have bedsores noted to sacral area and left buttock/hip area. Patient reports she has not been out of the bed in several days/weeks. Long-term care facility discussed with patient due to current bed bound situation and generalized debilitated state. Patient's sig other also expressed concern that he is unable to properly turn patient or get her up.   2246 - [facility] canceled.                  Clinical Impression:   Final diagnoses:  [R41.82] AMS (altered mental status)  [N39.0] Urinary tract infection without hematuria,  site unspecified (Primary)  [J18.9] Pneumonia of upper lobe due to infectious organism, unspecified laterality        ED Disposition Condition    Admit Stable                KEVAN Leggett  12/31/22 2247       KEVAN Leggett  12/31/22 2248       Felicia Hill, [facility]  12/31/22 3260

## 2023-01-01 NOTE — ASSESSMENT & PLAN NOTE
Urine culture sent  Patient received 2gm of Rocephin IV at home today per PCP  Patient started on Rocephin daily

## 2023-01-01 NOTE — H&P
[facility] - Medical Surgical Unit  Hospital Medicine  History & Physical    Patient Name: Herlinda Valdovinos  MRN: [id number]  Patient Class: IP- Inpatient  Admission Date: 12/31/2022  Attending Physician: Chuy Daugherty IV, DO   Primary Care Provider: Vini Hernandez NP         Patient information was obtained from ER records.     Subjective:     Principal Problem:Urinary tract infection without hematuria    Chief Complaint:   Chief Complaint   Patient presents with    Weakness        HPI: Patient presented to [facility] tonight after her PCP made a house call on her due to her feeling bad and family concerned about her altered mental status. Her PCP called to patient's arrival and reported he treated patient at home for a UTI with 2gm of Rocephin IV along with a liter of NS for dehydation. PCP recommended to family and patient for her to come to the ED for evaluation. EMS reported patient was in the bed and was unable to assist with any transfer to EMS stretcher. EMS also reported patient recognized the medics and called them by name. On arrival to the ED, patient was awake, alert, oriented x3, called nursing staff by name. Patient reports she has been feeling bad for about 8 days but has not told anyone and has not been out of the bed the entire time. Patient has an order for PT/OT outpatient here at [place], the last documented visit was on 12/8/22. Patient is chronically ill appearing with debilitation. Patient's labs were unremarkable, CBC 9.32, H/H 13.2/39.2, Platelets 219, Na 138, K 4.0, Cl 102, BUN 13, Creat 0.76, Glucose 107. UDS positive for benzos and opiates. Urinalysis positive for trace leukocytes and bacteria (cath specimen). CT head was negative, chest x-ray showed infectious/ inflammatory process. Spoke with patient about admission, she was agreeable to be admitted. Spoke with on call hospitalist at [place], Dr. Jacome, admit orders and med rec discussed. Will admit patient to  inpatient for UTI, pneumonia and generalized weakness.       Past Medical History:   Diagnosis Date    Cerebral hemorrhage     Chronic anxiety     Dehydration     Depression     Dysphagia     Due to and following non-traumatic intracerebral hemorrhage    Hearing loss     History of CVA (cerebrovascular accident)     Hypertension     Hypokalemia 10/20/2022    Insomnia     Intrapontine hemorrhage     Left hemiparesis     Peripheral neuropathy     Stroke     Thyroid disease     Transient cerebral ischemia        Past Surgical History:   Procedure Laterality Date    APPENDECTOMY      CHOLECYSTECTOMY      HYSTERECTOMY      LITHOTRIPSY      Renal lithotripsy    percutaneious insertion of ureteric stent         Review of patient's allergies indicates:   Allergen Reactions    Lamisil [terbinafine] Anaphylaxis    Codeine Itching    Compazine [prochlorperazine] Itching       No current facility-administered medications on file prior to encounter.     Current Outpatient Medications on File Prior to Encounter   Medication Sig    atorvastatin (LIPITOR) 40 MG tablet Take 40 mg by mouth every evening.    clopidogreL (PLAVIX) 75 mg tablet Plavix 75 mg tablet   Take 1 po QD to prevent stroke    docusate sodium (COLACE) 100 MG capsule Take 100 mg by mouth 2 (two) times daily.    gabapentin (NEURONTIN) 800 MG tablet 800 mg 3 (three) times daily.    levothyroxine (SYNTHROID) 125 MCG tablet     omega-3 fatty acids/fish oil (FISH OIL-OMEGA-3 FATTY ACIDS) 300-1,000 mg capsule Take by mouth once daily.    pantoprazole (PROTONIX) 40 MG tablet Take 1 tablet (40 mg total) by mouth once daily.    sertraline (ZOLOFT) 50 MG tablet sertraline 50 mg tablet    temazepam (RESTORIL) 30 mg capsule Take 30 mg by mouth every evening.    tiZANidine (ZANAFLEX) 4 MG tablet     acetaminophen (TYLENOL) 325 MG tablet 2 tablets PRN    albuterol-ipratropium (DUO-NEB) 2.5 mg-0.5 mg/3 mL nebulizer solution Take 3 mLs by  nebulization every 6 (six) hours while awake. Rescue    amLODIPine (NORVASC) 10 MG tablet Take 1 tablet (10 mg total) by mouth once daily.    cloNIDine (CATAPRES) 0.1 MG tablet Take 1 tablet (0.1 mg total) by mouth every 8 (eight) hours as needed (160).    HYDROcodone-acetaminophen (NORCO)  mg per tablet Take 1 tablet by mouth 4 (four) times daily as needed.    LORazepam (ATIVAN) 0.5 MG tablet Take 1 tablet (0.5 mg total) by mouth As instructed for Anxiety. (Patient taking differently: Take 0.5 mg by mouth every 12 (twelve) hours as needed for Anxiety.)    valsartan (DIOVAN) 160 MG tablet Take 1 tablet (160 mg total) by mouth once daily.    [DISCONTINUED] hydroCHLOROthiazide (HYDRODIURIL) 12.5 MG Tab Take 1 tablet (12.5 mg total) by mouth once daily.    [DISCONTINUED] omeprazole (PRILOSEC) 40 MG capsule Take 40 mg by mouth every evening.     Family History    None       Tobacco Use    Smoking status: Never    Smokeless tobacco: Never   Substance and Sexual Activity    Alcohol use: Never    Drug use: Never    Sexual activity: Not on file     Review of Systems   Constitutional:  Positive for activity change and fatigue.   HENT: Negative.     Respiratory: Negative.     Cardiovascular: Negative.    Genitourinary:  Positive for dysuria.   Musculoskeletal:  Positive for arthralgias.   Skin: Negative.    Neurological:  Positive for weakness.   Psychiatric/Behavioral: Negative.     All other systems reviewed and are negative.  Objective:     Vital Signs (Most Recent):  Temp: 99.4 °F (37.4 °C) (12/31/22 2313)  Pulse: 84 (12/31/22 2313)  Resp: 18 (01/01/23 0114)  BP: (!) 142/93 (12/31/22 2313)  SpO2: 96 % (12/31/22 2313)   Vital Signs (24h Range):  Temp:  [98.3 °F (36.8 °C)-99.4 °F (37.4 °C)] 99.4 °F (37.4 °C)  Pulse:  [73-84] 84  Resp:  [16-20] 18  SpO2:  [92 %-98 %] 96 %  BP: (115-142)/(74-93) 142/93     Weight: 66.3 kg (146 lb 1.6 oz)  Body mass index is 25.08 kg/m².    Physical Exam  Vitals reviewed.    Constitutional:       General: She is awake.      Appearance: She is ill-appearing.   HENT:      Head: Normocephalic and atraumatic.      Mouth/Throat:      Mouth: Mucous membranes are dry.   Eyes:      Pupils: Pupils are equal, round, and reactive to light.   Cardiovascular:      Rate and Rhythm: Normal rate and regular rhythm.      Pulses: Normal pulses.      Heart sounds: Normal heart sounds.   Pulmonary:      Effort: Pulmonary effort is normal.      Breath sounds: Normal breath sounds.   Musculoskeletal:      Cervical back: Normal range of motion and neck supple.      Comments: Generalized weakness   Feet:      Comments: Both heels are boggy   Skin:     General: Skin is warm and dry.      Capillary Refill: Capillary refill takes less than 2 seconds.      Coloration: Skin is pale.          Neurological:      General: No focal deficit present.      Mental Status: She is alert and oriented to person, place, and time. Mental status is at baseline.      GCS: GCS eye subscore is 4. GCS verbal subscore is 5. GCS motor subscore is 6.      Motor: Weakness, atrophy and abnormal muscle tone present.      Coordination: Coordination abnormal.   Psychiatric:         Attention and Perception: Attention and perception normal.         Mood and Affect: Mood and affect normal.         Speech: Speech normal.         Behavior: Behavior normal. Behavior is cooperative.         Thought Content: Thought content normal.         Cognition and Memory: Cognition and memory normal.         Judgment: Judgment normal.         CRANIAL NERVES     CN III, IV, VI   Pupils are equal, round, and reactive to light.     Significant Labs: All pertinent labs within the past 24 hours have been reviewed.    Significant Imaging: I have reviewed all pertinent imaging results/findings within the past 24 hours.    Assessment/Plan:     * Urinary tract infection without hematuria  Urine culture sent  Patient received 2gm of Rocephin IV at home today per  PCP  Patient started on Rocephin daily       Pneumonia of upper lobe due to infectious organism  Blood cultures x2 pending  Patient received Rocephin 2gm prior to arrival per PCP  Patient started on Rocephin 1gm daily and Azithromycin 500mg daily      DVT prophylaxis  Patient started on Lovenox 40mg SQ daily  TEDS ordered      Hypertension  Monitor blood pressure  Continue home medications including Norvasc and Valsartan      Muscle weakness  PT/ OT evaluation and treatment       VTE Risk Mitigation (From admission, onward)         Ordered     enoxaparin injection 40 mg  Every 12 hours         01/01/23 0028     IP VTE HIGH RISK PATIENT  Once         01/01/23 0028     Place OCTAVIA hose  Until discontinued         01/01/23 0028                   KEVAN Alejo  Department of Hospital Medicine   [facility] - Medical Surgical Unit

## 2023-01-01 NOTE — SUBJECTIVE & OBJECTIVE
Interval History: Pt is continuing to get IV Rocephin daily x5D and PO Azithromycin 500mg PO x5D.  Pt denies cough, fever, chills, sweats and urinary symptoms.  Pt will have PT/OT evaluation on Monday and will see Wound care this week.      Review of Systems   Constitutional:  Positive for fatigue. Negative for chills, diaphoresis and fever.   HENT:  Negative for congestion.    Respiratory:  Negative for cough, shortness of breath and wheezing.    Cardiovascular:  Negative for chest pain.   Gastrointestinal:  Negative for abdominal pain.   Genitourinary:  Negative for difficulty urinating, dysuria, flank pain and frequency.   Skin:  Positive for wound.   Neurological:  Positive for weakness. Negative for headaches.   Objective:     Vital Signs (Most Recent):  Temp: 98.9 °F (37.2 °C) (01/01/23 0757)  Pulse: 85 (01/01/23 0757)  Resp: 18 (01/01/23 0757)  BP: (!) 132/92 (01/01/23 0757)  SpO2: (!) 94 % (01/01/23 0757)   Vital Signs (24h Range):  Temp:  [98.1 °F (36.7 °C)-99.8 °F (37.7 °C)] 98.9 °F (37.2 °C)  Pulse:  [73-88] 85  Resp:  [16-20] 18  SpO2:  [92 %-98 %] 94 %  BP: (115-142)/(74-93) 132/92     Weight: 66.3 kg (146 lb 1.6 oz)  Body mass index is 25.08 kg/m².    Intake/Output Summary (Last 24 hours) at 1/1/2023 0930  Last data filed at 1/1/2023 0806  Gross per 24 hour   Intake 240 ml   Output --   Net 240 ml      Physical Exam  Constitutional:       General: She is awake. She is not in acute distress.     Appearance: She is well-developed, well-groomed and overweight. She is ill-appearing (Chronic ill appearing). She is not toxic-appearing.      Interventions: She is not intubated.  HENT:      Head: Normocephalic and atraumatic.   Cardiovascular:      Rate and Rhythm: Normal rate and regular rhythm.      Pulses: Normal pulses.      Heart sounds: Normal heart sounds. No murmur heard.  Pulmonary:      Effort: Pulmonary effort is normal. No tachypnea, bradypnea, accessory muscle usage, prolonged expiration,  respiratory distress or retractions. She is not intubated.      Breath sounds: Normal breath sounds and air entry. No stridor, decreased air movement or transmitted upper airway sounds. No decreased breath sounds, wheezing, rhonchi or rales.   Musculoskeletal:      Cervical back: Normal range of motion. No rigidity.   Skin:     General: Skin is warm and dry.      Capillary Refill: Capillary refill takes less than 2 seconds.          Neurological:      General: No focal deficit present.      Mental Status: She is alert and oriented to person, place, and time.      GCS: GCS eye subscore is 4. GCS verbal subscore is 5. GCS motor subscore is 6.   Psychiatric:         Mood and Affect: Mood normal.         Behavior: Behavior is cooperative.       Significant Labs: All pertinent labs within the past 24 hours have been reviewed.    Significant Imaging: I have reviewed all pertinent imaging results/findings within the past 24 hours.

## 2023-01-01 NOTE — NURSING
"Patient refused to put her OCTAVIA hose back on. She stated that "they are killing my legs and I don't want them back on." Will cont to monitor.   "

## 2023-01-01 NOTE — PLAN OF CARE
Problem: Infection  Goal: Absence of Infection Signs and Symptoms  Outcome: Ongoing, Progressing     Problem: Skin Injury Risk Increased  Goal: Skin Health and Integrity  Outcome: Ongoing, Progressing     Problem: Adult Inpatient Plan of Care  Goal: Plan of Care Review  Outcome: Ongoing, Progressing  Goal: Patient-Specific Goal (Individualized)  Outcome: Ongoing, Progressing  Goal: Absence of Hospital-Acquired Illness or Injury  Outcome: Ongoing, Progressing  Goal: Optimal Comfort and Wellbeing  Outcome: Ongoing, Progressing  Goal: Readiness for Transition of Care  Outcome: Ongoing, Progressing     Problem: Diabetes Comorbidity  Goal: Blood Glucose Level Within Targeted Range  Outcome: Ongoing, Progressing     Problem: Impaired Wound Healing  Goal: Optimal Wound Healing  Outcome: Ongoing, Progressing     Problem: Fluid Imbalance (Pneumonia)  Goal: Fluid Balance  Outcome: Ongoing, Progressing     Problem: Infection (Pneumonia)  Goal: Resolution of Infection Signs and Symptoms  Outcome: Ongoing, Progressing     Problem: Respiratory Compromise (Pneumonia)  Goal: Effective Oxygenation and Ventilation  Outcome: Ongoing, Progressing

## 2023-01-01 NOTE — HOSPITAL COURSE
1/1/23 Pt was admitted yesterday from the ER for debiliting illness/decline in health status, probable PNA and UTI .  Pt is well known to the facility from previous swing bed admission.  Pt has blood cultures pending.  She denies cough, fever, chills, sweats and SOB.  She denies abd pain, nausea, dysuria and frequency.  UA showed only trace leuks, 0-5 WBC's and occasional bacterial.  No profound signs of UTI, no UTI symptoms.  She allegedly lives at home with her , who is her caretaker.  She reports she has been in bed the past few weeks.  She now has sacral breakdown from the decreased mobility.  Pt is getting IV Rocephin x5D and PO Azithromycin 500mg x5D.  Pt has consults for PT/OT therapies and wound care.  Will continue the current course of therapies and adjust as needed. I discussed with patient her decline in mobility and health conditions.  I inquired about long term plan for her care at home versus nursing home.  She currently refuses to consider nursing home.    01/02/2023 Awake and alert. Resting in bed. Complains of weakness. States UTI has really gotten her down. 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 Blood culture and urine culture pending. Continue rocephin and Zithromax. Continue physical therapy for strengthening. Talked with patient re code status and she chose DNR status.  01/03/2023 Awake and alert. Resting in bed. States she slept well last night. Reports having a good appetite this morning. Urine culture has been canceled. Wound care consult is in place for skin breakdown. Continue with rocephin and Zithromax. Continue with therapy.   01/04/2023 Nursing staff reports Mrs. Kaur being hard to arouse after restoril last night. Will decrease dose to 15 mg po hs prn and monitor. No complaints this morning.  01/04 Patient has been accepted for swing bed, physical and occupational therapy. She will also benefit from wound care. She has completed 4 days of treatment for pneumonia and has  progressed well. She will discharge from acute care to swingbed today.

## 2023-01-02 LAB
ANION GAP SERPL CALCULATED.3IONS-SCNC: 10 MMOL/L (ref 7–16)
BASOPHILS # BLD AUTO: 0.02 K/UL (ref 0–0.2)
BASOPHILS NFR BLD AUTO: 0.3 % (ref 0–1)
BUN SERPL-MCNC: 9 MG/DL (ref 7–18)
BUN/CREAT SERPL: 14 (ref 6–20)
CALCIUM SERPL-MCNC: 8.5 MG/DL (ref 8.5–10.1)
CHLORIDE SERPL-SCNC: 102 MMOL/L (ref 98–107)
CO2 SERPL-SCNC: 32 MMOL/L (ref 21–32)
CREAT SERPL-MCNC: 0.64 MG/DL (ref 0.55–1.02)
DIFFERENTIAL METHOD BLD: ABNORMAL
EGFR (NO RACE VARIABLE) (RUSH/TITUS): 104 ML/MIN/1.73M²
EOSINOPHIL # BLD AUTO: 0.38 K/UL (ref 0–0.5)
EOSINOPHIL NFR BLD AUTO: 5.4 % (ref 1–4)
EOSINOPHIL NFR BLD MANUAL: 6 % (ref 1–4)
ERYTHROCYTE [DISTWIDTH] IN BLOOD BY AUTOMATED COUNT: 14.4 % (ref 11.5–14.5)
GLUCOSE SERPL-MCNC: 129 MG/DL (ref 74–106)
HCT VFR BLD AUTO: 36.4 % (ref 38–47)
HGB BLD-MCNC: 12.5 G/DL (ref 12–16)
LYMPHOCYTES # BLD AUTO: 1.56 K/UL (ref 1–4.8)
LYMPHOCYTES NFR BLD AUTO: 22 % (ref 27–41)
LYMPHOCYTES NFR BLD MANUAL: 26 % (ref 27–41)
MAGNESIUM SERPL-MCNC: 1.7 MG/DL (ref 1.7–2.3)
MCH RBC QN AUTO: 28.7 PG (ref 27–31)
MCHC RBC AUTO-ENTMCNC: 34.3 G/DL (ref 32–36)
MCV RBC AUTO: 83.7 FL (ref 80–96)
MONOCYTES # BLD AUTO: 0.75 K/UL (ref 0–0.8)
MONOCYTES NFR BLD AUTO: 10.6 % (ref 2–6)
MONOCYTES NFR BLD MANUAL: 11 % (ref 2–6)
MPC BLD CALC-MCNC: 9.5 FL (ref 9.4–12.4)
NEUTROPHILS # BLD AUTO: 4.39 K/UL (ref 1.8–7.7)
NEUTROPHILS NFR BLD AUTO: 61.7 % (ref 53–65)
NEUTS BAND NFR BLD MANUAL: 1 % (ref 1–5)
NEUTS SEG NFR BLD MANUAL: 56 % (ref 50–62)
NRBC BLD MANUAL-RTO: ABNORMAL %
PLATELET # BLD AUTO: 221 K/UL (ref 150–400)
PLATELET MORPHOLOGY: NORMAL
POTASSIUM SERPL-SCNC: 3.5 MMOL/L (ref 3.5–5.1)
RBC # BLD AUTO: 4.35 M/UL (ref 4.2–5.4)
RBC MORPH BLD: NORMAL
SODIUM SERPL-SCNC: 140 MMOL/L (ref 136–145)
WBC # BLD AUTO: 7.1 K/UL (ref 4.5–11)

## 2023-01-02 PROCEDURE — 27000221 HC OXYGEN, UP TO 24 HOURS

## 2023-01-02 PROCEDURE — 11000001 HC ACUTE MED/SURG PRIVATE ROOM

## 2023-01-02 PROCEDURE — 63600175 PHARM REV CODE 636 W HCPCS: Performed by: NURSE PRACTITIONER

## 2023-01-02 PROCEDURE — 25000003 PHARM REV CODE 250: Performed by: NURSE PRACTITIONER

## 2023-01-02 PROCEDURE — 85025 COMPLETE CBC W/AUTO DIFF WBC: CPT | Performed by: NURSE PRACTITIONER

## 2023-01-02 PROCEDURE — 99900035 HC TECH TIME PER 15 MIN (STAT)

## 2023-01-02 PROCEDURE — 83735 ASSAY OF MAGNESIUM: CPT | Performed by: NURSE PRACTITIONER

## 2023-01-02 PROCEDURE — 99231 SBSQ HOSP IP/OBS SF/LOW 25: CPT | Mod: ,,, | Performed by: NURSE PRACTITIONER

## 2023-01-02 PROCEDURE — 27000944

## 2023-01-02 PROCEDURE — 99231 PR SUBSEQUENT HOSPITAL CARE,LEVL I: ICD-10-PCS | Mod: ,,, | Performed by: NURSE PRACTITIONER

## 2023-01-02 PROCEDURE — 94761 N-INVAS EAR/PLS OXIMETRY MLT: CPT

## 2023-01-02 PROCEDURE — 63700000 PHARM REV CODE 250 ALT 637 W/O HCPCS: Performed by: NURSE PRACTITIONER

## 2023-01-02 PROCEDURE — 97162 PT EVAL MOD COMPLEX 30 MIN: CPT

## 2023-01-02 PROCEDURE — 80048 BASIC METABOLIC PNL TOTAL CA: CPT | Performed by: NURSE PRACTITIONER

## 2023-01-02 PROCEDURE — 36415 COLL VENOUS BLD VENIPUNCTURE: CPT | Performed by: NURSE PRACTITIONER

## 2023-01-02 RX ORDER — TEMAZEPAM 30 MG/1
30 CAPSULE ORAL NIGHTLY PRN
Status: DISCONTINUED | OUTPATIENT
Start: 2023-01-02 | End: 2023-01-04

## 2023-01-02 RX ORDER — TEMAZEPAM 15 MG/1
15 CAPSULE ORAL NIGHTLY PRN
Status: CANCELLED | OUTPATIENT
Start: 2023-01-02

## 2023-01-02 RX ADMIN — AMLODIPINE BESYLATE 10 MG: 5 TABLET ORAL at 09:01

## 2023-01-02 RX ADMIN — HYDROCODONE BITARTRATE AND ACETAMINOPHEN 1 TABLET: 10; 325 TABLET ORAL at 05:01

## 2023-01-02 RX ADMIN — VALSARTAN 160 MG: 160 TABLET, FILM COATED ORAL at 09:01

## 2023-01-02 RX ADMIN — DOCUSATE SODIUM 100 MG: 100 CAPSULE, LIQUID FILLED ORAL at 09:01

## 2023-01-02 RX ADMIN — ENOXAPARIN SODIUM 40 MG: 40 INJECTION SUBCUTANEOUS at 09:01

## 2023-01-02 RX ADMIN — PANTOPRAZOLE SODIUM 40 MG: 40 TABLET, DELAYED RELEASE ORAL at 09:01

## 2023-01-02 RX ADMIN — CLOPIDOGREL BISULFATE 75 MG: 75 TABLET, FILM COATED ORAL at 09:01

## 2023-01-02 RX ADMIN — GABAPENTIN 800 MG: 400 CAPSULE ORAL at 03:01

## 2023-01-02 RX ADMIN — TEMAZEPAM 30 MG: 30 CAPSULE ORAL at 09:01

## 2023-01-02 RX ADMIN — CEFTRIAXONE 1 G: 1 INJECTION, POWDER, FOR SOLUTION INTRAMUSCULAR; INTRAVENOUS at 12:01

## 2023-01-02 RX ADMIN — SERTRALINE HYDROCHLORIDE 50 MG: 50 TABLET ORAL at 09:01

## 2023-01-02 RX ADMIN — LEVOTHYROXINE SODIUM 125 MCG: 25 TABLET ORAL at 06:01

## 2023-01-02 RX ADMIN — LIDOCAINE HYDROCHLORIDE 2.1 ML: 10 INJECTION, SOLUTION INFILTRATION; PERINEURAL at 12:01

## 2023-01-02 RX ADMIN — ONDANSETRON 4 MG: 4 TABLET, ORALLY DISINTEGRATING ORAL at 09:01

## 2023-01-02 RX ADMIN — OMEGA-3 FATTY ACIDS CAP 1000 MG 1 CAPSULE: 1000 CAP at 09:01

## 2023-01-02 RX ADMIN — ATORVASTATIN CALCIUM 40 MG: 40 TABLET, FILM COATED ORAL at 09:01

## 2023-01-02 RX ADMIN — HYDROCODONE BITARTRATE AND ACETAMINOPHEN 1 TABLET: 10; 325 TABLET ORAL at 09:01

## 2023-01-02 RX ADMIN — AZITHROMYCIN MONOHYDRATE 500 MG: 250 TABLET ORAL at 09:01

## 2023-01-02 RX ADMIN — GABAPENTIN 800 MG: 400 CAPSULE ORAL at 09:01

## 2023-01-02 NOTE — PLAN OF CARE
Problem: Infection  Goal: Absence of Infection Signs and Symptoms  Outcome: Ongoing, Progressing     Problem: Skin Injury Risk Increased  Goal: Skin Health and Integrity  Outcome: Ongoing, Progressing     Problem: Adult Inpatient Plan of Care  Goal: Plan of Care Review  1/2/2023 0028 by Cheli Cadena RN  Outcome: Ongoing, Progressing  1/2/2023 0028 by Cheli Cadena RN  Outcome: Ongoing, Progressing  Goal: Absence of Hospital-Acquired Illness or Injury  Outcome: Ongoing, Progressing  Goal: Optimal Comfort and Wellbeing  Outcome: Ongoing, Progressing  Goal: Readiness for Transition of Care  Outcome: Ongoing, Progressing     Problem: Fluid Imbalance (Pneumonia)  Goal: Fluid Balance  Outcome: Ongoing, Progressing     Problem: Infection (Pneumonia)  Goal: Resolution of Infection Signs and Symptoms  Outcome: Ongoing, Progressing     Problem: Respiratory Compromise (Pneumonia)  Goal: Effective Oxygenation and Ventilation  Outcome: Ongoing, Progressing

## 2023-01-02 NOTE — PLAN OF CARE
Problem: Physical Therapy  Goal: Physical Therapy Goal  Description: Short-term Goals: 2 weeks  1. Patient will perform supine to/from sit with minimal assistance to improve independence and safety with bed mobility.  2. Patient will perform sit to/from stand with rolling walker with minimal assistance to improve independence and safety with transfers.  3. Patient will ambulate 25 feet with rolling walker with minimal assistance to improve independence and safety with gait.  4. Patient will tolerate 15 minutes of physical therapy intervention to improve endurance and activity tolerance.    Long-term goals: 4 weeks  1. Patient will perform supine to/from sit with contact guard assist to improve independence and safety with bed mobility.  2. Patient will perform sit to/from stand with rolling walker with contact guard assist to improve independence and safety with transfers.  3. Patient will ambulate 50 feet with rolling walker with contact guard assist to improve independence and safety with gait.  4. Patient will tolerate 30 minutes of physical therapy intervention to improve endurance and activity tolerance.    Outcome: Ongoing, Progressing     Nguyễn [place], PT, DPT

## 2023-01-02 NOTE — PROGRESS NOTES
[facility] - Medical Surgical Unit  Hospital Medicine  Progress Note    Patient Name: Herlinda Valdovinos  MRN: [id number]  Patient Class: IP- Inpatient   Admission Date: 12/31/2022  Length of Stay: 2 days  Attending Physician: Chuy Daugherty IV, DO  Primary Care Provider: Vini Hernandez NP        Subjective:     Principal Problem:Urinary tract infection without hematuria        HPI:  Patient presented to [facility] tonight after her PCP made a house call on her due to her feeling bad and family concerned about her altered mental status. Her PCP called to patient's arrival and reported he treated patient at home for a UTI with 2gm of Rocephin IV along with a liter of NS for dehydation. PCP recommended to family and patient for her to come to the ED for evaluation. EMS reported patient was in the bed and was unable to assist with any transfer to EMS stretcher. EMS also reported patient recognized the medics and called them by name. On arrival to the ED, patient was awake, alert, oriented x3, called nursing staff by name. Patient reports she has been feeling bad for about 8 days but has not told anyone and has not been out of the bed the entire time. Patient has an order for PT/OT outpatient here at [place], the last documented visit was on 12/8/22. Patient is chronically ill appearing with debilitation. Patient's labs were unremarkable, CBC 9.32, H/H 13.2/39.2, Platelets 219, Na 138, K 4.0, Cl 102, BUN 13, Creat 0.76, Glucose 107. UDS positive for benzos and opiates. Urinalysis positive for trace leukocytes and bacteria (cath specimen). CT head was negative, chest x-ray showed infectious/ inflammatory process. Spoke with patient about admission, she was agreeable to be admitted. Spoke with on call hospitalist at [place], Dr. Jacoem, admit orders and med rec discussed. Will admit patient to inpatient for UTI, pneumonia and generalized weakness.       Overview/Hospital Course:  1/1/23 Pt was admitted  yesterday from the ER for debiliting illness/decline in health status, probable PNA and UTI .  Pt is well known to the facility from previous swing bed admission.  Pt has blood cultures pending.  She denies cough, fever, chills, sweats and SOB.  She denies abd pain, nausea, dysuria and frequency.  UA showed only trace leuks, 0-5 WBC's and occasional bacterial.  No profound signs of UTI, no UTI symptoms.  She allegedly lives at home with her , who is her caretaker.  She reports she has been in bed the past few weeks.  She now has sacral breakdown from the decreased mobility.  Pt is getting IV Rocephin x5D and PO Azithromycin 500mg x5D.  Pt has consults for PT/OT therapies and wound care.  Will continue the current course of therapies and adjust as needed. I discussed with patient her decline in mobility and health conditions.  I inquired about long term plan for her care at home versus nursing home.  She currently refuses to consider nursing home.    01/02/2023 Awake and alert. Resting in bed. Complains of weakness. States UTI has really gotten her down. 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 Blood culture and urine culture pending. Continue rocephin and Zithromax. Continue physical therapy for strengthening. Talked with patient re code status and she chose DNR status.        Interval History: Pneumonia and UTI    Review of Systems   Constitutional:  Positive for appetite change and fatigue. Negative for activity change and fever.   HENT:  Negative for congestion, ear pain, sinus pain and sneezing.    Eyes:  Negative for pain.   Respiratory:  Negative for cough, shortness of breath and wheezing.    Cardiovascular:  Negative for chest pain and palpitations.   Gastrointestinal:  Negative for abdominal pain, constipation, diarrhea and nausea.   Endocrine: Negative for cold intolerance and heat intolerance.   Genitourinary:  Negative for dysuria and vaginal discharge.   Musculoskeletal:  Negative for back  pain.        Weakness   Skin:  Negative for color change and rash.        Blister to left medial heel   Allergic/Immunologic: Negative for environmental allergies, food allergies and immunocompromised state.   Neurological:  Negative for dizziness and headaches.        Weakness   Hematological:  Does not bruise/bleed easily.   Psychiatric/Behavioral:  Negative for sleep disturbance and suicidal ideas.    Objective:     Vital Signs (Most Recent):  Temp: 97.9 °F (36.6 °C) (01/02/23 0730)  Pulse: 80 (01/02/23 0916)  Resp: 18 (01/02/23 0916)  BP: (!) 122/90 (01/02/23 0730)  SpO2: 95 % (01/02/23 0730)   Vital Signs (24h Range):  Temp:  [97.9 °F (36.6 °C)-98.4 °F (36.9 °C)] 97.9 °F (36.6 °C)  Pulse:  [79-88] 80  Resp:  [18] 18  SpO2:  [94 %-96 %] 95 %  BP: (122-133)/(90-93) 122/90     Weight: 66.3 kg (146 lb 1.6 oz)  Body mass index is 25.08 kg/m².    Intake/Output Summary (Last 24 hours) at 1/2/2023 1005  Last data filed at 1/1/2023 1825  Gross per 24 hour   Intake 120 ml   Output --   Net 120 ml      Physical Exam  Constitutional:       Appearance: Normal appearance.      Comments: Chronically ill appearing   HENT:      Head: Normocephalic.      Nose: Nose normal.      Mouth/Throat:      Mouth: Mucous membranes are moist.      Pharynx: Oropharynx is clear.   Eyes:      Extraocular Movements: Extraocular movements intact.      Conjunctiva/sclera: Conjunctivae normal.      Pupils: Pupils are equal, round, and reactive to light.   Cardiovascular:      Rate and Rhythm: Normal rate and regular rhythm.      Pulses: Normal pulses.      Heart sounds: Normal heart sounds.   Pulmonary:      Effort: Pulmonary effort is normal.      Breath sounds: Normal breath sounds.      Comments: O2 per nc @ 2 liters  Abdominal:      General: Bowel sounds are normal.   Musculoskeletal:         General: Normal range of motion.      Cervical back: Normal range of motion and neck supple.   Skin:     General: Skin is warm and dry.      Comments:  Blister to left medial heel  Sacral decubitus   Neurological:      Mental Status: She is alert and oriented to person, place, and time.      Motor: Weakness present.   Psychiatric:         Mood and Affect: Mood normal.       Significant Labs: All pertinent labs within the past 24 hours have been reviewed.  Recent Lab Results         01/02/23  0630        Anion Gap 10       Bands 1       Baso # 0.02       Basophil % 0.3       BUN 9       BUN/CREAT RATIO 14       Calcium 8.5       Chloride 102       CO2 32       Creatinine 0.64       Differential Type Manual       eGFR 104       Eos # 0.38       Eosinophil % 5.4        6       Glucose 129       Hematocrit 36.4       Hemoglobin 12.5       Lymph # 1.56       Lymph % 22.0        26       Magnesium 1.7       MCH 28.7       MCHC 34.3       MCV 83.7       Mono # 0.75       Mono % 10.6        11       MPV 9.5       Neutrophils, Abs 4.39       Neutrophils Relative 61.7       nRBC       PLATELET MORPHOLOGY Normal       Platelets 221       Potassium 3.5       RBC 4.35       RBC Morphology Normal       RDW 14.4       Segmented Neutrophils, Man % 56       Sodium 140       WBC 7.10               Significant Imaging:       Assessment/Plan:      * Urinary tract infection without hematuria  Urine culture sent  Patient received 2gm of Rocephin IV at home today per PCP  Patient started on Rocephin daily     01/02 culture pending    Skin breakdown  Consult wound care therapy this week.  Wound care daily.     01/02 wound care consult for sacral decubitus and blister to left medial heel      Pneumonia of upper lobe due to infectious organism  Blood cultures x2 pending  Patient received Rocephin 2gm prior to arrival per PCP  Patient started on Rocephin 1gm daily and Azithromycin 500mg daily      DVT prophylaxis  Patient started on Lovenox 40mg SQ daily  TEDS ordered      Hypertension  Monitor blood pressure  Continue home medications including Norvasc and Valsartan      Muscle  weakness  PT/ OT evaluation and treatment         VTE Risk Mitigation (From admission, onward)           Ordered     enoxaparin injection 40 mg  Every 12 hours         01/01/23 0028     IP VTE HIGH RISK PATIENT  Once         01/01/23 0028     Place OCTAVIA hose  Until discontinued         01/01/23 0028                    Discharge Planning   POOL:      Code Status: DNR   Is the patient medically ready for discharge?:     Reason for patient still in hospital (select all that apply): Treatment                     KEVAN Funk  Department of Hospital Medicine   [facility] - Medical Surgical Unit

## 2023-01-02 NOTE — PLAN OF CARE
Problem: Impaired Wound Healing  Goal: Optimal Wound Healing  Outcome: Ongoing, Progressing  Intervention: Promote Wound Healing  Flowsheets (Taken 1/2/2023 1248)  Oral Nutrition Promotion: physical activity promoted  Pain Management Interventions:   care clustered   diversional activity provided   pillow support provided   position adjusted     Problem: Infection (Pneumonia)  Goal: Resolution of Infection Signs and Symptoms  Outcome: Ongoing, Progressing  Intervention: Prevent Infection Progression  Flowsheets (Taken 1/2/2023 1248)  Infection Management: aseptic technique maintained

## 2023-01-02 NOTE — PT/OT/SLP EVAL
Physical Therapy Evaluation    Patient Name:  Herlinda Valdovinos   MRN:  [id number]    Recommendations:     Discharge Recommendations: nursing facility, skilled, home health PT, intermediate care facility/nursing home, rehabilitation facility   Discharge Equipment Recommendations: cane, quad, cane, straight   Barriers to discharge: None    Assessment:     Herlinda Valdovinos is a 57 y.o. female admitted with a medical diagnosis of Urinary tract infection without hematuria. Patient has also been diagnosed with pneumonia. Patient has experienced a significant functional decline over the past 10 days. Patient status post a cerebrovascular accident on 4/27/2021 with residual left hemiparesis. Patient is well known to the rehab staff at [facility] due to previous episodes of both swingbed and outpatient rehabilitation. She presents with the following impairments/functional limitations: weakness, impaired endurance, impaired functional mobility, gait instability, impaired balance, decreased coordination, decreased lower extremity function, decreased safety awareness. Patient would benefit from further skilled physical therapy in the swingbed setting to maximize patient's functional potential prior to discharging home.     Rehab Prognosis: Fair; patient would benefit from acute skilled PT services to address these deficits and reach maximum level of function.    Recent Surgery: * No surgery found *      Plan:     During this hospitalization, patient to be seen 5 x/week to address the identified rehab impairments via gait training, therapeutic activities, therapeutic exercises, neuromuscular re-education and progress toward the following goals:    Plan of Care Expires:  01/30/23    Subjective     Chief Complaint: weakness  Patient/Family Comments/goals: Patient wishes to improve her strength and functional mobility so that she can return home with her family.   Pain/Comfort:  Pain Rating 1: 7/10  Location - Side 1:  Left  Location - Orientation 1: posterior  Location 1: hip  Pain Addressed 1: Reposition  Pain Rating Post-Intervention 1: 7/10  Pain Rating 2: 7/10  Location - Side 2: Left  Location 2: heel  Pain Addressed 2: Reposition  Pain Rating Post-Intervention 2: 7/10    Patients cultural, spiritual, Yazidism conflicts given the current situation: no    Living Environment:  Prior to admission, patients level of function required a small amount of assistance from her family with bed mobility and transfers. Patient lives in a single story home with a ramp to enter. Patient uses a manual wheelchair for most of her mobility but is able to walk short distances using a rolling walker. Equipment used at home: bedside commode, walker, rolling, wheelchair, shower chair, hospital bed, oxygen, nebulizer. DME owned (not currently used): single point cane and quad cane . Upon discharge, patient will have assistance from her .    Objective:     Communicated with FRAN Gleason, prior to session. Patient found left sidelying (3/4 supine) with oxygen  upon PT entry to room.    General Precautions: Standard,    Orthopedic Precautions:N/A   Braces: N/A  Respiratory Status: Nasal cannula, flow 3 L/min    Exams:  Gross Motor Coordination:  WFL  RLE ROM: WFL  RLE Strength: Deficits: 3+/5  LLE ROM: WFL  LLE Strength: Deficits: 3- to 3/5    Functional Mobility:  Bed Mobility:     Scooting: moderate assistance  Supine to Sit: moderate assistance  Sit to Supine: moderate assistance  Transfers:     Sit to Stand:  moderate assistance with rolling walker  Gait: x 2 side steps to the left towards the head of bed; moderate assist; easy fatigue; difficulty lifting feet off floor  Balance: minimal assist with static standing balance with rolling walker; moderate assist with dynamic standing balance with rolling walker     AM-PAC 6 CLICK MOBILITY  Total Score:12     Treatment & Education:    Bed mobility, transfers, and gait as listed above. Patient  educated on the importance of not attempting bed mobility, transfers, or gait alone at this time to prevent falls. Kaila SR, educated on proper completion of bed to/from bedside commode transfers (2-person assist with rolling walker). Patient educated on plan of care and physical therapy recommendations.     Patient left left sidelying (3/4 supine) with left cushioned boot in place and call button in reach.    GOALS:   Multidisciplinary Problems       Physical Therapy Goals          Problem: Physical Therapy    Goal Priority Disciplines Outcome Goal Variances Interventions   Physical Therapy Goal     PT, PT/OT Ongoing, Progressing     Description: Short-term Goals: 2 weeks  1. Patient will perform supine to/from sit with minimal assistance to improve independence and safety with bed mobility.  2. Patient will perform sit to/from stand with rolling walker with minimal assistance to improve independence and safety with transfers.  3. Patient will ambulate 25 feet with rolling walker with minimal assistance to improve independence and safety with gait.  4. Patient will tolerate 15 minutes of physical therapy intervention to improve endurance and activity tolerance.    Long-term goals: 4 weeks  1. Patient will perform supine to/from sit with contact guard assist to improve independence and safety with bed mobility.  2. Patient will perform sit to/from stand with rolling walker with contact guard assist to improve independence and safety with transfers.  3. Patient will ambulate 50 feet with rolling walker with contact guard assist to improve independence and safety with gait.  4. Patient will tolerate 30 minutes of physical therapy intervention to improve endurance and activity tolerance.                       History:     Past Medical History:   Diagnosis Date    Cerebral hemorrhage     Chronic anxiety     Dehydration     Depression     Dysphagia     Due to and following non-traumatic intracerebral hemorrhage     Hearing loss     History of CVA (cerebrovascular accident)     Hypertension     Hypokalemia 10/20/2022    Insomnia     Intrapontine hemorrhage     Left hemiparesis     Peripheral neuropathy     Stroke     Thyroid disease     Transient cerebral ischemia        Past Surgical History:   Procedure Laterality Date    APPENDECTOMY      CHOLECYSTECTOMY      HYSTERECTOMY      LITHOTRIPSY      Renal lithotripsy    percutaneious insertion of ureteric stent         Time Tracking:     PT Received On: 01/02/23  PT Start Time: 0931     PT Stop Time: 0957  PT Total Time (min): 26 min     Billable Minutes: Evaluation 26 minutes      01/02/2023   Home

## 2023-01-02 NOTE — ASSESSMENT & PLAN NOTE
Urine culture sent  Patient received 2gm of Rocephin IV at home today per PCP  Patient started on Rocephin daily     01/02 culture pending

## 2023-01-02 NOTE — ASSESSMENT & PLAN NOTE
Consult wound care therapy this week.  Wound care daily.     01/02 wound care consult for sacral decubitus and blister to left medial heel

## 2023-01-02 NOTE — SUBJECTIVE & OBJECTIVE
Interval History: Pneumonia and UTI    Review of Systems   Constitutional:  Positive for appetite change and fatigue. Negative for activity change and fever.   HENT:  Negative for congestion, ear pain, sinus pain and sneezing.    Eyes:  Negative for pain.   Respiratory:  Negative for cough, shortness of breath and wheezing.    Cardiovascular:  Negative for chest pain and palpitations.   Gastrointestinal:  Negative for abdominal pain, constipation, diarrhea and nausea.   Endocrine: Negative for cold intolerance and heat intolerance.   Genitourinary:  Negative for dysuria and vaginal discharge.   Musculoskeletal:  Negative for back pain.        Weakness   Skin:  Negative for color change and rash.        Blister to left medial heel   Allergic/Immunologic: Negative for environmental allergies, food allergies and immunocompromised state.   Neurological:  Negative for dizziness and headaches.        Weakness   Hematological:  Does not bruise/bleed easily.   Psychiatric/Behavioral:  Negative for sleep disturbance and suicidal ideas.    Objective:     Vital Signs (Most Recent):  Temp: 97.9 °F (36.6 °C) (01/02/23 0730)  Pulse: 80 (01/02/23 0916)  Resp: 18 (01/02/23 0916)  BP: (!) 122/90 (01/02/23 0730)  SpO2: 95 % (01/02/23 0730)   Vital Signs (24h Range):  Temp:  [97.9 °F (36.6 °C)-98.4 °F (36.9 °C)] 97.9 °F (36.6 °C)  Pulse:  [79-88] 80  Resp:  [18] 18  SpO2:  [94 %-96 %] 95 %  BP: (122-133)/(90-93) 122/90     Weight: 66.3 kg (146 lb 1.6 oz)  Body mass index is 25.08 kg/m².    Intake/Output Summary (Last 24 hours) at 1/2/2023 1005  Last data filed at 1/1/2023 1825  Gross per 24 hour   Intake 120 ml   Output --   Net 120 ml      Physical Exam  Constitutional:       Appearance: Normal appearance.      Comments: Chronically ill appearing   HENT:      Head: Normocephalic.      Nose: Nose normal.      Mouth/Throat:      Mouth: Mucous membranes are moist.      Pharynx: Oropharynx is clear.   Eyes:      Extraocular Movements:  Extraocular movements intact.      Conjunctiva/sclera: Conjunctivae normal.      Pupils: Pupils are equal, round, and reactive to light.   Cardiovascular:      Rate and Rhythm: Normal rate and regular rhythm.      Pulses: Normal pulses.      Heart sounds: Normal heart sounds.   Pulmonary:      Effort: Pulmonary effort is normal.      Breath sounds: Normal breath sounds.      Comments: O2 per nc @ 2 liters  Abdominal:      General: Bowel sounds are normal.   Musculoskeletal:         General: Normal range of motion.      Cervical back: Normal range of motion and neck supple.   Skin:     General: Skin is warm and dry.      Comments: Blister to left medial heel  Sacral decubitus   Neurological:      Mental Status: She is alert and oriented to person, place, and time.      Motor: Weakness present.   Psychiatric:         Mood and Affect: Mood normal.       Significant Labs: All pertinent labs within the past 24 hours have been reviewed.  Recent Lab Results         01/02/23  0630        Anion Gap 10       Bands 1       Baso # 0.02       Basophil % 0.3       BUN 9       BUN/CREAT RATIO 14       Calcium 8.5       Chloride 102       CO2 32       Creatinine 0.64       Differential Type Manual       eGFR 104       Eos # 0.38       Eosinophil % 5.4        6       Glucose 129       Hematocrit 36.4       Hemoglobin 12.5       Lymph # 1.56       Lymph % 22.0        26       Magnesium 1.7       MCH 28.7       MCHC 34.3       MCV 83.7       Mono # 0.75       Mono % 10.6        11       MPV 9.5       Neutrophils, Abs 4.39       Neutrophils Relative 61.7       nRBC       PLATELET MORPHOLOGY Normal       Platelets 221       Potassium 3.5       RBC 4.35       RBC Morphology Normal       RDW 14.4       Segmented Neutrophils, Man % 56       Sodium 140       WBC 7.10               Significant Imaging:

## 2023-01-03 ENCOUNTER — CLINICAL SUPPORT (OUTPATIENT)
Dept: WOUND CARE | Facility: HOSPITAL | Age: 58
DRG: 689 | End: 2023-01-03
Payer: MEDICARE

## 2023-01-03 VITALS
RESPIRATION RATE: 18 BRPM | HEART RATE: 88 BPM | DIASTOLIC BLOOD PRESSURE: 90 MMHG | SYSTOLIC BLOOD PRESSURE: 133 MMHG | TEMPERATURE: 98 F

## 2023-01-03 DIAGNOSIS — L89.153 PRESSURE INJURY OF SACRAL REGION, STAGE 3: ICD-10-CM

## 2023-01-03 DIAGNOSIS — L89.620 PRESSURE INJURY OF LEFT HEEL, UNSTAGEABLE: Primary | ICD-10-CM

## 2023-01-03 LAB
ANION GAP SERPL CALCULATED.3IONS-SCNC: 1 MMOL/L (ref 7–16)
BASOPHILS # BLD AUTO: 0.02 K/UL (ref 0–0.2)
BASOPHILS NFR BLD AUTO: 0.3 % (ref 0–1)
BUN SERPL-MCNC: 8 MG/DL (ref 7–18)
BUN/CREAT SERPL: 11 (ref 6–20)
CALCIUM SERPL-MCNC: 8.8 MG/DL (ref 8.5–10.1)
CHLORIDE SERPL-SCNC: 100 MMOL/L (ref 98–107)
CO2 SERPL-SCNC: 34 MMOL/L (ref 21–32)
CREAT SERPL-MCNC: 0.74 MG/DL (ref 0.55–1.02)
DIFFERENTIAL METHOD BLD: ABNORMAL
EGFR (NO RACE VARIABLE) (RUSH/TITUS): 95 ML/MIN/1.73M²
EOSINOPHIL # BLD AUTO: 0.41 K/UL (ref 0–0.5)
EOSINOPHIL NFR BLD AUTO: 5.6 % (ref 1–4)
EOSINOPHIL NFR BLD MANUAL: 11 % (ref 1–4)
ERYTHROCYTE [DISTWIDTH] IN BLOOD BY AUTOMATED COUNT: 14.5 % (ref 11.5–14.5)
GLUCOSE SERPL-MCNC: 118 MG/DL (ref 74–106)
HCT VFR BLD AUTO: 39.5 % (ref 38–47)
HGB BLD-MCNC: 13.3 G/DL (ref 12–16)
LYMPHOCYTES # BLD AUTO: 2.04 K/UL (ref 1–4.8)
LYMPHOCYTES NFR BLD AUTO: 27.8 % (ref 27–41)
LYMPHOCYTES NFR BLD MANUAL: 30 % (ref 27–41)
MAGNESIUM SERPL-MCNC: 1.6 MG/DL (ref 1.7–2.3)
MCH RBC QN AUTO: 28.2 PG (ref 27–31)
MCHC RBC AUTO-ENTMCNC: 33.7 G/DL (ref 32–36)
MCV RBC AUTO: 83.9 FL (ref 80–96)
MONOCYTES # BLD AUTO: 0.76 K/UL (ref 0–0.8)
MONOCYTES NFR BLD AUTO: 10.4 % (ref 2–6)
MONOCYTES NFR BLD MANUAL: 9 % (ref 2–6)
MPC BLD CALC-MCNC: 9.6 FL (ref 9.4–12.4)
NEUTROPHILS # BLD AUTO: 4.11 K/UL (ref 1.8–7.7)
NEUTROPHILS NFR BLD AUTO: 55.9 % (ref 53–65)
NEUTS BAND NFR BLD MANUAL: 1 % (ref 1–5)
NEUTS SEG NFR BLD MANUAL: 49 % (ref 50–62)
NRBC BLD MANUAL-RTO: ABNORMAL %
PLATELET # BLD AUTO: 261 K/UL (ref 150–400)
PLATELET MORPHOLOGY: NORMAL
POTASSIUM SERPL-SCNC: 3.3 MMOL/L (ref 3.5–5.1)
RBC # BLD AUTO: 4.71 M/UL (ref 4.2–5.4)
RBC MORPH BLD: NORMAL
SODIUM SERPL-SCNC: 132 MMOL/L (ref 136–145)
WBC # BLD AUTO: 7.34 K/UL (ref 4.5–11)

## 2023-01-03 PROCEDURE — 80048 BASIC METABOLIC PNL TOTAL CA: CPT | Performed by: NURSE PRACTITIONER

## 2023-01-03 PROCEDURE — 63600175 PHARM REV CODE 636 W HCPCS: Performed by: NURSE PRACTITIONER

## 2023-01-03 PROCEDURE — 36415 COLL VENOUS BLD VENIPUNCTURE: CPT | Performed by: NURSE PRACTITIONER

## 2023-01-03 PROCEDURE — 27000944

## 2023-01-03 PROCEDURE — 25000003 PHARM REV CODE 250: Performed by: NURSE PRACTITIONER

## 2023-01-03 PROCEDURE — 11000001 HC ACUTE MED/SURG PRIVATE ROOM

## 2023-01-03 PROCEDURE — 97530 THERAPEUTIC ACTIVITIES: CPT | Mod: CQ

## 2023-01-03 PROCEDURE — 83735 ASSAY OF MAGNESIUM: CPT | Performed by: NURSE PRACTITIONER

## 2023-01-03 PROCEDURE — 94761 N-INVAS EAR/PLS OXIMETRY MLT: CPT

## 2023-01-03 PROCEDURE — 27000221 HC OXYGEN, UP TO 24 HOURS

## 2023-01-03 PROCEDURE — 97166 OT EVAL MOD COMPLEX 45 MIN: CPT

## 2023-01-03 PROCEDURE — 99214 OFFICE O/P EST MOD 30 MIN: CPT

## 2023-01-03 PROCEDURE — 63700000 PHARM REV CODE 250 ALT 637 W/O HCPCS: Performed by: NURSE PRACTITIONER

## 2023-01-03 PROCEDURE — 85025 COMPLETE CBC W/AUTO DIFF WBC: CPT | Performed by: NURSE PRACTITIONER

## 2023-01-03 RX ADMIN — LEVOTHYROXINE SODIUM 125 MCG: 25 TABLET ORAL at 05:01

## 2023-01-03 RX ADMIN — TEMAZEPAM 30 MG: 30 CAPSULE ORAL at 09:01

## 2023-01-03 RX ADMIN — AMLODIPINE BESYLATE 10 MG: 5 TABLET ORAL at 08:01

## 2023-01-03 RX ADMIN — ACETAMINOPHEN 650 MG: 325 TABLET ORAL at 02:01

## 2023-01-03 RX ADMIN — AZITHROMYCIN MONOHYDRATE 500 MG: 250 TABLET ORAL at 08:01

## 2023-01-03 RX ADMIN — ENOXAPARIN SODIUM 40 MG: 40 INJECTION SUBCUTANEOUS at 08:01

## 2023-01-03 RX ADMIN — GABAPENTIN 800 MG: 400 CAPSULE ORAL at 09:01

## 2023-01-03 RX ADMIN — CLOPIDOGREL BISULFATE 75 MG: 75 TABLET, FILM COATED ORAL at 08:01

## 2023-01-03 RX ADMIN — GABAPENTIN 800 MG: 400 CAPSULE ORAL at 08:01

## 2023-01-03 RX ADMIN — DOCUSATE SODIUM 100 MG: 100 CAPSULE, LIQUID FILLED ORAL at 08:01

## 2023-01-03 RX ADMIN — OMEGA-3 FATTY ACIDS CAP 1000 MG 1 CAPSULE: 1000 CAP at 08:01

## 2023-01-03 RX ADMIN — SERTRALINE HYDROCHLORIDE 50 MG: 50 TABLET ORAL at 09:01

## 2023-01-03 RX ADMIN — ENOXAPARIN SODIUM 40 MG: 40 INJECTION SUBCUTANEOUS at 09:01

## 2023-01-03 RX ADMIN — PANTOPRAZOLE SODIUM 40 MG: 40 TABLET, DELAYED RELEASE ORAL at 08:01

## 2023-01-03 RX ADMIN — CEFTRIAXONE 1 G: 1 INJECTION, POWDER, FOR SOLUTION INTRAMUSCULAR; INTRAVENOUS at 12:01

## 2023-01-03 RX ADMIN — DOCUSATE SODIUM 100 MG: 100 CAPSULE, LIQUID FILLED ORAL at 09:01

## 2023-01-03 RX ADMIN — ATORVASTATIN CALCIUM 40 MG: 40 TABLET, FILM COATED ORAL at 09:01

## 2023-01-03 RX ADMIN — GABAPENTIN 800 MG: 400 CAPSULE ORAL at 02:01

## 2023-01-03 RX ADMIN — LIDOCAINE HYDROCHLORIDE 2.1 ML: 10 INJECTION, SOLUTION INFILTRATION; PERINEURAL at 12:01

## 2023-01-03 RX ADMIN — VALSARTAN 160 MG: 160 TABLET, FILM COATED ORAL at 08:01

## 2023-01-03 NOTE — PLAN OF CARE
Problem: Fall Injury Risk  Goal: Absence of Fall and Fall-Related Injury  Outcome: Ongoing, Progressing  Intervention: Promote Injury-Free Environment  Flowsheets (Taken 1/3/2023 1143)  Safety Promotion/Fall Prevention:   assistive device/personal item within reach   room near unit station   side rails raised x 3   nonskid shoes/socks when out of bed   instructed to call staff for mobility

## 2023-01-03 NOTE — SUBJECTIVE & OBJECTIVE
Interval History: Pneumonia    Review of Systems   Constitutional:  Positive for appetite change. Negative for activity change, chills, fatigue and fever.   HENT:  Negative for congestion, ear pain, sinus pain and sneezing.    Eyes:  Negative for pain.   Respiratory:  Negative for cough, shortness of breath and wheezing.    Cardiovascular:  Negative for chest pain and palpitations.   Gastrointestinal:  Negative for abdominal pain, constipation, diarrhea, nausea and vomiting.   Endocrine: Negative for cold intolerance and heat intolerance.   Genitourinary:  Negative for difficulty urinating, dysuria and vaginal discharge.   Musculoskeletal:  Negative for back pain.   Skin:  Positive for wound. Negative for color change and rash.        Skin breakdown to left groin, left heel blister, sacrum   Allergic/Immunologic: Negative for environmental allergies, food allergies and immunocompromised state.   Neurological:  Positive for weakness. Negative for dizziness and headaches.   Hematological:  Does not bruise/bleed easily.   Psychiatric/Behavioral:  Negative for sleep disturbance and suicidal ideas.    Objective:     Vital Signs (Most Recent):  Temp: 98.2 °F (36.8 °C) (01/03/23 0823)  Pulse: 88 (01/03/23 0823)  Resp: 20 (01/03/23 0823)  BP: (!) 139/90 (01/03/23 0823)  SpO2: (!) 93 % (01/03/23 0823)   Vital Signs (24h Range):  Temp:  [98.1 °F (36.7 °C)-98.2 °F (36.8 °C)] 98.2 °F (36.8 °C)  Pulse:  [80-88] 88  Resp:  [18-20] 18  SpO2:  [93 %-96 %] 93 %  BP: (133-139)/(85-90) 133/90     Weight: 66.3 kg (146 lb 1.6 oz)  Body mass index is 25.08 kg/m².    Intake/Output Summary (Last 24 hours) at 1/3/2023 1024  Last data filed at 1/3/2023 0845  Gross per 24 hour   Intake 600 ml   Output --   Net 600 ml      Physical Exam  Constitutional:       Comments: Chronically ill appearing   HENT:      Head: Normocephalic.   Cardiovascular:      Rate and Rhythm: Normal rate.      Pulses: Normal pulses.      Heart sounds: Normal heart  sounds.   Pulmonary:      Effort: Pulmonary effort is normal.      Breath sounds: Normal breath sounds.   Abdominal:      General: Abdomen is flat. Bowel sounds are normal.      Palpations: Abdomen is soft.   Musculoskeletal:      Cervical back: Normal range of motion.   Skin:     General: Skin is warm and dry.   Neurological:      Mental Status: She is oriented to person, place, and time.      Motor: Weakness present.      Coordination: Coordination abnormal.      Gait: Gait abnormal.   Psychiatric:         Mood and Affect: Mood normal.       Significant Labs: All pertinent labs within the past 24 hours have been reviewed.  Recent Lab Results         01/03/23  0812        Anion Gap 1       Bands 1       Baso # 0.02       Basophil % 0.3       BUN 8       BUN/CREAT RATIO 11       Calcium 8.8       Chloride 100       CO2 34       Creatinine 0.74       Differential Type Manual       eGFR 95       Eos # 0.41       Eosinophil % 5.6        11       Glucose 118       Hematocrit 39.5       Hemoglobin 13.3       Lymph # 2.04       Lymph % 27.8        30       Magnesium 1.6       MCH 28.2       MCHC 33.7       MCV 83.9       Mono # 0.76       Mono % 10.4        9       MPV 9.6       Neutrophils, Abs 4.11       Neutrophils Relative 55.9       nRBC       PLATELET MORPHOLOGY Normal       Platelets 261       Potassium 3.3       RBC 4.71       RBC Morphology Normal       RDW 14.5       Segmented Neutrophils, Man % 49       Sodium 132       WBC 7.34               Significant Imaging: I have reviewed all pertinent imaging results/findings within the past 24 hours.

## 2023-01-03 NOTE — PLAN OF CARE
Problem: Infection  Goal: Absence of Infection Signs and Symptoms  Outcome: Ongoing, Progressing  Intervention: Prevent or Manage Infection  Flowsheets (Taken 1/3/2023 0110)  Infection Management: aseptic technique maintained     Problem: Skin Injury Risk Increased  Goal: Skin Health and Integrity  Outcome: Ongoing, Progressing  Intervention: Optimize Skin Protection  Flowsheets (Taken 1/3/2023 0110)  Pressure Reduction Techniques:   frequent weight shift encouraged   weight shift assistance provided   positioned off wounds  Pressure Reduction Devices: positioning supports utilized  Skin Protection:   incontinence pads utilized   silicone foam dressing in place   tubing/devices free from skin contact  Head of Bed (HOB) Positioning: HOB elevated  Intervention: Promote and Optimize Oral Intake  Flowsheets (Taken 1/3/2023 0110)  Oral Nutrition Promotion: physical activity promoted     Problem: Adult Inpatient Plan of Care  Goal: Plan of Care Review  Outcome: Ongoing, Progressing  Flowsheets (Taken 1/3/2023 0110)  Plan of Care Reviewed With:   patient   significant other  Goal: Patient-Specific Goal (Individualized)  Outcome: Ongoing, Progressing  Goal: Absence of Hospital-Acquired Illness or Injury  Outcome: Ongoing, Progressing  Intervention: Identify and Manage Fall Risk  Flowsheets (Taken 1/3/2023 0110)  Safety Promotion/Fall Prevention:   medications reviewed   Fall Risk reviewed with patient/family   diversional activities provided   side rails raised x 3  Intervention: Prevent Skin Injury  Flowsheets (Taken 1/3/2023 0110)  Body Position:   turned   weight shifting  Skin Protection:   incontinence pads utilized   silicone foam dressing in place   tubing/devices free from skin contact  Intervention: Prevent and Manage VTE (Venous Thromboembolism) Risk  Flowsheets (Taken 1/3/2023 0110)  Activity Management:   Heel slide - L1   Ankle pumps - L1  VTE Prevention/Management: fluids promoted  Range of Motion: active  ROM (range of motion) encouraged  Intervention: Prevent Infection  Flowsheets (Taken 1/3/2023 0110)  Infection Prevention:   hand hygiene promoted   rest/sleep promoted   personal protective equipment utilized   single patient room provided  Goal: Optimal Comfort and Wellbeing  Outcome: Ongoing, Progressing  Intervention: Monitor Pain and Promote Comfort  Flowsheets (Taken 1/3/2023 0110)  Pain Management Interventions: pain management plan reviewed with patient/caregiver  Intervention: Provide Person-Centered Care  Flowsheets (Taken 1/3/2023 0110)  Trust Relationship/Rapport:   thoughts/feelings acknowledged   questions encouraged   questions answered  Goal: Readiness for Transition of Care  Outcome: Ongoing, Progressing     Problem: Diabetes Comorbidity  Goal: Blood Glucose Level Within Targeted Range  Outcome: Ongoing, Progressing     Problem: Impaired Wound Healing  Goal: Optimal Wound Healing  Outcome: Ongoing, Progressing  Intervention: Promote Wound Healing  Flowsheets (Taken 1/3/2023 0110)  Oral Nutrition Promotion: physical activity promoted  Sleep/Rest Enhancement:   regular sleep/rest pattern promoted   awakenings minimized  Activity Management:   Heel slide - L1   Ankle pumps - L1  Pain Management Interventions: pain management plan reviewed with patient/caregiver     Problem: Fluid Imbalance (Pneumonia)  Goal: Fluid Balance  Outcome: Ongoing, Progressing     Problem: Infection (Pneumonia)  Goal: Resolution of Infection Signs and Symptoms  Outcome: Ongoing, Progressing  Intervention: Prevent Infection Progression  Flowsheets (Taken 1/3/2023 0110)  Infection Management: aseptic technique maintained     Problem: Respiratory Compromise (Pneumonia)  Goal: Effective Oxygenation and Ventilation  Outcome: Ongoing, Progressing  Intervention: Promote Airway Secretion Clearance  Flowsheets (Taken 1/3/2023 0110)  Breathing Techniques/Airway Clearance: deep/controlled cough encouraged  Cough And Deep Breathing: done  with encouragement  Intervention: Optimize Oxygenation and Ventilation  Flowsheets (Taken 1/3/2023 0110)  Airway/Ventilation Management: airway patency maintained  Head of Bed (HOB) Positioning: HOB elevated

## 2023-01-03 NOTE — PROGRESS NOTES
[facility] - Medical Surgical Unit  Hospital Medicine  Progress Note    Patient Name: Herlinda Valdovinos  MRN: [id number]  Patient Class: IP- Inpatient   Admission Date: 12/31/2022  Length of Stay: 3 days  Attending Physician: Chuy Daugherty IV, DO  Primary Care Provider: Vini Hernandez NP        Subjective:     Principal Problem:Urinary tract infection without hematuria        HPI:  Patient presented to [facility] tonight after her PCP made a house call on her due to her feeling bad and family concerned about her altered mental status. Her PCP called to patient's arrival and reported he treated patient at home for a UTI with 2gm of Rocephin IV along with a liter of NS for dehydation. PCP recommended to family and patient for her to come to the ED for evaluation. EMS reported patient was in the bed and was unable to assist with any transfer to EMS stretcher. EMS also reported patient recognized the medics and called them by name. On arrival to the ED, patient was awake, alert, oriented x3, called nursing staff by name. Patient reports she has been feeling bad for about 8 days but has not told anyone and has not been out of the bed the entire time. Patient has an order for PT/OT outpatient here at [facility], the last documented visit was on 12/8/22. Patient is chronically ill appearing with debilitation. Patient's labs were unremarkable, CBC 9.32, H/H 13.2/39.2, Platelets 219, Na 138, K 4.0, Cl 102, BUN 13, Creat 0.76, Glucose 107. UDS positive for benzos and opiates. Urinalysis positive for trace leukocytes and bacteria (cath specimen). CT head was negative, chest x-ray showed infectious/ inflammatory process. Spoke with patient about admission, she was agreeable to be admitted. Spoke with on call hospitalist at [facility], Dr. Jacome, admit orders and med rec discussed. Will admit patient to inpatient for UTI, pneumonia and generalized weakness.       Overview/Hospital Course:  1/1/23 Pt was admitted  yesterday from the ER for debiliting illness/decline in health status, probable PNA and UTI .  Pt is well known to the facility from previous swing bed admission.  Pt has blood cultures pending.  She denies cough, fever, chills, sweats and SOB.  She denies abd pain, nausea, dysuria and frequency.  UA showed only trace leuks, 0-5 WBC's and occasional bacterial.  No profound signs of UTI, no UTI symptoms.  She allegedly lives at home with her , who is her caretaker.  She reports she has been in bed the past few weeks.  She now has sacral breakdown from the decreased mobility.  Pt is getting IV Rocephin x5D and PO Azithromycin 500mg x5D.  Pt has consults for PT/OT therapies and wound care.  Will continue the current course of therapies and adjust as needed. I discussed with patient her decline in mobility and health conditions.  I inquired about long term plan for her care at home versus nursing home.  She currently refuses to consider nursing home.    01/02/2023 Awake and alert. Resting in bed. Complains of weakness. States UTI has really gotten her down. 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 Blood culture and urine culture pending. Continue rocephin and Zithromax. Continue physical therapy for strengthening. Talked with patient re code status and she chose DNR status.  01/03/2023 Awake and alert. Resting in bed. States she slept well last night. Reports having a good appetite this morning. Urine culture has been canceled. Wound care consult is in place for skin breakdown. Continue with rocephin and Zithromax. Continue with therapy.       Interval History: Pneumonia    Review of Systems   Constitutional:  Positive for appetite change. Negative for activity change, chills, fatigue and fever.   HENT:  Negative for congestion, ear pain, sinus pain and sneezing.    Eyes:  Negative for pain.   Respiratory:  Negative for cough, shortness of breath and wheezing.    Cardiovascular:  Negative for chest pain and  palpitations.   Gastrointestinal:  Negative for abdominal pain, constipation, diarrhea, nausea and vomiting.   Endocrine: Negative for cold intolerance and heat intolerance.   Genitourinary:  Negative for difficulty urinating, dysuria and vaginal discharge.   Musculoskeletal:  Negative for back pain.   Skin:  Positive for wound. Negative for color change and rash.        Skin breakdown to left groin, left heel blister, sacrum   Allergic/Immunologic: Negative for environmental allergies, food allergies and immunocompromised state.   Neurological:  Positive for weakness. Negative for dizziness and headaches.   Hematological:  Does not bruise/bleed easily.   Psychiatric/Behavioral:  Negative for sleep disturbance and suicidal ideas.    Objective:     Vital Signs (Most Recent):  Temp: 98.2 °F (36.8 °C) (01/03/23 0823)  Pulse: 88 (01/03/23 0823)  Resp: 20 (01/03/23 0823)  BP: (!) 139/90 (01/03/23 0823)  SpO2: (!) 93 % (01/03/23 0823)   Vital Signs (24h Range):  Temp:  [98.1 °F (36.7 °C)-98.2 °F (36.8 °C)] 98.2 °F (36.8 °C)  Pulse:  [80-88] 88  Resp:  [18-20] 18  SpO2:  [93 %-96 %] 93 %  BP: (133-139)/(85-90) 133/90     Weight: 66.3 kg (146 lb 1.6 oz)  Body mass index is 25.08 kg/m².    Intake/Output Summary (Last 24 hours) at 1/3/2023 1024  Last data filed at 1/3/2023 0845  Gross per 24 hour   Intake 600 ml   Output --   Net 600 ml      Physical Exam  Constitutional:       Comments: Chronically ill appearing   HENT:      Head: Normocephalic.   Cardiovascular:      Rate and Rhythm: Normal rate.      Pulses: Normal pulses.      Heart sounds: Normal heart sounds.   Pulmonary:      Effort: Pulmonary effort is normal.      Breath sounds: Normal breath sounds.   Abdominal:      General: Abdomen is flat. Bowel sounds are normal.      Palpations: Abdomen is soft.   Musculoskeletal:      Cervical back: Normal range of motion.   Skin:     General: Skin is warm and dry.   Neurological:      Mental Status: She is oriented to person,  place, and time.      Motor: Weakness present.      Coordination: Coordination abnormal.      Gait: Gait abnormal.   Psychiatric:         Mood and Affect: Mood normal.       Significant Labs: All pertinent labs within the past 24 hours have been reviewed.  Recent Lab Results         01/03/23  0812        Anion Gap 1       Bands 1       Baso # 0.02       Basophil % 0.3       BUN 8       BUN/CREAT RATIO 11       Calcium 8.8       Chloride 100       CO2 34       Creatinine 0.74       Differential Type Manual       eGFR 95       Eos # 0.41       Eosinophil % 5.6        11       Glucose 118       Hematocrit 39.5       Hemoglobin 13.3       Lymph # 2.04       Lymph % 27.8        30       Magnesium 1.6       MCH 28.2       MCHC 33.7       MCV 83.9       Mono # 0.76       Mono % 10.4        9       MPV 9.6       Neutrophils, Abs 4.11       Neutrophils Relative 55.9       nRBC       PLATELET MORPHOLOGY Normal       Platelets 261       Potassium 3.3       RBC 4.71       RBC Morphology Normal       RDW 14.5       Segmented Neutrophils, Man % 49       Sodium 132       WBC 7.34               Significant Imaging: I have reviewed all pertinent imaging results/findings within the past 24 hours.      Assessment/Plan:      * Urinary tract infection without hematuria  Urine culture sent  Patient received 2gm of Rocephin IV at home today per PCP  Patient started on Rocephin daily     01/02 culture pending    Skin breakdown  Consult wound care therapy this week.  Wound care daily.     01/02 wound care consult for sacral decubitus and blister to left medial heel      Pneumonia of upper lobe due to infectious organism  Blood cultures x2 pending  Patient received Rocephin 2gm prior to arrival per PCP  Patient started on Rocephin 1gm daily and Azithromycin 500mg daily      DVT prophylaxis  Patient started on Lovenox 40mg SQ daily  TEDS ordered      Hypertension  Monitor blood pressure  Continue home medications including Norvasc and  Valsartan      Muscle weakness  PT/ OT evaluation and treatment         VTE Risk Mitigation (From admission, onward)         Ordered     enoxaparin injection 40 mg  Every 12 hours         01/01/23 0028     IP VTE HIGH RISK PATIENT  Once         01/01/23 0028     Place OCTAVIA hose  Until discontinued         01/01/23 0028                Discharge Planning   POOL:      Code Status: DNR   Is the patient medically ready for discharge?:     Reason for patient still in hospital (select all that apply): Treatment                     KEVAN Funk  Department of Hospital Medicine   [facility] - Medical Surgical Unit

## 2023-01-03 NOTE — PT/OT/SLP PROGRESS
Physical Therapy Treatment    Patient Name:  Herlinda Valdovinos   MRN:  [id number]    Recommendations:     Discharge Recommendations: nursing facility, skilled, home health PT, intermediate care facility/nursing home, rehabilitation facility  Discharge Equipment Recommendations: cane, quad, cane, straight  Barriers to discharge: None    Assessment:     Herlinda Valdovinos is a 57 y.o. female admitted with a medical diagnosis of Urinary tract infection without hematuria.  She presents with the following impairments/functional limitations: weakness, impaired endurance, impaired functional mobility, gait instability, impaired balance, decreased coordination, decreased lower extremity function, decreased safety awareness.    Rehab Prognosis: Fair; patient would benefit from acute skilled PT services to address these deficits and reach maximum level of function.    Recent Surgery: * No surgery found *      Plan:     During this hospitalization, patient to be seen 5 x/week to address the identified rehab impairments via gait training, therapeutic activities, therapeutic exercises and progress toward the following goals:    Plan of Care Expires:  01/30/23    Subjective     Chief Complaint: weakness  Patient/Family Comments/goals: Pt wishes to improve strength and functional mobility in order to return home with her family  Pain/Comfort:  Pain Rating 1:  (patient states she has pain in her bottom and left heel, did not rate pain)      Objective:     Communicated with PT Nguyễn [place] prior to session.  Patient found supine with   upon PT entry to room.     General Precautions: Standard, fall  Orthopedic Precautions: N/A  Braces: N/A  Respiratory Status: Nasal cannula, flow 3 L/min     Functional Mobility:  Bed Mobility:     Rolling Left:  contact guard assistance  Supine to Sit: moderate assistance  Sit to Supine: minimum assistance  Transfers:     Sit to Stand:  minimum assistance with rolling walker and patient keeping L heel  off floor due to wound      AM-PAC 6 CLICK MOBILITY  Turning over in bed (including adjusting bedclothes, sheets and blankets)?: 3  Sitting down on and standing up from a chair with arms (e.g., wheelchair, bedside commode, etc.): 2  Moving from lying on back to sitting on the side of the bed?: 2  Moving to and from a bed to a chair (including a wheelchair)?: 2  Need to walk in hospital room?: 2  Climbing 3-5 steps with a railing?: 1  Basic Mobility Total Score: 12       Treatment & Education:  Patient performed bed mobility and transfers, listed above    Patient left supine with call button in reach..    GOALS:   Multidisciplinary Problems       Physical Therapy Goals          Problem: Physical Therapy    Goal Priority Disciplines Outcome Goal Variances Interventions   Physical Therapy Goal     PT, PT/OT Ongoing, Progressing     Description: Short-term Goals: 2 weeks  1. Patient will perform supine to/from sit with minimal assistance to improve independence and safety with bed mobility.  2. Patient will perform sit to/from stand with rolling walker with minimal assistance to improve independence and safety with transfers.  3. Patient will ambulate 25 feet with rolling walker with minimal assistance to improve independence and safety with gait.  4. Patient will tolerate 15 minutes of physical therapy intervention to improve endurance and activity tolerance.    Long-term goals: 4 weeks  1. Patient will perform supine to/from sit with contact guard assist to improve independence and safety with bed mobility.  2. Patient will perform sit to/from stand with rolling walker with contact guard assist to improve independence and safety with transfers.  3. Patient will ambulate 50 feet with rolling walker with contact guard assist to improve independence and safety with gait.  4. Patient will tolerate 30 minutes of physical therapy intervention to improve endurance and activity tolerance.                         Time  Tracking:     PT Received On: 01/03/23  PT Start Time: 1505     PT Stop Time: 1525  PT Total Time (min): 20 min     Billable Minutes: Therapeutic Activity 15    Treatment Type: Treatment  PT/PTA: PTA     PTA Visit Number: 1     01/03/2023

## 2023-01-04 ENCOUNTER — HOSPITAL ENCOUNTER (INPATIENT)
Facility: HOSPITAL | Age: 58
LOS: 21 days | Discharge: HOME HEALTH | DRG: 947 | End: 2023-01-25
Attending: FAMILY MEDICINE | Admitting: FAMILY MEDICINE
Payer: MEDICARE

## 2023-01-04 VITALS
WEIGHT: 143 LBS | HEART RATE: 83 BPM | HEIGHT: 64 IN | RESPIRATION RATE: 18 BRPM | OXYGEN SATURATION: 94 % | SYSTOLIC BLOOD PRESSURE: 133 MMHG | DIASTOLIC BLOOD PRESSURE: 96 MMHG | BODY MASS INDEX: 24.41 KG/M2 | TEMPERATURE: 98 F

## 2023-01-04 DIAGNOSIS — R53.1 WEAKNESS GENERALIZED: ICD-10-CM

## 2023-01-04 LAB
ANION GAP SERPL CALCULATED.3IONS-SCNC: 8 MMOL/L (ref 7–16)
BASOPHILS # BLD AUTO: 0.02 K/UL (ref 0–0.2)
BASOPHILS NFR BLD AUTO: 0.3 % (ref 0–1)
BUN SERPL-MCNC: 10 MG/DL (ref 7–18)
BUN/CREAT SERPL: 15 (ref 6–20)
CALCIUM SERPL-MCNC: 8.7 MG/DL (ref 8.5–10.1)
CHLORIDE SERPL-SCNC: 102 MMOL/L (ref 98–107)
CO2 SERPL-SCNC: 36 MMOL/L (ref 21–32)
CREAT SERPL-MCNC: 0.65 MG/DL (ref 0.55–1.02)
DIFFERENTIAL METHOD BLD: ABNORMAL
EGFR (NO RACE VARIABLE) (RUSH/TITUS): 103 ML/MIN/1.73M²
EOSINOPHIL # BLD AUTO: 0.35 K/UL (ref 0–0.5)
EOSINOPHIL NFR BLD AUTO: 5.5 % (ref 1–4)
EOSINOPHIL NFR BLD MANUAL: 5 % (ref 1–4)
ERYTHROCYTE [DISTWIDTH] IN BLOOD BY AUTOMATED COUNT: 14.3 % (ref 11.5–14.5)
GLUCOSE SERPL-MCNC: 116 MG/DL (ref 74–106)
HCT VFR BLD AUTO: 39.2 % (ref 38–47)
HGB BLD-MCNC: 13.3 G/DL (ref 12–16)
LYMPHOCYTES # BLD AUTO: 1.85 K/UL (ref 1–4.8)
LYMPHOCYTES NFR BLD AUTO: 29.3 % (ref 27–41)
LYMPHOCYTES NFR BLD MANUAL: 24 % (ref 27–41)
MAGNESIUM SERPL-MCNC: 1.8 MG/DL (ref 1.7–2.3)
MCH RBC QN AUTO: 28.5 PG (ref 27–31)
MCHC RBC AUTO-ENTMCNC: 33.9 G/DL (ref 32–36)
MCV RBC AUTO: 83.9 FL (ref 80–96)
MONOCYTES # BLD AUTO: 0.65 K/UL (ref 0–0.8)
MONOCYTES NFR BLD AUTO: 10.3 % (ref 2–6)
MONOCYTES NFR BLD MANUAL: 9 % (ref 2–6)
MPC BLD CALC-MCNC: 9.5 FL (ref 9.4–12.4)
NEUTROPHILS # BLD AUTO: 3.44 K/UL (ref 1.8–7.7)
NEUTROPHILS NFR BLD AUTO: 54.6 % (ref 53–65)
NEUTS SEG NFR BLD MANUAL: 62 % (ref 50–62)
NRBC BLD MANUAL-RTO: ABNORMAL %
PLATELET # BLD AUTO: 251 K/UL (ref 150–400)
PLATELET MORPHOLOGY: NORMAL
POTASSIUM SERPL-SCNC: 3.5 MMOL/L (ref 3.5–5.1)
RBC # BLD AUTO: 4.67 M/UL (ref 4.2–5.4)
RBC MORPH BLD: NORMAL
SODIUM SERPL-SCNC: 142 MMOL/L (ref 136–145)
WBC # BLD AUTO: 6.31 K/UL (ref 4.5–11)

## 2023-01-04 PROCEDURE — 27000944

## 2023-01-04 PROCEDURE — 99239 HOSP IP/OBS DSCHRG MGMT >30: CPT | Mod: ,,, | Performed by: NURSE PRACTITIONER

## 2023-01-04 PROCEDURE — 97530 THERAPEUTIC ACTIVITIES: CPT

## 2023-01-04 PROCEDURE — 94761 N-INVAS EAR/PLS OXIMETRY MLT: CPT

## 2023-01-04 PROCEDURE — 63600175 PHARM REV CODE 636 W HCPCS: Performed by: NURSE PRACTITIONER

## 2023-01-04 PROCEDURE — 80048 BASIC METABOLIC PNL TOTAL CA: CPT | Performed by: NURSE PRACTITIONER

## 2023-01-04 PROCEDURE — 97530 THERAPEUTIC ACTIVITIES: CPT | Mod: CQ

## 2023-01-04 PROCEDURE — 99239 PR HOSPITAL DISCHARGE DAY,>30 MIN: ICD-10-PCS | Mod: ,,, | Performed by: NURSE PRACTITIONER

## 2023-01-04 PROCEDURE — 11000004 HC SNF PRIVATE

## 2023-01-04 PROCEDURE — 36415 COLL VENOUS BLD VENIPUNCTURE: CPT | Performed by: NURSE PRACTITIONER

## 2023-01-04 PROCEDURE — 25000003 PHARM REV CODE 250: Performed by: NURSE PRACTITIONER

## 2023-01-04 PROCEDURE — 85025 COMPLETE CBC W/AUTO DIFF WBC: CPT | Performed by: NURSE PRACTITIONER

## 2023-01-04 PROCEDURE — 63700000 PHARM REV CODE 250 ALT 637 W/O HCPCS: Performed by: NURSE PRACTITIONER

## 2023-01-04 PROCEDURE — 97535 SELF CARE MNGMENT TRAINING: CPT

## 2023-01-04 PROCEDURE — 83735 ASSAY OF MAGNESIUM: CPT | Performed by: NURSE PRACTITIONER

## 2023-01-04 PROCEDURE — 27000221 HC OXYGEN, UP TO 24 HOURS

## 2023-01-04 RX ORDER — ASCORBIC ACID 500 MG
500 TABLET ORAL 2 TIMES DAILY
Status: DISCONTINUED | OUTPATIENT
Start: 2023-01-04 | End: 2023-01-25 | Stop reason: HOSPADM

## 2023-01-04 RX ORDER — SERTRALINE HYDROCHLORIDE 50 MG/1
50 TABLET, FILM COATED ORAL DAILY
Status: DISCONTINUED | OUTPATIENT
Start: 2023-01-05 | End: 2023-01-05

## 2023-01-04 RX ORDER — CALCIUM CARBONATE 200(500)MG
500 TABLET,CHEWABLE ORAL 2 TIMES DAILY PRN
Start: 2023-01-04 | End: 2023-01-14

## 2023-01-04 RX ORDER — ENOXAPARIN SODIUM 100 MG/ML
40 INJECTION SUBCUTANEOUS EVERY 12 HOURS
Status: DISCONTINUED | OUTPATIENT
Start: 2023-01-04 | End: 2023-01-09

## 2023-01-04 RX ORDER — ONDANSETRON 4 MG/1
4 TABLET, ORALLY DISINTEGRATING ORAL EVERY 8 HOURS PRN
Status: DISCONTINUED | OUTPATIENT
Start: 2023-01-04 | End: 2023-01-25 | Stop reason: HOSPADM

## 2023-01-04 RX ORDER — AZITHROMYCIN 500 MG/1
500 TABLET, FILM COATED ORAL DAILY
Qty: 2 TABLET | Status: ON HOLD
Start: 2023-01-05 | End: 2023-01-25 | Stop reason: HOSPADM

## 2023-01-04 RX ORDER — ONDANSETRON 4 MG/1
4 TABLET, ORALLY DISINTEGRATING ORAL EVERY 8 HOURS PRN
Start: 2023-01-04

## 2023-01-04 RX ORDER — LIDOCAINE HYDROCHLORIDE 10 MG/ML
2.1 INJECTION INFILTRATION; PERINEURAL DAILY
Status: ON HOLD
Start: 2023-01-05 | End: 2023-01-25 | Stop reason: HOSPADM

## 2023-01-04 RX ORDER — LORAZEPAM 0.5 MG/1
0.5 TABLET ORAL EVERY 12 HOURS PRN
Status: DISCONTINUED | OUTPATIENT
Start: 2023-01-04 | End: 2023-01-25 | Stop reason: HOSPADM

## 2023-01-04 RX ORDER — ENOXAPARIN SODIUM 100 MG/ML
40 INJECTION SUBCUTANEOUS EVERY 12 HOURS
Qty: 8 ML | Refills: 0 | Status: ON HOLD
Start: 2023-01-04 | End: 2023-01-25 | Stop reason: HOSPADM

## 2023-01-04 RX ORDER — TALC
9 POWDER (GRAM) TOPICAL NIGHTLY PRN
Status: DISCONTINUED | OUTPATIENT
Start: 2023-01-04 | End: 2023-01-25 | Stop reason: HOSPADM

## 2023-01-04 RX ORDER — AZITHROMYCIN 250 MG/1
500 TABLET, FILM COATED ORAL ONCE
Status: COMPLETED | OUTPATIENT
Start: 2023-01-05 | End: 2023-01-05

## 2023-01-04 RX ORDER — PANTOPRAZOLE SODIUM 40 MG/1
40 TABLET, DELAYED RELEASE ORAL DAILY
Status: DISCONTINUED | OUTPATIENT
Start: 2023-01-05 | End: 2023-01-25 | Stop reason: HOSPADM

## 2023-01-04 RX ORDER — TALC
6 POWDER (GRAM) TOPICAL NIGHTLY PRN
Status: DISCONTINUED | OUTPATIENT
Start: 2023-01-04 | End: 2023-01-04 | Stop reason: HOSPADM

## 2023-01-04 RX ORDER — ACETAMINOPHEN 325 MG/1
650 TABLET ORAL EVERY 6 HOURS PRN
Status: DISCONTINUED | OUTPATIENT
Start: 2023-01-04 | End: 2023-01-25 | Stop reason: HOSPADM

## 2023-01-04 RX ORDER — CLOPIDOGREL BISULFATE 75 MG/1
75 TABLET ORAL DAILY
Status: DISCONTINUED | OUTPATIENT
Start: 2023-01-05 | End: 2023-01-25 | Stop reason: HOSPADM

## 2023-01-04 RX ORDER — TALC
6 POWDER (GRAM) TOPICAL NIGHTLY PRN
Status: DISCONTINUED | OUTPATIENT
Start: 2023-01-04 | End: 2023-01-04

## 2023-01-04 RX ORDER — TALC
6 POWDER (GRAM) TOPICAL NIGHTLY PRN
Refills: 0 | Status: ON HOLD
Start: 2023-01-04 | End: 2023-01-25 | Stop reason: HOSPADM

## 2023-01-04 RX ORDER — AMLODIPINE BESYLATE 5 MG/1
10 TABLET ORAL DAILY
Status: DISCONTINUED | OUTPATIENT
Start: 2023-01-05 | End: 2023-01-25 | Stop reason: HOSPADM

## 2023-01-04 RX ORDER — TEMAZEPAM 15 MG/1
15 CAPSULE ORAL NIGHTLY PRN
Status: DISCONTINUED | OUTPATIENT
Start: 2023-01-04 | End: 2023-01-04

## 2023-01-04 RX ORDER — VALSARTAN 160 MG/1
160 TABLET ORAL DAILY
Status: DISCONTINUED | OUTPATIENT
Start: 2023-01-05 | End: 2023-01-25 | Stop reason: HOSPADM

## 2023-01-04 RX ORDER — CALCIUM CARBONATE 200(500)MG
500 TABLET,CHEWABLE ORAL 2 TIMES DAILY PRN
Status: DISCONTINUED | OUTPATIENT
Start: 2023-01-04 | End: 2023-01-25 | Stop reason: HOSPADM

## 2023-01-04 RX ORDER — AMOXICILLIN 250 MG
1 CAPSULE ORAL 2 TIMES DAILY
Status: DISCONTINUED | OUTPATIENT
Start: 2023-01-04 | End: 2023-01-25 | Stop reason: HOSPADM

## 2023-01-04 RX ORDER — HYDROCODONE BITARTRATE AND ACETAMINOPHEN 10; 325 MG/1; MG/1
1 TABLET ORAL EVERY 6 HOURS PRN
Status: DISCONTINUED | OUTPATIENT
Start: 2023-01-04 | End: 2023-01-25 | Stop reason: HOSPADM

## 2023-01-04 RX ORDER — GLUCOSAM/CHONDRO/HERB 149/HYAL 750-100 MG
1 TABLET ORAL DAILY
Status: DISCONTINUED | OUTPATIENT
Start: 2023-01-05 | End: 2023-01-25 | Stop reason: HOSPADM

## 2023-01-04 RX ORDER — GABAPENTIN 400 MG/1
800 CAPSULE ORAL 3 TIMES DAILY
Status: DISCONTINUED | OUTPATIENT
Start: 2023-01-04 | End: 2023-01-09

## 2023-01-04 RX ORDER — ACETAMINOPHEN 325 MG/1
650 TABLET ORAL EVERY 6 HOURS PRN
Status: DISCONTINUED | OUTPATIENT
Start: 2023-01-04 | End: 2023-01-04 | Stop reason: SDUPTHER

## 2023-01-04 RX ORDER — ATORVASTATIN CALCIUM 40 MG/1
40 TABLET, FILM COATED ORAL NIGHTLY
Status: DISCONTINUED | OUTPATIENT
Start: 2023-01-04 | End: 2023-01-25 | Stop reason: HOSPADM

## 2023-01-04 RX ORDER — CEFTRIAXONE 1 G/1
1 INJECTION, POWDER, FOR SOLUTION INTRAMUSCULAR; INTRAVENOUS DAILY
Qty: 1 G | Refills: 0 | Status: ON HOLD | OUTPATIENT
Start: 2023-01-04 | End: 2023-01-25 | Stop reason: HOSPADM

## 2023-01-04 RX ADMIN — GABAPENTIN 800 MG: 400 CAPSULE ORAL at 03:01

## 2023-01-04 RX ADMIN — GABAPENTIN 800 MG: 400 CAPSULE ORAL at 08:01

## 2023-01-04 RX ADMIN — MELATONIN TAB 3 MG 9 MG: 3 TAB at 08:01

## 2023-01-04 RX ADMIN — VALSARTAN 160 MG: 160 TABLET, FILM COATED ORAL at 08:01

## 2023-01-04 RX ADMIN — ENOXAPARIN SODIUM 40 MG: 40 INJECTION SUBCUTANEOUS at 08:01

## 2023-01-04 RX ADMIN — CLOPIDOGREL BISULFATE 75 MG: 75 TABLET, FILM COATED ORAL at 08:01

## 2023-01-04 RX ADMIN — OXYCODONE HYDROCHLORIDE AND ACETAMINOPHEN 500 MG: 500 TABLET ORAL at 08:01

## 2023-01-04 RX ADMIN — AZITHROMYCIN MONOHYDRATE 500 MG: 250 TABLET ORAL at 08:01

## 2023-01-04 RX ADMIN — ATORVASTATIN CALCIUM 40 MG: 40 TABLET, FILM COATED ORAL at 08:01

## 2023-01-04 RX ADMIN — DOCUSATE SODIUM 100 MG: 100 CAPSULE, LIQUID FILLED ORAL at 08:01

## 2023-01-04 RX ADMIN — CEFTRIAXONE 1 G: 1 INJECTION, POWDER, FOR SOLUTION INTRAMUSCULAR; INTRAVENOUS at 12:01

## 2023-01-04 RX ADMIN — SENNOSIDES AND DOCUSATE SODIUM 1 TABLET: 50; 8.6 TABLET ORAL at 08:01

## 2023-01-04 RX ADMIN — LIDOCAINE HYDROCHLORIDE 2.1 ML: 10 INJECTION, SOLUTION INFILTRATION; PERINEURAL at 12:01

## 2023-01-04 RX ADMIN — OMEGA-3 FATTY ACIDS CAP 1000 MG 1 CAPSULE: 1000 CAP at 08:01

## 2023-01-04 RX ADMIN — LEVOTHYROXINE SODIUM 125 MCG: 25 TABLET ORAL at 05:01

## 2023-01-04 RX ADMIN — HYDROCODONE BITARTRATE AND ACETAMINOPHEN 1 TABLET: 10; 325 TABLET ORAL at 08:01

## 2023-01-04 RX ADMIN — PANTOPRAZOLE SODIUM 40 MG: 40 TABLET, DELAYED RELEASE ORAL at 08:01

## 2023-01-04 RX ADMIN — AMLODIPINE BESYLATE 10 MG: 5 TABLET ORAL at 08:01

## 2023-01-04 NOTE — SUBJECTIVE & OBJECTIVE
Interval History: Pneumonia  Review of Systems   Constitutional:  Negative for activity change, appetite change, fatigue and fever.   HENT:  Negative for congestion, ear pain, sinus pain and sneezing.    Eyes:  Negative for pain.   Respiratory:  Negative for cough, shortness of breath and wheezing.    Cardiovascular:  Negative for chest pain and palpitations.   Gastrointestinal:  Negative for abdominal pain, constipation, diarrhea and nausea.   Endocrine: Negative for cold intolerance and heat intolerance.   Genitourinary:  Negative for dysuria and vaginal discharge.   Musculoskeletal:  Negative for back pain.   Skin:  Negative for color change and rash.        Skin breakdown to sacrum, left groin and left heel   Allergic/Immunologic: Negative for environmental allergies, food allergies and immunocompromised state.   Neurological:  Positive for weakness. Negative for dizziness and headaches.   Hematological:  Does not bruise/bleed easily.   Psychiatric/Behavioral:  Negative for sleep disturbance and suicidal ideas.    Objective:     Vital Signs (Most Recent):  Temp: 97.9 °F (36.6 °C) (01/04/23 0738)  Pulse: 83 (01/04/23 0738)  Resp: 18 (01/04/23 0738)  BP: (!) 133/96 (01/04/23 0738)  SpO2: (!) 94 % (01/04/23 0738)   Vital Signs (24h Range):  Temp:  [97.9 °F (36.6 °C)-99.2 °F (37.3 °C)] 97.9 °F (36.6 °C)  Pulse:  [83-88] 83  Resp:  [18-20] 18  SpO2:  [93 %-95 %] 94 %  BP: (133-138)/(86-96) 133/96     Weight: 64.9 kg (143 lb)  Body mass index is 24.55 kg/m².    Intake/Output Summary (Last 24 hours) at 1/4/2023 0824  Last data filed at 1/4/2023 0805  Gross per 24 hour   Intake 720 ml   Output --   Net 720 ml      Physical Exam  Constitutional:       Appearance: She is normal weight.      Comments: Chronically ill appearance   HENT:      Head: Normocephalic.      Nose: Nose normal.      Mouth/Throat:      Mouth: Mucous membranes are moist.      Pharynx: Oropharynx is clear.   Eyes:      Extraocular Movements:  Extraocular movements intact.      Conjunctiva/sclera: Conjunctivae normal.      Pupils: Pupils are equal, round, and reactive to light.   Cardiovascular:      Rate and Rhythm: Normal rate and regular rhythm.      Pulses: Normal pulses.      Heart sounds: Normal heart sounds.   Pulmonary:      Effort: Pulmonary effort is normal.      Breath sounds: Normal breath sounds.   Abdominal:      General: Bowel sounds are normal.      Palpations: Abdomen is soft.   Musculoskeletal:         General: Normal range of motion.      Cervical back: Normal range of motion and neck supple.   Skin:     General: Skin is warm and dry.      Comments: Left heel blister intact, heels floated   Left groin wound  Sacral wound    Neurological:      Mental Status: She is alert and oriented to person, place, and time.      Motor: Weakness present.   Psychiatric:         Mood and Affect: Mood normal.       Significant Labs: All pertinent labs within the past 24 hours have been reviewed.  Recent Lab Results         01/04/23  0513        Anion Gap 8       Baso # 0.02       Basophil % 0.3       BUN 10       BUN/CREAT RATIO 15       Calcium 8.7       Chloride 102       CO2 36       Creatinine 0.65       Differential Type Manual       eGFR 103       Eos # 0.35       Eosinophil % 5.5        5       Glucose 116       Hematocrit 39.2       Hemoglobin 13.3       Lymph # 1.85       Lymph % 29.3        24       Magnesium 1.8       MCH 28.5       MCHC 33.9       MCV 83.9       Mono # 0.65       Mono % 10.3        9       MPV 9.5       Neutrophils, Abs 3.44       Neutrophils Relative 54.6       nRBC       PLATELET MORPHOLOGY Normal       Platelets 251       Potassium 3.5       RBC 4.67       RBC Morphology Normal       RDW 14.3       Segmented Neutrophils, Man % 62       Sodium 142       WBC 6.31               Significant Imaging: I have reviewed all pertinent imaging results/findings within the past 24 hours.

## 2023-01-04 NOTE — PT/OT/SLP PROGRESS
Occupational Therapy   Treatment    Name: Herlinda Valdovinos  MRN: [id number]  Admitting Diagnosis:  Urinary tract infection without hematuria       Recommendations:     Discharge Recommendations: home health OT, home health PT  Discharge Equipment Recommendations:     Barriers to discharge:       Assessment:     Herlinda Valdovinos is a 57 y.o. female with a medical diagnosis of Urinary tract infection without hematuria.  She presents with performance deficits affecting function are weakness, impaired endurance, impaired self care skills, impaired functional mobility, gait instability, impaired balance, pain.     Rehab Prognosis:  Good; patient would benefit from acute skilled OT services to address these deficits and reach maximum level of function.       Plan:     Patient to be seen 5 x/week to address the above listed problems via self-care/home management, therapeutic activities, therapeutic exercises, neuromuscular re-education  Plan of Care Expires: 01/31/23  Plan of Care Reviewed with: patient    Subjective     Pain/Comfort:  Pain Rating 1: 5/10  Location - Side 1: Left  Location 1: heel  Pain Addressed 1: Reposition  Pain Rating Post-Intervention 1: 4/10    Objective:     Communicated with: Nurse prior to session.  Patient found supine with oxygen upon OT entry to room.    General Precautions: Standard, fall    Orthopedic Precautions:   Braces:    Respiratory Status: Nasal cannula, flow 2 L/min     Occupational Performance:     Bed Mobility:    Patient completed Supine to Sit with minimum assistance  Patient completed Sit to Supine with minimum assistance     Functional Mobility/Transfers:  Patient completed Sit <> Stand Transfer with minimum assistance  with  rolling walker   Functional Mobility: Unable, pt fearful of weight bearing through L foot d/t large wound on heel.     Activities of Daily Living:  Lower Body Dressing: moderate assistance sitting EOB then standing to pull over hips      AMPA 6 Click ADL:       Treatment & Education:  Performed LB dressing sitting EOB, requiring Juanis to thread over Les, ModA to pull over hips. Difficulty noted with pulling over hips d/t significant posterior lean. Instructed in weight shifting technique with fair carryover. Poor dynamic standing balance noted. Addressed further dynamic standing balance EOB challenging ability to adjust balance, requiring Min-ModA to optimally adjust. Posterior LOB noted with dynamic standing tasks. Returned to bed with OTR placing L LE on stack of pillows to ensure optimal heel floating position with pt reporting, that feels much better.     Patient left supine with call button in reach    GOALS:   Multidisciplinary Problems       Occupational Therapy Goals          Problem: Occupational Therapy    Goal Priority Disciplines Outcome Interventions   Occupational Therapy Goal     OT, PT/OT Ongoing, Progressing    Description: Goals to be met by: 2 weeks     Patient will increase functional independence with ADLs by performing:    UE Dressing with Contact Guard Assistance.  LE Dressing with Minimal Assistance.  Toileting from toilet with Contact Guard Assistance for hygiene and clothing management.   Bathing from  edge of bed with Minimal Assistance.  Toilet transfer to toilet with Contact Guard Assistance.    Goals to be met by: 4 weeks     Patient will increase functional independence with ADLs by performing:    UE Dressing with Set-up Assistance.  LE Dressing with Set-up Assistance.  Toileting from toilet with Modified [place] for hygiene and clothing management.   Bathing from  shower chair/bench with Set-up Assistance.  Toilet transfer to toilet with Modified [place].                           Time Tracking:     OT Date of Treatment: 01/04/23  OT Start Time: 1044  OT Stop Time: 1130  OT Total Time (min): 46 min    Billable Minutes:Self Care/Home Management 18  Therapeutic Activity 28    MARCUS Levy, OTR/L    OT/HEMAL: OT           1/4/2023

## 2023-01-04 NOTE — PLAN OF CARE
Problem: Fall Injury Risk  Goal: Absence of Fall and Fall-Related Injury  Outcome: Ongoing, Progressing  Intervention: Promote Injury-Free Environment  Flowsheets (Taken 1/4/2023 1150)  Safety Promotion/Fall Prevention:   assistive device/personal item within reach   side rails raised x 3   instructed to call staff for mobility   nonskid shoes/socks when out of bed   family to remain at bedside   room near unit station

## 2023-01-04 NOTE — PROGRESS NOTES
NEW WOUND CARE CONSULT          Patient Name: Herlinda Valdovinos is a 57 y.o. female       Chief Complaint:  New Wound Care Consult    Review of patient's allergies indicates:   Allergen Reactions    Lamisil [terbinafine] Anaphylaxis    Codeine Itching    Compazine [prochlorperazine] Itching        I have reviewed the patient's medical, surgical, family and social history.      Subjective:    HPI     Wound Location: left medial heel, sacrum    Wound Duration: onset approximately 12/15/22    Wound Context: pressure    Wound Pain: mild to moderate- sacrum. Denies pain to heel.     56 YO WF seen for initial assessment of wounds. Pt is currently inpatient due to UTI, pneumonia, and generalized weakness.         Review of Systems   Constitutional:  Negative for appetite change and fever.   Respiratory:  Positive for shortness of breath (intermittent).    Cardiovascular:  Negative for chest pain, leg swelling and claudication.   Integumentary:  Positive for rash and wound. Negative for color change and mole/lesion.   Neurological:  Positive for weakness, coordination difficulties and coordination difficulties. Negative for numbness.   Hematological:  Does not bruise/bleed easily.      Medications:    Current Facility-Administered Medications on File Prior to Visit   Medication Dose Route Frequency Provider Last Rate Last Admin    acetaminophen tablet 650 mg  650 mg Oral Q6H PRN JACK LeggettP   650 mg at 01/03/23 1446    amLODIPine tablet 10 mg  10 mg Oral Daily JACK LeggettP   10 mg at 01/04/23 0828    atorvastatin tablet 40 mg  40 mg Oral QHS Felicia Hill FNP   40 mg at 01/03/23 2114    azithromycin tablet 500 mg  500 mg Oral Daily Felicia Hill FNP   500 mg at 01/04/23 0828    calcium carbonate 200 mg calcium (500 mg) chewable tablet 500 mg  500 mg Oral BID PRN KEVAN Leggett        cefTRIAXone injection 1 g  1 g Intramuscular Q24H JACK FangP   1 g at 01/03/23 1201    clopidogreL tablet 75 mg   75 mg Oral Daily Felicia BLAYNE Hill, FNP   75 mg at 01/04/23 0828    docusate sodium capsule 100 mg  100 mg Oral BID Felicia BLAYNE Hill, FNP   100 mg at 01/04/23 0828    enoxaparin injection 40 mg  40 mg Subcutaneous Q12H Felicia CISNEROS Sergio, FNP   40 mg at 01/04/23 0828    gabapentin capsule 800 mg  800 mg Oral TID Felicia CISNEROS Sergio, FNP   800 mg at 01/04/23 0828    HYDROcodone-acetaminophen  mg per tablet 1 tablet  1 tablet Oral Q6H PRN Felicia CISNEROS Sregio, FNP   1 tablet at 01/02/23 1721    levothyroxine tablet 125 mcg  125 mcg Oral Before breakfast Felicia Hill, FNP   125 mcg at 01/04/23 0536    LIDOcaine HCL 10 mg/ml (1%) injection 2.1 mL  2.1 mL Intramuscular Daily Graciela B Jeanine, FNP   2.1 mL at 01/03/23 1200    LORazepam tablet 0.5 mg  0.5 mg Oral Q12H PRN Felicia Hill, FNP        melatonin tablet 6 mg  6 mg Oral Nightly PRN Laura L Garza, FNP        omega 3-dha-epa-fish oil 1,000 mg (120 mg-180 mg) Cap 1 capsule  1 capsule Oral Daily Felicia Hill, FNP   1 capsule at 01/04/23 0828    ondansetron disintegrating tablet 4 mg  4 mg Oral Q8H PRN Garciela B Jeanine, FNP   4 mg at 01/02/23 0916    pantoprazole EC tablet 40 mg  40 mg Oral Daily Feliciablake Hill, FNP   40 mg at 01/04/23 0828    sertraline tablet 50 mg  50 mg Oral QHS Felicia Hill, FNP   50 mg at 01/03/23 2114    valsartan tablet 160 mg  160 mg Oral Daily Felicia JOSE CRUZMarika Sergio, FNP   160 mg at 01/04/23 0828    [DISCONTINUED] melatonin tablet 6 mg  6 mg Oral Nightly PRN Felicia Hill, FNP   6 mg at 01/01/23 2209    [DISCONTINUED] temazepam capsule 15 mg  15 mg Oral Nightly PRN Laura L Garza, FNP        [DISCONTINUED] temazepam capsule 30 mg  30 mg Oral Nightly PRN Chuy Daugherty IV, DO   30 mg at 01/03/23 8730     Current Outpatient Medications on File Prior to Visit   Medication Sig Dispense Refill    acetaminophen (TYLENOL) 325 MG tablet 2 tablets PRN      albuterol-ipratropium (DUO-NEB) 2.5 mg-0.5 mg/3 mL nebulizer solution Take 3 mLs by nebulization every 6 (six)  hours while awake. Rescue 150 mL 0    amLODIPine (NORVASC) 10 MG tablet Take 1 tablet (10 mg total) by mouth once daily. 30 tablet 0    atorvastatin (LIPITOR) 40 MG tablet Take 40 mg by mouth every evening.      cloNIDine (CATAPRES) 0.1 MG tablet Take 1 tablet (0.1 mg total) by mouth every 8 (eight) hours as needed (160). 90 tablet 0    clopidogreL (PLAVIX) 75 mg tablet Plavix 75 mg tablet   Take 1 po QD to prevent stroke      docusate sodium (COLACE) 100 MG capsule Take 100 mg by mouth 2 (two) times daily.      gabapentin (NEURONTIN) 800 MG tablet 800 mg 3 (three) times daily.      HYDROcodone-acetaminophen (NORCO)  mg per tablet Take 1 tablet by mouth 4 (four) times daily as needed.      levothyroxine (SYNTHROID) 125 MCG tablet       LORazepam (ATIVAN) 0.5 MG tablet Take 1 tablet (0.5 mg total) by mouth As instructed for Anxiety. (Patient taking differently: Take 0.5 mg by mouth every 12 (twelve) hours as needed for Anxiety.) 30 tablet 0    omega-3 fatty acids/fish oil (FISH OIL-OMEGA-3 FATTY ACIDS) 300-1,000 mg capsule Take by mouth once daily.      pantoprazole (PROTONIX) 40 MG tablet Take 1 tablet (40 mg total) by mouth once daily. 30 tablet 0    sertraline (ZOLOFT) 50 MG tablet sertraline 50 mg tablet      temazepam (RESTORIL) 30 mg capsule Take 30 mg by mouth every evening.      tiZANidine (ZANAFLEX) 4 MG tablet       valsartan (DIOVAN) 160 MG tablet Take 1 tablet (160 mg total) by mouth once daily. 30 tablet 0    [DISCONTINUED] hydroCHLOROthiazide (HYDRODIURIL) 12.5 MG Tab Take 1 tablet (12.5 mg total) by mouth once daily. 30 tablet 11    [DISCONTINUED] omeprazole (PRILOSEC) 40 MG capsule Take 40 mg by mouth every evening.            Physical Exam  Vitals reviewed.   Constitutional:       Appearance: Normal appearance.   HENT:      Head: Normocephalic and atraumatic.      Right Ear: External ear normal.      Left Ear: External ear normal.   Cardiovascular:      Rate and Rhythm: Normal rate and regular  rhythm.      Pulses:           Dorsalis pedis pulses are 2+ on the right side and 2+ on the left side.   Pulmonary:      Effort: Pulmonary effort is normal.   Abdominal:      General: Bowel sounds are normal.      Palpations: Abdomen is soft.   Musculoskeletal:      Right lower leg: No edema.      Left lower leg: No edema.   Skin:     General: Skin is warm and dry.      Findings: Wound present.          Neurological:      Mental Status: She is alert.      Comments: L side weakness  L foot pronates        Please see Wound Docs for complete Wound Assessment and lower extremity assessment when applicable.    Documentation of any procedures can be viewed in Wound Docs if applicable.         Assessment and Plan:    1. Pressure injury of left heel, unstageable  Paint with betadine daily  Float bilateral heels  Waffle boot to left      2. Pressure injury of sacral region, stage 3  Skin prep with foam border, change daily  Turn Q2   Air mattress         Please see Wound Docs for complete plan including patient instructions.     Follow Up & Goals:     Follow up in about 1 week (around 1/10/2023).     Short term goals  Reduction of devitalized tissue  Reduction of bacterial burden  Stimulate and/or maintain acute phases of wound healing  Promote granulation formation  Promote epithelization  Promote compliance with plan of care  Address factors affecting wound healing  Optimize nutritional status    Long term goals  Prevent loss of limb  Prevent infection  Conservative Wound Treatment  Prevent recurrent ulcerations  Resolve wounds

## 2023-01-04 NOTE — PLAN OF CARE
Problem: Infection  Goal: Absence of Infection Signs and Symptoms  Outcome: Ongoing, Progressing    Problem: Skin Injury Risk Increased  Goal: Skin Health and Integrity  Outcome: Ongoing, Progressing     Problem: Adult Inpatient Plan of Care  Goal: Plan of Care Review  Outcome: Ongoing, Progressing  Flowsheets (Taken 1/4/2023 0506)  Plan of Care Reviewed With:   patient   spouse     Problem: Adult Inpatient Plan of Care  Goal: Patient-Specific Goal (Individualized)  Outcome: Ongoing, Progressing     Problem: Adult Inpatient Plan of Care  Goal: Absence of Hospital-Acquired Illness or Injury  Outcome: Ongoing, Progressing     Problem: Adult Inpatient Plan of Care  Goal: Optimal Comfort and Wellbeing  Outcome: Ongoing, Progressing  Intervention: Monitor Pain and Promote Comfort  Flowsheets (Taken 1/4/2023 0507)  Pain Management Interventions: pain management plan reviewed with patient/caregiver     Problem: Adult Inpatient Plan of Care  Goal: Readiness for Transition of Care  Outcome: Ongoing, Progressing     Problem: Impaired Wound Healing  Goal: Optimal Wound Healing  Outcome: Ongoing, Progressing     Problem: Infection (Pneumonia)  Goal: Resolution of Infection Signs and Symptoms  Outcome: Ongoing, Progressing     Problem: Respiratory Compromise (Pneumonia)  Goal: Effective Oxygenation and Ventilation  Outcome: Ongoing, Progressing  Intervention: Promote Airway Secretion Clearance  Flowsheets (Taken 1/4/2023 0507)  Breathing Techniques/Airway Clearance: deep/controlled cough encouraged  Cough And Deep Breathing: done with encouragement  Intervention: Optimize Oxygenation and Ventilation  Flowsheets (Taken 1/4/2023 0507)  Airway/Ventilation Management: airway patency maintained  Head of Bed (HOB) Positioning:   HOB elevated   HOB at 30-45 degrees     Problem: Respiratory Compromise (Pneumonia)  Goal: Effective Oxygenation and Ventilation  Intervention: Promote Airway Secretion Clearance  Flowsheets (Taken 1/4/2023  0507)  Breathing Techniques/Airway Clearance: deep/controlled cough encouraged  Cough And Deep Breathing: done with encouragement     Problem: Respiratory Compromise (Pneumonia)  Goal: Effective Oxygenation and Ventilation  Intervention: Optimize Oxygenation and Ventilation  Flowsheets (Taken 1/4/2023 0507)  Airway/Ventilation Management: airway patency maintained  Head of Bed (HOB) Positioning:   HOB elevated   HOB at 30-45 degrees     Problem: Fall Injury Risk  Goal: Absence of Fall and Fall-Related Injury  Outcome: Ongoing, Progressing  Intervention: Identify and Manage Contributors  Flowsheets (Taken 1/4/2023 0507)  Self-Care Promotion: independence encouraged  Medication Review/Management: medications reviewed     Problem: Fall Injury Risk  Goal: Absence of Fall and Fall-Related Injury  Intervention: Identify and Manage Contributors  Flowsheets (Taken 1/4/2023 0507)  Self-Care Promotion: independence encouraged  Medication Review/Management: medications reviewed

## 2023-01-04 NOTE — DISCHARGE SUMMARY
[facility] - Medical Surgical Unit  Hospital Medicine  Discharge Summary      Patient Name: Herlinda Valdovinos  MRN: [id number]  DANIEL: [id number]  Patient Class: IP- Inpatient  Admission Date: 12/31/2022  Hospital Length of Stay: 4 days  Discharge Date and Time:  01/04/2023 12:34 PM  Attending Physician: Chuy Daugherty IV, DO   Discharging Provider: KEVAN Funk  Primary Care Provider: Vini Hernandez NP    Primary Care Team: Networked reference to record PCT     HPI:   Patient presented to [facility] tonight after her PCP made a house call on her due to her feeling bad and family concerned about her altered mental status. Her PCP called to patient's arrival and reported he treated patient at home for a UTI with 2gm of Rocephin IV along with a liter of NS for dehydation. PCP recommended to family and patient for her to come to the ED for evaluation. EMS reported patient was in the bed and was unable to assist with any transfer to EMS stretcher. EMS also reported patient recognized the medics and called them by name. On arrival to the ED, patient was awake, alert, oriented x3, called nursing staff by name. Patient reports she has been feeling bad for about 8 days but has not told anyone and has not been out of the bed the entire time. Patient has an order for PT/OT outpatient here at [facility], the last documented visit was on 12/8/22. Patient is chronically ill appearing with debilitation. Patient's labs were unremarkable, CBC 9.32, H/H 13.2/39.2, Platelets 219, Na 138, K 4.0, Cl 102, BUN 13, Creat 0.76, Glucose 107. UDS positive for benzos and opiates. Urinalysis positive for trace leukocytes and bacteria (cath specimen). CT head was negative, chest x-ray showed infectious/ inflammatory process. Spoke with patient about admission, she was agreeable to be admitted. Spoke with on call hospitalist at [facility], Dr. Jacome, admit orders and med rec discussed. Will admit patient to inpatient for  UTI, pneumonia and generalized weakness.       * No surgery found *      Hospital Course:   1/1/23 Pt was admitted yesterday from the ER for debiliting illness/decline in health status, probable PNA and UTI .  Pt is well known to the facility from previous swing bed admission.  Pt has blood cultures pending.  She denies cough, fever, chills, sweats and SOB.  She denies abd pain, nausea, dysuria and frequency.  UA showed only trace leuks, 0-5 WBC's and occasional bacterial.  No profound signs of UTI, no UTI symptoms.  She allegedly lives at home with her , who is her caretaker.  She reports she has been in bed the past few weeks.  She now has sacral breakdown from the decreased mobility.  Pt is getting IV Rocephin x5D and PO Azithromycin 500mg x5D.  Pt has consults for PT/OT therapies and wound care.  Will continue the current course of therapies and adjust as needed. I discussed with patient her decline in mobility and health conditions.  I inquired about long term plan for her care at home versus nursing home.  She currently refuses to consider nursing home.    01/02/2023 Awake and alert. Resting in bed. Complains of weakness. States UTI has really gotten her down. 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 Blood culture and urine culture pending. Continue rocephin and Zithromax. Continue physical therapy for strengthening. Talked with patient re code status and she chose DNR status.  01/03/2023 Awake and alert. Resting in bed. States she slept well last night. Reports having a good appetite this morning. Urine culture has been canceled. Wound care consult is in place for skin breakdown. Continue with rocephin and Zithromax. Continue with therapy.   01/04/2023 Nursing staff reports Mrs. Kaur being hard to arouse after restoril last night. Will decrease dose to 15 mg po hs prn and monitor. No complaints this morning.  01/04 Patient has been accepted for swing bed, physical and occupational therapy. She  will also benefit from wound care. She has completed 4 days of treatment for pneumonia and has progressed well. She will discharge from acute care to [facility] today.       Goals of Care Treatment Preferences:  Code Status: DNR      Consults:   Consults (From admission, onward)        Status Ordering Provider     Inpatient consult to Registered Dietitian/Nutritionist  Once        Provider:  (Not yet assigned)    Completed IRAM VELAZQUEZ IV     Inpatient consult to Registered Dietitian/Nutritionist  Once        Provider:  (Not yet assigned)    Completed LEVAR LEE     Inpatient consult to Registered Dietitian/Nutritionist  Once        Provider:  (Not yet assigned)    Completed IRMA VELAZQUEZ IV     IP consult to case management  Once        Provider:  (Not yet assigned)    Acknowledged IRAM VELAZQUEZ IV          Pneumonia of upper lobe due to infectious organism  Blood cultures x2 pending  Patient received Rocephin 2gm prior to arrival per PCP  Patient started on Rocephin 1gm daily and Azithromycin 500mg daily      01/04 will discharge to [facility] today. Has completed 4/5 days of antibiotic therapy for pneumonia- will complete last day in [facility].      Final Active Diagnoses:    Diagnosis Date Noted POA    PRINCIPAL PROBLEM:  Urinary tract infection without hematuria [N39.0] 10/26/2022 Unknown    Skin breakdown [R23.8] 01/01/2023 Yes    Pneumonia of upper lobe due to infectious organism [J18.9] 04/13/2022 Unknown    DVT prophylaxis [Z29.9] 06/10/2021 Not Applicable    Hypertension [I10] 05/20/2021 Yes    Muscle weakness [M62.81] 05/15/2021 Yes      Problems Resolved During this Admission:    Diagnosis Date Noted Date Resolved POA    Chronic pain disorder [G89.4] 10/20/2022 01/01/2023 Yes    Type 2 diabetes mellitus with neurologic complication [E11.49] 05/20/2021 01/01/2023 Yes       Discharged Condition: stable    Disposition: Skilled Nursing Facility    Follow Up:    Patient Instructions:   No  discharge procedures on file.    Significant Diagnostic Studies: Labs: All labs within the past 24 hours have been reviewed    Pending Diagnostic Studies:     Procedure Component Value Units Date/Time    Zinc [[id number]]     Order Status: Sent Lab Status: No result     Specimen: Blood          Medications:  Reconciled Home Medications:      Medication List      START taking these medications    azithromycin 500 MG tablet  Commonly known as: ZITHROMAX  Take 1 tablet (500 mg total) by mouth once daily.  Start taking on: January 5, 2023     calcium carbonate 200 mg calcium (500 mg) chewable tablet  Commonly known as: TUMS  Take 1 tablet (500 mg total) by mouth 2 (two) times daily as needed for Heartburn.     cefTRIAXone 1 gram injection  Commonly known as: ROCEPHIN  Inject 1 g into the muscle once daily. for 1 day     enoxaparin 40 mg/0.4 mL Syrg  Commonly known as: LOVENOX  Inject 0.4 mLs (40 mg total) into the skin every 12 (twelve) hours. for 10 days     LIDOcaine HCL 10 mg/ml (1%) 10 mg/mL (1 %) injection  Inject 2.1 mLs into the muscle once daily. for 1 day  Start taking on: January 5, 2023     melatonin 3 mg tablet  Commonly known as: MELATIN  Take 2 tablets (6 mg total) by mouth nightly as needed for Insomnia.     ondansetron 4 MG Tbdl  Commonly known as: ZOFRAN-ODT  Take 1 tablet (4 mg total) by mouth every 8 (eight) hours as needed.        CHANGE how you take these medications    LORazepam 0.5 MG tablet  Commonly known as: ATIVAN  Take 1 tablet (0.5 mg total) by mouth As instructed for Anxiety.  What changed: when to take this        CONTINUE taking these medications    acetaminophen 325 MG tablet  Commonly known as: TYLENOL  2 tablets PRN     amLODIPine 10 MG tablet  Commonly known as: NORVASC  Take 1 tablet (10 mg total) by mouth once daily.     atorvastatin 40 MG tablet  Commonly known as: LIPITOR  Take 40 mg by mouth every evening.     clopidogreL 75 mg tablet  Commonly known as: PLAVIX  Plavix 75 mg  tablet   Take 1 po QD to prevent stroke     docusate sodium 100 MG capsule  Commonly known as: COLACE  Take 100 mg by mouth 2 (two) times daily.     fish oil-omega-3 fatty acids 300-1,000 mg capsule  Take by mouth once daily.     gabapentin 800 MG tablet  Commonly known as: NEURONTIN  800 mg 3 (three) times daily.     HYDROcodone-acetaminophen  mg per tablet  Commonly known as: NORCO  Take 1 tablet by mouth 4 (four) times daily as needed.     levothyroxine 125 MCG tablet  Commonly known as: SYNTHROID     pantoprazole 40 MG tablet  Commonly known as: PROTONIX  Take 1 tablet (40 mg total) by mouth once daily.     sertraline 50 MG tablet  Commonly known as: ZOLOFT  sertraline 50 mg tablet     valsartan 160 MG tablet  Commonly known as: DIOVAN  Take 1 tablet (160 mg total) by mouth once daily.        STOP taking these medications    albuterol-ipratropium 2.5 mg-0.5 mg/3 mL nebulizer solution  Commonly known as: DUO-NEB     cloNIDine 0.1 MG tablet  Commonly known as: CATAPRES     temazepam 30 mg capsule  Commonly known as: RESTORIL     tiZANidine 4 MG tablet  Commonly known as: ZANAFLEX            Indwelling Lines/Drains at time of discharge:   Lines/Drains/Airways     None                 Time spent on the discharge of patient: 30 minutes         KEVAN Funk  Department of Hospital Medicine  [facility] - Medical Surgical Unit

## 2023-01-04 NOTE — ASSESSMENT & PLAN NOTE
Blood cultures x2 pending  Patient received Rocephin 2gm prior to arrival per PCP  Patient started on Rocephin 1gm daily and Azithromycin 500mg daily      01/04 will discharge to swingbed today. Has completed 4/5 days of antibiotic therapy for pneumonia- will complete last day in swingbed.

## 2023-01-04 NOTE — H&P
[facility] - Medical Surgical Unit  Hospital Medicine  History & Physical    Patient Name: Herlinda Valdovinos  MRN: [id number]  Patient Class: IP- Swing  Admission Date: (Not on file)  Attending Physician: Chuy Daugherty IV, DO   Primary Care Provider: Vini Hernandez NP         Patient information was obtained from patient and ER records.     Subjective:     Principal Problem:Muscle weakness    Chief Complaint:   Chief Complaint   Patient presents with    Weakness        HPI: Mrs. Kaur was admitted to acute care on 01/01 with UTI and pneumonia. Was started on rocephin and zithromax. She has completed 4/5 days of treatment for pneumonia. She has pressure wounds to sacrum, left heel and left groin. Wound care is following. 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 She has been mostly chair/ bed bound. States she has not had wounds until recently. States she felt bad approximately 10 days before admission but did not seek treatment.   She will be admitted to swingbed today to continue therapy for weakness. We will also continue wound care to sacrum, left groin and left heel.   She is at high risk for fall.  Talked with her re code status and she chose DNR.      Past Medical History:   Diagnosis Date    Cerebral hemorrhage     Chronic anxiety     Dehydration     Depression     Dysphagia     Due to and following non-traumatic intracerebral hemorrhage    Hearing loss     History of CVA (cerebrovascular accident)     Hypertension     Hypokalemia 10/20/2022    Insomnia     Intrapontine hemorrhage     Left hemiparesis     Peripheral neuropathy     Stroke     Thyroid disease     Transient cerebral ischemia        Past Surgical History:   Procedure Laterality Date    APPENDECTOMY      CHOLECYSTECTOMY      HYSTERECTOMY      LITHOTRIPSY      Renal lithotripsy    percutaneious insertion of ureteric stent         Review of patient's allergies indicates:   Allergen Reactions     Lamisil [terbinafine] Anaphylaxis    Codeine Itching    Compazine [prochlorperazine] Itching       Current Facility-Administered Medications on File Prior to Encounter   Medication    acetaminophen tablet 650 mg    amLODIPine tablet 10 mg    atorvastatin tablet 40 mg    azithromycin tablet 500 mg    calcium carbonate 200 mg calcium (500 mg) chewable tablet 500 mg    cefTRIAXone injection 1 g    clopidogreL tablet 75 mg    docusate sodium capsule 100 mg    enoxaparin injection 40 mg    gabapentin capsule 800 mg    HYDROcodone-acetaminophen  mg per tablet 1 tablet    levothyroxine tablet 125 mcg    LIDOcaine HCL 10 mg/ml (1%) injection 2.1 mL    LORazepam tablet 0.5 mg    melatonin tablet 6 mg    omega 3-dha-epa-fish oil 1,000 mg (120 mg-180 mg) Cap 1 capsule    ondansetron disintegrating tablet 4 mg    pantoprazole EC tablet 40 mg    sertraline tablet 50 mg    valsartan tablet 160 mg    [DISCONTINUED] melatonin tablet 6 mg    [DISCONTINUED] temazepam capsule 15 mg    [DISCONTINUED] temazepam capsule 30 mg     Current Outpatient Medications on File Prior to Encounter   Medication Sig    acetaminophen (TYLENOL) 325 MG tablet 2 tablets PRN    amLODIPine (NORVASC) 10 MG tablet Take 1 tablet (10 mg total) by mouth once daily.    atorvastatin (LIPITOR) 40 MG tablet Take 40 mg by mouth every evening.    [START ON 1/5/2023] azithromycin (ZITHROMAX) 500 MG tablet Take 1 tablet (500 mg total) by mouth once daily.    calcium carbonate (TUMS) 200 mg calcium (500 mg) chewable tablet Take 1 tablet (500 mg total) by mouth 2 (two) times daily as needed for Heartburn.    cefTRIAXone (ROCEPHIN) 1 gram injection Inject 1 g into the muscle once daily. for 1 day    clopidogreL (PLAVIX) 75 mg tablet Plavix 75 mg tablet   Take 1 po QD to prevent stroke    docusate sodium (COLACE) 100 MG capsule Take 100 mg by mouth 2 (two) times daily.    enoxaparin (LOVENOX) 40 mg/0.4 mL Syrg Inject 0.4 mLs (40 mg total) into the skin every 12  (twelve) hours. for 10 days    gabapentin (NEURONTIN) 800 MG tablet 800 mg 3 (three) times daily.    HYDROcodone-acetaminophen (NORCO)  mg per tablet Take 1 tablet by mouth 4 (four) times daily as needed.    levothyroxine (SYNTHROID) 125 MCG tablet     [START ON 1/5/2023] LIDOcaine HCL 10 mg/ml, 1%, 10 mg/mL (1 %) injection Inject 2.1 mLs into the muscle once daily. for 1 day    LORazepam (ATIVAN) 0.5 MG tablet Take 1 tablet (0.5 mg total) by mouth As instructed for Anxiety. (Patient taking differently: Take 0.5 mg by mouth every 12 (twelve) hours as needed for Anxiety.)    melatonin (MELATIN) 3 mg tablet Take 2 tablets (6 mg total) by mouth nightly as needed for Insomnia.    omega-3 fatty acids/fish oil (FISH OIL-OMEGA-3 FATTY ACIDS) 300-1,000 mg capsule Take by mouth once daily.    ondansetron (ZOFRAN-ODT) 4 MG TbDL Take 1 tablet (4 mg total) by mouth every 8 (eight) hours as needed.    pantoprazole (PROTONIX) 40 MG tablet Take 1 tablet (40 mg total) by mouth once daily.    sertraline (ZOLOFT) 50 MG tablet sertraline 50 mg tablet    valsartan (DIOVAN) 160 MG tablet Take 1 tablet (160 mg total) by mouth once daily.    [DISCONTINUED] albuterol-ipratropium (DUO-NEB) 2.5 mg-0.5 mg/3 mL nebulizer solution Take 3 mLs by nebulization every 6 (six) hours while awake. Rescue    [DISCONTINUED] cloNIDine (CATAPRES) 0.1 MG tablet Take 1 tablet (0.1 mg total) by mouth every 8 (eight) hours as needed (160).    [DISCONTINUED] hydroCHLOROthiazide (HYDRODIURIL) 12.5 MG Tab Take 1 tablet (12.5 mg total) by mouth once daily.    [DISCONTINUED] omeprazole (PRILOSEC) 40 MG capsule Take 40 mg by mouth every evening.    [DISCONTINUED] temazepam (RESTORIL) 30 mg capsule Take 30 mg by mouth every evening.    [DISCONTINUED] tiZANidine (ZANAFLEX) 4 MG tablet      Family History    None       Tobacco Use    Smoking status: Never    Smokeless tobacco: Never   Substance and Sexual Activity    Alcohol use: Never    Drug use: Never     Sexual activity: Not on file     Review of Systems   Constitutional:  Positive for appetite change.   HENT:  Negative for postnasal drip, sinus pressure and sore throat.    Eyes:  Negative for pain, discharge and redness.   Respiratory:  Negative for cough and shortness of breath.    Cardiovascular:  Negative for chest pain.   Gastrointestinal:  Negative for constipation, diarrhea, nausea and vomiting.   Genitourinary:  Negative for dysuria and frequency.   Skin:  Positive for wound.        Pressure wound to left groin and sacrum. Blister to left heel.   Neurological:  Positive for weakness.   Psychiatric/Behavioral:  The patient is nervous/anxious.    Objective:     Vital Signs (Most Recent):    Vital Signs (24h Range):  Temp:  [97.9 °F (36.6 °C)-99.2 °F (37.3 °C)] 97.9 °F (36.6 °C)  Pulse:  [83-86] 83  Resp:  [18-20] 18  SpO2:  [93 %-95 %] 94 %  BP: (133-138)/(86-96) 133/96        There is no height or weight on file to calculate BMI.    Physical Exam  Constitutional:       Comments: Chronically ill appearing   HENT:      Head: Normocephalic.      Mouth/Throat:      Mouth: Mucous membranes are moist.   Eyes:      Pupils: Pupils are equal, round, and reactive to light.   Cardiovascular:      Rate and Rhythm: Normal rate and regular rhythm.      Pulses: Normal pulses.      Heart sounds: Normal heart sounds.   Pulmonary:      Effort: Pulmonary effort is normal.      Breath sounds: Normal breath sounds.   Abdominal:      General: Abdomen is flat. Bowel sounds are normal.      Palpations: Abdomen is soft.      Comments: BM today   Skin:     General: Skin is warm and dry.      Comments: Sacral and left groin pressure wound, left heel blister medially   Neurological:      Mental Status: She is alert and oriented to person, place, and time.      Motor: Weakness present.      Coordination: Coordination abnormal.   Psychiatric:         Behavior: Behavior normal.         CRANIAL NERVES     CN III, IV, VI   Pupils are equal,  round, and reactive to light.     Significant Labs: All pertinent labs within the past 24 hours have been reviewed.  Recent Lab Results         01/04/23  0513        Anion Gap 8       Baso # 0.02       Basophil % 0.3       BUN 10       BUN/CREAT RATIO 15       Calcium 8.7       Chloride 102       CO2 36       Creatinine 0.65       Differential Type Manual       eGFR 103       Eos # 0.35       Eosinophil % 5.5        5       Glucose 116       Hematocrit 39.2       Hemoglobin 13.3       Lymph # 1.85       Lymph % 29.3        24       Magnesium 1.8       MCH 28.5       MCHC 33.9       MCV 83.9       Mono # 0.65       Mono % 10.3        9       MPV 9.5       Neutrophils, Abs 3.44       Neutrophils Relative 54.6       nRBC       PLATELET MORPHOLOGY Normal       Platelets 251       Potassium 3.5       RBC 4.67       RBC Morphology Normal       RDW 14.3       Segmented Neutrophils, Man % 62       Sodium 142       WBC 6.31               Significant Imaging: I have reviewed all pertinent imaging results/findings within the past 24 hours.    Assessment/Plan:     Skin breakdown  01/04/2023  Wound care consult placed.  Continue wound care to left groin, sacrum and left heel  Low air loss bed  Turn every 2 hours and prn   Float heels      Dyslipidemia  01/04/2023  -continue atorvastatin 40 mg po daily      Thyroid disease  01/04/2023  -continue levothyroxine 125 mcg po daily      Pneumonia of upper lobe due to infectious organism  01/04/2023  -has completed 4/5 days of antibiotcs- continue rocephin and zithromax for one more day      DVT prophylaxis  01/04/2023  -lovenox 40 mg sq every 12 hours      Idiopathic peripheral neuropathy  01/04/2023  -gabapentin 800 mg po tid       Insomnia  01/04/2023  -melatonin at hs prn       Hypertension  01/04/2023  Valsartan 160 mg po daily      Depressive disorder  01/04/2023  Sertraline 50 mg po daily        Generalized anxiety disorder  01/04/2023  -lorazepam prn every 12 hours prn        Muscle weakness  01/01/2023  Physical and occupational therapy consults placed      Acute cerebrovascular insufficiency transient focal neurologic deficit  01/04/2023  -has hx of cva in April 2021  -continue plavix        VTE Risk Mitigation (From admission, onward)      None               Chuy Daugherty IV, DO  Department of Hospital Medicine   [facility] - [facility]

## 2023-01-04 NOTE — PLAN OF CARE
Problem: Occupational Therapy  Goal: Occupational Therapy Goal  Description: Goals to be met by: 2 weeks     Patient will increase functional independence with ADLs by performing:    UE Dressing with Contact Guard Assistance.  LE Dressing with Minimal Assistance.  Toileting from toilet with Contact Guard Assistance for hygiene and clothing management.   Bathing from  edge of bed with Minimal Assistance.  Toilet transfer to toilet with Contact Guard Assistance.    Goals to be met by: 4 weeks     Patient will increase functional independence with ADLs by performing:    UE Dressing with Set-up Assistance.  LE Dressing with Set-up Assistance.  Toileting from toilet with Modified Johnson for hygiene and clothing management.   Bathing from  shower chair/bench with Set-up Assistance.  Toilet transfer to toilet with Modified Johnson.      Outcome: Ongoing, Progressing     MARCUS Levy, OTR/L  1/3/2023

## 2023-01-04 NOTE — PT/OT/SLP EVAL
Occupational Therapy   Evaluation    Name: Herlinda Valdovinos  MRN: [id number]  Admitting Diagnosis: Urinary tract infection without hematuria  Recent Surgery: * No surgery found *      Recommendations:     Discharge Recommendations: home health OT, home health PT versus SNF/FCI. Pt would benefit from skilled placement to prevent repetitive decline attributed to poor ability to manage health and stress level in current home environment.   Discharge Equipment Recommendations:     Barriers to discharge:       Assessment:     Herlinda Valdovinos is a 57 y.o. female with a medical diagnosis of Urinary tract infection without hematuria.  She presents with hospitalization for UTI. Performance deficits affecting function: weakness, impaired endurance, impaired self care skills, impaired functional mobility, gait instability, impaired balance, pain.      Rehab Prognosis: Good; patient would benefit from acute skilled OT services to address these deficits and reach maximum level of function.       Plan:     Patient to be seen 5 x/week to address the above listed problems via self-care/home management, therapeutic activities, therapeutic exercises, neuromuscular re-education  Plan of Care Expires: 01/31/23  Plan of Care Reviewed with: patient    Subjective     Chief Complaint: Weakness  Patient/Family Comments/goals: Return home.     Occupational Profile:  Living Environment: Lives in 1 level home with significant other, son and granddaughter.   Previous level of function: Set-up for BADLs prior to recent decline.   Roles and Routines: Significant other performs all IADLs.   Equipment Used at Home: walker, rolling, bedside commode, wheelchair  Assistance upon Discharge: Will return home with significant other.     Pain/Comfort:   8/10 L heel at site of wound with pressure off loading, positioning boot found in poor position on L foot. Assisted with repositioning, removing boot and placing L foot, floating on pillows, no pressure to  heal at all. Pt reported significant improvement in pain with removal of boot and repositioning. D/t poor position of boot, the boot was actually pressing in to wound on heel. Notified nurseJill.    6/10 L heel    Patients cultural, spiritual, Tenriism conflicts given the current situation: no    Objective:     Communicated with: Nurse prior to session.  Patient found supine with oxygen upon OT entry to room.    General Precautions: Standard, fall  Orthopedic Precautions:    Braces:    Respiratory Status: Nasal cannula, flow 2 L/min    Occupational Performance:    Bed Mobility:    Patient completed Supine to Sit with minimum assistance  Patient completed Sit to Supine with minimum assistance    Functional Mobility/Transfers:  Patient completed Sit <> Stand Transfer with minimum assistance  with  rolling walker   Functional Mobility: Unable d/t pain in L heel    Activities of Daily Living:  Upper Body Dressing: minimum assistance seated  Lower Body Dressing: moderate assistance seated    Cognitive/Visual Perceptual:  Cognitive/Psychosocial Skills:     -       Oriented to: Person, Place, Time, and Situation     Physical Exam:  Upper Extremity Strength:    -       Right Upper Extremity: 3+/5  -       Left Upper Extremity: 3-/5    AMPAC 6 Click ADL:  AMPAC Total Score:  16    Treatment & Education:  Pt educated on OT role/POC.   Importance of OOB activity with staff assistance.  Importance of sitting up in the chair throughout the day as tolerated, especially for meals   Safety during functional t/f and mobility  Importance of assisting with self-care activities    Patient left supine with call button in reach, nurse notified, and L heel floating on pillow d/t L heel pain with off loading positioning boot in bed.     GOALS:   Multidisciplinary Problems       Occupational Therapy Goals          Problem: Occupational Therapy    Goal Priority Disciplines Outcome Interventions   Occupational Therapy Goal     OT, PT/OT  Ongoing, Progressing    Description: Goals to be met by: 2 weeks     Patient will increase functional independence with ADLs by performing:    UE Dressing with Contact Guard Assistance.  LE Dressing with Minimal Assistance.  Toileting from toilet with Contact Guard Assistance for hygiene and clothing management.   Bathing from  edge of bed with Minimal Assistance.  Toilet transfer to toilet with Contact Guard Assistance.    Goals to be met by: 4 weeks     Patient will increase functional independence with ADLs by performing:    UE Dressing with Set-up Assistance.  LE Dressing with Set-up Assistance.  Toileting from toilet with Modified [place] for hygiene and clothing management.   Bathing from  shower chair/bench with Set-up Assistance.  Toilet transfer to toilet with Modified [place].                           History:     Past Medical History:   Diagnosis Date    Cerebral hemorrhage     Chronic anxiety     Dehydration     Depression     Dysphagia     Due to and following non-traumatic intracerebral hemorrhage    Hearing loss     History of CVA (cerebrovascular accident)     Hypertension     Hypokalemia 10/20/2022    Insomnia     Intrapontine hemorrhage     Left hemiparesis     Peripheral neuropathy     Stroke     Thyroid disease     Transient cerebral ischemia          Past Surgical History:   Procedure Laterality Date    APPENDECTOMY      CHOLECYSTECTOMY      HYSTERECTOMY      LITHOTRIPSY      Renal lithotripsy    percutaneious insertion of ureteric stent         Time Tracking:     OT Date of Treatment: 01/03/23  OT Start Time: 1500  OT Stop Time: 1535  OT Total Time (min): 35 min    Billable Minutes:Evaluation 35    MARCUS Levy, OTR/L      1/3/2023

## 2023-01-04 NOTE — CONSULTS
[facility] - Medical Surgical Unit  Adult Nutrition  Consult Note    SUMMARY     Recommendations    Recommendation/Intervention: 1. Regular diet 2. Check Zinc level for deficiency and supplement as needed 3. 500mg Vit C po BID 4. Ensure Clear one carton po BID 5. MVM daily  Goals: Meet EEN >75% to aid in WND healing and improve nutritional status  Nutrition Goal Status: new    Assessment and Plan    No new Assessment & Plan notes have been filed under this hospital service since the last note was generated.  Service: Nutrition       Malnutrition Assessment  Malnutrition Type: chronic illness, acute illness or injury  Skin (Micronutrient): wounds unhealed       Energy Intake (Malnutrition): less than 75% for greater than 7 days                    Moderate Weight Loss (Malnutrition):  (8% WL in 2 months)    Reason for Assessment    Reason For Assessment: consult (consult)  Diagnosis:  (UTI, PNA, change in MS, WL)  Relevant Medical History: CVA, UTIs, Anxiety, Dehydration, [place], Neuropathy, Hypothyroidism  Interdisciplinary Rounds: did not attend  General Information Comments: Pt known to me from previous admits.  RDM visited pt this a.m.  She is not feeling welll and has lost ~10# since 11/22. She has skin breakdown to her sacrum, L buttock and other multiple high risk area. Meal intake ~60%.  She has poor dentition but denied need to modify texture of diet.  She likes Ensure Clear.  RDN enc pt to voice prefs as needed.    Nutrition Risk Screen    Nutrition Risk Screen: reduced oral intake over the last month, unintentional loss of 10 lbs or more in the past 2 months    Nutrition/Diet History    Patient Reported Diet/Restrictions/Preferences: general  Spiritual, Cultural Beliefs, Spiritism Practices, Values that Affect Care: no  Food Allergies: NKFA  Factors Affecting Nutritional Intake: chewing difficulties/inability to chew food, decreased appetite, depression    Anthropometrics    Temp: 97.9 °F (36.6  "°C)  Height Method: Stated  Height: 5' 4" (162.6 cm)  Height (inches): 64 in  Weight Method: Bed Scale  Weight: 64.9 kg (143 lb)  Weight (lb): 143 lb  Ideal Body Weight (IBW), Female: 120 lb  % Ideal Body Weight, Female (lb): 119.17 %  BMI (Calculated): 24.5  BMI Grade: 18.5-24.9 - normal       Lab/Procedures/Meds    Pertinent Labs Reviewed: reviewed  Pertinent Labs Comments: B-129, Alb 2.6  Pertinent Medications Reviewed: reviewed  Pertinent Medications Comments: Lipitor, Azithromycin, Rocephin, Synthroid, Omega 3, Protonix, Zoloft    Physical Findings/Assessment         Estimated/Assessed Needs    Weight Used For Calorie Calculations: 64.9 kg (143 lb)  Energy Calorie Requirements (kcal): 1550-3816  Energy Need Method: Kcal/kg  Protein Requirements: 78-98  Weight Used For Protein Calculations: 65 kg (143 lb 4.8 oz)     Estimated Fluid Requirement Method: RDA Method  RDA Method (mL):          Nutrition Prescription Ordered    Current Diet Order: Cardiac    Evaluation of Received Nutrient/Fluid Intake    Energy Calories Required: not meeting needs  Protein Required: not meeting needs  Fluid Required: not meeting needs  Tolerance: not tolerating  % Intake of Estimated Energy Needs: 25 - 50 %  % Meal Intake: 25 - 50 %    Nutrition Risk    Level of Risk/Frequency of Follow-up: high       Monitor and Evaluation    Food and Nutrient Intake: food and beverage intake  Anthropometric Measurements: weight  Biochemical Data, Medical Tests and Procedures: electrolyte and renal panel  Nutrition-Focused Physical Findings: overall appearance, skin       Nutrition Follow-Up    RD Follow-up?: Yes  "

## 2023-01-04 NOTE — PT/OT/SLP PROGRESS
Physical Therapy Treatment    Patient Name:  Herlinda Valdovinos   MRN:  [id number]    Recommendations:     Discharge Recommendations: nursing facility, skilled, home health PT, intermediate care facility/nursing home, rehabilitation facility  Discharge Equipment Recommendations: cane, quad, cane, straight  Barriers to discharge: None    Assessment:     Herlinda Valdovinos is a 57 y.o. female admitted with a medical diagnosis of Urinary tract infection without hematuria.  She presents with the following impairments/functional limitations: weakness, impaired endurance, impaired functional mobility, gait instability, impaired balance, decreased coordination, decreased lower extremity function, decreased safety awareness.    Rehab Prognosis: Good; patient would benefit from acute skilled PT services to address these deficits and reach maximum level of function.    Recent Surgery: * No surgery found *      Plan:     During this hospitalization, patient to be seen 5 x/week to address the identified rehab impairments via therapeutic exercises, therapeutic activities, gait training and progress toward the following goals:    Plan of Care Expires:  01/30/23    Subjective     Chief Complaint: weakness  Patient/Family Comments/goals: Pt wishes to improve strength and functional mobility in order to return home with family  Pain/Comfort:  Pain Rating 1: 5/10  Location - Side 1: Right  Location 1: heel  Pain Addressed 1: Reposition  Pain Rating Post-Intervention 1: 4/10      Objective:     Communicated with OT Kiana Rasmussen prior to session.  Patient found supine with   upon PT entry to room.     General Precautions: Standard, fall  Orthopedic Precautions: N/A  Braces: N/A  Respiratory Status: Nasal cannula, flow 2 L/min     Functional Mobility:  Bed Mobility:     Supine to Sit: minimum assistance  Sit to Supine: minimum assistance  Transfers:     Sit to Stand:  minimum assistance with rolling walker  Gait: Pt able to take 3 side  steps towards HOB using RW and CGA with verbal cues to keep L heel offloaded d/t wound.      AM-PAC 6 CLICK MOBILITY  Turning over in bed (including adjusting bedclothes, sheets and blankets)?: 3  Sitting down on and standing up from a chair with arms (e.g., wheelchair, bedside commode, etc.): 3  Moving from lying on back to sitting on the side of the bed?: 3  Moving to and from a bed to a chair (including a wheelchair)?: 2  Need to walk in hospital room?: 2  Climbing 3-5 steps with a railing?: 1  Basic Mobility Total Score: 14       Treatment & Education:  Patient able to perform sit to stand transfer at [place] and able to stay standing approximately 2-3 min before fatiguing. Patient with heavy posterior lean at beginning of stand requiring Min/ModA and verbal/tactile cues to correct. Patient able to take small side steps toward HOB using RW and CGA and verbal cues to keep L heel offloaded d/t wound.    Patient left sitting edge of bed with   and OT present..    GOALS:   Multidisciplinary Problems       Physical Therapy Goals          Problem: Physical Therapy    Goal Priority Disciplines Outcome Goal Variances Interventions   Physical Therapy Goal     PT, PT/OT Ongoing, Progressing     Description: Short-term Goals: 2 weeks  1. Patient will perform supine to/from sit with minimal assistance to improve independence and safety with bed mobility.  2. Patient will perform sit to/from stand with rolling walker with minimal assistance to improve independence and safety with transfers.  3. Patient will ambulate 25 feet with rolling walker with minimal assistance to improve independence and safety with gait.  4. Patient will tolerate 15 minutes of physical therapy intervention to improve endurance and activity tolerance.    Long-term goals: 4 weeks  1. Patient will perform supine to/from sit with contact guard assist to improve independence and safety with bed mobility.  2. Patient will perform sit to/from stand with  rolling walker with contact guard assist to improve independence and safety with transfers.  3. Patient will ambulate 50 feet with rolling walker with contact guard assist to improve independence and safety with gait.  4. Patient will tolerate 30 minutes of physical therapy intervention to improve endurance and activity tolerance.                         Time Tracking:     PT Received On: 01/04/23  PT Start Time: 1044     PT Stop Time: 1100  PT Total Time (min): 16 min     Billable Minutes: Therapeutic Activity 16    Treatment Type: Treatment  PT/PTA: PTA     PTA Visit Number: 2     01/04/2023

## 2023-01-04 NOTE — ASSESSMENT & PLAN NOTE
Urine culture sent  Patient received 2gm of Rocephin IV at home today per PCP  Patient started on Rocephin daily     01/02 culture pending    01/03 urine culture canceled

## 2023-01-04 NOTE — PROGRESS NOTES
[facility] - Medical Surgical Unit  Hospital Medicine  Progress Note    Patient Name: Herlinda Valdovinos  MRN: [id number]  Patient Class: IP- Inpatient   Admission Date: 12/31/2022  Length of Stay: 4 days  Attending Physician: Chuy Daugherty IV, DO  Primary Care Provider: Vini Hernandez NP        Subjective:     Principal Problem:Urinary tract infection without hematuria        HPI:  Patient presented to [facility] tonight after her PCP made a house call on her due to her feeling bad and family concerned about her altered mental status. Her PCP called to patient's arrival and reported he treated patient at home for a UTI with 2gm of Rocephin IV along with a liter of NS for dehydation. PCP recommended to family and patient for her to come to the ED for evaluation. EMS reported patient was in the bed and was unable to assist with any transfer to EMS stretcher. EMS also reported patient recognized the medics and called them by name. On arrival to the ED, patient was awake, alert, oriented x3, called nursing staff by name. Patient reports she has been feeling bad for about 8 days but has not told anyone and has not been out of the bed the entire time. Patient has an order for PT/OT outpatient here at [facility], the last documented visit was on 12/8/22. Patient is chronically ill appearing with debilitation. Patient's labs were unremarkable, CBC 9.32, H/H 13.2/39.2, Platelets 219, Na 138, K 4.0, Cl 102, BUN 13, Creat 0.76, Glucose 107. UDS positive for benzos and opiates. Urinalysis positive for trace leukocytes and bacteria (cath specimen). CT head was negative, chest x-ray showed infectious/ inflammatory process. Spoke with patient about admission, she was agreeable to be admitted. Spoke with on call hospitalist at [facility], Dr. Jacome, admit orders and med rec discussed. Will admit patient to inpatient for UTI, pneumonia and generalized weakness.       Overview/Hospital Course:  1/1/23 Pt was admitted  yesterday from the ER for debiliting illness/decline in health status, probable PNA and UTI .  Pt is well known to the facility from previous swing bed admission.  Pt has blood cultures pending.  She denies cough, fever, chills, sweats and SOB.  She denies abd pain, nausea, dysuria and frequency.  UA showed only trace leuks, 0-5 WBC's and occasional bacterial.  No profound signs of UTI, no UTI symptoms.  She allegedly lives at home with her , who is her caretaker.  She reports she has been in bed the past few weeks.  She now has sacral breakdown from the decreased mobility.  Pt is getting IV Rocephin x5D and PO Azithromycin 500mg x5D.  Pt has consults for PT/OT therapies and wound care.  Will continue the current course of therapies and adjust as needed. I discussed with patient her decline in mobility and health conditions.  I inquired about long term plan for her care at home versus nursing home.  She currently refuses to consider nursing home.    01/02/2023 Awake and alert. Resting in bed. Complains of weakness. States UTI has really gotten her down. 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 Blood culture and urine culture pending. Continue rocephin and Zithromax. Continue physical therapy for strengthening. Talked with patient re code status and she chose DNR status.  01/03/2023 Awake and alert. Resting in bed. States she slept well last night. Reports having a good appetite this morning. Urine culture has been canceled. Wound care consult is in place for skin breakdown. Continue with rocephin and Zithromax. Continue with therapy.   01/04/2023 Nursing staff reports Mrs. Kaur being hard to arouse after restoril last night. Will decrease dose to 15 mg po hs prn and monitor. No complaints this morning.      Interval History: Pneumonia  Review of Systems   Constitutional:  Negative for activity change, appetite change, fatigue and fever.   HENT:  Negative for congestion, ear pain, sinus pain and  sneezing.    Eyes:  Negative for pain.   Respiratory:  Negative for cough, shortness of breath and wheezing.    Cardiovascular:  Negative for chest pain and palpitations.   Gastrointestinal:  Negative for abdominal pain, constipation, diarrhea and nausea.   Endocrine: Negative for cold intolerance and heat intolerance.   Genitourinary:  Negative for dysuria and vaginal discharge.   Musculoskeletal:  Negative for back pain.   Skin:  Negative for color change and rash.        Skin breakdown to sacrum, left groin and left heel   Allergic/Immunologic: Negative for environmental allergies, food allergies and immunocompromised state.   Neurological:  Positive for weakness. Negative for dizziness and headaches.   Hematological:  Does not bruise/bleed easily.   Psychiatric/Behavioral:  Negative for sleep disturbance and suicidal ideas.    Objective:     Vital Signs (Most Recent):  Temp: 97.9 °F (36.6 °C) (01/04/23 0738)  Pulse: 83 (01/04/23 0738)  Resp: 18 (01/04/23 0738)  BP: (!) 133/96 (01/04/23 0738)  SpO2: (!) 94 % (01/04/23 0738)   Vital Signs (24h Range):  Temp:  [97.9 °F (36.6 °C)-99.2 °F (37.3 °C)] 97.9 °F (36.6 °C)  Pulse:  [83-88] 83  Resp:  [18-20] 18  SpO2:  [93 %-95 %] 94 %  BP: (133-138)/(86-96) 133/96     Weight: 64.9 kg (143 lb)  Body mass index is 24.55 kg/m².    Intake/Output Summary (Last 24 hours) at 1/4/2023 0824  Last data filed at 1/4/2023 0805  Gross per 24 hour   Intake 720 ml   Output --   Net 720 ml      Physical Exam  Constitutional:       Appearance: She is normal weight.      Comments: Chronically ill appearance   HENT:      Head: Normocephalic.      Nose: Nose normal.      Mouth/Throat:      Mouth: Mucous membranes are moist.      Pharynx: Oropharynx is clear.   Eyes:      Extraocular Movements: Extraocular movements intact.      Conjunctiva/sclera: Conjunctivae normal.      Pupils: Pupils are equal, round, and reactive to light.   Cardiovascular:      Rate and Rhythm: Normal rate and regular  rhythm.      Pulses: Normal pulses.      Heart sounds: Normal heart sounds.   Pulmonary:      Effort: Pulmonary effort is normal.      Breath sounds: Normal breath sounds.   Abdominal:      General: Bowel sounds are normal.      Palpations: Abdomen is soft.   Musculoskeletal:         General: Normal range of motion.      Cervical back: Normal range of motion and neck supple.   Skin:     General: Skin is warm and dry.      Comments: Left heel blister intact, heels floated   Left groin wound  Sacral wound    Neurological:      Mental Status: She is alert and oriented to person, place, and time.      Motor: Weakness present.   Psychiatric:         Mood and Affect: Mood normal.       Significant Labs: All pertinent labs within the past 24 hours have been reviewed.  Recent Lab Results         01/04/23  0513        Anion Gap 8       Baso # 0.02       Basophil % 0.3       BUN 10       BUN/CREAT RATIO 15       Calcium 8.7       Chloride 102       CO2 36       Creatinine 0.65       Differential Type Manual       eGFR 103       Eos # 0.35       Eosinophil % 5.5        5       Glucose 116       Hematocrit 39.2       Hemoglobin 13.3       Lymph # 1.85       Lymph % 29.3        24       Magnesium 1.8       MCH 28.5       MCHC 33.9       MCV 83.9       Mono # 0.65       Mono % 10.3        9       MPV 9.5       Neutrophils, Abs 3.44       Neutrophils Relative 54.6       nRBC       PLATELET MORPHOLOGY Normal       Platelets 251       Potassium 3.5       RBC 4.67       RBC Morphology Normal       RDW 14.3       Segmented Neutrophils, Man % 62       Sodium 142       WBC 6.31               Significant Imaging: I have reviewed all pertinent imaging results/findings within the past 24 hours.      Assessment/Plan:      * Urinary tract infection without hematuria  Urine culture sent  Patient received 2gm of Rocephin IV at home today per PCP  Patient started on Rocephin daily     01/02 culture pending    01/03 urine culture  canceled    Skin breakdown  Consult wound care therapy this week.  Wound care daily.     01/02 wound care consult for sacral decubitus and blister to left medial heel      Pneumonia of upper lobe due to infectious organism  Blood cultures x2 pending  Patient received Rocephin 2gm prior to arrival per PCP  Patient started on Rocephin 1gm daily and Azithromycin 500mg daily      DVT prophylaxis  Patient started on Lovenox 40mg SQ daily  TEDS ordered      Hypertension  Monitor blood pressure  Continue home medications including Norvasc and Valsartan      Muscle weakness  PT/ OT evaluation and treatment         VTE Risk Mitigation (From admission, onward)         Ordered     enoxaparin injection 40 mg  Every 12 hours         01/01/23 0028     IP VTE HIGH RISK PATIENT  Once         01/01/23 0028     Place OCTAVIA hose  Until discontinued         01/01/23 0028                Discharge Planning   POOL:      Code Status: DNR   Is the patient medically ready for discharge?:     Reason for patient still in hospital (select all that apply): Treatment                     KEVAN Funk  Department of Hospital Medicine   [facility] - Medical Surgical Unit

## 2023-01-04 NOTE — PLAN OF CARE
Problem: Fall Injury Risk  Goal: Absence of Fall and Fall-Related Injury  Outcome: Ongoing, Progressing  Intervention: Promote Injury-Free Environment  Flowsheets (Taken 1/4/2023 8635)  Safety Promotion/Fall Prevention:   assistive device/personal item within reach   side rails raised x 3   instructed to call staff for mobility   room near unit station   nonskid shoes/socks when out of bed

## 2023-01-05 LAB
ANION GAP SERPL CALCULATED.3IONS-SCNC: 12 MMOL/L (ref 7–16)
BUN SERPL-MCNC: 11 MG/DL (ref 7–18)
BUN/CREAT SERPL: 15 (ref 6–20)
CALCIUM SERPL-MCNC: 8.5 MG/DL (ref 8.5–10.1)
CHLORIDE SERPL-SCNC: 103 MMOL/L (ref 98–107)
CO2 SERPL-SCNC: 28 MMOL/L (ref 21–32)
CREAT SERPL-MCNC: 0.73 MG/DL (ref 0.55–1.02)
EGFR (NO RACE VARIABLE) (RUSH/TITUS): 97 ML/MIN/1.73M²
GLUCOSE SERPL-MCNC: 117 MG/DL (ref 74–106)
POTASSIUM SERPL-SCNC: 3.1 MMOL/L (ref 3.5–5.1)
SODIUM SERPL-SCNC: 140 MMOL/L (ref 136–145)

## 2023-01-05 PROCEDURE — 99307 PR NURSING FAC CARE, SUBSEQ, IMPROVING: ICD-10-PCS | Mod: ,,, | Performed by: NURSE PRACTITIONER

## 2023-01-05 PROCEDURE — 11000004 HC SNF PRIVATE

## 2023-01-05 PROCEDURE — 94761 N-INVAS EAR/PLS OXIMETRY MLT: CPT

## 2023-01-05 PROCEDURE — 80048 BASIC METABOLIC PNL TOTAL CA: CPT | Performed by: NURSE PRACTITIONER

## 2023-01-05 PROCEDURE — 63700000 PHARM REV CODE 250 ALT 637 W/O HCPCS: Performed by: NURSE PRACTITIONER

## 2023-01-05 PROCEDURE — 99307 SBSQ NF CARE SF MDM 10: CPT | Mod: ,,, | Performed by: NURSE PRACTITIONER

## 2023-01-05 PROCEDURE — 97161 PT EVAL LOW COMPLEX 20 MIN: CPT

## 2023-01-05 PROCEDURE — 27000221 HC OXYGEN, UP TO 24 HOURS

## 2023-01-05 PROCEDURE — 97166 OT EVAL MOD COMPLEX 45 MIN: CPT

## 2023-01-05 PROCEDURE — 63600175 PHARM REV CODE 636 W HCPCS: Performed by: NURSE PRACTITIONER

## 2023-01-05 PROCEDURE — 36415 COLL VENOUS BLD VENIPUNCTURE: CPT | Performed by: NURSE PRACTITIONER

## 2023-01-05 PROCEDURE — 25000003 PHARM REV CODE 250: Performed by: NURSE PRACTITIONER

## 2023-01-05 PROCEDURE — 27000944

## 2023-01-05 RX ORDER — CALCIUM CARBONATE 200(500)MG
500 TABLET,CHEWABLE ORAL 2 TIMES DAILY PRN
Status: CANCELLED | OUTPATIENT
Start: 2023-01-05

## 2023-01-05 RX ORDER — POTASSIUM CHLORIDE 20 MEQ/1
20 TABLET, EXTENDED RELEASE ORAL 2 TIMES DAILY
Status: DISCONTINUED | OUTPATIENT
Start: 2023-01-05 | End: 2023-01-05 | Stop reason: SDUPTHER

## 2023-01-05 RX ORDER — SERTRALINE HYDROCHLORIDE 50 MG/1
150 TABLET, FILM COATED ORAL DAILY
Status: DISCONTINUED | OUTPATIENT
Start: 2023-01-06 | End: 2023-01-25 | Stop reason: HOSPADM

## 2023-01-05 RX ORDER — TALC
6 POWDER (GRAM) TOPICAL NIGHTLY PRN
Status: CANCELLED | OUTPATIENT
Start: 2023-01-05

## 2023-01-05 RX ORDER — SERTRALINE HYDROCHLORIDE 50 MG/1
100 TABLET, FILM COATED ORAL DAILY
Status: COMPLETED | OUTPATIENT
Start: 2023-01-05 | End: 2023-01-05

## 2023-01-05 RX ORDER — AMOXICILLIN 250 MG
1 CAPSULE ORAL 2 TIMES DAILY
Status: CANCELLED | OUTPATIENT
Start: 2023-01-05

## 2023-01-05 RX ORDER — POTASSIUM CHLORIDE 20 MEQ/1
20 TABLET, EXTENDED RELEASE ORAL 2 TIMES DAILY
Status: COMPLETED | OUTPATIENT
Start: 2023-01-05 | End: 2023-01-05

## 2023-01-05 RX ORDER — ACETAMINOPHEN 325 MG/1
650 TABLET ORAL EVERY 6 HOURS PRN
Status: CANCELLED | OUTPATIENT
Start: 2023-01-05

## 2023-01-05 RX ORDER — SERTRALINE HYDROCHLORIDE 50 MG/1
150 TABLET, FILM COATED ORAL DAILY
Status: DISCONTINUED | OUTPATIENT
Start: 2023-01-05 | End: 2023-01-05

## 2023-01-05 RX ADMIN — AMLODIPINE BESYLATE 10 MG: 5 TABLET ORAL at 08:01

## 2023-01-05 RX ADMIN — OXYCODONE HYDROCHLORIDE AND ACETAMINOPHEN 500 MG: 500 TABLET ORAL at 08:01

## 2023-01-05 RX ADMIN — THERA TABS 1 TABLET: TAB at 08:01

## 2023-01-05 RX ADMIN — GABAPENTIN 800 MG: 400 CAPSULE ORAL at 08:01

## 2023-01-05 RX ADMIN — POTASSIUM CHLORIDE 20 MEQ: 1500 TABLET, EXTENDED RELEASE ORAL at 10:01

## 2023-01-05 RX ADMIN — MELATONIN TAB 3 MG 9 MG: 3 TAB at 08:01

## 2023-01-05 RX ADMIN — LEVOTHYROXINE SODIUM 125 MCG: 25 TABLET ORAL at 05:01

## 2023-01-05 RX ADMIN — SERTRALINE HYDROCHLORIDE 100 MG: 50 TABLET ORAL at 11:01

## 2023-01-05 RX ADMIN — PANTOPRAZOLE SODIUM 40 MG: 40 TABLET, DELAYED RELEASE ORAL at 08:01

## 2023-01-05 RX ADMIN — ATORVASTATIN CALCIUM 40 MG: 40 TABLET, FILM COATED ORAL at 08:01

## 2023-01-05 RX ADMIN — OMEGA-3 FATTY ACIDS CAP 1000 MG 1 CAPSULE: 1000 CAP at 08:01

## 2023-01-05 RX ADMIN — AZITHROMYCIN MONOHYDRATE 500 MG: 250 TABLET ORAL at 08:01

## 2023-01-05 RX ADMIN — LORAZEPAM 0.5 MG: 0.5 TABLET ORAL at 11:01

## 2023-01-05 RX ADMIN — CLOPIDOGREL BISULFATE 75 MG: 75 TABLET, FILM COATED ORAL at 08:01

## 2023-01-05 RX ADMIN — POTASSIUM CHLORIDE 20 MEQ: 1500 TABLET, EXTENDED RELEASE ORAL at 08:01

## 2023-01-05 RX ADMIN — ENOXAPARIN SODIUM 40 MG: 40 INJECTION SUBCUTANEOUS at 08:01

## 2023-01-05 RX ADMIN — CEFTRIAXONE 1 G: 1 INJECTION, POWDER, FOR SOLUTION INTRAMUSCULAR; INTRAVENOUS at 11:01

## 2023-01-05 RX ADMIN — SERTRALINE HYDROCHLORIDE 50 MG: 50 TABLET ORAL at 08:01

## 2023-01-05 RX ADMIN — GABAPENTIN 800 MG: 400 CAPSULE ORAL at 02:01

## 2023-01-05 RX ADMIN — VALSARTAN 160 MG: 160 TABLET, FILM COATED ORAL at 08:01

## 2023-01-05 NOTE — PLAN OF CARE
Problem: Adult Inpatient Plan of Care  Goal: Optimal Comfort and Wellbeing  Outcome: Ongoing, Progressing  Intervention: Monitor Pain and Promote Comfort  Flowsheets (Taken 1/4/2023 2039)  Pain Management Interventions:   quiet environment facilitated   medication offered   care clustered   position adjusted   pillow support provided  Intervention: Provide Person-Centered Care  Flowsheets (Taken 1/4/2023 2039)  Trust Relationship/Rapport:   care explained   questions answered   choices provided   questions encouraged   emotional support provided   empathic listening provided   thoughts/feelings acknowledged   reassurance provided      Patient has an appt today

## 2023-01-05 NOTE — SUBJECTIVE & OBJECTIVE
Interval History: weakness, sacral wound, left groin wound, left heel wound    Review of Systems   Constitutional:  Negative for activity change, appetite change, fatigue and fever.   HENT:  Negative for congestion, ear pain, sinus pain and sneezing.    Eyes:  Negative for pain.   Respiratory:  Negative for cough, shortness of breath and wheezing.    Cardiovascular:  Negative for chest pain and palpitations.   Gastrointestinal:  Negative for abdominal pain, constipation, diarrhea and nausea.   Endocrine: Negative for cold intolerance and heat intolerance.   Genitourinary:  Negative for dysuria and vaginal discharge.   Musculoskeletal:  Negative for back pain.   Skin:  Positive for wound. Negative for color change and rash.        Sacral, left heel and left groin wound   Allergic/Immunologic: Negative for environmental allergies, food allergies and immunocompromised state.   Neurological:  Positive for weakness. Negative for dizziness and headaches.   Hematological:  Does not bruise/bleed easily.   Psychiatric/Behavioral:  Negative for sleep disturbance and suicidal ideas.    Objective:     Vital Signs (Most Recent):  Temp: 98.4 °F (36.9 °C) (01/04/23 1910)  Pulse: 91 (01/04/23 1910)  Resp: 18 (01/04/23 2030)  BP: (!) 142/95 (01/05/23 0843)  SpO2: (!) 94 % (01/05/23 0544)   Vital Signs (24h Range):  Temp:  [98.4 °F (36.9 °C)-98.8 °F (37.1 °C)] 98.4 °F (36.9 °C)  Pulse:  [84-91] 91  Resp:  [18] 18  SpO2:  [94 %-96 %] 94 %  BP: (133-142)/(90-95) 142/95     Weight: 64.9 kg (143 lb 1.3 oz)  Body mass index is 24.56 kg/m².    Intake/Output Summary (Last 24 hours) at 1/5/2023 0939  Last data filed at 1/5/2023 0839  Gross per 24 hour   Intake 480 ml   Output --   Net 480 ml      Physical Exam  Constitutional:       Appearance: She is normal weight.      Comments: Chronically ill appearing    HENT:      Head: Normocephalic.      Nose: Nose normal.      Mouth/Throat:      Mouth: Mucous membranes are moist.      Pharynx:  Oropharynx is clear.   Eyes:      Extraocular Movements: Extraocular movements intact.      Conjunctiva/sclera: Conjunctivae normal.      Pupils: Pupils are equal, round, and reactive to light.   Cardiovascular:      Rate and Rhythm: Normal rate and regular rhythm.      Pulses: Normal pulses.      Heart sounds: Normal heart sounds.   Pulmonary:      Effort: Pulmonary effort is normal.      Breath sounds: Normal breath sounds.   Abdominal:      General: Abdomen is flat. Bowel sounds are normal.      Palpations: Abdomen is soft.   Musculoskeletal:         General: Normal range of motion.      Cervical back: Normal range of motion and neck supple.   Skin:     General: Skin is warm and dry.      Comments: Sacral, left groin and left heel wound   Neurological:      Mental Status: She is alert and oriented to person, place, and time.      Motor: Weakness present.      Coordination: Coordination abnormal.   Psychiatric:         Mood and Affect: Mood normal.       Significant Labs: All pertinent labs within the past 24 hours have been reviewed.  Recent Lab Results         01/05/23  0428        Anion Gap 12       BUN 11       BUN/CREAT RATIO 15       Calcium 8.5       Chloride 103       CO2 28       Creatinine 0.73       eGFR 97       Glucose 117       Potassium 3.1       Sodium 140               Significant Imaging: I have reviewed all pertinent imaging results/findings within the past 24 hours.

## 2023-01-05 NOTE — PROGRESS NOTES
[facility] - Medical Surgical Unit  Hospital Medicine  Progress Note    Patient Name: Herlinda Valdovinos  MRN: [id number]  Patient Class: IP- Swing   Admission Date: 1/4/2023  Length of Stay: 1 days  Attending Physician: Chuy Daugherty IV, DO  Primary Care Provider: Vini Hernandez NP        Subjective:     Principal Problem:weakness, s/p pneumonia, sacral wound , left heel wound , left groin wound       HPI:  Mrs. Kaur was admitted to acute care on 01/01 with UTI and pneumonia. Was started on rocephin and zithromax. She has completed 4/5 days of treatment for pneumonia. She has pressure wounds to sacrum, left heel and left groin. Wound care is following. 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 She has been mostly chair/ bed bound. States she has not had wounds until recently. States she felt bad approximately 10 days before admission but did not seek treatment.   She will be admitted to [facility] today to continue therapy for weakness. We will also continue wound care to sacrum, left groin and left heel.   She is at high risk for fall.  Talked with her re code status and she chose DNR.      Overview/Hospital Course:  01/05 Admitted to swingbed on 01/04/2023. Continue therapy for weakness. Continue wound care to sacrum, left groin and left heel. Potassium is low at 3.1. Will replace.      Interval History: weakness, sacral wound, left groin wound, left heel wound    Review of Systems   Constitutional:  Negative for activity change, appetite change, fatigue and fever.   HENT:  Negative for congestion, ear pain, sinus pain and sneezing.    Eyes:  Negative for pain.   Respiratory:  Negative for cough, shortness of breath and wheezing.    Cardiovascular:  Negative for chest pain and palpitations.   Gastrointestinal:  Negative for abdominal pain, constipation, diarrhea and nausea.   Endocrine: Negative for cold intolerance and heat intolerance.   Genitourinary:  Negative  for dysuria and vaginal discharge.   Musculoskeletal:  Negative for back pain.   Skin:  Positive for wound. Negative for color change and rash.        Sacral, left heel and left groin wound   Allergic/Immunologic: Negative for environmental allergies, food allergies and immunocompromised state.   Neurological:  Positive for weakness. Negative for dizziness and headaches.   Hematological:  Does not bruise/bleed easily.   Psychiatric/Behavioral:  Negative for sleep disturbance and suicidal ideas.    Objective:     Vital Signs (Most Recent):  Temp: 98.4 °F (36.9 °C) (01/04/23 1910)  Pulse: 91 (01/04/23 1910)  Resp: 18 (01/04/23 2030)  BP: (!) 142/95 (01/05/23 0843)  SpO2: (!) 94 % (01/05/23 0544)   Vital Signs (24h Range):  Temp:  [98.4 °F (36.9 °C)-98.8 °F (37.1 °C)] 98.4 °F (36.9 °C)  Pulse:  [84-91] 91  Resp:  [18] 18  SpO2:  [94 %-96 %] 94 %  BP: (133-142)/(90-95) 142/95     Weight: 64.9 kg (143 lb 1.3 oz)  Body mass index is 24.56 kg/m².    Intake/Output Summary (Last 24 hours) at 1/5/2023 0925  Last data filed at 1/5/2023 0839  Gross per 24 hour   Intake 480 ml   Output --   Net 480 ml      Physical Exam  Constitutional:       Appearance: She is normal weight.      Comments: Chronically ill appearing    HENT:      Head: Normocephalic.      Nose: Nose normal.      Mouth/Throat:      Mouth: Mucous membranes are moist.      Pharynx: Oropharynx is clear.   Eyes:      Extraocular Movements: Extraocular movements intact.      Conjunctiva/sclera: Conjunctivae normal.      Pupils: Pupils are equal, round, and reactive to light.   Cardiovascular:      Rate and Rhythm: Normal rate and regular rhythm.      Pulses: Normal pulses.      Heart sounds: Normal heart sounds.   Pulmonary:      Effort: Pulmonary effort is normal.      Breath sounds: Normal breath sounds.   Abdominal:      General: Abdomen is flat. Bowel sounds are normal.      Palpations: Abdomen is soft.   Musculoskeletal:         General: Normal range of motion.       Cervical back: Normal range of motion and neck supple.   Skin:     General: Skin is warm and dry.      Comments: Sacral, left groin and left heel wound   Neurological:      Mental Status: She is alert and oriented to person, place, and time.      Motor: Weakness present.      Coordination: Coordination abnormal.   Psychiatric:         Mood and Affect: Mood normal.       Significant Labs: All pertinent labs within the past 24 hours have been reviewed.  Recent Lab Results         01/05/23  0428        Anion Gap 12       BUN 11       BUN/CREAT RATIO 15       Calcium 8.5       Chloride 103       CO2 28       Creatinine 0.73       eGFR 97       Glucose 117       Potassium 3.1       Sodium 140               Significant Imaging: I have reviewed all pertinent imaging results/findings within the past 24 hours.      Assessment/Plan:      Skin breakdown  01/05/2023   Continue wound care to sacrum, left groin and left heel      Hyperlipidemia  01/05 continue atorvastatin daily         Thyroid disease  01/05/2023  Continue levothyroxine 125 mcg daily      Pneumonia of upper lobe due to infectious organism  01/05/2023  Will receive last dose of rocephin and zithromax today       DVT prophylaxis    01/05/2023  Continue OCTAVIA hose and plavix, continue lovenox    Idiopathic peripheral neuropathy  01/05/2023  Gabapentin 800 mg po TID       Insomnia  01/05/2023  Continue melatonin prn       Hypertension    01/05/2023  Continue norvasc and atorvastatin    Depressive disorder  01/05/2023  Continue zoloft 50 mg po daily          Generalized anxiety disorder  01/05/2023  Continue lorazepam prn anxiety      Muscle weakness  01/05/2023 continue therapy for weakness        VTE Risk Mitigation (From admission, onward)         Ordered     enoxaparin injection 40 mg  Every 12 hours         01/04/23 4051                Discharge Planning   POOL:      Code Status: DNR   Is the patient medically ready for discharge?:     Reason for patient still  in hospital (select all that apply): Treatment                     KEVAN Funk  Department of Hospital Medicine   [facility] - Medical Surgical Unit

## 2023-01-05 NOTE — PT/OT/SLP EVAL
Physical Therapy Evaluation    Patient Name:  Herlinda Valdovinos   MRN:  [id number]    Recommendations:     Discharge Recommendations: home health PT, outpatient PT   Discharge Equipment Recommendations: cane, straight, cane, quad   Barriers to discharge: Decreased caregiver support    Assessment:     Herlinda Valdovinos is a 57 y.o. female admitted with a medical diagnosis of urinary tract infection without hematuria. Patient has also been diagnosed with pneumonia. Patient recently admitted to [facility] as a swingbed patient after being treated as an inpatient for ~4 days. Prior to admission patient experienced a significant functional decline over the previous 10 days. Patient status post a cerebrovascular accident on 4/27/2021 with residual left hemiparesis. Patient is well known to the rehab staff at [facility] due to previous episodes of both swingbed and outpatient rehabilitation. She presents with the following impairments/functional limitations: weakness, impaired endurance, impaired sensation, impaired functional mobility, impaired balance, gait instability, decreased coordination, decreased lower extremity function, decreased safety awareness, pain.    Rehab Prognosis: Good; patient would benefit from acute skilled PT services to address these deficits and reach maximum level of function.    Recent Surgery: * No surgery found *      Plan:     During this hospitalization, patient to be seen 5 x/week to address the identified rehab impairments via gait training, therapeutic activities, therapeutic exercises, neuromuscular re-education and progress toward the following goals:    Plan of Care Expires:  01/30/23    Subjective     Chief Complaint: weakness  Patient/Family Comments/goals: Patient is concerned about her left medial heel wound as her mother had to have a below the knee amputation, and she does not want the same to happen to her.   Pain/Comfort:  Pain Rating 1: 6/10  Location  - Side 1: Left  Location 1: gluteal  Pain Rating Post-Intervention 1: 6/10  Pain Addressed 2: Reposition, Distraction    Patients cultural, spiritual, Jainism conflicts given the current situation: no    Living Environment:    Living Environment:    Prior to admission, patients level of function required a small amount of assistance from her family with bed mobility and transfers. Patient lives in a single story home with a ramp to enter. Patient uses a manual wheelchair for most of her mobility but is able to walk short distances using a rolling walker. Equipment used at home: bedside commode, hospital bed, nebulizer, oxygen, shower chair, walker, rolling, wheelchair.  DME owned (not currently used): single point cane and quad cane .  Upon discharge, patient will have assistance from her .    Objective:     Communicated with Kiana Rasmussen occupational therapist, prior to session. Patient found supine with oxygen  upon PT entry to room.    General Precautions: Standard,    Orthopedic Precautions:LLE non weight bearing (per patient's conversation with Dr. Daugherty)   Braces:    Respiratory Status: Nasal cannula, flow 2 L/min    Exams:  Gross Motor Coordination:  WFL  Sensation:    -       Intact  light/touch right lower extremity  -       Impaired  light/touch left lower extremity (able to isolate but diminished compared to the right)  Skin Integrity/Edema:      -       Skin integrity: Wound of left medial heel (round blister-like wound; grayish white in color); left gluteal wound also reported by patient  RLE ROM: WFL  RLE Strength: Deficits: 4/5  LLE ROM: WFL  LLE Strength: Deficits: 3-/5; 3+/5 within available range    Functional Mobility:  Bed Mobility:     Supine to Sit: stand by assistance  Sit to Supine: stand by assistance  Transfers:     Sit to Stand:  minimum assistance with rolling walker  Gait: 2 side hops towards left toward head of bed with rolling walker; minimal assist  Balance: contact  guard assist with static standing balance with rolling walker; minimal assist with dynamic standing balance with rolling walker     AM-PAC 6 CLICK MOBILITY  Total Score:18     Treatment & Education:  Bed mobility, transfers, and gait as listed above. Patient advised to keep left heel floating on pillows to avoid pressure on wound. Patient advised to keep left heel off of the floor while performing transfers/gait in accordance with what patient reports Dr. Daugherty told her. Physical Therapist with plans to speak with wound care practitioner on 1/9/2022 to establish an official weightbearing status.     Patient left supine with call button in reach and left heel floating with pillows under calf .    GOALS:   Multidisciplinary Problems       Physical Therapy Goals          Problem: Physical Therapy    Goal Priority Disciplines Outcome Goal Variances Interventions   Physical Therapy Goal     PT, PT/OT Ongoing, Progressing     Description: Short-term Goals: 2 weeks  1. Patient will perform supine to/from sit with supervision to improve independence and safety with bed mobility.  2. Patient will perform sit to/from stand with rolling walker with contact guard assist to improve independence and safety with transfers.  3. Patient will ambulate 15 feet with standard walker with minimal assistance while maintaining appropriate left lower extremity weightbearing status to improve independence and safety with gait.  4. Patient will tolerate 15 minutes of physical therapy intervention to improve endurance and activity tolerance.    Long-term goals: 4 weeks  1. Patient will perform supine to/from sit with modified independence to improve independence and safety with bed mobility.  2. Patient will perform sit to/from stand with rolling walker with standby assist to improve independence and safety with transfers.  3. Patient will ambulate 30 feet with standard walker with contact guard assist while maintaining appropriate left lower  extremity weightbearing status to improve independence and safety with gait.  4. Patient will tolerate 30 minutes of physical therapy intervention to improve endurance and activity tolerance.                       History:     Past Medical History:   Diagnosis Date    Cerebral hemorrhage     Chronic anxiety     Dehydration     Depression     Dysphagia     Due to and following non-traumatic intracerebral hemorrhage    Hearing loss     History of CVA (cerebrovascular accident)     Hypertension     Hypokalemia 10/20/2022    Insomnia     Intrapontine hemorrhage     Left hemiparesis     Peripheral neuropathy     Stroke     Thyroid disease     Transient cerebral ischemia      Past Surgical History:   Procedure Laterality Date    APPENDECTOMY      CHOLECYSTECTOMY      HYSTERECTOMY      LITHOTRIPSY      Renal lithotripsy    percutaneious insertion of ureteric stent       Time Tracking:     PT Received On: 01/05/23  PT Start Time: 1420     PT Stop Time: 1440  PT Total Time (min): 20 min     Billable Minutes: Evaluation 20 minutes      01/05/2023

## 2023-01-05 NOTE — HOSPITAL COURSE
01/05 Admitted to swingbed on 01/04/2023. Continue therapy for weakness. Continue wound care to sacrum, left groin and left heel. Potassium is low at 3.1. Will replace.    01/06 Awake and resting in bed without complaints. States she is feeling better this morning. Continue with wound care and therapy.    01/09 Awake and resting in bed. Reports having a good weekend. Requests ensure shake BID. Continue wound care to sacrum, left groin and left heel. Continue therapy for strengthening.     01/11/23 Awake and resting in bed without complaints. Continue therapy for strengthening. Continue wound care.  Appetite is good.    01/154/2023 HOSPITAL DAY 10  I WAS ASKED TO EVALUATE PT DUE PT SUSTAINING A CUT WHEN EX SPOUSE SHAVED CHIN WITH BLEEDING NOTED THAT STOPPED AFTER PRESSURE APPLIED.  PT IS ON PLAVIX/LOVENOX    01/16 Complains of having constipation over the weekend and finally having a bm yesterday. Requests miralax in addition to pericolace. Continue with PT and OT . Continue with wound care to sacrum.    01/17 Resting in bed without complaints. Reports having large BM Sunday. No further complaints of constipation. Continue wound care to sacrum and left heel. Continue therapy.    01/18 Patient states she is feeling 10,000 times better today. She ambulated 240 feet with therapist. Continue wound care.    01/23 Awake ansd resting in bed    01/19 Awake and resting in bed with family at side. Reports feeling good this morning. Continue therapy.    01/20 Awake and resting in bed. No complaints. Urinalysis shows infection. She was started on rocephin yesterday. Will follow culture. Continue therapy.    01/23/23 Resting in bed without complaints.  Urine culture show growth of enterococcus faecalis not sensitive to rocephin. Started macrobid 100 mgpo BID x 7 days. Continue wound care. Continue therapy.  Has ambulated 304 feet with rolling walker.    01/25/23 Will discharge home today. Will need to complete course of macrobid  for total of 7 days. She wishes to do outpatient physical therapy.Medication reconciliation done. Continue wound care to sacrum and left heel. She refuses home health services.

## 2023-01-05 NOTE — HPI
Mrs. Kaur was admitted to acute care on 01/01 with UTI and pneumonia. Was started on rocephin and zithromax. She has completed 4/5 days of treatment for pneumonia. She has pressure wounds to sacrum, left heel and left groin. Wound care is following. 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 She has been mostly chair/ bed bound. States she has not had wounds until recently. States she felt bad approximately 10 days before admission but did not seek treatment.   She will be admitted to [facility] today to continue therapy for weakness. We will also continue wound care to sacrum, left groin and left heel.   She is at high risk for fall.  Talked with her re code status and she chose DNR.

## 2023-01-05 NOTE — PLAN OF CARE
Problem: Fall Injury Risk  Goal: Absence of Fall and Fall-Related Injury  Outcome: Ongoing, Progressing  Intervention: Promote Injury-Free Environment  Flowsheets (Taken 1/5/2023 3777)  Safety Promotion/Fall Prevention:   assistive device/personal item within reach   nonskid shoes/socks when out of bed   room near unit station   side rails raised x 3   instructed to call staff for mobility

## 2023-01-05 NOTE — PT/OT/SLP EVAL
Occupational Therapy   Evaluation    Name: Herlinda Valdovinos  MRN: [id number]  Admitting Diagnosis: Weakness  Recent Surgery: * No surgery found *      Recommendations:     Discharge Recommendations: other (see comments) (Home health versus placement in SNF/[facility])  Discharge Equipment Recommendations:  walker, rolling, wheelchair, bedside commode  Barriers to discharge:  Other (Comment) (Per pt and family report, pt lives in stressful environment with significant other, son, and granddaughter.)    Assessment:     Herlinda Valdovinos is a 57 y.o. female with a medical diagnosis of weakness.  She presents with admission to [facility] swing bed following hospitalization for UTI and L heel wound.  Performance deficits affecting function: weakness, impaired endurance, impaired sensation, impaired self care skills, impaired functional mobility, gait instability, impaired balance, pain, decreased ROM.      Rehab Prognosis: Good; patient would benefit from acute skilled OT services to address these deficits and reach maximum level of function.       Plan:     Patient to be seen 5 x/week to address the above listed problems via self-care/home management, therapeutic activities, therapeutic exercises, neuromuscular re-education  Plan of Care Expires: 02/03/23  Plan of Care Reviewed with:      Subjective     Chief Complaint: Weakness and L heel pain  Patient/Family Comments/goals: Return home to [facility].     Occupational Profile:  Living Environment: Lives in 1 level home with significant other, son, and granddaughter.   Previous level of function: Set-up for BADLs prior to recent decline.   Roles and Routines: Significant other performs all IADLs.   Equipment Used at Home: bedside commode, walker, rolling, wheelchair  Assistance upon Discharge: Will return home with significant other.     Pain/Comfort:  Pain Rating 1: 5/10  Location - Side 1: Left  Location 1: heel  Pain Addressed 1: Cessation of Activity  Pain Rating  Post-Intervention 1: 5/10    Patients cultural, spiritual, Yarsani conflicts given the current situation: no    Objective:     Communicated with: Nurse prior to session.  Patient found supine with   upon OT entry to room.    General Precautions: Standard, fall  Orthopedic Precautions:    Braces:    Respiratory Status: Nasal cannula, flow 2 L/min    Occupational Performance:    Bed Mobility:    Patient completed Scooting/Bridging with stand by assistance  Patient completed Supine to Sit with stand by assistance  Patient completed Sit to Supine with stand by assistance    Functional Mobility/Transfers:  Patient completed Sit <> Stand Transfer with minimum assistance  with  rolling walker   Patient completed Bed <> Chair Transfer using Step Transfer technique with minimum assistance with rolling walker  Functional Mobility: 2 hop steps at EOB with RW with CGA-Juanis    Activities of Daily Living:  Upper Body Dressing: minimum assistance seated  Lower Body Dressing: moderate assistance sitting/standing    Cognitive/Visual Perceptual:  Cognitive/Psychosocial Skills:     -       Oriented to: Person, Place, Time, and Situation     Physical Exam:  Upper Extremity Strength:    -       Right Upper Extremity: 4/5  -       Left Upper Extremity: 3/5    AMPAC 6 Click ADL:  AMPAC Total Score: 17    Treatment & Education:  Pt educated on OT role/POC.   Importance of OOB activity with staff assistance.  Importance of sitting up in the chair throughout the day as tolerated, especially for meals   Safety during functional t/f and mobility  Importance of assisting with self-care activities      Patient left supine with call button in reach    GOALS:   Multidisciplinary Problems       Occupational Therapy Goals          Problem: Occupational Therapy    Goal Priority Disciplines Outcome Interventions   Occupational Therapy Goal     OT, PT/OT Ongoing, Progressing    Description: Goals to be met by: 2 weeks     Patient will increase  functional independence with ADLs by performing:    UE Dressing with Set-up Assistance.  LE Dressing with Stand-by Assistance.  Toileting from toilet with Stand-by Assistance for hygiene and clothing management.   Bathing from  edge of bed with Stand-by Assistance.  Dynamic standing with SBA, tolerating x 6 mins continuous stand, no LOB.     Goals to be met by: 4 weeks     Patient will increase functional independence with ADLs by performing:    UE Dressing with Modified [place].  LE Dressing with Modified [place].  Toileting from toilet with Modified [place] for hygiene and clothing management.   Bathing from  shower chair/bench with Modified [place].  Light IADLs Tiffany with RW.                            History:     Past Medical History:   Diagnosis Date    Cerebral hemorrhage     Chronic anxiety     Dehydration     Depression     Dysphagia     Due to and following non-traumatic intracerebral hemorrhage    Hearing loss     History of CVA (cerebrovascular accident)     Hypertension     Hypokalemia 10/20/2022    Insomnia     Intrapontine hemorrhage     Left hemiparesis     Peripheral neuropathy     Stroke     Thyroid disease     Transient cerebral ischemia          Past Surgical History:   Procedure Laterality Date    APPENDECTOMY      CHOLECYSTECTOMY      HYSTERECTOMY      LITHOTRIPSY      Renal lithotripsy    percutaneious insertion of ureteric stent         Time Tracking:     OT Date of Treatment: 01/05/23  OT Start Time: 1023  OT Stop Time: 1055  OT Total Time (min): 32 min    Billable Minutes:Evaluation 32    MARCUS Levy, OTR/L      1/5/2023

## 2023-01-05 NOTE — PLAN OF CARE
[facility] - Medical Surgical Unit  Initial Discharge Assessment       Primary Care Provider: Vini Hernandez NP    Admission Diagnosis: Weakness generalized [R53.1]    Admission Date: 1/4/2023  Expected Discharge Date: 1/11/2023         Payor: HU[employer] MANAGED MEDICARE / Plan: HU[employer] MEDICARE PPO / Product Type: Medicare Advantage /     Extended Emergency Contact Information  Primary Emergency Contact: Micheal Kaur  Mobile Phone: [phone]  Relation: Spouse  Preferred language: English   needed? No    Discharge Plan A: (P) Home with family         [facility] - [place], [place] - [address]  [address]  [place] [address]  Phone: [phone] Fax: [phone]      Initial Assessment (most recent)       Adult Discharge Assessment - 01/05/23 1357          Discharge Assessment    Assessment Type Discharge Planning Assessment (P)      Source of Information patient;family (P)      People in Home spouse (P)      Do you expect to return to your current living situation? Yes (P)      Do you have help at home or someone to help you manage your care at home? Yes (P)      Who are your caregiver(s) and their phone number(s)?  (P)      Prior to hospitilization cognitive status: Alert/Oriented (P)      Current cognitive status: Alert/Oriented (P)      Equipment Currently Used at Home oxygen;walker, rolling;wheelchair;hospital bed;bedside commode (P)      Patient currently being followed by outpatient case management? No (P)      Do you currently have service(s) that help you manage your care at home? No (P)      Discharge Plan A Home with family (P)      DME Needed Upon Discharge  none (P)      Discharge Plan discussed with: Patient;Spouse/sig other (P)                       I visited with pt in her room. Her D/C plan is back home with her . Pt does not want HH. Pt has all DME needed. Planned D/C date of 1/11 wrote on board in her room.

## 2023-01-05 NOTE — PLAN OF CARE
Problem: Physical Therapy  Goal: Physical Therapy Goal  Description: Short-term Goals: 2 weeks  1. Patient will perform supine to/from sit with supervision to improve independence and safety with bed mobility.  2. Patient will perform sit to/from stand with rolling walker with contact guard assist to improve independence and safety with transfers.  3. Patient will ambulate 15 feet with standard walker with minimal assistance while maintaining appropriate left lower extremity weightbearing status to improve independence and safety with gait.  4. Patient will tolerate 15 minutes of physical therapy intervention to improve endurance and activity tolerance.    Long-term goals: 4 weeks  1. Patient will perform supine to/from sit with modified independence to improve independence and safety with bed mobility.  2. Patient will perform sit to/from stand with rolling walker with standby assist to improve independence and safety with transfers.  3. Patient will ambulate 30 feet with standard walker with contact guard assist while maintaining appropriate left lower extremity weightbearing status to improve independence and safety with gait.  4. Patient will tolerate 30 minutes of physical therapy intervention to improve endurance and activity tolerance.    Outcome: Ongoing, Progressing     Nguyễn Cuba, PT, DPT

## 2023-01-05 NOTE — PLAN OF CARE
Problem: Occupational Therapy  Goal: Occupational Therapy Goal  Description: Goals to be met by: 2 weeks     Patient will increase functional independence with ADLs by performing:    UE Dressing with Set-up Assistance.  LE Dressing with Stand-by Assistance.  Toileting from toilet with Stand-by Assistance for hygiene and clothing management.   Bathing from  edge of bed with Stand-by Assistance.  Dynamic standing with SBA, tolerating x 6 mins continuous stand, no LOB.     Goals to be met by: 4 weeks     Patient will increase functional independence with ADLs by performing:    UE Dressing with Modified [place].  LE Dressing with Modified [place].  Toileting from toilet with Modified [place] for hygiene and clothing management.   Bathing from  shower chair/bench with Modified [place].  Light IADLs Tiffany with RW.       Outcome: Ongoing, Progressing     MARCUS Levy, OTR/L  1/5/2023

## 2023-01-06 PROCEDURE — 97535 SELF CARE MNGMENT TRAINING: CPT

## 2023-01-06 PROCEDURE — 27000944

## 2023-01-06 PROCEDURE — 94761 N-INVAS EAR/PLS OXIMETRY MLT: CPT

## 2023-01-06 PROCEDURE — 27000221 HC OXYGEN, UP TO 24 HOURS

## 2023-01-06 PROCEDURE — 99307 PR NURSING FAC CARE, SUBSEQ, IMPROVING: ICD-10-PCS | Mod: ,,, | Performed by: NURSE PRACTITIONER

## 2023-01-06 PROCEDURE — 63600175 PHARM REV CODE 636 W HCPCS: Performed by: NURSE PRACTITIONER

## 2023-01-06 PROCEDURE — 97112 NEUROMUSCULAR REEDUCATION: CPT

## 2023-01-06 PROCEDURE — 97110 THERAPEUTIC EXERCISES: CPT | Mod: CQ

## 2023-01-06 PROCEDURE — 25000003 PHARM REV CODE 250: Performed by: NURSE PRACTITIONER

## 2023-01-06 PROCEDURE — 11000004 HC SNF PRIVATE

## 2023-01-06 PROCEDURE — 99900035 HC TECH TIME PER 15 MIN (STAT)

## 2023-01-06 PROCEDURE — 99307 SBSQ NF CARE SF MDM 10: CPT | Mod: ,,, | Performed by: NURSE PRACTITIONER

## 2023-01-06 RX ADMIN — PANTOPRAZOLE SODIUM 40 MG: 40 TABLET, DELAYED RELEASE ORAL at 08:01

## 2023-01-06 RX ADMIN — LORAZEPAM 0.5 MG: 0.5 TABLET ORAL at 08:01

## 2023-01-06 RX ADMIN — SERTRALINE HYDROCHLORIDE 150 MG: 50 TABLET ORAL at 08:01

## 2023-01-06 RX ADMIN — THERA TABS 1 TABLET: TAB at 08:01

## 2023-01-06 RX ADMIN — ENOXAPARIN SODIUM 40 MG: 40 INJECTION SUBCUTANEOUS at 08:01

## 2023-01-06 RX ADMIN — SENNOSIDES AND DOCUSATE SODIUM 1 TABLET: 50; 8.6 TABLET ORAL at 08:01

## 2023-01-06 RX ADMIN — MELATONIN TAB 3 MG 9 MG: 3 TAB at 08:01

## 2023-01-06 RX ADMIN — AMLODIPINE BESYLATE 10 MG: 5 TABLET ORAL at 08:01

## 2023-01-06 RX ADMIN — ATORVASTATIN CALCIUM 40 MG: 40 TABLET, FILM COATED ORAL at 08:01

## 2023-01-06 RX ADMIN — OXYCODONE HYDROCHLORIDE AND ACETAMINOPHEN 500 MG: 500 TABLET ORAL at 08:01

## 2023-01-06 RX ADMIN — OMEGA-3 FATTY ACIDS CAP 1000 MG 1 CAPSULE: 1000 CAP at 08:01

## 2023-01-06 RX ADMIN — VALSARTAN 160 MG: 160 TABLET, FILM COATED ORAL at 08:01

## 2023-01-06 RX ADMIN — HYDROCODONE BITARTRATE AND ACETAMINOPHEN 1 TABLET: 10; 325 TABLET ORAL at 10:01

## 2023-01-06 RX ADMIN — GABAPENTIN 800 MG: 400 CAPSULE ORAL at 08:01

## 2023-01-06 RX ADMIN — LEVOTHYROXINE SODIUM 125 MCG: 25 TABLET ORAL at 06:01

## 2023-01-06 RX ADMIN — CLOPIDOGREL BISULFATE 75 MG: 75 TABLET, FILM COATED ORAL at 08:01

## 2023-01-06 NOTE — PROGRESS NOTES
[facility] - Medical Surgical Unit  Hospital Medicine  Progress Note    Patient Name: Herlinda Valdovinos  MRN: [id number]  Patient Class: IP- Swing   Admission Date: 1/4/2023  Length of Stay: 2 days  Attending Physician: Chuy Daugherty IV, DO  Primary Care Provider: Vini Hernandez NP        Subjective:     Principal Problem:<principal problem not specified>        HPI:  Mrs. Kaur was admitted to acute care on 01/01 with UTI and pneumonia. Was started on rocephin and zithromax. She has completed 4/5 days of treatment for pneumonia. She has pressure wounds to sacrum, left heel and left groin. Wound care is following. 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 She has been mostly chair/ bed bound. States she has not had wounds until recently. States she felt bad approximately 10 days before admission but did not seek treatment.   She will be admitted to [facility] today to continue therapy for weakness. We will also continue wound care to sacrum, left groin and left heel.   She is at high risk for fall.  Talked with her re code status and she chose DNR.      Overview/Hospital Course:  [facility] Continue therapy for weakness. Continue wound care to sacrum, left groin and left heel. Potassium is low at 3.1. Will replace.    01/06 Awake and resting in bed without complaints. States she is feeling better this morning. Continue with wound care and therapy.      Interval History: weakness, sacral and  left groin wound, left heel wound      Review of Systems   Constitutional:  Negative for activity change, appetite change, chills, fatigue and fever.   HENT:  Negative for congestion, ear pain, sinus pain and sneezing.    Eyes:  Negative for pain.   Respiratory:  Negative for cough, shortness of breath and wheezing.    Cardiovascular:  Negative for chest pain and palpitations.   Gastrointestinal:  Negative for abdominal pain, constipation, diarrhea,  nausea and vomiting.   Endocrine: Negative for cold intolerance and heat intolerance.   Genitourinary:  Negative for difficulty urinating, dysuria and vaginal discharge.   Musculoskeletal:  Negative for back pain.   Skin:  Positive for wound. Negative for color change and rash.   Allergic/Immunologic: Negative for environmental allergies, food allergies and immunocompromised state.   Neurological:  Negative for dizziness and headaches.   Hematological:  Does not bruise/bleed easily.   Psychiatric/Behavioral:  Negative for sleep disturbance and suicidal ideas.    Objective:     Vital Signs (Most Recent):  Temp: 98.7 °F (37.1 °C) (01/06/23 0844)  Pulse: 83 (01/06/23 0844)  Resp: 18 (01/06/23 0844)  BP: 130/89 (01/06/23 0844)  SpO2: 97 % (01/06/23 0844) Vital Signs (24h Range):  Temp:  [98.3 °F (36.8 °C)-98.7 °F (37.1 °C)] 98.7 °F (37.1 °C)  Pulse:  [83-88] 83  Resp:  [18-20] 18  SpO2:  [95 %-97 %] 97 %  BP: (130-142)/(89-96) 130/89     Weight: 64.9 kg (143 lb 1.3 oz)  Body mass index is 24.56 kg/m².    Intake/Output Summary (Last 24 hours) at 1/6/2023 1101  Last data filed at 1/6/2023 0844  Gross per 24 hour   Intake 720 ml   Output --   Net 720 ml      Physical Exam  Constitutional:       Comments: Chronically ill appearing   HENT:      Head: Normocephalic.      Nose: Nose normal.      Mouth/Throat:      Mouth: Mucous membranes are moist.      Pharynx: Oropharynx is clear.   Eyes:      Extraocular Movements: Extraocular movements intact.      Conjunctiva/sclera: Conjunctivae normal.      Pupils: Pupils are equal, round, and reactive to light.   Cardiovascular:      Rate and Rhythm: Normal rate and regular rhythm.      Pulses: Normal pulses.      Heart sounds: Normal heart sounds.   Pulmonary:      Effort: Pulmonary effort is normal.      Breath sounds: Normal breath sounds.   Abdominal:      General: Abdomen is flat. Bowel sounds are normal.      Palpations: Abdomen is soft.   Musculoskeletal:         General: Normal  range of motion.      Cervical back: Normal range of motion and neck supple.   Skin:     General: Skin is warm and dry.      Comments: Sacral and left groin wound, left heel blister   Neurological:      Mental Status: She is alert.      Motor: Weakness present.   Psychiatric:         Mood and Affect: Mood normal.       Significant Labs: All pertinent labs within the past 24 hours have been reviewed.  Recent Lab Results       None            Significant Imaging: I have reviewed all pertinent imaging results/findings within the past 24 hours.      Assessment/Plan:      Skin breakdown  01/05/2023   Continue wound care to sacrum, left groin and left heel      Hyperlipidemia  01/05 continue atorvastatin daily         Thyroid disease  01/05/2023  Continue levothyroxine 125 mcg daily      Pneumonia of upper lobe due to infectious organism  01/05/2023  Will receive last dose of rocephin and zithromax today       DVT prophylaxis    01/05/2023  Continue OCTAVIA hose and plavix, continue lovenox    Idiopathic peripheral neuropathy  01/05/2023  Gabapentin 800 mg po TID       Insomnia  01/05/2023  Continue melatonin prn       Hypertension    01/05/2023  Continue norvasc and atorvastatin    Depressive disorder  01/05/2023  Continue zoloft 50 mg po daily    01/06/2023  Increased to home dose of 150 mg zoloft          Generalized anxiety disorder  01/05/2023  Continue lorazepam prn anxiety      Muscle weakness  01/05/2023 continue therapy for weakness        VTE Risk Mitigation (From admission, onward)         Ordered     enoxaparin injection 40 mg  Every 12 hours         01/04/23 9467                Discharge Planning   POOL: 1/11/2023     Code Status: DNR   Is the patient medically ready for discharge?:     Reason for patient still in hospital (select all that apply): Treatment  Discharge Plan A: Home with family                  KEVAN Funk  Department of Hospital Medicine   [facility] - Medical Surgical  Unit

## 2023-01-06 NOTE — ASSESSMENT & PLAN NOTE
01/05/2023  Continue zoloft 50 mg po daily    01/06/2023  Increased to home dose of 150 mg zoloft

## 2023-01-06 NOTE — SUBJECTIVE & OBJECTIVE
Interval History: weakness, sacral and  left groin wound, left heel wound      Review of Systems   Constitutional:  Negative for activity change, appetite change, chills, fatigue and fever.   HENT:  Negative for congestion, ear pain, sinus pain and sneezing.    Eyes:  Negative for pain.   Respiratory:  Negative for cough, shortness of breath and wheezing.    Cardiovascular:  Negative for chest pain and palpitations.   Gastrointestinal:  Negative for abdominal pain, constipation, diarrhea, nausea and vomiting.   Endocrine: Negative for cold intolerance and heat intolerance.   Genitourinary:  Negative for difficulty urinating, dysuria and vaginal discharge.   Musculoskeletal:  Negative for back pain.   Skin:  Positive for wound. Negative for color change and rash.   Allergic/Immunologic: Negative for environmental allergies, food allergies and immunocompromised state.   Neurological:  Negative for dizziness and headaches.   Hematological:  Does not bruise/bleed easily.   Psychiatric/Behavioral:  Negative for sleep disturbance and suicidal ideas.    Objective:     Vital Signs (Most Recent):  Temp: 98.7 °F (37.1 °C) (01/06/23 0844)  Pulse: 83 (01/06/23 0844)  Resp: 18 (01/06/23 0844)  BP: 130/89 (01/06/23 0844)  SpO2: 97 % (01/06/23 0844) Vital Signs (24h Range):  Temp:  [98.3 °F (36.8 °C)-98.7 °F (37.1 °C)] 98.7 °F (37.1 °C)  Pulse:  [83-88] 83  Resp:  [18-20] 18  SpO2:  [95 %-97 %] 97 %  BP: (130-142)/(89-96) 130/89     Weight: 64.9 kg (143 lb 1.3 oz)  Body mass index is 24.56 kg/m².    Intake/Output Summary (Last 24 hours) at 1/6/2023 1101  Last data filed at 1/6/2023 0844  Gross per 24 hour   Intake 720 ml   Output --   Net 720 ml      Physical Exam  Constitutional:       Comments: Chronically ill appearing   HENT:      Head: Normocephalic.      Nose: Nose normal.      Mouth/Throat:      Mouth: Mucous membranes are moist.      Pharynx: Oropharynx is clear.   Eyes:      Extraocular Movements: Extraocular movements  intact.      Conjunctiva/sclera: Conjunctivae normal.      Pupils: Pupils are equal, round, and reactive to light.   Cardiovascular:      Rate and Rhythm: Normal rate and regular rhythm.      Pulses: Normal pulses.      Heart sounds: Normal heart sounds.   Pulmonary:      Effort: Pulmonary effort is normal.      Breath sounds: Normal breath sounds.   Abdominal:      General: Abdomen is flat. Bowel sounds are normal.      Palpations: Abdomen is soft.   Musculoskeletal:         General: Normal range of motion.      Cervical back: Normal range of motion and neck supple.   Skin:     General: Skin is warm and dry.      Comments: Sacral and left groin wound, left heel blister   Neurological:      Mental Status: She is alert.      Motor: Weakness present.   Psychiatric:         Mood and Affect: Mood normal.       Significant Labs: All pertinent labs within the past 24 hours have been reviewed.  Recent Lab Results       None            Significant Imaging: I have reviewed all pertinent imaging results/findings within the past 24 hours.

## 2023-01-06 NOTE — PT/OT/SLP PROGRESS
Physical Therapy Treatment    Patient Name:  Herlinad Valdovinos   MRN:  [id number]    Recommendations:     Discharge Recommendations: home health PT, outpatient PT  Discharge Equipment Recommendations: cane, straight, cane, quad  Barriers to discharge: Decreased caregiver support    Assessment:     Herlinda Valdovinos is a 57 y.o. female admitted with a medical diagnosis of <principal problem not specified>.  She presents with the following impairments/functional limitations: weakness, impaired endurance, impaired sensation, impaired functional mobility, impaired balance, gait instability, decreased coordination, decreased lower extremity function, decreased safety awareness, pain  Patient agreed to PT treatment. Patient was fatigued from OT session and just wanted to do exercises at EOB. Patient easily fatigued.    Rehab Prognosis: Good; patient would benefit from acute skilled PT services to address these deficits and reach maximum level of function.    Recent Surgery: * No surgery found *      Plan:     During this hospitalization, patient to be seen 5 x/week to address the identified rehab impairments via gait training, therapeutic activities, therapeutic exercises and progress toward the following goals:    Plan of Care Expires:  01/30/23    Subjective     Chief Complaint: weakness  Patient/Family Comments/goals: Pt concerned about left medial heel wound  Pain/Comfort:  Pain Rating 1: 0/10  Pain Rating Post-Intervention 1: 0/10      Objective:     Communicated with OT prior to session.  Patient found up in chair with   upon PT entry to room.     General Precautions: Standard, fall  Orthopedic Precautions: LLE non weight bearing (per patient's conversation with Dr. Daugherty)  Braces:    Respiratory Status: Nasal cannula, flow 2 L/min     Functional Mobility:  Bed Mobility:     Scooting: stand by assistance  Sit to Supine: stand by assistance      AM-PAC 6 CLICK MOBILITY  Turning over in bed (including adjusting  bedclothes, sheets and blankets)?: 4  Sitting down on and standing up from a chair with arms (e.g., wheelchair, bedside commode, etc.): 3  Moving from lying on back to sitting on the side of the bed?: 3  Moving to and from a bed to a chair (including a wheelchair)?: 3  Need to walk in hospital room?: 3  Climbing 3-5 steps with a railing?: 2  Basic Mobility Total Score: 18       Treatment & Education:  Patient performed BLE strengthening exercises at EOB including:  Hip flexion x 20 reps  LAQ x 20 reps  Hip abduction x 20 reps  Hip adduction x 20 reps  AP x 20 reps    Patient left supine with call button in reach and  present..    GOALS:   Multidisciplinary Problems       Physical Therapy Goals          Problem: Physical Therapy    Goal Priority Disciplines Outcome Goal Variances Interventions   Physical Therapy Goal     PT, PT/OT Ongoing, Progressing     Description: Short-term Goals: 2 weeks  1. Patient will perform supine to/from sit with supervision to improve independence and safety with bed mobility.  2. Patient will perform sit to/from stand with rolling walker with contact guard assist to improve independence and safety with transfers.  3. Patient will ambulate 15 feet with standard walker with minimal assistance while maintaining appropriate left lower extremity weightbearing status to improve independence and safety with gait.  4. Patient will tolerate 15 minutes of physical therapy intervention to improve endurance and activity tolerance.    Long-term goals: 4 weeks  1. Patient will perform supine to/from sit with modified independence to improve independence and safety with bed mobility.  2. Patient will perform sit to/from stand with rolling walker with standby assist to improve independence and safety with transfers.  3. Patient will ambulate 30 feet with standard walker with contact guard assist while maintaining appropriate left lower extremity weightbearing status to improve independence and  safety with gait.  4. Patient will tolerate 30 minutes of physical therapy intervention to improve endurance and activity tolerance.                         Time Tracking:     PT Received On: 01/06/23  PT Start Time: 1107     PT Stop Time: 1121  PT Total Time (min): 14 min     Billable Minutes: Therapeutic Exercise 10    Treatment Type: Treatment  PT/PTA: PTA     PTA Visit Number: 1     01/06/2023

## 2023-01-06 NOTE — PLAN OF CARE
PT admit to facility due to generalized weakness. PT worked with patient today. Pt on po Zithromax. Pt incont of bowel and bladder. Adult brief intact. Family at bedside. Pt turn every 2 hours and as needed. Redness noted under both breast, groin, and buttock area. Calmoseptine applied to areas  Problem: Adult Inpatient Plan of Care  Goal: Plan of Care Review  Outcome: Ongoing, Progressing  Goal: Patient-Specific Goal (Individualized)  Outcome: Ongoing, Progressing  Goal: Absence of Hospital-Acquired Illness or Injury  Outcome: Ongoing, Progressing  Goal: Optimal Comfort and Wellbeing  Outcome: Ongoing, Progressing  Goal: Readiness for Transition of Care  Outcome: Ongoing, Progressing     Problem: Fluid Imbalance (Pneumonia)  Goal: Fluid Balance  Outcome: Ongoing, Progressing     Problem: Infection (Pneumonia)  Goal: Resolution of Infection Signs and Symptoms  Outcome: Ongoing, Progressing     Problem: Respiratory Compromise (Pneumonia)  Goal: Effective Oxygenation and Ventilation  Outcome: Ongoing, Progressing     Problem: Impaired Wound Healing  Goal: Optimal Wound Healing  Outcome: Ongoing, Progressing     Problem: Skin Injury Risk Increased  Goal: Skin Health and Integrity  Outcome: Ongoing, Progressing     Problem: Fall Injury Risk  Goal: Absence of Fall and Fall-Related Injury  Outcome: Ongoing, Progressing

## 2023-01-06 NOTE — PT/OT/SLP PROGRESS
Occupational Therapy   Treatment    Name: Herlinda Valdovinos  MRN: [id number]  Admitting Diagnosis:  <principal problem not specified>       Recommendations:     Discharge Recommendations: other (see comments) (Home health versus placement in SNF/assisted)  Discharge Equipment Recommendations:     Barriers to discharge:  Other (Comment) (Per pt and family report, pt lives in stressful environment with significant other, son, and granddaughter.)    Assessment:     Herlinda Valdovinos is a 57 y.o. female with a medical diagnosis of <principal problem not specified>.  She presents with performance deficits affecting function are weakness, impaired endurance, impaired sensation, impaired self care skills, impaired functional mobility, gait instability, impaired balance, pain, decreased ROM.     Rehab Prognosis:  Good; patient would benefit from acute skilled OT services to address these deficits and reach maximum level of function.       Plan:     Patient to be seen 5 x/week to address the above listed problems via self-care/home management, therapeutic activities, therapeutic exercises, neuromuscular re-education  Plan of Care Expires: 02/03/23  Plan of Care Reviewed with:      Subjective     Pain/Comfort:  Pain Rating 1: 0/10  Pain Rating Post-Intervention 1: 0/10    Objective:     Communicated with: Nurse prior to session.  Patient found supine with   upon OT entry to room.    General Precautions: Standard, fall    Orthopedic Precautions:   Braces:    Respiratory Status: Nasal cannula, flow 2 L/min     Occupational Performance:     Bed Mobility:    Patient completed Supine to Sit with stand by assistance     Functional Mobility/Transfers:  Patient completed Sit <> Stand Transfer with minimum assistance  with  rolling walker     Activities of Daily Living:  Lower Body Dressing: minimum assistance donning pants seated then standing to pull over hips.       [facility] 6 Click ADL:      Treatment & Education:  Performed LB dressing  requiring Juanis to thread Les then stand to pull over hips. Posterior lean requiring Juanis to maintain safe standing balance. Performed series of dynamic standing challenging balance, righting reaction, ability to correct posture requiring intermittent Juanis. Tolerated multiple standing episodes with CGA-Juanis, challenging change in head position while maintaining safe postural control.     Patient left sitting edge of bed with call button in reach and PTA present    GOALS:   Multidisciplinary Problems       Occupational Therapy Goals          Problem: Occupational Therapy    Goal Priority Disciplines Outcome Interventions   Occupational Therapy Goal     OT, PT/OT Ongoing, Progressing    Description: Goals to be met by: 2 weeks     Patient will increase functional independence with ADLs by performing:    UE Dressing with Set-up Assistance.  LE Dressing with Stand-by Assistance.  Toileting from toilet with Stand-by Assistance for hygiene and clothing management.   Bathing from  edge of bed with Stand-by Assistance.  Dynamic standing with SBA, tolerating x 6 mins continuous stand, no LOB.     Goals to be met by: 4 weeks     Patient will increase functional independence with ADLs by performing:    UE Dressing with Modified [place].  LE Dressing with Modified [place].  Toileting from toilet with Modified [place] for hygiene and clothing management.   Bathing from  shower chair/bench with Modified [place].  Light IADLs Tiffany with RW.                            Time Tracking:     OT Date of Treatment: 01/06/23  OT Start Time: 1034  OT Stop Time: 1116  OT Total Time (min): 42 min    Billable Minutes:Self Care/Home Management 10  Neuromuscular Re-education 32    Kiana Rasmussen, MARCUS, OTR/L      OT/HEMAL: OT          1/6/2023

## 2023-01-07 LAB
BACTERIA BLD CULT: NORMAL
BACTERIA BLD CULT: NORMAL

## 2023-01-07 PROCEDURE — 27000944

## 2023-01-07 PROCEDURE — 25000003 PHARM REV CODE 250: Performed by: NURSE PRACTITIONER

## 2023-01-07 PROCEDURE — 27000221 HC OXYGEN, UP TO 24 HOURS

## 2023-01-07 PROCEDURE — 63600175 PHARM REV CODE 636 W HCPCS: Performed by: NURSE PRACTITIONER

## 2023-01-07 PROCEDURE — 94761 N-INVAS EAR/PLS OXIMETRY MLT: CPT

## 2023-01-07 PROCEDURE — 11000004 HC SNF PRIVATE

## 2023-01-07 RX ADMIN — SENNOSIDES AND DOCUSATE SODIUM 1 TABLET: 50; 8.6 TABLET ORAL at 09:01

## 2023-01-07 RX ADMIN — ENOXAPARIN SODIUM 40 MG: 40 INJECTION SUBCUTANEOUS at 09:01

## 2023-01-07 RX ADMIN — THERA TABS 1 TABLET: TAB at 09:01

## 2023-01-07 RX ADMIN — LEVOTHYROXINE SODIUM 125 MCG: 25 TABLET ORAL at 05:01

## 2023-01-07 RX ADMIN — PANTOPRAZOLE SODIUM 40 MG: 40 TABLET, DELAYED RELEASE ORAL at 09:01

## 2023-01-07 RX ADMIN — OXYCODONE HYDROCHLORIDE AND ACETAMINOPHEN 500 MG: 500 TABLET ORAL at 08:01

## 2023-01-07 RX ADMIN — MELATONIN TAB 3 MG 9 MG: 3 TAB at 08:01

## 2023-01-07 RX ADMIN — VALSARTAN 160 MG: 160 TABLET, FILM COATED ORAL at 09:01

## 2023-01-07 RX ADMIN — AMLODIPINE BESYLATE 10 MG: 5 TABLET ORAL at 09:01

## 2023-01-07 RX ADMIN — SENNOSIDES AND DOCUSATE SODIUM 1 TABLET: 50; 8.6 TABLET ORAL at 08:01

## 2023-01-07 RX ADMIN — CLOPIDOGREL BISULFATE 75 MG: 75 TABLET, FILM COATED ORAL at 09:01

## 2023-01-07 RX ADMIN — LORAZEPAM 0.5 MG: 0.5 TABLET ORAL at 09:01

## 2023-01-07 RX ADMIN — SERTRALINE HYDROCHLORIDE 150 MG: 50 TABLET ORAL at 09:01

## 2023-01-07 RX ADMIN — GABAPENTIN 800 MG: 400 CAPSULE ORAL at 09:01

## 2023-01-07 RX ADMIN — ENOXAPARIN SODIUM 40 MG: 40 INJECTION SUBCUTANEOUS at 08:01

## 2023-01-07 RX ADMIN — ATORVASTATIN CALCIUM 40 MG: 40 TABLET, FILM COATED ORAL at 08:01

## 2023-01-07 RX ADMIN — GABAPENTIN 800 MG: 400 CAPSULE ORAL at 03:01

## 2023-01-07 RX ADMIN — HYDROCODONE BITARTRATE AND ACETAMINOPHEN 1 TABLET: 10; 325 TABLET ORAL at 09:01

## 2023-01-07 RX ADMIN — GABAPENTIN 800 MG: 400 CAPSULE ORAL at 08:01

## 2023-01-07 RX ADMIN — OMEGA-3 FATTY ACIDS CAP 1000 MG 1 CAPSULE: 1000 CAP at 09:01

## 2023-01-07 RX ADMIN — OXYCODONE HYDROCHLORIDE AND ACETAMINOPHEN 500 MG: 500 TABLET ORAL at 09:01

## 2023-01-07 NOTE — PLAN OF CARE
Problem: Adult Inpatient Plan of Care  Goal: Plan of Care Review  Outcome: Ongoing, Progressing  Goal: Patient-Specific Goal (Individualized)  Outcome: Ongoing, Progressing  Goal: Absence of Hospital-Acquired Illness or Injury  Outcome: Ongoing, Progressing  Goal: Optimal Comfort and Wellbeing  Outcome: Ongoing, Progressing     Problem: Fluid Imbalance (Pneumonia)  Goal: Fluid Balance  Outcome: Ongoing, Progressing     Problem: Infection (Pneumonia)  Goal: Resolution of Infection Signs and Symptoms  Outcome: Ongoing, Progressing     Problem: Respiratory Compromise (Pneumonia)  Goal: Effective Oxygenation and Ventilation  Outcome: Ongoing, Progressing     Problem: Impaired Wound Healing  Goal: Optimal Wound Healing  Outcome: Ongoing, Progressing     Problem: Skin Injury Risk Increased  Goal: Skin Health and Integrity  Outcome: Ongoing, Progressing

## 2023-01-07 NOTE — PLAN OF CARE
Problem: Adult Inpatient Plan of Care  Goal: Plan of Care Review  Outcome: Ongoing, Progressing  Goal: Patient-Specific Goal (Individualized)  Outcome: Ongoing, Progressing  Goal: Absence of Hospital-Acquired Illness or Injury  Outcome: Ongoing, Progressing  Goal: Optimal Comfort and Wellbeing  Outcome: Ongoing, Progressing  Goal: Readiness for Transition of Care  Outcome: Ongoing, Progressing     Problem: Fluid Imbalance (Pneumonia)  Goal: Fluid Balance  Outcome: Ongoing, Progressing     Problem: Infection (Pneumonia)  Goal: Resolution of Infection Signs and Symptoms  Outcome: Ongoing, Progressing     Problem: Respiratory Compromise (Pneumonia)  Goal: Effective Oxygenation and Ventilation  Outcome: Ongoing, Progressing     Problem: Impaired Wound Healing  Goal: Optimal Wound Healing  Outcome: Ongoing, Progressing     Problem: Skin Injury Risk Increased  Goal: Skin Health and Integrity  Outcome: Ongoing, Progressing     Problem: Fall Injury Risk  Goal: Absence of Fall and Fall-Related Injury  Outcome: Ongoing, Progressing

## 2023-01-08 PROCEDURE — 94761 N-INVAS EAR/PLS OXIMETRY MLT: CPT

## 2023-01-08 PROCEDURE — 27202170 HC DRESSING, MEPITEL

## 2023-01-08 PROCEDURE — 99900035 HC TECH TIME PER 15 MIN (STAT)

## 2023-01-08 PROCEDURE — 11000004 HC SNF PRIVATE

## 2023-01-08 PROCEDURE — 25000003 PHARM REV CODE 250: Performed by: NURSE PRACTITIONER

## 2023-01-08 PROCEDURE — 63600175 PHARM REV CODE 636 W HCPCS: Performed by: NURSE PRACTITIONER

## 2023-01-08 PROCEDURE — 27000221 HC OXYGEN, UP TO 24 HOURS

## 2023-01-08 RX ADMIN — GABAPENTIN 800 MG: 400 CAPSULE ORAL at 08:01

## 2023-01-08 RX ADMIN — ONDANSETRON 4 MG: 4 TABLET, ORALLY DISINTEGRATING ORAL at 10:01

## 2023-01-08 RX ADMIN — OXYCODONE HYDROCHLORIDE AND ACETAMINOPHEN 500 MG: 500 TABLET ORAL at 09:01

## 2023-01-08 RX ADMIN — HYDROCODONE BITARTRATE AND ACETAMINOPHEN 1 TABLET: 10; 325 TABLET ORAL at 08:01

## 2023-01-08 RX ADMIN — OMEGA-3 FATTY ACIDS CAP 1000 MG 1 CAPSULE: 1000 CAP at 09:01

## 2023-01-08 RX ADMIN — SENNOSIDES AND DOCUSATE SODIUM 1 TABLET: 50; 8.6 TABLET ORAL at 09:01

## 2023-01-08 RX ADMIN — ENOXAPARIN SODIUM 40 MG: 40 INJECTION SUBCUTANEOUS at 09:01

## 2023-01-08 RX ADMIN — OXYCODONE HYDROCHLORIDE AND ACETAMINOPHEN 500 MG: 500 TABLET ORAL at 08:01

## 2023-01-08 RX ADMIN — HYDROCODONE BITARTRATE AND ACETAMINOPHEN 1 TABLET: 10; 325 TABLET ORAL at 03:01

## 2023-01-08 RX ADMIN — LEVOTHYROXINE SODIUM 125 MCG: 25 TABLET ORAL at 05:01

## 2023-01-08 RX ADMIN — PANTOPRAZOLE SODIUM 40 MG: 40 TABLET, DELAYED RELEASE ORAL at 09:01

## 2023-01-08 RX ADMIN — MELATONIN TAB 3 MG 9 MG: 3 TAB at 08:01

## 2023-01-08 RX ADMIN — ATORVASTATIN CALCIUM 40 MG: 40 TABLET, FILM COATED ORAL at 08:01

## 2023-01-08 RX ADMIN — ENOXAPARIN SODIUM 40 MG: 40 INJECTION SUBCUTANEOUS at 08:01

## 2023-01-08 RX ADMIN — GABAPENTIN 800 MG: 400 CAPSULE ORAL at 02:01

## 2023-01-08 RX ADMIN — LORAZEPAM 0.5 MG: 0.5 TABLET ORAL at 08:01

## 2023-01-08 RX ADMIN — SERTRALINE HYDROCHLORIDE 150 MG: 50 TABLET ORAL at 09:01

## 2023-01-08 RX ADMIN — THERA TABS 1 TABLET: TAB at 08:01

## 2023-01-08 RX ADMIN — SENNOSIDES AND DOCUSATE SODIUM 1 TABLET: 50; 8.6 TABLET ORAL at 08:01

## 2023-01-08 RX ADMIN — AMLODIPINE BESYLATE 10 MG: 5 TABLET ORAL at 09:01

## 2023-01-08 RX ADMIN — VALSARTAN 160 MG: 160 TABLET, FILM COATED ORAL at 09:01

## 2023-01-08 RX ADMIN — CLOPIDOGREL BISULFATE 75 MG: 75 TABLET, FILM COATED ORAL at 09:01

## 2023-01-08 NOTE — NURSING
Patients left knee was accidentally hit by supper tray, and previous scab came off. Area cleansed with minimal bleeding and bandage applied. Pt states no complaints at this time.

## 2023-01-08 NOTE — PLAN OF CARE
Problem: Adult Inpatient Plan of Care  Goal: Plan of Care Review  Outcome: Ongoing, Progressing  Goal: Absence of Hospital-Acquired Illness or Injury  Outcome: Ongoing, Progressing  Goal: Readiness for Transition of Care  Outcome: Ongoing, Progressing     Problem: Impaired Wound Healing  Goal: Optimal Wound Healing  Outcome: Ongoing, Progressing

## 2023-01-09 ENCOUNTER — CLINICAL SUPPORT (OUTPATIENT)
Dept: WOUND CARE | Facility: HOSPITAL | Age: 58
DRG: 947 | End: 2023-01-09
Attending: FAMILY MEDICINE
Payer: MEDICARE

## 2023-01-09 DIAGNOSIS — L89.153 PRESSURE INJURY OF SACRAL REGION, STAGE 3: ICD-10-CM

## 2023-01-09 DIAGNOSIS — L89.620 PRESSURE INJURY OF LEFT HEEL, UNSTAGEABLE: Primary | ICD-10-CM

## 2023-01-09 LAB
ANION GAP SERPL CALCULATED.3IONS-SCNC: 10 MMOL/L (ref 7–16)
BUN SERPL-MCNC: 12 MG/DL (ref 7–18)
BUN/CREAT SERPL: 15 (ref 6–20)
CALCIUM SERPL-MCNC: 8.6 MG/DL (ref 8.5–10.1)
CHLORIDE SERPL-SCNC: 105 MMOL/L (ref 98–107)
CO2 SERPL-SCNC: 32 MMOL/L (ref 21–32)
CREAT SERPL-MCNC: 0.78 MG/DL (ref 0.55–1.02)
EGFR (NO RACE VARIABLE) (RUSH/TITUS): 89 ML/MIN/1.73M²
GLUCOSE SERPL-MCNC: 138 MG/DL (ref 74–106)
POTASSIUM SERPL-SCNC: 3.8 MMOL/L (ref 3.5–5.1)
SODIUM SERPL-SCNC: 143 MMOL/L (ref 136–145)

## 2023-01-09 PROCEDURE — 11042 DBRDMT SUBQ TIS 1ST 20SQCM/<: CPT

## 2023-01-09 PROCEDURE — 25000003 PHARM REV CODE 250: Performed by: NURSE PRACTITIONER

## 2023-01-09 PROCEDURE — 99900035 HC TECH TIME PER 15 MIN (STAT)

## 2023-01-09 PROCEDURE — 36415 COLL VENOUS BLD VENIPUNCTURE: CPT | Performed by: NURSE PRACTITIONER

## 2023-01-09 PROCEDURE — 94761 N-INVAS EAR/PLS OXIMETRY MLT: CPT

## 2023-01-09 PROCEDURE — 27000944

## 2023-01-09 PROCEDURE — 97535 SELF CARE MNGMENT TRAINING: CPT

## 2023-01-09 PROCEDURE — 27000221 HC OXYGEN, UP TO 24 HOURS

## 2023-01-09 PROCEDURE — 99307 SBSQ NF CARE SF MDM 10: CPT | Mod: ,,, | Performed by: NURSE PRACTITIONER

## 2023-01-09 PROCEDURE — 11000004 HC SNF PRIVATE

## 2023-01-09 PROCEDURE — 27202170 HC DRESSING, MEPITEL

## 2023-01-09 PROCEDURE — 63600175 PHARM REV CODE 636 W HCPCS: Performed by: NURSE PRACTITIONER

## 2023-01-09 PROCEDURE — 80048 BASIC METABOLIC PNL TOTAL CA: CPT | Performed by: NURSE PRACTITIONER

## 2023-01-09 PROCEDURE — 99307 PR NURSING FAC CARE, SUBSEQ, IMPROVING: ICD-10-PCS | Mod: ,,, | Performed by: NURSE PRACTITIONER

## 2023-01-09 RX ORDER — ENOXAPARIN SODIUM 100 MG/ML
40 INJECTION SUBCUTANEOUS DAILY
Status: DISCONTINUED | OUTPATIENT
Start: 2023-01-10 | End: 2023-01-25 | Stop reason: HOSPADM

## 2023-01-09 RX ORDER — GABAPENTIN 300 MG/1
600 CAPSULE ORAL 3 TIMES DAILY
Status: DISCONTINUED | OUTPATIENT
Start: 2023-01-09 | End: 2023-01-25 | Stop reason: HOSPADM

## 2023-01-09 RX ORDER — GABAPENTIN 400 MG/1
800 CAPSULE ORAL DAILY
Status: DISCONTINUED | OUTPATIENT
Start: 2023-01-10 | End: 2023-01-09

## 2023-01-09 RX ADMIN — SERTRALINE HYDROCHLORIDE 150 MG: 50 TABLET ORAL at 08:01

## 2023-01-09 RX ADMIN — ATORVASTATIN CALCIUM 40 MG: 40 TABLET, FILM COATED ORAL at 09:01

## 2023-01-09 RX ADMIN — HYDROCODONE BITARTRATE AND ACETAMINOPHEN 1 TABLET: 10; 325 TABLET ORAL at 04:01

## 2023-01-09 RX ADMIN — LEVOTHYROXINE SODIUM 125 MCG: 25 TABLET ORAL at 05:01

## 2023-01-09 RX ADMIN — MELATONIN TAB 3 MG 9 MG: 3 TAB at 09:01

## 2023-01-09 RX ADMIN — VALSARTAN 160 MG: 160 TABLET, FILM COATED ORAL at 08:01

## 2023-01-09 RX ADMIN — HYDROCODONE BITARTRATE AND ACETAMINOPHEN 1 TABLET: 10; 325 TABLET ORAL at 10:01

## 2023-01-09 RX ADMIN — OXYCODONE HYDROCHLORIDE AND ACETAMINOPHEN 500 MG: 500 TABLET ORAL at 09:01

## 2023-01-09 RX ADMIN — OMEGA-3 FATTY ACIDS CAP 1000 MG 1 CAPSULE: 1000 CAP at 08:01

## 2023-01-09 RX ADMIN — HYDROCODONE BITARTRATE AND ACETAMINOPHEN 1 TABLET: 10; 325 TABLET ORAL at 01:01

## 2023-01-09 RX ADMIN — THERA TABS 1 TABLET: TAB at 08:01

## 2023-01-09 RX ADMIN — GABAPENTIN 600 MG: 300 CAPSULE ORAL at 09:01

## 2023-01-09 RX ADMIN — LORAZEPAM 0.5 MG: 0.5 TABLET ORAL at 09:01

## 2023-01-09 RX ADMIN — AMLODIPINE BESYLATE 10 MG: 5 TABLET ORAL at 08:01

## 2023-01-09 RX ADMIN — ENOXAPARIN SODIUM 40 MG: 40 INJECTION SUBCUTANEOUS at 08:01

## 2023-01-09 RX ADMIN — PANTOPRAZOLE SODIUM 40 MG: 40 TABLET, DELAYED RELEASE ORAL at 08:01

## 2023-01-09 RX ADMIN — CLOPIDOGREL BISULFATE 75 MG: 75 TABLET, FILM COATED ORAL at 08:01

## 2023-01-09 RX ADMIN — GABAPENTIN 600 MG: 300 CAPSULE ORAL at 03:01

## 2023-01-09 RX ADMIN — GABAPENTIN 800 MG: 400 CAPSULE ORAL at 08:01

## 2023-01-09 RX ADMIN — SENNOSIDES AND DOCUSATE SODIUM 1 TABLET: 50; 8.6 TABLET ORAL at 08:01

## 2023-01-09 RX ADMIN — OXYCODONE HYDROCHLORIDE AND ACETAMINOPHEN 500 MG: 500 TABLET ORAL at 08:01

## 2023-01-09 NOTE — PT/OT/SLP PROGRESS
Physical Therapy Treatment    Patient Name:  Herlinda Valdovinos   MRN:  [id number]    Recommendations:     Discharge Recommendations: home health PT, outpatient PT  Discharge Equipment Recommendations: cane, straight, cane, quad  Barriers to discharge: Decreased caregiver support    Assessment:     Herlinda Valdovinos is a 57 y.o. female admitted with a medical diagnosis of <principal problem not specified>.  She presents with the following impairments/functional limitations: weakness, impaired endurance, impaired sensation, impaired functional mobility, impaired balance, gait instability, decreased coordination, decreased lower extremity function, decreased safety awareness, pain.    Rehab Prognosis: Good; patient would benefit from acute skilled PT services to address these deficits and reach maximum level of function.    Recent Surgery: * No surgery found *      Plan:     During this hospitalization, patient to be seen 5 x/week to address the identified rehab impairments via gait training, therapeutic activities, therapeutic exercises and progress toward the following goals:    Plan of Care Expires:  01/30/23    Subjective     Chief Complaint: weakness  Patient/Family Comments/goals: Pt concerned about left medial heel wound  Pain/Comfort:  Pain Rating 1: 0/10  Pain Rating Post-Intervention 1: 0/10      Objective:     Communicated with OT prior to session.  Patient found supine with   upon PT entry to room.     General Precautions: Standard, fall  Orthopedic Precautions: LLE non weight bearing (per patient's conversation with Dr. Daugherty)  Braces:    Respiratory Status: Nasal cannula, flow 2 L/min     Functional Mobility:  Bed Mobility:     Rolling Left:  supervision  Supine to Sit: stand by assistance  Sit to Supine: stand by assistance  Transfers:     Sit to Stand:  contact guard assistance with rolling walker  Gait: Pt able to take 3 steps to HOB using RW and CGA  Balance: Patient performed dynamic standing balance  activities including reaching outside ARA and across midline using UE support as needed exhibiting fair + balance.       AM-PAC 6 CLICK MOBILITY  Turning over in bed (including adjusting bedclothes, sheets and blankets)?: 4  Sitting down on and standing up from a chair with arms (e.g., wheelchair, bedside commode, etc.): 4  Moving from lying on back to sitting on the side of the bed?: 4  Moving to and from a bed to a chair (including a wheelchair)?: 3  Need to walk in hospital room?: 3  Climbing 3-5 steps with a railing?: 2  Basic Mobility Total Score: 20       Treatment & Education:  Patient performed bed mobility, transfers and balance activities as listed above. Patient performed 4 sit to stand transfers, standing 1-2 minutes each stand at [facility]. Patient required seated rest break between attempts.    Patient left supine with call button in reach and family present..    GOALS:   Multidisciplinary Problems       Physical Therapy Goals          Problem: Physical Therapy    Goal Priority Disciplines Outcome Goal Variances Interventions   Physical Therapy Goal     PT, PT/OT Ongoing, Progressing     Description: Short-term Goals: 2 weeks  1. Patient will perform supine to/from sit with supervision to improve independence and safety with bed mobility.  2. Patient will perform sit to/from stand with rolling walker with contact guard assist to improve independence and safety with transfers.  3. Patient will ambulate 15 feet with standard walker with minimal assistance while maintaining appropriate left lower extremity weightbearing status to improve independence and safety with gait.  4. Patient will tolerate 15 minutes of physical therapy intervention to improve endurance and activity tolerance.    Long-term goals: 4 weeks  1. Patient will perform supine to/from sit with modified independence to improve independence and safety with bed mobility.  2. Patient will perform sit to/from stand with rolling walker with standby  assist to improve independence and safety with transfers.  3. Patient will ambulate 30 feet with standard walker with contact guard assist while maintaining appropriate left lower extremity weightbearing status to improve independence and safety with gait.  4. Patient will tolerate 30 minutes of physical therapy intervention to improve endurance and activity tolerance.                         Time Tracking:     PT Received On: 01/09/23  PT Start Time: 1031     PT Stop Time: 1104  PT Total Time (min): 33 min     Billable Minutes: Therapeutic Activity 15    Treatment Type: Treatment  PT/PTA: PTA     PTA Visit Number: 2     01/09/2023

## 2023-01-09 NOTE — PT/OT/SLP PROGRESS
Occupational Therapy   Treatment    Name: Herlinda Valdovinos  MRN: [id number]  Admitting Diagnosis: Weakness     Recommendations:     Discharge Recommendations: other (see comments) (Home health versus placement in SNF/[facility])  Discharge Equipment Recommendations:     Barriers to discharge:  Other (Comment) (Per pt and family report, pt lives in stressful environment with significant other, son, and granddaughter.)    Assessment:     Herlinda Valdovinos is a 57 y.o. female with a medical diagnosis of weakness.  She presents with performance deficits affecting function are weakness, impaired endurance, impaired sensation, impaired self care skills, impaired functional mobility, gait instability, impaired balance, pain, decreased ROM.     Rehab Prognosis:  Good; patient would benefit from acute skilled OT services to address these deficits and reach maximum level of function.       Plan:     Patient to be seen 5 x/week to address the above listed problems via self-care/home management, therapeutic activities, therapeutic exercises, neuromuscular re-education  Plan of Care Expires: 02/03/23  Plan of Care Reviewed with:      Subjective     Pain/Comfort:  Pain Rating 1: 6/10  Location 1:  (Sacral pressure)  Pain Addressed 1: Reposition  Pain Rating Post-Intervention 1: 5/10    Objective:     Communicated with: Nurse prior to session.  Patient found supine with oxygen upon OT entry to room.    General Precautions: Standard, fall    Orthopedic Precautions:   Braces:    Respiratory Status: Nasal cannula, flow 2 L/min     Occupational Performance:     Bed Mobility:    Patient completed Supine to Sit with stand by assistance  Patient completed Sit to Supine with minimum assistance and with leg lift     Functional Mobility/Transfers:  Patient completed Sit <> Stand Transfer with contact guard assistance  with  rolling walker     Activities of Daily Living:  Upper Body Dressing: supervision seated  Lower Body Dressing: minimum  assistance sitting/standing      [facility] 6 Click ADL: 21    Treatment & Education:  Donned Ub clothing sitting EOB with set-up/SUP, donned LB clothing with set-up and Juanis for pulling over hips while standing d/t significant posterior lean initially. Performed dynamic standing EOB challenging reaching posteriorly and anteriorly, challenging balance needed for safe clothing management with dressing/toileting. Demonstrating good participation with CGA, posterior LOB x 1, correcting with CGA-Juanis.     Patient left supine with call button in reach and L heel floating.     GOALS:   Multidisciplinary Problems       Occupational Therapy Goals          Problem: Occupational Therapy    Goal Priority Disciplines Outcome Interventions   Occupational Therapy Goal     OT, PT/OT Ongoing, Progressing    Description: Goals to be met by: 2 weeks     Patient will increase functional independence with ADLs by performing:    UE Dressing with Set-up Assistance.  LE Dressing with Stand-by Assistance.  Toileting from toilet with Stand-by Assistance for hygiene and clothing management.   Bathing from  edge of bed with Stand-by Assistance.  Dynamic standing with SBA, tolerating x 6 mins continuous stand, no LOB.     Goals to be met by: 4 weeks     Patient will increase functional independence with ADLs by performing:    UE Dressing with Modified [place].  LE Dressing with Modified [place].  Toileting from toilet with Modified [place] for hygiene and clothing management.   Bathing from  shower chair/bench with Modified [place].  Light IADLs Tiffany with RW.                            Time Tracking:     OT Date of Treatment: 01/09/23  OT Start Time: 1031  OT Stop Time: 1105  OT Total Time (min): 34 min    Billable Minutes:Self Care/Home Management 15    MARCUS Levy, OTR/L      OT/HEMAL: OT          1/9/2023

## 2023-01-09 NOTE — PLAN OF CARE
Problem: Adult Inpatient Plan of Care  Goal: Plan of Care Review  Outcome: Ongoing, Progressing  Flowsheets (Taken 1/8/2023 1942)  Plan of Care Reviewed With: patient  Goal: Patient-Specific Goal (Individualized)  Outcome: Ongoing, Progressing  Flowsheets (Taken 1/8/2023 1942)  Anxieties, Fears or Concerns: None voiced  Individualized Care Needs: To obtain optimal wound healing prior to discharge

## 2023-01-09 NOTE — PROGRESS NOTES
[facility] Hospital Medicine  Progress Note    Patient Name: Herlinda Valdovinos  MRN: [id number]  Patient Class: IP- Swing   Admission Date: 1/4/2023  Length of Stay: 5 days  Attending Physician: Chuy Daugherty IV,   Primary Care Provider: Vini Hernandez NP        Subjective:     Principal Problem:<principal problem not specified>    [facility] Hospital Admission Certification    I certify that skilled nursing facility services are required to be given on an inpatient basis due to the following required skilled nursing and/or skilled rehabilitation services on a continuing basis:    management & evaluation of a patient plan of care that requires skilled nursing or rehabilitation services    I estimate that the additional period of SNF inpatient care will be 5 days.    Plans for post SNF care are: Home Care  ______________________________________________________________________        HPI:  Mrs. Kaur was admitted to acute care on 01/01 with UTI and pneumonia. Was started on rocephin and zithromax. She has completed 4/5 days of treatment for pneumonia. She has pressure wounds to sacrum, left heel and left groin. Wound care is following. 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 She has been mostly chair/ bed bound. States she has not had wounds until recently. States she felt bad approximately 10 days before admission but did not seek treatment.   She will be admitted to [facility] today to continue therapy for weakness. We will also continue wound care to sacrum, left groin and left heel.   She is at high risk for fall.  Talked with her re code status and she chose DNR.      Overview/Hospital Course:  01/05 Admitted to [facility] on 01/04/2023. Continue therapy for weakness. Continue wound care to sacrum, left groin and left heel. Potassium is low at 3.1. Will replace.    01/06 Awake and resting in bed  without complaints. States she is feeling better this morning. Continue with wound care and therapy.    01/09 Awake and resting in bed. Reports having a good weekend. Requests ensure shake BID. Continue wound care to sacrum, left groin and left heel. Continue therapy for strengthening.       Interval History: 01/09 Awake and resting in bed. Reports having a good weekend. Requests ensure shake BID. Continue wound care to sacrum, left groin and left heel. Continue therapy for strengthening.     Review of Systems   Constitutional:  Negative for activity change, appetite change, fatigue and fever.   HENT:  Negative for congestion, ear pain, sinus pain and sneezing.    Eyes:  Negative for pain.   Respiratory:  Negative for cough, shortness of breath and wheezing.    Cardiovascular:  Negative for chest pain and palpitations.   Gastrointestinal:  Negative for abdominal pain, constipation, diarrhea and nausea.   Endocrine: Negative for cold intolerance and heat intolerance.   Genitourinary:  Negative for dysuria and vaginal discharge.   Musculoskeletal:  Negative for back pain.   Skin:  Positive for wound. Negative for color change and rash.        Complains of occasional pain to sacrum-  Turn every 2 hours and prn to relieve sacral pressure   Continue wound care   Allergic/Immunologic: Negative for environmental allergies, food allergies and immunocompromised state.   Neurological:  Negative for dizziness and headaches.   Hematological:  Does not bruise/bleed easily.   Psychiatric/Behavioral:  Negative for sleep disturbance and suicidal ideas.    Objective:     Vital Signs (Most Recent):  Temp: 97.8 °F (36.6 °C) (01/09/23 0748)  Pulse: 73 (01/09/23 0748)  Resp: 18 (01/09/23 0748)  BP: 112/81 (01/09/23 0748)  SpO2: 96 % (01/09/23 0748) Vital Signs (24h Range):  Temp:  [97.8 °F (36.6 °C)-98.6 °F (37 °C)] 97.8 °F (36.6 °C)  Pulse:  [72-73] 73  Resp:  [18] 18  SpO2:  [96 %-97 %] 96 %  BP: (104-112)/(67-81) 112/81     Weight:  64.9 kg (143 lb 1.3 oz)  Body mass index is 24.56 kg/m².    Intake/Output Summary (Last 24 hours) at 1/9/2023 0914  Last data filed at 1/8/2023 1845  Gross per 24 hour   Intake 480 ml   Output --   Net 480 ml      Physical Exam  Constitutional:       Comments: Chronically ill appearing   HENT:      Head: Normocephalic.      Mouth/Throat:      Mouth: Mucous membranes are moist.   Eyes:      Conjunctiva/sclera: Conjunctivae normal.      Pupils: Pupils are equal, round, and reactive to light.   Cardiovascular:      Rate and Rhythm: Normal rate and regular rhythm.      Pulses: Normal pulses.      Heart sounds: Normal heart sounds.   Pulmonary:      Effort: Pulmonary effort is normal.      Breath sounds: Normal breath sounds.   Abdominal:      General: Abdomen is flat. Bowel sounds are normal.      Palpations: Abdomen is soft.   Musculoskeletal:      Cervical back: Normal range of motion.   Skin:     Comments: Sacral, left groin and left heel wound   Neurological:      Mental Status: She is oriented to person, place, and time.      Comments: States she is sleeping weill with mealtonin   Psychiatric:         Mood and Affect: Mood normal.       Significant Labs: All pertinent labs within the past 24 hours have been reviewed.  Recent Lab Results         01/09/23  0419        Anion Gap 10       BUN 12       BUN/CREAT RATIO 15       Calcium 8.6       Chloride 105       CO2 32       Creatinine 0.78       eGFR 89       Glucose 138       Potassium 3.8       Sodium 143               Significant Imaging: I have reviewed all pertinent imaging results/findings within the past 24 hours.      Assessment/Plan:      Skin breakdown  01/05/2023   Continue wound care to sacrum, left groin and left heel      Hyperlipidemia  01/05 continue atorvastatin daily         Thyroid disease  01/05/2023  Continue levothyroxine 125 mcg daily      Pneumonia of upper lobe due to infectious organism  01/05/2023  Will receive last dose of rocephin and  zithromax today     01/09 resolved    DVT prophylaxis    01/05/2023  Continue OCTAVIA hose and plavix, continue lovenox    Idiopathic peripheral neuropathy  01/05/2023  Gabapentin 800 mg po TID       Insomnia  01/05/2023  Continue melatonin prn       Hypertension    01/05/2023  Continue norvasc and atorvastatin    01/09 stable    Depressive disorder  01/05/2023  Continue zoloft 50 mg po daily    01/06/2023  Increased to home dose of 150 mg zoloft          Generalized anxiety disorder  01/05/2023  Continue lorazepam prn anxiety      Muscle weakness  01/05/2023 continue therapy for weakness        VTE Risk Mitigation (From admission, onward)         Ordered     enoxaparin injection 40 mg  Every 12 hours         01/04/23 1407                Discharge Planning   POOL: 1/11/2023     Code Status: DNR   Is the patient medically ready for discharge?:     Reason for patient still in hospital (select all that apply): Treatment  Discharge Plan A: Home with family                  KEVAN Funk  Department of Hospital Medicine   [facility]

## 2023-01-09 NOTE — SUBJECTIVE & OBJECTIVE
Interval History: 01/09 Awake and resting in bed. Reports having a good weekend. Requests ensure shake BID. Continue wound care to sacrum, left groin and left heel. Continue therapy for strengthening.     Review of Systems   Constitutional:  Negative for activity change, appetite change, fatigue and fever.   HENT:  Negative for congestion, ear pain, sinus pain and sneezing.    Eyes:  Negative for pain.   Respiratory:  Negative for cough, shortness of breath and wheezing.    Cardiovascular:  Negative for chest pain and palpitations.   Gastrointestinal:  Negative for abdominal pain, constipation, diarrhea and nausea.   Endocrine: Negative for cold intolerance and heat intolerance.   Genitourinary:  Negative for dysuria and vaginal discharge.   Musculoskeletal:  Negative for back pain.   Skin:  Positive for wound. Negative for color change and rash.        Complains of occasional pain to sacrum-  Turn every 2 hours and prn to relieve sacral pressure   Continue wound care   Allergic/Immunologic: Negative for environmental allergies, food allergies and immunocompromised state.   Neurological:  Negative for dizziness and headaches.   Hematological:  Does not bruise/bleed easily.   Psychiatric/Behavioral:  Negative for sleep disturbance and suicidal ideas.    Objective:     Vital Signs (Most Recent):  Temp: 97.8 °F (36.6 °C) (01/09/23 0748)  Pulse: 73 (01/09/23 0748)  Resp: 18 (01/09/23 0748)  BP: 112/81 (01/09/23 0748)  SpO2: 96 % (01/09/23 0748) Vital Signs (24h Range):  Temp:  [97.8 °F (36.6 °C)-98.6 °F (37 °C)] 97.8 °F (36.6 °C)  Pulse:  [72-73] 73  Resp:  [18] 18  SpO2:  [96 %-97 %] 96 %  BP: (104-112)/(67-81) 112/81     Weight: 64.9 kg (143 lb 1.3 oz)  Body mass index is 24.56 kg/m².    Intake/Output Summary (Last 24 hours) at 1/9/2023 0914  Last data filed at 1/8/2023 1845  Gross per 24 hour   Intake 480 ml   Output --   Net 480 ml      Physical Exam  Constitutional:       Comments: Chronically ill appearing   HENT:       Head: Normocephalic.      Mouth/Throat:      Mouth: Mucous membranes are moist.   Eyes:      Conjunctiva/sclera: Conjunctivae normal.      Pupils: Pupils are equal, round, and reactive to light.   Cardiovascular:      Rate and Rhythm: Normal rate and regular rhythm.      Pulses: Normal pulses.      Heart sounds: Normal heart sounds.   Pulmonary:      Effort: Pulmonary effort is normal.      Breath sounds: Normal breath sounds.   Abdominal:      General: Abdomen is flat. Bowel sounds are normal.      Palpations: Abdomen is soft.   Musculoskeletal:      Cervical back: Normal range of motion.   Skin:     Comments: Sacral, left groin and left heel wound   Neurological:      Mental Status: She is oriented to person, place, and time.      Comments: States she is sleeping weill with mealtonin   Psychiatric:         Mood and Affect: Mood normal.       Significant Labs: All pertinent labs within the past 24 hours have been reviewed.  Recent Lab Results         01/09/23  0419        Anion Gap 10       BUN 12       BUN/CREAT RATIO 15       Calcium 8.6       Chloride 105       CO2 32       Creatinine 0.78       eGFR 89       Glucose 138       Potassium 3.8       Sodium 143               Significant Imaging: I have reviewed all pertinent imaging results/findings within the past 24 hours.

## 2023-01-10 PROCEDURE — 94761 N-INVAS EAR/PLS OXIMETRY MLT: CPT

## 2023-01-10 PROCEDURE — 97530 THERAPEUTIC ACTIVITIES: CPT | Mod: CQ

## 2023-01-10 PROCEDURE — 63600175 PHARM REV CODE 636 W HCPCS: Performed by: NURSE PRACTITIONER

## 2023-01-10 PROCEDURE — 25000003 PHARM REV CODE 250: Performed by: NURSE PRACTITIONER

## 2023-01-10 PROCEDURE — 97535 SELF CARE MNGMENT TRAINING: CPT

## 2023-01-10 PROCEDURE — 27000221 HC OXYGEN, UP TO 24 HOURS

## 2023-01-10 PROCEDURE — 99900035 HC TECH TIME PER 15 MIN (STAT)

## 2023-01-10 PROCEDURE — 11000004 HC SNF PRIVATE

## 2023-01-10 PROCEDURE — 27000944

## 2023-01-10 RX ADMIN — GABAPENTIN 600 MG: 300 CAPSULE ORAL at 03:01

## 2023-01-10 RX ADMIN — ATORVASTATIN CALCIUM 40 MG: 40 TABLET, FILM COATED ORAL at 08:01

## 2023-01-10 RX ADMIN — HYDROCODONE BITARTRATE AND ACETAMINOPHEN 1 TABLET: 10; 325 TABLET ORAL at 11:01

## 2023-01-10 RX ADMIN — SERTRALINE HYDROCHLORIDE 150 MG: 50 TABLET ORAL at 09:01

## 2023-01-10 RX ADMIN — GABAPENTIN 600 MG: 300 CAPSULE ORAL at 09:01

## 2023-01-10 RX ADMIN — MELATONIN TAB 3 MG 9 MG: 3 TAB at 08:01

## 2023-01-10 RX ADMIN — OXYCODONE HYDROCHLORIDE AND ACETAMINOPHEN 500 MG: 500 TABLET ORAL at 08:01

## 2023-01-10 RX ADMIN — AMLODIPINE BESYLATE 10 MG: 5 TABLET ORAL at 09:01

## 2023-01-10 RX ADMIN — VALSARTAN 160 MG: 160 TABLET, FILM COATED ORAL at 09:01

## 2023-01-10 RX ADMIN — HYDROCODONE BITARTRATE AND ACETAMINOPHEN 1 TABLET: 10; 325 TABLET ORAL at 07:01

## 2023-01-10 RX ADMIN — CLOPIDOGREL BISULFATE 75 MG: 75 TABLET, FILM COATED ORAL at 09:01

## 2023-01-10 RX ADMIN — GABAPENTIN 600 MG: 300 CAPSULE ORAL at 08:01

## 2023-01-10 RX ADMIN — ENOXAPARIN SODIUM 40 MG: 40 INJECTION SUBCUTANEOUS at 09:01

## 2023-01-10 RX ADMIN — OXYCODONE HYDROCHLORIDE AND ACETAMINOPHEN 500 MG: 500 TABLET ORAL at 09:01

## 2023-01-10 RX ADMIN — SENNOSIDES AND DOCUSATE SODIUM 1 TABLET: 50; 8.6 TABLET ORAL at 09:01

## 2023-01-10 RX ADMIN — PANTOPRAZOLE SODIUM 40 MG: 40 TABLET, DELAYED RELEASE ORAL at 09:01

## 2023-01-10 RX ADMIN — SENNOSIDES AND DOCUSATE SODIUM 1 TABLET: 50; 8.6 TABLET ORAL at 08:01

## 2023-01-10 RX ADMIN — LORAZEPAM 0.5 MG: 0.5 TABLET ORAL at 08:01

## 2023-01-10 RX ADMIN — OMEGA-3 FATTY ACIDS CAP 1000 MG 1 CAPSULE: 1000 CAP at 09:01

## 2023-01-10 RX ADMIN — THERA TABS 1 TABLET: TAB at 09:01

## 2023-01-10 RX ADMIN — LEVOTHYROXINE SODIUM 125 MCG: 25 TABLET ORAL at 05:01

## 2023-01-10 NOTE — PT/OT/SLP PROGRESS
Physical Therapy Treatment    Patient Name:  Herlinda Valdovinos   MRN:  [id number]    Recommendations:     Discharge Recommendations: home health PT, outpatient PT  Discharge Equipment Recommendations: cane, straight, cane, quad  Barriers to discharge: Decreased caregiver support    Assessment:     Herlinda Valdovinos is a 57 y.o. female admitted with a medical diagnosis of <principal problem not specified>.  She presents with the following impairments/functional limitations: weakness, impaired endurance, impaired sensation, impaired functional mobility, impaired balance, gait instability, decreased coordination, decreased lower extremity function, decreased safety awareness, pain.    Rehab Prognosis: Good; patient would benefit from acute skilled PT services to address these deficits and reach maximum level of function.    Recent Surgery: * No surgery found *      Plan:     During this hospitalization, patient to be seen 5 x/week to address the identified rehab impairments via therapeutic activities, therapeutic exercises, gait training and progress toward the following goals:    Plan of Care Expires:  01/30/23    Subjective     Chief Complaint: weakness  Patient/Family Comments/goals: Pt agreeable to PT  Pain/Comfort:  Pain Rating 1: 0/10  Pain Rating Post-Intervention 1: 0/10      Objective:     Communicated with OT prior to session.  Patient found supine with   upon PT entry to room.     General Precautions: Standard, fall  Orthopedic Precautions: LLE non weight bearing (per patient's conversation with Dr. Daugherty)  Braces:    Respiratory Status: Nasal cannula, flow 2 L/min     Functional Mobility:  Bed Mobility:     Scooting: stand by assistance  Supine to Sit: stand by assistance  Sit to Supine: stand by assistance  Transfers:     Sit to Stand:  minimum assistance with rolling walker      AM-PAC 6 CLICK MOBILITY  Turning over in bed (including adjusting bedclothes, sheets and blankets)?: 4  Sitting down on and  standing up from a chair with arms (e.g., wheelchair, bedside commode, etc.): 3  Moving from lying on back to sitting on the side of the bed?: 4  Moving to and from a bed to a chair (including a wheelchair)?: 3  Need to walk in hospital room?: 3  Climbing 3-5 steps with a railing?: 2  Basic Mobility Total Score: 19       Treatment & Education:  Patient performed 3 sit to stands from bed using RW for support and requiring Mack to complete today. Patient able to stand 1.5 to 2.5 minutes each stand and perform various balance activities with reaching across midline and outside ARA with one moment of LOB requiring aMck to correct.    Patient left supine with call button in reach and  present..    GOALS:   Multidisciplinary Problems       Physical Therapy Goals          Problem: Physical Therapy    Goal Priority Disciplines Outcome Goal Variances Interventions   Physical Therapy Goal     PT, PT/OT Ongoing, Progressing     Description: Short-term Goals: 2 weeks  1. Patient will perform supine to/from sit with supervision to improve independence and safety with bed mobility.  2. Patient will perform sit to/from stand with rolling walker with contact guard assist to improve independence and safety with transfers.  3. Patient will ambulate 15 feet with standard walker with minimal assistance while maintaining appropriate left lower extremity weightbearing status to improve independence and safety with gait.  4. Patient will tolerate 15 minutes of physical therapy intervention to improve endurance and activity tolerance.    Long-term goals: 4 weeks  1. Patient will perform supine to/from sit with modified independence to improve independence and safety with bed mobility.  2. Patient will perform sit to/from stand with rolling walker with standby assist to improve independence and safety with transfers.  3. Patient will ambulate 30 feet with standard walker with contact guard assist while maintaining appropriate left  lower extremity weightbearing status to improve independence and safety with gait.  4. Patient will tolerate 30 minutes of physical therapy intervention to improve endurance and activity tolerance.                         Time Tracking:     PT Received On: 01/10/23  PT Start Time: 1018     PT Stop Time: 1042  PT Total Time (min): 24 min     Billable Minutes: Therapeutic Activity 12    Treatment Type: Treatment  PT/PTA: PTA     PTA Visit Number: 3     01/10/2023

## 2023-01-10 NOTE — PLAN OF CARE
[facility] - Medical Surgical Unit  Discharge Assessment    I visited with pt to discuss plan for continued stay in [place] for rehab pending insurance auth. Pt verb understanding and gratefulness.

## 2023-01-10 NOTE — PROGRESS NOTES
WOUND CARE CONSULT          Patient Name: Herlinda Valdovinos is a 57 y.o. female       Chief Complaint:  Wound Care Consult- F/U    Review of patient's allergies indicates:   Allergen Reactions    Lamisil [terbinafine] Anaphylaxis    Codeine Itching    Compazine [prochlorperazine] Itching        I have reviewed the patient's medical, surgical, family and social history.      Subjective:    HPI     Wound Location: left medial heel, sacrum    Wound Duration: onset approximately 12/15/22    Wound Context: pressure    Wound Pain: mild to moderate- sacrum. Denies pain to heel.     58 YO WF seen for follow up assessment of wounds. Pt is currently inpatient due to UTI, pneumonia, and generalized weakness. No new issues are reported. Sacral wound measurements are improved. Heel measurements are stable, with fluid filled blister remaining intact.       Medications:    Current Facility-Administered Medications on File Prior to Visit   Medication Dose Route Frequency Provider Last Rate Last Admin    acetaminophen tablet 650 mg  650 mg Oral Q6H PRN Laura L Garza, FNP        amLODIPine tablet 10 mg  10 mg Oral Daily Laura L Garza, FNP   10 mg at 01/10/23 0926    ascorbic acid (vitamin C) tablet 500 mg  500 mg Oral BID Laura L Garza, FNP   500 mg at 01/10/23 0926    atorvastatin tablet 40 mg  40 mg Oral QHS Laura L Garza, FNP   40 mg at 01/09/23 2135    calcium carbonate 200 mg calcium (500 mg) chewable tablet 500 mg  500 mg Oral BID PRN Laura L Garza, FNP        clopidogreL tablet 75 mg  75 mg Oral Daily Laura L Garza, FNP   75 mg at 01/10/23 0926    enoxaparin injection 40 mg  40 mg Subcutaneous Daily Laura L Garza, FNP   40 mg at 01/10/23 0927    gabapentin capsule 600 mg  600 mg Oral TID Laura L Garza, FNP   600 mg at 01/10/23 0925    HYDROcodone-acetaminophen  mg per tablet 1 tablet  1 tablet Oral Q6H PRN Laura L Garza, FNP   1 tablet at 01/10/23 1105    levothyroxine tablet 125 mcg  125 mcg  Oral Before breakfast Laura L Garza, FNP   125 mcg at 01/10/23 0528    LORazepam tablet 0.5 mg  0.5 mg Oral Q12H PRN Laura L Garza, FNP   0.5 mg at 01/09/23 2135    melatonin tablet 9 mg  9 mg Oral Nightly PRN Laura L Garza, FNP   9 mg at 01/09/23 2135    multivitamin tablet  1 tablet Oral Daily Laura L Garza, FNP   1 tablet at 01/10/23 0925    omega 3-dha-epa-fish oil 1,000 mg (120 mg-180 mg) Cap 1 capsule  1 capsule Oral Daily Laura L Garza, FNP   1 capsule at 01/10/23 0925    ondansetron disintegrating tablet 4 mg  4 mg Oral Q8H PRN Laura L Garza, FNP   4 mg at 01/08/23 2208    pantoprazole EC tablet 40 mg  40 mg Oral Daily Laura L Garza, FNP   40 mg at 01/10/23 0926    senna-docusate 8.6-50 mg per tablet 1 tablet  1 tablet Oral BID Laura L Garza, FNP   1 tablet at 01/10/23 0926    sertraline tablet 150 mg  150 mg Oral Daily Laura L Garza, FNP   150 mg at 01/10/23 0927    valsartan tablet 160 mg  160 mg Oral Daily Laura L Garza, FNP   160 mg at 01/10/23 0925    [DISCONTINUED] enoxaparin injection 40 mg  40 mg Subcutaneous Q12H Laura L Garza, FNP   40 mg at 01/09/23 0841    [DISCONTINUED] gabapentin capsule 800 mg  800 mg Oral Daily Laura L Garza, FNP         Current Outpatient Medications on File Prior to Visit   Medication Sig Dispense Refill    acetaminophen (TYLENOL) 325 MG tablet 2 tablets PRN      amLODIPine (NORVASC) 10 MG tablet Take 1 tablet (10 mg total) by mouth once daily. 30 tablet 0    atorvastatin (LIPITOR) 40 MG tablet Take 40 mg by mouth every evening.      azithromycin (ZITHROMAX) 500 MG tablet Take 1 tablet (500 mg total) by mouth once daily. 2 tablet     calcium carbonate (TUMS) 200 mg calcium (500 mg) chewable tablet Take 1 tablet (500 mg total) by mouth 2 (two) times daily as needed for Heartburn.      clopidogreL (PLAVIX) 75 mg tablet Plavix 75 mg tablet   Take 1 po QD to prevent stroke      docusate sodium (COLACE) 100 MG capsule Take 100 mg by mouth 2  (two) times daily.      enoxaparin (LOVENOX) 40 mg/0.4 mL Syrg Inject 0.4 mLs (40 mg total) into the skin every 12 (twelve) hours. for 10 days 8 mL 0    gabapentin (NEURONTIN) 800 MG tablet 800 mg 3 (three) times daily.      HYDROcodone-acetaminophen (NORCO)  mg per tablet Take 1 tablet by mouth 4 (four) times daily as needed.      levothyroxine (SYNTHROID) 125 MCG tablet       LORazepam (ATIVAN) 0.5 MG tablet Take 1 tablet (0.5 mg total) by mouth As instructed for Anxiety. (Patient taking differently: Take 0.5 mg by mouth every 12 (twelve) hours as needed for Anxiety.) 30 tablet 0    melatonin (MELATIN) 3 mg tablet Take 2 tablets (6 mg total) by mouth nightly as needed for Insomnia.  0    omega-3 fatty acids/fish oil (FISH OIL-OMEGA-3 FATTY ACIDS) 300-1,000 mg capsule Take by mouth once daily.      ondansetron (ZOFRAN-ODT) 4 MG TbDL Take 1 tablet (4 mg total) by mouth every 8 (eight) hours as needed.      pantoprazole (PROTONIX) 40 MG tablet Take 1 tablet (40 mg total) by mouth once daily. 30 tablet 0    sertraline (ZOLOFT) 50 MG tablet sertraline 50 mg tablet      valsartan (DIOVAN) 160 MG tablet Take 1 tablet (160 mg total) by mouth once daily. 30 tablet 0    [DISCONTINUED] hydroCHLOROthiazide (HYDRODIURIL) 12.5 MG Tab Take 1 tablet (12.5 mg total) by mouth once daily. 30 tablet 11    [DISCONTINUED] omeprazole (PRILOSEC) 40 MG capsule Take 40 mg by mouth every evening.            Physical Exam  Vitals reviewed.   Constitutional:       Appearance: Normal appearance.   HENT:      Head: Normocephalic and atraumatic.      Right Ear: External ear normal.      Left Ear: External ear normal.   Cardiovascular:      Pulses:           Dorsalis pedis pulses are 2+ on the right side and 2+ on the left side.   Pulmonary:      Effort: Pulmonary effort is normal.   Abdominal:      General: Bowel sounds are normal.      Palpations: Abdomen is soft.   Musculoskeletal:      Right lower leg: No edema.      Left lower leg: No  edema.   Skin:     General: Skin is warm and dry.      Findings: Wound present.          Neurological:      Mental Status: She is alert.      Comments: L side weakness  L foot pronates        Please see Wound Docs for complete Wound Assessment and lower extremity assessment when applicable.    Documentation of any procedures can be viewed in Wound Docs if applicable.         Assessment and Plan:    1. Pressure injury of left heel, unstageable  Paint with betadine daily  Float bilateral heels  Waffle boot to left  Leave blister intact      2. Pressure injury of sacral region, stage 3  Clean with NS, apply collagen to wound bed, cover with foam border, change QOD and PRN  Turn Q2   Air mattress     Nutritional support and offloading    Please see Wound Docs for complete plan including patient instructions.     Follow Up & Goals:     Follow up in about 1 week (around 1/16/2023).     Short term goals  Reduction of devitalized tissue  Reduction of bacterial burden  Stimulate and/or maintain acute phases of wound healing  Promote granulation formation  Promote epithelization  Promote compliance with plan of care  Address factors affecting wound healing  Optimize nutritional status    Long term goals  Prevent loss of limb  Prevent infection  Conservative Wound Treatment  Prevent recurrent ulcerations  Resolve wounds

## 2023-01-11 PROCEDURE — 11000004 HC SNF PRIVATE

## 2023-01-11 PROCEDURE — 99307 SBSQ NF CARE SF MDM 10: CPT | Mod: ,,, | Performed by: NURSE PRACTITIONER

## 2023-01-11 PROCEDURE — 25000003 PHARM REV CODE 250: Performed by: NURSE PRACTITIONER

## 2023-01-11 PROCEDURE — 63600175 PHARM REV CODE 636 W HCPCS: Performed by: NURSE PRACTITIONER

## 2023-01-11 PROCEDURE — 97535 SELF CARE MNGMENT TRAINING: CPT

## 2023-01-11 PROCEDURE — 27000944

## 2023-01-11 PROCEDURE — 94761 N-INVAS EAR/PLS OXIMETRY MLT: CPT

## 2023-01-11 PROCEDURE — 99900035 HC TECH TIME PER 15 MIN (STAT)

## 2023-01-11 PROCEDURE — 99307 PR NURSING FAC CARE, SUBSEQ, IMPROVING: ICD-10-PCS | Mod: ,,, | Performed by: NURSE PRACTITIONER

## 2023-01-11 PROCEDURE — 97116 GAIT TRAINING THERAPY: CPT | Mod: CQ

## 2023-01-11 PROCEDURE — 27000221 HC OXYGEN, UP TO 24 HOURS

## 2023-01-11 RX ORDER — POLYETHYLENE GLYCOL 3350 17 G/17G
17 POWDER, FOR SOLUTION ORAL 2 TIMES DAILY PRN
Status: DISCONTINUED | OUTPATIENT
Start: 2023-01-12 | End: 2023-01-25 | Stop reason: HOSPADM

## 2023-01-11 RX ADMIN — GABAPENTIN 600 MG: 300 CAPSULE ORAL at 04:01

## 2023-01-11 RX ADMIN — GABAPENTIN 600 MG: 300 CAPSULE ORAL at 09:01

## 2023-01-11 RX ADMIN — MELATONIN TAB 3 MG 9 MG: 3 TAB at 09:01

## 2023-01-11 RX ADMIN — VALSARTAN 160 MG: 160 TABLET, FILM COATED ORAL at 09:01

## 2023-01-11 RX ADMIN — HYDROCODONE BITARTRATE AND ACETAMINOPHEN 1 TABLET: 10; 325 TABLET ORAL at 01:01

## 2023-01-11 RX ADMIN — SENNOSIDES AND DOCUSATE SODIUM 1 TABLET: 50; 8.6 TABLET ORAL at 09:01

## 2023-01-11 RX ADMIN — ATORVASTATIN CALCIUM 40 MG: 40 TABLET, FILM COATED ORAL at 09:01

## 2023-01-11 RX ADMIN — HYDROCODONE BITARTRATE AND ACETAMINOPHEN 1 TABLET: 10; 325 TABLET ORAL at 07:01

## 2023-01-11 RX ADMIN — OMEGA-3 FATTY ACIDS CAP 1000 MG 1 CAPSULE: 1000 CAP at 09:01

## 2023-01-11 RX ADMIN — HYDROCODONE BITARTRATE AND ACETAMINOPHEN 1 TABLET: 10; 325 TABLET ORAL at 06:01

## 2023-01-11 RX ADMIN — THERA TABS 1 TABLET: TAB at 09:01

## 2023-01-11 RX ADMIN — OXYCODONE HYDROCHLORIDE AND ACETAMINOPHEN 500 MG: 500 TABLET ORAL at 09:01

## 2023-01-11 RX ADMIN — SERTRALINE HYDROCHLORIDE 150 MG: 50 TABLET ORAL at 09:01

## 2023-01-11 RX ADMIN — ENOXAPARIN SODIUM 40 MG: 40 INJECTION SUBCUTANEOUS at 09:01

## 2023-01-11 RX ADMIN — PANTOPRAZOLE SODIUM 40 MG: 40 TABLET, DELAYED RELEASE ORAL at 09:01

## 2023-01-11 RX ADMIN — AMLODIPINE BESYLATE 10 MG: 5 TABLET ORAL at 09:01

## 2023-01-11 RX ADMIN — LEVOTHYROXINE SODIUM 125 MCG: 25 TABLET ORAL at 05:01

## 2023-01-11 RX ADMIN — CLOPIDOGREL BISULFATE 75 MG: 75 TABLET, FILM COATED ORAL at 09:01

## 2023-01-11 NOTE — PROGRESS NOTES
Wt: 143# stable.  RDN visited pt this a.m. and she looks so much better and states that she feels better. For some reason her Ensure Clear was dc'd-will resume.  She has several high risk skin areas.  Skin breakdown to sacrum appears better per pictures.  Meal intake is improved ~70%.  Zinc level was 65 WNL.  She is receiving Vit C and MVM for WNDs. She prefers Ensure Clear instead of Yamil.  Her last BM was 1/10.  Pt denied complaints.  Rec 1. Ensure Clear (APPLE) one po BID.

## 2023-01-11 NOTE — PLAN OF CARE
[facility] - Medical Surgical Unit - Swing Bed   Interdisciplinary Team Meeting    Patient: Herlinda Valdovinos   Today's Date: 1/11/2023   Estimated D/C Date: 1/18/2023        Physician:  madelin Daugherty Nurse Practitioner: Brendan Garza NP & Felicia Hill NP   Pharmacy: Zina Morales PharmD Unit Director: IRENE Barbosa   : Mery Mcclelland RN Physical/Occupational Therapy: Kiana Rasmussen OTR/L   Speech Therapy: Kiana Rasmussen OTR/L Activity Therapy: Izzy aLi LPN   Nursing: IRENE Barbosa  Respiratory: Elbert Adams, RT Dietary: Willa Collins  Other:      Nursing  New Symptoms/Problems: none  Last Bowel Movement: 01/10/23  Urine: incontinent Diarrhea: No   Constipated: No Bowel: continent [place]: No   Isolation: No     O2 Device: Nasal Cannula O2 Flow: 2L  SpO2: 96 %   Nutrition: Regular  Speech/Swallowing: No current speech or swallowing issues  Aspiration Precautions: No  Cognition: WNL    Physical Therapy  Physical Therapy/Gait: 3 steps ELOS: Plan to DC 1/18/2023    Transfers: Minimal Assistance Range of Motion/Restrictions:      Occupational Therapy  Eating/Grooming: Supervision or Set-up Assistance Toileting: Contact Guard Assistance   Bathing: Contact Guard Assistance Dressing (Upper Body): Contact Guard Assistance   Dressing (Lower Body): Contact Guard Assistance      Activity Therapy  Level of participation: Active participation      Tx Plan/Recommendations reviewed with family and/or patient on (date) 01102023.  Additional family Conference/Training:   D/C Plan/Recommendations: Home with family  POOL: 1/18/2023    Pharmacy  Medication Changes (see MD orders in chart): No  MD/NP:  Madelin Daugherty Labs Reviewed: Yes New Lab Orders:no   Lab Concerns:

## 2023-01-11 NOTE — PROGRESS NOTES
[facility] - [facility] Surgical [facility]  Hospital Medicine  Progress Note    Patient Name: Herlinda Valdovinos  MRN: [id number]  Patient Class: IP- Swing   Admission Date: 1/4/2023  Length of Stay: 7 days  Attending Physician: Chuy Daugherty IV, DO  Primary Care Provider: Vini Hernandez NP        Subjective:     Principal Problem:<principal problem not specified>        HPI:  Mrs. Kaur was admitted to acute care on 01/01 with UTI and pneumonia. Was started on rocephin and zithromax. She has completed 4/5 days of treatment for pneumonia. She has pressure wounds to sacrum, left heel and left groin. Wound care is following. 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 She has been mostly chair/ bed bound. States she has not had wounds until recently. States she felt bad approximately 10 days before admission but did not seek treatment.   She will be admitted to [facility] today to continue therapy for weakness. We will also continue wound care to sacrum, left groin and left heel.   She is at high risk for fall.  Talked with her re code status and she chose DNR.      Overview/Hospital Course:  01/05 Admitted to [facility] on 01/04/2023. Continue therapy for weakness. Continue wound care to sacrum, left groin and left heel. Potassium is low at 3.1. Will replace.    01/06 Awake and resting in bed without complaints. States she is feeling better this morning. Continue with wound care and therapy.    01/09 Awake and resting in bed. Reports having a good weekend. Requests ensure shake BID. Continue wound care to sacrum, left groin and left heel. Continue therapy for strengthening.     01/11/23 Awake and resting in bed without complaints. Continue therapy for strengthening. Continue wound care.  Appetite is good.      Interval History: 01/11/23 Awake and resting in bed without complaints. Continue therapy for strengthening. Continue wound care.  Appetite is good.    Review of Systems    Constitutional:  Negative for activity change, appetite change, fatigue and fever.   HENT:  Negative for congestion, ear pain, sinus pain and sneezing.    Eyes:  Negative for pain.   Respiratory:  Negative for cough, shortness of breath and wheezing.    Cardiovascular:  Negative for chest pain and palpitations.   Gastrointestinal:  Negative for abdominal pain, constipation, diarrhea and nausea.   Endocrine: Negative for cold intolerance and heat intolerance.   Genitourinary:  Negative for dysuria and vaginal discharge.   Musculoskeletal:  Negative for back pain.   Skin:  Positive for wound. Negative for color change and rash.   Allergic/Immunologic: Negative for environmental allergies, food allergies and immunocompromised state.   Neurological:  Negative for dizziness and headaches.   Hematological:  Does not bruise/bleed easily.   Psychiatric/Behavioral:  Negative for sleep disturbance and suicidal ideas.    Objective:     Vital Signs (Most Recent):  Temp: 98.1 °F (36.7 °C) (01/11/23 0705)  Pulse: 85 (01/11/23 0705)  Resp: 18 (01/11/23 0705)  BP: 117/82 (01/11/23 0705)  SpO2: 96 % (01/11/23 0705) Vital Signs (24h Range):  Temp:  [97.6 °F (36.4 °C)-98.1 °F (36.7 °C)] 98.1 °F (36.7 °C)  Pulse:  [77-85] 85  Resp:  [18-20] 18  SpO2:  [93 %-96 %] 96 %  BP: (117-129)/(82) 117/82     Weight: 64.9 kg (143 lb)  Body mass index is 24.55 kg/m².    Intake/Output Summary (Last 24 hours) at 1/11/2023 1105  Last data filed at 1/10/2023 1757  Gross per 24 hour   Intake 480 ml   Output --   Net 480 ml      Physical Exam  Constitutional:       Appearance: She is ill-appearing.   HENT:      Mouth/Throat:      Mouth: Mucous membranes are moist.   Eyes:      Pupils: Pupils are equal, round, and reactive to light.   Cardiovascular:      Rate and Rhythm: Normal rate and regular rhythm.      Pulses: Normal pulses.      Heart sounds: Normal heart sounds.   Pulmonary:      Effort: Pulmonary effort is normal.      Breath sounds: Normal  breath sounds.   Abdominal:      General: Bowel sounds are normal.      Palpations: Abdomen is soft.   Skin:     Comments: Sacral and left groin wound, left heel blister   Neurological:      Mental Status: She is alert and oriented to person, place, and time.      Motor: Weakness present.       Significant Labs: All pertinent labs within the past 24 hours have been reviewed.  Recent Lab Results       None            Significant Imaging: I have reviewed all pertinent imaging results/findings within the past 24 hours.      Assessment/Plan:      Skin breakdown  01/05/2023   Continue wound care to sacrum, left groin and left heel      Hyperlipidemia  01/05 continue atorvastatin daily         Thyroid disease  01/05/2023  Continue levothyroxine 125 mcg daily      Pneumonia of upper lobe due to infectious organism  01/05/2023  Will receive last dose of rocephin and zithromax today     01/09 resolved    DVT prophylaxis    01/05/2023  Continue OCTAVIA hose and plavix, continue lovenox    Idiopathic peripheral neuropathy  01/05/2023  Gabapentin 800 mg po TID       Insomnia  01/05/2023  Continue melatonin prn       Hypertension    01/05/2023  Continue norvasc and atorvastatin    01/09 stable    Depressive disorder  01/05/2023  Continue zoloft 50 mg po daily    01/06/2023  Increased to home dose of 150 mg zoloft          Generalized anxiety disorder  01/05/2023  Continue lorazepam prn anxiety      Muscle weakness  01/05/2023 continue therapy for weakness        VTE Risk Mitigation (From admission, onward)         Ordered     enoxaparin injection 40 mg  Daily         01/09/23 1223                Discharge Planning   POOL: 1/18/2023     Code Status: DNR   Is the patient medically ready for discharge?:     Reason for patient still in hospital (select all that apply): Treatment  Discharge Plan A: Home with family                  KEVAN Funk  Department of Hospital Medicine   [facility]

## 2023-01-11 NOTE — PT/OT/SLP PROGRESS
Physical Therapy Treatment    Patient Name:  Herlinda Valdovinos   MRN:  [id number]    Recommendations:     Discharge Recommendations: home health PT, outpatient PT  Discharge Equipment Recommendations: cane, straight, cane, quad  Barriers to discharge: Decreased caregiver support    Assessment:     Herlinda Valdovinos is a 57 y.o. female admitted with a medical diagnosis of <principal problem not specified>.  She presents with the following impairments/functional limitations: weakness, impaired endurance, impaired sensation, impaired functional mobility, impaired balance, gait instability, decreased coordination, decreased lower extremity function, decreased safety awareness, pain.    Rehab Prognosis: Good; patient would benefit from acute skilled PT services to address these deficits and reach maximum level of function.    Recent Surgery: * No surgery found *      Plan:     During this hospitalization, patient to be seen 5 x/week to address the identified rehab impairments via gait training, therapeutic activities, therapeutic exercises and progress toward the following goals:    Plan of Care Expires:  01/30/23    Subjective     Chief Complaint: weakness, pain in gluteal area  Patient/Family Comments/goals: Pt agreeable to PT  Pain/Comfort:  Pain Rating 1: 6/10  Location - Side 1: Bilateral  Location - Orientation 1: posterior  Location 1: gluteal  Pain Addressed 1: Reposition  Pain Rating Post-Intervention 1: 8/10      Objective:     Communicated with OT prior to session.  Patient found right sidelying with   upon PT entry to room.     General Precautions: Standard, fall  Orthopedic Precautions: LLE non weight bearing (per patient's conversation with Dr. Daugherty)  Braces:    Respiratory Status: Nasal cannula, flow 2 L/min     Functional Mobility:  Bed Mobility:     Rolling Left:  modified independence  Rolling Right: modified independence  Scooting: stand by assistance  Supine to Sit: stand by assistance  Sit to Supine:  stand by assistance  Transfers:     Sit to Stand:  contact guard assistance with rolling walker  Gait: Pt ambulated 16 feet between bed and doorway using RW and CGA/Juanis; fatigues easily; right knee buckling; able to keep weight off L heel during gait; decreased stride length      AM-PAC 6 CLICK MOBILITY  Turning over in bed (including adjusting bedclothes, sheets and blankets)?: 4  Sitting down on and standing up from a chair with arms (e.g., wheelchair, bedside commode, etc.): 3  Moving from lying on back to sitting on the side of the bed?: 4  Moving to and from a bed to a chair (including a wheelchair)?: 3  Need to walk in hospital room?: 3  Climbing 3-5 steps with a railing?: 2  Basic Mobility Total Score: 19       Treatment & Education:  Patient performed multiple transfers, bed mobility and gait training as listed above.    Patient left right sidelying with call button in reach and  present..    GOALS:   Multidisciplinary Problems       Physical Therapy Goals          Problem: Physical Therapy    Goal Priority Disciplines Outcome Goal Variances Interventions   Physical Therapy Goal     PT, PT/OT Ongoing, Progressing     Description: Short-term Goals: 2 weeks  1. Patient will perform supine to/from sit with supervision to improve independence and safety with bed mobility.  2. Patient will perform sit to/from stand with rolling walker with contact guard assist to improve independence and safety with transfers.  3. Patient will ambulate 15 feet with standard walker with minimal assistance while maintaining appropriate left lower extremity weightbearing status to improve independence and safety with gait.  4. Patient will tolerate 15 minutes of physical therapy intervention to improve endurance and activity tolerance.    Long-term goals: 4 weeks  1. Patient will perform supine to/from sit with modified independence to improve independence and safety with bed mobility.  2. Patient will perform sit to/from  stand with rolling walker with standby assist to improve independence and safety with transfers.  3. Patient will ambulate 30 feet with standard walker with contact guard assist while maintaining appropriate left lower extremity weightbearing status to improve independence and safety with gait.  4. Patient will tolerate 30 minutes of physical therapy intervention to improve endurance and activity tolerance.                         Time Tracking:     PT Received On: 01/11/23  PT Start Time: 1020     PT Stop Time: 1042  PT Total Time (min): 22 min     Billable Minutes: Gait Training 10    Treatment Type: Treatment  PT/PTA: PTA     PTA Visit Number: 4     01/11/2023

## 2023-01-11 NOTE — PT/OT/SLP PROGRESS
Occupational Therapy   Treatment    Name: Herlinda Valdovinos  MRN: [id number]  Admitting Diagnosis: Weakness     Recommendations:     Discharge Recommendations: other (see comments) (Home health versus placement in SNF/[facility])  Discharge Equipment Recommendations:     Barriers to discharge:  Other (Comment) (Per pt and family report, pt lives in stressful environment with significant other, son, and granddaughter.)    Assessment:     Herlinda Valdovinos is a 57 y.o. female with a medical diagnosis of weakness.  She presents with performance deficits affecting function are weakness, impaired endurance, impaired sensation, impaired self care skills, impaired functional mobility, gait instability, impaired balance, pain, decreased ROM.     Rehab Prognosis:  Good; patient would benefit from acute skilled OT services to address these deficits and reach maximum level of function.       Plan:     Patient to be seen 5 x/week to address the above listed problems via self-care/home management, therapeutic activities, therapeutic exercises, neuromuscular re-education  Plan of Care Expires: 02/03/23  Plan of Care Reviewed with:      Subjective     Pain/Comfort:   Significant discomfort reported in sacral area at site of recent wound debridement. Pain unrated.     Objective:     Communicated with: Nurse prior to session.  Patient found supine with oxygen upon OT entry to room.    General Precautions: Standard, fall    Orthopedic Precautions:   Braces:    Respiratory Status: Nasal cannula, flow 2 L/min     Occupational Performance:     Bed Mobility:    Patient completed Supine to Sit with stand by assistance  Patient completed Sit to Supine with minimum assistance and with leg lift     Functional Mobility/Transfers:  Patient completed Sit <> Stand Transfer with contact guard assistance  with  rolling walker     Activities of Daily Living:  Upper Body Dressing: supervision seated  Lower Body Dressing: minimum assistance  sitting/standing      [facility] 6 Click ADL:      Treatment & Education:  Donned Ub clothing sitting EOB with set-up/SUP, donned LB clothing with set-up and Juanis for pulling over hips while standing d/t significant posterior lean initially. Performed dynamic standing EOB challenging reaching posteriorly and anteriorly, challenging balance needed for safe clothing management with dressing/toileting. Demonstrating good participation with CGA, posterior LOB x 2, correcting with CGA-Juanis.     Patient left right sidelying with call button in reach and L heel floating.     GOALS:   Multidisciplinary Problems       Occupational Therapy Goals          Problem: Occupational Therapy    Goal Priority Disciplines Outcome Interventions   Occupational Therapy Goal     OT, PT/OT Ongoing, Progressing    Description: Goals to be met by: 2 weeks     Patient will increase functional independence with ADLs by performing:    UE Dressing with Set-up Assistance.  LE Dressing with Stand-by Assistance.  Toileting from toilet with Stand-by Assistance for hygiene and clothing management.   Bathing from  edge of bed with Stand-by Assistance.  Dynamic standing with SBA, tolerating x 6 mins continuous stand, no LOB.     Goals to be met by: 4 weeks     Patient will increase functional independence with ADLs by performing:    UE Dressing with Modified [place].  LE Dressing with Modified [place].  Toileting from toilet with Modified [place] for hygiene and clothing management.   Bathing from  shower chair/bench with Modified [place].  Light IADLs Tiffany with RW.                            Time Tracking:     OT Date of Treatment: 01/10/23  OT Start Time: 1017  OT Stop Time: 1042  OT Total Time (min): 25 min    Billable Minutes:Self Care/Home Management 15    MARCUS Levy, OTR/L      OT/HEMAL: OT          1/10/2023

## 2023-01-11 NOTE — PT/OT/SLP PROGRESS
Occupational Therapy   Treatment    Name: Herlinda Valdovinos  MRN: [id number]  Admitting Diagnosis: Weakness     Recommendations:     Discharge Recommendations: other (see comments) (Home health versus placement in SNF/[facility])  Discharge Equipment Recommendations:     Barriers to discharge:  Other (Comment) (Per pt and family report, pt lives in stressful environment with significant other, son, and granddaughter.)    Assessment:     Herlinda Valdovinos is a 57 y.o. female with a medical diagnosis of weakness.  She presents with performance deficits affecting function are weakness, impaired endurance, impaired sensation, impaired self care skills, impaired functional mobility, gait instability, impaired balance, pain, decreased ROM.     Rehab Prognosis:  Good; patient would benefit from acute skilled OT services to address these deficits and reach maximum level of function.       Plan:     Patient to be seen 5 x/week to address the above listed problems via self-care/home management, therapeutic activities, therapeutic exercises, neuromuscular re-education  Plan of Care Expires: 02/03/23  Plan of Care Reviewed with:      Subjective     Pain/Comfort:  Pain Rating 1: 6/10  Location 1:  (Sacral area at site of wound debridement.)  Pain Addressed 1: Reposition  Pain Rating Post-Intervention 1: 6/10Significant discomfort reported in sacral area at site of recent wound debridement. Pain unrated.     Objective:     Communicated with: Nurse prior to session.  Patient found supine with oxygen upon OT entry to room.    General Precautions: Standard, fall    Orthopedic Precautions:   Braces:    Respiratory Status: Nasal cannula, flow 2 L/min     Occupational Performance:     Bed Mobility:    Patient completed Supine to Sit with stand by assistance  Patient completed Sit to Supine with minimum assistance and with leg lift     Functional Mobility/Transfers:  Patient completed Sit <> Stand Transfer with contact guard assistance  with   rolling walker     Activities of Daily Living:  Upper Body Dressing: supervision seated  Lower Body Dressing: minimum assistance sitting/standing      [facility] 6 Click ADL: 21    Treatment & Education:  Donned Ub clothing sitting EOB with set-up/SUP, donned LB clothing with set-up and Juanis for threading B Les as well as pulling over hips while standing d/t significant posterior lean. Extended time needed d/t easily fatigued and level of sacral discomfort. Assisted with repositioning when returning to bed to reduce sacral pressure as well as ensure no pressure to L heel.     Patient left right sidelying with call button in reach and L heel floating.     GOALS:   Multidisciplinary Problems       Occupational Therapy Goals          Problem: Occupational Therapy    Goal Priority Disciplines Outcome Interventions   Occupational Therapy Goal     OT, PT/OT Ongoing, Progressing    Description: Goals to be met by: 2 weeks     Patient will increase functional independence with ADLs by performing:    UE Dressing with Set-up Assistance.  LE Dressing with Stand-by Assistance.  Toileting from toilet with Stand-by Assistance for hygiene and clothing management.   Bathing from  edge of bed with Stand-by Assistance.  Dynamic standing with SBA, tolerating x 6 mins continuous stand, no LOB.     Goals to be met by: 4 weeks     Patient will increase functional independence with ADLs by performing:    UE Dressing with Modified [place].  LE Dressing with Modified [place].  Toileting from toilet with Modified [place] for hygiene and clothing management.   Bathing from  shower chair/bench with Modified [place].  Light IADLs [place] with RW.                            Time Tracking:     OT Date of Treatment: 01/11/23  OT Start Time: 1020  OT Stop Time: 1043  OT Total Time (min): 23 min    Billable Minutes:Self Care/Home Management 11    MARCUS Levy, OTR/L      OT/HEMAL: OT          1/11/2023

## 2023-01-11 NOTE — SUBJECTIVE & OBJECTIVE
Interval History: 01/11/23 Awake and resting in bed without complaints. Continue therapy for strengthening. Continue wound care.  Appetite is good.    Review of Systems   Constitutional:  Negative for activity change, appetite change, fatigue and fever.   HENT:  Negative for congestion, ear pain, sinus pain and sneezing.    Eyes:  Negative for pain.   Respiratory:  Negative for cough, shortness of breath and wheezing.    Cardiovascular:  Negative for chest pain and palpitations.   Gastrointestinal:  Negative for abdominal pain, constipation, diarrhea and nausea.   Endocrine: Negative for cold intolerance and heat intolerance.   Genitourinary:  Negative for dysuria and vaginal discharge.   Musculoskeletal:  Negative for back pain.   Skin:  Positive for wound. Negative for color change and rash.   Allergic/Immunologic: Negative for environmental allergies, food allergies and immunocompromised state.   Neurological:  Negative for dizziness and headaches.   Hematological:  Does not bruise/bleed easily.   Psychiatric/Behavioral:  Negative for sleep disturbance and suicidal ideas.    Objective:     Vital Signs (Most Recent):  Temp: 98.1 °F (36.7 °C) (01/11/23 0705)  Pulse: 85 (01/11/23 0705)  Resp: 18 (01/11/23 0705)  BP: 117/82 (01/11/23 0705)  SpO2: 96 % (01/11/23 0705) Vital Signs (24h Range):  Temp:  [97.6 °F (36.4 °C)-98.1 °F (36.7 °C)] 98.1 °F (36.7 °C)  Pulse:  [77-85] 85  Resp:  [18-20] 18  SpO2:  [93 %-96 %] 96 %  BP: (117-129)/(82) 117/82     Weight: 64.9 kg (143 lb)  Body mass index is 24.55 kg/m².    Intake/Output Summary (Last 24 hours) at 1/11/2023 1105  Last data filed at 1/10/2023 1757  Gross per 24 hour   Intake 480 ml   Output --   Net 480 ml      Physical Exam  Constitutional:       Appearance: She is ill-appearing.   HENT:      Mouth/Throat:      Mouth: Mucous membranes are moist.   Eyes:      Pupils: Pupils are equal, round, and reactive to light.   Cardiovascular:      Rate and Rhythm: Normal rate  and regular rhythm.      Pulses: Normal pulses.      Heart sounds: Normal heart sounds.   Pulmonary:      Effort: Pulmonary effort is normal.      Breath sounds: Normal breath sounds.   Abdominal:      General: Bowel sounds are normal.      Palpations: Abdomen is soft.   Skin:     Comments: Sacral and left groin wound, left heel blister   Neurological:      Mental Status: She is alert and oriented to person, place, and time.      Motor: Weakness present.       Significant Labs: All pertinent labs within the past 24 hours have been reviewed.  Recent Lab Results       None            Significant Imaging: I have reviewed all pertinent imaging results/findings within the past 24 hours.

## 2023-01-12 PROCEDURE — 63600175 PHARM REV CODE 636 W HCPCS: Performed by: NURSE PRACTITIONER

## 2023-01-12 PROCEDURE — 27000944

## 2023-01-12 PROCEDURE — 97116 GAIT TRAINING THERAPY: CPT | Mod: CQ

## 2023-01-12 PROCEDURE — A6212 FOAM DRG <=16 SQ IN W/BORDER: HCPCS

## 2023-01-12 PROCEDURE — 94761 N-INVAS EAR/PLS OXIMETRY MLT: CPT

## 2023-01-12 PROCEDURE — 27000935

## 2023-01-12 PROCEDURE — 25000003 PHARM REV CODE 250: Performed by: NURSE PRACTITIONER

## 2023-01-12 PROCEDURE — 97110 THERAPEUTIC EXERCISES: CPT

## 2023-01-12 PROCEDURE — 99900035 HC TECH TIME PER 15 MIN (STAT)

## 2023-01-12 PROCEDURE — 27000221 HC OXYGEN, UP TO 24 HOURS

## 2023-01-12 PROCEDURE — 11000004 HC SNF PRIVATE

## 2023-01-12 RX ORDER — MICONAZOLE NITRATE 2 %
POWDER (GRAM) TOPICAL 2 TIMES DAILY
Status: DISCONTINUED | OUTPATIENT
Start: 2023-01-12 | End: 2023-01-25 | Stop reason: HOSPADM

## 2023-01-12 RX ADMIN — POLYETHYLENE GLYCOL 3350 17 G: 17 POWDER, FOR SOLUTION ORAL at 01:01

## 2023-01-12 RX ADMIN — PANTOPRAZOLE SODIUM 40 MG: 40 TABLET, DELAYED RELEASE ORAL at 10:01

## 2023-01-12 RX ADMIN — SERTRALINE HYDROCHLORIDE 150 MG: 50 TABLET ORAL at 10:01

## 2023-01-12 RX ADMIN — HYDROCODONE BITARTRATE AND ACETAMINOPHEN 1 TABLET: 10; 325 TABLET ORAL at 10:01

## 2023-01-12 RX ADMIN — OMEGA-3 FATTY ACIDS CAP 1000 MG 1 CAPSULE: 1000 CAP at 10:01

## 2023-01-12 RX ADMIN — HYDROCODONE BITARTRATE AND ACETAMINOPHEN 1 TABLET: 10; 325 TABLET ORAL at 05:01

## 2023-01-12 RX ADMIN — OXYCODONE HYDROCHLORIDE AND ACETAMINOPHEN 500 MG: 500 TABLET ORAL at 10:01

## 2023-01-12 RX ADMIN — POLYETHYLENE GLYCOL 3350 17 G: 17 POWDER, FOR SOLUTION ORAL at 10:01

## 2023-01-12 RX ADMIN — ENOXAPARIN SODIUM 40 MG: 40 INJECTION SUBCUTANEOUS at 10:01

## 2023-01-12 RX ADMIN — ATORVASTATIN CALCIUM 40 MG: 40 TABLET, FILM COATED ORAL at 09:01

## 2023-01-12 RX ADMIN — MICONAZOLE NITRATE: 20 POWDER TOPICAL at 09:01

## 2023-01-12 RX ADMIN — LEVOTHYROXINE SODIUM 125 MCG: 25 TABLET ORAL at 06:01

## 2023-01-12 RX ADMIN — SENNOSIDES AND DOCUSATE SODIUM 1 TABLET: 50; 8.6 TABLET ORAL at 10:01

## 2023-01-12 RX ADMIN — GABAPENTIN 600 MG: 300 CAPSULE ORAL at 09:01

## 2023-01-12 RX ADMIN — AMLODIPINE BESYLATE 10 MG: 5 TABLET ORAL at 10:01

## 2023-01-12 RX ADMIN — SENNOSIDES AND DOCUSATE SODIUM 1 TABLET: 50; 8.6 TABLET ORAL at 09:01

## 2023-01-12 RX ADMIN — GABAPENTIN 600 MG: 300 CAPSULE ORAL at 10:01

## 2023-01-12 RX ADMIN — GABAPENTIN 600 MG: 300 CAPSULE ORAL at 03:01

## 2023-01-12 RX ADMIN — THERA TABS 1 TABLET: TAB at 10:01

## 2023-01-12 RX ADMIN — OXYCODONE HYDROCHLORIDE AND ACETAMINOPHEN 500 MG: 500 TABLET ORAL at 09:01

## 2023-01-12 RX ADMIN — MELATONIN TAB 3 MG 9 MG: 3 TAB at 09:01

## 2023-01-12 RX ADMIN — CLOPIDOGREL BISULFATE 75 MG: 75 TABLET, FILM COATED ORAL at 10:01

## 2023-01-12 RX ADMIN — VALSARTAN 160 MG: 160 TABLET, FILM COATED ORAL at 10:01

## 2023-01-12 NOTE — PLAN OF CARE
Problem: Adult Inpatient Plan of Care  Goal: Plan of Care Review  Outcome: Ongoing, Progressing  Goal: Absence of Hospital-Acquired Illness or Injury  Outcome: Ongoing, Progressing  Goal: Readiness for Transition of Care  Outcome: Ongoing, Progressing     Problem: Fluid Imbalance (Pneumonia)  Goal: Fluid Balance  Outcome: Ongoing, Progressing     Problem: Impaired Wound Healing  Goal: Optimal Wound Healing  Outcome: Ongoing, Progressing

## 2023-01-12 NOTE — PT/OT/SLP PROGRESS
Physical Therapy Treatment    Patient Name:  Herlinda Valdovinos   MRN:  [id number]    Recommendations:     Discharge Recommendations: home health PT, outpatient PT  Discharge Equipment Recommendations: cane, straight, cane, quad  Barriers to discharge: Decreased caregiver support    Assessment:     Herlinda Valdovinos is a 57 y.o. female admitted with a medical diagnosis of <principal problem not specified>.  She presents with the following impairments/functional limitations: weakness, impaired endurance, impaired sensation, impaired functional mobility, impaired balance, gait instability, decreased coordination, decreased lower extremity function, decreased safety awareness, pain.    Rehab Prognosis: Good; patient would benefit from acute skilled PT services to address these deficits and reach maximum level of function.    Recent Surgery: * No surgery found *      Plan:     During this hospitalization, patient to be seen 5 x/week to address the identified rehab impairments via therapeutic exercises, therapeutic activities, gait training and progress toward the following goals:    Plan of Care Expires:  01/30/23    Subjective     Chief Complaint: weakness, pain in gluteal area  Patient/Family Comments/goals: Patient agreeable to PT  Pain/Comfort:  Pain Rating 1: 8/10  Location - Side 1: Bilateral  Location - Orientation 1: posterior  Location 1: gluteal  Pain Addressed 1: Distraction  Pain Rating Post-Intervention 1: 8/10      Objective:     Communicated with PT prior to session.  Patient found supine with   upon PT entry to room.     General Precautions: Standard, fall  Orthopedic Precautions: LLE non weight bearing (per patient's conversation with Dr. Daugherty)  Braces:    Respiratory Status: Nasal cannula, flow 2 L/min     Functional Mobility:  Bed Mobility:     Rolling Right: modified independence  Supine to Sit: stand by assistance  Sit to Supine: stand by assistance  Transfers:     Sit to Stand:  contact guard  assistance with rolling walker  Gait: Patient ambulated 18 feet using RW and CGA; easily fatigued; able to keep weight off L heel during gait; decreased step length; verbal cues for upright posture and to keep head up during gait      AM-PAC 6 CLICK MOBILITY  Turning over in bed (including adjusting bedclothes, sheets and blankets)?: 4  Sitting down on and standing up from a chair with arms (e.g., wheelchair, bedside commode, etc.): 3  Moving from lying on back to sitting on the side of the bed?: 4  Moving to and from a bed to a chair (including a wheelchair)?: 3  Need to walk in hospital room?: 3  Climbing 3-5 steps with a railing?: 2  Basic Mobility Total Score: 19       Treatment & Education:  Patient performed bed mobility, transfers, and gait as listed above.    Patient left right sidelying with call button in reach and  present..    GOALS:   Multidisciplinary Problems       Physical Therapy Goals          Problem: Physical Therapy    Goal Priority Disciplines Outcome Goal Variances Interventions   Physical Therapy Goal     PT, PT/OT Ongoing, Progressing     Description: Short-term Goals: 2 weeks  1. Patient will perform supine to/from sit with supervision to improve independence and safety with bed mobility.  2. Patient will perform sit to/from stand with rolling walker with contact guard assist to improve independence and safety with transfers.  3. Patient will ambulate 15 feet with standard walker with minimal assistance while maintaining appropriate left lower extremity weightbearing status to improve independence and safety with gait.  4. Patient will tolerate 15 minutes of physical therapy intervention to improve endurance and activity tolerance.    Long-term goals: 4 weeks  1. Patient will perform supine to/from sit with modified independence to improve independence and safety with bed mobility.  2. Patient will perform sit to/from stand with rolling walker with standby assist to improve  independence and safety with transfers.  3. Patient will ambulate 30 feet with standard walker with contact guard assist while maintaining appropriate left lower extremity weightbearing status to improve independence and safety with gait.  4. Patient will tolerate 30 minutes of physical therapy intervention to improve endurance and activity tolerance.                         Time Tracking:     PT Received On: 01/12/23  PT Start Time: 1056     PT Stop Time: 1114  PT Total Time (min): 18 min     Billable Minutes: Gait Training 12 and Therapeutic Activity 6    Treatment Type: Treatment  PT/PTA: PTA     PTA Visit Number: 5     01/12/2023

## 2023-01-13 PROCEDURE — A6212 FOAM DRG <=16 SQ IN W/BORDER: HCPCS

## 2023-01-13 PROCEDURE — 27000944

## 2023-01-13 PROCEDURE — 94761 N-INVAS EAR/PLS OXIMETRY MLT: CPT

## 2023-01-13 PROCEDURE — 63600175 PHARM REV CODE 636 W HCPCS: Performed by: NURSE PRACTITIONER

## 2023-01-13 PROCEDURE — 27000221 HC OXYGEN, UP TO 24 HOURS

## 2023-01-13 PROCEDURE — 97116 GAIT TRAINING THERAPY: CPT

## 2023-01-13 PROCEDURE — 11000004 HC SNF PRIVATE

## 2023-01-13 PROCEDURE — 97535 SELF CARE MNGMENT TRAINING: CPT

## 2023-01-13 PROCEDURE — 25000003 PHARM REV CODE 250: Performed by: NURSE PRACTITIONER

## 2023-01-13 RX ORDER — SYRING-NEEDL,DISP,INSUL,0.3 ML 29 G X1/2"
148 SYRINGE, EMPTY DISPOSABLE MISCELLANEOUS ONCE
Status: DISCONTINUED | OUTPATIENT
Start: 2023-01-13 | End: 2023-01-13 | Stop reason: RX

## 2023-01-13 RX ADMIN — GABAPENTIN 600 MG: 300 CAPSULE ORAL at 02:01

## 2023-01-13 RX ADMIN — PANTOPRAZOLE SODIUM 40 MG: 40 TABLET, DELAYED RELEASE ORAL at 09:01

## 2023-01-13 RX ADMIN — VALSARTAN 160 MG: 160 TABLET, FILM COATED ORAL at 09:01

## 2023-01-13 RX ADMIN — ENOXAPARIN SODIUM 40 MG: 40 INJECTION SUBCUTANEOUS at 09:01

## 2023-01-13 RX ADMIN — AMLODIPINE BESYLATE 10 MG: 5 TABLET ORAL at 09:01

## 2023-01-13 RX ADMIN — SENNOSIDES AND DOCUSATE SODIUM 1 TABLET: 50; 8.6 TABLET ORAL at 09:01

## 2023-01-13 RX ADMIN — OXYCODONE HYDROCHLORIDE AND ACETAMINOPHEN 500 MG: 500 TABLET ORAL at 09:01

## 2023-01-13 RX ADMIN — ATORVASTATIN CALCIUM 40 MG: 40 TABLET, FILM COATED ORAL at 09:01

## 2023-01-13 RX ADMIN — THERA TABS 1 TABLET: TAB at 09:01

## 2023-01-13 RX ADMIN — MICONAZOLE NITRATE: 20 POWDER TOPICAL at 09:01

## 2023-01-13 RX ADMIN — HYDROCODONE BITARTRATE AND ACETAMINOPHEN 1 TABLET: 10; 325 TABLET ORAL at 01:01

## 2023-01-13 RX ADMIN — HYDROCODONE BITARTRATE AND ACETAMINOPHEN 1 TABLET: 10; 325 TABLET ORAL at 09:01

## 2023-01-13 RX ADMIN — GABAPENTIN 600 MG: 300 CAPSULE ORAL at 09:01

## 2023-01-13 RX ADMIN — LORAZEPAM 0.5 MG: 0.5 TABLET ORAL at 09:01

## 2023-01-13 RX ADMIN — LEVOTHYROXINE SODIUM 125 MCG: 25 TABLET ORAL at 06:01

## 2023-01-13 RX ADMIN — OMEGA-3 FATTY ACIDS CAP 1000 MG 1 CAPSULE: 1000 CAP at 09:01

## 2023-01-13 RX ADMIN — MELATONIN TAB 3 MG 9 MG: 3 TAB at 09:01

## 2023-01-13 RX ADMIN — CLOPIDOGREL BISULFATE 75 MG: 75 TABLET, FILM COATED ORAL at 09:01

## 2023-01-13 RX ADMIN — SERTRALINE HYDROCHLORIDE 150 MG: 50 TABLET ORAL at 09:01

## 2023-01-13 NOTE — PLAN OF CARE
Problem: Adult Inpatient Plan of Care  Goal: Plan of Care Review  Outcome: Ongoing, Progressing  Goal: Patient-Specific Goal (Individualized)  Outcome: Ongoing, Progressing  Goal: Absence of Hospital-Acquired Illness or Injury  Outcome: Ongoing, Progressing  Intervention: Identify and Manage Fall Risk  Flowsheets (Taken 1/13/2023 1335)  Safety Promotion/Fall Prevention: assistive device/personal item within reach  Intervention: Prevent and Manage VTE (Venous Thromboembolism) Risk  Flowsheets (Taken 1/13/2023 1335)  VTE Prevention/Management: fluids promoted  Intervention: Prevent Infection  Flowsheets (Taken 1/13/2023 1335)  Infection Prevention: hand hygiene promoted  Goal: Optimal Comfort and Wellbeing  Outcome: Ongoing, Progressing  Intervention: Monitor Pain and Promote Comfort  Flowsheets (Taken 1/13/2023 1335)  Pain Management Interventions: care clustered  Goal: Readiness for Transition of Care  Outcome: Ongoing, Progressing  Intervention: Mutually Develop Transition Plan  Flowsheets (Taken 1/13/2023 1335)  Equipment Currently Used at Home:   wheelchair   walker, rolling   bedside commode  Transportation Anticipated: family or friend will provide     Problem: Fluid Imbalance (Pneumonia)  Goal: Fluid Balance  Outcome: Ongoing, Progressing  Intervention: Monitor and Manage Fluid Balance  Flowsheets (Taken 1/13/2023 1335)  Fluid/Electrolyte Management: oral rehydration therapy initiated     Problem: Infection (Pneumonia)  Goal: Resolution of Infection Signs and Symptoms  Outcome: Ongoing, Progressing     Problem: Respiratory Compromise (Pneumonia)  Goal: Effective Oxygenation and Ventilation  Outcome: Ongoing, Progressing  Intervention: Optimize Oxygenation and Ventilation  Flowsheets (Taken 1/13/2023 1335)  Airway/Ventilation Management: airway patency maintained     Problem: Impaired Wound Healing  Goal: Optimal Wound Healing  Outcome: Ongoing, Progressing  Intervention: Promote Wound Healing  Flowsheets  (Taken 1/13/2023 1335)  Oral Nutrition Promotion: safe use of adaptive equipment encouraged  Sleep/Rest Enhancement: awakenings minimized  Pain Management Interventions: care clustered     Problem: Skin Injury Risk Increased  Goal: Skin Health and Integrity  Outcome: Ongoing, Progressing  Intervention: Promote and Optimize Oral Intake  Flowsheets (Taken 1/13/2023 1335)  Oral Nutrition Promotion: safe use of adaptive equipment encouraged     Problem: Fall Injury Risk  Goal: Absence of Fall and Fall-Related Injury  Outcome: Ongoing, Progressing  Intervention: Identify and Manage Contributors  Flowsheets (Taken 1/13/2023 1335)  Medication Review/Management: medications reviewed  Intervention: Promote Injury-Free Environment  Flowsheets (Taken 1/13/2023 1335)  Safety Promotion/Fall Prevention: assistive device/personal item within reach

## 2023-01-13 NOTE — PT/OT/SLP PROGRESS
Occupational Therapy   Treatment    Name: Herlinda Valdovinos  MRN: [id number]  Admitting Diagnosis:  <principal problem not specified>       Recommendations:     Discharge Recommendations: other (see comments) (Home health versus placement in SNF/[facility])  Discharge Equipment Recommendations:     Barriers to discharge:  Other (Comment) (Per pt and family report, pt lives in stressful environment with significant other, son, and granddaughter.)    Assessment:     Herlinda Valdovinos is a 57 y.o. female with a medical diagnosis of <principal problem not specified>.  She presents with performance deficits affecting function are weakness, impaired endurance, impaired sensation, impaired self care skills, impaired functional mobility, gait instability, impaired balance, pain, decreased ROM. Addressed self care training including dressing and toileting with pt demonstrating significant difficulty this day. Pt experienced diaphoretic episode when toileting, attempting to have BM with pt becoming increasingly diaphoretic and nauseous. Assisted pt with eventually returning to bed with symptoms subsiding slightly. Notified FRAN Pate and CHELSY Molina of pt's episode.     Rehab Prognosis:  Good; patient would benefit from acute skilled OT services to address these deficits and reach maximum level of function.       Plan:     Patient to be seen 5 x/week to address the above listed problems via self-care/home management, therapeutic activities, therapeutic exercises, neuromuscular re-education  Plan of Care Expires: 02/03/23  Plan of Care Reviewed with:      Subjective     Pain/Comfort:   Pain not rated, but pt reports ongoing sacral pain at site of wound. Repositioning provided when returning to bed at end of tx.     Objective:     Communicated with: Nurse prior to session.  Patient found supine with oxygen upon OT entry to room.    General Precautions: Standard, fall    Orthopedic Precautions:   Braces:    Respiratory Status: Nasal cannula,  flow 2 L/min     Occupational Performance:     Bed Mobility:    Patient completed Supine to Sit with stand by assistance  Patient completed Sit to Supine with minimum assistance     Functional Mobility/Transfers:  Patient completed Sit <> Stand Transfer with minimum assistance  with  rolling walker   Patient completed Toilet Transfer Step Transfer technique with contact guard assistance and of 2 persons with  rolling walker    Activities of Daily Living:  Lower Body Dressing: moderate assistance seated/standing  Toileting: minimum assistance and of 2 persons in bathroom      [facility] 6 Click ADL:      Treatment & Education:  Performed LB dressing sitting EOB with ModA needed for threading B Les through pants then pulling over hips while standing. Juanis needed for safe standing balance. Performed all aspects of toileting requiring Juanis x 2 with pt demonstrating diaphoretic episode while toileting, attempting to have BM. Pt successfully had BM but becoming increasing sweaty with severe nausea. Assisted with returning to bed once pt completed toileting, requiring Juanis to return to supine. Notified RnHerlinda and CHELSY Molina of pt's episode.     Patient left supine with call button in reach and L heel floating.     GOALS:   Multidisciplinary Problems       Occupational Therapy Goals          Problem: Occupational Therapy    Goal Priority Disciplines Outcome Interventions   Occupational Therapy Goal     OT, PT/OT Ongoing, Progressing    Description: Goals to be met by: 2 weeks     Patient will increase functional independence with ADLs by performing:    UE Dressing with Set-up Assistance.  LE Dressing with Stand-by Assistance.  Toileting from toilet with Stand-by Assistance for hygiene and clothing management.   Bathing from  edge of bed with Stand-by Assistance.  Dynamic standing with SBA, tolerating x 6 mins continuous stand, no LOB.     Goals to be met by: 4 weeks     Patient will increase functional independence with ADLs by  performing:    UE Dressing with Modified [place].  LE Dressing with Modified [place].  Toileting from toilet with Modified [place] for hygiene and clothing management.   Bathing from  shower chair/bench with Modified [place].  Light IADLs Tiffany with RW.                            Time Tracking:     OT Date of Treatment: 01/13/23  OT Start Time: 1412  OT Stop Time: 1459  OT Total Time (min): 47 min    Billable Minutes:Self Care/Home Management 32    MARCUS Levy, OTR/L      OT/HEMAL: OT          1/13/2023

## 2023-01-13 NOTE — PT/OT/SLP PROGRESS
Physical Therapy Treatment    Patient Name:  Herlinda Valdovinos   MRN:  [id number]    Recommendations:     Discharge Recommendations: home health PT  Discharge Equipment Recommendations: cane, straight, cane, quad  Barriers to discharge: None    Assessment:     Herlinda Valdovinos is a 57 y.o. female admitted with a medical diagnosis of urinary tract infection without hematuria. She presents with the following impairments/functional limitations: weakness, impaired endurance, impaired sensation, impaired functional mobility, impaired balance, gait instability, decreased coordination, decreased lower extremity function, decreased safety awareness, pain. Patient requested to use the restroom during treatment as she felt as though she needed to have a bowel movement. After ~5 minutes of sitting on the toilet patient became diaphoretic and reported feeling nauseous. Patient was able to have a small bowel movement. Patient provided with a cool wash cloth and assisted back to her bed. Patient's RN, Herlinda, and Jesse Coates LPN notified.     Rehab Prognosis: Good; patient would benefit from acute skilled PT services to address these deficits and reach maximum level of function.    Recent Surgery: * No surgery found *      Plan:     During this hospitalization, patient to be seen 5 x/week to address the identified rehab impairments via therapeutic exercises, therapeutic activities, gait training and progress toward the following goals:    Plan of Care Expires:  01/30/23    Subjective     Chief Complaint: weakness; constipation  Patient/Family Comments/goals: Patient reports her sacral wound is hurting her.  Pain/Comfort:  Pain Rating 1: 6/10  Location 1: sacral spine  Pain Addressed 1: Reposition, Distraction  Pain Rating Post-Intervention 1: 6/10    Objective:     Communicated with RNHerlinda, prior to session. Patient found supine with oxygen upon PT entry to room.     General Precautions: Standard, fall  Orthopedic Precautions:  LLE toe touch weight bearing  Braces: N/A  Respiratory Status: Nasal cannula, flow 1.5 L/min     Functional Mobility:  Bed Mobility:     Supine to Sit: stand by assistance  Sit to Supine: minimum assistance  Transfers:     Sit to Stand:  minimum assistance with rolling walker  Toilet Transfer: minimum assistance and of 2 persons with  rolling walker  using  Stand Pivot  Gait: x 20 feet with rolling walker with contact guard assist; slow lilli; step-to gait pattern; verbal cues to maintain toe-touch weightbearing left lower extremity    AM-PAC 6 CLICK MOBILITY  Turning over in bed (including adjusting bedclothes, sheets and blankets)?: 3  Sitting down on and standing up from a chair with arms (e.g., wheelchair, bedside commode, etc.): 3  Moving from lying on back to sitting on the side of the bed?: 3  Moving to and from a bed to a chair (including a wheelchair)?: 3  Need to walk in hospital room?: 3  Climbing 3-5 steps with a railing?: 2  Basic Mobility Total Score: 17     Treatment & Education:    Bed mobility, transfers, gait, and toilet transfers performed as listed above.    Patient left supine with call button in reach and Herlinda PETERS, and Jesse Coates LPN notified.    GOALS:   Multidisciplinary Problems       Physical Therapy Goals          Problem: Physical Therapy    Goal Priority Disciplines Outcome Goal Variances Interventions   Physical Therapy Goal     PT, PT/OT Ongoing, Progressing     Description: Short-term Goals: 2 weeks  1. Patient will perform supine to/from sit with supervision to improve independence and safety with bed mobility.  2. Patient will perform sit to/from stand with rolling walker with contact guard assist to improve independence and safety with transfers.  3. Patient will ambulate 15 feet with standard walker with minimal assistance while maintaining appropriate left lower extremity weightbearing status to improve independence and safety with gait.  4. Patient will tolerate 15  minutes of physical therapy intervention to improve endurance and activity tolerance.    Long-term goals: 4 weeks  1. Patient will perform supine to/from sit with modified independence to improve independence and safety with bed mobility.  2. Patient will perform sit to/from stand with rolling walker with standby assist to improve independence and safety with transfers.  3. Patient will ambulate 30 feet with standard walker with contact guard assist while maintaining appropriate left lower extremity weightbearing status to improve independence and safety with gait.  4. Patient will tolerate 30 minutes of physical therapy intervention to improve endurance and activity tolerance.                       Time Tracking:     PT Received On: 01/13/23  PT Start Time: 1412     PT Stop Time: 1459  PT Total Time (min): 47 min     Billable Minutes: Gait Training 15 minutes    Treatment Type: Treatment, 6th Visit  PT/PTA: PT     PTA Visit Number: 0     01/13/2023

## 2023-01-13 NOTE — PT/OT/SLP PROGRESS
Occupational Therapy   Treatment    Name: Herlinda Valdovinos  MRN: [id number]  Admitting Diagnosis:  weakness      Recommendations:     Discharge Recommendations: other (see comments) (Home health versus placement in SNF/[facility])  Discharge Equipment Recommendations:     Barriers to discharge:  Other (Comment) (Per pt and family report, pt lives in stressful environment with significant other, son, and granddaughter.)    Assessment:     Herlinda Valdovinos is a 57 y.o. female with a medical diagnosis of weakness.  She presents with performance deficits affecting function are weakness, impaired endurance, impaired sensation, impaired self care skills, impaired functional mobility, gait instability, impaired balance, pain, decreased ROM.     Rehab Prognosis:  Good; patient would benefit from acute skilled OT services to address these deficits and reach maximum level of function.       Plan:     Patient to be seen 5 x/week to address the above listed problems via self-care/home management, therapeutic activities, therapeutic exercises, neuromuscular re-education  Plan of Care Expires: 02/03/23  Plan of Care Reviewed with:      Subjective     Pain/Comfort:   Pain reported in sacral area at site of wound, pain unrated. Repositioned with slight relief.     Objective:     Communicated with: Nurse prior to session.  Patient found HOB elevated with oxygen upon OT entry to room.    General Precautions: Standard, fall    Orthopedic Precautions:   Braces:    Respiratory Status: Nasal cannula, flow 2 L/min     Occupational Performance:     Bed Mobility:       Functional Mobility/Transfers:    Activities of Daily Living:    [facility] 6 Click ADL:      Treatment & Education:  Performed B UE strengthening with use of 2# resistance, 3 sets x 15 reps chest press and shoulder press. Intermittent rest breaks needed d/t L shoulder weakness and fatigue. UE strengthening needed to improve ability to perform functional t/fs and TB ADLs.     Patient left  supine with call button in reach    GOALS:   Multidisciplinary Problems       Occupational Therapy Goals          Problem: Occupational Therapy    Goal Priority Disciplines Outcome Interventions   Occupational Therapy Goal     OT, PT/OT Ongoing, Progressing    Description: Goals to be met by: 2 weeks     Patient will increase functional independence with ADLs by performing:    UE Dressing with Set-up Assistance.  LE Dressing with Stand-by Assistance.  Toileting from toilet with Stand-by Assistance for hygiene and clothing management.   Bathing from  edge of bed with Stand-by Assistance.  Dynamic standing with SBA, tolerating x 6 mins continuous stand, no LOB.     Goals to be met by: 4 weeks     Patient will increase functional independence with ADLs by performing:    UE Dressing with Modified [place].  LE Dressing with Modified [place].  Toileting from toilet with Modified [place] for hygiene and clothing management.   Bathing from  shower chair/bench with Modified [place].  Light IADLs Tiffany with RW.                            Time Tracking:     OT Date of Treatment: 01/12/23  OT Start Time: 1515  OT Stop Time: 1531  OT Total Time (min): 16 min    Billable Minutes:Therapeutic Exercise 16    MARCUS Levy, OTR/L      OT/HEMAL: OT          1/12/2023

## 2023-01-14 PROBLEM — S01.81XA LACERATION OF CHIN: Status: ACTIVE | Noted: 2023-01-14

## 2023-01-14 PROCEDURE — A6212 FOAM DRG <=16 SQ IN W/BORDER: HCPCS

## 2023-01-14 PROCEDURE — 27000938

## 2023-01-14 PROCEDURE — 94761 N-INVAS EAR/PLS OXIMETRY MLT: CPT

## 2023-01-14 PROCEDURE — 63600175 PHARM REV CODE 636 W HCPCS: Performed by: NURSE PRACTITIONER

## 2023-01-14 PROCEDURE — 25000003 PHARM REV CODE 250: Performed by: NURSE PRACTITIONER

## 2023-01-14 PROCEDURE — 27000944

## 2023-01-14 PROCEDURE — 27000221 HC OXYGEN, UP TO 24 HOURS

## 2023-01-14 PROCEDURE — 11000004 HC SNF PRIVATE

## 2023-01-14 RX ADMIN — CLOPIDOGREL BISULFATE 75 MG: 75 TABLET, FILM COATED ORAL at 08:01

## 2023-01-14 RX ADMIN — LORAZEPAM 0.5 MG: 0.5 TABLET ORAL at 09:01

## 2023-01-14 RX ADMIN — MICONAZOLE NITRATE: 20 POWDER TOPICAL at 08:01

## 2023-01-14 RX ADMIN — SENNOSIDES AND DOCUSATE SODIUM 1 TABLET: 50; 8.6 TABLET ORAL at 09:01

## 2023-01-14 RX ADMIN — GABAPENTIN 600 MG: 300 CAPSULE ORAL at 02:01

## 2023-01-14 RX ADMIN — HYDROCODONE BITARTRATE AND ACETAMINOPHEN 1 TABLET: 10; 325 TABLET ORAL at 09:01

## 2023-01-14 RX ADMIN — AMLODIPINE BESYLATE 10 MG: 5 TABLET ORAL at 08:01

## 2023-01-14 RX ADMIN — VALSARTAN 160 MG: 160 TABLET, FILM COATED ORAL at 08:01

## 2023-01-14 RX ADMIN — POLYETHYLENE GLYCOL 3350 17 G: 17 POWDER, FOR SOLUTION ORAL at 02:01

## 2023-01-14 RX ADMIN — ENOXAPARIN SODIUM 40 MG: 40 INJECTION SUBCUTANEOUS at 08:01

## 2023-01-14 RX ADMIN — MICONAZOLE NITRATE: 20 POWDER TOPICAL at 09:01

## 2023-01-14 RX ADMIN — OMEGA-3 FATTY ACIDS CAP 1000 MG 1 CAPSULE: 1000 CAP at 08:01

## 2023-01-14 RX ADMIN — PANTOPRAZOLE SODIUM 40 MG: 40 TABLET, DELAYED RELEASE ORAL at 08:01

## 2023-01-14 RX ADMIN — HYDROCODONE BITARTRATE AND ACETAMINOPHEN 1 TABLET: 10; 325 TABLET ORAL at 02:01

## 2023-01-14 RX ADMIN — THERA TABS 1 TABLET: TAB at 08:01

## 2023-01-14 RX ADMIN — GABAPENTIN 600 MG: 300 CAPSULE ORAL at 09:01

## 2023-01-14 RX ADMIN — MELATONIN TAB 3 MG 9 MG: 3 TAB at 09:01

## 2023-01-14 RX ADMIN — SERTRALINE HYDROCHLORIDE 150 MG: 50 TABLET ORAL at 08:01

## 2023-01-14 RX ADMIN — SENNOSIDES AND DOCUSATE SODIUM 1 TABLET: 50; 8.6 TABLET ORAL at 08:01

## 2023-01-14 RX ADMIN — HYDROCODONE BITARTRATE AND ACETAMINOPHEN 1 TABLET: 10; 325 TABLET ORAL at 06:01

## 2023-01-14 RX ADMIN — ATORVASTATIN CALCIUM 40 MG: 40 TABLET, FILM COATED ORAL at 09:01

## 2023-01-14 RX ADMIN — LEVOTHYROXINE SODIUM 125 MCG: 25 TABLET ORAL at 06:01

## 2023-01-14 RX ADMIN — GABAPENTIN 600 MG: 300 CAPSULE ORAL at 08:01

## 2023-01-14 RX ADMIN — OXYCODONE HYDROCHLORIDE AND ACETAMINOPHEN 500 MG: 500 TABLET ORAL at 09:01

## 2023-01-14 RX ADMIN — OXYCODONE HYDROCHLORIDE AND ACETAMINOPHEN 500 MG: 500 TABLET ORAL at 08:01

## 2023-01-14 NOTE — PLAN OF CARE
Problem: Adult Inpatient Plan of Care  Goal: Plan of Care Review  Outcome: Ongoing, Progressing  Goal: Patient-Specific Goal (Individualized)  Outcome: Ongoing, Progressing  Goal: Absence of Hospital-Acquired Illness or Injury  Outcome: Ongoing, Progressing  Goal: Optimal Comfort and Wellbeing  Outcome: Ongoing, Progressing  Intervention: Monitor Pain and Promote Comfort  Flowsheets (Taken 1/14/2023 1537)  Pain Management Interventions: care clustered  Goal: Readiness for Transition of Care  Outcome: Ongoing, Progressing  Intervention: Mutually Develop Transition Plan  Flowsheets (Taken 1/14/2023 1537)  Equipment Currently Used at Home:   wheelchair   walker, rolling  Transportation Anticipated: family or friend will provide     Problem: Fluid Imbalance (Pneumonia)  Goal: Fluid Balance  Outcome: Ongoing, Progressing  Intervention: Monitor and Manage Fluid Balance  Flowsheets (Taken 1/14/2023 1537)  Fluid/Electrolyte Management: oral rehydration therapy initiated     Problem: Infection (Pneumonia)  Goal: Resolution of Infection Signs and Symptoms  Outcome: Ongoing, Progressing     Problem: Respiratory Compromise (Pneumonia)  Goal: Effective Oxygenation and Ventilation  Outcome: Ongoing, Progressing  Intervention: Promote Airway Secretion Clearance  Flowsheets (Taken 1/14/2023 1537)  Cough And Deep Breathing: done independently per patient  Intervention: Optimize Oxygenation and Ventilation  Flowsheets (Taken 1/14/2023 1537)  Airway/Ventilation Management: airway patency maintained     Problem: Impaired Wound Healing  Goal: Optimal Wound Healing  Outcome: Ongoing, Progressing  Intervention: Promote Wound Healing  Flowsheets (Taken 1/14/2023 1537)  Oral Nutrition Promotion: safe use of adaptive equipment encouraged  Sleep/Rest Enhancement: awakenings minimized  Pain Management Interventions: care clustered     Problem: Skin Injury Risk Increased  Goal: Skin Health and Integrity  Outcome: Ongoing,  Progressing  Intervention: Promote and Optimize Oral Intake  Flowsheets (Taken 1/14/2023 1537)  Oral Nutrition Promotion: safe use of adaptive equipment encouraged     Problem: Fall Injury Risk  Goal: Absence of Fall and Fall-Related Injury  Outcome: Ongoing, Progressing  Intervention: Identify and Manage Contributors  Flowsheets (Taken 1/14/2023 1537)  Self-Care Promotion: independence encouraged  Medication Review/Management: medications reviewed
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
## 2023-01-15 PROCEDURE — A6212 FOAM DRG <=16 SQ IN W/BORDER: HCPCS

## 2023-01-15 PROCEDURE — 63600175 PHARM REV CODE 636 W HCPCS: Performed by: NURSE PRACTITIONER

## 2023-01-15 PROCEDURE — 94761 N-INVAS EAR/PLS OXIMETRY MLT: CPT

## 2023-01-15 PROCEDURE — 11000004 HC SNF PRIVATE

## 2023-01-15 PROCEDURE — 27000940

## 2023-01-15 PROCEDURE — 27000221 HC OXYGEN, UP TO 24 HOURS

## 2023-01-15 PROCEDURE — 25000003 PHARM REV CODE 250: Performed by: NURSE PRACTITIONER

## 2023-01-15 RX ORDER — BISACODYL 10 MG
10 SUPPOSITORY, RECTAL RECTAL DAILY PRN
Status: DISCONTINUED | OUTPATIENT
Start: 2023-01-15 | End: 2023-01-25 | Stop reason: HOSPADM

## 2023-01-15 RX ADMIN — OXYCODONE HYDROCHLORIDE AND ACETAMINOPHEN 500 MG: 500 TABLET ORAL at 08:01

## 2023-01-15 RX ADMIN — AMLODIPINE BESYLATE 10 MG: 5 TABLET ORAL at 08:01

## 2023-01-15 RX ADMIN — THERA TABS 1 TABLET: TAB at 08:01

## 2023-01-15 RX ADMIN — SERTRALINE HYDROCHLORIDE 150 MG: 50 TABLET ORAL at 08:01

## 2023-01-15 RX ADMIN — GABAPENTIN 600 MG: 300 CAPSULE ORAL at 08:01

## 2023-01-15 RX ADMIN — ONDANSETRON 4 MG: 4 TABLET, ORALLY DISINTEGRATING ORAL at 10:01

## 2023-01-15 RX ADMIN — SENNOSIDES AND DOCUSATE SODIUM 1 TABLET: 50; 8.6 TABLET ORAL at 08:01

## 2023-01-15 RX ADMIN — GABAPENTIN 600 MG: 300 CAPSULE ORAL at 03:01

## 2023-01-15 RX ADMIN — CLOPIDOGREL BISULFATE 75 MG: 75 TABLET, FILM COATED ORAL at 08:01

## 2023-01-15 RX ADMIN — MICONAZOLE NITRATE: 20 POWDER TOPICAL at 08:01

## 2023-01-15 RX ADMIN — LORAZEPAM 0.5 MG: 0.5 TABLET ORAL at 08:01

## 2023-01-15 RX ADMIN — ENOXAPARIN SODIUM 40 MG: 40 INJECTION SUBCUTANEOUS at 08:01

## 2023-01-15 RX ADMIN — POLYETHYLENE GLYCOL 3350 17 G: 17 POWDER, FOR SOLUTION ORAL at 08:01

## 2023-01-15 RX ADMIN — LEVOTHYROXINE SODIUM 125 MCG: 25 TABLET ORAL at 05:01

## 2023-01-15 RX ADMIN — BISACODYL 10 MG: 10 SUPPOSITORY RECTAL at 08:01

## 2023-01-15 RX ADMIN — HYDROCODONE BITARTRATE AND ACETAMINOPHEN 1 TABLET: 10; 325 TABLET ORAL at 05:01

## 2023-01-15 RX ADMIN — ATORVASTATIN CALCIUM 40 MG: 40 TABLET, FILM COATED ORAL at 08:01

## 2023-01-15 RX ADMIN — OMEGA-3 FATTY ACIDS CAP 1000 MG 1 CAPSULE: 1000 CAP at 08:01

## 2023-01-15 RX ADMIN — VALSARTAN 160 MG: 160 TABLET, FILM COATED ORAL at 08:01

## 2023-01-15 RX ADMIN — PANTOPRAZOLE SODIUM 40 MG: 40 TABLET, DELAYED RELEASE ORAL at 08:01

## 2023-01-15 RX ADMIN — HYDROCODONE BITARTRATE AND ACETAMINOPHEN 1 TABLET: 10; 325 TABLET ORAL at 09:01

## 2023-01-15 RX ADMIN — MELATONIN TAB 3 MG 9 MG: 3 TAB at 08:01

## 2023-01-15 NOTE — ASSESSMENT & PLAN NOTE
01/05/2023  Will receive last dose of rocephin and zithromax today     01/09 resolved  01/14/2023  RESOLVED

## 2023-01-15 NOTE — ASSESSMENT & PLAN NOTE
01/05/2023  Continue OCTAVIA hose and plavix, continue lovenox  01/14/2023  PT IS AT RISK FOR VTE  VTE PPX  CONTINUE LOVENOX,PLAVIX,TEDS/EARLY AMBULATION

## 2023-01-15 NOTE — ASSESSMENT & PLAN NOTE
01/05/2023  Continue zoloft 50 mg po daily    01/06/2023  Increased to home dose of 150 mg zolot    01/14/2023  MOOD IS IMPROVED

## 2023-01-15 NOTE — PLAN OF CARE
Problem: Adult Inpatient Plan of Care  Goal: Plan of Care Review  Outcome: Ongoing, Progressing  Goal: Patient-Specific Goal (Individualized)  Outcome: Ongoing, Progressing  Goal: Absence of Hospital-Acquired Illness or Injury  Outcome: Ongoing, Progressing  Intervention: Identify and Manage Fall Risk  Flowsheets (Taken 1/15/2023 1538)  Safety Promotion/Fall Prevention: assistive device/personal item within reach  Goal: Optimal Comfort and Wellbeing  Outcome: Ongoing, Progressing  Intervention: Monitor Pain and Promote Comfort  Flowsheets (Taken 1/15/2023 1538)  Pain Management Interventions: care clustered  Goal: Readiness for Transition of Care  Outcome: Ongoing, Progressing  Intervention: Mutually Develop Transition Plan  Flowsheets (Taken 1/15/2023 1538)  Equipment Currently Used at Home: wheelchair  Transportation Anticipated: family or friend will provide     Problem: Fluid Imbalance (Pneumonia)  Goal: Fluid Balance  Outcome: Ongoing, Progressing  Intervention: Monitor and Manage Fluid Balance  Flowsheets (Taken 1/15/2023 1538)  Fluid/Electrolyte Management: oral rehydration therapy initiated     Problem: Infection (Pneumonia)  Goal: Resolution of Infection Signs and Symptoms  Outcome: Ongoing, Progressing     Problem: Respiratory Compromise (Pneumonia)  Goal: Effective Oxygenation and Ventilation  Outcome: Ongoing, Progressing  Intervention: Promote Airway Secretion Clearance  Flowsheets (Taken 1/15/2023 1538)  Breathing Techniques/Airway Clearance: deep/controlled cough encouraged  Cough And Deep Breathing: done independently per patient  Intervention: Optimize Oxygenation and Ventilation  Flowsheets (Taken 1/15/2023 1538)  Airway/Ventilation Management: airway patency maintained  Head of Bed (HOB) Positioning: HOB elevated     Problem: Impaired Wound Healing  Goal: Optimal Wound Healing  Outcome: Ongoing, Progressing  Intervention: Promote Wound Healing  Flowsheets (Taken 1/15/2023 1538)  Oral Nutrition  Promotion: safe use of adaptive equipment encouraged  Sleep/Rest Enhancement: awakenings minimized  Pain Management Interventions: care clustered     Problem: Skin Injury Risk Increased  Goal: Skin Health and Integrity  Outcome: Ongoing, Progressing  Intervention: Optimize Skin Protection  Flowsheets (Taken 1/15/2023 1538)  Head of Bed (HOB) Positioning: HOB elevated  Intervention: Promote and Optimize Oral Intake  Flowsheets (Taken 1/15/2023 1538)  Oral Nutrition Promotion: safe use of adaptive equipment encouraged     Problem: Fall Injury Risk  Goal: Absence of Fall and Fall-Related Injury  Outcome: Ongoing, Progressing  Intervention: Identify and Manage Contributors  Flowsheets (Taken 1/15/2023 1538)  Self-Care Promotion: independence encouraged  Medication Review/Management: medications reviewed  Intervention: Promote Injury-Free Environment  Flowsheets (Taken 1/15/2023 1538)  Safety Promotion/Fall Prevention: assistive device/personal item within reach

## 2023-01-15 NOTE — PROGRESS NOTES
[facility]  Hospital Medicine  Progress Note    Patient Name: Herlinda Valdovinos  MRN: [id number]  Patient Class: IP- Swing   Admission Date: 1/4/2023  Length of Stay: 10 days  Attending Physician: Chuy Daugherty IV, DO  Primary Care Provider: Vini Hernandez NP        Subjective:     Principal Problem:<principal problem not specified>        HPI:  Mrs. Kaur was admitted to acute care on 01/01 with UTI and pneumonia. Was started on rocephin and zithromax. She has completed 4/5 days of treatment for pneumonia. She has pressure wounds to sacrum, left heel and left groin. Wound care is following. 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 She has been mostly chair/ bed bound. States she has not had wounds until recently. States she felt bad approximately 10 days before admission but did not seek treatment.   She will be admitted to [facility] today to continue therapy for weakness. We will also continue wound care to sacrum, left groin and left heel.   She is at high risk for fall.  Talked with her re code status and she chose DNR.      Overview/Hospital Course:  01/05 Admitted to [facility] on 01/04/2023. Continue therapy for weakness. Continue wound care to sacrum, left groin and left heel. Potassium is low at 3.1. Will replace.    01/06 Awake and resting in bed without complaints. States she is feeling better this morning. Continue with wound care and therapy.    01/09 Awake and resting in bed. Reports having a good weekend. Requests ensure shake BID. Continue wound care to sacrum, left groin and left heel. Continue therapy for strengthening.     01/11/23 Awake and resting in bed without complaints. Continue therapy for strengthening. Continue wound care.  Appetite is good.    01/154/2023 HOSPITAL DAY 10  I WAS ASKED TO EVALUATE PT DUE PT SUSTAINING A CUT WHEN EX SPOUSE SHAVED CHIN WITH BLEEDING NOTED THAT STOPPED AFTER PRESSURE APPLIED.  PT IS ON  PLAVIX/LOVENOX      Interval History: I WAS ASKED TO EVALUATE PT DUE PT SUSTAINING A CUT WHEN EX SPOUSE SHAVED CHIN WITH BLEEDING NOTED THAT STOPPED AFTER PRESSURE APPLIED.  PT IS ON PLAVIX.    Review of Systems   Constitutional:  Positive for activity change and fatigue.   HENT: Negative.     Eyes: Negative.    Respiratory: Negative.     Cardiovascular: Negative.    Gastrointestinal: Negative.    Endocrine: Negative.    Genitourinary: Negative.    Musculoskeletal:  Positive for arthralgias and gait problem.   Skin:  Positive for pallor and wound (L CHIN LAC, SACRAL WOUND, L HEEL BLISTER AND GROIN).   Allergic/Immunologic: Positive for immunocompromised state.   Neurological:  Positive for weakness.   Hematological:  Bruises/bleeds easily (D/TPLAVIX).   Psychiatric/Behavioral:  The patient is nervous/anxious.    Objective:     Vital Signs (Most Recent):  Temp: 97.4 °F (36.3 °C) (01/14/23 1936)  Pulse: 81 (01/14/23 1936)  Resp: 18 (01/14/23 2130)  BP: 114/74 (01/14/23 1936)  SpO2: (!) 94 % (01/14/23 1936)   Vital Signs (24h Range):  Temp:  [97.4 °F (36.3 °C)-98.3 °F (36.8 °C)] 97.4 °F (36.3 °C)  Pulse:  [78-81] 81  Resp:  [18-20] 18  SpO2:  [94 %-96 %] 94 %  BP: (111-114)/(74-82) 114/74     Weight: 64.9 kg (143 lb)  Body mass index is 24.55 kg/m².    Intake/Output Summary (Last 24 hours) at 1/14/2023 2205  Last data filed at 1/14/2023 1827  Gross per 24 hour   Intake 240 ml   Output --   Net 240 ml      Physical Exam  Vitals reviewed.   Constitutional:       Appearance: She is ill-appearing.   HENT:      Head: Normocephalic and atraumatic.      Right Ear: Tympanic membrane normal.      Left Ear: Tympanic membrane normal.      Nose: Nose normal.      Mouth/Throat:      Mouth: Mucous membranes are moist.   Eyes:      Extraocular Movements: Extraocular movements intact.      Pupils: Pupils are equal, round, and reactive to light.   Cardiovascular:      Rate and Rhythm: Normal rate and regular rhythm.      Pulses: Normal  pulses.      Heart sounds: Normal heart sounds.   Pulmonary:      Effort: Pulmonary effort is normal. No respiratory distress.      Breath sounds: Normal breath sounds. No wheezing.   Abdominal:      General: Bowel sounds are normal.      Palpations: Abdomen is soft.   Musculoskeletal:         General: No swelling or tenderness. Normal range of motion.      Cervical back: Normal range of motion and neck supple.   Skin:     General: Skin is warm and dry.      Capillary Refill: Capillary refill takes less than 2 seconds.      Coloration: Skin is pale.      Findings: Lesion (SMALL  (2 MM) LACERATION L CHIN, STABLE WOUNDS SACRUM, BLISTER HEEL AND GROIN WOUND) present.   Neurological:      General: No focal deficit present.      Mental Status: She is alert and oriented to person, place, and time.      Motor: Weakness (GENERALIZED) present.   Psychiatric:         Mood and Affect: Mood normal.         Behavior: Behavior normal.         Thought Content: Thought content normal.       Significant Labs: All pertinent labs within the past 24 hours have been reviewed.  NoneNONE    Significant Imaging: I have reviewed all pertinent imaging results/findings within the past 24 hours.NONE      Assessment/Plan:      Laceration of chin  PT SUSTAINED A CUT WHEN EX SPOUSE SHAVED CHIN WITH BLEEDING NOTED THAT STOPPED AFTER PRESSURE APPLIED.  PT IS ON PLAVIX/LOVENOX      Skin breakdown  01/05/2023   Continue wound care to sacrum, left groin and left heel  01/14/2023  CONTINUE WOUND CARE TEAM TREATMENT      Hyperlipidemia  01/05 continue atorvastatin daily         Thyroid disease  01/05/2023  Continue levothyroxine 125 mcg daily  01/14/2023  TSH 1.880 10/22  CONTINUE POC        Pneumonia of upper lobe due to infectious organism  01/05/2023  Will receive last dose of rocephin and zithromax today     01/09 resolved  01/14/2023  RESOLVED    DVT prophylaxis  01/05/2023  Continue OCTAVIA hose and plavix, continue lovenox  01/14/2023  PT IS AT RISK  FOR VTE  VTE PPX  CONTINUE LOVENOX,PLAVIX,TEDS/EARLY AMBULATION        Idiopathic peripheral neuropathy  01/05/2023  Gabapentin 800 mg po TID   01/14/2022  IMPROVED      Insomnia  01/05/2023  Continue melatonin prn       Hypertension  01/05/2023  Continue norvasc and atorvastatin    01/09 stable  01/14/2023  BP IS STABLE 111/82    Depressive disorder  01/05/2023  Continue zoloft 50 mg po daily    01/06/2023  Increased to home dose of 150 mg zolot    01/14/2023  MOOD IS IMPROVED            Generalized anxiety disorder  01/05/2023  Continue lorazepam prn anxiety      Muscle weakness  01/05/2023 continue therapy for weakness  01/14/2023  CONTINUE PT/OT      VTE Risk Mitigation (From admission, onward)         Ordered     enoxaparin injection 40 mg  Daily         01/09/23 1223                Discharge Planning   POOL: 1/30/2023     Code Status: DNR   Is the patient medically ready for discharge?:     Reason for patient still in hospital (select all that apply): Patient new problem, Patient trending condition, Treatment, Consult recommendations and PT / OT recommendations  Discharge Plan A: Home with family                  Alis Grider MD  Department of Hospital Medicine   [facility]

## 2023-01-15 NOTE — ASSESSMENT & PLAN NOTE
PT SUSTAINED A CUT WHEN EX SPOUSE SHAVED CHIN WITH BLEEDING NOTED THAT STOPPED AFTER PRESSURE APPLIED.  PT IS ON PLAVIX/LOVENOX

## 2023-01-15 NOTE — SUBJECTIVE & OBJECTIVE
Interval History: I WAS ASKED TO EVALUATE PT DUE PT SUSTAINING A CUT WHEN EX SPOUSE SHAVED CHIN WITH BLEEDING NOTED THAT STOPPED AFTER PRESSURE APPLIED.  PT IS ON PLAVIX.    Review of Systems   Constitutional:  Positive for activity change and fatigue.   HENT: Negative.     Eyes: Negative.    Respiratory: Negative.     Cardiovascular: Negative.    Gastrointestinal: Negative.    Endocrine: Negative.    Genitourinary: Negative.    Musculoskeletal:  Positive for arthralgias and gait problem.   Skin:  Positive for pallor and wound (L CHIN LAC, SACRAL WOUND, L HEEL BLISTER AND GROIN).   Allergic/Immunologic: Positive for immunocompromised state.   Neurological:  Positive for weakness.   Hematological:  Bruises/bleeds easily (D/TPLAVIX).   Psychiatric/Behavioral:  The patient is nervous/anxious.    Objective:     Vital Signs (Most Recent):  Temp: 97.4 °F (36.3 °C) (01/14/23 1936)  Pulse: 81 (01/14/23 1936)  Resp: 18 (01/14/23 2130)  BP: 114/74 (01/14/23 1936)  SpO2: (!) 94 % (01/14/23 1936)   Vital Signs (24h Range):  Temp:  [97.4 °F (36.3 °C)-98.3 °F (36.8 °C)] 97.4 °F (36.3 °C)  Pulse:  [78-81] 81  Resp:  [18-20] 18  SpO2:  [94 %-96 %] 94 %  BP: (111-114)/(74-82) 114/74     Weight: 64.9 kg (143 lb)  Body mass index is 24.55 kg/m².    Intake/Output Summary (Last 24 hours) at 1/14/2023 2205  Last data filed at 1/14/2023 1827  Gross per 24 hour   Intake 240 ml   Output --   Net 240 ml      Physical Exam  Vitals reviewed.   Constitutional:       Appearance: She is ill-appearing.   HENT:      Head: Normocephalic and atraumatic.      Right Ear: Tympanic membrane normal.      Left Ear: Tympanic membrane normal.      Nose: Nose normal.      Mouth/Throat:      Mouth: Mucous membranes are moist.   Eyes:      Extraocular Movements: Extraocular movements intact.      Pupils: Pupils are equal, round, and reactive to light.   Cardiovascular:      Rate and Rhythm: Normal rate and regular rhythm.      Pulses: Normal pulses.      Heart  sounds: Normal heart sounds.   Pulmonary:      Effort: Pulmonary effort is normal. No respiratory distress.      Breath sounds: Normal breath sounds. No wheezing.   Abdominal:      General: Bowel sounds are normal.      Palpations: Abdomen is soft.   Musculoskeletal:         General: No swelling or tenderness. Normal range of motion.      Cervical back: Normal range of motion and neck supple.   Skin:     General: Skin is warm and dry.      Capillary Refill: Capillary refill takes less than 2 seconds.      Coloration: Skin is pale.      Findings: Lesion (SMALL  (2 MM) LACERATION L CHIN, STABLE WOUNDS SACRUM, BLISTER HEEL AND GROIN WOUND) present.   Neurological:      General: No focal deficit present.      Mental Status: She is alert and oriented to person, place, and time.      Motor: Weakness (GENERALIZED) present.   Psychiatric:         Mood and Affect: Mood normal.         Behavior: Behavior normal.         Thought Content: Thought content normal.       Significant Labs: All pertinent labs within the past 24 hours have been reviewed.  NoneNONE    Significant Imaging: I have reviewed all pertinent imaging results/findings within the past 24 hours.NONE

## 2023-01-15 NOTE — ASSESSMENT & PLAN NOTE
01/05/2023   Continue wound care to sacrum, left groin and left heel  01/14/2023  CONTINUE WOUND CARE TEAM TREATMENT

## 2023-01-16 PROBLEM — K59.00 CONSTIPATION: Status: ACTIVE | Noted: 2023-01-16

## 2023-01-16 PROCEDURE — 94761 N-INVAS EAR/PLS OXIMETRY MLT: CPT

## 2023-01-16 PROCEDURE — 11000004 HC SNF PRIVATE

## 2023-01-16 PROCEDURE — 27000221 HC OXYGEN, UP TO 24 HOURS

## 2023-01-16 PROCEDURE — 63600175 PHARM REV CODE 636 W HCPCS: Performed by: NURSE PRACTITIONER

## 2023-01-16 PROCEDURE — 25000003 PHARM REV CODE 250: Performed by: NURSE PRACTITIONER

## 2023-01-16 PROCEDURE — 97535 SELF CARE MNGMENT TRAINING: CPT

## 2023-01-16 PROCEDURE — 27000944

## 2023-01-16 PROCEDURE — A6212 FOAM DRG <=16 SQ IN W/BORDER: HCPCS

## 2023-01-16 PROCEDURE — 99307 PR NURSING FAC CARE, SUBSEQ, IMPROVING: ICD-10-PCS | Mod: ,,, | Performed by: NURSE PRACTITIONER

## 2023-01-16 PROCEDURE — 99307 SBSQ NF CARE SF MDM 10: CPT | Mod: ,,, | Performed by: NURSE PRACTITIONER

## 2023-01-16 PROCEDURE — 99900035 HC TECH TIME PER 15 MIN (STAT)

## 2023-01-16 RX ORDER — POLYETHYLENE GLYCOL 3350 17 G/17G
17 POWDER, FOR SOLUTION ORAL DAILY
Status: DISCONTINUED | OUTPATIENT
Start: 2023-01-16 | End: 2023-01-25 | Stop reason: HOSPADM

## 2023-01-16 RX ORDER — HYDROCORTISONE 25 MG/G
CREAM TOPICAL 2 TIMES DAILY
Status: DISCONTINUED | OUTPATIENT
Start: 2023-01-16 | End: 2023-01-25 | Stop reason: HOSPADM

## 2023-01-16 RX ADMIN — GABAPENTIN 600 MG: 300 CAPSULE ORAL at 08:01

## 2023-01-16 RX ADMIN — GABAPENTIN 600 MG: 300 CAPSULE ORAL at 09:01

## 2023-01-16 RX ADMIN — SENNOSIDES AND DOCUSATE SODIUM 1 TABLET: 50; 8.6 TABLET ORAL at 08:01

## 2023-01-16 RX ADMIN — OXYCODONE HYDROCHLORIDE AND ACETAMINOPHEN 500 MG: 500 TABLET ORAL at 08:01

## 2023-01-16 RX ADMIN — MICONAZOLE NITRATE: 20 POWDER TOPICAL at 09:01

## 2023-01-16 RX ADMIN — CLOPIDOGREL BISULFATE 75 MG: 75 TABLET, FILM COATED ORAL at 08:01

## 2023-01-16 RX ADMIN — GABAPENTIN 600 MG: 300 CAPSULE ORAL at 03:01

## 2023-01-16 RX ADMIN — MELATONIN TAB 3 MG 9 MG: 3 TAB at 09:01

## 2023-01-16 RX ADMIN — VALSARTAN 160 MG: 160 TABLET, FILM COATED ORAL at 08:01

## 2023-01-16 RX ADMIN — SENNOSIDES AND DOCUSATE SODIUM 1 TABLET: 50; 8.6 TABLET ORAL at 09:01

## 2023-01-16 RX ADMIN — POLYETHYLENE GLYCOL 3350 17 G: 17 POWDER, FOR SOLUTION ORAL at 12:01

## 2023-01-16 RX ADMIN — HYDROCODONE BITARTRATE AND ACETAMINOPHEN 1 TABLET: 10; 325 TABLET ORAL at 09:01

## 2023-01-16 RX ADMIN — ENOXAPARIN SODIUM 40 MG: 40 INJECTION SUBCUTANEOUS at 08:01

## 2023-01-16 RX ADMIN — PANTOPRAZOLE SODIUM 40 MG: 40 TABLET, DELAYED RELEASE ORAL at 08:01

## 2023-01-16 RX ADMIN — HYDROCODONE BITARTRATE AND ACETAMINOPHEN 1 TABLET: 10; 325 TABLET ORAL at 12:01

## 2023-01-16 RX ADMIN — OXYCODONE HYDROCHLORIDE AND ACETAMINOPHEN 500 MG: 500 TABLET ORAL at 09:01

## 2023-01-16 RX ADMIN — LEVOTHYROXINE SODIUM 125 MCG: 25 TABLET ORAL at 05:01

## 2023-01-16 RX ADMIN — THERA TABS 1 TABLET: TAB at 08:01

## 2023-01-16 RX ADMIN — ATORVASTATIN CALCIUM 40 MG: 40 TABLET, FILM COATED ORAL at 09:01

## 2023-01-16 RX ADMIN — SERTRALINE HYDROCHLORIDE 150 MG: 50 TABLET ORAL at 08:01

## 2023-01-16 RX ADMIN — OMEGA-3 FATTY ACIDS CAP 1000 MG 1 CAPSULE: 1000 CAP at 08:01

## 2023-01-16 RX ADMIN — HYDROCORTISONE 2.5%: 25 CREAM TOPICAL at 09:01

## 2023-01-16 RX ADMIN — AMLODIPINE BESYLATE 10 MG: 5 TABLET ORAL at 08:01

## 2023-01-16 NOTE — NURSING
Scratches noted to left buttock due to patient itching unknown cause pt scratched until she started bleeding, blood on fingers and buttock. Cleaned area with soap and water. Pt C/O constipation and pain to anus, suppository given and warm apple juice, had 5 to 6 small to medium bowel movements.

## 2023-01-16 NOTE — PT/OT/SLP PROGRESS
Occupational Therapy   Treatment    Name: Herlinda Valdovinos  MRN: [id number]  Admitting Diagnosis:  <principal problem not specified>       Recommendations:     Discharge Recommendations: other (see comments) (Home health versus placement in SNF/care home)  Discharge Equipment Recommendations:     Barriers to discharge:  Other (Comment) (Per pt and family report, pt lives in stressful environment with significant other, son, and granddaughter.)    Assessment:     Herlinda Valdovinos is a 57 y.o. female with a medical diagnosis of <principal problem not specified>.  She presents with tearful episode this day, d/t significant discomfort following episode of constipation. Pt refused OOB activity d/t level of discomfort and fatigue following constipation episode. Performance deficits affecting function are weakness, impaired endurance, impaired sensation, impaired self care skills, impaired functional mobility, gait instability, impaired balance, pain, decreased ROM.     Rehab Prognosis:  Good; patient would benefit from acute skilled OT services to address these deficits and reach maximum level of function.       Plan:     Patient to be seen 5 x/week to address the above listed problems via self-care/home management, therapeutic activities, therapeutic exercises, neuromuscular re-education  Plan of Care Expires: 02/03/23  Plan of Care Reviewed with:      Subjective     Pain/Comfort:  Pain Rating 1: 9/10  Location 1:  (anal region with pt attributing to recent constipation episode)  Pain Addressed 1: Reposition  Pain Rating Post-Intervention 1: 9/10    Objective:     Communicated with: Nurse prior to session.  Patient found right sidelying with oxygen upon OT entry to room.    General Precautions: Standard, fall    Orthopedic Precautions:   Braces:    Respiratory Status: Nasal cannula, flow 2 L/min     Occupational Performance:     Bed Mobility:    Patient completed Rolling/Turning to Left with  supervision  Patient completed  "Rolling/Turning to Right with supervision  Patient completed Scooting/Bridging with minimum assistance     Functional Mobility/Transfers:    Activities of Daily Living:  Toileting: maximal assistance in supine      AMPAC 6 Click ADL:      Treatment & Education:  Performed all aspects of toileting with MaxA while in supine d/t significant discomfort following constipation episode. Pt tearful throughout tx d/t "exhaustion and pain." At end of tx, pt presented calmer with no further tearful episodes. Assisted with positioning to ensure optimal pressure relieving position as well as comfort.     Patient left left sidelying with call button in reach    GOALS:   Multidisciplinary Problems       Occupational Therapy Goals          Problem: Occupational Therapy    Goal Priority Disciplines Outcome Interventions   Occupational Therapy Goal     OT, PT/OT Ongoing, Progressing    Description: Goals to be met by: 2 weeks     Patient will increase functional independence with ADLs by performing:    UE Dressing with Set-up Assistance.  LE Dressing with Stand-by Assistance.  Toileting from toilet with Stand-by Assistance for hygiene and clothing management.   Bathing from  edge of bed with Stand-by Assistance.  Dynamic standing with SBA, tolerating x 6 mins continuous stand, no LOB.     Goals to be met by: 4 weeks     Patient will increase functional independence with ADLs by performing:    UE Dressing with Modified [place].  LE Dressing with Modified [place].  Toileting from toilet with Modified [place] for hygiene and clothing management.   Bathing from  shower chair/bench with Modified [place].  Light IADLs Tiffany with RW.                            Time Tracking:     OT Date of Treatment: 01/16/23  OT Start Time: 1352  OT Stop Time: 1415  OT Total Time (min): 23 min    Billable Minutes:Self Care/Home Management 23    MARCUS Levy, OTR/L      OT/HEMAL: OT          1/16/2023  "

## 2023-01-16 NOTE — PLAN OF CARE
Problem: Impaired Wound Healing  Goal: Optimal Wound Healing  Outcome: Ongoing, Progressing  Intervention: Promote Wound Healing  Flowsheets (Taken 1/16/2023 1728)  Oral Nutrition Promotion: physical activity promoted  Activity Management: Rolling - L1  Pain Management Interventions:   care clustered   pillow support provided   position adjusted     Problem: Fall Injury Risk  Goal: Absence of Fall and Fall-Related Injury  Outcome: Ongoing, Progressing  Intervention: Identify and Manage Contributors  Flowsheets (Taken 1/16/2023 1728)  Self-Care Promotion:   independence encouraged   BADL personal objects within reach   BADL personal routines maintained   meal set-up provided   safe use of adaptive equipment encouraged  Medication Review/Management: medications reviewed  Intervention: Promote Injury-Free Environment  Flowsheets (Taken 1/16/2023 1728)  Safety Promotion/Fall Prevention:   assistive device/personal item within reach   diversional activities provided   Fall Risk reviewed with patient/family   Fall Risk signage in place   nonskid shoes/socks when out of bed   side rails raised x 3   instructed to call staff for mobility

## 2023-01-16 NOTE — PROGRESS NOTES
[facility] Hospital Medicine  Progress Note    Patient Name: Herlinda Valdovinos  MRN: [id number]  Patient Class: IP- Swing   Admission Date: 1/4/2023  Length of Stay: 12 days  Attending Physician: Chuy Daugherty IV,   Primary Care Provider: Vini Hernandez NP        Subjective:     Principal Problem:<principal problem not specified>    [facility] Hospital Admission Certification    I certify that skilled nursing facility services are required to be given on an inpatient basis due to the following required skilled nursing and/or skilled rehabilitation services on a continuing basis:    management & evaluation of a patient plan of care that requires skilled nursing or rehabilitation services    I estimate that the additional period of SNF inpatient care will be10 days.    Plans for post SNF care are: Home Care  ______________________________________________________________________        HPI:  Mrs. Kaur was admitted to acute care on 01/01 with UTI and pneumonia. Was started on rocephin and zithromax. She has completed 4/5 days of treatment for pneumonia. She has pressure wounds to sacrum, left heel and left groin. Wound care is following. 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 She has been mostly chair/ bed bound. States she has not had wounds until recently. States she felt bad approximately 10 days before admission but did not seek treatment.   She will be admitted to [facility] today to continue therapy for weakness. We will also continue wound care to sacrum, left groin and left heel.   She is at high risk for fall.  Talked with her re code status and she chose DNR.      Overview/Hospital Course:  01/05 Admitted to [facility] on 01/04/2023. Continue therapy for weakness. Continue wound care to sacrum, left groin and left heel. Potassium is low at 3.1. Will replace.    01/06 Awake and resting in bed  without complaints. States she is feeling better this morning. Continue with wound care and therapy.    01/09 Awake and resting in bed. Reports having a good weekend. Requests ensure shake BID. Continue wound care to sacrum, left groin and left heel. Continue therapy for strengthening.     01/11/23 Awake and resting in bed without complaints. Continue therapy for strengthening. Continue wound care.  Appetite is good.    01/154/2023 HOSPITAL DAY 10  I WAS ASKED TO EVALUATE PT DUE PT SUSTAINING A CUT WHEN EX SPOUSE SHAVED CHIN WITH BLEEDING NOTED THAT STOPPED AFTER PRESSURE APPLIED.  PT IS ON PLAVIX/LOVENOX    01/16 Complains of having constipation over the weekend and finally having a bm yesterday. Requests miralax in addition to pericolace. Continue with PT and OT . Continue with wound care to sacrum.      Interval History:   01/16 Complains of having constipation over the weekend and finally having a bm yesterday. Requests miralax in addition to pericolace. Continue with PT and OT . Continue with wound care to sacrum.    Review of Systems   Respiratory:  Negative for cough and shortness of breath.    Cardiovascular:  Negative for chest pain.   Gastrointestinal:  Positive for constipation. Negative for nausea and vomiting.   Neurological:  Positive for weakness.   Objective:     Vital Signs (Most Recent):  Temp: 97.6 °F (36.4 °C) (01/16/23 0740)  Pulse: 102 (01/16/23 0740)  Resp: 18 (01/16/23 0740)  BP: 119/86 (01/16/23 0740)  SpO2: 96 % (01/16/23 0740) Vital Signs (24h Range):  Temp:  [97.6 °F (36.4 °C)-98.5 °F (36.9 °C)] 97.6 °F (36.4 °C)  Pulse:  [] 102  Resp:  [18-20] 18  SpO2:  [94 %-96 %] 96 %  BP: (119-133)/(86-96) 119/86     Weight: 64.9 kg (143 lb)  Body mass index is 24.55 kg/m².    Intake/Output Summary (Last 24 hours) at 1/16/2023 1029  Last data filed at 1/15/2023 2251  Gross per 24 hour   Intake 720 ml   Output --   Net 720 ml      Physical Exam  Constitutional:       Appearance: She is  ill-appearing.      Comments: Chronically ill appearing    HENT:      Mouth/Throat:      Mouth: Mucous membranes are moist.   Cardiovascular:      Rate and Rhythm: Normal rate and regular rhythm.      Heart sounds: Normal heart sounds.   Pulmonary:      Effort: Pulmonary effort is normal.      Breath sounds: Normal breath sounds.   Abdominal:      General: Bowel sounds are normal.      Palpations: Abdomen is soft.   Skin:     Comments: Sacral wound    Neurological:      Mental Status: She is oriented to person, place, and time.      Motor: Weakness present.   Psychiatric:         Mood and Affect: Mood normal.       Significant Labs: All pertinent labs within the past 24 hours have been reviewed.  Recent Lab Results       None            Significant Imaging: I have reviewed all pertinent imaging results/findings within the past 24 hours.      Assessment/Plan:      Laceration of chin  PT SUSTAINED A CUT WHEN EX SPOUSE SHAVED CHIN WITH BLEEDING NOTED THAT STOPPED AFTER PRESSURE APPLIED.  PT IS ON PLAVIX/LOVENOX      Skin breakdown  01/05/2023   Continue wound care to sacrum, left groin and left heel  01/14/2023  CONTINUE WOUND CARE TEAM TREATMENT      Hyperlipidemia  01/05 continue atorvastatin daily         Thyroid disease  01/05/2023  Continue levothyroxine 125 mcg daily  01/14/2023  TSH 1.880 10/22  CONTINUE POC        Pneumonia of upper lobe due to infectious organism  01/05/2023  Will receive last dose of rocephin and zithromax today     01/09 resolved  01/14/2023  RESOLVED    DVT prophylaxis  01/05/2023  Continue OCTAVIA hose and plavix, continue lovenox  01/14/2023  PT IS AT RISK FOR VTE  VTE PPX  CONTINUE LOVENOX,PLAVIX,TEDS/EARLY AMBULATION        Idiopathic peripheral neuropathy  01/05/2023  Gabapentin 800 mg po TID   01/14/2022  IMPROVED      Insomnia  01/05/2023  Continue melatonin prn       Hypertension  01/05/2023  Continue norvasc and atorvastatin    01/09 stable  01/14/2023  BP IS STABLE  111/82    Depressive disorder  01/05/2023  Continue zoloft 50 mg po daily    01/06/2023  Increased to home dose of 150 mg zolot    01/14/2023  MOOD IS IMPROVED            Generalized anxiety disorder  01/05/2023  Continue lorazepam prn anxiety      Muscle weakness  01/05/2023 continue therapy for weakness  01/14/2023  CONTINUE PT/OT        VTE Risk Mitigation (From admission, onward)         Ordered     enoxaparin injection 40 mg  Daily         01/09/23 1223                Discharge Planning   POOL: 1/30/2023     Code Status: DNR   Is the patient medically ready for discharge?:     Reason for patient still in hospital (select all that apply): Treatment  Discharge Plan A: Home with family                  KEVAN Funk  Department of Hospital Medicine   [facility]

## 2023-01-16 NOTE — SUBJECTIVE & OBJECTIVE
Interval History:   01/16 Complains of having constipation over the weekend and finally having a bm yesterday. Requests miralax in addition to pericolace. Continue with PT and OT . Continue with wound care to sacrum.    Review of Systems   Respiratory:  Negative for cough and shortness of breath.    Cardiovascular:  Negative for chest pain.   Gastrointestinal:  Positive for constipation. Negative for nausea and vomiting.   Neurological:  Positive for weakness.   Objective:     Vital Signs (Most Recent):  Temp: 97.6 °F (36.4 °C) (01/16/23 0740)  Pulse: 102 (01/16/23 0740)  Resp: 18 (01/16/23 0740)  BP: 119/86 (01/16/23 0740)  SpO2: 96 % (01/16/23 0740) Vital Signs (24h Range):  Temp:  [97.6 °F (36.4 °C)-98.5 °F (36.9 °C)] 97.6 °F (36.4 °C)  Pulse:  [] 102  Resp:  [18-20] 18  SpO2:  [94 %-96 %] 96 %  BP: (119-133)/(86-96) 119/86     Weight: 64.9 kg (143 lb)  Body mass index is 24.55 kg/m².    Intake/Output Summary (Last 24 hours) at 1/16/2023 1029  Last data filed at 1/15/2023 2251  Gross per 24 hour   Intake 720 ml   Output --   Net 720 ml      Physical Exam  Constitutional:       Appearance: She is ill-appearing.      Comments: Chronically ill appearing    HENT:      Mouth/Throat:      Mouth: Mucous membranes are moist.   Cardiovascular:      Rate and Rhythm: Normal rate and regular rhythm.      Heart sounds: Normal heart sounds.   Pulmonary:      Effort: Pulmonary effort is normal.      Breath sounds: Normal breath sounds.   Abdominal:      General: Bowel sounds are normal.      Palpations: Abdomen is soft.   Skin:     Comments: Sacral wound    Neurological:      Mental Status: She is oriented to person, place, and time.      Motor: Weakness present.   Psychiatric:         Mood and Affect: Mood normal.       Significant Labs: All pertinent labs within the past 24 hours have been reviewed.  Recent Lab Results       None            Significant Imaging: I have reviewed all pertinent imaging results/findings  within the past 24 hours.

## 2023-01-17 ENCOUNTER — CLINICAL SUPPORT (OUTPATIENT)
Dept: WOUND CARE | Facility: HOSPITAL | Age: 58
DRG: 947 | End: 2023-01-17
Attending: FAMILY MEDICINE
Payer: MEDICARE

## 2023-01-17 DIAGNOSIS — L89.153 PRESSURE INJURY OF SACRAL REGION, STAGE 3: ICD-10-CM

## 2023-01-17 DIAGNOSIS — L89.620 PRESSURE INJURY OF LEFT HEEL, UNSTAGEABLE: Primary | ICD-10-CM

## 2023-01-17 LAB
ANION GAP SERPL CALCULATED.3IONS-SCNC: 13 MMOL/L (ref 7–16)
BASOPHILS # BLD AUTO: 0.02 K/UL (ref 0–0.2)
BASOPHILS NFR BLD AUTO: 0.3 % (ref 0–1)
BUN SERPL-MCNC: 14 MG/DL (ref 7–18)
BUN/CREAT SERPL: 18 (ref 6–20)
CALCIUM SERPL-MCNC: 9 MG/DL (ref 8.5–10.1)
CHLORIDE SERPL-SCNC: 103 MMOL/L (ref 98–107)
CO2 SERPL-SCNC: 28 MMOL/L (ref 21–32)
CREAT SERPL-MCNC: 0.79 MG/DL (ref 0.55–1.02)
DIFFERENTIAL METHOD BLD: ABNORMAL
EGFR (NO RACE VARIABLE) (RUSH/TITUS): 88 ML/MIN/1.73M²
EOSINOPHIL # BLD AUTO: 0.41 K/UL (ref 0–0.5)
EOSINOPHIL NFR BLD AUTO: 6.5 % (ref 1–4)
ERYTHROCYTE [DISTWIDTH] IN BLOOD BY AUTOMATED COUNT: 16.6 % (ref 11.5–14.5)
GLUCOSE SERPL-MCNC: 154 MG/DL (ref 74–106)
HCT VFR BLD AUTO: 37.8 % (ref 38–47)
HGB BLD-MCNC: 12.4 G/DL (ref 12–16)
LYMPHOCYTES # BLD AUTO: 1.97 K/UL (ref 1–4.8)
LYMPHOCYTES NFR BLD AUTO: 31.3 % (ref 27–41)
MCH RBC QN AUTO: 28.4 PG (ref 27–31)
MCHC RBC AUTO-ENTMCNC: 32.8 G/DL (ref 32–36)
MCV RBC AUTO: 86.5 FL (ref 80–96)
MONOCYTES # BLD AUTO: 0.61 K/UL (ref 0–0.8)
MONOCYTES NFR BLD AUTO: 9.7 % (ref 2–6)
MPC BLD CALC-MCNC: 10.3 FL (ref 9.4–12.4)
NEUTROPHILS # BLD AUTO: 3.28 K/UL (ref 1.8–7.7)
NEUTROPHILS NFR BLD AUTO: 52.2 % (ref 53–65)
PLATELET # BLD AUTO: 154 K/UL (ref 150–400)
POTASSIUM SERPL-SCNC: 3.6 MMOL/L (ref 3.5–5.1)
RBC # BLD AUTO: 4.37 M/UL (ref 4.2–5.4)
SODIUM SERPL-SCNC: 140 MMOL/L (ref 136–145)
WBC # BLD AUTO: 6.29 K/UL (ref 4.5–11)

## 2023-01-17 PROCEDURE — 99307 PR NURSING FAC CARE, SUBSEQ, IMPROVING: ICD-10-PCS | Mod: ,,, | Performed by: NURSE PRACTITIONER

## 2023-01-17 PROCEDURE — 63600175 PHARM REV CODE 636 W HCPCS: Performed by: NURSE PRACTITIONER

## 2023-01-17 PROCEDURE — 85025 COMPLETE CBC W/AUTO DIFF WBC: CPT | Performed by: NURSE PRACTITIONER

## 2023-01-17 PROCEDURE — 99900035 HC TECH TIME PER 15 MIN (STAT)

## 2023-01-17 PROCEDURE — 99307 SBSQ NF CARE SF MDM 10: CPT | Mod: ,,, | Performed by: NURSE PRACTITIONER

## 2023-01-17 PROCEDURE — 97116 GAIT TRAINING THERAPY: CPT | Mod: CQ

## 2023-01-17 PROCEDURE — 36415 COLL VENOUS BLD VENIPUNCTURE: CPT | Performed by: NURSE PRACTITIONER

## 2023-01-17 PROCEDURE — 27000944

## 2023-01-17 PROCEDURE — 27000221 HC OXYGEN, UP TO 24 HOURS

## 2023-01-17 PROCEDURE — 80048 BASIC METABOLIC PNL TOTAL CA: CPT | Performed by: NURSE PRACTITIONER

## 2023-01-17 PROCEDURE — 97112 NEUROMUSCULAR REEDUCATION: CPT

## 2023-01-17 PROCEDURE — 25000003 PHARM REV CODE 250: Performed by: NURSE PRACTITIONER

## 2023-01-17 PROCEDURE — 97597 DBRDMT OPN WND 1ST 20 CM/<: CPT

## 2023-01-17 PROCEDURE — 94761 N-INVAS EAR/PLS OXIMETRY MLT: CPT

## 2023-01-17 PROCEDURE — 11000004 HC SNF PRIVATE

## 2023-01-17 PROCEDURE — 97535 SELF CARE MNGMENT TRAINING: CPT

## 2023-01-17 RX ADMIN — PANTOPRAZOLE SODIUM 40 MG: 40 TABLET, DELAYED RELEASE ORAL at 09:01

## 2023-01-17 RX ADMIN — MICONAZOLE NITRATE: 20 POWDER TOPICAL at 09:01

## 2023-01-17 RX ADMIN — GABAPENTIN 600 MG: 300 CAPSULE ORAL at 09:01

## 2023-01-17 RX ADMIN — CLOPIDOGREL BISULFATE 75 MG: 75 TABLET, FILM COATED ORAL at 09:01

## 2023-01-17 RX ADMIN — GABAPENTIN 600 MG: 300 CAPSULE ORAL at 02:01

## 2023-01-17 RX ADMIN — LEVOTHYROXINE SODIUM 125 MCG: 25 TABLET ORAL at 06:01

## 2023-01-17 RX ADMIN — SERTRALINE HYDROCHLORIDE 150 MG: 50 TABLET ORAL at 09:01

## 2023-01-17 RX ADMIN — ATORVASTATIN CALCIUM 40 MG: 40 TABLET, FILM COATED ORAL at 09:01

## 2023-01-17 RX ADMIN — OXYCODONE HYDROCHLORIDE AND ACETAMINOPHEN 500 MG: 500 TABLET ORAL at 09:01

## 2023-01-17 RX ADMIN — AMLODIPINE BESYLATE 10 MG: 5 TABLET ORAL at 09:01

## 2023-01-17 RX ADMIN — SENNOSIDES AND DOCUSATE SODIUM 1 TABLET: 50; 8.6 TABLET ORAL at 09:01

## 2023-01-17 RX ADMIN — HYDROCORTISONE 2.5%: 25 CREAM TOPICAL at 09:01

## 2023-01-17 RX ADMIN — ENOXAPARIN SODIUM 40 MG: 40 INJECTION SUBCUTANEOUS at 09:01

## 2023-01-17 RX ADMIN — THERA TABS 1 TABLET: TAB at 09:01

## 2023-01-17 RX ADMIN — POLYETHYLENE GLYCOL 3350 17 G: 17 POWDER, FOR SOLUTION ORAL at 09:01

## 2023-01-17 RX ADMIN — LORAZEPAM 0.5 MG: 0.5 TABLET ORAL at 09:01

## 2023-01-17 RX ADMIN — HYDROCODONE BITARTRATE AND ACETAMINOPHEN 1 TABLET: 10; 325 TABLET ORAL at 12:01

## 2023-01-17 RX ADMIN — HYDROCODONE BITARTRATE AND ACETAMINOPHEN 1 TABLET: 10; 325 TABLET ORAL at 09:01

## 2023-01-17 RX ADMIN — OMEGA-3 FATTY ACIDS CAP 1000 MG 1 CAPSULE: 1000 CAP at 09:01

## 2023-01-17 RX ADMIN — VALSARTAN 160 MG: 160 TABLET, FILM COATED ORAL at 09:01

## 2023-01-17 RX ADMIN — MELATONIN TAB 3 MG 9 MG: 3 TAB at 09:01

## 2023-01-17 NOTE — PROGRESS NOTES
[facility] Unit  Hospital Medicine  Progress Note    Patient Name: Herlinda Valdovinos  MRN: [id number]  Patient Class: IP- Swing   Admission Date: 1/4/2023  Length of Stay: 13 days  Attending Physician: Chuy Daugherty IV, DO  Primary Care Provider: Vini Hernandez NP        Subjective:     Principal Problem:<principal problem not specified>        HPI:  Mrs. Kaur was admitted to acute care on 01/01 with UTI and pneumonia. Was started on rocephin and zithromax. She has completed 4/5 days of treatment for pneumonia. She has pressure wounds to sacrum, left heel and left groin. Wound care is following. 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 She has been mostly chair/ bed bound. States she has not had wounds until recently. States she felt bad approximately 10 days before admission but did not seek treatment.   She will be admitted to [facility] today to continue therapy for weakness. We will also continue wound care to sacrum, left groin and left heel.   She is at high risk for fall.  Talked with her re code status and she chose DNR.      Overview/Hospital Course:  01/05 Admitted to [facility] on 01/04/2023. Continue therapy for weakness. Continue wound care to sacrum, left groin and left heel. Potassium is low at 3.1. Will replace.    01/06 Awake and resting in bed without complaints. States she is feeling better this morning. Continue with wound care and therapy.    01/09 Awake and resting in bed. Reports having a good weekend. Requests ensure shake BID. Continue wound care to sacrum, left groin and left heel. Continue therapy for strengthening.     01/11/23 Awake and resting in bed without complaints. Continue therapy for strengthening. Continue wound care.  Appetite is good.    01/154/2023 HOSPITAL DAY 10  I WAS ASKED TO EVALUATE PT DUE PT SUSTAINING A CUT WHEN EX SPOUSE SHAVED CHIN WITH BLEEDING NOTED THAT STOPPED AFTER PRESSURE APPLIED.  PT IS ON  PLAVIX/LOVENOX    01/16 Complains of having constipation over the weekend and finally having a bm yesterday. Requests miralax in addition to pericolace. Continue with PT and OT . Continue with wound care to sacrum.    01/17 Resting in bed without complaints. Reports having large BM Sunday. No further complaints of constipation. Continue wound care to sacrum and left heel. Continue therapy.      Interval History: 01/17 Resting in bed without complaints. Reports having large BM Sunday. No further complaints of constipation. Continue wound care to sacrum and left heel. Continue therapy.    Review of Systems   Constitutional:  Negative for appetite change.   Respiratory:  Negative for cough and shortness of breath.    Cardiovascular:  Negative for chest pain.   Gastrointestinal:  Negative for nausea and vomiting.   Genitourinary:  Negative for dysuria.   Skin:  Positive for wound.   Neurological:  Positive for weakness.   Objective:     Vital Signs (Most Recent):  Temp: 97.7 °F (36.5 °C) (01/17/23 0720)  Pulse: 70 (01/17/23 0720)  Resp: 20 (01/17/23 0720)  BP: 102/72 (01/17/23 0720)  SpO2: 95 % (01/17/23 0720) Vital Signs (24h Range):  Temp:  [97.7 °F (36.5 °C)-98.4 °F (36.9 °C)] 97.7 °F (36.5 °C)  Pulse:  [62-84] 70  Resp:  [18-20] 20  SpO2:  [92 %-95 %] 95 %  BP: (102-111)/(72-76) 102/72     Weight: 64.9 kg (143 lb)  Body mass index is 24.55 kg/m².    Intake/Output Summary (Last 24 hours) at 1/17/2023 1223  Last data filed at 1/17/2023 0800  Gross per 24 hour   Intake 340 ml   Output --   Net 340 ml      Physical Exam  HENT:      Head: Normocephalic.      Mouth/Throat:      Mouth: Mucous membranes are moist.   Eyes:      Pupils: Pupils are equal, round, and reactive to light.   Cardiovascular:      Rate and Rhythm: Normal rate and regular rhythm.      Pulses: Normal pulses.      Heart sounds: Normal heart sounds.   Pulmonary:      Effort: Pulmonary effort is normal.      Breath sounds: Normal breath sounds.    Abdominal:      General: Abdomen is flat. Bowel sounds are normal.      Palpations: Abdomen is soft.   Skin:     Comments: Sacral and left heel   Neurological:      Mental Status: She is alert.      Motor: Weakness present.   Psychiatric:         Mood and Affect: Mood normal.       Significant Labs: All pertinent labs within the past 24 hours have been reviewed.  Recent Lab Results         01/17/23  1036        Anion Gap 13       Baso # 0.02       Basophil % 0.3       BUN 14       BUN/CREAT RATIO 18       Calcium 9.0       Chloride 103       CO2 28       Creatinine 0.79       Differential Type Auto       eGFR 88       Eos # 0.41       Eosinophil % 6.5       Glucose 154       Hematocrit 37.8       Hemoglobin 12.4       Lymph # 1.97       Lymph % 31.3       MCH 28.4       MCHC 32.8       MCV 86.5       Mono # 0.61       Mono % 9.7       MPV 10.3       Neutrophils, Abs 3.28       Neutrophils Relative 52.2       Platelets 154       Potassium 3.6       RBC 4.37       RDW 16.6       Sodium 140       WBC 6.29               Significant Imaging: I have reviewed all pertinent imaging results/findings within the past 24 hours.      Assessment/Plan:      Constipation    01/17/23 resolved    Continue miralax    Laceration of chin  PT SUSTAINED A CUT WHEN EX SPOUSE SHAVED CHIN WITH BLEEDING NOTED THAT STOPPED AFTER PRESSURE APPLIED.  PT IS ON PLAVIX/LOVENOX      Skin breakdown  01/05/2023   Continue wound care to sacrum, left groin and left heel  01/14/2023  CONTINUE WOUND CARE TEAM TREATMENT      Hyperlipidemia  01/05 continue atorvastatin daily         Thyroid disease  01/05/2023  Continue levothyroxine 125 mcg daily  01/14/2023  TSH 1.880 10/22  CONTINUE POC        Pneumonia of upper lobe due to infectious organism  01/05/2023  Will receive last dose of rocephin and zithromax today     01/09 resolved  01/14/2023  RESOLVED    DVT prophylaxis  01/05/2023  Continue OCTAVIA hose and plavix, continue lovenox  01/14/2023  PT IS AT  RISK FOR VTE  VTE PPX  CONTINUE LOVENOX,PLAVIX,TEDS/EARLY AMBULATION    \  01/17 continue plavix and lovenox        Idiopathic peripheral neuropathy  01/05/2023  Gabapentin 800 mg po TID   01/14/2022  IMPROVED      Insomnia  01/05/2023  Continue melatonin prn       Hypertension  01/05/2023  Continue norvasc and atorvastatin    01/09 stable  01/14/2023  BP IS STABLE 111/82    Depressive disorder  01/05/2023  Continue zoloft 50 mg po daily    01/06/2023  Increased to home dose of 150 mg zolot    01/14/2023  MOOD IS IMPROVED            Generalized anxiety disorder  01/05/2023  Continue lorazepam prn anxiety      Muscle weakness  01/05/2023 continue therapy for weakness  01/14/2023  CONTINUE PT/OT        VTE Risk Mitigation (From admission, onward)         Ordered     enoxaparin injection 40 mg  Daily         01/09/23 1223                Discharge Planning   POOL: 1/30/2023     Code Status: DNR   Is the patient medically ready for discharge?:     Reason for patient still in hospital (select all that apply): Treatment  Discharge Plan A: Home with family                  KEVAN Funk  Department of Hospital Medicine   [facility]

## 2023-01-17 NOTE — ASSESSMENT & PLAN NOTE
01/05/2023  Continue OCTAVIA hose and plavix, continue lovenox  01/14/2023  PT IS AT RISK FOR VTE  VTE PPX  CONTINUE LOVENOX,PLAVIX,TEDS/EARLY AMBULATION    \  01/17 continue plavix and lovenox

## 2023-01-17 NOTE — PT/OT/SLP PROGRESS
Physical Therapy Treatment    Patient Name:  Herlinda Valdovinos   MRN:  [id number]    Recommendations:     Discharge Recommendations: home health PT  Discharge Equipment Recommendations: cane, straight, cane, quad  Barriers to discharge: None    Assessment:     Herlinda Valdovinos is a 57 y.o. female admitted with a medical diagnosis of <principal problem not specified>.  She presents with the following impairments/functional limitations: weakness, impaired endurance, impaired sensation, impaired functional mobility, impaired balance, gait instability, decreased coordination, decreased lower extremity function, decreased safety awareness, pain. Patient stated she felt much better today than the last few days. Patient with much improved gait distance and quality of gait.    Rehab Prognosis: Good; patient would benefit from acute skilled PT services to address these deficits and reach maximum level of function.    Recent Surgery: * No surgery found *      Plan:     During this hospitalization, patient to be seen 5 x/week to address the identified rehab impairments via therapeutic exercises, therapeutic activities, gait training and progress toward the following goals:    Plan of Care Expires:  01/30/23    Subjective     Chief Complaint: weakness  Patient/Family Comments/goals: Pt reports her sacral wound is still hurting her, but overall she is feeling better today  Pain/Comfort:  Pain Rating 1: 0/10  Pain Rating Post-Intervention 1: 0/10      Objective:     Communicated with PT prior to session.  Patient found supine with   upon PT entry to room.     General Precautions: Standard, fall  Orthopedic Precautions: LLE toe touch weight bearing  Braces: N/A  Respiratory Status: Nasal cannula, flow 1.5 L/min     Functional Mobility:  Bed Mobility:     Supine to Sit: stand by assistance  Sit to Supine: minimum assistance  Transfers:     Sit to Stand:  minimum assistance with rolling walker  Gait: Patient ambulated 100 feet using  RW and CGA; improved lilli; step-to gait pattern; able to maintain WB status      AM-PAC 6 CLICK MOBILITY  Turning over in bed (including adjusting bedclothes, sheets and blankets)?: 4  Sitting down on and standing up from a chair with arms (e.g., wheelchair, bedside commode, etc.): 3  Moving from lying on back to sitting on the side of the bed?: 3  Moving to and from a bed to a chair (including a wheelchair)?: 3  Need to walk in hospital room?: 3  Climbing 3-5 steps with a railing?: 2  Basic Mobility Total Score: 18       Treatment & Education:  Patient performed bed mobility, transfers, and gait as listed above.    Patient left supine with call button in reach and  present..    GOALS:   Multidisciplinary Problems       Physical Therapy Goals          Problem: Physical Therapy    Goal Priority Disciplines Outcome Goal Variances Interventions   Physical Therapy Goal     PT, PT/OT Ongoing, Progressing     Description: Short-term Goals: 2 weeks  1. Patient will perform supine to/from sit with supervision to improve independence and safety with bed mobility.  2. Patient will perform sit to/from stand with rolling walker with contact guard assist to improve independence and safety with transfers.  3. Patient will ambulate 15 feet with standard walker with minimal assistance while maintaining appropriate left lower extremity weightbearing status to improve independence and safety with gait.  4. Patient will tolerate 15 minutes of physical therapy intervention to improve endurance and activity tolerance.    Long-term goals: 4 weeks  1. Patient will perform supine to/from sit with modified independence to improve independence and safety with bed mobility.  2. Patient will perform sit to/from stand with rolling walker with standby assist to improve independence and safety with transfers.  3. Patient will ambulate 30 feet with standard walker with contact guard assist while maintaining appropriate left lower  extremity weightbearing status to improve independence and safety with gait.  4. Patient will tolerate 30 minutes of physical therapy intervention to improve endurance and activity tolerance.                         Time Tracking:     PT Received On: 01/17/23  PT Start Time: 1037     PT Stop Time: 1055  PT Total Time (min): 18 min     Billable Minutes: Gait Training 13 and Therapeutic Activity 5    Treatment Type: Treatment  PT/PTA: PTA     PTA Visit Number: 1     01/17/2023

## 2023-01-17 NOTE — SUBJECTIVE & OBJECTIVE
Interval History: 01/17 Resting in bed without complaints. Reports having large BM Sunday. No further complaints of constipation. Continue wound care to sacrum and left heel. Continue therapy.    Review of Systems   Constitutional:  Negative for appetite change.   Respiratory:  Negative for cough and shortness of breath.    Cardiovascular:  Negative for chest pain.   Gastrointestinal:  Negative for nausea and vomiting.   Genitourinary:  Negative for dysuria.   Skin:  Positive for wound.   Neurological:  Positive for weakness.   Objective:     Vital Signs (Most Recent):  Temp: 97.7 °F (36.5 °C) (01/17/23 0720)  Pulse: 70 (01/17/23 0720)  Resp: 20 (01/17/23 0720)  BP: 102/72 (01/17/23 0720)  SpO2: 95 % (01/17/23 0720) Vital Signs (24h Range):  Temp:  [97.7 °F (36.5 °C)-98.4 °F (36.9 °C)] 97.7 °F (36.5 °C)  Pulse:  [62-84] 70  Resp:  [18-20] 20  SpO2:  [92 %-95 %] 95 %  BP: (102-111)/(72-76) 102/72     Weight: 64.9 kg (143 lb)  Body mass index is 24.55 kg/m².    Intake/Output Summary (Last 24 hours) at 1/17/2023 1223  Last data filed at 1/17/2023 0800  Gross per 24 hour   Intake 340 ml   Output --   Net 340 ml      Physical Exam  HENT:      Head: Normocephalic.      Mouth/Throat:      Mouth: Mucous membranes are moist.   Eyes:      Pupils: Pupils are equal, round, and reactive to light.   Cardiovascular:      Rate and Rhythm: Normal rate and regular rhythm.      Pulses: Normal pulses.      Heart sounds: Normal heart sounds.   Pulmonary:      Effort: Pulmonary effort is normal.      Breath sounds: Normal breath sounds.   Abdominal:      General: Abdomen is flat. Bowel sounds are normal.      Palpations: Abdomen is soft.   Skin:     Comments: Sacral and left heel   Neurological:      Mental Status: She is alert.      Motor: Weakness present.   Psychiatric:         Mood and Affect: Mood normal.       Significant Labs: All pertinent labs within the past 24 hours have been reviewed.  Recent Lab Results          01/17/23  1036        Anion Gap 13       Baso # 0.02       Basophil % 0.3       BUN 14       BUN/CREAT RATIO 18       Calcium 9.0       Chloride 103       CO2 28       Creatinine 0.79       Differential Type Auto       eGFR 88       Eos # 0.41       Eosinophil % 6.5       Glucose 154       Hematocrit 37.8       Hemoglobin 12.4       Lymph # 1.97       Lymph % 31.3       MCH 28.4       MCHC 32.8       MCV 86.5       Mono # 0.61       Mono % 9.7       MPV 10.3       Neutrophils, Abs 3.28       Neutrophils Relative 52.2       Platelets 154       Potassium 3.6       RBC 4.37       RDW 16.6       Sodium 140       WBC 6.29               Significant Imaging: I have reviewed all pertinent imaging results/findings within the past 24 hours.

## 2023-01-18 PROCEDURE — 27000221 HC OXYGEN, UP TO 24 HOURS

## 2023-01-18 PROCEDURE — 11000004 HC SNF PRIVATE

## 2023-01-18 PROCEDURE — 97535 SELF CARE MNGMENT TRAINING: CPT

## 2023-01-18 PROCEDURE — 99900035 HC TECH TIME PER 15 MIN (STAT)

## 2023-01-18 PROCEDURE — 63600175 PHARM REV CODE 636 W HCPCS: Performed by: NURSE PRACTITIONER

## 2023-01-18 PROCEDURE — 27000944

## 2023-01-18 PROCEDURE — 99307 SBSQ NF CARE SF MDM 10: CPT | Mod: ,,, | Performed by: NURSE PRACTITIONER

## 2023-01-18 PROCEDURE — 27201920 HC DRESSING, AQUACEL AG

## 2023-01-18 PROCEDURE — 25000003 PHARM REV CODE 250: Performed by: NURSE PRACTITIONER

## 2023-01-18 PROCEDURE — A6212 FOAM DRG <=16 SQ IN W/BORDER: HCPCS

## 2023-01-18 PROCEDURE — 97112 NEUROMUSCULAR REEDUCATION: CPT

## 2023-01-18 PROCEDURE — 99307 PR NURSING FAC CARE, SUBSEQ, IMPROVING: ICD-10-PCS | Mod: ,,, | Performed by: NURSE PRACTITIONER

## 2023-01-18 PROCEDURE — 97530 THERAPEUTIC ACTIVITIES: CPT

## 2023-01-18 PROCEDURE — 97116 GAIT TRAINING THERAPY: CPT | Mod: CQ

## 2023-01-18 PROCEDURE — 94761 N-INVAS EAR/PLS OXIMETRY MLT: CPT

## 2023-01-18 RX ADMIN — ENOXAPARIN SODIUM 40 MG: 40 INJECTION SUBCUTANEOUS at 09:01

## 2023-01-18 RX ADMIN — OMEGA-3 FATTY ACIDS CAP 1000 MG 1 CAPSULE: 1000 CAP at 09:01

## 2023-01-18 RX ADMIN — SERTRALINE HYDROCHLORIDE 150 MG: 50 TABLET ORAL at 09:01

## 2023-01-18 RX ADMIN — ATORVASTATIN CALCIUM 40 MG: 40 TABLET, FILM COATED ORAL at 09:01

## 2023-01-18 RX ADMIN — MICONAZOLE NITRATE: 20 POWDER TOPICAL at 10:01

## 2023-01-18 RX ADMIN — HYDROCORTISONE 2.5%: 25 CREAM TOPICAL at 09:01

## 2023-01-18 RX ADMIN — SENNOSIDES AND DOCUSATE SODIUM 1 TABLET: 50; 8.6 TABLET ORAL at 09:01

## 2023-01-18 RX ADMIN — AMLODIPINE BESYLATE 10 MG: 5 TABLET ORAL at 09:01

## 2023-01-18 RX ADMIN — HYDROCODONE BITARTRATE AND ACETAMINOPHEN 1 TABLET: 10; 325 TABLET ORAL at 10:01

## 2023-01-18 RX ADMIN — MELATONIN TAB 3 MG 9 MG: 3 TAB at 09:01

## 2023-01-18 RX ADMIN — VALSARTAN 160 MG: 160 TABLET, FILM COATED ORAL at 09:01

## 2023-01-18 RX ADMIN — GABAPENTIN 600 MG: 300 CAPSULE ORAL at 03:01

## 2023-01-18 RX ADMIN — POLYETHYLENE GLYCOL 3350 17 G: 17 POWDER, FOR SOLUTION ORAL at 09:01

## 2023-01-18 RX ADMIN — PANTOPRAZOLE SODIUM 40 MG: 40 TABLET, DELAYED RELEASE ORAL at 09:01

## 2023-01-18 RX ADMIN — GABAPENTIN 600 MG: 300 CAPSULE ORAL at 09:01

## 2023-01-18 RX ADMIN — LEVOTHYROXINE SODIUM 125 MCG: 25 TABLET ORAL at 06:01

## 2023-01-18 RX ADMIN — OXYCODONE HYDROCHLORIDE AND ACETAMINOPHEN 500 MG: 500 TABLET ORAL at 09:01

## 2023-01-18 RX ADMIN — CLOPIDOGREL BISULFATE 75 MG: 75 TABLET, FILM COATED ORAL at 09:01

## 2023-01-18 RX ADMIN — LORAZEPAM 0.5 MG: 0.5 TABLET ORAL at 09:01

## 2023-01-18 RX ADMIN — HYDROCODONE BITARTRATE AND ACETAMINOPHEN 1 TABLET: 10; 325 TABLET ORAL at 09:01

## 2023-01-18 RX ADMIN — MICONAZOLE NITRATE: 20 POWDER TOPICAL at 09:01

## 2023-01-18 RX ADMIN — THERA TABS 1 TABLET: TAB at 09:01

## 2023-01-18 NOTE — PLAN OF CARE
Problem: Fall Injury Risk  Goal: Absence of Fall and Fall-Related Injury  Outcome: Ongoing, Progressing  Intervention: Identify and Manage Contributors  Flowsheets (Taken 1/17/2023 1814)  Self-Care Promotion:   independence encouraged   BADL personal objects within reach   BADL personal routines maintained   meal set-up provided   safe use of adaptive equipment encouraged  Intervention: Promote Injury-Free Environment  Flowsheets (Taken 1/17/2023 1814)  Safety Promotion/Fall Prevention:   assistive device/personal item within reach   bed alarm set   diversional activities provided   Fall Risk reviewed with patient/family   Fall Risk signage in place   lighting adjusted   nonskid shoes/socks when out of bed   room near unit station   side rails raised x 3   instructed to call staff for mobility

## 2023-01-18 NOTE — PROGRESS NOTES
[facility]  Hospital Medicine  Progress Note    Patient Name: Herlinda Valdovinos  MRN: [id number]  Patient Class: IP- Swing   Admission Date: 1/4/2023  Length of Stay: 14 days  Attending Physician: Chuy Daugherty IV, DO  Primary Care Provider: Vini Hernandez NP        Subjective:     Principal Problem:<principal problem not specified>        HPI:  Mrs. Kaur was admitted to acute care on 01/01 with UTI and pneumonia. Was started on rocephin and zithromax. She has completed 4/5 days of treatment for pneumonia. She has pressure wounds to sacrum, left heel and left groin. Wound care is following. 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 She has been mostly chair/ bed bound. States she has not had wounds until recently. States she felt bad approximately 10 days before admission but did not seek treatment.   She will be admitted to [facility] today to continue therapy for weakness. We will also continue wound care to sacrum, left groin and left heel.   She is at high risk for fall.  Talked with her re code status and she chose DNR.      Overview/Hospital Course:  01/05 Admitted to [facility] on 01/04/2023. Continue therapy for weakness. Continue wound care to sacrum, left groin and left heel. Potassium is low at 3.1. Will replace.    01/06 Awake and resting in bed without complaints. States she is feeling better this morning. Continue with wound care and therapy.    01/09 Awake and resting in bed. Reports having a good weekend. Requests ensure shake BID. Continue wound care to sacrum, left groin and left heel. Continue therapy for strengthening.     01/11/23 Awake and resting in bed without complaints. Continue therapy for strengthening. Continue wound care.  Appetite is good.    01/154/2023 HOSPITAL DAY 10  I WAS ASKED TO EVALUATE PT DUE PT SUSTAINING A CUT WHEN EX SPOUSE SHAVED CHIN WITH BLEEDING NOTED THAT STOPPED AFTER PRESSURE APPLIED.  PT IS ON  PLAVIX/LOVENOX    01/16 Complains of having constipation over the weekend and finally having a bm yesterday. Requests miralax in addition to pericolace. Continue with PT and OT . Continue with wound care to sacrum.    01/17 Resting in bed without complaints. Reports having large BM Sunday. No further complaints of constipation. Continue wound care to sacrum and left heel. Continue therapy.    01/18 Patient states she is feeling 10,000 times better today. She ambulated 240 feet with therapist. Continue wound care.      Interval History: 01/18 Patient states she is feeling 10,000 times better today. She ambulated 240 feet with therapist. Continue wound care.    Review of Systems   Constitutional:  Negative for appetite change.   Respiratory:  Negative for cough and shortness of breath.    Gastrointestinal:  Negative for diarrhea, nausea and vomiting.   Skin:  Positive for wound.   Neurological:  Positive for weakness.   Objective:     Vital Signs (Most Recent):  Temp: 97.7 °F (36.5 °C) (01/18/23 0735)  Pulse: 74 (01/18/23 0746)  Resp: 18 (01/18/23 1014)  BP: 126/66 (01/18/23 0735)  SpO2: 95 % (01/18/23 0746) Vital Signs (24h Range):  Temp:  [97.7 °F (36.5 °C)-98.6 °F (37 °C)] 97.7 °F (36.5 °C)  Pulse:  [67-86] 74  Resp:  [18] 18  SpO2:  [94 %-96 %] 95 %  BP: (118-126)/(66-81) 126/66     Weight: 63.1 kg (139 lb 3.2 oz)  Body mass index is 23.89 kg/m².    Intake/Output Summary (Last 24 hours) at 1/18/2023 1317  Last data filed at 1/18/2023 1005  Gross per 24 hour   Intake 940 ml   Output --   Net 940 ml      Physical Exam  HENT:      Mouth/Throat:      Mouth: Mucous membranes are moist.   Cardiovascular:      Rate and Rhythm: Normal rate and regular rhythm.      Pulses: Normal pulses.      Heart sounds: Normal heart sounds.   Pulmonary:      Effort: Pulmonary effort is normal.      Breath sounds: Normal breath sounds.   Abdominal:      General: Bowel sounds are normal.      Palpations: Abdomen is soft.   Skin:      Comments: Sacrum , left heel   Neurological:      Mental Status: She is alert.      Motor: Weakness present.       Significant Labs: All pertinent labs within the past 24 hours have been reviewed.  Recent Lab Results       None            Significant Imaging: I have reviewed all pertinent imaging results/findings within the past 24 hours.      Assessment/Plan:      Constipation    01/17/23 resolved    Continue miralax    Laceration of chin  PT SUSTAINED A CUT WHEN EX SPOUSE SHAVED CHIN WITH BLEEDING NOTED THAT STOPPED AFTER PRESSURE APPLIED.  PT IS ON PLAVIX/LOVENOX      Skin breakdown  01/05/2023   Continue wound care to sacrum, left groin and left heel  01/14/2023  CONTINUE WOUND CARE TEAM TREATMENT      Hyperlipidemia  01/05 continue atorvastatin daily         Thyroid disease  01/05/2023  Continue levothyroxine 125 mcg daily  01/14/2023  TSH 1.880 10/22  CONTINUE POC        Pneumonia of upper lobe due to infectious organism  01/05/2023  Will receive last dose of rocephin and zithromax today     01/09 resolved  01/14/2023  RESOLVED    DVT prophylaxis  01/05/2023  Continue OCTAVIA hose and plavix, continue lovenox  01/14/2023  PT IS AT RISK FOR VTE  VTE PPX  CONTINUE LOVENOX,PLAVIX,TEDS/EARLY AMBULATION    \  01/17 continue plavix and lovenox        Idiopathic peripheral neuropathy  01/05/2023  Gabapentin 800 mg po TID   01/14/2022  IMPROVED      Insomnia  01/05/2023  Continue melatonin prn       Hypertension  01/05/2023  Continue norvasc and atorvastatin    01/09 stable  01/14/2023  BP IS STABLE 111/82    Depressive disorder  01/05/2023  Continue zoloft 50 mg po daily    01/06/2023  Increased to home dose of 150 mg zolot    01/14/2023  MOOD IS IMPROVED            Generalized anxiety disorder  01/05/2023  Continue lorazepam prn anxiety      Muscle weakness  01/05/2023 continue therapy for weakness  01/14/2023  CONTINUE PT/OT      01/18 ambulated 240 feet with therapy today      VTE Risk Mitigation (From admission,  onward)         Ordered     enoxaparin injection 40 mg  Daily         01/09/23 1223                Discharge Planning   POOL: 1/30/2023     Code Status: DNR   Is the patient medically ready for discharge?:     Reason for patient still in hospital (select all that apply): Treatment  Discharge Plan A: Home with family                  KEVAN Funk  Department of Hospital Medicine   [facility]

## 2023-01-18 NOTE — ASSESSMENT & PLAN NOTE
01/05/2023 continue therapy for weakness  01/14/2023  CONTINUE PT/OT      01/18 ambulated 240 feet with therapy today

## 2023-01-18 NOTE — PT/OT/SLP PROGRESS
Occupational Therapy   Treatment    Name: Herlinda Valdovinos  MRN: [id number]  Admitting Diagnosis:  <principal problem not specified>       Recommendations:     Discharge Recommendations: other (see comments) (Home health versus placement in SNF/snf)  Discharge Equipment Recommendations:     Barriers to discharge:  Other (Comment) (Per pt and family report, pt lives in stressful environment with significant other, son, and granddaughter.)    Assessment:     Herlinda Valdovinos is a 57 y.o. female with a medical diagnosis of <principal problem not specified>.  She presents with performance deficits affecting function are weakness, impaired endurance, impaired sensation, impaired self care skills, impaired functional mobility, gait instability, impaired balance, pain, decreased ROM.     Rehab Prognosis:  Good; patient would benefit from acute skilled OT services to address these deficits and reach maximum level of function.       Plan:     Patient to be seen 5 x/week to address the above listed problems via self-care/home management, therapeutic activities, therapeutic exercises, neuromuscular re-education  Plan of Care Expires: 02/03/23  Plan of Care Reviewed with:      Subjective     Pain/Comfort:   None reported.     Objective:     Communicated with: Nurse prior to session.  Patient found supine with oxygen upon OT entry to room.    General Precautions: Standard, fall    Orthopedic Precautions:   Braces:    Respiratory Status: Nasal cannula, flow 2 L/min     Occupational Performance:     Bed Mobility:    Patient completed Supine to Sit with stand by assistance  Patient completed Sit to Supine with stand by assistance     Functional Mobility/Transfers:  Patient completed Sit <> Stand Transfer with stand by assistance and contact guard assistance  with  no assistive device     Activities of Daily Living:  Lower Body Dressing: stand by assistance seated EOB them standing to pull over hips      AMPAC 6 Click ADL:       Treatment & Education:  Performed LB dressing training sitting EOB, donning pants with extended time and SBA, threading Les through pants then standing EOB to pull over hips. Significant improvement noted in standing performance related to ADL completion, no DME used and no LOB. Performed further dynamic standing while standing EOB challenging balance and righting reaction associated with changing head position, gaze direction, UE reaching and positioning, attempting to elicit LOB with no LOB noted. Required SBA with significant improved dynamic standing balance, tolerance, and posture. Challenged pt in assuming and maintaining upright standing posture while standing EOB without use of DME for balance assistance, performing with SBA, assuming and maintaining appropriate upright standing posture.     Patient left supine with call button in reach    GOALS:   Multidisciplinary Problems       Occupational Therapy Goals          Problem: Occupational Therapy    Goal Priority Disciplines Outcome Interventions   Occupational Therapy Goal     OT, PT/OT Ongoing, Progressing    Description: Goals to be met by: 2 weeks     Patient will increase functional independence with ADLs by performing:    UE Dressing with Set-up Assistance.  LE Dressing with Stand-by Assistance.  Toileting from toilet with Stand-by Assistance for hygiene and clothing management.   Bathing from  edge of bed with Stand-by Assistance.  Dynamic standing with SBA, tolerating x 6 mins continuous stand, no LOB.     Goals to be met by: 4 weeks     Patient will increase functional independence with ADLs by performing:    UE Dressing with Modified [place].  LE Dressing with Modified [place].  Toileting from toilet with Modified [place] for hygiene and clothing management.   Bathing from  shower chair/bench with Modified [place].  Light IADLs Tiffany with RW.                            Time Tracking:     OT Date of Treatment: 01/17/23  OT  Start Time: 1507  OT Stop Time: 1539  OT Total Time (min): 32 min    Billable Minutes:Self Care/Home Management 12  Neuromuscular Re-education 20    MARCUS Levy, OTR/L      OT/HEMAL: OT        1/17/2023

## 2023-01-18 NOTE — PROGRESS NOTES
Wt: 139#  RDN visited pt today for SB f/u but it was also reported to RDN re: foodservice complaints.  RDN addressed complaints with dietary staff.  She had a blister to her L heel that ruptured.  Sacral WND is better.  Meal intake ~85%.  She is drinking Ensure Clear BID.  She also receives MVM daily and Vit C BID.  Her last BM was 1/19.

## 2023-01-18 NOTE — PLAN OF CARE
[facility] - Medical Surgical Unit - Swing Bed   Interdisciplinary Team Meeting    Patient: Herlinda Valdovinos   Today's Date: 1/18/2023   Estimated D/C Date: 1/30/2023        Physician:  Dr Daugherty Nurse Practitioner:  Brendan LOPES   Pharmacy: Zina Morales, PharmD Unit Director: IRENE Barbosa   : Mery Mcclelland RN Physical/Occupational Therapy: Kiana Rasmussen, OTR/L   Speech Therapy: Kiana Rasmussen OTR/L Activity Therapy: Izzy Lai LPN   Nursing: Elmira Mcgovern RN  Respiratory: Marilou Bill, RT Dietary: Cesar Heaton RD and Willa Collins  Other:      Nursing  New Symptoms/Problems:   Last Bowel Movement: 01/17/23  Urine: continent Diarrhea: No   Constipated: No Bowel: continent [place]: No   Isolation: No     O2 Device: Nasal Cannula O2 Flow:  2L SpO2: 95%   Nutrition: Cardiac  Speech/Swallowing: No current speech or swallowing issues  Aspiration Precautions: No  Cognition: WNL    Physical Therapy  Physical Therapy/Gait: 240' ELOS: Plan to DC 1/30/2023    Transfers: Contact Guard Assistance Range of Motion/Restrictions:      Occupational Therapy  Eating/Grooming: Modified Independent Toileting: Minimal Assistance   Bathing: Minimal Assistance Dressing (Upper Body): Supervision or Set-up Assistance   Dressing (Lower Body): Minimal Assistance      Activity Therapy  Level of participation: Active participation      Tx Plan/Recommendations reviewed with family and/or patient on (date) [id number].  Additional family Conference/Training:   D/C Plan/Recommendations: Home with family  POOL: 1/30/2023    Pharmacy  Medication Changes (see MD orders in chart): No  MD/NP:  Dr Daugherty Labs Reviewed: Yes New Lab Orders: No   Lab Concerns:

## 2023-01-18 NOTE — PT/OT/SLP PROGRESS
Physical Therapy Treatment    Patient Name:  Herlinda Valdovinos   MRN:  [id number]    Recommendations:     Discharge Recommendations: home health PT  Discharge Equipment Recommendations: cane, straight, cane, quad  Barriers to discharge: None    Assessment:     Herlinda Valdovinos is a 57 y.o. female admitted with a medical diagnosis of <principal problem not specified>.  She presents with the following impairments/functional limitations: weakness, impaired endurance, impaired sensation, impaired functional mobility, impaired balance, gait instability, decreased coordination, decreased lower extremity function, decreased safety awareness, pain. Patient with continued improving gait distance, quality of gait and transfers today.    Rehab Prognosis: Good; patient would benefit from acute skilled PT services to address these deficits and reach maximum level of function.    Recent Surgery: * No surgery found *      Plan:     During this hospitalization, patient to be seen 5 x/week to address the identified rehab impairments via therapeutic exercises, neuromuscular re-education, therapeutic activities, gait training and progress toward the following goals:    Plan of Care Expires:  01/30/23    Subjective     Chief Complaint: weakness  Patient/Family Comments/goals: Pt states she is doing good and ready for PT  Pain/Comfort:  Pain Rating 1: 0/10  Pain Rating Post-Intervention 1: 0/10      Objective:     Communicated with nurse prior to session.  Patient found supine with   upon PT entry to room.     General Precautions: Standard, fall  Orthopedic Precautions: LLE toe touch weight bearing  Braces: N/A  Respiratory Status: Nasal cannula, flow 1.5 L/min     Functional Mobility:  Bed Mobility:     Scooting: stand by assistance  Supine to Sit: stand by assistance  Sit to Supine: contact guard assistance  Transfers:     Sit to Stand:  contact guard assistance with rolling walker  Gait: Patient ambulated 240 feet using RW and CGA;  improved lilli; improved step through gait pattern; able to maintain WB status on LLE      AM-PAC 6 CLICK MOBILITY  Turning over in bed (including adjusting bedclothes, sheets and blankets)?: 4  Sitting down on and standing up from a chair with arms (e.g., wheelchair, bedside commode, etc.): 3  Moving from lying on back to sitting on the side of the bed?: 4  Moving to and from a bed to a chair (including a wheelchair)?: 3  Need to walk in hospital room?: 3  Climbing 3-5 steps with a railing?: 2  Basic Mobility Total Score: 19       Treatment & Education:  Patient performed bed mobility, transfers, and gait as listed above.    Patient left supine with call button in reach and  present..    GOALS:   Multidisciplinary Problems       Physical Therapy Goals          Problem: Physical Therapy    Goal Priority Disciplines Outcome Goal Variances Interventions   Physical Therapy Goal     PT, PT/OT Ongoing, Progressing     Description: Short-term Goals: 2 weeks  1. Patient will perform supine to/from sit with supervision to improve independence and safety with bed mobility.  2. Patient will perform sit to/from stand with rolling walker with contact guard assist to improve independence and safety with transfers.  3. Patient will ambulate 15 feet with standard walker with minimal assistance while maintaining appropriate left lower extremity weightbearing status to improve independence and safety with gait.  4. Patient will tolerate 15 minutes of physical therapy intervention to improve endurance and activity tolerance.    Long-term goals: 4 weeks  1. Patient will perform supine to/from sit with modified independence to improve independence and safety with bed mobility.  2. Patient will perform sit to/from stand with rolling walker with standby assist to improve independence and safety with transfers.  3. Patient will ambulate 30 feet with standard walker with contact guard assist while maintaining appropriate left  lower extremity weightbearing status to improve independence and safety with gait.  4. Patient will tolerate 30 minutes of physical therapy intervention to improve endurance and activity tolerance.                         Time Tracking:     PT Received On: 01/18/23  PT Start Time: 1030     PT Stop Time: 1050  PT Total Time (min): 20 min     Billable Minutes: Gait Training 15    Treatment Type: Treatment  PT/PTA: PTA     PTA Visit Number: 2     01/18/2023

## 2023-01-18 NOTE — SUBJECTIVE & OBJECTIVE
Interval History: 01/18 Patient states she is feeling 10,000 times better today. She ambulated 240 feet with therapist. Continue wound care.    Review of Systems   Constitutional:  Negative for appetite change.   Respiratory:  Negative for cough and shortness of breath.    Gastrointestinal:  Negative for diarrhea, nausea and vomiting.   Skin:  Positive for wound.   Neurological:  Positive for weakness.   Objective:     Vital Signs (Most Recent):  Temp: 97.7 °F (36.5 °C) (01/18/23 0735)  Pulse: 74 (01/18/23 0746)  Resp: 18 (01/18/23 1014)  BP: 126/66 (01/18/23 0735)  SpO2: 95 % (01/18/23 0746) Vital Signs (24h Range):  Temp:  [97.7 °F (36.5 °C)-98.6 °F (37 °C)] 97.7 °F (36.5 °C)  Pulse:  [67-86] 74  Resp:  [18] 18  SpO2:  [94 %-96 %] 95 %  BP: (118-126)/(66-81) 126/66     Weight: 63.1 kg (139 lb 3.2 oz)  Body mass index is 23.89 kg/m².    Intake/Output Summary (Last 24 hours) at 1/18/2023 1317  Last data filed at 1/18/2023 1005  Gross per 24 hour   Intake 940 ml   Output --   Net 940 ml      Physical Exam  HENT:      Mouth/Throat:      Mouth: Mucous membranes are moist.   Cardiovascular:      Rate and Rhythm: Normal rate and regular rhythm.      Pulses: Normal pulses.      Heart sounds: Normal heart sounds.   Pulmonary:      Effort: Pulmonary effort is normal.      Breath sounds: Normal breath sounds.   Abdominal:      General: Bowel sounds are normal.      Palpations: Abdomen is soft.   Skin:     Comments: Sacrum , left heel   Neurological:      Mental Status: She is alert.      Motor: Weakness present.       Significant Labs: All pertinent labs within the past 24 hours have been reviewed.  Recent Lab Results       None            Significant Imaging: I have reviewed all pertinent imaging results/findings within the past 24 hours.

## 2023-01-19 LAB
BACTERIA #/AREA URNS HPF: ABNORMAL /HPF
BILIRUB UR QL STRIP: NEGATIVE
CAOX CRY #/AREA URNS LPF: ABNORMAL /LPF
CLARITY UR: ABNORMAL
COLOR UR: YELLOW
GLUCOSE UR STRIP-MCNC: NEGATIVE MG/DL
KETONES UR STRIP-SCNC: NEGATIVE MG/DL
LEUKOCYTE ESTERASE UR QL STRIP: ABNORMAL
NITRITE UR QL STRIP: NEGATIVE
PH UR STRIP: 5.5 PH UNITS
PROT UR QL STRIP: 30
RBC # UR STRIP: ABNORMAL /UL
RBC #/AREA URNS HPF: ABNORMAL /HPF
SP GR UR STRIP: >=1.03
SQUAMOUS #/AREA URNS LPF: ABNORMAL /LPF
TRANS CELLS #/AREA URNS LPF: ABNORMAL /LPF
UROBILINOGEN UR STRIP-ACNC: 0.2 MG/DL
WBC #/AREA URNS HPF: ABNORMAL /HPF
WBC CLUMPS, UA: ABNORMAL /HPF

## 2023-01-19 PROCEDURE — 27000944

## 2023-01-19 PROCEDURE — 25000003 PHARM REV CODE 250: Performed by: NURSE PRACTITIONER

## 2023-01-19 PROCEDURE — 81003 URINALYSIS AUTO W/O SCOPE: CPT | Performed by: NURSE PRACTITIONER

## 2023-01-19 PROCEDURE — 87086 URINE CULTURE/COLONY COUNT: CPT | Performed by: NURSE PRACTITIONER

## 2023-01-19 PROCEDURE — 99307 SBSQ NF CARE SF MDM 10: CPT | Mod: ,,, | Performed by: NURSE PRACTITIONER

## 2023-01-19 PROCEDURE — 97535 SELF CARE MNGMENT TRAINING: CPT

## 2023-01-19 PROCEDURE — 87077 CULTURE AEROBIC IDENTIFY: CPT | Performed by: NURSE PRACTITIONER

## 2023-01-19 PROCEDURE — 97116 GAIT TRAINING THERAPY: CPT | Mod: CQ

## 2023-01-19 PROCEDURE — 11000004 HC SNF PRIVATE

## 2023-01-19 PROCEDURE — 63600175 PHARM REV CODE 636 W HCPCS: Performed by: NURSE PRACTITIONER

## 2023-01-19 PROCEDURE — 27000221 HC OXYGEN, UP TO 24 HOURS

## 2023-01-19 PROCEDURE — 63600175 PHARM REV CODE 636 W HCPCS

## 2023-01-19 PROCEDURE — 94761 N-INVAS EAR/PLS OXIMETRY MLT: CPT

## 2023-01-19 PROCEDURE — 99307 PR NURSING FAC CARE, SUBSEQ, IMPROVING: ICD-10-PCS | Mod: ,,, | Performed by: NURSE PRACTITIONER

## 2023-01-19 PROCEDURE — 63700000 PHARM REV CODE 250 ALT 637 W/O HCPCS: Performed by: NURSE PRACTITIONER

## 2023-01-19 RX ORDER — CEFTRIAXONE 1 G/1
INJECTION, POWDER, FOR SOLUTION INTRAMUSCULAR; INTRAVENOUS
Status: COMPLETED
Start: 2023-01-19 | End: 2023-01-19

## 2023-01-19 RX ORDER — NITROFURANTOIN 25; 75 MG/1; MG/1
100 CAPSULE ORAL EVERY 12 HOURS
Status: DISCONTINUED | OUTPATIENT
Start: 2023-01-19 | End: 2023-01-19

## 2023-01-19 RX ORDER — FLUCONAZOLE 100 MG/1
100 TABLET ORAL DAILY
Status: COMPLETED | OUTPATIENT
Start: 2023-01-19 | End: 2023-01-20

## 2023-01-19 RX ADMIN — SENNOSIDES AND DOCUSATE SODIUM 1 TABLET: 50; 8.6 TABLET ORAL at 09:01

## 2023-01-19 RX ADMIN — AMLODIPINE BESYLATE 10 MG: 5 TABLET ORAL at 09:01

## 2023-01-19 RX ADMIN — ENOXAPARIN SODIUM 40 MG: 40 INJECTION SUBCUTANEOUS at 09:01

## 2023-01-19 RX ADMIN — OMEGA-3 FATTY ACIDS CAP 1000 MG 1 CAPSULE: 1000 CAP at 09:01

## 2023-01-19 RX ADMIN — GABAPENTIN 600 MG: 300 CAPSULE ORAL at 09:01

## 2023-01-19 RX ADMIN — OXYCODONE HYDROCHLORIDE AND ACETAMINOPHEN 500 MG: 500 TABLET ORAL at 09:01

## 2023-01-19 RX ADMIN — HYDROCODONE BITARTRATE AND ACETAMINOPHEN 1 TABLET: 10; 325 TABLET ORAL at 05:01

## 2023-01-19 RX ADMIN — THERA TABS 1 TABLET: TAB at 09:01

## 2023-01-19 RX ADMIN — CEFTRIAXONE 1000 MG: 1 INJECTION, POWDER, FOR SOLUTION INTRAMUSCULAR; INTRAVENOUS at 06:01

## 2023-01-19 RX ADMIN — ATORVASTATIN CALCIUM 40 MG: 40 TABLET, FILM COATED ORAL at 09:01

## 2023-01-19 RX ADMIN — HYDROCORTISONE 2.5%: 25 CREAM TOPICAL at 09:01

## 2023-01-19 RX ADMIN — MICONAZOLE NITRATE: 20 POWDER TOPICAL at 09:01

## 2023-01-19 RX ADMIN — CLOPIDOGREL BISULFATE 75 MG: 75 TABLET, FILM COATED ORAL at 09:01

## 2023-01-19 RX ADMIN — SERTRALINE HYDROCHLORIDE 150 MG: 50 TABLET ORAL at 09:01

## 2023-01-19 RX ADMIN — LEVOTHYROXINE SODIUM 125 MCG: 25 TABLET ORAL at 05:01

## 2023-01-19 RX ADMIN — GABAPENTIN 600 MG: 300 CAPSULE ORAL at 03:01

## 2023-01-19 RX ADMIN — HYDROCODONE BITARTRATE AND ACETAMINOPHEN 1 TABLET: 10; 325 TABLET ORAL at 09:01

## 2023-01-19 RX ADMIN — PANTOPRAZOLE SODIUM 40 MG: 40 TABLET, DELAYED RELEASE ORAL at 09:01

## 2023-01-19 RX ADMIN — FLUCONAZOLE 100 MG: 100 TABLET ORAL at 03:01

## 2023-01-19 RX ADMIN — VALSARTAN 160 MG: 160 TABLET, FILM COATED ORAL at 09:01

## 2023-01-19 RX ADMIN — MELATONIN TAB 3 MG 9 MG: 3 TAB at 09:01

## 2023-01-19 RX ADMIN — LORAZEPAM 0.5 MG: 0.5 TABLET ORAL at 09:01

## 2023-01-19 NOTE — PLAN OF CARE
Problem: Adult Inpatient Plan of Care  Goal: Plan of Care Review  1/19/2023 1503 by Nichole Allen RN  Outcome: Ongoing, Progressing  1/19/2023 1503 by Nichole Allen RN  Outcome: Ongoing, Progressing  Goal: Patient-Specific Goal (Individualized)  1/19/2023 1503 by Nichole Allen RN  Outcome: Ongoing, Progressing  1/19/2023 1503 by Nichole Allen RN  Outcome: Ongoing, Progressing  Goal: Absence of Hospital-Acquired Illness or Injury  1/19/2023 1503 by Nichole Allen RN  Outcome: Ongoing, Progressing  1/19/2023 1503 by Nichole Allen RN  Outcome: Ongoing, Progressing  Goal: Optimal Comfort and Wellbeing  1/19/2023 1503 by Nichole Allen RN  Outcome: Ongoing, Progressing  1/19/2023 1503 by Nichole Allen RN  Outcome: Ongoing, Progressing  Goal: Readiness for Transition of Care  1/19/2023 1503 by Nichole Allen RN  Outcome: Ongoing, Progressing  1/19/2023 1503 by Nichoel Allen RN  Outcome: Ongoing, Progressing

## 2023-01-19 NOTE — SUBJECTIVE & OBJECTIVE
Interval History: 01/19 Awake and resting in bed with family at side. Reports feeling good this morning. Continue therapy.    Review of Systems   Constitutional:  Negative for appetite change and fever.   Gastrointestinal:  Negative for constipation, diarrhea, nausea and vomiting.   Skin:  Positive for wound.   Neurological:  Positive for weakness.   Objective:     Vital Signs (Most Recent):  Temp: 98.2 °F (36.8 °C) (01/19/23 0743)  Pulse: 74 (01/19/23 0743)  Resp: 18 (01/19/23 0743)  BP: 110/79 (01/19/23 0743)  SpO2: (!) 93 % (01/19/23 0743)   Vital Signs (24h Range):  Temp:  [98.2 °F (36.8 °C)-98.6 °F (37 °C)] 98.2 °F (36.8 °C)  Pulse:  [74-89] 74  Resp:  [18-20] 18  SpO2:  [93 %-97 %] 93 %  BP: (110-124)/(79-81) 110/79     Weight: 63.1 kg (139 lb 3.2 oz)  Body mass index is 23.89 kg/m².    Intake/Output Summary (Last 24 hours) at 1/19/2023 0924  Last data filed at 1/19/2023 0556  Gross per 24 hour   Intake 1260 ml   Output --   Net 1260 ml      Physical Exam  HENT:      Mouth/Throat:      Mouth: Mucous membranes are moist.   Cardiovascular:      Rate and Rhythm: Normal rate and regular rhythm.      Pulses: Normal pulses.      Heart sounds: Normal heart sounds.   Pulmonary:      Effort: Pulmonary effort is normal.      Breath sounds: Normal breath sounds.   Abdominal:      General: Bowel sounds are normal.      Palpations: Abdomen is soft.   Skin:     General: Skin is warm and dry.      Comments: Sacral wound , wound to left heel   Neurological:      Mental Status: She is alert.      Motor: Weakness present.       Significant Labs: All pertinent labs within the past 24 hours have been reviewed.  Recent Lab Results       None            Significant Imaging: I have reviewed all pertinent imaging results/findings within the past 24 hours.

## 2023-01-19 NOTE — PROGRESS NOTES
[facility] - Medical Surgical Unit  Hospital Medicine  Progress Note    Patient Name: Herlinda Valdovinos  MRN: [id number]  Patient Class: IP- Swing   Admission Date: 1/4/2023  Length of Stay: 15 days  Attending Physician: Chuy Daugherty IV, DO  Primary Care Provider: Vini Hernandez NP        Subjective:     Principal Problem:<principal problem not specified>        HPI:  Mrs. Kaur was admitted to acute care on 01/01 with UTI and pneumonia. Was started on rocephin and zithromax. She has completed 4/5 days of treatment for pneumonia. She has pressure wounds to sacrum, left heel and left groin. Wound care is following. 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 She has been mostly chair/ bed bound. States she has not had wounds until recently. States she felt bad approximately 10 days before admission but did not seek treatment.   She will be admitted to [facility] today to continue therapy for weakness. We will also continue wound care to sacrum, left groin and left heel.   She is at high risk for fall.  Talked with her re code status and she chose DNR.      Overview/Hospital Course:  01/05 Admitted to [facility] on 01/04/2023. Continue therapy for weakness. Continue wound care to sacrum, left groin and left heel. Potassium is low at 3.1. Will replace.    01/06 Awake and resting in bed without complaints. States she is feeling better this morning. Continue with wound care and therapy.    01/09 Awake and resting in bed. Reports having a good weekend. Requests ensure shake BID. Continue wound care to sacrum, left groin and left heel. Continue therapy for strengthening.     01/11/23 Awake and resting in bed without complaints. Continue therapy for strengthening. Continue wound care.  Appetite is good.    01/154/2023 HOSPITAL DAY 10  I WAS ASKED TO EVALUATE PT DUE PT SUSTAINING A CUT WHEN EX SPOUSE SHAVED CHIN WITH BLEEDING NOTED THAT STOPPED AFTER PRESSURE APPLIED.  PT IS ON  PLAVIX/LOVENOX    01/16 Complains of having constipation over the weekend and finally having a bm yesterday. Requests miralax in addition to pericolace. Continue with PT and OT . Continue with wound care to sacrum.    01/17 Resting in bed without complaints. Reports having large BM Sunday. No further complaints of constipation. Continue wound care to sacrum and left heel. Continue therapy.    01/18 Patient states she is feeling 10,000 times better today. She ambulated 240 feet with therapist. Continue wound care.    01/19 Awake and resting in bed with family at side. Reports feeling good this morning. Continue therapy.      Interval History: 01/19 Awake and resting in bed with family at side. Reports feeling good this morning. Continue therapy.    Review of Systems   Constitutional:  Negative for appetite change and fever.   Gastrointestinal:  Negative for constipation, diarrhea, nausea and vomiting.   Skin:  Positive for wound.   Neurological:  Positive for weakness.   Objective:     Vital Signs (Most Recent):  Temp: 98.2 °F (36.8 °C) (01/19/23 0743)  Pulse: 74 (01/19/23 0743)  Resp: 18 (01/19/23 0743)  BP: 110/79 (01/19/23 0743)  SpO2: (!) 93 % (01/19/23 0743)   Vital Signs (24h Range):  Temp:  [98.2 °F (36.8 °C)-98.6 °F (37 °C)] 98.2 °F (36.8 °C)  Pulse:  [74-89] 74  Resp:  [18-20] 18  SpO2:  [93 %-97 %] 93 %  BP: (110-124)/(79-81) 110/79     Weight: 63.1 kg (139 lb 3.2 oz)  Body mass index is 23.89 kg/m².    Intake/Output Summary (Last 24 hours) at 1/19/2023 0924  Last data filed at 1/19/2023 0556  Gross per 24 hour   Intake 1260 ml   Output --   Net 1260 ml      Physical Exam  HENT:      Mouth/Throat:      Mouth: Mucous membranes are moist.   Cardiovascular:      Rate and Rhythm: Normal rate and regular rhythm.      Pulses: Normal pulses.      Heart sounds: Normal heart sounds.   Pulmonary:      Effort: Pulmonary effort is normal.      Breath sounds: Normal breath sounds.   Abdominal:      General: Bowel sounds  are normal.      Palpations: Abdomen is soft.   Skin:     General: Skin is warm and dry.      Comments: Sacral wound , wound to left heel   Neurological:      Mental Status: She is alert.      Motor: Weakness present.       Significant Labs: All pertinent labs within the past 24 hours have been reviewed.  Recent Lab Results       None            Significant Imaging: I have reviewed all pertinent imaging results/findings within the past 24 hours.      Assessment/Plan:      Constipation    01/17/23 resolved    Continue miralax    Laceration of chin  PT SUSTAINED A CUT WHEN EX SPOUSE SHAVED CHIN WITH BLEEDING NOTED THAT STOPPED AFTER PRESSURE APPLIED.  PT IS ON PLAVIX/LOVENOX      Skin breakdown  01/05/2023   Continue wound care to sacrum, left groin and left heel  01/14/2023  CONTINUE WOUND CARE TEAM TREATMENT      Hyperlipidemia  01/05 continue atorvastatin daily         Thyroid disease  01/05/2023  Continue levothyroxine 125 mcg daily  01/14/2023  TSH 1.880 10/22  CONTINUE POC        Pneumonia of upper lobe due to infectious organism  01/05/2023  Will receive last dose of rocephin and zithromax today     01/09 resolved  01/14/2023  RESOLVED    DVT prophylaxis  01/05/2023  Continue OCTAVIA hose and plavix, continue lovenox  01/14/2023  PT IS AT RISK FOR VTE  VTE PPX  CONTINUE LOVENOX,PLAVIX,TEDS/EARLY AMBULATION    \  01/17 continue plavix and lovenox        Idiopathic peripheral neuropathy  01/05/2023  Gabapentin 800 mg po TID   01/14/2022  IMPROVED      Insomnia  01/05/2023  Continue melatonin prn       Hypertension  01/05/2023  Continue norvasc and atorvastatin    01/09 stable  01/14/2023  BP IS STABLE 111/82    Depressive disorder  01/05/2023  Continue zoloft 50 mg po daily    01/06/2023  Increased to home dose of 150 mg zolot    01/14/2023  MOOD IS IMPROVED            Generalized anxiety disorder  01/05/2023  Continue lorazepam prn anxiety      Muscle weakness  01/05/2023 continue therapy for  weakness  01/14/2023  CONTINUE PT/OT      01/18 ambulated 240 feet with therapy today      VTE Risk Mitigation (From admission, onward)         Ordered     enoxaparin injection 40 mg  Daily         01/09/23 1223                Discharge Planning   POOL: 1/30/2023     Code Status: DNR   Is the patient medically ready for discharge?:     Reason for patient still in hospital (select all that apply): Treatment  Discharge Plan A: Home with family                  KEVAN Funk  Department of Hospital Medicine   [facility] - Medical Surgical Unit

## 2023-01-19 NOTE — PT/OT/SLP PROGRESS
Physical Therapy Treatment    Patient Name:  Herlinda Valdovinos   MRN:  [id number]    Recommendations:     Discharge Recommendations: home health PT  Discharge Equipment Recommendations: cane, straight, cane, quad  Barriers to discharge: None    Assessment:     Herlinda Valdovinos is a 57 y.o. female admitted with a medical diagnosis of <principal problem not specified>.  She presents with the following impairments/functional limitations: weakness, impaired endurance, impaired sensation, impaired functional mobility, impaired balance, gait instability, decreased coordination, decreased lower extremity function, decreased safety awareness, pain.    Rehab Prognosis: Good; patient would benefit from acute skilled PT services to address these deficits and reach maximum level of function.    Recent Surgery: * No surgery found *      Plan:     During this hospitalization, patient to be seen 5 x/week to address the identified rehab impairments via therapeutic exercises, therapeutic activities, gait training and progress toward the following goals:    Plan of Care Expires:  01/30/23    Subjective     Chief Complaint: weakness  Patient/Family Comments/goals: Pt states she wants to continue to improve functional mobility until she reach PLOF  Pain/Comfort:  Pain Rating 1: 0/10  Pain Rating Post-Intervention 1: 0/10      Objective:     Communicated with OT prior to session.  Patient found supine with   upon PT entry to room.     General Precautions: Standard, fall  Orthopedic Precautions: LLE toe touch weight bearing  Braces: N/A  Respiratory Status: Nasal cannula, flow 1.5 L/min     Functional Mobility:  Bed Mobility:     Scooting: supervision  Supine to Sit: stand by assistance  Sit to Supine: stand by assistance  Transfers:     Sit to Stand:  stand by assistance with rolling walker  Gait: Patient ambulated 280 feet using RW and CGA progressing to SBA; improved lilli and pace; improved reciprocal steps      AM-PAC 6 CLICK  MOBILITY  Turning over in bed (including adjusting bedclothes, sheets and blankets)?: 4  Sitting down on and standing up from a chair with arms (e.g., wheelchair, bedside commode, etc.): 4  Moving from lying on back to sitting on the side of the bed?: 4  Moving to and from a bed to a chair (including a wheelchair)?: 3  Need to walk in hospital room?: 3  Climbing 3-5 steps with a railing?: 2  Basic Mobility Total Score: 20       Treatment & Education:  Patient performed bed mobility, transfers, and gait as listed above    Patient left supine with call button in reach..    GOALS:   Multidisciplinary Problems       Physical Therapy Goals          Problem: Physical Therapy    Goal Priority Disciplines Outcome Goal Variances Interventions   Physical Therapy Goal     PT, PT/OT Ongoing, Progressing     Description: Short-term Goals: 2 weeks  1. Patient will perform supine to/from sit with supervision to improve independence and safety with bed mobility.  2. Patient will perform sit to/from stand with rolling walker with contact guard assist to improve independence and safety with transfers.  3. Patient will ambulate 15 feet with standard walker with minimal assistance while maintaining appropriate left lower extremity weightbearing status to improve independence and safety with gait.  4. Patient will tolerate 15 minutes of physical therapy intervention to improve endurance and activity tolerance.    Long-term goals: 4 weeks  1. Patient will perform supine to/from sit with modified independence to improve independence and safety with bed mobility.  2. Patient will perform sit to/from stand with rolling walker with standby assist to improve independence and safety with transfers.  3. Patient will ambulate 30 feet with standard walker with contact guard assist while maintaining appropriate left lower extremity weightbearing status to improve independence and safety with gait.  4. Patient will tolerate 30 minutes of physical  therapy intervention to improve endurance and activity tolerance.                         Time Tracking:     PT Received On: 01/19/23  PT Start Time: 1003     PT Stop Time: 1032  PT Total Time (min): 29 min     Billable Minutes: Gait Training 15    Treatment Type: Treatment  PT/PTA: PTA     PTA Visit Number: 3     01/19/2023

## 2023-01-19 NOTE — PROGRESS NOTES
WOUND CARE CONSULT          Patient Name: Herlinda Valdovinos is a 57 y.o. female       Chief Complaint:  Wound Care Consult- F/U    Review of patient's allergies indicates:   Allergen Reactions    Lamisil [terbinafine] Anaphylaxis    Codeine Itching    Compazine [prochlorperazine] Itching        I have reviewed the patient's medical, surgical, family and social history.      Subjective:    HPI     Wound Location: left medial heel, sacrum    Wound Duration: onset approximately 12/15/22    Wound Context: pressure    Wound Pain: mild to moderate- sacrum. Denies pain to heel.     58 YO WF seen for follow up assessment of wounds. Pt is currently inpatient due to UTI, pneumonia, and generalized weakness. Sacral wound measurements are improved. Blister to heel ruptured 2 days ago.       Medications:    Current Facility-Administered Medications on File Prior to Visit   Medication Dose Route Frequency Provider Last Rate Last Admin    acetaminophen tablet 650 mg  650 mg Oral Q6H PRN Laura L Garza, FNP        amLODIPine tablet 10 mg  10 mg Oral Daily Laura L Garza, FNP   10 mg at 01/19/23 0947    ascorbic acid (vitamin C) tablet 500 mg  500 mg Oral BID Laura L Garza, FNP   500 mg at 01/19/23 0948    atorvastatin tablet 40 mg  40 mg Oral QHS Laura L Garza, FNP   40 mg at 01/18/23 2114    bisacodyL suppository 10 mg  10 mg Rectal Daily PRN Felicia Hill, FNP   10 mg at 01/15/23 2039    calcium carbonate 200 mg calcium (500 mg) chewable tablet 500 mg  500 mg Oral BID PRN Laura L Garza, FNP        clopidogreL tablet 75 mg  75 mg Oral Daily Laura L Garza, FNP   75 mg at 01/19/23 0947    enoxaparin injection 40 mg  40 mg Subcutaneous Daily Laura L Garza, FNP   40 mg at 01/19/23 0950    gabapentin capsule 600 mg  600 mg Oral TID Laura L Garza, FNP   600 mg at 01/19/23 0950    HYDROcodone-acetaminophen  mg per tablet 1 tablet  1 tablet Oral Q6H PRN Laura L Garza, FNP   1 tablet at 01/19/23 0946     hydrocortisone 2.5 % rectal cream   Rectal BID Laura L Garza, FNP   Given at 01/19/23 0955    levothyroxine tablet 125 mcg  125 mcg Oral Before breakfast Laura L Garza, FNP   125 mcg at 01/19/23 0557    LORazepam tablet 0.5 mg  0.5 mg Oral Q12H PRN Laura L Garza, FNP   0.5 mg at 01/18/23 2114    melatonin tablet 9 mg  9 mg Oral Nightly PRN Laura L Garza, FNP   9 mg at 01/18/23 2115    miconazole NITRATE 2 % top powder   Topical (Top) BID Laura L Garza, FNP   Given at 01/19/23 0954    multivitamin tablet  1 tablet Oral Daily Laura L Garza, FNP   1 tablet at 01/19/23 0949    omega 3-dha-epa-fish oil 1,000 mg (120 mg-180 mg) Cap 1 capsule  1 capsule Oral Daily Laura L Garza, FNP   1 capsule at 01/19/23 0947    ondansetron disintegrating tablet 4 mg  4 mg Oral Q8H PRN Laura L Garza, FNP   4 mg at 01/15/23 2250    pantoprazole EC tablet 40 mg  40 mg Oral Daily Laura L Garza, FNP   40 mg at 01/19/23 0948    polyethylene glycol packet 17 g  17 g Oral BID PRN Felicia Hill, FNP   17 g at 01/15/23 0847    polyethylene glycol packet 17 g  17 g Oral Daily Laura L Garza, FNP   17 g at 01/18/23 0929    senna-docusate 8.6-50 mg per tablet 1 tablet  1 tablet Oral BID Laura L Garza, FNP   1 tablet at 01/19/23 0947    sertraline tablet 150 mg  150 mg Oral Daily Laura L Garza, FNP   150 mg at 01/19/23 0950    valsartan tablet 160 mg  160 mg Oral Daily Laura L Garza, FNP   160 mg at 01/19/23 0947     Current Outpatient Medications on File Prior to Visit   Medication Sig Dispense Refill    acetaminophen (TYLENOL) 325 MG tablet 2 tablets PRN      amLODIPine (NORVASC) 10 MG tablet Take 1 tablet (10 mg total) by mouth once daily. 30 tablet 0    atorvastatin (LIPITOR) 40 MG tablet Take 40 mg by mouth every evening.      azithromycin (ZITHROMAX) 500 MG tablet Take 1 tablet (500 mg total) by mouth once daily. 2 tablet     calcium carbonate (TUMS) 200 mg calcium (500 mg) chewable tablet Take 1 tablet  (500 mg total) by mouth 2 (two) times daily as needed for Heartburn.      clopidogreL (PLAVIX) 75 mg tablet Plavix 75 mg tablet   Take 1 po QD to prevent stroke      docusate sodium (COLACE) 100 MG capsule Take 100 mg by mouth 2 (two) times daily.      gabapentin (NEURONTIN) 800 MG tablet 800 mg 3 (three) times daily.      HYDROcodone-acetaminophen (NORCO)  mg per tablet Take 1 tablet by mouth 4 (four) times daily as needed.      levothyroxine (SYNTHROID) 125 MCG tablet       LORazepam (ATIVAN) 0.5 MG tablet Take 1 tablet (0.5 mg total) by mouth As instructed for Anxiety. (Patient taking differently: Take 0.5 mg by mouth every 12 (twelve) hours as needed for Anxiety.) 30 tablet 0    melatonin (MELATIN) 3 mg tablet Take 2 tablets (6 mg total) by mouth nightly as needed for Insomnia.  0    omega-3 fatty acids/fish oil (FISH OIL-OMEGA-3 FATTY ACIDS) 300-1,000 mg capsule Take by mouth once daily.      ondansetron (ZOFRAN-ODT) 4 MG TbDL Take 1 tablet (4 mg total) by mouth every 8 (eight) hours as needed.      pantoprazole (PROTONIX) 40 MG tablet Take 1 tablet (40 mg total) by mouth once daily. 30 tablet 0    sertraline (ZOLOFT) 50 MG tablet sertraline 50 mg tablet      valsartan (DIOVAN) 160 MG tablet Take 1 tablet (160 mg total) by mouth once daily. 30 tablet 0    [DISCONTINUED] hydroCHLOROthiazide (HYDRODIURIL) 12.5 MG Tab Take 1 tablet (12.5 mg total) by mouth once daily. 30 tablet 11    [DISCONTINUED] omeprazole (PRILOSEC) 40 MG capsule Take 40 mg by mouth every evening.            Physical Exam  Vitals reviewed.   Constitutional:       Appearance: Normal appearance.   HENT:      Head: Normocephalic and atraumatic.      Right Ear: External ear normal.      Left Ear: External ear normal.   Cardiovascular:      Pulses:           Dorsalis pedis pulses are 2+ on the right side and 2+ on the left side.   Pulmonary:      Effort: Pulmonary effort is normal.   Abdominal:      General: Bowel sounds are normal.       Palpations: Abdomen is soft.   Musculoskeletal:      Right lower leg: No edema.      Left lower leg: No edema.   Skin:     General: Skin is warm and dry.      Findings: Wound present.          Neurological:      Mental Status: She is alert.      Comments: L side weakness  L foot pronates        Please see Wound Docs for complete Wound Assessment and lower extremity assessment when applicable.    Documentation of any procedures can be viewed in Wound Docs if applicable.         Assessment and Plan:    1. Pressure injury of left heel, unstageable  Silver alginate and foam to heel, change QOD  Float bilateral heels  Waffle boot to left      2. Pressure injury of sacral region, stage 3  Clean with NS, apply collagen to wound bed, cover with foam border, change QOD and PRN  Turn Q2   Air mattress     Nutritional support and offloading    Please see Wound Docs for complete plan including patient instructions.     Follow Up & Goals:     Follow up in about 1 week (around 1/24/2023).     Short term goals  Reduction of devitalized tissue  Reduction of bacterial burden  Stimulate and/or maintain acute phases of wound healing  Promote granulation formation  Promote epithelization  Promote compliance with plan of care  Address factors affecting wound healing  Optimize nutritional status    Long term goals  Prevent loss of limb  Prevent infection  Conservative Wound Treatment  Prevent recurrent ulcerations  Resolve wounds

## 2023-01-20 PROCEDURE — 97535 SELF CARE MNGMENT TRAINING: CPT

## 2023-01-20 PROCEDURE — 27000944

## 2023-01-20 PROCEDURE — 99307 SBSQ NF CARE SF MDM 10: CPT | Mod: ,,, | Performed by: NURSE PRACTITIONER

## 2023-01-20 PROCEDURE — 63700000 PHARM REV CODE 250 ALT 637 W/O HCPCS: Performed by: NURSE PRACTITIONER

## 2023-01-20 PROCEDURE — 11000004 HC SNF PRIVATE

## 2023-01-20 PROCEDURE — 25000003 PHARM REV CODE 250: Performed by: NURSE PRACTITIONER

## 2023-01-20 PROCEDURE — 97116 GAIT TRAINING THERAPY: CPT | Mod: CQ

## 2023-01-20 PROCEDURE — 94761 N-INVAS EAR/PLS OXIMETRY MLT: CPT

## 2023-01-20 PROCEDURE — 99307 PR NURSING FAC CARE, SUBSEQ, IMPROVING: ICD-10-PCS | Mod: ,,, | Performed by: NURSE PRACTITIONER

## 2023-01-20 PROCEDURE — 27000221 HC OXYGEN, UP TO 24 HOURS

## 2023-01-20 PROCEDURE — 97112 NEUROMUSCULAR REEDUCATION: CPT

## 2023-01-20 PROCEDURE — 63600175 PHARM REV CODE 636 W HCPCS: Performed by: NURSE PRACTITIONER

## 2023-01-20 RX ADMIN — GABAPENTIN 600 MG: 300 CAPSULE ORAL at 02:01

## 2023-01-20 RX ADMIN — CEFTRIAXONE 1 G: 1 INJECTION, POWDER, FOR SOLUTION INTRAMUSCULAR; INTRAVENOUS at 05:01

## 2023-01-20 RX ADMIN — AMLODIPINE BESYLATE 10 MG: 5 TABLET ORAL at 09:01

## 2023-01-20 RX ADMIN — SENNOSIDES AND DOCUSATE SODIUM 1 TABLET: 50; 8.6 TABLET ORAL at 08:01

## 2023-01-20 RX ADMIN — ATORVASTATIN CALCIUM 40 MG: 40 TABLET, FILM COATED ORAL at 08:01

## 2023-01-20 RX ADMIN — OMEGA-3 FATTY ACIDS CAP 1000 MG 1 CAPSULE: 1000 CAP at 09:01

## 2023-01-20 RX ADMIN — LEVOTHYROXINE SODIUM 125 MCG: 25 TABLET ORAL at 06:01

## 2023-01-20 RX ADMIN — MICONAZOLE NITRATE: 20 POWDER TOPICAL at 09:01

## 2023-01-20 RX ADMIN — THERA TABS 1 TABLET: TAB at 09:01

## 2023-01-20 RX ADMIN — PANTOPRAZOLE SODIUM 40 MG: 40 TABLET, DELAYED RELEASE ORAL at 09:01

## 2023-01-20 RX ADMIN — HYDROCODONE BITARTRATE AND ACETAMINOPHEN 1 TABLET: 10; 325 TABLET ORAL at 02:01

## 2023-01-20 RX ADMIN — ENOXAPARIN SODIUM 40 MG: 40 INJECTION SUBCUTANEOUS at 09:01

## 2023-01-20 RX ADMIN — HYDROCODONE BITARTRATE AND ACETAMINOPHEN 1 TABLET: 10; 325 TABLET ORAL at 09:01

## 2023-01-20 RX ADMIN — OXYCODONE HYDROCHLORIDE AND ACETAMINOPHEN 500 MG: 500 TABLET ORAL at 08:01

## 2023-01-20 RX ADMIN — HYDROCORTISONE 2.5%: 25 CREAM TOPICAL at 09:01

## 2023-01-20 RX ADMIN — OXYCODONE HYDROCHLORIDE AND ACETAMINOPHEN 500 MG: 500 TABLET ORAL at 09:01

## 2023-01-20 RX ADMIN — LORAZEPAM 0.5 MG: 0.5 TABLET ORAL at 08:01

## 2023-01-20 RX ADMIN — VALSARTAN 160 MG: 160 TABLET, FILM COATED ORAL at 09:01

## 2023-01-20 RX ADMIN — CLOPIDOGREL BISULFATE 75 MG: 75 TABLET, FILM COATED ORAL at 09:01

## 2023-01-20 RX ADMIN — MELATONIN TAB 3 MG 9 MG: 3 TAB at 08:01

## 2023-01-20 RX ADMIN — GABAPENTIN 600 MG: 300 CAPSULE ORAL at 08:01

## 2023-01-20 RX ADMIN — SENNOSIDES AND DOCUSATE SODIUM 1 TABLET: 50; 8.6 TABLET ORAL at 09:01

## 2023-01-20 RX ADMIN — GABAPENTIN 600 MG: 300 CAPSULE ORAL at 09:01

## 2023-01-20 RX ADMIN — SERTRALINE HYDROCHLORIDE 150 MG: 50 TABLET ORAL at 09:01

## 2023-01-20 RX ADMIN — MICONAZOLE NITRATE: 20 POWDER TOPICAL at 08:01

## 2023-01-20 RX ADMIN — FLUCONAZOLE 100 MG: 100 TABLET ORAL at 09:01

## 2023-01-20 RX ADMIN — HYDROCORTISONE 2.5%: 25 CREAM TOPICAL at 08:01

## 2023-01-20 RX ADMIN — POLYETHYLENE GLYCOL 3350 17 G: 17 POWDER, FOR SOLUTION ORAL at 09:01

## 2023-01-20 RX ADMIN — POLYETHYLENE GLYCOL 3350 17 G: 17 POWDER, FOR SOLUTION ORAL at 08:01

## 2023-01-20 NOTE — PT/OT/SLP PROGRESS
Physical Therapy Treatment    Patient Name:  Herlinda Valdovinos   MRN:  [id number]    Recommendations:     Discharge Recommendations: home health PT  Discharge Equipment Recommendations: cane, straight, cane, quad  Barriers to discharge: None    Assessment:     Herlinda Valdovinos is a 57 y.o. female admitted with a medical diagnosis of <principal problem not specified>.  She presents with the following impairments/functional limitations: weakness, impaired endurance, impaired sensation, impaired functional mobility, impaired balance, gait instability, decreased coordination, decreased lower extremity function, decreased safety awareness, pain. Patient with improving gait distance and quality of gait while maintaining weight bearing status on LLE.    Rehab Prognosis: Good; patient would benefit from acute skilled PT services to address these deficits and reach maximum level of function.    Recent Surgery: * No surgery found *      Plan:     During this hospitalization, patient to be seen 5 x/week to address the identified rehab impairments via gait training, therapeutic activities, therapeutic exercises and progress toward the following goals:    Plan of Care Expires:  01/30/23    Subjective     Chief Complaint: weakness  Patient/Family Comments/goals: Pt states she wants to continue to improve functional mobility until she reaches PLOF  Pain/Comfort:  Pain Rating 1: 0/10  Pain Rating Post-Intervention 1: 0/10      Objective:     Communicated with PT prior to session.  Patient found supine with   upon PT entry to room.     General Precautions: Standard, fall  Orthopedic Precautions: LLE toe touch weight bearing  Braces: N/A  Respiratory Status: Nasal cannula, flow 2 L/min     Functional Mobility:  Bed Mobility:     Supine to Sit: stand by assistance  Sit to Supine: stand by assistance  Transfers:     Sit to Stand:  stand by assistance with rolling walker  Gait: Patient ambulated 304 feet using RW and contract guard assist  progressing to stand by assist; slower pace today but able to maintain WB status; verbal cues for upright posture      AM-PAC 6 CLICK MOBILITY  Turning over in bed (including adjusting bedclothes, sheets and blankets)?: 4  Sitting down on and standing up from a chair with arms (e.g., wheelchair, bedside commode, etc.): 4  Moving from lying on back to sitting on the side of the bed?: 4  Moving to and from a bed to a chair (including a wheelchair)?: 3  Need to walk in hospital room?: 3  Climbing 3-5 steps with a railing?: 2  Basic Mobility Total Score: 20       Treatment & Education:  Patient performed bed mobility, transfers, and gait as listed above    Patient left supine with call button in reach and  present..    GOALS:   Multidisciplinary Problems       Physical Therapy Goals          Problem: Physical Therapy    Goal Priority Disciplines Outcome Goal Variances Interventions   Physical Therapy Goal     PT, PT/OT Ongoing, Progressing     Description: Short-term Goals: 2 weeks  1. Patient will perform supine to/from sit with supervision to improve independence and safety with bed mobility.  2. Patient will perform sit to/from stand with rolling walker with contact guard assist to improve independence and safety with transfers.  3. Patient will ambulate 15 feet with standard walker with minimal assistance while maintaining appropriate left lower extremity weightbearing status to improve independence and safety with gait.  4. Patient will tolerate 15 minutes of physical therapy intervention to improve endurance and activity tolerance.    Long-term goals: 4 weeks  1. Patient will perform supine to/from sit with modified independence to improve independence and safety with bed mobility.  2. Patient will perform sit to/from stand with rolling walker with standby assist to improve independence and safety with transfers.  3. Patient will ambulate 30 feet with standard walker with contact guard assist while  maintaining appropriate left lower extremity weightbearing status to improve independence and safety with gait.  4. Patient will tolerate 30 minutes of physical therapy intervention to improve endurance and activity tolerance.                         Time Tracking:     PT Received On: 01/20/23  PT Start Time: 1037     PT Stop Time: 1057  PT Total Time (min): 20 min     Billable Minutes: Gait Training 15 and Therapeutic Activity 5    Treatment Type: Treatment  PT/PTA: PTA     PTA Visit Number: 4     01/20/2023

## 2023-01-20 NOTE — ASSESSMENT & PLAN NOTE
01/19/23 UA shows   Latest Reference Range & Units Most Recent   Color, UA Colorless, Straw, Yellow, Light Yellow, Dark Yellow  Yellow  1/19/23 14:06   Appearance, UA Clear  Slightly Cloudy  1/19/23 14:06   Specific Gravity, UA <=1.005, 1.010, 1.015, 1.020, 1.025, 1.030  >=1.030 !  1/19/23 14:06   pH, UA 5.0 to 8.0 pH Units 5.5  1/19/23 14:06   Protein, UA Negative  30 !  1/19/23 14:06   Glucose, UA Normal mg/dL Negative  1/19/23 14:06   Ketones, UA Negative mg/dL Negative  1/19/23 14:06   Occult Blood UA Negative  Moderate !  1/19/23 14:06   NITRITE UA Negative  Negative  1/19/23 14:06   UROBILINOGEN UA 0.2, 1.0, Normal mg/dL 0.2  1/19/23 14:06   Bilirubin (UA) Negative  Negative  1/19/23 14:06   Leukocytes, UA Negative  Moderate !  1/19/23 14:06   RBC, UA None Seen, 0-3 /hpf 10-15 !  1/19/23 14:06   WBC, UA None Seen, 0-5 /hpf 15-25 !  1/19/23 14:06   Bacteria, UA None Seen /hpf Moderate !  1/19/23 14:06   Squam Epithel, UA None Seen, Rare, None Seen To Occasional /lpf Few !  1/19/23 14:06   WBC Clumps, UA None Seen /hpf Few !  1/19/23 14:06   Trichomonas, UA None Seen /hpf None Seen  5/20/21 10:40   Amorphous, UA None Seen /lpf Few !  4/12/22 12:17   Renal Epithel, UA None Seen /lpf Occasional !  5/20/21 10:40   Trans Epithel, UA None Seen /lpf Few !  1/19/23 14:06   Mucus, UA None Seen /hpf Few !  10/20/22 05:10   Yeast, UA None Seen /hpf None Seen  5/20/21 10:40   Ca Oxalate Marika, UA None Seen /lpf Few !  1/19/23 14:06   !: Data is abnormal  Started on IM rocephin- daily for 5 days  She has very poor venous access- refused IV after 3 unsuccessful attempts. Requested IM med

## 2023-01-20 NOTE — PLAN OF CARE
Pt AAOX3. Resp even. O2 at 3 liters per NC. Dressing dry and intact to left heel and sacral area. Pt incont of bowel and bladder.  Problem: Adult Inpatient Plan of Care  Goal: Plan of Care Review  Outcome: Ongoing, Progressing  Goal: Patient-Specific Goal (Individualized)  Outcome: Ongoing, Progressing  Goal: Absence of Hospital-Acquired Illness or Injury  Outcome: Ongoing, Progressing  Goal: Optimal Comfort and Wellbeing  Outcome: Ongoing, Progressing  Goal: Readiness for Transition of Care  Outcome: Ongoing, Progressing     Problem: Fluid Imbalance (Pneumonia)  Goal: Fluid Balance  Outcome: Ongoing, Progressing     Problem: Infection (Pneumonia)  Goal: Resolution of Infection Signs and Symptoms  Outcome: Ongoing, Progressing     Problem: Respiratory Compromise (Pneumonia)  Goal: Effective Oxygenation and Ventilation  Outcome: Ongoing, Progressing     Problem: Impaired Wound Healing  Goal: Optimal Wound Healing  Outcome: Ongoing, Progressing     Problem: Skin Injury Risk Increased  Goal: Skin Health and Integrity  Outcome: Ongoing, Progressing     Problem: Fall Injury Risk  Goal: Absence of Fall and Fall-Related Injury  Outcome: Ongoing, Progressing

## 2023-01-20 NOTE — PT/OT/SLP PROGRESS
Occupational Therapy   Treatment    Name: Herlinda Valdovinos  MRN: [id number]  Admitting Diagnosis:  <principal problem not specified>       Recommendations:     Discharge Recommendations: other (see comments) (Home health versus placement in SNF/half-way)  Discharge Equipment Recommendations:     Barriers to discharge:  Other (Comment) (Per pt and family report, pt lives in stressful environment with significant other, son, and granddaughter.)    Assessment:     Herlinda Valdovinos is a 57 y.o. female with a medical diagnosis of <principal problem not specified>.  She presents with performance deficits affecting function are weakness, impaired endurance, impaired sensation, impaired self care skills, impaired functional mobility, gait instability, impaired balance, pain, decreased ROM.     Rehab Prognosis:  Good; patient would benefit from acute skilled OT services to address these deficits and reach maximum level of function.       Plan:     Patient to be seen 5 x/week to address the above listed problems via self-care/home management, therapeutic activities, therapeutic exercises, neuromuscular re-education  Plan of Care Expires: 02/03/23  Plan of Care Reviewed with:      Subjective     Pain/Comfort:   None reported.     Objective:     Communicated with: Nurse prior to session.  Patient found supine with oxygen upon OT entry to room.    General Precautions: Standard, fall    Orthopedic Precautions:   Braces:    Respiratory Status: Nasal cannula, flow 2 L/min     Occupational Performance:     Bed Mobility:    Patient completed Supine to Sit with modified independence  Patient completed Sit to Supine with modified independence     Functional Mobility/Transfers:  Patient completed Sit <> Stand Transfer with stand by assistance  with  no assistive device     Activities of Daily Living:  Lower Body Dressing: stand by assistance sitting/standing      [facility] 6 Click ADL:      Treatment & Education:  Performed LB dressing training  with set-up and SBA for donning pants and B socks. Performed dynamic standing/balance training, right reaction, assessing reaction time and challenging reach outside base of support, necessary for improving functional balance required for IADL and BADL tasks in home environment. Pt has a new goal of returning to IADL tasks including washing dishes, laundry, and cleaning. Performed neuro re-ed challenging balance and endurance, tolerating x 12 mins continuous stand with functional reaching outside base of support, overhead and across midline, no LOB. Significantly improved balance, endurance, and function. Demonstrated excellent upright posture without cuing needed to assume position.     Patient left supine with call button in reach and family present    GOALS:   Multidisciplinary Problems       Occupational Therapy Goals          Problem: Occupational Therapy    Goal Priority Disciplines Outcome Interventions   Occupational Therapy Goal     OT, PT/OT Ongoing, Progressing    Description: Goals to be met by: 2 weeks     Patient will increase functional independence with ADLs by performing:    UE Dressing with Set-up Assistance.  LE Dressing with Stand-by Assistance.  Toileting from toilet with Stand-by Assistance for hygiene and clothing management.   Bathing from  edge of bed with Stand-by Assistance.  Dynamic standing with SBA, tolerating x 6 mins continuous stand, no LOB.     Goals to be met by: 4 weeks     Patient will increase functional independence with ADLs by performing:    UE Dressing with Modified [place].  LE Dressing with Modified [place].  Toileting from toilet with Modified [place] for hygiene and clothing management.   Bathing from  shower chair/bench with Modified [place].  Light IADLs Tiffany with RW.                            Time Tracking:     OT Date of Treatment: 01/20/23  OT Start Time: 1506  OT Stop Time: 1545  OT Total Time (min): 39 min    Billable Minutes:Self Care/Home  Management 10  Neuromuscular Re-education 29    OT/HEMAL: OT        Kiana Rasmussen, OTPURA, OTR/L      1/20/2023

## 2023-01-20 NOTE — PT/OT/SLP PROGRESS
Occupational Therapy   Treatment    Name: Herlinda Valdovinos  MRN: [id number]  Admitting Diagnosis:  <principal problem not specified>       Recommendations:     Discharge Recommendations: other (see comments) (Home health versus placement in SNF/halfway)  Discharge Equipment Recommendations:     Barriers to discharge:  Other (Comment) (Per pt and family report, pt lives in stressful environment with significant other, son, and granddaughter.)    Assessment:     Herlinda Valdovinos is a 57 y.o. female with a medical diagnosis of <principal problem not specified>.  She presents with performance deficits affecting function are weakness, impaired endurance, impaired sensation, impaired self care skills, impaired functional mobility, gait instability, impaired balance, pain, decreased ROM.     Rehab Prognosis:  Good; patient would benefit from acute skilled OT services to address these deficits and reach maximum level of function.       Plan:     Patient to be seen 5 x/week to address the above listed problems via self-care/home management, therapeutic activities, therapeutic exercises, neuromuscular re-education  Plan of Care Expires: 02/03/23  Plan of Care Reviewed with:      Subjective     Pain/Comfort:   None.     Objective:     Communicated with: Nurse prior to session.  Patient found supine with oxygen upon OT entry to room.    General Precautions: Standard, fall    Orthopedic Precautions:   Braces:    Respiratory Status: Nasal cannula, flow 2 L/min     Occupational Performance:     Bed Mobility:    Patient completed Supine to Sit with supervision  Patient completed Sit to Supine with supervision     Functional Mobility/Transfers:  Patient completed Sit <> Stand Transfer with stand by assistance  with  rolling walker   Functional Mobility: x 200 feet with RW with SBA.     Activities of Daily Living:  Lower Body Dressing: stand by assistance sitting/standing.      [facility] 6 Click ADL:      Treatment & Education:  Performed LB  dressing training donning pants and socks while seated EOB with set-up and SBA. Extended time needed with improved quality noted. Performed functional mobility training with good performance, no LOB, significantly improved.     Patient left supine with call button in reach    GOALS:   Multidisciplinary Problems       Occupational Therapy Goals          Problem: Occupational Therapy    Goal Priority Disciplines Outcome Interventions   Occupational Therapy Goal     OT, PT/OT Ongoing, Progressing    Description: Goals to be met by: 2 weeks     Patient will increase functional independence with ADLs by performing:    UE Dressing with Set-up Assistance.  LE Dressing with Stand-by Assistance.  Toileting from toilet with Stand-by Assistance for hygiene and clothing management.   Bathing from  edge of bed with Stand-by Assistance.  Dynamic standing with SBA, tolerating x 6 mins continuous stand, no LOB.     Goals to be met by: 4 weeks     Patient will increase functional independence with ADLs by performing:    UE Dressing with Modified [place].  LE Dressing with Modified [place].  Toileting from toilet with Modified [place] for hygiene and clothing management.   Bathing from  shower chair/bench with Modified [place].  Light IADLs Tiffany with RW.                            Time Tracking:     OT Date of Treatment: 01/19/23  OT Start Time: 1003  OT Stop Time: 1034  OT Total Time (min): 31 min    Billable Minutes:Self Care/Home Management 15    Kiana Rasmussen, MARCUS, OTR/L      OT/HEMAL: OT        1/19/2023

## 2023-01-20 NOTE — PT/OT/SLP PROGRESS
Occupational Therapy   Treatment    Name: Herlinda Valdovinos  MRN: [id number]  Admitting Diagnosis:  <principal problem not specified>       Recommendations:     Discharge Recommendations: other (see comments) (Home health versus placement in SNF/correction)  Discharge Equipment Recommendations:     Barriers to discharge:  Other (Comment) (Per pt and family report, pt lives in stressful environment with significant other, son, and granddaughter.)    Assessment:     Herlinda Valdovinos is a 57 y.o. female with a medical diagnosis of <principal problem not specified>.  She presents with performance deficits affecting function are weakness, impaired endurance, impaired sensation, impaired self care skills, impaired functional mobility, gait instability, impaired balance, pain, decreased ROM. Demonstrates significantly improved mobility, balance, and self care performance. No LOB during dynamic standing balance work this day, no posterior or anterior LOB, maintaining safe standing posture, unsupported x 15 mins before requiring rest break.     Rehab Prognosis:  Good; patient would benefit from acute skilled OT services to address these deficits and reach maximum level of function.       Plan:     Patient to be seen 5 x/week to address the above listed problems via self-care/home management, therapeutic activities, therapeutic exercises, neuromuscular re-education  Plan of Care Expires: 02/03/23  Plan of Care Reviewed with:      Subjective     Pain/Comfort:   None reported.     Objective:     Communicated with: Nurse prior to session.  Patient found supine with oxygen upon OT entry to room.    General Precautions: Standard, fall    Orthopedic Precautions:   Braces:    Respiratory Status: Nasal cannula, flow 2 L/min     Occupational Performance:     Bed Mobility:    Patient completed Supine to Sit with supervision  Patient completed Sit to Supine with supervision     Functional Mobility/Transfers:  Patient completed Sit <> Stand  Transfer with stand by assistance  with  no assistive device     Activities of Daily Living:  Lower Body Dressing: stand by assistance sitting/standing EOB      [facility] 6 Click ADL:      Treatment & Education:  Performed LB dressing requiring set-up and SBA seated and standing, donning socks and pants with no physical assistance needed. SBA provided to ensure no LOB. Performed dynamic standing challenging balance needed for BADL/IADL performance when returning home, utilized no balance support, tolerating x 15 mins with no LOB.     Patient left supine with call button in reach    GOALS:   Multidisciplinary Problems       Occupational Therapy Goals          Problem: Occupational Therapy    Goal Priority Disciplines Outcome Interventions   Occupational Therapy Goal     OT, PT/OT Ongoing, Progressing    Description: Goals to be met by: 2 weeks     Patient will increase functional independence with ADLs by performing:    UE Dressing with Set-up Assistance.  LE Dressing with Stand-by Assistance.  Toileting from toilet with Stand-by Assistance for hygiene and clothing management.   Bathing from  edge of bed with Stand-by Assistance.  Dynamic standing with SBA, tolerating x 6 mins continuous stand, no LOB.     Goals to be met by: 4 weeks     Patient will increase functional independence with ADLs by performing:    UE Dressing with Modified [place].  LE Dressing with Modified [place].  Toileting from toilet with Modified [place] for hygiene and clothing management.   Bathing from  shower chair/bench with Modified [place].  Light IADLs [place] with RW.                            Time Tracking:     OT Date of Treatment: 01/18/23  OT Start Time: 1535  OT Stop Time: 1623  OT Total Time (min): 48 min    Billable Minutes:Self Care/Home Management 15  Therapeutic Activity 33    MARCUS Levy, OTR/L      OT/HEMAL: OT          1/18/2023

## 2023-01-20 NOTE — SUBJECTIVE & OBJECTIVE
Interval History: 01/20 Awake and resting in bed. No complaints. Urinalysis shows infection. She was started on rocephin yesterday. Will follow culture. Continue therapy.    Review of Systems   Constitutional:  Negative for appetite change, fatigue and fever.   Respiratory:  Negative for cough, shortness of breath and wheezing.    Cardiovascular:  Negative for chest pain and palpitations.   Gastrointestinal:  Negative for abdominal pain, constipation, diarrhea and nausea.   Endocrine: Negative for cold intolerance and heat intolerance.   Genitourinary:  Positive for dysuria. Negative for vaginal discharge.        Vaginal itching   Burning on urination    Musculoskeletal:  Negative for back pain.   Skin:  Positive for wound. Negative for color change and rash.   Allergic/Immunologic: Negative for environmental allergies, food allergies and immunocompromised state.   Neurological:  Positive for weakness. Negative for dizziness and headaches.   Hematological:  Does not bruise/bleed easily.   Psychiatric/Behavioral:  Negative for sleep disturbance and suicidal ideas.    Objective:     Vital Signs (Most Recent):  Temp: 98 °F (36.7 °C) (01/20/23 0715)  Pulse: 73 (01/20/23 0715)  Resp: 18 (01/20/23 0715)  BP: 125/85 (01/20/23 0906)  SpO2: 99 % (01/20/23 0715) Vital Signs (24h Range):  Temp:  [98 °F (36.7 °C)-98.4 °F (36.9 °C)] 98 °F (36.7 °C)  Pulse:  [73-78] 73  Resp:  [18] 18  SpO2:  [94 %-99 %] 99 %  BP: (125-131)/(82-85) 125/85     Weight: 63.1 kg (139 lb 3.2 oz)  Body mass index is 23.89 kg/m².    Intake/Output Summary (Last 24 hours) at 1/20/2023 1022  Last data filed at 1/19/2023 1808  Gross per 24 hour   Intake 720 ml   Output --   Net 720 ml      Physical Exam  HENT:      Head: Normocephalic.      Mouth/Throat:      Mouth: Mucous membranes are moist.      Comments: Lips dry  Cardiovascular:      Rate and Rhythm: Normal rate and regular rhythm.      Pulses: Normal pulses.      Heart sounds: Normal heart sounds.    Pulmonary:      Effort: Pulmonary effort is normal.      Breath sounds: Normal breath sounds.   Abdominal:      General: Bowel sounds are normal.      Palpations: Abdomen is soft.   Skin:     General: Skin is warm and dry.      Comments: Sacral and left heel   Neurological:      Mental Status: She is alert and oriented to person, place, and time.      Motor: Weakness present.   Psychiatric:         Mood and Affect: Mood normal.       Significant Labs: All pertinent labs within the past 24 hours have been reviewed.  Recent Lab Results         01/19/23  1406        Appearance, UA Slightly Cloudy       Bacteria, UA Moderate       Bilirubin (UA) Negative       Ca Oxalate Marika, UA Few       Color, UA Yellow       Glucose, UA Negative       Ketones, UA Negative       Leukocytes, UA Moderate       NITRITE UA Negative       Occult Blood UA Moderate       pH, UA 5.5       Protein, UA 30       RBC, UA 10-15       Specific Gravity, UA >=1.030       Squam Epithel, UA Few       Trans Epithel, UA Few       UROBILINOGEN UA 0.2       WBC Clumps, UA Few       WBC, UA 15-25               Significant Imaging: I have reviewed all pertinent imaging results/findings within the past 24 hours.

## 2023-01-20 NOTE — PROGRESS NOTES
[facility] - Medical Surgical Unit  Hospital Medicine  Progress Note    Patient Name: Herlinda Valdovinos  MRN: [id number]  Patient Class: IP- Swing   Admission Date: 1/4/2023  Length of Stay: 16 days  Attending Physician: Chuy Daugherty IV, DO  Primary Care Provider: Vini Hernandez NP        Subjective:     Principal Problem:<principal problem not specified>        HPI:  Mrs. Kaur was admitted to acute care on 01/01 with UTI and pneumonia. Was started on rocephin and zithromax. She has completed 4/5 days of treatment for pneumonia. She has pressure wounds to sacrum, left heel and left groin. Wound care is following. 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 She has been mostly chair/ bed bound. States she has not had wounds until recently. States she felt bad approximately 10 days before admission but did not seek treatment.   She will be admitted to [facility] today to continue therapy for weakness. We will also continue wound care to sacrum, left groin and left heel.   She is at high risk for fall.  Talked with her re code status and she chose DNR.      Overview/Hospital Course:  01/05 Admitted to [facility] on 01/04/2023. Continue therapy for weakness. Continue wound care to sacrum, left groin and left heel. Potassium is low at 3.1. Will replace.    01/06 Awake and resting in bed without complaints. States she is feeling better this morning. Continue with wound care and therapy.    01/09 Awake and resting in bed. Reports having a good weekend. Requests ensure shake BID. Continue wound care to sacrum, left groin and left heel. Continue therapy for strengthening.     01/11/23 Awake and resting in bed without complaints. Continue therapy for strengthening. Continue wound care.  Appetite is good.    01/154/2023 HOSPITAL DAY 10  I WAS ASKED TO EVALUATE PT DUE PT SUSTAINING A CUT WHEN EX SPOUSE SHAVED CHIN WITH BLEEDING NOTED THAT STOPPED AFTER PRESSURE APPLIED.  PT IS ON  PLAVIX/LOVENOX    01/16 Complains of having constipation over the weekend and finally having a bm yesterday. Requests miralax in addition to pericolace. Continue with PT and OT . Continue with wound care to sacrum.    01/17 Resting in bed without complaints. Reports having large BM Sunday. No further complaints of constipation. Continue wound care to sacrum and left heel. Continue therapy.    01/18 Patient states she is feeling 10,000 times better today. She ambulated 240 feet with therapist. Continue wound care.    01/19 Awake and resting in bed with family at side. Reports feeling good this morning. Continue therapy.    01/20 Awake and resting in bed. No complaints. Urinalysis shows infection. She was started on rocephin yesterday. Will follow culture. Continue therapy.      Interval History: 01/20 Awake and resting in bed. No complaints. Urinalysis shows infection. She was started on rocephin yesterday. Will follow culture. Continue therapy.    Review of Systems   Constitutional:  Negative for appetite change, fatigue and fever.   Respiratory:  Negative for cough, shortness of breath and wheezing.    Cardiovascular:  Negative for chest pain and palpitations.   Gastrointestinal:  Negative for abdominal pain, constipation, diarrhea and nausea.   Endocrine: Negative for cold intolerance and heat intolerance.   Genitourinary:  Positive for dysuria. Negative for vaginal discharge.        Vaginal itching   Burning on urination    Musculoskeletal:  Negative for back pain.   Skin:  Positive for wound. Negative for color change and rash.   Allergic/Immunologic: Negative for environmental allergies, food allergies and immunocompromised state.   Neurological:  Positive for weakness. Negative for dizziness and headaches.   Hematological:  Does not bruise/bleed easily.   Psychiatric/Behavioral:  Negative for sleep disturbance and suicidal ideas.    Objective:     Vital Signs (Most Recent):  Temp: 98 °F (36.7 °C) (01/20/23  0715)  Pulse: 73 (01/20/23 0715)  Resp: 18 (01/20/23 0715)  BP: 125/85 (01/20/23 0906)  SpO2: 99 % (01/20/23 0715) Vital Signs (24h Range):  Temp:  [98 °F (36.7 °C)-98.4 °F (36.9 °C)] 98 °F (36.7 °C)  Pulse:  [73-78] 73  Resp:  [18] 18  SpO2:  [94 %-99 %] 99 %  BP: (125-131)/(82-85) 125/85     Weight: 63.1 kg (139 lb 3.2 oz)  Body mass index is 23.89 kg/m².    Intake/Output Summary (Last 24 hours) at 1/20/2023 1022  Last data filed at 1/19/2023 1808  Gross per 24 hour   Intake 720 ml   Output --   Net 720 ml      Physical Exam  HENT:      Head: Normocephalic.      Mouth/Throat:      Mouth: Mucous membranes are moist.      Comments: Lips dry  Cardiovascular:      Rate and Rhythm: Normal rate and regular rhythm.      Pulses: Normal pulses.      Heart sounds: Normal heart sounds.   Pulmonary:      Effort: Pulmonary effort is normal.      Breath sounds: Normal breath sounds.   Abdominal:      General: Bowel sounds are normal.      Palpations: Abdomen is soft.   Skin:     General: Skin is warm and dry.      Comments: Sacral and left heel   Neurological:      Mental Status: She is alert and oriented to person, place, and time.      Motor: Weakness present.   Psychiatric:         Mood and Affect: Mood normal.       Significant Labs: All pertinent labs within the past 24 hours have been reviewed.  Recent Lab Results         01/19/23  1406        Appearance, UA Slightly Cloudy       Bacteria, UA Moderate       Bilirubin (UA) Negative       Ca Oxalate Marika, UA Few       Color, UA Yellow       Glucose, UA Negative       Ketones, UA Negative       Leukocytes, UA Moderate       NITRITE UA Negative       Occult Blood UA Moderate       pH, UA 5.5       Protein, UA 30       RBC, UA 10-15       Specific Gravity, UA >=1.030       Squam Epithel, UA Few       Trans Epithel, UA Few       UROBILINOGEN UA 0.2       WBC Clumps, UA Few       WBC, UA 15-25               Significant Imaging: I have reviewed all pertinent imaging  results/findings within the past 24 hours.      Assessment/Plan:      Constipation    01/17/23 resolved    Continue miralax    Laceration of chin  PT SUSTAINED A CUT WHEN EX SPOUSE SHAVED CHIN WITH BLEEDING NOTED THAT STOPPED AFTER PRESSURE APPLIED.  PT IS ON PLAVIX/LOVENOX      Skin breakdown  01/05/2023   Continue wound care to sacrum, left groin and left heel  01/14/2023  CONTINUE WOUND CARE TEAM TREATMENT      UTI (urinary tract infection)  01/19/23 UA shows   Latest Reference Range & Units Most Recent   Color, UA Colorless, Straw, Yellow, Light Yellow, Dark Yellow  Yellow  1/19/23 14:06   Appearance, UA Clear  Slightly Cloudy  1/19/23 14:06   Specific Gravity, UA <=1.005, 1.010, 1.015, 1.020, 1.025, 1.030  >=1.030 !  1/19/23 14:06   pH, UA 5.0 to 8.0 pH Units 5.5  1/19/23 14:06   Protein, UA Negative  30 !  1/19/23 14:06   Glucose, UA Normal mg/dL Negative  1/19/23 14:06   Ketones, UA Negative mg/dL Negative  1/19/23 14:06   Occult Blood UA Negative  Moderate !  1/19/23 14:06   NITRITE UA Negative  Negative  1/19/23 14:06   UROBILINOGEN UA 0.2, 1.0, Normal mg/dL 0.2  1/19/23 14:06   Bilirubin (UA) Negative  Negative  1/19/23 14:06   Leukocytes, UA Negative  Moderate !  1/19/23 14:06   RBC, UA None Seen, 0-3 /hpf 10-15 !  1/19/23 14:06   WBC, UA None Seen, 0-5 /hpf 15-25 !  1/19/23 14:06   Bacteria, UA None Seen /hpf Moderate !  1/19/23 14:06   Squam Epithel, UA None Seen, Rare, None Seen To Occasional /lpf Few !  1/19/23 14:06   WBC Clumps, UA None Seen /hpf Few !  1/19/23 14:06   Trichomonas, UA None Seen /hpf None Seen  5/20/21 10:40   Amorphous, UA None Seen /lpf Few !  4/12/22 12:17   Renal Epithel, UA None Seen /lpf Occasional !  5/20/21 10:40   Trans Epithel, UA None Seen /lpf Few !  1/19/23 14:06   Mucus, UA None Seen /hpf Few !  10/20/22 05:10   Yeast, UA None Seen /hpf None Seen  5/20/21 10:40   Ca Oxalate Marika, UA None Seen /lpf Few !  1/19/23 14:06   !: Data is abnormal  Started on IM rocephin- daily  for 5 days  She has very poor venous access- refused IV after 3 unsuccessful attempts. Requested IM med    Hyperlipidemia  01/05 continue atorvastatin daily         Thyroid disease  01/05/2023  Continue levothyroxine 125 mcg daily  01/14/2023  TSH 1.880 10/22  CONTINUE POC        Pneumonia of upper lobe due to infectious organism  01/05/2023  Will receive last dose of rocephin and zithromax today     01/09 resolved  01/14/2023  RESOLVED    DVT prophylaxis  01/05/2023  Continue OCTAVIA hose and plavix, continue lovenox  01/14/2023  PT IS AT RISK FOR VTE  VTE PPX  CONTINUE LOVENOX,PLAVIX,TEDS/EARLY AMBULATION    \  01/17 continue plavix and lovenox        Idiopathic peripheral neuropathy  01/05/2023  Gabapentin 800 mg po TID   01/14/2022  IMPROVED      Insomnia  01/05/2023  Continue melatonin prn       Hypertension  01/05/2023  Continue norvasc and atorvastatin    01/09 stable  01/14/2023  BP IS STABLE 111/82    Depressive disorder  01/05/2023  Continue zoloft 50 mg po daily    01/06/2023  Increased to home dose of 150 mg zolot    01/14/2023  MOOD IS IMPROVED            Generalized anxiety disorder  01/05/2023  Continue lorazepam prn anxiety      Muscle weakness  01/05/2023 continue therapy for weakness  01/14/2023  CONTINUE PT/OT      01/18 ambulated 240 feet with therapy today      VTE Risk Mitigation (From admission, onward)         Ordered     enoxaparin injection 40 mg  Daily         01/09/23 1223                Discharge Planning   POOL: 1/30/2023     Code Status: DNR   Is the patient medically ready for discharge?:     Reason for patient still in hospital (select all that apply): Treatment  Discharge Plan A: Home with family                  KEVAN Funk  Department of Hospital Medicine   [facility] - Medical Surgical Unit

## 2023-01-21 PROCEDURE — 94761 N-INVAS EAR/PLS OXIMETRY MLT: CPT

## 2023-01-21 PROCEDURE — 63600175 PHARM REV CODE 636 W HCPCS: Performed by: NURSE PRACTITIONER

## 2023-01-21 PROCEDURE — 11000004 HC SNF PRIVATE

## 2023-01-21 PROCEDURE — 27000944

## 2023-01-21 PROCEDURE — 27000935

## 2023-01-21 PROCEDURE — 25000003 PHARM REV CODE 250: Performed by: NURSE PRACTITIONER

## 2023-01-21 PROCEDURE — 27000982 HC MATTRESS, MATRIX LAL RENTAL

## 2023-01-21 PROCEDURE — 27000221 HC OXYGEN, UP TO 24 HOURS

## 2023-01-21 RX ADMIN — GABAPENTIN 600 MG: 300 CAPSULE ORAL at 09:01

## 2023-01-21 RX ADMIN — HYDROCORTISONE 2.5%: 25 CREAM TOPICAL at 08:01

## 2023-01-21 RX ADMIN — MICONAZOLE NITRATE: 20 POWDER TOPICAL at 08:01

## 2023-01-21 RX ADMIN — LORAZEPAM 0.5 MG: 0.5 TABLET ORAL at 08:01

## 2023-01-21 RX ADMIN — HYDROCODONE BITARTRATE AND ACETAMINOPHEN 1 TABLET: 10; 325 TABLET ORAL at 03:01

## 2023-01-21 RX ADMIN — THERA TABS 1 TABLET: TAB at 09:01

## 2023-01-21 RX ADMIN — OXYCODONE HYDROCHLORIDE AND ACETAMINOPHEN 500 MG: 500 TABLET ORAL at 09:01

## 2023-01-21 RX ADMIN — HYDROCODONE BITARTRATE AND ACETAMINOPHEN 1 TABLET: 10; 325 TABLET ORAL at 04:01

## 2023-01-21 RX ADMIN — VALSARTAN 160 MG: 160 TABLET, FILM COATED ORAL at 09:01

## 2023-01-21 RX ADMIN — HYDROCODONE BITARTRATE AND ACETAMINOPHEN 1 TABLET: 10; 325 TABLET ORAL at 09:01

## 2023-01-21 RX ADMIN — ENOXAPARIN SODIUM 40 MG: 40 INJECTION SUBCUTANEOUS at 09:01

## 2023-01-21 RX ADMIN — CLOPIDOGREL BISULFATE 75 MG: 75 TABLET, FILM COATED ORAL at 09:01

## 2023-01-21 RX ADMIN — SENNOSIDES AND DOCUSATE SODIUM 1 TABLET: 50; 8.6 TABLET ORAL at 08:01

## 2023-01-21 RX ADMIN — ATORVASTATIN CALCIUM 40 MG: 40 TABLET, FILM COATED ORAL at 08:01

## 2023-01-21 RX ADMIN — OMEGA-3 FATTY ACIDS CAP 1000 MG 1 CAPSULE: 1000 CAP at 09:01

## 2023-01-21 RX ADMIN — MELATONIN TAB 3 MG 9 MG: 3 TAB at 08:01

## 2023-01-21 RX ADMIN — GABAPENTIN 600 MG: 300 CAPSULE ORAL at 08:01

## 2023-01-21 RX ADMIN — AMLODIPINE BESYLATE 10 MG: 5 TABLET ORAL at 09:01

## 2023-01-21 RX ADMIN — LEVOTHYROXINE SODIUM 125 MCG: 25 TABLET ORAL at 06:01

## 2023-01-21 RX ADMIN — GABAPENTIN 600 MG: 300 CAPSULE ORAL at 03:01

## 2023-01-21 RX ADMIN — CEFTRIAXONE 1 G: 1 INJECTION, POWDER, FOR SOLUTION INTRAMUSCULAR; INTRAVENOUS at 06:01

## 2023-01-21 RX ADMIN — OXYCODONE HYDROCHLORIDE AND ACETAMINOPHEN 500 MG: 500 TABLET ORAL at 08:01

## 2023-01-21 RX ADMIN — SENNOSIDES AND DOCUSATE SODIUM 1 TABLET: 50; 8.6 TABLET ORAL at 09:01

## 2023-01-21 RX ADMIN — MICONAZOLE NITRATE: 20 POWDER TOPICAL at 09:01

## 2023-01-21 RX ADMIN — POLYETHYLENE GLYCOL 3350 17 G: 17 POWDER, FOR SOLUTION ORAL at 09:01

## 2023-01-21 RX ADMIN — PANTOPRAZOLE SODIUM 40 MG: 40 TABLET, DELAYED RELEASE ORAL at 09:01

## 2023-01-21 RX ADMIN — SERTRALINE HYDROCHLORIDE 150 MG: 50 TABLET ORAL at 09:01

## 2023-01-21 NOTE — PLAN OF CARE
Problem: Adult Inpatient Plan of Care  Goal: Optimal Comfort and Wellbeing  Outcome: Ongoing, Progressing     Problem: Respiratory Compromise (Pneumonia)  Goal: Effective Oxygenation and Ventilation  Outcome: Ongoing, Progressing     Problem: Fall Injury Risk  Goal: Absence of Fall and Fall-Related Injury  Outcome: Ongoing, Progressing

## 2023-01-22 LAB — UA COMPLETE W REFLEX CULTURE PNL UR: ABNORMAL

## 2023-01-22 PROCEDURE — A6212 FOAM DRG <=16 SQ IN W/BORDER: HCPCS

## 2023-01-22 PROCEDURE — 27201920 HC DRESSING, AQUACEL AG

## 2023-01-22 PROCEDURE — 63600175 PHARM REV CODE 636 W HCPCS: Performed by: NURSE PRACTITIONER

## 2023-01-22 PROCEDURE — 27000982 HC MATTRESS, MATRIX LAL RENTAL

## 2023-01-22 PROCEDURE — 94761 N-INVAS EAR/PLS OXIMETRY MLT: CPT

## 2023-01-22 PROCEDURE — 25000003 PHARM REV CODE 250: Performed by: NURSE PRACTITIONER

## 2023-01-22 PROCEDURE — 11000004 HC SNF PRIVATE

## 2023-01-22 PROCEDURE — 27000944

## 2023-01-22 PROCEDURE — 27000221 HC OXYGEN, UP TO 24 HOURS

## 2023-01-22 RX ORDER — NITROFURANTOIN 25; 75 MG/1; MG/1
100 CAPSULE ORAL EVERY 12 HOURS
Status: DISCONTINUED | OUTPATIENT
Start: 2023-01-22 | End: 2023-01-25 | Stop reason: HOSPADM

## 2023-01-22 RX ORDER — LIDOCAINE HYDROCHLORIDE 10 MG/ML
2.1 INJECTION INFILTRATION; PERINEURAL EVERY 24 HOURS
Status: DISCONTINUED | OUTPATIENT
Start: 2023-01-22 | End: 2023-01-23

## 2023-01-22 RX ORDER — CEFTRIAXONE 1 G/1
1 INJECTION, POWDER, FOR SOLUTION INTRAMUSCULAR; INTRAVENOUS
Status: DISCONTINUED | OUTPATIENT
Start: 2023-01-22 | End: 2023-01-22

## 2023-01-22 RX ADMIN — GABAPENTIN 600 MG: 300 CAPSULE ORAL at 09:01

## 2023-01-22 RX ADMIN — LEVOTHYROXINE SODIUM 125 MCG: 25 TABLET ORAL at 06:01

## 2023-01-22 RX ADMIN — MICONAZOLE NITRATE: 20 POWDER TOPICAL at 08:01

## 2023-01-22 RX ADMIN — MICONAZOLE NITRATE: 20 POWDER TOPICAL at 09:01

## 2023-01-22 RX ADMIN — AMLODIPINE BESYLATE 10 MG: 5 TABLET ORAL at 09:01

## 2023-01-22 RX ADMIN — HYDROCODONE BITARTRATE AND ACETAMINOPHEN 1 TABLET: 10; 325 TABLET ORAL at 08:01

## 2023-01-22 RX ADMIN — LORAZEPAM 0.5 MG: 0.5 TABLET ORAL at 08:01

## 2023-01-22 RX ADMIN — ENOXAPARIN SODIUM 40 MG: 40 INJECTION SUBCUTANEOUS at 09:01

## 2023-01-22 RX ADMIN — HYDROCODONE BITARTRATE AND ACETAMINOPHEN 1 TABLET: 10; 325 TABLET ORAL at 01:01

## 2023-01-22 RX ADMIN — CEFTRIAXONE 1 G: 1 INJECTION, POWDER, FOR SOLUTION INTRAMUSCULAR; INTRAVENOUS at 05:01

## 2023-01-22 RX ADMIN — HYDROCORTISONE 2.5%: 25 CREAM TOPICAL at 08:01

## 2023-01-22 RX ADMIN — PANTOPRAZOLE SODIUM 40 MG: 40 TABLET, DELAYED RELEASE ORAL at 09:01

## 2023-01-22 RX ADMIN — SENNOSIDES AND DOCUSATE SODIUM 1 TABLET: 50; 8.6 TABLET ORAL at 08:01

## 2023-01-22 RX ADMIN — HYDROCODONE BITARTRATE AND ACETAMINOPHEN 1 TABLET: 10; 325 TABLET ORAL at 06:01

## 2023-01-22 RX ADMIN — MELATONIN TAB 3 MG 9 MG: 3 TAB at 08:01

## 2023-01-22 RX ADMIN — THERA TABS 1 TABLET: TAB at 09:01

## 2023-01-22 RX ADMIN — SERTRALINE HYDROCHLORIDE 150 MG: 50 TABLET ORAL at 09:01

## 2023-01-22 RX ADMIN — POLYETHYLENE GLYCOL 3350 17 G: 17 POWDER, FOR SOLUTION ORAL at 09:01

## 2023-01-22 RX ADMIN — SENNOSIDES AND DOCUSATE SODIUM 1 TABLET: 50; 8.6 TABLET ORAL at 09:01

## 2023-01-22 RX ADMIN — ATORVASTATIN CALCIUM 40 MG: 40 TABLET, FILM COATED ORAL at 08:01

## 2023-01-22 RX ADMIN — VALSARTAN 160 MG: 160 TABLET, FILM COATED ORAL at 09:01

## 2023-01-22 RX ADMIN — CLOPIDOGREL BISULFATE 75 MG: 75 TABLET, FILM COATED ORAL at 09:01

## 2023-01-22 RX ADMIN — GABAPENTIN 600 MG: 300 CAPSULE ORAL at 08:01

## 2023-01-22 RX ADMIN — GABAPENTIN 600 MG: 300 CAPSULE ORAL at 03:01

## 2023-01-22 RX ADMIN — LIDOCAINE HYDROCHLORIDE 2.1 ML: 10 INJECTION, SOLUTION INFILTRATION; PERINEURAL at 05:01

## 2023-01-22 RX ADMIN — OXYCODONE HYDROCHLORIDE AND ACETAMINOPHEN 500 MG: 500 TABLET ORAL at 09:01

## 2023-01-22 RX ADMIN — OMEGA-3 FATTY ACIDS CAP 1000 MG 1 CAPSULE: 1000 CAP at 09:01

## 2023-01-22 RX ADMIN — OXYCODONE HYDROCHLORIDE AND ACETAMINOPHEN 500 MG: 500 TABLET ORAL at 08:01

## 2023-01-22 RX ADMIN — NITROFURANTOIN (MONOHYDRATE/MACROCRYSTALS) 100 MG: 75; 25 CAPSULE ORAL at 08:01

## 2023-01-22 NOTE — PLAN OF CARE
Problem: Adult Inpatient Plan of Care  Goal: Plan of Care Review  Outcome: Ongoing, Progressing  Goal: Absence of Hospital-Acquired Illness or Injury  Outcome: Ongoing, Progressing  Goal: Readiness for Transition of Care  Outcome: Ongoing, Progressing     Problem: Fall Injury Risk  Goal: Absence of Fall and Fall-Related Injury  Outcome: Ongoing, Progressing

## 2023-01-23 ENCOUNTER — CLINICAL SUPPORT (OUTPATIENT)
Dept: WOUND CARE | Facility: HOSPITAL | Age: 58
DRG: 947 | End: 2023-01-23
Attending: FAMILY MEDICINE
Payer: MEDICARE

## 2023-01-23 DIAGNOSIS — L89.153 PRESSURE INJURY OF SACRAL REGION, STAGE 3: ICD-10-CM

## 2023-01-23 DIAGNOSIS — L89.623 PRESSURE INJURY OF LEFT HEEL, STAGE 3: Primary | ICD-10-CM

## 2023-01-23 LAB
ANION GAP SERPL CALCULATED.3IONS-SCNC: 8 MMOL/L (ref 7–16)
BASOPHILS # BLD AUTO: 0.02 K/UL (ref 0–0.2)
BASOPHILS NFR BLD AUTO: 0.4 % (ref 0–1)
BUN SERPL-MCNC: 14 MG/DL (ref 7–18)
BUN/CREAT SERPL: 18 (ref 6–20)
CALCIUM SERPL-MCNC: 9.2 MG/DL (ref 8.5–10.1)
CHLORIDE SERPL-SCNC: 104 MMOL/L (ref 98–107)
CO2 SERPL-SCNC: 35 MMOL/L (ref 21–32)
CREAT SERPL-MCNC: 0.8 MG/DL (ref 0.55–1.02)
DIFFERENTIAL METHOD BLD: ABNORMAL
EGFR (NO RACE VARIABLE) (RUSH/TITUS): 87 ML/MIN/1.73M²
EOSINOPHIL # BLD AUTO: 0.58 K/UL (ref 0–0.5)
EOSINOPHIL NFR BLD AUTO: 11.4 % (ref 1–4)
ERYTHROCYTE [DISTWIDTH] IN BLOOD BY AUTOMATED COUNT: 16 % (ref 11.5–14.5)
GLUCOSE SERPL-MCNC: 101 MG/DL (ref 74–106)
HCT VFR BLD AUTO: 37.3 % (ref 38–47)
HGB BLD-MCNC: 12 G/DL (ref 12–16)
LYMPHOCYTES # BLD AUTO: 1.83 K/UL (ref 1–4.8)
LYMPHOCYTES NFR BLD AUTO: 36.1 % (ref 27–41)
MCH RBC QN AUTO: 28.2 PG (ref 27–31)
MCHC RBC AUTO-ENTMCNC: 32.2 G/DL (ref 32–36)
MCV RBC AUTO: 87.8 FL (ref 80–96)
MONOCYTES # BLD AUTO: 0.54 K/UL (ref 0–0.8)
MONOCYTES NFR BLD AUTO: 10.7 % (ref 2–6)
MPC BLD CALC-MCNC: 10.1 FL (ref 9.4–12.4)
NEUTROPHILS # BLD AUTO: 2.1 K/UL (ref 1.8–7.7)
NEUTROPHILS NFR BLD AUTO: 41.4 % (ref 53–65)
PLATELET # BLD AUTO: 137 K/UL (ref 150–400)
POTASSIUM SERPL-SCNC: 4.6 MMOL/L (ref 3.5–5.1)
RBC # BLD AUTO: 4.25 M/UL (ref 4.2–5.4)
SODIUM SERPL-SCNC: 142 MMOL/L (ref 136–145)
WBC # BLD AUTO: 5.07 K/UL (ref 4.5–11)

## 2023-01-23 PROCEDURE — 97530 THERAPEUTIC ACTIVITIES: CPT

## 2023-01-23 PROCEDURE — 36415 COLL VENOUS BLD VENIPUNCTURE: CPT | Performed by: NURSE PRACTITIONER

## 2023-01-23 PROCEDURE — 94761 N-INVAS EAR/PLS OXIMETRY MLT: CPT

## 2023-01-23 PROCEDURE — 11000004 HC SNF PRIVATE

## 2023-01-23 PROCEDURE — 80048 BASIC METABOLIC PNL TOTAL CA: CPT | Performed by: NURSE PRACTITIONER

## 2023-01-23 PROCEDURE — 99900035 HC TECH TIME PER 15 MIN (STAT)

## 2023-01-23 PROCEDURE — 27000944

## 2023-01-23 PROCEDURE — 99308 SBSQ NF CARE LOW MDM 20: CPT | Mod: ,,, | Performed by: NURSE PRACTITIONER

## 2023-01-23 PROCEDURE — 97116 GAIT TRAINING THERAPY: CPT | Mod: CQ

## 2023-01-23 PROCEDURE — 27000221 HC OXYGEN, UP TO 24 HOURS

## 2023-01-23 PROCEDURE — 85025 COMPLETE CBC W/AUTO DIFF WBC: CPT | Performed by: NURSE PRACTITIONER

## 2023-01-23 PROCEDURE — 63600175 PHARM REV CODE 636 W HCPCS: Performed by: NURSE PRACTITIONER

## 2023-01-23 PROCEDURE — 99212 OFFICE O/P EST SF 10 MIN: CPT

## 2023-01-23 PROCEDURE — 99308 PR NURSING FAC CARE, SUBSEQ, MINOR COMPLIC: ICD-10-PCS | Mod: ,,, | Performed by: NURSE PRACTITIONER

## 2023-01-23 PROCEDURE — 25000003 PHARM REV CODE 250: Performed by: NURSE PRACTITIONER

## 2023-01-23 RX ADMIN — AMLODIPINE BESYLATE 10 MG: 5 TABLET ORAL at 08:01

## 2023-01-23 RX ADMIN — GABAPENTIN 600 MG: 300 CAPSULE ORAL at 08:01

## 2023-01-23 RX ADMIN — PANTOPRAZOLE SODIUM 40 MG: 40 TABLET, DELAYED RELEASE ORAL at 08:01

## 2023-01-23 RX ADMIN — NITROFURANTOIN (MONOHYDRATE/MACROCRYSTALS) 100 MG: 75; 25 CAPSULE ORAL at 08:01

## 2023-01-23 RX ADMIN — ENOXAPARIN SODIUM 40 MG: 40 INJECTION SUBCUTANEOUS at 08:01

## 2023-01-23 RX ADMIN — SENNOSIDES AND DOCUSATE SODIUM 1 TABLET: 50; 8.6 TABLET ORAL at 08:01

## 2023-01-23 RX ADMIN — GABAPENTIN 600 MG: 300 CAPSULE ORAL at 02:01

## 2023-01-23 RX ADMIN — ATORVASTATIN CALCIUM 40 MG: 40 TABLET, FILM COATED ORAL at 08:01

## 2023-01-23 RX ADMIN — CLOPIDOGREL BISULFATE 75 MG: 75 TABLET, FILM COATED ORAL at 08:01

## 2023-01-23 RX ADMIN — HYDROCORTISONE 2.5%: 25 CREAM TOPICAL at 08:01

## 2023-01-23 RX ADMIN — OXYCODONE HYDROCHLORIDE AND ACETAMINOPHEN 500 MG: 500 TABLET ORAL at 08:01

## 2023-01-23 RX ADMIN — MICONAZOLE NITRATE: 20 POWDER TOPICAL at 08:01

## 2023-01-23 RX ADMIN — THERA TABS 1 TABLET: TAB at 08:01

## 2023-01-23 RX ADMIN — MELATONIN TAB 3 MG 9 MG: 3 TAB at 08:01

## 2023-01-23 RX ADMIN — LEVOTHYROXINE SODIUM 125 MCG: 25 TABLET ORAL at 05:01

## 2023-01-23 RX ADMIN — LORAZEPAM 0.5 MG: 0.5 TABLET ORAL at 08:01

## 2023-01-23 RX ADMIN — VALSARTAN 160 MG: 160 TABLET, FILM COATED ORAL at 08:01

## 2023-01-23 RX ADMIN — HYDROCODONE BITARTRATE AND ACETAMINOPHEN 1 TABLET: 10; 325 TABLET ORAL at 05:01

## 2023-01-23 RX ADMIN — SERTRALINE HYDROCHLORIDE 150 MG: 50 TABLET ORAL at 08:01

## 2023-01-23 RX ADMIN — HYDROCODONE BITARTRATE AND ACETAMINOPHEN 1 TABLET: 10; 325 TABLET ORAL at 06:01

## 2023-01-23 RX ADMIN — OMEGA-3 FATTY ACIDS CAP 1000 MG 1 CAPSULE: 1000 CAP at 08:01

## 2023-01-23 RX ADMIN — POLYETHYLENE GLYCOL 3350 17 G: 17 POWDER, FOR SOLUTION ORAL at 08:01

## 2023-01-23 NOTE — ASSESSMENT & PLAN NOTE
01/05/2023 continue therapy for weakness  01/14/2023  CONTINUE PT/OT      01/18 ambulated 240 feet with therapy today    01/23 has ambulated 360 feet with rolling walker

## 2023-01-23 NOTE — SUBJECTIVE & OBJECTIVE
Interval History:   01/23/23 Resting in bed without complaints.  Urine culture show growth of enterococcus faecalis not sensitive to rocephin. Started macrobid 100 mgpo BID x 7 days. Continue wound care. Continue therapy.  Has ambulated 304 feet with rolling walker.  Review of Systems   Respiratory:  Negative for cough and shortness of breath.    Cardiovascular:  Negative for chest pain.   Gastrointestinal:  Negative for diarrhea, nausea and vomiting.        Last bm Friday    Genitourinary:  Negative for dysuria.   Skin:  Positive for wound.   Neurological:  Positive for weakness.   Objective:     Vital Signs (Most Recent):  Temp: 98.3 °F (36.8 °C) (01/23/23 0725)  Pulse: 70 (01/23/23 0725)  Resp: 18 (01/23/23 0725)  BP: 108/78 (01/23/23 0725)  SpO2: 98 % (01/23/23 0725) Vital Signs (24h Range):  Temp:  [98.3 °F (36.8 °C)-98.5 °F (36.9 °C)] 98.3 °F (36.8 °C)  Pulse:  [68-75] 70  Resp:  [18-20] 18  SpO2:  [97 %-98 %] 98 %  BP: (108-116)/(74-78) 108/78     Weight: 63.1 kg (139 lb 3.2 oz)  Body mass index is 23.89 kg/m².    Intake/Output Summary (Last 24 hours) at 1/23/2023 1015  Last data filed at 1/23/2023 1009  Gross per 24 hour   Intake 1040 ml   Output --   Net 1040 ml      Physical Exam  HENT:      Mouth/Throat:      Mouth: Mucous membranes are moist.   Cardiovascular:      Rate and Rhythm: Normal rate and regular rhythm.      Pulses: Normal pulses.      Heart sounds: Normal heart sounds.   Pulmonary:      Effort: Pulmonary effort is normal.      Breath sounds: Normal breath sounds.   Abdominal:      General: Bowel sounds are normal.      Palpations: Abdomen is soft.   Skin:     General: Skin is warm and dry.      Comments: Wound to left heel   Neurological:      Mental Status: She is alert and oriented to person, place, and time.   Psychiatric:         Mood and Affect: Mood normal.       Significant Labs: All pertinent labs within the past 24 hours have been reviewed.  Recent Lab Results         01/23/23 0822         Anion Gap 8       Baso # 0.02       Basophil % 0.4       BUN 14       BUN/CREAT RATIO 18       Calcium 9.2       Chloride 104       CO2 35       Creatinine 0.80       Differential Type Auto       eGFR 87       Eos # 0.58       Eosinophil % 11.4       Glucose 101       Hematocrit 37.3       Hemoglobin 12.0       Lymph # 1.83       Lymph % 36.1       MCH 28.2       MCHC 32.2       MCV 87.8       Mono # 0.54       Mono % 10.7       MPV 10.1       Neutrophils, Abs 2.10       Neutrophils Relative 41.4       Platelets 137       Potassium 4.6       RBC 4.25       RDW 16.0       Sodium 142       WBC 5.07               Significant Imaging: I have reviewed all pertinent imaging results/findings within the past 24 hours.

## 2023-01-23 NOTE — PROGRESS NOTES
WOUND CARE CONSULT          Patient Name: Herlinda Valdovinos is a 57 y.o. female       Chief Complaint:  Wound Care Consult- F/U    Review of patient's allergies indicates:   Allergen Reactions    Lamisil [terbinafine] Anaphylaxis    Codeine Itching    Compazine [prochlorperazine] Itching        I have reviewed the patient's medical, surgical, family and social history.      Subjective:    HPI     Wound Location: left medial heel, sacrum (now resolved)    Wound Duration: onset approximately 12/15/22    Wound Context: pressure    Wound Pain: denies pain to heel    56 YO WF seen for follow up assessment of wounds. Pt is currently inpatient due to UTI, pneumonia, and generalized weakness. Sacral wound is now resolved. Heel wound is significantly improved. Pt will likely D/C home on 1/30/23      Medications:    Current Facility-Administered Medications on File Prior to Visit   Medication Dose Route Frequency Provider Last Rate Last Admin    acetaminophen tablet 650 mg  650 mg Oral Q6H PRN Laura L Garza, FNP        amLODIPine tablet 10 mg  10 mg Oral Daily Laura L Garza, FNP   10 mg at 01/23/23 0841    ascorbic acid (vitamin C) tablet 500 mg  500 mg Oral BID Laura L Garza, FNP   500 mg at 01/23/23 0840    atorvastatin tablet 40 mg  40 mg Oral QHS Laura L Garza, FNP   40 mg at 01/22/23 2052    bisacodyL suppository 10 mg  10 mg Rectal Daily PRN Felicia Hill, FNP   10 mg at 01/15/23 2039    calcium carbonate 200 mg calcium (500 mg) chewable tablet 500 mg  500 mg Oral BID PRN Laura L Garza, FNP        clopidogreL tablet 75 mg  75 mg Oral Daily Laura L Garza, FNP   75 mg at 01/23/23 0841    enoxaparin injection 40 mg  40 mg Subcutaneous Daily Laura L Garza, FNP   40 mg at 01/23/23 0841    gabapentin capsule 600 mg  600 mg Oral TID Laura L Garza, FNP   600 mg at 01/23/23 1405    HYDROcodone-acetaminophen  mg per tablet 1 tablet  1 tablet Oral Q6H PRN Laura L Garza, FNP   1 tablet at 01/23/23  0635    hydrocortisone 2.5 % rectal cream   Rectal BID Laura L Garza, FNP   Given at 01/23/23 0842    levothyroxine tablet 125 mcg  125 mcg Oral Before breakfast Laura L Garza, FNP   125 mcg at 01/23/23 0559    LORazepam tablet 0.5 mg  0.5 mg Oral Q12H PRN Laura L Garza, FNP   0.5 mg at 01/22/23 2052    melatonin tablet 9 mg  9 mg Oral Nightly PRN Laura L Garza, FNP   9 mg at 01/22/23 2052    miconazole NITRATE 2 % top powder   Topical (Top) BID Laura L Garza, FNP   Given at 01/23/23 0841    multivitamin tablet  1 tablet Oral Daily Laura L Garza, FNP   1 tablet at 01/23/23 0841    nitrofurantoin (macrocrystal-monohydrate) 100 MG capsule 100 mg  100 mg Oral Q12H Laura L Garza, FNP   100 mg at 01/23/23 0841    omega 3-dha-epa-fish oil 1,000 mg (120 mg-180 mg) Cap 1 capsule  1 capsule Oral Daily Laura L Garza, FNP   1 capsule at 01/23/23 0840    ondansetron disintegrating tablet 4 mg  4 mg Oral Q8H PRN Laura L Garza, FNP   4 mg at 01/15/23 2250    pantoprazole EC tablet 40 mg  40 mg Oral Daily Laura L Garza, FNP   40 mg at 01/23/23 0840    polyethylene glycol packet 17 g  17 g Oral BID PRN Felicia Hill, FNP   17 g at 01/20/23 2027    polyethylene glycol packet 17 g  17 g Oral Daily Laura L Garza, FNP   17 g at 01/23/23 0839    senna-docusate 8.6-50 mg per tablet 1 tablet  1 tablet Oral BID Laura L Garza, FNP   1 tablet at 01/23/23 0841    sertraline tablet 150 mg  150 mg Oral Daily Laura L Garza, FNP   150 mg at 01/23/23 0841    valsartan tablet 160 mg  160 mg Oral Daily Laura L Garza, FNP   160 mg at 01/23/23 0841    [DISCONTINUED] cefTRIAXone injection 1 g  1 g Intramuscular Q24H Sherri Pritchett NP   1 g at 01/22/23 1742    [DISCONTINUED] LIDOcaine HCL 10 mg/ml (1%) injection 2.1 mL  2.1 mL Intramuscular Daily Sherri Pritchett NP   2.1 mL at 01/22/23 9627     Current Outpatient Medications on File Prior to Visit   Medication Sig Dispense Refill    acetaminophen (TYLENOL) 325 MG  tablet 2 tablets PRN      amLODIPine (NORVASC) 10 MG tablet Take 1 tablet (10 mg total) by mouth once daily. 30 tablet 0    atorvastatin (LIPITOR) 40 MG tablet Take 40 mg by mouth every evening.      azithromycin (ZITHROMAX) 500 MG tablet Take 1 tablet (500 mg total) by mouth once daily. 2 tablet     calcium carbonate (TUMS) 200 mg calcium (500 mg) chewable tablet Take 1 tablet (500 mg total) by mouth 2 (two) times daily as needed for Heartburn.      clopidogreL (PLAVIX) 75 mg tablet Plavix 75 mg tablet   Take 1 po QD to prevent stroke      docusate sodium (COLACE) 100 MG capsule Take 100 mg by mouth 2 (two) times daily.      gabapentin (NEURONTIN) 800 MG tablet 800 mg 3 (three) times daily.      HYDROcodone-acetaminophen (NORCO)  mg per tablet Take 1 tablet by mouth 4 (four) times daily as needed.      levothyroxine (SYNTHROID) 125 MCG tablet       LORazepam (ATIVAN) 0.5 MG tablet Take 1 tablet (0.5 mg total) by mouth As instructed for Anxiety. (Patient taking differently: Take 0.5 mg by mouth every 12 (twelve) hours as needed for Anxiety.) 30 tablet 0    melatonin (MELATIN) 3 mg tablet Take 2 tablets (6 mg total) by mouth nightly as needed for Insomnia.  0    omega-3 fatty acids/fish oil (FISH OIL-OMEGA-3 FATTY ACIDS) 300-1,000 mg capsule Take by mouth once daily.      ondansetron (ZOFRAN-ODT) 4 MG TbDL Take 1 tablet (4 mg total) by mouth every 8 (eight) hours as needed.      pantoprazole (PROTONIX) 40 MG tablet Take 1 tablet (40 mg total) by mouth once daily. 30 tablet 0    sertraline (ZOLOFT) 50 MG tablet sertraline 50 mg tablet      valsartan (DIOVAN) 160 MG tablet Take 1 tablet (160 mg total) by mouth once daily. 30 tablet 0    [DISCONTINUED] hydroCHLOROthiazide (HYDRODIURIL) 12.5 MG Tab Take 1 tablet (12.5 mg total) by mouth once daily. 30 tablet 11    [DISCONTINUED] omeprazole (PRILOSEC) 40 MG capsule Take 40 mg by mouth every evening.            Physical Exam  Vitals reviewed.   Constitutional:        Appearance: Normal appearance.   HENT:      Head: Normocephalic and atraumatic.      Right Ear: External ear normal.      Left Ear: External ear normal.   Cardiovascular:      Pulses:           Dorsalis pedis pulses are 2+ on the right side and 2+ on the left side.   Pulmonary:      Effort: Pulmonary effort is normal.   Musculoskeletal:      Right lower leg: No edema.      Left lower leg: No edema.        Feet:    Skin:     General: Skin is warm and dry.      Findings: Wound present.   Neurological:      Mental Status: She is alert.      Comments: L side weakness  L foot pronates        Please see Wound Docs for complete Wound Assessment and lower extremity assessment when applicable.    Documentation of any procedures can be viewed in Wound Docs if applicable.         Assessment and Plan:    1. Pressure injury of left heel, unstageable  Collagen and foam to heel, change QOD  Float bilateral heels  Waffle boot to left      2. Pressure injury of sacral region, stage 3  Wound resolved  Protect skin with zinc barrier ointment at least BID     Nutritional support and offloading    Please see Wound Docs for complete plan including patient instructions.     Follow Up & Goals:     Follow up in about 1 week (around 1/30/2023).     Short term goals  Reduction of devitalized tissue  Reduction of bacterial burden  Stimulate and/or maintain acute phases of wound healing  Promote granulation formation  Promote epithelization  Promote compliance with plan of care  Address factors affecting wound healing  Optimize nutritional status    Long term goals  Prevent loss of limb  Prevent infection  Conservative Wound Treatment  Prevent recurrent ulcerations  Resolve wound

## 2023-01-23 NOTE — ASSESSMENT & PLAN NOTE
01/19/23 UA shows   Latest Reference Range & Units Most Recent   Color, UA Colorless, Straw, Yellow, Light Yellow, Dark Yellow  Yellow  1/19/23 14:06   Appearance, UA Clear  Slightly Cloudy  1/19/23 14:06   Specific Gravity, UA <=1.005, 1.010, 1.015, 1.020, 1.025, 1.030  >=1.030 !  1/19/23 14:06   pH, UA 5.0 to 8.0 pH Units 5.5  1/19/23 14:06   Protein, UA Negative  30 !  1/19/23 14:06   Glucose, UA Normal mg/dL Negative  1/19/23 14:06   Ketones, UA Negative mg/dL Negative  1/19/23 14:06   Occult Blood UA Negative  Moderate !  1/19/23 14:06   NITRITE UA Negative  Negative  1/19/23 14:06   UROBILINOGEN UA 0.2, 1.0, Normal mg/dL 0.2  1/19/23 14:06   Bilirubin (UA) Negative  Negative  1/19/23 14:06   Leukocytes, UA Negative  Moderate !  1/19/23 14:06   RBC, UA None Seen, 0-3 /hpf 10-15 !  1/19/23 14:06   WBC, UA None Seen, 0-5 /hpf 15-25 !  1/19/23 14:06   Bacteria, UA None Seen /hpf Moderate !  1/19/23 14:06   Squam Epithel, UA None Seen, Rare, None Seen To Occasional /lpf Few !  1/19/23 14:06   WBC Clumps, UA None Seen /hpf Few !  1/19/23 14:06   Trichomonas, UA None Seen /hpf None Seen  5/20/21 10:40   Amorphous, UA None Seen /lpf Few !  4/12/22 12:17   Renal Epithel, UA None Seen /lpf Occasional !  5/20/21 10:40   Trans Epithel, UA None Seen /lpf Few !  1/19/23 14:06   Mucus, UA None Seen /hpf Few !  10/20/22 05:10   Yeast, UA None Seen /hpf None Seen  5/20/21 10:40   Ca Oxalate Marika, UA None Seen /lpf Few !  1/19/23 14:06   !: Data is abnormal  Started on IM rocephin- daily for 5 days  She has very poor venous access- refused IV after 3 unsuccessful attempts. Requested IM med    01/23 urine shows >100,000 Enterococcus faecalis Abnormal  - started macrobid

## 2023-01-23 NOTE — PLAN OF CARE
Problem: Fall Injury Risk  Goal: Absence of Fall and Fall-Related Injury  Outcome: Ongoing, Progressing  Intervention: Promote Injury-Free Environment  Flowsheets (Taken 1/23/2023 1525)  Safety Promotion/Fall Prevention:   assistive device/personal item within reach   side rails raised x 3   instructed to call staff for mobility   room near unit station   nonskid shoes/socks when out of bed

## 2023-01-23 NOTE — PLAN OF CARE
Problem: Adult Inpatient Plan of Care  Goal: Plan of Care Review  Outcome: Ongoing, Progressing  Goal: Absence of Hospital-Acquired Illness or Injury  Outcome: Ongoing, Progressing  Goal: Readiness for Transition of Care  Outcome: Ongoing, Progressing     Problem: Impaired Wound Healing  Goal: Optimal Wound Healing  Outcome: Ongoing, Progressing     Problem: Skin Injury Risk Increased  Goal: Skin Health and Integrity  Outcome: Ongoing, Progressing

## 2023-01-23 NOTE — PROGRESS NOTES
[facility] - Medical Surgical Unit  Hospital Medicine  Progress Note    Patient Name: Herlinda Valdovinos  MRN: [id number]  Patient Class: IP- Swing   Admission Date: 1/4/2023  Length of Stay: 19 days  Attending Physician: Chuy Daugherty IV, DO  Primary Care Provider: Vini Hernandez NP        Subjective:     Principal Problem:<principal problem not specified>        HPI:  Mrs. Kaur was admitted to acute care on 01/01 with UTI and pneumonia. Was started on rocephin and zithromax. She has completed 4/5 days of treatment for pneumonia. She has pressure wounds to sacrum, left heel and left groin. Wound care is following. 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 She has been mostly chair/ bed bound. States she has not had wounds until recently. States she felt bad approximately 10 days before admission but did not seek treatment.   She will be admitted to [facility] today to continue therapy for weakness. We will also continue wound care to sacrum, left groin and left heel.   She is at high risk for fall.  Talked with her re code status and she chose DNR.      Overview/Hospital Course:  01/05 Admitted to [facility] on 01/04/2023. Continue therapy for weakness. Continue wound care to sacrum, left groin and left heel. Potassium is low at 3.1. Will replace.    01/06 Awake and resting in bed without complaints. States she is feeling better this morning. Continue with wound care and therapy.    01/09 Awake and resting in bed. Reports having a good weekend. Requests ensure shake BID. Continue wound care to sacrum, left groin and left heel. Continue therapy for strengthening.     01/11/23 Awake and resting in bed without complaints. Continue therapy for strengthening. Continue wound care.  Appetite is good.    01/154/2023 HOSPITAL DAY 10  I WAS ASKED TO EVALUATE PT DUE PT SUSTAINING A CUT WHEN EX SPOUSE SHAVED CHIN WITH BLEEDING NOTED THAT STOPPED AFTER PRESSURE APPLIED.  PT IS ON  PLAVIX/LOVENOX    01/16 Complains of having constipation over the weekend and finally having a bm yesterday. Requests miralax in addition to pericolace. Continue with PT and OT . Continue with wound care to sacrum.    01/17 Resting in bed without complaints. Reports having large BM Sunday. No further complaints of constipation. Continue wound care to sacrum and left heel. Continue therapy.    01/18 Patient states she is feeling 10,000 times better today. She ambulated 240 feet with therapist. Continue wound care.    01/23 Awake ansd resting in bed    01/19 Awake and resting in bed with family at side. Reports feeling good this morning. Continue therapy.    01/20 Awake and resting in bed. No complaints. Urinalysis shows infection. She was started on rocephin yesterday. Will follow culture. Continue therapy.    01/23/23 Resting in bed without complaints.  Urine culture show growth of enterococcus faecalis not sensitive to rocephin. Started macrobid 100 mgpo BID x 7 days. Continue wound care. Continue therapy.  Has ambulated 304 feet with rolling walker.      Interval History:   01/23/23 Resting in bed without complaints.  Urine culture show growth of enterococcus faecalis not sensitive to rocephin. Started macrobid 100 mgpo BID x 7 days. Continue wound care. Continue therapy.  Has ambulated 304 feet with rolling walker.  Review of Systems   Respiratory:  Negative for cough and shortness of breath.    Cardiovascular:  Negative for chest pain.   Gastrointestinal:  Negative for diarrhea, nausea and vomiting.        Last bm Friday    Genitourinary:  Negative for dysuria.   Skin:  Positive for wound.   Neurological:  Positive for weakness.   Objective:     Vital Signs (Most Recent):  Temp: 98.3 °F (36.8 °C) (01/23/23 0725)  Pulse: 70 (01/23/23 0725)  Resp: 18 (01/23/23 0725)  BP: 108/78 (01/23/23 0725)  SpO2: 98 % (01/23/23 0725) Vital Signs (24h Range):  Temp:  [98.3 °F (36.8 °C)-98.5 °F (36.9 °C)] 98.3 °F (36.8  °C)  Pulse:  [68-75] 70  Resp:  [18-20] 18  SpO2:  [97 %-98 %] 98 %  BP: (108-116)/(74-78) 108/78     Weight: 63.1 kg (139 lb 3.2 oz)  Body mass index is 23.89 kg/m².    Intake/Output Summary (Last 24 hours) at 1/23/2023 1015  Last data filed at 1/23/2023 1009  Gross per 24 hour   Intake 1040 ml   Output --   Net 1040 ml      Physical Exam  HENT:      Mouth/Throat:      Mouth: Mucous membranes are moist.   Cardiovascular:      Rate and Rhythm: Normal rate and regular rhythm.      Pulses: Normal pulses.      Heart sounds: Normal heart sounds.   Pulmonary:      Effort: Pulmonary effort is normal.      Breath sounds: Normal breath sounds.   Abdominal:      General: Bowel sounds are normal.      Palpations: Abdomen is soft.   Skin:     General: Skin is warm and dry.      Comments: Wound to left heel   Neurological:      Mental Status: She is alert and oriented to person, place, and time.   Psychiatric:         Mood and Affect: Mood normal.       Significant Labs: All pertinent labs within the past 24 hours have been reviewed.  Recent Lab Results         01/23/23  0822        Anion Gap 8       Baso # 0.02       Basophil % 0.4       BUN 14       BUN/CREAT RATIO 18       Calcium 9.2       Chloride 104       CO2 35       Creatinine 0.80       Differential Type Auto       eGFR 87       Eos # 0.58       Eosinophil % 11.4       Glucose 101       Hematocrit 37.3       Hemoglobin 12.0       Lymph # 1.83       Lymph % 36.1       MCH 28.2       MCHC 32.2       MCV 87.8       Mono # 0.54       Mono % 10.7       MPV 10.1       Neutrophils, Abs 2.10       Neutrophils Relative 41.4       Platelets 137       Potassium 4.6       RBC 4.25       RDW 16.0       Sodium 142       WBC 5.07               Significant Imaging: I have reviewed all pertinent imaging results/findings within the past 24 hours.      Assessment/Plan:      Constipation    01/17/23 resolved    Continue miralax      01/23 last bm Friday 01/20    Laceration of chin  PT  SUSTAINED A CUT WHEN EX SPOUSE SHAVED CHIN WITH BLEEDING NOTED THAT STOPPED AFTER PRESSURE APPLIED.  PT IS ON PLAVIX/LOVENOX      Skin breakdown  01/05/2023   Continue wound care to sacrum, left groin and left heel  01/14/2023  CONTINUE WOUND CARE TEAM TREATMENT      UTI (urinary tract infection)  01/19/23 UA shows   Latest Reference Range & Units Most Recent   Color, UA Colorless, Straw, Yellow, Light Yellow, Dark Yellow  Yellow  1/19/23 14:06   Appearance, UA Clear  Slightly Cloudy  1/19/23 14:06   Specific Gravity, UA <=1.005, 1.010, 1.015, 1.020, 1.025, 1.030  >=1.030 !  1/19/23 14:06   pH, UA 5.0 to 8.0 pH Units 5.5  1/19/23 14:06   Protein, UA Negative  30 !  1/19/23 14:06   Glucose, UA Normal mg/dL Negative  1/19/23 14:06   Ketones, UA Negative mg/dL Negative  1/19/23 14:06   Occult Blood UA Negative  Moderate !  1/19/23 14:06   NITRITE UA Negative  Negative  1/19/23 14:06   UROBILINOGEN UA 0.2, 1.0, Normal mg/dL 0.2  1/19/23 14:06   Bilirubin (UA) Negative  Negative  1/19/23 14:06   Leukocytes, UA Negative  Moderate !  1/19/23 14:06   RBC, UA None Seen, 0-3 /hpf 10-15 !  1/19/23 14:06   WBC, UA None Seen, 0-5 /hpf 15-25 !  1/19/23 14:06   Bacteria, UA None Seen /hpf Moderate !  1/19/23 14:06   Squam Epithel, UA None Seen, Rare, None Seen To Occasional /lpf Few !  1/19/23 14:06   WBC Clumps, UA None Seen /hpf Few !  1/19/23 14:06   Trichomonas, UA None Seen /hpf None Seen  5/20/21 10:40   Amorphous, UA None Seen /lpf Few !  4/12/22 12:17   Renal Epithel, UA None Seen /lpf Occasional !  5/20/21 10:40   Trans Epithel, UA None Seen /lpf Few !  1/19/23 14:06   Mucus, UA None Seen /hpf Few !  10/20/22 05:10   Yeast, UA None Seen /hpf None Seen  5/20/21 10:40   Ca Oxalate Marika, UA None Seen /lpf Few !  1/19/23 14:06   !: Data is abnormal  Started on IM rocephin- daily for 5 days  She has very poor venous access- refused IV after 3 unsuccessful attempts. Requested IM med    01/23 urine shows >100,000 Enterococcus  faecalis Abnormal  - started macrobid       Hyperlipidemia  01/05 continue atorvastatin daily         Thyroid disease  01/05/2023  Continue levothyroxine 125 mcg daily  01/14/2023  TSH 1.880 10/22  CONTINUE POC        Pneumonia of upper lobe due to infectious organism  01/05/2023  Will receive last dose of rocephin and zithromax today     01/09 resolved  01/14/2023  RESOLVED    DVT prophylaxis  01/05/2023  Continue OCTAVIA hose and plavix, continue lovenox  01/14/2023  PT IS AT RISK FOR VTE  VTE PPX  CONTINUE LOVENOX,PLAVIX,TEDS/EARLY AMBULATION    \  01/17 continue plavix and lovenox        Idiopathic peripheral neuropathy  01/05/2023  Gabapentin 800 mg po TID   01/14/2022  IMPROVED      Insomnia  01/05/2023  Continue melatonin prn       Hypertension  01/05/2023  Continue norvasc and atorvastatin    01/09 stable  01/14/2023  BP IS STABLE 111/82    Depressive disorder  01/05/2023  Continue zoloft 50 mg po daily    01/06/2023  Increased to home dose of 150 mg zolot    01/14/2023  MOOD IS IMPROVED            Generalized anxiety disorder  01/05/2023  Continue lorazepam prn anxiety      Muscle weakness  01/05/2023 continue therapy for weakness  01/14/2023  CONTINUE PT/OT      01/18 ambulated 240 feet with therapy today    01/23 has ambulated 360 feet with rolling walker      VTE Risk Mitigation (From admission, onward)         Ordered     enoxaparin injection 40 mg  Daily         01/09/23 1223                Discharge Planning   POOL: 1/30/2023     Code Status: DNR   Is the patient medically ready for discharge?:     Reason for patient still in hospital (select all that apply): Treatment  Discharge Plan A: Home with family                  KEVAN Funk  Department of Hospital Medicine   [facility] - Medical Surgical Unit

## 2023-01-23 NOTE — PT/OT/SLP PROGRESS
Physical Therapy Treatment    Patient Name:  Herlinda aVldovinos   MRN:  [id number]    Recommendations:     Discharge Recommendations: home health PT  Discharge Equipment Recommendations: cane, straight, cane, quad  Barriers to discharge: None    Assessment:     Herlinda Valdovinos is a 57 y.o. female admitted with a medical diagnosis of <principal problem not specified>.  She presents with the following impairments/functional limitations: weakness .    Rehab Prognosis: Good and Fair; patient would benefit from acute skilled PT services to address these deficits and reach maximum level of function.    Recent Surgery: * No surgery found *      Received Plan of Care per [facility]    Plan:     During this hospitalization, patient to be seen 5 x/week to address the identified rehab impairments via gait training, therapeutic activities, therapeutic exercises and progress toward the following goals:    Plan of Care Expires:  01/30/23    Subjective     Chief Complaint: just had wound care  Patient/Family Comments/goals: agrees to PT Tx as able   Pain/Comfort:  Pain Rating 1: 0/10      Objective:     Communicated with patient prior to session.  Patient found sitting edge of bed with oxygen upon PT entry to room.     General Precautions: Standard, fall  Orthopedic Precautions: LLE toe touch weight bearing  Braces: N/A  Respiratory Status: Nasal cannula, flow 2 L/min     Functional Mobility:  Bed Mobility:     Scooting: modified independence  Sit to Supine: stand by assistance and contact guard assistance  Transfers:     Sit to Stand:  modified independence with rolling walker  Gait: 120ft with rolling walker (no O2), CGA, VC's reminders for WB status; pt fatigued easily today      AM-PAC 6 CLICK MOBILITY  Turning over in bed (including adjusting bedclothes, sheets and blankets)?: 4  Sitting down on and standing up from a chair with arms (e.g., wheelchair, bedside commode, etc.): 4  Moving from lying on back to sitting on  "the side of the bed?: 4  Moving to and from a bed to a chair (including a wheelchair)?: 3  Need to walk in hospital room?: 3  Climbing 3-5 steps with a railing?: 2  Basic Mobility Total Score: 20       Treatment & Education:  Pt with fatigue post gait and collapsed onto bed with lack of safety awareness; pt understands this was not the safe way to get back into bed, "I never do that".  Supine BLE exercises x 20 repetitions each: ankle pumps, heel slides, hip abduction, straight leg raises   Gait and transfers as noted above     Patient left HOB elevated with all lines intact, call button in reach, and CNA, OTR present..    GOALS:   Multidisciplinary Problems       Physical Therapy Goals          Problem: Physical Therapy    Goal Priority Disciplines Outcome Goal Variances Interventions   Physical Therapy Goal     PT, PT/OT Ongoing, Progressing     Description: Short-term Goals: 2 weeks  1. Patient will perform supine to/from sit with supervision to improve independence and safety with bed mobility.  2. Patient will perform sit to/from stand with rolling walker with contact guard assist to improve independence and safety with transfers.  3. Patient will ambulate 15 feet with standard walker with minimal assistance while maintaining appropriate left lower extremity weightbearing status to improve independence and safety with gait.  4. Patient will tolerate 15 minutes of physical therapy intervention to improve endurance and activity tolerance.    Long-term goals: 4 weeks  1. Patient will perform supine to/from sit with modified independence to improve independence and safety with bed mobility.  2. Patient will perform sit to/from stand with rolling walker with standby assist to improve independence and safety with transfers.  3. Patient will ambulate 30 feet with standard walker with contact guard assist while maintaining appropriate left lower extremity weightbearing status to improve independence and safety with " gait.  4. Patient will tolerate 30 minutes of physical therapy intervention to improve endurance and activity tolerance.                         Time Tracking:     PT Received On: 01/23/23  PT Start Time: 1400     PT Stop Time: 1418  PT Total Time (min): 18 min     Billable Minutes: Gait Training 13, 5 minutes exercises     Treatment Type: Treatment  PT/PTA: PTA     PTA Visit Number: 5 01/23/2023

## 2023-01-24 PROCEDURE — 63600175 PHARM REV CODE 636 W HCPCS: Performed by: NURSE PRACTITIONER

## 2023-01-24 PROCEDURE — 94761 N-INVAS EAR/PLS OXIMETRY MLT: CPT

## 2023-01-24 PROCEDURE — 27000944

## 2023-01-24 PROCEDURE — 11000004 HC SNF PRIVATE

## 2023-01-24 PROCEDURE — 97535 SELF CARE MNGMENT TRAINING: CPT

## 2023-01-24 PROCEDURE — 97116 GAIT TRAINING THERAPY: CPT

## 2023-01-24 PROCEDURE — 27000221 HC OXYGEN, UP TO 24 HOURS

## 2023-01-24 PROCEDURE — 99900035 HC TECH TIME PER 15 MIN (STAT)

## 2023-01-24 PROCEDURE — 25000003 PHARM REV CODE 250: Performed by: NURSE PRACTITIONER

## 2023-01-24 RX ADMIN — GABAPENTIN 600 MG: 300 CAPSULE ORAL at 09:01

## 2023-01-24 RX ADMIN — MICONAZOLE NITRATE: 20 POWDER TOPICAL at 09:01

## 2023-01-24 RX ADMIN — SERTRALINE HYDROCHLORIDE 150 MG: 50 TABLET ORAL at 08:01

## 2023-01-24 RX ADMIN — ATORVASTATIN CALCIUM 40 MG: 40 TABLET, FILM COATED ORAL at 09:01

## 2023-01-24 RX ADMIN — SENNOSIDES AND DOCUSATE SODIUM 1 TABLET: 50; 8.6 TABLET ORAL at 08:01

## 2023-01-24 RX ADMIN — MELATONIN TAB 3 MG 9 MG: 3 TAB at 09:01

## 2023-01-24 RX ADMIN — THERA TABS 1 TABLET: TAB at 08:01

## 2023-01-24 RX ADMIN — HYDROCORTISONE 2.5%: 25 CREAM TOPICAL at 08:01

## 2023-01-24 RX ADMIN — SENNOSIDES AND DOCUSATE SODIUM 1 TABLET: 50; 8.6 TABLET ORAL at 09:01

## 2023-01-24 RX ADMIN — OXYCODONE HYDROCHLORIDE AND ACETAMINOPHEN 500 MG: 500 TABLET ORAL at 08:01

## 2023-01-24 RX ADMIN — HYDROCODONE BITARTRATE AND ACETAMINOPHEN 1 TABLET: 10; 325 TABLET ORAL at 02:01

## 2023-01-24 RX ADMIN — OXYCODONE HYDROCHLORIDE AND ACETAMINOPHEN 500 MG: 500 TABLET ORAL at 09:01

## 2023-01-24 RX ADMIN — POLYETHYLENE GLYCOL 3350 17 G: 17 POWDER, FOR SOLUTION ORAL at 08:01

## 2023-01-24 RX ADMIN — MICONAZOLE NITRATE: 20 POWDER TOPICAL at 08:01

## 2023-01-24 RX ADMIN — VALSARTAN 160 MG: 160 TABLET, FILM COATED ORAL at 08:01

## 2023-01-24 RX ADMIN — NITROFURANTOIN (MONOHYDRATE/MACROCRYSTALS) 100 MG: 75; 25 CAPSULE ORAL at 09:01

## 2023-01-24 RX ADMIN — HYDROCORTISONE 2.5%: 25 CREAM TOPICAL at 09:01

## 2023-01-24 RX ADMIN — AMLODIPINE BESYLATE 10 MG: 5 TABLET ORAL at 08:01

## 2023-01-24 RX ADMIN — CLOPIDOGREL BISULFATE 75 MG: 75 TABLET, FILM COATED ORAL at 08:01

## 2023-01-24 RX ADMIN — LORAZEPAM 0.5 MG: 0.5 TABLET ORAL at 09:01

## 2023-01-24 RX ADMIN — ENOXAPARIN SODIUM 40 MG: 40 INJECTION SUBCUTANEOUS at 08:01

## 2023-01-24 RX ADMIN — GABAPENTIN 600 MG: 300 CAPSULE ORAL at 08:01

## 2023-01-24 RX ADMIN — HYDROCODONE BITARTRATE AND ACETAMINOPHEN 1 TABLET: 10; 325 TABLET ORAL at 09:01

## 2023-01-24 RX ADMIN — GABAPENTIN 600 MG: 300 CAPSULE ORAL at 02:01

## 2023-01-24 RX ADMIN — OMEGA-3 FATTY ACIDS CAP 1000 MG 1 CAPSULE: 1000 CAP at 08:01

## 2023-01-24 RX ADMIN — PANTOPRAZOLE SODIUM 40 MG: 40 TABLET, DELAYED RELEASE ORAL at 08:01

## 2023-01-24 RX ADMIN — LEVOTHYROXINE SODIUM 125 MCG: 25 TABLET ORAL at 06:01

## 2023-01-24 RX ADMIN — NITROFURANTOIN (MONOHYDRATE/MACROCRYSTALS) 100 MG: 75; 25 CAPSULE ORAL at 08:01

## 2023-01-24 NOTE — PLAN OF CARE
Problem: Adult Inpatient Plan of Care  Goal: Plan of Care Review  Outcome: Ongoing, Progressing  Goal: Patient-Specific Goal (Individualized)  Outcome: Ongoing, Progressing  Goal: Absence of Hospital-Acquired Illness or Injury  Outcome: Ongoing, Progressing  Goal: Optimal Comfort and Wellbeing  Outcome: Ongoing, Progressing     Problem: Fluid Imbalance (Pneumonia)  Goal: Fluid Balance  Outcome: Ongoing, Progressing     Problem: Infection (Pneumonia)  Goal: Resolution of Infection Signs and Symptoms  Outcome: Ongoing, Progressing     Problem: Respiratory Compromise (Pneumonia)  Goal: Effective Oxygenation and Ventilation  Outcome: Ongoing, Progressing     Problem: Skin Injury Risk Increased  Goal: Skin Health and Integrity  Outcome: Ongoing, Progressing

## 2023-01-24 NOTE — PT/OT/SLP PROGRESS
Occupational Therapy   Treatment    Name: Herlinda Valdovinos  MRN: [id number]  Admitting Diagnosis:  <principal problem not specified>       Recommendations:     Discharge Recommendations: other (see comments) (Home health versus placement in SNF/shelter)  Discharge Equipment Recommendations:     Barriers to discharge:  Other (Comment) (Per pt and family report, pt lives in stressful environment with significant other, son, and granddaughter.)    Assessment:     Herlinda Valdovinos is a 57 y.o. female with a medical diagnosis of <principal problem not specified>.  She presents with performance deficits affecting function are weakness, impaired endurance, impaired sensation, impaired self care skills, impaired functional mobility, gait instability, impaired balance, pain, decreased ROM.     Rehab Prognosis:  Good; patient would benefit from acute skilled OT services to address these deficits and reach maximum level of function.       Plan:     Patient to be seen 5 x/week to address the above listed problems via self-care/home management, therapeutic activities, therapeutic exercises, neuromuscular re-education  Plan of Care Expires: 02/03/23  Plan of Care Reviewed with:      Subjective     Pain/Comfort:   None reported.     Objective:     Communicated with: Nurse prior to session.  Patient found supine with oxygen upon OT entry to room.    General Precautions: Standard, fall    Orthopedic Precautions:   Braces:    Respiratory Status: Nasal cannula, flow 2 L/min     Occupational Performance:     Bed Mobility:    Patient completed Supine to Sit with modified independence  Patient completed Sit to Supine with modified independence     Functional Mobility/Transfers:  Patient completed Sit <> Stand Transfer with supervision  with  rolling walker   Patient completed Bed <> Chair Transfer using Step Transfer technique with supervision and stand by assistance with rolling walker  Functional Mobility: x 10 feet in room with RW with  SBA-Sup    Activities of Daily Living:      [facility] 6 Click ADL:      Treatment & Education:  Performed functional t/f training with use of RW requiring SBA-SUP, as well as functional mobility in room with RW with SBA-SUP. Instructed in safe technique demonstrating good understanding/carryover.     Patient left supine with call button in reach    GOALS:   Multidisciplinary Problems       Occupational Therapy Goals          Problem: Occupational Therapy    Goal Priority Disciplines Outcome Interventions   Occupational Therapy Goal     OT, PT/OT Ongoing, Progressing    Description: Goals to be met by: 2 weeks     Patient will increase functional independence with ADLs by performing:    UE Dressing with Set-up Assistance.  LE Dressing with Stand-by Assistance.  Toileting from toilet with Stand-by Assistance for hygiene and clothing management.   Bathing from  edge of bed with Stand-by Assistance.  Dynamic standing with SBA, tolerating x 6 mins continuous stand, no LOB.     Goals to be met by: 4 weeks     Patient will increase functional independence with ADLs by performing:    UE Dressing with Modified [place].  LE Dressing with Modified [place].  Toileting from toilet with Modified [place] for hygiene and clothing management.   Bathing from  shower chair/bench with Modified [place].  Light IADLs Tiffany with RW.                            Time Tracking:     OT Date of Treatment: 01/23/23  OT Start Time: 1415  OT Stop Time: 1431  OT Total Time (min): 16 min    Billable Minutes:Therapeutic Activity 16    MARCUS Levy, OTR/L      OT/HEMAL: OT       1/23/2023

## 2023-01-24 NOTE — PT/OT/SLP PROGRESS
Physical Therapy Treatment    Patient Name:  Herlinda Valdovinos   MRN:  [id number]    Recommendations:     Discharge Recommendations: home health PT  Discharge Equipment Recommendations: cane, straight, cane, quad  Barriers to discharge: None    Assessment:     Herlinda Valdovinos is a 57 y.o. female admitted with a medical diagnosis of UTI without hematuria.  She presents with the following impairments/functional limitations: weakness.    Rehab Prognosis: Good; patient would benefit from acute skilled PT services to address these deficits and reach maximum level of function.    Recent Surgery: * No surgery found *      Plan:     During this hospitalization, patient to be seen 5 x/week to address the identified rehab impairments via gait training, therapeutic activities, therapeutic exercises and progress toward the following goals:    Plan of Care Expires:  01/30/23    Subjective     Chief Complaint: no complaints this afternoon  Patient/Family Comments/goals: Patient reports she is ready to go home tomorrow (1/25/2023)  Pain/Comfort:  Pain Rating 1: 0/10  Pain Rating Post-Intervention 1: 0/10    Objective:     Communicated with nurse prior to session. Patient found supine with oxygen with OT and patient's daughter at bedside upon PT entry to room.     General Precautions: Standard, fall  Orthopedic Precautions: LLE toe touch weight bearing  Braces: N/A  Respiratory Status: Nasal cannula     Functional Mobility:  Bed Mobility:     Supine to Sit: supervision  Sit to Supine: supervision  Transfers:     Sit to Stand:  stand by assistance with rolling walker  Bed to Chair: stand by assistance with  rolling walker  using  Stand Pivot  Gait: 240 feet with rolling walker (toe touch weightbearing left lower extremity); verbal cues to increase upright posture and increase step lengths; no loss of balance; noted fatigue after ~150 feet    AM-PAC 6 CLICK MOBILITY  Turning over in bed (including adjusting bedclothes, sheets and  blankets)?: 4  Sitting down on and standing up from a chair with arms (e.g., wheelchair, bedside commode, etc.): 4  Moving from lying on back to sitting on the side of the bed?: 4  Moving to and from a bed to a chair (including a wheelchair)?: 4  Need to walk in hospital room?: 4  Climbing 3-5 steps with a railing?: 3  Basic Mobility Total Score: 23     Treatment & Education:    Bed mobility, transfers, and gait performed as listed above.    Patient left supine with call button in reach and occupational therapist and nurse present.    GOALS:   Multidisciplinary Problems       Physical Therapy Goals          Problem: Physical Therapy    Goal Priority Disciplines Outcome Goal Variances Interventions   Physical Therapy Goal     PT, PT/OT Ongoing, Progressing     Description: Short-term Goals: 2 weeks  1. Patient will perform supine to/from sit with supervision to improve independence and safety with bed mobility.  2. Patient will perform sit to/from stand with rolling walker with contact guard assist to improve independence and safety with transfers.  3. Patient will ambulate 15 feet with standard walker with minimal assistance while maintaining appropriate left lower extremity weightbearing status to improve independence and safety with gait.  4. Patient will tolerate 15 minutes of physical therapy intervention to improve endurance and activity tolerance.    Long-term goals: 4 weeks  1. Patient will perform supine to/from sit with modified independence to improve independence and safety with bed mobility.  2. Patient will perform sit to/from stand with rolling walker with standby assist to improve independence and safety with transfers.  3. Patient will ambulate 30 feet with standard walker with contact guard assist while maintaining appropriate left lower extremity weightbearing status to improve independence and safety with gait.  4. Patient will tolerate 30 minutes of physical therapy intervention to improve  endurance and activity tolerance.                       Time Tracking:     PT Received On: 01/24/23  PT Start Time: 1420     PT Stop Time: 1448  PT Total Time (min): 28 min     Billable Minutes: Gait Training 15 minutes    Treatment Type: Treatment, 6th Visit  PT/PTA: PT     PTA Visit Number: 0     01/24/2023

## 2023-01-24 NOTE — PLAN OF CARE
[facility] - Medical Surgical Unit  Discharge Assessment      I visited with pt and her daughter in her room to discuss plan for D/C home on 1/25. Pt verb understanding and signed IMM and pt's choice form for OP rehab here at [place].

## 2023-01-24 NOTE — PLAN OF CARE
Problem: Fall Injury Risk  Goal: Absence of Fall and Fall-Related Injury  Outcome: Ongoing, Progressing  Intervention: Promote Injury-Free Environment  Flowsheets (Taken 1/24/2023 1616)  Safety Promotion/Fall Prevention:   assistive device/personal item within reach   side rails raised x 3   instructed to call staff for mobility   nonskid shoes/socks when out of bed   room near unit station

## 2023-01-25 VITALS
RESPIRATION RATE: 18 BRPM | TEMPERATURE: 98 F | HEART RATE: 75 BPM | SYSTOLIC BLOOD PRESSURE: 110 MMHG | WEIGHT: 139.19 LBS | HEIGHT: 64 IN | OXYGEN SATURATION: 98 % | BODY MASS INDEX: 23.76 KG/M2 | DIASTOLIC BLOOD PRESSURE: 75 MMHG

## 2023-01-25 PROCEDURE — 27000221 HC OXYGEN, UP TO 24 HOURS

## 2023-01-25 PROCEDURE — 94761 N-INVAS EAR/PLS OXIMETRY MLT: CPT

## 2023-01-25 PROCEDURE — 27000944

## 2023-01-25 PROCEDURE — 99316 NF DSCHRG MGMT 30 MIN+: CPT | Mod: ,,, | Performed by: NURSE PRACTITIONER

## 2023-01-25 PROCEDURE — 27201920 HC DRESSING, AQUACEL AG

## 2023-01-25 PROCEDURE — 97535 SELF CARE MNGMENT TRAINING: CPT

## 2023-01-25 PROCEDURE — 25000003 PHARM REV CODE 250: Performed by: NURSE PRACTITIONER

## 2023-01-25 PROCEDURE — 63600175 PHARM REV CODE 636 W HCPCS: Performed by: NURSE PRACTITIONER

## 2023-01-25 PROCEDURE — 99316 PR NURSING FAC DISCHRGE DAY,MORE 30 MIN: ICD-10-PCS | Mod: ,,, | Performed by: NURSE PRACTITIONER

## 2023-01-25 PROCEDURE — 97116 GAIT TRAINING THERAPY: CPT | Mod: CQ

## 2023-01-25 RX ORDER — CLOPIDOGREL BISULFATE 75 MG/1
75 TABLET ORAL DAILY
Qty: 30 TABLET | Refills: 11 | Status: SHIPPED | OUTPATIENT
Start: 2023-01-25 | End: 2024-01-25

## 2023-01-25 RX ORDER — POLYETHYLENE GLYCOL 3350 17 G/17G
17 POWDER, FOR SOLUTION ORAL 2 TIMES DAILY PRN
Qty: 60 PACKET | Refills: 0 | Status: SHIPPED | OUTPATIENT
Start: 2023-01-25 | End: 2023-02-24

## 2023-01-25 RX ORDER — CALCIUM CARBONATE 200(500)MG
500 TABLET,CHEWABLE ORAL 2 TIMES DAILY PRN
Qty: 30 TABLET | Refills: 0 | Status: SHIPPED | OUTPATIENT
Start: 2023-01-25 | End: 2023-02-24

## 2023-01-25 RX ORDER — HYDROCODONE BITARTRATE AND ACETAMINOPHEN 10; 325 MG/1; MG/1
1 TABLET ORAL EVERY 6 HOURS PRN
Qty: 10 TABLET | Refills: 0 | Status: CANCELLED | OUTPATIENT
Start: 2023-01-25 | End: 2023-02-01

## 2023-01-25 RX ORDER — NITROFURANTOIN 25; 75 MG/1; MG/1
100 CAPSULE ORAL EVERY 12 HOURS
Qty: 10 CAPSULE | Refills: 0 | Status: SHIPPED | OUTPATIENT
Start: 2023-01-25 | End: 2023-01-30

## 2023-01-25 RX ORDER — AMOXICILLIN 250 MG
1 CAPSULE ORAL 2 TIMES DAILY
Qty: 60 TABLET | Refills: 0 | Status: SHIPPED | OUTPATIENT
Start: 2023-01-25 | End: 2023-02-24

## 2023-01-25 RX ORDER — LEVOTHYROXINE SODIUM 125 UG/1
125 TABLET ORAL
Qty: 30 TABLET | Refills: 11 | Status: SHIPPED | OUTPATIENT
Start: 2023-01-26 | End: 2024-01-26

## 2023-01-25 RX ORDER — LORAZEPAM 0.5 MG/1
0.5 TABLET ORAL EVERY 12 HOURS PRN
Qty: 15 TABLET | Refills: 0 | Status: SHIPPED | OUTPATIENT
Start: 2023-01-25 | End: 2023-02-24

## 2023-01-25 RX ORDER — SERTRALINE HYDROCHLORIDE 50 MG/1
150 TABLET, FILM COATED ORAL DAILY
Qty: 90 TABLET | Refills: 11 | Status: SHIPPED | OUTPATIENT
Start: 2023-01-25 | End: 2024-01-25

## 2023-01-25 RX ORDER — TALC
9 POWDER (GRAM) TOPICAL NIGHTLY PRN
Qty: 30 TABLET | Refills: 0 | Status: SHIPPED | OUTPATIENT
Start: 2023-01-25 | End: 2023-02-24

## 2023-01-25 RX ORDER — GLUCOSAM/CHONDRO/HERB 149/HYAL 750-100 MG
1 TABLET ORAL DAILY
Qty: 90 CAPSULE | Refills: 3 | Status: SHIPPED | OUTPATIENT
Start: 2023-01-25 | End: 2024-01-25

## 2023-01-25 RX ORDER — GABAPENTIN 300 MG/1
600 CAPSULE ORAL 3 TIMES DAILY
Qty: 180 CAPSULE | Refills: 11 | Status: SHIPPED | OUTPATIENT
Start: 2023-01-25 | End: 2024-01-25

## 2023-01-25 RX ADMIN — CLOPIDOGREL BISULFATE 75 MG: 75 TABLET, FILM COATED ORAL at 09:01

## 2023-01-25 RX ADMIN — AMLODIPINE BESYLATE 10 MG: 5 TABLET ORAL at 09:01

## 2023-01-25 RX ADMIN — THERA TABS 1 TABLET: TAB at 09:01

## 2023-01-25 RX ADMIN — POLYETHYLENE GLYCOL 3350 17 G: 17 POWDER, FOR SOLUTION ORAL at 09:01

## 2023-01-25 RX ADMIN — MICONAZOLE NITRATE: 20 POWDER TOPICAL at 09:01

## 2023-01-25 RX ADMIN — SENNOSIDES AND DOCUSATE SODIUM 1 TABLET: 50; 8.6 TABLET ORAL at 09:01

## 2023-01-25 RX ADMIN — OMEGA-3 FATTY ACIDS CAP 1000 MG 1 CAPSULE: 1000 CAP at 09:01

## 2023-01-25 RX ADMIN — ENOXAPARIN SODIUM 40 MG: 40 INJECTION SUBCUTANEOUS at 09:01

## 2023-01-25 RX ADMIN — PANTOPRAZOLE SODIUM 40 MG: 40 TABLET, DELAYED RELEASE ORAL at 09:01

## 2023-01-25 RX ADMIN — SERTRALINE HYDROCHLORIDE 150 MG: 50 TABLET ORAL at 09:01

## 2023-01-25 RX ADMIN — LEVOTHYROXINE SODIUM 125 MCG: 25 TABLET ORAL at 06:01

## 2023-01-25 RX ADMIN — GABAPENTIN 600 MG: 300 CAPSULE ORAL at 09:01

## 2023-01-25 RX ADMIN — VALSARTAN 160 MG: 160 TABLET, FILM COATED ORAL at 09:01

## 2023-01-25 RX ADMIN — NITROFURANTOIN (MONOHYDRATE/MACROCRYSTALS) 100 MG: 75; 25 CAPSULE ORAL at 09:01

## 2023-01-25 RX ADMIN — OXYCODONE HYDROCHLORIDE AND ACETAMINOPHEN 500 MG: 500 TABLET ORAL at 09:01

## 2023-01-25 NOTE — PT/OT/SLP PROGRESS
Occupational Therapy   Treatment    Name: Herlinda Valdovinos  MRN: [id number]  Admitting Diagnosis:  <principal problem not specified>       Recommendations:     Discharge Recommendations: other (see comments) (Home health versus placement in SNF/penitentiary)  Discharge Equipment Recommendations:     Barriers to discharge:  Other (Comment) (Per pt and family report, pt lives in stressful environment with significant other, son, and granddaughter.)    Assessment:     Herlinda Valdovinos is a 57 y.o. female with a medical diagnosis of <principal problem not specified>.  She presents with performance deficits affecting function are weakness, impaired endurance, impaired sensation, impaired self care skills, impaired functional mobility, gait instability, impaired balance, pain, decreased ROM.     Rehab Prognosis:  Good; patient would benefit from acute skilled OT services to address these deficits and reach maximum level of function.       Plan:     Patient to be seen 5 x/week to address the above listed problems via self-care/home management, therapeutic activities, therapeutic exercises, neuromuscular re-education  Plan of Care Expires: 02/03/23  Plan of Care Reviewed with:      Subjective     Pain/Comfort:  Pain Rating 1: 0/10  Pain Rating Post-Intervention 1: 0/10    Objective:     Communicated with: Nurse prior to session.  Patient found supine with oxygen upon OT entry to room.    General Precautions: Standard, fall    Orthopedic Precautions:   Braces:    Respiratory Status: Nasal cannula, flow 2 L/min     Occupational Performance:     Bed Mobility:    Patient completed Supine to Sit with modified independence  Patient completed Sit to Supine with modified independence     Functional Mobility/Transfers:  Patient completed Sit <> Stand Transfer with supervision  with  rolling walker   Patient completed Bed <> Chair Transfer using Step Transfer technique with supervision with rolling walker  Patient completed Toilet Transfer  Step Transfer technique with supervision and stand by assistance with  rolling walker  Functional Mobility: x 10 feet in room with RW with SBA-Sup    Activities of Daily Living:  UB dressing set-up  LB dressing SUP sitting EOB/standing.   Toileting SBA-SUP in bathroom   Grooming SBA-SUP standing sink side    [facility] 6 Click ADL: 24    Treatment & Education:  Performed TB dressing with set-up/SUP sitting/standing EOB demonstrating improved performance. Performed all aspects of toileting in bathroom with SBA-SUP and set-up for posterior jose care. Educated pt and spouse who came in near of treatment session, regarding importance of continuing self care performance at home, including dressing, bathing and toileting, not reverting back to allowing extensive assistance from family members unnecessarily. Both verbalized good understanding.     Patient left supine with call button in reach    GOALS:   Multidisciplinary Problems       Occupational Therapy Goals          Problem: Occupational Therapy    Goal Priority Disciplines Outcome Interventions   Occupational Therapy Goal     OT, PT/OT Ongoing, Progressing    Description: Goals to be met by: 2 weeks     Patient will increase functional independence with ADLs by performing:    UE Dressing with Set-up Assistance.  LE Dressing with Stand-by Assistance.  Toileting from toilet with Stand-by Assistance for hygiene and clothing management.   Bathing from  edge of bed with Stand-by Assistance.  Dynamic standing with SBA, tolerating x 6 mins continuous stand, no LOB.     Goals to be met by: 4 weeks     Patient will increase functional independence with ADLs by performing:    UE Dressing with Modified [place].  LE Dressing with Modified [place].  Toileting from toilet with Modified [place] for hygiene and clothing management.   Bathing from  shower chair/bench with Modified [place].  Light IADLs Tiffany with RW.                            Time Tracking:     OT  Date of Treatment: 01/25/23  OT Start Time: 0920  OT Stop Time: 0954  OT Total Time (min): 34 min    Billable Minutes:Self Care/Home Management 17    MARCUS Levy, OTR/L      OT/HEMAL: OT       1/25/2023

## 2023-01-25 NOTE — DISCHARGE SUMMARY
[facility]  Hospital Medicine  Discharge Summary      Patient Name: Herlinda Valdovinos  MRN: [id number]  [facility]: [id number]  Patient Class: IP- Swing  Admission Date: 1/4/2023  Hospital Length of Stay: 21 days  Discharge Date and Time:  01/25/2023 8:53 AM  Attending Physician: Chuy Daugherty IV, DO   Discharging Provider: KEVAN Funk  Primary Care Provider: Vini Hernandez NP    Primary Care Team: Networked reference to record PCT     HPI:   Mrs. Kaur was admitted to acute care on 01/01 with UTI and pneumonia. Was started on rocephin and zithromax. She has completed 4/5 days of treatment for pneumonia. She has pressure wounds to sacrum, left heel and left groin. Wound care is following. 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 She has been mostly chair/ bed bound. States she has not had wounds until recently. States she felt bad approximately 10 days before admission but did not seek treatment.   She will be admitted to [facility] today to continue therapy for weakness. We will also continue wound care to sacrum, left groin and left heel.   She is at high risk for fall.  Talked with her re code status and she chose DNR.      * No surgery found *      Hospital Course:   01/05 Admitted to [facility] on 01/04/2023. Continue therapy for weakness. Continue wound care to sacrum, left groin and left heel. Potassium is low at 3.1. Will replace.    01/06 Awake and resting in bed without complaints. States she is feeling better this morning. Continue with wound care and therapy.    01/09 Awake and resting in bed. Reports having a good weekend. Requests ensure shake BID. Continue wound care to sacrum, left groin and left heel. Continue therapy for strengthening.     01/11/23 Awake and resting in bed without complaints. Continue therapy for strengthening. Continue wound care.  Appetite is good.    01/154/2023 HOSPITAL DAY 10  I WAS ASKED TO EVALUATE PT  DUE PT SUSTAINING A CUT WHEN EX SPOUSE SHAVED CHIN WITH BLEEDING NOTED THAT STOPPED AFTER PRESSURE APPLIED.  PT IS ON PLAVIX/LOVENOX    01/16 Complains of having constipation over the weekend and finally having a bm yesterday. Requests miralax in addition to pericolace. Continue with PT and OT . Continue with wound care to sacrum.    01/17 Resting in bed without complaints. Reports having large BM Sunday. No further complaints of constipation. Continue wound care to sacrum and left heel. Continue therapy.    01/18 Patient states she is feeling 10,000 times better today. She ambulated 240 feet with therapist. Continue wound care.    01/23 Awake ansd resting in bed    01/19 Awake and resting in bed with family at side. Reports feeling good this morning. Continue therapy.    01/20 Awake and resting in bed. No complaints. Urinalysis shows infection. She was started on rocephin yesterday. Will follow culture. Continue therapy.    01/23/23 Resting in bed without complaints.  Urine culture show growth of enterococcus faecalis not sensitive to rocephin. Started macrobid 100 mgpo BID x 7 days. Continue wound care. Continue therapy.  Has ambulated 304 feet with rolling walker.    01/25/23 Will discharge home today. Will need to complete course of macrobid for total of 7 days. She wishes to do outpatient physical therapy.Medication reconciliation done. Continue wound care to sacrum and left heel. She refuses home health services.       Goals of Care Treatment Preferences:  Code Status: DNR      Consults:   Consults (From admission, onward)        Status Ordering Provider     Inpatient consult to Registered Dietitian/Nutritionist  Once        Provider:  (Not yet assigned)    Acknowledged IRAM VELAZQUEZ IV     IP consult to case management  Once        Provider:  (Not yet assigned)    Acknowledged IRAM VELAZQUEZ IV     Inpatient consult to Registered Dietitian/Nutritionist  Once        Provider:  (Not yet assigned)     Acknowledged GUILLERMINA KENNEDY          No new Assessment & Plan notes have been filed under this hospital service since the last note was generated.  Service: Hospital Medicine    Final Active Diagnoses:    Diagnosis Date Noted POA    Constipation [K59.00] 01/16/2023 Unknown    Laceration of chin [S01.81XA] 01/14/2023 No    Skin breakdown [R23.8] 01/01/2023 Yes    UTI (urinary tract infection) [N39.0] 10/26/2022 Unknown    Thyroid disease [E07.9] 10/17/2022 Yes    Pneumonia of upper lobe due to infectious organism [J18.9] 04/13/2022 Yes    DVT prophylaxis [Z29.9] 06/10/2021 Not Applicable    Depressive disorder [F32.A] 05/20/2021 Yes    Hypertension [I10] 05/20/2021 Yes    Idiopathic peripheral neuropathy [G60.9] 05/20/2021 Yes    Muscle weakness [M62.81] 05/15/2021 Yes      Problems Resolved During this Admission:       Discharged Condition: stable    Disposition: Home-Health Care [facility]    Follow Up:   Follow-up Information     Vini Hernandez NP Follow up.    Specialties: Gerontology, Family Medicine, Emergency Medicine  Contact information:  [address] [place] [id number]  [phone]                       Patient Instructions:   No discharge procedures on file.    Significant Diagnostic Studies: Labs: All labs within the past 24 hours have been reviewed    Pending Diagnostic Studies:     None         Medications:  Reconciled Home Medications:      Medication List      START taking these medications    gabapentin 300 MG capsule  Commonly known as: NEURONTIN  Take 2 capsules (600 mg total) by mouth 3 (three) times daily.  Replaces: gabapentin 800 MG tablet     multivitamin Tab  Take 1 tablet by mouth once daily.     nitrofurantoin (macrocrystal-monohydrate) 100 MG capsule  Commonly known as: MACROBID  Take 1 capsule (100 mg total) by mouth every 12 (twelve) hours. for 5 days     omega 3-dha-epa-fish oil 1,000 mg (120 mg-180 mg) Cap  Take 1 capsule by mouth once daily.     polyethylene glycol 17 gram  Pwpk  Commonly known as: GLYCOLAX  Take 17 g by mouth 2 (two) times daily as needed.     senna-docusate 8.6-50 mg 8.6-50 mg per tablet  Commonly known as: PERICOLACE  Take 1 tablet by mouth 2 (two) times daily.        CHANGE how you take these medications    * calcium carbonate 200 mg calcium (500 mg) chewable tablet  Commonly known as: TUMS  Take 1 tablet (500 mg total) by mouth 2 (two) times daily as needed for Heartburn.  What changed: Another medication with the same name was added. Make sure you understand how and when to take each.     * calcium carbonate 200 mg calcium (500 mg) chewable tablet  Commonly known as: TUMS  Take 1 tablet (500 mg total) by mouth 2 (two) times daily as needed for Heartburn.  What changed: You were already taking a medication with the same name, and this prescription was added. Make sure you understand how and when to take each.     clopidogreL 75 mg tablet  Commonly known as: PLAVIX  Take 1 tablet (75 mg total) by mouth once daily.  What changed: See the new instructions.     levothyroxine 125 MCG tablet  Commonly known as: SYNTHROID  Take 1 tablet (125 mcg total) by mouth before breakfast.  Start taking on: January 26, 2023  What changed:   · how much to take  · how to take this  · when to take this     melatonin 3 mg tablet  Commonly known as: MELATIN  Take 3 tablets (9 mg total) by mouth nightly as needed for Insomnia.  What changed: how much to take     sertraline 50 MG tablet  Commonly known as: ZOLOFT  Take 3 tablets (150 mg total) by mouth once daily.  What changed: See the new instructions.         * This list has 2 medication(s) that are the same as other medications prescribed for you. Read the directions carefully, and ask your doctor or other care provider to review them with you.            CONTINUE taking these medications    acetaminophen 325 MG tablet  Commonly known as: TYLENOL  2 tablets PRN     amLODIPine 10 MG tablet  Commonly known as: NORVASC  Take 1 tablet (10  mg total) by mouth once daily.     atorvastatin 40 MG tablet  Commonly known as: LIPITOR  Take 40 mg by mouth every evening.     LORazepam 0.5 MG tablet  Commonly known as: ATIVAN  Take 1 tablet (0.5 mg total) by mouth every 12 (twelve) hours as needed for Anxiety.     ondansetron 4 MG Tbdl  Commonly known as: ZOFRAN-ODT  Take 1 tablet (4 mg total) by mouth every 8 (eight) hours as needed.     pantoprazole 40 MG tablet  Commonly known as: PROTONIX  Take 1 tablet (40 mg total) by mouth once daily.     valsartan 160 MG tablet  Commonly known as: DIOVAN  Take 1 tablet (160 mg total) by mouth once daily.        STOP taking these medications    azithromycin 500 MG tablet  Commonly known as: ZITHROMAX     cefTRIAXone 1 gram injection  Commonly known as: ROCEPHIN     docusate sodium 100 MG capsule  Commonly known as: COLACE     enoxaparin 40 mg/0.4 mL Syrg  Commonly known as: LOVENOX     fish oil-omega-3 fatty acids 300-1,000 mg capsule     gabapentin 800 MG tablet  Commonly known as: NEURONTIN  Replaced by: gabapentin 300 MG capsule     HYDROcodone-acetaminophen  mg per tablet  Commonly known as: NORCO     LIDOcaine HCL 10 mg/ml (1%) 10 mg/mL (1 %) injection            Indwelling Lines/Drains at time of discharge:   Lines/Drains/Airways     None                 Time spent on the discharge of patient: 30 minutes         KEVAN Funk  Department of Hospital Medicine  [facility]

## 2023-01-25 NOTE — PLAN OF CARE
[facility] - Swing Bed   Interdisciplinary Team Meeting    Patient: Herlinda Valdovinos   Today's Date: 1/27/2023   Estimated D/C Date: 1/25/2023        Physician:  Dr Carr Nurse Practitioner:  Brendan Garza NP   Pharmacy: Zina Morales, PharmD Unit Director: JAMIR   : Mery Mcclelland RN Physical/Occupational Therapy: Kiana Rasmussen, OTR/L   Speech Therapy: Kiana Rasmussen OTR/L Activity Therapy: Izzy Lai LPN   Nursing: Elmira Mcgovern RN  Respiratory: Elbert Adams, RT Dietary: Cesar Heaton RD and Willa Collins  Other:      Nursing  New Symptoms/Problems: none  Last Bowel Movement: 01/24/23  Urine: incontinent Diarrhea: No   Constipated: No Bowel: incontinent [place]: No   Isolation: No     O2 Device: Nasal Cannula O2 Flow: 2L  SpO2: 97 %   Nutrition: Cardiac  Speech/Swallowing: No current speech or swallowing issues  Aspiration Precautions: No  Cognition: WNL    Physical Therapy  Physical Therapy/Gait: 150' ELOS: Plan to DC 1/25/2023    Transfers: Standby Assistance Range of Motion/Restrictions:      Occupational Therapy  Eating/Grooming: Modified Independent Toileting: Supervision or Set-up Assistance   Bathing: Supervision or Set-up Assistance Dressing (Upper Body): Supervision or Set-up Assistance   Dressing (Lower Body): Supervision or Set-up Assistance      Activity Therapy  Level of participation: Active participation      Tx Plan/Recommendations reviewed with family and/or patient on (date) [id number].  Additional family Conference/Training:   D/C Plan/Recommendations: Home with family  POOL: 1/25/2023    Pharmacy  Medication Changes (see MD orders in chart): No  MD/NP:  Dr Carr Labs Reviewed: Yes New Lab Orders: No   Lab Concerns:

## 2023-01-25 NOTE — DISCHARGE INSTRUCTIONS
Reviewed discharge instructions with patient and her  who is her care giver.  Reviewed discharge plan on her wound care to left heel.  Patient teaching on changing and cleaning left heel wound and applying dressing.  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

## 2023-01-25 NOTE — DISCHARGE SUMMARY
[facility]  Hospital Medicine  Discharge Summary      Patient Name: Herlinda Valdovinos  MRN: [id number]  [facility]: [id number]  Patient Class: IP- Swing  Admission Date: 1/4/2023  Hospital Length of Stay: 21 days  Discharge Date and Time:  01/25/2023 8:47 AM  Attending Physician: Chuy Daugherty IV DO   Discharging Provider: KEVAN Funk  Primary Care Provider: Vini Hernandez NP    Primary Care Team: Networked reference to record PCT     HPI:   Mrs. Kaur was admitted to acute care on 01/01 with UTI and pneumonia. Was started on rocephin and zithromax. She has completed 4/5 days of treatment for pneumonia. She has pressure wounds to sacrum, left heel and left groin. Wound care is following. 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 She has been mostly chair/ bed bound. States she has not had wounds until recently. States she felt bad approximately 10 days before admission but did not seek treatment.   She will be admitted to [facility] today to continue therapy for weakness. We will also continue wound care to sacrum, left groin and left heel.   She is at high risk for fall.  Talked with her re code status and she chose DNR.      * No surgery found *      Hospital Course:   01/05 Admitted to [facility] on 01/04/2023. Continue therapy for weakness. Continue wound care to sacrum, left groin and left heel. Potassium is low at 3.1. Will replace.    01/06 Awake and resting in bed without complaints. States she is feeling better this morning. Continue with wound care and therapy.    01/09 Awake and resting in bed. Reports having a good weekend. Requests ensure shake BID. Continue wound care to sacrum, left groin and left heel. Continue therapy for strengthening.     01/11/23 Awake and resting in bed without complaints. Continue therapy for strengthening. Continue wound care.  Appetite is good.    01/154/2023 HOSPITAL DAY 10  I WAS ASKED TO EVALUATE PT  DUE PT SUSTAINING A CUT WHEN EX SPOUSE SHAVED CHIN WITH BLEEDING NOTED THAT STOPPED AFTER PRESSURE APPLIED.  PT IS ON PLAVIX/LOVENOX    01/16 Complains of having constipation over the weekend and finally having a bm yesterday. Requests miralax in addition to pericolace. Continue with PT and OT . Continue with wound care to sacrum.    01/17 Resting in bed without complaints. Reports having large BM Sunday. No further complaints of constipation. Continue wound care to sacrum and left heel. Continue therapy.    01/18 Patient states she is feeling 10,000 times better today. She ambulated 240 feet with therapist. Continue wound care.    01/23 Awake ansd resting in bed    01/19 Awake and resting in bed with family at side. Reports feeling good this morning. Continue therapy.    01/20 Awake and resting in bed. No complaints. Urinalysis shows infection. She was started on rocephin yesterday. Will follow culture. Continue therapy.    01/23/23 Resting in bed without complaints.  Urine culture show growth of enterococcus faecalis not sensitive to rocephin. Started macrobid 100 mgpo BID x 7 days. Continue wound care. Continue therapy.  Has ambulated 304 feet with rolling walker.    01/25/23 Will discharge home today. Will need to complete course of macrobid for total of 7 days. She wishes to do outpatient physical therapy.Medication reconciliation done. Continue wound care to sacrum and left heel.        Goals of Care Treatment Preferences:  Code Status: DNR      Consults:   Consults (From admission, onward)        Status Ordering Provider     Inpatient consult to Registered Dietitian/Nutritionist  Once        Provider:  (Not yet assigned)    Acknowledged IRAM VELAZQUEZ IV     IP consult to case management  Once        Provider:  (Not yet assigned)    Acknowledged IRAM VELAZQUEZ IV     Inpatient consult to Registered Dietitian/Nutritionist  Once        Provider:  (Not yet assigned)    Acknowledged GUILLERMINA KENNEDY           UTI (urinary tract infection)  01/19/23 UA shows   Latest Reference Range & Units Most Recent   Color, UA Colorless, Straw, Yellow, Light Yellow, Dark Yellow  Yellow  1/19/23 14:06   Appearance, UA Clear  Slightly Cloudy  1/19/23 14:06   Specific Gravity, UA <=1.005, 1.010, 1.015, 1.020, 1.025, 1.030  >=1.030 !  1/19/23 14:06   pH, UA 5.0 to 8.0 pH Units 5.5  1/19/23 14:06   Protein, UA Negative  30 !  1/19/23 14:06   Glucose, UA Normal mg/dL Negative  1/19/23 14:06   Ketones, UA Negative mg/dL Negative  1/19/23 14:06   Occult Blood UA Negative  Moderate !  1/19/23 14:06   NITRITE UA Negative  Negative  1/19/23 14:06   UROBILINOGEN UA 0.2, 1.0, Normal mg/dL 0.2  1/19/23 14:06   Bilirubin (UA) Negative  Negative  1/19/23 14:06   Leukocytes, UA Negative  Moderate !  1/19/23 14:06   RBC, UA None Seen, 0-3 /hpf 10-15 !  1/19/23 14:06   WBC, UA None Seen, 0-5 /hpf 15-25 !  1/19/23 14:06   Bacteria, UA None Seen /hpf Moderate !  1/19/23 14:06   Squam Epithel, UA None Seen, Rare, None Seen To Occasional /lpf Few !  1/19/23 14:06   WBC Clumps, UA None Seen /hpf Few !  1/19/23 14:06   Trichomonas, UA None Seen /hpf None Seen  5/20/21 10:40   Amorphous, UA None Seen /lpf Few !  4/12/22 12:17   Renal Epithel, UA None Seen /lpf Occasional !  5/20/21 10:40   Trans Epithel, UA None Seen /lpf Few !  1/19/23 14:06   Mucus, UA None Seen /hpf Few !  10/20/22 05:10   Yeast, UA None Seen /hpf None Seen  5/20/21 10:40   Ca Oxalate Marika, UA None Seen /lpf Few !  1/19/23 14:06   !: Data is abnormal  Started on IM rocephin- daily for 5 days  She has very poor venous access- refused IV after 3 unsuccessful attempts. Requested IM med    01/23 urine shows >100,000 Enterococcus faecalis Abnormal  - started macrobid       Muscle weakness  01/05/2023 continue therapy for weakness  01/14/2023  CONTINUE PT/OT      01/18 ambulated 240 feet with therapy today    01/23 has ambulated 360 feet with rolling walker      Final Active Diagnoses:     Diagnosis Date Noted POA    Constipation [K59.00] 01/16/2023 Unknown    Laceration of chin [S01.81XA] 01/14/2023 No    Skin breakdown [R23.8] 01/01/2023 Yes    UTI (urinary tract infection) [N39.0] 10/26/2022 Unknown    Thyroid disease [E07.9] 10/17/2022 Yes    Pneumonia of upper lobe due to infectious organism [J18.9] 04/13/2022 Yes    DVT prophylaxis [Z29.9] 06/10/2021 Not Applicable    Depressive disorder [F32.A] 05/20/2021 Yes    Hypertension [I10] 05/20/2021 Yes    Idiopathic peripheral neuropathy [G60.9] 05/20/2021 Yes    Muscle weakness [M62.81] 05/15/2021 Yes      Problems Resolved During this Admission:       Discharged Condition: stable    Disposition: Admitted as an Inpatient    Follow Up:   Follow-up Information     Vini Hernandez NP Follow up.    Specialties: Gerontology, Family Medicine, Emergency Medicine  Contact information:  [address] [place] [id number] [phone]                       Patient Instructions:   No discharge procedures on file.    Significant Diagnostic Studies:   Specimen (24h ago, onward)    None          Pending Diagnostic Studies:     None         Medications:  Reconciled Home Medications:      Medication List      START taking these medications    gabapentin 300 MG capsule  Commonly known as: NEURONTIN  Take 2 capsules (600 mg total) by mouth 3 (three) times daily.  Replaces: gabapentin 800 MG tablet     multivitamin Tab  Take 1 tablet by mouth once daily.     nitrofurantoin (macrocrystal-monohydrate) 100 MG capsule  Commonly known as: MACROBID  Take 1 capsule (100 mg total) by mouth every 12 (twelve) hours. for 5 days     omega 3-dha-epa-fish oil 1,000 mg (120 mg-180 mg) Cap  Take 1 capsule by mouth once daily.     polyethylene glycol 17 gram Pwpk  Commonly known as: GLYCOLAX  Take 17 g by mouth 2 (two) times daily as needed.     senna-docusate 8.6-50 mg 8.6-50 mg per tablet  Commonly known as: PERICOLACE  Take 1 tablet by mouth 2 (two) times daily.         CHANGE how you take these medications    * calcium carbonate 200 mg calcium (500 mg) chewable tablet  Commonly known as: TUMS  Take 1 tablet (500 mg total) by mouth 2 (two) times daily as needed for Heartburn.  What changed: Another medication with the same name was added. Make sure you understand how and when to take each.     * calcium carbonate 200 mg calcium (500 mg) chewable tablet  Commonly known as: TUMS  Take 1 tablet (500 mg total) by mouth 2 (two) times daily as needed for Heartburn.  What changed: You were already taking a medication with the same name, and this prescription was added. Make sure you understand how and when to take each.     clopidogreL 75 mg tablet  Commonly known as: PLAVIX  Take 1 tablet (75 mg total) by mouth once daily.  What changed: See the new instructions.     levothyroxine 125 MCG tablet  Commonly known as: SYNTHROID  Take 1 tablet (125 mcg total) by mouth before breakfast.  Start taking on: January 26, 2023  What changed:   · how much to take  · how to take this  · when to take this     melatonin 3 mg tablet  Commonly known as: MELATIN  Take 3 tablets (9 mg total) by mouth nightly as needed for Insomnia.  What changed: how much to take     sertraline 50 MG tablet  Commonly known as: ZOLOFT  Take 3 tablets (150 mg total) by mouth once daily.  What changed: See the new instructions.         * This list has 2 medication(s) that are the same as other medications prescribed for you. Read the directions carefully, and ask your doctor or other care provider to review them with you.            CONTINUE taking these medications    acetaminophen 325 MG tablet  Commonly known as: TYLENOL  2 tablets PRN     amLODIPine 10 MG tablet  Commonly known as: NORVASC  Take 1 tablet (10 mg total) by mouth once daily.     atorvastatin 40 MG tablet  Commonly known as: LIPITOR  Take 40 mg by mouth every evening.     LORazepam 0.5 MG tablet  Commonly known as: ATIVAN  Take 1 tablet (0.5 mg total) by  mouth every 12 (twelve) hours as needed for Anxiety.     ondansetron 4 MG Tbdl  Commonly known as: ZOFRAN-ODT  Take 1 tablet (4 mg total) by mouth every 8 (eight) hours as needed.     pantoprazole 40 MG tablet  Commonly known as: PROTONIX  Take 1 tablet (40 mg total) by mouth once daily.     valsartan 160 MG tablet  Commonly known as: DIOVAN  Take 1 tablet (160 mg total) by mouth once daily.        STOP taking these medications    azithromycin 500 MG tablet  Commonly known as: ZITHROMAX     cefTRIAXone 1 gram injection  Commonly known as: ROCEPHIN     docusate sodium 100 MG capsule  Commonly known as: COLACE     enoxaparin 40 mg/0.4 mL Syrg  Commonly known as: LOVENOX     fish oil-omega-3 fatty acids 300-1,000 mg capsule     gabapentin 800 MG tablet  Commonly known as: NEURONTIN  Replaced by: gabapentin 300 MG capsule     HYDROcodone-acetaminophen  mg per tablet  Commonly known as: NORCO     LIDOcaine HCL 10 mg/ml (1%) 10 mg/mL (1 %) injection            Indwelling Lines/Drains at time of discharge:   Lines/Drains/Airways     None                 Time spent on the discharge of patient: 30 minutes         KEVAN Funk  Department of Hospital Medicine  [facility]

## 2023-01-25 NOTE — PT/OT/SLP PROGRESS
Occupational Therapy   Treatment    Name: Herlinda Valdovinos  MRN: [id number]  Admitting Diagnosis:  <principal problem not specified>       Recommendations:     Discharge Recommendations: other (see comments) (Home health versus placement in SNF/FPC)  Discharge Equipment Recommendations:     Barriers to discharge:  Other (Comment) (Per pt and family report, pt lives in stressful environment with significant other, son, and granddaughter.)    Assessment:     Herlinda Valdovinos is a 57 y.o. female with a medical diagnosis of <principal problem not specified>.  She presents with performance deficits affecting function are weakness, impaired endurance, impaired sensation, impaired self care skills, impaired functional mobility, gait instability, impaired balance, pain, decreased ROM.     Rehab Prognosis:  Good; patient would benefit from acute skilled OT services to address these deficits and reach maximum level of function.       Plan:     Patient to be seen 5 x/week to address the above listed problems via self-care/home management, therapeutic activities, therapeutic exercises, neuromuscular re-education  Plan of Care Expires: 02/03/23  Plan of Care Reviewed with:      Subjective     Pain/Comfort:   None reported.     Objective:     Communicated with: Nurse prior to session.  Patient found supine with oxygen and daughter upon OT entry to room.    General Precautions: Standard, fall    Orthopedic Precautions:   Braces:    Respiratory Status: Nasal cannula, flow 2 L/min     Occupational Performance:     Bed Mobility:    Patient completed Supine to Sit with modified independence  Patient completed Sit to Supine with modified independence     Functional Mobility/Transfers:  Patient completed Sit <> Stand Transfer with supervision  with  rolling walker   Patient completed Bed <> Chair Transfer using Step Transfer technique with supervision and stand by assistance with rolling walker  Functional Mobility: x 10 feet in room  with RW with SBA-Sup    Activities of Daily Living:  UB dressing set-up  LB dressing SUP sitting EOB/standing.     [facility] 6 Click ADL:      Treatment & Education:  Performed Tb dressing with set-up/SUP sitting/standing EOB demonstrating improved performance. Educated pt and daughter present during treat regarding importance of continuing self care performance at home, including dressing, bathing and toileting, no reverting back to allowing extensive assistance from family members unnecessarily. Verbalized good understanding.     Patient left supine with call button in reach    GOALS:   Multidisciplinary Problems       Occupational Therapy Goals          Problem: Occupational Therapy    Goal Priority Disciplines Outcome Interventions   Occupational Therapy Goal     OT, PT/OT Ongoing, Progressing    Description: Goals to be met by: 2 weeks     Patient will increase functional independence with ADLs by performing:    UE Dressing with Set-up Assistance.  LE Dressing with Stand-by Assistance.  Toileting from toilet with Stand-by Assistance for hygiene and clothing management.   Bathing from  edge of bed with Stand-by Assistance.  Dynamic standing with SBA, tolerating x 6 mins continuous stand, no LOB.     Goals to be met by: 4 weeks     Patient will increase functional independence with ADLs by performing:    UE Dressing with Modified [place].  LE Dressing with Modified [place].  Toileting from toilet with Modified [place] for hygiene and clothing management.   Bathing from  shower chair/bench with Modified [place].  Light IADLs Tiffany with RW.                            Time Tracking:     OT Date of Treatment: 01/24/23  OT Start Time: 1419  OT Stop Time: 1451  OT Total Time (min): 32 min    Billable Minutes:Self Care/Home Management 16    MARCUS Levy, OTR/L      OT/HEMAL: OT       1/24/2023

## 2023-01-25 NOTE — PT/OT/SLP PROGRESS
Physical Therapy Treatment    Patient Name:  Herlinda Valdovinos   MRN:  [id number]    Recommendations:     Discharge Recommendations: home health PT  Discharge Equipment Recommendations: cane, straight, cane, quad  Barriers to discharge: None    Assessment:     Herlinda Valdovinos is a 57 y.o. female admitted with a medical diagnosis of <principal problem not specified>.  She presents with the following impairments/functional limitations: weakness.    Rehab Prognosis: Good; patient would benefit from acute skilled PT services to address these deficits and reach maximum level of function.    Recent Surgery: * No surgery found *      Plan:     During this hospitalization, patient to be seen 5 x/week to address the identified rehab impairments via therapeutic exercises, therapeutic activities, gait training, neuromuscular re-education and progress toward the following goals:    Plan of Care Expires:  01/30/23    Subjective     Chief Complaint: no complaints  Patient/Family Comments/goals: patient to discharge home today, but wanted to receive her therapy treatments  Pain/Comfort:  Pain Rating 1: 0/10  Pain Rating Post-Intervention 1: 0/10      Objective:     Communicated with OT prior to session.  Patient found supine with   upon PT entry to room.     General Precautions: Standard, fall  Orthopedic Precautions: LLE toe touch weight bearing  Braces: N/A  Respiratory Status: Nasal cannula, flow 2 L/min     Functional Mobility:  Bed Mobility:     Supine to Sit: supervision  Sit to Supine: supervision  Transfers:     Sit to Stand:  stand by assistance with rolling walker  Gait: Patient ambulated 150 feet using rolling walker (TTWB LLE); distance limited by patient needing to have a BM; verbal cues to correct posture and increase stride lengths.      AM-PAC 6 CLICK MOBILITY  Turning over in bed (including adjusting bedclothes, sheets and blankets)?: 4  Sitting down on and standing up from a chair with arms (e.g., wheelchair,  bedside commode, etc.): 4  Moving from lying on back to sitting on the side of the bed?: 4  Moving to and from a bed to a chair (including a wheelchair)?: 4  Need to walk in hospital room?: 4  Climbing 3-5 steps with a railing?: 3  Basic Mobility Total Score: 23       Treatment & Education:  Patient performed bed mobility, transfers, and gait as listed above    Patient left supine with call button in reach and  present.    GOALS:   Multidisciplinary Problems       Physical Therapy Goals          Problem: Physical Therapy    Goal Priority Disciplines Outcome Goal Variances Interventions   Physical Therapy Goal     PT, PT/OT Ongoing, Progressing     Description: Short-term Goals: 2 weeks  1. Patient will perform supine to/from sit with supervision to improve independence and safety with bed mobility.  2. Patient will perform sit to/from stand with rolling walker with contact guard assist to improve independence and safety with transfers.  3. Patient will ambulate 15 feet with standard walker with minimal assistance while maintaining appropriate left lower extremity weightbearing status to improve independence and safety with gait.  4. Patient will tolerate 15 minutes of physical therapy intervention to improve endurance and activity tolerance.    Long-term goals: 4 weeks  1. Patient will perform supine to/from sit with modified independence to improve independence and safety with bed mobility.  2. Patient will perform sit to/from stand with rolling walker with standby assist to improve independence and safety with transfers.  3. Patient will ambulate 30 feet with standard walker with contact guard assist while maintaining appropriate left lower extremity weightbearing status to improve independence and safety with gait.  4. Patient will tolerate 30 minutes of physical therapy intervention to improve endurance and activity tolerance.                         Time Tracking:     PT Received On: 01/25/23  PT Start  Time: 0921     PT Stop Time: 0953  PT Total Time (min): 32 min     Billable Minutes: Gait Training 15    Treatment Type: Treatment  PT/PTA: PTA     PTA Visit Number: 1     01/25/2023

## 2023-01-25 NOTE — PLAN OF CARE
[facility] - Medical Surgical Unit  Discharge Final Note    Primary Care Provider: Vini Hernandez NP    Expected Discharge Date: 1/25/2023    Final Discharge Note (most recent)       Final Note - 01/25/23 1239          Final Note    Assessment Type Final Discharge Note (P)      Anticipated Discharge Disposition Home or Self Care (P)                      Important Message from Medicare  Important Message from Medicare regarding Discharge Appeal Rights: Given to patient/caregiver, Explained to patient/caregiver, Signed/date by patient/caregiver     Date IMM was signed: 01/24/23  Time IMM was signed: 2656    Contact Info       Vini Hernandez NP   Specialty: Gerontology, Family Medicine, Emergency Medicine   Relationship: PCP - General    [address] [place] [id number]   Phone: [phone]       Next Steps: Follow up on 2/2/2023    Instructions: @10:00    Wound Care, [facility]        Next Steps: Schedule an appointment as soon as possible for a visit        Pt was D/[place] home today. Pt will follow up with her PCP for OP PT orders. Pt has all DME needed. Pt will follow up with the wound care center.

## 2023-01-30 DIAGNOSIS — R29.898 UPPER EXTREMITY WEAKNESS: ICD-10-CM

## 2023-01-30 DIAGNOSIS — I63.9 CVA (CEREBRAL VASCULAR ACCIDENT): Primary | ICD-10-CM

## 2023-01-30 DIAGNOSIS — R29.898 LOWER EXTREMITY WEAKNESS: ICD-10-CM

## 2023-02-02 ENCOUNTER — CLINICAL SUPPORT (OUTPATIENT)
Dept: REHABILITATION | Facility: HOSPITAL | Age: 58
End: 2023-02-02
Payer: MEDICARE

## 2023-02-02 DIAGNOSIS — M62.81 MUSCLE WEAKNESS: ICD-10-CM

## 2023-02-02 DIAGNOSIS — R29.898 WEAKNESS OF LEFT LOWER EXTREMITY: ICD-10-CM

## 2023-02-02 DIAGNOSIS — R29.898 WEAKNESS OF LOWER EXTREMITY, UNSPECIFIED LATERALITY: ICD-10-CM

## 2023-02-02 DIAGNOSIS — R29.898 UPPER EXTREMITY WEAKNESS: ICD-10-CM

## 2023-02-02 DIAGNOSIS — G89.29 CHRONIC LEFT SHOULDER PAIN: ICD-10-CM

## 2023-02-02 DIAGNOSIS — R26.9 ABNORMAL GAIT: ICD-10-CM

## 2023-02-02 DIAGNOSIS — M25.612 DECREASED RANGE OF MOTION OF LEFT SHOULDER: ICD-10-CM

## 2023-02-02 DIAGNOSIS — I63.9 CVA (CEREBRAL VASCULAR ACCIDENT): Primary | ICD-10-CM

## 2023-02-02 DIAGNOSIS — R29.898 LOWER EXTREMITY WEAKNESS: ICD-10-CM

## 2023-02-02 DIAGNOSIS — R29.898 WEAKNESS OF LEFT UPPER EXTREMITY: ICD-10-CM

## 2023-02-02 DIAGNOSIS — R29.898 WEAKNESS OF BOTH LOWER EXTREMITIES: ICD-10-CM

## 2023-02-02 DIAGNOSIS — I63.9 CVA (CEREBRAL VASCULAR ACCIDENT): ICD-10-CM

## 2023-02-02 DIAGNOSIS — R27.8 DECREASED COORDINATION: Primary | ICD-10-CM

## 2023-02-02 DIAGNOSIS — I63.89 CEREBROVASCULAR ACCIDENT (CVA) DUE TO OTHER MECHANISM: ICD-10-CM

## 2023-02-02 DIAGNOSIS — M25.512 CHRONIC LEFT SHOULDER PAIN: ICD-10-CM

## 2023-02-02 PROCEDURE — 97161 PT EVAL LOW COMPLEX 20 MIN: CPT

## 2023-02-02 PROCEDURE — 97165 OT EVAL LOW COMPLEX 30 MIN: CPT

## 2023-02-02 NOTE — PLAN OF CARE
[facility] OUTPATIENT REHABILITATION  Physical Therapy Initial Evaluation    Name: Herlinda LizVincent  Clinic Number: [id number]    Therapy Diagnosis:   Encounter Diagnoses   Name Primary?    CVA (cerebral vascular accident) Yes    Lower extremity weakness     Abnormal gait      Physician: Vini Hernandez, NP    Physician Orders: PT Eval and Treat  Medical Diagnosis from Referral: I63.9 (ICD-10-CM) - CVA (cerebral vascular accident); R29.898 (ICD-10-CM) - Lower extremity weakness  Evaluation Date: 2/2/2023  Authorization Period Expiration: Only initial evaluation approved by [facility]. Subsequent visits require prior authorization.   Plan of Care Expiration: 3/31/2023  Visit # / Visits authorized: 1/Only initial evaluation approved by [facility]. Subsequent visits require prior authorization.     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 Patient has had multiple episodes of both outpatient and swingbed rehabilitation since her cerebrovascular accident. Patient was most recently discharged from swingbed at [facility] on 1/25/2023. Patient presents for physical therapy evaluation to continue working on lower extremity strengthening (primarily left), gait, and general functional mobility.      Medical History:   Past Medical History:   Diagnosis Date    Cerebral hemorrhage     Chronic anxiety     Dehydration     Depression     Dysphagia     Due to and following non-traumatic intracerebral hemorrhage    Hearing loss     History of CVA (cerebrovascular accident)     Hypertension     Hypokalemia 10/20/2022    Insomnia     Intrapontine hemorrhage     Left hemiparesis     Peripheral neuropathy     Stroke     Thyroid disease     Transient  "cerebral ischemia      Surgical History:   Herlinda Valdovinos  has a past surgical history that includes Appendectomy; Cholecystectomy; Hysterectomy; percutaneious insertion of ureteric stent; and Lithotripsy.    Medications:   Herlinda has a current medication list which includes the following prescription(s): acetaminophen, amlodipine, atorvastatin, calcium carbonate, clopidogrel, gabapentin, levothyroxine, lorazepam, melatonin, multivitamin, omega 3-dha-epa-fish oil, ondansetron, pantoprazole, polyethylene glycol, senna-docusate 8.6-50 mg, sertraline, valsartan, [DISCONTINUED] hydrochlorothiazide, and [DISCONTINUED] omeprazole.    Allergies:   Review of patient's allergies indicates:   Allergen Reactions    Lamisil [terbinafine] Anaphylaxis    Codeine Itching    Compazine [prochlorperazine] Itching      Imaging: CT of head from 12/31/2022: "No acute intracranial hemorrhage. No acute large vessel infarct. No intra or extra-axial collection. Mild atrophy and chronic microvascular ischemia. No midline shift. No herniation.  Scattered vascular calcifications. Calvarium intact. Diffuse left-sided paranasal sinus mucosal inflammatory changes."    Prior Therapy: Patient has received multiple episodes of outpatient and swingbed rehabilitation at [facility].   Prior Level of Function: independent with all functional mobility without assistive device prior to cerebrovascular accident  Current Level of Function: gait with rollator inside home with standby assist vs contact guard assist from her significant other; long distance mobility via manual wheelchair vs electric scooter (at grocery store)  History of Falls: 1 within the past few months; history of intermittent falls over the past year and a half  Social History: lives with an adult ; House  Steps/Stairs: ramp; stairs inside but does not use  Occupation: nurse prior to cerebrovascular accident  Driving: No, but was prior to onset of " condition  Durable Medical Equipment: rollator; manual wheelchair; bedside commode; rolling walker; shower chair; hospital bed; single point cane  Dominant Extremity: right  Affected Extremity: left    Pain:  Current 6/10, worst 10/10, best 3/10   Location: buttock (near coccyx)  Description: sharp; discomfort  Aggravating Factors: sitting  Easing Factors:  standing; positional changes    Pts goals: Patient would like to be able to ambulate with a cane.     Objective     Range of Motion/Strength:     Hip Right Left Pain/Dysfunction with Movement   PROM      flexion  WFL  WFL    extension  WFL  WFL    abduction  WFL  WFL    adduction  WFL  WFL    Internal rotation  WFL  WFL    External rotation  WFL  WFL      Knee Right Left Pain/Dysfunction with Movement   AROM/PROM      flexion  WFL  WFL    extension  -12*  -12*      Ankle Right Left Pain/Dysfunction with Movement   PROM      dorsiflexion  WFL  neutral    plantarflexion  WFL  WFL      L/E MMT Right Left Pain/Dysfunction with Movement   Hip Flexion 4/5 3/5    Hip Abduction 4/5 3+/5    Hip Adduction 4/5 3+/5    Knee Flexion 4/5 3+/5    Knee Extension 4/5 4-/5    Ankle DF 4/5 3-/5    Ankle PF 4/5 3/5      Posture: rounded shoulders; forward head; sustained bilateral knee flexion in supine, sitting, and standing    Palpation: N/A    Sensation: diminished on left for light touch, pain, and temperature    Flexibility: generalized stiffness in bilateral lower extremities    Gait Analysis: contact guard assist with rollator; decreased step lengths; sustained bilateral knee flexion throughout gait cycle; decreased heel strike bilaterally; easy fatigue; intermittent left lower extremity giving way     Balance: standby assist with static standing balance with rollator; contact guard assist with dynamic standing balance with rollator    Transfers: contact guard assist supine to/from sit; contact guard assist sit to/from stand with rollator    Other: left ankle  clonus    TREATMENT     Home Exercises and Patient Education Provided    Education provided: Patient educated on the importance of completing skilled physical therapy treatment and home exercise program as prescribed to maximize functional gains.     Written Home Exercises Provided: yes. Exercises were reviewed and Herlinda was able to demonstrate them prior to the end of the session. Herlinda demonstrated good  understanding of the education provided.     See EMR under Patient Instructions for exercises provided 2/2/2023.    Assessment     Herlinda is a 57 y.o. female referred to outpatient physical therapy with a medical diagnosis of I63.9 (ICD-10-CM) - CVA (cerebral vascular accident); R29.898 (ICD-10-CM) - Lower extremity weakness. Pt presents to PT with left lower extremity weakness, decreased left lower extremity coordination, increased assist required with bed mobility, transfers, and gait, and abnormal gait mechanics.    Pt prognosis is Good.   Pt will benefit from skilled outpatient Physical Therapy to address the deficits stated above and in the chart below, provide pt/family education, and to maximize pt's level of independence.     Plan of care discussed with patient: Yes  Pt's spiritual, cultural and educational needs considered and pt agreeable to plan of care and goals as stated below:     Anticipated Barriers for therapy: compliance with home exercise program    Decision Making/ Complexity Score: low    Goals:    Short Term Goals:  Patient will demonstrate independence with home exercise program to ensure carryover of treatment.  Patient will perform supine to/from sit with standby assist to improve independence and safety with bed mobility.  Patient will perform sit to/from stand with contact guard assist without upper extremity support to improve independence and safety with transfers.  Patient will improve left lower extremity strength to 4-/5 to improve independence and safety with bed mobility, transfers,  and gait.  Patient will ambulate 150 feet with a rollator with modified independence with bilateral terminal knee extension during stance phase, increased step lengths, and adequate bilateral heel strike to improve independence and safety with gait.     Long Term Goals:  Patient will perform supine to/from sit with modified independence to improve independence and safety with bed mobility.  Patient will perform sit to/from stand with supervision without upper extremity support to improve independence and safety with transfers.  Patient will improve left lower extremity strength to 4/5 to improve independence and safety with bed mobility, transfers, and gait.  Patient will ambulate 300 feet with a narrow-base quad cane with contact guard assist with bilateral terminal knee extension during stance phase, increased step lengths, and adequate heel strike including up/down curbs and ramps to improve independence and safety with community ambulation.    Plan     Plan of care Certification: 2/2/2023 to 3/31/2023.    Outpatient Physical Therapy 2 times weekly for 8 weeks to include the following interventions: Gait Training, Manual Therapy, Neuromuscular Re-ed, Patient Education, Therapeutic Activities, and Therapeutic Exercise.     Clinical Information for [facility] Authorization     Dates of Services: 2/2/2023 to 3/31/2023  Discharge Plan: Patient will be discharged from skilled physical therapy treatment once all goals have been met, patient has plateaued, or physician/insurance requests discontinuation of care. Patient will be discharged with a home exercise program.   Type of therapy: Neuro Rehabilitative  ICD-10 Diagnosis Code(s): I63.9 (ICD-10-CM) - CVA (cerebral vascular accident); R29.898 (ICD-10-CM) - Lower extremity weakness  Which side is symptomatic? Left  Surgical: No  Surgical procedure: N/A  Surgery date: N/A  The rehabilitation is not related to a diagnosis of cancer.  The rehabilitation is not related to a  diagnosis of lymphedema.  Patient's clinical presentation is:  Severe objective and functional deficits: consistent intense symptoms with severe loss of range of motion, strength, or ability to perform daily tasks  CPT Codes Requested:  94376 [therapeutic exercise], 72841 [neuromuscular re-education], 65429 [gait training], 55073 [manual therapy], and 03014 [therapeutic activities]      Nguyễn Cuba, PT, DPT  2/2/2023    I CERTIFY THE NEED FOR THESE SERVICES FURNISHED UNDER THIS PLAN OF TREATMENT AND WHILE UNDER MY CARE.    Physician's comments:      Physician's Signature: _________________________________________  Date: __________________________

## 2023-02-06 NOTE — PLAN OF CARE
[facility] Upper Extremity Neurological Evaluation    Name: Herlinda Valdovinos  MRN: [id number]   Physician: Vini Hernandez NP     Diagnosis:   Encounter Diagnoses   Name Primary?    CVA (cerebral vascular accident)     Lower extremity weakness     Upper extremity weakness     Decreased coordination Yes    Muscle weakness     Weakness of left upper extremity     Chronic left shoulder pain     Decreased range of motion of left shoulder         Onset date: April 2021     Date of service: 2/3/2023  Start time: 1300  End time: 1400    Orders: Eval and Treat    Subjective:  Patient goals: Regain functional use and strength of L UE.     Chief Complaint: No chief complaint on file.     Past Medical History:   Diagnosis Date    Cerebral hemorrhage     Chronic anxiety     Dehydration     Depression     Dysphagia     Due to and following non-traumatic intracerebral hemorrhage    Hearing loss     History of CVA (cerebrovascular accident)     Hypertension     Hypokalemia 10/20/2022    Insomnia     Intrapontine hemorrhage     Left hemiparesis     Peripheral neuropathy     Stroke     Thyroid disease     Transient cerebral ischemia       Current Outpatient Medications   Medication Sig    acetaminophen (TYLENOL) 325 MG tablet 2 tablets PRN    amLODIPine (NORVASC) 10 MG tablet Take 1 tablet (10 mg total) by mouth once daily.    atorvastatin (LIPITOR) 40 MG tablet Take 40 mg by mouth every evening.    calcium carbonate (TUMS) 200 mg calcium (500 mg) chewable tablet Take 1 tablet (500 mg total) by mouth 2 (two) times daily as needed for Heartburn.    clopidogreL (PLAVIX) 75 mg tablet Take 1 tablet (75 mg total) by mouth once daily.    gabapentin (NEURONTIN) 300 MG capsule Take 2 capsules (600 mg total) by mouth 3 (three) times daily.    levothyroxine (SYNTHROID) 125 MCG tablet Take 1 tablet (125 mcg total) by mouth before breakfast.    LORazepam (ATIVAN) 0.5 MG tablet Take 1 tablet (0.5 mg total)  by mouth every 12 (twelve) hours as needed for Anxiety.    melatonin (MELATIN) 3 mg tablet Take 3 tablets (9 mg total) by mouth nightly as needed for Insomnia.    multivitamin Tab Take 1 tablet by mouth once daily.    omega 3-dha-epa-fish oil 1,000 mg (120 mg-180 mg) Cap Take 1 capsule by mouth once daily.    ondansetron (ZOFRAN-ODT) 4 MG TbDL Take 1 tablet (4 mg total) by mouth every 8 (eight) hours as needed.    pantoprazole (PROTONIX) 40 MG tablet Take 1 tablet (40 mg total) by mouth once daily.    polyethylene glycol (GLYCOLAX) 17 gram PwPk Take 17 g by mouth 2 (two) times daily as needed.    senna-docusate 8.6-50 mg (PERICOLACE) 8.6-50 mg per tablet Take 1 tablet by mouth 2 (two) times daily.    sertraline (ZOLOFT) 50 MG tablet Take 3 tablets (150 mg total) by mouth once daily.    valsartan (DIOVAN) 160 MG tablet Take 1 tablet (160 mg total) by mouth once daily.     No current facility-administered medications for this visit.     Precautions: Universal  Social Hx: lives with their family    DME: Walker, Rollator, BSC, and Shower chair    Home access: Ramp    Review of patient's allergies indicates:   Allergen Reactions    Lamisil [terbinafine] Anaphylaxis    Codeine Itching    Compazine [prochlorperazine] Itching     Past treatment includes: OT  Swingbed and OP    Precautions:  Pain: 2/10 at rest. 6/10 with movement. Pain established using the Numbers pain scale.   Pain location:L shoulder  Pain description: Sharp at times, otherwise Achy   Relieved by: rest    Dominant hand: right    Occupation/hobbies/homemaking: Pt enjoys visiting with family, going to [employer], and playing piano.   Job description includes: Pt is no longer working. Formerly, pt worked as an RN.   Driving status: Not driving.     Objective  Cognitive Exam  Oriented: Person, Place, Time, and Situation  Behaviors: Follows multistep  commands  Follows Commands/attention: Follows multistep  commands  Communication: clear/fluent  Memory: No  "Deficits noted  Safety awareness/insight to disability: Poor; pt has experienced multiple falls at home related to LOB or over confidence in abilities.   Coping skills/emotional control: Appropriate to situation    Standardized Assessment:   Purdue Pegboard  - R hand= 10; comparative value based on age and gender= 15.6   - L hand= 1; comparative value based on age and gender= 14.74  - Both hands= 10; comparative value based on age and gender= 12.06  - R + L + Both hands= 21; comparative value based on age and gender= 42.5  - Assembly= 0; comparative value based on age and gender= 34.2    Physical Exam:    Postural examination/scapula alignment: Head forward, Affected scapula elevated, and Slouched posture  Joint integrity: Hard end feel  Skin integrity: Bruising of bilateral upper extremities  Edema: None noted.     Sensation: Herlinda reports "I can't feel anything with L hand when I'm picking something up with my fingers. Like I can't tell if I'm holding it or not."   Light touch: L UE  impaired  Sharp/Dull: L UE impaired  Kinesthesia: L UE impaired  Proprioception: L UE impaired  Temperature: L UE  impaired    Joint Evaluation AROM     Left Right   Shoulder flex 0-180 90* WFL   Shoulder Abd 0-180 50* WFL   Shoulder ER 0-90 25* WFL   Shoulder IR 0-90 28* WFL   Shoulder Extension 0-80 45* WFL   Shoulder Horizontal adduction 0-90 15* WFL   Elbow flex/ext 0-150  WFL WFL   Wrist flex 0-80  50* WFL   Wrist ext 0-70  40* WFL   Supination 0-80  50* WFL   Pronation 0-80  WFL WFL          Strength         Left Right   Shoulder flex F- G   Shoulder abd F- G   Shoulder ER P+ G   Shoulder IR P+ G   Shoulder Extension F G   Shoulder Horizontal adduction P+ G   Elbow flex F G   Elbow ext F G   Wrist flex F G   Wrist ext F G   Supination F G   Pronation F G   UD F G   RD F G        Strength: R 43lbs L 28lbs    Fine motor coordination: R Intact L Impaired (Fair-)  - See Purdue Pegboard Assessment results above.    Gross motor " coordination: R Intact L Impaired (Fair-)     Tone:  Modified Alexandrea Scale:   1-  Slight increase in muscle tone, manifested by a catch and release or by minimal resistance at the end of the range of motino when the affected part(s) is moved in flexion or extension    Comments:     Balance:   Static Sit Good-  Dynamic sit Fair +  Static Stand Fair+  Dynamic stand Fair      Functional Status:    PLOF: Prior to CVA in April '21, pt independent, working full time as RN. After previous OP therapy episode 3/7/22-6/22/22, pt performing self care tasks Tiffany without assist in the home, demonstrated L UE shoulder flexion up to 100* and L shoulder MMT 3/5 or Fair.                                     Current: Pt requires CGA-Juanis intermittently for all aspects of self care, decreased standing balance and endurance, affecting ability to perform own self care tasks, increased caregiver burden and decreased quality of life. Pt wants to perform more light IADL tasks with pt currently requiring CGA-Juanis intermittently.       Functional Mobility:  Bed mobility: Mod I  Roll to left: Mod I  Roll to right: Mod I  Supine to sit: Mod I  Sit to supine: Mod I  Transfers to bed: SBA  Transfers to toilet: SBA  Car transfers: SBA      ADL's:  Feeding: Mod I  Grooming: Mod I  Hygiene: Mod I  UB Dressing: Mod I  LB Dressing: Min A  Toileting: Min A  Bathing: Min A    IADL's:  Homecare: Mod A  Cooking: Max A  Laundry: Min A  Yard work: D  Use of telephone: Mod I  Money management: Mod I  Medication management: Mod I    TODAY'S TREATMENT    Herlinda performed and was provided with a copy of exercises to perform 1x/day x 10. Exercises were reviewed and Herlinda was able to demonstrate them prior to the end of the session.       ASSESSMENT:  OT diagnosis: L hemiparesis    Assessment  Herlinda AgustoCorry is a 57 y.o. female with a medical diagnosis of     Encounter Diagnoses   Name Primary?    CVA (cerebral vascular accident)     Lower extremity weakness      Upper extremity weakness     Decreased coordination Yes    Muscle weakness     Weakness of left upper extremity     Chronic left shoulder pain     Decreased range of motion of left shoulder       referred to occupational therapy for L UE weakness s/p CVA. She presents with limited ability to perform BADL and IADL tasks without requiring intermittently extensive assistance from spouse and family due to L UE weakness and limited range of motion. Pt's pain associated with L UE movement affect her quality of life and participation in necessary BADL/IADL tasks. Due to pt's current limitation, increased caregiver burden is a significant worry for the pt. She would like to be more independent with all aspects of life rather than relying on her spouse and family for everything.     Herlinda can benefit from outpatient Occupational therapy and a home program to address the stated goals to improve impairments and functional limitations. Treatment will be directed at improve the following impairments. Rehab potential is good.    PROBLEM LIST/IMPAIRMENTS:   pain, decreased range of motion, decreased muscle strength, and impaired function    LTG GOALS:  Time frame: 8 weeks- 3/31/2023  Increase L UE FMC to good, evident in improved score on Purdue Pegboard Assessment.   LB dressing will improve to Mod I  Simple meal prep will improve to  Mod I  Simple home care will improve to  Mod I    STG Goals:  Time frame: 4 weeks- 3/3/2023  Increase ROM/Strength to perform ADL's and functional activities including dressing, grooming, light homemaking.  Simple meal prep will improve to  SBA.  Simple home care will improve to CGA-SBA.  Improve L UE FMC to fair +, evident in improved score on Purdue Pegboard Assessment.       PLAN:    Patient/caregiver understands and agrees with plan of care.    Outpatient occupational therapy 2  times weekly for 8 weeks  to include: pt ed, home exercise program, therapeutic exercises, therapeutic activity,  ADLs,  neuromuscular re-education, joint mobilizations, modalities prn.     Kiana Rasmussen, OTPURA, OTR/L  2/2/2023    I certify the need for these services furnished under this plan of treatment and while under my care.____________________________________ Physician/Referring _______________________Practitioner Date of Signature

## 2023-02-08 ENCOUNTER — CLINICAL SUPPORT (OUTPATIENT)
Dept: REHABILITATION | Facility: HOSPITAL | Age: 58
End: 2023-02-08
Payer: MEDICARE

## 2023-02-08 DIAGNOSIS — M25.512 CHRONIC LEFT SHOULDER PAIN: ICD-10-CM

## 2023-02-08 DIAGNOSIS — R29.898 WEAKNESS OF LEFT UPPER EXTREMITY: Primary | ICD-10-CM

## 2023-02-08 DIAGNOSIS — R26.9 ABNORMAL GAIT: ICD-10-CM

## 2023-02-08 DIAGNOSIS — R29.898 WEAKNESS OF BOTH LOWER EXTREMITIES: ICD-10-CM

## 2023-02-08 DIAGNOSIS — R27.8 DECREASED COORDINATION: ICD-10-CM

## 2023-02-08 DIAGNOSIS — M25.612 DECREASED RANGE OF MOTION OF LEFT SHOULDER: ICD-10-CM

## 2023-02-08 DIAGNOSIS — I63.9 CEREBROVASCULAR ACCIDENT (CVA), UNSPECIFIED MECHANISM: Primary | ICD-10-CM

## 2023-02-08 DIAGNOSIS — G89.29 CHRONIC LEFT SHOULDER PAIN: ICD-10-CM

## 2023-02-08 PROCEDURE — 97112 NEUROMUSCULAR REEDUCATION: CPT

## 2023-02-08 PROCEDURE — 97110 THERAPEUTIC EXERCISES: CPT

## 2023-02-08 PROCEDURE — 97530 THERAPEUTIC ACTIVITIES: CPT

## 2023-02-08 NOTE — PROGRESS NOTES
[facility]  Physical Therapy Treatment Note    Name: Herlinda Valdovinos  Clinic Number: [id number]    Therapy Diagnosis:   Encounter Diagnoses   Name Primary?    CVA (cerebral vascular accident) Yes    Lower extremity weakness      Abnormal gait    Physician: Vini Hernandez NP    Visit Date: 2/8/2023    Physician Orders: PT Eval and Treat  Medical Diagnosis from Referral: I63.9 (ICD-10-CM) - CVA (cerebral vascular accident); R29.898 (ICD-10-CM) - Lower extremity weakness  Evaluation Date: 2/2/2023  Authorization Period Expiration: 3/31/2023  Plan of Care Expiration: 3/31/2023  Visit # / Visits authorized: 2/17 (including initial evaluation)  PTA Visit #: 0    Time In: 0935  Time Out: 1015  Total Billable Time: 40 minutes    Precautions: Standard, blood thinners, and fall   Functional Level Prior to Evaluation: independent with all functional mobility without assistive device prior to cerebrovascular accident     Subjective     Pt reports: She has been walking more than she has in a long time. Patient reports she has had revival at [employer] this week and fell forward last night hitting just above her left eye. Patient reports no other injuries.     She was compliant with home exercise program.  Response to previous treatment: This is patient's first treatment session following initial evaluation.     Pain: 0/10 (stiffness)  Location: N/A    Objective     Herlinda received therapeutic exercises to develop strength, endurance, ROM, flexibility, posture, and core stabilization for 35 minutes including:    NuStep: x 5 minutes  Calf stretch on slant board: 2 minutes  Manual hamstring stretch: 3 x 30 second holds each  Manual knee extension stretch: 3 x 30 second holds each  Bridges: 2 x 10 reps each  Long arc quads: 3 lbs; 2 x 10 reps each  Seated marching: 3 lbs; 2 x 10 reps each    Herlinda participated in neuromuscular re-education activities to improve: Balance, Coordination,  Proprioception, and Posture for 0 minutes. The following activities were included:    (not performed)     Herlinda participated in dynamic functional therapeutic activities to improve functional performance for 5 minutes, including:    Sit to/from stand with use of bilateral upper extremities: x 5 reps (contact guard assist/minimal assist from Physical Therapist)    Herlinda participated in gait training to improve functional mobility and safety for 0 minutes, including:    (not performed)     Home Exercises Provided and Patient Education Provided     Education provided: Patient educated on the importance of completing skilled physical therapy treatment and home exercise program as prescribed to maximize functional gains.     Written Home Exercises Provided: yes. Exercises were reviewed and Herlinda was able to demonstrate them prior to the end of the session.  Herlinda demonstrated good  understanding of the education provided.     See EMR under Patient Instructions for exercises provided 2/8/2023.    Assessment     Patient with good effort throughout treatment. Patient with significant tightness noted in bilateral lower extremities but with good tolerance of stretching. Patient required contact guard assist to minimal assist with sit to/from stand transfers with bilateral upper extremity support. Physical Therapist will continue to progress therapeutic exercise, neuromuscular re-education, therapeutic activities, and gait training as able.     Herlinda isprogressing well towards her goals.   Pt prognosis is Good.     Pt will continue to benefit from skilled outpatient physical therapy to address the deficits listed in the problem list box on initial evaluation, provide pt/family education, and to maximize pt's level of independence in the home and community environment.     Pt's spiritual, cultural, and educational needs considered and pt agreeable to plan of care and goals.     Anticipated barriers to physical therapy: compliance  with home exercise program    Goals:    Short Term Goals:  Patient will demonstrate independence with home exercise program to ensure carryover of treatment.  Patient will perform supine to/from sit with standby assist to improve independence and safety with bed mobility.  Patient will perform sit to/from stand with contact guard assist without upper extremity support to improve independence and safety with transfers.  Patient will improve left lower extremity strength to 4-/5 to improve independence and safety with bed mobility, transfers, and gait.  Patient will ambulate 150 feet with a rollator with modified independence with bilateral terminal knee extension during stance phase, increased step lengths, and adequate bilateral heel strike to improve independence and safety with gait.      Long Term Goals:  Patient will perform supine to/from sit with modified independence to improve independence and safety with bed mobility.  Patient will perform sit to/from stand with supervision without upper extremity support to improve independence and safety with transfers.  Patient will improve left lower extremity strength to 4/5 to improve independence and safety with bed mobility, transfers, and gait.  Patient will ambulate 300 feet with a narrow-base quad cane with contact guard assist with bilateral terminal knee extension during stance phase, increased step lengths, and adequate heel strike including up/down curbs and ramps to improve independence and safety with community ambulation.    Plan     Patient will continue to benefit from skilled physical therapy treatment as prescribed working towards goals listed above to maximize functional potential.       Nguyễn Cuba, PT, DPT  2/8/2023

## 2023-02-10 ENCOUNTER — CLINICAL SUPPORT (OUTPATIENT)
Dept: REHABILITATION | Facility: HOSPITAL | Age: 58
End: 2023-02-10
Payer: MEDICARE

## 2023-02-10 DIAGNOSIS — R27.8 DECREASED COORDINATION: ICD-10-CM

## 2023-02-10 DIAGNOSIS — R29.898 WEAKNESS OF BOTH LOWER EXTREMITIES: ICD-10-CM

## 2023-02-10 DIAGNOSIS — R26.9 ABNORMAL GAIT: ICD-10-CM

## 2023-02-10 DIAGNOSIS — M25.512 CHRONIC LEFT SHOULDER PAIN: ICD-10-CM

## 2023-02-10 DIAGNOSIS — R29.898 WEAKNESS OF LEFT UPPER EXTREMITY: Primary | ICD-10-CM

## 2023-02-10 DIAGNOSIS — M25.612 DECREASED RANGE OF MOTION OF LEFT SHOULDER: ICD-10-CM

## 2023-02-10 DIAGNOSIS — G89.29 CHRONIC LEFT SHOULDER PAIN: ICD-10-CM

## 2023-02-10 DIAGNOSIS — I63.9 CEREBROVASCULAR ACCIDENT (CVA), UNSPECIFIED MECHANISM: Primary | ICD-10-CM

## 2023-02-10 PROCEDURE — 97530 THERAPEUTIC ACTIVITIES: CPT | Mod: CQ

## 2023-02-10 PROCEDURE — 97112 NEUROMUSCULAR REEDUCATION: CPT

## 2023-02-10 PROCEDURE — 97110 THERAPEUTIC EXERCISES: CPT | Mod: CQ

## 2023-02-10 NOTE — PROGRESS NOTES
[facility]  Physical Therapy Treatment Note    [facility]  [facility] Number: [id number]    Therapy Diagnosis:   Encounter Diagnoses   Name Primary?    CVA (cerebral vascular accident) Yes    Lower extremity weakness      Abnormal gait    Physician: Vini Hernandez NP    Visit Date: 2/10/2023    Physician Orders: PT Eval and Treat  Medical Diagnosis from Referral: I63.9 (ICD-10-CM) - CVA (cerebral vascular accident); R29.898 (ICD-10-CM) - Lower extremity weakness  Evaluation Date: 2/2/2023  Authorization Period Expiration: 3/31/2023  Plan of Care Expiration: 3/31/2023  Visit # / Visits authorized: 3/17 (including initial evaluation)  PTA Visit #: 1    Time In: 1103  Time Out: 1143  Total Billable Time: 40 minutes    Precautions: Standard, blood thinners, and fall   Functional Level Prior to Evaluation: independent with all functional mobility without assistive device prior to cerebrovascular accident     Subjective     Pt reports: she had difficulty ambulating into therapy gym this morning, unable to make it the whole way. She has been walking more than she has in a long time. Patient reports she has had revival at [employer] this week and fell forward last night hitting just above her left eye. Patient reports no other injuries.     She was compliant with home exercise program.  Response to previous treatment: This is patient's first treatment session following initial evaluation.     Pain: 0/10 (stiffness)  Location: N/A    Objective     Herlinda received therapeutic exercises to develop strength, endurance, ROM, flexibility, posture, and core stabilization for 30 minutes including:    NuStep: x 5 minutes  Calf stretch on slant board: 2 minutes  Manual hamstring stretch: 3 x 30 second holds each  Manual knee extension stretch: 3 x 30 second holds each  Bridges: 2 x 10 reps each  Long arc quads: 3 lbs; 2 x 10 reps each  Seated marching: 3 lbs; 2 x 10 reps each    Herlinda  participated in neuromuscular re-education activities to improve: Balance, Coordination, Proprioception, and Posture for 0 minutes. The following activities were included:    (not performed)     Herlinda participated in dynamic functional therapeutic activities to improve functional performance for 5 minutes, including:    Sit to/from stand with use of bilateral upper extremities: x 5 reps (contact guard assist/minimal assist from Physical Therapist)    Herlinda participated in gait training to improve functional mobility and safety for 5 minutes, including:    Patient ambulated 110 feet using rollator and contact guard assist; verbal cues for bilateral heel strike, step length, and upright posture    Home Exercises Provided and Patient Education Provided     Education provided: Patient educated on the importance of completing skilled physical therapy treatment and home exercise program as prescribed to maximize functional gains.     Written Home Exercises Provided: yes. Exercises were reviewed and Herlinda was able to demonstrate them prior to the end of the session.  Herlinda demonstrated good  understanding of the education provided.     See EMR under Patient Instructions for exercises provided 2/8/2023.    Assessment     Patient with good effort overall. Patient with significant tightness noted in bilateral lower extremities. Patient stated she felt better after treatment.    Herlinda isprogressing well towards her goals.   Pt prognosis is Good.     Pt will continue to benefit from skilled outpatient physical therapy to address the deficits listed in the problem list box on initial evaluation, provide pt/family education, and to maximize pt's level of independence in the home and community environment.     Pt's spiritual, cultural, and educational needs considered and pt agreeable to plan of care and goals.     Anticipated barriers to physical therapy: compliance with home exercise program    Goals:    Short Term Goals:  Patient  will demonstrate independence with home exercise program to ensure carryover of treatment.  Patient will perform supine to/from sit with standby assist to improve independence and safety with bed mobility.  Patient will perform sit to/from stand with contact guard assist without upper extremity support to improve independence and safety with transfers.  Patient will improve left lower extremity strength to 4-/5 to improve independence and safety with bed mobility, transfers, and gait.  Patient will ambulate 150 feet with a rollator with modified independence with bilateral terminal knee extension during stance phase, increased step lengths, and adequate bilateral heel strike to improve independence and safety with gait.      Long Term Goals:  Patient will perform supine to/from sit with modified independence to improve independence and safety with bed mobility.  Patient will perform sit to/from stand with supervision without upper extremity support to improve independence and safety with transfers.  Patient will improve left lower extremity strength to 4/5 to improve independence and safety with bed mobility, transfers, and gait.  Patient will ambulate 300 feet with a narrow-base quad cane with contact guard assist with bilateral terminal knee extension during stance phase, increased step lengths, and adequate heel strike including up/down curbs and ramps to improve independence and safety with community ambulation.    Plan     Patient will continue to benefit from skilled physical therapy treatment as prescribed working towards goals listed above to maximize functional potential.       RYAN Perkins  2/10/2023

## 2023-02-10 NOTE — PROGRESS NOTES
[facility] Daily Progress Note    [id number]   Date of Note: 02/10/2023   Referring Physician:  Vini Hernandez NP  Diagnosis:  No diagnosis found.     Start Time: 1015   End Time: 1100  Total Time: 45 min  Visit #: 2/17      Subjective:   Pt reports she had a fall at [employer], hitting her head, just above her L eye. No injuries incurred.     Pain:  0  out of 10     Objective:   Patient seen by OT this session. Treatment  consist of the following:  Neuromuscular re-education    Treatment: Neuromuscular re-education x 45 min    Neuromuscular Re-education:   - Pt performed FMC challenging, pincer grasp and strength with L UE. Performed manipulation and translation of items in L hand. Demonstrating fair performance, requiring extended time d/t slow movements.   - Performed rapid L UE movements in various planes challenging diadochokinesis demonstrating fair GMC of UE.   - Performed bilateral integrative use of Ues challenging ability to utilize L UE to assist R UE to accomplish a common goal including folding, tying, and buttoning. Extended time needed. Fair- control and coordination noted.      Assessment:  Pt demonstrates good participation and progression during neuro re-ed tasks. Pain rated 4/10 in L shoulder at end of tx with use while reaching overhead during tx. Pt will continue to benefit from skilled OT intervention.    Patient continues to demonstrate limitation  with  ROM, Stiffness, Decreased fine motor coordination, Decreased gross motor coordination, Decreased functional use, Impaired sensation, Decreased strength, and Continued pain      New/Revised Goals: Continue POC  1.   2.   3.   4.        Plan:  Continue 2x week x 8 weeks  during the certification period from   2/2/2023 to 3/31/2023  in pursuit of  OT goals.      Kiana Rasmussen, MARCUS, OTR/L  2/8/2023

## 2023-02-10 NOTE — PROGRESS NOTES
[facility] Daily Progress Note    [id number]   Date of Note: 02/10/2023   Referring Physician:  Vini Hernandez NP  Diagnosis:  No diagnosis found.     Start Time: 1015   End Time: 1100   Total Time: 45 min  Visit #: 3/17      Subjective:   Pt reports being tired from her recent birthday celebration.     Pain:  0  out of 10     Objective:   Patient seen by OT this session. Treatment  consist of the following:  Neuromuscular re-education    Treatment: Neuromuscular re-education x 45 min    Neuromuscular Re-education:   - Pt performed FMC challenging, pincer grasp and strength with L UE. Performed manipulation and translation of items in L hand. Demonstrating fair- performance, requiring extended time d/t slow movements.   - Performed rapid L UE movements in various planes challenging diadochokinesis demonstrating fair- GMC of UE.   - Performed bilateral integrative use of Ues challenging ability to utilize L UE to assist R UE to accomplish a common goal including folding, tying, and buttoning. Extended time needed. Fair- control and coordination noted.      Assessment:  Pt demonstrates good participation and progression during neuro re-ed tasks, although increased fatigued and pain noted throughout treatment. Pt attributes increased fatigue to birthday celebration. Pain rated 5/10 in L shoulder at end of tx with use while reaching overhead during tx. Pt will continue to benefit from skilled OT intervention.    Patient continues to demonstrate limitation  with  ROM, Stiffness, Decreased fine motor coordination, Decreased gross motor coordination, Decreased functional use, Impaired sensation, Decreased strength, and Continued pain      New/Revised Goals: Continue POC  1.   2.   3.   4.        Plan:  Continue 2x week x 8 weeks  during the certification period from   2/2/2023 to 3/31/2023  in pursuit of  OT goals.      Kiana Rasmussen, MARCUS,  OTR/L  2/10/2023

## 2023-02-13 ENCOUNTER — CLINICAL SUPPORT (OUTPATIENT)
Dept: REHABILITATION | Facility: HOSPITAL | Age: 58
End: 2023-02-13
Payer: MEDICARE

## 2023-02-13 DIAGNOSIS — G89.29 CHRONIC LEFT SHOULDER PAIN: ICD-10-CM

## 2023-02-13 DIAGNOSIS — R29.898 WEAKNESS OF BOTH LOWER EXTREMITIES: ICD-10-CM

## 2023-02-13 DIAGNOSIS — R29.898 WEAKNESS OF LEFT UPPER EXTREMITY: ICD-10-CM

## 2023-02-13 DIAGNOSIS — I63.9 CEREBROVASCULAR ACCIDENT (CVA), UNSPECIFIED MECHANISM: Primary | ICD-10-CM

## 2023-02-13 DIAGNOSIS — R26.9 ABNORMAL GAIT: ICD-10-CM

## 2023-02-13 DIAGNOSIS — R27.8 DECREASED COORDINATION: Primary | ICD-10-CM

## 2023-02-13 DIAGNOSIS — M25.512 CHRONIC LEFT SHOULDER PAIN: ICD-10-CM

## 2023-02-13 DIAGNOSIS — M25.612 DECREASED RANGE OF MOTION OF LEFT SHOULDER: ICD-10-CM

## 2023-02-13 PROCEDURE — 97530 THERAPEUTIC ACTIVITIES: CPT

## 2023-02-13 PROCEDURE — 97530 THERAPEUTIC ACTIVITIES: CPT | Mod: CQ

## 2023-02-13 PROCEDURE — 97140 MANUAL THERAPY 1/> REGIONS: CPT | Mod: CQ

## 2023-02-13 PROCEDURE — 97110 THERAPEUTIC EXERCISES: CPT | Mod: CQ

## 2023-02-13 NOTE — PROGRESS NOTES
[facility]  Physical Therapy Treatment Note    Name: Herlinda MoodyVincent  Clinic Number: [id number]    Therapy Diagnosis:   Encounter Diagnoses   Name Primary?    CVA (cerebral vascular accident) Yes    Lower extremity weakness      Abnormal gait    Physician: Vini Hernandez NP    Visit Date: 2/13/2023    Physician Orders: PT Eval and Treat  Medical Diagnosis from Referral: I63.9 (ICD-10-CM) - CVA (cerebral vascular accident); R29.898 (ICD-10-CM) - Lower extremity weakness  Evaluation Date: 2/2/2023  Authorization Period Expiration: 3/31/2023  Plan of Care Expiration: 3/31/2023  Visit # / Visits authorized: 4/17 (including initial evaluation)  PTA Visit #: 2    Time In: 1401  Time Out: 1444  Total Billable Time: 43 minutes    Precautions: Standard, blood thinners, and fall   Functional Level Prior to Evaluation: independent with all functional mobility without assistive device prior to cerebrovascular accident     Subjective     Pt reports: patient stated she had a fall yesterday coming from [facility]; patient reports no injuries just soreness     She was compliant with home exercise program.  Response to previous treatment: sore from fall yesterday.     Pain: 0/10 (stiffness)  Location: N/A    Objective     Herlinda received therapeutic exercises to develop strength, endurance, ROM, flexibility, posture, and core stabilization for 20 minutes including:  NuStep: x 6 minutes  Calf stretch on slant board: 2 minutes  Bridges: 2 x 10 reps  Long arc quads: 3 lbs; 3 x 10 reps each  Seated marching: 3 lbs; 2 x 10 reps each      Herlinda received the following manual therapy techniques: Joint mobilizations, Manual traction, Myofacial release, and Soft tissue Mobilization were applied to the: bilateral lower extremities for 10 minutes, including:  Manual hamstring stretch: 3 x 30 second holds each  Manual knee extension stretch: 3 x 30 second holds each  Manual single knee to chest: 3 x 30 sec  hold each      Herlinda participated in neuromuscular re-education activities to improve: Balance, Coordination, Proprioception, and Posture for 3 minutes. The following activities were included:  Tall kneeling: (unable to come to complete tall kneeling due to weakness and left knee pain)  Quadruped: rocking; x 15 reps (difficulty with left knee pain)    Herlinda participated in dynamic functional therapeutic activities to improve functional performance for 5 minutes, including:  Sit to/from stand with use of bilateral upper extremities: x 5 reps (contact guard assist)    Herlinda participated in gait training to improve functional mobility and safety for 5 minutes, including:  Patient ambulated 110 feet using rollator and contact guard assist; verbal cues for bilateral heel strike, step length, and upright posture    Home Exercises Provided and Patient Education Provided     Education provided: Patient educated on the importance of completing skilled physical therapy treatment and home exercise program as prescribed to maximize functional gains.     Written Home Exercises Provided: yes. Exercises were reviewed and Herlinda was able to demonstrate them prior to the end of the session.  Herlinda demonstrated good  understanding of the education provided.     See EMR under Patient Instructions for exercises provided 2/8/2023.    Assessment     Patient with good effort overall. Patient with significant tightness noted in bilateral lower extremities. Patient with increased left knee pain in quadruped and tall kneeling. Patient stated she felt better after treatment.    Herlinda isprogressing well towards her goals.   Pt prognosis is Good.     Pt will continue to benefit from skilled outpatient physical therapy to address the deficits listed in the problem list box on initial evaluation, provide pt/family education, and to maximize pt's level of independence in the home and community environment.     Pt's spiritual, cultural, and educational  needs considered and pt agreeable to plan of care and goals.     Anticipated barriers to physical therapy: compliance with home exercise program    Goals:    Short Term Goals:  Patient will demonstrate independence with home exercise program to ensure carryover of treatment.  Patient will perform supine to/from sit with standby assist to improve independence and safety with bed mobility.  Patient will perform sit to/from stand with contact guard assist without upper extremity support to improve independence and safety with transfers.  Patient will improve left lower extremity strength to 4-/5 to improve independence and safety with bed mobility, transfers, and gait.  Patient will ambulate 150 feet with a rollator with modified independence with bilateral terminal knee extension during stance phase, increased step lengths, and adequate bilateral heel strike to improve independence and safety with gait.      Long Term Goals:  Patient will perform supine to/from sit with modified independence to improve independence and safety with bed mobility.  Patient will perform sit to/from stand with supervision without upper extremity support to improve independence and safety with transfers.  Patient will improve left lower extremity strength to 4/5 to improve independence and safety with bed mobility, transfers, and gait.  Patient will ambulate 300 feet with a narrow-base quad cane with contact guard assist with bilateral terminal knee extension during stance phase, increased step lengths, and adequate heel strike including up/down curbs and ramps to improve independence and safety with community ambulation.    Plan     Patient will continue to benefit from skilled physical therapy treatment as prescribed working towards goals listed above to maximize functional potential.       RYAN Perkins  2/13/2023

## 2023-02-15 ENCOUNTER — CLINICAL SUPPORT (OUTPATIENT)
Dept: REHABILITATION | Facility: HOSPITAL | Age: 58
End: 2023-02-15
Payer: MEDICARE

## 2023-02-15 DIAGNOSIS — M25.612 DECREASED RANGE OF MOTION OF LEFT SHOULDER: ICD-10-CM

## 2023-02-15 DIAGNOSIS — R29.898 WEAKNESS OF BOTH LOWER EXTREMITIES: ICD-10-CM

## 2023-02-15 DIAGNOSIS — M25.512 CHRONIC LEFT SHOULDER PAIN: ICD-10-CM

## 2023-02-15 DIAGNOSIS — G89.29 CHRONIC LEFT SHOULDER PAIN: ICD-10-CM

## 2023-02-15 DIAGNOSIS — R27.8 DECREASED COORDINATION: Primary | ICD-10-CM

## 2023-02-15 DIAGNOSIS — R26.9 ABNORMAL GAIT: ICD-10-CM

## 2023-02-15 DIAGNOSIS — I63.9 CEREBROVASCULAR ACCIDENT (CVA), UNSPECIFIED MECHANISM: Primary | ICD-10-CM

## 2023-02-15 DIAGNOSIS — R29.898 WEAKNESS OF LEFT UPPER EXTREMITY: ICD-10-CM

## 2023-02-15 PROCEDURE — 97110 THERAPEUTIC EXERCISES: CPT | Mod: CQ

## 2023-02-15 PROCEDURE — 97112 NEUROMUSCULAR REEDUCATION: CPT | Mod: CQ

## 2023-02-15 PROCEDURE — 97140 MANUAL THERAPY 1/> REGIONS: CPT | Mod: CQ

## 2023-02-15 PROCEDURE — 97112 NEUROMUSCULAR REEDUCATION: CPT

## 2023-02-15 NOTE — PROGRESS NOTES
"[facility] Daily Progress Note    [id number]   Date of Note: 02/15/2023   Referring Physician:  Vini Hernandez NP  Diagnosis:  No diagnosis found.     Start Time: 1310   End Time: 1400   Total Time: 50 min  Visit #: 4/17      Subjective:   Pt reports falling over the weekend, at [place], explaining, "my legs just gave out when I was getting in the car." Pt reports no injury, she explained "I just kind of sat down at the edge of the car, kind of on the floorboard."      Pain:  0  out of 10     Objective:   Patient seen by OT this session. Treatment  consist of the following:  Therapeutic activity    Treatment: Therapeutic activity x 50 min    Therapeutic Activity:   - Pt performed FMC challenging, pincer grasp and strength with L UE with use of green theraputty, successfully removing all items from putty with extended time. Challenged dexterity and coordination of LUE.   - Performed L UE ROM arch with OTR increasing ROM distance by adding extension to arch, tolerating fair with pain rated 3/10 in L shoulder. Demonstrates improved AROM in L shoulder during activity.  - Performed further L UE FMC, placing pegs in peg board with fair use of dexterity and coordination, dropping pegs intermittently, successfully placing 4/10 without dropping or needing to readjust .  - Performed L UE shoulder AROM with hand dexterity, challenging removal of pegs from board while OTR held board over head, challenging change in grasp pattern along with shoulder ROM. Tolerated fair with pain rated 4/10 and successfully removing 3/10 pegs without difficulty.        Assessment:  Pt demonstrates good participation and progression during activities. Pain rated 4/10 in L shoulder at end of tx with use while reaching overhead during tx. Demonstrates fair GMC and FMC of L UE throughout treatment. Pt will continue to benefit from skilled OT intervention.    Patient continues to " demonstrate limitation  with  ROM, Stiffness, Decreased fine motor coordination, Decreased gross motor coordination, Decreased functional use, Impaired sensation, Decreased strength, and Continued pain      New/Revised Goals: Continue POC  1.   2.   3.   4.        Plan:  Continue 2x week x 8 weeks  during the certification period from   2/2/2023 to 3/31/2023  in pursuit of  OT goals.      Kiana Rasmussen, OTD, OTR/L  2/13/2023

## 2023-02-15 NOTE — PROGRESS NOTES
"[facility] Daily Progress Note    [id number]   Date of Note: 02/15/2023   Referring Physician:  Vini Hernandez NP  Diagnosis:    Encounter Diagnoses   Name Primary?    Decreased coordination Yes    Weakness of left upper extremity     Chronic left shoulder pain     Decreased range of motion of left shoulder         Start Time: 1400   End Time: 1500   Total Time: 60 min  Visit #: 5/17      Subjective:   Pt reports feeling "ok," but reports increased fatigue this day, stating "I don't know why, I just feel more tired today."       Pain:  0  out of 10     Objective:   Patient seen by OT this session. Treatment  consist of the following:  Neuro Re-education     Treatment: Neuro Re-ed x 60 min    Neuro Re-ed:   - Pt performed FMC challenging, pincer grasp and strength with L UE with use of green theraputty, successfully removing all items from putty with extended time. Challenged manipulation, stereognosis, dexterity and coordination of LUE.   - Performed dynamic sitting EOB while challenging GMC and bilateral integrative use of upper extremities, neccessary for catching and throwing large therapy ball. Demonstrated 75% accuracy.   - Performed dynamic standing and L UE functional reaching distance, GMC, FMC and kinesthetic awareness necessary for placing pegs in to a board mounted on a vertical surface, demonstrated 50% accuracy.   - Performed further dynamic standing at edge of mat, challenging upright posture, righting reaction with slight delay noted in reaction time. Pt demonstrates difficulty extending LB in combination with UB for full postural extension during dynamic standing tasks.   - Performed further L UE FMC, removing pegs from peg board with fair use of dexterity and coordination, dropping pegs intermittently, successfully removing 6/10 without dropping or needing to readjust .     Assessment:  Pt demonstrates good participation and " progression during activities. Pain rated 3/10 in L shoulder at end of tx with use while reaching overhead during tx. Demonstrates fair GMC and FMC of L UE throughout treatment. Pt will continue to benefit from skilled OT intervention.    Patient continues to demonstrate limitation  with  ROM, Stiffness, Decreased fine motor coordination, Decreased gross motor coordination, Decreased functional use, Impaired sensation, Decreased strength, and Continued pain      New/Revised Goals: Continue POC  1.   2.   3.   4.        Plan:  Continue 2x week x 8 weeks  during the certification period from   2/2/2023 to 3/31/2023  in pursuit of  OT goals.      Kiana Rasmussen, MARCUS, OTR/L  2/15/2023

## 2023-02-15 NOTE — PROGRESS NOTES
[facility]  Physical Therapy Treatment Note    [facility]  [facility] [id number]    Therapy Diagnosis:   Encounter Diagnoses   Name Primary?    CVA (cerebral vascular accident) Yes    Lower extremity weakness      Abnormal gait    Physician: Vini Hernandez NP    Visit Date: 2/15/2023    Physician Orders: PT Eval and Treat  Medical Diagnosis from Referral: I63.9 (ICD-10-CM) - CVA (cerebral vascular accident); R29.898 (ICD-10-CM) - Lower extremity weakness  Evaluation Date: 2/2/2023  Authorization Period Expiration: 3/31/2023  Plan of Care Expiration: 3/31/2023  Visit # / Visits authorized: 5/17 (including initial evaluation)  PTA Visit #: 3    Time In: 1312 (Patient treatment started prior to being checked in)  Time Out: 1352  Total Billable Time: 40 minutes    Precautions: Standard, blood thinners, and fall   Functional Level Prior to Evaluation: independent with all functional mobility without assistive device prior to cerebrovascular accident     Subjective     Pt reports: she is doing okay; increased left knee pain since last visit    She was compliant with home exercise program.  Response to previous treatment: sore from fall yesterday.     Pain: 3/10  Location: N/A    Objective     Herlinda received therapeutic exercises to develop strength, endurance, ROM, flexibility, posture, and core stabilization for 15 minutes including:  NuStep: x 6 minutes  Calf stretch on slant board: 2 minutes  Bridges: 2 x 10 reps  Straight leg raises: bilateral; 2 x 10 reps    Long arc quads: 3 lbs; 3 x 10 reps each (not performed)  Seated marching: 3 lbs; 2 x 10 reps each (not performed)      Herlinda received the following manual therapy techniques: Joint mobilizations, Manual traction, Myofacial release, and Soft tissue Mobilization were applied to the: bilateral lower extremities for 12 minutes, including:  Manual hamstring stretch: 3 x 30 second holds each  Manual knee  extension stretch: 3 x 30 second holds each  Manual single knee to chest: 3 x 30 sec hold each      Herlinda participated in neuromuscular re-education activities to improve: Balance, Coordination, Proprioception, and Posture for 8 minutes. The following activities were included:  Tall kneeling: able to come to complete stand with moderate assist and hold x 30 sec  Quadruped: rocking; x 15 reps (difficulty with left knee pain)  Quadruped: alternating upper extremity x 10 reps each    Herlinda participated in dynamic functional therapeutic activities to improve functional performance for 5 minutes, including:  Sit to/from stand with use of bilateral upper extremities: x 10 reps (contact guard assist)    Herlinda participated in gait training to improve functional mobility and safety for 0 minutes, including:  Patient ambulated 110 feet using rollator and contact guard assist; verbal cues for bilateral heel strike, step length, and upright posture    Home Exercises Provided and Patient Education Provided     Education provided: Patient educated on the importance of completing skilled physical therapy treatment and home exercise program as prescribed to maximize functional gains.     Written Home Exercises Provided: yes. Exercises were reviewed and Herlinda was able to demonstrate them prior to the end of the session.  Herlinda demonstrated good  understanding of the education provided.     See EMR under Patient Instructions for exercises provided 2/8/2023.    Assessment     Patient with good effort overall. Patient still with significant tightness noted in bilateral lower extremities. Patient able to perform quadruped and tall kneeling exercises today with no increase in pain. Patient stated she felt better after treatment.    Herlinda isprogressing well towards her goals.   Pt prognosis is Good.     Pt will continue to benefit from skilled outpatient physical therapy to address the deficits listed in the problem list box on initial  evaluation, provide pt/family education, and to maximize pt's level of independence in the home and community environment.     Pt's spiritual, cultural, and educational needs considered and pt agreeable to plan of care and goals.     Anticipated barriers to physical therapy: compliance with home exercise program    Goals:    Short Term Goals:  Patient will demonstrate independence with home exercise program to ensure carryover of treatment.  Patient will perform supine to/from sit with standby assist to improve independence and safety with bed mobility.  Patient will perform sit to/from stand with contact guard assist without upper extremity support to improve independence and safety with transfers.  Patient will improve left lower extremity strength to 4-/5 to improve independence and safety with bed mobility, transfers, and gait.  Patient will ambulate 150 feet with a rollator with modified independence with bilateral terminal knee extension during stance phase, increased step lengths, and adequate bilateral heel strike to improve independence and safety with gait.      Long Term Goals:  Patient will perform supine to/from sit with modified independence to improve independence and safety with bed mobility.  Patient will perform sit to/from stand with supervision without upper extremity support to improve independence and safety with transfers.  Patient will improve left lower extremity strength to 4/5 to improve independence and safety with bed mobility, transfers, and gait.  Patient will ambulate 300 feet with a narrow-base quad cane with contact guard assist with bilateral terminal knee extension during stance phase, increased step lengths, and adequate heel strike including up/down curbs and ramps to improve independence and safety with community ambulation.    Plan     Patient will continue to benefit from skilled physical therapy treatment as prescribed working towards goals listed above to maximize  functional potential.       Srinivasa Redmond LPTA  2/15/2023

## 2023-02-21 ENCOUNTER — CLINICAL SUPPORT (OUTPATIENT)
Dept: REHABILITATION | Facility: HOSPITAL | Age: 58
End: 2023-02-21
Payer: MEDICARE

## 2023-02-21 DIAGNOSIS — R29.898 WEAKNESS OF LEFT UPPER EXTREMITY: ICD-10-CM

## 2023-02-21 DIAGNOSIS — R26.9 ABNORMAL GAIT: ICD-10-CM

## 2023-02-21 DIAGNOSIS — I63.9 CEREBROVASCULAR ACCIDENT (CVA), UNSPECIFIED MECHANISM: ICD-10-CM

## 2023-02-21 DIAGNOSIS — M25.512 CHRONIC LEFT SHOULDER PAIN: ICD-10-CM

## 2023-02-21 DIAGNOSIS — R27.8 DECREASED COORDINATION: Primary | ICD-10-CM

## 2023-02-21 DIAGNOSIS — M25.612 DECREASED RANGE OF MOTION OF LEFT SHOULDER: ICD-10-CM

## 2023-02-21 DIAGNOSIS — G89.29 CHRONIC LEFT SHOULDER PAIN: ICD-10-CM

## 2023-02-21 DIAGNOSIS — R29.898 WEAKNESS OF BOTH LOWER EXTREMITIES: ICD-10-CM

## 2023-02-21 DIAGNOSIS — I63.9 CEREBROVASCULAR ACCIDENT (CVA), UNSPECIFIED MECHANISM: Primary | ICD-10-CM

## 2023-02-21 PROCEDURE — 97140 MANUAL THERAPY 1/> REGIONS: CPT | Mod: CQ

## 2023-02-21 PROCEDURE — 97112 NEUROMUSCULAR REEDUCATION: CPT | Mod: CQ

## 2023-02-21 PROCEDURE — 97112 NEUROMUSCULAR REEDUCATION: CPT

## 2023-02-21 PROCEDURE — 97110 THERAPEUTIC EXERCISES: CPT | Mod: CQ

## 2023-02-21 NOTE — PROGRESS NOTES
[facility]  Physical Therapy Treatment Note    [facility]  [facility] Number: [id number]    Therapy Diagnosis:   Encounter Diagnoses   Name Primary?    CVA (cerebral vascular accident) Yes    Lower extremity weakness      Abnormal gait    Physician: Vini Hernandez NP    Visit Date: 2/21/2023    Physician Orders: PT Eval and Treat  Medical Diagnosis from Referral: I63.9 (ICD-10-CM) - CVA (cerebral vascular accident); R29.898 (ICD-10-CM) - Lower extremity weakness  Evaluation Date: 2/2/2023  Authorization Period Expiration: 3/31/2023  Plan of Care Expiration: 3/31/2023  Visit # / Visits authorized: 6/17 (including initial evaluation)  PTA Visit #: 4    Time In: 1447 (Patient treatment started prior to being checked in)  Time Out: 1529  Total Billable Time: 42 minutes    Precautions: Standard, blood thinners, and fall   Functional Level Prior to Evaluation: independent with all functional mobility without assistive device prior to cerebrovascular accident     Subjective     Pt reports: she has had increased left knee pain today    She was compliant with home exercise program.  Response to previous treatment: sore from fall yesterday.     Pain: 4/10  Location: left knee    Objective     Herlinda received therapeutic exercises to develop strength, endurance, ROM, flexibility, posture, and core stabilization for 15 minutes including:  NuStep: x 6 minutes  Calf stretch on slant board: 2 minutes  Bridges: 2 x 10 reps  Straight leg raises: bilateral; 2 x 10 reps    Long arc quads: 3 lbs; 3 x 10 reps each (not performed)  Seated marching: 3 lbs; 2 x 10 reps each (not performed)      Herlinda received the following manual therapy techniques: Joint mobilizations, Manual traction, Myofacial release, and Soft tissue Mobilization were applied to the: bilateral lower extremities for 10 minutes, including:  Manual hamstring stretch: 3 x 30 second holds each  Manual knee extension stretch: 3  x 30 second holds each  Manual single knee to chest: 3 x 30 sec hold each      Herlinda participated in neuromuscular re-education activities to improve: Balance, Coordination, Proprioception, and Posture for 8 minutes. The following activities were included:  Tall kneeling: able to come to complete stand with moderate assist and hold x 30 sec (not today due to L knee pain)  Quadruped: rocking; x 15 reps (difficulty with left knee pain)  Quadruped: alternating upper extremity x 10 reps each     Herlinda participated in dynamic functional therapeutic activities to improve functional performance for 4 minutes, including:  Sit to/from stand with use of bilateral upper extremities: x 10 reps (contact guard assist; verbal cues for upright posture)    Herlinda participated in gait training to improve functional mobility and safety for 5 minutes, including:  Patient ambulated 200 feet using rollator and contact guard assist; verbal cues for bilateral heel strike, step length, and upright posture    Home Exercises Provided and Patient Education Provided     Education provided: Patient educated on the importance of completing skilled physical therapy treatment and home exercise program as prescribed to maximize functional gains.     Written Home Exercises Provided: yes. Exercises were reviewed and Herlinda was able to demonstrate them prior to the end of the session.  Herlinda demonstrated good  understanding of the education provided.     See EMR under Patient Instructions for exercises provided 2/8/2023.    Assessment     Patient with good effort overall. Patient still with significant tightness noted in bilateral lower extremities. Patient limited with quadruped and tall kneeling exercises due to increase in left knee pain today. Patient stated she felt better after treatment.    Herlinda isprogressing well towards her goals.   Pt prognosis is Good.     Pt will continue to benefit from skilled outpatient physical therapy to address the deficits  listed in the problem list box on initial evaluation, provide pt/family education, and to maximize pt's level of independence in the home and community environment.     Pt's spiritual, cultural, and educational needs considered and pt agreeable to plan of care and goals.     Anticipated barriers to physical therapy: compliance with home exercise program    Goals:    Short Term Goals:  Patient will demonstrate independence with home exercise program to ensure carryover of treatment.  Patient will perform supine to/from sit with standby assist to improve independence and safety with bed mobility.  Patient will perform sit to/from stand with contact guard assist without upper extremity support to improve independence and safety with transfers.  Patient will improve left lower extremity strength to 4-/5 to improve independence and safety with bed mobility, transfers, and gait.  Patient will ambulate 150 feet with a rollator with modified independence with bilateral terminal knee extension during stance phase, increased step lengths, and adequate bilateral heel strike to improve independence and safety with gait.      Long Term Goals:  Patient will perform supine to/from sit with modified independence to improve independence and safety with bed mobility.  Patient will perform sit to/from stand with supervision without upper extremity support to improve independence and safety with transfers.  Patient will improve left lower extremity strength to 4/5 to improve independence and safety with bed mobility, transfers, and gait.  Patient will ambulate 300 feet with a narrow-base quad cane with contact guard assist with bilateral terminal knee extension during stance phase, increased step lengths, and adequate heel strike including up/down curbs and ramps to improve independence and safety with community ambulation.    Plan     Patient will continue to benefit from skilled physical therapy treatment as prescribed working  towards goals listed above to maximize functional potential.       RYAN Perkins  2/21/2023

## 2023-02-23 NOTE — PROGRESS NOTES
"[facility] Therapy Daily Progress Note    [id number]   Date of Note: 02/21/2023   Referring Physician:  Vini Hernandez NP  Diagnosis:    No diagnosis found.       Start Time: 1415   End Time: 1445   Total Time: 30 min  Visit #: 6/17      Subjective:   Pt reports feeling "pretty good," and apologized for "running late today."        Pain:  0  out of 10     Objective:   Patient seen by OT this session. Treatment  consist of the following:  Neuro Re-education     Treatment: Neuro Re-ed x 00 min    Neuro Re-ed:   - Pt performed FMC challenging, pincer grasp and strength with L UE with use of green theraputty, successfully removing all items from putty with extended time. Challenged manipulation, stereognosis, dexterity and coordination of LUE.   - Performed dynamic sitting edge of mat while challenging GMC and bilateral integrative use of upper extremities, neccessary for folding towels of various size. Extended time needed to slow movements and fatigue.   - Performed L UE functional reaching overhead challenging pincer grasp with various levels of resistance, challenging strength, GMC, FMC, placing clothes pins on vertical pole. Demonstrated 75% accuracy with 1#, 3#, and 5# resistive clothes pins.      Assessment:  Pt demonstrates good participation and progression during activities. Pain rated 2-3/10 in L shoulder at end of tx d/t reaching overhead during tx. Demonstrates fair GMC and FMC of L UE throughout treatment. Pt will continue to benefit from skilled OT intervention.    Patient continues to demonstrate limitation  with  ROM, Stiffness, Decreased fine motor coordination, Decreased gross motor coordination, Decreased functional use, Impaired sensation, Decreased strength, and Continued pain      New/Revised Goals: Continue POC  1.   2.   3.   4.        Plan:  Continue 2x week x 8 weeks  during the certification period from   2/2/2023 to 3/31/2023  in " pursuit of  OT goals.      MARCUS Levy, OTR/L  2/21/2023

## 2023-02-28 ENCOUNTER — CLINICAL SUPPORT (OUTPATIENT)
Dept: REHABILITATION | Facility: HOSPITAL | Age: 58
End: 2023-02-28
Payer: MEDICARE

## 2023-02-28 DIAGNOSIS — M25.512 CHRONIC LEFT SHOULDER PAIN: ICD-10-CM

## 2023-02-28 DIAGNOSIS — I63.9 CEREBROVASCULAR ACCIDENT (CVA), UNSPECIFIED MECHANISM: ICD-10-CM

## 2023-02-28 DIAGNOSIS — R27.8 DECREASED COORDINATION: Primary | ICD-10-CM

## 2023-02-28 DIAGNOSIS — I63.9 CEREBROVASCULAR ACCIDENT (CVA), UNSPECIFIED MECHANISM: Primary | ICD-10-CM

## 2023-02-28 DIAGNOSIS — R29.898 WEAKNESS OF BOTH LOWER EXTREMITIES: ICD-10-CM

## 2023-02-28 DIAGNOSIS — M25.612 DECREASED RANGE OF MOTION OF LEFT SHOULDER: ICD-10-CM

## 2023-02-28 DIAGNOSIS — R26.9 ABNORMAL GAIT: ICD-10-CM

## 2023-02-28 DIAGNOSIS — R29.898 WEAKNESS OF LEFT UPPER EXTREMITY: ICD-10-CM

## 2023-02-28 DIAGNOSIS — G89.29 CHRONIC LEFT SHOULDER PAIN: ICD-10-CM

## 2023-02-28 PROCEDURE — 97140 MANUAL THERAPY 1/> REGIONS: CPT | Mod: CQ

## 2023-02-28 PROCEDURE — 97530 THERAPEUTIC ACTIVITIES: CPT | Mod: CQ

## 2023-02-28 PROCEDURE — 97112 NEUROMUSCULAR REEDUCATION: CPT

## 2023-02-28 PROCEDURE — 97110 THERAPEUTIC EXERCISES: CPT | Mod: CQ

## 2023-02-28 NOTE — PROGRESS NOTES
"[facility] Daily Progress Note    [id number]   Date of Note: 2/28/2023   Referring Physician:  Vini Hernandez NP    Diagnosis:    Encounter Diagnoses   Name Primary?    Decreased coordination Yes    Weakness of left upper extremity     Chronic left shoulder pain     Cerebrovascular accident (CVA), unspecified mechanism     Decreased range of motion of left shoulder           Start Time: 1315   End Time: 1400   Total Time: 45 min  Visit #: 7/17      Subjective:   Pt reports feeling "pretty good."         Pain:  0  out of 10     Objective:   Patient seen by OT this session. Treatment  consist of the following:  Neuro Re-education     Treatment: Neuro Re-ed x 45 min    Neuro Re-ed:   - Pt performed FMC challenging, pincer grasp and strength with L UE with use of green theraputty, successfully removing all items from putty with extended time. Challenged manipulation, stereognosis, dexterity and coordination of LUE.   - Performed L UE ROM arch, successfully completed 3 passes, increasing height of arch with each pass, challenging optimal L UE GMC and control.   - Performed L UE functional reaching overhead challenging pincer grasp with various levels of resistance, challenging strength, GMC, FMC, placing clothes pins on vertical pole. Demonstrated 100% accuracy with 1# and 3# resistive clothes pins.   - Performed L UE FMC placing pegs in board with 100% accuracy then removing pegs with board placed on vertical surface, challenging L wrist extension and FMC.       Assessment:  Pt demonstrates good participation and progression during activities. Pain rated 1-2/10 in L shoulder at end of tx d/t reaching overhead during tx. Demonstrates fair+ GMC and FMC of L UE throughout treatment. Pt will continue to benefit from skilled OT intervention.    Patient continues to demonstrate limitation  with  ROM, Stiffness, Decreased fine motor coordination, Decreased " gross motor coordination, Decreased functional use, Impaired sensation, Decreased strength, and Continued pain      New/Revised Goals: Continue POC  1.   2.   3.   4.        Plan:  Continue 2x week x 8 weeks  during the certification period from   2/2/2023 to 3/31/2023  in pursuit of  OT goals.      MARCUS Levy, OTR/L  2/28/2023

## 2023-02-28 NOTE — PROGRESS NOTES
[facility]  Physical Therapy Treatment Note    [id number]    Therapy Diagnosis:   Encounter Diagnoses   Name Primary?    CVA (cerebral vascular accident) Yes    Lower extremity weakness      Abnormal gait    Physician: Vini Hernandez NP    Visit Date: 2/28/2023    Physician Orders: PT Eval and Treat  Medical Diagnosis from Referral: I63.9 (ICD-10-CM) - CVA (cerebral vascular accident); R29.898 (ICD-10-CM) - Lower extremity weakness  Evaluation Date: 2/2/2023  Authorization Period Expiration: 3/31/2023  Plan of Care Expiration: 3/31/2023  Visit # / Visits authorized: 7/17 (including initial evaluation)  PTA Visit #: 5    Time In: 1401  Time Out: 1444  Total Billable Time: 43 minutes    Precautions: Standard, blood thinners, and fall   Functional Level Prior to Evaluation: independent with all functional mobility without assistive device prior to cerebrovascular accident     Subjective     Pt reports: she did not have any pain just sore and stiffness in left knee    She was compliant with home exercise program.  Response to previous treatment: sore from fall yesterday.     Pain: 0/10  Location: left knee    Objective     Herlinda received therapeutic exercises to develop strength, endurance, ROM, flexibility, posture, and core stabilization for 15 minutes including:  NuStep: x 6 minutes  Calf stretch on slant board: 2 minutes  Bridges: 3 x 10 reps  Straight leg raises: bilateral; 2 x 10 reps    Long arc quads: 3 lbs; 3 x 10 reps each (not performed)  Seated marching: 3 lbs; 2 x 10 reps each (not performed)      Herlinda received the following manual therapy techniques: Joint mobilizations, Manual traction, Myofacial release, and Soft tissue Mobilization were applied to the: bilateral lower extremities for 10 minutes, including:  Manual hamstring stretch: 3 x 30 second holds each  Manual knee extension stretch: 3 x 30 second holds each  Manual  single knee to chest: 3 x 30 sec hold each      Herlinda participated in neuromuscular re-education activities to improve: Balance, Coordination, Proprioception, and Posture for 8 minutes. The following activities were included:  Tall kneeling: able to come to complete stand with moderate assist and hold x 1 minute with minimal assistance  Quadruped: alternating upper extremity x 10 reps each     Herlinda participated in dynamic functional therapeutic activities to improve functional performance for 5 minutes, including:  Sit to/from stand with use of bilateral upper extremities: x 10 reps (standby assist; verbal cues for upright posture)    Herlinda participated in gait training to improve functional mobility and safety for 4 minutes, including:  Patient ambulated 200 feet using rollator and contact guard assist; verbal cues for bilateral heel strike, step length, and upright posture    Home Exercises Provided and Patient Education Provided     Education provided: Patient educated on the importance of completing skilled physical therapy treatment and home exercise program as prescribed to maximize functional gains.     Written Home Exercises Provided: yes. Exercises were reviewed and Herlinda was able to demonstrate them prior to the end of the session.  Herlinda demonstrated good  understanding of the education provided.     See EMR under Patient Instructions for exercises provided 2/8/2023.    Assessment     Patient with good effort overall. Patient still has significant tightness in bilateral lower extremities. Patient with improved quadruped and tall kneeling today.  Patient stated she was fatigued but felt better after treatment.    Herlinda isprogressing well towards her goals.   Pt prognosis is Good.     Pt will continue to benefit from skilled outpatient physical therapy to address the deficits listed in the problem list box on initial evaluation, provide pt/family education, and to maximize pt's level of independence in the home  and community environment.     Pt's spiritual, cultural, and educational needs considered and pt agreeable to plan of care and goals.     Anticipated barriers to physical therapy: compliance with home exercise program    Goals:    Short Term Goals:  Patient will demonstrate independence with home exercise program to ensure carryover of treatment.  Patient will perform supine to/from sit with standby assist to improve independence and safety with bed mobility.  Patient will perform sit to/from stand with contact guard assist without upper extremity support to improve independence and safety with transfers.  Patient will improve left lower extremity strength to 4-/5 to improve independence and safety with bed mobility, transfers, and gait.  Patient will ambulate 150 feet with a rollator with modified independence with bilateral terminal knee extension during stance phase, increased step lengths, and adequate bilateral heel strike to improve independence and safety with gait.      Long Term Goals:  Patient will perform supine to/from sit with modified independence to improve independence and safety with bed mobility.  Patient will perform sit to/from stand with supervision without upper extremity support to improve independence and safety with transfers.  Patient will improve left lower extremity strength to 4/5 to improve independence and safety with bed mobility, transfers, and gait.  Patient will ambulate 300 feet with a narrow-base quad cane with contact guard assist with bilateral terminal knee extension during stance phase, increased step lengths, and adequate heel strike including up/down curbs and ramps to improve independence and safety with community ambulation.    Plan     Patient will continue to benefit from skilled physical therapy treatment as prescribed working towards goals listed above to maximize functional potential.       RYAN Perkins  2/28/2023

## 2023-03-01 NOTE — PROGRESS NOTES
[facility]  Physical Therapy Treatment Note    [facility]  [id number]    Therapy Diagnosis:   Encounter Diagnoses   Name Primary?    CVA (cerebral vascular accident) Yes    Lower extremity weakness      Abnormal gait    Physician: Vini Hernandez NP    Visit Date: 3/2/2023     Physician Orders: PT Eval and Treat  Medical Diagnosis from Referral: I63.9 (ICD-10-CM) - CVA (cerebral vascular accident); R29.898 (ICD-10-CM) - Lower extremity weakness  Evaluation Date: 2/2/2023  Authorization Period Expiration: 3/31/2023  Plan of Care Expiration: 3/31/2023  Visit # / Visits authorized: 8/17 (including initial evaluation)  PTA Visit #: 0    Time In: 1500  Time Out: 1545  Total Billable Time: 45 minutes    Precautions: Standard, blood thinners, and fall   Functional Level Prior to Evaluation: independent with all functional mobility without assistive device prior to cerebrovascular accident     Subjective     Pt reports: Her buttock is incredibly sore/painful. Patient completed ~45 minutes of occupational therapy treatment prior to physical therapy treatment this afternoon.     She was compliant with home exercise program.  Response to previous treatment: good    Pain: 0/10  Location: left knee    Objective     Herlinda received therapeutic exercises to develop strength, endurance, ROM, flexibility, posture, and core stabilization for 22 minutes including:    NuStep: x 6 minutes  Calf stretch on slant board: 2 minutes  Straight leg raises: 2 x 10 reps each    Bridges: 3 x 10 reps (not performed)   Long arc quads: 3 lbs; 3 x 10 reps each (not performed)  Seated marching: 3 lbs; 2 x 10 reps each (not performed)    Herlinda received the following manual therapy techniques to bilateral lower extremities for 15 minutes, including:    Manual hamstring stretch (each)  Manual knee extension stretch (each  Manual single knee to chest (each)    Herlinda participated in  neuromuscular re-education activities to improve: Balance, Coordination, Proprioception, and Posture for 8 minutes. The following activities were included:    Double limb support on firm surface with ball toss to Physical Therapist  Double limb support on foam with ball toss to Physical Therapist    Tall kneeling: able to come to complete stand with moderate assist and hold x 1 minute with minimal assistance (not performed)   Quadruped: alternating upper extremity x 10 reps each (not performed)     Herlinda participated in dynamic functional therapeutic activities to improve functional performance for 0 minutes, including:    Sit to/from stand with use of bilateral upper extremities: x 10 reps (standby assist; verbal cues for upright posture) (not performed)     Herlinda participated in gait training to improve functional mobility and safety for 0 minutes, including:    Patient ambulated 200 feet using rollator and contact guard assist; verbal cues for bilateral heel strike, step length, and upright posture (not performed)     Home Exercises Provided and Patient Education Provided     Education provided: Patient educated on the importance of completing skilled physical therapy treatment and home exercise program as prescribed to maximize functional gains.     Written Home Exercises Provided: yes. Exercises were reviewed and Herlinda was able to demonstrate them prior to the end of the session.  Herlinda demonstrated good  understanding of the education provided.     See EMR under Patient Instructions for exercises provided 2/8/2023.    Assessment     Patient with good effort throughout treatment. Patient slow to complete therapeutic exercise and required breaks with manual therapy due to pain. Patient unable to perform bridges this treatment due to buttock pain. Patient continues to have significant tightness in bilateral lower extremities. Physical Therapist will continue to progress therapeutic exercise, neuromuscular  re-education, therapeutic activities, and gait training as able with manual therapy and modalities utilized as needed.    Herlinda isprogressing well towards her goals.   Pt prognosis is Good.     Pt will continue to benefit from skilled outpatient physical therapy to address the deficits listed in the problem list box on initial evaluation, provide pt/family education, and to maximize pt's level of independence in the home and community environment.     Pt's spiritual, cultural, and educational needs considered and pt agreeable to plan of care and goals.     Anticipated barriers to physical therapy: compliance with home exercise program    Goals:    Short Term Goals:  Patient will demonstrate independence with home exercise program to ensure carryover of treatment.  Patient will perform supine to/from sit with standby assist to improve independence and safety with bed mobility.  Patient will perform sit to/from stand with contact guard assist without upper extremity support to improve independence and safety with transfers.  Patient will improve left lower extremity strength to 4-/5 to improve independence and safety with bed mobility, transfers, and gait.  Patient will ambulate 150 feet with a rollator with modified independence with bilateral terminal knee extension during stance phase, increased step lengths, and adequate bilateral heel strike to improve independence and safety with gait.      Long Term Goals:  Patient will perform supine to/from sit with modified independence to improve independence and safety with bed mobility.  Patient will perform sit to/from stand with supervision without upper extremity support to improve independence and safety with transfers.  Patient will improve left lower extremity strength to 4/5 to improve independence and safety with bed mobility, transfers, and gait.  Patient will ambulate 300 feet with a narrow-base quad cane with contact guard assist with bilateral terminal knee  extension during stance phase, increased step lengths, and adequate heel strike including up/down curbs and ramps to improve independence and safety with community ambulation.    Plan     Patient will continue to benefit from skilled physical therapy treatment as prescribed working towards goals listed above to maximize functional potential.       Nguyễn Cuba, PT, DPT  3/2/2023

## 2023-03-02 ENCOUNTER — CLINICAL SUPPORT (OUTPATIENT)
Dept: REHABILITATION | Facility: HOSPITAL | Age: 58
End: 2023-03-02
Payer: MEDICARE

## 2023-03-02 DIAGNOSIS — M25.612 DECREASED RANGE OF MOTION OF LEFT SHOULDER: ICD-10-CM

## 2023-03-02 DIAGNOSIS — I63.9 CEREBROVASCULAR ACCIDENT (CVA), UNSPECIFIED MECHANISM: ICD-10-CM

## 2023-03-02 DIAGNOSIS — R29.898 WEAKNESS OF BOTH LOWER EXTREMITIES: ICD-10-CM

## 2023-03-02 DIAGNOSIS — R29.898 WEAKNESS OF LEFT UPPER EXTREMITY: ICD-10-CM

## 2023-03-02 DIAGNOSIS — G89.29 CHRONIC LEFT SHOULDER PAIN: ICD-10-CM

## 2023-03-02 DIAGNOSIS — R26.9 ABNORMAL GAIT: ICD-10-CM

## 2023-03-02 DIAGNOSIS — M25.512 CHRONIC LEFT SHOULDER PAIN: ICD-10-CM

## 2023-03-02 DIAGNOSIS — R27.8 DECREASED COORDINATION: Primary | ICD-10-CM

## 2023-03-02 DIAGNOSIS — I63.9 CEREBROVASCULAR ACCIDENT (CVA), UNSPECIFIED MECHANISM: Primary | ICD-10-CM

## 2023-03-02 PROCEDURE — 97112 NEUROMUSCULAR REEDUCATION: CPT

## 2023-03-02 PROCEDURE — 97140 MANUAL THERAPY 1/> REGIONS: CPT

## 2023-03-02 PROCEDURE — 97110 THERAPEUTIC EXERCISES: CPT

## 2023-03-03 NOTE — PROGRESS NOTES
"[facility] Daily Progress Note    [id number]   Date of Note: 3/3/2023   Referring Physician:  Vini Hernandez NP    Diagnosis:    Encounter Diagnoses   Name Primary?    Decreased coordination Yes    Weakness of left upper extremity     Chronic left shoulder pain     Cerebrovascular accident (CVA), unspecified mechanism     Decreased range of motion of left shoulder           Start Time: 1400   End Time: 1445   Total Time: 45 min  Visit #: 8/17      Subjective:   Pt reports feeling "very good."         Pain:  0  out of 10     Objective:   Patient seen by OT this session. Treatment  consist of the following:  Neuro Re-education     Treatment: Neuro Re-ed x 45 min    Neuro Re-ed:   - Pt performed FMC challenging, pincer grasp and strength with L UE with use of green theraputty, successfully removing all items from putty with extended time. Challenged manipulation, stereognosis, dexterity and coordination of LUE.    - Performed L UE functional reaching overhead challenging pincer grasp with various levels of resistance, challenging strength, GMC, FMC, placing clothes pins on vertical pole. Demonstrated 100% accuracy with 1# and 3# resistive clothes pins.   - Performed L UE FMC placing pegs in board with 100% accuracy then removing pegs with board placed on vertical surface, challenging L wrist extension and FMC.    - Performed further FMC challenging pincer grasp with L UE, as well as dexterity and stereognosis. Fair- performance. Extended time needed.      Assessment:  Pt demonstrates good participation and progression during activities. Pain rated 1-2/10 in L shoulder at end of tx d/t reaching overhead during tx. Demonstrates fair+ GMC and FMC of L UE throughout treatment. Pt will continue to benefit from skilled OT intervention.    Patient continues to demonstrate limitation  with  ROM, Stiffness, Decreased fine motor coordination, Decreased gross " motor coordination, Decreased functional use, Impaired sensation, Decreased strength, and Continued pain      New/Revised Goals: Continue POC  1.   2.   3.   4.        Plan:  Continue 2x week x 8 weeks  during the certification period from   2/2/2023 to 3/31/2023  in pursuit of  OT goals.      MARCUS Levy, OTR/L  3/3/2023

## 2023-03-07 ENCOUNTER — CLINICAL SUPPORT (OUTPATIENT)
Dept: REHABILITATION | Facility: HOSPITAL | Age: 58
End: 2023-03-07
Payer: MEDICARE

## 2023-03-07 DIAGNOSIS — I63.9 CEREBROVASCULAR ACCIDENT (CVA), UNSPECIFIED MECHANISM: ICD-10-CM

## 2023-03-07 DIAGNOSIS — R27.8 DECREASED COORDINATION: Primary | ICD-10-CM

## 2023-03-07 DIAGNOSIS — M25.612 DECREASED RANGE OF MOTION OF LEFT SHOULDER: ICD-10-CM

## 2023-03-07 DIAGNOSIS — R29.898 WEAKNESS OF BOTH LOWER EXTREMITIES: ICD-10-CM

## 2023-03-07 DIAGNOSIS — M25.512 CHRONIC LEFT SHOULDER PAIN: ICD-10-CM

## 2023-03-07 DIAGNOSIS — R29.898 WEAKNESS OF LEFT UPPER EXTREMITY: ICD-10-CM

## 2023-03-07 DIAGNOSIS — I63.9 CEREBROVASCULAR ACCIDENT (CVA), UNSPECIFIED MECHANISM: Primary | ICD-10-CM

## 2023-03-07 DIAGNOSIS — R26.9 ABNORMAL GAIT: ICD-10-CM

## 2023-03-07 DIAGNOSIS — G89.29 CHRONIC LEFT SHOULDER PAIN: ICD-10-CM

## 2023-03-07 PROCEDURE — 97140 MANUAL THERAPY 1/> REGIONS: CPT | Mod: CQ

## 2023-03-07 PROCEDURE — 97112 NEUROMUSCULAR REEDUCATION: CPT

## 2023-03-07 PROCEDURE — 97110 THERAPEUTIC EXERCISES: CPT | Mod: CQ

## 2023-03-07 PROCEDURE — 97112 NEUROMUSCULAR REEDUCATION: CPT | Mod: CQ

## 2023-03-07 NOTE — PROGRESS NOTES
[facility] Daily Progress Note    [id number]   Date of Note: 3/7/2023   Referring Physician:  Vini Hernandez NP    Diagnosis:    Encounter Diagnoses   Name Primary?    Decreased coordination Yes    Weakness of left upper extremity     Chronic left shoulder pain     Cerebrovascular accident (CVA), unspecified mechanism     Decreased range of motion of left shoulder           Start Time: 1015  End Time: 1100   Total Time: 45 min  Visit #: 9/17      Subjective:   Pt reports she has been having heartburn today.          Pain:  0  out of 10     Objective:   Patient seen by OT this session. Treatment  consist of the following:  Neuro Re-education     Treatment: Neuro Re-ed x 45 min    Neuro Re-ed:   - Pt performed FMC challenging, pincer grasp and strength with L UE with use of green theraputty, successfully removing all items from putty with extended time. Challenged manipulation, stereognosis, dexterity and coordination of LUE.    - Performed L UE  strengthening activity with various levels of resistance, challenging strength, GMC, FMC, placing clothes pins on vertical pole. Demonstrated 100% accuracy with 1# and 3# resistive clothes pins.   - Performed dynamic standing balance activity challenging gross motor coordination, visual motor coordination with tossing and catching large exercise ball     Assessment:  Pt demonstrates good participation and progression during activities. Demonstrates fair+ GMC and FMC of L UE, Fair+ dynamic standing balance requiring SBA during balance task. Pt will continue to benefit from skilled OT intervention. Patient continues to demonstrate limitation with ROM, Stiffness, Decreased fine motor coordination, Decreased gross motor coordination, Decreased functional use, Impaired sensation, Decreased strength, and Continued pain      New/Revised Goals: Continue POC  1.   2.   3.   4.        Plan:  Continue 2x week x 8  weeks  during the certification period from   2/2/2023 to 3/31/2023  in pursuit of  OT goals.      Gabriela Redmond, OTR/L  3/7/2023

## 2023-03-07 NOTE — PROGRESS NOTES
[facility]  Physical Therapy Treatment Note    Name: Herlinda Alvarezley  Clinic Number: [id number]    Therapy Diagnosis:   Encounter Diagnoses   Name Primary?    CVA (cerebral vascular accident) Yes    Lower extremity weakness      Abnormal gait    Physician: Vini Hernandez NP    Visit Date: 3/7/2023    Physician Orders: PT Eval and Treat  Medical Diagnosis from Referral: I63.9 (ICD-10-CM) - CVA (cerebral vascular accident); R29.898 (ICD-10-CM) - Lower extremity weakness  Evaluation Date: 2/2/2023  Authorization Period Expiration: 3/31/2023  Plan of Care Expiration: 3/31/2023  Visit # / Visits authorized: 9/17 (including initial evaluation)  PTA Visit #: 1    Time In: 1104  Time Out: 1148  Total Billable Time: 44 minutes    Precautions: Standard, blood thinners, and fall   Functional Level Prior to Evaluation: independent with all functional mobility without assistive device prior to cerebrovascular accident     Subjective     Pt reports: she if feeling good     She was compliant with home exercise program.    Pain: 0/10  Location: left knee    Objective     Herlinda received therapeutic exercises to develop strength, endurance, ROM, flexibility, posture, and core stabilization for 17 minutes including:  NuStep: x 6 minutes  Calf stretch on slant board: 2 minutes  Bridges: 3 x 10 reps (not today)  Straight leg raises: bilateral; 2 x 10 reps  Long arc quads: 3 x 10 reps each  Seated marching: 3 x 10 reps each       Herlinda received the following manual therapy techniques: Joint mobilizations, Manual traction, Myofacial release, and Soft tissue Mobilization were applied to the: bilateral lower extremities for 8 minutes, including:  Manual hamstring stretch: 3 x 30 second holds each  Manual knee extension stretch: 3 x 30 second holds each (not today)  Manual single knee to chest: 3 x 30 sec hold each      Herlinda participated in neuromuscular re-education activities to improve: Balance,  Coordination, Proprioception, and Posture for 15 minutes. The following activities were included:  Tall kneeling: able to come to complete stand with moderate assist and hold x 1 minute with minimal assistance  Quadruped: alternating upper extremity x 10 reps each   Sit to/from stand with use of bilateral upper extremities: x 10 reps (standby assist; verbal cues for upright posture)    Herlinda participated in gait training to improve functional mobility and safety for 4 minutes, including:  Patient ambulated 200 feet using rollator and contact guard assist; verbal cues for bilateral heel strike, step length, and upright posture    Home Exercises Provided and Patient Education Provided     Education provided: Patient educated on the importance of completing skilled physical therapy treatment and home exercise program as prescribed to maximize functional gains.     Written Home Exercises Provided: yes. Exercises were reviewed and Herlinda was able to demonstrate them prior to the end of the session.  Herlinda demonstrated good  understanding of the education provided.     See EMR under Patient Instructions for exercises provided 2/8/2023.    Assessment     Patient with good effort overall. Patient does become easily fatigues towards end of treatment and requires longer rest breaks between exercises.     Herlinda isprogressing well towards her goals.   Pt prognosis is Good.     Pt will continue to benefit from skilled outpatient physical therapy to address the deficits listed in the problem list box on initial evaluation, provide pt/family education, and to maximize pt's level of independence in the home and community environment.     Pt's spiritual, cultural, and educational needs considered and pt agreeable to plan of care and goals.     Anticipated barriers to physical therapy: compliance with home exercise program    Goals:    Short Term Goals:  Patient will demonstrate independence with home exercise program to ensure carryover  of treatment.  Patient will perform supine to/from sit with standby assist to improve independence and safety with bed mobility.  Patient will perform sit to/from stand with contact guard assist without upper extremity support to improve independence and safety with transfers.  Patient will improve left lower extremity strength to 4-/5 to improve independence and safety with bed mobility, transfers, and gait.  Patient will ambulate 150 feet with a rollator with modified independence with bilateral terminal knee extension during stance phase, increased step lengths, and adequate bilateral heel strike to improve independence and safety with gait.      Long Term Goals:  Patient will perform supine to/from sit with modified independence to improve independence and safety with bed mobility.  Patient will perform sit to/from stand with supervision without upper extremity support to improve independence and safety with transfers.  Patient will improve left lower extremity strength to 4/5 to improve independence and safety with bed mobility, transfers, and gait.  Patient will ambulate 300 feet with a narrow-base quad cane with contact guard assist with bilateral terminal knee extension during stance phase, increased step lengths, and adequate heel strike including up/down curbs and ramps to improve independence and safety with community ambulation.    Plan     Patient will continue to benefit from skilled physical therapy treatment as prescribed working towards goals listed above to maximize functional potential.       RYAN Steele  3/7/2023

## 2023-03-09 ENCOUNTER — CLINICAL SUPPORT (OUTPATIENT)
Dept: REHABILITATION | Facility: HOSPITAL | Age: 58
End: 2023-03-09
Payer: MEDICARE

## 2023-03-09 DIAGNOSIS — R29.898 WEAKNESS OF LEFT UPPER EXTREMITY: ICD-10-CM

## 2023-03-09 DIAGNOSIS — R26.9 ABNORMAL GAIT: ICD-10-CM

## 2023-03-09 DIAGNOSIS — M25.612 DECREASED RANGE OF MOTION OF LEFT SHOULDER: ICD-10-CM

## 2023-03-09 DIAGNOSIS — I63.9 CEREBROVASCULAR ACCIDENT (CVA), UNSPECIFIED MECHANISM: Primary | ICD-10-CM

## 2023-03-09 DIAGNOSIS — R29.898 WEAKNESS OF BOTH LOWER EXTREMITIES: ICD-10-CM

## 2023-03-09 DIAGNOSIS — M25.512 CHRONIC LEFT SHOULDER PAIN: ICD-10-CM

## 2023-03-09 DIAGNOSIS — G89.29 CHRONIC LEFT SHOULDER PAIN: ICD-10-CM

## 2023-03-09 DIAGNOSIS — I63.9 CEREBROVASCULAR ACCIDENT (CVA), UNSPECIFIED MECHANISM: ICD-10-CM

## 2023-03-09 DIAGNOSIS — R27.8 DECREASED COORDINATION: Primary | ICD-10-CM

## 2023-03-09 PROCEDURE — 97110 THERAPEUTIC EXERCISES: CPT | Mod: CQ

## 2023-03-09 PROCEDURE — 97110 THERAPEUTIC EXERCISES: CPT

## 2023-03-09 PROCEDURE — 97112 NEUROMUSCULAR REEDUCATION: CPT | Mod: CQ

## 2023-03-09 PROCEDURE — 97140 MANUAL THERAPY 1/> REGIONS: CPT | Mod: CQ

## 2023-03-09 NOTE — PROGRESS NOTES
[facility]  Physical Therapy Treatment Note    [facility]  [facility] Number: [id number]    Therapy Diagnosis:   Encounter Diagnoses   Name Primary?    CVA (cerebral vascular accident) Yes    Lower extremity weakness      Abnormal gait    Physician: Vini Hernandez NP    Visit Date: 3/9/2023    Physician Orders: PT Eval and Treat  Medical Diagnosis from Referral: I63.9 (ICD-10-CM) - CVA (cerebral vascular accident); R29.898 (ICD-10-CM) - Lower extremity weakness  Evaluation Date: 2/2/2023  Authorization Period Expiration: 3/31/2023  Plan of Care Expiration: 3/31/2023  Visit # / Visits authorized: 10/17 (including initial evaluation)  PTA Visit #: 2    Time In: 1352  Time Out: 1433  Total Billable Time: 41 minutes    Precautions: Standard, blood thinners, and fall   Functional Level Prior to Evaluation: independent with all functional mobility without assistive device prior to cerebrovascular accident     Subjective     Pt reports: she is doing okay today; patient ambulated into therapy with rollator and     She was compliant with home exercise program.    Pain: 0/10  Location: left knee    Objective     Herlinda received therapeutic exercises to develop strength, endurance, ROM, flexibility, posture, and core stabilization for 14 minutes including:  NuStep: x 6 minutes  Calf stretch on slant board: 2 minutes  Straight leg raises: bilateral; 2 x 10 reps    Sit to/from stand with use of bilateral upper extremities: x 10 reps (standby assist; verbal cues for upright posture) (not performed)  Bridges: 3 x 10 reps (not today)  Long arc quads: 3 x 10 reps each (not performed)  Seated marching: 3 x 10 reps each (not performed)      Herlinda received the following manual therapy techniques: Joint mobilizations, Manual traction, Myofacial release, and Soft tissue Mobilization were applied to the: bilateral lower extremities for 12 minutes, including:  Manual hamstring stretch:  3 x 30 second holds each  Manual knee extension stretch: 3 x 30 second holds each (not today)  Manual single knee to chest: 3 x 30 sec hold each      Herlinda participated in neuromuscular re-education activities to improve: Balance, Coordination, Proprioception, and Posture for 12 minutes. The following activities were included:  Double limb support on firm surface with ball toss to Physical Therapist  Double limb support on foam with ball toss to Physical Therapist  Double limb support on foam pad with perturbations      Tall kneeling: able to come to complete stand with moderate assist and hold x 1 minute with minimal assistance  Quadruped: alternating upper extremity x 10 reps each     Herlinda participated in gait training to improve functional mobility and safety for 3 minutes, including:  Patient ambulated 200 feet using rollator and contact guard assist; verbal cues for bilateral heel strike, step length, and upright posture    Home Exercises Provided and Patient Education Provided     Education provided: Patient educated on the importance of completing skilled physical therapy treatment and home exercise program as prescribed to maximize functional gains.     Written Home Exercises Provided: yes. Exercises were reviewed and Herlinda was able to demonstrate them prior to the end of the session.  Herlinda demonstrated good  understanding of the education provided.     See EMR under Patient Instructions for exercises provided 2/8/2023.    Assessment     Patient with good effort overall. Patient able to tolerate balance activities with no complaints and only brief moments of balance that she was able to correct with minimal assistance. Pt is easily fatigued but improving.     Herlinda isprogressing well towards her goals.   Pt prognosis is Good.     Pt will continue to benefit from skilled outpatient physical therapy to address the deficits listed in the problem list box on initial evaluation, provide pt/family education, and to  maximize pt's level of independence in the home and community environment.     Pt's spiritual, cultural, and educational needs considered and pt agreeable to plan of care and goals.     Anticipated barriers to physical therapy: compliance with home exercise program    Goals:    Short Term Goals:  Patient will demonstrate independence with home exercise program to ensure carryover of treatment.  Patient will perform supine to/from sit with standby assist to improve independence and safety with bed mobility.  Patient will perform sit to/from stand with contact guard assist without upper extremity support to improve independence and safety with transfers.  Patient will improve left lower extremity strength to 4-/5 to improve independence and safety with bed mobility, transfers, and gait.  Patient will ambulate 150 feet with a rollator with modified independence with bilateral terminal knee extension during stance phase, increased step lengths, and adequate bilateral heel strike to improve independence and safety with gait.      Long Term Goals:  Patient will perform supine to/from sit with modified independence to improve independence and safety with bed mobility.  Patient will perform sit to/from stand with supervision without upper extremity support to improve independence and safety with transfers.  Patient will improve left lower extremity strength to 4/5 to improve independence and safety with bed mobility, transfers, and gait.  Patient will ambulate 300 feet with a narrow-base quad cane with contact guard assist with bilateral terminal knee extension during stance phase, increased step lengths, and adequate heel strike including up/down curbs and ramps to improve independence and safety with community ambulation.    Plan     Patient will continue to benefit from skilled physical therapy treatment as prescribed working towards goals listed above to maximize functional potential.       Srinivasa Redmond  LPTA  3/9/2023

## 2023-03-09 NOTE — PROGRESS NOTES
"  [facility] Daily Progress Note    [id number]   Date of Note: 3/9/2023   Referring Physician:  Vini Hernandez NP    Diagnosis:    Encounter Diagnoses   Name Primary?    Decreased coordination Yes    Weakness of left upper extremity     Chronic left shoulder pain     Cerebrovascular accident (CVA), unspecified mechanism     Decreased range of motion of left shoulder           Start Time: 1310  End Time: 1348   Total Time: 38 min  Visit #: 10/17      Subjective:   Pt reports feeling "ok I guess."          Pain:  0  out of 10     Objective:   Patient seen by OT this session. Treatment  consist of the following:  Therapeutic exercise    Treatment: Therapeutic Exercise x 38 min    Therapeutic Exercise:   - Performed L UE elbow flex/ext with 2# resistance, 3 sets x 15 reps, no pain reported.   - Performed L wrist flex/ext with 2# resistance, 3 sets x 15 reps, no pain reported.   - Performed L supination/pronation with 2# resistance, 3 sets x 15 reps, no pain reported.   - Performed L  strengthening with 5# resistance, 3 sets x 15 reps, no pain reported.     Assessment:  Pt demonstrates good participation and progression of strength during L UE exercise. Demonstrated improved  strength evident in ability to utilize 5# resistance. Educated in continuing HEP of L UE functional use as much as possible at home as well as stretching shoulder. Verbalized good understanding. Pt will continue to benefit from skilled OT intervention. Patient continues to demonstrate limitation with ROM, Stiffness, Decreased fine motor coordination, Decreased gross motor coordination, Decreased functional use, Impaired sensation, Decreased strength, and Continued pain      New/Revised Goals: Continue POC  1.   2.   3.   4.        Plan:  Continue 2x week x 8 weeks  during the certification period from   2/2/2023 to 3/31/2023  in pursuit of  OT goals.      Kiana Rasmussen, " OTD, OTR/L  3/9/2023

## 2023-03-16 ENCOUNTER — CLINICAL SUPPORT (OUTPATIENT)
Dept: REHABILITATION | Facility: HOSPITAL | Age: 58
End: 2023-03-16
Payer: MEDICARE

## 2023-03-16 DIAGNOSIS — I63.9 CEREBROVASCULAR ACCIDENT (CVA), UNSPECIFIED MECHANISM: ICD-10-CM

## 2023-03-16 DIAGNOSIS — R29.898 WEAKNESS OF BOTH LOWER EXTREMITIES: ICD-10-CM

## 2023-03-16 DIAGNOSIS — G89.29 CHRONIC LEFT SHOULDER PAIN: ICD-10-CM

## 2023-03-16 DIAGNOSIS — R26.9 ABNORMAL GAIT: ICD-10-CM

## 2023-03-16 DIAGNOSIS — R27.8 DECREASED COORDINATION: Primary | ICD-10-CM

## 2023-03-16 DIAGNOSIS — R29.898 WEAKNESS OF LEFT UPPER EXTREMITY: ICD-10-CM

## 2023-03-16 DIAGNOSIS — M25.612 DECREASED RANGE OF MOTION OF LEFT SHOULDER: ICD-10-CM

## 2023-03-16 DIAGNOSIS — M25.512 CHRONIC LEFT SHOULDER PAIN: ICD-10-CM

## 2023-03-16 DIAGNOSIS — I63.9 CEREBROVASCULAR ACCIDENT (CVA), UNSPECIFIED MECHANISM: Primary | ICD-10-CM

## 2023-03-16 PROCEDURE — 97112 NEUROMUSCULAR REEDUCATION: CPT

## 2023-03-16 PROCEDURE — 97112 NEUROMUSCULAR REEDUCATION: CPT | Mod: CQ

## 2023-03-16 PROCEDURE — 97110 THERAPEUTIC EXERCISES: CPT | Mod: CQ

## 2023-03-16 PROCEDURE — 97140 MANUAL THERAPY 1/> REGIONS: CPT | Mod: CQ

## 2023-03-16 NOTE — PROGRESS NOTES
"  [facility] Daily Progress Note    [id number]   Date of Note: 3/16/2023   Referring Physician:  Vini Hernandez NP    Diagnosis:    Encounter Diagnoses   Name Primary?    Decreased coordination Yes    Weakness of left upper extremity     Chronic left shoulder pain     Cerebrovascular accident (CVA), unspecified mechanism     Decreased range of motion of left shoulder           Start Time: 1313  End Time: 1400   Total Time: 47 min  Visit #: 11/17      Subjective:   Pt reports attempting to perform seated bath in bathtub, resulting in discomfort and soreness in L hip and "tailbone."          Pain:  0  out of 10     Objective:   Patient seen by OT this session. Treatment  consist of the following:  Neuro Re-ed     Treatment: Neuro Re-ed x 47 min    Neuro Re-ed:   - Pt performed the following neuro re-ed tasks challenging manipulation, stereognosis, dexterity, coordination, and bilateral integrative use of LUE:  - Pt performed FMC challenging bilateral integrative use of Ues, specifically being able to lace/thread string through holes on a board, demonstrating 100% accuracy with extended time required d/t slow movements.   - Pt performed FMC challenging, pincer grasp and strength with L UE with use of green theraputty, successfully removing all items from putty with extended time.     - Performed L UE FMC placing pegs in board with 100% accuracy then removing pegs with board placed on vertical surface, challenging L wrist extension and FMC.    - Performed further FMC challenging pincer grasp with L UE, as well as dexterity and stereognosis. Fair performance. Extended time needed.     Assessment:  Pt demonstrates good participation and progression of strength during L UE neuro re-ed training. Demonstrated improved  and pinch ability evident in improved accuracy of neuro re-ed tasks. Educated in continuing HEP of L UE functional use as much as " possible at home as well as stretching shoulder. Verbalized good understanding. Pt will continue to benefit from skilled OT intervention. Patient continues to demonstrate limitation with ROM, Stiffness, Decreased fine motor coordination, Decreased gross motor coordination, Decreased functional use, Impaired sensation, Decreased strength, and Continued pain      New/Revised Goals: Continue POC  1.   2.   3.   4.        Plan:  Continue 2x week x 8 weeks  during the certification period from   2/2/2023 to 3/31/2023  in pursuit of  OT goals.      MARCUS Levy, OTR/L  3/16/2023

## 2023-03-16 NOTE — PROGRESS NOTES
[facility]PAT[facility]  Physical Therapy Treatment Note    Name: Herlinda MoodyVincent  Clinic Number: [id number]    Therapy Diagnosis:   Encounter Diagnoses   Name Primary?    CVA (cerebral vascular accident) Yes    Lower extremity weakness      Abnormal gait    Physician: Vini Hernandez NP    Visit Date: 3/16/2023    Physician Orders: PT Eval and Treat  Medical Diagnosis from Referral: I63.9 (ICD-10-CM) - CVA (cerebral vascular accident); R29.898 (ICD-10-CM) - Lower extremity weakness  Evaluation Date: 2/2/2023  Authorization Period Expiration: 3/31/2023  Plan of Care Expiration: 3/31/2023  Visit # / Visits authorized: 11/17 (including initial evaluation)  PTA Visit #: 3    Time In: ***  Time Out: ***  Total Billable Time: *** minutes    Precautions: Standard, blood thinners, and fall   Functional Level Prior to Evaluation: independent with all functional mobility without assistive device prior to cerebrovascular accident     Subjective     Pt reports: she is doing okay today; patient ambulated into therapy with rollator and     She was compliant with home exercise program.    Pain: 0/10  Location: left knee    Objective     Herlinda received therapeutic exercises to develop strength, endurance, ROM, flexibility, posture, and core stabilization for *** minutes including:  NuStep: x 6 minutes  Calf stretch on slant board: 2 minutes  Straight leg raises: bilateral; 2 x 10 reps    Sit to/from stand with use of bilateral upper extremities: x 10 reps (standby assist; verbal cues for upright posture) (not performed)  Bridges: 3 x 10 reps (not today)  Long arc quads: 3 x 10 reps each (not performed)  Seated marching: 3 x 10 reps each (not performed)      Herlinda received the following manual therapy techniques: Joint mobilizations, Manual traction, Myofacial release, and Soft tissue Mobilization were applied to the: bilateral lower extremities for *** minutes, including:  Manual hamstring  stretch: 3 x 30 second holds each  Manual knee extension stretch: 3 x 30 second holds each (not today)  Manual single knee to chest: 3 x 30 sec hold each      Herlinda participated in neuromuscular re-education activities to improve: Balance, Coordination, Proprioception, and Posture for *** minutes. The following activities were included:  Double limb support on firm surface with ball toss to Physical Therapist  Double limb support on foam with ball toss to Physical Therapist  Double limb support on foam pad with perturbations      Tall kneeling: able to come to complete stand with moderate assist and hold x 1 minute with minimal assistance  Quadruped: alternating upper extremity x 10 reps each     Herlinda participated in gait training to improve functional mobility and safety for 3 minutes, including:  Patient ambulated 200 feet using rollator and contact guard assist; verbal cues for bilateral heel strike, step length, and upright posture    Home Exercises Provided and Patient Education Provided     Education provided: Patient educated on the importance of completing skilled physical therapy treatment and home exercise program as prescribed to maximize functional gains.     Written Home Exercises Provided: yes. Exercises were reviewed and Herlinda was able to demonstrate them prior to the end of the session.  Herlinda demonstrated good  understanding of the education provided.     See EMR under Patient Instructions for exercises provided 2/8/2023.    Assessment     Patient with good effort overall.     Herlinda isprogressing well towards her goals.   Pt prognosis is Good.     Pt will continue to benefit from skilled outpatient physical therapy to address the deficits listed in the problem list box on initial evaluation, provide pt/family education, and to maximize pt's level of independence in the home and community environment.     Pt's spiritual, cultural, and educational needs considered and pt agreeable to plan of care and  goals.     Anticipated barriers to physical therapy: compliance with home exercise program    Goals:    Short Term Goals:  Patient will demonstrate independence with home exercise program to ensure carryover of treatment.  Patient will perform supine to/from sit with standby assist to improve independence and safety with bed mobility.  Patient will perform sit to/from stand with contact guard assist without upper extremity support to improve independence and safety with transfers.  Patient will improve left lower extremity strength to 4-/5 to improve independence and safety with bed mobility, transfers, and gait.  Patient will ambulate 150 feet with a rollator with modified independence with bilateral terminal knee extension during stance phase, increased step lengths, and adequate bilateral heel strike to improve independence and safety with gait.      Long Term Goals:  Patient will perform supine to/from sit with modified independence to improve independence and safety with bed mobility.  Patient will perform sit to/from stand with supervision without upper extremity support to improve independence and safety with transfers.  Patient will improve left lower extremity strength to 4/5 to improve independence and safety with bed mobility, transfers, and gait.  Patient will ambulate 300 feet with a narrow-base quad cane with contact guard assist with bilateral terminal knee extension during stance phase, increased step lengths, and adequate heel strike including up/down curbs and ramps to improve independence and safety with community ambulation.    Plan     Patient will continue to benefit from skilled physical therapy treatment as prescribed working towards goals listed above to maximize functional potential.     RYAN Steele  3/16/2023

## 2023-03-16 NOTE — PROGRESS NOTES
[facility]  Physical Therapy Treatment Note    [facility]  [facility] [id number]    Therapy Diagnosis:   Encounter Diagnoses   Name Primary?    CVA (cerebral vascular accident) Yes    Lower extremity weakness      Abnormal gait    Physician: Vini Hernandez NP    Visit Date: 3/16/2023    Physician Orders: PT Eval and Treat  Medical Diagnosis from Referral: I63.9 (ICD-10-CM) - CVA (cerebral vascular accident); R29.898 (ICD-10-CM) - Lower extremity weakness  Evaluation Date: 2/2/2023  Authorization Period Expiration: 3/31/2023  Plan of Care Expiration: 3/31/2023  Visit # / Visits authorized: 11/17 (including initial evaluation)  PTA Visit #: 3    Time In: 1402  Time Out: 1441  Total Billable Time: 39 minutes    Precautions: Standard, blood thinners, and fall   Functional Level Prior to Evaluation: independent with all functional mobility without assistive device prior to cerebrovascular accident     Subjective     Pt reports: she is doing okay today; patient ambulated into therapy with rollator and     She was compliant with home exercise program.    Pain: 7/10  Location: gluteal area    Objective     Herlinda received therapeutic exercises to develop strength, endurance, ROM, flexibility, posture, and core stabilization for 15 minutes including:  NuStep: x 6 minutes (not performed)  Calf stretch on slant board: 2 minutes  Straight leg raises: bilateral; 2 x 10 reps  Bridges: x 10 reps (unable to complete more due to pain)    Sit to/from stand with use of bilateral upper extremities: x 10 reps (standby assist; verbal cues for upright posture) (not performed)  Long arc quads: 3 x 10 reps each (not performed)  Seated marching: 3 x 10 reps each (not performed)      Herlinda received the following manual therapy techniques: Joint mobilizations, Manual traction, Myofacial release, and Soft tissue Mobilization were applied to the: bilateral lower extremities for 12  minutes, including:  Manual hamstring stretch: 3 x 30 second holds each  Manual knee extension stretch: 3 x 30 second holds each (not today)  Manual single knee to chest: 3 x 30 sec hold each      Herlinda participated in neuromuscular re-education activities to improve: Balance, Coordination, Proprioception, and Posture for 12 minutes. The following activities were included:  Double limb support on firm surface with ball toss to Physical Therapist  Double limb support on foam with ball toss to Physical Therapist  Double limb support on foam pad with perturbations      Tall kneeling: able to come to complete stand with moderate assist and hold x 1 minute with minimal assistance  Quadruped: alternating upper extremity x 10 reps each     Herlinda participated in gait training to improve functional mobility and safety for 0 minutes, including:  Patient ambulated 200 feet using rollator and contact guard assist; verbal cues for bilateral heel strike, step length, and upright posture    Home Exercises Provided and Patient Education Provided     Education provided: Patient educated on the importance of completing skilled physical therapy treatment and home exercise program as prescribed to maximize functional gains.     Written Home Exercises Provided: yes. Exercises were reviewed and Herlinda was able to demonstrate them prior to the end of the session.  Herlinda demonstrated good  understanding of the education provided.     See EMR under Patient Instructions for exercises provided 2/8/2023.    Assessment     Patient with good effort overall. Patient will benefit from continued skilled interventions until she reaches her maximum functional potential. Patient would like to start trying to ambulate using her cane. Will attempt next session.    Herlinda isprogressing well towards her goals.   Pt prognosis is Good.     Pt will continue to benefit from skilled outpatient physical therapy to address the deficits listed in the problem list box on  initial evaluation, provide pt/family education, and to maximize pt's level of independence in the home and community environment.     Pt's spiritual, cultural, and educational needs considered and pt agreeable to plan of care and goals.     Anticipated barriers to physical therapy: compliance with home exercise program    Goals:    Short Term Goals:  Patient will demonstrate independence with home exercise program to ensure carryover of treatment.  Patient will perform supine to/from sit with standby assist to improve independence and safety with bed mobility.  Patient will perform sit to/from stand with contact guard assist without upper extremity support to improve independence and safety with transfers.  Patient will improve left lower extremity strength to 4-/5 to improve independence and safety with bed mobility, transfers, and gait.  Patient will ambulate 150 feet with a rollator with modified independence with bilateral terminal knee extension during stance phase, increased step lengths, and adequate bilateral heel strike to improve independence and safety with gait.      Long Term Goals:  Patient will perform supine to/from sit with modified independence to improve independence and safety with bed mobility.  Patient will perform sit to/from stand with supervision without upper extremity support to improve independence and safety with transfers.  Patient will improve left lower extremity strength to 4/5 to improve independence and safety with bed mobility, transfers, and gait.  Patient will ambulate 300 feet with a narrow-base quad cane with contact guard assist with bilateral terminal knee extension during stance phase, increased step lengths, and adequate heel strike including up/down curbs and ramps to improve independence and safety with community ambulation.    Plan     Patient will continue to benefit from skilled physical therapy treatment as prescribed working towards goals listed above to maximize  functional potential.       Srinivasa Redmond LPTA  3/16/2023

## 2023-03-21 ENCOUNTER — CLINICAL SUPPORT (OUTPATIENT)
Dept: REHABILITATION | Facility: HOSPITAL | Age: 58
End: 2023-03-21
Payer: MEDICARE

## 2023-03-21 DIAGNOSIS — R27.8 DECREASED COORDINATION: Primary | ICD-10-CM

## 2023-03-21 DIAGNOSIS — G89.29 CHRONIC LEFT SHOULDER PAIN: ICD-10-CM

## 2023-03-21 DIAGNOSIS — R29.898 WEAKNESS OF LEFT UPPER EXTREMITY: ICD-10-CM

## 2023-03-21 DIAGNOSIS — I63.9 CEREBROVASCULAR ACCIDENT (CVA), UNSPECIFIED MECHANISM: Primary | ICD-10-CM

## 2023-03-21 DIAGNOSIS — I63.9 CEREBROVASCULAR ACCIDENT (CVA), UNSPECIFIED MECHANISM: ICD-10-CM

## 2023-03-21 DIAGNOSIS — M25.612 DECREASED RANGE OF MOTION OF LEFT SHOULDER: ICD-10-CM

## 2023-03-21 DIAGNOSIS — R29.898 WEAKNESS OF BOTH LOWER EXTREMITIES: ICD-10-CM

## 2023-03-21 DIAGNOSIS — R26.9 ABNORMAL GAIT: ICD-10-CM

## 2023-03-21 DIAGNOSIS — M25.512 CHRONIC LEFT SHOULDER PAIN: ICD-10-CM

## 2023-03-21 PROCEDURE — 97110 THERAPEUTIC EXERCISES: CPT | Mod: CQ

## 2023-03-21 PROCEDURE — 97140 MANUAL THERAPY 1/> REGIONS: CPT | Mod: CQ

## 2023-03-21 PROCEDURE — 97112 NEUROMUSCULAR REEDUCATION: CPT | Mod: CQ

## 2023-03-21 PROCEDURE — 97112 NEUROMUSCULAR REEDUCATION: CPT

## 2023-03-21 PROCEDURE — 97110 THERAPEUTIC EXERCISES: CPT

## 2023-03-21 NOTE — PROGRESS NOTES
"  [facility] Daily Progress Note    [id number]   Date of Note: 3/21/2023   Referring Physician:  Vini Hernandez NP    Diagnosis:    Encounter Diagnoses   Name Primary?    Decreased coordination Yes    Weakness of left upper extremity     Chronic left shoulder pain     Cerebrovascular accident (CVA), unspecified mechanism     Decreased range of motion of left shoulder           Start Time: 1401  End Time: 1445   Total Time: 44 min  Visit #: 11/17      Subjective:   Pt reports feeling "not too good," this day, explaining she feels as though her blood pressure is low. Assessed BP, found to be 116/76.          Pain:  0  out of 10     Objective:   Patient seen by OT this session. Treatment  consist of the following: Therapeutic exercise and Neuro Re-ed     Treatment: Therapeutic exercise x 15 and Neuro Re-ed x 29 min    Therapeutic Exercise:  - Performed chest press 3 sets x 15 reps with 3# dowel steffanie, demonstrating improve L UE strength and ROM of shoulder.   - Performed shoulder press, 3 sets x 15 reps with 3# dowel steffanie, demonstrating continued improvement in L UE shoulder strength/ROM.  - Performed shoulder horizontal abd/add, 3 sets x 15 reps, 3# dowel steffanie, no pain reported.     Neuro Re-ed:   - Pt performed the following neuro re-ed tasks challenging manipulation, stereognosis, dexterity, coordination, and bilateral integrative use of LUE:  - Pt performed FMC challenging, pincer grasp and strength with L UE with use of green theraputty, successfully removing all items from putty with extended time.     - Performed L UE FMC placing pegs in board with 100% accuracy then removing pegs with board placed on vertical surface, challenging L wrist extension and FMC.    - Performed further FMC challenging pincer grasp with L UE, as well as dexterity and stereognosis. Fair performance. Extended time needed.     Assessment:  Pt demonstrates good participation " and progression of strength during L UE neuro re-ed training. Demonstrated improved  and pinch ability evident in improved accuracy of neuro re-ed tasks. Educated in continuing HEP of L UE functional use as much as possible at home as well as stretching shoulder. Verbalized good understanding. Pt will continue to benefit from skilled OT intervention. Patient continues to demonstrate limitation with ROM, Stiffness, Decreased fine motor coordination, Decreased gross motor coordination, Decreased functional use, Impaired sensation, Decreased strength, and Continued pain      New/Revised Goals: Continue POC  1.   2.   3.   4.        Plan:  Continue 2x week x 8 weeks  during the certification period from   2/2/2023 to 3/31/2023  in pursuit of  OT goals.      Kiana Rasmussen, MARCUS, OTR/L  3/21/2023

## 2023-03-21 NOTE — PROGRESS NOTES
[facility]  Physical Therapy Treatment Note    Name: Herlinda Liz\[facility]\""  Clinic Number: [id number]    Therapy Diagnosis:   Encounter Diagnoses   Name Primary?    CVA (cerebral vascular accident) Yes    Lower extremity weakness      Abnormal gait    Physician: Vini Hernandez NP    Visit Date: 3/21/2023    Physician Orders: PT Eval and Treat  Medical Diagnosis from Referral: I63.9 (ICD-10-CM) - CVA (cerebral vascular accident); R29.898 (ICD-10-CM) - Lower extremity weakness  Evaluation Date: 2/2/2023  Authorization Period Expiration: 3/31/2023  Plan of Care Expiration: 3/31/2023  Visit # / Visits authorized: 12/17 (including initial evaluation)  PTA Visit #: 4    Time In: 1448  Time Out: 1528  Total Billable Time: 40 minutes    Precautions: Standard, blood thinners, and fall   Functional Level Prior to Evaluation: independent with all functional mobility without assistive device prior to cerebrovascular accident     Subjective     Pt reports: she is doing okay today; patient ambulated into therapy with rollator and     She was compliant with home exercise program.    Pain: 5/10  Location: gluteal area    Objective     Herlinda received therapeutic exercises to develop strength, endurance, ROM, flexibility, posture, and core stabilization for 15 minutes including:  NuStep: x 5 minutes  Calf stretch on slant board: 2 minutes  Straight leg raises: bilateral; 3 x 10 reps  Bridges: x 10 reps    Sit to/from stand with use of bilateral upper extremities: x 10 reps (standby assist; verbal cues for upright posture) (not performed)  Long arc quads: 3 x 10 reps each (not performed)  Seated marching: 3 x 10 reps each (not performed)      Herlinda received the following manual therapy techniques: Joint mobilizations, Manual traction, Myofacial release, and Soft tissue Mobilization were applied to the: bilateral lower extremities for 12 minutes, including:  Manual hamstring stretch: 3 x 30  second holds each  Manual knee extension stretch: 3 x 30 second holds each (not today)  Manual single knee to chest: 3 x 30 sec hold each      Herlinda participated in neuromuscular re-education activities to improve: Balance, Coordination, Proprioception, and Posture for 13 minutes. The following activities were included:  Double limb support on firm surface with ball toss to Physical Therapist  Double limb support on foam with ball toss to Physical Therapist with couple moments of LOB  Double limb support on foam pad with perturbations   Double limb support on foam pad with bending touching knees x 20 reps       Tall kneeling: able to come to complete stand with moderate assist and hold x 1 minute with minimal assistance (not performed)  Quadruped: alternating upper extremity x 10 reps each (not performed)    Herlinda participated in gait training to improve functional mobility and safety for 0 minutes, including:  Patient ambulated 200 feet using rollator and contact guard assist; verbal cues for bilateral heel strike, step length, and upright posture    Home Exercises Provided and Patient Education Provided     Education provided: Patient educated on the importance of completing skilled physical therapy treatment and home exercise program as prescribed to maximize functional gains.     Written Home Exercises Provided: yes. Exercises were reviewed and Herlinda was able to demonstrate them prior to the end of the session.  Herlinda demonstrated good  understanding of the education provided.     See EMR under Patient Instructions for exercises provided 2/8/2023.    Assessment     Patient with good effort overall. Patient states she feels very weak and tired today. Will attempt to start using cane for ambulation next session. Patient will benefit from continued skilled interventions until she reaches her maximum functional potential.  Herlinda isprogressing well towards her goals.   Pt prognosis is Good.     Pt will continue to benefit  from skilled outpatient physical therapy to address the deficits listed in the problem list box on initial evaluation, provide pt/family education, and to maximize pt's level of independence in the home and community environment.     Pt's spiritual, cultural, and educational needs considered and pt agreeable to plan of care and goals.     Anticipated barriers to physical therapy: compliance with home exercise program    Goals:    Short Term Goals:  Patient will demonstrate independence with home exercise program to ensure carryover of treatment.  Patient will perform supine to/from sit with standby assist to improve independence and safety with bed mobility.  Patient will perform sit to/from stand with contact guard assist without upper extremity support to improve independence and safety with transfers.  Patient will improve left lower extremity strength to 4-/5 to improve independence and safety with bed mobility, transfers, and gait.  Patient will ambulate 150 feet with a rollator with modified independence with bilateral terminal knee extension during stance phase, increased step lengths, and adequate bilateral heel strike to improve independence and safety with gait.      Long Term Goals:  Patient will perform supine to/from sit with modified independence to improve independence and safety with bed mobility.  Patient will perform sit to/from stand with supervision without upper extremity support to improve independence and safety with transfers.  Patient will improve left lower extremity strength to 4/5 to improve independence and safety with bed mobility, transfers, and gait.  Patient will ambulate 300 feet with a narrow-base quad cane with contact guard assist with bilateral terminal knee extension during stance phase, increased step lengths, and adequate heel strike including up/down curbs and ramps to improve independence and safety with community ambulation.    Plan     Patient will continue to benefit  from skilled physical therapy treatment as prescribed working towards goals listed above to maximize functional potential.       RYAN Perkins  3/21/2023

## 2023-03-23 ENCOUNTER — CLINICAL SUPPORT (OUTPATIENT)
Dept: REHABILITATION | Facility: HOSPITAL | Age: 58
End: 2023-03-23
Payer: MEDICARE

## 2023-03-23 DIAGNOSIS — M25.512 CHRONIC LEFT SHOULDER PAIN: ICD-10-CM

## 2023-03-23 DIAGNOSIS — I63.9 CEREBROVASCULAR ACCIDENT (CVA), UNSPECIFIED MECHANISM: Primary | ICD-10-CM

## 2023-03-23 DIAGNOSIS — R29.898 WEAKNESS OF LEFT UPPER EXTREMITY: ICD-10-CM

## 2023-03-23 DIAGNOSIS — I63.9 CEREBROVASCULAR ACCIDENT (CVA), UNSPECIFIED MECHANISM: ICD-10-CM

## 2023-03-23 DIAGNOSIS — R29.898 WEAKNESS OF BOTH LOWER EXTREMITIES: ICD-10-CM

## 2023-03-23 DIAGNOSIS — G89.29 CHRONIC LEFT SHOULDER PAIN: ICD-10-CM

## 2023-03-23 DIAGNOSIS — M25.612 DECREASED RANGE OF MOTION OF LEFT SHOULDER: ICD-10-CM

## 2023-03-23 DIAGNOSIS — R27.8 DECREASED COORDINATION: Primary | ICD-10-CM

## 2023-03-23 DIAGNOSIS — R26.9 ABNORMAL GAIT: ICD-10-CM

## 2023-03-23 PROCEDURE — 97112 NEUROMUSCULAR REEDUCATION: CPT | Mod: CQ

## 2023-03-23 PROCEDURE — 97110 THERAPEUTIC EXERCISES: CPT

## 2023-03-23 PROCEDURE — 97140 MANUAL THERAPY 1/> REGIONS: CPT | Mod: CQ

## 2023-03-23 PROCEDURE — 97110 THERAPEUTIC EXERCISES: CPT | Mod: CQ

## 2023-03-23 PROCEDURE — 97112 NEUROMUSCULAR REEDUCATION: CPT

## 2023-03-23 NOTE — PROGRESS NOTES
"  [facility] Therapy Daily Progress Note    [id number]   Date of Note: 3/23/2023   Referring Physician:  Vini Hernandez NP    Diagnosis:    Encounter Diagnoses   Name Primary?    Decreased coordination Yes    Weakness of left upper extremity     Chronic left shoulder pain     Cerebrovascular accident (CVA), unspecified mechanism     Decreased range of motion of left shoulder           Start Time: 1313  End Time: 1400   Total Time:  47 min  Visit #: 12/17      Subjective:   Pt reports feeling "much better today than last time." Pt attributes episode in last therapy session to particular medication, Ativan, that she did not take this day prior to therapy.         Pain:  0  out of 10     Objective:   Patient seen by OT this session. Treatment  consist of the following: Therapeutic exercise and Neuro Re-ed     Treatment: Therapeutic exercise x 20 and Neuro Re-ed x 27 min    Therapeutic Exercise:  - Performed chest press 3 sets x 15 reps with 3# dowel steffanie, demonstrating improve L UE strength and ROM of shoulder.   - Performed shoulder press, 3 sets x 15 reps with 3# dowel steffanie, demonstrating continued improvement in L UE shoulder strength/ROM.  - Performed shoulder horizontal abd/add, 3 sets x 15 reps, 3# dowel steffanie, no pain reported.   - Performed L UE  strengthening with 5lbs resistance, 3 sets x 10 reps.     Neuro Re-ed:   - Pt performed the following neuro re-ed tasks challenging manipulation, stereognosis, dexterity, coordination, and bilateral integrative use of LUE:  - Pt performed FMC challenging, pincer grasp and strength with L UE with use of blue theraputty, successfully removing all items from putty with extended time.     - Performed L UE FMC placing clothespins on horizontal and vertical surfaces with 75% accuracy d/t limitation in shoulder ROM/strength.  - Performed bilateral integrative use of Ues challenging ability to fold laundry items with " improvement noted in coordination.     - Performed further FMC challenging pincer grasp with L UE, as well as dexterity and stereognosis. Fair performance. Extended time needed.     Assessment:  Pt demonstrates good participation and progression of strength during L UE therex and neuro re-ed training. Educated in continuing HEP of L UE functional use as much as possible at home as well as stretching shoulder. Verbalized good understanding. Pt will continue to benefit from skilled OT intervention. Patient continues to demonstrate limitation with ROM, Stiffness, Decreased fine motor coordination, Decreased gross motor coordination, Decreased functional use, Impaired sensation, Decreased strength, and Continued pain      New/Revised Goals: Continue POC  1.   2.   3.   4.        Plan:  Continue 2x week x 8 weeks  during the certification period from   2/2/2023 to 3/31/2023  in pursuit of  OT goals.      Kiana Rasmussen, MARCUS, OTR/L  3/23/2023

## 2023-03-23 NOTE — PROGRESS NOTES
[facility]  Physical Therapy Treatment Note    [facility]  [facility] Number[facility] [id number]    Therapy Diagnosis:   Encounter Diagnoses   Name Primary?    CVA (cerebral vascular accident) Yes    Lower extremity weakness      Abnormal gait    Physician: Vini Hernandez NP    Visit Date: 3/23/2023    Physician Orders: PT Eval and Treat  Medical Diagnosis from Referral: I63.9 (ICD-10-CM) - CVA (cerebral vascular accident); R29.898 (ICD-10-CM) - Lower extremity weakness  Evaluation Date: 2/2/2023  Authorization Period Expiration: 3/31/2023  Plan of Care Expiration: 3/31/2023  Visit # / Visits authorized: 13/17 (including initial evaluation)  PTA Visit #: 5    Time In: 1402  Time Out: 1443  Total Billable Time: 41 minutes    Precautions: Standard, blood thinners, and fall   Functional Level Prior to Evaluation: independent with all functional mobility without assistive device prior to cerebrovascular accident     Subjective     Pt reports: she is better today with no complaints    She was compliant with home exercise program.    Pain: 4/10  Location: gluteal area    Objective     Herlinda received therapeutic exercises to develop strength, endurance, ROM, flexibility, posture, and core stabilization for 17 minutes including:  NuStep: x 5 minutes  Calf stretch on slant board: 2 minutes  Straight leg raises: bilateral; 3 x 10 reps  Bridges: x 15 reps    Sit to/from stand with use of bilateral upper extremities: x 10 reps (standby assist; verbal cues for upright posture) (not performed)  Long arc quads: 3 x 10 reps each (not performed)  Seated marching: 3 x 10 reps each (not performed)      Herlinda received the following manual therapy techniques: Joint mobilizations, Manual traction, Myofacial release, and Soft tissue Mobilization were applied to the: bilateral lower extremities for 11 minutes, including:  Manual hamstring stretch: 3 x 30 second holds each  Manual knee extension  stretch: 3 x 30 second holds each (not today)  Manual single knee to chest: 3 x 30 sec hold each      Herlinda participated in neuromuscular re-education activities to improve: Balance, Coordination, Proprioception, and Posture for 13 minutes. The following activities were included:  Double limb support on firm surface with ball toss to Physical Therapist  Double limb support on foam with ball toss to Physical Therapist with couple moments of LOB  Double limb support on foam pad with perturbations  Double limb support on foam pad with bending touching knees x 20 reps     Tall kneeling: able to come to complete stand with moderate assist and hold x 1 minute with minimal assistance (not performed)  Quadruped: alternating upper extremity x 10 reps each (not performed)    Herlinda participated in gait training to improve functional mobility and safety for 0 minutes, including:  Patient ambulated 200 feet using rollator and contact guard assist; verbal cues for bilateral heel strike, step length, and upright posture    Home Exercises Provided and Patient Education Provided     Education provided: Patient educated on the importance of completing skilled physical therapy treatment and home exercise program as prescribed to maximize functional gains.     Written Home Exercises Provided: yes. Exercises were reviewed and Herlinda was able to demonstrate them prior to the end of the session.  Herlinda demonstrated good  understanding of the education provided.     See EMR under Patient Instructions for exercises provided 2/8/2023.    Assessment     Patient with good effort overall. Patient with posterior lean during balance activities today requiring minimum assist at times to correct. Patient able to increase reps with bridges. Will attempt to start using cane for ambulation next session. Patient will benefit from continued skilled interventions until she reaches her maximum functional potential.  Herlinda isprogressing well towards her goals.    Pt prognosis is Good.     Pt will continue to benefit from skilled outpatient physical therapy to address the deficits listed in the problem list box on initial evaluation, provide pt/family education, and to maximize pt's level of independence in the home and community environment.     Pt's spiritual, cultural, and educational needs considered and pt agreeable to plan of care and goals.     Anticipated barriers to physical therapy: compliance with home exercise program    Goals:    Short Term Goals:  Patient will demonstrate independence with home exercise program to ensure carryover of treatment.  Patient will perform supine to/from sit with standby assist to improve independence and safety with bed mobility.  Patient will perform sit to/from stand with contact guard assist without upper extremity support to improve independence and safety with transfers.  Patient will improve left lower extremity strength to 4-/5 to improve independence and safety with bed mobility, transfers, and gait.  Patient will ambulate 150 feet with a rollator with modified independence with bilateral terminal knee extension during stance phase, increased step lengths, and adequate bilateral heel strike to improve independence and safety with gait.      Long Term Goals:  Patient will perform supine to/from sit with modified independence to improve independence and safety with bed mobility.  Patient will perform sit to/from stand with supervision without upper extremity support to improve independence and safety with transfers.  Patient will improve left lower extremity strength to 4/5 to improve independence and safety with bed mobility, transfers, and gait.  Patient will ambulate 300 feet with a narrow-base quad cane with contact guard assist with bilateral terminal knee extension during stance phase, increased step lengths, and adequate heel strike including up/down curbs and ramps to improve independence and safety with community  ambulation.    Plan     Patient will continue to benefit from skilled physical therapy treatment as prescribed working towards goals listed above to maximize functional potential.       RYAN Perkins  3/23/2023

## 2023-04-06 ENCOUNTER — CLINICAL SUPPORT (OUTPATIENT)
Dept: REHABILITATION | Facility: HOSPITAL | Age: 58
End: 2023-04-06
Payer: MEDICARE

## 2023-04-06 DIAGNOSIS — M25.512 CHRONIC LEFT SHOULDER PAIN: ICD-10-CM

## 2023-04-06 DIAGNOSIS — R26.9 ABNORMAL GAIT: ICD-10-CM

## 2023-04-06 DIAGNOSIS — R29.898 WEAKNESS OF BOTH LOWER EXTREMITIES: ICD-10-CM

## 2023-04-06 DIAGNOSIS — R29.898 WEAKNESS OF LEFT UPPER EXTREMITY: ICD-10-CM

## 2023-04-06 DIAGNOSIS — M25.612 DECREASED RANGE OF MOTION OF LEFT SHOULDER: ICD-10-CM

## 2023-04-06 DIAGNOSIS — R27.8 DECREASED COORDINATION: Primary | ICD-10-CM

## 2023-04-06 DIAGNOSIS — G89.29 CHRONIC LEFT SHOULDER PAIN: ICD-10-CM

## 2023-04-06 DIAGNOSIS — I63.9 CEREBROVASCULAR ACCIDENT (CVA), UNSPECIFIED MECHANISM: ICD-10-CM

## 2023-04-06 DIAGNOSIS — I63.9 CEREBROVASCULAR ACCIDENT (CVA), UNSPECIFIED MECHANISM: Primary | ICD-10-CM

## 2023-04-06 PROCEDURE — 97140 MANUAL THERAPY 1/> REGIONS: CPT

## 2023-04-06 PROCEDURE — 97110 THERAPEUTIC EXERCISES: CPT

## 2023-04-06 NOTE — PLAN OF CARE
[facility]  Physical Therapy Discharge Summary    Name: [facility]linda [facility]  Clinic Number: [id number]    Therapy Diagnosis:   Encounter Diagnoses   Name Primary?    CVA (cerebral vascular accident) Yes    Lower extremity weakness      Abnormal gait    Physician: Vini Hernandez NP    Visit Date: 4/6/2023    Physician Orders: PT Eval and Treat  Medical Diagnosis from Referral: I63.9 (ICD-10-CM) - CVA (cerebral vascular accident); R29.898 (ICD-10-CM) - Lower extremity weakness  Evaluation Date: 2/2/2023  Authorization Period Expiration: 4/6/2023  Plan of Care Expiration: 4/6/2023  Visit # / Visits authorized: 14/17 (including initial evaluation)  PTA Visit #: 0    Time In: 1105  Time Out: 1145  Total Billable Time: 40 minutes    Precautions: Standard, blood thinners, and fall   Functional Level Prior to Evaluation: independent with all functional mobility without assistive device prior to cerebrovascular accident     Subjective     Pt reports: She is feeling some better but continues to have pain due to the rash on her left buttock/hip region.     She was compliant with home exercise program.    Pain: 8/10  Location: left gluteal area (intermittently)    Objective     Herlinda received therapeutic exercises to develop strength, endurance, ROM, flexibility, posture, and core stabilization for 15 minutes including:    NuStep: x 5 minutes  Quad sets: x 10 reps each  Sit to/from stand with use of bilateral upper extremities: x 5 reps (standby assist; verbal cues for upright posture)    Herlinda received the following manual therapy techniques for 25 minutes:    Manual hamstring stretch (bilateral)  Manual knee extension stretch (bilateral)  Manual single knee to chest (bilateral)    Home Exercises Provided and Patient Education Provided     Education provided: Patient educated on the importance of completing home exercise program as prescribed to maximize functional gains.     Written Home  Exercises Provided: yes. Exercises were reviewed and Herlinda was able to demonstrate them prior to the end of the session.  Herlinda demonstrated good  understanding of the education provided.     See EMR under Patient Instructions for exercises provided 2/8/2023.    Assessment     Patient with good effort throughout treatment. Patient has become extremely limited with her progress with physical therapy due to the pain she experiences from the rash on her left hip/gluteal region. Patient has made good progress towards her short-term goals but has since reached a plateau with skilled physical therapy treatment. Patient encouraged to continue completion of her home exercise program for continued strengthening of bilateral lower extremities.     Pt's spiritual, cultural, and educational needs considered and pt agreeable to plan of care and goals.     Anticipated barriers to physical therapy: compliance with home exercise program    Goals:    Short Term Goals:  Patient will demonstrate independence with home exercise program to ensure carryover of treatment. [Met]  Patient will perform supine to/from sit with standby assist to improve independence and safety with bed mobility. [Met]  Patient will perform sit to/from stand with contact guard assist without upper extremity support to improve independence and safety with transfers. [Not met: minimal assist]  Patient will improve left lower extremity strength to 4-/5 to improve independence and safety with bed mobility, transfers, and gait. [Met: 4-/5]  Patient will ambulate 150 feet with a rollator with modified independence with bilateral terminal knee extension during stance phase, increased step lengths, and adequate bilateral heel strike to improve independence and safety with gait. [Not met: sustained bilateral knee flexion]     Long Term Goals:  Patient will perform supine to/from sit with modified independence to improve independence and safety with bed mobility. [Not met:  standby assist]  Patient will perform sit to/from stand with supervision without upper extremity support to improve independence and safety with transfers. [Not met]  Patient will improve left lower extremity strength to 4/5 to improve independence and safety with bed mobility, transfers, and gait. [Not met: 4-/5]  Patient will ambulate 300 feet with a narrow-base quad cane with contact guard assist with bilateral terminal knee extension during stance phase, increased step lengths, and adequate heel strike including up/down curbs and ramps to improve independence and safety with community ambulation. [Not met: rollator required; sustained bilateral knee flexion]    Discharge reason: Patient has completed allowable visits authorized by insurance    Plan     This patient is discharged from Physical Therapy.      Nguyễn Cuba, PT, DPT  4/9/2023

## 2023-04-06 NOTE — PLAN OF CARE
"[facility]  Occupational Therapy Discharge    Patient:  Herlinda Valdovinos  [facility] #:  [id number]   Date of Note: 4/6/2023   Referring Physician:  Vini Hernandez NP    Diagnosis:    Encounter Diagnoses   Name Primary?    Decreased coordination Yes    Weakness of left upper extremity     Chronic left shoulder pain     Cerebrovascular accident (CVA), unspecified mechanism     Decreased range of motion of left shoulder           Start Time: 1018  End Time: 1107   Total Time: 49  min  Visit #: 13/17      Subjective:   Pt reports feeling "fine today."          Pain: 0 out of 10     Objective:   Patient seen by OT this session. Treatment  consist of the following: Therapeutic Exercise and Therapeutic Activity     Treatment: Therapeutic Exercise x 49 min    Therapeutic Exercise:  - Performed chest press 3 sets x 15 reps with 3# dowel steffanie, demonstrating improve L UE strength and ROM of shoulder.   - Performed shoulder press, 3 sets x 15 reps with 3# dowel steffanie, demonstrating continued improvement in L UE shoulder strength/ROM.  - Performed shoulder horizontal abd/add, 3 sets x 15 reps, 3# dowel steffanie, no pain reported.   - Performed L UE  strengthening with 5lbs resistance, 3 sets x 10 reps.   - Discussed d/c planning with pt agreeable to d/c from skilled OT services this day.     Objective Measurements:      Physical Exam:        Joint Evaluation AROM       Left Right   Shoulder flex 0-180 115* WFL   Shoulder Abd 0-180 95* WFL   Shoulder ER 0-90 40* WFL   Shoulder IR 0-90 30* WFL   Shoulder Extension 0-80 60* WFL   Shoulder Horizontal adduction 0-90 30* WFL   Elbow flex/ext 0-150  WFL WFL   Wrist flex 0-80  65* WFL   Wrist ext 0-70  50* WFL   Supination 0-80  WFL WFL   Pronation 0-80  WFL WFL            Strength         Left Right   Shoulder flex F+ G   Shoulder abd F+ G   Shoulder ER F G   Shoulder IR F G   Shoulder Extension F+ G   Shoulder Horizontal adduction F G   Elbow flex G " G   Elbow ext G G   Wrist flex G G   Wrist ext G G   Supination G G   Pronation G G   UD G G   RD G G          Strength: R 43lbs L 30lbs     Fine motor coordination: R Intact L Good-     Gross motor coordination: R Intact L Good-       Functional Status:                               Current: Pt now performs all aspects of dressing and toileting without assistance. Receives intermittent assist with bathing to decrease fall risk. Pt is now performing light IADL tasks including washing dishes, folding laundry, and light meal prep while standing/sitting in kitchen.      Functional Mobility:  Bed mobility: Mod I  Roll to left: Mod I  Roll to right: Mod I  Supine to sit: Mod I  Sit to supine: Mod I  Transfers to bed: Tiffany  Transfers to toilet: Tiffany  Car transfers: SBA        ADL's:  Feeding: Mod I  Grooming: Mod I  Hygiene: Mod I  UB Dressing: Mod I  LB Dressing: Tiffany  Toileting: Tiffany  Bathing: iTffany     IADL's:  Homecare: Min A  Cooking: SUP  Laundry: Set-up  Use of telephone: Mod I  Money management: Mod I  Medication management: Mod I       Assessment:  Pt demonstrates excellent progression toward attainment of OT goals, meeting all short and long term goals. Pt has improved her quality of life, improved strength, function and coordination of L UE. NO further skilled OT services needed at this time. Discharge from skilled OT services this day.       MARCUS Levy, OTR/L  4/6/2023    **No signature required.**

## 2023-04-09 PROBLEM — R26.9 ABNORMAL GAIT: Status: RESOLVED | Noted: 2022-11-17 | Resolved: 2023-04-09

## 2023-04-09 PROBLEM — R29.898 LOWER EXTREMITY WEAKNESS: Status: RESOLVED | Noted: 2021-05-15 | Resolved: 2023-04-09

## 2023-04-10 PROBLEM — J18.9 PNEUMONIA OF UPPER LOBE DUE TO INFECTIOUS ORGANISM: Status: RESOLVED | Noted: 2022-04-13 | Resolved: 2023-04-10

## 2023-04-17 ENCOUNTER — LAB REQUISITION (OUTPATIENT)
Dept: LAB | Facility: HOSPITAL | Age: 58
End: 2023-04-17
Attending: NURSE PRACTITIONER
Payer: MEDICARE

## 2023-04-17 DIAGNOSIS — R89.9 UNSPECIFIED ABNORMAL FINDING IN SPECIMENS FROM OTHER ORGANS, SYSTEMS AND TISSUES: ICD-10-CM

## 2023-04-17 PROCEDURE — 87070 CULTURE OTHR SPECIMN AEROBIC: CPT | Performed by: NURSE PRACTITIONER

## 2023-04-19 ENCOUNTER — LAB REQUISITION (OUTPATIENT)
Dept: LAB | Facility: HOSPITAL | Age: 58
End: 2023-04-19
Attending: NURSE PRACTITIONER
Payer: MEDICARE

## 2023-04-19 DIAGNOSIS — L24.9 IRRITANT CONTACT DERMATITIS, UNSPECIFIED CAUSE: ICD-10-CM

## 2023-04-19 LAB
BASOPHILS # BLD AUTO: 0.02 K/UL (ref 0–0.2)
BASOPHILS NFR BLD AUTO: 0.2 % (ref 0–1)
DIFFERENTIAL METHOD BLD: ABNORMAL
EOSINOPHIL # BLD AUTO: 0.39 K/UL (ref 0–0.5)
EOSINOPHIL NFR BLD AUTO: 3.9 % (ref 1–4)
ERYTHROCYTE [DISTWIDTH] IN BLOOD BY AUTOMATED COUNT: 15 % (ref 11.5–14.5)
HCT VFR BLD AUTO: 38.5 % (ref 38–47)
HGB BLD-MCNC: 12.6 G/DL (ref 12–16)
LYMPHOCYTES # BLD AUTO: 1.5 K/UL (ref 1–4.8)
LYMPHOCYTES NFR BLD AUTO: 14.9 % (ref 27–41)
MCH RBC QN AUTO: 27.9 PG (ref 27–31)
MCHC RBC AUTO-ENTMCNC: 32.7 G/DL (ref 32–36)
MCV RBC AUTO: 85.2 FL (ref 80–96)
MICROORGANISM SPEC CULT: ABNORMAL
MONOCYTES # BLD AUTO: 0.77 K/UL (ref 0–0.8)
MONOCYTES NFR BLD AUTO: 7.7 % (ref 2–6)
MPC BLD CALC-MCNC: 9.6 FL (ref 9.4–12.4)
NEUTROPHILS # BLD AUTO: 7.36 K/UL (ref 1.8–7.7)
NEUTROPHILS NFR BLD AUTO: 73.3 % (ref 53–65)
PLATELET # BLD AUTO: 260 K/UL (ref 150–400)
RBC # BLD AUTO: 4.52 M/UL (ref 4.2–5.4)
WBC # BLD AUTO: 10.04 K/UL (ref 4.5–11)

## 2023-04-19 PROCEDURE — 85025 COMPLETE CBC W/AUTO DIFF WBC: CPT | Performed by: NURSE PRACTITIONER

## 2023-04-24 PROBLEM — N39.0 UTI (URINARY TRACT INFECTION): Status: RESOLVED | Noted: 2022-10-26 | Resolved: 2023-04-24

## 2023-10-25 ENCOUNTER — HOSPITAL ENCOUNTER (EMERGENCY)
Facility: HOSPITAL | Age: 58
Discharge: HOME | End: 2023-10-25
Payer: MEDICARE

## 2023-10-25 VITALS
BODY MASS INDEX: 28.17 KG/M2 | HEART RATE: 111 BPM | SYSTOLIC BLOOD PRESSURE: 130 MMHG | TEMPERATURE: 99 F | RESPIRATION RATE: 18 BRPM | WEIGHT: 165 LBS | HEIGHT: 64 IN | OXYGEN SATURATION: 96 % | DIASTOLIC BLOOD PRESSURE: 93 MMHG

## 2023-10-25 DIAGNOSIS — J18.9 PNEUMONIA OF RIGHT LOWER LOBE DUE TO INFECTIOUS ORGANISM: Primary | ICD-10-CM

## 2023-10-25 LAB
ALBUMIN SERPL BCP-MCNC: 3.3 G/DL (ref 3.5–5)
ALBUMIN/GLOB SERPL: 0.9 {RATIO}
ALP SERPL-CCNC: 133 U/L (ref 46–118)
ALT SERPL W P-5'-P-CCNC: 19 U/L (ref 13–56)
ANION GAP SERPL CALCULATED.3IONS-SCNC: 13 MMOL/L (ref 7–16)
AST SERPL W P-5'-P-CCNC: 35 U/L (ref 15–37)
BASOPHILS # BLD AUTO: 0.02 K/UL (ref 0–0.2)
BASOPHILS NFR BLD AUTO: 0.3 % (ref 0–1)
BILIRUB SERPL-MCNC: 0.7 MG/DL (ref ?–1.2)
BUN SERPL-MCNC: 14 MG/DL (ref 7–18)
BUN/CREAT SERPL: 14 (ref 6–20)
CALCIUM SERPL-MCNC: 9.1 MG/DL (ref 8.5–10.1)
CHLORIDE SERPL-SCNC: 102 MMOL/L (ref 98–107)
CO2 SERPL-SCNC: 28 MMOL/L (ref 21–32)
CREAT SERPL-MCNC: 0.99 MG/DL (ref 0.55–1.02)
DIFFERENTIAL METHOD BLD: ABNORMAL
EGFR (NO RACE VARIABLE) (RUSH/TITUS): 67 ML/MIN/1.73M2
EOSINOPHIL # BLD AUTO: 0.15 K/UL (ref 0–0.5)
EOSINOPHIL NFR BLD AUTO: 2.1 % (ref 1–4)
ERYTHROCYTE [DISTWIDTH] IN BLOOD BY AUTOMATED COUNT: 15.8 % (ref 11.5–14.5)
GLOBULIN SER-MCNC: 3.6 G/DL (ref 2–4)
GLUCOSE SERPL-MCNC: 160 MG/DL (ref 74–106)
HCT VFR BLD AUTO: 39.8 % (ref 38–47)
HGB BLD-MCNC: 12.8 G/DL (ref 12–16)
LYMPHOCYTES # BLD AUTO: 0.77 K/UL (ref 1–4.8)
LYMPHOCYTES NFR BLD AUTO: 10.6 % (ref 27–41)
MAGNESIUM SERPL-MCNC: 1.9 MG/DL (ref 1.7–2.3)
MCH RBC QN AUTO: 27.8 PG (ref 27–31)
MCHC RBC AUTO-ENTMCNC: 32.2 G/DL (ref 32–36)
MCV RBC AUTO: 86.5 FL (ref 80–96)
MONOCYTES # BLD AUTO: 0.72 K/UL (ref 0–0.8)
MONOCYTES NFR BLD AUTO: 9.9 % (ref 2–6)
MPC BLD CALC-MCNC: 9.8 FL (ref 9.4–12.4)
NEUTROPHILS # BLD AUTO: 5.58 K/UL (ref 1.8–7.7)
NEUTROPHILS NFR BLD AUTO: 77.1 % (ref 53–65)
PLATELET # BLD AUTO: 153 K/UL (ref 150–400)
POTASSIUM SERPL-SCNC: 3.4 MMOL/L (ref 3.5–5.1)
PROT SERPL-MCNC: 6.9 G/DL (ref 6.4–8.2)
RBC # BLD AUTO: 4.6 M/UL (ref 4.2–5.4)
SODIUM SERPL-SCNC: 140 MMOL/L (ref 136–145)
WBC # BLD AUTO: 7.24 K/UL (ref 4.5–11)

## 2023-10-25 PROCEDURE — 85025 COMPLETE CBC W/AUTO DIFF WBC: CPT | Performed by: NURSE PRACTITIONER

## 2023-10-25 PROCEDURE — 96372 THER/PROPH/DIAG INJ SC/IM: CPT | Performed by: NURSE PRACTITIONER

## 2023-10-25 PROCEDURE — 83735 ASSAY OF MAGNESIUM: CPT | Performed by: NURSE PRACTITIONER

## 2023-10-25 PROCEDURE — 25000003 PHARM REV CODE 250: Performed by: NURSE PRACTITIONER

## 2023-10-25 PROCEDURE — 94640 AIRWAY INHALATION TREATMENT: CPT

## 2023-10-25 PROCEDURE — 99284 EMERGENCY DEPT VISIT MOD MDM: CPT | Mod: GF

## 2023-10-25 PROCEDURE — 25000242 PHARM REV CODE 250 ALT 637 W/ HCPCS: Performed by: NURSE PRACTITIONER

## 2023-10-25 PROCEDURE — 63600175 PHARM REV CODE 636 W HCPCS: Performed by: NURSE PRACTITIONER

## 2023-10-25 PROCEDURE — 94761 N-INVAS EAR/PLS OXIMETRY MLT: CPT

## 2023-10-25 PROCEDURE — 80053 COMPREHEN METABOLIC PANEL: CPT | Performed by: NURSE PRACTITIONER

## 2023-10-25 PROCEDURE — 99284 EMERGENCY DEPT VISIT MOD MDM: CPT | Mod: 25

## 2023-10-25 RX ORDER — LIDOCAINE HYDROCHLORIDE 10 MG/ML
2 INJECTION, SOLUTION EPIDURAL; INFILTRATION; INTRACAUDAL; PERINEURAL
Status: COMPLETED | OUTPATIENT
Start: 2023-10-25 | End: 2023-10-25

## 2023-10-25 RX ORDER — ALBUTEROL SULFATE 90 UG/1
2 AEROSOL, METERED RESPIRATORY (INHALATION) EVERY 6 HOURS PRN
Qty: 18 G | Refills: 0 | Status: SHIPPED | OUTPATIENT
Start: 2023-10-25 | End: 2024-10-24

## 2023-10-25 RX ORDER — IPRATROPIUM BROMIDE AND ALBUTEROL SULFATE 2.5; .5 MG/3ML; MG/3ML
3 SOLUTION RESPIRATORY (INHALATION)
Status: COMPLETED | OUTPATIENT
Start: 2023-10-25 | End: 2023-10-25

## 2023-10-25 RX ORDER — DEXAMETHASONE SODIUM PHOSPHATE 4 MG/ML
4 INJECTION, SOLUTION INTRA-ARTICULAR; INTRALESIONAL; INTRAMUSCULAR; INTRAVENOUS; SOFT TISSUE
Status: COMPLETED | OUTPATIENT
Start: 2023-10-25 | End: 2023-10-25

## 2023-10-25 RX ORDER — LEVOFLOXACIN 500 MG/1
500 TABLET, FILM COATED ORAL DAILY
Qty: 5 TABLET | Refills: 0 | Status: SHIPPED | OUTPATIENT
Start: 2023-10-25 | End: 2023-10-30

## 2023-10-25 RX ORDER — PREDNISONE 20 MG/1
20 TABLET ORAL DAILY
Qty: 5 TABLET | Refills: 0 | Status: SHIPPED | OUTPATIENT
Start: 2023-10-25 | End: 2023-10-30

## 2023-10-25 RX ORDER — IPRATROPIUM BROMIDE AND ALBUTEROL SULFATE 2.5; .5 MG/3ML; MG/3ML
3 SOLUTION RESPIRATORY (INHALATION)
Status: DISCONTINUED | OUTPATIENT
Start: 2023-10-25 | End: 2023-10-25 | Stop reason: HOSPADM

## 2023-10-25 RX ORDER — GUAIFENESIN AND DEXTROMETHORPHAN HYDROBROMIDE 1200; 60 MG/1; MG/1
1 TABLET, EXTENDED RELEASE ORAL 2 TIMES DAILY PRN
Qty: 14 TABLET | Refills: 0 | Status: SHIPPED | OUTPATIENT
Start: 2023-10-25 | End: 2023-11-04

## 2023-10-25 RX ORDER — CEFTRIAXONE 1 G/1
1 INJECTION, POWDER, FOR SOLUTION INTRAMUSCULAR; INTRAVENOUS
Status: COMPLETED | OUTPATIENT
Start: 2023-10-25 | End: 2023-10-25

## 2023-10-25 RX ORDER — GUAIFENESIN AND DEXTROMETHORPHAN HYDROBROMIDE 1200; 60 MG/1; MG/1
1 TABLET, EXTENDED RELEASE ORAL 2 TIMES DAILY PRN
Qty: 28 TABLET | Refills: 0 | Status: SHIPPED | OUTPATIENT
Start: 2023-10-25 | End: 2023-11-04

## 2023-10-25 RX ADMIN — DEXAMETHASONE SODIUM PHOSPHATE 4 MG: 4 INJECTION, SOLUTION INTRA-ARTICULAR; INTRALESIONAL; INTRAMUSCULAR; INTRAVENOUS; SOFT TISSUE at 05:10

## 2023-10-25 RX ADMIN — IPRATROPIUM BROMIDE AND ALBUTEROL SULFATE 3 ML: .5; 3 SOLUTION RESPIRATORY (INHALATION) at 04:10

## 2023-10-25 RX ADMIN — LIDOCAINE HYDROCHLORIDE 20 MG: 10 INJECTION, SOLUTION EPIDURAL; INFILTRATION; INTRACAUDAL; PERINEURAL at 05:10

## 2023-10-25 RX ADMIN — CEFTRIAXONE SODIUM 1 G: 1 INJECTION, POWDER, FOR SOLUTION INTRAMUSCULAR; INTRAVENOUS at 05:10

## 2023-10-25 NOTE — DISCHARGE INSTRUCTIONS
Take Rx as directed  Increase fluids while taking medications  Follow up with pcp in one week, sooner if needed  Continue Current Home Medications

## 2023-10-25 NOTE — ED PROVIDER NOTES
Encounter Date: 10/25/2023       History     Chief Complaint   Patient presents with    Shortness of Breath    Fever     Presents to ED with c/o cough / congestion x 3 days. Previous history of pneumonia and wanted to be sure it isn't developing now. Denies fever / chills.     The history is provided by the patient.     Review of patient's allergies indicates:   Allergen Reactions    Lamisil [terbinafine] Anaphylaxis    Codeine Itching    Compazine [prochlorperazine] Itching     Past Medical History:   Diagnosis Date    Cerebral hemorrhage     Chronic anxiety     Decreased coordination 3/7/2022    Dehydration     Depression     Dysphagia     Due to and following non-traumatic intracerebral hemorrhage    Hearing loss     History of CVA (cerebrovascular accident)     Hypertension     Hypokalemia 10/20/2022    Insomnia     Intrapontine hemorrhage     Left hemiparesis     Peripheral neuropathy     Stroke     Thyroid disease     Transient cerebral ischemia      Past Surgical History:   Procedure Laterality Date    APPENDECTOMY      CHOLECYSTECTOMY      HYSTERECTOMY      LITHOTRIPSY      Renal lithotripsy    percutaneious insertion of ureteric stent       History reviewed. No pertinent family history.  Social History     Tobacco Use    Smoking status: Never    Smokeless tobacco: Never   Substance Use Topics    Alcohol use: Never    Drug use: Never     Review of Systems   Constitutional: Negative.  Negative for fever.   HENT:  Positive for congestion.    Eyes: Negative.    Respiratory:  Positive for cough and wheezing.    Cardiovascular: Negative.    Gastrointestinal: Negative.    Musculoskeletal: Negative.    All other systems reviewed and are negative.      Physical Exam     Initial Vitals [10/25/23 1543]   BP Pulse Resp Temp SpO2   (!) 130/93 109 18 98.7 °F (37.1 °C) 96 %      MAP       --         Physical Exam    Nursing note and vitals reviewed.  Constitutional: She appears well-developed and well-nourished.   HENT:    Head: Normocephalic and atraumatic.   Right Ear: External ear normal.   Left Ear: External ear normal.   Nose: Nose normal.   Mouth/Throat: Oropharynx is clear and moist.   Eyes: Conjunctivae and EOM are normal. Pupils are equal, round, and reactive to light.   Neck: Neck supple. No JVD present.   Normal range of motion.  Cardiovascular:  Normal rate, regular rhythm, normal heart sounds and intact distal pulses.           No murmur heard.  Pulmonary/Chest: She has wheezes. She has rhonchi. She exhibits no tenderness.   Abdominal: Abdomen is soft. Bowel sounds are normal.   Musculoskeletal:         General: Normal range of motion.      Cervical back: Normal range of motion and neck supple.     Neurological: She is alert and oriented to person, place, and time. She has normal strength. GCS score is 15. GCS eye subscore is 4. GCS verbal subscore is 5. GCS motor subscore is 6.   Skin: Skin is warm and dry. Capillary refill takes less than 2 seconds.   Psychiatric: She has a normal mood and affect. Her behavior is normal. Judgment and thought content normal.         Medical Screening Exam   See Full Note    ED Course   Procedures  Labs Reviewed   COMPREHENSIVE METABOLIC PANEL - Abnormal; Notable for the following components:       Result Value    Potassium 3.4 (*)     Glucose 160 (*)     Albumin 3.3 (*)     Alk Phos 133 (*)     All other components within normal limits   CBC WITH DIFFERENTIAL - Abnormal; Notable for the following components:    RDW 15.8 (*)     Neutrophils % 77.1 (*)     Lymphocytes % 10.6 (*)     Lymphocytes, Absolute 0.77 (*)     Monocytes % 9.9 (*)     All other components within normal limits   MAGNESIUM - Normal   CBC W/ AUTO DIFFERENTIAL    Narrative:     The following orders were created for panel order CBC auto differential.  Procedure                               Abnormality         Status                     ---------                               -----------         ------                      CBC with Differential[[id number]]       Abnormal            Final result                 Please view results for these tests on the individual orders.          Imaging Results              X-Ray Chest AP Portable (Final result)  Result time 10/25/23 16:05:51      Final result by Ashish Churchill MD (10/25/23 16:05:51)                   Impression:      Evidence of mild right lower lung pneumonia      Electronically signed by: Ashish Churchill  Date:    10/25/2023  Time:    16:05               Narrative:    EXAMINATION:  XR CHEST AP PORTABLE    CLINICAL HISTORY:  cough;.    COMPARISON:  December 31, 2022    TECHNIQUE:  Chest x-ray AP portable    FINDINGS:  Cardiac silhouette is upper normal.  There is no mediastinal mass.  There is no pulmonary vascular engorgement.    Shallow inspiration.  Lordotic technique.    There is some mild patchy asymmetry right lower lung which could represent mild pneumonia.  Lungs are otherwise unchanged.    No gross pleural effusion.    Osseous structures are unchanged                                       Medications   albuterol-ipratropium 2.5 mg-0.5 mg/3 mL nebulizer solution 3 mL (3 mLs Nebulization Given 10/25/23 1610)   cefTRIAXone injection 1 g (1 g Intramuscular Given 10/25/23 1734)   LIDOcaine (PF) 10 mg/ml (1%) injection 20 mg (20 mg Infiltration Given 10/25/23 1734)   dexAMETHasone injection 4 mg (4 mg Intramuscular Given 10/25/23 1734)     Medical Decision Making  DDX: Pneumonia, Bronchitis, URI    Reviewed Diagnostics with patient. Mild RLL pneumonia. Will treat w/ IM Rocephin and Rx for antibiotic, steroid, and inhaler.     Amount and/or Complexity of Data Reviewed  Labs: ordered.  Radiology: ordered.    Risk  OTC drugs.  Prescription drug management.                               Clinical Impression:   Final diagnoses:  [J18.9] Pneumonia of right lower lobe due to infectious organism (Primary)        ED Disposition Condition    Discharge Stable          ED  Prescriptions       Medication Sig Dispense Start Date End Date Auth. Provider    levoFLOXacin (LEVAQUIN) 500 MG tablet () Take 1 tablet (500 mg total) by mouth once daily. for 5 days 5 tablet 10/25/2023 10/30/2023 Vini Hernandez, NP    predniSONE (DELTASONE) 20 MG tablet () Take 1 tablet (20 mg total) by mouth once daily. for 5 days 5 tablet 10/25/2023 10/30/2023 Vini Hernandez, NP    albuterol (PROAIR HFA) 90 mcg/actuation inhaler Inhale 2 puffs into the lungs every 6 (six) hours as needed for Wheezing. Rescue 18 g 10/25/2023 10/24/2024 Vini Hernandez, NP    dextromethorphan-guaiFENesin (MUCINEX DM) 60-1,200 mg per 12 hr tablet () Take 1 tablet by mouth 2 (two) times daily as needed. 14 tablet 10/25/2023 2023 Vini Hernandez, NP    dextromethorphan-guaiFENesin (MUCINEX DM) 60-1,200 mg per 12 hr tablet () Take 1 tablet by mouth 2 (two) times daily as needed. 28 tablet 10/25/2023 2023 Vini Hernandez NP          Follow-up Information    None          Vini Hernandez NP  24 [id number]

## 2024-04-08 ENCOUNTER — HOSPITAL ENCOUNTER (INPATIENT)
Facility: HOSPITAL | Age: 59
LOS: 3 days | Discharge: NURSING, REHAB OR LONG-TERM CARE | DRG: 689 | End: 2024-04-11
Attending: FAMILY MEDICINE | Admitting: FAMILY MEDICINE
Payer: MEDICARE

## 2024-04-08 DIAGNOSIS — I10 HYPERTENSION, UNSPECIFIED TYPE: ICD-10-CM

## 2024-04-08 DIAGNOSIS — Z79.899 ON DEEP VEIN THROMBOSIS (DVT) PROPHYLAXIS: ICD-10-CM

## 2024-04-08 DIAGNOSIS — F41.1 GENERALIZED ANXIETY DISORDER: ICD-10-CM

## 2024-04-08 DIAGNOSIS — R78.81 BACTEREMIA DUE TO ESCHERICHIA COLI: ICD-10-CM

## 2024-04-08 DIAGNOSIS — E78.5 DYSLIPIDEMIA: ICD-10-CM

## 2024-04-08 DIAGNOSIS — F32.A DEPRESSIVE DISORDER: ICD-10-CM

## 2024-04-08 DIAGNOSIS — N30.01 ACUTE CYSTITIS WITH HEMATURIA: Primary | ICD-10-CM

## 2024-04-08 DIAGNOSIS — B96.20 BACTEREMIA DUE TO ESCHERICHIA COLI: ICD-10-CM

## 2024-04-08 LAB
ALBUMIN SERPL BCP-MCNC: 2.7 G/DL (ref 3.5–5)
ALBUMIN/GLOB SERPL: 0.6 {RATIO}
ALP SERPL-CCNC: 122 U/L (ref 46–118)
ALT SERPL W P-5'-P-CCNC: 16 U/L (ref 13–56)
ANION GAP SERPL CALCULATED.3IONS-SCNC: 12 MMOL/L (ref 7–16)
AST SERPL W P-5'-P-CCNC: 27 U/L (ref 15–37)
BACTERIA #/AREA URNS HPF: ABNORMAL /HPF
BASOPHILS # BLD AUTO: 0.02 K/UL (ref 0–0.2)
BASOPHILS NFR BLD AUTO: 0.2 % (ref 0–1)
BILIRUB SERPL-MCNC: 1.8 MG/DL (ref ?–1.2)
BILIRUB UR QL STRIP: NEGATIVE
BUN SERPL-MCNC: 12 MG/DL (ref 7–18)
BUN/CREAT SERPL: 11 (ref 6–20)
CALCIUM SERPL-MCNC: 8.1 MG/DL (ref 8.5–10.1)
CHLORIDE SERPL-SCNC: 98 MMOL/L (ref 98–107)
CLARITY UR: ABNORMAL
CO2 SERPL-SCNC: 27 MMOL/L (ref 21–32)
COLOR UR: YELLOW
CREAT SERPL-MCNC: 1.06 MG/DL (ref 0.55–1.02)
DIFFERENTIAL METHOD BLD: ABNORMAL
EGFR (NO RACE VARIABLE) (RUSH/TITUS): 61 ML/MIN/1.73M2
EOSINOPHIL # BLD AUTO: 0.06 K/UL (ref 0–0.5)
EOSINOPHIL NFR BLD AUTO: 0.5 % (ref 1–4)
ERYTHROCYTE [DISTWIDTH] IN BLOOD BY AUTOMATED COUNT: 15.9 % (ref 11.5–14.5)
GLOBULIN SER-MCNC: 4.2 G/DL (ref 2–4)
GLUCOSE SERPL-MCNC: 181 MG/DL (ref 74–106)
GLUCOSE UR STRIP-MCNC: NEGATIVE MG/DL
HCT VFR BLD AUTO: 38.1 % (ref 38–47)
HGB BLD-MCNC: 12.1 G/DL (ref 12–16)
KETONES UR STRIP-SCNC: NEGATIVE MG/DL
LACTATE SERPL-SCNC: 0.7 MMOL/L (ref 0.4–2)
LACTATE SERPL-SCNC: 1.1 MMOL/L (ref 0.4–2)
LEUKOCYTE ESTERASE UR QL STRIP: ABNORMAL
LIPASE SERPL-CCNC: 29 U/L (ref 16–77)
LYMPHOCYTES # BLD AUTO: 0.6 K/UL (ref 1–4.8)
LYMPHOCYTES NFR BLD AUTO: 4.9 % (ref 27–41)
MAGNESIUM SERPL-MCNC: 1.3 MG/DL (ref 1.7–2.3)
MCH RBC QN AUTO: 27.5 PG (ref 27–31)
MCHC RBC AUTO-ENTMCNC: 31.8 G/DL (ref 32–36)
MCV RBC AUTO: 86.6 FL (ref 80–96)
MONOCYTES # BLD AUTO: 1.06 K/UL (ref 0–0.8)
MONOCYTES NFR BLD AUTO: 8.7 % (ref 2–6)
MPC BLD CALC-MCNC: 9.6 FL (ref 9.4–12.4)
MUCOUS THREADS #/AREA URNS HPF: ABNORMAL /HPF
NEUTROPHILS # BLD AUTO: 10.42 K/UL (ref 1.8–7.7)
NEUTROPHILS NFR BLD AUTO: 85.7 % (ref 53–65)
NITRITE UR QL STRIP: POSITIVE
PH UR STRIP: 7 PH UNITS
PLATELET # BLD AUTO: 120 K/UL (ref 150–400)
POTASSIUM SERPL-SCNC: 3.3 MMOL/L (ref 3.5–5.1)
PROT SERPL-MCNC: 6.9 G/DL (ref 6.4–8.2)
PROT UR QL STRIP: 100
RBC # BLD AUTO: 4.4 M/UL (ref 4.2–5.4)
RBC # UR STRIP: ABNORMAL /UL
RBC #/AREA URNS HPF: ABNORMAL /HPF
SODIUM SERPL-SCNC: 134 MMOL/L (ref 136–145)
SP GR UR STRIP: 1.02
SQUAMOUS #/AREA URNS LPF: ABNORMAL /LPF
UROBILINOGEN UR STRIP-ACNC: 2 MG/DL
WBC # BLD AUTO: 12.16 K/UL (ref 4.5–11)
WBC #/AREA URNS HPF: ABNORMAL /HPF

## 2024-04-08 PROCEDURE — 87149 DNA/RNA DIRECT PROBE: CPT | Performed by: NURSE PRACTITIONER

## 2024-04-08 PROCEDURE — 96375 TX/PRO/DX INJ NEW DRUG ADDON: CPT

## 2024-04-08 PROCEDURE — 87040 BLOOD CULTURE FOR BACTERIA: CPT | Performed by: NURSE PRACTITIONER

## 2024-04-08 PROCEDURE — 63600175 PHARM REV CODE 636 W HCPCS: Performed by: NURSE PRACTITIONER

## 2024-04-08 PROCEDURE — 27000982 HC MATTRESS, MATRIX LAL RENTAL

## 2024-04-08 PROCEDURE — 25000003 PHARM REV CODE 250: Performed by: NURSE PRACTITIONER

## 2024-04-08 PROCEDURE — 96365 THER/PROPH/DIAG IV INF INIT: CPT

## 2024-04-08 PROCEDURE — 85025 COMPLETE CBC W/AUTO DIFF WBC: CPT | Performed by: NURSE PRACTITIONER

## 2024-04-08 PROCEDURE — 27000944

## 2024-04-08 PROCEDURE — 27000221 HC OXYGEN, UP TO 24 HOURS

## 2024-04-08 PROCEDURE — 94761 N-INVAS EAR/PLS OXIMETRY MLT: CPT

## 2024-04-08 PROCEDURE — 83690 ASSAY OF LIPASE: CPT | Performed by: NURSE PRACTITIONER

## 2024-04-08 PROCEDURE — 99285 EMERGENCY DEPT VISIT HI MDM: CPT | Mod: GF

## 2024-04-08 PROCEDURE — 99222 1ST HOSP IP/OBS MODERATE 55: CPT | Mod: FS,AI,, | Performed by: FAMILY MEDICINE

## 2024-04-08 PROCEDURE — 99900035 HC TECH TIME PER 15 MIN (STAT)

## 2024-04-08 PROCEDURE — 99285 EMERGENCY DEPT VISIT HI MDM: CPT | Mod: 25

## 2024-04-08 PROCEDURE — 87086 URINE CULTURE/COLONY COUNT: CPT | Performed by: NURSE PRACTITIONER

## 2024-04-08 PROCEDURE — 81003 URINALYSIS AUTO W/O SCOPE: CPT | Performed by: NURSE PRACTITIONER

## 2024-04-08 PROCEDURE — 83605 ASSAY OF LACTIC ACID: CPT | Performed by: NURSE PRACTITIONER

## 2024-04-08 PROCEDURE — 11000001 HC ACUTE MED/SURG PRIVATE ROOM

## 2024-04-08 PROCEDURE — 83735 ASSAY OF MAGNESIUM: CPT | Performed by: NURSE PRACTITIONER

## 2024-04-08 PROCEDURE — 80053 COMPREHEN METABOLIC PANEL: CPT | Performed by: NURSE PRACTITIONER

## 2024-04-08 RX ORDER — GABAPENTIN 300 MG/1
600 CAPSULE ORAL 3 TIMES DAILY
Status: DISCONTINUED | OUTPATIENT
Start: 2024-04-08 | End: 2024-04-08

## 2024-04-08 RX ORDER — ATORVASTATIN CALCIUM 40 MG/1
40 TABLET, FILM COATED ORAL NIGHTLY
Status: DISCONTINUED | OUTPATIENT
Start: 2024-04-08 | End: 2024-04-11 | Stop reason: HOSPADM

## 2024-04-08 RX ORDER — AMLODIPINE BESYLATE 5 MG/1
10 TABLET ORAL DAILY
Status: DISCONTINUED | OUTPATIENT
Start: 2024-04-09 | End: 2024-04-11 | Stop reason: HOSPADM

## 2024-04-08 RX ORDER — GABAPENTIN 300 MG/1
300 CAPSULE ORAL 3 TIMES DAILY
Status: DISCONTINUED | OUTPATIENT
Start: 2024-04-08 | End: 2024-04-08

## 2024-04-08 RX ORDER — IPRATROPIUM BROMIDE AND ALBUTEROL SULFATE 2.5; .5 MG/3ML; MG/3ML
3 SOLUTION RESPIRATORY (INHALATION) EVERY 4 HOURS PRN
Status: DISCONTINUED | OUTPATIENT
Start: 2024-04-08 | End: 2024-04-11 | Stop reason: HOSPADM

## 2024-04-08 RX ORDER — GABAPENTIN 300 MG/1
300 CAPSULE ORAL 2 TIMES DAILY
Status: DISCONTINUED | OUTPATIENT
Start: 2024-04-08 | End: 2024-04-11 | Stop reason: HOSPADM

## 2024-04-08 RX ORDER — SODIUM CHLORIDE 0.9 % (FLUSH) 0.9 %
10 SYRINGE (ML) INJECTION
Status: DISCONTINUED | OUTPATIENT
Start: 2024-04-08 | End: 2024-04-11 | Stop reason: HOSPADM

## 2024-04-08 RX ORDER — MAGNESIUM SULFATE HEPTAHYDRATE 40 MG/ML
2 INJECTION, SOLUTION INTRAVENOUS
Status: COMPLETED | OUTPATIENT
Start: 2024-04-08 | End: 2024-04-08

## 2024-04-08 RX ORDER — GLUCOSAM/CHONDRO/HERB 149/HYAL 750-100 MG
1 TABLET ORAL DAILY
Status: DISCONTINUED | OUTPATIENT
Start: 2024-04-09 | End: 2024-04-11 | Stop reason: HOSPADM

## 2024-04-08 RX ORDER — MUPIROCIN 20 MG/G
OINTMENT TOPICAL 2 TIMES DAILY
Status: DISCONTINUED | OUTPATIENT
Start: 2024-04-08 | End: 2024-04-11 | Stop reason: HOSPADM

## 2024-04-08 RX ORDER — HYDRALAZINE HYDROCHLORIDE 20 MG/ML
10 INJECTION INTRAMUSCULAR; INTRAVENOUS EVERY 6 HOURS PRN
Status: DISCONTINUED | OUTPATIENT
Start: 2024-04-08 | End: 2024-04-11 | Stop reason: HOSPADM

## 2024-04-08 RX ORDER — VALSARTAN 160 MG/1
320 TABLET ORAL DAILY
Status: DISCONTINUED | OUTPATIENT
Start: 2024-04-09 | End: 2024-04-11 | Stop reason: HOSPADM

## 2024-04-08 RX ORDER — LORAZEPAM 0.5 MG/1
0.5 TABLET ORAL EVERY 12 HOURS PRN
Status: DISCONTINUED | OUTPATIENT
Start: 2024-04-08 | End: 2024-04-11 | Stop reason: HOSPADM

## 2024-04-08 RX ORDER — SERTRALINE HYDROCHLORIDE 50 MG/1
150 TABLET, FILM COATED ORAL DAILY
Status: DISCONTINUED | OUTPATIENT
Start: 2024-04-09 | End: 2024-04-11 | Stop reason: HOSPADM

## 2024-04-08 RX ORDER — PANTOPRAZOLE SODIUM 40 MG/1
40 TABLET, DELAYED RELEASE ORAL DAILY
Status: DISCONTINUED | OUTPATIENT
Start: 2024-04-09 | End: 2024-04-11 | Stop reason: HOSPADM

## 2024-04-08 RX ORDER — ONDANSETRON HYDROCHLORIDE 2 MG/ML
4 INJECTION, SOLUTION INTRAVENOUS
Status: COMPLETED | OUTPATIENT
Start: 2024-04-08 | End: 2024-04-08

## 2024-04-08 RX ORDER — CLOPIDOGREL BISULFATE 75 MG/1
75 TABLET ORAL DAILY
Status: DISCONTINUED | OUTPATIENT
Start: 2024-04-09 | End: 2024-04-11 | Stop reason: HOSPADM

## 2024-04-08 RX ORDER — TEMAZEPAM 15 MG/1
15 CAPSULE ORAL NIGHTLY
Status: ON HOLD | COMMUNITY
Start: 2024-03-25 | End: 2024-04-25 | Stop reason: HOSPADM

## 2024-04-08 RX ORDER — PROMETHAZINE HYDROCHLORIDE 25 MG/1
25 TABLET ORAL 2 TIMES DAILY PRN
Status: ON HOLD | COMMUNITY
Start: 2024-03-21 | End: 2024-04-25 | Stop reason: HOSPADM

## 2024-04-08 RX ORDER — ACETAMINOPHEN 500 MG
1000 TABLET ORAL
Status: COMPLETED | OUTPATIENT
Start: 2024-04-08 | End: 2024-04-08

## 2024-04-08 RX ORDER — ENOXAPARIN SODIUM 100 MG/ML
40 INJECTION SUBCUTANEOUS EVERY 24 HOURS
Status: DISCONTINUED | OUTPATIENT
Start: 2024-04-09 | End: 2024-04-11 | Stop reason: HOSPADM

## 2024-04-08 RX ORDER — SODIUM CHLORIDE 9 MG/ML
INJECTION, SOLUTION INTRAVENOUS CONTINUOUS
Status: DISCONTINUED | OUTPATIENT
Start: 2024-04-08 | End: 2024-04-09

## 2024-04-08 RX ORDER — OLMESARTAN MEDOXOMIL 40 MG/1
40 TABLET ORAL DAILY
Status: ON HOLD | COMMUNITY
End: 2024-04-25

## 2024-04-08 RX ORDER — DOXYLAMINE SUCCINATE 25 MG
TABLET ORAL 2 TIMES DAILY
Status: DISCONTINUED | OUTPATIENT
Start: 2024-04-08 | End: 2024-04-11 | Stop reason: HOSPADM

## 2024-04-08 RX ADMIN — LORAZEPAM 0.5 MG: 0.5 TABLET ORAL at 09:04

## 2024-04-08 RX ADMIN — ATORVASTATIN CALCIUM 40 MG: 40 TABLET, FILM COATED ORAL at 09:04

## 2024-04-08 RX ADMIN — ONDANSETRON 4 MG: 2 INJECTION INTRAMUSCULAR; INTRAVENOUS at 02:04

## 2024-04-08 RX ADMIN — AZITHROMYCIN MONOHYDRATE 500 MG: 500 INJECTION, POWDER, LYOPHILIZED, FOR SOLUTION INTRAVENOUS at 06:04

## 2024-04-08 RX ADMIN — MAGNESIUM SULFATE 2 G: 2 INJECTION INTRAVENOUS at 02:04

## 2024-04-08 RX ADMIN — ACETAMINOPHEN 1000 MG: 500 TABLET ORAL at 03:04

## 2024-04-08 RX ADMIN — GABAPENTIN 300 MG: 300 CAPSULE ORAL at 09:04

## 2024-04-08 RX ADMIN — SODIUM CHLORIDE: 900 INJECTION, SOLUTION INTRAVENOUS at 06:04

## 2024-04-08 RX ADMIN — MUPIROCIN: 20 OINTMENT TOPICAL at 09:04

## 2024-04-08 RX ADMIN — MICONAZOLE NITRATE: 20 CREAM TOPICAL at 09:04

## 2024-04-08 RX ADMIN — CEFTRIAXONE SODIUM 1 G: 1 INJECTION, POWDER, FOR SOLUTION INTRAMUSCULAR; INTRAVENOUS at 03:04

## 2024-04-08 NOTE — PHARMACY MED REC
"Admission Medication History     The home medication history was taken by Shawnee Campos.    You may go to "Admission" then "Reconcile Home Medications" tabs to review and/or act upon these items.     The home medication list has been updated by the Pharmacy department.   Please read ALL comments highlighted in yellow.   Please address this information as you see fit.    Feel free to contact us if you have any questions or require assistance.  Medications Added:  Promethazine 25 mg  Temazepam 15 mg  Medications Updated:  Lorazepam 0.5 mg      The medications listed below were removed from the home medication list. Please reorder if appropriate:  Patient reports no longer taking the following medication(s):  Calcium Carbonate chewables  Levothyroxine 125 mcg  Valsartan 160 mg    Medications listed below were obtained from: Patient/family and Analytic software- [employer]  (Not in a hospital admission)        Current Outpatient Medications on File Prior to Encounter   Medication Sig Dispense Refill Last Dose    acetaminophen (TYLENOL) 325 MG tablet Take 650 mg by mouth every 6 (six) hours as needed.   4/7/2024    albuterol (PROAIR HFA) 90 mcg/actuation inhaler Inhale 2 puffs into the lungs every 6 (six) hours as needed for Wheezing. Rescue 18 g 0 4/7/2024    amLODIPine (NORVASC) 10 MG tablet Take 1 tablet (10 mg total) by mouth once daily. (Patient taking differently: Take 10 mg by mouth every evening.) 30 tablet 0 4/7/2024    atorvastatin (LIPITOR) 40 MG tablet Take 40 mg by mouth every evening.   4/7/2024    clopidogreL (PLAVIX) 75 mg tablet Take 1 tablet (75 mg total) by mouth once daily. 30 tablet 11 4/7/2024    gabapentin (NEURONTIN) 300 MG capsule Take 2 capsules (600 mg total) by mouth 3 (three) times daily. 180 capsule 11 4/7/2024    LORazepam (ATIVAN) 0.5 MG tablet Take 1 tablet (0.5 mg total) by mouth every 12 (twelve) hours as needed for Anxiety. (Patient taking differently: Take 0.5 mg by mouth once daily.) 15 " tablet 0 4/7/2024    multivit with minerals/lutein (MULTIVITAMIN 50 PLUS ORAL) Take 1 tablet by mouth once daily.   4/7/2024    olmesartan (BENICAR) 40 MG tablet Take 40 mg by mouth once daily.   4/7/2024    omega 3-dha-epa-fish oil 1,000 mg (120 mg-180 mg) Cap Take 1 capsule by mouth once daily. 90 capsule 3 4/7/2024    ondansetron (ZOFRAN-ODT) 4 MG TbDL Take 1 tablet (4 mg total) by mouth every 8 (eight) hours as needed.   Past Month    pantoprazole (PROTONIX) 40 MG tablet Take 1 tablet (40 mg total) by mouth once daily. 30 tablet 0 4/7/2024    promethazine (PHENERGAN) 25 MG tablet Take 25 mg by mouth 2 (two) times daily as needed.   Past Month    sertraline (ZOLOFT) 50 MG tablet Take 3 tablets (150 mg total) by mouth once daily. 90 tablet 11 4/7/2024    temazepam (RESTORIL) 15 mg Cap Take 15 mg by mouth every evening.   4/7/2024    [DISCONTINUED] calcium carbonate (TUMS) 200 mg calcium (500 mg) chewable tablet Take 1 tablet (500 mg total) by mouth 2 (two) times daily as needed for Heartburn. 30 tablet 0     [DISCONTINUED] hydroCHLOROthiazide (HYDRODIURIL) 12.5 MG Tab Take 1 tablet (12.5 mg total) by mouth once daily. 30 tablet 11     [DISCONTINUED] levothyroxine (SYNTHROID) 125 MCG tablet Take 1 tablet (125 mcg total) by mouth before breakfast. (Patient not taking: Reported on 4/8/2024) 30 tablet 11 Not Taking    [DISCONTINUED] omeprazole (PRILOSEC) 40 MG capsule Take 40 mg by mouth every evening.       [DISCONTINUED] valsartan (DIOVAN) 160 MG tablet Take 1 tablet (160 mg total) by mouth once daily. (Patient not taking: Reported on 4/8/2024) 30 tablet 0 Not Taking         VIRTUAL TELENOTE    Start time:4:20PM  Chief complaint: N/A  The patient location is: [facility]  The patient arrived at: N/A  Present with the patient at the time of the telemed/virtual assessment:N/A             End time:  4:30PM    Total time spent with patient: 10 minutes    The attending portion of this evaluation, treatment, and  documentation was performed per Shawnee Campos via Telemedicine AudioVisual using the secure link bird software platform with 2 way audio/video. The provider was located off-site and the patient is located in the hospital. The aforementioned video software was utilized to document the relevant history and physical exam.      Potential issues to be addressed PRIOR TO DISCHARGE  No issues identified.    Shawnee Campos  Pharmacy Tech Specialist - Medication History  EXT. [phone]    .

## 2024-04-08 NOTE — ASSESSMENT & PLAN NOTE
04/08/24 1456Urinalysis, Microscopic   Collected: 04/08/24 1410  Final result  Specimen: Urine   WBC, UA 20-50 Abnormal  /hpf Squamous Epithelial Cells, UA Rare /lpf   RBC, UA 10-15 Abnormal  /hpf Mucus, UA Moderate Abnormal  /hpf   Bacteria, UA Few Abnormal  /hpf       Urine culture pending   Given rocephin while in ER   Continue rocephin for a total of 5 days -

## 2024-04-08 NOTE — HPI
Herlinda Kaur is a 59 year old female with pmh of hypertension, chronic anxiety, depression, dysphagia, CVA, insomnia, intrapontine hemorrhage, left hemiparesis, peripheral neuropathy and thyroid disease. PCP is Micheal Nash. She presented to ER today with complaints of recurrent UTI. States she recently finished a course of Amoxicillin for a UTI but over the last 2-3 days it has come back. Reports pain, frequency and urgency with urination, reports she wears a pad. Patient has fever today. Reports some nausea also. She also complains of SOB and dry cough. Has home o2 per nasal canula at 2 liters.Worked up in ER and results are:  Labs significant for WBC 12.16, H/H 12.1/38.1, Platelets 120, Na 134, K 3.3, Cl 98, BUN 12, Creat 1.06, Calcium 8.1, Mag 1.3, Bilirubin 1.8, Lactic 1.1, Large occult blood in urine with 10-15 RBC's, positive nitrates, moderate leukocytes with 20-50 WBC's. Blood cultures pending.  Chest Xray significant for Diffuse interstitial and patchy airspace opacities scattered throughout the lungs, edema versus infection. Patient was managed in the ED with IV fluids prior to arrival, IV Magnesium, IV Zofran, IV Rocephin and oral tylenol.  The response to treatment was good.  She was admitted to inpatient care for acute urinary tract infection, failed outpatient treatment. She has recd rocephin while in ER.

## 2024-04-08 NOTE — SUBJECTIVE & OBJECTIVE
Past Medical History:   Diagnosis Date    Cerebral hemorrhage     Chronic anxiety     Decreased coordination 3/7/2022    Dehydration     Depression     Dysphagia     Due to and following non-traumatic intracerebral hemorrhage    Hearing loss     History of CVA (cerebrovascular accident)     Hypertension     Hypokalemia 10/20/2022    Insomnia     Intrapontine hemorrhage     Left hemiparesis     Peripheral neuropathy     Stroke     Thyroid disease     Transient cerebral ischemia        Past Surgical History:   Procedure Laterality Date    APPENDECTOMY      CHOLECYSTECTOMY      HYSTERECTOMY      LITHOTRIPSY      Renal lithotripsy    percutaneious insertion of ureteric stent         Review of patient's allergies indicates:   Allergen Reactions    Lamisil [terbinafine] Anaphylaxis    Codeine Itching    Compazine [prochlorperazine] Itching       No current facility-administered medications on file prior to encounter.     Current Outpatient Medications on File Prior to Encounter   Medication Sig    acetaminophen (TYLENOL) 325 MG tablet Take 650 mg by mouth every 6 (six) hours as needed.    albuterol (PROAIR HFA) 90 mcg/actuation inhaler Inhale 2 puffs into the lungs every 6 (six) hours as needed for Wheezing. Rescue    amLODIPine (NORVASC) 10 MG tablet Take 1 tablet (10 mg total) by mouth once daily. (Patient taking differently: Take 10 mg by mouth every evening.)    atorvastatin (LIPITOR) 40 MG tablet Take 40 mg by mouth every evening.    clopidogreL (PLAVIX) 75 mg tablet Take 1 tablet (75 mg total) by mouth once daily.    gabapentin (NEURONTIN) 300 MG capsule Take 2 capsules (600 mg total) by mouth 3 (three) times daily.    LORazepam (ATIVAN) 0.5 MG tablet Take 1 tablet (0.5 mg total) by mouth every 12 (twelve) hours as needed for Anxiety. (Patient taking differently: Take 0.5 mg by mouth once daily.)    multivit with minerals/lutein (MULTIVITAMIN 50 PLUS ORAL) Take 1 tablet by mouth once daily.    olmesartan (BENICAR)  40 MG tablet Take 40 mg by mouth once daily.    omega 3-dha-epa-fish oil 1,000 mg (120 mg-180 mg) Cap Take 1 capsule by mouth once daily.    ondansetron (ZOFRAN-ODT) 4 MG TbDL Take 1 tablet (4 mg total) by mouth every 8 (eight) hours as needed.    pantoprazole (PROTONIX) 40 MG tablet Take 1 tablet (40 mg total) by mouth once daily.    promethazine (PHENERGAN) 25 MG tablet Take 25 mg by mouth 2 (two) times daily as needed.    sertraline (ZOLOFT) 50 MG tablet Take 3 tablets (150 mg total) by mouth once daily.    temazepam (RESTORIL) 15 mg Cap Take 15 mg by mouth every evening.    [DISCONTINUED] calcium carbonate (TUMS) 200 mg calcium (500 mg) chewable tablet Take 1 tablet (500 mg total) by mouth 2 (two) times daily as needed for Heartburn.    [DISCONTINUED] hydroCHLOROthiazide (HYDRODIURIL) 12.5 MG Tab Take 1 tablet (12.5 mg total) by mouth once daily.    [DISCONTINUED] levothyroxine (SYNTHROID) 125 MCG tablet Take 1 tablet (125 mcg total) by mouth before breakfast. (Patient not taking: Reported on 4/8/2024)    [DISCONTINUED] omeprazole (PRILOSEC) 40 MG capsule Take 40 mg by mouth every evening.    [DISCONTINUED] valsartan (DIOVAN) 160 MG tablet Take 1 tablet (160 mg total) by mouth once daily. (Patient not taking: Reported on 4/8/2024)     Family History    None       Tobacco Use    Smoking status: Never    Smokeless tobacco: Never   Substance and Sexual Activity    Alcohol use: Never    Drug use: Never    Sexual activity: Not on file     Review of Systems   Constitutional:  Positive for appetite change and fever. Negative for fatigue.   HENT:  Negative for congestion, nosebleeds, sinus pressure, sore throat and trouble swallowing.    Eyes:         States vision in left eye has been blurry since CVA    Respiratory:  Positive for cough and shortness of breath.         Dry cough   Wears home o2 @ 2 liters   Cardiovascular:  Negative for chest pain, palpitations and leg swelling.   Gastrointestinal:  Positive for  nausea. Negative for abdominal distention, abdominal pain, constipation, diarrhea and vomiting.   Genitourinary:  Positive for dysuria. Negative for flank pain, menstrual problem and vaginal bleeding.        States has incontinence or urine   Musculoskeletal:  Negative for back pain, joint swelling and neck pain.   Skin:         Has some skin breaks to abdominal folds   Neurological:  Positive for weakness. Negative for tremors, light-headedness and headaches.   Psychiatric/Behavioral:  Negative for hallucinations.      Objective:     Vital Signs (Most Recent):  Temp: 99.9 °F (37.7 °C) (04/08/24 1642)  Pulse: 97 (04/08/24 1642)  Resp: 18 (04/08/24 1603)  BP: (!) 139/90 (04/08/24 1642)  SpO2: (!) 89 % (04/08/24 1642) Vital Signs (24h Range):  Temp:  [99.9 °F (37.7 °C)-102.6 °F (39.2 °C)] 99.9 °F (37.7 °C)  Pulse:  [] 97  Resp:  [18] 18  SpO2:  [89 %-93 %] 89 %  BP: (119-143)/() 139/90     Weight: 71.6 kg (157 lb 12.8 oz)  Body mass index is 27.09 kg/m².     Physical Exam           Significant Labs: All pertinent labs within the past 24 hours have been reviewed.  Recent Lab Results         04/08/24  1424   04/08/24  1410        Albumin/Globulin Ratio 0.6         Albumin 2.7                  ALT 16         Anion Gap 12         Appearance, UA   Cloudy       AST 27         Bacteria, UA   Few       Baso # 0.02         Basophil % 0.2         Bilirubin (UA)   Negative       BILIRUBIN TOTAL 1.8         BUN 12         BUN/CREAT RATIO 11         Calcium 8.1         Chloride 98         CO2 27         Color, UA   Yellow       Creatinine 1.06         Differential Method Auto         eGFR 61         Eos # 0.06         Eos % 0.5         Globulin, Total 4.2         Glucose 181         Glucose, UA   Negative       Hematocrit 38.1         Hemoglobin 12.1         Ketones, UA   Negative       Lactic Acid Level 1.1         Leukocyte Esterase, UA   Moderate       Lipase 29         Lymph # 0.60         Lymph % 4.9          Magnesium  1.3         MCH 27.5         MCHC 31.8         MCV 86.6         Mono # 1.06         Mono % 8.7         MPV 9.6         Mucus, UA   Moderate       Neutrophils, Abs 10.42         Neutrophils Relative 85.7         NITRITE UA   Positive       Blood, UA   Large       pH, UA   7.0       Platelet Count 120         Potassium 3.3         PROTEIN TOTAL 6.9         Protein, UA   100       RBC 4.40         RBC, UA   10-15       RDW 15.9         Sodium 134         Specific Gravity, UA   1.020       Squam Epithel, UA   Rare       UROBILINOGEN UA   2.0       WBC, UA   20-50       WBC 12.16                 Significant Imaging: I have reviewed all pertinent imaging results/findings within the past 24 hours.

## 2024-04-08 NOTE — ED NOTES
O2 applied at 2 liters nasal cannula, patient states she wears at home. 02 Saturation was 88 percent on room air. Now 93. NP Sergio aware

## 2024-04-08 NOTE — PLAN OF CARE
PT AAOX3. Resp even. O2 at 2 liters per NC. Pt incont of bladder at times. OCTAVIA hose and skid sock placed. Redness noted under both breast. Bed low. SR up X 3. Bed and chair alarm present. CB within reach  Problem: Adult Inpatient Plan of Care  Goal: Plan of Care Review  Outcome: Ongoing, Progressing  Goal: Patient-Specific Goal (Individualized)  Outcome: Ongoing, Progressing  Goal: Absence of Hospital-Acquired Illness or Injury  Outcome: Ongoing, Progressing  Goal: Optimal Comfort and Wellbeing  Outcome: Ongoing, Progressing  Goal: Readiness for Transition of Care  Outcome: Ongoing, Progressing

## 2024-04-08 NOTE — H&P
[facility] - Medical Surgical Unit  Hospital Medicine  History & Physical    Patient Name: Herlinda Valdovinos  MRN: [id number]  Patient Class: IP- Inpatient  Admission Date: 4/8/2024  Attending Physician: Chuy Daugherty IV   Primary Care Provider: Micheal MATHUR           Patient information was obtained from patient, past medical records, and ER records.     Subjective:     Principal Problem:Acute cystitis with hematuria    Chief Complaint:   Chief Complaint   Patient presents with    Vomiting    Fever    Diarrhea     X 1 day        HPI: Herlinda Kaur is a 59 year old female with pmh of hypertension, chronic anxiety, depression, dysphagia, CVA, insomnia, intrapontine hemorrhage, left hemiparesis, peripheral neuropathy and thyroid disease. PCP is Micheal Nash. She presented to ER today with complaints of recurrent UTI. States she recently finished a course of Amoxicillin for a UTI but over the last 2-3 days it has come back. Reports pain, frequency and urgency with urination, reports she wears a pad. Patient has fever today. Reports some nausea also. She also complains of SOB and dry cough. Has home o2 per nasal canula at 2 liters.Worked up in ER and results are:  Labs significant for WBC 12.16, H/H 12.1/38.1, Platelets 120, Na 134, K 3.3, Cl 98, BUN 12, Creat 1.06, Calcium 8.1, Mag 1.3, Bilirubin 1.8, Lactic 1.1, Large occult blood in urine with 10-15 RBC's, positive nitrates, moderate leukocytes with 20-50 WBC's. Blood cultures pending.  Chest Xray significant for Diffuse interstitial and patchy airspace opacities scattered throughout the lungs, edema versus infection. Patient was managed in the ED with IV fluids prior to arrival, IV Magnesium, IV Zofran, IV Rocephin and oral tylenol.  The response to treatment was good.  She was admitted to inpatient care for acute urinary tract infection, failed outpatient treatment. She has recd rocephin while in ER.    Past Medical History:   Diagnosis Date     Cerebral hemorrhage     Chronic anxiety     Decreased coordination 3/7/2022    Dehydration     Depression     Dysphagia     Due to and following non-traumatic intracerebral hemorrhage    Hearing loss     History of CVA (cerebrovascular accident)     Hypertension     Hypokalemia 10/20/2022    Insomnia     Intrapontine hemorrhage     Left hemiparesis     Peripheral neuropathy     Stroke     Thyroid disease     Transient cerebral ischemia        Past Surgical History:   Procedure Laterality Date    APPENDECTOMY      CHOLECYSTECTOMY      HYSTERECTOMY      LITHOTRIPSY      Renal lithotripsy    percutaneious insertion of ureteric stent         Review of patient's allergies indicates:   Allergen Reactions    Lamisil [terbinafine] Anaphylaxis    Codeine Itching    Compazine [prochlorperazine] Itching       No current facility-administered medications on file prior to encounter.     Current Outpatient Medications on File Prior to Encounter   Medication Sig    acetaminophen (TYLENOL) 325 MG tablet Take 650 mg by mouth every 6 (six) hours as needed.    albuterol (PROAIR HFA) 90 mcg/actuation inhaler Inhale 2 puffs into the lungs every 6 (six) hours as needed for Wheezing. Rescue    amLODIPine (NORVASC) 10 MG tablet Take 1 tablet (10 mg total) by mouth once daily. (Patient taking differently: Take 10 mg by mouth every evening.)    atorvastatin (LIPITOR) 40 MG tablet Take 40 mg by mouth every evening.    clopidogreL (PLAVIX) 75 mg tablet Take 1 tablet (75 mg total) by mouth once daily.    gabapentin (NEURONTIN) 300 MG capsule Take 2 capsules (600 mg total) by mouth 3 (three) times daily.    LORazepam (ATIVAN) 0.5 MG tablet Take 1 tablet (0.5 mg total) by mouth every 12 (twelve) hours as needed for Anxiety. (Patient taking differently: Take 0.5 mg by mouth once daily.)    multivit with minerals/lutein (MULTIVITAMIN 50 PLUS ORAL) Take 1 tablet by mouth once daily.    olmesartan (BENICAR) 40 MG tablet Take 40 mg by mouth once  daily.    omega 3-dha-epa-fish oil 1,000 mg (120 mg-180 mg) Cap Take 1 capsule by mouth once daily.    ondansetron (ZOFRAN-ODT) 4 MG TbDL Take 1 tablet (4 mg total) by mouth every 8 (eight) hours as needed.    pantoprazole (PROTONIX) 40 MG tablet Take 1 tablet (40 mg total) by mouth once daily.    promethazine (PHENERGAN) 25 MG tablet Take 25 mg by mouth 2 (two) times daily as needed.    sertraline (ZOLOFT) 50 MG tablet Take 3 tablets (150 mg total) by mouth once daily.    temazepam (RESTORIL) 15 mg Cap Take 15 mg by mouth every evening.    [DISCONTINUED] calcium carbonate (TUMS) 200 mg calcium (500 mg) chewable tablet Take 1 tablet (500 mg total) by mouth 2 (two) times daily as needed for Heartburn.    [DISCONTINUED] hydroCHLOROthiazide (HYDRODIURIL) 12.5 MG Tab Take 1 tablet (12.5 mg total) by mouth once daily.    [DISCONTINUED] levothyroxine (SYNTHROID) 125 MCG tablet Take 1 tablet (125 mcg total) by mouth before breakfast. (Patient not taking: Reported on 4/8/2024)    [DISCONTINUED] omeprazole (PRILOSEC) 40 MG capsule Take 40 mg by mouth every evening.    [DISCONTINUED] valsartan (DIOVAN) 160 MG tablet Take 1 tablet (160 mg total) by mouth once daily. (Patient not taking: Reported on 4/8/2024)     Family History    None       Tobacco Use    Smoking status: Never    Smokeless tobacco: Never   Substance and Sexual Activity    Alcohol use: Never    Drug use: Never    Sexual activity: Not on file     Review of Systems   Constitutional:  Positive for appetite change and fever. Negative for fatigue.   HENT:  Negative for congestion, nosebleeds, sinus pressure, sore throat and trouble swallowing.    Eyes:         States vision in left eye has been blurry since CVA    Respiratory:  Positive for cough and shortness of breath.         Dry cough   Wears home o2 @ 2 liters   Cardiovascular:  Negative for chest pain, palpitations and leg swelling.   Gastrointestinal:  Positive for nausea. Negative for abdominal distention,  abdominal pain, constipation, diarrhea and vomiting.   Genitourinary:  Positive for dysuria. Negative for flank pain, menstrual problem and vaginal bleeding.        States has incontinence or urine   Musculoskeletal:  Negative for back pain, joint swelling and neck pain.   Skin:         Has some skin breaks to abdominal folds   Neurological:  Positive for weakness. Negative for tremors, light-headedness and headaches.   Psychiatric/Behavioral:  Negative for hallucinations.      Objective:     Vital Signs (Most Recent):  Temp: 99.9 °F (37.7 °C) (04/08/24 1642)  Pulse: 97 (04/08/24 1642)  Resp: 18 (04/08/24 1603)  BP: (!) 139/90 (04/08/24 1642)  SpO2: (!) 89 % (04/08/24 1642) Vital Signs (24h Range):  Temp:  [99.9 °F (37.7 °C)-102.6 °F (39.2 °C)] 99.9 °F (37.7 °C)  Pulse:  [] 97  Resp:  [18] 18  SpO2:  [89 %-93 %] 89 %  BP: (119-143)/() 139/90     Weight: 71.6 kg (157 lb 12.8 oz)  Body mass index is 27.09 kg/m².     Physical Exam           Significant Labs: All pertinent labs within the past 24 hours have been reviewed.  Recent Lab Results         04/08/24  1424   04/08/24  1410        Albumin/Globulin Ratio 0.6         Albumin 2.7                  ALT 16         Anion Gap 12         Appearance, UA   Cloudy       AST 27         Bacteria, UA   Few       Baso # 0.02         Basophil % 0.2         Bilirubin (UA)   Negative       BILIRUBIN TOTAL 1.8         BUN 12         BUN/CREAT RATIO 11         Calcium 8.1         Chloride 98         CO2 27         Color, UA   Yellow       Creatinine 1.06         Differential Method Auto         eGFR 61         Eos # 0.06         Eos % 0.5         Globulin, Total 4.2         Glucose 181         Glucose, UA   Negative       Hematocrit 38.1         Hemoglobin 12.1         Ketones, UA   Negative       Lactic Acid Level 1.1         Leukocyte Esterase, UA   Moderate       Lipase 29         Lymph # 0.60         Lymph % 4.9         Magnesium  1.3         MCH 27.5          MCHC 31.8         MCV 86.6         Mono # 1.06         Mono % 8.7         MPV 9.6         Mucus, UA   Moderate       Neutrophils, Abs 10.42         Neutrophils Relative 85.7         NITRITE UA   Positive       Blood, UA   Large       pH, UA   7.0       Platelet Count 120         Potassium 3.3         PROTEIN TOTAL 6.9         Protein, UA   100       RBC 4.40         RBC, UA   10-15       RDW 15.9         Sodium 134         Specific Gravity, UA   1.020       Squam Epithel, UA   Rare       UROBILINOGEN UA   2.0       WBC, UA   20-50       WBC 12.16                 Significant Imaging: I have reviewed all pertinent imaging results/findings within the past 24 hours.  Assessment/Plan:     * Acute cystitis with hematuria    04/08/24 1456Urinalysis, Microscopic   Collected: 04/08/24 1410  Final result  Specimen: Urine   WBC, UA 20-50 Abnormal  /hpf Squamous Epithelial Cells, UA Rare /lpf   RBC, UA 10-15 Abnormal  /hpf Mucus, UA Moderate Abnormal  /hpf   Bacteria, UA Few Abnormal  /hpf       Urine culture pending   Given rocephin while in ER   Continue rocephin for a total of 5 days -     CAP (community acquired pneumonia)  Blood cultures pending  O2 @ 2 liters  Duonebs every 4 hours prn   Rocephin and zithromycin       Hypertension  Chronic, controlled. Latest blood pressure and vitals reviewed-     Temp:  [99.9 °F (37.7 °C)-102.6 °F (39.2 °C)]   Pulse:  []   Resp:  [18]   BP: (119-143)/()   SpO2:  [89 %-93 %] .   Home meds for hypertension were reviewed and noted below.   Hypertension Medications               amLODIPine (NORVASC) 10 MG tablet Take 1 tablet (10 mg total) by mouth once daily.    olmesartan (BENICAR) 40 MG tablet Take 40 mg by mouth once daily.            While in the hospital, will manage blood pressure as follows; Continue home antihypertensive regimen    Will utilize p.r.n. blood pressure medication only if patient's blood pressure greater than 180/110 and she develops symptoms such as  worsening chest pain or shortness of breath.    Generalized anxiety disorder    04/08/24 lorazepam 0.5 mg po every 12 hours prn anxiety    Depressive disorder  04/08/24 continue sertraline 150 mg po daily       DVT prophylaxis  04/08/24 lovenox 40 mg sq daily      Dyslipidemia    04/08/24 continue statin       VTE Risk Mitigation (From admission, onward)           Ordered     Place sequential compression device  Until discontinued         04/08/24 1744     IP VTE LOW RISK PATIENT  Once         04/08/24 1744                                    KEVAN Funk  Department of Hospital Medicine  [facility] - Medical Surgical Unit

## 2024-04-08 NOTE — ASSESSMENT & PLAN NOTE
Chronic, controlled. Latest blood pressure and vitals reviewed-     Temp:  [99.9 °F (37.7 °C)-102.6 °F (39.2 °C)]   Pulse:  []   Resp:  [18]   BP: (119-143)/()   SpO2:  [89 %-93 %] .   Home meds for hypertension were reviewed and noted below.   Hypertension Medications               amLODIPine (NORVASC) 10 MG tablet Take 1 tablet (10 mg total) by mouth once daily.    olmesartan (BENICAR) 40 MG tablet Take 40 mg by mouth once daily.            While in the hospital, will manage blood pressure as follows; Continue home antihypertensive regimen    Will utilize p.r.n. blood pressure medication only if patient's blood pressure greater than 180/110 and she develops symptoms such as worsening chest pain or shortness of breath.

## 2024-04-09 LAB
ACINETOBACTER SPECIES: NOT DETECTED
ANION GAP SERPL CALCULATED.3IONS-SCNC: 9 MMOL/L (ref 7–16)
BASOPHILS # BLD AUTO: 0.02 K/UL (ref 0–0.2)
BASOPHILS NFR BLD AUTO: 0.2 % (ref 0–1)
BUN SERPL-MCNC: 15 MG/DL (ref 7–18)
BUN/CREAT SERPL: 13 (ref 6–20)
CALCIUM SERPL-MCNC: 8 MG/DL (ref 8.5–10.1)
CHLORIDE SERPL-SCNC: 103 MMOL/L (ref 98–107)
CITROBACTER SPECIES: NOT DETECTED
CO2 SERPL-SCNC: 30 MMOL/L (ref 21–32)
CREAT SERPL-MCNC: 1.14 MG/DL (ref 0.55–1.02)
DIFFERENTIAL METHOD BLD: ABNORMAL
EGFR (NO RACE VARIABLE) (RUSH/TITUS): 56 ML/MIN/1.73M2
ENTEROBACTER SPECIES: NOT DETECTED
EOSINOPHIL # BLD AUTO: 0.14 K/UL (ref 0–0.5)
EOSINOPHIL NFR BLD AUTO: 1.7 % (ref 1–4)
ERYTHROCYTE [DISTWIDTH] IN BLOOD BY AUTOMATED COUNT: 15.8 % (ref 11.5–14.5)
ESCHERICHIA COLI: DETECTED
GLUCOSE SERPL-MCNC: 146 MG/DL (ref 74–106)
HCT VFR BLD AUTO: 36.8 % (ref 38–47)
HGB BLD-MCNC: 12 G/DL (ref 12–16)
IMP: NOT DETECTED
INFLUENZA A MOLECULAR (OHS): NEGATIVE
INFLUENZA B MOLECULAR (OHS): NEGATIVE
KLEBSIELLA OXYTOCA: NOT DETECTED
KLEBSIELLA PNEUMONIAE: NOT DETECTED
LYMPHOCYTES # BLD AUTO: 1.14 K/UL (ref 1–4.8)
LYMPHOCYTES NFR BLD AUTO: 13.7 % (ref 27–41)
Lab: DETECTED
Lab: NOT DETECTED
Lab: NOT DETECTED
MCH RBC QN AUTO: 28 PG (ref 27–31)
MCHC RBC AUTO-ENTMCNC: 32.6 G/DL (ref 32–36)
MCV RBC AUTO: 86 FL (ref 80–96)
MONOCYTES # BLD AUTO: 0.83 K/UL (ref 0–0.8)
MONOCYTES NFR BLD AUTO: 10 % (ref 2–6)
MPC BLD CALC-MCNC: 9.6 FL (ref 9.4–12.4)
NDM: NOT DETECTED
NEUTROPHILS # BLD AUTO: 6.2 K/UL (ref 1.8–7.7)
NEUTROPHILS NFR BLD AUTO: 74.4 % (ref 53–65)
NT-PROBNP SERPL-MCNC: 568 PG/ML (ref 1–125)
PLATELET # BLD AUTO: 121 K/UL (ref 150–400)
POTASSIUM SERPL-SCNC: 3.1 MMOL/L (ref 3.5–5.1)
PROTEUS SPECIES: NOT DETECTED
PSEUDOMONAS AERUGINOSA: NOT DETECTED
RBC # BLD AUTO: 4.28 M/UL (ref 4.2–5.4)
SARS-COV-2 RDRP RESP QL NAA+PROBE: NEGATIVE
SODIUM SERPL-SCNC: 139 MMOL/L (ref 136–145)
VERIGENE RESULT 2: ABNORMAL
VERIGENE RESULT: ABNORMAL
VIM: NOT DETECTED
WBC # BLD AUTO: 8.33 K/UL (ref 4.5–11)

## 2024-04-09 PROCEDURE — 63600175 PHARM REV CODE 636 W HCPCS: Performed by: NURSE PRACTITIONER

## 2024-04-09 PROCEDURE — 94640 AIRWAY INHALATION TREATMENT: CPT

## 2024-04-09 PROCEDURE — 83880 ASSAY OF NATRIURETIC PEPTIDE: CPT | Performed by: NURSE PRACTITIONER

## 2024-04-09 PROCEDURE — 85025 COMPLETE CBC W/AUTO DIFF WBC: CPT | Performed by: NURSE PRACTITIONER

## 2024-04-09 PROCEDURE — 25000003 PHARM REV CODE 250: Performed by: NURSE PRACTITIONER

## 2024-04-09 PROCEDURE — 11000001 HC ACUTE MED/SURG PRIVATE ROOM

## 2024-04-09 PROCEDURE — 27000221 HC OXYGEN, UP TO 24 HOURS

## 2024-04-09 PROCEDURE — 87502 INFLUENZA DNA AMP PROBE: CPT | Performed by: NURSE PRACTITIONER

## 2024-04-09 PROCEDURE — 27000982 HC MATTRESS, MATRIX LAL RENTAL

## 2024-04-09 PROCEDURE — 87635 SARS-COV-2 COVID-19 AMP PRB: CPT | Performed by: NURSE PRACTITIONER

## 2024-04-09 PROCEDURE — 27000944

## 2024-04-09 PROCEDURE — 25000242 PHARM REV CODE 250 ALT 637 W/ HCPCS: Performed by: NURSE PRACTITIONER

## 2024-04-09 PROCEDURE — 25000003 PHARM REV CODE 250: Performed by: FAMILY MEDICINE

## 2024-04-09 PROCEDURE — 94761 N-INVAS EAR/PLS OXIMETRY MLT: CPT

## 2024-04-09 PROCEDURE — 80048 BASIC METABOLIC PNL TOTAL CA: CPT | Performed by: NURSE PRACTITIONER

## 2024-04-09 PROCEDURE — 99900035 HC TECH TIME PER 15 MIN (STAT)

## 2024-04-09 PROCEDURE — 99232 SBSQ HOSP IP/OBS MODERATE 35: CPT | Mod: FS,,, | Performed by: FAMILY MEDICINE

## 2024-04-09 RX ORDER — SODIUM CHLORIDE AND POTASSIUM CHLORIDE 150; 900 MG/100ML; MG/100ML
INJECTION, SOLUTION INTRAVENOUS CONTINUOUS
Status: DISCONTINUED | OUTPATIENT
Start: 2024-04-09 | End: 2024-04-10

## 2024-04-09 RX ORDER — ACETAMINOPHEN 325 MG/1
650 TABLET ORAL EVERY 6 HOURS PRN
Status: DISCONTINUED | OUTPATIENT
Start: 2024-04-09 | End: 2024-04-10

## 2024-04-09 RX ORDER — SELENIUM 50 MCG
1 TABLET ORAL
Status: DISCONTINUED | OUTPATIENT
Start: 2024-04-09 | End: 2024-04-11 | Stop reason: HOSPADM

## 2024-04-09 RX ADMIN — SODIUM CHLORIDE: 900 INJECTION, SOLUTION INTRAVENOUS at 05:04

## 2024-04-09 RX ADMIN — GABAPENTIN 300 MG: 300 CAPSULE ORAL at 09:04

## 2024-04-09 RX ADMIN — MICONAZOLE NITRATE: 20 CREAM TOPICAL at 09:04

## 2024-04-09 RX ADMIN — SODIUM CHLORIDE: 900 INJECTION, SOLUTION INTRAVENOUS at 03:04

## 2024-04-09 RX ADMIN — MUPIROCIN: 20 OINTMENT TOPICAL at 09:04

## 2024-04-09 RX ADMIN — LORAZEPAM 0.5 MG: 0.5 TABLET ORAL at 09:04

## 2024-04-09 RX ADMIN — ATORVASTATIN CALCIUM 40 MG: 40 TABLET, FILM COATED ORAL at 09:04

## 2024-04-09 RX ADMIN — ENOXAPARIN SODIUM 40 MG: 40 INJECTION SUBCUTANEOUS at 04:04

## 2024-04-09 RX ADMIN — OMEGA-3 FATTY ACIDS CAP 1000 MG 1 CAPSULE: 1000 CAP at 09:04

## 2024-04-09 RX ADMIN — SERTRALINE HYDROCHLORIDE 150 MG: 50 TABLET ORAL at 09:04

## 2024-04-09 RX ADMIN — CEFTRIAXONE SODIUM 1 G: 1 INJECTION, POWDER, FOR SOLUTION INTRAMUSCULAR; INTRAVENOUS at 12:04

## 2024-04-09 RX ADMIN — Medication 1 CAPSULE: at 01:04

## 2024-04-09 RX ADMIN — Medication 1 CAPSULE: at 04:04

## 2024-04-09 RX ADMIN — ACETAMINOPHEN 650 MG: 325 TABLET ORAL at 11:04

## 2024-04-09 RX ADMIN — IPRATROPIUM BROMIDE AND ALBUTEROL SULFATE 3 ML: 2.5; .5 SOLUTION RESPIRATORY (INHALATION) at 09:04

## 2024-04-09 RX ADMIN — POTASSIUM BICARBONATE 35 MEQ: 391 TABLET, EFFERVESCENT ORAL at 11:04

## 2024-04-09 RX ADMIN — AZITHROMYCIN MONOHYDRATE 500 MG: 500 INJECTION, POWDER, LYOPHILIZED, FOR SOLUTION INTRAVENOUS at 06:04

## 2024-04-09 RX ADMIN — AMLODIPINE BESYLATE 10 MG: 5 TABLET ORAL at 09:04

## 2024-04-09 RX ADMIN — CLOPIDOGREL BISULFATE 75 MG: 75 TABLET ORAL at 09:04

## 2024-04-09 RX ADMIN — POTASSIUM CHLORIDE AND SODIUM CHLORIDE: 900; 150 INJECTION, SOLUTION INTRAVENOUS at 04:04

## 2024-04-09 RX ADMIN — MICONAZOLE NITRATE: 20 CREAM TOPICAL at 11:04

## 2024-04-09 RX ADMIN — PANTOPRAZOLE SODIUM 40 MG: 40 TABLET, DELAYED RELEASE ORAL at 09:04

## 2024-04-09 RX ADMIN — ACETAMINOPHEN 650 MG: 325 TABLET ORAL at 10:04

## 2024-04-09 NOTE — PROGRESS NOTES
[facility]  Hospital Medicine  Progress Note    Patient Name: Herlinda Valdovinos  MRN: [id number]  Patient Class: IP- Inpatient   Admission Date: 4/8/2024  Length of Stay: 1 days  Attending Physician: Chuy Daugherty IV, DO  Primary Care Provider: Vini Hernandez NP        Subjective:     Principal Problem:Acute cystitis with hematuria        HPI:  Herlinda Kaur is a 59 year old female with pmh of hypertension, chronic anxiety, depression, dysphagia, CVA, insomnia, intrapontine hemorrhage, left hemiparesis, peripheral neuropathy and thyroid disease. PCP is Micheal Nash. She presented to ER today with complaints of recurrent UTI. States she recently finished a course of Amoxicillin for a UTI but over the last 2-3 days it has come back. Reports pain, frequency and urgency with urination, reports she wears a pad. Patient has fever today. Reports some nausea also. She also complains of SOB and dry cough. Has home o2 per nasal canula at 2 liters.Worked up in ER and results are:  Labs significant for WBC 12.16, H/H 12.1/38.1, Platelets 120, Na 134, K 3.3, Cl 98, BUN 12, Creat 1.06, Calcium 8.1, Mag 1.3, Bilirubin 1.8, Lactic 1.1, Large occult blood in urine with 10-15 RBC's, positive nitrates, moderate leukocytes with 20-50 WBC's. Blood cultures pending.  Chest Xray significant for Diffuse interstitial and patchy airspace opacities scattered throughout the lungs, edema versus infection. Patient was managed in the ED with IV fluids prior to arrival, IV Magnesium, IV Zofran, IV Rocephin and oral tylenol.  The response to treatment was good.  She was admitted to inpatient care for acute urinary tract infection, failed outpatient treatment. She has recd rocephin while in ER.    Overview/Hospital Course:  04/09/24 Awake and resting in bed. Max temp is 102.6 past 24 hours. WBC 8.33.  Potassium is 3.1 cr 1.14. Denies any increased SOB. Sat on 2 liters is 96%. Breath sounds are  diminished. Will repeat cxr.  Urine culture pending. Continue rocephin and zithromax. Continue fluids. Replace potassium orally.    Interval History: UTI, CAP    Review of Systems   Constitutional:  Positive for fever. Negative for appetite change and fatigue.   HENT:  Negative for congestion, nosebleeds, sinus pressure, sore throat and trouble swallowing.    Eyes:         States vision in left eye has been blurry since CVA    Respiratory:  Positive for cough and shortness of breath.         Dry cough   Wears home o2 @ 2 liters   Cardiovascular:  Negative for chest pain, palpitations and leg swelling.   Gastrointestinal:  Negative for abdominal distention, abdominal pain, constipation, diarrhea, nausea and vomiting.   Genitourinary:  Positive for dysuria. Negative for flank pain, menstrual problem and vaginal bleeding.        States has incontinence or urine   Musculoskeletal:  Negative for back pain, joint swelling and neck pain.   Skin:         Has some skin breaks to abdominal folds   Neurological:  Positive for weakness. Negative for tremors, light-headedness and headaches.   Psychiatric/Behavioral:  Negative for hallucinations.      Objective:     Vital Signs (Most Recent):  Temp: 99.8 °F (37.7 °C) (04/09/24 1123)  Pulse: 93 (04/09/24 1123)  Resp: 18 (04/09/24 1123)  BP: 116/74 (04/09/24 1123)  SpO2: 96 % (04/09/24 1123) Vital Signs (24h Range):  Temp:  [97.8 °F (36.6 °C)-102.7 °F (39.3 °C)] 99.8 °F (37.7 °C)  Pulse:  [] 93  Resp:  [18-20] 18  SpO2:  [89 %-96 %] 96 %  BP: (100-143)/() 116/74     Weight: 71.6 kg (157 lb 12.8 oz)  Body mass index is 27.09 kg/m².    Intake/Output Summary (Last 24 hours) at 4/9/2024 1317  Last data filed at 4/9/2024 1246  Gross per 24 hour   Intake 1947.63 ml   Output --   Net 1947.63 ml         Physical Exam  HENT:      Head: Normocephalic.      Mouth/Throat:      Mouth: Mucous membranes are moist.   Cardiovascular:      Rate and Rhythm: Normal rate and regular rhythm.       Heart sounds: Normal heart sounds.   Pulmonary:      Breath sounds: Normal breath sounds.      Comments: Diminished   Abdominal:      General: Bowel sounds are normal. There is no distension.      Palpations: Abdomen is soft. There is no mass.      Tenderness: There is no abdominal tenderness.      Hernia: No hernia is present.   Musculoskeletal:         General: No swelling, tenderness, deformity or signs of injury.      Cervical back: Normal range of motion.      Right lower leg: No edema.      Left lower leg: No edema.   Skin:     General: Skin is warm and dry.   Neurological:      Mental Status: She is alert and oriented to person, place, and time.      Motor: Weakness present.   Psychiatric:         Mood and Affect: Mood normal.             Significant Labs: All pertinent labs within the past 24 hours have been reviewed.  Recent Lab Results  (Last 5 results in the past 24 hours)        04/09/24  0900   04/09/24  0518   04/08/24  1752   04/08/24  1424   04/08/24  1410        Influenza A, Molecular Negative               Influenza B, Molecular Negative               Albumin/Globulin Ratio       0.6         Albumin       2.7         ALP       122         ALT       16         Anion Gap   9     12         Appearance, UA         Cloudy       AST       27         Bacteria, UA         Few       Baso #   0.02     0.02         Basophil %   0.2     0.2         Bilirubin (UA)         Negative       BILIRUBIN TOTAL       1.8         BUN   15     12         BUN/CREAT RATIO   13     11         Calcium   8.0     8.1         Chloride   103     98         CO2   30     27         Color, UA         Yellow       Creatinine   1.14     1.06         Differential Method   Auto     Auto         eGFR   56     61         Eos #   0.14     0.06         Eos %   1.7     0.5         Globulin, Total       4.2         Glucose   146     181         Glucose, UA         Negative       Hematocrit   36.8     38.1         Hemoglobin   12.0      12.1         Ketones, UA         Negative       Lactic Acid Level     0.7   1.1         Leukocyte Esterase, UA         Moderate       Lipase       29         Lymph #   1.14     0.60         Lymph %   13.7     4.9         Magnesium        1.3         MCH   28.0     27.5         MCHC   32.6     31.8         MCV   86.0     86.6         Mono #   0.83     1.06         Mono %   10.0     8.7         MPV   9.6     9.6         Mucus, UA         Moderate       Neutrophils, Abs   6.20     10.42         Neutrophils Relative   74.4     85.7         NITRITE UA         Positive       NT-proBNP   568             Blood, UA         Large       pH, UA         7.0       Platelet Count   121     120         Potassium   3.1     3.3         PROTEIN TOTAL       6.9         Protein, UA         100       RBC   4.28     4.40         RBC, UA         10-15       RDW   15.8     15.9         Sodium   139     134         Specific Gravity, UA         1.020       Squam Epithel, UA         Rare       UROBILINOGEN UA         2.0       WBC, UA         20-50       WBC   8.33     12.16                                Significant Imaging: I have reviewed all pertinent imaging results/findings within the past 24 hours.    Assessment/Plan:      * Acute cystitis with hematuria    04/08/24 1456Urinalysis, Microscopic   Collected: 04/08/24 1410  Final result  Specimen: Urine   WBC, UA 20-50 Abnormal  /hpf Squamous Epithelial Cells, UA Rare /lpf   RBC, UA 10-15 Abnormal  /hpf Mucus, UA Moderate Abnormal  /hpf   Bacteria, UA Few Abnormal  /hpf       Urine culture pending   Given rocephin while in ER   Continue rocephin for a total of 5 days -     04/09/24 urine culture pending    CAP (community acquired pneumonia)  Blood cultures pending  O2 @ 2 liters  Duonebs every 4 hours prn   Rocephin and zithromycin         04/09/24 continue rocephin and zithromax    Hypertension  Chronic, controlled. Latest blood pressure and vitals reviewed-     Temp:  [99.9 °F (37.7  °C)-102.6 °F (39.2 °C)]   Pulse:  []   Resp:  [18]   BP: (119-143)/()   SpO2:  [89 %-93 %] .   Home meds for hypertension were reviewed and noted below.   Hypertension Medications               amLODIPine (NORVASC) 10 MG tablet Take 1 tablet (10 mg total) by mouth once daily.    olmesartan (BENICAR) 40 MG tablet Take 40 mg by mouth once daily.            While in the hospital, will manage blood pressure as follows; Continue home antihypertensive regimen    Will utilize p.r.n. blood pressure medication only if patient's blood pressure greater than 180/110 and she develops symptoms such as worsening chest pain or shortness of breath.    Generalized anxiety disorder    04/08/24 lorazepam 0.5 mg po every 12 hours prn anxiety    Depressive disorder  04/08/24 continue sertraline 150 mg po daily       DVT prophylaxis  04/08/24 lovenox 40 mg sq daily      Dyslipidemia    04/08/24 continue statin       VTE Risk Mitigation (From admission, onward)           Ordered     enoxaparin injection 40 mg  Every 24 hours         04/08/24 1808     Place sequential compression device  Until discontinued         04/08/24 1744     IP VTE LOW RISK PATIENT  Once         04/08/24 1744                    Discharge Planning   POOL:      Code Status: Full Code   Is the patient medically ready for discharge?:     Reason for patient still in hospital (select all that apply): Treatment  Discharge Plan A: Home with family                  KEVAN Funk  Department of Hospital Medicine   [facility]

## 2024-04-09 NOTE — ASSESSMENT & PLAN NOTE
04/08/24 1456Urinalysis, Microscopic   Collected: 04/08/24 1410  Final result  Specimen: Urine   WBC, UA 20-50 Abnormal  /hpf Squamous Epithelial Cells, UA Rare /lpf   RBC, UA 10-15 Abnormal  /hpf Mucus, UA Moderate Abnormal  /hpf   Bacteria, UA Few Abnormal  /hpf       Urine culture pending   Given rocephin while in ER   Continue rocephin for a total of 5 days -     04/09/24 urine culture pending

## 2024-04-09 NOTE — SUBJECTIVE & OBJECTIVE
Interval History: UTI, CAP    Review of Systems   Constitutional:  Positive for fever. Negative for appetite change and fatigue.   HENT:  Negative for congestion, nosebleeds, sinus pressure, sore throat and trouble swallowing.    Eyes:         States vision in left eye has been blurry since CVA    Respiratory:  Positive for cough and shortness of breath.         Dry cough   Wears home o2 @ 2 liters   Cardiovascular:  Negative for chest pain, palpitations and leg swelling.   Gastrointestinal:  Negative for abdominal distention, abdominal pain, constipation, diarrhea, nausea and vomiting.   Genitourinary:  Positive for dysuria. Negative for flank pain, menstrual problem and vaginal bleeding.        States has incontinence or urine   Musculoskeletal:  Negative for back pain, joint swelling and neck pain.   Skin:         Has some skin breaks to abdominal folds   Neurological:  Positive for weakness. Negative for tremors, light-headedness and headaches.   Psychiatric/Behavioral:  Negative for hallucinations.      Objective:     Vital Signs (Most Recent):  Temp: 99.8 °F (37.7 °C) (04/09/24 1123)  Pulse: 93 (04/09/24 1123)  Resp: 18 (04/09/24 1123)  BP: 116/74 (04/09/24 1123)  SpO2: 96 % (04/09/24 1123) Vital Signs (24h Range):  Temp:  [97.8 °F (36.6 °C)-102.7 °F (39.3 °C)] 99.8 °F (37.7 °C)  Pulse:  [] 93  Resp:  [18-20] 18  SpO2:  [89 %-96 %] 96 %  BP: (100-143)/() 116/74     Weight: 71.6 kg (157 lb 12.8 oz)  Body mass index is 27.09 kg/m².    Intake/Output Summary (Last 24 hours) at 4/9/2024 1317  Last data filed at 4/9/2024 1246  Gross per 24 hour   Intake 1947.63 ml   Output --   Net 1947.63 ml         Physical Exam  HENT:      Head: Normocephalic.      Mouth/Throat:      Mouth: Mucous membranes are moist.   Cardiovascular:      Rate and Rhythm: Normal rate and regular rhythm.      Heart sounds: Normal heart sounds.   Pulmonary:      Breath sounds: Normal breath sounds.      Comments: Diminished    Abdominal:      General: Bowel sounds are normal. There is no distension.      Palpations: Abdomen is soft. There is no mass.      Tenderness: There is no abdominal tenderness.      Hernia: No hernia is present.   Musculoskeletal:         General: No swelling, tenderness, deformity or signs of injury.      Cervical back: Normal range of motion.      Right lower leg: No edema.      Left lower leg: No edema.   Skin:     General: Skin is warm and dry.   Neurological:      Mental Status: She is alert and oriented to person, place, and time.      Motor: Weakness present.   Psychiatric:         Mood and Affect: Mood normal.             Significant Labs: All pertinent labs within the past 24 hours have been reviewed.  Recent Lab Results  (Last 5 results in the past 24 hours)        04/09/24  0900   04/09/24  0518   04/08/24  1752   04/08/24  1424   04/08/24  1410        Influenza A, Molecular Negative               Influenza B, Molecular Negative               Albumin/Globulin Ratio       0.6         Albumin       2.7         ALP       122         ALT       16         Anion Gap   9     12         Appearance, UA         Cloudy       AST       27         Bacteria, UA         Few       Baso #   0.02     0.02         Basophil %   0.2     0.2         Bilirubin (UA)         Negative       BILIRUBIN TOTAL       1.8         BUN   15     12         BUN/CREAT RATIO   13     11         Calcium   8.0     8.1         Chloride   103     98         CO2   30     27         Color, UA         Yellow       Creatinine   1.14     1.06         Differential Method   Auto     Auto         eGFR   56     61         Eos #   0.14     0.06         Eos %   1.7     0.5         Globulin, Total       4.2         Glucose   146     181         Glucose, UA         Negative       Hematocrit   36.8     38.1         Hemoglobin   12.0     12.1         Ketones, UA         Negative       Lactic Acid Level     0.7   1.1         Leukocyte Esterase, UA          Moderate       Lipase       29         Lymph #   1.14     0.60         Lymph %   13.7     4.9         Magnesium        1.3         MCH   28.0     27.5         MCHC   32.6     31.8         MCV   86.0     86.6         Mono #   0.83     1.06         Mono %   10.0     8.7         MPV   9.6     9.6         Mucus, UA         Moderate       Neutrophils, Abs   6.20     10.42         Neutrophils Relative   74.4     85.7         NITRITE UA         Positive       NT-proBNP   568             Blood, UA         Large       pH, UA         7.0       Platelet Count   121     120         Potassium   3.1     3.3         PROTEIN TOTAL       6.9         Protein, UA         100       RBC   4.28     4.40         RBC, UA         10-15       RDW   15.8     15.9         Sodium   139     134         Specific Gravity, UA         1.020       Squam Epithel, UA         Rare       UROBILINOGEN UA         2.0       WBC, UA         20-50       WBC   8.33     12.16                                Significant Imaging: I have reviewed all pertinent imaging results/findings within the past 24 hours.

## 2024-04-09 NOTE — ASSESSMENT & PLAN NOTE
Blood cultures pending  O2 @ 2 liters  Duonebs every 4 hours prn   Rocephin and zithromycin         04/09/24 continue rocephin and zithromax

## 2024-04-09 NOTE — PLAN OF CARE
Problem: Adult Inpatient Plan of Care  Goal: Optimal Comfort and Wellbeing  Outcome: Ongoing, Progressing     Problem: Infection (Pneumonia)  Goal: Resolution of Infection Signs and Symptoms  Outcome: Ongoing, Progressing

## 2024-04-09 NOTE — PLAN OF CARE
[facility]  Initial Discharge Assessment       Primary Care Provider: Vini Hernandez NP    Admission Diagnosis: Acute cystitis with hematuria [N30.01]    Admission Date: 4/8/2024  Expected Discharge Date:     Transition of Care Barriers: (P) None    Payor: [employer] MANAGED MEDICARE / Plan: [employer] MEDICARE PPO / Product Type: Medicare Advantage /     Extended Emergency Contact Information  Primary Emergency Contact: VincentMicheal  Mobile Phone: [phone]  Relation: Spouse  Preferred language: English   needed? No    Discharge Plan A: (P) Home with family         [facility] - [place], [place] - [address]  [address]  [place] [address]  Phone: [phone] Fax: [phone]      Initial Assessment (most recent)       Adult Discharge Assessment - 04/09/24 0939          Discharge Assessment    Assessment Type Discharge Planning Assessment (P)      Confirmed/corrected address, phone number and insurance Yes (P)      Confirmed Demographics Correct on Facesheet (P)      Source of Information patient (P)      Communicated POOL with patient/caregiver Date not available/Unable to determine (P)      Reason For Admission Acute cystitis w/Hematuria (P)      People in Home significant other (P)      Do you expect to return to your current living situation? Yes (P)      Do you have help at home or someone to help you manage your care at home? Yes (P)      Who are your caregiver(s) and their phone number(s)? Nataliia / Micheal Vincent [phone] (P)      Current cognitive status: Alert/Oriented (P)      Walking or Climbing Stairs Difficulty yes (P)      Walking or Climbing Stairs ambulation difficulty, requires equipment (P)      Mobility Management uses a walker (P)      Dressing/Bathing Difficulty yes (P)      Dressing/Bathing bathing difficulty, assistance 1 person (P)      Do you have any problems with: Errands/Grocery (P)      Home Accessibility stairs to  enter home (P)      Number of Stairs, Main Entrance two (P)      Surface of Stairs, Main Entrance hardwood (P)      Landing, Stairs, Main Entrance adequate turning radius (P)      Home Layout Able to live on 1st floor (P)      Equipment Currently Used at Home blood pressure machine;cane, quad;cane, straight;oxygen;rollator;walker, rolling;wheelchair (P)      Readmission within 30 days? No (P)      Patient currently being followed by outpatient case management? No (P)      Do you currently have service(s) that help you manage your care at home? No (P)      Do you take prescription medications? Yes (P)      Do you have prescription coverage? Yes (P)      Coverage [employer] (P)      Do you have any problems affording any of your prescribed medications? No (P)      Is the patient taking medications as prescribed? yes (P)      Who is going to help you get home at discharge? Nataliia (P)      How do you get to doctors appointments? family or friend will provide (P)      Are you on dialysis? No (P)      Do you take coumadin? No (P)      Discharge Plan A Home with family (P)      DME Needed Upon Discharge  none (P)      Discharge Plan discussed with: Patient;Adult children (P)      Transition of Care Barriers None (P)         Physical Activity    On average, how many days per week do you engage in moderate to strenuous exercise (like a brisk walk)? 0 days (P)      On average, how many minutes do you engage in exercise at this level? 0 min (P)         Financial Resource Strain    How hard is it for you to pay for the very basics like food, housing, medical care, and heating? Somewhat hard (P)         Housing Stability    In the last 12 months, was there a time when you were not able to pay the mortgage or rent on time? No (P)      In the last 12 months, how many places have you lived? 1 (P)      In the last 12 months, was there a time when you did not have a steady place to sleep or slept in a shelter (including now)? No (P)          Transportation Needs    In the past 12 months, has lack of transportation kept you from medical appointments or from getting medications? No (P)      In the past 12 months, has lack of transportation kept you from meetings, work, or from getting things needed for daily living? No (P)         Food Insecurity    Within the past 12 months, you worried that your food would run out before you got the money to buy more. Never true (P)      Within the past 12 months, the food you bought just didn't last and you didn't have money to get more. Never true (P)         Stress    Do you feel stress - tense, restless, nervous, or anxious, or unable to sleep at night because your mind is troubled all the time - these days? Only a little (P)         Social Connections    In a typical week, how many times do you talk on the phone with family, friends, or neighbors? More than three times a week (P)      How often do you get together with friends or relatives? More than three times a week (P)      How often do you attend Scientologist or Church services? More than 4 times per year (P)      Do you belong to any clubs or organizations such as Scientologist groups, unions, fraternal or athletic groups, or school groups? No (P)      How often do you attend meetings of the clubs or organizations you belong to? Never (P)      Are you , , , , never , or living with a partner?  (P)         Alcohol Use    Q1: How often do you have a drink containing alcohol? Never (P)      Q2: How many drinks containing alcohol do you have on a typical day when you are drinking? Patient does not drink (P)      Q3: How often do you have six or more drinks on one occasion? Never (P)                    Visited with Mr. Valdovinos and her daughter, Nataliia.  She has all needed DME. She has had home health in the past but unsure of name of company. Her  and daughter transport her to needed appointments, shopping, etc.  She will return home with Mr. Licona at time of discharge.

## 2024-04-09 NOTE — HOSPITAL COURSE
04/09/24 Awake and resting in bed. Max temp is 102.6 past 24 hours. WBC 8.33.  Potassium is 3.1 cr 1.14. Denies any increased SOB. Sat on 2 liters is 96%. Breath sounds are diminished. Will repeat cxr.  Urine culture pending. Continue rocephin and zithromax. Continue fluids. Replace potassium orally.  04/10/24 Awake and resting in bed. Max temp past 24 hours is 102.7. Blood cultures show     2 d ago     Verigene Result Negative E. coli Abnormal    Verigene Result Negative ESBL Positive Abnormal    DCD rocephin and zithromax. Merrem 1 gm IV every 8 hours. Will repeat blood cultures in 48 hours. Talked with patient re this and need to continue abx for 2 weeks after we get negative blood cultures. She is agreeable to plan. Contact isolation for ESBL ecoli bacteremia.  04/11/24 Awak and resting in bed. Temp.trending down- Max temp past 24 hours is 101.6. States she is feeling much better today. Will continue merrem for ESBL bacteremia. Will repeat blood cultures tomorrow- will continue abx for 14 days from negative blood cultures. Talked with her re swing bed due to need for abx and for weakness. She is agreeable. She will dc from acute care to swing bed today.

## 2024-04-10 PROBLEM — B96.20 BACTEREMIA DUE TO ESCHERICHIA COLI: Status: ACTIVE | Noted: 2024-04-10

## 2024-04-10 PROBLEM — R78.81 BACTEREMIA DUE TO ESCHERICHIA COLI: Status: ACTIVE | Noted: 2024-04-10

## 2024-04-10 LAB
ANION GAP SERPL CALCULATED.3IONS-SCNC: 10 MMOL/L (ref 7–16)
BASOPHILS # BLD AUTO: 0.02 K/UL (ref 0–0.2)
BASOPHILS NFR BLD AUTO: 0.3 % (ref 0–1)
BUN SERPL-MCNC: 9 MG/DL (ref 7–18)
BUN/CREAT SERPL: 9 (ref 6–20)
CALCIUM SERPL-MCNC: 8.2 MG/DL (ref 8.5–10.1)
CHLORIDE SERPL-SCNC: 105 MMOL/L (ref 98–107)
CO2 SERPL-SCNC: 28 MMOL/L (ref 21–32)
CREAT SERPL-MCNC: 0.97 MG/DL (ref 0.55–1.02)
DIFFERENTIAL METHOD BLD: ABNORMAL
EGFR (NO RACE VARIABLE) (RUSH/TITUS): 68 ML/MIN/1.73M2
EOSINOPHIL # BLD AUTO: 0.15 K/UL (ref 0–0.5)
EOSINOPHIL NFR BLD AUTO: 2.3 % (ref 1–4)
ERYTHROCYTE [DISTWIDTH] IN BLOOD BY AUTOMATED COUNT: 15.4 % (ref 11.5–14.5)
GLUCOSE SERPL-MCNC: 110 MG/DL (ref 74–106)
HCT VFR BLD AUTO: 34.3 % (ref 38–47)
HGB BLD-MCNC: 10.9 G/DL (ref 12–16)
LYMPHOCYTES # BLD AUTO: 1.03 K/UL (ref 1–4.8)
LYMPHOCYTES NFR BLD AUTO: 16 % (ref 27–41)
MCH RBC QN AUTO: 27.7 PG (ref 27–31)
MCHC RBC AUTO-ENTMCNC: 31.8 G/DL (ref 32–36)
MCV RBC AUTO: 87.1 FL (ref 80–96)
MONOCYTES # BLD AUTO: 0.77 K/UL (ref 0–0.8)
MONOCYTES NFR BLD AUTO: 12 % (ref 2–6)
MPC BLD CALC-MCNC: 9.4 FL (ref 9.4–12.4)
NEUTROPHILS # BLD AUTO: 4.47 K/UL (ref 1.8–7.7)
NEUTROPHILS NFR BLD AUTO: 69.4 % (ref 53–65)
PLATELET # BLD AUTO: 105 K/UL (ref 150–400)
POTASSIUM SERPL-SCNC: 4.2 MMOL/L (ref 3.5–5.1)
RBC # BLD AUTO: 3.94 M/UL (ref 4.2–5.4)
SODIUM SERPL-SCNC: 139 MMOL/L (ref 136–145)
WBC # BLD AUTO: 6.44 K/UL (ref 4.5–11)

## 2024-04-10 PROCEDURE — 27000221 HC OXYGEN, UP TO 24 HOURS

## 2024-04-10 PROCEDURE — 99233 SBSQ HOSP IP/OBS HIGH 50: CPT | Mod: ,,, | Performed by: NURSE PRACTITIONER

## 2024-04-10 PROCEDURE — 25000003 PHARM REV CODE 250: Performed by: FAMILY MEDICINE

## 2024-04-10 PROCEDURE — 27000944

## 2024-04-10 PROCEDURE — 27000982 HC MATTRESS, MATRIX LAL RENTAL

## 2024-04-10 PROCEDURE — 85025 COMPLETE CBC W/AUTO DIFF WBC: CPT | Performed by: NURSE PRACTITIONER

## 2024-04-10 PROCEDURE — 11000001 HC ACUTE MED/SURG PRIVATE ROOM

## 2024-04-10 PROCEDURE — 94761 N-INVAS EAR/PLS OXIMETRY MLT: CPT

## 2024-04-10 PROCEDURE — 25000003 PHARM REV CODE 250: Performed by: NURSE PRACTITIONER

## 2024-04-10 PROCEDURE — 80048 BASIC METABOLIC PNL TOTAL CA: CPT | Performed by: NURSE PRACTITIONER

## 2024-04-10 PROCEDURE — 63600175 PHARM REV CODE 636 W HCPCS: Performed by: NURSE PRACTITIONER

## 2024-04-10 RX ORDER — ACETAMINOPHEN 325 MG/1
650 TABLET ORAL EVERY 6 HOURS PRN
Status: DISCONTINUED | OUTPATIENT
Start: 2024-04-10 | End: 2024-04-11 | Stop reason: HOSPADM

## 2024-04-10 RX ORDER — SODIUM CHLORIDE 9 MG/ML
INJECTION, SOLUTION INTRAVENOUS CONTINUOUS
Status: DISCONTINUED | OUTPATIENT
Start: 2024-04-10 | End: 2024-04-11 | Stop reason: HOSPADM

## 2024-04-10 RX ADMIN — MUPIROCIN: 20 OINTMENT TOPICAL at 09:04

## 2024-04-10 RX ADMIN — MEROPENEM 1 G: 1 INJECTION, POWDER, FOR SOLUTION INTRAVENOUS at 05:04

## 2024-04-10 RX ADMIN — Medication 1 CAPSULE: at 12:04

## 2024-04-10 RX ADMIN — CLOPIDOGREL BISULFATE 75 MG: 75 TABLET ORAL at 09:04

## 2024-04-10 RX ADMIN — ATORVASTATIN CALCIUM 40 MG: 40 TABLET, FILM COATED ORAL at 09:04

## 2024-04-10 RX ADMIN — SODIUM CHLORIDE: 9 INJECTION, SOLUTION INTRAVENOUS at 09:04

## 2024-04-10 RX ADMIN — PANTOPRAZOLE SODIUM 40 MG: 40 TABLET, DELAYED RELEASE ORAL at 09:04

## 2024-04-10 RX ADMIN — Medication 1 CAPSULE: at 04:04

## 2024-04-10 RX ADMIN — MEROPENEM 1 G: 1 INJECTION, POWDER, FOR SOLUTION INTRAVENOUS at 09:04

## 2024-04-10 RX ADMIN — ACETAMINOPHEN 650 MG: 325 TABLET ORAL at 04:04

## 2024-04-10 RX ADMIN — ACETAMINOPHEN 650 MG: 325 TABLET ORAL at 07:04

## 2024-04-10 RX ADMIN — GABAPENTIN 300 MG: 300 CAPSULE ORAL at 09:04

## 2024-04-10 RX ADMIN — POTASSIUM CHLORIDE AND SODIUM CHLORIDE: 900; 150 INJECTION, SOLUTION INTRAVENOUS at 03:04

## 2024-04-10 RX ADMIN — OMEGA-3 FATTY ACIDS CAP 1000 MG 1 CAPSULE: 1000 CAP at 09:04

## 2024-04-10 RX ADMIN — AMLODIPINE BESYLATE 10 MG: 5 TABLET ORAL at 09:04

## 2024-04-10 RX ADMIN — MICONAZOLE NITRATE: 20 CREAM TOPICAL at 09:04

## 2024-04-10 RX ADMIN — LORAZEPAM 0.5 MG: 0.5 TABLET ORAL at 09:04

## 2024-04-10 RX ADMIN — SERTRALINE HYDROCHLORIDE 150 MG: 50 TABLET ORAL at 09:04

## 2024-04-10 RX ADMIN — Medication 1 CAPSULE: at 07:04

## 2024-04-10 RX ADMIN — ENOXAPARIN SODIUM 40 MG: 40 INJECTION SUBCUTANEOUS at 04:04

## 2024-04-10 RX ADMIN — MICONAZOLE NITRATE: 20 CREAM TOPICAL at 10:04

## 2024-04-10 NOTE — ASSESSMENT & PLAN NOTE
Blood cultures pending  O2 @ 2 liters  Duonebs every 4 hours prn   Rocephin and zithromycin         04/09/24 continue rocephin and zithromax    04/10/24 04/10/24 dcd rocephin and zithromax- started merrem 1 gm every 8 hours

## 2024-04-10 NOTE — SUBJECTIVE & OBJECTIVE
Interval History: ESBL bacteremia     Review of Systems   Constitutional:  Positive for appetite change, fatigue and fever.   HENT:  Negative for congestion, sinus pressure, sinus pain, sore throat and trouble swallowing.    Respiratory:  Positive for cough. Negative for shortness of breath.    Cardiovascular:  Negative for chest pain, palpitations and leg swelling.   Gastrointestinal:  Negative for abdominal distention, abdominal pain, constipation, diarrhea, nausea and vomiting.   Genitourinary:  Negative for difficulty urinating and dysuria.   Musculoskeletal:  Negative for arthralgias and back pain.   Neurological:  Positive for weakness and headaches. Negative for speech difficulty.   Psychiatric/Behavioral:  Negative for confusion and hallucinations.      Objective:     Vital Signs (Most Recent):  Temp: (!) 101.6 °F (38.7 °C) (04/10/24 0746)  Pulse: 89 (04/10/24 0710)  Resp: 18 (04/10/24 0710)  BP: 126/82 (04/10/24 0710)  SpO2: (!) 93 % (04/10/24 0710) Vital Signs (24h Range):  Temp:  [98 °F (36.7 °C)-102.7 °F (39.3 °C)] 101.6 °F (38.7 °C)  Pulse:  [] 89  Resp:  [18-20] 18  SpO2:  [90 %-97 %] 93 %  BP: (115-146)/(74-93) 126/82     Weight: 71 kg (156 lb 8 oz)  Body mass index is 26.86 kg/m².    Intake/Output Summary (Last 24 hours) at 4/10/2024 0949  Last data filed at 4/10/2024 0849  Gross per 24 hour   Intake 1699.83 ml   Output --   Net 1699.83 ml         Physical Exam  Constitutional:       Appearance: She is ill-appearing.   HENT:      Head: Normocephalic.   Cardiovascular:      Rate and Rhythm: Normal rate and regular rhythm.      Pulses: Normal pulses.      Heart sounds: Normal heart sounds.   Pulmonary:      Effort: No respiratory distress.      Breath sounds: Normal breath sounds. No stridor. No wheezing or rhonchi.   Abdominal:      General: Bowel sounds are normal. There is no distension.      Palpations: Abdomen is soft. There is no mass.      Tenderness: There is no abdominal tenderness.  There is no guarding or rebound.      Hernia: No hernia is present.   Musculoskeletal:         General: No swelling, tenderness, deformity or signs of injury.   Skin:     General: Skin is warm and dry.   Neurological:      Mental Status: She is alert and oriented to person, place, and time.      Motor: Weakness present.             Significant Labs: All pertinent labs within the past 24 hours have been reviewed.  Recent Lab Results         04/10/24  0519   04/09/24  1342        Anion Gap 10         Baso # 0.02         Basophil % 0.3         BUN 9         BUN/CREAT RATIO 9         Calcium 8.2         Chloride 105         CO2 28         ID NOW COVID-19, (ERICK)   Negative       Creatinine 0.97         Differential Method Auto         eGFR 68         Eos # 0.15         Eos % 2.3         Glucose 110         Hematocrit 34.3         Hemoglobin 10.9         Lymph # 1.03         Lymph % 16.0         MCH 27.7         MCHC 31.8         MCV 87.1         Mono # 0.77         Mono % 12.0         MPV 9.4         Neutrophils, Abs 4.47         Neutrophils Relative 69.4         Platelet Count 105         Potassium 4.2         RBC 3.94         RDW 15.4         Sodium 139         WBC 6.44                 Significant Imaging: I have reviewed all pertinent imaging results/findings within the past 24 hours.

## 2024-04-10 NOTE — ASSESSMENT & PLAN NOTE
04/08/24 1456Urinalysis, Microscopic   Collected: 04/08/24 1410  Final result  Specimen: Urine   WBC, UA 20-50 Abnormal  /hpf Squamous Epithelial Cells, UA Rare /lpf   RBC, UA 10-15 Abnormal  /hpf Mucus, UA Moderate Abnormal  /hpf   Bacteria, UA Few Abnormal  /hpf       Urine culture pending   Given rocephin while in ER   Continue rocephin for a total of 5 days -     04/09/24 urine culture pending    04/10/24 dcd rocephin and zithromax- started merrem 1 gm every 8 hours

## 2024-04-10 NOTE — PROGRESS NOTES
[facility] - [facility] Surgical Unit  Hospital Medicine  Progress Note    Patient Name: Herlinda Valdovinos  MRN: [id number]  Patient Class: IP- Inpatient   Admission Date: 4/8/2024  Length of Stay: 2 days  Attending Physician: Chuy Daugherty IV, DO  Primary Care Provider: Vini Hernandez NP        Subjective:     Principal Problem:Bacteremia due to Escherichia coli        HPI:  Herlinda Kaur is a 59 year old female with pmh of hypertension, chronic anxiety, depression, dysphagia, CVA, insomnia, intrapontine hemorrhage, left hemiparesis, peripheral neuropathy and thyroid disease. PCP is Micheal Nash. She presented to ER today with complaints of recurrent UTI. States she recently finished a course of Amoxicillin for a UTI but over the last 2-3 days it has come back. Reports pain, frequency and urgency with urination, reports she wears a pad. Patient has fever today. Reports some nausea also. She also complains of SOB and dry cough. Has home o2 per nasal canula at 2 liters.Worked up in ER and results are:  Labs significant for WBC 12.16, H/H 12.1/38.1, Platelets 120, Na 134, K 3.3, Cl 98, BUN 12, Creat 1.06, Calcium 8.1, Mag 1.3, Bilirubin 1.8, Lactic 1.1, Large occult blood in urine with 10-15 RBC's, positive nitrates, moderate leukocytes with 20-50 WBC's. Blood cultures pending.  Chest Xray significant for Diffuse interstitial and patchy airspace opacities scattered throughout the lungs, edema versus infection. Patient was managed in the ED with IV fluids prior to arrival, IV Magnesium, IV Zofran, IV Rocephin and oral tylenol.  The response to treatment was good.  She was admitted to inpatient care for acute urinary tract infection, failed outpatient treatment. She has recd rocephin while in ER.    Overview/Hospital Course:  04/09/24 Awake and resting in bed. Max temp is 102.6 past 24 hours. WBC 8.33.  Potassium is 3.1 cr 1.14. Denies any increased SOB. Sat on 2 liters is 96%. Breath sounds are  diminished. Will repeat cxr.  Urine culture pending. Continue rocephin and zithromax. Continue fluids. Replace potassium orally.  04/10/24 Awake and resting in bed. Max temp past 24 hours is 102.7. Blood cultures show     2 d ago     Verigene Result Negative E. coli Abnormal    Verigene Result Negative ESBL Positive Abnormal    DCD rocephin and zithromax. Merrem 1 gm IV every 8 hours. Will repeat blood cultures in 48 hours. Talked with patient re this and need to continue abx for 2 weeks after we get negative blood cultures. She is agreeable to plan. Contact isolation for ESBL ecoli bacteremia.    Interval History: ESBL bacteremia     Review of Systems   Constitutional:  Positive for appetite change, fatigue and fever.   HENT:  Negative for congestion, sinus pressure, sinus pain, sore throat and trouble swallowing.    Respiratory:  Positive for cough. Negative for shortness of breath.    Cardiovascular:  Negative for chest pain, palpitations and leg swelling.   Gastrointestinal:  Negative for abdominal distention, abdominal pain, constipation, diarrhea, nausea and vomiting.   Genitourinary:  Negative for difficulty urinating and dysuria.   Musculoskeletal:  Negative for arthralgias and back pain.   Neurological:  Positive for weakness and headaches. Negative for speech difficulty.   Psychiatric/Behavioral:  Negative for confusion and hallucinations.      Objective:     Vital Signs (Most Recent):  Temp: (!) 101.6 °F (38.7 °C) (04/10/24 0746)  Pulse: 89 (04/10/24 0710)  Resp: 18 (04/10/24 0710)  BP: 126/82 (04/10/24 0710)  SpO2: (!) 93 % (04/10/24 0710) Vital Signs (24h Range):  Temp:  [98 °F (36.7 °C)-102.7 °F (39.3 °C)] 101.6 °F (38.7 °C)  Pulse:  [] 89  Resp:  [18-20] 18  SpO2:  [90 %-97 %] 93 %  BP: (115-146)/(74-93) 126/82     Weight: 71 kg (156 lb 8 oz)  Body mass index is 26.86 kg/m².    Intake/Output Summary (Last 24 hours) at 4/10/2024 0901  Last data filed at 4/10/2024 0849  Gross per 24 hour   Intake  1699.83 ml   Output --   Net 1699.83 ml         Physical Exam  Constitutional:       Appearance: She is ill-appearing.   HENT:      Head: Normocephalic.   Cardiovascular:      Rate and Rhythm: Normal rate and regular rhythm.      Pulses: Normal pulses.      Heart sounds: Normal heart sounds.   Pulmonary:      Effort: No respiratory distress.      Breath sounds: Normal breath sounds. No stridor. No wheezing or rhonchi.   Abdominal:      General: Bowel sounds are normal. There is no distension.      Palpations: Abdomen is soft. There is no mass.      Tenderness: There is no abdominal tenderness. There is no guarding or rebound.      Hernia: No hernia is present.   Musculoskeletal:         General: No swelling, tenderness, deformity or signs of injury.   Skin:     General: Skin is warm and dry.   Neurological:      Mental Status: She is alert and oriented to person, place, and time.      Motor: Weakness present.             Significant Labs: All pertinent labs within the past 24 hours have been reviewed.  Recent Lab Results         04/10/24  0519   04/09/24  1342        Anion Gap 10         Baso # 0.02         Basophil % 0.3         BUN 9         BUN/CREAT RATIO 9         Calcium 8.2         Chloride 105         CO2 28         ID NOW COVID-19, (ERICK)   Negative       Creatinine 0.97         Differential Method Auto         eGFR 68         Eos # 0.15         Eos % 2.3         Glucose 110         Hematocrit 34.3         Hemoglobin 10.9         Lymph # 1.03         Lymph % 16.0         MCH 27.7         MCHC 31.8         MCV 87.1         Mono # 0.77         Mono % 12.0         MPV 9.4         Neutrophils, Abs 4.47         Neutrophils Relative 69.4         Platelet Count 105         Potassium 4.2         RBC 3.94         RDW 15.4         Sodium 139         WBC 6.44                 Significant Imaging: I have reviewed all pertinent imaging results/findings within the past 24 hours.    Assessment/Plan:      * Bacteremia due to  Escherichia coli  Merrem 1 gm every 8 hours   Repeat blood cultures in 48 hours       CAP (community acquired pneumonia)  Blood cultures pending  O2 @ 2 liters  Duonebs every 4 hours prn   Rocephin and zithromycin         04/09/24 continue rocephin and zithromax    04/10/24 04/10/24 dcd rocephin and zithromax- started merrem 1 gm every 8 hours     Hypertension  Chronic, controlled. Latest blood pressure and vitals reviewed-     Temp:  [99.9 °F (37.7 °C)-102.6 °F (39.2 °C)]   Pulse:  []   Resp:  [18]   BP: (119-143)/()   SpO2:  [89 %-93 %] .   Home meds for hypertension were reviewed and noted below.   Hypertension Medications               amLODIPine (NORVASC) 10 MG tablet Take 1 tablet (10 mg total) by mouth once daily.    olmesartan (BENICAR) 40 MG tablet Take 40 mg by mouth once daily.            While in the hospital, will manage blood pressure as follows; Continue home antihypertensive regimen    Will utilize p.r.n. blood pressure medication only if patient's blood pressure greater than 180/110 and she develops symptoms such as worsening chest pain or shortness of breath.    Generalized anxiety disorder    04/08/24 lorazepam 0.5 mg po every 12 hours prn anxiety    Depressive disorder  04/08/24 continue sertraline 150 mg po daily       DVT prophylaxis  04/08/24 lovenox 40 mg sq daily      Dyslipidemia    04/08/24 continue statin     Acute cystitis with hematuria    04/08/24 1456Urinalysis, Microscopic   Collected: 04/08/24 1410  Final result  Specimen: Urine   WBC, UA 20-50 Abnormal  /hpf Squamous Epithelial Cells, UA Rare /lpf   RBC, UA 10-15 Abnormal  /hpf Mucus, UA Moderate Abnormal  /hpf   Bacteria, UA Few Abnormal  /hpf       Urine culture pending   Given rocephin while in ER   Continue rocephin for a total of 5 days -     04/09/24 urine culture pending    04/10/24 dcd rocephin and zithromax- started merrem 1 gm every 8 hours       VTE Risk Mitigation (From admission, onward)           Ordered      enoxaparin injection 40 mg  Every 24 hours         04/08/24 1808     Place sequential compression device  Until discontinued         04/08/24 1744     IP VTE LOW RISK PATIENT  Once         04/08/24 1744                    Discharge Planning   POOL:      Code Status: Full Code   Is the patient medically ready for discharge?:     Reason for patient still in hospital (select all that apply): Treatment  Discharge Plan A: Home with family                  KEVAN Funk  Department of Hospital Medicine   [facility]

## 2024-04-11 ENCOUNTER — HOSPITAL ENCOUNTER (INPATIENT)
Facility: HOSPITAL | Age: 59
LOS: 14 days | Discharge: HOME HEALTH | DRG: 872 | End: 2024-04-25
Attending: FAMILY MEDICINE | Admitting: FAMILY MEDICINE
Payer: MEDICARE

## 2024-04-11 VITALS
HEART RATE: 75 BPM | OXYGEN SATURATION: 94 % | TEMPERATURE: 99 F | BODY MASS INDEX: 26.72 KG/M2 | HEIGHT: 64 IN | WEIGHT: 156.5 LBS | SYSTOLIC BLOOD PRESSURE: 128 MMHG | RESPIRATION RATE: 18 BRPM | DIASTOLIC BLOOD PRESSURE: 90 MMHG

## 2024-04-11 DIAGNOSIS — E78.5 DYSLIPIDEMIA: ICD-10-CM

## 2024-04-11 DIAGNOSIS — I10 HYPERTENSION, UNSPECIFIED TYPE: ICD-10-CM

## 2024-04-11 DIAGNOSIS — E07.9 THYROID DISEASE: ICD-10-CM

## 2024-04-11 DIAGNOSIS — R53.1 GENERAL WEAKNESS: Primary | ICD-10-CM

## 2024-04-11 DIAGNOSIS — F41.1 GENERALIZED ANXIETY DISORDER: ICD-10-CM

## 2024-04-11 DIAGNOSIS — B96.20 BACTEREMIA DUE TO ESCHERICHIA COLI: ICD-10-CM

## 2024-04-11 DIAGNOSIS — E87.6 HYPOKALEMIA: ICD-10-CM

## 2024-04-11 DIAGNOSIS — F32.A DEPRESSIVE DISORDER: ICD-10-CM

## 2024-04-11 DIAGNOSIS — B37.31 VAGINAL YEAST INFECTION: ICD-10-CM

## 2024-04-11 DIAGNOSIS — Z79.899 ON DEEP VEIN THROMBOSIS (DVT) PROPHYLAXIS: ICD-10-CM

## 2024-04-11 DIAGNOSIS — R78.81 BACTEREMIA DUE TO ESCHERICHIA COLI: ICD-10-CM

## 2024-04-11 PROBLEM — J18.9 CAP (COMMUNITY ACQUIRED PNEUMONIA): Status: RESOLVED | Noted: 2022-04-13 | Resolved: 2024-04-11

## 2024-04-11 LAB
ANION GAP SERPL CALCULATED.3IONS-SCNC: 11 MMOL/L (ref 7–16)
BACTERIA BLD CULT: ABNORMAL
BASOPHILS # BLD AUTO: 0.01 K/UL (ref 0–0.2)
BASOPHILS NFR BLD AUTO: 0.2 % (ref 0–1)
BUN SERPL-MCNC: 9 MG/DL (ref 7–18)
BUN/CREAT SERPL: 10 (ref 6–20)
CALCIUM SERPL-MCNC: 8.1 MG/DL (ref 8.5–10.1)
CHLORIDE SERPL-SCNC: 102 MMOL/L (ref 98–107)
CO2 SERPL-SCNC: 27 MMOL/L (ref 21–32)
CREAT SERPL-MCNC: 0.93 MG/DL (ref 0.55–1.02)
DIFFERENTIAL METHOD BLD: ABNORMAL
EGFR (NO RACE VARIABLE) (RUSH/TITUS): 71 ML/MIN/1.73M2
EOSINOPHIL # BLD AUTO: 0.24 K/UL (ref 0–0.5)
EOSINOPHIL NFR BLD AUTO: 4.4 % (ref 1–4)
ERYTHROCYTE [DISTWIDTH] IN BLOOD BY AUTOMATED COUNT: 14.9 % (ref 11.5–14.5)
GLUCOSE SERPL-MCNC: 119 MG/DL (ref 74–106)
GRAM STN SPEC: ABNORMAL
GRAM STN SPEC: ABNORMAL
HCT VFR BLD AUTO: 33.2 % (ref 38–47)
HGB BLD-MCNC: 10.7 G/DL (ref 12–16)
LYMPHOCYTES # BLD AUTO: 1.06 K/UL (ref 1–4.8)
LYMPHOCYTES NFR BLD AUTO: 19.6 % (ref 27–41)
MCH RBC QN AUTO: 27.6 PG (ref 27–31)
MCHC RBC AUTO-ENTMCNC: 32.2 G/DL (ref 32–36)
MCV RBC AUTO: 85.6 FL (ref 80–96)
MONOCYTES # BLD AUTO: 0.65 K/UL (ref 0–0.8)
MONOCYTES NFR BLD AUTO: 12 % (ref 2–6)
MPC BLD CALC-MCNC: 10.3 FL (ref 9.4–12.4)
NEUTROPHILS # BLD AUTO: 3.44 K/UL (ref 1.8–7.7)
NEUTROPHILS NFR BLD AUTO: 63.8 % (ref 53–65)
PLATELET # BLD AUTO: 129 K/UL (ref 150–400)
POTASSIUM SERPL-SCNC: 3.4 MMOL/L (ref 3.5–5.1)
RBC # BLD AUTO: 3.88 M/UL (ref 4.2–5.4)
SODIUM SERPL-SCNC: 137 MMOL/L (ref 136–145)
UA COMPLETE W REFLEX CULTURE PNL UR: ABNORMAL
WBC # BLD AUTO: 5.4 K/UL (ref 4.5–11)

## 2024-04-11 PROCEDURE — 99900035 HC TECH TIME PER 15 MIN (STAT)

## 2024-04-11 PROCEDURE — 85025 COMPLETE CBC W/AUTO DIFF WBC: CPT | Performed by: NURSE PRACTITIONER

## 2024-04-11 PROCEDURE — 27000944

## 2024-04-11 PROCEDURE — 94761 N-INVAS EAR/PLS OXIMETRY MLT: CPT

## 2024-04-11 PROCEDURE — 99305 1ST NF CARE MODERATE MDM 35: CPT | Mod: AI,,, | Performed by: FAMILY MEDICINE

## 2024-04-11 PROCEDURE — 25000003 PHARM REV CODE 250: Performed by: FAMILY MEDICINE

## 2024-04-11 PROCEDURE — 25000003 PHARM REV CODE 250: Performed by: NURSE PRACTITIONER

## 2024-04-11 PROCEDURE — 63600175 PHARM REV CODE 636 W HCPCS: Performed by: NURSE PRACTITIONER

## 2024-04-11 PROCEDURE — 27000221 HC OXYGEN, UP TO 24 HOURS

## 2024-04-11 PROCEDURE — 99238 HOSP IP/OBS DSCHRG MGMT 30/<: CPT | Mod: ,,, | Performed by: NURSE PRACTITIONER

## 2024-04-11 PROCEDURE — 27000982 HC MATTRESS, MATRIX LAL RENTAL

## 2024-04-11 PROCEDURE — 11000004 HC SNF PRIVATE

## 2024-04-11 PROCEDURE — 80048 BASIC METABOLIC PNL TOTAL CA: CPT | Performed by: NURSE PRACTITIONER

## 2024-04-11 PROCEDURE — 63600175 PHARM REV CODE 636 W HCPCS: Performed by: FAMILY MEDICINE

## 2024-04-11 RX ORDER — AMLODIPINE BESYLATE 5 MG/1
10 TABLET ORAL DAILY
Status: DISCONTINUED | OUTPATIENT
Start: 2024-04-12 | End: 2024-04-25 | Stop reason: HOSPADM

## 2024-04-11 RX ORDER — MUPIROCIN 20 MG/G
OINTMENT TOPICAL 2 TIMES DAILY
Status: DISPENSED | OUTPATIENT
Start: 2024-04-11 | End: 2024-04-16

## 2024-04-11 RX ORDER — SERTRALINE HYDROCHLORIDE 50 MG/1
100 TABLET, FILM COATED ORAL DAILY
Status: DISCONTINUED | OUTPATIENT
Start: 2024-04-12 | End: 2024-04-12

## 2024-04-11 RX ORDER — AMOXICILLIN 250 MG
1 CAPSULE ORAL 2 TIMES DAILY
Status: DISCONTINUED | OUTPATIENT
Start: 2024-04-11 | End: 2024-04-11 | Stop reason: SDUPTHER

## 2024-04-11 RX ORDER — CLOPIDOGREL BISULFATE 75 MG/1
75 TABLET ORAL DAILY
Status: DISCONTINUED | OUTPATIENT
Start: 2024-04-12 | End: 2024-04-25 | Stop reason: HOSPADM

## 2024-04-11 RX ORDER — SELENIUM 50 MCG
1 TABLET ORAL
Status: DISCONTINUED | OUTPATIENT
Start: 2024-04-11 | End: 2024-04-25 | Stop reason: HOSPADM

## 2024-04-11 RX ORDER — ACETAMINOPHEN 325 MG/1
650 TABLET ORAL EVERY 6 HOURS PRN
Status: DISCONTINUED | OUTPATIENT
Start: 2024-04-11 | End: 2024-04-11 | Stop reason: SDUPTHER

## 2024-04-11 RX ORDER — VALSARTAN 160 MG/1
320 TABLET ORAL DAILY
Status: DISCONTINUED | OUTPATIENT
Start: 2024-04-12 | End: 2024-04-25 | Stop reason: HOSPADM

## 2024-04-11 RX ORDER — GABAPENTIN 300 MG/1
300 CAPSULE ORAL 2 TIMES DAILY
Status: DISCONTINUED | OUTPATIENT
Start: 2024-04-11 | End: 2024-04-25 | Stop reason: HOSPADM

## 2024-04-11 RX ORDER — ONDANSETRON 4 MG/1
4 TABLET, ORALLY DISINTEGRATING ORAL EVERY 8 HOURS PRN
Status: DISCONTINUED | OUTPATIENT
Start: 2024-04-11 | End: 2024-04-11 | Stop reason: SDUPTHER

## 2024-04-11 RX ORDER — PANTOPRAZOLE SODIUM 40 MG/1
40 TABLET, DELAYED RELEASE ORAL DAILY
Status: DISCONTINUED | OUTPATIENT
Start: 2024-04-12 | End: 2024-04-25 | Stop reason: HOSPADM

## 2024-04-11 RX ORDER — DOXYLAMINE SUCCINATE 25 MG
TABLET ORAL 2 TIMES DAILY
Status: DISCONTINUED | OUTPATIENT
Start: 2024-04-11 | End: 2024-04-25 | Stop reason: HOSPADM

## 2024-04-11 RX ORDER — CALCIUM CARBONATE 200(500)MG
500 TABLET,CHEWABLE ORAL 2 TIMES DAILY PRN
Status: DISCONTINUED | OUTPATIENT
Start: 2024-04-11 | End: 2024-04-25 | Stop reason: HOSPADM

## 2024-04-11 RX ORDER — TALC
6 POWDER (GRAM) TOPICAL NIGHTLY PRN
Status: DISCONTINUED | OUTPATIENT
Start: 2024-04-11 | End: 2024-04-11 | Stop reason: SDUPTHER

## 2024-04-11 RX ORDER — MUPIROCIN 20 MG/G
OINTMENT TOPICAL 2 TIMES DAILY
Status: CANCELLED | OUTPATIENT
Start: 2024-04-11 | End: 2024-04-16

## 2024-04-11 RX ORDER — HYDRALAZINE HYDROCHLORIDE 20 MG/ML
10 INJECTION INTRAMUSCULAR; INTRAVENOUS EVERY 6 HOURS PRN
Status: DISCONTINUED | OUTPATIENT
Start: 2024-04-11 | End: 2024-04-25 | Stop reason: HOSPADM

## 2024-04-11 RX ORDER — AMOXICILLIN 250 MG
1 CAPSULE ORAL 2 TIMES DAILY
Status: DISCONTINUED | OUTPATIENT
Start: 2024-04-11 | End: 2024-04-25 | Stop reason: HOSPADM

## 2024-04-11 RX ORDER — CALCIUM CARBONATE 200(500)MG
500 TABLET,CHEWABLE ORAL 2 TIMES DAILY PRN
Status: DISCONTINUED | OUTPATIENT
Start: 2024-04-11 | End: 2024-04-11 | Stop reason: SDUPTHER

## 2024-04-11 RX ORDER — IPRATROPIUM BROMIDE AND ALBUTEROL SULFATE 2.5; .5 MG/3ML; MG/3ML
3 SOLUTION RESPIRATORY (INHALATION) EVERY 4 HOURS PRN
Status: DISCONTINUED | OUTPATIENT
Start: 2024-04-11 | End: 2024-04-25 | Stop reason: HOSPADM

## 2024-04-11 RX ORDER — ACETAMINOPHEN 325 MG/1
650 TABLET ORAL EVERY 6 HOURS PRN
Status: DISCONTINUED | OUTPATIENT
Start: 2024-04-11 | End: 2024-04-25 | Stop reason: HOSPADM

## 2024-04-11 RX ORDER — TALC
6 POWDER (GRAM) TOPICAL NIGHTLY PRN
Status: DISCONTINUED | OUTPATIENT
Start: 2024-04-11 | End: 2024-04-25 | Stop reason: HOSPADM

## 2024-04-11 RX ORDER — SODIUM CHLORIDE 9 MG/ML
INJECTION, SOLUTION INTRAVENOUS CONTINUOUS
Status: DISCONTINUED | OUTPATIENT
Start: 2024-04-11 | End: 2024-04-15

## 2024-04-11 RX ORDER — ONDANSETRON 4 MG/1
4 TABLET, ORALLY DISINTEGRATING ORAL EVERY 8 HOURS PRN
Status: DISCONTINUED | OUTPATIENT
Start: 2024-04-11 | End: 2024-04-25 | Stop reason: HOSPADM

## 2024-04-11 RX ORDER — ATORVASTATIN CALCIUM 40 MG/1
40 TABLET, FILM COATED ORAL NIGHTLY
Status: DISCONTINUED | OUTPATIENT
Start: 2024-04-11 | End: 2024-04-25 | Stop reason: HOSPADM

## 2024-04-11 RX ORDER — GLUCOSAM/CHONDRO/HERB 149/HYAL 750-100 MG
1 TABLET ORAL DAILY
Status: DISCONTINUED | OUTPATIENT
Start: 2024-04-12 | End: 2024-04-25 | Stop reason: HOSPADM

## 2024-04-11 RX ADMIN — MEROPENEM 1 G: 1 INJECTION, POWDER, FOR SOLUTION INTRAVENOUS at 12:04

## 2024-04-11 RX ADMIN — ATORVASTATIN CALCIUM 40 MG: 40 TABLET, FILM COATED ORAL at 08:04

## 2024-04-11 RX ADMIN — AMLODIPINE BESYLATE 10 MG: 5 TABLET ORAL at 09:04

## 2024-04-11 RX ADMIN — GABAPENTIN 300 MG: 300 CAPSULE ORAL at 08:04

## 2024-04-11 RX ADMIN — MEROPENEM 1 G: 1 INJECTION, POWDER, FOR SOLUTION INTRAVENOUS at 05:04

## 2024-04-11 RX ADMIN — OMEGA-3 FATTY ACIDS CAP 1000 MG 1 CAPSULE: 1000 CAP at 09:04

## 2024-04-11 RX ADMIN — POTASSIUM BICARBONATE 35 MEQ: 391 TABLET, EFFERVESCENT ORAL at 12:04

## 2024-04-11 RX ADMIN — SERTRALINE HYDROCHLORIDE 150 MG: 50 TABLET ORAL at 09:04

## 2024-04-11 RX ADMIN — MICONAZOLE NITRATE: 20 CREAM TOPICAL at 08:04

## 2024-04-11 RX ADMIN — Medication 1 CAPSULE: at 05:04

## 2024-04-11 RX ADMIN — SENNOSIDES AND DOCUSATE SODIUM 1 TABLET: 8.6; 5 TABLET ORAL at 08:04

## 2024-04-11 RX ADMIN — VALSARTAN 320 MG: 160 TABLET, FILM COATED ORAL at 09:04

## 2024-04-11 RX ADMIN — CLOPIDOGREL BISULFATE 75 MG: 75 TABLET ORAL at 09:04

## 2024-04-11 RX ADMIN — Medication 1 CAPSULE: at 12:04

## 2024-04-11 RX ADMIN — MEROPENEM 1 G: 1 INJECTION, POWDER, FOR SOLUTION INTRAVENOUS at 09:04

## 2024-04-11 RX ADMIN — MUPIROCIN: 20 OINTMENT TOPICAL at 09:04

## 2024-04-11 RX ADMIN — SODIUM CHLORIDE: 900 INJECTION, SOLUTION INTRAVENOUS at 06:04

## 2024-04-11 RX ADMIN — SODIUM CHLORIDE: 9 INJECTION, SOLUTION INTRAVENOUS at 07:04

## 2024-04-11 RX ADMIN — MICONAZOLE NITRATE: 20 CREAM TOPICAL at 09:04

## 2024-04-11 RX ADMIN — GABAPENTIN 300 MG: 300 CAPSULE ORAL at 09:04

## 2024-04-11 RX ADMIN — ACETAMINOPHEN 650 MG: 325 TABLET ORAL at 03:04

## 2024-04-11 RX ADMIN — ACETAMINOPHEN 650 MG: 325 TABLET ORAL at 09:04

## 2024-04-11 RX ADMIN — Medication 1 CAPSULE: at 07:04

## 2024-04-11 RX ADMIN — PANTOPRAZOLE SODIUM 40 MG: 40 TABLET, DELAYED RELEASE ORAL at 09:04

## 2024-04-11 RX ADMIN — ACETAMINOPHEN 650 MG: 325 TABLET ORAL at 02:04

## 2024-04-11 RX ADMIN — MUPIROCIN: 20 OINTMENT TOPICAL at 08:04

## 2024-04-11 NOTE — PLAN OF CARE
Problem: Adult Inpatient Plan of Care  Goal: Plan of Care Review  Outcome: Met  Goal: Patient-Specific Goal (Individualized)  Outcome: Met  Goal: Absence of Hospital-Acquired Illness or Injury  Outcome: Met  Goal: Optimal Comfort and Wellbeing  Outcome: Met  Goal: Readiness for Transition of Care  Outcome: Met     Problem: Fluid Imbalance (Pneumonia)  Goal: Fluid Balance  Outcome: Met     Problem: Infection (Pneumonia)  Goal: Resolution of Infection Signs and Symptoms  Outcome: Met     Problem: Respiratory Compromise (Pneumonia)  Goal: Effective Oxygenation and Ventilation  Outcome: Met     Problem: Infection  Goal: Absence of Infection Signs and Symptoms  Outcome: Met     Problem: Skin Injury Risk Increased  Goal: Skin Health and Integrity  Outcome: Met

## 2024-04-11 NOTE — PLAN OF CARE
Problem: Gas Exchange Impaired  Goal: Optimal Gas Exchange  Outcome: Ongoing, Progressing     Problem: Pain Acute  Goal: Acceptable Pain Control and Functional Ability  Outcome: Ongoing, Progressing     Problem: Fatigue  Goal: Improved Activity Tolerance  Outcome: Ongoing, Progressing     Problem: Fall Injury Risk  Goal: Absence of Fall and Fall-Related Injury  Outcome: Ongoing, Progressing

## 2024-04-11 NOTE — HPI
Herlinda Kaur is a 59 year old female with pmh of hypertension, chronic anxiety, depression, dysphagia, CVA, insomnia, intrapontine hemorrhage, left hemiparesis, peripheral neuropathy and thyroid disease. PCP is Micheal Nash. She presented to ER today with complaints of recurrent UTI. States she recently finished a course of Amoxicillin for a UTI but over the last 2-3 days it has come back. Reports pain, frequency and urgency with urination, reports she wears a pad. Patient has fever today. Reports some nausea also. She also complains of SOB and dry cough. Has home o2 per nasal canula at 2 liters.Worked up in ER and results are:  Labs significant for WBC 12.16, H/H 12.1/38.1, Platelets 120, Na 134, K 3.3, Cl 98, BUN 12, Creat 1.06, Calcium 8.1, Mag 1.3, Bilirubin 1.8, Lactic 1.1, Large occult blood in urine with 10-15 RBC's, positive nitrates, moderate leukocytes with 20-50 WBC's. Blood cultures pending.  Chest Xray significant for Diffuse interstitial and patchy airspace opacities scattered throughout the lungs, edema versus infection. Patient was managed in the ED with IV fluids prior to arrival, IV Magnesium, IV Zofran, IV Rocephin and oral tylenol.  The response to treatment was good.  She was admitted to inpatient care for acute urinary tract infection, failed outpatient treatment. She has recd rocephin while in ER.     * No surgery found *       Hospital Course:   04/09/24 Awake and resting in bed. Max temp is 102.6 past 24 hours. WBC 8.33.  Potassium is 3.1 cr 1.14. Denies any increased SOB. Sat on 2 liters is 96%. Breath sounds are diminished. Will repeat cxr.  Urine culture pending. Continue rocephin and zithromax. Continue fluids. Replace potassium orally.  04/10/24 Awake and resting in bed. Max temp past 24 hours is 102.7. Blood cultures show     2 d ago       Verigene Result Negative E. coli Abnormal    Verigene Result Negative ESBL Positive Abnormal    DCD rocephin and zithromax. Merrem 1 gm IV  every 8 hours. Will repeat blood cultures in 48 hours. Talked with patient re this and need to continue abx for 2 weeks after we get negative blood cultures. She is agreeable to plan. Contact isolation for ESBL ecoli bacteremia.  04/11/24 Awak and resting in bed. Temp.trending down- Max temp past 24 hours is 101.6. States she is feeling much better today. Will continue merrem for ESBL bacteremia. Will repeat blood cultures tomorrow- will continue abx for 14 days from negative blood cultures. Talked with her re swing bed due to need for abx and for weakness. She is agreeable. She will dc from acute care to swing bed today.  Admitted to swing bed on 04/11/24. Continue merrem for bacteremia. Repeat blood cultures drawn this morning. Afebrile overnight. States she is feeling much better this morning.

## 2024-04-11 NOTE — PLAN OF CARE
[facility]  Discharge Final Note    Primary Care Provider: Vini Hernandez NP    Expected Discharge Date: 4/11/2024    Final Discharge Note (most recent)       Final Note - 04/11/24 1259          Final Note    Assessment Type Final Discharge Note (P)      Anticipated Discharge Disposition Swing Bed (P)      What phone number can be called within the next 1-3 days to see how you are doing after discharge? [phone] (P)      Hospital Resources/Appts/Education Provided Provided patient/caregiver with written discharge plan information;Provided education on problems/symptoms using teachback (P)         Post-Acute Status    Coverage [employer] (P)      Patient choice form signed by patient/caregiver List with quality metrics by geographic area provided (P)      Discharge Delays None known at this time (P)                      Important Message from Medicare  Important Message from Medicare regarding Discharge Appeal Rights: (P) Given to patient/caregiver, Explained to patient/caregiver, Signed/date by patient/caregiver     Date IMM was signed: (P) 04/11/24  Time IMM was signed: (P) 0900    Ms. Crowe will discharge from Inpatient today and will be admitted to [facility].

## 2024-04-11 NOTE — SUBJECTIVE & OBJECTIVE
Interval History: ESBL bacteremia    Review of Systems  Objective:     Vital Signs (Most Recent):  Temp: 98.5 °F (36.9 °C) (04/11/24 1121)  Pulse: 75 (04/11/24 1121)  Resp: 18 (04/11/24 1121)  BP: (!) 128/90 (04/11/24 1121)  SpO2: (!) 94 % (04/11/24 1121) Vital Signs (24h Range):  Temp:  [98.1 °F (36.7 °C)-100.3 °F (37.9 °C)] 98.5 °F (36.9 °C)  Pulse:  [75-94] 75  Resp:  [18-20] 18  SpO2:  [89 %-97 %] 94 %  BP: (123-132)/(79-90) 128/90     Weight: 71 kg (156 lb 8 oz)  Body mass index is 26.86 kg/m².    Intake/Output Summary (Last 24 hours) at 4/11/2024 1240  Last data filed at 4/11/2024 0909  Gross per 24 hour   Intake 4083.21 ml   Output --   Net 4083.21 ml         Physical Exam        Significant Labs: All pertinent labs within the past 24 hours have been reviewed.  Recent Lab Results         04/11/24  0545        Anion Gap 11       Baso # 0.01       Basophil % 0.2       BUN 9       BUN/CREAT RATIO 10       Calcium 8.1       Chloride 102       CO2 27       Creatinine 0.93       Differential Method Auto       eGFR 71       Eos # 0.24       Eos % 4.4       Glucose 119       Hematocrit 33.2       Hemoglobin 10.7       Lymph # 1.06       Lymph % 19.6       MCH 27.6       MCHC 32.2       MCV 85.6       Mono # 0.65       Mono % 12.0       MPV 10.3       Neutrophils, Abs 3.44       Neutrophils Relative 63.8       Platelet Count 129       Potassium 3.4       RBC 3.88       RDW 14.9       Sodium 137       WBC 5.40               Significant Imaging: I have reviewed all pertinent imaging results/findings within the past 24 hours.

## 2024-04-11 NOTE — ASSESSMENT & PLAN NOTE
04/12/24 repeat blood cultures x 2 tomorrow  Continue merrem 1 gm every 8 hours x 14 post negative blood culture

## 2024-04-11 NOTE — ASSESSMENT & PLAN NOTE
Chronic, controlled. Latest blood pressure and vitals reviewed-     Temp:  [98.1 °F (36.7 °C)-100.3 °F (37.9 °C)]   Pulse:  [75-94]   Resp:  [18-20]   BP: (123-132)/(79-90)   SpO2:  [89 %-97 %] .   Home meds for hypertension were reviewed and noted below.   Hypertension Medications               amLODIPine (NORVASC) 10 MG tablet Take 1 tablet (10 mg total) by mouth once daily.    olmesartan (BENICAR) 40 MG tablet Take 40 mg by mouth once daily.            While in the hospital, will manage blood pressure as follows; Continue home antihypertensive regimen    Will utilize p.r.n. blood pressure medication only if patient's blood pressure greater than 180/110 and she develops symptoms such as worsening chest pain or shortness of breath.

## 2024-04-11 NOTE — ASSESSMENT & PLAN NOTE
Chronic, controlled. Latest blood pressure and vitals reviewed-     Temp:  [98.1 °F (36.7 °C)-100.3 °F (37.9 °C)]   Pulse:  [75-94]   Resp:  [18-20]   BP: (123-132)/(79-90)   SpO2:  [89 %-97 %] .   Home meds for hypertension were reviewed and noted below.   Hypertension Medications               amLODIPine (NORVASC) 10 MG tablet Take 1 tablet (10 mg total) by mouth once daily.    olmesartan (BENICAR) 40 MG tablet Take 40 mg by mouth once daily.            While in the hospital, will manage blood pressure as follows; Continue home antihypertensive regimen    Will utilize p.r.n. blood pressure medication only if patient's blood pressure greater than 180/110 and she develops symptoms such as worsening chest pain or shortness of breath.    04/11/24 stable

## 2024-04-11 NOTE — ASSESSMENT & PLAN NOTE
Urine culture  Order: [id number] - Reflex for Order [id number]  Status: Final result       Visible to patient: No (inaccessible in Patient Portal)       Next appt: None    Specimen Information: Urine   0 Result Notes  Urine Culture >100,000 Escherichia coli ESBL Abnormal            Resulting Agency:     Susceptibility     Escherichia coli ESBL     Not Specified     Ampicillin >16 µg/ml Resistant     Ampicillin/Sulbactam >16/8 µg/ml Resistant     Aztreonam >16 µg/ml Resistant     Cefazolin >16 µg/ml Resistant     Cefepime >16 µg/ml Resistant     Cefotaxime >16 µg/ml Resistant     Ceftazidime 16 µg/ml Resistant     Ceftriaxone >2 µg/ml Resistant     Ciprofloxacin 0.5 µg/ml Intermediate     Ertapenem <=0.5 µg/ml Sensitive     Gentamicin >8 µg/ml Resistant     Meropenem <=1 µg/ml Sensitive     Nitrofurantoin <=32 µg/ml Sensitive     Piperacillin/Tazobactam <=8 µg/ml Sensitive     Tetracycline >8 µg/ml Resistant     Tobramycin >8 µg/ml Resistant     Trimeth/Sulfa <=2/38 µg/ml Sensitive                     04/12/24 continue merrem

## 2024-04-11 NOTE — PROGRESS NOTES
[facility]  Hospital Medicine  Progress Note    Patient Name: Herlinda Valdovinos  MRN: [id number]  Patient Class: IP- Inpatient   Admission Date: 4/8/2024  Length of Stay: 3 days  Attending Physician: Chuy Daugherty IV, DO  Primary Care Provider: Vini Hernandez NP        Subjective:     Principal Problem:Bacteremia due to Escherichia coli        HPI:  Herlinda Kaur is a 59 year old female with pmh of hypertension, chronic anxiety, depression, dysphagia, CVA, insomnia, intrapontine hemorrhage, left hemiparesis, peripheral neuropathy and thyroid disease. PCP is Micheal Nash. She presented to ER today with complaints of recurrent UTI. States she recently finished a course of Amoxicillin for a UTI but over the last 2-3 days it has come back. Reports pain, frequency and urgency with urination, reports she wears a pad. Patient has fever today. Reports some nausea also. She also complains of SOB and dry cough. Has home o2 per nasal canula at 2 liters.Worked up in ER and results are:  Labs significant for WBC 12.16, H/H 12.1/38.1, Platelets 120, Na 134, K 3.3, Cl 98, BUN 12, Creat 1.06, Calcium 8.1, Mag 1.3, Bilirubin 1.8, Lactic 1.1, Large occult blood in urine with 10-15 RBC's, positive nitrates, moderate leukocytes with 20-50 WBC's. Blood cultures pending.  Chest Xray significant for Diffuse interstitial and patchy airspace opacities scattered throughout the lungs, edema versus infection. Patient was managed in the ED with IV fluids prior to arrival, IV Magnesium, IV Zofran, IV Rocephin and oral tylenol.  The response to treatment was good.  She was admitted to inpatient care for acute urinary tract infection, failed outpatient treatment. She has recd rocephin while in ER.    Overview/Hospital Course:  04/09/24 Awake and resting in bed. Max temp is 102.6 past 24 hours. WBC 8.33.  Potassium is 3.1 cr 1.14. Denies any increased SOB. Sat on 2 liters is 96%. Breath sounds are  diminished. Will repeat cxr.  Urine culture pending. Continue rocephin and zithromax. Continue fluids. Replace potassium orally.  04/10/24 Awake and resting in bed. Max temp past 24 hours is 102.7. Blood cultures show     2 d ago     Verigene Result Negative E. coli Abnormal    Verigene Result Negative ESBL Positive Abnormal    DCD rocephin and zithromax. Merrem 1 gm IV every 8 hours. Will repeat blood cultures in 48 hours. Talked with patient re this and need to continue abx for 2 weeks after we get negative blood cultures. She is agreeable to plan. Contact isolation for ESBL ecoli bacteremia.  04/11/24 Awak and resting in bed. Temp.trending down- Max temp past 24 hours is 101.6. States she is feeling much better today. Will continue merrem for ESBL bacteremia. Will repeat blood cultures tomorrow- will continue abx for 14 days from negative blood cultures. Talked with her re swing bed due to need for abx and for weakness. She is agreeable. She will dc from acute care to swing bed today.      Interval History: ESBL bacteremia    Review of Systems  Objective:     Vital Signs (Most Recent):  Temp: 98.5 °F (36.9 °C) (04/11/24 1121)  Pulse: 75 (04/11/24 1121)  Resp: 18 (04/11/24 1121)  BP: (!) 128/90 (04/11/24 1121)  SpO2: (!) 94 % (04/11/24 1121) Vital Signs (24h Range):  Temp:  [98.1 °F (36.7 °C)-100.3 °F (37.9 °C)] 98.5 °F (36.9 °C)  Pulse:  [75-94] 75  Resp:  [18-20] 18  SpO2:  [89 %-97 %] 94 %  BP: (123-132)/(79-90) 128/90     Weight: 71 kg (156 lb 8 oz)  Body mass index is 26.86 kg/m².    Intake/Output Summary (Last 24 hours) at 4/11/2024 1240  Last data filed at 4/11/2024 0909  Gross per 24 hour   Intake 4083.21 ml   Output --   Net 4083.21 ml         Physical Exam        Significant Labs: All pertinent labs within the past 24 hours have been reviewed.  Recent Lab Results         04/11/24  0545        Anion Gap 11       Baso # 0.01       Basophil % 0.2       BUN 9       BUN/CREAT RATIO 10       Calcium 8.1        Chloride 102       CO2 27       Creatinine 0.93       Differential Method Auto       eGFR 71       Eos # 0.24       Eos % 4.4       Glucose 119       Hematocrit 33.2       Hemoglobin 10.7       Lymph # 1.06       Lymph % 19.6       MCH 27.6       MCHC 32.2       MCV 85.6       Mono # 0.65       Mono % 12.0       MPV 10.3       Neutrophils, Abs 3.44       Neutrophils Relative 63.8       Platelet Count 129       Potassium 3.4       RBC 3.88       RDW 14.9       Sodium 137       WBC 5.40               Significant Imaging: I have reviewed all pertinent imaging results/findings within the past 24 hours.    Assessment/Plan:      * Bacteremia due to Escherichia coli  Merrem 1 gm every 8 hours   Repeat blood cultures in 48 hours       04/11/24 continue merrem for esbl bacteremia  Repeat bc tomorrow    Hypertension  Chronic, controlled. Latest blood pressure and vitals reviewed-     Temp:  [98.1 °F (36.7 °C)-100.3 °F (37.9 °C)]   Pulse:  [75-94]   Resp:  [18-20]   BP: (123-132)/(79-90)   SpO2:  [89 %-97 %] .   Home meds for hypertension were reviewed and noted below.   Hypertension Medications               amLODIPine (NORVASC) 10 MG tablet Take 1 tablet (10 mg total) by mouth once daily.    olmesartan (BENICAR) 40 MG tablet Take 40 mg by mouth once daily.            While in the hospital, will manage blood pressure as follows; Continue home antihypertensive regimen    Will utilize p.r.n. blood pressure medication only if patient's blood pressure greater than 180/110 and she develops symptoms such as worsening chest pain or shortness of breath.    04/11/24 stable    Generalized anxiety disorder    04/08/24 lorazepam 0.5 mg po every 12 hours prn anxiety    Depressive disorder  04/08/24 continue sertraline 150 mg po daily       04/11/24 continue statin    DVT prophylaxis  04/08/24 lovenox 40 mg sq daily      Dyslipidemia    04/08/24 continue statin     04/11/24 continue statin    Acute cystitis with hematuria    04/08/24  1456Urinalysis, Microscopic   Collected: 04/08/24 1410  Final result  Specimen: Urine   WBC, UA 20-50 Abnormal  /hpf Squamous Epithelial Cells, UA Rare /lpf   RBC, UA 10-15 Abnormal  /hpf Mucus, UA Moderate Abnormal  /hpf   Bacteria, UA Few Abnormal  /hpf       Urine culture pending   Given rocephin while in ER   Continue rocephin for a total of 5 days -     04/09/24 urine culture pending    04/10/24 dcd rocephin and zithromax- started merrem 1 gm every 8 hours       VTE Risk Mitigation (From admission, onward)           Ordered     enoxaparin injection 40 mg  Every 24 hours         04/08/24 1808     Place sequential compression device  Until discontinued         04/08/24 1744     IP VTE LOW RISK PATIENT  Once         04/08/24 1744                    Discharge Planning   POOL: 4/11/2024     Code Status: Full Code   Is the patient medically ready for discharge?:     Reason for patient still in hospital (select all that apply): Treatment  Discharge Plan A: Home with family                  KEVAN Funk  Department of Hospital Medicine   [facility]

## 2024-04-11 NOTE — DISCHARGE SUMMARY
[facility]  Hospital Medicine  Discharge Summary      Patient Name: Herlinda Valdovinos  MRN: [id number]  [facility]: [id number]  Patient Class: IP- Inpatient  Admission Date: 4/8/2024  Hospital Length of Stay: 3 days  Discharge Date and Time:  04/11/2024 12:46 PM  Attending Physician: Chuy Daugherty IV, DO   Discharging Provider: KEVAN Funk  Primary Care Provider: Vini Hernandez NP    Primary Care Team: Networked reference to record PCT     HPI:   Herlinda Kaur is a 59 year old female with pmh of hypertension, chronic anxiety, depression, dysphagia, CVA, insomnia, intrapontine hemorrhage, left hemiparesis, peripheral neuropathy and thyroid disease. PCP is Micheal Nash. She presented to ER today with complaints of recurrent UTI. States she recently finished a course of Amoxicillin for a UTI but over the last 2-3 days it has come back. Reports pain, frequency and urgency with urination, reports she wears a pad. Patient has fever today. Reports some nausea also. She also complains of SOB and dry cough. Has home o2 per nasal canula at 2 liters.Worked up in ER and results are:  Labs significant for WBC 12.16, H/H 12.1/38.1, Platelets 120, Na 134, K 3.3, Cl 98, BUN 12, Creat 1.06, Calcium 8.1, Mag 1.3, Bilirubin 1.8, Lactic 1.1, Large occult blood in urine with 10-15 RBC's, positive nitrates, moderate leukocytes with 20-50 WBC's. Blood cultures pending.  Chest Xray significant for Diffuse interstitial and patchy airspace opacities scattered throughout the lungs, edema versus infection. Patient was managed in the ED with IV fluids prior to arrival, IV Magnesium, IV Zofran, IV Rocephin and oral tylenol.  The response to treatment was good.  She was admitted to inpatient care for acute urinary tract infection, failed outpatient treatment. She has recd rocephin while in ER.    * No surgery found *      Hospital Course:   04/09/24 Awake and resting in bed. Max temp is 102.6  past 24 hours. WBC 8.33.  Potassium is 3.1 cr 1.14. Denies any increased SOB. Sat on 2 liters is 96%. Breath sounds are diminished. Will repeat cxr.  Urine culture pending. Continue rocephin and zithromax. Continue fluids. Replace potassium orally.  04/10/24 Awake and resting in bed. Max temp past 24 hours is 102.7. Blood cultures show     2 d ago     Verigene Result Negative E. coli Abnormal    Verigene Result Negative ESBL Positive Abnormal    DCD rocephin and zithromax. Merrem 1 gm IV every 8 hours. Will repeat blood cultures in 48 hours. Talked with patient re this and need to continue abx for 2 weeks after we get negative blood cultures. She is agreeable to plan. Contact isolation for ESBL ecoli bacteremia.  04/11/24 Awak and resting in bed. Temp.trending down- Max temp past 24 hours is 101.6. States she is feeling much better today. Will continue merrem for ESBL bacteremia. Will repeat blood cultures tomorrow- will continue abx for 14 days from negative blood cultures. Talked with her re swing bed due to need for abx and for weakness. She is agreeable. She will dc from acute care to swing bed today.       Goals of Care Treatment Preferences:  Code Status: Full Code      Consults:     Psychiatric  Depressive disorder  04/08/24 continue sertraline 150 mg po daily       04/11/24 continue statin    Cardiac/Vascular  Dyslipidemia    04/08/24 continue statin     04/11/24 continue statin    Hypertension  Chronic, controlled. Latest blood pressure and vitals reviewed-     Temp:  [98.1 °F (36.7 °C)-100.3 °F (37.9 °C)]   Pulse:  [75-94]   Resp:  [18-20]   BP: (123-132)/(79-90)   SpO2:  [89 %-97 %] .   Home meds for hypertension were reviewed and noted below.   Hypertension Medications               amLODIPine (NORVASC) 10 MG tablet Take 1 tablet (10 mg total) by mouth once daily.    olmesartan (BENICAR) 40 MG tablet Take 40 mg by mouth once daily.            While in the hospital, will manage blood pressure as follows;  Continue home antihypertensive regimen    Will utilize p.r.n. blood pressure medication only if patient's blood pressure greater than 180/110 and she develops symptoms such as worsening chest pain or shortness of breath.    04/11/24 stable    ID  * Bacteremia due to Escherichia coli  Merrem 1 gm every 8 hours   Repeat blood cultures in 48 hours       04/11/24 continue merrem for esbl bacteremia  Repeat bc tomorrow      Final Active Diagnoses:    Diagnosis Date Noted POA    PRINCIPAL PROBLEM:  Bacteremia due to Escherichia coli [R78.81, B96.20] 04/10/2024 Yes    Hypertension [I10] 05/20/2021 Yes    Generalized anxiety disorder [F41.1] 05/20/2021 Yes    Depressive disorder [F32.A] 05/20/2021 Yes    DVT prophylaxis [Z79.899] 06/10/2021 Not Applicable    Dyslipidemia [E78.5] 10/19/2022 Yes    Acute cystitis with hematuria [N30.01] 05/20/2021 Yes      Problems Resolved During this Admission:    Diagnosis Date Noted Date Resolved POA    CAP (community acquired pneumonia) [J18.9] 04/13/2022 04/11/2024 Yes       Discharged Condition: stable    Disposition:     Follow Up:    Patient Instructions:   No discharge procedures on file.    Significant Diagnostic Studies: Labs: All labs within the past 24 hours have been reviewed    Pending Diagnostic Studies:       None           Medications:  Reconciled Home Medications:      Medication List        ASK your doctor about these medications      acetaminophen 325 MG tablet  Commonly known as: TYLENOL  Take 650 mg by mouth every 6 (six) hours as needed.     albuterol 90 mcg/actuation inhaler  Commonly known as: PROAIR HFA  Inhale 2 puffs into the lungs every 6 (six) hours as needed for Wheezing. Rescue     amLODIPine 10 MG tablet  Commonly known as: NORVASC  Take 1 tablet (10 mg total) by mouth once daily.     atorvastatin 40 MG tablet  Commonly known as: LIPITOR  Take 40 mg by mouth every evening.     clopidogreL 75 mg tablet  Commonly known as: PLAVIX  Take 1 tablet (75 mg total)  by mouth once daily.     gabapentin 300 MG capsule  Commonly known as: NEURONTIN  Take 2 capsules (600 mg total) by mouth 3 (three) times daily.     LORazepam 0.5 MG tablet  Commonly known as: ATIVAN  Take 1 tablet (0.5 mg total) by mouth every 12 (twelve) hours as needed for Anxiety.     MULTIVITAMIN 50 PLUS ORAL  Take 1 tablet by mouth once daily.     olmesartan 40 MG tablet  Commonly known as: BENICAR  Take 40 mg by mouth once daily.     omega 3-dha-epa-fish oil 1,000 mg (120 mg-180 mg) Cap  Take 1 capsule by mouth once daily.     ondansetron 4 MG Tbdl  Commonly known as: ZOFRAN-ODT  Take 1 tablet (4 mg total) by mouth every 8 (eight) hours as needed.     pantoprazole 40 MG tablet  Commonly known as: PROTONIX  Take 1 tablet (40 mg total) by mouth once daily.     promethazine 25 MG tablet  Commonly known as: PHENERGAN  Take 25 mg by mouth 2 (two) times daily as needed.     sertraline 50 MG tablet  Commonly known as: ZOLOFT  Take 3 tablets (150 mg total) by mouth once daily.     temazepam 15 mg Cap  Commonly known as: RESTORIL  Take 15 mg by mouth every evening.              Indwelling Lines/Drains at time of discharge:   Lines/Drains/Airways       None                   Time spent on the discharge of patient: 30 minutes         KEVAN Funk  Department of Hospital Medicine  [facility]

## 2024-04-11 NOTE — ASSESSMENT & PLAN NOTE
Merrem 1 gm every 8 hours   Repeat blood cultures in 48 hours       04/11/24 continue merrem for esbl bacteremia  Repeat bc tomorrow

## 2024-04-12 LAB
ANION GAP SERPL CALCULATED.3IONS-SCNC: 13 MMOL/L (ref 7–16)
BASOPHILS # BLD AUTO: 0.02 K/UL (ref 0–0.2)
BASOPHILS NFR BLD AUTO: 0.4 % (ref 0–1)
BUN SERPL-MCNC: 8 MG/DL (ref 7–18)
BUN/CREAT SERPL: 10 (ref 6–20)
CALCIUM SERPL-MCNC: 8.5 MG/DL (ref 8.5–10.1)
CHLORIDE SERPL-SCNC: 103 MMOL/L (ref 98–107)
CO2 SERPL-SCNC: 29 MMOL/L (ref 21–32)
CREAT SERPL-MCNC: 0.82 MG/DL (ref 0.55–1.02)
DIFFERENTIAL METHOD BLD: ABNORMAL
EGFR (NO RACE VARIABLE) (RUSH/TITUS): 83 ML/MIN/1.73M2
EOSINOPHIL # BLD AUTO: 0.28 K/UL (ref 0–0.5)
EOSINOPHIL NFR BLD AUTO: 5.7 % (ref 1–4)
ERYTHROCYTE [DISTWIDTH] IN BLOOD BY AUTOMATED COUNT: 14.7 % (ref 11.5–14.5)
GLUCOSE SERPL-MCNC: 116 MG/DL (ref 74–106)
HCT VFR BLD AUTO: 36 % (ref 38–47)
HGB BLD-MCNC: 11.5 G/DL (ref 12–16)
LYMPHOCYTES # BLD AUTO: 1.31 K/UL (ref 1–4.8)
LYMPHOCYTES NFR BLD AUTO: 26.5 % (ref 27–41)
MCH RBC QN AUTO: 27.3 PG (ref 27–31)
MCHC RBC AUTO-ENTMCNC: 31.9 G/DL (ref 32–36)
MCV RBC AUTO: 85.5 FL (ref 80–96)
MONOCYTES # BLD AUTO: 0.54 K/UL (ref 0–0.8)
MONOCYTES NFR BLD AUTO: 10.9 % (ref 2–6)
MPC BLD CALC-MCNC: 10.3 FL (ref 9.4–12.4)
NEUTROPHILS # BLD AUTO: 2.8 K/UL (ref 1.8–7.7)
NEUTROPHILS NFR BLD AUTO: 56.5 % (ref 53–65)
PLATELET # BLD AUTO: 147 K/UL (ref 150–400)
POTASSIUM SERPL-SCNC: 3.8 MMOL/L (ref 3.5–5.1)
RBC # BLD AUTO: 4.21 M/UL (ref 4.2–5.4)
SODIUM SERPL-SCNC: 141 MMOL/L (ref 136–145)
WBC # BLD AUTO: 4.95 K/UL (ref 4.5–11)

## 2024-04-12 PROCEDURE — 99900035 HC TECH TIME PER 15 MIN (STAT)

## 2024-04-12 PROCEDURE — 25000003 PHARM REV CODE 250: Performed by: NURSE PRACTITIONER

## 2024-04-12 PROCEDURE — 94761 N-INVAS EAR/PLS OXIMETRY MLT: CPT

## 2024-04-12 PROCEDURE — 97161 PT EVAL LOW COMPLEX 20 MIN: CPT

## 2024-04-12 PROCEDURE — 87040 BLOOD CULTURE FOR BACTERIA: CPT | Performed by: NURSE PRACTITIONER

## 2024-04-12 PROCEDURE — 25000003 PHARM REV CODE 250: Performed by: FAMILY MEDICINE

## 2024-04-12 PROCEDURE — 63600175 PHARM REV CODE 636 W HCPCS: Performed by: FAMILY MEDICINE

## 2024-04-12 PROCEDURE — 27000221 HC OXYGEN, UP TO 24 HOURS

## 2024-04-12 PROCEDURE — 11000004 HC SNF PRIVATE

## 2024-04-12 PROCEDURE — 27000982 HC MATTRESS, MATRIX LAL RENTAL

## 2024-04-12 PROCEDURE — 97165 OT EVAL LOW COMPLEX 30 MIN: CPT

## 2024-04-12 PROCEDURE — 27000944

## 2024-04-12 PROCEDURE — 80048 BASIC METABOLIC PNL TOTAL CA: CPT | Performed by: NURSE PRACTITIONER

## 2024-04-12 PROCEDURE — 85025 COMPLETE CBC W/AUTO DIFF WBC: CPT | Performed by: NURSE PRACTITIONER

## 2024-04-12 RX ORDER — SERTRALINE HYDROCHLORIDE 50 MG/1
150 TABLET, FILM COATED ORAL DAILY
Status: DISCONTINUED | OUTPATIENT
Start: 2024-04-13 | End: 2024-04-25 | Stop reason: HOSPADM

## 2024-04-12 RX ADMIN — VALSARTAN 320 MG: 160 TABLET, FILM COATED ORAL at 09:04

## 2024-04-12 RX ADMIN — GABAPENTIN 300 MG: 300 CAPSULE ORAL at 09:04

## 2024-04-12 RX ADMIN — AMLODIPINE BESYLATE 10 MG: 5 TABLET ORAL at 09:04

## 2024-04-12 RX ADMIN — MUPIROCIN: 20 OINTMENT TOPICAL at 09:04

## 2024-04-12 RX ADMIN — ONDANSETRON 4 MG: 4 TABLET, ORALLY DISINTEGRATING ORAL at 05:04

## 2024-04-12 RX ADMIN — SODIUM CHLORIDE: 900 INJECTION, SOLUTION INTRAVENOUS at 04:04

## 2024-04-12 RX ADMIN — GABAPENTIN 300 MG: 300 CAPSULE ORAL at 08:04

## 2024-04-12 RX ADMIN — CLOPIDOGREL BISULFATE 75 MG: 75 TABLET ORAL at 09:04

## 2024-04-12 RX ADMIN — Medication 1 CAPSULE: at 07:04

## 2024-04-12 RX ADMIN — ATORVASTATIN CALCIUM 40 MG: 40 TABLET, FILM COATED ORAL at 08:04

## 2024-04-12 RX ADMIN — MEROPENEM 1 G: 1 INJECTION, POWDER, FOR SOLUTION INTRAVENOUS at 02:04

## 2024-04-12 RX ADMIN — SENNOSIDES AND DOCUSATE SODIUM 1 TABLET: 8.6; 5 TABLET ORAL at 09:04

## 2024-04-12 RX ADMIN — SENNOSIDES AND DOCUSATE SODIUM 1 TABLET: 8.6; 5 TABLET ORAL at 08:04

## 2024-04-12 RX ADMIN — Medication 1 CAPSULE: at 04:04

## 2024-04-12 RX ADMIN — MEROPENEM 1 G: 1 INJECTION, POWDER, FOR SOLUTION INTRAVENOUS at 10:04

## 2024-04-12 RX ADMIN — MEROPENEM 1 G: 1 INJECTION, POWDER, FOR SOLUTION INTRAVENOUS at 05:04

## 2024-04-12 RX ADMIN — SERTRALINE HYDROCHLORIDE 100 MG: 50 TABLET ORAL at 09:04

## 2024-04-12 RX ADMIN — MICONAZOLE NITRATE: 20 CREAM TOPICAL at 09:04

## 2024-04-12 RX ADMIN — SODIUM CHLORIDE: 900 INJECTION, SOLUTION INTRAVENOUS at 06:04

## 2024-04-12 RX ADMIN — ACETAMINOPHEN 650 MG: 325 TABLET ORAL at 03:04

## 2024-04-12 RX ADMIN — ACETAMINOPHEN 650 MG: 325 TABLET ORAL at 08:04

## 2024-04-12 RX ADMIN — PANTOPRAZOLE SODIUM 40 MG: 40 TABLET, DELAYED RELEASE ORAL at 09:04

## 2024-04-12 RX ADMIN — OMEGA-3 FATTY ACIDS CAP 1000 MG 1 CAPSULE: 1000 CAP at 09:04

## 2024-04-12 RX ADMIN — Medication 1 CAPSULE: at 12:04

## 2024-04-12 RX ADMIN — THERA TABS 1 TABLET: TAB at 09:04

## 2024-04-12 NOTE — PROGRESS NOTES
Consult received. Pt is known to me. MELANY contacted facility and discussed pt with KEVAN Acosta. Pt is much better. Diet order is Regular.  Will follow up to further assess upon return to facility.

## 2024-04-12 NOTE — PLAN OF CARE
Pt AAOX 3. Resp even. O2 at 2 liters per Nasal cannula. NS at 100cc/hr infusing via pump into right forearm. Pt incont of bowel and bladder. Bed low. Sr up X 2. CB within reach.   Problem: Gas Exchange Impaired  Goal: Optimal Gas Exchange  Outcome: Ongoing, Progressing     Problem: Pain Acute  Goal: Acceptable Pain Control and Functional Ability  Outcome: Ongoing, Progressing     Problem: Fatigue  Goal: Improved Activity Tolerance  Outcome: Ongoing, Progressing     Problem: Fall Injury Risk  Goal: Absence of Fall and Fall-Related Injury  Outcome: Ongoing, Progressing     Problem: Hypertension Acute  Goal: Blood Pressure Within Desired Range  Outcome: Ongoing, Progressing

## 2024-04-12 NOTE — H&P
[facility] - Medical Surgical Unit  Hospital Medicine  History & Physical    Patient Name: Herlinda Valdovinos  MRN: [id number]  Patient Class: IP- Swing  Admission Date: 4/11/2024  Attending Physician: Chuy Daugherty IV, DO   Primary Care Provider: Vini Hernandez NP         Patient information was obtained from patient, past medical records, and ER records.     Subjective:     Principal Problem:Bacteremia due to Escherichia coli    Chief Complaint:   Chief Complaint   Patient presents with    Bacteremia        HPI: Herlinda Kaur is a 59 year old female with pmh of hypertension, chronic anxiety, depression, dysphagia, CVA, insomnia, intrapontine hemorrhage, left hemiparesis, peripheral neuropathy and thyroid disease. PCP is Micheal Nash. She presented to ER today with complaints of recurrent UTI. States she recently finished a course of Amoxicillin for a UTI but over the last 2-3 days it has come back. Reports pain, frequency and urgency with urination, reports she wears a pad. Patient has fever today. Reports some nausea also. She also complains of SOB and dry cough. Has home o2 per nasal canula at 2 liters.Worked up in ER and results are:  Labs significant for WBC 12.16, H/H 12.1/38.1, Platelets 120, Na 134, K 3.3, Cl 98, BUN 12, Creat 1.06, Calcium 8.1, Mag 1.3, Bilirubin 1.8, Lactic 1.1, Large occult blood in urine with 10-15 RBC's, positive nitrates, moderate leukocytes with 20-50 WBC's. Blood cultures pending.  Chest Xray significant for Diffuse interstitial and patchy airspace opacities scattered throughout the lungs, edema versus infection. Patient was managed in the ED with IV fluids prior to arrival, IV Magnesium, IV Zofran, IV Rocephin and oral tylenol.  The response to treatment was good.  She was admitted to inpatient care for acute urinary tract infection, failed outpatient treatment. She has recd rocephin while in ER.     * No surgery found *       Hospital Course:   04/09/24  Awake and resting in bed. Max temp is 102.6 past 24 hours. WBC 8.33.  Potassium is 3.1 cr 1.14. Denies any increased SOB. Sat on 2 liters is 96%. Breath sounds are diminished. Will repeat cxr.  Urine culture pending. Continue rocephin and zithromax. Continue fluids. Replace potassium orally.  04/10/24 Awake and resting in bed. Max temp past 24 hours is 102.7. Blood cultures show     2 d ago       Verigene Result Negative E. coli Abnormal    Verigene Result Negative ESBL Positive Abnormal    DCD rocephin and zithromax. Merrem 1 gm IV every 8 hours. Will repeat blood cultures in 48 hours. Talked with patient re this and need to continue abx for 2 weeks after we get negative blood cultures. She is agreeable to plan. Contact isolation for ESBL ecoli bacteremia.  04/11/24 Awak and resting in bed. Temp.trending down- Max temp past 24 hours is 101.6. States she is feeling much better today. Will continue merrem for ESBL bacteremia. Will repeat blood cultures tomorrow- will continue abx for 14 days from negative blood cultures. Talked with her re swing bed due to need for abx and for weakness. She is agreeable. She will dc from acute care to swing bed today.  Admitted to swing bed on 04/11/24. Continue merrem for bacteremia. Repeat blood cultures drawn this morning. Afebrile overnight. States she is feeling much better this morning.    Past Medical History:   Diagnosis Date    Cerebral hemorrhage     Chronic anxiety     Decreased coordination 3/7/2022    Dehydration     Depression     Dysphagia     Due to and following non-traumatic intracerebral hemorrhage    Hearing loss     History of CVA (cerebrovascular accident)     Hypertension     Hypokalemia 10/20/2022    Insomnia     Intrapontine hemorrhage     Left hemiparesis     Peripheral neuropathy     Stroke     Thyroid disease     Transient cerebral ischemia        Past Surgical History:   Procedure Laterality Date    APPENDECTOMY      CHOLECYSTECTOMY      HYSTERECTOMY       LITHOTRIPSY      Renal lithotripsy    percutaneious insertion of ureteric stent         Review of patient's allergies indicates:   Allergen Reactions    Lamisil [terbinafine] Anaphylaxis    Codeine Itching    Compazine [prochlorperazine] Itching       Current Facility-Administered Medications on File Prior to Encounter   Medication    [DISCONTINUED] 0.9%  NaCl infusion    [DISCONTINUED] acetaminophen tablet 650 mg    [DISCONTINUED] albuterol-ipratropium 2.5 mg-0.5 mg/3 mL nebulizer solution 3 mL    [DISCONTINUED] amLODIPine tablet 10 mg    [DISCONTINUED] atorvastatin tablet 40 mg    [DISCONTINUED] clopidogreL tablet 75 mg    [DISCONTINUED] enoxaparin injection 40 mg    [DISCONTINUED] gabapentin capsule 300 mg    [DISCONTINUED] hydrALAZINE injection 10 mg    [DISCONTINUED] Lactobacillus acidophilus capsule 1 capsule    [DISCONTINUED] LORazepam tablet 0.5 mg    [DISCONTINUED] meropenem (MERREM) 1 g in sodium chloride 0.9 % 100 mL IVPB (MB+)    [DISCONTINUED] miconazole 2 % cream    [DISCONTINUED] mupirocin 2 % ointment    [DISCONTINUED] omega 3-dha-epa-fish oil 1,000 mg (120 mg-180 mg) Cap 1 capsule    [DISCONTINUED] pantoprazole EC tablet 40 mg    [DISCONTINUED] potassium bicarbonate disintegrating tablet 35 mEq    [DISCONTINUED] potassium bicarbonate disintegrating tablet 50 mEq    [DISCONTINUED] potassium bicarbonate disintegrating tablet 60 mEq    [DISCONTINUED] sertraline tablet 150 mg    [DISCONTINUED] sodium chloride 0.9% flush 10 mL    [DISCONTINUED] valsartan tablet 320 mg     Current Outpatient Medications on File Prior to Encounter   Medication Sig    acetaminophen (TYLENOL) 325 MG tablet Take 650 mg by mouth every 6 (six) hours as needed.    albuterol (PROAIR HFA) 90 mcg/actuation inhaler Inhale 2 puffs into the lungs every 6 (six) hours as needed for Wheezing. Rescue    amLODIPine (NORVASC) 10 MG tablet Take 1 tablet (10 mg total) by mouth once daily. (Patient taking differently: Take 10 mg by mouth every  evening.)    atorvastatin (LIPITOR) 40 MG tablet Take 40 mg by mouth every evening.    clopidogreL (PLAVIX) 75 mg tablet Take 1 tablet (75 mg total) by mouth once daily.    gabapentin (NEURONTIN) 300 MG capsule Take 2 capsules (600 mg total) by mouth 3 (three) times daily.    LORazepam (ATIVAN) 0.5 MG tablet Take 1 tablet (0.5 mg total) by mouth every 12 (twelve) hours as needed for Anxiety. (Patient taking differently: Take 0.5 mg by mouth once daily.)    multivit with minerals/lutein (MULTIVITAMIN 50 PLUS ORAL) Take 1 tablet by mouth once daily.    olmesartan (BENICAR) 40 MG tablet Take 40 mg by mouth once daily.    omega 3-dha-epa-fish oil 1,000 mg (120 mg-180 mg) Cap Take 1 capsule by mouth once daily.    ondansetron (ZOFRAN-ODT) 4 MG TbDL Take 1 tablet (4 mg total) by mouth every 8 (eight) hours as needed.    pantoprazole (PROTONIX) 40 MG tablet Take 1 tablet (40 mg total) by mouth once daily.    promethazine (PHENERGAN) 25 MG tablet Take 25 mg by mouth 2 (two) times daily as needed.    sertraline (ZOLOFT) 50 MG tablet Take 3 tablets (150 mg total) by mouth once daily.    temazepam (RESTORIL) 15 mg Cap Take 15 mg by mouth every evening.    [DISCONTINUED] hydroCHLOROthiazide (HYDRODIURIL) 12.5 MG Tab Take 1 tablet (12.5 mg total) by mouth once daily.    [DISCONTINUED] omeprazole (PRILOSEC) 40 MG capsule Take 40 mg by mouth every evening.     Family History    None       Tobacco Use    Smoking status: Never    Smokeless tobacco: Never   Substance and Sexual Activity    Alcohol use: Never    Drug use: Never    Sexual activity: Not on file     Review of Systems   Constitutional:  Negative for appetite change and fever.   HENT:  Negative for sore throat and trouble swallowing.    Respiratory:  Positive for cough. Negative for chest tightness and shortness of breath.    Cardiovascular:  Negative for chest pain.   Gastrointestinal:  Negative for abdominal pain, constipation, diarrhea, nausea and vomiting.    Genitourinary:  Negative for difficulty urinating and dysuria.   Musculoskeletal:  Negative for arthralgias.   Neurological:  Positive for weakness. Negative for light-headedness and headaches.     Objective:     Vital Signs (Most Recent):  Temp: 98.2 °F (36.8 °C) (04/12/24 0509)  Pulse: 66 (04/12/24 0509)  Resp: 20 (04/12/24 0400)  BP: (!) 137/90 (04/12/24 0509)  SpO2: 96 % (04/12/24 0515) Vital Signs (24h Range):  Temp:  [98.2 °F (36.8 °C)-99.4 °F (37.4 °C)] 98.2 °F (36.8 °C)  Pulse:  [66-80] 66  Resp:  [18-20] 20  SpO2:  [93 %-98 %] 96 %  BP: (126-138)/(84-92) 137/90     Weight: 71 kg (156 lb 8 oz)  Body mass index is 26.86 kg/m².     Physical Exam  HENT:      Head: Normocephalic.      Mouth/Throat:      Mouth: Mucous membranes are moist.   Cardiovascular:      Rate and Rhythm: Normal rate and regular rhythm.      Pulses: Normal pulses.      Heart sounds: Normal heart sounds.   Pulmonary:      Effort: No respiratory distress.      Breath sounds: Normal breath sounds. No stridor. No wheezing or rhonchi.   Abdominal:      General: Bowel sounds are normal. There is no distension.      Palpations: Abdomen is soft. There is no mass.      Tenderness: There is no abdominal tenderness. There is no guarding.      Hernia: No hernia is present.   Musculoskeletal:         General: No swelling, tenderness, deformity or signs of injury.   Skin:     General: Skin is warm and dry.   Neurological:      Mental Status: She is alert and oriented to person, place, and time.      Motor: Weakness present.   Psychiatric:         Mood and Affect: Mood normal.                Significant Labs: All pertinent labs within the past 24 hours have been reviewed.  Recent Lab Results         04/12/24  0602        Anion Gap 13       Baso # 0.02       Basophil % 0.4       BUN 8       BUN/CREAT RATIO 10       Calcium 8.5       Chloride 103       CO2 29       Creatinine 0.82       Differential Method Auto       eGFR 83       Eos # 0.28       Eos %  5.7       Glucose 116       Hematocrit 36.0       Hemoglobin 11.5       Lymph # 1.31       Lymph % 26.5       MCH 27.3       MCHC 31.9       MCV 85.5       Mono # 0.54       Mono % 10.9       MPV 10.3       Neutrophils, Abs 2.80       Neutrophils Relative 56.5       Platelet Count 147       Potassium 3.8       RBC 4.21       RDW 14.7       Sodium 141       WBC 4.95               Significant Imaging: I have reviewed all pertinent imaging results/findings within the past 24 hours.  Assessment/Plan:     * Bacteremia due to Escherichia coli    04/12/24 repeat blood cultures x 2 tomorrow  Continue merrem 1 gm every 8 hours x 14 post negative blood culture    Acute cystitis with hematuria    Urine culture  Order: [id number] - Reflex for Order [id number]  Status: Final result       Visible to patient: No (inaccessible in Patient Portal)       Next appt: None    Specimen Information: Urine   0 Result Notes  Urine Culture >100,000 Escherichia coli ESBL Abnormal            Resulting Agency:     Susceptibility     Escherichia coli ESBL     Not Specified     Ampicillin >16 µg/ml Resistant     Ampicillin/Sulbactam >16/8 µg/ml Resistant     Aztreonam >16 µg/ml Resistant     Cefazolin >16 µg/ml Resistant     Cefepime >16 µg/ml Resistant     Cefotaxime >16 µg/ml Resistant     Ceftazidime 16 µg/ml Resistant     Ceftriaxone >2 µg/ml Resistant     Ciprofloxacin 0.5 µg/ml Intermediate     Ertapenem <=0.5 µg/ml Sensitive     Gentamicin >8 µg/ml Resistant     Meropenem <=1 µg/ml Sensitive     Nitrofurantoin <=32 µg/ml Sensitive     Piperacillin/Tazobactam <=8 µg/ml Sensitive     Tetracycline >8 µg/ml Resistant     Tobramycin >8 µg/ml Resistant     Trimeth/Sulfa <=2/38 µg/ml Sensitive                     04/12/24 continue merrem    General weakness  04/12/24 PT and OT to evaluate and treat      Hypertension  Chronic, controlled. Latest blood pressure and vitals reviewed-     Temp:  [98.1 °F (36.7 °C)-100.3 °F (37.9 °C)]   Pulse:  [75-94]    Resp:  [18-20]   BP: (123-132)/(79-90)   SpO2:  [89 %-97 %] .   Home meds for hypertension were reviewed and noted below.   Hypertension Medications               amLODIPine (NORVASC) 10 MG tablet Take 1 tablet (10 mg total) by mouth once daily.    olmesartan (BENICAR) 40 MG tablet Take 40 mg by mouth once daily.            While in the hospital, will manage blood pressure as follows; Continue home antihypertensive regimen    Will utilize p.r.n. blood pressure medication only if patient's blood pressure greater than 180/110 and she develops symptoms such as worsening chest pain or shortness of breath.    Thyroid disease    04/12/24 continue levothyroxine    Depressive disorder  Patient has persistent depression which is unknown and is currently controlled. Will Continue anti-depressant medications. We will not consult psychiatry at this time. Patient does not display psychosis at this time. Continue to monitor closely and adjust plan of care as needed.        Generalized anxiety disorder        Dyslipidemia  04/12/24 continue statin      DVT prophylaxis  04/12/24 continue lovenox        VTE Risk Mitigation (From admission, onward)      None                            Chuy Daugherty IV, DO  Department of Hospital Medicine  [facility] - Medical Surgical Unit

## 2024-04-12 NOTE — SUBJECTIVE & OBJECTIVE
Past Medical History:   Diagnosis Date    Cerebral hemorrhage     Chronic anxiety     Decreased coordination 3/7/2022    Dehydration     Depression     Dysphagia     Due to and following non-traumatic intracerebral hemorrhage    Hearing loss     History of CVA (cerebrovascular accident)     Hypertension     Hypokalemia 10/20/2022    Insomnia     Intrapontine hemorrhage     Left hemiparesis     Peripheral neuropathy     Stroke     Thyroid disease     Transient cerebral ischemia        Past Surgical History:   Procedure Laterality Date    APPENDECTOMY      CHOLECYSTECTOMY      HYSTERECTOMY      LITHOTRIPSY      Renal lithotripsy    percutaneious insertion of ureteric stent         Review of patient's allergies indicates:   Allergen Reactions    Lamisil [terbinafine] Anaphylaxis    Codeine Itching    Compazine [prochlorperazine] Itching       Current Facility-Administered Medications on File Prior to Encounter   Medication    [DISCONTINUED] 0.9%  NaCl infusion    [DISCONTINUED] acetaminophen tablet 650 mg    [DISCONTINUED] albuterol-ipratropium 2.5 mg-0.5 mg/3 mL nebulizer solution 3 mL    [DISCONTINUED] amLODIPine tablet 10 mg    [DISCONTINUED] atorvastatin tablet 40 mg    [DISCONTINUED] clopidogreL tablet 75 mg    [DISCONTINUED] enoxaparin injection 40 mg    [DISCONTINUED] gabapentin capsule 300 mg    [DISCONTINUED] hydrALAZINE injection 10 mg    [DISCONTINUED] Lactobacillus acidophilus capsule 1 capsule    [DISCONTINUED] LORazepam tablet 0.5 mg    [DISCONTINUED] meropenem (MERREM) 1 g in sodium chloride 0.9 % 100 mL IVPB (MB+)    [DISCONTINUED] miconazole 2 % cream    [DISCONTINUED] mupirocin 2 % ointment    [DISCONTINUED] omega 3-dha-epa-fish oil 1,000 mg (120 mg-180 mg) Cap 1 capsule    [DISCONTINUED] pantoprazole EC tablet 40 mg    [DISCONTINUED] potassium bicarbonate disintegrating tablet 35 mEq    [DISCONTINUED] potassium bicarbonate disintegrating tablet 50 mEq    [DISCONTINUED] potassium bicarbonate  disintegrating tablet 60 mEq    [DISCONTINUED] sertraline tablet 150 mg    [DISCONTINUED] sodium chloride 0.9% flush 10 mL    [DISCONTINUED] valsartan tablet 320 mg     Current Outpatient Medications on File Prior to Encounter   Medication Sig    acetaminophen (TYLENOL) 325 MG tablet Take 650 mg by mouth every 6 (six) hours as needed.    albuterol (PROAIR HFA) 90 mcg/actuation inhaler Inhale 2 puffs into the lungs every 6 (six) hours as needed for Wheezing. Rescue    amLODIPine (NORVASC) 10 MG tablet Take 1 tablet (10 mg total) by mouth once daily. (Patient taking differently: Take 10 mg by mouth every evening.)    atorvastatin (LIPITOR) 40 MG tablet Take 40 mg by mouth every evening.    clopidogreL (PLAVIX) 75 mg tablet Take 1 tablet (75 mg total) by mouth once daily.    gabapentin (NEURONTIN) 300 MG capsule Take 2 capsules (600 mg total) by mouth 3 (three) times daily.    LORazepam (ATIVAN) 0.5 MG tablet Take 1 tablet (0.5 mg total) by mouth every 12 (twelve) hours as needed for Anxiety. (Patient taking differently: Take 0.5 mg by mouth once daily.)    multivit with minerals/lutein (MULTIVITAMIN 50 PLUS ORAL) Take 1 tablet by mouth once daily.    olmesartan (BENICAR) 40 MG tablet Take 40 mg by mouth once daily.    omega 3-dha-epa-fish oil 1,000 mg (120 mg-180 mg) Cap Take 1 capsule by mouth once daily.    ondansetron (ZOFRAN-ODT) 4 MG TbDL Take 1 tablet (4 mg total) by mouth every 8 (eight) hours as needed.    pantoprazole (PROTONIX) 40 MG tablet Take 1 tablet (40 mg total) by mouth once daily.    promethazine (PHENERGAN) 25 MG tablet Take 25 mg by mouth 2 (two) times daily as needed.    sertraline (ZOLOFT) 50 MG tablet Take 3 tablets (150 mg total) by mouth once daily.    temazepam (RESTORIL) 15 mg Cap Take 15 mg by mouth every evening.    [DISCONTINUED] hydroCHLOROthiazide (HYDRODIURIL) 12.5 MG Tab Take 1 tablet (12.5 mg total) by mouth once daily.    [DISCONTINUED] omeprazole (PRILOSEC) 40 MG capsule Take 40 mg  by mouth every evening.     Family History    None       Tobacco Use    Smoking status: Never    Smokeless tobacco: Never   Substance and Sexual Activity    Alcohol use: Never    Drug use: Never    Sexual activity: Not on file     Review of Systems   Constitutional:  Negative for appetite change and fever.   HENT:  Negative for sore throat and trouble swallowing.    Respiratory:  Positive for cough. Negative for chest tightness and shortness of breath.    Cardiovascular:  Negative for chest pain.   Gastrointestinal:  Negative for abdominal pain, constipation, diarrhea, nausea and vomiting.   Genitourinary:  Negative for difficulty urinating and dysuria.   Musculoskeletal:  Negative for arthralgias.   Neurological:  Positive for weakness. Negative for light-headedness and headaches.     Objective:     Vital Signs (Most Recent):  Temp: 98.2 °F (36.8 °C) (04/12/24 0509)  Pulse: 66 (04/12/24 0509)  Resp: 20 (04/12/24 0400)  BP: (!) 137/90 (04/12/24 0509)  SpO2: 96 % (04/12/24 0515) Vital Signs (24h Range):  Temp:  [98.2 °F (36.8 °C)-99.4 °F (37.4 °C)] 98.2 °F (36.8 °C)  Pulse:  [66-80] 66  Resp:  [18-20] 20  SpO2:  [93 %-98 %] 96 %  BP: (126-138)/(84-92) 137/90     Weight: 71 kg (156 lb 8 oz)  Body mass index is 26.86 kg/m².     Physical Exam  HENT:      Head: Normocephalic.      Mouth/Throat:      Mouth: Mucous membranes are moist.   Cardiovascular:      Rate and Rhythm: Normal rate and regular rhythm.      Pulses: Normal pulses.      Heart sounds: Normal heart sounds.   Pulmonary:      Effort: No respiratory distress.      Breath sounds: Normal breath sounds. No stridor. No wheezing or rhonchi.   Abdominal:      General: Bowel sounds are normal. There is no distension.      Palpations: Abdomen is soft. There is no mass.      Tenderness: There is no abdominal tenderness. There is no guarding.      Hernia: No hernia is present.   Musculoskeletal:         General: No swelling, tenderness, deformity or signs of injury.    Skin:     General: Skin is warm and dry.   Neurological:      Mental Status: She is alert and oriented to person, place, and time.      Motor: Weakness present.   Psychiatric:         Mood and Affect: Mood normal.                Significant Labs: All pertinent labs within the past 24 hours have been reviewed.  Recent Lab Results         04/12/24  0602        Anion Gap 13       Baso # 0.02       Basophil % 0.4       BUN 8       BUN/CREAT RATIO 10       Calcium 8.5       Chloride 103       CO2 29       Creatinine 0.82       Differential Method Auto       eGFR 83       Eos # 0.28       Eos % 5.7       Glucose 116       Hematocrit 36.0       Hemoglobin 11.5       Lymph # 1.31       Lymph % 26.5       MCH 27.3       MCHC 31.9       MCV 85.5       Mono # 0.54       Mono % 10.9       MPV 10.3       Neutrophils, Abs 2.80       Neutrophils Relative 56.5       Platelet Count 147       Potassium 3.8       RBC 4.21       RDW 14.7       Sodium 141       WBC 4.95               Significant Imaging: I have reviewed all pertinent imaging results/findings within the past 24 hours.

## 2024-04-12 NOTE — PT/OT/SLP EVAL
Occupational Therapy   Evaluation    Name: Herlinda Valdovinos  MRN: [id number]  Admitting Diagnosis: Bacteremia due to Escherichia coli  Recent Surgery: * No surgery found *      Recommendations:     Discharge Recommendations: Low Intensity Therapy  Discharge Equipment Recommendations:  none  Barriers to discharge:  None    Assessment:     Herlinda Valdovinos is a 59 y.o. female with a medical diagnosis of Bacteremia due to Escherichia coli.  She presents with weakness. Performance deficits affecting function: weakness.      Rehab Prognosis: Fair; patient would not benefit from acute skilled OT services to address these deficits due to patient if functioning at baseline.       Plan:   Patient is at baseline with skilled OT att his time:  Plan of Care Expires:    Plan of Care Reviewed with: patient    Subjective     Chief Complaint: weakness from old CVA  Patient/Family Comments/goals: to return to prior level of function    Occupational Profile:  Living Environment: Patient lives at home with   Previous level of function: Min A to SBA with self care and functional mobility  Roles and Routines: homemaker  Equipment Used at Home: rollator  Assistance upon Discharge: Recommend D/C to home with HH    Pain/Comfort:  Pain Rating Post-Intervention 1: 0/10  Pain Rating Post-Intervention 2: 0/10    Patients cultural, spiritual, Yazidism conflicts given the current situation: no    Objective:     Communicated with: Nurse prior to session.  Patient found HOB elevated with oxygen, peripheral IV upon OT entry to room.    General Precautions: Standard, fall  Orthopedic Precautions: N/A  Braces: N/A  Respiratory Status:     Occupational Performance:    Bed Mobility:    Patient completed Rolling/Turning to Left with  modified independence  Patient completed Rolling/Turning to Right with modified independence  Patient completed Scooting/Bridging with modified independence  Patient completed Supine to Sit with modified  independence  Patient completed Sit to Supine with modified independence    Functional Mobility/Transfers:  Patient completed Sit <> Stand Transfer with modified independence  with  no assistive device   Functional Mobility: n/a    Activities of Daily Living:  Upper Body Dressing: stand by assistance to adalberto gown  Lower Body Dressing: modified independence to doff socks    Cognitive/Visual Perceptual:  Cognitive/Psychosocial Skills:     -       Oriented to: Person, Place, and Situation   -       Follows Commands/attention:Follows multistep  commands  -       Communication: clear/fluent  -       Safety awareness/insight to disability: impaired   Visual/Perceptual:      -Intact WFLs    Physical Exam:  Balance:    -       good  Upper Extremity Range of Motion:     -       Bilateral Upper Extremity: WNL  Upper Extremity Strength:    -       Right Upper Extremity: WFL  -       Left Upper Extremity: WFL, 1/2 AROM shoulder    AMPAC 6 Click ADL:  AMPAC Total Score: 22    Treatment & Education:  Pt educated on OT role/POC.   Importance of OOB activity with staff assistance.  Importance of sitting up in the chair throughout the day as tolerated, especially for meals   Safety during functional t/f and mobility  Importance of assisting with self-care activities      Patient left HOB elevated with all lines intact, call button in reach, and bed alarm on    GOALS:   Multidisciplinary Problems       Occupational Therapy Goals          Problem: Occupational Therapy    Goal Priority Disciplines Outcome Interventions   Occupational Therapy Goal     OT, PT/OT                         History:     Past Medical History:   Diagnosis Date    Cerebral hemorrhage     Chronic anxiety     Decreased coordination 3/7/2022    Dehydration     Depression     Dysphagia     Due to and following non-traumatic intracerebral hemorrhage    Hearing loss     History of CVA (cerebrovascular accident)     Hypertension     Hypokalemia 10/20/2022    Insomnia      Intrapontine hemorrhage     Left hemiparesis     Peripheral neuropathy     Stroke     Thyroid disease     Transient cerebral ischemia          Past Surgical History:   Procedure Laterality Date    APPENDECTOMY      CHOLECYSTECTOMY      HYSTERECTOMY      LITHOTRIPSY      Renal lithotripsy    percutaneious insertion of ureteric stent         Time Tracking:     OT Date of Treatment: 04/12/24  OT Start Time: 1405  OT Stop Time: 1430  OT Total Time (min): 25 min    Billable Minutes:Evaluation Low complexity    Kerri Sanchez, OTR/L, CSRS  4/12/2024

## 2024-04-12 NOTE — PLAN OF CARE
[facility] - Medical Surgical Unit  Initial Discharge Assessment       Primary Care Provider: Vini Hernandez NP    Admission Diagnosis: Bacteremia due to Escherichia coli [R78.81, B96.20]    Admission Date: 4/11/2024  Expected Discharge Date: 4/24/2024    Transition of Care Barriers: (P) None    Payor: HU[employer] MANAGED MEDICARE / Plan: HU[employer] MEDICARE PPO / Product Type: Medicare Advantage /     Extended Emergency Contact Information  Primary Emergency Contact: Siobhan Kaur  Mobile Phone: [phone]  Relation: Spouse  Preferred language: English   needed? No    Discharge Plan A: (P) Home with family         [facility] - [place], [place] - [address]  [address] [place] [address]  Phone: [phone] Fax: [phone]      Initial Assessment (most recent)       Adult Discharge Assessment - 04/12/24 0953          Discharge Assessment    Assessment Type Discharge Planning Assessment (P)      Confirmed/corrected address, phone number and insurance Yes (P)      Confirmed Demographics Correct on Facesheet (P)      Source of Information patient (P)      Communicated POOL with patient/caregiver Date not available/Unable to determine (P)      Reason For Admission Bacteremia d/t E Coli (P)      People in Home significant other (P)      Facility Arrived From: [facility] (P)      Do you expect to return to your current living situation? Yes (P)      Do you have help at home or someone to help you manage your care at home? Yes (P)      Who are your caregiver(s) and their phone number(s)? siobhan Kaur [phone]/ Nataliia (P)      Prior to hospitilization cognitive status: Alert/Oriented (P)      Current cognitive status: Alert/Oriented (P)      Walking or Climbing Stairs Difficulty yes (P)      Walking or Climbing Stairs ambulation difficulty, requires equipment (P)      Mobility Management uses a walker, cane (P)      Dressing/Bathing Difficulty yes (P)       Dressing/Bathing bathing difficulty, assistance 1 person (P)      Do you have any problems with: Errands/Grocery (P)      Home Layout Able to live on 1st floor (P)      Equipment Currently Used at Home blood pressure machine;cane, quad;cane, straight;oxygen;rollator;walker, rolling;wheelchair (P)      Patient currently being followed by outpatient case management? No (P)      Do you currently have service(s) that help you manage your care at home? No (P)      Do you take prescription medications? Yes (P)      Do you have prescription coverage? Yes (P)      Coverage [employer] (P)      Do you have any problems affording any of your prescribed medications? No (P)      Is the patient taking medications as prescribed? yes (P)      Who is going to help you get home at discharge? Nataliia, daughter (P)      How do you get to doctors appointments? family or friend will provide (P)      Are you on dialysis? No (P)      Do you take coumadin? No (P)      Discharge Plan A Home with family (P)      DME Needed Upon Discharge  none (P)      Discharge Plan discussed with: Patient (P)      Transition of Care Barriers None (P)                       Ms. Pate lives at home with her .  She doesn't have any home health services at this time. Her daughter is very supportive in her car.  Her daughter/ transports her to appointments, etc.

## 2024-04-12 NOTE — PT/OT/SLP EVAL
Physical Therapy Evaluation    Patient Name:  Herlinda Valdovinos   MRN:  [id number]    Recommendations:     Discharge Recommendations: Low Intensity Therapy   Discharge Equipment Recommendations: none   Barriers to discharge: None    Assessment:     Herlinda Valdovinos is a 59 y.o. female admitted with a medical diagnosis of Bacteremia due to Escherichia coli. She presents with the following impairments/functional limitations: weakness, impaired endurance, impaired functional mobility, impaired balance, decreased lower extremity function, pain, impaired coordination. 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 Patient typically ambulates around her home with a rollator. Patient reports she feels as though she is very close to her functional baseline and denies the need for physical therapy services at the present time. Patient advised to let KEVAN Smith know if she changes her mind and would like to participate in physical therapy treatment in the swing bed setting.     Rehab Prognosis: Good; patient would benefit from acute skilled PT services to address these deficits and reach maximum level of function.    Recent Surgery: * No surgery found *      Plan:     During this hospitalization, patient to be seen  (Patient has been admitted to swing bed for IV antibiotics, reports she feels as though she is close to her functional baseline, and denies the need for physical therapy intervention at the present time.) to address the identified rehab impairments via   and progress toward the following goals:    Plan of Care Expires:  04/12/24    Subjective     Chief Complaint: no complaints this morning  Patient/Family Comments/goals: Patient's goal is to return home with her  when finished with her IV antibiotic regimen.   Pain/Comfort:  Pain Rating 1:  (No numerical pain rating given)  Location 1: sacral spine  Pain Addressed 1: Reposition  Pain Rating  Post-Intervention 1:  (No numerical pain rating given.)    Patients cultural, spiritual, Lutheran conflicts given the current situation: no    Living Environment: Patient lives with her spouse and ambulates short distances with a rollator. Patient reports she goes to [employer] and occasionally to [employer]. Otherwise, she spends the majority of her time at home. Prior to admission, patient required standby assist with all functional mobility with a rollator. Occasional minimal assist required with sit to stand transfers. Equipment used at home: rollator. DME owned (not currently used): none.  Upon discharge, patient will have assistance from her  and other family members as needed.    Objective:     Communicated with KEVAN Smith prior to session. Patient found supine with oxygen, peripheral IV  upon PT entry to room.    General Precautions: Standard, fall  Orthopedic Precautions:N/A   Braces: N/A  Respiratory Status: Nasal cannula, flow 2 L/min    Exams:  Gross Motor Coordination:  WFL  Sensation:    -       Intact  RLE ROM: WFL  RLE Strength: WFL  LLE ROM: WFL  LLE Strength: Deficits: 4/5 (baseline following her CVA)    Functional Mobility:  Bed Mobility:     Scooting: modified independence  Supine to Sit: modified independence  Sit to Supine: modified independence  Transfers:     Sit to Stand:  minimum assistance with rolling walker  Gait: x 15 feet with rolling walker with contact guard assist; slow lilli; no loss of balance or major safety concerns  Balance: standby assist with static standing balance with rolling walker; contact guard assist with dynamic standing balance with rolling walker    AM-PAC 6 CLICK MOBILITY  Total Score:20     Treatment & Education:    Bed mobility, transfers, and gait performed as listed above. Patient educated on the physical therapy plan of care.     Patient left supine with call button in reach.    GOALS:   Multidisciplinary Problems       Physical Therapy Goals           Problem: Physical Therapy    Goal Priority Disciplines Outcome Goal Variances Interventions   Physical Therapy Goal     PT, PT/OT                        History:     Past Medical History:   Diagnosis Date    Cerebral hemorrhage     Chronic anxiety     Decreased coordination 3/7/2022    Dehydration     Depression     Dysphagia     Due to and following non-traumatic intracerebral hemorrhage    Hearing loss     History of CVA (cerebrovascular accident)     Hypertension     Hypokalemia 10/20/2022    Insomnia     Intrapontine hemorrhage     Left hemiparesis     Peripheral neuropathy     Stroke     Thyroid disease     Transient cerebral ischemia      Past Surgical History:   Procedure Laterality Date    APPENDECTOMY      CHOLECYSTECTOMY      HYSTERECTOMY      LITHOTRIPSY      Renal lithotripsy    percutaneious insertion of ureteric stent       Time Tracking:     PT Received On: 04/12/24  PT Start Time: 1034     PT Stop Time: 1105  PT Total Time (min): 31 min     Billable Minutes: Evaluation (low complexity; 31 minutes)      04/12/2024

## 2024-04-12 NOTE — PLAN OF CARE
Problem: Pain Acute  Goal: Acceptable Pain Control and Functional Ability  Outcome: Ongoing, Progressing     Problem: Fatigue  Goal: Improved Activity Tolerance  Outcome: Ongoing, Progressing     Problem: Fall Injury Risk  Goal: Absence of Fall and Fall-Related Injury  Outcome: Ongoing, Progressing

## 2024-04-13 LAB
ANION GAP SERPL CALCULATED.3IONS-SCNC: 10 MMOL/L (ref 7–16)
BASOPHILS # BLD AUTO: 0.02 K/UL (ref 0–0.2)
BASOPHILS NFR BLD AUTO: 0.4 % (ref 0–1)
BUN SERPL-MCNC: 12 MG/DL (ref 7–18)
BUN/CREAT SERPL: 14 (ref 6–20)
CALCIUM SERPL-MCNC: 8.6 MG/DL (ref 8.5–10.1)
CHLORIDE SERPL-SCNC: 103 MMOL/L (ref 98–107)
CO2 SERPL-SCNC: 32 MMOL/L (ref 21–32)
CREAT SERPL-MCNC: 0.88 MG/DL (ref 0.55–1.02)
DIFFERENTIAL METHOD BLD: ABNORMAL
EGFR (NO RACE VARIABLE) (RUSH/TITUS): 76 ML/MIN/1.73M2
EOSINOPHIL # BLD AUTO: 0.29 K/UL (ref 0–0.5)
EOSINOPHIL NFR BLD AUTO: 5.6 % (ref 1–4)
EOSINOPHIL NFR BLD MANUAL: 4 % (ref 1–4)
ERYTHROCYTE [DISTWIDTH] IN BLOOD BY AUTOMATED COUNT: 14.7 % (ref 11.5–14.5)
GLUCOSE SERPL-MCNC: 117 MG/DL (ref 74–106)
HCT VFR BLD AUTO: 35.7 % (ref 38–47)
HGB BLD-MCNC: 11.5 G/DL (ref 12–16)
LYMPHOCYTES # BLD AUTO: 1.6 K/UL (ref 1–4.8)
LYMPHOCYTES NFR BLD AUTO: 30.7 % (ref 27–41)
LYMPHOCYTES NFR BLD MANUAL: 32 % (ref 27–41)
MCH RBC QN AUTO: 27.2 PG (ref 27–31)
MCHC RBC AUTO-ENTMCNC: 32.2 G/DL (ref 32–36)
MCV RBC AUTO: 84.4 FL (ref 80–96)
MONOCYTES # BLD AUTO: 0.64 K/UL (ref 0–0.8)
MONOCYTES NFR BLD AUTO: 12.3 % (ref 2–6)
MONOCYTES NFR BLD MANUAL: 13 % (ref 2–6)
MPC BLD CALC-MCNC: 10 FL (ref 9.4–12.4)
NEUTROPHILS # BLD AUTO: 2.67 K/UL (ref 1.8–7.7)
NEUTROPHILS NFR BLD AUTO: 51 % (ref 53–65)
NEUTS BAND NFR BLD MANUAL: 7 % (ref 1–5)
NEUTS SEG NFR BLD MANUAL: 44 % (ref 50–62)
NRBC BLD MANUAL-RTO: ABNORMAL %
PLATELET # BLD AUTO: 158 K/UL (ref 150–400)
PLATELET MORPHOLOGY: NORMAL
POTASSIUM SERPL-SCNC: 3.8 MMOL/L (ref 3.5–5.1)
RBC # BLD AUTO: 4.23 M/UL (ref 4.2–5.4)
RBC MORPH BLD: NORMAL
SODIUM SERPL-SCNC: 141 MMOL/L (ref 136–145)
WBC # BLD AUTO: 5.22 K/UL (ref 4.5–11)

## 2024-04-13 PROCEDURE — 27000221 HC OXYGEN, UP TO 24 HOURS

## 2024-04-13 PROCEDURE — 11000004 HC SNF PRIVATE

## 2024-04-13 PROCEDURE — 25000003 PHARM REV CODE 250: Performed by: FAMILY MEDICINE

## 2024-04-13 PROCEDURE — 85025 COMPLETE CBC W/AUTO DIFF WBC: CPT | Performed by: NURSE PRACTITIONER

## 2024-04-13 PROCEDURE — 25000003 PHARM REV CODE 250: Performed by: NURSE PRACTITIONER

## 2024-04-13 PROCEDURE — 94761 N-INVAS EAR/PLS OXIMETRY MLT: CPT

## 2024-04-13 PROCEDURE — 27000944

## 2024-04-13 PROCEDURE — 80048 BASIC METABOLIC PNL TOTAL CA: CPT | Performed by: NURSE PRACTITIONER

## 2024-04-13 PROCEDURE — 27000982 HC MATTRESS, MATRIX LAL RENTAL

## 2024-04-13 PROCEDURE — 63600175 PHARM REV CODE 636 W HCPCS: Performed by: FAMILY MEDICINE

## 2024-04-13 PROCEDURE — 99900035 HC TECH TIME PER 15 MIN (STAT)

## 2024-04-13 RX ORDER — BISACODYL 10 MG/1
10 SUPPOSITORY RECTAL DAILY PRN
Status: DISCONTINUED | OUTPATIENT
Start: 2024-04-13 | End: 2024-04-25 | Stop reason: HOSPADM

## 2024-04-13 RX ORDER — POLYETHYLENE GLYCOL 3350 17 G/17G
17 POWDER, FOR SOLUTION ORAL 3 TIMES DAILY PRN
Status: DISCONTINUED | OUTPATIENT
Start: 2024-04-13 | End: 2024-04-17

## 2024-04-13 RX ADMIN — SODIUM CHLORIDE: 900 INJECTION, SOLUTION INTRAVENOUS at 03:04

## 2024-04-13 RX ADMIN — VALSARTAN 320 MG: 160 TABLET, FILM COATED ORAL at 08:04

## 2024-04-13 RX ADMIN — THERA TABS 1 TABLET: TAB at 08:04

## 2024-04-13 RX ADMIN — MUPIROCIN: 20 OINTMENT TOPICAL at 09:04

## 2024-04-13 RX ADMIN — MEROPENEM 1 G: 1 INJECTION, POWDER, FOR SOLUTION INTRAVENOUS at 10:04

## 2024-04-13 RX ADMIN — PANTOPRAZOLE SODIUM 40 MG: 40 TABLET, DELAYED RELEASE ORAL at 08:04

## 2024-04-13 RX ADMIN — Medication 1 CAPSULE: at 11:04

## 2024-04-13 RX ADMIN — MUPIROCIN: 20 OINTMENT TOPICAL at 08:04

## 2024-04-13 RX ADMIN — Medication 1 CAPSULE: at 04:04

## 2024-04-13 RX ADMIN — GABAPENTIN 300 MG: 300 CAPSULE ORAL at 09:04

## 2024-04-13 RX ADMIN — ATORVASTATIN CALCIUM 40 MG: 40 TABLET, FILM COATED ORAL at 09:04

## 2024-04-13 RX ADMIN — Medication 1 CAPSULE: at 08:04

## 2024-04-13 RX ADMIN — MEROPENEM 1 G: 1 INJECTION, POWDER, FOR SOLUTION INTRAVENOUS at 05:04

## 2024-04-13 RX ADMIN — CLOPIDOGREL BISULFATE 75 MG: 75 TABLET ORAL at 08:04

## 2024-04-13 RX ADMIN — AMLODIPINE BESYLATE 10 MG: 5 TABLET ORAL at 08:04

## 2024-04-13 RX ADMIN — POLYETHYLENE GLYCOL 3350 17 G: 17 POWDER, FOR SOLUTION ORAL at 08:04

## 2024-04-13 RX ADMIN — GABAPENTIN 300 MG: 300 CAPSULE ORAL at 08:04

## 2024-04-13 RX ADMIN — MEROPENEM 1 G: 1 INJECTION, POWDER, FOR SOLUTION INTRAVENOUS at 02:04

## 2024-04-13 RX ADMIN — SERTRALINE HYDROCHLORIDE 150 MG: 50 TABLET ORAL at 08:04

## 2024-04-13 RX ADMIN — SENNOSIDES AND DOCUSATE SODIUM 1 TABLET: 8.6; 5 TABLET ORAL at 09:04

## 2024-04-13 RX ADMIN — MICONAZOLE NITRATE: 20 CREAM TOPICAL at 08:04

## 2024-04-13 RX ADMIN — MICONAZOLE NITRATE: 20 CREAM TOPICAL at 09:04

## 2024-04-13 RX ADMIN — SENNOSIDES AND DOCUSATE SODIUM 1 TABLET: 8.6; 5 TABLET ORAL at 08:04

## 2024-04-13 RX ADMIN — ACETAMINOPHEN 650 MG: 325 TABLET ORAL at 10:04

## 2024-04-13 RX ADMIN — OMEGA-3 FATTY ACIDS CAP 1000 MG 1 CAPSULE: 1000 CAP at 08:04

## 2024-04-13 NOTE — PLAN OF CARE
Problem: Gas Exchange Impaired  Goal: Optimal Gas Exchange  Outcome: Ongoing, Progressing     Problem: Pain Acute  Goal: Acceptable Pain Control and Functional Ability  Outcome: Ongoing, Progressing     Problem: Fatigue  Goal: Improved Activity Tolerance  Outcome: Ongoing, Progressing     Problem: Fall Injury Risk  Goal: Absence of Fall and Fall-Related Injury  Outcome: Ongoing, Progressing     Problem: Hypertension Acute  Goal: Blood Pressure Within Desired Range  Outcome: Ongoing, Progressing

## 2024-04-14 LAB
ANION GAP SERPL CALCULATED.3IONS-SCNC: 8 MMOL/L (ref 7–16)
BASOPHILS # BLD AUTO: 0.01 K/UL (ref 0–0.2)
BASOPHILS NFR BLD AUTO: 0.2 % (ref 0–1)
BUN SERPL-MCNC: 11 MG/DL (ref 7–18)
BUN/CREAT SERPL: 13 (ref 6–20)
CALCIUM SERPL-MCNC: 8.7 MG/DL (ref 8.5–10.1)
CHLORIDE SERPL-SCNC: 104 MMOL/L (ref 98–107)
CO2 SERPL-SCNC: 33 MMOL/L (ref 21–32)
CREAT SERPL-MCNC: 0.84 MG/DL (ref 0.55–1.02)
DIFFERENTIAL METHOD BLD: ABNORMAL
EGFR (NO RACE VARIABLE) (RUSH/TITUS): 81 ML/MIN/1.73M2
EOSINOPHIL # BLD AUTO: 0.34 K/UL (ref 0–0.5)
EOSINOPHIL NFR BLD AUTO: 6.1 % (ref 1–4)
ERYTHROCYTE [DISTWIDTH] IN BLOOD BY AUTOMATED COUNT: 14.8 % (ref 11.5–14.5)
GLUCOSE SERPL-MCNC: 103 MG/DL (ref 74–106)
HCT VFR BLD AUTO: 35.4 % (ref 38–47)
HGB BLD-MCNC: 11.3 G/DL (ref 12–16)
LYMPHOCYTES # BLD AUTO: 1.98 K/UL (ref 1–4.8)
LYMPHOCYTES NFR BLD AUTO: 35.7 % (ref 27–41)
MCH RBC QN AUTO: 27 PG (ref 27–31)
MCHC RBC AUTO-ENTMCNC: 31.9 G/DL (ref 32–36)
MCV RBC AUTO: 84.7 FL (ref 80–96)
MONOCYTES # BLD AUTO: 0.62 K/UL (ref 0–0.8)
MONOCYTES NFR BLD AUTO: 11.2 % (ref 2–6)
MPC BLD CALC-MCNC: 10 FL (ref 9.4–12.4)
NEUTROPHILS # BLD AUTO: 2.59 K/UL (ref 1.8–7.7)
NEUTROPHILS NFR BLD AUTO: 46.8 % (ref 53–65)
PLATELET # BLD AUTO: 182 K/UL (ref 150–400)
POTASSIUM SERPL-SCNC: 3.9 MMOL/L (ref 3.5–5.1)
RBC # BLD AUTO: 4.18 M/UL (ref 4.2–5.4)
SODIUM SERPL-SCNC: 141 MMOL/L (ref 136–145)
WBC # BLD AUTO: 5.54 K/UL (ref 4.5–11)

## 2024-04-14 PROCEDURE — 25000003 PHARM REV CODE 250: Performed by: NURSE PRACTITIONER

## 2024-04-14 PROCEDURE — 25000003 PHARM REV CODE 250: Performed by: FAMILY MEDICINE

## 2024-04-14 PROCEDURE — 85025 COMPLETE CBC W/AUTO DIFF WBC: CPT | Performed by: NURSE PRACTITIONER

## 2024-04-14 PROCEDURE — 94761 N-INVAS EAR/PLS OXIMETRY MLT: CPT

## 2024-04-14 PROCEDURE — 63600175 PHARM REV CODE 636 W HCPCS: Performed by: FAMILY MEDICINE

## 2024-04-14 PROCEDURE — 27000944

## 2024-04-14 PROCEDURE — 11000004 HC SNF PRIVATE

## 2024-04-14 PROCEDURE — 27000982 HC MATTRESS, MATRIX LAL RENTAL

## 2024-04-14 PROCEDURE — 99900035 HC TECH TIME PER 15 MIN (STAT)

## 2024-04-14 PROCEDURE — 80048 BASIC METABOLIC PNL TOTAL CA: CPT | Performed by: NURSE PRACTITIONER

## 2024-04-14 PROCEDURE — 27000221 HC OXYGEN, UP TO 24 HOURS

## 2024-04-14 PROCEDURE — 83735 ASSAY OF MAGNESIUM: CPT | Performed by: NURSE PRACTITIONER

## 2024-04-14 RX ADMIN — SODIUM CHLORIDE: 900 INJECTION, SOLUTION INTRAVENOUS at 10:04

## 2024-04-14 RX ADMIN — AMLODIPINE BESYLATE 10 MG: 5 TABLET ORAL at 08:04

## 2024-04-14 RX ADMIN — Medication 1 CAPSULE: at 11:04

## 2024-04-14 RX ADMIN — MEROPENEM 1 G: 1 INJECTION, POWDER, FOR SOLUTION INTRAVENOUS at 09:04

## 2024-04-14 RX ADMIN — SODIUM CHLORIDE: 900 INJECTION, SOLUTION INTRAVENOUS at 02:04

## 2024-04-14 RX ADMIN — OMEGA-3 FATTY ACIDS CAP 1000 MG 1 CAPSULE: 1000 CAP at 08:04

## 2024-04-14 RX ADMIN — THERA TABS 1 TABLET: TAB at 08:04

## 2024-04-14 RX ADMIN — GABAPENTIN 300 MG: 300 CAPSULE ORAL at 08:04

## 2024-04-14 RX ADMIN — VALSARTAN 320 MG: 160 TABLET, FILM COATED ORAL at 08:04

## 2024-04-14 RX ADMIN — MUPIROCIN: 20 OINTMENT TOPICAL at 08:04

## 2024-04-14 RX ADMIN — PANTOPRAZOLE SODIUM 40 MG: 40 TABLET, DELAYED RELEASE ORAL at 08:04

## 2024-04-14 RX ADMIN — ATORVASTATIN CALCIUM 40 MG: 40 TABLET, FILM COATED ORAL at 08:04

## 2024-04-14 RX ADMIN — MEROPENEM 1 G: 1 INJECTION, POWDER, FOR SOLUTION INTRAVENOUS at 02:04

## 2024-04-14 RX ADMIN — SENNOSIDES AND DOCUSATE SODIUM 1 TABLET: 8.6; 5 TABLET ORAL at 08:04

## 2024-04-14 RX ADMIN — ACETAMINOPHEN 650 MG: 325 TABLET ORAL at 08:04

## 2024-04-14 RX ADMIN — SERTRALINE HYDROCHLORIDE 150 MG: 50 TABLET ORAL at 08:04

## 2024-04-14 RX ADMIN — SODIUM CHLORIDE: 900 INJECTION, SOLUTION INTRAVENOUS at 01:04

## 2024-04-14 RX ADMIN — MICONAZOLE NITRATE: 20 CREAM TOPICAL at 08:04

## 2024-04-14 RX ADMIN — Medication 1 CAPSULE: at 04:04

## 2024-04-14 RX ADMIN — CLOPIDOGREL BISULFATE 75 MG: 75 TABLET ORAL at 08:04

## 2024-04-14 RX ADMIN — Medication 1 CAPSULE: at 08:04

## 2024-04-14 RX ADMIN — MEROPENEM 1 G: 1 INJECTION, POWDER, FOR SOLUTION INTRAVENOUS at 05:04

## 2024-04-15 LAB
BACTERIA BLD CULT: NORMAL
MAGNESIUM SERPL-MCNC: 2 MG/DL (ref 1.7–2.3)

## 2024-04-15 PROCEDURE — 94761 N-INVAS EAR/PLS OXIMETRY MLT: CPT

## 2024-04-15 PROCEDURE — 25000003 PHARM REV CODE 250: Performed by: NURSE PRACTITIONER

## 2024-04-15 PROCEDURE — 25000003 PHARM REV CODE 250: Performed by: FAMILY MEDICINE

## 2024-04-15 PROCEDURE — 27000944

## 2024-04-15 PROCEDURE — 27000221 HC OXYGEN, UP TO 24 HOURS

## 2024-04-15 PROCEDURE — 27000982 HC MATTRESS, MATRIX LAL RENTAL

## 2024-04-15 PROCEDURE — 11000004 HC SNF PRIVATE

## 2024-04-15 PROCEDURE — 99900035 HC TECH TIME PER 15 MIN (STAT)

## 2024-04-15 PROCEDURE — 63600175 PHARM REV CODE 636 W HCPCS: Performed by: FAMILY MEDICINE

## 2024-04-15 PROCEDURE — 99308 SBSQ NF CARE LOW MDM 20: CPT | Mod: ,,, | Performed by: NURSE PRACTITIONER

## 2024-04-15 RX ADMIN — Medication 1 CAPSULE: at 11:04

## 2024-04-15 RX ADMIN — SENNOSIDES AND DOCUSATE SODIUM 1 TABLET: 8.6; 5 TABLET ORAL at 08:04

## 2024-04-15 RX ADMIN — Medication 1 CAPSULE: at 04:04

## 2024-04-15 RX ADMIN — SENNOSIDES AND DOCUSATE SODIUM 1 TABLET: 8.6; 5 TABLET ORAL at 09:04

## 2024-04-15 RX ADMIN — PANTOPRAZOLE SODIUM 40 MG: 40 TABLET, DELAYED RELEASE ORAL at 09:04

## 2024-04-15 RX ADMIN — MEROPENEM 1 G: 1 INJECTION, POWDER, FOR SOLUTION INTRAVENOUS at 09:04

## 2024-04-15 RX ADMIN — SODIUM CHLORIDE: 900 INJECTION, SOLUTION INTRAVENOUS at 09:04

## 2024-04-15 RX ADMIN — GABAPENTIN 300 MG: 300 CAPSULE ORAL at 09:04

## 2024-04-15 RX ADMIN — VALSARTAN 320 MG: 160 TABLET, FILM COATED ORAL at 09:04

## 2024-04-15 RX ADMIN — ATORVASTATIN CALCIUM 40 MG: 40 TABLET, FILM COATED ORAL at 08:04

## 2024-04-15 RX ADMIN — MUPIROCIN: 20 OINTMENT TOPICAL at 09:04

## 2024-04-15 RX ADMIN — MICONAZOLE NITRATE: 20 CREAM TOPICAL at 08:04

## 2024-04-15 RX ADMIN — THERA TABS 1 TABLET: TAB at 09:04

## 2024-04-15 RX ADMIN — AMLODIPINE BESYLATE 10 MG: 5 TABLET ORAL at 09:04

## 2024-04-15 RX ADMIN — CLOPIDOGREL BISULFATE 75 MG: 75 TABLET ORAL at 09:04

## 2024-04-15 RX ADMIN — GABAPENTIN 300 MG: 300 CAPSULE ORAL at 08:04

## 2024-04-15 RX ADMIN — SERTRALINE HYDROCHLORIDE 150 MG: 50 TABLET ORAL at 09:04

## 2024-04-15 RX ADMIN — OMEGA-3 FATTY ACIDS CAP 1000 MG 1 CAPSULE: 1000 CAP at 09:04

## 2024-04-15 RX ADMIN — MEROPENEM 1 G: 1 INJECTION, POWDER, FOR SOLUTION INTRAVENOUS at 02:04

## 2024-04-15 RX ADMIN — ACETAMINOPHEN 650 MG: 325 TABLET ORAL at 08:04

## 2024-04-15 RX ADMIN — MICONAZOLE NITRATE: 20 CREAM TOPICAL at 09:04

## 2024-04-15 RX ADMIN — MEROPENEM 1 G: 1 INJECTION, POWDER, FOR SOLUTION INTRAVENOUS at 06:04

## 2024-04-15 RX ADMIN — Medication 1 CAPSULE: at 09:04

## 2024-04-15 NOTE — PROGRESS NOTES
[facility] - Medical Surgical Unit  Hospital Medicine  Progress Note    Patient Name: Herlinda Vladovinos  MRN: [id number]  Patient Class: IP- Swing   Admission Date: 4/11/2024  Length of Stay: 4 days  Attending Physician: Chuy Daugherty IV, DO  Primary Care Provider: Vini Hernandez NP        Subjective:     Principal Problem:Bacteremia due to Escherichia coli        HPI:  Herlinda Kaur is a 59 year old female with pmh of hypertension, chronic anxiety, depression, dysphagia, CVA, insomnia, intrapontine hemorrhage, left hemiparesis, peripheral neuropathy and thyroid disease. PCP is Micheal Nash. She presented to ER today with complaints of recurrent UTI. States she recently finished a course of Amoxicillin for a UTI but over the last 2-3 days it has come back. Reports pain, frequency and urgency with urination, reports she wears a pad. Patient has fever today. Reports some nausea also. She also complains of SOB and dry cough. Has home o2 per nasal canula at 2 liters.Worked up in ER and results are:  Labs significant for WBC 12.16, H/H 12.1/38.1, Platelets 120, Na 134, K 3.3, Cl 98, BUN 12, Creat 1.06, Calcium 8.1, Mag 1.3, Bilirubin 1.8, Lactic 1.1, Large occult blood in urine with 10-15 RBC's, positive nitrates, moderate leukocytes with 20-50 WBC's. Blood cultures pending.  Chest Xray significant for Diffuse interstitial and patchy airspace opacities scattered throughout the lungs, edema versus infection. Patient was managed in the ED with IV fluids prior to arrival, IV Magnesium, IV Zofran, IV Rocephin and oral tylenol.  The response to treatment was good.  She was admitted to inpatient care for acute urinary tract infection, failed outpatient treatment. She has recd rocephin while in ER.     * No surgery found *       Hospital Course:   04/09/24 Awake and resting in bed. Max temp is 102.6 past 24 hours. WBC 8.33.  Potassium is 3.1 cr 1.14. Denies any increased SOB. Sat on 2 liters is 96%.  Breath sounds are diminished. Will repeat cxr.  Urine culture pending. Continue rocephin and zithromax. Continue fluids. Replace potassium orally.  04/10/24 Awake and resting in bed. Max temp past 24 hours is 102.7. Blood cultures show     2 d ago       Verigene Result Negative E. coli Abnormal    Verigene Result Negative ESBL Positive Abnormal    DCD rocephin and zithromax. Merrem 1 gm IV every 8 hours. Will repeat blood cultures in 48 hours. Talked with patient re this and need to continue abx for 2 weeks after we get negative blood cultures. She is agreeable to plan. Contact isolation for ESBL ecoli bacteremia.  04/11/24 Awak and resting in bed. Temp.trending down- Max temp past 24 hours is 101.6. States she is feeling much better today. Will continue merrem for ESBL bacteremia. Will repeat blood cultures tomorrow- will continue abx for 14 days from negative blood cultures. Talked with her re swing bed due to need for abx and for weakness. She is agreeable. She will dc from acute care to swing bed today.  Admitted to swing bed on 04/11/24. Continue merrem for bacteremia. Repeat blood cultures drawn this morning. Afebrile overnight. States she is feeling much better this morning.    Overview/Hospital Course:  04/15/24 Awake and resting in bed. No complaints. States she is feeling so much better. Afebrile. Repeat blood cultures were done Friday - show no growth. Continue abx for 14 days from 04/12- so end date 04/26/24. Appetite is good. BM yesterday.    Interval History: bacteremia     Review of Systems   Constitutional:  Negative for appetite change and fever.   HENT:  Negative for sore throat and trouble swallowing.    Respiratory:  Negative for cough, chest tightness and shortness of breath.    Cardiovascular:  Negative for chest pain.   Gastrointestinal:  Negative for abdominal pain, constipation, diarrhea, nausea and vomiting.   Genitourinary:  Negative for difficulty urinating and dysuria.   Musculoskeletal:   Negative for arthralgias.   Neurological:  Positive for weakness. Negative for light-headedness and headaches.     Objective:     Vital Signs (Most Recent):  Temp: 97.9 °F (36.6 °C) (04/15/24 0729)  Pulse: 74 (04/15/24 0729)  Resp: 18 (04/15/24 0729)  BP: (!) 140/91 (04/15/24 0729)  SpO2: 96 % (04/15/24 0729) Vital Signs (24h Range):  Temp:  [97.7 °F (36.5 °C)-98.3 °F (36.8 °C)] 97.9 °F (36.6 °C)  Pulse:  [70-78] 74  Resp:  [18-20] 18  SpO2:  [95 %-97 %] 96 %  BP: (118-146)/(78-91) 140/91     Weight: 71 kg (156 lb 8 oz)  Body mass index is 26.86 kg/m².    Intake/Output Summary (Last 24 hours) at 4/15/2024 1015  Last data filed at 4/15/2024 0829  Gross per 24 hour   Intake 4743.85 ml   Output --   Net 4743.85 ml         Physical Exam  HENT:      Head: Normocephalic.      Mouth/Throat:      Mouth: Mucous membranes are moist.   Cardiovascular:      Rate and Rhythm: Normal rate and regular rhythm.      Pulses: Normal pulses.      Heart sounds: Normal heart sounds.   Pulmonary:      Effort: No respiratory distress.      Breath sounds: Normal breath sounds. No stridor. No wheezing or rhonchi.   Abdominal:      General: Bowel sounds are normal. There is no distension.      Palpations: Abdomen is soft. There is no mass.      Tenderness: There is no abdominal tenderness. There is no guarding.      Hernia: No hernia is present.   Musculoskeletal:         General: No swelling, tenderness, deformity or signs of injury.   Skin:     General: Skin is warm and dry.   Neurological:      Mental Status: She is alert and oriented to person, place, and time.      Motor: Weakness present.   Psychiatric:         Mood and Affect: Mood normal.             Significant Labs: All pertinent labs within the past 24 hours have been reviewed.  Recent Lab Results       None            Significant Imaging: I have reviewed all pertinent imaging results/findings within the past 24 hours.    Assessment/Plan:      * Bacteremia due to Escherichia  coli    04/12/24 repeat blood cultures x 2 tomorrow  Continue merrem 1 gm every 8 hours x 14 post negative blood culture      04/15/24 blood cultures from 4/12 show no growth  Continue abx through 04/26       Acute cystitis with hematuria    Urine culture  Order: [id number] - Reflex for Order [id number]  Status: Final result       Visible to patient: No (inaccessible in Patient Portal)       Next appt: None    Specimen Information: Urine   0 Result Notes  Urine Culture >100,000 Escherichia coli ESBL Abnormal            Resulting Agency:     Susceptibility     Escherichia coli ESBL     Not Specified     Ampicillin >16 µg/ml Resistant     Ampicillin/Sulbactam >16/8 µg/ml Resistant     Aztreonam >16 µg/ml Resistant     Cefazolin >16 µg/ml Resistant     Cefepime >16 µg/ml Resistant     Cefotaxime >16 µg/ml Resistant     Ceftazidime 16 µg/ml Resistant     Ceftriaxone >2 µg/ml Resistant     Ciprofloxacin 0.5 µg/ml Intermediate     Ertapenem <=0.5 µg/ml Sensitive     Gentamicin >8 µg/ml Resistant     Meropenem <=1 µg/ml Sensitive     Nitrofurantoin <=32 µg/ml Sensitive     Piperacillin/Tazobactam <=8 µg/ml Sensitive     Tetracycline >8 µg/ml Resistant     Tobramycin >8 µg/ml Resistant     Trimeth/Sulfa <=2/38 µg/ml Sensitive                     04/12/24 continue merrem    General weakness  04/12/24 PT and OT to evaluate and treat      Hypertension  Chronic, controlled. Latest blood pressure and vitals reviewed-     Temp:  [98.1 °F (36.7 °C)-100.3 °F (37.9 °C)]   Pulse:  [75-94]   Resp:  [18-20]   BP: (123-132)/(79-90)   SpO2:  [89 %-97 %] .   Home meds for hypertension were reviewed and noted below.   Hypertension Medications               amLODIPine (NORVASC) 10 MG tablet Take 1 tablet (10 mg total) by mouth once daily.    olmesartan (BENICAR) 40 MG tablet Take 40 mg by mouth once daily.            While in the hospital, will manage blood pressure as follows; Continue home antihypertensive regimen    Will utilize p.r.n.  blood pressure medication only if patient's blood pressure greater than 180/110 and she develops symptoms such as worsening chest pain or shortness of breath.    Thyroid disease    04/12/24 continue levothyroxine    Depressive disorder  Patient has persistent depression which is unknown and is currently controlled. Will Continue anti-depressant medications. We will not consult psychiatry at this time. Patient does not display psychosis at this time. Continue to monitor closely and adjust plan of care as needed.        Generalized anxiety disorder        Dyslipidemia  04/12/24 continue statin      DVT prophylaxis  04/12/24 continue lovenox        VTE Risk Mitigation (From admission, onward)      None            Discharge Planning   POOL: 4/24/2024     Code Status: Full Code   Is the patient medically ready for discharge?:     Reason for patient still in hospital (select all that apply): Treatment  Discharge Plan A: Home with family                  EKVAN Funk  Department of Hospital Medicine   [facility] - Medical Surgical Unit

## 2024-04-15 NOTE — PLAN OF CARE
Problem: Fall Injury Risk  Goal: Absence of Fall and Fall-Related Injury  Outcome: Ongoing, Progressing  Intervention: Promote Injury-Free Environment  Flowsheets (Taken 4/15/2024 1210)  Safety Promotion/Fall Prevention:   assistive device/personal item within reach   bed alarm set   chair alarm set   nonskid shoes/socks when out of bed   side rails raised x 3   instructed to call staff for mobility

## 2024-04-15 NOTE — HOSPITAL COURSE
04/15/24 Awake and resting in bed. No complaints. States she is feeling so much better. Afebrile. Repeat blood cultures were done Friday - show no growth. Continue abx for 14 days from 04/12- so end date 04/26/24. Appetite is good. BM yesterday.  04/16/24 Awake and resting in bed. No complaints. Appetite is good. Continue abx for ESBL bacteremia. Continue PT and OT.  04/17/24 Tolerating abx well. No complaints. Continue PT and OT for strengthening.  04/22/24 no complaints. Tolerating merrem well. Potassium is 3.3. Will replace orally. Continue therapy.  04/23/24 potassium is 3.7. Complains of vaginal itching. Will treat with diflucan.    04/25/24 Awake and resting in bed . Will complete merrem tonight after 1800 dose. Wishes to dc home after this. Will dc home with home halth with PT and OT . Meds reconciled and sent to [facility] per her request. Will follow up with PCP Micheal Smith NP.

## 2024-04-15 NOTE — PLAN OF CARE
[facility] - [facility] Surgical Unit  Discharge Assessment    Primary Care Provider: Vini Hernandez NP     Discharge Assessment (most recent)       BRIEF DISCHARGE ASSESSMENT - 04/15/24 2171          Discharge Planning    Assessment Type Discharge Planning Brief Assessment (P)                    Visited with Ms. Liz earlier today, her intentions are to return home with her family after discharge from [facility].

## 2024-04-15 NOTE — PLAN OF CARE
Problem: Pain Acute  Goal: Acceptable Pain Control and Functional Ability  Outcome: Ongoing, Progressing     Problem: Fall Injury Risk  Goal: Absence of Fall and Fall-Related Injury  Outcome: Ongoing, Progressing     Problem: Hypertension Acute  Goal: Blood Pressure Within Desired Range  Outcome: Ongoing, Progressing

## 2024-04-15 NOTE — ASSESSMENT & PLAN NOTE
04/12/24 repeat blood cultures x 2 tomorrow  Continue merrem 1 gm every 8 hours x 14 post negative blood culture      04/15/24 blood cultures from 4/12 show no growth  Continue abx through 04/26

## 2024-04-15 NOTE — SUBJECTIVE & OBJECTIVE
Interval History: bacteremia     Review of Systems   Constitutional:  Negative for appetite change and fever.   HENT:  Negative for sore throat and trouble swallowing.    Respiratory:  Negative for cough, chest tightness and shortness of breath.    Cardiovascular:  Negative for chest pain.   Gastrointestinal:  Negative for abdominal pain, constipation, diarrhea, nausea and vomiting.   Genitourinary:  Negative for difficulty urinating and dysuria.   Musculoskeletal:  Negative for arthralgias.   Neurological:  Positive for weakness. Negative for light-headedness and headaches.     Objective:     Vital Signs (Most Recent):  Temp: 97.9 °F (36.6 °C) (04/15/24 0729)  Pulse: 74 (04/15/24 0729)  Resp: 18 (04/15/24 0729)  BP: (!) 140/91 (04/15/24 0729)  SpO2: 96 % (04/15/24 0729) Vital Signs (24h Range):  Temp:  [97.7 °F (36.5 °C)-98.3 °F (36.8 °C)] 97.9 °F (36.6 °C)  Pulse:  [70-78] 74  Resp:  [18-20] 18  SpO2:  [95 %-97 %] 96 %  BP: (118-146)/(78-91) 140/91     Weight: 71 kg (156 lb 8 oz)  Body mass index is 26.86 kg/m².    Intake/Output Summary (Last 24 hours) at 4/15/2024 1015  Last data filed at 4/15/2024 0829  Gross per 24 hour   Intake 4743.85 ml   Output --   Net 4743.85 ml         Physical Exam  HENT:      Head: Normocephalic.      Mouth/Throat:      Mouth: Mucous membranes are moist.   Cardiovascular:      Rate and Rhythm: Normal rate and regular rhythm.      Pulses: Normal pulses.      Heart sounds: Normal heart sounds.   Pulmonary:      Effort: No respiratory distress.      Breath sounds: Normal breath sounds. No stridor. No wheezing or rhonchi.   Abdominal:      General: Bowel sounds are normal. There is no distension.      Palpations: Abdomen is soft. There is no mass.      Tenderness: There is no abdominal tenderness. There is no guarding.      Hernia: No hernia is present.   Musculoskeletal:         General: No swelling, tenderness, deformity or signs of injury.   Skin:     General: Skin is warm and dry.    Neurological:      Mental Status: She is alert and oriented to person, place, and time.      Motor: Weakness present.   Psychiatric:         Mood and Affect: Mood normal.             Significant Labs: All pertinent labs within the past 24 hours have been reviewed.  Recent Lab Results       None            Significant Imaging: I have reviewed all pertinent imaging results/findings within the past 24 hours.

## 2024-04-16 PROBLEM — N30.01 ACUTE CYSTITIS WITH HEMATURIA: Status: RESOLVED | Noted: 2021-05-20 | Resolved: 2024-04-16

## 2024-04-16 PROCEDURE — 97161 PT EVAL LOW COMPLEX 20 MIN: CPT

## 2024-04-16 PROCEDURE — 27000221 HC OXYGEN, UP TO 24 HOURS

## 2024-04-16 PROCEDURE — 27000982 HC MATTRESS, MATRIX LAL RENTAL

## 2024-04-16 PROCEDURE — 97165 OT EVAL LOW COMPLEX 30 MIN: CPT

## 2024-04-16 PROCEDURE — 94761 N-INVAS EAR/PLS OXIMETRY MLT: CPT

## 2024-04-16 PROCEDURE — 99308 SBSQ NF CARE LOW MDM 20: CPT | Mod: ,,, | Performed by: FAMILY MEDICINE

## 2024-04-16 PROCEDURE — 63600175 PHARM REV CODE 636 W HCPCS: Performed by: FAMILY MEDICINE

## 2024-04-16 PROCEDURE — 11000004 HC SNF PRIVATE

## 2024-04-16 PROCEDURE — 25000003 PHARM REV CODE 250: Performed by: NURSE PRACTITIONER

## 2024-04-16 PROCEDURE — 25000003 PHARM REV CODE 250: Performed by: FAMILY MEDICINE

## 2024-04-16 PROCEDURE — 63600175 PHARM REV CODE 636 W HCPCS: Performed by: NURSE PRACTITIONER

## 2024-04-16 PROCEDURE — 27000944

## 2024-04-16 RX ORDER — ENOXAPARIN SODIUM 100 MG/ML
40 INJECTION SUBCUTANEOUS EVERY 24 HOURS
Status: DISCONTINUED | OUTPATIENT
Start: 2024-04-16 | End: 2024-04-25 | Stop reason: HOSPADM

## 2024-04-16 RX ADMIN — Medication 1 CAPSULE: at 11:04

## 2024-04-16 RX ADMIN — ENOXAPARIN SODIUM 40 MG: 40 INJECTION SUBCUTANEOUS at 04:04

## 2024-04-16 RX ADMIN — VALSARTAN 320 MG: 160 TABLET, FILM COATED ORAL at 09:04

## 2024-04-16 RX ADMIN — GABAPENTIN 300 MG: 300 CAPSULE ORAL at 08:04

## 2024-04-16 RX ADMIN — THERA TABS 1 TABLET: TAB at 09:04

## 2024-04-16 RX ADMIN — OMEGA-3 FATTY ACIDS CAP 1000 MG 1 CAPSULE: 1000 CAP at 09:04

## 2024-04-16 RX ADMIN — ATORVASTATIN CALCIUM 40 MG: 40 TABLET, FILM COATED ORAL at 08:04

## 2024-04-16 RX ADMIN — MEROPENEM 1 G: 1 INJECTION, POWDER, FOR SOLUTION INTRAVENOUS at 05:04

## 2024-04-16 RX ADMIN — MICONAZOLE NITRATE: 20 CREAM TOPICAL at 08:04

## 2024-04-16 RX ADMIN — SERTRALINE HYDROCHLORIDE 150 MG: 50 TABLET ORAL at 09:04

## 2024-04-16 RX ADMIN — CLOPIDOGREL BISULFATE 75 MG: 75 TABLET ORAL at 09:04

## 2024-04-16 RX ADMIN — PANTOPRAZOLE SODIUM 40 MG: 40 TABLET, DELAYED RELEASE ORAL at 09:04

## 2024-04-16 RX ADMIN — MICONAZOLE NITRATE: 20 CREAM TOPICAL at 09:04

## 2024-04-16 RX ADMIN — SENNOSIDES AND DOCUSATE SODIUM 1 TABLET: 8.6; 5 TABLET ORAL at 08:04

## 2024-04-16 RX ADMIN — MEROPENEM 1 G: 1 INJECTION, POWDER, FOR SOLUTION INTRAVENOUS at 01:04

## 2024-04-16 RX ADMIN — Medication 1 CAPSULE: at 09:04

## 2024-04-16 RX ADMIN — Medication 1 CAPSULE: at 04:04

## 2024-04-16 RX ADMIN — SENNOSIDES AND DOCUSATE SODIUM 1 TABLET: 8.6; 5 TABLET ORAL at 09:04

## 2024-04-16 RX ADMIN — MUPIROCIN: 20 OINTMENT TOPICAL at 09:04

## 2024-04-16 RX ADMIN — AMLODIPINE BESYLATE 10 MG: 5 TABLET ORAL at 09:04

## 2024-04-16 RX ADMIN — GABAPENTIN 300 MG: 300 CAPSULE ORAL at 09:04

## 2024-04-16 RX ADMIN — MEROPENEM 1 G: 1 INJECTION, POWDER, FOR SOLUTION INTRAVENOUS at 09:04

## 2024-04-16 NOTE — PLAN OF CARE
Problem: Gas Exchange Impaired  Goal: Optimal Gas Exchange  Outcome: Ongoing, Progressing     Problem: Pain Acute  Goal: Acceptable Pain Control and Functional Ability  Outcome: Ongoing, Progressing     Problem: Fall Injury Risk  Goal: Absence of Fall and Fall-Related Injury  Outcome: Ongoing, Progressing

## 2024-04-16 NOTE — PROGRESS NOTES
[facility]  Hospital Medicine  Progress Note    Patient Name: Herlinda Valdovinos  MRN: [id number]  Patient Class: IP- Swing   Admission Date: 4/11/2024  Length of Stay: 5 days  Attending Physician: Chuy Daugherty IV, DO  Primary Care Provider: Vini Hernandez NP        Subjective:     Principal Problem:Bacteremia due to Escherichia coli    [facility]      HPI:  Herlinda Kaur is a 59 year old female with pmh of hypertension, chronic anxiety, depression, dysphagia, CVA, insomnia, intrapontine hemorrhage, left hemiparesis, peripheral neuropathy and thyroid disease. PCP is Micheal Nash. She presented to ER today with complaints of recurrent UTI. States she recently finished a course of Amoxicillin for a UTI but over the last 2-3 days it has come back. Reports pain, frequency and urgency with urination, reports she wears a pad. Patient has fever today. Reports some nausea also. She also complains of SOB and dry cough. Has home o2 per nasal canula at 2 liters.Worked up in ER and results are:  Labs significant for WBC 12.16, H/H 12.1/38.1, Platelets 120, Na 134, K 3.3, Cl 98, BUN 12, Creat 1.06, Calcium 8.1, Mag 1.3, Bilirubin 1.8, Lactic 1.1, Large occult blood in urine with 10-15 RBC's, positive nitrates, moderate leukocytes with 20-50 WBC's. Blood cultures pending.  Chest Xray significant for Diffuse interstitial and patchy airspace opacities scattered throughout the lungs, edema versus infection. Patient was managed in the ED with IV fluids prior to arrival, IV Magnesium, IV Zofran, IV Rocephin and oral tylenol.  The response to treatment was good.  She was admitted to inpatient care for acute urinary tract infection, failed outpatient treatment. She has recd rocephin while in ER.     * No surgery found *       Hospital Course:   04/09/24 Awake and resting in bed. Max temp is 102.6 past 24 hours. WBC 8.33.  Potassium is 3.1 cr 1.14.  Denies any increased SOB. Sat on 2 liters is 96%. Breath sounds are diminished. Will repeat cxr.  Urine culture pending. Continue rocephin and zithromax. Continue fluids. Replace potassium orally.  04/10/24 Awake and resting in bed. Max temp past 24 hours is 102.7. Blood cultures show     2 d ago       Verigene Result Negative E. coli Abnormal    Verigene Result Negative ESBL Positive Abnormal    DCD rocephin and zithromax. Merrem 1 gm IV every 8 hours. Will repeat blood cultures in 48 hours. Talked with patient re this and need to continue abx for 2 weeks after we get negative blood cultures. She is agreeable to plan. Contact isolation for ESBL ecoli bacteremia.  04/11/24 Awak and resting in bed. Temp.trending down- Max temp past 24 hours is 101.6. States she is feeling much better today. Will continue merrem for ESBL bacteremia. Will repeat blood cultures tomorrow- will continue abx for 14 days from negative blood cultures. Talked with her re swing bed due to need for abx and for weakness. She is agreeable. She will dc from acute care to swing bed today.  Admitted to swing bed on 04/11/24. Continue merrem for bacteremia. Repeat blood cultures drawn this morning. Afebrile overnight. States she is feeling much better this morning.    Overview/Hospital Course:  04/15/24 Awake and resting in bed. No complaints. States she is feeling so much better. Afebrile. Repeat blood cultures were done Friday - show no growth. Continue abx for 14 days from 04/12- so end date 04/26/24. Appetite is good. BM yesterday.  04/16/24 Awake and resting in bed. No complaints. Appetite is good. Continue abx for ESBL bacteremia. Continue PT and OT.    Interval History: weakness    Review of Systems   Constitutional:  Negative for appetite change and fever.   HENT:  Negative for sore throat and trouble swallowing.    Respiratory:  Negative for cough, chest tightness and shortness of breath.    Cardiovascular:  Negative for chest pain.    Gastrointestinal:  Negative for abdominal pain, constipation, diarrhea, nausea and vomiting.   Genitourinary:  Negative for difficulty urinating and dysuria.   Musculoskeletal:  Negative for arthralgias.   Neurological:  Positive for weakness. Negative for light-headedness and headaches.     Objective:     Vital Signs (Most Recent):  Temp: 98.4 °F (36.9 °C) (04/16/24 0735)  Pulse: 76 (04/16/24 0735)  Resp: 18 (04/16/24 0735)  BP: (!) 138/96 (04/16/24 0735)  SpO2: (!) 93 % (04/16/24 0735) Vital Signs (24h Range):  Temp:  [97.6 °F (36.4 °C)-98.5 °F (36.9 °C)] 98.4 °F (36.9 °C)  Pulse:  [69-78] 76  Resp:  [18-20] 18  SpO2:  [93 %-95 %] 93 %  BP: (111-142)/(71-96) 138/96     Weight: 71 kg (156 lb 8 oz)  Body mass index is 26.86 kg/m².    Intake/Output Summary (Last 24 hours) at 4/16/2024 0958  Last data filed at 4/16/2024 0818  Gross per 24 hour   Intake 2051.48 ml   Output --   Net 2051.48 ml         Physical Exam  HENT:      Head: Normocephalic.      Mouth/Throat:      Mouth: Mucous membranes are moist.   Cardiovascular:      Rate and Rhythm: Normal rate and regular rhythm.      Pulses: Normal pulses.      Heart sounds: Normal heart sounds.   Pulmonary:      Effort: No respiratory distress.      Breath sounds: Normal breath sounds. No stridor. No wheezing or rhonchi.   Abdominal:      General: Bowel sounds are normal. There is no distension.      Palpations: Abdomen is soft. There is no mass.      Tenderness: There is no abdominal tenderness. There is no guarding.      Hernia: No hernia is present.   Musculoskeletal:         General: No swelling, tenderness, deformity or signs of injury.   Skin:     General: Skin is warm and dry.   Neurological:      Mental Status: She is alert and oriented to person, place, and time.      Motor: Weakness present.   Psychiatric:         Mood and Affect: Mood normal.             Significant Labs: All pertinent labs within the past 24 hours have been reviewed.  Recent Lab Results        None            Significant Imaging: I have reviewed all pertinent imaging results/findings within the past 24 hours.    Assessment/Plan:      * Bacteremia due to Escherichia coli    04/12/24 repeat blood cultures x 2 tomorrow  Continue merrem 1 gm every 8 hours x 14 post negative blood culture      04/15/24 blood cultures from 4/12 show no growth  Continue abx through 04/26 04/16/24 tolerating merrem well    General weakness  04/12/24 PT and OT to evaluate and treat    04/16/24 PT and  OT to eval and treat      Hypertension  Chronic, controlled. Latest blood pressure and vitals reviewed-     Temp:  [98.1 °F (36.7 °C)-100.3 °F (37.9 °C)]   Pulse:  [75-94]   Resp:  [18-20]   BP: (123-132)/(79-90)   SpO2:  [89 %-97 %] .   Home meds for hypertension were reviewed and noted below.   Hypertension Medications               amLODIPine (NORVASC) 10 MG tablet Take 1 tablet (10 mg total) by mouth once daily.    olmesartan (BENICAR) 40 MG tablet Take 40 mg by mouth once daily.            While in the hospital, will manage blood pressure as follows; Continue home antihypertensive regimen    Will utilize p.r.n. blood pressure medication only if patient's blood pressure greater than 180/110 and she develops symptoms such as worsening chest pain or shortness of breath.    Thyroid disease    04/12/24 continue levothyroxine    Depressive disorder  Patient has persistent depression which is unknown and is currently controlled. Will Continue anti-depressant medications. We will not consult psychiatry at this time. Patient does not display psychosis at this time. Continue to monitor closely and adjust plan of care as needed.        Generalized anxiety disorder        Dyslipidemia  04/12/24 continue statin      DVT prophylaxis  04/12/24 continue lovenox        VTE Risk Mitigation (From admission, onward)           Ordered     enoxaparin injection 40 mg  Every 24 hours         04/16/24 0957                    Discharge  Planning   POOL: 4/24/2024     Code Status: Full Code   Is the patient medically ready for discharge?:     Reason for patient still in hospital (select all that apply): Treatment  Discharge Plan A: Home with family                  Chuy Daugherty IV, DO  Department of Hospital Medicine   [facility]

## 2024-04-16 NOTE — SUBJECTIVE & OBJECTIVE
Interval History: weakness    Review of Systems   Constitutional:  Negative for appetite change and fever.   HENT:  Negative for sore throat and trouble swallowing.    Respiratory:  Negative for cough, chest tightness and shortness of breath.    Cardiovascular:  Negative for chest pain.   Gastrointestinal:  Negative for abdominal pain, constipation, diarrhea, nausea and vomiting.   Genitourinary:  Negative for difficulty urinating and dysuria.   Musculoskeletal:  Negative for arthralgias.   Neurological:  Positive for weakness. Negative for light-headedness and headaches.     Objective:     Vital Signs (Most Recent):  Temp: 98.4 °F (36.9 °C) (04/16/24 0735)  Pulse: 76 (04/16/24 0735)  Resp: 18 (04/16/24 0735)  BP: (!) 138/96 (04/16/24 0735)  SpO2: (!) 93 % (04/16/24 0735) Vital Signs (24h Range):  Temp:  [97.6 °F (36.4 °C)-98.5 °F (36.9 °C)] 98.4 °F (36.9 °C)  Pulse:  [69-78] 76  Resp:  [18-20] 18  SpO2:  [93 %-95 %] 93 %  BP: (111-142)/(71-96) 138/96     Weight: 71 kg (156 lb 8 oz)  Body mass index is 26.86 kg/m².    Intake/Output Summary (Last 24 hours) at 4/16/2024 0958  Last data filed at 4/16/2024 0818  Gross per 24 hour   Intake 2051.48 ml   Output --   Net 2051.48 ml         Physical Exam  HENT:      Head: Normocephalic.      Mouth/Throat:      Mouth: Mucous membranes are moist.   Cardiovascular:      Rate and Rhythm: Normal rate and regular rhythm.      Pulses: Normal pulses.      Heart sounds: Normal heart sounds.   Pulmonary:      Effort: No respiratory distress.      Breath sounds: Normal breath sounds. No stridor. No wheezing or rhonchi.   Abdominal:      General: Bowel sounds are normal. There is no distension.      Palpations: Abdomen is soft. There is no mass.      Tenderness: There is no abdominal tenderness. There is no guarding.      Hernia: No hernia is present.   Musculoskeletal:         General: No swelling, tenderness, deformity or signs of injury.   Skin:     General: Skin is warm and dry.    Neurological:      Mental Status: She is alert and oriented to person, place, and time.      Motor: Weakness present.   Psychiatric:         Mood and Affect: Mood normal.             Significant Labs: All pertinent labs within the past 24 hours have been reviewed.  Recent Lab Results       None            Significant Imaging: I have reviewed all pertinent imaging results/findings within the past 24 hours.

## 2024-04-16 NOTE — ASSESSMENT & PLAN NOTE
04/12/24 repeat blood cultures x 2 tomorrow  Continue merrem 1 gm every 8 hours x 14 post negative blood culture      04/15/24 blood cultures from 4/12 show no growth  Continue abx through 04/26 04/16/24 tolerating merrem well

## 2024-04-16 NOTE — PLAN OF CARE
[facility] - [facility] Surgical Unit - Swing Bed   Interdisciplinary Team Meeting    Patient: Herlinda Valdovinos   Today's Date: 4/16/2024   Estimated D/C Date: 4/24/2024        Physician:  Chuy Daugherty MD Unit Director: IRENE Rincon   Pharmacy: [facility] Nursing: Nichole Allen RN   : Naomi Chung RN Physical/Occupational Therapy: Nguyễn Cuba PT   Speech Therapy: n/a Respiratory: n/a   Dietary: Willa Collins                            Nursing  New Symptoms/Problems: none at this time      Urine: incontinent  [place]: No   Bowel: incontinent  Last Bowel Movement: 04/15/24   Constipated: No  Diarrhea: No   Isolation: Yes  Cognition: WNL  Aspiration Precautions: No  Wound Care: No  Wound Location/Tx: n/a  Comment(s): n/a     Respiratory  O2 Device: Nasal Cannula  O2 Flow: 2l  SpO2: 96%  Neb Tx: Yes  Comment(s): nebs prn     Dietary  Nutrition: Regular  Comment(s): n/a    Speech Therapy  Speech/Swallowing: No current speech or swallowing issues  Comment(s): n/a    Physical Therapy  Gait/Assistive Device: PT not ordered for this patient ELOS: Plan to DC 4/24/2024           Occupational Therapy              Activity Therapy  Level of participation: pt not in therapy   Comment(s): n/a    Pharmacy  Medication Changes: No  Labs Reviewed: Yes  New Lab Orders: No  Comment(s): labs q mon/thurs      Tx Plan/Recommendations reviewed with family and/or patient on 4/15/24.  Additional family Conference/Training: n/a  D/C Plan/Recommendations: Home with family  POOL: 4/24/2024  Comment(s): Visited with Ms. Liz earlier today, her intentions are to return home with her family after discharge from [facility].

## 2024-04-16 NOTE — PLAN OF CARE
Problem: Physical Therapy  Goal: Physical Therapy Goal  Description: Short-term Goals: 0.75 week  1. Patient will perform supine to/from sit with modified independence to improve independence and safety with bed mobility.  2. Patient will perform sit to/from stand with a rolling walker with standby assist to improve independence and safety with transfers.  3. Patient will ambulate 100 feet with a rolling walker with standby assist to improve independence and safety with gait.  4. Patient will tolerate 15 minutes of physical therapy intervention to improve endurance and activity tolerance.    Long-term goals: 1.5 weeks  1. Patient will perform sit to/from stand with a rolling walker with supervision to improve independence and safety with transfers.  2. Patient will ambulate 50 feet with a quad cane with contact guard assist to improve independence and safety with gait.  3. Patient will tolerate 30 minutes of physical therapy intervention to improve endurance and activity tolerance.    Outcome: Ongoing, Progressing

## 2024-04-16 NOTE — PT/OT/SLP EVAL
Occupational Therapy   Evaluation    Name: Herlinda Valdovinos  MRN: [id number]  Admitting Diagnosis: Bacteremia due to Escherichia coli  Recent Surgery: * No surgery found *      Recommendations:     Discharge Recommendations: Low Intensity Therapy  Discharge Equipment Recommendations:  none  Barriers to discharge:  None    Assessment:     Herlinda Valdovinos is a 59 y.o. female with a medical diagnosis of Bacteremia due to Escherichia coli.  She presents with weakness and states she is getting walker from laying in the bed. Patient desires OT to increase strength and function with self care. Performance deficits affecting function: weakness, impaired endurance, impaired self care skills, impaired functional mobility, gait instability, impaired balance, decreased coordination, decreased lower extremity function, decreased safety awareness.      Rehab Prognosis: Fair; patient would benefit from acute skilled OT services to address these deficits and reach maximum level of function.       Plan:     Patient to be seen 5 x/week to address the above listed problems via self-care/home management, therapeutic activities, therapeutic exercises  Plan of Care Expires:    Plan of Care Reviewed with: patient    Subjective     Chief Complaint: Weakness  Patient/Family Comments/goals: to get better    Occupational Profile:  Living Environment: patient lives at home with   Previous level of function: Independent with self care  Roles and Routines: Homemaker  Equipment Used at Home: rollator  Assistance upon Discharge: Recommend D/C to home with HH    Pain/Comfort:  Pain Rating 1: 0/10  Pain Rating Post-Intervention 1: 0/10    Patients cultural, spiritual, Confucianist conflicts given the current situation: no    Objective:     Communicated with: Nurse prior to session.  Patient found HOB elevated with oxygen, peripheral IV upon OT entry to room.    General Precautions: Standard, fall, contact  Orthopedic Precautions: N/A  Braces:  N/A  Respiratory Status: Room air    Occupational Performance:    Bed Mobility:    Patient completed Rolling/Turning to Left with  stand by assistance  Patient completed Rolling/Turning to Right with stand by assistance  Patient completed Scooting/Bridging with stand by assistance  Patient completed Supine to Sit with stand by assistance    Functional Mobility/Transfers:  Patient completed Sit <> Stand Transfer with supervision  with  rolling walker   Patient completed Toilet Transfer Step Transfer technique with supervision with  rolling walker  Functional Mobility: Patient transferred within room with minimal difficulty    Activities of Daily Living:  Grooming: stand by assistance to stand and wash hands at sink  Upper Body Dressing: minimum assistance to adalberto gown  Lower Body Dressing: minimum assistance to adalberto socks    Cognitive/Visual Perceptual:  Cognitive/Psychosocial Skills:     -       Oriented to: Person, Place, and Situation   -       Follows Commands/attention:Follows multistep  commands  -       Communication: clear/fluent  -       Safety awareness/insight to disability: impaired   Visual/Perceptual:      -Intact WFLs    Physical Exam:  Balance:    -       sitting- fair  Dominant hand:    -       right  Upper Extremity Range of Motion:     -       Right Upper Extremity: WFL  -       Left Upper Extremity: WFL  Upper Extremity Strength:    -       Right Upper Extremity: 4+/5 grossly  -       Left Upper Extremity: 3/5 grossly   Strength:    -       Right Upper Extremity: WFL  -       Left Upper Extremity: WFL  Fine Motor Coordination: -       Intact  Gross motor coordination:   WFL    AMPAC 6 Click ADL:  AMPAC Total Score: 21    Treatment & Education:  Pt educated on OT role/POC.   Importance of OOB activity with staff assistance.  Importance of sitting up in the chair throughout the day as tolerated, especially for meals   Safety during functional t/f and mobility  Importance of assisting with  self-care activities      Patient left sitting edge of bed with all lines intact, call button in reach, and CNA present    GOALS:   Multidisciplinary Problems       Occupational Therapy Goals          Problem: Occupational Therapy    Goal Priority Disciplines Outcome Interventions   Occupational Therapy Goal     OT, PT/OT Ongoing, Progressing    Description: Grooming Status:   Short Term Goal: Pt will perform grooming with SBA sitting EOB.   Long Term Goal: Pt will perform grooming/oral hygiene standing at sink with SBA      LE dressing Status:   Short Term Goal: Pt will perform LE dressing with SBA.   Long Term Goal: Pt will perform LE dressing with Mod I.    Toileting Status:   Short Term Goal: Pt will perform toilet hygiene on BSC with Mod I.  Long Term Goal: Pt will perform toilet hygiene on toilet with no AE with Mod I.    Commode Transfer:   Short Term Goal: Pt will perform BSC t/f with Mod I.  Long Term Goal:  Pt will perform toilet t/f in bathroom with Mod I.     Bathing Status:   Long Term Goal: Pt will perform sponge bath with Mod I with no unsafe fatigue.     Strength Status: 5/5 BUEs  Long Term Goal: Pt to perform BUE strengthening with weights and/or body weight to increase ADL independence and safety    Endurance Status:   Short Term Goal:pt to perform 15 min OT treatment with 5 or greater rest breaks  Long Term Goal: pt to perform 30 min OT treat with 3 or less rest breaks                          History:     Past Medical History:   Diagnosis Date    Cerebral hemorrhage     Chronic anxiety     Decreased coordination 3/7/2022    Dehydration     Depression     Dysphagia     Due to and following non-traumatic intracerebral hemorrhage    Hearing loss     History of CVA (cerebrovascular accident)     Hypertension     Hypokalemia 10/20/2022    Insomnia     Intrapontine hemorrhage     Left hemiparesis     Peripheral neuropathy     Stroke     Thyroid disease     Transient cerebral ischemia          Past  Surgical History:   Procedure Laterality Date    APPENDECTOMY      CHOLECYSTECTOMY      HYSTERECTOMY      LITHOTRIPSY      Renal lithotripsy    percutaneious insertion of ureteric stent         Time Tracking:     OT Date of Treatment: 04/16/24  OT Start Time: 1400  OT Stop Time: 1425  OT Total Time (min): 25 min    Billable Minutes:Evaluation Low complexity    Kerri Sanchez, RUDDY/L, CSRS  4/16/2024

## 2024-04-16 NOTE — PT/OT/SLP EVAL
Physical Therapy Evaluation    Patient Name:  Herlinda Valdovinos   MRN:  [id number]    Recommendations:     Discharge Recommendations: Low Intensity Therapy   Discharge Equipment Recommendations: none   Barriers to discharge: None    Assessment:     Herlinda Valdovinos is a 59 y.o. female admitted with a medical diagnosis of Bacteremia due to Escherichia coli. She presents with the following impairments/functional limitations: weakness, impaired endurance, impaired functional mobility, gait instability, impaired balance, decreased coordination, decreased lower extremity function, decreased safety awareness. 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 Patient typically ambulates around her home with a rollator. Patient reports she has decided that she would like to work with physical therapy while in the swing bed setting in order to avoid a functional decline.     Rehab Prognosis: Good; patient would benefit from acute skilled PT services to address these deficits and reach maximum level of function.    Recent Surgery: * No surgery found *      Plan:     During this hospitalization, patient to be seen 5 x/week to address the identified rehab impairments via gait training, therapeutic exercises, therapeutic activities, neuromuscular re-education and progress toward the following goals:    Plan of Care Expires:  04/26/24    Subjective     Chief Complaint: no complaints this morning  Patient/Family Comments/goals: Patient's goal is to return home with her  when finished with her IV antibiotic regimen on 4/26/2024. Patient reports she understands that Physical Therapist will not be able to see her for every visit but that [facility] her hard enough towards her goals. Patient reports she would like to be able to walk down the aisle in a wedding on 5/18/2024 with a quad cane. Physical Therapist informed patient  that he would speak with Physical Therapist Assistant, share patient's concerns, and share patient's goals for rehab. Physical Therapist advised patient to let Physical Therapist know if she has issues/concerns with her physical therapy treatment.  Pain/Comfort:  Pain Rating 1: 0/10  Pain Rating Post-Intervention 1: 0/10    Patients cultural, spiritual, Jewish conflicts given the current situation: no    Living Environment: Patient lives with her spouse and ambulates short distances with a rollator. Patient reports she goes to [employer] and occasionally to [employer]. Otherwise, she spends the majority of her time at home. Prior to admission, patient required standby assist with all functional mobility with a rollator. Occasional minimal assist required with sit to stand transfers. Equipment used at home: rollator. DME owned (not currently used): none.  Upon discharge, patient will have assistance from her  and other family members as needed.    Objective:     Communicated with KEVAN Smith prior to session. Patient found supine with oxygen, peripheral IV  upon PT entry to room.    General Precautions: Standard, fall, contact  Orthopedic Precautions:N/A   Braces: N/A  Respiratory Status: Nasal cannula, flow 2 L/min    Exams:  Gross Motor Coordination:  WFL  Sensation:    -       Intact  RLE ROM: WFL  RLE Strength: WFL  LLE ROM: WFL  LLE Strength: Deficits: 4/5 (baseline following her CVA)    Functional Mobility:  Bed Mobility:     Scooting: modified independence  Supine to Sit: standby assist   Sit to Supine: minimal assist  Transfers:     Sit to Stand:  contact guard assist with rolling walker  Gait: x 30 feet with rolling walker with contact guard assist vs standby assist; slow lilli; no loss of balance  Balance: standby assist with static standing balance with rolling walker; contact guard assist with dynamic standing balance with rolling walker    AM-PAC 6 CLICK MOBILITY  Total  Score:18     Treatment & Education:    Bed mobility, transfers, and gait performed as listed above. Patient educated on the physical therapy plan of care.     Patient left supine with call button in reach.    GOALS:   Multidisciplinary Problems       Physical Therapy Goals          Problem: Physical Therapy    Goal Priority Disciplines Outcome Goal Variances Interventions   Physical Therapy Goal     PT, PT/OT Ongoing, Progressing     Description: Short-term Goals: 0.75 week  1. Patient will perform supine to/from sit with modified independence to improve independence and safety with bed mobility.  2. Patient will perform sit to/from stand with a rolling walker with standby assist to improve independence and safety with transfers.  3. Patient will ambulate 100 feet with a rolling walker with standby assist to improve independence and safety with gait.  4. Patient will tolerate 15 minutes of physical therapy intervention to improve endurance and activity tolerance.    Long-term goals: 1.5 weeks  1. Patient will perform sit to/from stand with a rolling walker with supervision to improve independence and safety with transfers.  2. Patient will ambulate 50 feet with a quad cane with contact guard assist to improve independence and safety with gait.  3. Patient will tolerate 30 minutes of physical therapy intervention to improve endurance and activity tolerance.                       History:     Past Medical History:   Diagnosis Date    Cerebral hemorrhage     Chronic anxiety     Decreased coordination 3/7/2022    Dehydration     Depression     Dysphagia     Due to and following non-traumatic intracerebral hemorrhage    Hearing loss     History of CVA (cerebrovascular accident)     Hypertension     Hypokalemia 10/20/2022    Insomnia     Intrapontine hemorrhage     Left hemiparesis     Peripheral neuropathy     Stroke     Thyroid disease     Transient cerebral ischemia      Past Surgical History:   Procedure Laterality  Date    APPENDECTOMY      CHOLECYSTECTOMY      HYSTERECTOMY      LITHOTRIPSY      Renal lithotripsy    percutaneious insertion of ureteric stent       Time Tracking:     PT Received On: 04/16/24  PT Start Time: 1322     PT Stop Time: 1345  PT Total Time (min): 23 min     Billable Minutes: Evaluation (low complexity; 31 minutes)      04/16/2024

## 2024-04-17 PROCEDURE — 97110 THERAPEUTIC EXERCISES: CPT

## 2024-04-17 PROCEDURE — 27000221 HC OXYGEN, UP TO 24 HOURS

## 2024-04-17 PROCEDURE — 27000944

## 2024-04-17 PROCEDURE — 97116 GAIT TRAINING THERAPY: CPT | Mod: CQ

## 2024-04-17 PROCEDURE — 11000004 HC SNF PRIVATE

## 2024-04-17 PROCEDURE — 25000003 PHARM REV CODE 250: Performed by: NURSE PRACTITIONER

## 2024-04-17 PROCEDURE — 97110 THERAPEUTIC EXERCISES: CPT | Mod: CQ

## 2024-04-17 PROCEDURE — 27000982 HC MATTRESS, MATRIX LAL RENTAL

## 2024-04-17 PROCEDURE — 25000003 PHARM REV CODE 250: Performed by: FAMILY MEDICINE

## 2024-04-17 PROCEDURE — 63600175 PHARM REV CODE 636 W HCPCS: Performed by: FAMILY MEDICINE

## 2024-04-17 PROCEDURE — 97535 SELF CARE MNGMENT TRAINING: CPT

## 2024-04-17 PROCEDURE — 94761 N-INVAS EAR/PLS OXIMETRY MLT: CPT

## 2024-04-17 PROCEDURE — 63700000 PHARM REV CODE 250 ALT 637 W/O HCPCS: Performed by: NURSE PRACTITIONER

## 2024-04-17 RX ORDER — FLUCONAZOLE 100 MG/1
100 TABLET ORAL DAILY
Status: COMPLETED | OUTPATIENT
Start: 2024-04-17 | End: 2024-04-17

## 2024-04-17 RX ADMIN — SERTRALINE HYDROCHLORIDE 150 MG: 50 TABLET ORAL at 08:04

## 2024-04-17 RX ADMIN — GABAPENTIN 300 MG: 300 CAPSULE ORAL at 08:04

## 2024-04-17 RX ADMIN — MEROPENEM 1 G: 1 INJECTION, POWDER, FOR SOLUTION INTRAVENOUS at 05:04

## 2024-04-17 RX ADMIN — ATORVASTATIN CALCIUM 40 MG: 40 TABLET, FILM COATED ORAL at 08:04

## 2024-04-17 RX ADMIN — THERA TABS 1 TABLET: TAB at 08:04

## 2024-04-17 RX ADMIN — Medication 1 CAPSULE: at 11:04

## 2024-04-17 RX ADMIN — FLUCONAZOLE 100 MG: 100 TABLET ORAL at 06:04

## 2024-04-17 RX ADMIN — OMEGA-3 FATTY ACIDS CAP 1000 MG 1 CAPSULE: 1000 CAP at 08:04

## 2024-04-17 RX ADMIN — ACETAMINOPHEN 650 MG: 325 TABLET ORAL at 04:04

## 2024-04-17 RX ADMIN — ACETAMINOPHEN 650 MG: 325 TABLET ORAL at 11:04

## 2024-04-17 RX ADMIN — VALSARTAN 320 MG: 160 TABLET, FILM COATED ORAL at 08:04

## 2024-04-17 RX ADMIN — CLOPIDOGREL BISULFATE 75 MG: 75 TABLET ORAL at 08:04

## 2024-04-17 RX ADMIN — MICONAZOLE NITRATE: 20 CREAM TOPICAL at 08:04

## 2024-04-17 RX ADMIN — AMLODIPINE BESYLATE 10 MG: 5 TABLET ORAL at 08:04

## 2024-04-17 RX ADMIN — SENNOSIDES AND DOCUSATE SODIUM 1 TABLET: 8.6; 5 TABLET ORAL at 08:04

## 2024-04-17 RX ADMIN — MEROPENEM 1 G: 1 INJECTION, POWDER, FOR SOLUTION INTRAVENOUS at 01:04

## 2024-04-17 RX ADMIN — PANTOPRAZOLE SODIUM 40 MG: 40 TABLET, DELAYED RELEASE ORAL at 08:04

## 2024-04-17 RX ADMIN — MEROPENEM 1 G: 1 INJECTION, POWDER, FOR SOLUTION INTRAVENOUS at 09:04

## 2024-04-17 RX ADMIN — Medication 1 CAPSULE: at 04:04

## 2024-04-17 RX ADMIN — Medication 1 CAPSULE: at 07:04

## 2024-04-17 NOTE — CONSULTS
"  [facility]  Adult Nutrition  Consult Note    SUMMARY     Recommendations    Recommendation/Intervention: 1. Ensure Clear one carton po BID (240 kcal, 8g PRO per carton) 2. F/U for intake support  Goals: % meals  Nutrition Goal Status: new    No nutrition diagnosis at present.  Intake goal met with present intake 75% meals (~1900 kcal, 75g PRO)    Malnutrition Assessment  Malnutrition Level:  (Based on assessment this pt is not malnourished.)                                    Reason for Assessment    Reason For Assessment: consult, lorna Keyes (swing bed pt)  Diagnosis:  (Weakness, UTI)  Relevant Medical History: CVA with  L sided weakness, dysphagia, Anxiety, depression, [place], neuropathy  Interdisciplinary Rounds: did not attend  General Information Comments: Pt known to me from previous admits.  She is feeling much better and taking ~75% of Regular diet.  Skin areas between abd folds noted but no skin breakdown this visit.  BSS 18 noted.  Last BM 4/17.  Pt's wt has increased since last admit.  She drinks Ensure Clear at home.  Nutrition Discharge Planning: New admit to [facility]    Nutrition Risk Screen    Nutrition Risk Screen: reduced oral intake over the last month (acute illness)    Nutrition/Diet History    Spiritual, Cultural Beliefs, Taoism Practices, Values that Affect Care: no  Supplemental Drinks or Food Habits: Ensure Clear  Food Allergies: NKFA  Factors Affecting Nutritional Intake: None identified at this time    Anthropometrics    Temp: 98.8 °F (37.1 °C)  Height: 5' 4" (162.6 cm)  Height (inches): 64 in  Weight Method: Bed Scale  Weight: 74.8 kg (165 lb)  Weight (lb): 165 lb  Ideal Body Weight (IBW), Female: 120 lb  % Ideal Body Weight, Female (lb): 137.5 %  BMI (Calculated): 28.3  BMI Grade: 25 - 29.9 - overweight       Lab/Procedures/Meds    Pertinent Labs Reviewed: reviewed  Pertinent Labs Comments: Alb 2.7 4/8, Hgb 11.3, Hct 35.4  Pertinent Medications " Reviewed: reviewed  Pertinent Medications Comments: Lipitor, Lactobacillus, Merrem, Multivitamin, Omega 3,  Protonix, Zoloft       Estimated/Assessed Needs    Weight Used For Calorie Calculations: 74.8 kg (165 lb)  Energy Calorie Requirements (kcal): 1409-6782  Energy Need Method: Kcal/kg  Protein Requirements: 75-83  Weight Used For Protein Calculations: 74.8 kg (165 lb)     Estimated Fluid Requirement Method: other (see comments) (1875)  RDA Method (mL): 1650         Nutrition Prescription Ordered    Current Diet Order: Regular  Nutrition Order Comments: 1. Ensure Clear one carton po BID (240 kcal, 8g PRO per carton) 2. F/U for intake support    Evaluation of Received Nutrient/Fluid Intake    Oral Calories (kcal): 1900  Oral Protein (gm): 75  % Kcal Needs: 100  % Protein Needs: 100  Energy Calories Required: meeting needs  Protein Required: meeting needs  Fluid Required: meeting needs  Tolerance: tolerating  % Intake of Estimated Energy Needs: 75 - 100 %  % Meal Intake: 75 - 100 %    Nutrition Risk    Level of Risk/Frequency of Follow-up: low - moderate       Monitor and Evaluation    Food and Nutrient Intake: food and beverage intake  Food and Nutrient Adminstration: diet order  Anthropometric Measurements: weight  Biochemical Data, Medical Tests and Procedures: electrolyte and renal panel  Nutrition-Focused Physical Findings: overall appearance, skin      LEARNING ASSESSMENT  04/12/2024 0820 [facility] (4/11/2024 - Present)  Created by Randee Carcamo RN - RN (Nurse)Status: Complete  PRIMARY LEARNER   Primary Learner Name: Herlinda Valdovinos TM - 04/12/2024 0820  Relationship: Patient TM - 04/12/2024 0820  Does the primary learner have any barriers to learning?: No Barriers TM - 04/12/2024 0820  What is the preferred language of the primary learner?: English TM - 04/12/2024 0820  Is an  required?: No TM - 04/12/2024 0820  How does the primary learner prefer to  learn new concepts?: Listening TM - 04/12/2024 0820  How often do you need to have someone help you read instructions, pamphlets, or written material from your doctor or pharmacy?: Never TM - 04/12/2024 0820  CO-LEARNER #1   No question answered  CO-LEARNER #2   No question answered  SPECIAL TOPICS   No question answered  ANSWERED BY:   No question answered  Edit History        SDOH:  Physical Activit  On average, how many days per week do you engage in moderate to strenuous exercise (like a brisk walk)?  0 days (P)       On average, how many minutes do you engage in exercise at this level?  0 min (P)       Financial Resource Strain  How hard is it for you to pay for the very basics like food, housing, medical care, and heating?  Somewhat hard (P)       Housing Stability  In the last 12 months, was there a time when you were not able to pay the mortgage or rent on time?  No (P)       In the last 12 months, how many places have you lived?  1 (P)       In the last 12 months, was there a time when you did not have a steady place to sleep or slept in a shelter (including now)?  No (P)       Transportation Needs  In the past 12 months, has lack of transportation kept you from medical appointments or from getting medications?  No (P)       In the past 12 months, has lack of transportation kept you from meetings, work, or from getting things needed for daily living?  No (P)       Food Insecurity  Within the past 12 months, you worried that your food would run out before you got the money to buy more.  Never true (P)       Within the past 12 months, the food you bought just didn't last and you didn't have money to get more.  Never true (P)      Stress  Do you feel stress - tense, restless, nervous, or anxious, or unable to sleep at night because your mind is troubled all the time - these days?  Only a little (P)       Social Connections  In a typical week, how many times do you talk on the phone with family, friends, or  neighbors?  More than three times a week (P)       How often do you get together with friends or relatives?  More than three times a week (P)       How often do you attend Baptism or Church services?  More than 4 times per year (P)       Do you belong to any clubs or organizations such as Baptism groups, unions, fraternal or athletic groups, or school groups?  No (P)       How often do you attend meetings of the clubs or organizations you belong to?  Never (P)       Are you , , , , never , or living with a partner?   (P)       Alcohol Use  Q1: How often do you have a drink containing alcohol?  Never (P)       Q2: How many drinks containing alcohol do you have on a typical day when you are drinking?  Patient does not drink (P)       Q3: How often do you have six or more drinks on one occasion?  Never (P)           Nutrition Follow-Up    RD Follow-up?: Yes

## 2024-04-17 NOTE — PT/OT/SLP PROGRESS
Occupational Therapy   Treatment    Name: eHrlinda Valdovinos  MRN: [id number]  Admitting Diagnosis:  Bacteremia due to Escherichia coli       Recommendations:     Discharge Recommendations: Low Intensity Therapy  Discharge Equipment Recommendations:  none  Barriers to discharge:  None    Assessment:     Herlinda Valdovinos is a 59 y.o. female with a medical diagnosis of Bacteremia due to Escherichia coli.  She presents with weakness and decline with ADLs. Performance deficits affecting function are weakness, impaired endurance, impaired self care skills, impaired functional mobility, gait instability, impaired balance, decreased coordination, decreased lower extremity function, decreased safety awareness.     Rehab Prognosis:  Good; patient would benefit from acute skilled OT services to address these deficits and reach maximum level of function.       Plan:     Patient to be seen 5 x/week to address the above listed problems via self-care/home management, therapeutic activities, therapeutic exercises  Plan of Care Expires:    Plan of Care Reviewed with: patient    Subjective     Chief Complaint: Weakness  Patient/Family Comments/goals: to return to prior level of function  Pain/Comfort:       Objective:     Communicated with: Nurse prior to session.  Patient found sitting edge of bed with oxygen, peripheral IV upon OT entry to room.    General Precautions: Standard, fall, contact    Orthopedic Precautions:N/A  Braces: N/A  Respiratory Status: Room air     Occupational Performance:     Bed Mobility:    Not tested     Functional Mobility/Transfers:  Patient completed Sit <> Stand Transfer with stand by assistance  with  rolling walker   Patient completed Bed <> Chair Transfer using Step Transfer technique with contact guard assistance with rolling walker  Functional Mobility: Patient transferred within room with minimal fatigue and SBA    Activities of Daily Living:  Lower Body Dressing: stand by assistance to adalberto cabral  and socks using compensatory techniques      [facility] 6 Click ADL:      Treatment & Education:  Pt performed B UE strengthening exercises to include:   Shoulder flexion   Chest press    Elbow flexion   Elbow extension   Pectoral stretches   Bilateral rowing  All exercises performed 3x10 reps using 3# weight and red theraband. Patient also performed standing activities with tossing and catching ball to challenge balance. Patient with 1 LOB and 3 rest breaks.     Patient left up in chair with all lines intact and call button in reach    GOALS:   Multidisciplinary Problems       Occupational Therapy Goals          Problem: Occupational Therapy    Goal Priority Disciplines Outcome Interventions   Occupational Therapy Goal     OT, PT/OT Ongoing, Progressing    Description: Grooming Status:   Short Term Goal: Pt will perform grooming with SBA sitting EOB.   Long Term Goal: Pt will perform grooming/oral hygiene standing at sink with SBA      LE dressing Status:   Short Term Goal: Pt will perform LE dressing with SBA.   Long Term Goal: Pt will perform LE dressing with Mod I.    Toileting Status:   Short Term Goal: Pt will perform toilet hygiene on BSC with Mod I.  Long Term Goal: Pt will perform toilet hygiene on toilet with no AE with Mod I.    Commode Transfer:   Short Term Goal: Pt will perform BSC t/f with Mod I.  Long Term Goal:  Pt will perform toilet t/f in bathroom with Mod I.     Bathing Status:   Long Term Goal: Pt will perform sponge bath with Mod I with no unsafe fatigue.     Strength Status: 5/5 BUEs  Long Term Goal: Pt to perform BUE strengthening with weights and/or body weight to increase ADL independence and safety    Endurance Status:   Short Term Goal:pt to perform 15 min OT treatment with 5 or greater rest breaks  Long Term Goal: pt to perform 30 min OT treat with 3 or less rest breaks                          Time Tracking:     OT Date of Treatment: 04/17/24  OT Start Time: 1423  OT Stop Time: 1536  OT  Total Time (min): 73 min    Billable Minutes:Self Care/Home Management 20 min   Therapeutic Exercise 20 min and Therapeutic Activtiies 33 min    OT/HEMAL: OT          4/17/2024

## 2024-04-17 NOTE — PT/OT/SLP PROGRESS
Physical Therapy Treatment    Patient Name:  Herlinda Valdovinos   MRN:  [id number]    Recommendations:     Discharge Recommendations: Low Intensity Therapy  Discharge Equipment Recommendations: none  Barriers to discharge: None    Assessment:     Herlinda Valdovinos is a 59 y.o. female admitted with a medical diagnosis of Bacteremia due to Escherichia coli.  She presents with the following impairments/functional limitations: weakness, impaired endurance, impaired functional mobility Patient was agreeable to perform therapy, but does not want therapist helping her. Patient was able to tolerate ambulating and exercises during today's therapy session. Patient stated she was being worked good, but wanted to start out slow first    Rehab Prognosis: Good; patient would benefit from acute skilled PT services to address these deficits and reach maximum level of function.    Recent Surgery: * No surgery found *      Plan:     During this hospitalization, patient to be seen 5 x/week to address the identified rehab impairments via gait training, therapeutic activities, therapeutic exercises, neuromuscular re-education and progress toward the following goals:    Plan of Care Expires:  04/26/24    Subjective     Chief Complaint: none  Patient/Family Comments/goals: return home  Pain/Comfort:  Pain Rating 1: 0/10  Pain Rating Post-Intervention 1: 0/10      Objective:     Communicated with OT prior to session.  Patient found up in chair with peripheral IV upon PT entry to room.     General Precautions: Standard, fall, contact  Orthopedic Precautions: N/A  Braces: N/A  Respiratory Status: Room air     Functional Mobility:  Transfers:     Sit to Stand:  modified independence with rolling walker  Gait: Patient ambulated 200' with RW and supervision with no LOB.       AM-PAC 6 CLICK MOBILITY  Turning over in bed (including adjusting bedclothes, sheets and blankets)?: 4  Sitting down on and standing up from a chair with arms (e.g.,  wheelchair, bedside commode, etc.): 4  Moving from lying on back to sitting on the side of the bed?: 3  Moving to and from a bed to a chair (including a wheelchair)?: 4  Need to walk in hospital room?: 4  Climbing 3-5 steps with a railing?: 2  Basic Mobility Total Score: 21       Treatment & Education:  Transfers and ambulation as listed above.   Long arc quads: 20 reps with 2 lbs  Seated marchin reps with 2 lbs  Hip adduction squeezes: 20 reps   Standing balance exercises with ball toss    Patient left sitting edge of bed with call button in reach..    GOALS:   Multidisciplinary Problems       Physical Therapy Goals          Problem: Physical Therapy    Goal Priority Disciplines Outcome Goal Variances Interventions   Physical Therapy Goal     PT, PT/OT Ongoing, Progressing     Description: Short-term Goals: 0.75 week  1. Patient will perform supine to/from sit with modified independence to improve independence and safety with bed mobility.  2. Patient will perform sit to/from stand with a rolling walker with standby assist to improve independence and safety with transfers.  3. Patient will ambulate 100 feet with a rolling walker with standby assist to improve independence and safety with gait.  4. Patient will tolerate 15 minutes of physical therapy intervention to improve endurance and activity tolerance.    Long-term goals: 1.5 weeks  1. Patient will perform sit to/from stand with a rolling walker with supervision to improve independence and safety with transfers.  2. Patient will ambulate 50 feet with a quad cane with contact guard assist to improve independence and safety with gait.  3. Patient will tolerate 30 minutes of physical therapy intervention to improve endurance and activity tolerance.                         Time Tracking:     PT Received On: 24  PT Start Time: 1432     PT Stop Time: 1536  PT Total Time (min): 64 min     Billable Minutes: Gait Training 17 and Therapeutic Exercise  17    Treatment Type: Treatment  PT/PTA: PTA     Number of PTA visits since last PT visit: 1   RYAN Steele  04/17/2024

## 2024-04-18 LAB
ALBUMIN SERPL BCP-MCNC: 3.2 G/DL (ref 3.5–5)
ALBUMIN/GLOB SERPL: 0.7 {RATIO}
ALP SERPL-CCNC: 164 U/L (ref 46–118)
ALT SERPL W P-5'-P-CCNC: 27 U/L (ref 13–56)
ANION GAP SERPL CALCULATED.3IONS-SCNC: 10 MMOL/L (ref 7–16)
AST SERPL W P-5'-P-CCNC: 37 U/L (ref 15–37)
BACTERIA BLD CULT: NORMAL
BACTERIA BLD CULT: NORMAL
BASOPHILS # BLD AUTO: 0.02 K/UL (ref 0–0.2)
BASOPHILS NFR BLD AUTO: 0.3 % (ref 0–1)
BILIRUB SERPL-MCNC: 0.6 MG/DL (ref ?–1.2)
BUN SERPL-MCNC: 15 MG/DL (ref 7–18)
BUN/CREAT SERPL: 19 (ref 6–20)
CALCIUM SERPL-MCNC: 9.2 MG/DL (ref 8.5–10.1)
CHLORIDE SERPL-SCNC: 100 MMOL/L (ref 98–107)
CO2 SERPL-SCNC: 33 MMOL/L (ref 21–32)
CREAT SERPL-MCNC: 0.8 MG/DL (ref 0.55–1.02)
DIFFERENTIAL METHOD BLD: ABNORMAL
EGFR (NO RACE VARIABLE) (RUSH/TITUS): 86 ML/MIN/1.73M2
EOSINOPHIL # BLD AUTO: 0.33 K/UL (ref 0–0.5)
EOSINOPHIL NFR BLD AUTO: 4.4 % (ref 1–4)
EOSINOPHIL NFR BLD MANUAL: 3 % (ref 1–4)
ERYTHROCYTE [DISTWIDTH] IN BLOOD BY AUTOMATED COUNT: 15.6 % (ref 11.5–14.5)
GLOBULIN SER-MCNC: 4.6 G/DL (ref 2–4)
GLUCOSE SERPL-MCNC: 109 MG/DL (ref 74–106)
HCT VFR BLD AUTO: 40.9 % (ref 38–47)
HGB BLD-MCNC: 13 G/DL (ref 12–16)
LYMPHOCYTES # BLD AUTO: 2 K/UL (ref 1–4.8)
LYMPHOCYTES NFR BLD AUTO: 27 % (ref 27–41)
LYMPHOCYTES NFR BLD MANUAL: 25 % (ref 27–41)
MCH RBC QN AUTO: 27.4 PG (ref 27–31)
MCHC RBC AUTO-ENTMCNC: 31.8 G/DL (ref 32–36)
MCV RBC AUTO: 86.1 FL (ref 80–96)
MONOCYTES # BLD AUTO: 0.52 K/UL (ref 0–0.8)
MONOCYTES NFR BLD AUTO: 7 % (ref 2–6)
MONOCYTES NFR BLD MANUAL: 6 % (ref 2–6)
MPC BLD CALC-MCNC: 9.8 FL (ref 9.4–12.4)
NEUTROPHILS # BLD AUTO: 4.55 K/UL (ref 1.8–7.7)
NEUTROPHILS NFR BLD AUTO: 61.3 % (ref 53–65)
NEUTS BAND NFR BLD MANUAL: 2 % (ref 1–5)
NEUTS SEG NFR BLD MANUAL: 64 % (ref 50–62)
NRBC BLD MANUAL-RTO: ABNORMAL %
PLATELET # BLD AUTO: 296 K/UL (ref 150–400)
PLATELET MORPHOLOGY: NORMAL
POTASSIUM SERPL-SCNC: 3.5 MMOL/L (ref 3.5–5.1)
PROT SERPL-MCNC: 7.8 G/DL (ref 6.4–8.2)
RBC # BLD AUTO: 4.75 M/UL (ref 4.2–5.4)
RBC MORPH BLD: NORMAL
SODIUM SERPL-SCNC: 139 MMOL/L (ref 136–145)
WBC # BLD AUTO: 7.42 K/UL (ref 4.5–11)

## 2024-04-18 PROCEDURE — 27000221 HC OXYGEN, UP TO 24 HOURS

## 2024-04-18 PROCEDURE — 25000003 PHARM REV CODE 250: Performed by: FAMILY MEDICINE

## 2024-04-18 PROCEDURE — 97535 SELF CARE MNGMENT TRAINING: CPT

## 2024-04-18 PROCEDURE — 27000944

## 2024-04-18 PROCEDURE — 27000982 HC MATTRESS, MATRIX LAL RENTAL

## 2024-04-18 PROCEDURE — 97116 GAIT TRAINING THERAPY: CPT | Mod: CQ

## 2024-04-18 PROCEDURE — 11000004 HC SNF PRIVATE

## 2024-04-18 PROCEDURE — 97110 THERAPEUTIC EXERCISES: CPT

## 2024-04-18 PROCEDURE — 63600175 PHARM REV CODE 636 W HCPCS: Performed by: FAMILY MEDICINE

## 2024-04-18 PROCEDURE — 94761 N-INVAS EAR/PLS OXIMETRY MLT: CPT

## 2024-04-18 PROCEDURE — 85025 COMPLETE CBC W/AUTO DIFF WBC: CPT | Performed by: FAMILY MEDICINE

## 2024-04-18 PROCEDURE — 97110 THERAPEUTIC EXERCISES: CPT | Mod: CQ

## 2024-04-18 PROCEDURE — 80053 COMPREHEN METABOLIC PANEL: CPT | Performed by: FAMILY MEDICINE

## 2024-04-18 PROCEDURE — 25000003 PHARM REV CODE 250: Performed by: NURSE PRACTITIONER

## 2024-04-18 PROCEDURE — 63600175 PHARM REV CODE 636 W HCPCS: Performed by: NURSE PRACTITIONER

## 2024-04-18 RX ADMIN — VALSARTAN 320 MG: 160 TABLET, FILM COATED ORAL at 09:04

## 2024-04-18 RX ADMIN — OMEGA-3 FATTY ACIDS CAP 1000 MG 1 CAPSULE: 1000 CAP at 09:04

## 2024-04-18 RX ADMIN — THERA TABS 1 TABLET: TAB at 09:04

## 2024-04-18 RX ADMIN — MEROPENEM 1 G: 1 INJECTION, POWDER, FOR SOLUTION INTRAVENOUS at 06:04

## 2024-04-18 RX ADMIN — GABAPENTIN 300 MG: 300 CAPSULE ORAL at 09:04

## 2024-04-18 RX ADMIN — GABAPENTIN 300 MG: 300 CAPSULE ORAL at 08:04

## 2024-04-18 RX ADMIN — AMLODIPINE BESYLATE 10 MG: 5 TABLET ORAL at 09:04

## 2024-04-18 RX ADMIN — Medication 1 CAPSULE: at 09:04

## 2024-04-18 RX ADMIN — Medication 1 CAPSULE: at 05:04

## 2024-04-18 RX ADMIN — MEROPENEM 1 G: 1 INJECTION, POWDER, FOR SOLUTION INTRAVENOUS at 09:04

## 2024-04-18 RX ADMIN — SENNOSIDES AND DOCUSATE SODIUM 1 TABLET: 8.6; 5 TABLET ORAL at 09:04

## 2024-04-18 RX ADMIN — PANTOPRAZOLE SODIUM 40 MG: 40 TABLET, DELAYED RELEASE ORAL at 09:04

## 2024-04-18 RX ADMIN — CLOPIDOGREL BISULFATE 75 MG: 75 TABLET ORAL at 09:04

## 2024-04-18 RX ADMIN — MICONAZOLE NITRATE: 20 CREAM TOPICAL at 09:04

## 2024-04-18 RX ADMIN — Medication 1 CAPSULE: at 12:04

## 2024-04-18 RX ADMIN — MICONAZOLE NITRATE: 20 CREAM TOPICAL at 08:04

## 2024-04-18 RX ADMIN — ACETAMINOPHEN 650 MG: 325 TABLET ORAL at 08:04

## 2024-04-18 RX ADMIN — MEROPENEM 1 G: 1 INJECTION, POWDER, FOR SOLUTION INTRAVENOUS at 01:04

## 2024-04-18 RX ADMIN — SENNOSIDES AND DOCUSATE SODIUM 1 TABLET: 8.6; 5 TABLET ORAL at 08:04

## 2024-04-18 RX ADMIN — SERTRALINE HYDROCHLORIDE 150 MG: 50 TABLET ORAL at 09:04

## 2024-04-18 RX ADMIN — ENOXAPARIN SODIUM 40 MG: 40 INJECTION SUBCUTANEOUS at 05:04

## 2024-04-18 RX ADMIN — ATORVASTATIN CALCIUM 40 MG: 40 TABLET, FILM COATED ORAL at 08:04

## 2024-04-18 NOTE — PT/OT/SLP PROGRESS
Occupational Therapy   Treatment    Name: Herlinda Valdovinos  MRN: [id number]  Admitting Diagnosis:  Bacteremia due to Escherichia coli       Recommendations:     Discharge Recommendations: Low Intensity Therapy  Discharge Equipment Recommendations:  none  Barriers to discharge:       Assessment:     Herlinda Valdovinos is a 59 y.o. female with a medical diagnosis of Bacteremia due to Escherichia coli.  She presents with weakness. Performance deficits affecting function are weakness, impaired endurance, impaired self care skills, impaired functional mobility, gait instability, impaired balance, decreased coordination, decreased lower extremity function, decreased safety awareness.     Rehab Prognosis:  Good; patient would benefit from acute skilled OT services to address these deficits and reach maximum level of function.       Plan:     Patient to be seen 5 x/week to address the above listed problems via self-care/home management, therapeutic activities, therapeutic exercises  Plan of Care Expires:    Plan of Care Reviewed with: patient    Subjective     Chief Complaint: Pr had no complaints this date  Patient/Family Comments/goals: return to PLOF  Pain/Comfort:       Objective:     Communicated with: Pt prior to session.  Patient found supine with peripheral IV  upon OT entry to room.    General Precautions: Standard, fall, contact    Orthopedic Precautions:N/A  Braces: N/A  Respiratory Status: Room air     Occupational Performance:     Bed Mobility:    Patient completed Rolling/Turning to Left with  supervision  Patient completed Rolling/Turning to Right with supervision  Patient completed Scooting/Bridging with supervision  Patient completed Supine to Sit with supervision     Functional Mobility/Transfers:  Patient completed Sit <> Stand Transfer with supervision  with  hand-held assist   Patient completed Bed <> Chair Transfer using Step Transfer technique with contact guard assistance with hand-held  assist  Functional Mobility: Pt took 4-5 steps bed >chair    Activities of Daily Living:  Upper Body Dressing: supervision Pt donned/doffed pullover shirt sitting EOB  Lower Body Dressing: supervision Pt rolled left ><right and completed bridging to assist with changing pull up and donning pants , Pt was able to pull brief and pants up over bottom. Pt donned B socks      AMPAC 6 Click ADL:      Treatment & Education:  Pt completed BUE elbow flex, sh flex, and chest press with 2# dowel sitting unsupported EOB 2x15 ea ex to increase functional mobility and improve I in self care tasks    Patient left up in chair with all lines intact, call button in reach, and nursing present    GOALS:   Multidisciplinary Problems       Occupational Therapy Goals          Problem: Occupational Therapy    Goal Priority Disciplines Outcome Interventions   Occupational Therapy Goal     OT, PT/OT Ongoing, Progressing    Description: Grooming Status:   Short Term Goal: Pt will perform grooming with SBA sitting EOB.   Long Term Goal: Pt will perform grooming/oral hygiene standing at sink with SBA      LE dressing Status:   Short Term Goal: Pt will perform LE dressing with SBA.   Long Term Goal: Pt will perform LE dressing with Mod I.    Toileting Status:   Short Term Goal: Pt will perform toilet hygiene on BSC with Mod I.  Long Term Goal: Pt will perform toilet hygiene on toilet with no AE with Mod I.    Commode Transfer:   Short Term Goal: Pt will perform BSC t/f with Mod I.  Long Term Goal:  Pt will perform toilet t/f in bathroom with Mod I.     Bathing Status:   Long Term Goal: Pt will perform sponge bath with Mod I with no unsafe fatigue.     Strength Status: 5/5 BUEs  Long Term Goal: Pt to perform BUE strengthening with weights and/or body weight to increase ADL independence and safety    Endurance Status:   Short Term Goal:pt to perform 15 min OT treatment with 5 or greater rest breaks  Long Term Goal: pt to perform 30 min OT treat  with 3 or less rest breaks                          Time Tracking:     OT Date of Treatment: 04/18/24  OT Start Time: 1130  OT Stop Time: 1200  OT Total Time (min): 30 min    Billable Minutes:Self Care/Home Management 15  Therapeutic Exercise 15               4/18/2024

## 2024-04-18 NOTE — PT/OT/SLP PROGRESS
"Physical Therapy Treatment    Patient Name:  Herlinda Valdovinos   MRN:  [id number]    Recommendations:     Discharge Recommendations: Low Intensity Therapy  Discharge Equipment Recommendations: none  Barriers to discharge: None    Assessment:     Herlinda Valdovinos is a 59 y.o. female admitted with a medical diagnosis of Bacteremia due to Escherichia coli.  She presents with the following impairments/functional limitations: weakness, impaired endurance, impaired functional mobility Patient was agreeable and excited to perform therapy. Patient with excellent participation with therapy today. Patient was able to ambulate from her room to therapy gym with no rest breaks or LOB noted. Once patient arrived in therapy gym she requested to rest while talking with other therapist in therapy gym. Patient then performed nu step for 7 minutes before exercises and gait training in parallel bars. Patient reported she feels good about her ambulation, but feels her biggest hold up is coming from "my head". Patient states she has had overprotection from her  and she feels that could possibly has caused her to feel more worried about using a cane. Patient reported to be feeling good with her only complaint being that she is "feeling sore" from her therapy treatment the day before and ambulation performed during today's therapy session.     Rehab Prognosis: Good; patient would benefit from acute skilled PT services to address these deficits and reach maximum level of function.    Recent Surgery: * No surgery found *      Plan:     During this hospitalization, patient to be seen 5 x/week to address the identified rehab impairments via gait training, therapeutic activities, therapeutic exercises, neuromuscular re-education and progress toward the following goals:    Plan of Care Expires:  04/26/24    Subjective     Chief Complaint: none  Patient/Family Comments/goals: return home  Pain/Comfort:  Pain Rating 1: 0/10  Pain Rating " "Post-Intervention 1: 0/10      Objective:     Communicated with OT prior to session.  Patient found up in chair with peripheral IV upon PT entry to room.     General Precautions: Standard, fall, contact  Orthopedic Precautions: N/A  Braces: N/A  Respiratory Status: Room air     Functional Mobility:  Transfers:     Sit to Stand:  modified independence with rolling walker  Gait: Patient ambulated 290' with RW and supervision with no LOB. Patient also performed gait training in parallel bars while in therapy gym.       AM-PAC 6 CLICK MOBILITY  Turning over in bed (including adjusting bedclothes, sheets and blankets)?: 4  Sitting down on and standing up from a chair with arms (e.g., wheelchair, bedside commode, etc.): 4  Moving from lying on back to sitting on the side of the bed?: 4  Moving to and from a bed to a chair (including a wheelchair)?: 4  Need to walk in hospital room?: 4  Climbing 3-5 steps with a railing?: 3  Basic Mobility Total Score: 23       Treatment & Education:  Patient ambulated 290' from room to therapy gym. Patient then requested to rest. Patient sat in chair resting while talking with other therapist in therapy gym. Patient then reported feeling rested and walked over to the nu step. Patient performed the nu step for 7 minutes with no rest breaks required. Patient then headed to the parallel bars where she performed sit to stands and ambulation. Patient ambulated in bars with only support from her right hand. Patient with no LOB and noted that she feels her walking is good, but she can sometimes feel fearful from "overprotection". Patient was then returned back to room.      Patient left up in chair with call button in reach..    GOALS:   Multidisciplinary Problems       Physical Therapy Goals          Problem: Physical Therapy    Goal Priority Disciplines Outcome Goal Variances Interventions   Physical Therapy Goal     PT, PT/OT Ongoing, Progressing     Description: Short-term Goals: 0.75 " week  1. Patient will perform supine to/from sit with modified independence to improve independence and safety with bed mobility.  2. Patient will perform sit to/from stand with a rolling walker with standby assist to improve independence and safety with transfers.  3. Patient will ambulate 100 feet with a rolling walker with standby assist to improve independence and safety with gait.  4. Patient will tolerate 15 minutes of physical therapy intervention to improve endurance and activity tolerance.    Long-term goals: 1.5 weeks  1. Patient will perform sit to/from stand with a rolling walker with supervision to improve independence and safety with transfers.  2. Patient will ambulate 50 feet with a quad cane with contact guard assist to improve independence and safety with gait.  3. Patient will tolerate 30 minutes of physical therapy intervention to improve endurance and activity tolerance.                         Time Tracking:     PT Received On: 04/18/24  PT Start Time: 1314     PT Stop Time: 1410  PT Total Time (min): 56 min     Billable Minutes: Gait Training 40 and Therapeutic Exercise 16    Treatment Type: Treatment  PT/PTA: PTA     Number of PTA visits since last PT visit: 2   RYAN Steele  04/18/2024

## 2024-04-18 NOTE — PLAN OF CARE
Problem: Fall Injury Risk  Goal: Absence of Fall and Fall-Related Injury  Outcome: Ongoing, Progressing  Intervention: Identify and Manage Contributors  Flowsheets (Taken 4/18/2024 1849)  Self-Care Promotion:   independence encouraged   BADL personal objects within reach   BADL personal routines maintained   meal set-up provided   safe use of adaptive equipment encouraged  Intervention: Promote Injury-Free Environment  Flowsheets (Taken 4/18/2024 1849)  Safety Promotion/Fall Prevention:   assistive device/personal item within reach   bed alarm set   chair alarm set   Fall Risk reviewed with patient/family   diversional activities provided   Fall Risk signage in place   lighting adjusted   nonskid shoes/socks when out of bed   room near unit station   side rails raised x 3   instructed to call staff for mobility

## 2024-04-19 LAB — GLUCOSE SERPL-MCNC: 110 MG/DL (ref 70–105)

## 2024-04-19 PROCEDURE — 25000003 PHARM REV CODE 250: Performed by: FAMILY MEDICINE

## 2024-04-19 PROCEDURE — 97530 THERAPEUTIC ACTIVITIES: CPT

## 2024-04-19 PROCEDURE — 97110 THERAPEUTIC EXERCISES: CPT

## 2024-04-19 PROCEDURE — 27000944

## 2024-04-19 PROCEDURE — 63600175 PHARM REV CODE 636 W HCPCS: Performed by: FAMILY MEDICINE

## 2024-04-19 PROCEDURE — 27000982 HC MATTRESS, MATRIX LAL RENTAL

## 2024-04-19 PROCEDURE — 97110 THERAPEUTIC EXERCISES: CPT | Mod: CQ

## 2024-04-19 PROCEDURE — 25000003 PHARM REV CODE 250: Performed by: NURSE PRACTITIONER

## 2024-04-19 PROCEDURE — 27000221 HC OXYGEN, UP TO 24 HOURS

## 2024-04-19 PROCEDURE — 63600175 PHARM REV CODE 636 W HCPCS: Performed by: NURSE PRACTITIONER

## 2024-04-19 PROCEDURE — 11000004 HC SNF PRIVATE

## 2024-04-19 PROCEDURE — 97116 GAIT TRAINING THERAPY: CPT | Mod: CQ

## 2024-04-19 PROCEDURE — 99900035 HC TECH TIME PER 15 MIN (STAT)

## 2024-04-19 PROCEDURE — 82962 GLUCOSE BLOOD TEST: CPT

## 2024-04-19 PROCEDURE — 94761 N-INVAS EAR/PLS OXIMETRY MLT: CPT

## 2024-04-19 RX ADMIN — ACETAMINOPHEN 650 MG: 325 TABLET ORAL at 10:04

## 2024-04-19 RX ADMIN — CLOPIDOGREL BISULFATE 75 MG: 75 TABLET ORAL at 09:04

## 2024-04-19 RX ADMIN — AMLODIPINE BESYLATE 10 MG: 5 TABLET ORAL at 09:04

## 2024-04-19 RX ADMIN — SENNOSIDES AND DOCUSATE SODIUM 1 TABLET: 8.6; 5 TABLET ORAL at 08:04

## 2024-04-19 RX ADMIN — ACETAMINOPHEN 650 MG: 325 TABLET ORAL at 04:04

## 2024-04-19 RX ADMIN — ATORVASTATIN CALCIUM 40 MG: 40 TABLET, FILM COATED ORAL at 08:04

## 2024-04-19 RX ADMIN — GABAPENTIN 300 MG: 300 CAPSULE ORAL at 09:04

## 2024-04-19 RX ADMIN — THERA TABS 1 TABLET: TAB at 09:04

## 2024-04-19 RX ADMIN — PANTOPRAZOLE SODIUM 40 MG: 40 TABLET, DELAYED RELEASE ORAL at 09:04

## 2024-04-19 RX ADMIN — Medication 1 CAPSULE: at 04:04

## 2024-04-19 RX ADMIN — MICONAZOLE NITRATE: 20 CREAM TOPICAL at 09:04

## 2024-04-19 RX ADMIN — Medication 1 CAPSULE: at 11:04

## 2024-04-19 RX ADMIN — VALSARTAN 320 MG: 160 TABLET, FILM COATED ORAL at 09:04

## 2024-04-19 RX ADMIN — MEROPENEM 1 G: 1 INJECTION, POWDER, FOR SOLUTION INTRAVENOUS at 01:04

## 2024-04-19 RX ADMIN — OMEGA-3 FATTY ACIDS CAP 1000 MG 1 CAPSULE: 1000 CAP at 09:04

## 2024-04-19 RX ADMIN — MEROPENEM 1 G: 1 INJECTION, POWDER, FOR SOLUTION INTRAVENOUS at 05:04

## 2024-04-19 RX ADMIN — MEROPENEM 1 G: 1 INJECTION, POWDER, FOR SOLUTION INTRAVENOUS at 09:04

## 2024-04-19 RX ADMIN — SERTRALINE HYDROCHLORIDE 150 MG: 50 TABLET ORAL at 09:04

## 2024-04-19 RX ADMIN — GABAPENTIN 300 MG: 300 CAPSULE ORAL at 08:04

## 2024-04-19 RX ADMIN — Medication 1 CAPSULE: at 09:04

## 2024-04-19 RX ADMIN — ENOXAPARIN SODIUM 40 MG: 40 INJECTION SUBCUTANEOUS at 04:04

## 2024-04-19 RX ADMIN — SENNOSIDES AND DOCUSATE SODIUM 1 TABLET: 8.6; 5 TABLET ORAL at 09:04

## 2024-04-19 RX ADMIN — MICONAZOLE NITRATE: 20 CREAM TOPICAL at 08:04

## 2024-04-19 NOTE — PT/OT/SLP PROGRESS
"Physical Therapy Treatment    Patient Name:  Herlinda Valdovinos   MRN:  [id number]    Recommendations:     Discharge Recommendations: Low Intensity Therapy  Discharge Equipment Recommendations: none  Barriers to discharge: None    Assessment:     Herlinda Valdovinos is a 59 y.o. female admitted with a medical diagnosis of Bacteremia due to Escherichia coli.  She presents with the following impairments/functional limitations: weakness, impaired endurance, impaired functional mobility Patient was agreeable and excited to perform therapy. Patient with excellent participation with therapy today. Patient was able to perform all transfers and get herself dress with no assistance needed from therapist. Patient was able to ambulate from her room to therapy gym with no rest breaks or LOB noted. Patient was able to perform nustep as well as exercises with no complaints. Patient reports feeling sore from therapy and is happy with her treatments she has received. Patient did report her "electric feelings" that she gets on and off since she has had her strokes, so patient requested to be pushed back to her room.    Rehab Prognosis: Good; patient would benefit from acute skilled PT services to address these deficits and reach maximum level of function.    Recent Surgery: * No surgery found *      Plan:     During this hospitalization, patient to be seen 5 x/week to address the identified rehab impairments via gait training, therapeutic activities, therapeutic exercises, neuromuscular re-education and progress toward the following goals:    Plan of Care Expires:  04/26/24    Subjective     Chief Complaint: none  Patient/Family Comments/goals: return home  Pain/Comfort:  Pain Rating 1: 0/10  Pain Rating Post-Intervention 1: 0/10      Objective:     Communicated with OT prior to session.  Patient found supine with peripheral IV upon PT entry to room.     General Precautions: Standard, fall, contact  Orthopedic Precautions: N/A  Braces: " "N/A  Respiratory Status: Room air     Functional Mobility:  Bed Mobility:     Supine to Sit: independence  Transfers:     Sit to Stand:  independence with some stands being with no AD and some being with RW  Gait: Patient ambulated 290' with RW and supervision with no LOB. Patient also performed gait training in parallel bars while in therapy gym.       AM-PAC 6 CLICK MOBILITY  Turning over in bed (including adjusting bedclothes, sheets and blankets)?: 4  Sitting down on and standing up from a chair with arms (e.g., wheelchair, bedside commode, etc.): 4  Moving from lying on back to sitting on the side of the bed?: 4  Moving to and from a bed to a chair (including a wheelchair)?: 4  Need to walk in hospital room?: 4  Climbing 3-5 steps with a railing?: 3  Basic Mobility Total Score: 23       Treatment & Education:  Patient was supine when therapist entered the room. Patient was willing to participate with therapy, but reported needing to change her clothes first. Patient was able to sit herself up on edge of bed with no assistance needed. Patient was then able to dress herself with no assistance as well as stand up from bed without the use of her walker. Patient ambulated 290' from room to therapy gym. Patient then performed the nu step for 7 minutes with no rest breaks required. Patient then headed to the parallel bars where she performed sit to stands, long arc quads, and gait training. Patient ambulated in bars with only support from her right hand. Patient reports she can sometimes feel fearful from "overprotection". Patient held conversation with everyone in therapy gym throughout her entire treatment. Patient was then returned back to room.      Patient left up in chair with call button in reach..    GOALS:   Multidisciplinary Problems       Physical Therapy Goals          Problem: Physical Therapy    Goal Priority Disciplines Outcome Goal Variances Interventions   Physical Therapy Goal     PT, PT/OT Ongoing, " Progressing     Description: Short-term Goals: 0.75 week  1. Patient will perform supine to/from sit with modified independence to improve independence and safety with bed mobility.  2. Patient will perform sit to/from stand with a rolling walker with standby assist to improve independence and safety with transfers.  3. Patient will ambulate 100 feet with a rolling walker with standby assist to improve independence and safety with gait.  4. Patient will tolerate 15 minutes of physical therapy intervention to improve endurance and activity tolerance.    Long-term goals: 1.5 weeks  1. Patient will perform sit to/from stand with a rolling walker with supervision to improve independence and safety with transfers.  2. Patient will ambulate 50 feet with a quad cane with contact guard assist to improve independence and safety with gait.  3. Patient will tolerate 30 minutes of physical therapy intervention to improve endurance and activity tolerance.                         Time Tracking:     PT Received On: 04/19/24  PT Start Time: 1309     PT Stop Time: 1409  PT Total Time (min): 60 min     Billable Minutes: Gait Training 30 and Therapeutic Exercise 30    Treatment Type: Treatment  PT/PTA: PTA     Number of PTA visits since last PT visit: 3   RYAN Steele  04/19/2024

## 2024-04-19 NOTE — PLAN OF CARE
[facility] - Medical Surgical Unit  Discharge Assessment    Primary Care Provider: Vini Hernandez NP     Discharge Assessment (most recent)       BRIEF DISCHARGE ASSESSMENT - 04/19/24 1037          Discharge Planning    Assessment Type Discharge Planning Assessment (P)                    Visited with Ms. Pate, made her aware of [facility] Mcelory verbalized understanding.

## 2024-04-19 NOTE — PT/OT/SLP PROGRESS
Occupational Therapy   Treatment    Name: Herlinda Valdovinos  MRN: [id number]  Admitting Diagnosis:  Bacteremia due to Escherichia coli       Recommendations:     Discharge Recommendations: Low Intensity Therapy  Discharge Equipment Recommendations:  none  Barriers to discharge:       Assessment:     Herlinda Valdovinos is a 59 y.o. female with a medical diagnosis of Bacteremia due to Escherichia coli.  She presents with weakness. Performance deficits affecting function are weakness, impaired endurance, impaired self care skills, impaired functional mobility, gait instability, impaired balance, decreased coordination, decreased lower extremity function, decreased safety awareness.     Rehab Prognosis:  Good; patient would benefit from acute skilled OT services to address these deficits and reach maximum level of function.       Plan:     Patient to be seen 5 x/week to address the above listed problems via self-care/home management, therapeutic activities, therapeutic exercises  Plan of Care Expires:    Plan of Care Reviewed with: patient    Subjective     Chief Complaint: Pt had complaints this date  Patient/Family Comments/goals: return to PLOF  Pain/Comfort:       Objective:     Communicated with: Pt prior to session.  Patient found supine with peripheral IV  upon OT entry to room.    General Precautions: Standard, fall, contact    Orthopedic Precautions:N/A  Braces: N/A  Respiratory Status: Room air     Occupational Performance:     Bed Mobility:    Patient completed Supine to Sit with supervision  Patient completed Sit to Supine with supervision     Functional Mobility/Transfers:  Patient completed Sit <> Stand Transfer with contact guard assistance  with  hand-held assist   Functional Mobility: Pt took 3-4 side steps toward [facility]    Activities of Daily Living:        AMPAC 6 Click ADL:      Treatment & Education:  Pt completed BUE elbow flex, chest press, and sh flex with 2# dowel and LUE horz ABD/ADD and scap pulls  sitting unsupported EOB 3x15 ea ex to increase strength and endurance for improved I in self care skills    Patient left supine with all lines intact and call button in reach    GOALS:   Multidisciplinary Problems       Occupational Therapy Goals          Problem: Occupational Therapy    Goal Priority Disciplines Outcome Interventions   Occupational Therapy Goal     OT, PT/OT Ongoing, Progressing    Description: Grooming Status:   Short Term Goal: Pt will perform grooming with SBA sitting EOB.   Long Term Goal: Pt will perform grooming/oral hygiene standing at sink with SBA      LE dressing Status:   Short Term Goal: Pt will perform LE dressing with SBA.   Long Term Goal: Pt will perform LE dressing with Mod I.    Toileting Status:   Short Term Goal: Pt will perform toilet hygiene on BSC with Mod I.  Long Term Goal: Pt will perform toilet hygiene on toilet with no AE with Mod I.    Commode Transfer:   Short Term Goal: Pt will perform BSC t/f with Mod I.  Long Term Goal:  Pt will perform toilet t/f in bathroom with Mod I.     Bathing Status:   Long Term Goal: Pt will perform sponge bath with Mod I with no unsafe fatigue.     Strength Status: 5/5 BUEs  Long Term Goal: Pt to perform BUE strengthening with weights and/or body weight to increase ADL independence and safety    Endurance Status:   Short Term Goal:pt to perform 15 min OT treatment with 5 or greater rest breaks  Long Term Goal: pt to perform 30 min OT treat with 3 or less rest breaks                          Time Tracking:     OT Date of Treatment: 04/19/24  OT Start Time: 0920  OT Stop Time: 0956  OT Total Time (min): 36 min    Billable Minutes:Therapeutic Activity 12  Therapeutic Exercise 24               4/19/2024

## 2024-04-20 PROCEDURE — 25000003 PHARM REV CODE 250: Performed by: NURSE PRACTITIONER

## 2024-04-20 PROCEDURE — 27000944

## 2024-04-20 PROCEDURE — 99900035 HC TECH TIME PER 15 MIN (STAT)

## 2024-04-20 PROCEDURE — 63600175 PHARM REV CODE 636 W HCPCS: Performed by: FAMILY MEDICINE

## 2024-04-20 PROCEDURE — 27000982 HC MATTRESS, MATRIX LAL RENTAL

## 2024-04-20 PROCEDURE — 11000004 HC SNF PRIVATE

## 2024-04-20 PROCEDURE — 63600175 PHARM REV CODE 636 W HCPCS: Performed by: NURSE PRACTITIONER

## 2024-04-20 PROCEDURE — 27000221 HC OXYGEN, UP TO 24 HOURS

## 2024-04-20 PROCEDURE — 94761 N-INVAS EAR/PLS OXIMETRY MLT: CPT

## 2024-04-20 PROCEDURE — 25000003 PHARM REV CODE 250: Performed by: FAMILY MEDICINE

## 2024-04-20 RX ADMIN — GABAPENTIN 300 MG: 300 CAPSULE ORAL at 08:04

## 2024-04-20 RX ADMIN — MICONAZOLE NITRATE: 20 CREAM TOPICAL at 08:04

## 2024-04-20 RX ADMIN — PANTOPRAZOLE SODIUM 40 MG: 40 TABLET, DELAYED RELEASE ORAL at 08:04

## 2024-04-20 RX ADMIN — ENOXAPARIN SODIUM 40 MG: 40 INJECTION SUBCUTANEOUS at 04:04

## 2024-04-20 RX ADMIN — MEROPENEM 1 G: 1 INJECTION, POWDER, FOR SOLUTION INTRAVENOUS at 10:04

## 2024-04-20 RX ADMIN — SENNOSIDES AND DOCUSATE SODIUM 1 TABLET: 8.6; 5 TABLET ORAL at 08:04

## 2024-04-20 RX ADMIN — GABAPENTIN 300 MG: 300 CAPSULE ORAL at 09:04

## 2024-04-20 RX ADMIN — OMEGA-3 FATTY ACIDS CAP 1000 MG 1 CAPSULE: 1000 CAP at 08:04

## 2024-04-20 RX ADMIN — SERTRALINE HYDROCHLORIDE 150 MG: 50 TABLET ORAL at 08:04

## 2024-04-20 RX ADMIN — ATORVASTATIN CALCIUM 40 MG: 40 TABLET, FILM COATED ORAL at 09:04

## 2024-04-20 RX ADMIN — ACETAMINOPHEN 650 MG: 325 TABLET ORAL at 09:04

## 2024-04-20 RX ADMIN — MICONAZOLE NITRATE: 20 CREAM TOPICAL at 09:04

## 2024-04-20 RX ADMIN — VALSARTAN 320 MG: 160 TABLET, FILM COATED ORAL at 08:04

## 2024-04-20 RX ADMIN — Medication 1 CAPSULE: at 04:04

## 2024-04-20 RX ADMIN — AMLODIPINE BESYLATE 10 MG: 5 TABLET ORAL at 08:04

## 2024-04-20 RX ADMIN — MEROPENEM 1 G: 1 INJECTION, POWDER, FOR SOLUTION INTRAVENOUS at 05:04

## 2024-04-20 RX ADMIN — Medication 1 CAPSULE: at 11:04

## 2024-04-20 RX ADMIN — Medication 1 CAPSULE: at 08:04

## 2024-04-20 RX ADMIN — THERA TABS 1 TABLET: TAB at 08:04

## 2024-04-20 RX ADMIN — MEROPENEM 1 G: 1 INJECTION, POWDER, FOR SOLUTION INTRAVENOUS at 01:04

## 2024-04-20 RX ADMIN — CLOPIDOGREL BISULFATE 75 MG: 75 TABLET ORAL at 08:04

## 2024-04-20 NOTE — PLAN OF CARE
Problem: Gas Exchange Impaired  Goal: Optimal Gas Exchange  Outcome: Ongoing, Progressing  Intervention: Optimize Oxygenation and Ventilation  Flowsheets (Taken 4/20/2024 1402)  Airway/Ventilation Management: airway patency maintained     Problem: Pain Acute  Goal: Acceptable Pain Control and Functional Ability  Outcome: Ongoing, Progressing  Intervention: Prevent or Manage Pain  Flowsheets (Taken 4/20/2024 1402)  Sleep/Rest Enhancement: awakenings minimized  Sensory Stimulation Regulation:   care clustered   quiet environment promoted  Medication Review/Management: medications reviewed  Intervention: Optimize Psychosocial Wellbeing  Flowsheets (Taken 4/20/2024 1402)  Supportive Measures: active listening utilized     Problem: Fatigue  Goal: Improved Activity Tolerance  Outcome: Ongoing, Progressing  Intervention: Promote Improved Energy  Flowsheets (Taken 4/20/2024 1402)  Sleep/Rest Enhancement: awakenings minimized     Problem: Fall Injury Risk  Goal: Absence of Fall and Fall-Related Injury  Outcome: Ongoing, Progressing  Intervention: Identify and Manage Contributors  Flowsheets (Taken 4/20/2024 1402)  Self-Care Promotion: safe use of adaptive equipment encouraged  Medication Review/Management: medications reviewed     Problem: Hypertension Acute  Goal: Blood Pressure Within Desired Range  Outcome: Ongoing, Progressing  Intervention: Normalize Blood Pressure  Flowsheets (Taken 4/20/2024 1402)  Sensory Stimulation Regulation:   care clustered   quiet environment promoted  Medication Review/Management: medications reviewed  Intervention: Provide Surveillance and Support  Flowsheets (Taken 4/20/2024 1402)  Stabilization Measures: airway opened

## 2024-04-21 PROCEDURE — 27000982 HC MATTRESS, MATRIX LAL RENTAL

## 2024-04-21 PROCEDURE — 27000221 HC OXYGEN, UP TO 24 HOURS

## 2024-04-21 PROCEDURE — 94761 N-INVAS EAR/PLS OXIMETRY MLT: CPT

## 2024-04-21 PROCEDURE — 11000004 HC SNF PRIVATE

## 2024-04-21 PROCEDURE — 25000003 PHARM REV CODE 250: Performed by: NURSE PRACTITIONER

## 2024-04-21 PROCEDURE — 27000944

## 2024-04-21 PROCEDURE — 63600175 PHARM REV CODE 636 W HCPCS: Performed by: FAMILY MEDICINE

## 2024-04-21 PROCEDURE — 25000003 PHARM REV CODE 250: Performed by: FAMILY MEDICINE

## 2024-04-21 RX ADMIN — MICONAZOLE NITRATE: 20 CREAM TOPICAL at 08:04

## 2024-04-21 RX ADMIN — Medication 1 CAPSULE: at 08:04

## 2024-04-21 RX ADMIN — SENNOSIDES AND DOCUSATE SODIUM 1 TABLET: 8.6; 5 TABLET ORAL at 09:04

## 2024-04-21 RX ADMIN — SERTRALINE HYDROCHLORIDE 150 MG: 50 TABLET ORAL at 08:04

## 2024-04-21 RX ADMIN — THERA TABS 1 TABLET: TAB at 08:04

## 2024-04-21 RX ADMIN — VALSARTAN 320 MG: 160 TABLET, FILM COATED ORAL at 08:04

## 2024-04-21 RX ADMIN — ACETAMINOPHEN 650 MG: 325 TABLET ORAL at 09:04

## 2024-04-21 RX ADMIN — MEROPENEM 1 G: 1 INJECTION, POWDER, FOR SOLUTION INTRAVENOUS at 11:04

## 2024-04-21 RX ADMIN — GABAPENTIN 300 MG: 300 CAPSULE ORAL at 08:04

## 2024-04-21 RX ADMIN — AMLODIPINE BESYLATE 10 MG: 5 TABLET ORAL at 08:04

## 2024-04-21 RX ADMIN — Medication 1 CAPSULE: at 11:04

## 2024-04-21 RX ADMIN — MICONAZOLE NITRATE: 20 CREAM TOPICAL at 10:04

## 2024-04-21 RX ADMIN — OMEGA-3 FATTY ACIDS CAP 1000 MG 1 CAPSULE: 1000 CAP at 08:04

## 2024-04-21 RX ADMIN — PANTOPRAZOLE SODIUM 40 MG: 40 TABLET, DELAYED RELEASE ORAL at 08:04

## 2024-04-21 RX ADMIN — MEROPENEM 1 G: 1 INJECTION, POWDER, FOR SOLUTION INTRAVENOUS at 02:04

## 2024-04-21 RX ADMIN — SENNOSIDES AND DOCUSATE SODIUM 1 TABLET: 8.6; 5 TABLET ORAL at 08:04

## 2024-04-21 RX ADMIN — GABAPENTIN 300 MG: 300 CAPSULE ORAL at 09:04

## 2024-04-21 RX ADMIN — ATORVASTATIN CALCIUM 40 MG: 40 TABLET, FILM COATED ORAL at 09:04

## 2024-04-21 RX ADMIN — MEROPENEM 1 G: 1 INJECTION, POWDER, FOR SOLUTION INTRAVENOUS at 05:04

## 2024-04-21 RX ADMIN — CLOPIDOGREL BISULFATE 75 MG: 75 TABLET ORAL at 08:04

## 2024-04-21 RX ADMIN — Medication 1 CAPSULE: at 04:04

## 2024-04-21 NOTE — PLAN OF CARE
Problem: Gas Exchange Impaired  Goal: Optimal Gas Exchange  4/21/2024 1225 by Herlinda Apple RN  Outcome: Ongoing, Progressing  4/21/2024 1224 by Herlinda Apple RN  Outcome: Ongoing, Progressing     Problem: Pain Acute  Goal: Acceptable Pain Control and Functional Ability  4/21/2024 1225 by Herlinda Apple RN  Outcome: Ongoing, Progressing  4/21/2024 1224 by Herlinda Apple RN  Outcome: Ongoing, Progressing  Intervention: Prevent or Manage Pain  Flowsheets (Taken 4/21/2024 1225)  Sleep/Rest Enhancement: awakenings minimized  Medication Review/Management: medications reviewed  Intervention: Optimize Psychosocial Wellbeing  Flowsheets (Taken 4/21/2024 1225)  Supportive Measures: active listening utilized     Problem: Fatigue  Goal: Improved Activity Tolerance  4/21/2024 1225 by Herlinda Apple RN  Outcome: Ongoing, Progressing  4/21/2024 1224 by Herlinda Apple RN  Outcome: Ongoing, Progressing  Intervention: Promote Improved Energy  Flowsheets (Taken 4/21/2024 1225)  Sleep/Rest Enhancement: awakenings minimized     Problem: Fall Injury Risk  Goal: Absence of Fall and Fall-Related Injury  4/21/2024 1225 by Herlinda Apple RN  Outcome: Ongoing, Progressing  4/21/2024 1224 by Herlinda Apple RN  Outcome: Ongoing, Progressing  Intervention: Identify and Manage Contributors  Flowsheets (Taken 4/21/2024 1225)  Medication Review/Management: medications reviewed     Problem: Hypertension Acute  Goal: Blood Pressure Within Desired Range  4/21/2024 1225 by Herlinda Apple RN  Outcome: Ongoing, Progressing  4/21/2024 1224 by Herlinda Apple RN  Outcome: Ongoing, Progressing  Intervention: Normalize Blood Pressure  Flowsheets (Taken 4/21/2024 1225)  Medication Review/Management: medications reviewed  Intervention: Provide Surveillance and Support  Flowsheets (Taken 4/21/2024 1225)  Stabilization Measures: airway opened

## 2024-04-22 LAB
ALBUMIN SERPL BCP-MCNC: 3 G/DL (ref 3.5–5)
ALBUMIN/GLOB SERPL: 0.9 {RATIO}
ALP SERPL-CCNC: 186 U/L (ref 46–118)
ALT SERPL W P-5'-P-CCNC: 25 U/L (ref 13–56)
ANION GAP SERPL CALCULATED.3IONS-SCNC: 11 MMOL/L (ref 7–16)
AST SERPL W P-5'-P-CCNC: 39 U/L (ref 15–37)
BASOPHILS # BLD AUTO: 0.02 K/UL (ref 0–0.2)
BASOPHILS NFR BLD AUTO: 0.3 % (ref 0–1)
BILIRUB SERPL-MCNC: 0.4 MG/DL (ref ?–1.2)
BUN SERPL-MCNC: 17 MG/DL (ref 7–18)
BUN/CREAT SERPL: 27 (ref 6–20)
CALCIUM SERPL-MCNC: 8.5 MG/DL (ref 8.5–10.1)
CHLORIDE SERPL-SCNC: 109 MMOL/L (ref 98–107)
CO2 SERPL-SCNC: 25 MMOL/L (ref 21–32)
CREAT SERPL-MCNC: 0.62 MG/DL (ref 0.55–1.02)
DIFFERENTIAL METHOD BLD: ABNORMAL
EGFR (NO RACE VARIABLE) (RUSH/TITUS): 103 ML/MIN/1.73M2
EOSINOPHIL # BLD AUTO: 0.42 K/UL (ref 0–0.5)
EOSINOPHIL NFR BLD AUTO: 5.3 % (ref 1–4)
ERYTHROCYTE [DISTWIDTH] IN BLOOD BY AUTOMATED COUNT: 15.5 % (ref 11.5–14.5)
GLOBULIN SER-MCNC: 3.4 G/DL (ref 2–4)
GLUCOSE SERPL-MCNC: 110 MG/DL (ref 74–106)
HCT VFR BLD AUTO: 36.8 % (ref 38–47)
HGB BLD-MCNC: 11.9 G/DL (ref 12–16)
LYMPHOCYTES # BLD AUTO: 2.54 K/UL (ref 1–4.8)
LYMPHOCYTES NFR BLD AUTO: 32.1 % (ref 27–41)
MCH RBC QN AUTO: 27.8 PG (ref 27–31)
MCHC RBC AUTO-ENTMCNC: 32.3 G/DL (ref 32–36)
MCV RBC AUTO: 86 FL (ref 80–96)
MONOCYTES # BLD AUTO: 0.63 K/UL (ref 0–0.8)
MONOCYTES NFR BLD AUTO: 8 % (ref 2–6)
MPC BLD CALC-MCNC: 10.1 FL (ref 9.4–12.4)
NEUTROPHILS # BLD AUTO: 4.3 K/UL (ref 1.8–7.7)
NEUTROPHILS NFR BLD AUTO: 54.3 % (ref 53–65)
PLATELET # BLD AUTO: 234 K/UL (ref 150–400)
POTASSIUM SERPL-SCNC: 3.3 MMOL/L (ref 3.5–5.1)
PROT SERPL-MCNC: 6.4 G/DL (ref 6.4–8.2)
RBC # BLD AUTO: 4.28 M/UL (ref 4.2–5.4)
SODIUM SERPL-SCNC: 142 MMOL/L (ref 136–145)
WBC # BLD AUTO: 7.91 K/UL (ref 4.5–11)

## 2024-04-22 PROCEDURE — 99308 SBSQ NF CARE LOW MDM 20: CPT | Mod: ,,, | Performed by: NURSE PRACTITIONER

## 2024-04-22 PROCEDURE — 25000003 PHARM REV CODE 250: Performed by: FAMILY MEDICINE

## 2024-04-22 PROCEDURE — 97110 THERAPEUTIC EXERCISES: CPT

## 2024-04-22 PROCEDURE — 99900035 HC TECH TIME PER 15 MIN (STAT)

## 2024-04-22 PROCEDURE — 94761 N-INVAS EAR/PLS OXIMETRY MLT: CPT

## 2024-04-22 PROCEDURE — 80053 COMPREHEN METABOLIC PANEL: CPT | Performed by: FAMILY MEDICINE

## 2024-04-22 PROCEDURE — 97110 THERAPEUTIC EXERCISES: CPT | Mod: CQ

## 2024-04-22 PROCEDURE — 97116 GAIT TRAINING THERAPY: CPT | Mod: CQ

## 2024-04-22 PROCEDURE — 27000221 HC OXYGEN, UP TO 24 HOURS

## 2024-04-22 PROCEDURE — 80048 BASIC METABOLIC PNL TOTAL CA: CPT | Mod: XB | Performed by: NURSE PRACTITIONER

## 2024-04-22 PROCEDURE — 97530 THERAPEUTIC ACTIVITIES: CPT

## 2024-04-22 PROCEDURE — 63600175 PHARM REV CODE 636 W HCPCS: Performed by: NURSE PRACTITIONER

## 2024-04-22 PROCEDURE — 11000004 HC SNF PRIVATE

## 2024-04-22 PROCEDURE — 85025 COMPLETE CBC W/AUTO DIFF WBC: CPT | Performed by: NURSE PRACTITIONER

## 2024-04-22 PROCEDURE — 63600175 PHARM REV CODE 636 W HCPCS: Performed by: FAMILY MEDICINE

## 2024-04-22 PROCEDURE — 25000003 PHARM REV CODE 250: Performed by: NURSE PRACTITIONER

## 2024-04-22 PROCEDURE — 36415 COLL VENOUS BLD VENIPUNCTURE: CPT | Performed by: NURSE PRACTITIONER

## 2024-04-22 RX ADMIN — Medication 1 CAPSULE: at 05:04

## 2024-04-22 RX ADMIN — POTASSIUM BICARBONATE 35 MEQ: 391 TABLET, EFFERVESCENT ORAL at 12:04

## 2024-04-22 RX ADMIN — PANTOPRAZOLE SODIUM 40 MG: 40 TABLET, DELAYED RELEASE ORAL at 08:04

## 2024-04-22 RX ADMIN — GABAPENTIN 300 MG: 300 CAPSULE ORAL at 08:04

## 2024-04-22 RX ADMIN — POTASSIUM BICARBONATE 35 MEQ: 391 TABLET, EFFERVESCENT ORAL at 10:04

## 2024-04-22 RX ADMIN — ACETAMINOPHEN 650 MG: 325 TABLET ORAL at 08:04

## 2024-04-22 RX ADMIN — MICONAZOLE NITRATE: 20 CREAM TOPICAL at 09:04

## 2024-04-22 RX ADMIN — SENNOSIDES AND DOCUSATE SODIUM 1 TABLET: 8.6; 5 TABLET ORAL at 08:04

## 2024-04-22 RX ADMIN — MEROPENEM 1 G: 1 INJECTION, POWDER, FOR SOLUTION INTRAVENOUS at 02:04

## 2024-04-22 RX ADMIN — OMEGA-3 FATTY ACIDS CAP 1000 MG 1 CAPSULE: 1000 CAP at 08:04

## 2024-04-22 RX ADMIN — MEROPENEM 1 G: 1 INJECTION, POWDER, FOR SOLUTION INTRAVENOUS at 10:04

## 2024-04-22 RX ADMIN — SERTRALINE HYDROCHLORIDE 150 MG: 50 TABLET ORAL at 08:04

## 2024-04-22 RX ADMIN — MEROPENEM 1 G: 1 INJECTION, POWDER, FOR SOLUTION INTRAVENOUS at 05:04

## 2024-04-22 RX ADMIN — MICONAZOLE NITRATE: 20 CREAM TOPICAL at 08:04

## 2024-04-22 RX ADMIN — ATORVASTATIN CALCIUM 40 MG: 40 TABLET, FILM COATED ORAL at 08:04

## 2024-04-22 RX ADMIN — Medication 1 CAPSULE: at 12:04

## 2024-04-22 RX ADMIN — AMLODIPINE BESYLATE 10 MG: 5 TABLET ORAL at 08:04

## 2024-04-22 RX ADMIN — CLOPIDOGREL BISULFATE 75 MG: 75 TABLET ORAL at 08:04

## 2024-04-22 RX ADMIN — ENOXAPARIN SODIUM 40 MG: 40 INJECTION SUBCUTANEOUS at 05:04

## 2024-04-22 RX ADMIN — Medication 1 CAPSULE: at 08:04

## 2024-04-22 RX ADMIN — VALSARTAN 320 MG: 160 TABLET, FILM COATED ORAL at 08:04

## 2024-04-22 RX ADMIN — THERA TABS 1 TABLET: TAB at 08:04

## 2024-04-22 NOTE — ASSESSMENT & PLAN NOTE
04/12/24 PT and OT to evaluate and treat    04/16/24 PT and  OT to eval and treat      04/22/24 Walked 290 feet with rolling walker on Friday

## 2024-04-22 NOTE — SUBJECTIVE & OBJECTIVE
Interval History:bacteremia, hypokalemia      Review of Systems   Constitutional:  Negative for appetite change and fever.   HENT:  Negative for sore throat and trouble swallowing.    Respiratory:  Negative for cough, chest tightness and shortness of breath.    Cardiovascular:  Negative for chest pain.   Gastrointestinal:  Negative for abdominal pain, constipation, diarrhea, nausea and vomiting.   Genitourinary:  Negative for difficulty urinating and dysuria.   Musculoskeletal:  Negative for arthralgias.   Neurological:  Positive for weakness. Negative for light-headedness and headaches.     Objective:     Vital Signs (Most Recent):  Temp: 97.8 °F (36.6 °C) (04/22/24 0404)  Pulse: 72 (04/22/24 0404)  Resp: 18 (04/22/24 0404)  BP: 117/74 (04/22/24 0404)  SpO2: 95 % (04/22/24 0500) Vital Signs (24h Range):  Temp:  [97.8 °F (36.6 °C)-99.1 °F (37.3 °C)] 97.8 °F (36.6 °C)  Pulse:  [72-80] 72  Resp:  [16-18] 18  SpO2:  [94 %-96 %] 95 %  BP: (105-144)/(71-84) 117/74     Weight: 74.8 kg (165 lb)  Body mass index is 28.32 kg/m².    Intake/Output Summary (Last 24 hours) at 4/22/2024 1234  Last data filed at 4/22/2024 0832  Gross per 24 hour   Intake 1582.88 ml   Output --   Net 1582.88 ml         Physical Exam  HENT:      Head: Normocephalic.      Mouth/Throat:      Mouth: Mucous membranes are moist.   Cardiovascular:      Rate and Rhythm: Normal rate and regular rhythm.      Pulses: Normal pulses.      Heart sounds: Normal heart sounds.   Pulmonary:      Effort: No respiratory distress.      Breath sounds: Normal breath sounds. No stridor. No wheezing or rhonchi.   Abdominal:      General: Bowel sounds are normal. There is no distension.      Palpations: Abdomen is soft. There is no mass.      Tenderness: There is no abdominal tenderness. There is no guarding.      Hernia: No hernia is present.   Musculoskeletal:         General: No swelling, tenderness, deformity or signs of injury.   Skin:     General: Skin is warm and  dry.   Neurological:      Mental Status: She is alert and oriented to person, place, and time.      Motor: Weakness present.   Psychiatric:         Mood and Affect: Mood normal.             Significant Labs: All pertinent labs within the past 24 hours have been reviewed.  Recent Lab Results         04/22/24  0441        Albumin/Globulin Ratio 0.9       Albumin 3.0              ALT 25       Anion Gap 11       AST 39       Baso # 0.02       Basophil % 0.3       BILIRUBIN TOTAL 0.4       BUN 17       BUN/CREAT RATIO 27       Calcium 8.5       Chloride 109       CO2 25       Creatinine 0.62       Differential Method Auto       eGFR 103       Eos # 0.42       Eos % 5.3       Globulin, Total 3.4       Glucose 110       Hematocrit 36.8       Hemoglobin 11.9       Lymph # 2.54       Lymph % 32.1       MCH 27.8       MCHC 32.3       MCV 86.0       Mono # 0.63       Mono % 8.0       MPV 10.1       Neutrophils, Abs 4.30       Neutrophils Relative 54.3       Platelet Count 234       Potassium 3.3       PROTEIN TOTAL 6.4       RBC 4.28       RDW 15.5       Sodium 142       WBC 7.91               Significant Imaging: I have reviewed all pertinent imaging results/findings within the past 24 hours.

## 2024-04-22 NOTE — ASSESSMENT & PLAN NOTE
04/12/24 repeat blood cultures x 2 tomorrow  Continue merrem 1 gm every 8 hours x 14 post negative blood culture      04/15/24 blood cultures from 4/12 show no growth  Continue abx through 04/26 04/16/24 tolerating merrem well    04/22/24 toelrating merrem well. Contineu through 04/26

## 2024-04-22 NOTE — PLAN OF CARE
Problem: Fatigue  Goal: Improved Activity Tolerance  Outcome: Ongoing, Progressing  Intervention: Promote Improved Energy  Flowsheets (Taken 4/22/2024 1106)  Sleep/Rest Enhancement: awakenings minimized  Activity Management: Ambulated in room - L4     Problem: Fall Injury Risk  Goal: Absence of Fall and Fall-Related Injury  Outcome: Ongoing, Progressing  Intervention: Identify and Manage Contributors  Flowsheets (Taken 4/22/2024 1106)  Self-Care Promotion:   independence encouraged   BADL personal objects within reach   BADL personal routines maintained   meal set-up provided   safe use of adaptive equipment encouraged  Intervention: Promote Injury-Free Environment  Flowsheets (Taken 4/22/2024 1106)  Safety Promotion/Fall Prevention:   assistive device/personal item within reach   bed alarm set   chair alarm set   diversional activities provided   Fall Risk signage in place   Fall Risk reviewed with patient/family   lighting adjusted   nonskid shoes/socks when out of bed   room near unit station   side rails raised x 3   instructed to call staff for mobility

## 2024-04-22 NOTE — PROGRESS NOTES
[facility] - Medical Surgical Unit  Hospital Medicine  Progress Note    Patient Name: Herlinda Valdovinos  MRN: [id number]  Patient Class: IP- Swing   Admission Date: 4/11/2024  Length of Stay: 11 days  Attending Physician: Heber Carr Jr., MD  Primary Care Provider: Vini Hernandez NP        Subjective:     Principal Problem:Bacteremia due to Escherichia coli        HPI:  Herlinda Kaur is a 59 year old female with pmh of hypertension, chronic anxiety, depression, dysphagia, CVA, insomnia, intrapontine hemorrhage, left hemiparesis, peripheral neuropathy and thyroid disease. PCP is Micheal Nash. She presented to ER today with complaints of recurrent UTI. States she recently finished a course of Amoxicillin for a UTI but over the last 2-3 days it has come back. Reports pain, frequency and urgency with urination, reports she wears a pad. Patient has fever today. Reports some nausea also. She also complains of SOB and dry cough. Has home o2 per nasal canula at 2 liters.Worked up in ER and results are:  Labs significant for WBC 12.16, H/H 12.1/38.1, Platelets 120, Na 134, K 3.3, Cl 98, BUN 12, Creat 1.06, Calcium 8.1, Mag 1.3, Bilirubin 1.8, Lactic 1.1, Large occult blood in urine with 10-15 RBC's, positive nitrates, moderate leukocytes with 20-50 WBC's. Blood cultures pending.  Chest Xray significant for Diffuse interstitial and patchy airspace opacities scattered throughout the lungs, edema versus infection. Patient was managed in the ED with IV fluids prior to arrival, IV Magnesium, IV Zofran, IV Rocephin and oral tylenol.  The response to treatment was good.  She was admitted to inpatient care for acute urinary tract infection, failed outpatient treatment. She has recd rocephin while in ER.     * No surgery found *       Hospital Course:   04/09/24 Awake and resting in bed. Max temp is 102.6 past 24 hours. WBC 8.33.  Potassium is 3.1 cr 1.14. Denies any increased SOB. Sat on 2 liters is 96%.  Breath sounds are diminished. Will repeat cxr.  Urine culture pending. Continue rocephin and zithromax. Continue fluids. Replace potassium orally.  04/10/24 Awake and resting in bed. Max temp past 24 hours is 102.7. Blood cultures show     2 d ago       Verigene Result Negative E. coli Abnormal    Verigene Result Negative ESBL Positive Abnormal    DCD rocephin and zithromax. Merrem 1 gm IV every 8 hours. Will repeat blood cultures in 48 hours. Talked with patient re this and need to continue abx for 2 weeks after we get negative blood cultures. She is agreeable to plan. Contact isolation for ESBL ecoli bacteremia.  04/11/24 Awak and resting in bed. Temp.trending down- Max temp past 24 hours is 101.6. States she is feeling much better today. Will continue merrem for ESBL bacteremia. Will repeat blood cultures tomorrow- will continue abx for 14 days from negative blood cultures. Talked with her re swing bed due to need for abx and for weakness. She is agreeable. She will dc from acute care to swing bed today.  Admitted to swing bed on 04/11/24. Continue merrem for bacteremia. Repeat blood cultures drawn this morning. Afebrile overnight. States she is feeling much better this morning.    Overview/Hospital Course:  04/15/24 Awake and resting in bed. No complaints. States she is feeling so much better. Afebrile. Repeat blood cultures were done Friday - show no growth. Continue abx for 14 days from 04/12- so end date 04/26/24. Appetite is good. BM yesterday.  04/16/24 Awake and resting in bed. No complaints. Appetite is good. Continue abx for ESBL bacteremia. Continue PT and OT.  04/17/24 Tolerating abx well. No complaints. Continue PT and OT for strengthening.  04/22/24 no complaints. Tolerating merrem well. Potassium is 3.3. Will replace orally. Continue therapy.    Interval History:bacteremia, hypokalemia      Review of Systems   Constitutional:  Negative for appetite change and fever.   HENT:  Negative for sore throat  and trouble swallowing.    Respiratory:  Negative for cough, chest tightness and shortness of breath.    Cardiovascular:  Negative for chest pain.   Gastrointestinal:  Negative for abdominal pain, constipation, diarrhea, nausea and vomiting.   Genitourinary:  Negative for difficulty urinating and dysuria.   Musculoskeletal:  Negative for arthralgias.   Neurological:  Positive for weakness. Negative for light-headedness and headaches.     Objective:     Vital Signs (Most Recent):  Temp: 97.8 °F (36.6 °C) (04/22/24 0404)  Pulse: 72 (04/22/24 0404)  Resp: 18 (04/22/24 0404)  BP: 117/74 (04/22/24 0404)  SpO2: 95 % (04/22/24 0500) Vital Signs (24h Range):  Temp:  [97.8 °F (36.6 °C)-99.1 °F (37.3 °C)] 97.8 °F (36.6 °C)  Pulse:  [72-80] 72  Resp:  [16-18] 18  SpO2:  [94 %-96 %] 95 %  BP: (105-144)/(71-84) 117/74     Weight: 74.8 kg (165 lb)  Body mass index is 28.32 kg/m².    Intake/Output Summary (Last 24 hours) at 4/22/2024 1234  Last data filed at 4/22/2024 0832  Gross per 24 hour   Intake 1582.88 ml   Output --   Net 1582.88 ml         Physical Exam  HENT:      Head: Normocephalic.      Mouth/Throat:      Mouth: Mucous membranes are moist.   Cardiovascular:      Rate and Rhythm: Normal rate and regular rhythm.      Pulses: Normal pulses.      Heart sounds: Normal heart sounds.   Pulmonary:      Effort: No respiratory distress.      Breath sounds: Normal breath sounds. No stridor. No wheezing or rhonchi.   Abdominal:      General: Bowel sounds are normal. There is no distension.      Palpations: Abdomen is soft. There is no mass.      Tenderness: There is no abdominal tenderness. There is no guarding.      Hernia: No hernia is present.   Musculoskeletal:         General: No swelling, tenderness, deformity or signs of injury.   Skin:     General: Skin is warm and dry.   Neurological:      Mental Status: She is alert and oriented to person, place, and time.      Motor: Weakness present.   Psychiatric:         Mood and  Affect: Mood normal.             Significant Labs: All pertinent labs within the past 24 hours have been reviewed.  Recent Lab Results         04/22/24  0441        Albumin/Globulin Ratio 0.9       Albumin 3.0              ALT 25       Anion Gap 11       AST 39       Baso # 0.02       Basophil % 0.3       BILIRUBIN TOTAL 0.4       BUN 17       BUN/CREAT RATIO 27       Calcium 8.5       Chloride 109       CO2 25       Creatinine 0.62       Differential Method Auto       eGFR 103       Eos # 0.42       Eos % 5.3       Globulin, Total 3.4       Glucose 110       Hematocrit 36.8       Hemoglobin 11.9       Lymph # 2.54       Lymph % 32.1       MCH 27.8       MCHC 32.3       MCV 86.0       Mono # 0.63       Mono % 8.0       MPV 10.1       Neutrophils, Abs 4.30       Neutrophils Relative 54.3       Platelet Count 234       Potassium 3.3       PROTEIN TOTAL 6.4       RBC 4.28       RDW 15.5       Sodium 142       WBC 7.91               Significant Imaging: I have reviewed all pertinent imaging results/findings within the past 24 hours.    Assessment/Plan:      * Bacteremia due to Escherichia coli    04/12/24 repeat blood cultures x 2 tomorrow  Continue merrem 1 gm every 8 hours x 14 post negative blood culture      04/15/24 blood cultures from 4/12 show no growth  Continue abx through 04/26 04/16/24 tolerating merrem well    04/22/24 toelrating merrem well. Contineu through 04/26    General weakness  04/12/24 PT and OT to evaluate and treat    04/16/24 PT and  OT to eval and treat      04/22/24 Walked 290 feet with rolling walker on Friday    Hypertension  Chronic, controlled. Latest blood pressure and vitals reviewed-     Temp:  [97.8 °F (36.6 °C)-99.1 °F (37.3 °C)]   Pulse:  [72-80]   Resp:  [16-18]   BP: (105-144)/(71-84)   SpO2:  [94 %-96 %] .   Home meds for hypertension were reviewed and noted below.   Hypertension Medications               amLODIPine (NORVASC) 10 MG tablet Take 1 tablet (10 mg total) by  mouth once daily.    olmesartan (BENICAR) 40 MG tablet Take 40 mg by mouth once daily.            While in the hospital, will manage blood pressure as follows; Continue home antihypertensive regimen    Will utilize p.r.n. blood pressure medication only if patient's blood pressure greater than 180/110 and she develops symptoms such as worsening chest pain or shortness of breath.      04/22/24 stable    Thyroid disease    04/12/24 continue levothyroxine    Depressive disorder  Patient has persistent depression which is unknown and is currently controlled. Will Continue anti-depressant medications. We will not consult psychiatry at this time. Patient does not display psychosis at this time. Continue to monitor closely and adjust plan of care as needed.        Generalized anxiety disorder    04/22/24 stable    Dyslipidemia  04/12/24 continue statin      04/22/24 continue statin     DVT prophylaxis  04/12/24 continue lovenox      Hypokalemia  Patient has hypokalemia which is Acute and currently controlled. Most recent potassium levels reviewed-   Lab Results   Component Value Date    K 3.3 (L) 04/22/2024   . Will continue potassium replacement per protocol and recheck repeat levels after replacement completed.       VTE Risk Mitigation (From admission, onward)           Ordered     enoxaparin injection 40 mg  Every 24 hours         04/16/24 0957                    Discharge Planning   POOL: 4/26/2024     Code Status: Full Code   Is the patient medically ready for discharge?:     Reason for patient still in hospital (select all that apply): Treatment  Discharge Plan A: Home with family                  KEVAN Funk  Department of Hospital Medicine   [facility] - Medical Surgical Unit

## 2024-04-22 NOTE — PT/OT/SLP PROGRESS
Occupational Therapy   Treatment    Name: Herlinda Valdovinos  MRN: [id number]  Admitting Diagnosis:  Bacteremia due to Escherichia coli       Recommendations:     Discharge Recommendations: Low Intensity Therapy  Discharge Equipment Recommendations:  none  Barriers to discharge:  None    Assessment:     Herlinda Valdovinos is a 59 y.o. female with a medical diagnosis of Bacteremia due to Escherichia coli.  She presents with weakness and decline with ADLs. Performance deficits affecting function are weakness, impaired endurance, impaired self care skills, impaired functional mobility, gait instability, impaired balance, decreased coordination, decreased lower extremity function, decreased safety awareness.     Rehab Prognosis:  Good; patient would benefit from acute skilled OT services to address these deficits and reach maximum level of function.       Plan:     Patient to be seen 5 x/week to address the above listed problems via self-care/home management, therapeutic activities, therapeutic exercises  Plan of Care Expires:    Plan of Care Reviewed with: patient    Subjective     Chief Complaint: Pain and weakness  Patient/Family Comments/goals: to return to prior level of function  Pain/Comfort:       Objective:     Communicated with: Nurse prior to session.  Patient found up in chair with oxygen, peripheral IV upon OT entry to room.    General Precautions: Standard, fall, contact    Orthopedic Precautions:N/A  Braces: N/A  Respiratory Status: Room air     Occupational Performance:     Bed Mobility:    Not tested     Functional Mobility/Transfers:  Patient completed Sit <> Stand Transfer with modified independence  with  rolling walker   Functional Mobility: Patient transferred within room with no difficulty    Activities of Daily Living:  Not tested      [facility] 6 Click ADL:      Treatment & Education:  Pt performed B UE strengthening exercises to include:   Shoulder flexion   Chest press    Elbow flexion   Elbow  extension   Pectoral stretches   Bilateral rowing  All exercises performed 3x10 reps suing 3# dowel and green theraband.  Patient also performed fine motor coordination activity with clothes pin tree. Patient was beronica to adalberto 2#/4# resistive pinch pins with minimal difficulty.    Patient left up in chair with all lines intact and call button in reach    GOALS:   Multidisciplinary Problems       Occupational Therapy Goals          Problem: Occupational Therapy    Goal Priority Disciplines Outcome Interventions   Occupational Therapy Goal     OT, PT/OT Ongoing, Progressing    Description: Grooming Status:   Short Term Goal: Pt will perform grooming with SBA sitting EOB.   Long Term Goal: Pt will perform grooming/oral hygiene standing at sink with SBA      LE dressing Status:   Short Term Goal: Pt will perform LE dressing with SBA.   Long Term Goal: Pt will perform LE dressing with Mod I.    Toileting Status:   Short Term Goal: Pt will perform toilet hygiene on BSC with Mod I.  Long Term Goal: Pt will perform toilet hygiene on toilet with no AE with Mod I.    Commode Transfer:   Short Term Goal: Pt will perform BSC t/f with Mod I.  Long Term Goal:  Pt will perform toilet t/f in bathroom with Mod I.     Bathing Status:   Long Term Goal: Pt will perform sponge bath with Mod I with no unsafe fatigue.     Strength Status: 5/5 BUEs  Long Term Goal: Pt to perform BUE strengthening with weights and/or body weight to increase ADL independence and safety    Endurance Status:   Short Term Goal:pt to perform 15 min OT treatment with 5 or greater rest breaks  Long Term Goal: pt to perform 30 min OT treat with 3 or less rest breaks                          Time Tracking:     OT Date of Treatment: 04/22/24  OT Start Time: 1348  OT Stop Time: 1437  OT Total Time (min): 49 min    Billable Minutes:Therapeutic Activity 34 min  Therapeutic Exercise 15 min    RUDDY Means/L, CSRS  OT/HEMAL: OT          4/22/2024

## 2024-04-22 NOTE — PT/OT/SLP PROGRESS
Physical Therapy Treatment    Patient Name:  Herlinda Valdovinos   MRN:  [id number]    Recommendations:     Discharge Recommendations: Low Intensity Therapy  Discharge Equipment Recommendations: none  Barriers to discharge: None    Assessment:     Herlinda Valdovinos is a 59 y.o. female admitted with a medical diagnosis of Bacteremia due to Escherichia coli.  She presents with the following impairments/functional limitations: weakness, impaired endurance, impaired functional mobility Patient was agreeable and excited to perform therapy. Patient with excellent participation with therapy today. Patient was able to perform all transfers and get herself dress with no assistance needed from therapist. Patient was able to ambulate from her room to therapy gym with no rest breaks or LOB noted. Patient was able to perform nustep as well as exercises with no complaints. PTA discussed therapy progress with PT. PT was agreeable with treatment and feels that the patient is making good progress.     Rehab Prognosis: Good; patient would benefit from acute skilled PT services to address these deficits and reach maximum level of function.    Recent Surgery: * No surgery found *      Plan:     During this hospitalization, patient to be seen 5 x/week to address the identified rehab impairments via gait training, therapeutic activities, therapeutic exercises, neuromuscular re-education and progress toward the following goals:    Plan of Care Expires:  04/26/24    Subjective     Chief Complaint: none  Patient/Family Comments/goals: return home  Pain/Comfort:  Pain Rating 1: 0/10  Pain Rating Post-Intervention 1: 0/10      Objective:     Communicated with OT prior to session.  Patient found supine with peripheral IV upon PT entry to room.     General Precautions: Standard, contact, fall  Orthopedic Precautions: N/A  Braces: N/A  Respiratory Status: Room air     Functional Mobility:  Bed Mobility:     Supine to Sit: independence  Transfers:      Sit to Stand:  independence with rolling walker  Gait: Patient ambulated 290' with RW and supervision with no LOB. Patient also performed gait training in parallel bars while in therapy gym.       AM-PAC 6 CLICK MOBILITY  Turning over in bed (including adjusting bedclothes, sheets and blankets)?: 4  Sitting down on and standing up from a chair with arms (e.g., wheelchair, bedside commode, etc.): 4  Moving from lying on back to sitting on the side of the bed?: 4  Moving to and from a bed to a chair (including a wheelchair)?: 4  Need to walk in hospital room?: 4  Climbing 3-5 steps with a railing?: 3  Basic Mobility Total Score: 23       Treatment & Education:  Transfers and ambulation as listed above.   Ambulation - 290'  Nu step - 7 minutes   Long arc quads - 20 reps with 2 lb ankle weights   Seated hip flexion - 20 reps with 2 lb ankle weights  Gait training in parallel bars    Patient left up in chair with  OT present..    GOALS:   Multidisciplinary Problems       Physical Therapy Goals          Problem: Physical Therapy    Goal Priority Disciplines Outcome Goal Variances Interventions   Physical Therapy Goal     PT, PT/OT Ongoing, Progressing     Description: Short-term Goals: 0.75 week  1. Patient will perform supine to/from sit with modified independence to improve independence and safety with bed mobility.  2. Patient will perform sit to/from stand with a rolling walker with standby assist to improve independence and safety with transfers.  3. Patient will ambulate 100 feet with a rolling walker with standby assist to improve independence and safety with gait.  4. Patient will tolerate 15 minutes of physical therapy intervention to improve endurance and activity tolerance.    Long-term goals: 1.5 weeks  1. Patient will perform sit to/from stand with a rolling walker with supervision to improve independence and safety with transfers.  2. Patient will ambulate 50 feet with a quad cane with contact guard assist  to improve independence and safety with gait.  3. Patient will tolerate 30 minutes of physical therapy intervention to improve endurance and activity tolerance.                         Time Tracking:     PT Received On: 04/22/24  PT Start Time: 1308     PT Stop Time: 1348  PT Total Time (min): 40 min     Billable Minutes: Gait Training 15 and Therapeutic Exercise 25    Treatment Type: Treatment  PT/PTA: PTA     Number of PTA visits since last PT visit: 4   RYAN Steele  04/22/2024

## 2024-04-22 NOTE — ASSESSMENT & PLAN NOTE
Chronic, controlled. Latest blood pressure and vitals reviewed-     Temp:  [97.8 °F (36.6 °C)-99.1 °F (37.3 °C)]   Pulse:  [72-80]   Resp:  [16-18]   BP: (105-144)/(71-84)   SpO2:  [94 %-96 %] .   Home meds for hypertension were reviewed and noted below.   Hypertension Medications               amLODIPine (NORVASC) 10 MG tablet Take 1 tablet (10 mg total) by mouth once daily.    olmesartan (BENICAR) 40 MG tablet Take 40 mg by mouth once daily.            While in the hospital, will manage blood pressure as follows; Continue home antihypertensive regimen    Will utilize p.r.n. blood pressure medication only if patient's blood pressure greater than 180/110 and she develops symptoms such as worsening chest pain or shortness of breath.      04/22/24 stable

## 2024-04-22 NOTE — ASSESSMENT & PLAN NOTE
Patient has hypokalemia which is Acute and currently controlled. Most recent potassium levels reviewed-   Lab Results   Component Value Date    K 3.3 (L) 04/22/2024   . Will continue potassium replacement per protocol and recheck repeat levels after replacement completed.

## 2024-04-23 PROBLEM — B37.31 VAGINAL YEAST INFECTION: Status: ACTIVE | Noted: 2024-04-23

## 2024-04-23 PROBLEM — B37.9 YEAST INFECTION: Status: RESOLVED | Noted: 2024-04-23 | Resolved: 2024-04-23

## 2024-04-23 PROBLEM — B37.9 YEAST INFECTION: Status: ACTIVE | Noted: 2024-04-23

## 2024-04-23 LAB
ANION GAP SERPL CALCULATED.3IONS-SCNC: 11 MMOL/L (ref 7–16)
BUN SERPL-MCNC: 19 MG/DL (ref 7–18)
BUN/CREAT SERPL: 21 (ref 6–20)
CALCIUM SERPL-MCNC: 8.9 MG/DL (ref 8.5–10.1)
CHLORIDE SERPL-SCNC: 102 MMOL/L (ref 98–107)
CO2 SERPL-SCNC: 30 MMOL/L (ref 21–32)
CREAT SERPL-MCNC: 0.89 MG/DL (ref 0.55–1.02)
EGFR (NO RACE VARIABLE) (RUSH/TITUS): 75 ML/MIN/1.73M2
GLUCOSE SERPL-MCNC: 125 MG/DL (ref 74–106)
POTASSIUM SERPL-SCNC: 3.7 MMOL/L (ref 3.5–5.1)
SODIUM SERPL-SCNC: 139 MMOL/L (ref 136–145)

## 2024-04-23 PROCEDURE — 25000003 PHARM REV CODE 250: Performed by: NURSE PRACTITIONER

## 2024-04-23 PROCEDURE — 99308 SBSQ NF CARE LOW MDM 20: CPT | Mod: ,,, | Performed by: NURSE PRACTITIONER

## 2024-04-23 PROCEDURE — 99900035 HC TECH TIME PER 15 MIN (STAT)

## 2024-04-23 PROCEDURE — 25000003 PHARM REV CODE 250: Performed by: FAMILY MEDICINE

## 2024-04-23 PROCEDURE — 63600175 PHARM REV CODE 636 W HCPCS: Performed by: FAMILY MEDICINE

## 2024-04-23 PROCEDURE — 97530 THERAPEUTIC ACTIVITIES: CPT

## 2024-04-23 PROCEDURE — 63700000 PHARM REV CODE 250 ALT 637 W/O HCPCS: Performed by: NURSE PRACTITIONER

## 2024-04-23 PROCEDURE — 63600175 PHARM REV CODE 636 W HCPCS: Performed by: NURSE PRACTITIONER

## 2024-04-23 PROCEDURE — 97110 THERAPEUTIC EXERCISES: CPT

## 2024-04-23 PROCEDURE — 97116 GAIT TRAINING THERAPY: CPT | Mod: CQ

## 2024-04-23 PROCEDURE — 27000221 HC OXYGEN, UP TO 24 HOURS

## 2024-04-23 PROCEDURE — 11000004 HC SNF PRIVATE

## 2024-04-23 PROCEDURE — 97110 THERAPEUTIC EXERCISES: CPT | Mod: CQ

## 2024-04-23 PROCEDURE — 94761 N-INVAS EAR/PLS OXIMETRY MLT: CPT

## 2024-04-23 RX ORDER — ACETAMINOPHEN 325 MG/1
650 TABLET ORAL NIGHTLY
Status: DISCONTINUED | OUTPATIENT
Start: 2024-04-23 | End: 2024-04-25 | Stop reason: HOSPADM

## 2024-04-23 RX ORDER — FLUCONAZOLE 100 MG/1
100 TABLET ORAL ONCE
Status: COMPLETED | OUTPATIENT
Start: 2024-04-23 | End: 2024-04-23

## 2024-04-23 RX ORDER — FLUCONAZOLE 100 MG/1
100 TABLET ORAL ONCE
Qty: 1 TABLET | Refills: 0 | Status: COMPLETED | OUTPATIENT
Start: 2024-04-25 | End: 2024-04-25

## 2024-04-23 RX ORDER — FLUCONAZOLE 100 MG/1
100 TABLET ORAL DAILY
Status: DISCONTINUED | OUTPATIENT
Start: 2024-04-25 | End: 2024-04-23

## 2024-04-23 RX ADMIN — AMLODIPINE BESYLATE 10 MG: 5 TABLET ORAL at 09:04

## 2024-04-23 RX ADMIN — MEROPENEM 1 G: 1 INJECTION, POWDER, FOR SOLUTION INTRAVENOUS at 05:04

## 2024-04-23 RX ADMIN — MICONAZOLE NITRATE: 20 CREAM TOPICAL at 08:04

## 2024-04-23 RX ADMIN — SENNOSIDES AND DOCUSATE SODIUM 1 TABLET: 8.6; 5 TABLET ORAL at 08:04

## 2024-04-23 RX ADMIN — MEROPENEM 1 G: 1 INJECTION, POWDER, FOR SOLUTION INTRAVENOUS at 10:04

## 2024-04-23 RX ADMIN — CLOPIDOGREL BISULFATE 75 MG: 75 TABLET ORAL at 09:04

## 2024-04-23 RX ADMIN — MICONAZOLE NITRATE: 20 CREAM TOPICAL at 09:04

## 2024-04-23 RX ADMIN — SERTRALINE HYDROCHLORIDE 150 MG: 50 TABLET ORAL at 09:04

## 2024-04-23 RX ADMIN — OMEGA-3 FATTY ACIDS CAP 1000 MG 1 CAPSULE: 1000 CAP at 09:04

## 2024-04-23 RX ADMIN — FLUCONAZOLE 100 MG: 100 TABLET ORAL at 09:04

## 2024-04-23 RX ADMIN — ATORVASTATIN CALCIUM 40 MG: 40 TABLET, FILM COATED ORAL at 08:04

## 2024-04-23 RX ADMIN — Medication 1 CAPSULE: at 04:04

## 2024-04-23 RX ADMIN — VALSARTAN 320 MG: 160 TABLET, FILM COATED ORAL at 09:04

## 2024-04-23 RX ADMIN — Medication 1 CAPSULE: at 11:04

## 2024-04-23 RX ADMIN — GABAPENTIN 300 MG: 300 CAPSULE ORAL at 08:04

## 2024-04-23 RX ADMIN — PANTOPRAZOLE SODIUM 40 MG: 40 TABLET, DELAYED RELEASE ORAL at 09:04

## 2024-04-23 RX ADMIN — ENOXAPARIN SODIUM 40 MG: 40 INJECTION SUBCUTANEOUS at 04:04

## 2024-04-23 RX ADMIN — POTASSIUM BICARBONATE 50 MEQ: 977.5 TABLET, EFFERVESCENT ORAL at 09:04

## 2024-04-23 RX ADMIN — ACETAMINOPHEN 650 MG: 325 TABLET ORAL at 08:04

## 2024-04-23 RX ADMIN — GABAPENTIN 300 MG: 300 CAPSULE ORAL at 09:04

## 2024-04-23 RX ADMIN — MEROPENEM 1 G: 1 INJECTION, POWDER, FOR SOLUTION INTRAVENOUS at 01:04

## 2024-04-23 RX ADMIN — Medication 1 CAPSULE: at 08:04

## 2024-04-23 RX ADMIN — THERA TABS 1 TABLET: TAB at 09:04

## 2024-04-23 NOTE — PROGRESS NOTES
[facility]  Hospital Medicine  Progress Note    Patient Name: Herlinda Valdovinos  MRN: [id number]  Patient Class: IP- Swing   Admission Date: 4/11/2024  Length of Stay: 12 days  Attending Physician: Heber Carr Jr., MD  Primary Care Provider: Vini Hernandez NP        Subjective:     Principal Problem:Bacteremia due to Escherichia coli    [facility]        HPI:  Herlinda Kaur is a 59 year old female with pmh of hypertension, chronic anxiety, depression, dysphagia, CVA, insomnia, intrapontine hemorrhage, left hemiparesis, peripheral neuropathy and thyroid disease. PCP is Micheal Nash. She presented to ER today with complaints of recurrent UTI. States she recently finished a course of Amoxicillin for a UTI but over the last 2-3 days it has come back. Reports pain, frequency and urgency with urination, reports she wears a pad. Patient has fever today. Reports some nausea also. She also complains of SOB and dry cough. Has home o2 per nasal canula at 2 liters.Worked up in ER and results are:  Labs significant for WBC 12.16, H/H 12.1/38.1, Platelets 120, Na 134, K 3.3, Cl 98, BUN 12, Creat 1.06, Calcium 8.1, Mag 1.3, Bilirubin 1.8, Lactic 1.1, Large occult blood in urine with 10-15 RBC's, positive nitrates, moderate leukocytes with 20-50 WBC's. Blood cultures pending.  Chest Xray significant for Diffuse interstitial and patchy airspace opacities scattered throughout the lungs, edema versus infection. Patient was managed in the ED with IV fluids prior to arrival, IV Magnesium, IV Zofran, IV Rocephin and oral tylenol.  The response to treatment was good.  She was admitted to inpatient care for acute urinary tract infection, failed outpatient treatment. She has recd rocephin while in ER.     * No surgery found *       Hospital Course:   04/09/24 Awake and resting in bed. Max temp is 102.6 past 24 hours. WBC 8.33.  Potassium is 3.1 cr 1.14.  Denies any increased SOB. Sat on 2 liters is 96%. Breath sounds are diminished. Will repeat cxr.  Urine culture pending. Continue rocephin and zithromax. Continue fluids. Replace potassium orally.  04/10/24 Awake and resting in bed. Max temp past 24 hours is 102.7. Blood cultures show     2 d ago       Verigene Result Negative E. coli Abnormal    Verigene Result Negative ESBL Positive Abnormal    DCD rocephin and zithromax. Merrem 1 gm IV every 8 hours. Will repeat blood cultures in 48 hours. Talked with patient re this and need to continue abx for 2 weeks after we get negative blood cultures. She is agreeable to plan. Contact isolation for ESBL ecoli bacteremia.  04/11/24 Awak and resting in bed. Temp.trending down- Max temp past 24 hours is 101.6. States she is feeling much better today. Will continue merrem for ESBL bacteremia. Will repeat blood cultures tomorrow- will continue abx for 14 days from negative blood cultures. Talked with her re swing bed due to need for abx and for weakness. She is agreeable. She will dc from acute care to swing bed today.  Admitted to swing bed on 04/11/24. Continue merrem for bacteremia. Repeat blood cultures drawn this morning. Afebrile overnight. States she is feeling much better this morning.    Overview/Hospital Course:  04/15/24 Awake and resting in bed. No complaints. States she is feeling so much better. Afebrile. Repeat blood cultures were done Friday - show no growth. Continue abx for 14 days from 04/12- so end date 04/26/24. Appetite is good. BM yesterday.  04/16/24 Awake and resting in bed. No complaints. Appetite is good. Continue abx for ESBL bacteremia. Continue PT and OT.  04/17/24 Tolerating abx well. No complaints. Continue PT and OT for strengthening.  04/22/24 no complaints. Tolerating merrem well. Potassium is 3.3. Will replace orally. Continue therapy.  04/23/24 potassium is 3.7. Complains of vaginal itching. Will treat with diflucan.    Interval History:  vaginal itching     Review of Systems   Constitutional:  Negative for appetite change and fever.   HENT:  Negative for sore throat and trouble swallowing.    Respiratory:  Negative for cough, chest tightness and shortness of breath.    Cardiovascular:  Negative for chest pain.   Gastrointestinal:  Negative for abdominal pain, constipation, diarrhea, nausea and vomiting.   Genitourinary:  Negative for difficulty urinating and dysuria.        Vaginal itching    Musculoskeletal:  Negative for arthralgias.   Neurological:  Positive for weakness. Negative for light-headedness and headaches.     Objective:     Vital Signs (Most Recent):  Temp: 98.4 °F (36.9 °C) (04/23/24 0400)  Pulse: 80 (04/23/24 0545)  Resp: 18 (04/23/24 0545)  BP: 120/77 (04/23/24 0400)  SpO2: 96 % (04/23/24 0545) Vital Signs (24h Range):  Temp:  [97.6 °F (36.4 °C)-98.4 °F (36.9 °C)] 98.4 °F (36.9 °C)  Pulse:  [76-88] 80  Resp:  [18] 18  SpO2:  [93 %-96 %] 96 %  BP: (112-136)/(72-79) 120/77     Weight: 74.8 kg (165 lb)  Body mass index is 28.32 kg/m².    Intake/Output Summary (Last 24 hours) at 4/23/2024 0935  Last data filed at 4/23/2024 0908  Gross per 24 hour   Intake 892.73 ml   Output --   Net 892.73 ml         Physical Exam  HENT:      Head: Normocephalic.      Mouth/Throat:      Mouth: Mucous membranes are moist.   Cardiovascular:      Rate and Rhythm: Normal rate and regular rhythm.      Pulses: Normal pulses.      Heart sounds: Normal heart sounds.   Pulmonary:      Effort: No respiratory distress.      Breath sounds: Normal breath sounds. No stridor. No wheezing or rhonchi.   Abdominal:      General: Bowel sounds are normal. There is no distension.      Palpations: Abdomen is soft. There is no mass.      Tenderness: There is no abdominal tenderness. There is no guarding.      Hernia: No hernia is present.   Genitourinary:     Comments: Vaginal itching   Musculoskeletal:         General: No swelling, tenderness, deformity or signs of injury.    Skin:     General: Skin is warm and dry.   Neurological:      Mental Status: She is alert and oriented to person, place, and time.      Motor: Weakness present.   Psychiatric:         Mood and Affect: Mood normal.             Significant Labs: All pertinent labs within the past 24 hours have been reviewed.  Recent Lab Results         04/22/24  2231        Anion Gap 11       BUN 19       BUN/CREAT RATIO 21       Calcium 8.9       Chloride 102       CO2 30       Creatinine 0.89       eGFR 75       Glucose 125       Potassium 3.7       Sodium 139               Significant Imaging: I have reviewed all pertinent imaging results/findings within the past 24 hours.    Assessment/Plan:      * Bacteremia due to Escherichia coli    04/12/24 repeat blood cultures x 2 tomorrow  Continue merrem 1 gm every 8 hours x 14 post negative blood culture      04/15/24 blood cultures from 4/12 show no growth  Continue abx through 04/26 04/16/24 tolerating merrem well    04/22/24 toelrating merrem well. Contineu through 04/26    General weakness  04/12/24 PT and OT to evaluate and treat    04/16/24 PT and  OT to eval and treat      04/22/24 Walked 290 feet with rolling walker on Friday    Hypertension  Chronic, controlled. Latest blood pressure and vitals reviewed-     Temp:  [97.8 °F (36.6 °C)-99.1 °F (37.3 °C)]   Pulse:  [72-80]   Resp:  [16-18]   BP: (105-144)/(71-84)   SpO2:  [94 %-96 %] .   Home meds for hypertension were reviewed and noted below.   Hypertension Medications               amLODIPine (NORVASC) 10 MG tablet Take 1 tablet (10 mg total) by mouth once daily.    olmesartan (BENICAR) 40 MG tablet Take 40 mg by mouth once daily.            While in the hospital, will manage blood pressure as follows; Continue home antihypertensive regimen    Will utilize p.r.n. blood pressure medication only if patient's blood pressure greater than 180/110 and she develops symptoms such as worsening chest pain or shortness of  breath.      04/22/24 stable    Thyroid disease    04/12/24 continue levothyroxine    Depressive disorder  Patient has persistent depression which is unknown and is currently controlled. Will Continue anti-depressant medications. We will not consult psychiatry at this time. Patient does not display psychosis at this time. Continue to monitor closely and adjust plan of care as needed.        Generalized anxiety disorder    04/22/24 stable    Dyslipidemia  04/12/24 continue statin      04/22/24 continue statin     DVT prophylaxis  04/12/24 continue lovenox      Vaginal yeast infection  04/23/24 diflucan daily x 2      Hypokalemia  Patient has hypokalemia which is Acute and currently controlled. Most recent potassium levels reviewed-   Lab Results   Component Value Date    K 3.7 04/22/2024   . Will continue potassium replacement per protocol and recheck repeat levels after replacement completed.       VTE Risk Mitigation (From admission, onward)           Ordered     enoxaparin injection 40 mg  Every 24 hours         04/16/24 0957                    Discharge Planning   POOL: 4/26/2024     Code Status: Full Code   Is the patient medically ready for discharge?:     Reason for patient still in hospital (select all that apply): Treatment  Discharge Plan A: Home with family                  KEVAN Funk  Department of Hospital Medicine   [facility]

## 2024-04-23 NOTE — PT/OT/SLP PROGRESS
Occupational Therapy   Treatment    Name: Herlinda Valdovinos  MRN: [id number]  Admitting Diagnosis:  Bacteremia due to Escherichia coli       Recommendations:     Discharge Recommendations: Low Intensity Therapy  Discharge Equipment Recommendations:  none  Barriers to discharge:  None    Assessment:     Herlinda Valdovinos is a 59 y.o. female with a medical diagnosis of Bacteremia due to Escherichia coli.  She presents with weakness and decline with ADLs. Performance deficits affecting function are weakness, impaired endurance, impaired self care skills, impaired functional mobility, gait instability, impaired balance, decreased coordination, decreased lower extremity function, decreased safety awareness.     Rehab Prognosis:  Good; patient would benefit from acute skilled OT services to address these deficits and reach maximum level of function.       Plan:     Patient to be seen 5 x/week to address the above listed problems via self-care/home management, therapeutic activities, therapeutic exercises  Plan of Care Expires:    Plan of Care Reviewed with: patient    Subjective     Chief Complaint: Weakness  Patient/Family Comments/goals: to get better  Pain/Comfort:       Objective:     Communicated with: Nurse prior to session.  Patient found supine with oxygen, peripheral IV upon OT entry to room.    General Precautions: Standard, fall, contact    Orthopedic Precautions:N/A  Braces: N/A  Respiratory Status: Room air     Occupational Performance:     Bed Mobility:    Patient completed Rolling/Turning to Left with  modified independence  Patient completed Rolling/Turning to Right with modified independence  Patient completed Scooting/Bridging with modified independence  Patient completed Supine to Sit with modified independence     Functional Mobility/Transfers:  Patient completed Sit <> Stand Transfer with modified independence  with  rolling walker   Patient completed Bed <> Chair Transfer using Step Transfer technique  with modified independence with rolling walker  Patient completed Toilet Transfer Step Transfer technique with modified independence with  rolling walker  Functional Mobility: Patient transferred within room with no difficulty    Activities of Daily Living:  Grooming: modified independence to wash hands at sink  Lower Body Dressing: modified independence to adalberto pant and underwear  Toileting: modified independence to perform toilet hygiene      [facility] 6 Click ADL:      Treatment & Education:  Pt performed B UE strengthening exercises to include:   Shoulder flexion   Chest press    Elbow flexion   Elbow extension   Pectoral stretches   Bilateral rowing  All exercises performed 3x10 reps using 3# dowel and red theraband. Patient performed dynamic standing with CGA for safety.     Patient left up in chair with all lines intact and call button in reach    GOALS:   Multidisciplinary Problems       Occupational Therapy Goals          Problem: Occupational Therapy    Goal Priority Disciplines Outcome Interventions   Occupational Therapy Goal     OT, PT/OT Progressing    Description: Grooming Status:   Short Term Goal: Pt will perform grooming with SBA sitting EOB.   Long Term Goal: Pt will perform grooming/oral hygiene standing at sink with SBA      LE dressing Status:   Short Term Goal: Pt will perform LE dressing with SBA.   Long Term Goal: Pt will perform LE dressing with Mod I.    Toileting Status:   Short Term Goal: Pt will perform toilet hygiene on BSC with Mod I.  Long Term Goal: Pt will perform toilet hygiene on toilet with no AE with Mod I.    Commode Transfer:   Short Term Goal: Pt will perform BSC t/f with Mod I.  Long Term Goal:  Pt will perform toilet t/f in bathroom with Mod I.     Bathing Status:   Long Term Goal: Pt will perform sponge bath with Mod I with no unsafe fatigue.     Strength Status: 5/5 BUEs  Long Term Goal: Pt to perform BUE strengthening with weights and/or body weight to increase ADL  independence and safety    Endurance Status:   Short Term Goal:pt to perform 15 min OT treatment with 5 or greater rest breaks  Long Term Goal: pt to perform 30 min OT treat with 3 or less rest breaks                          Time Tracking:     OT Date of Treatment: 04/23/24  OT Start Time: 1422  OT Stop Time: 1452  OT Total Time (min): 30 min    Billable Minutes:Therapeutic Activity 10 min  Therapeutic Exercise 20 min      RUDDY Means/L, CSRS  OT/HEMAL: OT          4/23/2024

## 2024-04-23 NOTE — SUBJECTIVE & OBJECTIVE
Interval History: vaginal itching     Review of Systems   Constitutional:  Negative for appetite change and fever.   HENT:  Negative for sore throat and trouble swallowing.    Respiratory:  Negative for cough, chest tightness and shortness of breath.    Cardiovascular:  Negative for chest pain.   Gastrointestinal:  Negative for abdominal pain, constipation, diarrhea, nausea and vomiting.   Genitourinary:  Negative for difficulty urinating and dysuria.        Vaginal itching    Musculoskeletal:  Negative for arthralgias.   Neurological:  Positive for weakness. Negative for light-headedness and headaches.     Objective:     Vital Signs (Most Recent):  Temp: 98.4 °F (36.9 °C) (04/23/24 0400)  Pulse: 80 (04/23/24 0545)  Resp: 18 (04/23/24 0545)  BP: 120/77 (04/23/24 0400)  SpO2: 96 % (04/23/24 0545) Vital Signs (24h Range):  Temp:  [97.6 °F (36.4 °C)-98.4 °F (36.9 °C)] 98.4 °F (36.9 °C)  Pulse:  [76-88] 80  Resp:  [18] 18  SpO2:  [93 %-96 %] 96 %  BP: (112-136)/(72-79) 120/77     Weight: 74.8 kg (165 lb)  Body mass index is 28.32 kg/m².    Intake/Output Summary (Last 24 hours) at 4/23/2024 0935  Last data filed at 4/23/2024 0908  Gross per 24 hour   Intake 892.73 ml   Output --   Net 892.73 ml         Physical Exam  HENT:      Head: Normocephalic.      Mouth/Throat:      Mouth: Mucous membranes are moist.   Cardiovascular:      Rate and Rhythm: Normal rate and regular rhythm.      Pulses: Normal pulses.      Heart sounds: Normal heart sounds.   Pulmonary:      Effort: No respiratory distress.      Breath sounds: Normal breath sounds. No stridor. No wheezing or rhonchi.   Abdominal:      General: Bowel sounds are normal. There is no distension.      Palpations: Abdomen is soft. There is no mass.      Tenderness: There is no abdominal tenderness. There is no guarding.      Hernia: No hernia is present.   Genitourinary:     Comments: Vaginal itching   Musculoskeletal:         General: No swelling, tenderness, deformity or  signs of injury.   Skin:     General: Skin is warm and dry.   Neurological:      Mental Status: She is alert and oriented to person, place, and time.      Motor: Weakness present.   Psychiatric:         Mood and Affect: Mood normal.             Significant Labs: All pertinent labs within the past 24 hours have been reviewed.  Recent Lab Results         04/22/24  2231        Anion Gap 11       BUN 19       BUN/CREAT RATIO 21       Calcium 8.9       Chloride 102       CO2 30       Creatinine 0.89       eGFR 75       Glucose 125       Potassium 3.7       Sodium 139               Significant Imaging: I have reviewed all pertinent imaging results/findings within the past 24 hours.

## 2024-04-23 NOTE — PT/OT/SLP PROGRESS
Physical Therapy Treatment    Patient Name:  Herlinda Valdovinos   MRN:  [id number]    Recommendations:     Discharge Recommendations: Low Intensity Therapy  Discharge Equipment Recommendations: none  Barriers to discharge: None    Assessment:     Herlinda Valdovinos is a 59 y.o. female admitted with a medical diagnosis of Bacteremia due to Escherichia coli.  She presents with the following impairments/functional limitations: weakness, impaired endurance, impaired functional mobility Patient was agreeable and excited to perform therapy. Patient with excellent participation with therapy today. Patient was able to perform all transfers and get herself dress with no assistance needed from therapist. Patient was able to ambulate within hospital and outside hospital with no rest breaks or LOB noted. PTA has discussed therapy progress with PT. PT was agreeable with treatment and feels that the patient is making good progress.     Rehab Prognosis: Good; patient would benefit from acute skilled PT services to address these deficits and reach maximum level of function.    Recent Surgery: * No surgery found *      Plan:     During this hospitalization, patient to be seen 5 x/week to address the identified rehab impairments via gait training, therapeutic activities, therapeutic exercises, neuromuscular re-education and progress toward the following goals:    Plan of Care Expires:  04/26/24    Subjective     Chief Complaint: none  Patient/Family Comments/goals: return home  Pain/Comfort:  Pain Rating 1: 0/10  Pain Rating Post-Intervention 1: 0/10      Objective:     Communicated with OT prior to session.  Patient found supine with peripheral IV upon PT entry to room.     General Precautions: Standard, contact, fall  Orthopedic Precautions: N/A  Braces: N/A  Respiratory Status: Room air     Functional Mobility:  Bed Mobility:     Supine to Sit: independence  Transfers:     Sit to Stand:  independence with rolling walker  Toilet  Transfer: independence with  rolling walker  using  Step Transfer  Gait: Patient ambulated 300+' with RW and supervision with no LOB. Patient ambulated inside hospital as well as outside the hospital. Patient was able to ambulate down the outside ramp and back up two stairs with no issues noted.   Stairs:  Pt ascended/descended 2 stair(s) with Rolling Walker with left handrail with Modified Independent.       AM-PAC 6 CLICK MOBILITY  Turning over in bed (including adjusting bedclothes, sheets and blankets)?: 4  Sitting down on and standing up from a chair with arms (e.g., wheelchair, bedside commode, etc.): 4  Moving from lying on back to sitting on the side of the bed?: 4  Moving to and from a bed to a chair (including a wheelchair)?: 4  Need to walk in hospital room?: 4  Climbing 3-5 steps with a railing?: 3  Basic Mobility Total Score: 23       Treatment & Education:  Transfers and ambulation as listed above.   Ambulation - 300'+, Patient ambulated within the hospital as well as outside the hospital. Patient ambulated down the ramp and back up the stairs with no issue.   Long arc quads - 20 reps with 2 lb ankle weights   Seated hip flexion - 20 reps with 2 lb ankle weights  Hip adduction squeezes - 20 reps    Patient left up in chair with call button in reach and chair alarm on..    GOALS:   Multidisciplinary Problems       Physical Therapy Goals          Problem: Physical Therapy    Goal Priority Disciplines Outcome Goal Variances Interventions   Physical Therapy Goal     PT, PT/OT Progressing     Description: Short-term Goals: 0.75 week  1. Patient will perform supine to/from sit with modified independence to improve independence and safety with bed mobility.  2. Patient will perform sit to/from stand with a rolling walker with standby assist to improve independence and safety with transfers.  3. Patient will ambulate 100 feet with a rolling walker with standby assist to improve independence and safety with  gait.  4. Patient will tolerate 15 minutes of physical therapy intervention to improve endurance and activity tolerance.    Long-term goals: 1.5 weeks  1. Patient will perform sit to/from stand with a rolling walker with supervision to improve independence and safety with transfers.  2. Patient will ambulate 50 feet with a quad cane with contact guard assist to improve independence and safety with gait.  3. Patient will tolerate 30 minutes of physical therapy intervention to improve endurance and activity tolerance.                         Time Tracking:     PT Received On: 04/23/24  PT Start Time: 1352     PT Stop Time: 1422  PT Total Time (min): 30 min     Billable Minutes: Gait Training 15 and Therapeutic Exercise 15    Treatment Type: Treatment  PT/PTA: PTA     Number of PTA visits since last PT visit: 5   RYAN Steele  04/23/2024

## 2024-04-23 NOTE — ASSESSMENT & PLAN NOTE
Patient has hypokalemia which is Acute and currently controlled. Most recent potassium levels reviewed-   Lab Results   Component Value Date    K 3.7 04/22/2024   . Will continue potassium replacement per protocol and recheck repeat levels after replacement completed.

## 2024-04-23 NOTE — PLAN OF CARE
Problem: Fall Injury Risk  Goal: Absence of Fall and Fall-Related Injury  Outcome: Progressing  Intervention: Promote Injury-Free Environment  Flowsheets (Taken 4/23/2024 1151)  Safety Promotion/Fall Prevention:   assistive device/personal item within reach   bed alarm set   nonskid shoes/socks when out of bed   side rails raised x 3   instructed to call staff for mobility

## 2024-04-23 NOTE — PLAN OF CARE
[facility] - Swing Bed   Interdisciplinary Team Meeting    Patient: Herlinda Valdovinos   Today's Date: 4/23/2024   Estimated D/C Date: 4/26/2024        Physician:  Heber Carr MD Nurse Practitioner: Brendan Posey NP   Pharmacy: Zina Branstetter, PharmD Unit Director: IRENE Barbosa   :  Rosibel Jalloh RN Physical/Occupational Therapy: Kerri Sanchez OT   Speech Therapy: JAMIR Activity Therapy: Izzy Lai LPN   Nursing: IRENE Barbosa  Respiratory: Marilou Bill,  Dietary: Willa Collins  Other:      Nursing  New Symptoms/Problems: c/o yeast  Last Bowel Movement: 04/22/24   Urine: incontinent  [place]: No   Bowel: incontinent  Constipated: No  Diarrhea: No   Isolation: Yes  Cognition: WNL  Aspiration Precautions:No  Wound Care: No  Wound Location/Tx:   Comment(s):   Respiratory   O2 Device: Room Air  O2 Flow:   SpO2: 95%  Neb Tx: Yes  Comment(s): Nebs Q 4 hours prn     Dietary  Nutrition: NPO  Comment(s): Ensure Clear for lunch and dinner    Speech Therapy  Speech/Swallowing: No current speech or swallowing issues  Comment(s):     Physical Therapy  Gait/Assistive Device: 300' w/ RW w/ supervision ELOS: Plan to DC 4/26/2024    Transfers: Modified Independent  Bed Mobility: Modified Independent Range of Motion/Restrictions: N/A  Comment(s):      Occupational Therapy  Eating/Grooming: Independent Toileting: Independent   Bathing: Independent Dressing (Upper Body): Independent   Dressing (Lower Body): Modified Independent Comment(s):      Activity Therapy  Level of participation: Active participation  Comment(s):     Pharmacy  Medication Changes (see MD orders in chart): Yes  Labs Reviewed: Yes  New Lab Orders: Yes  Comment(s): New order noted for [facility]      Tx Plan/Recommendations reviewed with family and/or patient on (date) 04/12/2024.  Additional family Conference/Training:   D/C Plan/Recommendations: Home with HH and Home  with family  POOL: 4/26/2024  Comment(s): Ms. Valdovinos will discharge home with family

## 2024-04-24 PROCEDURE — 63600175 PHARM REV CODE 636 W HCPCS: Performed by: NURSE PRACTITIONER

## 2024-04-24 PROCEDURE — 27000982 HC MATTRESS, MATRIX LAL RENTAL

## 2024-04-24 PROCEDURE — 25000003 PHARM REV CODE 250: Performed by: FAMILY MEDICINE

## 2024-04-24 PROCEDURE — 94761 N-INVAS EAR/PLS OXIMETRY MLT: CPT

## 2024-04-24 PROCEDURE — 63600175 PHARM REV CODE 636 W HCPCS: Performed by: FAMILY MEDICINE

## 2024-04-24 PROCEDURE — 97116 GAIT TRAINING THERAPY: CPT

## 2024-04-24 PROCEDURE — 25000003 PHARM REV CODE 250: Performed by: NURSE PRACTITIONER

## 2024-04-24 PROCEDURE — 11000004 HC SNF PRIVATE

## 2024-04-24 PROCEDURE — 27000944

## 2024-04-24 PROCEDURE — 27000221 HC OXYGEN, UP TO 24 HOURS

## 2024-04-24 PROCEDURE — 99900035 HC TECH TIME PER 15 MIN (STAT)

## 2024-04-24 PROCEDURE — 97110 THERAPEUTIC EXERCISES: CPT

## 2024-04-24 RX ORDER — CETIRIZINE HYDROCHLORIDE 10 MG/1
10 TABLET ORAL DAILY
Status: DISCONTINUED | OUTPATIENT
Start: 2024-04-24 | End: 2024-04-25 | Stop reason: HOSPADM

## 2024-04-24 RX ADMIN — SENNOSIDES AND DOCUSATE SODIUM 1 TABLET: 8.6; 5 TABLET ORAL at 08:04

## 2024-04-24 RX ADMIN — THERA TABS 1 TABLET: TAB at 08:04

## 2024-04-24 RX ADMIN — CLOPIDOGREL BISULFATE 75 MG: 75 TABLET ORAL at 08:04

## 2024-04-24 RX ADMIN — PANTOPRAZOLE SODIUM 40 MG: 40 TABLET, DELAYED RELEASE ORAL at 08:04

## 2024-04-24 RX ADMIN — GABAPENTIN 300 MG: 300 CAPSULE ORAL at 08:04

## 2024-04-24 RX ADMIN — Medication 1 CAPSULE: at 04:04

## 2024-04-24 RX ADMIN — MEROPENEM 1 G: 1 INJECTION, POWDER, FOR SOLUTION INTRAVENOUS at 01:04

## 2024-04-24 RX ADMIN — Medication 1 CAPSULE: at 08:04

## 2024-04-24 RX ADMIN — CETIRIZINE HYDROCHLORIDE 10 MG: 10 TABLET, FILM COATED ORAL at 11:04

## 2024-04-24 RX ADMIN — MEROPENEM 1 G: 1 INJECTION, POWDER, FOR SOLUTION INTRAVENOUS at 09:04

## 2024-04-24 RX ADMIN — OMEGA-3 FATTY ACIDS CAP 1000 MG 1 CAPSULE: 1000 CAP at 08:04

## 2024-04-24 RX ADMIN — MICONAZOLE NITRATE: 20 CREAM TOPICAL at 08:04

## 2024-04-24 RX ADMIN — ACETAMINOPHEN 650 MG: 325 TABLET ORAL at 08:04

## 2024-04-24 RX ADMIN — AMLODIPINE BESYLATE 10 MG: 5 TABLET ORAL at 08:04

## 2024-04-24 RX ADMIN — Medication 1 CAPSULE: at 11:04

## 2024-04-24 RX ADMIN — ATORVASTATIN CALCIUM 40 MG: 40 TABLET, FILM COATED ORAL at 08:04

## 2024-04-24 RX ADMIN — VALSARTAN 320 MG: 160 TABLET, FILM COATED ORAL at 08:04

## 2024-04-24 RX ADMIN — MEROPENEM 1 G: 1 INJECTION, POWDER, FOR SOLUTION INTRAVENOUS at 05:04

## 2024-04-24 RX ADMIN — ENOXAPARIN SODIUM 40 MG: 40 INJECTION SUBCUTANEOUS at 04:04

## 2024-04-24 RX ADMIN — SERTRALINE HYDROCHLORIDE 150 MG: 50 TABLET ORAL at 08:04

## 2024-04-24 NOTE — PT/OT/SLP PROGRESS
Occupational Therapy   Treatment    Name: Herlinda Valdovinos  MRN: [id number]  Admitting Diagnosis:  Bacteremia due to Escherichia coli       Recommendations:     Discharge Recommendations: Low Intensity Therapy  Discharge Equipment Recommendations:  none  Barriers to discharge:  None    Assessment:     Herlinda Valdovinos is a 59 y.o. female with a medical diagnosis of Bacteremia due to Escherichia coli.  She presents with weakness and decline with ADLs. Performance deficits affecting function are weakness, impaired endurance, impaired self care skills, impaired functional mobility, gait instability, impaired balance, decreased coordination, decreased lower extremity function, decreased safety awareness.     Rehab Prognosis:  Good; patient would benefit from acute skilled OT services to address these deficits and reach maximum level of function.       Plan:     Patient to be seen 5 x/week to address the above listed problems via self-care/home management, therapeutic activities, therapeutic exercises  Plan of Care Expires:    Plan of Care Reviewed with: patient    Subjective     Chief Complaint: Weakness  Patient/Family Comments/goals: to get better  Pain/Comfort:       Objective:     Communicated with: Nurse prior to session.  Patient found supine with oxygen, peripheral IV upon OT entry to room.    General Precautions: Standard, fall, contact    Orthopedic Precautions:N/A  Braces: N/A  Respiratory Status: Room air     Occupational Performance:     Bed Mobility:    Patient completed Rolling/Turning to Left with  modified independence  Patient completed Rolling/Turning to Right with modified independence  Patient completed Scooting/Bridging with modified independence  Patient completed Supine to Sit with modified independence     Functional Mobility/Transfers:  Patient sat EOB with no difficulty    Activities of Daily Living:  Not tested      [facility] 6 Click ADL:      Treatment & Education:  Pt performed B UE strengthening  exercises to include:   Shoulder flexion   Chest press    Elbow flexion   Elbow extension   Pectoral stretches   Bilateral rowing  All exercises performed 3x10 reps using 3# dowel and green theraband while sitting EOB.    Patient left up in chair with all lines intact and call button in reach    GOALS:   Multidisciplinary Problems       Occupational Therapy Goals          Problem: Occupational Therapy    Goal Priority Disciplines Outcome Interventions   Occupational Therapy Goal     OT, PT/OT Progressing    Description: Grooming Status:   Short Term Goal: Pt will perform grooming with SBA sitting EOB.   Long Term Goal: Pt will perform grooming/oral hygiene standing at sink with SBA      LE dressing Status:   Short Term Goal: Pt will perform LE dressing with SBA.   Long Term Goal: Pt will perform LE dressing with Mod I.    Toileting Status:   Short Term Goal: Pt will perform toilet hygiene on BSC with Mod I.  Long Term Goal: Pt will perform toilet hygiene on toilet with no AE with Mod I.    Commode Transfer:   Short Term Goal: Pt will perform BSC t/f with Mod I.  Long Term Goal:  Pt will perform toilet t/f in bathroom with Mod I.     Bathing Status:   Long Term Goal: Pt will perform sponge bath with Mod I with no unsafe fatigue.     Strength Status: 5/5 BUEs  Long Term Goal: Pt to perform BUE strengthening with weights and/or body weight to increase ADL independence and safety    Endurance Status:   Short Term Goal:pt to perform 15 min OT treatment with 5 or greater rest breaks  Long Term Goal: pt to perform 30 min OT treat with 3 or less rest breaks                          Time Tracking:     OT Date of Treatment: 04/24/24  OT Start Time: 1255  OT Stop Time: 1325  OT Total Time (min): 30 min    Billable Minutes:Therapeutic Activity 10 min  Therapeutic Exercise 20 min      RUDDY Means/L, CSRS  OT/HEMAL: OT          4/24/2024

## 2024-04-24 NOTE — PLAN OF CARE
Problem: Fall Injury Risk  Goal: Absence of Fall and Fall-Related Injury  Outcome: Progressing  Intervention: Promote Injury-Free Environment  Flowsheets (Taken 4/24/2024 1159)  Safety Promotion/Fall Prevention:   assistive device/personal item within reach   bed alarm set   nonskid shoes/socks when out of bed   side rails raised x 3   instructed to call staff for mobility

## 2024-04-24 NOTE — PT/OT/SLP PROGRESS
Physical Therapy Treatment    Patient Name:  Herlinda Valdovinos   MRN:  [id number]    Recommendations:     Discharge Recommendations: Low Intensity Therapy  Discharge Equipment Recommendations: none  Barriers to discharge: None    Assessment:     Herlinda Valdovinos is a 59 y.o. female admitted with a medical diagnosis of Bacteremia due to Escherichia coli. She presents with the following impairments/functional limitations: weakness, impaired endurance, impaired functional mobility. Patient with good effort throughout treatment. Patient with good tolerance of gait training with a narrow-based quad cane and unilateral handheld assist in preparation for her goal to walk down the aise at a wedding in May 2024 with a narrow-based quad cane. Patient reports her plan is to discharge home with her  tomorrow after her last IV antibiotic treatment.     Rehab Prognosis: Good; patient would benefit from acute skilled PT services to address these deficits and reach maximum level of function.    Recent Surgery: * No surgery found *      Plan:     During this hospitalization, patient to be seen 5 x/week to address the identified rehab impairments via gait training, therapeutic activities, therapeutic exercises, neuromuscular re-education and progress toward the following goals:    Plan of Care Expires:  04/26/24    Subjective     Chief Complaint: no complaints this afternoon  Patient/Family Comments/goals: Patient's goal is to discharge home with her  tomorrow evening (4/25/2024) after her last IV antibiotic treatment.   Pain/Comfort:  Pain Rating 1: 0/10  Pain Rating Post-Intervention 1: 0/10    Objective:     Communicated with nurse prior to session. Patient found sitting edge of bed with Kerri Sancehz OT finishing occupational therapy treatment upon PT entry to room.     General Precautions: Standard, contact, fall  Orthopedic Precautions: N/A  Braces: N/A  Respiratory Status: Room air     Functional  Mobility:  Transfers:     Sit to Stand:  stand by assistance with rolling walker  Chair to mat: stand by assistance and contact guard assistance with  rolling walker  using  Step Transfer  Gait: 60 feet x 2 trials with narrow-based quad cane held in the right upper extremity and left handheld assist from Physical Therapist. Seated rest break taken between trials. Patient initially ambulated with a 3-point gait pattern but progressed to a 2-point gait pattern with verbal cues. Minimal assist required with gait.     AM-PAC 6 CLICK MOBILITY  Turning over in bed (including adjusting bedclothes, sheets and blankets)?: 4  Sitting down on and standing up from a chair with arms (e.g., wheelchair, bedside commode, etc.): 4  Moving from lying on back to sitting on the side of the bed?: 4  Moving to and from a bed to a chair (including a wheelchair)?: 3  Need to walk in hospital room?: 3  Climbing 3-5 steps with a railing?: 3  Basic Mobility Total Score: 21     Treatment & Education:    NuStep: 8 minutes    Patient left up in chair with call button in reach.    GOALS:   Multidisciplinary Problems       Physical Therapy Goals          Problem: Physical Therapy    Goal Priority Disciplines Outcome Goal Variances Interventions   Physical Therapy Goal     PT, PT/OT Progressing     Description: Short-term Goals: 0.75 week  1. Patient will perform supine to/from sit with modified independence to improve independence and safety with bed mobility.  2. Patient will perform sit to/from stand with a rolling walker with standby assist to improve independence and safety with transfers.  3. Patient will ambulate 100 feet with a rolling walker with standby assist to improve independence and safety with gait.  4. Patient will tolerate 15 minutes of physical therapy intervention to improve endurance and activity tolerance.    Long-term goals: 1.5 weeks  1. Patient will perform sit to/from stand with a rolling walker with supervision to improve  independence and safety with transfers.  2. Patient will ambulate 50 feet with a quad cane with contact guard assist to improve independence and safety with gait.  3. Patient will tolerate 30 minutes of physical therapy intervention to improve endurance and activity tolerance.                       Time Tracking:     PT Received On: 04/24/24  PT Start Time: 1332     PT Stop Time: 1408  PT Total Time (min): 36 min     Billable Minutes: Gait Training 28 minutes and Therapeutic Exercise 8 minutes    Treatment Type: Treatment, 6th Visit  PT/PTA: PT     Number of PTA visits since last PT visit: 0     04/24/2024

## 2024-04-24 NOTE — CONSULTS
"  [facility]  Adult Nutrition  Consult Note    SUMMARY     Recommendations    Recommendation/Intervention: 1. Ensure Clear one carton po BID (240 kcal, 8g PRO per carton) 2. F/U for intake support  Goals: % meals  Nutrition Goal Status: new    No nutrition diagnosis at present.  Intake goal met with present intake 75% meals (~1900 kcal, 75g PRO)    4/24/24 Wt: 166# 4/24 wt stable. Pt for D/C home with family soon.  Meal intake ~70% and pt drinks Ensure Clear one carton po BID=~2300 kcal, 85g PRO, intake goals met.  Last BM 4/23.  RDN took prefs this a.m. and enc fluid intake.  BUN 19 4/22/ noted.    Malnutrition Assessment  Malnutrition Level:  (Based on assessment this pt is not malnourished.)                                    Reason for Assessment    Reason For Assessment: consult, lorna Keyes (swing bed pt)  Diagnosis:  (Weakness, UTI)  Relevant Medical History: CVA with  L sided weakness, dysphagia, Anxiety, depression, [place], neuropathy  Interdisciplinary Rounds: did not attend  General Information Comments: Pt known to me from previous admits.  She is feeling much better and taking ~75% of Regular diet.  Skin areas between abd folds noted but no skin breakdown this visit.  BSS 18 noted.  Last BM 4/17.  Pt's wt has increased since last admit.  She drinks Ensure Clear at home.  Nutrition Discharge Planning: New admit to [facility]    Nutrition Risk Screen    Nutrition Risk Screen: no indicators present    Nutrition/Diet History    Spiritual, Cultural Beliefs, Roman Catholic Practices, Values that Affect Care: no  Supplemental Drinks or Food Habits: Ensure Clear  Food Allergies: NKFA  Factors Affecting Nutritional Intake: None identified at this time    Anthropometrics    Temp: 98.3 °F (36.8 °C)  Height: 5' 4" (162.6 cm)  Height (inches): 64 in  Weight Method: Bed Scale  Weight: 75.5 kg (166 lb 8 oz)  Weight (lb): 166.5 lb  Ideal Body Weight (IBW), Female: 120 lb  % Ideal Body " Weight, Female (lb): 137.5 %  BMI (Calculated): 28.6  BMI Grade: 25 - 29.9 - overweight       Lab/Procedures/Meds    Pertinent Labs Reviewed: reviewed  Pertinent Labs Comments: Alb 2.7 4/8, Hgb 11.3, Hct 35.4  Pertinent Medications Reviewed: reviewed  Pertinent Medications Comments: Lipitor, Lactobacillus, Merrem, Multivitamin, Omega 3,  Protonix, Zoloft       Estimated/Assessed Needs    Weight Used For Calorie Calculations: 74.8 kg (165 lb)  Energy Calorie Requirements (kcal): 6511-3618  Energy Need Method: Kcal/kg  Protein Requirements: 75-83  Weight Used For Protein Calculations: 74.8 kg (165 lb)     Estimated Fluid Requirement Method: other (see comments) (1875)  RDA Method (mL): 1650         Nutrition Prescription Ordered    Current Diet Order: Regular  Nutrition Order Comments: 1. Ensure Clear one carton po BID (240 kcal, 8g PRO per carton) 2. F/U for intake support    Evaluation of Received Nutrient/Fluid Intake    Oral Calories (kcal): 1900  Oral Protein (gm): 75  % Kcal Needs: 100  % Protein Needs: 100  Energy Calories Required: meeting needs  Protein Required: meeting needs  Fluid Required: meeting needs  Tolerance: tolerating  % Intake of Estimated Energy Needs: 75 - 100 %  % Meal Intake: 75 - 100 %    Nutrition Risk    Level of Risk/Frequency of Follow-up: low - moderate       Monitor and Evaluation    Food and Nutrient Intake: food and beverage intake  Food and Nutrient Adminstration: diet order  Anthropometric Measurements: weight  Biochemical Data, Medical Tests and Procedures: electrolyte and renal panel  Nutrition-Focused Physical Findings: overall appearance, skin      LEARNING ASSESSMENT  04/12/2024 0820 [facility] (4/11/2024 - Present)  Created by Randee Carcamo, RN - RN (Nurse)Status: Complete  PRIMARY LEARNER   Primary Learner Name: Herlinda Valdovinos TM - 04/12/2024 0820  Relationship: Patient TM - 04/12/2024 0820  Does the primary learner have any  barriers to learning?: No Barriers TM - 04/12/2024 0820  What is the preferred language of the primary learner?: English TM - 04/12/2024 0820  Is an  required?: No TM - 04/12/2024 0820  How does the primary learner prefer to learn new concepts?: Listening TM - 04/12/2024 0820  How often do you need to have someone help you read instructions, pamphlets, or written material from your doctor or pharmacy?: Never TM - 04/12/2024 0820  CO-LEARNER #1   No question answered  CO-LEARNER #2   No question answered  SPECIAL TOPICS   No question answered  ANSWERED BY:   No question answered  Edit History        SDOH:  Physical Activit  On average, how many days per week do you engage in moderate to strenuous exercise (like a brisk walk)?  0 days (P)       On average, how many minutes do you engage in exercise at this level?  0 min (P)       Financial Resource Strain  How hard is it for you to pay for the very basics like food, housing, medical care, and heating?  Somewhat hard (P)       Housing Stability  In the last 12 months, was there a time when you were not able to pay the mortgage or rent on time?  No (P)       In the last 12 months, how many places have you lived?  1 (P)       In the last 12 months, was there a time when you did not have a steady place to sleep or slept in a shelter (including now)?  No (P)       Transportation Needs  In the past 12 months, has lack of transportation kept you from medical appointments or from getting medications?  No (P)       In the past 12 months, has lack of transportation kept you from meetings, work, or from getting things needed for daily living?  No (P)       Food Insecurity  Within the past 12 months, you worried that your food would run out before you got the money to buy more.  Never true (P)       Within the past 12 months, the food you bought just didn't last and you didn't have money to get more.  Never true (P)      Stress  Do you feel stress - tense, restless,  nervous, or anxious, or unable to sleep at night because your mind is troubled all the time - these days?  Only a little (P)       Social Connections  In a typical week, how many times do you talk on the phone with family, friends, or neighbors?  More than three times a week (P)       How often do you get together with friends or relatives?  More than three times a week (P)       How often do you attend Jehovah's witness or Baptism services?  More than 4 times per year (P)       Do you belong to any clubs or organizations such as Jehovah's witness groups, unions, fraternal or athletic groups, or school groups?  No (P)       How often do you attend meetings of the clubs or organizations you belong to?  Never (P)       Are you , , , , never , or living with a partner?   (P)       Alcohol Use  Q1: How often do you have a drink containing alcohol?  Never (P)       Q2: How many drinks containing alcohol do you have on a typical day when you are drinking?  Patient does not drink (P)       Q3: How often do you have six or more drinks on one occasion?  Never (P)           Nutrition Follow-Up    RD Follow-up?: Yes

## 2024-04-24 NOTE — PLAN OF CARE
Problem: Gas Exchange Impaired  Goal: Optimal Gas Exchange  Outcome: Progressing     Problem: Pain Acute  Goal: Acceptable Pain Control and Functional Ability  Outcome: Progressing     Problem: Fatigue  Goal: Improved Activity Tolerance  Outcome: Progressing     Problem: Fall Injury Risk  Goal: Absence of Fall and Fall-Related Injury  Outcome: Progressing     Problem: Hypertension Acute  Goal: Blood Pressure Within Desired Range  Outcome: Progressing

## 2024-04-25 VITALS
OXYGEN SATURATION: 94 % | BODY MASS INDEX: 28.42 KG/M2 | RESPIRATION RATE: 20 BRPM | DIASTOLIC BLOOD PRESSURE: 83 MMHG | HEIGHT: 64 IN | WEIGHT: 166.5 LBS | TEMPERATURE: 98 F | HEART RATE: 94 BPM | SYSTOLIC BLOOD PRESSURE: 123 MMHG

## 2024-04-25 LAB
ANION GAP SERPL CALCULATED.3IONS-SCNC: 12 MMOL/L (ref 7–16)
BASOPHILS # BLD AUTO: 0.02 K/UL (ref 0–0.2)
BASOPHILS NFR BLD AUTO: 0.3 % (ref 0–1)
BUN SERPL-MCNC: 21 MG/DL (ref 7–18)
BUN/CREAT SERPL: 25 (ref 6–20)
CALCIUM SERPL-MCNC: 8.4 MG/DL (ref 8.5–10.1)
CHLORIDE SERPL-SCNC: 104 MMOL/L (ref 98–107)
CO2 SERPL-SCNC: 30 MMOL/L (ref 21–32)
CREAT SERPL-MCNC: 0.85 MG/DL (ref 0.55–1.02)
DIFFERENTIAL METHOD BLD: ABNORMAL
EGFR (NO RACE VARIABLE) (RUSH/TITUS): 80 ML/MIN/1.73M2
EOSINOPHIL # BLD AUTO: 0.27 K/UL (ref 0–0.5)
EOSINOPHIL NFR BLD AUTO: 3.9 % (ref 1–4)
ERYTHROCYTE [DISTWIDTH] IN BLOOD BY AUTOMATED COUNT: 16 % (ref 11.5–14.5)
GLUCOSE SERPL-MCNC: 106 MG/DL (ref 74–106)
HCT VFR BLD AUTO: 38 % (ref 38–47)
HGB BLD-MCNC: 12 G/DL (ref 12–16)
LYMPHOCYTES # BLD AUTO: 1.66 K/UL (ref 1–4.8)
LYMPHOCYTES NFR BLD AUTO: 23.9 % (ref 27–41)
MCH RBC QN AUTO: 27.4 PG (ref 27–31)
MCHC RBC AUTO-ENTMCNC: 31.6 G/DL (ref 32–36)
MCV RBC AUTO: 86.8 FL (ref 80–96)
MONOCYTES # BLD AUTO: 0.83 K/UL (ref 0–0.8)
MONOCYTES NFR BLD AUTO: 11.9 % (ref 2–6)
MPC BLD CALC-MCNC: 10 FL (ref 9.4–12.4)
NEUTROPHILS # BLD AUTO: 4.17 K/UL (ref 1.8–7.7)
NEUTROPHILS NFR BLD AUTO: 60 % (ref 53–65)
PLATELET # BLD AUTO: 175 K/UL (ref 150–400)
POTASSIUM SERPL-SCNC: 4 MMOL/L (ref 3.5–5.1)
RBC # BLD AUTO: 4.38 M/UL (ref 4.2–5.4)
SODIUM SERPL-SCNC: 142 MMOL/L (ref 136–145)
WBC # BLD AUTO: 6.95 K/UL (ref 4.5–11)

## 2024-04-25 PROCEDURE — 27000221 HC OXYGEN, UP TO 24 HOURS

## 2024-04-25 PROCEDURE — 27000944

## 2024-04-25 PROCEDURE — 27000982 HC MATTRESS, MATRIX LAL RENTAL

## 2024-04-25 PROCEDURE — 97116 GAIT TRAINING THERAPY: CPT | Mod: CQ

## 2024-04-25 PROCEDURE — 25000003 PHARM REV CODE 250: Performed by: FAMILY MEDICINE

## 2024-04-25 PROCEDURE — 63600175 PHARM REV CODE 636 W HCPCS: Performed by: FAMILY MEDICINE

## 2024-04-25 PROCEDURE — 85025 COMPLETE CBC W/AUTO DIFF WBC: CPT | Performed by: NURSE PRACTITIONER

## 2024-04-25 PROCEDURE — 63700000 PHARM REV CODE 250 ALT 637 W/O HCPCS: Performed by: NURSE PRACTITIONER

## 2024-04-25 PROCEDURE — 97110 THERAPEUTIC EXERCISES: CPT | Mod: CQ

## 2024-04-25 PROCEDURE — 99316 NF DSCHRG MGMT 30 MIN+: CPT | Mod: ,,, | Performed by: NURSE PRACTITIONER

## 2024-04-25 PROCEDURE — 99900035 HC TECH TIME PER 15 MIN (STAT)

## 2024-04-25 PROCEDURE — 97110 THERAPEUTIC EXERCISES: CPT

## 2024-04-25 PROCEDURE — 63600175 PHARM REV CODE 636 W HCPCS: Performed by: NURSE PRACTITIONER

## 2024-04-25 PROCEDURE — 36415 COLL VENOUS BLD VENIPUNCTURE: CPT | Performed by: NURSE PRACTITIONER

## 2024-04-25 PROCEDURE — 25000003 PHARM REV CODE 250: Performed by: NURSE PRACTITIONER

## 2024-04-25 PROCEDURE — 80048 BASIC METABOLIC PNL TOTAL CA: CPT | Performed by: NURSE PRACTITIONER

## 2024-04-25 PROCEDURE — 94761 N-INVAS EAR/PLS OXIMETRY MLT: CPT

## 2024-04-25 RX ORDER — GABAPENTIN 300 MG/1
300 CAPSULE ORAL 2 TIMES DAILY
Qty: 60 CAPSULE | Refills: 0 | Status: SHIPPED | OUTPATIENT
Start: 2024-04-25 | End: 2024-05-25

## 2024-04-25 RX ORDER — ATORVASTATIN CALCIUM 40 MG/1
40 TABLET, FILM COATED ORAL NIGHTLY
Qty: 30 TABLET | Refills: 0 | Status: SHIPPED | OUTPATIENT
Start: 2024-04-25 | End: 2024-05-25

## 2024-04-25 RX ORDER — SERTRALINE HYDROCHLORIDE 50 MG/1
150 TABLET, FILM COATED ORAL DAILY
Qty: 90 TABLET | Refills: 0 | Status: SHIPPED | OUTPATIENT
Start: 2024-04-25 | End: 2024-05-25

## 2024-04-25 RX ORDER — BISACODYL 5 MG/1
1 TABLET, COATED ORAL DAILY
Qty: 30 TABLET | Refills: 11 | Status: SHIPPED | OUTPATIENT
Start: 2024-04-25 | End: 2025-04-25

## 2024-04-25 RX ORDER — GLUCOSAM/CHONDRO/HERB 149/HYAL 750-100 MG
1 TABLET ORAL DAILY
Qty: 30 CAPSULE | Refills: 0 | Status: SHIPPED | OUTPATIENT
Start: 2024-04-25 | End: 2024-05-25

## 2024-04-25 RX ORDER — ONDANSETRON 4 MG/1
4 TABLET, ORALLY DISINTEGRATING ORAL EVERY 8 HOURS PRN
Qty: 10 TABLET
Start: 2024-04-25 | End: 2024-05-05

## 2024-04-25 RX ORDER — OLMESARTAN MEDOXOMIL 40 MG/1
40 TABLET ORAL DAILY
Qty: 30 TABLET | Refills: 0 | Status: SHIPPED | OUTPATIENT
Start: 2024-04-25 | End: 2024-05-25

## 2024-04-25 RX ORDER — CLOPIDOGREL BISULFATE 75 MG/1
75 TABLET ORAL DAILY
Qty: 30 TABLET | Refills: 11 | Status: SHIPPED | OUTPATIENT
Start: 2024-04-25 | End: 2025-04-25

## 2024-04-25 RX ORDER — PANTOPRAZOLE SODIUM 40 MG/1
40 TABLET, DELAYED RELEASE ORAL DAILY
Qty: 30 TABLET | Refills: 0 | Status: SHIPPED | OUTPATIENT
Start: 2024-04-25 | End: 2024-05-25

## 2024-04-25 RX ORDER — CETIRIZINE HYDROCHLORIDE 10 MG/1
10 TABLET ORAL DAILY
Qty: 30 TABLET | Refills: 0 | Status: SHIPPED | OUTPATIENT
Start: 2024-04-26 | End: 2024-05-26

## 2024-04-25 RX ORDER — AMLODIPINE BESYLATE 10 MG/1
10 TABLET ORAL DAILY
Qty: 90 TABLET | Refills: 3 | Status: SHIPPED | OUTPATIENT
Start: 2024-04-25 | End: 2025-04-25

## 2024-04-25 RX ADMIN — VALSARTAN 320 MG: 160 TABLET, FILM COATED ORAL at 08:04

## 2024-04-25 RX ADMIN — FLUCONAZOLE 100 MG: 100 TABLET ORAL at 08:04

## 2024-04-25 RX ADMIN — MEROPENEM 1 G: 1 INJECTION, POWDER, FOR SOLUTION INTRAVENOUS at 01:04

## 2024-04-25 RX ADMIN — Medication 1 CAPSULE: at 08:04

## 2024-04-25 RX ADMIN — PANTOPRAZOLE SODIUM 40 MG: 40 TABLET, DELAYED RELEASE ORAL at 08:04

## 2024-04-25 RX ADMIN — CLOPIDOGREL BISULFATE 75 MG: 75 TABLET ORAL at 08:04

## 2024-04-25 RX ADMIN — SENNOSIDES AND DOCUSATE SODIUM 1 TABLET: 8.6; 5 TABLET ORAL at 08:04

## 2024-04-25 RX ADMIN — Medication 1 CAPSULE: at 04:04

## 2024-04-25 RX ADMIN — MICONAZOLE NITRATE: 20 CREAM TOPICAL at 08:04

## 2024-04-25 RX ADMIN — MEROPENEM 1 G: 1 INJECTION, POWDER, FOR SOLUTION INTRAVENOUS at 05:04

## 2024-04-25 RX ADMIN — CETIRIZINE HYDROCHLORIDE 10 MG: 10 TABLET, FILM COATED ORAL at 08:04

## 2024-04-25 RX ADMIN — AMLODIPINE BESYLATE 10 MG: 5 TABLET ORAL at 08:04

## 2024-04-25 RX ADMIN — Medication 1 CAPSULE: at 11:04

## 2024-04-25 RX ADMIN — ENOXAPARIN SODIUM 40 MG: 40 INJECTION SUBCUTANEOUS at 04:04

## 2024-04-25 RX ADMIN — GABAPENTIN 300 MG: 300 CAPSULE ORAL at 08:04

## 2024-04-25 RX ADMIN — MEROPENEM 1 G: 1 INJECTION, POWDER, FOR SOLUTION INTRAVENOUS at 10:04

## 2024-04-25 RX ADMIN — OMEGA-3 FATTY ACIDS CAP 1000 MG 1 CAPSULE: 1000 CAP at 08:04

## 2024-04-25 RX ADMIN — SERTRALINE HYDROCHLORIDE 150 MG: 50 TABLET ORAL at 08:04

## 2024-04-25 RX ADMIN — THERA TABS 1 TABLET: TAB at 08:04

## 2024-04-25 NOTE — PT/OT/SLP PROGRESS
Physical Therapy Treatment    Patient Name:  Herlinda Valdovinos   MRN:  [id number]    Recommendations:     Discharge Recommendations: Low Intensity Therapy  Discharge Equipment Recommendations: none  Barriers to discharge: None    Assessment:     Herlinda Valdovinos is a 59 y.o. female admitted with a medical diagnosis of Bacteremia due to Escherichia coli.  She presents with the following impairments/functional limitations: weakness, impaired endurance, impaired functional mobility Patient was agreeable and excited to perform therapy. Patient with excellent participation with therapy today. Patient was able to perform all transfers and get herself dress with no assistance needed from therapist. Patient was able to ambulate within hospital with no rest breaks or LOB noted. PTA has discussed therapy progress with PT. PT was agreeable with treatment and feels that the patient is making good progress.     Rehab Prognosis: Good; patient would benefit from acute skilled PT services to address these deficits and reach maximum level of function.    Recent Surgery: * No surgery found *      Plan:     During this hospitalization, patient to be seen 5 x/week to address the identified rehab impairments via gait training, therapeutic activities, therapeutic exercises, neuromuscular re-education and progress toward the following goals:    Plan of Care Expires:  04/26/24    Subjective     Chief Complaint: none  Patient/Family Comments/goals: return home  Pain/Comfort:  Pain Rating 1: 0/10  Pain Rating Post-Intervention 1: 0/10      Objective:     Communicated with OT prior to session.  Patient found supine with peripheral IV upon PT entry to room.     General Precautions: Standard, contact, fall  Orthopedic Precautions: N/A  Braces: N/A  Respiratory Status: Room air     Functional Mobility:  Bed Mobility:     Supine to Sit: independence  Transfers:     Sit to Stand:  independence with rolling walker  Bed to Chair: modified independence  with  rolling walker  using  Step Transfer  Gait: Patient ambulated 300' with RW and supervision with no LOB.       AM-PAC 6 CLICK MOBILITY  Turning over in bed (including adjusting bedclothes, sheets and blankets)?: 4  Sitting down on and standing up from a chair with arms (e.g., wheelchair, bedside commode, etc.): 4  Moving from lying on back to sitting on the side of the bed?: 4  Moving to and from a bed to a chair (including a wheelchair)?: 4  Need to walk in hospital room?: 4  Climbing 3-5 steps with a railing?: 3  Basic Mobility Total Score: 23       Treatment & Education:  Transfers and ambulation as listed above.   Ambulation - 300'back up the stairs with no issue.   Long arc quads - 20 reps with 2 lb ankle weights   Seated hip flexion - 20 reps with 2 lb ankle weights  Hip adduction squeezes - 20 reps    Patient left up in chair with call button in reach and chair alarm on..    GOALS:   Multidisciplinary Problems       Physical Therapy Goals          Problem: Physical Therapy    Goal Priority Disciplines Outcome Goal Variances Interventions   Physical Therapy Goal     PT, PT/OT Progressing     Description: Short-term Goals: 0.75 week  1. Patient will perform supine to/from sit with modified independence to improve independence and safety with bed mobility.  2. Patient will perform sit to/from stand with a rolling walker with standby assist to improve independence and safety with transfers.  3. Patient will ambulate 100 feet with a rolling walker with standby assist to improve independence and safety with gait.  4. Patient will tolerate 15 minutes of physical therapy intervention to improve endurance and activity tolerance.    Long-term goals: 1.5 weeks  1. Patient will perform sit to/from stand with a rolling walker with supervision to improve independence and safety with transfers.  2. Patient will ambulate 50 feet with a quad cane with contact guard assist to improve independence and safety with gait.  3.  Patient will tolerate 30 minutes of physical therapy intervention to improve endurance and activity tolerance.                         Time Tracking:     PT Received On: 04/25/24  PT Start Time: 1313     PT Stop Time: 1405  PT Total Time (min): 52 min     Billable Minutes: Gait Training 15 and Therapeutic Exercise 17    Treatment Type: Treatment  PT/PTA: PTA     Number of PTA visits since last PT visit: 1   RYAN Steele  04/25/2024

## 2024-04-25 NOTE — ASSESSMENT & PLAN NOTE
04/12/24 repeat blood cultures x 2 tomorrow  Continue merrem 1 gm every 8 hours x 14 post negative blood culture      04/15/24 blood cultures from 4/12 show no growth  Continue abx through 04/26 04/16/24 tolerating merrem well    04/22/24 toelrating merrem well. Contineu through 04/26 04/25/24 will dc toady after 1800 dose of merrem -

## 2024-04-25 NOTE — PLAN OF CARE
[facility]  Discharge Final Note    Primary Care Provider: Vini Hernandez NP    Expected Discharge Date: 4/26/2024    Final Discharge Note (most recent)       Final Note - 04/25/24 1044          Final Note    Assessment Type Final Discharge Note (P)      Anticipated Discharge Disposition Home-Health Care Svc (P)      What phone number can be called within the next 1-3 days to see how you are doing after discharge? [phone] (P)      Hospital Resources/Appts/Education Provided Provided patient/caregiver with written discharge plan information;Provided education on problems/symptoms using teachback;Appointments scheduled and added to AVS (P)         Post-Acute Status    Post-Acute Authorization Home Health (P)      Home Health Status Set-up Complete/Auth obtained (P)      Coverage [employer] (P)      Patient choice form signed by patient/caregiver List with quality metrics by geographic area provided;List from CMS Compare;List from System Post-Acute Care (P)      Discharge Delays None known at this time (P)                      Important Message from Medicare  Important Message from Medicare regarding Discharge Appeal Rights: Given to patient/caregiver, Explained to patient/caregiver, Signed/date by patient/caregiver     Date IMM was signed: 04/24/24  Time IMM was signed: 0930    Ms. Pate will discharge home this afternoon after her last scheduled dose of antibiotics with her family and [facility]. No DME needed at this time.

## 2024-04-25 NOTE — ASSESSMENT & PLAN NOTE
Patient has hypokalemia which is Acute and currently controlled. Most recent potassium levels reviewed-   Lab Results   Component Value Date    K 4.0 04/25/2024   . Will continue potassium replacement per protocol and recheck repeat levels after replacement completed.

## 2024-04-25 NOTE — DISCHARGE SUMMARY
[facility] - Medical Surgical Unit  Hospital Medicine  Discharge Summary      Patient Name: Herlinda Valdovinos  MRN: [id number]  [facility]: [id number]  Patient Class: IP- Swing  Admission Date: 4/11/2024  Hospital Length of Stay: 14 days  Discharge Date and Time:  04/25/2024 11:01 AM  Attending Physician: Heber Carr Jr., MD   Discharging Provider: KEVAN Funk  Primary Care Provider: Vini Hernandez NP    Primary Care Team: Networked reference to record PCT     HPI:   Herlinda Kaur is a 59 year old female with pmh of hypertension, chronic anxiety, depression, dysphagia, CVA, insomnia, intrapontine hemorrhage, left hemiparesis, peripheral neuropathy and thyroid disease. PCP is Micheal Nash. She presented to ER today with complaints of recurrent UTI. States she recently finished a course of Amoxicillin for a UTI but over the last 2-3 days it has come back. Reports pain, frequency and urgency with urination, reports she wears a pad. Patient has fever today. Reports some nausea also. She also complains of SOB and dry cough. Has home o2 per nasal canula at 2 liters.Worked up in ER and results are:  Labs significant for WBC 12.16, H/H 12.1/38.1, Platelets 120, Na 134, K 3.3, Cl 98, BUN 12, Creat 1.06, Calcium 8.1, Mag 1.3, Bilirubin 1.8, Lactic 1.1, Large occult blood in urine with 10-15 RBC's, positive nitrates, moderate leukocytes with 20-50 WBC's. Blood cultures pending.  Chest Xray significant for Diffuse interstitial and patchy airspace opacities scattered throughout the lungs, edema versus infection. Patient was managed in the ED with IV fluids prior to arrival, IV Magnesium, IV Zofran, IV Rocephin and oral tylenol.  The response to treatment was good.  She was admitted to inpatient care for acute urinary tract infection, failed outpatient treatment. She has recd rocephin while in ER.     * No surgery found *       Hospital Course:   04/09/24 Awake and resting in bed. Max temp is 102.6  past 24 hours. WBC 8.33.  Potassium is 3.1 cr 1.14. Denies any increased SOB. Sat on 2 liters is 96%. Breath sounds are diminished. Will repeat cxr.  Urine culture pending. Continue rocephin and zithromax. Continue fluids. Replace potassium orally.  04/10/24 Awake and resting in bed. Max temp past 24 hours is 102.7. Blood cultures show     2 d ago       Verigene Result Negative E. coli Abnormal    Verigene Result Negative ESBL Positive Abnormal    DCD rocephin and zithromax. Merrem 1 gm IV every 8 hours. Will repeat blood cultures in 48 hours. Talked with patient re this and need to continue abx for 2 weeks after we get negative blood cultures. She is agreeable to plan. Contact isolation for ESBL ecoli bacteremia.  04/11/24 Awak and resting in bed. Temp.trending down- Max temp past 24 hours is 101.6. States she is feeling much better today. Will continue merrem for ESBL bacteremia. Will repeat blood cultures tomorrow- will continue abx for 14 days from negative blood cultures. Talked with her re swing bed due to need for abx and for weakness. She is agreeable. She will dc from acute care to swing bed today.  Admitted to swing bed on 04/11/24. Continue merrem for bacteremia. Repeat blood cultures drawn this morning. Afebrile overnight. States she is feeling much better this morning.    * No surgery found *      Hospital Course:   04/15/24 Awake and resting in bed. No complaints. States she is feeling so much better. Afebrile. Repeat blood cultures were done Friday - show no growth. Continue abx for 14 days from 04/12- so end date 04/26/24. Appetite is good. BM yesterday.  04/16/24 Awake and resting in bed. No complaints. Appetite is good. Continue abx for ESBL bacteremia. Continue PT and OT.  04/17/24 Tolerating abx well. No complaints. Continue PT and OT for strengthening.  04/22/24 no complaints. Tolerating merrem well. Potassium is 3.3. Will replace orally. Continue therapy.  04/23/24 potassium is 3.7. Complains  of vaginal itching. Will treat with diflucan.    04/25/24 Awake and resting in bed . Will complete merrem tonight after 1800 dose. Wishes to dc home after this. Will dc home with [facility] with PT and OT . Meds reconciled and sent to [facility] per her request. Will follow up with PCP Micheal Smith NP.     Goals of Care Treatment Preferences:  Code Status: Full Code      Consults:   Consults (From admission, onward)          Status Ordering Provider     Inpatient consult to Registered Dietitian/Nutritionist  Once        Provider:  (Not yet assigned)    Completed GUILLERMINA KENNEDY            Renal/  Vaginal yeast infection  04/23/24 diflucan daily x 2    04/25/24 diflucan     Hypokalemia  Patient has hypokalemia which is Acute and currently controlled. Most recent potassium levels reviewed-   Lab Results   Component Value Date    K 4.0 04/25/2024   . Will continue potassium replacement per protocol and recheck repeat levels after replacement completed.     ID  * Bacteremia due to Escherichia coli    04/12/24 repeat blood cultures x 2 tomorrow  Continue merrem 1 gm every 8 hours x 14 post negative blood culture      04/15/24 blood cultures from 4/12 show no growth  Continue abx through 04/26 04/16/24 tolerating merrem well    04/22/24 toelrating merrem well. Contineu through 04/26 04/25/24 will dc toady after 1800 dose of merrem -     Other  General weakness  04/12/24 PT and OT to evaluate and treat    04/16/24 PT and  OT to eval and treat      04/22/24 Walked 290 feet with rolling walker on Friday 04/25/24 dc home with home health with PT an OT to follow       Final Active Diagnoses:    Diagnosis Date Noted POA    PRINCIPAL PROBLEM:  Bacteremia due to Escherichia coli [R78.81, B96.20] 04/10/2024 Yes    General weakness [R53.1] 04/11/2024 Yes    Hypertension [I10] 05/20/2021 Yes    Thyroid disease [E07.9] 10/17/2022 Yes    Depressive disorder [F32.A] 05/20/2021 Yes    Generalized anxiety disorder  [F41.1] 05/20/2021 Yes    Dyslipidemia [E78.5] 10/19/2022 Yes    DVT prophylaxis [Z79.899] 06/10/2021 Not Applicable    Vaginal yeast infection [B37.31] 04/23/2024 No    Hypokalemia [E87.6] 10/20/2022 Yes      Problems Resolved During this Admission:    Diagnosis Date Noted Date Resolved POA    Acute cystitis with hematuria [N30.01] 05/20/2021 04/16/2024 Yes    Yeast infection [B37.9] 04/23/2024 04/23/2024 Unknown       Discharged Condition: stable    Disposition:     Follow Up:    Patient Instructions:   No discharge procedures on file.    Significant Diagnostic Studies: Labs: All labs within the past 24 hours have been reviewed    Pending Diagnostic Studies:       None           Medications:  Reconciled Home Medications:      Medication List        START taking these medications      cetirizine 10 MG tablet  Commonly known as: ZYRTEC  Take 1 tablet (10 mg total) by mouth once daily.  Start taking on: April 26, 2024            CHANGE how you take these medications      amLODIPine 10 MG tablet  Commonly known as: NORVASC  Take 1 tablet (10 mg total) by mouth once daily.  What changed: when to take this     gabapentin 300 MG capsule  Commonly known as: NEURONTIN  Take 1 capsule (300 mg total) by mouth 2 (two) times daily.  What changed:   how much to take  when to take this     LORazepam 0.5 MG tablet  Commonly known as: ATIVAN  Take 1 tablet (0.5 mg total) by mouth every 12 (twelve) hours as needed for Anxiety.  What changed: when to take this     ondansetron 4 MG Tbdl  Commonly known as: ZOFRAN-ODT  Take 1 tablet (4 mg total) by mouth every 8 (eight) hours as needed (nausea).  What changed: reasons to take this            CONTINUE taking these medications      acetaminophen 325 MG tablet  Commonly known as: TYLENOL  Take 650 mg by mouth every 6 (six) hours as needed.     albuterol 90 mcg/actuation inhaler  Commonly known as: PROAIR HFA  Inhale 2 puffs into the lungs every 6 (six) hours as needed for Wheezing.  Rescue     atorvastatin 40 MG tablet  Commonly known as: LIPITOR  Take 1 tablet (40 mg total) by mouth every evening.     clopidogreL 75 mg tablet  Commonly known as: PLAVIX  Take 1 tablet (75 mg total) by mouth once daily.     MULTIVITAMIN 50 PLUS Tab  Generic drug: multivitamin-minerals-lutein  Take 1 tablet by mouth once daily.     olmesartan 40 MG tablet  Commonly known as: BENICAR  Take 1 tablet (40 mg total) by mouth once daily.     omega 3-dha-epa-fish oil 1,000 mg (120 mg-180 mg) Cap  Take 1 capsule by mouth once daily.     pantoprazole 40 MG tablet  Commonly known as: PROTONIX  Take 1 tablet (40 mg total) by mouth once daily.     sertraline 50 MG tablet  Commonly known as: ZOLOFT  Take 3 tablets (150 mg total) by mouth once daily.            STOP taking these medications      promethazine 25 MG tablet  Commonly known as: PHENERGAN     temazepam 15 mg Cap  Commonly known as: RESTORIL              Indwelling Lines/Drains at time of discharge:   Lines/Drains/Airways       None                   Time spent on the discharge of patient: 32 minutes         KEVAN Funk  Department of Hospital Medicine  [facility] - [facility] Surgical [facility]   [Nasal Discharge] : nasal discharge [Nasal Congestion] : nasal congestion [Cough] : cough [Negative] : Genitourinary

## 2024-04-25 NOTE — ASSESSMENT & PLAN NOTE
04/12/24 PT and OT to evaluate and treat    04/16/24 PT and  OT to eval and treat      04/22/24 Walked 290 feet with rolling walker on Friday 04/25/24 [place] home with home health with PT an OT to follow

## 2024-04-25 NOTE — PT/OT/SLP PROGRESS
Occupational Therapy   Treatment    Name: Herlinda Valdovinos  MRN: [id number]  Admitting Diagnosis:  Bacteremia due to Escherichia coli       Recommendations:     Discharge Recommendations: Low Intensity Therapy  Discharge Equipment Recommendations:  none  Barriers to discharge:  None    Assessment:     Herlinda Valdovinos is a 59 y.o. female with a medical diagnosis of Bacteremia due to Escherichia coli.  She presents with weakness and decline with ADLs. Performance deficits affecting function are weakness, impaired endurance, impaired self care skills, impaired functional mobility, gait instability, impaired balance, decreased coordination, decreased lower extremity function, decreased safety awareness.     Rehab Prognosis:  Good; patient would benefit from acute skilled OT services to address these deficits and reach maximum level of function.       Plan:     Patient to be seen 5 x/week to address the above listed problems via self-care/home management, therapeutic activities, therapeutic exercises  Plan of Care Expires:    Plan of Care Reviewed with: patient    Subjective     Chief Complaint: Weakness  Patient/Family Comments/goals: to get better  Pain/Comfort:       Objective:     Communicated with: Nurse prior to session.  Patient found supine with oxygen, peripheral IV upon OT entry to room.    General Precautions: Standard, fall, contact    Orthopedic Precautions:N/A  Braces: N/A  Respiratory Status: Room air     Occupational Performance:     Bed Mobility:    Patient completed Rolling/Turning to Left with  modified independence  Patient completed Rolling/Turning to Right with modified independence  Patient completed Scooting/Bridging with modified independence  Patient completed Supine to Sit with modified independence     Functional Mobility/Transfers:  Patient sat EOB with no difficulty    Activities of Daily Living:  Not tested      [facility] 6 Click ADL:      Treatment & Education:  Pt performed B UE strengthening  exercises to include:   UE bike x 5 min  Shoulder flexion   Chest press    Elbow flexion   Elbow extension   Pectoral stretches  All exercises performed 3x10 reps using 3# dowel and red theraband while sitting EOB.    Patient left up in chair with all lines intact and call button in reach    GOALS:   Multidisciplinary Problems       Occupational Therapy Goals          Problem: Occupational Therapy    Goal Priority Disciplines Outcome Interventions   Occupational Therapy Goal     OT, PT/OT Progressing    Description: Grooming Status:   Short Term Goal: Pt will perform grooming with SBA sitting EOB.   Long Term Goal: Pt will perform grooming/oral hygiene standing at sink with SBA      LE dressing Status:   Short Term Goal: Pt will perform LE dressing with SBA.   Long Term Goal: Pt will perform LE dressing with Mod I.    Toileting Status:   Short Term Goal: Pt will perform toilet hygiene on BSC with Mod I.  Long Term Goal: Pt will perform toilet hygiene on toilet with no AE with Mod I.    Commode Transfer:   Short Term Goal: Pt will perform BSC t/f with Mod I.  Long Term Goal:  Pt will perform toilet t/f in bathroom with Mod I.     Bathing Status:   Long Term Goal: Pt will perform sponge bath with Mod I with no unsafe fatigue.     Strength Status: 5/5 BUEs  Long Term Goal: Pt to perform BUE strengthening with weights and/or body weight to increase ADL independence and safety    Endurance Status:   Short Term Goal:pt to perform 15 min OT treatment with 5 or greater rest breaks  Long Term Goal: pt to perform 30 min OT treat with 3 or less rest breaks                          Time Tracking:     OT Date of Treatment: 04/25/24  OT Start Time: 1330  OT Stop Time: 1410  OT Total Time (min): 40 min    Billable Minutes:Therapeutic Activity 20 min  Therapeutic Exercise 20 min      RUDDY Means/L, CSRS  OT/HEMAL: OT          4/25/2024

## 2024-04-26 NOTE — NURSING
Pt assisted to personal vehicle AAOx3. Resp even and unlabored. No acute distress noted. All belongings in tow.

## 2024-04-30 ENCOUNTER — TELEPHONE (OUTPATIENT)
Dept: MEDSURG UNIT | Facility: HOSPITAL | Age: 59
End: 2024-04-30
Payer: MEDICARE

## 2024-06-13 ENCOUNTER — LAB REQUISITION (OUTPATIENT)
Dept: LAB | Facility: HOSPITAL | Age: 59
End: 2024-06-13
Attending: NURSE PRACTITIONER
Payer: MEDICARE

## 2024-06-13 DIAGNOSIS — R82.991 HYPOCITRATURIA: ICD-10-CM

## 2024-06-13 LAB
BACTERIA #/AREA URNS HPF: ABNORMAL /HPF
BILIRUB UR QL STRIP: NEGATIVE
CLARITY UR: ABNORMAL
COLOR UR: YELLOW
GLUCOSE UR STRIP-MCNC: NEGATIVE MG/DL
KETONES UR STRIP-SCNC: NEGATIVE MG/DL
LEUKOCYTE ESTERASE UR QL STRIP: ABNORMAL
NITRITE UR QL STRIP: POSITIVE
PH UR STRIP: 6 PH UNITS
PROT UR QL STRIP: 100
RBC # UR STRIP: ABNORMAL /UL
RBC #/AREA URNS HPF: ABNORMAL /HPF
RENAL EPI CELLS #/AREA URNS LPF: ABNORMAL /LPF
SP GR UR STRIP: 1.01
SQUAMOUS #/AREA URNS LPF: ABNORMAL /LPF
TRANS CELLS #/AREA URNS LPF: ABNORMAL /LPF
UROBILINOGEN UR STRIP-ACNC: 0.2 MG/DL
WBC #/AREA URNS HPF: ABNORMAL /HPF

## 2024-06-13 PROCEDURE — 87186 SC STD MICRODIL/AGAR DIL: CPT | Performed by: NURSE PRACTITIONER

## 2024-06-13 PROCEDURE — 81003 URINALYSIS AUTO W/O SCOPE: CPT | Performed by: NURSE PRACTITIONER

## 2024-06-13 PROCEDURE — 87086 URINE CULTURE/COLONY COUNT: CPT | Performed by: NURSE PRACTITIONER

## 2024-06-15 LAB — UA COMPLETE W REFLEX CULTURE PNL UR: ABNORMAL

## 2024-07-08 ENCOUNTER — LAB REQUISITION (OUTPATIENT)
Dept: LAB | Facility: HOSPITAL | Age: 59
End: 2024-07-08
Payer: MEDICARE

## 2024-07-08 DIAGNOSIS — I10 ESSENTIAL (PRIMARY) HYPERTENSION: ICD-10-CM

## 2024-07-08 DIAGNOSIS — R82.90 UNSPECIFIED ABNORMAL FINDINGS IN URINE: ICD-10-CM

## 2024-07-08 LAB
ALBUMIN SERPL BCP-MCNC: 3.9 G/DL (ref 3.5–5)
ALBUMIN/GLOB SERPL: 1.1 {RATIO}
ALP SERPL-CCNC: 145 U/L (ref 46–118)
ALT SERPL W P-5'-P-CCNC: 27 U/L (ref 13–56)
ANION GAP SERPL CALCULATED.3IONS-SCNC: 13 MMOL/L (ref 7–16)
AST SERPL W P-5'-P-CCNC: 37 U/L (ref 15–37)
BACTERIA #/AREA URNS HPF: ABNORMAL /HPF
BASOPHILS # BLD AUTO: 0.04 K/UL (ref 0–0.2)
BASOPHILS NFR BLD AUTO: 0.6 % (ref 0–1)
BILIRUB SERPL-MCNC: 0.9 MG/DL (ref ?–1.2)
BILIRUB UR QL STRIP: NEGATIVE
BUN SERPL-MCNC: 22 MG/DL (ref 7–18)
BUN/CREAT SERPL: 19 (ref 6–20)
CALCIUM SERPL-MCNC: 9.1 MG/DL (ref 8.5–10.1)
CHLORIDE SERPL-SCNC: 103 MMOL/L (ref 98–107)
CLARITY UR: ABNORMAL
CO2 SERPL-SCNC: 31 MMOL/L (ref 21–32)
COARSE GRAN CASTS #/AREA URNS LPF: ABNORMAL /LPF
COLOR UR: YELLOW
CREAT SERPL-MCNC: 1.15 MG/DL (ref 0.55–1.02)
DIFFERENTIAL METHOD BLD: ABNORMAL
EGFR (NO RACE VARIABLE) (RUSH/TITUS): 55 ML/MIN/1.73M2
EOSINOPHIL # BLD AUTO: 0.38 K/UL (ref 0–0.5)
EOSINOPHIL NFR BLD AUTO: 5.6 % (ref 1–4)
ERYTHROCYTE [DISTWIDTH] IN BLOOD BY AUTOMATED COUNT: 16.3 % (ref 11.5–14.5)
FINE GRAN CASTS #/AREA URNS LPF: ABNORMAL /LPF
GLOBULIN SER-MCNC: 3.5 G/DL (ref 2–4)
GLUCOSE SERPL-MCNC: 143 MG/DL (ref 74–106)
GLUCOSE UR STRIP-MCNC: NEGATIVE MG/DL
HCT VFR BLD AUTO: 41.9 % (ref 38–47)
HGB BLD-MCNC: 13.4 G/DL (ref 12–16)
HYALINE CASTS #/AREA URNS LPF: ABNORMAL /LPF
IMM GRANULOCYTES # BLD AUTO: 0.02 K/UL (ref 0–0.04)
IMM GRANULOCYTES NFR BLD: 0.3 % (ref 0–0.4)
KETONES UR STRIP-SCNC: NEGATIVE MG/DL
LEUKOCYTE ESTERASE UR QL STRIP: ABNORMAL
LYMPHOCYTES # BLD AUTO: 1.82 K/UL (ref 1–4.8)
LYMPHOCYTES NFR BLD AUTO: 26.6 % (ref 27–41)
MCH RBC QN AUTO: 27.7 PG (ref 27–31)
MCHC RBC AUTO-ENTMCNC: 32 G/DL (ref 32–36)
MCV RBC AUTO: 86.7 FL (ref 80–96)
MONOCYTES # BLD AUTO: 0.5 K/UL (ref 0–0.8)
MONOCYTES NFR BLD AUTO: 7.3 % (ref 2–6)
MPC BLD CALC-MCNC: 10.2 FL (ref 9.4–12.4)
MUCOUS THREADS #/AREA URNS HPF: ABNORMAL /HPF
NEUTROPHILS # BLD AUTO: 4.07 K/UL (ref 1.8–7.7)
NEUTROPHILS NFR BLD AUTO: 59.6 % (ref 53–65)
NITRITE UR QL STRIP: POSITIVE
NRBC # BLD AUTO: 0 X10E3/UL
NRBC, AUTO (.00): 0 %
PH UR STRIP: 6 PH UNITS
PLATELET # BLD AUTO: 152 K/UL (ref 150–400)
POTASSIUM SERPL-SCNC: 4.2 MMOL/L (ref 3.5–5.1)
PROT SERPL-MCNC: 7.4 G/DL (ref 6.4–8.2)
PROT UR QL STRIP: NEGATIVE
RBC # BLD AUTO: 4.83 M/UL (ref 4.2–5.4)
RBC # UR STRIP: ABNORMAL /UL
RBC #/AREA URNS HPF: ABNORMAL /HPF
SODIUM SERPL-SCNC: 143 MMOL/L (ref 136–145)
SP GR UR STRIP: 1.02
SQUAMOUS #/AREA URNS LPF: ABNORMAL /LPF
UROBILINOGEN UR STRIP-ACNC: 0.2 MG/DL
WBC # BLD AUTO: 6.83 K/UL (ref 4.5–11)
WBC #/AREA URNS HPF: ABNORMAL /HPF

## 2024-07-08 PROCEDURE — 84443 ASSAY THYROID STIM HORMONE: CPT | Performed by: NURSE PRACTITIONER

## 2024-07-08 PROCEDURE — 82306 VITAMIN D 25 HYDROXY: CPT | Performed by: NURSE PRACTITIONER

## 2024-07-08 PROCEDURE — 87086 URINE CULTURE/COLONY COUNT: CPT | Performed by: NURSE PRACTITIONER

## 2024-07-08 PROCEDURE — 80061 LIPID PANEL: CPT | Performed by: NURSE PRACTITIONER

## 2024-07-08 PROCEDURE — 83036 HEMOGLOBIN GLYCOSYLATED A1C: CPT | Performed by: NURSE PRACTITIONER

## 2024-07-08 PROCEDURE — 80053 COMPREHEN METABOLIC PANEL: CPT | Performed by: NURSE PRACTITIONER

## 2024-07-08 PROCEDURE — 85025 COMPLETE CBC W/AUTO DIFF WBC: CPT | Performed by: NURSE PRACTITIONER

## 2024-07-08 PROCEDURE — 82607 VITAMIN B-12: CPT | Performed by: NURSE PRACTITIONER

## 2024-07-08 PROCEDURE — 81001 URINALYSIS AUTO W/SCOPE: CPT | Performed by: NURSE PRACTITIONER

## 2024-07-08 PROCEDURE — 81003 URINALYSIS AUTO W/O SCOPE: CPT | Performed by: NURSE PRACTITIONER

## 2024-07-09 LAB
25(OH)D3 SERPL-MCNC: 26.4 NG/ML
CHOLEST SERPL-MCNC: 171 MG/DL (ref 0–200)
CHOLEST/HDLC SERPL: 4.5 {RATIO}
EST. AVERAGE GLUCOSE BLD GHB EST-MCNC: 123 MG/DL
FOLATE SERPL-MCNC: >20 NG/ML (ref 3.1–17.5)
HBA1C MFR BLD HPLC: 5.9 % (ref 4.5–6.6)
HDLC SERPL-MCNC: 38 MG/DL (ref 40–60)
LDLC SERPL CALC-MCNC: 82 MG/DL
LDLC/HDLC SERPL: 2.2 {RATIO}
NONHDLC SERPL-MCNC: 133 MG/DL
TRIGL SERPL-MCNC: 257 MG/DL (ref 35–150)
TSH SERPL DL<=0.005 MIU/L-ACNC: 3.22 UIU/ML (ref 0.36–3.74)
VIT B12 SERPL-MCNC: 622 PG/ML (ref 193–986)
VLDLC SERPL-MCNC: 51 MG/DL

## 2024-07-11 LAB — UA COMPLETE W REFLEX CULTURE PNL UR: ABNORMAL

## 2024-09-06 ENCOUNTER — HOSPITAL ENCOUNTER (INPATIENT)
Facility: HOSPITAL | Age: 59
LOS: 3 days | Discharge: OTHER INSTITUTION | DRG: 194 | End: 2024-09-10
Attending: FAMILY MEDICINE | Admitting: FAMILY MEDICINE
Payer: MEDICARE

## 2024-09-06 DIAGNOSIS — R00.0 TACHYCARDIA: ICD-10-CM

## 2024-09-06 DIAGNOSIS — J18.9 PNEUMONIA OF BOTH UPPER LOBES DUE TO INFECTIOUS ORGANISM: ICD-10-CM

## 2024-09-06 DIAGNOSIS — J18.9 PNEUMONIA DUE TO INFECTIOUS ORGANISM, UNSPECIFIED LATERALITY, UNSPECIFIED PART OF LUNG: Primary | ICD-10-CM

## 2024-09-06 DIAGNOSIS — N30.01 ACUTE CYSTITIS WITH HEMATURIA: ICD-10-CM

## 2024-09-06 DIAGNOSIS — R05.9 COUGH: ICD-10-CM

## 2024-09-06 DIAGNOSIS — A41.9 SEPSIS, DUE TO UNSPECIFIED ORGANISM, UNSPECIFIED WHETHER ACUTE ORGAN DYSFUNCTION PRESENT: ICD-10-CM

## 2024-09-06 DIAGNOSIS — R50.9 FEVER, UNSPECIFIED FEVER CAUSE: ICD-10-CM

## 2024-09-06 LAB
ALBUMIN SERPL BCP-MCNC: 3.1 G/DL (ref 3.5–5)
ALBUMIN/GLOB SERPL: 0.6 {RATIO}
ALP SERPL-CCNC: 148 U/L (ref 46–118)
ALT SERPL W P-5'-P-CCNC: 20 U/L (ref 13–56)
ANION GAP SERPL CALCULATED.3IONS-SCNC: 12 MMOL/L (ref 7–16)
AST SERPL W P-5'-P-CCNC: 36 U/L (ref 15–37)
BACTERIA #/AREA URNS HPF: ABNORMAL /HPF
BASOPHILS # BLD AUTO: 0.03 K/UL (ref 0–0.2)
BASOPHILS NFR BLD AUTO: 0.3 % (ref 0–1)
BILIRUB SERPL-MCNC: 1.2 MG/DL (ref ?–1.2)
BILIRUB UR QL STRIP: NEGATIVE
BUN SERPL-MCNC: 24 MG/DL (ref 7–18)
BUN/CREAT SERPL: 18 (ref 6–20)
CALCIUM SERPL-MCNC: 8.6 MG/DL (ref 8.5–10.1)
CHLORIDE SERPL-SCNC: 97 MMOL/L (ref 98–107)
CLARITY UR: ABNORMAL
CO2 SERPL-SCNC: 31 MMOL/L (ref 21–32)
COLOR UR: YELLOW
CREAT SERPL-MCNC: 1.34 MG/DL (ref 0.55–1.02)
DIFFERENTIAL METHOD BLD: ABNORMAL
EGFR (NO RACE VARIABLE) (RUSH/TITUS): 46 ML/MIN/1.73M2
EOSINOPHIL # BLD AUTO: 0.13 K/UL (ref 0–0.5)
EOSINOPHIL NFR BLD AUTO: 1.2 % (ref 1–4)
ERYTHROCYTE [DISTWIDTH] IN BLOOD BY AUTOMATED COUNT: 15.9 % (ref 11.5–14.5)
GLOBULIN SER-MCNC: 4.9 G/DL (ref 2–4)
GLUCOSE SERPL-MCNC: 135 MG/DL (ref 74–106)
GLUCOSE UR STRIP-MCNC: NEGATIVE MG/DL
HCT VFR BLD AUTO: 39.5 % (ref 38–47)
HGB BLD-MCNC: 12.7 G/DL (ref 12–16)
IMM GRANULOCYTES # BLD AUTO: 0.07 K/UL (ref 0–0.04)
IMM GRANULOCYTES NFR BLD: 0.6 % (ref 0–0.4)
INFLUENZA A MOLECULAR (OHS): NEGATIVE
INFLUENZA B MOLECULAR (OHS): NEGATIVE
KETONES UR STRIP-SCNC: NEGATIVE MG/DL
LACTATE SERPL-SCNC: 2.2 MMOL/L (ref 0.4–2)
LEUKOCYTE ESTERASE UR QL STRIP: ABNORMAL
LYMPHOCYTES # BLD AUTO: 1.32 K/UL (ref 1–4.8)
LYMPHOCYTES NFR BLD AUTO: 11.8 % (ref 27–41)
MCH RBC QN AUTO: 27.7 PG (ref 27–31)
MCHC RBC AUTO-ENTMCNC: 32.2 G/DL (ref 32–36)
MCV RBC AUTO: 86.2 FL (ref 80–96)
MONOCYTES # BLD AUTO: 0.78 K/UL (ref 0–0.8)
MONOCYTES NFR BLD AUTO: 7 % (ref 2–6)
MPC BLD CALC-MCNC: 9.8 FL (ref 9.4–12.4)
NEUTROPHILS # BLD AUTO: 8.83 K/UL (ref 1.8–7.7)
NEUTROPHILS NFR BLD AUTO: 79.1 % (ref 53–65)
NITRITE UR QL STRIP: POSITIVE
NRBC # BLD AUTO: 0 X10E3/UL
NRBC, AUTO (.00): 0 %
PH UR STRIP: 7 PH UNITS
PLATELET # BLD AUTO: 146 K/UL (ref 150–400)
POTASSIUM SERPL-SCNC: 3.6 MMOL/L (ref 3.5–5.1)
PROT SERPL-MCNC: 8 G/DL (ref 6.4–8.2)
PROT UR QL STRIP: 30
RBC # BLD AUTO: 4.58 M/UL (ref 4.2–5.4)
RBC # UR STRIP: ABNORMAL /UL
RBC #/AREA URNS HPF: ABNORMAL /HPF
SARS-COV-2 RDRP RESP QL NAA+PROBE: NEGATIVE
SODIUM SERPL-SCNC: 136 MMOL/L (ref 136–145)
SP GR UR STRIP: 1.01
SQUAMOUS #/AREA URNS LPF: ABNORMAL /LPF
UROBILINOGEN UR STRIP-ACNC: 1 MG/DL
WBC # BLD AUTO: 11.16 K/UL (ref 4.5–11)
WBC #/AREA URNS HPF: ABNORMAL /HPF

## 2024-09-06 PROCEDURE — 87635 SARS-COV-2 COVID-19 AMP PRB: CPT | Performed by: NURSE PRACTITIONER

## 2024-09-06 PROCEDURE — 27000221 HC OXYGEN, UP TO 24 HOURS

## 2024-09-06 PROCEDURE — 80053 COMPREHEN METABOLIC PANEL: CPT | Performed by: NURSE PRACTITIONER

## 2024-09-06 PROCEDURE — 96374 THER/PROPH/DIAG INJ IV PUSH: CPT

## 2024-09-06 PROCEDURE — 87086 URINE CULTURE/COLONY COUNT: CPT | Performed by: NURSE PRACTITIONER

## 2024-09-06 PROCEDURE — 93005 ELECTROCARDIOGRAM TRACING: CPT

## 2024-09-06 PROCEDURE — 63600175 PHARM REV CODE 636 W HCPCS: Performed by: NURSE PRACTITIONER

## 2024-09-06 PROCEDURE — 99285 EMERGENCY DEPT VISIT HI MDM: CPT | Mod: 25

## 2024-09-06 PROCEDURE — 87077 CULTURE AEROBIC IDENTIFY: CPT | Performed by: NURSE PRACTITIONER

## 2024-09-06 PROCEDURE — 85025 COMPLETE CBC W/AUTO DIFF WBC: CPT | Performed by: NURSE PRACTITIONER

## 2024-09-06 PROCEDURE — 83605 ASSAY OF LACTIC ACID: CPT | Performed by: NURSE PRACTITIONER

## 2024-09-06 PROCEDURE — 94761 N-INVAS EAR/PLS OXIMETRY MLT: CPT

## 2024-09-06 PROCEDURE — 87502 INFLUENZA DNA AMP PROBE: CPT | Performed by: NURSE PRACTITIONER

## 2024-09-06 PROCEDURE — 81001 URINALYSIS AUTO W/SCOPE: CPT | Performed by: NURSE PRACTITIONER

## 2024-09-06 PROCEDURE — 87040 BLOOD CULTURE FOR BACTERIA: CPT | Performed by: NURSE PRACTITIONER

## 2024-09-06 PROCEDURE — 96361 HYDRATE IV INFUSION ADD-ON: CPT

## 2024-09-06 RX ORDER — ACETAMINOPHEN 500 MG
1000 TABLET ORAL
Status: DISCONTINUED | OUTPATIENT
Start: 2024-09-06 | End: 2024-09-06

## 2024-09-06 RX ORDER — LEVOFLOXACIN 5 MG/ML
750 INJECTION, SOLUTION INTRAVENOUS
Status: DISCONTINUED | OUTPATIENT
Start: 2024-09-06 | End: 2024-09-09

## 2024-09-06 RX ADMIN — LEVOFLOXACIN 750 MG: 750 INJECTION, SOLUTION INTRAVENOUS at 11:09

## 2024-09-06 RX ADMIN — SODIUM CHLORIDE, POTASSIUM CHLORIDE, SODIUM LACTATE AND CALCIUM CHLORIDE 1000 ML: 600; 310; 30; 20 INJECTION, SOLUTION INTRAVENOUS at 10:09

## 2024-09-07 PROBLEM — J18.9 PNEUMONIA OF BOTH UPPER LOBES DUE TO INFECTIOUS ORGANISM: Status: ACTIVE | Noted: 2024-09-07

## 2024-09-07 LAB
ANION GAP SERPL CALCULATED.3IONS-SCNC: 11 MMOL/L (ref 7–16)
BASOPHILS # BLD AUTO: 0.01 K/UL (ref 0–0.2)
BASOPHILS NFR BLD AUTO: 0.1 % (ref 0–1)
BUN SERPL-MCNC: 19 MG/DL (ref 7–18)
BUN/CREAT SERPL: 19 (ref 6–20)
CALCIUM SERPL-MCNC: 8.6 MG/DL (ref 8.5–10.1)
CHLORIDE SERPL-SCNC: 102 MMOL/L (ref 98–107)
CO2 SERPL-SCNC: 28 MMOL/L (ref 21–32)
CREAT SERPL-MCNC: 1.02 MG/DL (ref 0.55–1.02)
DIFFERENTIAL METHOD BLD: ABNORMAL
EGFR (NO RACE VARIABLE) (RUSH/TITUS): 64 ML/MIN/1.73M2
EOSINOPHIL # BLD AUTO: 0.15 K/UL (ref 0–0.5)
EOSINOPHIL NFR BLD AUTO: 1.6 % (ref 1–4)
ERYTHROCYTE [DISTWIDTH] IN BLOOD BY AUTOMATED COUNT: 15.7 % (ref 11.5–14.5)
GLUCOSE SERPL-MCNC: 135 MG/DL (ref 74–106)
HCT VFR BLD AUTO: 34.8 % (ref 38–47)
HGB BLD-MCNC: 11.2 G/DL (ref 12–16)
IMM GRANULOCYTES # BLD AUTO: 0.04 K/UL (ref 0–0.04)
IMM GRANULOCYTES NFR BLD: 0.4 % (ref 0–0.4)
LACTATE SERPL-SCNC: 1.6 MMOL/L (ref 0.4–2)
LYMPHOCYTES # BLD AUTO: 1.15 K/UL (ref 1–4.8)
LYMPHOCYTES NFR BLD AUTO: 12.5 % (ref 27–41)
MCH RBC QN AUTO: 27.7 PG (ref 27–31)
MCHC RBC AUTO-ENTMCNC: 32.2 G/DL (ref 32–36)
MCV RBC AUTO: 85.9 FL (ref 80–96)
MONOCYTES # BLD AUTO: 0.65 K/UL (ref 0–0.8)
MONOCYTES NFR BLD AUTO: 7.1 % (ref 2–6)
MPC BLD CALC-MCNC: 9.9 FL (ref 9.4–12.4)
NEUTROPHILS # BLD AUTO: 7.18 K/UL (ref 1.8–7.7)
NEUTROPHILS NFR BLD AUTO: 78.3 % (ref 53–65)
NRBC # BLD AUTO: 0 X10E3/UL
NRBC, AUTO (.00): 0 %
OHS QRS DURATION: 80 MS
OHS QTC CALCULATION: 441 MS
PLATELET # BLD AUTO: 130 K/UL (ref 150–400)
POTASSIUM SERPL-SCNC: 4 MMOL/L (ref 3.5–5.1)
RBC # BLD AUTO: 4.05 M/UL (ref 4.2–5.4)
SODIUM SERPL-SCNC: 137 MMOL/L (ref 136–145)
WBC # BLD AUTO: 9.18 K/UL (ref 4.5–11)

## 2024-09-07 PROCEDURE — 83605 ASSAY OF LACTIC ACID: CPT | Performed by: NURSE PRACTITIONER

## 2024-09-07 PROCEDURE — 94761 N-INVAS EAR/PLS OXIMETRY MLT: CPT

## 2024-09-07 PROCEDURE — 25000003 PHARM REV CODE 250: Performed by: NURSE PRACTITIONER

## 2024-09-07 PROCEDURE — 36415 COLL VENOUS BLD VENIPUNCTURE: CPT | Performed by: NURSE PRACTITIONER

## 2024-09-07 PROCEDURE — 80048 BASIC METABOLIC PNL TOTAL CA: CPT

## 2024-09-07 PROCEDURE — 63600175 PHARM REV CODE 636 W HCPCS: Performed by: NURSE PRACTITIONER

## 2024-09-07 PROCEDURE — 27000944

## 2024-09-07 PROCEDURE — 27000935

## 2024-09-07 PROCEDURE — 63600175 PHARM REV CODE 636 W HCPCS

## 2024-09-07 PROCEDURE — 25000242 PHARM REV CODE 250 ALT 637 W/ HCPCS

## 2024-09-07 PROCEDURE — 36415 COLL VENOUS BLD VENIPUNCTURE: CPT

## 2024-09-07 PROCEDURE — 11000001 HC ACUTE MED/SURG PRIVATE ROOM

## 2024-09-07 PROCEDURE — 85025 COMPLETE CBC W/AUTO DIFF WBC: CPT

## 2024-09-07 PROCEDURE — 94640 AIRWAY INHALATION TREATMENT: CPT

## 2024-09-07 PROCEDURE — 99900035 HC TECH TIME PER 15 MIN (STAT)

## 2024-09-07 PROCEDURE — 25000003 PHARM REV CODE 250

## 2024-09-07 PROCEDURE — 27000221 HC OXYGEN, UP TO 24 HOURS

## 2024-09-07 RX ORDER — ALBUTEROL SULFATE 90 UG/1
2 INHALANT RESPIRATORY (INHALATION) EVERY 6 HOURS PRN
Status: DISCONTINUED | OUTPATIENT
Start: 2024-09-07 | End: 2024-09-07

## 2024-09-07 RX ORDER — SERTRALINE HYDROCHLORIDE 50 MG/1
150 TABLET, FILM COATED ORAL DAILY
Status: DISCONTINUED | OUTPATIENT
Start: 2024-09-07 | End: 2024-09-10 | Stop reason: HOSPADM

## 2024-09-07 RX ORDER — BENZONATATE 100 MG/1
100 CAPSULE ORAL 3 TIMES DAILY PRN
Status: DISCONTINUED | OUTPATIENT
Start: 2024-09-07 | End: 2024-09-10 | Stop reason: HOSPADM

## 2024-09-07 RX ORDER — ONDANSETRON HYDROCHLORIDE 2 MG/ML
4 INJECTION, SOLUTION INTRAVENOUS EVERY 8 HOURS PRN
Status: DISCONTINUED | OUTPATIENT
Start: 2024-09-07 | End: 2024-09-10 | Stop reason: HOSPADM

## 2024-09-07 RX ORDER — ENOXAPARIN SODIUM 100 MG/ML
30 INJECTION SUBCUTANEOUS EVERY 24 HOURS
Status: DISCONTINUED | OUTPATIENT
Start: 2024-09-07 | End: 2024-09-09 | Stop reason: DRUGHIGH

## 2024-09-07 RX ORDER — ACETAMINOPHEN 325 MG/1
650 TABLET ORAL EVERY 6 HOURS PRN
Status: DISCONTINUED | OUTPATIENT
Start: 2024-09-07 | End: 2024-09-10 | Stop reason: HOSPADM

## 2024-09-07 RX ORDER — PANTOPRAZOLE SODIUM 40 MG/1
40 TABLET, DELAYED RELEASE ORAL DAILY
Status: DISCONTINUED | OUTPATIENT
Start: 2024-09-07 | End: 2024-09-10 | Stop reason: HOSPADM

## 2024-09-07 RX ORDER — CLOPIDOGREL BISULFATE 75 MG/1
75 TABLET ORAL DAILY
Status: DISCONTINUED | OUTPATIENT
Start: 2024-09-07 | End: 2024-09-10 | Stop reason: HOSPADM

## 2024-09-07 RX ORDER — AMLODIPINE BESYLATE 5 MG/1
10 TABLET ORAL DAILY
Status: DISCONTINUED | OUTPATIENT
Start: 2024-09-07 | End: 2024-09-10 | Stop reason: HOSPADM

## 2024-09-07 RX ORDER — GABAPENTIN 300 MG/1
300 CAPSULE ORAL 2 TIMES DAILY
Status: DISCONTINUED | OUTPATIENT
Start: 2024-09-07 | End: 2024-09-10 | Stop reason: HOSPADM

## 2024-09-07 RX ORDER — ATORVASTATIN CALCIUM 40 MG/1
40 TABLET, FILM COATED ORAL NIGHTLY
Status: DISCONTINUED | OUTPATIENT
Start: 2024-09-07 | End: 2024-09-10 | Stop reason: HOSPADM

## 2024-09-07 RX ORDER — LORAZEPAM 0.5 MG/1
0.5 TABLET ORAL EVERY 12 HOURS PRN
Status: DISCONTINUED | OUTPATIENT
Start: 2024-09-07 | End: 2024-09-10 | Stop reason: HOSPADM

## 2024-09-07 RX ORDER — SODIUM CHLORIDE 9 MG/ML
INJECTION, SOLUTION INTRAVENOUS CONTINUOUS
Status: DISPENSED | OUTPATIENT
Start: 2024-09-07 | End: 2024-09-08

## 2024-09-07 RX ORDER — IPRATROPIUM BROMIDE AND ALBUTEROL SULFATE 2.5; .5 MG/3ML; MG/3ML
3 SOLUTION RESPIRATORY (INHALATION) EVERY 6 HOURS PRN
Status: DISCONTINUED | OUTPATIENT
Start: 2024-09-07 | End: 2024-09-07

## 2024-09-07 RX ORDER — SODIUM CHLORIDE 0.9 % (FLUSH) 0.9 %
10 SYRINGE (ML) INJECTION
Status: DISCONTINUED | OUTPATIENT
Start: 2024-09-07 | End: 2024-09-10 | Stop reason: HOSPADM

## 2024-09-07 RX ORDER — DEXTROMETHORPHAN HBR. AND GUAIFENESIN 10; 100 MG/5ML; MG/5ML
10 SOLUTION ORAL EVERY 6 HOURS PRN
Status: DISCONTINUED | OUTPATIENT
Start: 2024-09-07 | End: 2024-09-07

## 2024-09-07 RX ORDER — IPRATROPIUM BROMIDE AND ALBUTEROL SULFATE 2.5; .5 MG/3ML; MG/3ML
3 SOLUTION RESPIRATORY (INHALATION)
Status: DISCONTINUED | OUTPATIENT
Start: 2024-09-07 | End: 2024-09-10 | Stop reason: HOSPADM

## 2024-09-07 RX ORDER — ALBUTEROL SULFATE 0.83 MG/ML
2.5 SOLUTION RESPIRATORY (INHALATION) EVERY 6 HOURS PRN
Status: DISCONTINUED | OUTPATIENT
Start: 2024-09-07 | End: 2024-09-07

## 2024-09-07 RX ORDER — ACETAMINOPHEN 325 MG/1
650 TABLET ORAL EVERY 6 HOURS PRN
Status: DISCONTINUED | OUTPATIENT
Start: 2024-09-07 | End: 2024-09-07

## 2024-09-07 RX ORDER — TALC
6 POWDER (GRAM) TOPICAL NIGHTLY PRN
Status: DISCONTINUED | OUTPATIENT
Start: 2024-09-07 | End: 2024-09-10 | Stop reason: HOSPADM

## 2024-09-07 RX ORDER — CETIRIZINE HYDROCHLORIDE 10 MG/1
10 TABLET ORAL DAILY
Status: DISCONTINUED | OUTPATIENT
Start: 2024-09-07 | End: 2024-09-10 | Stop reason: HOSPADM

## 2024-09-07 RX ADMIN — CETIRIZINE HYDROCHLORIDE 10 MG: 10 TABLET, FILM COATED ORAL at 08:09

## 2024-09-07 RX ADMIN — ATORVASTATIN CALCIUM 40 MG: 40 TABLET, FILM COATED ORAL at 01:09

## 2024-09-07 RX ADMIN — GABAPENTIN 300 MG: 300 CAPSULE ORAL at 08:09

## 2024-09-07 RX ADMIN — BENZONATATE 100 MG: 100 CAPSULE ORAL at 11:09

## 2024-09-07 RX ADMIN — IPRATROPIUM BROMIDE AND ALBUTEROL SULFATE 3 ML: 2.5; .5 SOLUTION RESPIRATORY (INHALATION) at 10:09

## 2024-09-07 RX ADMIN — IPRATROPIUM BROMIDE AND ALBUTEROL SULFATE 3 ML: 2.5; .5 SOLUTION RESPIRATORY (INHALATION) at 07:09

## 2024-09-07 RX ADMIN — IPRATROPIUM BROMIDE AND ALBUTEROL SULFATE 3 ML: 2.5; .5 SOLUTION RESPIRATORY (INHALATION) at 01:09

## 2024-09-07 RX ADMIN — LEVOFLOXACIN 750 MG: 750 INJECTION, SOLUTION INTRAVENOUS at 11:09

## 2024-09-07 RX ADMIN — AMLODIPINE BESYLATE 10 MG: 5 TABLET ORAL at 08:09

## 2024-09-07 RX ADMIN — DEXTROMETHORPHAN HBR. AND GUAIFENESIN 10 ML: 20; 200 SOLUTION ORAL at 01:09

## 2024-09-07 RX ADMIN — ATORVASTATIN CALCIUM 40 MG: 40 TABLET, FILM COATED ORAL at 09:09

## 2024-09-07 RX ADMIN — SODIUM CHLORIDE: 9 INJECTION, SOLUTION INTRAVENOUS at 10:09

## 2024-09-07 RX ADMIN — SODIUM CHLORIDE, SODIUM LACTATE, POTASSIUM CHLORIDE, AND CALCIUM CHLORIDE 1000 ML: 600; 310; 30; 20 INJECTION, SOLUTION INTRAVENOUS at 01:09

## 2024-09-07 RX ADMIN — ACETAMINOPHEN 650 MG: 325 TABLET ORAL at 01:09

## 2024-09-07 RX ADMIN — LORAZEPAM 0.5 MG: 0.5 TABLET ORAL at 01:09

## 2024-09-07 RX ADMIN — CLOPIDOGREL BISULFATE 75 MG: 75 TABLET ORAL at 08:09

## 2024-09-07 RX ADMIN — MELATONIN TAB 3 MG 6 MG: 3 TAB at 09:09

## 2024-09-07 RX ADMIN — ACETAMINOPHEN 650 MG: 325 TABLET ORAL at 11:09

## 2024-09-07 RX ADMIN — SERTRALINE HYDROCHLORIDE 150 MG: 50 TABLET ORAL at 08:09

## 2024-09-07 RX ADMIN — ACETAMINOPHEN 650 MG: 325 TABLET ORAL at 03:09

## 2024-09-07 RX ADMIN — DEXTROMETHORPHAN HBR. AND GUAIFENESIN 10 ML: 20; 200 SOLUTION ORAL at 08:09

## 2024-09-07 RX ADMIN — PANTOPRAZOLE SODIUM 40 MG: 40 TABLET, DELAYED RELEASE ORAL at 08:09

## 2024-09-07 RX ADMIN — GABAPENTIN 300 MG: 300 CAPSULE ORAL at 09:09

## 2024-09-07 RX ADMIN — ENOXAPARIN SODIUM 30 MG: 30 INJECTION SUBCUTANEOUS at 04:09

## 2024-09-07 RX ADMIN — ONDANSETRON 4 MG: 2 INJECTION INTRAMUSCULAR; INTRAVENOUS at 01:09

## 2024-09-07 NOTE — PLAN OF CARE
Problem: Adult Inpatient Plan of Care  Goal: Plan of Care Review  9/7/2024 0130 by Rosio Warner RN  Outcome: Progressing  9/7/2024 0103 by Rosio Warner RN  Outcome: Progressing  Goal: Patient-Specific Goal (Individualized)  9/7/2024 0130 by Rosio Warner RN  Outcome: Progressing  9/7/2024 0103 by Rosio Warner RN  Outcome: Progressing  Goal: Absence of Hospital-Acquired Illness or Injury  9/7/2024 0130 by Rosio Warner RN  Outcome: Progressing  9/7/2024 0103 by Rosio Warner RN  Outcome: Progressing  Goal: Optimal Comfort and Wellbeing  9/7/2024 0130 by Rosio Warner RN  Outcome: Progressing  9/7/2024 0103 by Rosio Warner RN  Outcome: Progressing  Goal: Readiness for Transition of Care  Outcome: Progressing

## 2024-09-07 NOTE — SUBJECTIVE & OBJECTIVE
Past Medical History:   Diagnosis Date    Cerebral hemorrhage     Chronic anxiety     Decreased coordination 3/7/2022    Dehydration     Depression     Dysphagia     Due to and following non-traumatic intracerebral hemorrhage    Hearing loss     History of CVA (cerebrovascular accident)     Hypertension     Hypokalemia 10/20/2022    Insomnia     Intrapontine hemorrhage     Left hemiparesis     Peripheral neuropathy     Stroke     Thyroid disease     Transient cerebral ischemia        Past Surgical History:   Procedure Laterality Date    APPENDECTOMY      CHOLECYSTECTOMY      HYSTERECTOMY      LITHOTRIPSY      Renal lithotripsy    percutaneious insertion of ureteric stent         Review of patient's allergies indicates:   Allergen Reactions    Lamisil [terbinafine] Anaphylaxis    Codeine Itching    Compazine [prochlorperazine] Itching       No current facility-administered medications on file prior to encounter.     Current Outpatient Medications on File Prior to Encounter   Medication Sig    acetaminophen (TYLENOL) 325 MG tablet Take 650 mg by mouth every 6 (six) hours as needed.    albuterol (PROAIR HFA) 90 mcg/actuation inhaler Inhale 2 puffs into the lungs every 6 (six) hours as needed for Wheezing. Rescue    atorvastatin (LIPITOR) 40 MG tablet Take 1 tablet (40 mg total) by mouth every evening.    cetirizine (ZYRTEC) 10 MG tablet Take 1 tablet (10 mg total) by mouth once daily.    clopidogreL (PLAVIX) 75 mg tablet Take 1 tablet (75 mg total) by mouth once daily.    gabapentin (NEURONTIN) 300 MG capsule Take 1 capsule (300 mg total) by mouth 2 (two) times daily. (Patient taking differently: Take 600 mg by mouth 2 (two) times daily.)    multivitamin-minerals-lutein (MULTIVITAMIN 50 PLUS) Tab Take 1 tablet by mouth once daily.    olmesartan (BENICAR) 40 MG tablet Take 1 tablet (40 mg total) by mouth once daily.    omega 3-dha-epa-fish oil 1,000 mg (120 mg-180 mg) Cap Take 1 capsule by mouth once daily.     pantoprazole (PROTONIX) 40 MG tablet Take 1 tablet (40 mg total) by mouth once daily.    sertraline (ZOLOFT) 50 MG tablet Take 3 tablets (150 mg total) by mouth once daily.    amLODIPine (NORVASC) 10 MG tablet Take 1 tablet (10 mg total) by mouth once daily.    LORazepam (ATIVAN) 0.5 MG tablet Take 1 tablet (0.5 mg total) by mouth every 12 (twelve) hours as needed for Anxiety. (Patient taking differently: Take 0.5 mg by mouth once daily.)    [DISCONTINUED] hydroCHLOROthiazide (HYDRODIURIL) 12.5 MG Tab Take 1 tablet (12.5 mg total) by mouth once daily.    [DISCONTINUED] omeprazole (PRILOSEC) 40 MG capsule Take 40 mg by mouth every evening.     Family History    None       Tobacco Use    Smoking status: Never    Smokeless tobacco: Never   Substance and Sexual Activity    Alcohol use: Never    Drug use: Never    Sexual activity: Not on file     Review of Systems   Constitutional:  Positive for activity change, fatigue and fever. Negative for diaphoresis.   HENT:  Positive for congestion. Negative for sore throat and trouble swallowing.    Eyes: Negative.    Respiratory:  Positive for cough, shortness of breath and wheezing.    Cardiovascular:  Negative for chest pain and palpitations.   Gastrointestinal:  Negative for abdominal pain, constipation, diarrhea and vomiting.   Endocrine: Negative.    Genitourinary:  Positive for frequency. Negative for difficulty urinating and flank pain.   Musculoskeletal:  Positive for arthralgias and myalgias. Negative for neck pain and neck stiffness.   Skin:  Negative for color change, pallor and rash.   Allergic/Immunologic: Negative.    Neurological:  Positive for weakness (generalized). Negative for dizziness, syncope, facial asymmetry, speech difficulty, light-headedness and headaches.   Hematological:  Negative for adenopathy.   Psychiatric/Behavioral:  Negative for confusion. The patient is not nervous/anxious.    All other systems reviewed and are negative.    Objective:      Vital Signs (Most Recent):  Temp: 99.2 °F (37.3 °C) (09/07/24 0809)  Pulse: 78 (09/07/24 0809)  Resp: 18 (09/07/24 0809)  BP: (!) 102/59 (09/07/24 0809)  SpO2: 96 % (09/07/24 0809) Vital Signs (24h Range):  Temp:  [98.9 °F (37.2 °C)-101.4 °F (38.6 °C)] 99.2 °F (37.3 °C)  Pulse:  [] 78  Resp:  [18-22] 18  SpO2:  [88 %-97 %] 96 %  BP: (102-153)/() 102/59     Weight: 69.9 kg (154 lb)  Body mass index is 26.43 kg/m².     Physical Exam  Vitals and nursing note reviewed.   Constitutional:       General: She is not in acute distress.     Appearance: Normal appearance. She is obese.   HENT:      Head: Normocephalic and atraumatic.      Nose: Nose normal.      Mouth/Throat:      Mouth: Mucous membranes are moist.   Eyes:      Extraocular Movements: Extraocular movements intact.      Conjunctiva/sclera: Conjunctivae normal.      Pupils: Pupils are equal, round, and reactive to light.   Cardiovascular:      Rate and Rhythm: Normal rate and regular rhythm.      Pulses: Normal pulses.      Heart sounds: Normal heart sounds.   Pulmonary:      Effort: Pulmonary effort is normal. No respiratory distress.      Breath sounds: No stridor. Wheezing (few scattered) and rhonchi (bilateral upper lobes) present.   Abdominal:      General: Bowel sounds are normal. There is no distension.      Palpations: Abdomen is soft.      Tenderness: There is no abdominal tenderness.   Musculoskeletal:         General: Normal range of motion.      Cervical back: Normal range of motion.   Skin:     General: Skin is warm and dry.      Capillary Refill: Capillary refill takes less than 2 seconds.   Neurological:      General: No focal deficit present.      Mental Status: She is alert and oriented to person, place, and time. Mental status is at baseline.   Psychiatric:         Mood and Affect: Mood normal.         Behavior: Behavior normal.         Thought Content: Thought content normal.         Judgment: Judgment normal.               CRANIAL NERVES     CN III, IV, VI   Pupils are equal, round, and reactive to light.       Significant Labs: All pertinent labs within the past 24 hours have been reviewed.    Significant Imaging: I have reviewed all pertinent imaging results/findings within the past 24 hours.

## 2024-09-07 NOTE — ASSESSMENT & PLAN NOTE
9/7/24 urinalysis shows moderate leukocytes, positive nitrites, and WBC 11-15 with many bacteria.  Lactic acid initially 2.2 but down to 1.6.  Currently on Levaquin 750 mg Q 24 hours and urine culture is pending.  We will continue hydration with normal saline at 75 mL/hour until repeat labs are available.

## 2024-09-07 NOTE — ASSESSMENT & PLAN NOTE
9/7/24 currently on 10 hose and has been tolerating Plavix without any complications following a prior ICH.  Plan was discussed with Dr. Poe and he is in agreement with Jay rahman and low-dose Lovenox 30 mg subQ daily until patient is more active.

## 2024-09-07 NOTE — HPI
Patient is a 59-year-old female who presented to [facility] ED for evaluation of cough times 2-3 days.  Reports that she has been treating at home with over-the-counter cough medication and breathing treatments with only minimal improvement which prompted her to seek evaluation in the ED yesterday.  She was ultimately diagnosed with multifocal pneumonia and UTI and started on Levaquin 750 mg IV daily with close monitoring of her kidney function.  GFR was 46 with a creatinine of 1.34 during the ED visit.  Blood in urine cultures pending at this time.  We will continue current antibiotics until cultures are available.  Plans to continue hydration with normal saline at 75 mL/hour.  She does have a known history of CVA and most recently ICH 3-4 years prior but has been tolerating Plavix without any complications so we will perform low-dose DVT prophylaxis with Lovenox 30 mg daily.  We will continue home medications and provide DuoNebs q.6 hours while awake.  Monitor CBC and BMP daily.  Case was discussed with Dr. Poe who was in agreement with this plan.

## 2024-09-07 NOTE — ASSESSMENT & PLAN NOTE
Chronic, controlled. Latest blood pressure and vitals reviewed-     Temp:  [98.9 °F (37.2 °C)-101.4 °F (38.6 °C)]   Pulse:  []   Resp:  [18-22]   BP: (102-153)/()   SpO2:  [88 %-97 %] .   Home meds for hypertension were reviewed and noted below.   Hypertension Medications               amLODIPine (NORVASC) 10 MG tablet Take 1 tablet (10 mg total) by mouth once daily.    olmesartan (BENICAR) 40 MG tablet Take 1 tablet (40 mg total) by mouth once daily.            While in the hospital, will manage blood pressure as follows; Continue home antihypertensive regimen    Will utilize p.r.n. blood pressure medication only if patient's blood pressure greater than 180/110 and she develops symptoms such as worsening chest pain or shortness of breath.

## 2024-09-07 NOTE — PLAN OF CARE
Problem: Adult Inpatient Plan of Care  Goal: Plan of Care Review  9/7/2024 0215 by Rosio Warner RN  Outcome: Not Progressing  9/7/2024 0214 by Rosio Warner RN  Outcome: Progressing  9/7/2024 0130 by Rosio Warner RN  Outcome: Progressing  9/7/2024 0103 by Rosio Warner RN  Outcome: Progressing  Goal: Patient-Specific Goal (Individualized)  9/7/2024 0215 by Rosio Warner RN  Outcome: Not Progressing  9/7/2024 0214 by Rosio Warner RN  Outcome: Progressing  9/7/2024 0130 by Rosio Warner RN  Outcome: Progressing  9/7/2024 0103 by Rosio Warner RN  Outcome: Progressing  Goal: Absence of Hospital-Acquired Illness or Injury  9/7/2024 0215 by Rosio Warner RN  Outcome: Not Progressing  9/7/2024 0214 by Rosio Warner RN  Outcome: Progressing  9/7/2024 0130 by Rosio Warner RN  Outcome: Progressing  9/7/2024 0103 by Rosio Warner RN  Outcome: Progressing  Goal: Optimal Comfort and Wellbeing  9/7/2024 0215 by Rosio Warner RN  Outcome: Not Progressing  9/7/2024 0214 by Rosio Warner RN  Outcome: Progressing  9/7/2024 0130 by Rosio Warner RN  Outcome: Progressing  9/7/2024 0103 by Rosio Warner RN  Outcome: Progressing  Goal: Readiness for Transition of Care  9/7/2024 0215 by Rosio Warner RN  Outcome: Not Progressing  9/7/2024 0214 by Rosio Warner RN  Outcome: Not Progressing  9/7/2024 0130 by Rosio Warner RN  Outcome: Progressing

## 2024-09-07 NOTE — H&P
[facility] - Medical Surgical Unit  Hospital Medicine  History & Physical    Patient Name: Herlinda Valdovinos  MRN: [id number]  Patient Class: IP- Inpatient  Admission Date: 9/6/2024  Attending Physician: Chuy Daugherty IV, DO   Primary Care Provider: Vini Hernandez NP         Patient information was obtained from patient and ER records.     Subjective:     Principal Problem:Pneumonia of both upper lobes due to infectious organism    Chief Complaint:   Chief Complaint   Patient presents with    Cough     Pt c/o cough, congestion, fever x 2 days. Had tylenol 1 hr PTA    Pneumonia        HPI: Patient is a 59-year-old female who presented to [facility] ED for evaluation of cough times 2-3 days.  Reports that she has been treating at home with over-the-counter cough medication and breathing treatments with only minimal improvement which prompted her to seek evaluation in the ED yesterday.  She was ultimately diagnosed with multifocal pneumonia and UTI and started on Levaquin 750 mg IV daily with close monitoring of her kidney function.  GFR was 46 with a creatinine of 1.34 during the ED visit.  Blood in urine cultures pending at this time.  We will continue current antibiotics until cultures are available.  Plans to continue hydration with normal saline at 75 mL/hour.  She does have a known history of CVA and most recently ICH 3-4 years prior but has been tolerating Plavix without any complications so we will perform low-dose DVT prophylaxis with Lovenox 30 mg daily.  We will continue home medications and provide DuoNebs q.6 hours while awake.  Monitor CBC and BMP daily.  Case was discussed with Dr. Poe who was in agreement with this plan.    Past Medical History:   Diagnosis Date    Cerebral hemorrhage     Chronic anxiety     Decreased coordination 3/7/2022    Dehydration     Depression     Dysphagia     Due to and following non-traumatic intracerebral hemorrhage    Hearing loss     History of  CVA (cerebrovascular accident)     Hypertension     Hypokalemia 10/20/2022    Insomnia     Intrapontine hemorrhage     Left hemiparesis     Peripheral neuropathy     Stroke     Thyroid disease     Transient cerebral ischemia        Past Surgical History:   Procedure Laterality Date    APPENDECTOMY      CHOLECYSTECTOMY      HYSTERECTOMY      LITHOTRIPSY      Renal lithotripsy    percutaneious insertion of ureteric stent         Review of patient's allergies indicates:   Allergen Reactions    Lamisil [terbinafine] Anaphylaxis    Codeine Itching    Compazine [prochlorperazine] Itching       No current facility-administered medications on file prior to encounter.     Current Outpatient Medications on File Prior to Encounter   Medication Sig    acetaminophen (TYLENOL) 325 MG tablet Take 650 mg by mouth every 6 (six) hours as needed.    albuterol (PROAIR HFA) 90 mcg/actuation inhaler Inhale 2 puffs into the lungs every 6 (six) hours as needed for Wheezing. Rescue    atorvastatin (LIPITOR) 40 MG tablet Take 1 tablet (40 mg total) by mouth every evening.    cetirizine (ZYRTEC) 10 MG tablet Take 1 tablet (10 mg total) by mouth once daily.    clopidogreL (PLAVIX) 75 mg tablet Take 1 tablet (75 mg total) by mouth once daily.    gabapentin (NEURONTIN) 300 MG capsule Take 1 capsule (300 mg total) by mouth 2 (two) times daily. (Patient taking differently: Take 600 mg by mouth 2 (two) times daily.)    multivitamin-minerals-lutein (MULTIVITAMIN 50 PLUS) Tab Take 1 tablet by mouth once daily.    olmesartan (BENICAR) 40 MG tablet Take 1 tablet (40 mg total) by mouth once daily.    omega 3-dha-epa-fish oil 1,000 mg (120 mg-180 mg) Cap Take 1 capsule by mouth once daily.    pantoprazole (PROTONIX) 40 MG tablet Take 1 tablet (40 mg total) by mouth once daily.    sertraline (ZOLOFT) 50 MG tablet Take 3 tablets (150 mg total) by mouth once daily.    amLODIPine (NORVASC) 10 MG tablet Take 1 tablet (10 mg total) by mouth once daily.     LORazepam (ATIVAN) 0.5 MG tablet Take 1 tablet (0.5 mg total) by mouth every 12 (twelve) hours as needed for Anxiety. (Patient taking differently: Take 0.5 mg by mouth once daily.)    [DISCONTINUED] hydroCHLOROthiazide (HYDRODIURIL) 12.5 MG Tab Take 1 tablet (12.5 mg total) by mouth once daily.    [DISCONTINUED] omeprazole (PRILOSEC) 40 MG capsule Take 40 mg by mouth every evening.     Family History    None       Tobacco Use    Smoking status: Never    Smokeless tobacco: Never   Substance and Sexual Activity    Alcohol use: Never    Drug use: Never    Sexual activity: Not on file     Review of Systems   Constitutional:  Positive for activity change, fatigue and fever. Negative for diaphoresis.   HENT:  Positive for congestion. Negative for sore throat and trouble swallowing.    Eyes: Negative.    Respiratory:  Positive for cough, shortness of breath and wheezing.    Cardiovascular:  Negative for chest pain and palpitations.   Gastrointestinal:  Negative for abdominal pain, constipation, diarrhea and vomiting.   Endocrine: Negative.    Genitourinary:  Positive for frequency. Negative for difficulty urinating and flank pain.   Musculoskeletal:  Positive for arthralgias and myalgias. Negative for neck pain and neck stiffness.   Skin:  Negative for color change, pallor and rash.   Allergic/Immunologic: Negative.    Neurological:  Positive for weakness (generalized). Negative for dizziness, syncope, facial asymmetry, speech difficulty, light-headedness and headaches.   Hematological:  Negative for adenopathy.   Psychiatric/Behavioral:  Negative for confusion. The patient is not nervous/anxious.    All other systems reviewed and are negative.    Objective:     Vital Signs (Most Recent):  Temp: 99.2 °F (37.3 °C) (09/07/24 0809)  Pulse: 78 (09/07/24 0809)  Resp: 18 (09/07/24 0809)  BP: (!) 102/59 (09/07/24 0809)  SpO2: 96 % (09/07/24 0809) Vital Signs (24h Range):  Temp:  [98.9 °F (37.2 °C)-101.4 °F (38.6 °C)] 99.2 °F  (37.3 °C)  Pulse:  [] 78  Resp:  [18-22] 18  SpO2:  [88 %-97 %] 96 %  BP: (102-153)/() 102/59     Weight: 69.9 kg (154 lb)  Body mass index is 26.43 kg/m².     Physical Exam  Vitals and nursing note reviewed.   Constitutional:       General: She is not in acute distress.     Appearance: Normal appearance. She is obese.   HENT:      Head: Normocephalic and atraumatic.      Nose: Nose normal.      Mouth/Throat:      Mouth: Mucous membranes are moist.   Eyes:      Extraocular Movements: Extraocular movements intact.      Conjunctiva/sclera: Conjunctivae normal.      Pupils: Pupils are equal, round, and reactive to light.   Cardiovascular:      Rate and Rhythm: Normal rate and regular rhythm.      Pulses: Normal pulses.      Heart sounds: Normal heart sounds.   Pulmonary:      Effort: Pulmonary effort is normal. No respiratory distress.      Breath sounds: No stridor. Wheezing (few scattered) and rhonchi (bilateral upper lobes) present.   Abdominal:      General: Bowel sounds are normal. There is no distension.      Palpations: Abdomen is soft.      Tenderness: There is no abdominal tenderness.   Musculoskeletal:         General: Normal range of motion.      Cervical back: Normal range of motion.   Skin:     General: Skin is warm and dry.      Capillary Refill: Capillary refill takes less than 2 seconds.   Neurological:      General: No focal deficit present.      Mental Status: She is alert and oriented to person, place, and time. Mental status is at baseline.   Psychiatric:         Mood and Affect: Mood normal.         Behavior: Behavior normal.         Thought Content: Thought content normal.         Judgment: Judgment normal.              CRANIAL NERVES     CN III, IV, VI   Pupils are equal, round, and reactive to light.       Significant Labs: All pertinent labs within the past 24 hours have been reviewed.    Significant Imaging: I have reviewed all pertinent imaging results/findings within the past 24  hours.  Assessment/Plan:     Acute cystitis with hematuria  9/7/24 urinalysis shows moderate leukocytes, positive nitrites, and WBC 11-15 with many bacteria.  Lactic acid initially 2.2 but down to 1.6.  Currently on Levaquin 750 mg Q 24 hours and urine culture is pending.  We will continue hydration with normal saline at 75 mL/hour until repeat labs are available.      Hypertension  Chronic, controlled. Latest blood pressure and vitals reviewed-     Temp:  [98.9 °F (37.2 °C)-101.4 °F (38.6 °C)]   Pulse:  []   Resp:  [18-22]   BP: (102-153)/()   SpO2:  [88 %-97 %] .   Home meds for hypertension were reviewed and noted below.   Hypertension Medications               amLODIPine (NORVASC) 10 MG tablet Take 1 tablet (10 mg total) by mouth once daily.    olmesartan (BENICAR) 40 MG tablet Take 1 tablet (40 mg total) by mouth once daily.            While in the hospital, will manage blood pressure as follows; Continue home antihypertensive regimen    Will utilize p.r.n. blood pressure medication only if patient's blood pressure greater than 180/110 and she develops symptoms such as worsening chest pain or shortness of breath.    DVT prophylaxis  9/7/24 currently on 10 hose and has been tolerating Plavix without any complications following a prior ICH.  Plan was discussed with Dr. Poe and he is in agreement with Octavia hose and low-dose Lovenox 30 mg subQ daily until patient is more active.      Generalized anxiety disorder  9/7/24 we will monitor for signs or symptoms of increased anxiety and we have continued her PRN Ativan as needed.        VTE Risk Mitigation (From admission, onward)           Ordered     enoxaparin injection 30 mg  Daily         09/07/24 0835     IP VTE LOW RISK PATIENT  Once         09/07/24 0835     Place OCTAVIA hose  Until discontinued         09/07/24 0835                                    KEVAN NICHOLAS  Department of Hospital Medicine  [facility] - Medical  Surgical Unit

## 2024-09-07 NOTE — ASSESSMENT & PLAN NOTE
9/7/24 we will monitor for signs or symptoms of increased anxiety and we have continued her PRN Ativan as needed.

## 2024-09-07 NOTE — ED PROVIDER NOTES
Encounter Date: 9/6/2024       History     Chief Complaint   Patient presents with    Cough     Pt c/o cough, congestion, fever x 2 days. Had tylenol 1 hr PTA     58 y/o female presents c/o productive cough, congestion, and fever x2 days.         Review of patient's allergies indicates:   Allergen Reactions    Lamisil [terbinafine] Anaphylaxis    Codeine Itching    Compazine [prochlorperazine] Itching     Past Medical History:   Diagnosis Date    Cerebral hemorrhage     Chronic anxiety     Decreased coordination 3/7/2022    Dehydration     Depression     Dysphagia     Due to and following non-traumatic intracerebral hemorrhage    Hearing loss     History of CVA (cerebrovascular accident)     Hypertension     Hypokalemia 10/20/2022    Insomnia     Intrapontine hemorrhage     Left hemiparesis     Peripheral neuropathy     Stroke     Thyroid disease     Transient cerebral ischemia      Past Surgical History:   Procedure Laterality Date    APPENDECTOMY      CHOLECYSTECTOMY      HYSTERECTOMY      LITHOTRIPSY      Renal lithotripsy    percutaneious insertion of ureteric stent       No family history on file.  Social History     Tobacco Use    Smoking status: Never    Smokeless tobacco: Never   Substance Use Topics    Alcohol use: Never    Drug use: Never     Review of Systems   Constitutional:  Positive for activity change, appetite change and fever.   HENT:  Positive for congestion.    Respiratory:  Positive for cough. Negative for shortness of breath.    Cardiovascular:  Negative for chest pain and leg swelling.   Gastrointestinal:  Negative for abdominal pain, nausea and vomiting.   Neurological:  Negative for headaches.       Physical Exam     Initial Vitals [09/06/24 2149]   BP Pulse Resp Temp SpO2   (!) 148/90 106 (!) 22 (!) 100.7 °F (38.2 °C) (!) 93 %      MAP       --         Physical Exam    Nursing note and vitals reviewed.  Constitutional: She appears well-developed and well-nourished. No distress.   Eyes: EOM  are normal. Pupils are equal, round, and reactive to light.   Cardiovascular:  Regular rhythm.           Tachycardia     Pulmonary/Chest: She has rhonchi.   Abdominal: Abdomen is soft. Bowel sounds are normal. She exhibits no distension. There is no abdominal tenderness.     Neurological: She is alert and oriented to person, place, and time. GCS score is 15. GCS eye subscore is 4. GCS verbal subscore is 5. GCS motor subscore is 6.   Skin: Skin is warm and dry.   Psychiatric: She has a normal mood and affect.         Medical Screening Exam   See Full Note    ED Course   Procedures  Labs Reviewed   COMPREHENSIVE METABOLIC PANEL - Abnormal       Result Value    Sodium 136      Potassium 3.6      Chloride 97 (*)     CO2 31      Anion Gap 12      Glucose 135 (*)     BUN 24 (*)     Creatinine 1.34 (*)     BUN/Creatinine Ratio 18      Calcium 8.6      Total Protein 8.0      Albumin 3.1 (*)     Globulin 4.9 (*)     A/G Ratio 0.6      Bilirubin, Total 1.2      Alk Phos 148 (*)     ALT 20      AST 36      eGFR 46 (*)    CBC WITH DIFFERENTIAL - Abnormal    WBC 11.16 (*)     RBC 4.58      Hemoglobin 12.7      Hematocrit 39.5      MCV 86.2      MCH 27.7      MCHC 32.2      RDW 15.9 (*)     Platelet Count 146 (*)     MPV 9.8      Neutrophils % 79.1 (*)     Lymphocytes % 11.8 (*)     Monocytes % 7.0 (*)     Eosinophils % 1.2      Basophils % 0.3      Immature Granulocytes % 0.6 (*)     nRBC, Auto 0.0      Neutrophils, Abs 8.83 (*)     Lymphocytes, Absolute 1.32      Monocytes, Absolute 0.78      Eosinophils, Absolute 0.13      Basophils, Absolute 0.03      Immature Granulocytes, Absolute 0.07 (*)     nRBC, Absolute 0.00      Diff Type Auto     LACTIC ACID, PLASMA - Abnormal    Lactic Acid 2.2 (*)    URINALYSIS, REFLEX TO URINE CULTURE - Abnormal    Color, UA Yellow      Clarity, UA Slightly Cloudy      pH, UA 7.0      Leukocytes, UA Moderate (*)     Nitrites, UA Positive (*)     Protein, UA 30 (*)     Glucose, UA Negative       Ketones, UA Negative      Urobilinogen, UA 1.0      Bilirubin, UA Negative      Blood, UA Trace-Lysed (*)     Specific Tiline, UA 1.015     URINALYSIS, MICROSCOPIC - Abnormal    WBC, UA 11-15 (*)     RBC, UA 0-3      Bacteria, UA Many (*)     Squamous Epithelial Cells, UA Occasional (*)    INFLUENZA A & B BY MOLECULAR - Normal    INFLUENZA A MOLECULAR Negative      INFLUENZA B MOLECULAR  Negative     SARS-COV-2 RNA AMPLIFICATION, QUAL - Normal    SARS COV-2 Molecular Negative      Narrative:     Negative SARS-CoV results should not be used as the sole basis for treatment or patient management decisions; negative results should be considered in the context of a patient's recent exposures, history and the presene of clinical signs and symptoms consistent with COVID-19.  Negative results should be treated as presumptive and confirmed by molecular assay, if necessary for patient management.   CULTURE, BLOOD   CULTURE, BLOOD   CULTURE, URINE   CBC W/ AUTO DIFFERENTIAL    Narrative:     The following orders were created for panel order CBC auto differential.  Procedure                               Abnormality         Status                     ---------                               -----------         ------                     CBC with Differential[[id number]]       Abnormal            Final result                 Please view results for these tests on the individual orders.          Imaging Results              X-Ray Chest PA And Lateral (In process)                      Medications   levoFLOXacin 750 mg/150 mL IVPB 750 mg (0 mg Intravenous Stopped 9/7/24 0057)   lactated ringers bolus 1,000 mL (1,000 mLs Intravenous New Bag 9/7/24 0110)   ondansetron injection 4 mg (4 mg Intravenous Given 9/7/24 0112)   acetaminophen tablet 650 mg (650 mg Oral Given 9/7/24 0118)   amLODIPine tablet 10 mg (has no administration in time range)   atorvastatin tablet 40 mg (40 mg Oral Given 9/7/24 0118)   cetirizine tablet 10 mg (has no  administration in time range)   clopidogreL tablet 75 mg (has no administration in time range)   gabapentin capsule 300 mg (has no administration in time range)   LORazepam tablet 0.5 mg (0.5 mg Oral Given 9/7/24 0118)   pantoprazole EC tablet 40 mg (has no administration in time range)   sertraline tablet 150 mg (has no administration in time range)   albuterol nebulizer solution 2.5 mg (has no administration in time range)   dextromethorphan-guaiFENesin  mg/5 mL Liqd 10 mL (10 mLs Oral Given 9/7/24 0126)   lactated ringers bolus 1,000 mL (0 mLs Intravenous Stopped 9/6/24 2326)     Medical Decision Making  [facility]    Patient presents for emergent evaluation of cough and congestion that poses a threat to life and/or bodily function.    In the ED patient found to have acute pneumonia.    I ordered labs and personally reviewed them.  Labs significant for elevated lactic acid, elevated wbc, creatinine    I ordered EKG and personally reviewed it.  EKG significant for tachycardia.      Discharge MDM  I discussed the patient presentation labs, ekg, X-rays, CT findings with the consultant for Dr. Jacome (speciality).    Patient was managed in the ED with IV fluids, IV antibiotic, oxygen.    The response to treatment was positive.    Patient was admitted in stable condition.      Amount and/or Complexity of Data Reviewed  Labs: ordered.  Radiology: ordered and independent interpretation performed.     Details: Chest xray- pneumonia    Risk  OTC drugs.  Prescription drug management.  Decision regarding hospitalization.                                      Clinical Impression:   Final diagnoses:  [R05.9] Cough  [R00.0] Tachycardia  [J18.9] Pneumonia due to infectious organism, unspecified laterality, unspecified part of lung (Primary)  [R50.9] Fever, unspecified fever cause        ED Disposition Condition    Admit Stable                Ermelinda Moran FNP  09/07/24 0846

## 2024-09-08 LAB
ANION GAP SERPL CALCULATED.3IONS-SCNC: 10 MMOL/L (ref 7–16)
BASOPHILS # BLD AUTO: 0.01 K/UL (ref 0–0.2)
BASOPHILS NFR BLD AUTO: 0.2 % (ref 0–1)
BUN SERPL-MCNC: 16 MG/DL (ref 7–18)
BUN/CREAT SERPL: 17 (ref 6–20)
CALCIUM SERPL-MCNC: 8.2 MG/DL (ref 8.5–10.1)
CHLORIDE SERPL-SCNC: 105 MMOL/L (ref 98–107)
CO2 SERPL-SCNC: 29 MMOL/L (ref 21–32)
CREAT SERPL-MCNC: 0.94 MG/DL (ref 0.55–1.02)
DIFFERENTIAL METHOD BLD: ABNORMAL
EGFR (NO RACE VARIABLE) (RUSH/TITUS): 70 ML/MIN/1.73M2
EOSINOPHIL # BLD AUTO: 0.2 K/UL (ref 0–0.5)
EOSINOPHIL NFR BLD AUTO: 3.5 % (ref 1–4)
ERYTHROCYTE [DISTWIDTH] IN BLOOD BY AUTOMATED COUNT: 15.4 % (ref 11.5–14.5)
GLUCOSE SERPL-MCNC: 121 MG/DL (ref 74–106)
HCT VFR BLD AUTO: 32.8 % (ref 38–47)
HGB BLD-MCNC: 10.5 G/DL (ref 12–16)
IMM GRANULOCYTES # BLD AUTO: 0.02 K/UL (ref 0–0.04)
IMM GRANULOCYTES NFR BLD: 0.3 % (ref 0–0.4)
LYMPHOCYTES # BLD AUTO: 1.1 K/UL (ref 1–4.8)
LYMPHOCYTES NFR BLD AUTO: 19.1 % (ref 27–41)
MCH RBC QN AUTO: 27.7 PG (ref 27–31)
MCHC RBC AUTO-ENTMCNC: 32 G/DL (ref 32–36)
MCV RBC AUTO: 86.5 FL (ref 80–96)
MONOCYTES # BLD AUTO: 0.45 K/UL (ref 0–0.8)
MONOCYTES NFR BLD AUTO: 7.8 % (ref 2–6)
MPC BLD CALC-MCNC: 9.8 FL (ref 9.4–12.4)
NEUTROPHILS # BLD AUTO: 3.98 K/UL (ref 1.8–7.7)
NEUTROPHILS NFR BLD AUTO: 69.1 % (ref 53–65)
NRBC # BLD AUTO: 0 X10E3/UL
NRBC, AUTO (.00): 0 %
PLATELET # BLD AUTO: 115 K/UL (ref 150–400)
POTASSIUM SERPL-SCNC: 4.2 MMOL/L (ref 3.5–5.1)
RBC # BLD AUTO: 3.79 M/UL (ref 4.2–5.4)
SODIUM SERPL-SCNC: 140 MMOL/L (ref 136–145)
WBC # BLD AUTO: 5.76 K/UL (ref 4.5–11)

## 2024-09-08 PROCEDURE — 36415 COLL VENOUS BLD VENIPUNCTURE: CPT

## 2024-09-08 PROCEDURE — 27000221 HC OXYGEN, UP TO 24 HOURS

## 2024-09-08 PROCEDURE — 94761 N-INVAS EAR/PLS OXIMETRY MLT: CPT

## 2024-09-08 PROCEDURE — 25000003 PHARM REV CODE 250

## 2024-09-08 PROCEDURE — 85025 COMPLETE CBC W/AUTO DIFF WBC: CPT

## 2024-09-08 PROCEDURE — 25000242 PHARM REV CODE 250 ALT 637 W/ HCPCS

## 2024-09-08 PROCEDURE — 94640 AIRWAY INHALATION TREATMENT: CPT

## 2024-09-08 PROCEDURE — 80048 BASIC METABOLIC PNL TOTAL CA: CPT

## 2024-09-08 PROCEDURE — 99900035 HC TECH TIME PER 15 MIN (STAT)

## 2024-09-08 PROCEDURE — 63600175 PHARM REV CODE 636 W HCPCS

## 2024-09-08 PROCEDURE — 11000001 HC ACUTE MED/SURG PRIVATE ROOM

## 2024-09-08 RX ADMIN — LORAZEPAM 0.5 MG: 0.5 TABLET ORAL at 11:09

## 2024-09-08 RX ADMIN — PANTOPRAZOLE SODIUM 40 MG: 40 TABLET, DELAYED RELEASE ORAL at 09:09

## 2024-09-08 RX ADMIN — ENOXAPARIN SODIUM 30 MG: 30 INJECTION SUBCUTANEOUS at 05:09

## 2024-09-08 RX ADMIN — CLOPIDOGREL BISULFATE 75 MG: 75 TABLET ORAL at 09:09

## 2024-09-08 RX ADMIN — ACETAMINOPHEN 650 MG: 325 TABLET ORAL at 02:09

## 2024-09-08 RX ADMIN — IPRATROPIUM BROMIDE AND ALBUTEROL SULFATE 3 ML: 2.5; .5 SOLUTION RESPIRATORY (INHALATION) at 01:09

## 2024-09-08 RX ADMIN — IPRATROPIUM BROMIDE AND ALBUTEROL SULFATE 3 ML: 2.5; .5 SOLUTION RESPIRATORY (INHALATION) at 07:09

## 2024-09-08 RX ADMIN — SERTRALINE HYDROCHLORIDE 150 MG: 50 TABLET ORAL at 09:09

## 2024-09-08 RX ADMIN — LEVOFLOXACIN 750 MG: 750 INJECTION, SOLUTION INTRAVENOUS at 11:09

## 2024-09-08 RX ADMIN — MELATONIN TAB 3 MG 6 MG: 3 TAB at 08:09

## 2024-09-08 RX ADMIN — GABAPENTIN 300 MG: 300 CAPSULE ORAL at 09:09

## 2024-09-08 RX ADMIN — GABAPENTIN 300 MG: 300 CAPSULE ORAL at 08:09

## 2024-09-08 RX ADMIN — CETIRIZINE HYDROCHLORIDE 10 MG: 10 TABLET, FILM COATED ORAL at 09:09

## 2024-09-08 RX ADMIN — ATORVASTATIN CALCIUM 40 MG: 40 TABLET, FILM COATED ORAL at 08:09

## 2024-09-08 RX ADMIN — AMLODIPINE BESYLATE 10 MG: 5 TABLET ORAL at 09:09

## 2024-09-08 RX ADMIN — SODIUM CHLORIDE: 9 INJECTION, SOLUTION INTRAVENOUS at 01:09

## 2024-09-08 RX ADMIN — BENZONATATE 100 MG: 100 CAPSULE ORAL at 10:09

## 2024-09-08 RX ADMIN — BENZONATATE 100 MG: 100 CAPSULE ORAL at 08:09

## 2024-09-08 NOTE — SUBJECTIVE & OBJECTIVE
Past Medical History:   Diagnosis Date    Cerebral hemorrhage     Chronic anxiety     Decreased coordination 3/7/2022    Dehydration     Depression     Dysphagia     Due to and following non-traumatic intracerebral hemorrhage    Hearing loss     History of CVA (cerebrovascular accident)     Hypertension     Hypokalemia 10/20/2022    Insomnia     Intrapontine hemorrhage     Left hemiparesis     Peripheral neuropathy     Stroke     Thyroid disease     Transient cerebral ischemia        Past Surgical History:   Procedure Laterality Date    APPENDECTOMY      CHOLECYSTECTOMY      HYSTERECTOMY      LITHOTRIPSY      Renal lithotripsy    percutaneious insertion of ureteric stent         Review of patient's allergies indicates:   Allergen Reactions    Lamisil [terbinafine] Anaphylaxis    Codeine Itching    Compazine [prochlorperazine] Itching       No current facility-administered medications on file prior to encounter.     Current Outpatient Medications on File Prior to Encounter   Medication Sig    acetaminophen (TYLENOL) 325 MG tablet Take 650 mg by mouth every 6 (six) hours as needed.    albuterol (PROAIR HFA) 90 mcg/actuation inhaler Inhale 2 puffs into the lungs every 6 (six) hours as needed for Wheezing. Rescue    atorvastatin (LIPITOR) 40 MG tablet Take 1 tablet (40 mg total) by mouth every evening.    cetirizine (ZYRTEC) 10 MG tablet Take 1 tablet (10 mg total) by mouth once daily.    clopidogreL (PLAVIX) 75 mg tablet Take 1 tablet (75 mg total) by mouth once daily.    gabapentin (NEURONTIN) 300 MG capsule Take 1 capsule (300 mg total) by mouth 2 (two) times daily. (Patient taking differently: Take 600 mg by mouth 2 (two) times daily.)    multivitamin-minerals-lutein (MULTIVITAMIN 50 PLUS) Tab Take 1 tablet by mouth once daily.    olmesartan (BENICAR) 40 MG tablet Take 1 tablet (40 mg total) by mouth once daily.    omega 3-dha-epa-fish oil 1,000 mg (120 mg-180 mg) Cap Take 1 capsule by mouth once daily.     pantoprazole (PROTONIX) 40 MG tablet Take 1 tablet (40 mg total) by mouth once daily.    sertraline (ZOLOFT) 50 MG tablet Take 3 tablets (150 mg total) by mouth once daily.    amLODIPine (NORVASC) 10 MG tablet Take 1 tablet (10 mg total) by mouth once daily.    LORazepam (ATIVAN) 0.5 MG tablet Take 1 tablet (0.5 mg total) by mouth every 12 (twelve) hours as needed for Anxiety. (Patient taking differently: Take 0.5 mg by mouth once daily.)    [DISCONTINUED] hydroCHLOROthiazide (HYDRODIURIL) 12.5 MG Tab Take 1 tablet (12.5 mg total) by mouth once daily.    [DISCONTINUED] omeprazole (PRILOSEC) 40 MG capsule Take 40 mg by mouth every evening.     Family History    None       Tobacco Use    Smoking status: Never    Smokeless tobacco: Never   Substance and Sexual Activity    Alcohol use: Never    Drug use: Never    Sexual activity: Not on file     Review of Systems   Constitutional:  Negative for diaphoresis.   HENT:  Positive for congestion. Negative for sore throat and trouble swallowing.    Eyes: Negative.    Respiratory:  Positive for cough.    Cardiovascular:  Negative for chest pain and palpitations.   Gastrointestinal:  Negative for abdominal pain, constipation, diarrhea and vomiting.   Endocrine: Negative.    Genitourinary:  Negative for difficulty urinating and flank pain.   Musculoskeletal:  Positive for arthralgias. Negative for neck pain and neck stiffness.   Skin:  Negative for color change, pallor and rash.   Allergic/Immunologic: Negative.    Neurological:  Positive for weakness (generalized). Negative for dizziness, syncope, facial asymmetry, speech difficulty, light-headedness and headaches.   Hematological:  Negative for adenopathy.   Psychiatric/Behavioral:  Negative for confusion. The patient is not nervous/anxious.    All other systems reviewed and are negative.    Objective:     Vital Signs (Most Recent):  Temp: 99.4 °F (37.4 °C) (09/08/24 1109)  Pulse: 89 (09/08/24 1109)  Resp: 18 (09/08/24  0749)  BP: 134/86 (09/08/24 1109)  SpO2: (!) 92 % (09/08/24 1109) Vital Signs (24h Range):  Temp:  [98.6 °F (37 °C)-99.4 °F (37.4 °C)] 99.4 °F (37.4 °C)  Pulse:  [] 89  Resp:  [18-20] 18  SpO2:  [92 %-99 %] 92 %  BP: (108-137)/(73-88) 134/86     Weight: 69.9 kg (154 lb)  Body mass index is 26.43 kg/m².     Physical Exam  Vitals and nursing note reviewed.   Constitutional:       General: She is not in acute distress.     Appearance: Normal appearance. She is obese.   HENT:      Head: Normocephalic and atraumatic.      Nose: Nose normal.      Mouth/Throat:      Mouth: Mucous membranes are moist.   Eyes:      Extraocular Movements: Extraocular movements intact.      Conjunctiva/sclera: Conjunctivae normal.      Pupils: Pupils are equal, round, and reactive to light.   Cardiovascular:      Rate and Rhythm: Normal rate and regular rhythm.      Pulses: Normal pulses.      Heart sounds: Normal heart sounds.   Pulmonary:      Effort: Pulmonary effort is normal. No respiratory distress.      Breath sounds: No stridor. Rhonchi (bilateral upper lobes) present. Wheezes: few scattered.  Abdominal:      General: Bowel sounds are normal. There is no distension.      Palpations: Abdomen is soft.      Tenderness: There is no abdominal tenderness.   Musculoskeletal:         General: Normal range of motion.      Cervical back: Normal range of motion.   Skin:     General: Skin is warm and dry.      Capillary Refill: Capillary refill takes less than 2 seconds.   Neurological:      General: No focal deficit present.      Mental Status: She is alert and oriented to person, place, and time. Mental status is at baseline.   Psychiatric:         Mood and Affect: Mood normal.         Behavior: Behavior normal.         Thought Content: Thought content normal.         Judgment: Judgment normal.              CRANIAL NERVES     CN III, IV, VI   Pupils are equal, round, and reactive to light.       Significant Labs: All pertinent labs within  the past 24 hours have been reviewed.    Significant Imaging: I have reviewed all pertinent imaging results/findings within the past 24 hours.

## 2024-09-08 NOTE — PROGRESS NOTES
[facility] - Medical Surgical Unit  Hospital Medicine  Progress Note    Patient Name: Herlinda Valdovinos  MRN: [id number]  Patient Class: IP- Inpatient   Admission Date: 9/6/2024  Length of Stay: 1 days  Attending Physician: Chuy Daugherty IV, DO  Primary Care Provider: Vini Hernandez NP        Subjective:     Principal Problem:Pneumonia of both upper lobes due to infectious organism        HPI:  Patient is a 59-year-old female who presented to [facility] ED for evaluation of cough times 2-3 days.  Reports that she has been treating at home with over-the-counter cough medication and breathing treatments with only minimal improvement which prompted her to seek evaluation in the ED yesterday.  She was ultimately diagnosed with multifocal pneumonia and UTI and started on Levaquin 750 mg IV daily with close monitoring of her kidney function.  GFR was 46 with a creatinine of 1.34 during the ED visit.  Blood in urine cultures pending at this time.  We will continue current antibiotics until cultures are available.  Plans to continue hydration with normal saline at 75 mL/hour.  She does have a known history of CVA and most recently ICH 3-4 years prior but has been tolerating Plavix without any complications so we will perform low-dose DVT prophylaxis with Lovenox 30 mg daily.  We will continue home medications and provide DuoNebs q.6 hours while awake.  Monitor CBC and BMP daily.  Case was discussed with Dr. Poe who was in agreement with this plan.    Overview/Hospital Course:  09/08/24 Patient awake and alert this morning, reports she is feeling well this morning and reports she has been eating well. Patient denies any fever since admission. Patient's labs stable this morning, WBC 5.76, H/H 10.5/32.8, Na 140, K 4.2, Cl 105, BUN 16, Creat 0.94. Patient is receiving IV Levaquin for pneumonia and will continue with this current treatment that was discussed with Dr. Poe. Discussed discharge planning  with patient and she is hopeful for discharge tomorrow.     Past Medical History:   Diagnosis Date    Cerebral hemorrhage     Chronic anxiety     Decreased coordination 3/7/2022    Dehydration     Depression     Dysphagia     Due to and following non-traumatic intracerebral hemorrhage    Hearing loss     History of CVA (cerebrovascular accident)     Hypertension     Hypokalemia 10/20/2022    Insomnia     Intrapontine hemorrhage     Left hemiparesis     Peripheral neuropathy     Stroke     Thyroid disease     Transient cerebral ischemia        Past Surgical History:   Procedure Laterality Date    APPENDECTOMY      CHOLECYSTECTOMY      HYSTERECTOMY      LITHOTRIPSY      Renal lithotripsy    percutaneious insertion of ureteric stent         Review of patient's allergies indicates:   Allergen Reactions    Lamisil [terbinafine] Anaphylaxis    Codeine Itching    Compazine [prochlorperazine] Itching       No current facility-administered medications on file prior to encounter.     Current Outpatient Medications on File Prior to Encounter   Medication Sig    acetaminophen (TYLENOL) 325 MG tablet Take 650 mg by mouth every 6 (six) hours as needed.    albuterol (PROAIR HFA) 90 mcg/actuation inhaler Inhale 2 puffs into the lungs every 6 (six) hours as needed for Wheezing. Rescue    atorvastatin (LIPITOR) 40 MG tablet Take 1 tablet (40 mg total) by mouth every evening.    cetirizine (ZYRTEC) 10 MG tablet Take 1 tablet (10 mg total) by mouth once daily.    clopidogreL (PLAVIX) 75 mg tablet Take 1 tablet (75 mg total) by mouth once daily.    gabapentin (NEURONTIN) 300 MG capsule Take 1 capsule (300 mg total) by mouth 2 (two) times daily. (Patient taking differently: Take 600 mg by mouth 2 (two) times daily.)    multivitamin-minerals-lutein (MULTIVITAMIN 50 PLUS) Tab Take 1 tablet by mouth once daily.    olmesartan (BENICAR) 40 MG tablet Take 1 tablet (40 mg total) by mouth once daily.    omega 3-dha-epa-fish oil 1,000 mg (120  mg-180 mg) Cap Take 1 capsule by mouth once daily.    pantoprazole (PROTONIX) 40 MG tablet Take 1 tablet (40 mg total) by mouth once daily.    sertraline (ZOLOFT) 50 MG tablet Take 3 tablets (150 mg total) by mouth once daily.    amLODIPine (NORVASC) 10 MG tablet Take 1 tablet (10 mg total) by mouth once daily.    LORazepam (ATIVAN) 0.5 MG tablet Take 1 tablet (0.5 mg total) by mouth every 12 (twelve) hours as needed for Anxiety. (Patient taking differently: Take 0.5 mg by mouth once daily.)    [DISCONTINUED] hydroCHLOROthiazide (HYDRODIURIL) 12.5 MG Tab Take 1 tablet (12.5 mg total) by mouth once daily.    [DISCONTINUED] omeprazole (PRILOSEC) 40 MG capsule Take 40 mg by mouth every evening.     Family History    None       Tobacco Use    Smoking status: Never    Smokeless tobacco: Never   Substance and Sexual Activity    Alcohol use: Never    Drug use: Never    Sexual activity: Not on file     Review of Systems   Constitutional:  Negative for diaphoresis.   HENT:  Positive for congestion. Negative for sore throat and trouble swallowing.    Eyes: Negative.    Respiratory:  Positive for cough.    Cardiovascular:  Negative for chest pain and palpitations.   Gastrointestinal:  Negative for abdominal pain, constipation, diarrhea and vomiting.   Endocrine: Negative.    Genitourinary:  Negative for difficulty urinating and flank pain.   Musculoskeletal:  Positive for arthralgias. Negative for neck pain and neck stiffness.   Skin:  Negative for color change, pallor and rash.   Allergic/Immunologic: Negative.    Neurological:  Positive for weakness (generalized). Negative for dizziness, syncope, facial asymmetry, speech difficulty, light-headedness and headaches.   Hematological:  Negative for adenopathy.   Psychiatric/Behavioral:  Negative for confusion. The patient is not nervous/anxious.    All other systems reviewed and are negative.    Objective:     Vital Signs (Most Recent):  Temp: 99.4 °F (37.4 °C) (09/08/24  1109)  Pulse: 89 (09/08/24 1109)  Resp: 18 (09/08/24 0749)  BP: 134/86 (09/08/24 1109)  SpO2: (!) 92 % (09/08/24 1109) Vital Signs (24h Range):  Temp:  [98.6 °F (37 °C)-99.4 °F (37.4 °C)] 99.4 °F (37.4 °C)  Pulse:  [] 89  Resp:  [18-20] 18  SpO2:  [92 %-99 %] 92 %  BP: (108-137)/(73-88) 134/86     Weight: 69.9 kg (154 lb)  Body mass index is 26.43 kg/m².     Physical Exam  Vitals and nursing note reviewed.   Constitutional:       General: She is not in acute distress.     Appearance: Normal appearance. She is obese.   HENT:      Head: Normocephalic and atraumatic.      Nose: Nose normal.      Mouth/Throat:      Mouth: Mucous membranes are moist.   Eyes:      Extraocular Movements: Extraocular movements intact.      Conjunctiva/sclera: Conjunctivae normal.      Pupils: Pupils are equal, round, and reactive to light.   Cardiovascular:      Rate and Rhythm: Normal rate and regular rhythm.      Pulses: Normal pulses.      Heart sounds: Normal heart sounds.   Pulmonary:      Effort: Pulmonary effort is normal. No respiratory distress.      Breath sounds: No stridor. Rhonchi (bilateral upper lobes) present. Wheezes: few scattered.  Abdominal:      General: Bowel sounds are normal. There is no distension.      Palpations: Abdomen is soft.      Tenderness: There is no abdominal tenderness.   Musculoskeletal:         General: Normal range of motion.      Cervical back: Normal range of motion.   Skin:     General: Skin is warm and dry.      Capillary Refill: Capillary refill takes less than 2 seconds.   Neurological:      General: No focal deficit present.      Mental Status: She is alert and oriented to person, place, and time. Mental status is at baseline.   Psychiatric:         Mood and Affect: Mood normal.         Behavior: Behavior normal.         Thought Content: Thought content normal.         Judgment: Judgment normal.              CRANIAL NERVES     CN III, IV, VI   Pupils are equal, round, and reactive to  light.       Significant Labs: All pertinent labs within the past 24 hours have been reviewed.    Significant Imaging: I have reviewed all pertinent imaging results/findings within the past 24 hours.    Assessment/Plan:      DVT prophylaxis  9/7/24 currently on 10 hose and has been tolerating Plavix without any complications following a prior ICH.  Plan was discussed with Dr. Poe and he is in agreement with Jay lacey and low-dose Lovenox 30 mg subQ daily until patient is more active.      Acute cystitis with hematuria  9/7/24 urinalysis shows moderate leukocytes, positive nitrites, and WBC 11-15 with many bacteria.  Lactic acid initially 2.2 but down to 1.6.  Currently on Levaquin 750 mg Q 24 hours and urine culture is pending.  We will continue hydration with normal saline at 75 mL/hour until repeat labs are available.      Hypertension  Chronic, controlled. Latest blood pressure and vitals reviewed-     Temp:  [98.9 °F (37.2 °C)-101.4 °F (38.6 °C)]   Pulse:  []   Resp:  [18-22]   BP: (102-153)/()   SpO2:  [88 %-97 %] .   Home meds for hypertension were reviewed and noted below.   Hypertension Medications               amLODIPine (NORVASC) 10 MG tablet Take 1 tablet (10 mg total) by mouth once daily.    olmesartan (BENICAR) 40 MG tablet Take 1 tablet (40 mg total) by mouth once daily.            While in the hospital, will manage blood pressure as follows; Continue home antihypertensive regimen    Will utilize p.r.n. blood pressure medication only if patient's blood pressure greater than 180/110 and she develops symptoms such as worsening chest pain or shortness of breath.    Generalized anxiety disorder  9/7/24 we will monitor for signs or symptoms of increased anxiety and we have continued her PRN Ativan as needed.        VTE Risk Mitigation (From admission, onward)           Ordered     enoxaparin injection 30 mg  Daily         09/07/24 0835     IP VTE LOW RISK PATIENT  Once         09/07/24 0835      Place OCTAVIA hose  Until discontinued         09/07/24 0835                    Discharge Planning   POOL:      Code Status: Full Code   Is the patient medically ready for discharge?:     Reason for patient still in hospital (select all that apply): Treatment                     KEVAN Alejo  Department of Hospital Medicine   [facility] - [facility] Surgical Unit

## 2024-09-08 NOTE — PROGRESS NOTES
Renal function monitoring for patient started on Levaquin last night. Her CrCl was borderline for q24h/48 but due to septic nature went ahead and started q24h.     Tonight renal function is improved so no changes needed at this time. Current regimen is Levaquin IV 750mg every 24 hours.    Pharmacy will continue to monitor and make adjustments if needed.

## 2024-09-08 NOTE — HOSPITAL COURSE
09/08/24 Patient awake and alert this morning, reports she is feeling well this morning and reports she has been eating well. Patient denies any fever since admission. Patient's labs stable this morning, WBC 5.76, H/H 10.5/32.8, Na 140, K 4.2, Cl 105, BUN 16, Creat 0.94. Patient is receiving IV Levaquin for pneumonia and will continue with this current treatment that was discussed with Dr. Poe. Discussed discharge planning with patient and she is hopeful for discharge tomorrow.   09/09/24 Resting in bed. No complaints. States she is feeling better this morning. Slight wheeze. Sat on room air is 92%. Urine culture shows growth of ESBL ecoli. DCD levaquin. Will start merrem 1 gm iv every 8 hours x 5 days. PT and OT evaluation. Hope to dc to swing bed for continued IV abx and therapy.  09/10/24 Resting in bed. Reports feeling better this morning. States she was able to cough up sputum last night- clear. Breath sounds are clear this morning. O2 per nc at 2 liters with sat of 95%. Will start weaning down. Appetite fair. BM yesterday. Continue zosyn for ESBL ecoli UTI.   09/10/24 Has been approved for swing bed . Will Dc from acute to swing.

## 2024-09-09 PROBLEM — I61.9 CEREBRAL HEMORRHAGE: Status: RESOLVED | Noted: 2022-10-19 | Resolved: 2024-09-09

## 2024-09-09 PROBLEM — Z16.12 UTI DUE TO EXTENDED-SPECTRUM BETA LACTAMASE (ESBL) PRODUCING ESCHERICHIA COLI: Status: ACTIVE | Noted: 2022-10-26

## 2024-09-09 PROBLEM — B96.29 UTI DUE TO EXTENDED-SPECTRUM BETA LACTAMASE (ESBL) PRODUCING ESCHERICHIA COLI: Status: ACTIVE | Noted: 2022-10-26

## 2024-09-09 LAB
ANION GAP SERPL CALCULATED.3IONS-SCNC: 9 MMOL/L (ref 7–16)
BASOPHILS # BLD AUTO: 0.02 K/UL (ref 0–0.2)
BASOPHILS NFR BLD AUTO: 0.4 % (ref 0–1)
BUN SERPL-MCNC: 11 MG/DL (ref 7–18)
BUN/CREAT SERPL: 13 (ref 6–20)
CALCIUM SERPL-MCNC: 8 MG/DL (ref 8.5–10.1)
CHLORIDE SERPL-SCNC: 101 MMOL/L (ref 98–107)
CO2 SERPL-SCNC: 28 MMOL/L (ref 21–32)
CREAT SERPL-MCNC: 0.87 MG/DL (ref 0.55–1.02)
DIFFERENTIAL METHOD BLD: ABNORMAL
EGFR (NO RACE VARIABLE) (RUSH/TITUS): 77 ML/MIN/1.73M2
EOSINOPHIL # BLD AUTO: 0.26 K/UL (ref 0–0.5)
EOSINOPHIL NFR BLD AUTO: 5.3 % (ref 1–4)
ERYTHROCYTE [DISTWIDTH] IN BLOOD BY AUTOMATED COUNT: 15.1 % (ref 11.5–14.5)
GLUCOSE SERPL-MCNC: 127 MG/DL (ref 74–106)
HCT VFR BLD AUTO: 32 % (ref 38–47)
HGB BLD-MCNC: 10.2 G/DL (ref 12–16)
IMM GRANULOCYTES # BLD AUTO: 0.03 K/UL (ref 0–0.04)
IMM GRANULOCYTES NFR BLD: 0.6 % (ref 0–0.4)
LYMPHOCYTES # BLD AUTO: 1.15 K/UL (ref 1–4.8)
LYMPHOCYTES NFR BLD AUTO: 23.4 % (ref 27–41)
MCH RBC QN AUTO: 27.3 PG (ref 27–31)
MCHC RBC AUTO-ENTMCNC: 31.9 G/DL (ref 32–36)
MCV RBC AUTO: 85.6 FL (ref 80–96)
MONOCYTES # BLD AUTO: 0.46 K/UL (ref 0–0.8)
MONOCYTES NFR BLD AUTO: 9.3 % (ref 2–6)
MPC BLD CALC-MCNC: 9.6 FL (ref 9.4–12.4)
NEUTROPHILS # BLD AUTO: 3 K/UL (ref 1.8–7.7)
NEUTROPHILS NFR BLD AUTO: 61 % (ref 53–65)
NRBC # BLD AUTO: 0 X10E3/UL
NRBC, AUTO (.00): 0 %
PLATELET # BLD AUTO: 126 K/UL (ref 150–400)
POTASSIUM SERPL-SCNC: 4 MMOL/L (ref 3.5–5.1)
RBC # BLD AUTO: 3.74 M/UL (ref 4.2–5.4)
SODIUM SERPL-SCNC: 134 MMOL/L (ref 136–145)
UA COMPLETE W REFLEX CULTURE PNL UR: ABNORMAL
WBC # BLD AUTO: 4.92 K/UL (ref 4.5–11)

## 2024-09-09 PROCEDURE — 80048 BASIC METABOLIC PNL TOTAL CA: CPT

## 2024-09-09 PROCEDURE — 25000003 PHARM REV CODE 250: Performed by: FAMILY MEDICINE

## 2024-09-09 PROCEDURE — 94761 N-INVAS EAR/PLS OXIMETRY MLT: CPT

## 2024-09-09 PROCEDURE — 11000001 HC ACUTE MED/SURG PRIVATE ROOM

## 2024-09-09 PROCEDURE — 97162 PT EVAL MOD COMPLEX 30 MIN: CPT

## 2024-09-09 PROCEDURE — 94640 AIRWAY INHALATION TREATMENT: CPT

## 2024-09-09 PROCEDURE — 25000003 PHARM REV CODE 250

## 2024-09-09 PROCEDURE — 63600175 PHARM REV CODE 636 W HCPCS: Performed by: FAMILY MEDICINE

## 2024-09-09 PROCEDURE — 63600175 PHARM REV CODE 636 W HCPCS: Performed by: NURSE PRACTITIONER

## 2024-09-09 PROCEDURE — 36415 COLL VENOUS BLD VENIPUNCTURE: CPT

## 2024-09-09 PROCEDURE — 25000003 PHARM REV CODE 250: Performed by: NURSE PRACTITIONER

## 2024-09-09 PROCEDURE — 85025 COMPLETE CBC W/AUTO DIFF WBC: CPT

## 2024-09-09 PROCEDURE — 25000242 PHARM REV CODE 250 ALT 637 W/ HCPCS

## 2024-09-09 PROCEDURE — 99233 SBSQ HOSP IP/OBS HIGH 50: CPT | Mod: FS,,, | Performed by: FAMILY MEDICINE

## 2024-09-09 PROCEDURE — 27000221 HC OXYGEN, UP TO 24 HOURS

## 2024-09-09 PROCEDURE — 99900035 HC TECH TIME PER 15 MIN (STAT)

## 2024-09-09 RX ORDER — ENOXAPARIN SODIUM 100 MG/ML
40 INJECTION SUBCUTANEOUS EVERY 24 HOURS
Status: DISCONTINUED | OUTPATIENT
Start: 2024-09-09 | End: 2024-09-10 | Stop reason: HOSPADM

## 2024-09-09 RX ORDER — SELENIUM 50 MCG
1 TABLET ORAL
Status: DISCONTINUED | OUTPATIENT
Start: 2024-09-09 | End: 2024-09-10 | Stop reason: HOSPADM

## 2024-09-09 RX ORDER — GUAIFENESIN 100 MG/5ML
200 SOLUTION ORAL EVERY 4 HOURS PRN
Status: DISCONTINUED | OUTPATIENT
Start: 2024-09-09 | End: 2024-09-10 | Stop reason: HOSPADM

## 2024-09-09 RX ORDER — LOSARTAN POTASSIUM 50 MG/1
100 TABLET ORAL DAILY
Status: DISCONTINUED | OUTPATIENT
Start: 2024-09-09 | End: 2024-09-10 | Stop reason: HOSPADM

## 2024-09-09 RX ADMIN — AMLODIPINE BESYLATE 10 MG: 5 TABLET ORAL at 09:09

## 2024-09-09 RX ADMIN — LOSARTAN POTASSIUM 100 MG: 50 TABLET, FILM COATED ORAL at 11:09

## 2024-09-09 RX ADMIN — PANTOPRAZOLE SODIUM 40 MG: 40 TABLET, DELAYED RELEASE ORAL at 09:09

## 2024-09-09 RX ADMIN — CETIRIZINE HYDROCHLORIDE 10 MG: 10 TABLET, FILM COATED ORAL at 09:09

## 2024-09-09 RX ADMIN — CLOPIDOGREL BISULFATE 75 MG: 75 TABLET ORAL at 09:09

## 2024-09-09 RX ADMIN — ATORVASTATIN CALCIUM 40 MG: 40 TABLET, FILM COATED ORAL at 08:09

## 2024-09-09 RX ADMIN — IPRATROPIUM BROMIDE AND ALBUTEROL SULFATE 3 ML: 2.5; .5 SOLUTION RESPIRATORY (INHALATION) at 07:09

## 2024-09-09 RX ADMIN — AZITHROMYCIN MONOHYDRATE 500 MG: 500 INJECTION, POWDER, LYOPHILIZED, FOR SOLUTION INTRAVENOUS at 12:09

## 2024-09-09 RX ADMIN — IPRATROPIUM BROMIDE AND ALBUTEROL SULFATE 3 ML: 2.5; .5 SOLUTION RESPIRATORY (INHALATION) at 02:09

## 2024-09-09 RX ADMIN — GUAIFENESIN 200 MG: 100 LIQUID ORAL at 03:09

## 2024-09-09 RX ADMIN — BENZONATATE 100 MG: 100 CAPSULE ORAL at 08:09

## 2024-09-09 RX ADMIN — PIPERACILLIN SODIUM AND TAZOBACTAM SODIUM 4.5 G: 4; .5 INJECTION, POWDER, LYOPHILIZED, FOR SOLUTION INTRAVENOUS at 03:09

## 2024-09-09 RX ADMIN — SERTRALINE HYDROCHLORIDE 150 MG: 50 TABLET ORAL at 09:09

## 2024-09-09 RX ADMIN — GUAIFENESIN 200 MG: 100 LIQUID ORAL at 08:09

## 2024-09-09 RX ADMIN — ENOXAPARIN SODIUM 40 MG: 40 INJECTION SUBCUTANEOUS at 05:09

## 2024-09-09 RX ADMIN — GABAPENTIN 300 MG: 300 CAPSULE ORAL at 09:09

## 2024-09-09 RX ADMIN — GABAPENTIN 300 MG: 300 CAPSULE ORAL at 08:09

## 2024-09-09 RX ADMIN — Medication 1 CAPSULE: at 12:09

## 2024-09-09 RX ADMIN — BENZONATATE 100 MG: 100 CAPSULE ORAL at 01:09

## 2024-09-09 RX ADMIN — Medication 1 CAPSULE: at 05:09

## 2024-09-09 NOTE — PLAN OF CARE
[facility]  Initial Discharge Assessment       Primary Care Provider: Vini Hernandez NP    Admission Diagnosis: Cough [R05.9]  Tachycardia [R00.0]  Fever, unspecified fever cause [R50.9]  Pneumonia due to infectious organism, unspecified laterality, unspecified part of lung [J18.9]    Admission Date: 9/6/2024  Expected Discharge Date:     Transition of Care Barriers: (P) None    Payor: [employer] MANAGED MEDICARE / Plan: [employer] MEDICARE PPO / Product Type: Medicare Advantage /     Extended Emergency Contact Information  Primary Emergency Contact: Micheal Kaur  Mobile Phone: [phone]  Relation: Spouse  Preferred language: English   needed? No    Discharge Plan A: (P) Home with family  Discharge Plan B: (P) Home Health      [facility] [address]  [address]  [place] [address]  Phone: [phone] Fax: [phone]      Initial Assessment (most recent)       Adult Discharge Assessment - 09/09/24 1034          Discharge Assessment    Assessment Type Discharge Planning Assessment (P)      Confirmed/corrected address, phone number and insurance Yes (P)      Confirmed Demographics Correct on Facesheet (P)      Source of Information patient (P)      Communicated POOL with patient/caregiver Date not available/Unable to determine (P)      Reason For Admission Pneumonia (P)      People in Home child(erica), dependent;spouse (P)      Do you expect to return to your current living situation? Yes (P)      Do you have help at home or someone to help you manage your care at home? Yes (P)      Who are your caregiver(s) and their phone number(s)? Micheal Kaur [phone] (P)      Prior to hospitilization cognitive status: Alert/Oriented (P)      Current cognitive status: Alert/Oriented (P)      Walking or Climbing Stairs Difficulty yes (P)      Walking or Climbing Stairs ambulation difficulty, requires equipment (P)      Mobility Management  uses a walker (P)      Dressing/Bathing Difficulty yes (P)      Dressing/Bathing bathing difficulty, assistance 1 person (P)      Home Accessibility stairs to enter home (P)      Number of Stairs, Main Entrance three (P)      Stair Railings, Main Entrance railings safe and in good condition;railing on right side (ascending) (P)      Landing, Stairs, Main Entrance adequate turning radius (P)      Home Layout Able to live on 1st floor (P)      Equipment Currently Used at Home bedside commode;blood pressure machine;cane, straight;nebulizer;oxygen;walker, rolling;wheelchair (P)      Patient currently being followed by outpatient case management? No (P)      Do you currently have service(s) that help you manage your care at home? No (P)      Do you take prescription medications? Yes (P)      Do you have prescription coverage? Yes (P)      Coverage [employer] (P)      Do you have any problems affording any of your prescribed medications? No (P)      Is the patient taking medications as prescribed? yes (P)      Who is going to help you get home at discharge? Micheal Kaur (P)      How do you get to doctors appointments? family or friend will provide (P)      Are you on dialysis? No (P)      Do you take coumadin? No (P)      Discharge Plan A Home with family (P)      Discharge Plan B Home Health (P)      DME Needed Upon Discharge  none (P)      Discharge Plan discussed with: Patient (P)      Transition of Care Barriers None (P)         Physical Activity    On average, how many days per week do you engage in moderate to strenuous exercise (like a brisk walk)? 0 days (P)      On average, how many minutes do you engage in exercise at this level? 0 min (P)         Financial Resource Strain    How hard is it for you to pay for the very basics like food, housing, medical care, and heating? Somewhat hard (P)         Housing Stability    In the last 12 months, was there a time when you were not able to pay the mortgage or rent on  time? No (P)      At any time in the past 12 months, were you homeless or living in a shelter (including now)? No (P)         Transportation Needs    Has the lack of transportation kept you from medical appointments, meetings, work or from getting things needed for daily living? No (P)         Food Insecurity    Within the past 12 months, you worried that your food would run out before you got the money to buy more. Never true (P)      Within the past 12 months, the food you bought just didn't last and you didn't have money to get more. Never true (P)         Stress    Do you feel stress - tense, restless, nervous, or anxious, or unable to sleep at night because your mind is troubled all the time - these days? Rather much (P)         Social Isolation    How often do you feel lonely or isolated from those around you?  Never (P)         Alcohol Use    Q1: How often do you have a drink containing alcohol? Never (P)      Q2: How many drinks containing alcohol do you have on a typical day when you are drinking? Patient does not drink (P)      Q3: How often do you have six or more drinks on one occasion? Never (P)         Utilities    In the past 12 months has the electric, gas, oil, or water company threatened to shut off services in your home? No (P)         Health Literacy    How often do you need to have someone help you when you read instructions, pamphlets, or other written material from your doctor or pharmacy? Never (P)                    Ms. Pate lives at home with her  and teenage daughter.  She has all needed DME, she doesn't receive home health at this time but has in the past and wishes to continue services with them.  Her family transports her to appointments when scheduled.

## 2024-09-09 NOTE — PROGRESS NOTES
Pharmacist Renal Dose Adjustment Note    Herlinda Valdovinos is a 59 y.o. female being treated with the medication piperacillin/tazobactam.    Patient Data:    Vital Signs (Most Recent):  Temp: 98.6 °F (37 °C) (09/09/24 1246)  Pulse: 79 (09/09/24 1246)  Resp: 20 (09/09/24 0816)  BP: (!) 144/89 (09/09/24 1246)  SpO2: (!) 93 % (09/09/24 1246) Vital Signs (72h Range):  Temp:  [98.4 °F (36.9 °C)-101.4 °F (38.6 °C)]   Pulse:  []   Resp:  [18-24]   BP: (102-153)/()   SpO2:  [88 %-99 %]      Recent Labs   Lab 09/07/24  0906 09/08/24  0537 09/09/24  0507   CREATININE 1.02 0.94 0.87     Serum creatinine: 0.87 mg/dL 09/09/24 0507  Estimated creatinine clearance: 67.7 mL/min    Medication: piperacillin/tazobactam 4.5gm IV every 12 hours will be changed to medication: piperacillin/tazobactam 4.5gm IV every 8 hours.    Pharmacist's Name: Zina Morales, PharmD

## 2024-09-09 NOTE — PROGRESS NOTES
[facility] - Medical Surgical Unit  Hospital Medicine  Progress Note    Patient Name: Herlinda Valdovinos  MRN: [id number]  Patient Class: IP- Inpatient   Admission Date: 9/6/2024  Length of Stay: 2 days  Attending Physician: Chuy Daugherty IV, DO  Primary Care Provider: Vini Hernandez NP        Subjective:     Principal Problem:Pneumonia of both upper lobes due to infectious organism        HPI:  Patient is a 59-year-old female who presented to [facility] ED for evaluation of cough times 2-3 days.  Reports that she has been treating at home with over-the-counter cough medication and breathing treatments with only minimal improvement which prompted her to seek evaluation in the ED yesterday.  She was ultimately diagnosed with multifocal pneumonia and UTI and started on Levaquin 750 mg IV daily with close monitoring of her kidney function.  GFR was 46 with a creatinine of 1.34 during the ED visit.  Blood in urine cultures pending at this time.  We will continue current antibiotics until cultures are available.  Plans to continue hydration with normal saline at 75 mL/hour.  She does have a known history of CVA and most recently ICH 3-4 years prior but has been tolerating Plavix without any complications so we will perform low-dose DVT prophylaxis with Lovenox 30 mg daily.  We will continue home medications and provide DuoNebs q.6 hours while awake.  Monitor CBC and BMP daily.  Case was discussed with Dr. Poe who was in agreement with this plan.    Overview/Hospital Course:  09/08/24 Patient awake and alert this morning, reports she is feeling well this morning and reports she has been eating well. Patient denies any fever since admission. Patient's labs stable this morning, WBC 5.76, H/H 10.5/32.8, Na 140, K 4.2, Cl 105, BUN 16, Creat 0.94. Patient is receiving IV Levaquin for pneumonia and will continue with this current treatment that was discussed with Dr. Poe. Discussed discharge planning  with patient and she is hopeful for discharge tomorrow.   09/09/24 Resting in bed. No complaints. States she is feeling better this morning. Slight wheeze. Sat on room air is 92%. Urine culture shows growth of ESBL ecoli. DCD levaquin. Will start merrem 1 gm iv every 8 hours x 5 days. PT and OT evaluation. Hope to dc to swing bed for continued IV abx and therapy.    No new subjective & objective note has been filed under this hospital service since the last note was generated.      Assessment/Plan:      * Pneumonia of both upper lobes due to infectious organism  Patient has a diagnosis of pneumonia. The cause of the pneumonia is suspected to be bacterial in etiology but organism is not known. The pneumonia is improving. The patient has the following signs/symptoms of pneumonia: cough and sputum production. The patient does not have a current oxygen requirement and the patient does not have a home oxygen requirement. I have reviewed the pertinent imaging. The following cultures have been collected: Blood cultures The culture results are listed below.     Current antimicrobial regimen consists of the antibiotics listed below. Will monitor patient closely and continue current treatment plan unchanged.    Antibiotics (From admission, onward)      Start     Stop Route Frequency Ordered    09/09/24 1100  meropenem (MERREM) 1 g in 0.9% NaCl 100 mL IVPB (MB+)         -- IV Every 8 hours (non-standard times) 09/09/24 0954            Microbiology Results (last 7 days)       Procedure Component Value Units Date/Time    Urine culture [[id number]]  (Abnormal)  (Susceptibility) Collected: 09/06/24 2346    Order Status: Completed Specimen: Urine Updated: 09/09/24 0807     Culture, Urine >100,000 Escherichia coli ESBL    Blood culture [[id number]] Collected: 09/06/24 2250    Order Status: Completed Specimen: Blood from Peripheral, Hand, Left Updated: 09/09/24 0728     Culture, Blood No Growth At 24 Hours    Blood Culture #2  [[id number]] Collected: 09/06/24 2326    Order Status: Completed Specimen: Blood from Peripheral, Hand, Right Updated: 09/09/24 0728     Culture, Blood No Growth At 24 Hours    Influenza A & B by Molecular [[id number]]  (Normal) Collected: 09/06/24 2159    Order Status: Completed Specimen: Nasopharyngeal Swab Updated: 09/06/24 2218     INFLUENZA A MOLECULAR Negative     INFLUENZA B MOLECULAR  Negative        Levaquin dcd- start merrem I grm IV every 8 hours x 5 days    Acute cystitis with hematuria  9/7/24 urinalysis shows moderate leukocytes, positive nitrites, and WBC 11-15 with many bacteria.  Lactic acid initially 2.2 but down to 1.6.  Currently on Levaquin 750 mg Q 24 hours and urine culture is pending.  We will continue hydration with normal saline at 75 mL/hour until repeat labs are available.        09/09/24 urine culture shows growth of esbl ecoli- dcd levaquin - start merrem 1 gm iv every 8 hours x 5 days    Hypertension  Chronic, controlled. Latest blood pressure and vitals reviewed-     Temp:  [98.4 °F (36.9 °C)-99.4 °F (37.4 °C)]   Pulse:  [82-95]   Resp:  [18-20]   BP: (112-144)/(71-92)   SpO2:  [91 %-97 %] .   Home meds for hypertension were reviewed and noted below.   Hypertension Medications               amLODIPine (NORVASC) 10 MG tablet Take 1 tablet (10 mg total) by mouth once daily.    olmesartan (BENICAR) 40 MG tablet Take 1 tablet (40 mg total) by mouth once daily.            While in the hospital, will manage blood pressure as follows; Continue home antihypertensive regimen    Will utilize p.r.n. blood pressure medication only if patient's blood pressure greater than 180/110 and she develops symptoms such as worsening chest pain or shortness of breath.      09/09/24 losartan 100 mg po daily- sub for benicar    Generalized anxiety disorder  9/7/24 we will monitor for signs or symptoms of increased anxiety and we have continued her PRN Ativan as needed.      DVT prophylaxis  9/7/24 currently on  10 hose and has been tolerating Plavix without any complications following a prior ICH.  Plan was discussed with Dr. Poe and he is in agreement with Octavia hose and low-dose Lovenox 30 mg subQ daily until patient is more active.      UTI due to extended-spectrum beta lactamase (ESBL) producing Escherichia coli    09/09/24 start merrem 1 gm iv every 8 hours x 5 days       VTE Risk Mitigation (From admission, onward)           Ordered     enoxaparin injection 30 mg  Daily         09/07/24 0835     IP VTE LOW RISK PATIENT  Once         09/07/24 0835     Place OCTAVIA hose  Until discontinued         09/07/24 0835                    Discharge Planning   POOL:      Code Status: Full Code   Is the patient medically ready for discharge?:     Reason for patient still in hospital (select all that apply): Treatment                     KEVAN Funk  Department of Hospital Medicine   [facility] - [facility] Surgical Unit

## 2024-09-09 NOTE — PLAN OF CARE
Problem: Physical Therapy  Goal: Physical Therapy Goal  Description: Short-term Goals: 1 weeks  1. Patient will perform supine to/from sit with contact guard assist to improve independence and safety with bed mobility.  2. Patient will perform sit to/from stand with a rolling walker with contact guard assist to improve independence and safety with transfers.  3. Patient will ambulate 50 feet with a rolling walker with contact guard assist to improve independence and safety with gait.  4. Patient will tolerate 15 minutes of physical therapy intervention to improve endurance and activity tolerance.    Long-term goals: 2 weeks  1. Patient will perform supine to/from sit with standby assist to improve independence and safety with bed mobility.  2. Patient will perform sit to/from stand with a rolling walker with standby assist to improve independence and safety with transfers.  3. Patient will ambulate 150 feet with a rolling walker with standby assist to improve independence and safety with gait.  4. Patient will tolerate 30 minutes of physical therapy intervention to improve endurance and activity tolerance.   Outcome: Progressing

## 2024-09-09 NOTE — ASSESSMENT & PLAN NOTE
Patient has a diagnosis of pneumonia. The cause of the pneumonia is suspected to be bacterial in etiology but organism is not known. The pneumonia is improving. The patient has the following signs/symptoms of pneumonia: cough and sputum production. The patient does not have a current oxygen requirement and the patient does not have a home oxygen requirement. I have reviewed the pertinent imaging. The following cultures have been collected: Blood cultures The culture results are listed below.     Current antimicrobial regimen consists of the antibiotics listed below. Will monitor patient closely and continue current treatment plan unchanged.    Antibiotics (From admission, onward)      Start     Stop Route Frequency Ordered    09/09/24 1100  meropenem (MERREM) 1 g in 0.9% NaCl 100 mL IVPB (MB+)         -- IV Every 8 hours (non-standard times) 09/09/24 0954            Microbiology Results (last 7 days)       Procedure Component Value Units Date/Time    Urine culture [[id number]]  (Abnormal)  (Susceptibility) Collected: 09/06/24 2346    Order Status: Completed Specimen: Urine Updated: 09/09/24 0807     Culture, Urine >100,000 Escherichia coli ESBL    Blood culture [[id number]] Collected: 09/06/24 2250    Order Status: Completed Specimen: Blood from Peripheral, Hand, Left Updated: 09/09/24 0728     Culture, Blood No Growth At 24 Hours    Blood Culture #2 [[id number]] Collected: 09/06/24 2326    Order Status: Completed Specimen: Blood from Peripheral, Hand, Right Updated: 09/09/24 0728     Culture, Blood No Growth At 24 Hours    Influenza A & B by Molecular [[id number]]  (Normal) Collected: 09/06/24 2159    Order Status: Completed Specimen: Nasopharyngeal Swab Updated: 09/06/24 2218     INFLUENZA A MOLECULAR Negative     INFLUENZA B MOLECULAR  Negative        Levaquin dcd- start merrem I grm IV every 8 hours x 5 days

## 2024-09-09 NOTE — ASSESSMENT & PLAN NOTE
Chronic, controlled. Latest blood pressure and vitals reviewed-     Temp:  [98.4 °F (36.9 °C)-99.4 °F (37.4 °C)]   Pulse:  [82-95]   Resp:  [18-20]   BP: (112-144)/(71-92)   SpO2:  [91 %-97 %] .   Home meds for hypertension were reviewed and noted below.   Hypertension Medications               amLODIPine (NORVASC) 10 MG tablet Take 1 tablet (10 mg total) by mouth once daily.    olmesartan (BENICAR) 40 MG tablet Take 1 tablet (40 mg total) by mouth once daily.            While in the hospital, will manage blood pressure as follows; Continue home antihypertensive regimen    Will utilize p.r.n. blood pressure medication only if patient's blood pressure greater than 180/110 and she develops symptoms such as worsening chest pain or shortness of breath.      09/09/24 losartan 100 mg po daily- sub for benicar

## 2024-09-09 NOTE — PT/OT/SLP EVAL
Physical Therapy Evaluation    Patient Name:  Herlinda Valdovinos   MRN:  [id number]    Recommendations:     Discharge Recommendations: Low Intensity Therapy   Discharge Equipment Recommendations:     Barriers to discharge: Decreased caregiver support    Assessment:     Herlinda Valdovinos is a 59 y.o. female admitted with a medical diagnosis of Pneumonia of both upper lobes due to infectious organism.  She presents with the following impairments/functional limitations: weakness, impaired endurance, impaired balance, gait instability, decreased lower extremity function Patient states she had pneumonia, now she is just sore and weak from laying in bed. She states she has hx of CVA but can do anything she needs to. She was agreeable to PT evaluation. She completed med mobility and transfers with min assistance and took a few steps with min assistance to maintain balance.     Rehab Prognosis: Good; patient would benefit from acute skilled PT services to address these deficits and reach maximum level of function.    Recent Surgery: * No surgery found *      Plan:     During this hospitalization, patient to be seen 5 x/week to address the identified rehab impairments via gait training, therapeutic activities, therapeutic exercises, neuromuscular re-education and progress toward the following goals:    Plan of Care Expires:  09/19/24    Subjective     Chief Complaint: abdominal pain and lower extremity weakness  Patient/Family Comments/goals: she states she feels better this week, but she was very worn out on Friday.   Pain/Comfort:  Pain Rating 1: 4/10  Location 1: abdomen    Patients cultural, spiritual, Caodaism conflicts given the current situation: no    Living Environment:  2 story house, never uses 2nd floor, has ramps to get to doors, lives with  and family nearby.   Prior to admission, patients level of function was mod ind.  Equipment used at home: rollator, walker, rolling, cane, quad, cane, straight,  bedside commode, shower chair, other (see comments) (dmitry).  DME owned (not currently used): none.  Upon discharge, patient will have assistance from family.    Objective:     Communicated with nurse prior to session.  Patient found HOB elevated with peripheral IV, telemetry, oxygen  upon PT entry to room.    General Precautions: Standard, fall  Orthopedic Precautions:    Braces:    Respiratory Status: Nasal cannula, flow 2 L/min    Exams:  Sensation:    -       Impaired  light/touch LLE  RLE ROM: WFL  RLE Strength: Deficits: 4/5  LLE ROM: WFL  LLE Strength: Deficits: 4/5    Functional Mobility:  Bed Mobility:     Rolling Left:  minimum assistance  Rolling Right: minimum assistance  Scooting: minimum assistance  Supine to Sit: minimum assistance  Sit to Supine: minimum assistance  Transfers:     Sit to Stand:  minimum assistance with rolling walker  Gait: patient ambulated a few steps with rolling walker and min assistance      AM-PAC 6 CLICK MOBILITY  Total Score:16       Treatment & Education:  Bed mobility, transfer, gait education  Lower extremity exercises to be completed throughout the day    Patient left HOB elevated with all lines intact and call button in reach.    GOALS:   Multidisciplinary Problems       Physical Therapy Goals          Problem: Physical Therapy    Goal Priority Disciplines Outcome Goal Variances Interventions   Physical Therapy Goal     PT, PT/OT Progressing     Description: Short-term Goals: 1 weeks  1. Patient will perform supine to/from sit with contact guard assist to improve independence and safety with bed mobility.  2. Patient will perform sit to/from stand with a rolling walker with contact guard assist to improve independence and safety with transfers.  3. Patient will ambulate 50 feet with a rolling walker with contact guard assist to improve independence and safety with gait.  4. Patient will tolerate 15 minutes of physical therapy intervention to improve endurance and  activity tolerance.    Long-term goals: 2 weeks  1. Patient will perform supine to/from sit with standby assist to improve independence and safety with bed mobility.  2. Patient will perform sit to/from stand with a rolling walker with standby assist to improve independence and safety with transfers.  3. Patient will ambulate 150 feet with a rolling walker with standby assist to improve independence and safety with gait.  4. Patient will tolerate 30 minutes of physical therapy intervention to improve endurance and activity tolerance.                        History:     Past Medical History:   Diagnosis Date    Cerebral hemorrhage     Chronic anxiety     Decreased coordination 3/7/2022    Dehydration     Depression     Dysphagia     Due to and following non-traumatic intracerebral hemorrhage    Hearing loss     History of CVA (cerebrovascular accident)     Hypertension     Hypokalemia 10/20/2022    Insomnia     Intrapontine hemorrhage     Left hemiparesis     Peripheral neuropathy     Stroke     Thyroid disease     Transient cerebral ischemia        Past Surgical History:   Procedure Laterality Date    APPENDECTOMY      CHOLECYSTECTOMY      HYSTERECTOMY      LITHOTRIPSY      Renal lithotripsy    percutaneious insertion of ureteric stent         Time Tracking:     PT Received On: 09/09/24  PT Start Time: 1413     PT Stop Time: 1438  PT Total Time (min): 25 min     Billable Minutes: Evaluation 25 09/09/2024

## 2024-09-09 NOTE — SUBJECTIVE & OBJECTIVE
Interval History: ESBL ecoli uti    Review of Systems   Constitutional:  Positive for fatigue. Negative for appetite change and fever.   Respiratory:  Positive for cough. Negative for choking, chest tightness and shortness of breath.    Cardiovascular:  Negative for chest pain and leg swelling.   Gastrointestinal:  Negative for abdominal pain, diarrhea, nausea and vomiting.   Genitourinary:  Negative for difficulty urinating and dysuria.   Neurological:  Positive for weakness. Negative for headaches.     Objective:     Vital Signs (Most Recent):  Temp: 99 °F (37.2 °C) (09/09/24 0816)  Pulse: 84 (09/09/24 0816)  Resp: 20 (09/09/24 0816)  BP: (!) 144/92 (09/09/24 0816)  SpO2: (!) 91 % (09/09/24 0816) Vital Signs (24h Range):  Temp:  [98.4 °F (36.9 °C)-99.4 °F (37.4 °C)] 99 °F (37.2 °C)  Pulse:  [82-95] 84  Resp:  [18-20] 20  SpO2:  [91 %-97 %] 91 %  BP: (112-144)/(71-92) 144/92     Weight: 69.9 kg (154 lb)  Body mass index is 26.43 kg/m².    Intake/Output Summary (Last 24 hours) at 9/9/2024 1016  Last data filed at 9/9/2024 0559  Gross per 24 hour   Intake 2105.43 ml   Output --   Net 2105.43 ml         Physical Exam  HENT:      Head: Normocephalic.      Mouth/Throat:      Mouth: Mucous membranes are moist.   Eyes:      Pupils: Pupils are equal, round, and reactive to light.   Cardiovascular:      Rate and Rhythm: Normal rate and regular rhythm.      Pulses: Normal pulses.      Heart sounds: Normal heart sounds.   Pulmonary:      Breath sounds: Wheezing present.      Comments: Slight wheeze - room air sat is 92%  Abdominal:      General: Bowel sounds are normal. There is no distension.      Palpations: Abdomen is soft. There is no mass.      Tenderness: There is no abdominal tenderness.      Hernia: No hernia is present.   Musculoskeletal:         General: Normal range of motion.      Cervical back: Normal range of motion.   Skin:     General: Skin is warm and dry.   Neurological:      Mental Status: She is alert and  oriented to person, place, and time.      Motor: Weakness present.   Psychiatric:         Mood and Affect: Mood normal.             Significant Labs: All pertinent labs within the past 24 hours have been reviewed.  Recent Lab Results         09/09/24  0507        Anion Gap 9       Baso # 0.02       Basophil % 0.4       BUN 11       BUN/CREAT RATIO 13       Calcium 8.0       Chloride 101       CO2 28       Creatinine 0.87       Differential Method Auto       eGFR 77       Eos # 0.26       Eos % 5.3       Glucose 127       Hematocrit 32.0       Hemoglobin 10.2       Immature Grans (Abs) 0.03       Immature Granulocytes 0.6       Lymph # 1.15       Lymph % 23.4       MCH 27.3       MCHC 31.9       MCV 85.6       Mono # 0.46       Mono % 9.3       MPV 9.6       Neutrophils, Abs 3.00       Neutrophils Relative 61.0       nRBC 0.0       NUCLEATED RBC ABSOLUTE 0.00       Platelet Count 126       Potassium 4.0       RBC 3.74       RDW 15.1       Sodium 134       WBC 4.92               Significant Imaging: I have reviewed all pertinent imaging results/findings within the past 24 hours.

## 2024-09-09 NOTE — PROGRESS NOTES
[facility]  Hospital Medicine  Progress Note    Patient Name: Herlinda Valdovinos  MRN: [id number]  Patient Class: IP- Inpatient   Admission Date: 9/6/2024  Length of Stay: 2 days  Attending Physician: Chuy Daugherty IV, DO  Primary Care Provider: Vini Hernandez NP        Subjective:     Principal Problem:Pneumonia of both upper lobes due to infectious organism        HPI:  Patient is a 59-year-old female who presented to [facility] ED for evaluation of cough times 2-3 days.  Reports that she has been treating at home with over-the-counter cough medication and breathing treatments with only minimal improvement which prompted her to seek evaluation in the ED yesterday.  She was ultimately diagnosed with multifocal pneumonia and UTI and started on Levaquin 750 mg IV daily with close monitoring of her kidney function.  GFR was 46 with a creatinine of 1.34 during the ED visit.  Blood in urine cultures pending at this time.  We will continue current antibiotics until cultures are available.  Plans to continue hydration with normal saline at 75 mL/hour.  She does have a known history of CVA and most recently ICH 3-4 years prior but has been tolerating Plavix without any complications so we will perform low-dose DVT prophylaxis with Lovenox 30 mg daily.  We will continue home medications and provide DuoNebs q.6 hours while awake.  Monitor CBC and BMP daily.  Case was discussed with Dr. Poe who was in agreement with this plan.    Overview/Hospital Course:  09/08/24 Patient awake and alert this morning, reports she is feeling well this morning and reports she has been eating well. Patient denies any fever since admission. Patient's labs stable this morning, WBC 5.76, H/H 10.5/32.8, Na 140, K 4.2, Cl 105, BUN 16, Creat 0.94. Patient is receiving IV Levaquin for pneumonia and will continue with this current treatment that was discussed with Dr. Poe. Discussed discharge planning  with patient and she is hopeful for discharge tomorrow.   09/09/24 Resting in bed. No complaints. States she is feeling better this morning. Slight wheeze. Sat on room air is 92%. Urine culture shows growth of ESBL ecoli. DCD levaquin. Will start merrem 1 gm iv every 8 hours x 5 days. PT and OT evaluation. Hope to dc to swing bed for continued IV abx and therapy.    Interval History: ESBL ecoli uti    Review of Systems   Constitutional:  Positive for fatigue. Negative for appetite change and fever.   Respiratory:  Positive for cough. Negative for choking, chest tightness and shortness of breath.    Cardiovascular:  Negative for chest pain and leg swelling.   Gastrointestinal:  Negative for abdominal pain, diarrhea, nausea and vomiting.   Genitourinary:  Negative for difficulty urinating and dysuria.   Neurological:  Positive for weakness. Negative for headaches.     Objective:     Vital Signs (Most Recent):  Temp: 99 °F (37.2 °C) (09/09/24 0816)  Pulse: 84 (09/09/24 0816)  Resp: 20 (09/09/24 0816)  BP: (!) 144/92 (09/09/24 0816)  SpO2: (!) 91 % (09/09/24 0816) Vital Signs (24h Range):  Temp:  [98.4 °F (36.9 °C)-99.4 °F (37.4 °C)] 99 °F (37.2 °C)  Pulse:  [82-95] 84  Resp:  [18-20] 20  SpO2:  [91 %-97 %] 91 %  BP: (112-144)/(71-92) 144/92     Weight: 69.9 kg (154 lb)  Body mass index is 26.43 kg/m².    Intake/Output Summary (Last 24 hours) at 9/9/2024 1016  Last data filed at 9/9/2024 0559  Gross per 24 hour   Intake 2105.43 ml   Output --   Net 2105.43 ml         Physical Exam  HENT:      Head: Normocephalic.      Mouth/Throat:      Mouth: Mucous membranes are moist.   Eyes:      Pupils: Pupils are equal, round, and reactive to light.   Cardiovascular:      Rate and Rhythm: Normal rate and regular rhythm.      Pulses: Normal pulses.      Heart sounds: Normal heart sounds.   Pulmonary:      Breath sounds: Wheezing present.      Comments: Slight wheeze - room air sat is 92%  Abdominal:      General: Bowel sounds are  normal. There is no distension.      Palpations: Abdomen is soft. There is no mass.      Tenderness: There is no abdominal tenderness.      Hernia: No hernia is present.   Musculoskeletal:         General: Normal range of motion.      Cervical back: Normal range of motion.   Skin:     General: Skin is warm and dry.   Neurological:      Mental Status: She is alert and oriented to person, place, and time.      Motor: Weakness present.   Psychiatric:         Mood and Affect: Mood normal.             Significant Labs: All pertinent labs within the past 24 hours have been reviewed.  Recent Lab Results         09/09/24  0507        Anion Gap 9       Baso # 0.02       Basophil % 0.4       BUN 11       BUN/CREAT RATIO 13       Calcium 8.0       Chloride 101       CO2 28       Creatinine 0.87       Differential Method Auto       eGFR 77       Eos # 0.26       Eos % 5.3       Glucose 127       Hematocrit 32.0       Hemoglobin 10.2       Immature Grans (Abs) 0.03       Immature Granulocytes 0.6       Lymph # 1.15       Lymph % 23.4       MCH 27.3       MCHC 31.9       MCV 85.6       Mono # 0.46       Mono % 9.3       MPV 9.6       Neutrophils, Abs 3.00       Neutrophils Relative 61.0       nRBC 0.0       NUCLEATED RBC ABSOLUTE 0.00       Platelet Count 126       Potassium 4.0       RBC 3.74       RDW 15.1       Sodium 134       WBC 4.92               Significant Imaging: I have reviewed all pertinent imaging results/findings within the past 24 hours.    Assessment/Plan:      * Pneumonia of both upper lobes due to infectious organism  Patient has a diagnosis of pneumonia. The cause of the pneumonia is suspected to be bacterial in etiology but organism is not known. The pneumonia is improving. The patient has the following signs/symptoms of pneumonia: cough and sputum production. The patient does not have a current oxygen requirement and the patient does not have a home oxygen requirement. I have reviewed the pertinent imaging.  The following cultures have been collected: Blood cultures The culture results are listed below.     Current antimicrobial regimen consists of the antibiotics listed below. Will monitor patient closely and continue current treatment plan unchanged.    Antibiotics (From admission, onward)      Start     Stop Route Frequency Ordered    09/09/24 1100  meropenem (MERREM) 1 g in 0.9% NaCl 100 mL IVPB (MB+)         -- IV Every 8 hours (non-standard times) 09/09/24 0954            Microbiology Results (last 7 days)       Procedure Component Value Units Date/Time    Urine culture [[id number]]  (Abnormal)  (Susceptibility) Collected: 09/06/24 2346    Order Status: Completed Specimen: Urine Updated: 09/09/24 0807     Culture, Urine >100,000 Escherichia coli ESBL    Blood culture [[id number]] Collected: 09/06/24 2250    Order Status: Completed Specimen: Blood from Peripheral, Hand, Left Updated: 09/09/24 0728     Culture, Blood No Growth At 24 Hours    Blood Culture #2 [[id number]] Collected: 09/06/24 2326    Order Status: Completed Specimen: Blood from Peripheral, Hand, Right Updated: 09/09/24 0728     Culture, Blood No Growth At 24 Hours    Influenza A & B by Molecular [[id number]]  (Normal) Collected: 09/06/24 2159    Order Status: Completed Specimen: Nasopharyngeal Swab Updated: 09/06/24 2218     INFLUENZA A MOLECULAR Negative     INFLUENZA B MOLECULAR  Negative        Levaquin dcd- start merrem I grm IV every 8 hours x 5 days    Acute cystitis with hematuria  9/7/24 urinalysis shows moderate leukocytes, positive nitrites, and WBC 11-15 with many bacteria.  Lactic acid initially 2.2 but down to 1.6.  Currently on Levaquin 750 mg Q 24 hours and urine culture is pending.  We will continue hydration with normal saline at 75 mL/hour until repeat labs are available.        09/09/24 urine culture shows growth of esbl ecoli- dcd levaquin - start merrem 1 gm iv every 8 hours x 5 days    Hypertension  Chronic, controlled. Latest  blood pressure and vitals reviewed-     Temp:  [98.4 °F (36.9 °C)-99.4 °F (37.4 °C)]   Pulse:  [82-95]   Resp:  [18-20]   BP: (112-144)/(71-92)   SpO2:  [91 %-97 %] .   Home meds for hypertension were reviewed and noted below.   Hypertension Medications               amLODIPine (NORVASC) 10 MG tablet Take 1 tablet (10 mg total) by mouth once daily.    olmesartan (BENICAR) 40 MG tablet Take 1 tablet (40 mg total) by mouth once daily.            While in the hospital, will manage blood pressure as follows; Continue home antihypertensive regimen    Will utilize p.r.n. blood pressure medication only if patient's blood pressure greater than 180/110 and she develops symptoms such as worsening chest pain or shortness of breath.      09/09/24 losartan 100 mg po daily- sub for benicar    Generalized anxiety disorder  9/7/24 we will monitor for signs or symptoms of increased anxiety and we have continued her PRN Ativan as needed.      DVT prophylaxis  9/7/24 currently on 10 hose and has been tolerating Plavix without any complications following a prior ICH.  Plan was discussed with Dr. Poe and he is in agreement with Octavia hose and low-dose Lovenox 30 mg subQ daily until patient is more active.        VTE Risk Mitigation (From admission, onward)           Ordered     enoxaparin injection 30 mg  Daily         09/07/24 0835     IP VTE LOW RISK PATIENT  Once         09/07/24 0835     Place OCTAVIA hose  Until discontinued         09/07/24 0835                    Discharge Planning   POOL:      Code Status: Full Code   Is the patient medically ready for discharge?:     Reason for patient still in hospital (select all that apply): Treatment                     KEVAN Funk  Department of Hospital Medicine   [facility]

## 2024-09-09 NOTE — PLAN OF CARE
Problem: Pneumonia  Goal: Fluid Balance  Outcome: Progressing  Intervention: Monitor and Manage Fluid Balance  Flowsheets (Taken 9/9/2024 1821)  Fluid/Electrolyte Management: oral rehydration therapy initiated  Goal: Resolution of Infection Signs and Symptoms  Outcome: Progressing  Intervention: Prevent Infection Progression  Flowsheets (Taken 9/9/2024 1821)  Infection Management: aseptic technique maintained  Goal: Effective Oxygenation and Ventilation  Outcome: Progressing  Intervention: Promote Airway Secretion Clearance  Flowsheets (Taken 9/9/2024 1821)  Breathing Techniques/Airway Clearance: deep/controlled cough encouraged  Intervention: Optimize Oxygenation and Ventilation  Flowsheets (Taken 9/9/2024 1821)  Airway/Ventilation Management: airway patency maintained  Head of Bed (HOB) Positioning: HOB elevated     Problem: Skin Injury Risk Increased  Goal: Skin Health and Integrity  Outcome: Progressing  Intervention: Optimize Skin Protection  Flowsheets (Taken 9/9/2024 1821)  Pressure Reduction Techniques:   frequent weight shift encouraged   weight shift assistance provided  Pressure Reduction Devices: positioning supports utilized  Skin Protection: incontinence pads utilized  Activity Management: Sitting at edge of bed - L2  Head of Bed (HOB) Positioning: HOB elevated

## 2024-09-09 NOTE — PROGRESS NOTES
Pharmacist Renal Dose Adjustment Note    Herlinda Valdovinos is a 59 y.o. female being treated with the medication enoxaparin.    Patient Data:    Vital Signs (Most Recent):  Temp: 98.6 °F (37 °C) (09/09/24 1246)  Pulse: 79 (09/09/24 1246)  Resp: 20 (09/09/24 0816)  BP: (!) 144/89 (09/09/24 1246)  SpO2: (!) 93 % (09/09/24 1246) Vital Signs (72h Range):  Temp:  [98.4 °F (36.9 °C)-101.4 °F (38.6 °C)]   Pulse:  []   Resp:  [18-24]   BP: (102-153)/()   SpO2:  [88 %-99 %]      Recent Labs   Lab 09/07/24  0906 09/08/24  0537 09/09/24  0507   CREATININE 1.02 0.94 0.87     Serum creatinine: 0.87 mg/dL 09/09/24 0507  Estimated creatinine clearance: 67.7 mL/min    Medication: enoxaparin 30mg inj under the skin every 24 hours will be changed to medication: enoxaparin 40mg inj under the skin every 24 hours.     Pharmacist's Name: Zina Morales, PharmD

## 2024-09-09 NOTE — ASSESSMENT & PLAN NOTE
9/7/24 urinalysis shows moderate leukocytes, positive nitrites, and WBC 11-15 with many bacteria.  Lactic acid initially 2.2 but down to 1.6.  Currently on Levaquin 750 mg Q 24 hours and urine culture is pending.  We will continue hydration with normal saline at 75 mL/hour until repeat labs are available.        09/09/24 urine culture shows growth of esbl ecoli- dcd levaquin - start merrem 1 gm iv every 8 hours x 5 days

## 2024-09-10 ENCOUNTER — HOSPITAL ENCOUNTER (INPATIENT)
Facility: HOSPITAL | Age: 59
LOS: 5 days | Discharge: HOME | DRG: 947 | End: 2024-09-15
Attending: FAMILY MEDICINE | Admitting: FAMILY MEDICINE
Payer: MEDICARE

## 2024-09-10 VITALS
OXYGEN SATURATION: 92 % | SYSTOLIC BLOOD PRESSURE: 124 MMHG | HEIGHT: 64 IN | BODY MASS INDEX: 26.29 KG/M2 | RESPIRATION RATE: 18 BRPM | DIASTOLIC BLOOD PRESSURE: 85 MMHG | HEART RATE: 76 BPM | WEIGHT: 154 LBS | TEMPERATURE: 98 F

## 2024-09-10 DIAGNOSIS — R07.9 CHEST PAIN: ICD-10-CM

## 2024-09-10 DIAGNOSIS — R53.1 WEAKNESS: Primary | ICD-10-CM

## 2024-09-10 DIAGNOSIS — B96.29 URINARY TRACT INFECTION DUE TO EXTENDED-SPECTRUM BETA LACTAMASE (ESBL) PRODUCING ESCHERICHIA COLI: ICD-10-CM

## 2024-09-10 DIAGNOSIS — F41.1 GENERALIZED ANXIETY DISORDER: ICD-10-CM

## 2024-09-10 DIAGNOSIS — Z16.12 URINARY TRACT INFECTION DUE TO EXTENDED-SPECTRUM BETA LACTAMASE (ESBL) PRODUCING ESCHERICHIA COLI: ICD-10-CM

## 2024-09-10 DIAGNOSIS — N39.0 URINARY TRACT INFECTION DUE TO EXTENDED-SPECTRUM BETA LACTAMASE (ESBL) PRODUCING ESCHERICHIA COLI: ICD-10-CM

## 2024-09-10 DIAGNOSIS — E78.5 HYPERLIPIDEMIA, UNSPECIFIED HYPERLIPIDEMIA TYPE: ICD-10-CM

## 2024-09-10 DIAGNOSIS — N39.0 UTI (URINARY TRACT INFECTION): ICD-10-CM

## 2024-09-10 DIAGNOSIS — I10 HYPERTENSION, UNSPECIFIED TYPE: ICD-10-CM

## 2024-09-10 DIAGNOSIS — J18.9 PNEUMONIA OF BOTH UPPER LOBES DUE TO INFECTIOUS ORGANISM: ICD-10-CM

## 2024-09-10 LAB
ANION GAP SERPL CALCULATED.3IONS-SCNC: 9 MMOL/L (ref 7–16)
BASOPHILS # BLD AUTO: 0.03 K/UL (ref 0–0.2)
BASOPHILS NFR BLD AUTO: 0.5 % (ref 0–1)
BUN SERPL-MCNC: 14 MG/DL (ref 7–18)
BUN/CREAT SERPL: 15 (ref 6–20)
CALCIUM SERPL-MCNC: 8.4 MG/DL (ref 8.5–10.1)
CHLORIDE SERPL-SCNC: 102 MMOL/L (ref 98–107)
CO2 SERPL-SCNC: 30 MMOL/L (ref 21–32)
CREAT SERPL-MCNC: 0.92 MG/DL (ref 0.55–1.02)
DIFFERENTIAL METHOD BLD: ABNORMAL
EGFR (NO RACE VARIABLE) (RUSH/TITUS): 72 ML/MIN/1.73M2
EOSINOPHIL # BLD AUTO: 0.25 K/UL (ref 0–0.5)
EOSINOPHIL NFR BLD AUTO: 4.5 % (ref 1–4)
ERYTHROCYTE [DISTWIDTH] IN BLOOD BY AUTOMATED COUNT: 15 % (ref 11.5–14.5)
GLUCOSE SERPL-MCNC: 141 MG/DL (ref 74–106)
HCT VFR BLD AUTO: 34.4 % (ref 38–47)
HGB BLD-MCNC: 11 G/DL (ref 12–16)
IMM GRANULOCYTES # BLD AUTO: 0.09 K/UL (ref 0–0.04)
IMM GRANULOCYTES NFR BLD: 1.6 % (ref 0–0.4)
LYMPHOCYTES # BLD AUTO: 1.3 K/UL (ref 1–4.8)
LYMPHOCYTES NFR BLD AUTO: 23.6 % (ref 27–41)
MCH RBC QN AUTO: 27.3 PG (ref 27–31)
MCHC RBC AUTO-ENTMCNC: 32 G/DL (ref 32–36)
MCV RBC AUTO: 85.4 FL (ref 80–96)
MONOCYTES # BLD AUTO: 0.53 K/UL (ref 0–0.8)
MONOCYTES NFR BLD AUTO: 9.6 % (ref 2–6)
MPC BLD CALC-MCNC: 9.5 FL (ref 9.4–12.4)
NEUTROPHILS # BLD AUTO: 3.31 K/UL (ref 1.8–7.7)
NEUTROPHILS NFR BLD AUTO: 60.2 % (ref 53–65)
NRBC # BLD AUTO: 0 X10E3/UL
NRBC, AUTO (.00): 0 %
PLATELET # BLD AUTO: 154 K/UL (ref 150–400)
POTASSIUM SERPL-SCNC: 4 MMOL/L (ref 3.5–5.1)
RBC # BLD AUTO: 4.03 M/UL (ref 4.2–5.4)
SODIUM SERPL-SCNC: 137 MMOL/L (ref 136–145)
WBC # BLD AUTO: 5.51 K/UL (ref 4.5–11)

## 2024-09-10 PROCEDURE — 27000221 HC OXYGEN, UP TO 24 HOURS

## 2024-09-10 PROCEDURE — 99900031 HC PATIENT EDUCATION (STAT)

## 2024-09-10 PROCEDURE — 25000003 PHARM REV CODE 250

## 2024-09-10 PROCEDURE — 99239 HOSP IP/OBS DSCHRG MGMT >30: CPT | Mod: ,,, | Performed by: FAMILY MEDICINE

## 2024-09-10 PROCEDURE — 25000003 PHARM REV CODE 250: Performed by: NURSE PRACTITIONER

## 2024-09-10 PROCEDURE — 97110 THERAPEUTIC EXERCISES: CPT | Mod: CQ

## 2024-09-10 PROCEDURE — 94761 N-INVAS EAR/PLS OXIMETRY MLT: CPT

## 2024-09-10 PROCEDURE — 85025 COMPLETE CBC W/AUTO DIFF WBC: CPT

## 2024-09-10 PROCEDURE — 25000242 PHARM REV CODE 250 ALT 637 W/ HCPCS: Performed by: NURSE PRACTITIONER

## 2024-09-10 PROCEDURE — 63700000 PHARM REV CODE 250 ALT 637 W/O HCPCS: Performed by: FAMILY MEDICINE

## 2024-09-10 PROCEDURE — 97530 THERAPEUTIC ACTIVITIES: CPT | Mod: CQ

## 2024-09-10 PROCEDURE — 36415 COLL VENOUS BLD VENIPUNCTURE: CPT

## 2024-09-10 PROCEDURE — 25000242 PHARM REV CODE 250 ALT 637 W/ HCPCS

## 2024-09-10 PROCEDURE — 25000003 PHARM REV CODE 250: Performed by: FAMILY MEDICINE

## 2024-09-10 PROCEDURE — 94640 AIRWAY INHALATION TREATMENT: CPT

## 2024-09-10 PROCEDURE — 99900035 HC TECH TIME PER 15 MIN (STAT)

## 2024-09-10 PROCEDURE — 63600175 PHARM REV CODE 636 W HCPCS: Performed by: FAMILY MEDICINE

## 2024-09-10 PROCEDURE — 11000004 HC SNF PRIVATE

## 2024-09-10 PROCEDURE — 80048 BASIC METABOLIC PNL TOTAL CA: CPT

## 2024-09-10 PROCEDURE — 97165 OT EVAL LOW COMPLEX 30 MIN: CPT

## 2024-09-10 PROCEDURE — 63600175 PHARM REV CODE 636 W HCPCS: Performed by: NURSE PRACTITIONER

## 2024-09-10 RX ORDER — IPRATROPIUM BROMIDE AND ALBUTEROL SULFATE 2.5; .5 MG/3ML; MG/3ML
3 SOLUTION RESPIRATORY (INHALATION) EVERY 6 HOURS PRN
Status: DISCONTINUED | OUTPATIENT
Start: 2024-09-10 | End: 2024-09-14

## 2024-09-10 RX ORDER — ONDANSETRON 4 MG/1
8 TABLET, ORALLY DISINTEGRATING ORAL EVERY 8 HOURS PRN
Status: DISCONTINUED | OUTPATIENT
Start: 2024-09-10 | End: 2024-09-15 | Stop reason: HOSPADM

## 2024-09-10 RX ORDER — IBUPROFEN 200 MG
16 TABLET ORAL
Status: DISCONTINUED | OUTPATIENT
Start: 2024-09-10 | End: 2024-09-15 | Stop reason: HOSPADM

## 2024-09-10 RX ORDER — DOCUSATE SODIUM 100 MG/1
100 CAPSULE, LIQUID FILLED ORAL 2 TIMES DAILY
Status: DISCONTINUED | OUTPATIENT
Start: 2024-09-10 | End: 2024-09-15 | Stop reason: HOSPADM

## 2024-09-10 RX ORDER — LANOLIN ALCOHOL/MO/W.PET/CERES
800 CREAM (GRAM) TOPICAL
Status: DISCONTINUED | OUTPATIENT
Start: 2024-09-10 | End: 2024-09-15 | Stop reason: HOSPADM

## 2024-09-10 RX ORDER — CLOPIDOGREL BISULFATE 75 MG/1
75 TABLET ORAL DAILY
Status: DISCONTINUED | OUTPATIENT
Start: 2024-09-11 | End: 2024-09-15 | Stop reason: HOSPADM

## 2024-09-10 RX ORDER — ENOXAPARIN SODIUM 100 MG/ML
40 INJECTION SUBCUTANEOUS EVERY 24 HOURS
Status: DISCONTINUED | OUTPATIENT
Start: 2024-09-11 | End: 2024-09-15 | Stop reason: HOSPADM

## 2024-09-10 RX ORDER — ALBUTEROL SULFATE 0.83 MG/ML
2.5 SOLUTION RESPIRATORY (INHALATION) EVERY 4 HOURS PRN
Status: DISCONTINUED | OUTPATIENT
Start: 2024-09-10 | End: 2024-09-10

## 2024-09-10 RX ORDER — AMLODIPINE BESYLATE 5 MG/1
10 TABLET ORAL DAILY
Status: DISCONTINUED | OUTPATIENT
Start: 2024-09-11 | End: 2024-09-15 | Stop reason: HOSPADM

## 2024-09-10 RX ORDER — CETIRIZINE HYDROCHLORIDE 10 MG/1
10 TABLET ORAL DAILY
Status: DISCONTINUED | OUTPATIENT
Start: 2024-09-11 | End: 2024-09-15 | Stop reason: HOSPADM

## 2024-09-10 RX ORDER — DOCUSATE SODIUM 100 MG/1
100 CAPSULE, LIQUID FILLED ORAL 2 TIMES DAILY
Status: ON HOLD
Start: 2024-09-10

## 2024-09-10 RX ORDER — ATORVASTATIN CALCIUM 40 MG/1
40 TABLET, FILM COATED ORAL NIGHTLY
Status: DISCONTINUED | OUTPATIENT
Start: 2024-09-10 | End: 2024-09-15 | Stop reason: HOSPADM

## 2024-09-10 RX ORDER — NALOXONE HCL 0.4 MG/ML
0.02 VIAL (ML) INJECTION
Status: DISCONTINUED | OUTPATIENT
Start: 2024-09-10 | End: 2024-09-15 | Stop reason: HOSPADM

## 2024-09-10 RX ORDER — DOCUSATE SODIUM 100 MG/1
100 CAPSULE, LIQUID FILLED ORAL 2 TIMES DAILY
Status: DISCONTINUED | OUTPATIENT
Start: 2024-09-10 | End: 2024-09-10 | Stop reason: HOSPADM

## 2024-09-10 RX ORDER — SODIUM CHLORIDE 0.9 % (FLUSH) 0.9 %
10 SYRINGE (ML) INJECTION EVERY 12 HOURS PRN
Status: DISCONTINUED | OUTPATIENT
Start: 2024-09-10 | End: 2024-09-15 | Stop reason: HOSPADM

## 2024-09-10 RX ORDER — GLUCAGON 1 MG
1 KIT INJECTION
Status: DISCONTINUED | OUTPATIENT
Start: 2024-09-10 | End: 2024-09-15 | Stop reason: HOSPADM

## 2024-09-10 RX ORDER — AZITHROMYCIN 250 MG/1
500 TABLET, FILM COATED ORAL DAILY
Status: DISCONTINUED | OUTPATIENT
Start: 2024-09-10 | End: 2024-09-10 | Stop reason: HOSPADM

## 2024-09-10 RX ORDER — IBUPROFEN 200 MG
24 TABLET ORAL
Status: DISCONTINUED | OUTPATIENT
Start: 2024-09-10 | End: 2024-09-15 | Stop reason: HOSPADM

## 2024-09-10 RX ORDER — ACETAMINOPHEN 325 MG/1
650 TABLET ORAL EVERY 6 HOURS PRN
Status: DISCONTINUED | OUTPATIENT
Start: 2024-09-10 | End: 2024-09-15 | Stop reason: HOSPADM

## 2024-09-10 RX ORDER — GABAPENTIN 300 MG/1
300 CAPSULE ORAL 2 TIMES DAILY
Status: DISCONTINUED | OUTPATIENT
Start: 2024-09-10 | End: 2024-09-15 | Stop reason: HOSPADM

## 2024-09-10 RX ORDER — LORAZEPAM 0.5 MG/1
0.5 TABLET ORAL EVERY 12 HOURS PRN
Status: DISCONTINUED | OUTPATIENT
Start: 2024-09-10 | End: 2024-09-15 | Stop reason: HOSPADM

## 2024-09-10 RX ORDER — LOSARTAN POTASSIUM 50 MG/1
100 TABLET ORAL DAILY
Status: DISCONTINUED | OUTPATIENT
Start: 2024-09-11 | End: 2024-09-15 | Stop reason: HOSPADM

## 2024-09-10 RX ORDER — SERTRALINE HYDROCHLORIDE 50 MG/1
150 TABLET, FILM COATED ORAL DAILY
Status: DISCONTINUED | OUTPATIENT
Start: 2024-09-11 | End: 2024-09-15 | Stop reason: HOSPADM

## 2024-09-10 RX ORDER — PANTOPRAZOLE SODIUM 40 MG/1
40 TABLET, DELAYED RELEASE ORAL DAILY
Status: DISCONTINUED | OUTPATIENT
Start: 2024-09-11 | End: 2024-09-15 | Stop reason: HOSPADM

## 2024-09-10 RX ORDER — IPRATROPIUM BROMIDE AND ALBUTEROL SULFATE 2.5; .5 MG/3ML; MG/3ML
3 SOLUTION RESPIRATORY (INHALATION)
Qty: 75 ML | Refills: 0 | Status: ON HOLD | OUTPATIENT
Start: 2024-09-10 | End: 2025-09-10

## 2024-09-10 RX ADMIN — PANTOPRAZOLE SODIUM 40 MG: 40 TABLET, DELAYED RELEASE ORAL at 08:09

## 2024-09-10 RX ADMIN — CETIRIZINE HYDROCHLORIDE 10 MG: 10 TABLET, FILM COATED ORAL at 08:09

## 2024-09-10 RX ADMIN — ALBUTEROL SULFATE 2.5 MG: 2.5 SOLUTION RESPIRATORY (INHALATION) at 08:09

## 2024-09-10 RX ADMIN — IPRATROPIUM BROMIDE AND ALBUTEROL SULFATE 3 ML: 2.5; .5 SOLUTION RESPIRATORY (INHALATION) at 02:09

## 2024-09-10 RX ADMIN — GABAPENTIN 300 MG: 300 CAPSULE ORAL at 08:09

## 2024-09-10 RX ADMIN — PIPERACILLIN SODIUM AND TAZOBACTAM SODIUM 4.5 G: 4; .5 INJECTION, POWDER, LYOPHILIZED, FOR SOLUTION INTRAVENOUS at 07:09

## 2024-09-10 RX ADMIN — SERTRALINE HYDROCHLORIDE 150 MG: 50 TABLET ORAL at 08:09

## 2024-09-10 RX ADMIN — AZITHROMYCIN DIHYDRATE 500 MG: 250 TABLET, FILM COATED ORAL at 12:09

## 2024-09-10 RX ADMIN — PIPERACILLIN SODIUM AND TAZOBACTAM SODIUM 4.5 G: 4; .5 INJECTION, POWDER, LYOPHILIZED, FOR SOLUTION INTRAVENOUS at 11:09

## 2024-09-10 RX ADMIN — PIPERACILLIN SODIUM AND TAZOBACTAM SODIUM 4.5 G: 4; .5 INJECTION, POWDER, LYOPHILIZED, FOR SOLUTION INTRAVENOUS at 03:09

## 2024-09-10 RX ADMIN — LOSARTAN POTASSIUM 100 MG: 50 TABLET, FILM COATED ORAL at 08:09

## 2024-09-10 RX ADMIN — Medication 1 CAPSULE: at 12:09

## 2024-09-10 RX ADMIN — CLOPIDOGREL BISULFATE 75 MG: 75 TABLET ORAL at 08:09

## 2024-09-10 RX ADMIN — ATORVASTATIN CALCIUM 40 MG: 40 TABLET, FILM COATED ORAL at 10:09

## 2024-09-10 RX ADMIN — DOCUSATE SODIUM 100 MG: 100 CAPSULE, LIQUID FILLED ORAL at 09:09

## 2024-09-10 RX ADMIN — GUAIFENESIN 200 MG: 100 LIQUID ORAL at 03:09

## 2024-09-10 RX ADMIN — PIPERACILLIN SODIUM AND TAZOBACTAM SODIUM 4.5 G: 4; .5 INJECTION, POWDER, LYOPHILIZED, FOR SOLUTION INTRAVENOUS at 12:09

## 2024-09-10 RX ADMIN — DOCUSATE SODIUM 100 MG: 100 CAPSULE, LIQUID FILLED ORAL at 11:09

## 2024-09-10 RX ADMIN — GABAPENTIN 300 MG: 300 CAPSULE ORAL at 11:09

## 2024-09-10 RX ADMIN — ACETAMINOPHEN 650 MG: 325 TABLET ORAL at 03:09

## 2024-09-10 RX ADMIN — AMLODIPINE BESYLATE 10 MG: 5 TABLET ORAL at 08:09

## 2024-09-10 RX ADMIN — IPRATROPIUM BROMIDE AND ALBUTEROL SULFATE 3 ML: 2.5; .5 SOLUTION RESPIRATORY (INHALATION) at 07:09

## 2024-09-10 RX ADMIN — Medication 1 CAPSULE: at 08:09

## 2024-09-10 NOTE — PT/OT/SLP PROGRESS
Physical Therapy Treatment    Patient Name:  Herlinda Valdovinos   MRN:  [id number]    Recommendations:     Discharge Recommendations: Low Intensity Therapy  Discharge Equipment Recommendations: none  Barriers to discharge: None    Assessment:     Herlinda Valdovinos is a 59 y.o. female admitted with a medical diagnosis of Pneumonia of both upper lobes due to infectious organism.  She presents with the following impairments/functional limitations: weakness, impaired endurance Patient is at baseline and expressed her readiness to return home.    Rehab Prognosis: Good; patient would benefit from acute skilled PT services to address these deficits and reach maximum level of function.    Recent Surgery: * No surgery found *      Plan:     During this hospitalization, patient to be seen 5 x/week to address the identified rehab impairments via gait training, therapeutic activities, therapeutic exercises, neuromuscular re-education and progress toward the following goals:    Plan of Care Expires:  09/19/24    Subjective     Chief Complaint: congestion   Patient/Family Comments/goals: return home   Pain/Comfort:  Pain Rating 1: 0/10      Objective:     Communicated with PT prior to session.  Patient found HOB elevated with peripheral IV, telemetry, oxygen upon PT entry to room.     General Precautions: Standard, fall  Orthopedic Precautions: N/A  Braces: N/A  Respiratory Status: Room air     Functional Mobility:  Bed Mobility:     Supine to Sit: independence  Sit to Supine: independence  Transfers:     Sit to Stand:  modified independence with no AD  Gait: Patient ambulated within room with no difficulty. Patient expressed her readiness to go home.      AM-PAC 6 CLICK MOBILITY  Turning over in bed (including adjusting bedclothes, sheets and blankets)?: 4  Sitting down on and standing up from a chair with arms (e.g., wheelchair, bedside commode, etc.): 4  Moving from lying on back to sitting on the side of the bed?: 4  Moving to  and from a bed to a chair (including a wheelchair)?: 3  Need to walk in hospital room?: 3  Climbing 3-5 steps with a railing?: 3  Basic Mobility Total Score: 21       Treatment & Education:  Transfers and ambulation as listed above.   Long arc quads - 30 reps   Seated marches - 30 reps   Hip adduction squeezes - 30 reps   Straight leg raises - 20 reps     Patient left HOB elevated with call button in reach..    GOALS:   Multidisciplinary Problems       Physical Therapy Goals          Problem: Physical Therapy    Goal Priority Disciplines Outcome Goal Variances Interventions   Physical Therapy Goal     PT, PT/OT Progressing     Description: Short-term Goals: 1 weeks  1. Patient will perform supine to/from sit with contact guard assist to improve independence and safety with bed mobility.  2. Patient will perform sit to/from stand with a rolling walker with contact guard assist to improve independence and safety with transfers.  3. Patient will ambulate 50 feet with a rolling walker with contact guard assist to improve independence and safety with gait.  4. Patient will tolerate 15 minutes of physical therapy intervention to improve endurance and activity tolerance.    Long-term goals: 2 weeks  1. Patient will perform supine to/from sit with standby assist to improve independence and safety with bed mobility.  2. Patient will perform sit to/from stand with a rolling walker with standby assist to improve independence and safety with transfers.  3. Patient will ambulate 150 feet with a rolling walker with standby assist to improve independence and safety with gait.  4. Patient will tolerate 30 minutes of physical therapy intervention to improve endurance and activity tolerance.                        Time Tracking:     PT Received On: 09/10/24  PT Start Time: 1322     PT Stop Time: 1355  PT Total Time (min): 33 min     Billable Minutes: Therapeutic Activity 15 and Therapeutic Exercise 18    Treatment Type:  Treatment  PT/PTA: PTA     Number of PTA visits since last PT visit: 1   RYAN Steele   09/10/2024

## 2024-09-10 NOTE — DISCHARGE SUMMARY
[facility]  Hospital Medicine  Discharge Summary      Patient Name: Herlinda Valdovinos  MRN: [id number]  [facility]: [id number]  Patient Class: IP- Inpatient  Admission Date: 9/6/2024  Hospital Length of Stay: 3 days  Discharge Date and Time:  09/10/2024 2:31 PM  Attending Physician: Chuy Daugherty IV, DO   Discharging Provider: KEVAN Funk  Primary Care Provider: Vini Hernandez NP    Primary Care Team: Networked reference to record PCT     HPI:   Patient is a 59-year-old female who presented to [facility] ED for evaluation of cough times 2-3 days.  Reports that she has been treating at home with over-the-counter cough medication and breathing treatments with only minimal improvement which prompted her to seek evaluation in the ED yesterday.  She was ultimately diagnosed with multifocal pneumonia and UTI and started on Levaquin 750 mg IV daily with close monitoring of her kidney function.  GFR was 46 with a creatinine of 1.34 during the ED visit.  Blood in urine cultures pending at this time.  We will continue current antibiotics until cultures are available.  Plans to continue hydration with normal saline at 75 mL/hour.  She does have a known history of CVA and most recently ICH 3-4 years prior but has been tolerating Plavix without any complications so we will perform low-dose DVT prophylaxis with Lovenox 30 mg daily.  We will continue home medications and provide DuoNebs q.6 hours while awake.  Monitor CBC and BMP daily.  Case was discussed with Dr. Poe who was in agreement with this plan.    * No surgery found *      Hospital Course:   09/08/24 Patient awake and alert this morning, reports she is feeling well this morning and reports she has been eating well. Patient denies any fever since admission. Patient's labs stable this morning, WBC 5.76, H/H 10.5/32.8, Na 140, K 4.2, Cl 105, BUN 16, Creat 0.94. Patient is receiving IV Levaquin for pneumonia and will  continue with this current treatment that was discussed with Dr. Poe. Discussed discharge planning with patient and she is hopeful for discharge tomorrow.   09/09/24 Resting in bed. No complaints. States she is feeling better this morning. Slight wheeze. Sat on room air is 92%. Urine culture shows growth of ESBL ecoli. DCD levaquin. Will start merrem 1 gm iv every 8 hours x 5 days. PT and OT evaluation. Hope to dc to swing bed for continued IV abx and therapy.  09/10/24 Resting in bed. Reports feeling better this morning. States she was able to cough up sputum last night- clear. Breath sounds are clear this morning. O2 per nc at 2 liters with sat of 95%. Will start weaning down. Appetite fair. BM yesterday. Continue zosyn for ESBL ecoli UTI.   09/10/24 Has been approved for swing bed . Will Dc from acute to swing.     Goals of Care Treatment Preferences:  Code Status: Full Code      SDOH Screening:  The patient was screened for utility difficulties, food insecurity, transport difficulties, housing insecurity, and interpersonal safety and there were no concerns identified this admission.     Consults:     No new Assessment & Plan notes have been filed under this hospital service since the last note was generated.  Service: Hospital Medicine    Final Active Diagnoses:    Diagnosis Date Noted POA    PRINCIPAL PROBLEM:  Pneumonia of both upper lobes due to infectious organism [J18.9] 09/07/2024 Yes    Acute cystitis with hematuria [N30.01] 05/20/2021 Yes    Hypertension [I10] 05/20/2021 Yes    Generalized anxiety disorder [F41.1] 05/20/2021 Yes    DVT prophylaxis [Z79.899] 06/10/2021 Not Applicable    UTI due to extended-spectrum beta lactamase (ESBL) producing Escherichia coli [N39.0, B96.29, Z16.12] 10/26/2022 Yes      Problems Resolved During this Admission:       Discharged Condition: stable    Disposition: Another Health Care Inst*    Follow Up:   Follow-up Information       Vini Hernandez, NP Follow up.     Specialty: Emergency Medicine  Why: on swingbed dc - schedule follow up  Contact information:  [address] [facility] [place] [address]  [phone]                           Patient Instructions:      Diet Adult Regular       Significant Diagnostic Studies: Labs: All labs within the past 24 hours have been reviewed    Pending Diagnostic Studies:       None           Medications:  Reconciled Home Medications:      Medication List        START taking these medications      albuterol-ipratropium 2.5 mg-0.5 mg/3 mL nebulizer solution  Commonly known as: DUO-NEB  Take 3 mLs by nebulization every 6 (six) hours while awake. Rescue     docusate sodium 100 MG capsule  Commonly known as: COLACE  Take 1 capsule (100 mg total) by mouth 2 (two) times daily.            CHANGE how you take these medications      gabapentin 300 MG capsule  Commonly known as: NEURONTIN  Take 1 capsule (300 mg total) by mouth 2 (two) times daily.  What changed: how much to take     LORazepam 0.5 MG tablet  Commonly known as: ATIVAN  Take 1 tablet (0.5 mg total) by mouth every 12 (twelve) hours as needed for Anxiety.  What changed: when to take this            CONTINUE taking these medications      acetaminophen 325 MG tablet  Commonly known as: TYLENOL  Take 650 mg by mouth every 6 (six) hours as needed.     albuterol 90 mcg/actuation inhaler  Commonly known as: PROAIR HFA  Inhale 2 puffs into the lungs every 6 (six) hours as needed for Wheezing. Rescue     amLODIPine 10 MG tablet  Commonly known as: NORVASC  Take 1 tablet (10 mg total) by mouth once daily.     atorvastatin 40 MG tablet  Commonly known as: LIPITOR  Take 1 tablet (40 mg total) by mouth every evening.     cetirizine 10 MG tablet  Commonly known as: ZYRTEC  Take 1 tablet (10 mg total) by mouth once daily.     clopidogreL 75 mg tablet  Commonly known as: PLAVIX  Take 1 tablet (75 mg total) by mouth once daily.     MULTIVITAMIN 50 PLUS Tab  Generic drug:  multivitamin-minerals-lutein  Take 1 tablet by mouth once daily.     olmesartan 40 MG tablet  Commonly known as: BENICAR  Take 1 tablet (40 mg total) by mouth once daily.     omega 3-dha-epa-fish oil 1,000 mg (120 mg-180 mg) Cap  Take 1 capsule by mouth once daily.     pantoprazole 40 MG tablet  Commonly known as: PROTONIX  Take 1 tablet (40 mg total) by mouth once daily.     sertraline 50 MG tablet  Commonly known as: ZOLOFT  Take 3 tablets (150 mg total) by mouth once daily.              Indwelling Lines/Drains at time of discharge:   Lines/Drains/Airways       None                   Time spent on the discharge of patient: 35   minutes         Chuy Daugherty IV, DO  Department of Hospital Medicine  [facility]

## 2024-09-10 NOTE — PLAN OF CARE
[facility]  Discharge Final Note    Primary Care Provider: Vini Hernandez NP    Expected Discharge Date:     Final Discharge Note (most recent)       Final Note - 09/10/24 1421          Final Note    Assessment Type Final Discharge Note (P)      Anticipated Discharge Disposition Skilled Nursing Facility (P)         Post-Acute Status    Coverage [employer] (P)      Patient choice form signed by patient/caregiver List with quality metrics by geographic area provided (P)      Discharge Delays None known at this time (P)                      Important Message from Medicare  Important Message from Medicare regarding Discharge Appeal Rights: Given to patient/caregiver, Explained to patient/caregiver, Signed/date by patient/caregiver     Date IMM was signed: 09/09/24  Time IMM was signed: 1033    Ms. Valdovinos will be discharging to swing bed today.

## 2024-09-10 NOTE — SUBJECTIVE & OBJECTIVE
Interval History: weakness    Review of Systems   Constitutional:  Positive for fatigue. Negative for appetite change and fever.   Respiratory:  Positive for cough. Negative for choking, chest tightness and shortness of breath.    Cardiovascular:  Negative for chest pain and leg swelling.   Gastrointestinal:  Negative for abdominal pain, diarrhea, nausea and vomiting.   Genitourinary:  Negative for difficulty urinating and dysuria.   Neurological:  Positive for weakness. Negative for headaches.     Objective:     Vital Signs (Most Recent):  Temp: 98.1 °F (36.7 °C) (09/10/24 0713)  Pulse: 67 (09/10/24 0745)  Resp: 18 (09/10/24 0745)  BP: 115/79 (09/10/24 0713)  SpO2: 95 % (09/10/24 0745) Vital Signs (24h Range):  Temp:  [97.6 °F (36.4 °C)-98.9 °F (37.2 °C)] 98.1 °F (36.7 °C)  Pulse:  [67-95] 67  Resp:  [16-18] 18  SpO2:  [93 %-97 %] 95 %  BP: (115-144)/(79-89) 115/79     Weight: 69.9 kg (154 lb)  Body mass index is 26.43 kg/m².    Intake/Output Summary (Last 24 hours) at 9/10/2024 1020  Last data filed at 9/10/2024 0537  Gross per 24 hour   Intake 436.03 ml   Output --   Net 436.03 ml         Physical Exam  HENT:      Head: Normocephalic.      Mouth/Throat:      Mouth: Mucous membranes are moist.   Eyes:      Pupils: Pupils are equal, round, and reactive to light.   Cardiovascular:      Rate and Rhythm: Normal rate and regular rhythm.      Pulses: Normal pulses.      Heart sounds: Normal heart sounds.   Pulmonary:      Breath sounds: No wheezing.      Comments: Clear - sat is 95% on room air  Abdominal:      General: Bowel sounds are normal. There is no distension.      Palpations: Abdomen is soft. There is no mass.      Tenderness: There is no abdominal tenderness.      Hernia: No hernia is present.   Musculoskeletal:         General: Normal range of motion.      Cervical back: Normal range of motion.   Skin:     General: Skin is warm and dry.   Neurological:      Mental Status: She is alert and oriented to person,  place, and time.      Motor: Weakness present.   Psychiatric:         Mood and Affect: Mood normal.             Significant Labs: All pertinent labs within the past 24 hours have been reviewed.  Recent Lab Results         09/10/24  0516        Anion Gap 9       Baso # 0.03       Basophil % 0.5       BUN 14       BUN/CREAT RATIO 15       Calcium 8.4       Chloride 102       CO2 30       Creatinine 0.92       Differential Method Auto       eGFR 72       Eos # 0.25       Eos % 4.5       Glucose 141       Hematocrit 34.4       Hemoglobin 11.0       Immature Grans (Abs) 0.09       Immature Granulocytes 1.6       Lymph # 1.30       Lymph % 23.6       MCH 27.3       MCHC 32.0       MCV 85.4       Mono # 0.53       Mono % 9.6       MPV 9.5       Neutrophils, Abs 3.31       Neutrophils Relative 60.2       nRBC 0.0       NUCLEATED RBC ABSOLUTE 0.00       Platelet Count 154       Potassium 4.0       RBC 4.03       RDW 15.0       Sodium 137       WBC 5.51               Significant Imaging: I have reviewed all pertinent imaging results/findings within the past 24 hours.

## 2024-09-10 NOTE — PLAN OF CARE
Problem: Adult Inpatient Plan of Care  Goal: Plan of Care Review  Outcome: Progressing  Goal: Patient-Specific Goal (Individualized)  Outcome: Progressing  Goal: Absence of Hospital-Acquired Illness or Injury  Outcome: Progressing  Goal: Optimal Comfort and Wellbeing  Outcome: Progressing  Goal: Readiness for Transition of Care  Outcome: Progressing     Problem: Pneumonia  Goal: Fluid Balance  Outcome: Progressing  Goal: Resolution of Infection Signs and Symptoms  Outcome: Progressing  Goal: Effective Oxygenation and Ventilation  Outcome: Progressing     Problem: Skin Injury Risk Increased  Goal: Skin Health and Integrity  Outcome: Progressing     Problem: Breathing Pattern Ineffective  Goal: Effective Breathing Pattern  Outcome: Progressing

## 2024-09-10 NOTE — ASSESSMENT & PLAN NOTE
Patient has a diagnosis of pneumonia. The cause of the pneumonia is suspected to be bacterial in etiology but organism is not known. The pneumonia is improving. The patient has the following signs/symptoms of pneumonia: cough and sputum production. The patient does not have a current oxygen requirement and the patient does not have a home oxygen requirement. I have reviewed the pertinent imaging. The following cultures have been collected: Blood cultures The culture results are listed below.     Current antimicrobial regimen consists of the antibiotics listed below. Will monitor patient closely and continue current treatment plan unchanged.    Antibiotics (From admission, onward)      Start     Stop Route Frequency Ordered    09/09/24 1500  piperacillin-tazobactam (ZOSYN) 4.5 g in 0.9% NaCl 100 mL IVPB (MB+)         09/14/24 1459 IV Every 8 hours (non-standard times) 09/09/24 1304    09/09/24 1330  azithromycin (ZITHROMAX) 500 mg in D5W 250 mL  IVPB (admixture device)         09/14/24 1329 IV Every 24 hours (non-standard times) 09/09/24 1217            Microbiology Results (last 7 days)       Procedure Component Value Units Date/Time    Blood culture [[id number]] Collected: 09/06/24 2250    Order Status: Completed Specimen: Blood from Peripheral, Hand, Left Updated: 09/10/24 0708     Culture, Blood No Growth At 48 Hours    Blood Culture #2 [[id number]] Collected: 09/06/24 2326    Order Status: Completed Specimen: Blood from Peripheral, Hand, Right Updated: 09/10/24 0708     Culture, Blood No Growth At 48 Hours    Urine culture [[id number]]  (Abnormal)  (Susceptibility) Collected: 09/06/24 2346    Order Status: Completed Specimen: Urine Updated: 09/09/24 0807     Culture, Urine >100,000 Escherichia coli ESBL    Influenza A & B by Molecular [[id number]]  (Normal) Collected: 09/06/24 2159    Order Status: Completed Specimen: Nasopharyngeal Swab Updated: 09/06/24 2218     INFLUENZA A MOLECULAR Negative     INFLUENZA B  MOLECULAR  Negative        Levaquin dcd- start merrem I grm IV every 8 hours x 5 days      Dcd merrem and covered with zosyn

## 2024-09-10 NOTE — PROGRESS NOTES
Pharmacist Intervention IV to PO Note    Herlinda Valdovinos is a 59 y.o. female being treated with IV medication azithromycin    Patient Data:    Vital Signs (Most Recent):  Temp: 98.1 °F (36.7 °C) (09/10/24 0713)  Pulse: 67 (09/10/24 0745)  Resp: 18 (09/10/24 0745)  BP: 115/79 (09/10/24 0713)  SpO2: 95 % (09/10/24 0745) Vital Signs (72h Range):  Temp:  [97.6 °F (36.4 °C)-99.4 °F (37.4 °C)]   Pulse:  []   Resp:  [16-20]   BP: (108-144)/(71-92)   SpO2:  [91 %-99 %]      CBC:  Recent Labs   Lab 09/08/24  0537 09/09/24  0507 09/10/24  0516   WBC 5.76 4.92 5.51   RBC 3.79* 3.74* 4.03*   HGB 10.5* 10.2* 11.0*   HCT 32.8* 32.0* 34.4*   * 126* 154   MCV 86.5 85.6 85.4   MCH 27.7 27.3 27.3   MCHC 32.0 31.9* 32.0     CMP:     Recent Labs   Lab 09/06/24 2223 09/07/24  0906 09/08/24  0537 09/09/24  0507 09/10/24  0516   *   < > 121* 127* 141*   CALCIUM 8.6   < > 8.2* 8.0* 8.4*   ALBUMIN 3.1*  --   --   --   --    PROT 8.0  --   --   --   --       < > 140 134* 137   K 3.6   < > 4.2 4.0 4.0   CO2 31   < > 29 28 30   CL 97*   < > 105 101 102   BUN 24*   < > 16 11 14   CREATININE 1.34*   < > 0.94 0.87 0.92   ALKPHOS 148*  --   --   --   --    ALT 20  --   --   --   --    AST 36  --   --   --   --    BILITOT 1.2  --   --   --   --     < > = values in this interval not displayed.       Dietary Orders:  Diet Orders            Diet Cardiac: Cardiac starting at 09/07 0834            Based on the following criteria, this patient qualifies for intravenous to oral conversion:  [x] The patients gastrointestinal tract is functioning (tolerating medications via oral or enteral route for 24 hours and tolerating food or enteral feeds for 24 hours).  [x] The patient is hemodynamically stable for 24 hours (heart rate <100 beats per minute, systolic blood pressure >99 mm Hg, and respiratory rate <20 breaths per minute).  [x] The patient shows clinical improvement (afebrile for at least 24 hours and white blood cell  count downtrending or normalized). Additionally, the patient must be non-neutropenic (absolute neutrophil count >500 cells/mm3).  [x] For antimicrobials, the patient has received IV therapy for at least 24 hours.    IV medication azithromycin 500mg IV every 24 hours will be changed to oral medication azithromycin 500mg by mouth once daily.    Pharmacist's Name: Zina Morales, PharmD

## 2024-09-10 NOTE — PROGRESS NOTES
[facility] - Medical Surgical Unit  Hospital Medicine  Progress Note    Patient Name: Herlinda Valdovinos  MRN: [id number]  Patient Class: IP- Inpatient   Admission Date: 9/6/2024  Length of Stay: 3 days  Attending Physician: Chuy Daugherty IV, DO  Primary Care Provider: Vini Hernandez NP        Subjective:     Principal Problem:Pneumonia of both upper lobes due to infectious organism        HPI:  Patient is a 59-year-old female who presented to [facility] ED for evaluation of cough times 2-3 days.  Reports that she has been treating at home with over-the-counter cough medication and breathing treatments with only minimal improvement which prompted her to seek evaluation in the ED yesterday.  She was ultimately diagnosed with multifocal pneumonia and UTI and started on Levaquin 750 mg IV daily with close monitoring of her kidney function.  GFR was 46 with a creatinine of 1.34 during the ED visit.  Blood in urine cultures pending at this time.  We will continue current antibiotics until cultures are available.  Plans to continue hydration with normal saline at 75 mL/hour.  She does have a known history of CVA and most recently ICH 3-4 years prior but has been tolerating Plavix without any complications so we will perform low-dose DVT prophylaxis with Lovenox 30 mg daily.  We will continue home medications and provide DuoNebs q.6 hours while awake.  Monitor CBC and BMP daily.  Case was discussed with Dr. Poe who was in agreement with this plan.    Overview/Hospital Course:  09/08/24 Patient awake and alert this morning, reports she is feeling well this morning and reports she has been eating well. Patient denies any fever since admission. Patient's labs stable this morning, WBC 5.76, H/H 10.5/32.8, Na 140, K 4.2, Cl 105, BUN 16, Creat 0.94. Patient is receiving IV Levaquin for pneumonia and will continue with this current treatment that was discussed with Dr. Poe. Discussed discharge planning  with patient and she is hopeful for discharge tomorrow.   09/09/24 Resting in bed. No complaints. States she is feeling better this morning. Slight wheeze. Sat on room air is 92%. Urine culture shows growth of ESBL ecoli. DCD levaquin. Will start merrem 1 gm iv every 8 hours x 5 days. PT and OT evaluation. Hope to dc to swing bed for continued IV abx and therapy.  09/10/24 Resting in bed. Reports feeling better this morning. States she was able to cough up sputum last night- clear. Breath sounds are clear this morning. O2 per nc at 2 liters with sat of 95%. Will start weaning down. Appetite fair. BM yesterday. Continue zosyn for ESBL ecoli UTI.     Interval History: weakness    Review of Systems   Constitutional:  Positive for fatigue. Negative for appetite change and fever.   Respiratory:  Positive for cough. Negative for choking, chest tightness and shortness of breath.    Cardiovascular:  Negative for chest pain and leg swelling.   Gastrointestinal:  Negative for abdominal pain, diarrhea, nausea and vomiting.   Genitourinary:  Negative for difficulty urinating and dysuria.   Neurological:  Positive for weakness. Negative for headaches.     Objective:     Vital Signs (Most Recent):  Temp: 98.1 °F (36.7 °C) (09/10/24 0713)  Pulse: 67 (09/10/24 0745)  Resp: 18 (09/10/24 0745)  BP: 115/79 (09/10/24 0713)  SpO2: 95 % (09/10/24 0745) Vital Signs (24h Range):  Temp:  [97.6 °F (36.4 °C)-98.9 °F (37.2 °C)] 98.1 °F (36.7 °C)  Pulse:  [67-95] 67  Resp:  [16-18] 18  SpO2:  [93 %-97 %] 95 %  BP: (115-144)/(79-89) 115/79     Weight: 69.9 kg (154 lb)  Body mass index is 26.43 kg/m².    Intake/Output Summary (Last 24 hours) at 9/10/2024 1020  Last data filed at 9/10/2024 0537  Gross per 24 hour   Intake 436.03 ml   Output --   Net 436.03 ml         Physical Exam  HENT:      Head: Normocephalic.      Mouth/Throat:      Mouth: Mucous membranes are moist.   Eyes:      Pupils: Pupils are equal, round, and reactive to light.    Cardiovascular:      Rate and Rhythm: Normal rate and regular rhythm.      Pulses: Normal pulses.      Heart sounds: Normal heart sounds.   Pulmonary:      Breath sounds: No wheezing.      Comments: Clear - sat is 95% on room air  Abdominal:      General: Bowel sounds are normal. There is no distension.      Palpations: Abdomen is soft. There is no mass.      Tenderness: There is no abdominal tenderness.      Hernia: No hernia is present.   Musculoskeletal:         General: Normal range of motion.      Cervical back: Normal range of motion.   Skin:     General: Skin is warm and dry.   Neurological:      Mental Status: She is alert and oriented to person, place, and time.      Motor: Weakness present.   Psychiatric:         Mood and Affect: Mood normal.             Significant Labs: All pertinent labs within the past 24 hours have been reviewed.  Recent Lab Results         09/10/24  0516        Anion Gap 9       Baso # 0.03       Basophil % 0.5       BUN 14       BUN/CREAT RATIO 15       Calcium 8.4       Chloride 102       CO2 30       Creatinine 0.92       Differential Method Auto       eGFR 72       Eos # 0.25       Eos % 4.5       Glucose 141       Hematocrit 34.4       Hemoglobin 11.0       Immature Grans (Abs) 0.09       Immature Granulocytes 1.6       Lymph # 1.30       Lymph % 23.6       MCH 27.3       MCHC 32.0       MCV 85.4       Mono # 0.53       Mono % 9.6       MPV 9.5       Neutrophils, Abs 3.31       Neutrophils Relative 60.2       nRBC 0.0       NUCLEATED RBC ABSOLUTE 0.00       Platelet Count 154       Potassium 4.0       RBC 4.03       RDW 15.0       Sodium 137       WBC 5.51               Significant Imaging: I have reviewed all pertinent imaging results/findings within the past 24 hours.    Assessment/Plan:      * Pneumonia of both upper lobes due to infectious organism  Patient has a diagnosis of pneumonia. The cause of the pneumonia is suspected to be bacterial in etiology but organism is not  known. The pneumonia is improving. The patient has the following signs/symptoms of pneumonia: cough and sputum production. The patient does not have a current oxygen requirement and the patient does not have a home oxygen requirement. I have reviewed the pertinent imaging. The following cultures have been collected: Blood cultures The culture results are listed below.     Current antimicrobial regimen consists of the antibiotics listed below. Will monitor patient closely and continue current treatment plan unchanged.    Antibiotics (From admission, onward)      Start     Stop Route Frequency Ordered    09/09/24 1500  piperacillin-tazobactam (ZOSYN) 4.5 g in 0.9% NaCl 100 mL IVPB (MB+)         09/14/24 1459 IV Every 8 hours (non-standard times) 09/09/24 1304    09/09/24 1330  azithromycin (ZITHROMAX) 500 mg in D5W 250 mL  IVPB (admixture device)         09/14/24 1329 IV Every 24 hours (non-standard times) 09/09/24 1217            Microbiology Results (last 7 days)       Procedure Component Value Units Date/Time    Blood culture [[id number]] Collected: 09/06/24 2250    Order Status: Completed Specimen: Blood from Peripheral, Hand, Left Updated: 09/10/24 0708     Culture, Blood No Growth At 48 Hours    Blood Culture #2 [[id number]] Collected: 09/06/24 2326    Order Status: Completed Specimen: Blood from Peripheral, Hand, Right Updated: 09/10/24 0708     Culture, Blood No Growth At 48 Hours    Urine culture [[id number]]  (Abnormal)  (Susceptibility) Collected: 09/06/24 2346    Order Status: Completed Specimen: Urine Updated: 09/09/24 0807     Culture, Urine >100,000 Escherichia coli ESBL    Influenza A & B by Molecular [[id number]]  (Normal) Collected: 09/06/24 2159    Order Status: Completed Specimen: Nasopharyngeal Swab Updated: 09/06/24 2218     INFLUENZA A MOLECULAR Negative     INFLUENZA B MOLECULAR  Negative        Levaquin dcd- start merrem I grm IV every 8 hours x 5 days      Dcd merrem and covered with  zosyn    Acute cystitis with hematuria  9/7/24 urinalysis shows moderate leukocytes, positive nitrites, and WBC 11-15 with many bacteria.  Lactic acid initially 2.2 but down to 1.6.  Currently on Levaquin 750 mg Q 24 hours and urine culture is pending.  We will continue hydration with normal saline at 75 mL/hour until repeat labs are available.        09/09/24 urine culture shows growth of esbl ecoli- dcd levaquin - start merrem 1 gm iv every 8 hours x 5 days    Hypertension  Chronic, controlled. Latest blood pressure and vitals reviewed-     Temp:  [98.4 °F (36.9 °C)-99.4 °F (37.4 °C)]   Pulse:  [82-95]   Resp:  [18-20]   BP: (112-144)/(71-92)   SpO2:  [91 %-97 %] .   Home meds for hypertension were reviewed and noted below.   Hypertension Medications               amLODIPine (NORVASC) 10 MG tablet Take 1 tablet (10 mg total) by mouth once daily.    olmesartan (BENICAR) 40 MG tablet Take 1 tablet (40 mg total) by mouth once daily.            While in the hospital, will manage blood pressure as follows; Continue home antihypertensive regimen    Will utilize p.r.n. blood pressure medication only if patient's blood pressure greater than 180/110 and she develops symptoms such as worsening chest pain or shortness of breath.      09/09/24 losartan 100 mg po daily- sub for benicar    Generalized anxiety disorder  9/7/24 we will monitor for signs or symptoms of increased anxiety and we have continued her PRN Ativan as needed.      DVT prophylaxis  9/7/24 currently on 10 hose and has been tolerating Plavix without any complications following a prior ICH.  Plan was discussed with Dr. Poe and he is in agreement with Jay rahman and low-dose Lovenox 30 mg subQ daily until patient is more active.      UTI due to extended-spectrum beta lactamase (ESBL) producing Escherichia coli    09/09/24 start merrem 1 gm iv every 8 hours x 5 days     09/10/24 dcd merrem and covered with zosyn      VTE Risk Mitigation (From admission,  onward)           Ordered     enoxaparin injection 40 mg  Every 24 hours         09/09/24 1304     IP VTE LOW RISK PATIENT  Once         09/07/24 0835     Place OCTAVIA hose  Until discontinued         09/07/24 0835                    Discharge Planning   POOL:      Code Status: Full Code   Is the patient medically ready for discharge?:     Reason for patient still in hospital (select all that apply): Treatment  Discharge Plan A: Home with family                  KEVAN Funk  Department of Hospital Medicine   [facility] - Medical Surgical Unit

## 2024-09-10 NOTE — PT/OT/SLP EVAL
Occupational Therapy   Evaluation    Name: Herlinda Valdovinos  MRN: [id number]  Admitting Diagnosis: Pneumonia of both upper lobes due to infectious organism  Recent Surgery: * No surgery found *      Recommendations:     Discharge Recommendations: Low Intensity Therapy  Discharge Equipment Recommendations:  none  Barriers to discharge:  None    Assessment:     Herlinda Valdovinos is a 59 y.o. female with a medical diagnosis of Pneumonia of both upper lobes due to infectious organism.  She presents with no complaints. Performance deficits affecting function: weakness, impaired endurance, impaired balance, gait instability, decreased lower extremity function, decreased safety awareness, impaired functional mobility.      Rehab Prognosis: Fair; patient would not benefit from acute skilled OT services at this time secondary to patient is at baseline with ADLs.      Plan:     Patient to be seen 5 x/week to address the above listed problems via self-care/home management, therapeutic activities, therapeutic exercises  Plan of Care Expires:today    Subjective     Chief Complaint: No complaints  Patient/Family Comments/goals: to return to prior level of function    Occupational Profile:  Living Environment: Patient lives with  (disabled) and granddaughter  Previous level of function: Modified Independent with self care  Roles and Routines: Homemaker  Equipment Used at Home: rollator, walker, rolling, cane, quad, cane, straight, bedside commode, shower chair  Assistance upon Discharge: Recommend D/C to home    Pain/Comfort:  Pain Rating 1: 0/10  Pain Rating Post-Intervention 1: 0/10    Patients cultural, spiritual, Protestant conflicts given the current situation: no    Objective:     Communicated with: Nurse prior to session.  Patient found HOB elevated with peripheral IV, telemetry, oxygen upon OT entry to room.    General Precautions: Standard, fall  Orthopedic Precautions: N/A  Braces: N/A  Respiratory Status: Nasal  cannula, flow    Occupational Performance:    Bed Mobility:    Patient completed Rolling/Turning to Left with  stand by assistance  Patient completed Rolling/Turning to Right with stand by assistance  Patient completed Scooting/Bridging with stand by assistance  Patient completed Supine to Sit with modified independence  Patient completed Sit to Supine with modified independence    Functional Mobility/Transfers:  Patient completed Sit <> Stand Transfer with modified independence  with  no assistive device   Patient completed Bed <> Chair Transfer using Stand Pivot technique with modified independence with no assistive device  Functional Mobility: Patient transferred from bed to couch with no difficulty    Activities of Daily Living:  Grooming: supervision to perform handwashing and facial hygiene  Upper Body Dressing: modified independence to [facility]  Lower Body Dressing: modified independence to [facility] pull-up and socks  Toileting: supervision to stand and wash buttocks and perineum due to soiled bedding    Cognitive/Visual Perceptual:  Cognitive/Psychosocial Skills:     -       Oriented to: Person, Place, and Situation   -       Follows Commands/attention:Follows multistep  commands  -       Communication: clear/fluent  -       Safety awareness/insight to disability: impaired   Visual/Perceptual:      -Intact WFLs    Physical Exam:  Balance:    -       sitting: good and standing: fair  Dominant hand:    -       right  Upper Extremity Range of Motion:     -       Right Upper Extremity: WFL  -       Left Upper Extremity: WFL  Upper Extremity Strength:    -       Right Upper Extremity: 4/5 grossly  -       Left Upper Extremity: 3+/5 grossly (old CVA)   Strength:    -       Right Upper Extremity: WFL  -       Left Upper Extremity: WFL  Fine Motor Coordination:    -       Intact  Gross motor coordination:   WFL    AMPAC 6 Click ADL:  AMPAC Total Score: 23    Treatment & Education:  Pt educated on OT  role/POC.   Importance of OOB activity with staff assistance.  Importance of sitting up in the chair throughout the day as tolerated, especially for meals   Safety during functional t/f and mobility  Importance of assisting with self-care activities      Patient left HOB elevated with all lines intact, call button in reach, and chair alarm on    GOALS:   Multidisciplinary Problems       Occupational Therapy Goals       Not on file                    History:     Past Medical History:   Diagnosis Date    Cerebral hemorrhage     Chronic anxiety     Decreased coordination 3/7/2022    Dehydration     Depression     Dysphagia     Due to and following non-traumatic intracerebral hemorrhage    Hearing loss     History of CVA (cerebrovascular accident)     Hypertension     Hypokalemia 10/20/2022    Insomnia     Intrapontine hemorrhage     Left hemiparesis     Peripheral neuropathy     Stroke     Thyroid disease     Transient cerebral ischemia          Past Surgical History:   Procedure Laterality Date    APPENDECTOMY      CHOLECYSTECTOMY      HYSTERECTOMY      LITHOTRIPSY      Renal lithotripsy    percutaneious insertion of ureteric stent         Time Tracking:     OT Date of Treatment: 09/10/24  OT Start Time: 1105  OT Stop Time: 1129  OT Total Time (min): 24 min    Billable Minutes:Evaluation Low complexity    Kerri Sanchez, GENEVAR/L, CSRS  9/10/2024

## 2024-09-10 NOTE — PLAN OF CARE
Problem: Adult Inpatient Plan of Care  Goal: Plan of Care Review  Outcome: Progressing  Goal: Patient-Specific Goal (Individualized)  Outcome: Progressing  Goal: Absence of Hospital-Acquired Illness or Injury  Outcome: Progressing  Goal: Optimal Comfort and Wellbeing  Outcome: Progressing  Goal: Readiness for Transition of Care  Outcome: Progressing     Problem: Pneumonia  Goal: Fluid Balance  Outcome: Progressing  Goal: Resolution of Infection Signs and Symptoms  Outcome: Progressing  Goal: Effective Oxygenation and Ventilation  Outcome: Progressing     Problem: Skin Injury Risk Increased  Goal: Skin Health and Integrity  Outcome: Progressing     Problem: Breathing Pattern Ineffective  Goal: Effective Breathing Pattern  Outcome: Progressing     Problem: Infection  Goal: Absence of Infection Signs and Symptoms  Outcome: Progressing

## 2024-09-10 NOTE — ASSESSMENT & PLAN NOTE
09/09/24 start merrem 1 gm iv every 8 hours x 5 days     09/10/24 dcd merrem and covered with zosyn

## 2024-09-10 NOTE — HPI
Patient is a 59-year-old female who presented to [facility] ED for evaluation of cough times 2-3 days.  Reports that she has been treating at home with over-the-counter cough medication and breathing treatments with only minimal improvement which prompted her to seek evaluation in the ED yesterday.  She was ultimately diagnosed with multifocal pneumonia and UTI and started on Levaquin 750 mg IV daily with close monitoring of her kidney function.  GFR was 46 with a creatinine of 1.34 during the ED visit.  Blood in urine cultures pending at this time.  We will continue current antibiotics until cultures are available.  Plans to continue hydration with normal saline at 75 mL/hour.  She does have a known history of CVA and most recently ICH 3-4 years prior but has been tolerating Plavix without any complications so we will perform low-dose DVT prophylaxis with Lovenox 30 mg daily.  We will continue home medications and provide DuoNebs q.6 hours while awake.  Monitor CBC and BMP daily.  Case was discussed with Dr. Poe who was in agreement with this plan.     * No surgery found *       Hospital Course:   09/08/24 Patient awake and alert this morning, reports she is feeling well this morning and reports she has been eating well. Patient denies any fever since admission. Patient's labs stable this morning, WBC 5.76, H/H 10.5/32.8, Na 140, K 4.2, Cl 105, BUN 16, Creat 0.94. Patient is receiving IV Levaquin for pneumonia and will continue with this current treatment that was discussed with Dr. Poe. Discussed discharge planning with patient and she is hopeful for discharge tomorrow.   09/09/24 Resting in bed. No complaints. States she is feeling better this morning. Slight wheeze. Sat on room air is 92%. Urine culture shows growth of ESBL ecoli. DCD levaquin. Will start merrem 1 gm iv every 8 hours x 5 days. PT and OT evaluation. Hope to dc to swing bed for continued IV abx and therapy.  09/10/24 Resting in bed.  Reports feeling better this morning. States she was able to cough up sputum last night- clear. Breath sounds are clear this morning. O2 per nc at 2 liters with sat of 95%. Will start weaning down. Appetite fair. BM yesterday. Continue zosyn for ESBL ecoli UTI.   09/10/24 Has been approved for swing bed . Will Dc from acute to swing.         Above info is from acute care stay. She will admit to swing bed today to continue zosyn  for ESBL ecoli and  zosyn and azithromycin for pneumonia. She is generally weak and in need of strengthening. PT and OT consults placed. Talked with her re code status and she is Full code.

## 2024-09-11 PROBLEM — S01.81XA LACERATION OF CHIN: Status: RESOLVED | Noted: 2023-01-14 | Resolved: 2024-09-11

## 2024-09-11 PROBLEM — R10.12 LUQ PAIN: Status: RESOLVED | Noted: 2022-02-15 | Resolved: 2024-09-11

## 2024-09-11 PROCEDURE — 25000003 PHARM REV CODE 250: Performed by: FAMILY MEDICINE

## 2024-09-11 PROCEDURE — 94761 N-INVAS EAR/PLS OXIMETRY MLT: CPT

## 2024-09-11 PROCEDURE — 63700000 PHARM REV CODE 250 ALT 637 W/O HCPCS: Performed by: NURSE PRACTITIONER

## 2024-09-11 PROCEDURE — 97161 PT EVAL LOW COMPLEX 20 MIN: CPT

## 2024-09-11 PROCEDURE — 99305 1ST NF CARE MODERATE MDM 35: CPT | Mod: AI,,, | Performed by: FAMILY MEDICINE

## 2024-09-11 PROCEDURE — 97165 OT EVAL LOW COMPLEX 30 MIN: CPT

## 2024-09-11 PROCEDURE — 27000944

## 2024-09-11 PROCEDURE — 27000221 HC OXYGEN, UP TO 24 HOURS

## 2024-09-11 PROCEDURE — 63600175 PHARM REV CODE 636 W HCPCS: Performed by: NURSE PRACTITIONER

## 2024-09-11 PROCEDURE — 11000004 HC SNF PRIVATE

## 2024-09-11 PROCEDURE — 25000003 PHARM REV CODE 250: Performed by: NURSE PRACTITIONER

## 2024-09-11 RX ORDER — GUAIFENESIN 100 MG/5ML
200 SOLUTION ORAL EVERY 4 HOURS PRN
Status: DISCONTINUED | OUTPATIENT
Start: 2024-09-11 | End: 2024-09-15 | Stop reason: HOSPADM

## 2024-09-11 RX ORDER — SELENIUM 50 MCG
1 TABLET ORAL
Status: DISCONTINUED | OUTPATIENT
Start: 2024-09-11 | End: 2024-09-15 | Stop reason: HOSPADM

## 2024-09-11 RX ORDER — AZITHROMYCIN 250 MG/1
250 TABLET, FILM COATED ORAL DAILY
Status: COMPLETED | OUTPATIENT
Start: 2024-09-11 | End: 2024-09-13

## 2024-09-11 RX ADMIN — GABAPENTIN 300 MG: 300 CAPSULE ORAL at 08:09

## 2024-09-11 RX ADMIN — PIPERACILLIN SODIUM AND TAZOBACTAM SODIUM 4.5 G: 4; .5 INJECTION, POWDER, LYOPHILIZED, FOR SOLUTION INTRAVENOUS at 02:09

## 2024-09-11 RX ADMIN — DOCUSATE SODIUM 100 MG: 100 CAPSULE, LIQUID FILLED ORAL at 09:09

## 2024-09-11 RX ADMIN — GUAIFENESIN 200 MG: 100 LIQUID ORAL at 08:09

## 2024-09-11 RX ADMIN — Medication 1 CAPSULE: at 01:09

## 2024-09-11 RX ADMIN — GUAIFENESIN 200 MG: 100 LIQUID ORAL at 02:09

## 2024-09-11 RX ADMIN — PIPERACILLIN SODIUM AND TAZOBACTAM SODIUM 4.5 G: 4; .5 INJECTION, POWDER, LYOPHILIZED, FOR SOLUTION INTRAVENOUS at 11:09

## 2024-09-11 RX ADMIN — CLOPIDOGREL BISULFATE 75 MG: 75 TABLET ORAL at 09:09

## 2024-09-11 RX ADMIN — SERTRALINE HYDROCHLORIDE 150 MG: 50 TABLET ORAL at 09:09

## 2024-09-11 RX ADMIN — ENOXAPARIN SODIUM 40 MG: 40 INJECTION SUBCUTANEOUS at 04:09

## 2024-09-11 RX ADMIN — LORAZEPAM 0.5 MG: 0.5 TABLET ORAL at 08:09

## 2024-09-11 RX ADMIN — DOCUSATE SODIUM 100 MG: 100 CAPSULE, LIQUID FILLED ORAL at 08:09

## 2024-09-11 RX ADMIN — LOSARTAN POTASSIUM 100 MG: 50 TABLET, FILM COATED ORAL at 09:09

## 2024-09-11 RX ADMIN — PIPERACILLIN SODIUM AND TAZOBACTAM SODIUM 4.5 G: 4; .5 INJECTION, POWDER, LYOPHILIZED, FOR SOLUTION INTRAVENOUS at 07:09

## 2024-09-11 RX ADMIN — GABAPENTIN 300 MG: 300 CAPSULE ORAL at 09:09

## 2024-09-11 RX ADMIN — CETIRIZINE HYDROCHLORIDE 10 MG: 10 TABLET, FILM COATED ORAL at 09:09

## 2024-09-11 RX ADMIN — PANTOPRAZOLE SODIUM 40 MG: 40 TABLET, DELAYED RELEASE ORAL at 09:09

## 2024-09-11 RX ADMIN — AZITHROMYCIN DIHYDRATE 250 MG: 250 TABLET, FILM COATED ORAL at 09:09

## 2024-09-11 RX ADMIN — Medication 1 CAPSULE: at 04:09

## 2024-09-11 RX ADMIN — ATORVASTATIN CALCIUM 40 MG: 40 TABLET, FILM COATED ORAL at 08:09

## 2024-09-11 RX ADMIN — AMLODIPINE BESYLATE 10 MG: 5 TABLET ORAL at 09:09

## 2024-09-11 NOTE — PLAN OF CARE
[facility] - Medical Surgical Unit  Initial Discharge Assessment       Primary Care Provider: Vini Hernandez NP    Admission Diagnosis: UTI (urinary tract infection) [N39.0]    Admission Date: 9/10/2024  Expected Discharge Date:     Transition of Care Barriers: (P) None    Payor: HU[employer] MANAGED MEDICARE / Plan: HU[employer] MEDICARE PPO / Product Type: Medicare Advantage /     Extended Emergency Contact Information  Primary Emergency Contact: VincentMicheal  Mobile Phone: [phone]  Relation: Spouse  Preferred language: English   needed? No    Discharge Plan A: (P) Home with family  Discharge Plan B: (P) Home Health      [facility] [place] - [address]  [address]  [place] [address]  Phone: [phone] Fax: [phone]      Initial Assessment (most recent)       Adult Discharge Assessment - 09/11/24 0823          Discharge Assessment    Assessment Type Discharge Planning Assessment (P)      Confirmed/corrected address, phone number and insurance Yes (P)      Confirmed Demographics Correct on Facesheet (P)      Source of Information patient (P)      Communicated POOL with patient/caregiver Date not available/Unable to determine (P)      Reason For Admission Weakness (P)      People in Home child(erica), adult;child(erica), dependent;spouse (P)      Facility Arrived From: [facility] (P)      Do you expect to return to your current living situation? Yes (P)      Do you have help at home or someone to help you manage your care at home? Yes (P)      Who are your caregiver(s) and their phone number(s)? Micheal Kaur [phone] (P)      Prior to hospitilization cognitive status: Alert/Oriented (P)      Current cognitive status: Alert/Oriented (P)      Walking or Climbing Stairs Difficulty yes (P)      Walking or Climbing Stairs ambulation difficulty, requires equipment;ambulation difficulty, assistance 1 person (P)      Mobility Management uses a walker (P)       Dressing/Bathing Difficulty yes (P)      Dressing/Bathing bathing difficulty, assistance 1 person (P)      Dressing/Bathing Management weakness (P)      Home Accessibility stairs to enter home (P)      Number of Stairs, Main Entrance three (P)      Surface of Stairs, Main Entrance hardwood (P)      Stair Railings, Main Entrance railings safe and in good condition;railing on right side (ascending) (P)      Landing, Stairs, Main Entrance adequate turning radius (P)      Home Layout Able to live on 1st floor (P)      Equipment Currently Used at Home bedside commode;cane, quad;cane, straight;rollator;shower chair;walker, rolling (P)      Patient currently being followed by outpatient case management? No (P)      Do you currently have service(s) that help you manage your care at home? No (P)      Do you take prescription medications? Yes (P)      Do you have prescription coverage? Yes (P)      Coverage [employer] (P)      Do you have any problems affording any of your prescribed medications? No (P)      Is the patient taking medications as prescribed? yes (P)      Who is going to help you get home at discharge? family (P)      How do you get to doctors appointments? family or friend will provide (P)      Are you on dialysis? No (P)      Do you take coumadin? No (P)      Discharge Plan A Home with family (P)      Discharge Plan B Home Health (P)      DME Needed Upon Discharge  none (P)      Discharge Plan discussed with: Patient (P)      Transition of Care Barriers None (P)         Physical Activity    On average, how many days per week do you engage in moderate to strenuous exercise (like a brisk walk)? 0 days (P)      On average, how many minutes do you engage in exercise at this level? 0 min (P)         Financial Resource Strain    How hard is it for you to pay for the very basics like food, housing, medical care, and heating? Somewhat hard (P)         Housing Stability    In the last 12 months, was there a time when you  were not able to pay the mortgage or rent on time? No (P)      At any time in the past 12 months, were you homeless or living in a shelter (including now)? No (P)         Transportation Needs    Has the lack of transportation kept you from medical appointments, meetings, work or from getting things needed for daily living? No (P)         Food Insecurity    Within the past 12 months, you worried that your food would run out before you got the money to buy more. Never true (P)      Within the past 12 months, the food you bought just didn't last and you didn't have money to get more. Never true (P)         Stress    Do you feel stress - tense, restless, nervous, or anxious, or unable to sleep at night because your mind is troubled all the time - these days? Rather much (P)         Social Isolation    How often do you feel lonely or isolated from those around you?  Never (P)         Alcohol Use    Q1: How often do you have a drink containing alcohol? Never (P)      Q2: How many drinks containing alcohol do you have on a typical day when you are drinking? Patient does not drink (P)      Q3: How often do you have six or more drinks on one occasion? Never (P)         Weroom    In the past 12 months has the electric, gas, oil, or water company threatened to shut off services in your home? No (P)         Health Literacy    How often do you need to have someone help you when you read instructions, pamphlets, or other written material from your doctor or pharmacy? Never (P)                  Ms. Valdovinos lives at home with her family.  She had used [facility] in the past and wishes to resume services with them at discharge.  Her family/friends take her to appointments, shopping, etc. She has all required DME.

## 2024-09-11 NOTE — PT/OT/SLP EVAL
Occupational Therapy   Evaluation    Name: Herlinda Valdovinos  MRN: [id number]  Admitting Diagnosis: Weakness  Recent Surgery: * No surgery found *      Recommendations:     Discharge Recommendations:    Discharge Equipment Recommendations:  none  Barriers to discharge:  None    Assessment:     Herlinda Valdovinos is a 59 y.o. female with a medical diagnosis of Weakness.  She presents with no complaints. Performance deficits affecting function:  .      Rehab Prognosis: Fair; patient would not benefit from acute skilled OT services at this time secondary to patient is at baseline with ADLs.      Plan:     Patient to be given and HEP to perform exercises at home secondary to patient is at baseline with functional ADL status. No skilled OT indicated at this time.   Plan of Care Expires: today    Subjective     Chief Complaint: No complaints  Patient/Family Comments/goals: to return to prior level of function    Occupational Profile:  Living Environment: Patient lives with  (disabled) and granddaughter  Previous level of function: Modified Independent with self care  Roles and Routines: Homemaker  Equipment Used at Home: wheelchair, walker, rolling, rollator, lift device, cane, quad, cane, straight  Assistance upon Discharge: Recommend D/C to home    Pain/Comfort:  Pain Rating 1: 0/10  Pain Rating Post-Intervention 1: 0/10    Patients cultural, spiritual, Presybeterian conflicts given the current situation: no    Objective:     Communicated with: Nurse prior to session.  Patient found HOB elevated with peripheral IV, oxygen upon OT entry to room.    General Precautions: Standard, fall  Orthopedic Precautions: N/A  Braces: N/A  Respiratory Status: Nasal cannula, flow    Occupational Performance:    Bed Mobility:    No tested- patient sitting up in a chair upon arrival    Functional Mobility/Transfers:  SBA per PT to transfer to chair    Activities of Daily Living:  Grooming: supervision to perform handwashing and facial  hygiene  Upper Body Dressing: modified independence to [facility] gown  Lower Body Dressing: modified independence to [facility] pull-up and socks  Toileting: supervision to stand and wash buttocks and perineum due to soiled bedding    Cognitive/Visual Perceptual:  Cognitive/Psychosocial Skills:     -       Oriented to: Person, Place, and Situation   -       Follows Commands/attention:Follows multistep  commands  -       Communication: clear/fluent  -       Safety awareness/insight to disability: impaired   Visual/Perceptual:      -Intact WFLs    Physical Exam:  Balance:    -       sitting: good and standing: fair  Dominant hand:    -       right  Upper Extremity Range of Motion:     -       Right Upper Extremity: WFL  -       Left Upper Extremity: WFL  Upper Extremity Strength:    -       Right Upper Extremity: 4/5 grossly  -       Left Upper Extremity: 3+/5 grossly (old CVA)   Strength:    -       Right Upper Extremity: WFL  -       Left Upper Extremity: WFL  Fine Motor Coordination:    -       Intact  Gross motor coordination:   WFL    AMPAC 6 Click ADL:  AMPAC Total Score: 23    Treatment & Education:  Pt educated on OT role/POC.   Importance of OOB activity with staff assistance.  Importance of sitting up in the chair throughout the day as tolerated, especially for meals   Safety during functional t/f and mobility  Importance of assisting with self-care activities      Patient left up in chair with all lines intact, call button in reach, and chair alarm on    GOALS:   Multidisciplinary Problems       Occupational Therapy Goals       Not on file                    History:     Past Medical History:   Diagnosis Date    Cerebral hemorrhage     Chronic anxiety     Decreased coordination 3/7/2022    Dehydration     Depression     Dysphagia     Due to and following non-traumatic intracerebral hemorrhage    Hearing loss     History of CVA (cerebrovascular accident)     Hypertension     Hypokalemia 10/20/2022     Insomnia     Intrapontine hemorrhage     Left hemiparesis     Peripheral neuropathy     Stroke     Thyroid disease     Transient cerebral ischemia          Past Surgical History:   Procedure Laterality Date    APPENDECTOMY      CHOLECYSTECTOMY      HYSTERECTOMY      LITHOTRIPSY      Renal lithotripsy    percutaneious insertion of ureteric stent         Time Tracking:     OT Date of Treatment: 9/11/2024  OT Start Time: 1325  OT Stop Time: 1332  OT Total Time (min): 7 min    Billable Minutes:Evaluation Low complexity    Kerri Sanchez, OTR/L, CSRS  9/11/2024

## 2024-09-11 NOTE — PT/OT/SLP EVAL
Physical Therapy Evaluation    Patient Name:  Herlinda Valdovinos   MRN:  [id number]    Recommendations:     Discharge Recommendations: Low Intensity Therapy   Discharge Equipment Recommendations: none   Barriers to discharge: Decreased caregiver support    Assessment:     Herlinda Valdovinos is a 59 y.o. female admitted with a medical diagnosis of Weakness.  She presents with the following impairments/functional limitations: weakness, impaired endurance, impaired self care skills, impaired functional mobility, gait instability, impaired balance, decreased lower extremity function Patient agreeable to therapy today. She completed all transfers and bed mobility with stand by assistance. She ambulated 2 x 50 ft with rolling walker and contact guard assistance. She still shows strength of 4/5 bilateral lower extremities. Will continue with gait training and lower extremity functional strengthening as tolerated.     Rehab Prognosis: Good; patient would benefit from acute skilled PT services to address these deficits and reach maximum level of function.    Recent Surgery: * No surgery found *      Plan:     During this hospitalization, patient to be seen 5 x/week to address the identified rehab impairments via gait training, therapeutic activities, therapeutic exercises and progress toward the following goals:    Plan of Care Expires:   9/17/24    Subjective     Chief Complaint: weakness, slight dizziness when first sitting and standing  Patient/Family Comments/goals: patient stating she is ready for therapy today  Pain/Comfort:  Pain Rating 1: 0/10    Patients cultural, spiritual, Scientologist conflicts given the current situation: no    Living Environment:  2 story house,  but never goes to 2nd story. Ramps to get in entrances. Lives with  and family.  Prior to admission, patients level of function was mod I.  Equipment used at home: wheelchair, rollator, walker, rolling, lift device, cane, quad, cane, straight.  DME  owned (not currently used): rolling walker, single point cane, and wheelchair.  Upon discharge, patient will have assistance from family.    Objective:     Communicated with nurse prior to session.  Patient found HOB elevated with peripheral IV, oxygen, telemetry  upon PT entry to room.    General Precautions: Standard, fall  Orthopedic Precautions:    Braces:    Respiratory Status: Nasal cannula, flow 2 L/min(patient states she only uses at night)    Exams:  RLE ROM: WFL  RLE Strength: Deficits: 4/5  LLE ROM: WFL  LLE Strength: Deficits: 45    Functional Mobility:  Bed Mobility:     Supine to Sit: stand by assistance  Sit to Supine: stand by assistance  Transfers:     Sit to Stand:  stand by assistance with rolling walker  Bed to Chair: stand by assistance with  rolling walker  using  Step Transfer  Gait: Pt ambulated 2x50 feet with rolling walker and CGA      AM-PAC 6 CLICK MOBILITY  Total Score:23       Treatment & Education:  Lower extremity exercise education and demonstration  Bed mobility, transfers, gait as above    Patient left up in chair with all lines intact and call button in reach.    GOALS:   Multidisciplinary Problems       Physical Therapy Goals          Problem: Physical Therapy    Goal Priority Disciplines Outcome Goal Variances Interventions   Physical Therapy Goal     PT, PT/OT Progressing     Description: Short-term Goals: 3 days  1. Patient will perform supine to/from sit with supervision to improve independence and safety with bed mobility.  2. Patient will perform sit to/from stand with a rolling walker with supervision to improve independence and safety with transfers.  3. Patient will ambulate 150 feet with a rolling walker with standby assist to improve independence and safety with gait.  4. Patient will tolerate 15 minutes of physical therapy intervention to improve endurance and activity tolerance.    Long-term goals: 1 weeks  1. Patient will perform supine to/from sit with modified  independence to improve independence and safety with bed mobility.  2. Patient will perform sit to/from stand with a rolling walker with modified independence to improve independence and safety with transfers.  3. Patient will ambulate 200 feet with a rolling walker with modified independence to improve independence and safety with gait.  4. Patient will tolerate 30 minutes of physical therapy intervention to improve endurance and activity tolerance.                        History:     Past Medical History:   Diagnosis Date    Cerebral hemorrhage     Chronic anxiety     Decreased coordination 3/7/2022    Dehydration     Depression     Dysphagia     Due to and following non-traumatic intracerebral hemorrhage    Hearing loss     History of CVA (cerebrovascular accident)     Hypertension     Hypokalemia 10/20/2022    Insomnia     Intrapontine hemorrhage     Left hemiparesis     Peripheral neuropathy     Stroke     Thyroid disease     Transient cerebral ischemia        Past Surgical History:   Procedure Laterality Date    APPENDECTOMY      CHOLECYSTECTOMY      HYSTERECTOMY      LITHOTRIPSY      Renal lithotripsy    percutaneious insertion of ureteric stent         Time Tracking:     PT Received On: 09/11/24  PT Start Time: 1122     PT Stop Time: 1148  PT Total Time (min): 26 min     Billable Minutes: Evaluation 26      09/11/2024

## 2024-09-11 NOTE — SUBJECTIVE & OBJECTIVE
Past Medical History:   Diagnosis Date    Cerebral hemorrhage     Chronic anxiety     Decreased coordination 3/7/2022    Dehydration     Depression     Dysphagia     Due to and following non-traumatic intracerebral hemorrhage    Hearing loss     History of CVA (cerebrovascular accident)     Hypertension     Hypokalemia 10/20/2022    Insomnia     Intrapontine hemorrhage     Left hemiparesis     Peripheral neuropathy     Stroke     Thyroid disease     Transient cerebral ischemia        Past Surgical History:   Procedure Laterality Date    APPENDECTOMY      CHOLECYSTECTOMY      HYSTERECTOMY      LITHOTRIPSY      Renal lithotripsy    percutaneious insertion of ureteric stent         Review of patient's allergies indicates:   Allergen Reactions    Lamisil [terbinafine] Anaphylaxis    Codeine Itching    Compazine [prochlorperazine] Itching       Current Facility-Administered Medications on File Prior to Encounter   Medication    [DISCONTINUED] acetaminophen tablet 650 mg    [DISCONTINUED] albuterol-ipratropium 2.5 mg-0.5 mg/3 mL nebulizer solution 3 mL    [DISCONTINUED] amLODIPine tablet 10 mg    [DISCONTINUED] atorvastatin tablet 40 mg    [DISCONTINUED] azithromycin (ZITHROMAX) 500 mg in D5W 250 mL  IVPB (admixture device)    [DISCONTINUED] azithromycin tablet 500 mg    [DISCONTINUED] benzonatate capsule 100 mg    [DISCONTINUED] cetirizine tablet 10 mg    [DISCONTINUED] clopidogreL tablet 75 mg    [DISCONTINUED] docusate sodium capsule 100 mg    [DISCONTINUED] enoxaparin injection 40 mg    [DISCONTINUED] gabapentin capsule 300 mg    [DISCONTINUED] guaiFENesin 100 mg/5 ml syrup 200 mg    [DISCONTINUED] Lactobacillus acidophilus capsule 1 capsule    [DISCONTINUED] LORazepam tablet 0.5 mg    [DISCONTINUED] losartan tablet 100 mg    [DISCONTINUED] melatonin tablet 6 mg    [DISCONTINUED] ondansetron injection 4 mg    [DISCONTINUED] pantoprazole EC tablet 40 mg    [DISCONTINUED] piperacillin-tazobactam (ZOSYN) 4.5 g in 0.9%  NaCl 100 mL IVPB (MB+)    [DISCONTINUED] sertraline tablet 150 mg    [DISCONTINUED] sodium chloride 0.9% flush 10 mL     Current Outpatient Medications on File Prior to Encounter   Medication Sig    acetaminophen (TYLENOL) 325 MG tablet Take 650 mg by mouth every 6 (six) hours as needed.    albuterol (PROAIR HFA) 90 mcg/actuation inhaler Inhale 2 puffs into the lungs every 6 (six) hours as needed for Wheezing. Rescue    albuterol-ipratropium (DUO-NEB) 2.5 mg-0.5 mg/3 mL nebulizer solution Take 3 mLs by nebulization every 6 (six) hours while awake. Rescue    amLODIPine (NORVASC) 10 MG tablet Take 1 tablet (10 mg total) by mouth once daily.    atorvastatin (LIPITOR) 40 MG tablet Take 1 tablet (40 mg total) by mouth every evening.    cetirizine (ZYRTEC) 10 MG tablet Take 1 tablet (10 mg total) by mouth once daily.    clopidogreL (PLAVIX) 75 mg tablet Take 1 tablet (75 mg total) by mouth once daily.    docusate sodium (COLACE) 100 MG capsule Take 1 capsule (100 mg total) by mouth 2 (two) times daily.    gabapentin (NEURONTIN) 300 MG capsule Take 1 capsule (300 mg total) by mouth 2 (two) times daily. (Patient taking differently: Take 600 mg by mouth 2 (two) times daily.)    LORazepam (ATIVAN) 0.5 MG tablet Take 1 tablet (0.5 mg total) by mouth every 12 (twelve) hours as needed for Anxiety. (Patient taking differently: Take 0.5 mg by mouth once daily.)    multivitamin-minerals-lutein (MULTIVITAMIN 50 PLUS) Tab Take 1 tablet by mouth once daily.    olmesartan (BENICAR) 40 MG tablet Take 1 tablet (40 mg total) by mouth once daily.    omega 3-dha-epa-fish oil 1,000 mg (120 mg-180 mg) Cap Take 1 capsule by mouth once daily.    pantoprazole (PROTONIX) 40 MG tablet Take 1 tablet (40 mg total) by mouth once daily.    sertraline (ZOLOFT) 50 MG tablet Take 3 tablets (150 mg total) by mouth once daily.    [DISCONTINUED] hydroCHLOROthiazide (HYDRODIURIL) 12.5 MG Tab Take 1 tablet (12.5 mg total) by mouth once daily.     [DISCONTINUED] omeprazole (PRILOSEC) 40 MG capsule Take 40 mg by mouth every evening.     Family History    None       Tobacco Use    Smoking status: Never    Smokeless tobacco: Never   Substance and Sexual Activity    Alcohol use: Never    Drug use: Never    Sexual activity: Not on file     Review of Systems   Constitutional:  Negative for appetite change, fatigue and fever.   HENT:  Negative for sore throat.    Respiratory:  Positive for cough. Negative for chest tightness, shortness of breath and wheezing.    Cardiovascular:  Negative for leg swelling.   Gastrointestinal:  Negative for abdominal distention, abdominal pain, constipation, diarrhea, nausea and vomiting.   Genitourinary:  Negative for difficulty urinating and dysuria.   Neurological:  Positive for weakness. Negative for headaches.     Objective:     Vital Signs (Most Recent):  Temp: 98.5 °F (36.9 °C) (09/11/24 0432)  Pulse: 81 (09/11/24 0432)  Resp: 18 (09/11/24 0432)  BP: 108/68 (09/11/24 0432)  SpO2: 96 % (09/11/24 0432) Vital Signs (24h Range):  Temp:  [98 °F (36.7 °C)-98.5 °F (36.9 °C)] 98.5 °F (36.9 °C)  Pulse:  [67-88] 81  Resp:  [16-20] 18  SpO2:  [92 %-96 %] 96 %  BP: (108-133)/(68-87) 108/68     Weight: 83.7 kg (184 lb 8 oz)  Body mass index is 31.67 kg/m².     Physical Exam  HENT:      Head: Normocephalic.      Mouth/Throat:      Mouth: Mucous membranes are moist.   Eyes:      Pupils: Pupils are equal, round, and reactive to light.   Cardiovascular:      Rate and Rhythm: Normal rate and regular rhythm.      Pulses: Normal pulses.      Heart sounds: Normal heart sounds.   Pulmonary:      Effort: Pulmonary effort is normal.      Breath sounds: Normal breath sounds.      Comments: O2 per nc at 2 liters with sat of 96%  Abdominal:      General: Bowel sounds are normal. There is no distension.      Palpations: Abdomen is soft. There is no mass.      Tenderness: There is no abdominal tenderness.      Hernia: No hernia is present.    Musculoskeletal:         General: No swelling, tenderness, deformity or signs of injury. Normal range of motion.      Cervical back: Normal range of motion.   Skin:     General: Skin is warm and dry.      Coloration: Skin is not jaundiced or pale.      Findings: No bruising or erythema.   Neurological:      Mental Status: She is alert and oriented to person, place, and time.      Motor: Weakness present.   Psychiatric:         Mood and Affect: Mood normal.              CRANIAL NERVES     CN III, IV, VI   Pupils are equal, round, and reactive to light.       Significant Labs: All pertinent labs within the past 24 hours have been reviewed.  Recent Lab Results       None            Significant Imaging: I have reviewed all pertinent imaging results/findings within the past 24 hours.

## 2024-09-11 NOTE — ASSESSMENT & PLAN NOTE
Chest xray from 09/07 shows Multifocal pneumonia in upper lobes. Continue zosyn and zithromax . She was started on dextromethorphan-guaifenesin PRN for cough and we will place her on DuoNebs q.6 hours prn .  Blood cultures show no growth.

## 2024-09-11 NOTE — H&P
[facility] - Medical Surgical Unit  Hospital Medicine  History & Physical    Patient Name: Herlinda Valdovinos  MRN: [id number]  Patient Class: IP- Swing  Admission Date: 9/10/2024  Attending Physician: Chuy Daugherty IV, DO   Primary Care Provider: Vini Hernandez NP         Patient information was obtained from patient, past medical records, and ER records.     Subjective:     Principal Problem:Weakness    Chief Complaint: ESBL ecoli UTI        HPI: Patient is a 59-year-old female who presented to [facility] ED for evaluation of cough times 2-3 days.  Reports that she has been treating at home with over-the-counter cough medication and breathing treatments with only minimal improvement which prompted her to seek evaluation in the ED yesterday.  She was ultimately diagnosed with multifocal pneumonia and UTI and started on Levaquin 750 mg IV daily with close monitoring of her kidney function.  GFR was 46 with a creatinine of 1.34 during the ED visit.  Blood in urine cultures pending at this time.  We will continue current antibiotics until cultures are available.  Plans to continue hydration with normal saline at 75 mL/hour.  She does have a known history of CVA and most recently ICH 3-4 years prior but has been tolerating Plavix without any complications so we will perform low-dose DVT prophylaxis with Lovenox 30 mg daily.  We will continue home medications and provide DuoNebs q.6 hours while awake.  Monitor CBC and BMP daily.  Case was discussed with Dr. Poe who was in agreement with this plan.     * No surgery found *       Hospital Course:   09/08/24 Patient awake and alert this morning, reports she is feeling well this morning and reports she has been eating well. Patient denies any fever since admission. Patient's labs stable this morning, WBC 5.76, H/H 10.5/32.8, Na 140, K 4.2, Cl 105, BUN 16, Creat 0.94. Patient is receiving IV Levaquin for pneumonia and will continue with this current  treatment that was discussed with Dr. Poe. Discussed discharge planning with patient and she is hopeful for discharge tomorrow.   09/09/24 Resting in bed. No complaints. States she is feeling better this morning. Slight wheeze. Sat on room air is 92%. Urine culture shows growth of ESBL ecoli. DCD levaquin. Will start merrem 1 gm iv every 8 hours x 5 days. PT and OT evaluation. Hope to dc to swing bed for continued IV abx and therapy.  09/10/24 Resting in bed. Reports feeling better this morning. States she was able to cough up sputum last night- clear. Breath sounds are clear this morning. O2 per nc at 2 liters with sat of 95%. Will start weaning down. Appetite fair. BM yesterday. Continue zosyn for ESBL ecoli UTI.   09/10/24 Has been approved for swing bed . Will Dc from acute to swing.         Above info is from acute care stay. She will admit to swing bed today to continue zosyn  for ESBL ecoli and  zosyn and azithromycin for pneumonia. She is generally weak and in need of strengthening. PT and OT consults placed. Talked with her re code status and she is Full code.       Past Medical History:   Diagnosis Date    Cerebral hemorrhage     Chronic anxiety     Decreased coordination 3/7/2022    Dehydration     Depression     Dysphagia     Due to and following non-traumatic intracerebral hemorrhage    Hearing loss     History of CVA (cerebrovascular accident)     Hypertension     Hypokalemia 10/20/2022    Insomnia     Intrapontine hemorrhage     Left hemiparesis     Peripheral neuropathy     Stroke     Thyroid disease     Transient cerebral ischemia        Past Surgical History:   Procedure Laterality Date    APPENDECTOMY      CHOLECYSTECTOMY      HYSTERECTOMY      LITHOTRIPSY      Renal lithotripsy    percutaneious insertion of ureteric stent         Review of patient's allergies indicates:   Allergen Reactions    Lamisil [terbinafine] Anaphylaxis    Codeine Itching    Compazine [prochlorperazine] Itching        Current Facility-Administered Medications on File Prior to Encounter   Medication    [DISCONTINUED] acetaminophen tablet 650 mg    [DISCONTINUED] albuterol-ipratropium 2.5 mg-0.5 mg/3 mL nebulizer solution 3 mL    [DISCONTINUED] amLODIPine tablet 10 mg    [DISCONTINUED] atorvastatin tablet 40 mg    [DISCONTINUED] azithromycin (ZITHROMAX) 500 mg in D5W 250 mL  IVPB (admixture device)    [DISCONTINUED] azithromycin tablet 500 mg    [DISCONTINUED] benzonatate capsule 100 mg    [DISCONTINUED] cetirizine tablet 10 mg    [DISCONTINUED] clopidogreL tablet 75 mg    [DISCONTINUED] docusate sodium capsule 100 mg    [DISCONTINUED] enoxaparin injection 40 mg    [DISCONTINUED] gabapentin capsule 300 mg    [DISCONTINUED] guaiFENesin 100 mg/5 ml syrup 200 mg    [DISCONTINUED] Lactobacillus acidophilus capsule 1 capsule    [DISCONTINUED] LORazepam tablet 0.5 mg    [DISCONTINUED] losartan tablet 100 mg    [DISCONTINUED] melatonin tablet 6 mg    [DISCONTINUED] ondansetron injection 4 mg    [DISCONTINUED] pantoprazole EC tablet 40 mg    [DISCONTINUED] piperacillin-tazobactam (ZOSYN) 4.5 g in 0.9% NaCl 100 mL IVPB (MB+)    [DISCONTINUED] sertraline tablet 150 mg    [DISCONTINUED] sodium chloride 0.9% flush 10 mL     Current Outpatient Medications on File Prior to Encounter   Medication Sig    acetaminophen (TYLENOL) 325 MG tablet Take 650 mg by mouth every 6 (six) hours as needed.    albuterol (PROAIR HFA) 90 mcg/actuation inhaler Inhale 2 puffs into the lungs every 6 (six) hours as needed for Wheezing. Rescue    albuterol-ipratropium (DUO-NEB) 2.5 mg-0.5 mg/3 mL nebulizer solution Take 3 mLs by nebulization every 6 (six) hours while awake. Rescue    amLODIPine (NORVASC) 10 MG tablet Take 1 tablet (10 mg total) by mouth once daily.    atorvastatin (LIPITOR) 40 MG tablet Take 1 tablet (40 mg total) by mouth every evening.    cetirizine (ZYRTEC) 10 MG tablet Take 1 tablet (10 mg total) by mouth once daily.    clopidogreL (PLAVIX)  75 mg tablet Take 1 tablet (75 mg total) by mouth once daily.    docusate sodium (COLACE) 100 MG capsule Take 1 capsule (100 mg total) by mouth 2 (two) times daily.    gabapentin (NEURONTIN) 300 MG capsule Take 1 capsule (300 mg total) by mouth 2 (two) times daily. (Patient taking differently: Take 600 mg by mouth 2 (two) times daily.)    LORazepam (ATIVAN) 0.5 MG tablet Take 1 tablet (0.5 mg total) by mouth every 12 (twelve) hours as needed for Anxiety. (Patient taking differently: Take 0.5 mg by mouth once daily.)    multivitamin-minerals-lutein (MULTIVITAMIN 50 PLUS) Tab Take 1 tablet by mouth once daily.    olmesartan (BENICAR) 40 MG tablet Take 1 tablet (40 mg total) by mouth once daily.    omega 3-dha-epa-fish oil 1,000 mg (120 mg-180 mg) Cap Take 1 capsule by mouth once daily.    pantoprazole (PROTONIX) 40 MG tablet Take 1 tablet (40 mg total) by mouth once daily.    sertraline (ZOLOFT) 50 MG tablet Take 3 tablets (150 mg total) by mouth once daily.    [DISCONTINUED] hydroCHLOROthiazide (HYDRODIURIL) 12.5 MG Tab Take 1 tablet (12.5 mg total) by mouth once daily.    [DISCONTINUED] omeprazole (PRILOSEC) 40 MG capsule Take 40 mg by mouth every evening.     Family History    None       Tobacco Use    Smoking status: Never    Smokeless tobacco: Never   Substance and Sexual Activity    Alcohol use: Never    Drug use: Never    Sexual activity: Not on file     Review of Systems   Constitutional:  Negative for appetite change, fatigue and fever.   HENT:  Negative for sore throat.    Respiratory:  Positive for cough. Negative for chest tightness, shortness of breath and wheezing.    Cardiovascular:  Negative for leg swelling.   Gastrointestinal:  Negative for abdominal distention, abdominal pain, constipation, diarrhea, nausea and vomiting.   Genitourinary:  Negative for difficulty urinating and dysuria.   Neurological:  Positive for weakness. Negative for headaches.     Objective:     Vital Signs (Most Recent):  Temp:  98.5 °F (36.9 °C) (09/11/24 0432)  Pulse: 81 (09/11/24 0432)  Resp: 18 (09/11/24 0432)  BP: 108/68 (09/11/24 0432)  SpO2: 96 % (09/11/24 0432) Vital Signs (24h Range):  Temp:  [98 °F (36.7 °C)-98.5 °F (36.9 °C)] 98.5 °F (36.9 °C)  Pulse:  [67-88] 81  Resp:  [16-20] 18  SpO2:  [92 %-96 %] 96 %  BP: (108-133)/(68-87) 108/68     Weight: 83.7 kg (184 lb 8 oz)  Body mass index is 31.67 kg/m².     Physical Exam  HENT:      Head: Normocephalic.      Mouth/Throat:      Mouth: Mucous membranes are moist.   Eyes:      Pupils: Pupils are equal, round, and reactive to light.   Cardiovascular:      Rate and Rhythm: Normal rate and regular rhythm.      Pulses: Normal pulses.      Heart sounds: Normal heart sounds.   Pulmonary:      Effort: Pulmonary effort is normal.      Breath sounds: Normal breath sounds.      Comments: O2 per nc at 2 liters with sat of 96%  Abdominal:      General: Bowel sounds are normal. There is no distension.      Palpations: Abdomen is soft. There is no mass.      Tenderness: There is no abdominal tenderness.      Hernia: No hernia is present.   Musculoskeletal:         General: No swelling, tenderness, deformity or signs of injury. Normal range of motion.      Cervical back: Normal range of motion.   Skin:     General: Skin is warm and dry.      Coloration: Skin is not jaundiced or pale.      Findings: No bruising or erythema.   Neurological:      Mental Status: She is alert and oriented to person, place, and time.      Motor: Weakness present.   Psychiatric:         Mood and Affect: Mood normal.              CRANIAL NERVES     CN III, IV, VI   Pupils are equal, round, and reactive to light.       Significant Labs: All pertinent labs within the past 24 hours have been reviewed.  Recent Lab Results       None            Significant Imaging: I have reviewed all pertinent imaging results/findings within the past 24 hours.  Assessment/Plan:     * Weakness  09/11/24 PT and OT to evaluate and treat        Urinary tract infection due to extended-spectrum beta lactamase (ESBL) producing Escherichia coli    09/11/24 urine culture showed ESBL ecoli  Continue zosyn for a total of 5 days     Pneumonia of both upper lobes due to infectious organism    Chest xray from 09/07 shows Multifocal pneumonia in upper lobes. Continue zosyn and zithromax . She was started on dextromethorphan-guaifenesin PRN for cough and we will place her on DuoNebs q.6 hours prn .  Blood cultures show no growth.         Hypertension  Chronic, controlled. Latest blood pressure and vitals reviewed-     Temp:  [98 °F (36.7 °C)-98.5 °F (36.9 °C)]   Pulse:  [67-88]   Resp:  [16-20]   BP: (108-133)/(68-87)   SpO2:  [92 %-96 %] .   Home meds for hypertension were reviewed and noted below.   Hypertension Medications               amLODIPine (NORVASC) 10 MG tablet Take 1 tablet (10 mg total) by mouth once daily.    olmesartan (BENICAR) 40 MG tablet Take 1 tablet (40 mg total) by mouth once daily.            While in the hospital, will manage blood pressure as follows; Continue home antihypertensive regimen    Will utilize p.r.n. blood pressure medication only if patient's blood pressure greater than 180/110 and she develops symptoms such as worsening chest pain or shortness of breath.    Generalized anxiety disorder  Lorazepam 0.5. mg po every 12 hours prn         VTE Risk Mitigation (From admission, onward)           Ordered     enoxaparin injection 40 mg  Every 24 hours         09/10/24 8660                                    Chuy Daugherty IV, DO  Department of Hospital Medicine  [facility] - Medical Surgical Unit

## 2024-09-11 NOTE — HOSPITAL COURSE
0700 states she wanted to go home. Talked with her re need of abx to treat ESBL ecoli and pneumonia. Told her she would have to sign AMA because she is not medically ready for discharge. She asked if abx could be given through home health.  09/12/24 Awake and resting in bed without complaints. Continues to have occasional cough. Sat on room air is 93%. Afebrile , WBC 7.6. Continue abx for ESBL ecoli. Walked a total of 100 feet with therapist yesterday. Will complete abx on Saturday morning then wants to dc home.  09/14/24 Pt is receiving her final day of dosing with Piperacillin today. Her last dose is scheduled to run over 4 hours starting at 1500. She is afebrile. O2 sats ranging 94-96% in the last 24 hours. Upon assessment this morning, she is alert. She reports that she is feeling some better. She reports prod cough that is now clear sputum as opposed to the green she was having prior to admission. She denies dyspnea. Her lungs are noted with coarse sounds with some congestion in upper lobes bilat. Duoneb treatments are increased to routine dosing today due to congestion. She is scheduled for discharge today but since her antibiotic dosing will not be completed until 1900 and congestion is noted, it is recommended that she stay one more night. Her case was discussed with Dr. Keane, and he agreed with plan.  09/15/24 Patient reports she is feeling much better today.  She has completed her antibiotics and is scheduled to go home.  I have sent her in some [facility] for her cough and refilled her albuterol and neb.  I encouraged her to follow-up with a local family doctor or practitioner this week.    Patient counseled on the importance of keeping all hospital follow-up appointments and compliance with medications, therapies or devices as prescribed in order to provide the best health outcomes and decrease the risk of hospital readmission.  Additionally, patient was given written literature regarding their  current disease processes and home care recommendations.  Patient given opportunity to ask questions and verbalized understandings of all information discussed.

## 2024-09-11 NOTE — PLAN OF CARE
Problem: Adult Inpatient Plan of Care  Goal: Plan of Care Review  Outcome: Progressing  Goal: Absence of Hospital-Acquired Illness or Injury  Outcome: Progressing     Problem: Pneumonia  Goal: Fluid Balance  Outcome: Progressing  Goal: Resolution of Infection Signs and Symptoms  Outcome: Progressing  Goal: Effective Oxygenation and Ventilation  Outcome: Progressing

## 2024-09-11 NOTE — ASSESSMENT & PLAN NOTE
Chronic, controlled. Latest blood pressure and vitals reviewed-     Temp:  [98 °F (36.7 °C)-98.5 °F (36.9 °C)]   Pulse:  [67-88]   Resp:  [16-20]   BP: (108-133)/(68-87)   SpO2:  [92 %-96 %] .   Home meds for hypertension were reviewed and noted below.   Hypertension Medications               amLODIPine (NORVASC) 10 MG tablet Take 1 tablet (10 mg total) by mouth once daily.    olmesartan (BENICAR) 40 MG tablet Take 1 tablet (40 mg total) by mouth once daily.            While in the hospital, will manage blood pressure as follows; Continue home antihypertensive regimen    Will utilize p.r.n. blood pressure medication only if patient's blood pressure greater than 180/110 and she develops symptoms such as worsening chest pain or shortness of breath.

## 2024-09-11 NOTE — PLAN OF CARE
Problem: Pneumonia  Goal: Fluid Balance  Outcome: Progressing  Goal: Effective Oxygenation and Ventilation  Outcome: Progressing  Intervention: Promote Airway Secretion Clearance  Flowsheets (Taken 9/11/2024 1502)  Breathing Techniques/Airway Clearance: deep/controlled cough encouraged  Cough And Deep Breathing: done independently per patient

## 2024-09-11 NOTE — PLAN OF CARE
Problem: Physical Therapy  Goal: Physical Therapy Goal  Description: Short-term Goals: 3 days  1. Patient will perform supine to/from sit with supervision to improve independence and safety with bed mobility.  2. Patient will perform sit to/from stand with a rolling walker with supervision to improve independence and safety with transfers.  3. Patient will ambulate 150 feet with a rolling walker with standby assist to improve independence and safety with gait.  4. Patient will tolerate 15 minutes of physical therapy intervention to improve endurance and activity tolerance.    Long-term goals: 1 weeks  1. Patient will perform supine to/from sit with modified independence to improve independence and safety with bed mobility.  2. Patient will perform sit to/from stand with a rolling walker with modified independence to improve independence and safety with transfers.  3. Patient will ambulate 200 feet with a rolling walker with modified independence to improve independence and safety with gait.  4. Patient will tolerate 30 minutes of physical therapy intervention to improve endurance and activity tolerance.   Outcome: Progressing

## 2024-09-12 LAB
ANION GAP SERPL CALCULATED.3IONS-SCNC: 10 MMOL/L (ref 7–16)
BASOPHILS # BLD AUTO: 0.05 K/UL (ref 0–0.2)
BASOPHILS NFR BLD AUTO: 0.7 % (ref 0–1)
BUN SERPL-MCNC: 15 MG/DL (ref 7–18)
BUN/CREAT SERPL: 16 (ref 6–20)
CALCIUM SERPL-MCNC: 8.6 MG/DL (ref 8.5–10.1)
CHLORIDE SERPL-SCNC: 103 MMOL/L (ref 98–107)
CO2 SERPL-SCNC: 29 MMOL/L (ref 21–32)
CREAT SERPL-MCNC: 0.96 MG/DL (ref 0.55–1.02)
DIFFERENTIAL METHOD BLD: ABNORMAL
EGFR (NO RACE VARIABLE) (RUSH/TITUS): 69 ML/MIN/1.73M2
EOSINOPHIL # BLD AUTO: 0.48 K/UL (ref 0–0.5)
EOSINOPHIL NFR BLD AUTO: 6.3 % (ref 1–4)
EOSINOPHIL NFR BLD MANUAL: 5 % (ref 1–4)
ERYTHROCYTE [DISTWIDTH] IN BLOOD BY AUTOMATED COUNT: 14.9 % (ref 11.5–14.5)
GLUCOSE SERPL-MCNC: 118 MG/DL (ref 74–106)
HCT VFR BLD AUTO: 36.8 % (ref 38–47)
HGB BLD-MCNC: 11.6 G/DL (ref 12–16)
IMM GRANULOCYTES # BLD AUTO: 0.33 K/UL (ref 0–0.04)
IMM GRANULOCYTES NFR BLD: 4.3 % (ref 0–0.4)
LYMPHOCYTES # BLD AUTO: 2 K/UL (ref 1–4.8)
LYMPHOCYTES NFR BLD AUTO: 26.1 % (ref 27–41)
LYMPHOCYTES NFR BLD MANUAL: 29 % (ref 27–41)
MCH RBC QN AUTO: 27.1 PG (ref 27–31)
MCHC RBC AUTO-ENTMCNC: 31.5 G/DL (ref 32–36)
MCV RBC AUTO: 86 FL (ref 80–96)
MONOCYTES # BLD AUTO: 0.62 K/UL (ref 0–0.8)
MONOCYTES NFR BLD AUTO: 8.1 % (ref 2–6)
MONOCYTES NFR BLD MANUAL: 9 % (ref 2–6)
MPC BLD CALC-MCNC: 9.3 FL (ref 9.4–12.4)
NEUTROPHILS # BLD AUTO: 4.18 K/UL (ref 1.8–7.7)
NEUTROPHILS NFR BLD AUTO: 54.5 % (ref 53–65)
NEUTS BAND NFR BLD MANUAL: 5 % (ref 1–5)
NEUTS SEG NFR BLD MANUAL: 52 % (ref 50–62)
NRBC # BLD AUTO: 0 X10E3/UL
NRBC, AUTO (.00): 0 %
PLATELET # BLD AUTO: 214 K/UL (ref 150–400)
PLATELET MORPHOLOGY: NORMAL
POTASSIUM SERPL-SCNC: 4.2 MMOL/L (ref 3.5–5.1)
RBC # BLD AUTO: 4.28 M/UL (ref 4.2–5.4)
RBC MORPH BLD: NORMAL
SODIUM SERPL-SCNC: 138 MMOL/L (ref 136–145)
WBC # BLD AUTO: 7.66 K/UL (ref 4.5–11)

## 2024-09-12 PROCEDURE — 63600175 PHARM REV CODE 636 W HCPCS: Performed by: NURSE PRACTITIONER

## 2024-09-12 PROCEDURE — 97116 GAIT TRAINING THERAPY: CPT

## 2024-09-12 PROCEDURE — 94761 N-INVAS EAR/PLS OXIMETRY MLT: CPT

## 2024-09-12 PROCEDURE — 36415 COLL VENOUS BLD VENIPUNCTURE: CPT | Performed by: NURSE PRACTITIONER

## 2024-09-12 PROCEDURE — 27000221 HC OXYGEN, UP TO 24 HOURS

## 2024-09-12 PROCEDURE — 25000003 PHARM REV CODE 250: Performed by: FAMILY MEDICINE

## 2024-09-12 PROCEDURE — 63700000 PHARM REV CODE 250 ALT 637 W/O HCPCS: Performed by: NURSE PRACTITIONER

## 2024-09-12 PROCEDURE — 85025 COMPLETE CBC W/AUTO DIFF WBC: CPT | Performed by: NURSE PRACTITIONER

## 2024-09-12 PROCEDURE — 11000004 HC SNF PRIVATE

## 2024-09-12 PROCEDURE — 97530 THERAPEUTIC ACTIVITIES: CPT

## 2024-09-12 PROCEDURE — 80048 BASIC METABOLIC PNL TOTAL CA: CPT | Performed by: NURSE PRACTITIONER

## 2024-09-12 PROCEDURE — 99309 SBSQ NF CARE MODERATE MDM 30: CPT | Mod: ,,, | Performed by: NURSE PRACTITIONER

## 2024-09-12 PROCEDURE — 25000003 PHARM REV CODE 250: Performed by: NURSE PRACTITIONER

## 2024-09-12 RX ADMIN — DOCUSATE SODIUM 100 MG: 100 CAPSULE, LIQUID FILLED ORAL at 08:09

## 2024-09-12 RX ADMIN — Medication 1 CAPSULE: at 08:09

## 2024-09-12 RX ADMIN — ENOXAPARIN SODIUM 40 MG: 40 INJECTION SUBCUTANEOUS at 05:09

## 2024-09-12 RX ADMIN — GABAPENTIN 300 MG: 300 CAPSULE ORAL at 08:09

## 2024-09-12 RX ADMIN — PIPERACILLIN SODIUM AND TAZOBACTAM SODIUM 4.5 G: 4; .5 INJECTION, POWDER, LYOPHILIZED, FOR SOLUTION INTRAVENOUS at 07:09

## 2024-09-12 RX ADMIN — PIPERACILLIN SODIUM AND TAZOBACTAM SODIUM 4.5 G: 4; .5 INJECTION, POWDER, LYOPHILIZED, FOR SOLUTION INTRAVENOUS at 11:09

## 2024-09-12 RX ADMIN — GUAIFENESIN 200 MG: 100 LIQUID ORAL at 08:09

## 2024-09-12 RX ADMIN — CLOPIDOGREL BISULFATE 75 MG: 75 TABLET ORAL at 08:09

## 2024-09-12 RX ADMIN — PIPERACILLIN SODIUM AND TAZOBACTAM SODIUM 4.5 G: 4; .5 INJECTION, POWDER, LYOPHILIZED, FOR SOLUTION INTRAVENOUS at 03:09

## 2024-09-12 RX ADMIN — ATORVASTATIN CALCIUM 40 MG: 40 TABLET, FILM COATED ORAL at 08:09

## 2024-09-12 RX ADMIN — AMLODIPINE BESYLATE 10 MG: 5 TABLET ORAL at 08:09

## 2024-09-12 RX ADMIN — PANTOPRAZOLE SODIUM 40 MG: 40 TABLET, DELAYED RELEASE ORAL at 08:09

## 2024-09-12 RX ADMIN — LOSARTAN POTASSIUM 100 MG: 50 TABLET, FILM COATED ORAL at 08:09

## 2024-09-12 RX ADMIN — LORAZEPAM 0.5 MG: 0.5 TABLET ORAL at 08:09

## 2024-09-12 RX ADMIN — AZITHROMYCIN DIHYDRATE 250 MG: 250 TABLET, FILM COATED ORAL at 08:09

## 2024-09-12 RX ADMIN — SERTRALINE HYDROCHLORIDE 150 MG: 50 TABLET ORAL at 08:09

## 2024-09-12 RX ADMIN — CETIRIZINE HYDROCHLORIDE 10 MG: 10 TABLET, FILM COATED ORAL at 08:09

## 2024-09-12 RX ADMIN — Medication 1 CAPSULE: at 11:09

## 2024-09-12 RX ADMIN — Medication 1 CAPSULE: at 05:09

## 2024-09-12 NOTE — PT/OT/SLP PROGRESS
Physical Therapy Treatment    Patient Name:  Herlinda Valdovinos   MRN:  [id number]    Recommendations:     Discharge Recommendations: Low Intensity Therapy  Discharge Equipment Recommendations: none  Barriers to discharge: None    Assessment:     Herlinda Valdovinos is a 59 y.o. female admitted with a medical diagnosis of Weakness.  She presents with the following impairments/functional limitations: weakness, impaired endurance, impaired self care skills, impaired functional mobility, gait instability, impaired balance, decreased lower extremity function patient agreeable to physical therapy today, stating she would like to go home saturday. She completed transfers with stand by assistance still with slight verbal cues needed to maximize safety during transfers and ambulation. She ambulated 2x 72 feet with rolling walker and stand by assistance today. Will progress as tolerated.    Rehab Prognosis: Good; patient would benefit from acute skilled PT services to address these deficits and reach maximum level of function.    Recent Surgery: * No surgery found *      Plan:     During this hospitalization, patient to be seen 5 x/week to address the identified rehab impairments via gait training, therapeutic activities, therapeutic exercises and progress toward the following goals:    Plan of Care Expires:       Subjective     Chief Complaint: slight sob and abdomen soreness from coughing.  Patient/Family Comments/goals: patient states she would like to go home saturday  Pain/Comfort:  Pain Rating 1: 0/10      Objective:     Communicated with nurse prior to session.  Patient found HOB elevated with peripheral IV upon PT entry to room.     General Precautions: Standard, fall  Orthopedic Precautions:    Braces:    Respiratory Status: Room air     Functional Mobility:  Bed Mobility:     Supine to Sit: independence  Sit to Supine: independence  Transfers:     Sit to Stand:  stand by assistance with rolling walker  Bed to Chair:  stand by assistance with  rolling walker  using  Step Transfer  Gait: pt ambulated 2 x 72 feet with stand by assistance and rolling walker      AM-PAC 6 CLICK MOBILITY  Turning over in bed (including adjusting bedclothes, sheets and blankets)?: 4  Sitting down on and standing up from a chair with arms (e.g., wheelchair, bedside commode, etc.): 4  Moving from lying on back to sitting on the side of the bed?: 4  Moving to and from a bed to a chair (including a wheelchair)?: 4  Need to walk in hospital room?: 4  Climbing 3-5 steps with a railing?: 3  Basic Mobility Total Score: 23       Treatment & Education:  Bed mobility, transfers, gait as above  Sit to stand x 6  Long arc quad, hip add/abd, heel raise, seated march x 20    Patient left up in chair with all lines intact and call button in reach..    GOALS:   Multidisciplinary Problems       Physical Therapy Goals          Problem: Physical Therapy    Goal Priority Disciplines Outcome Goal Variances Interventions   Physical Therapy Goal     PT, PT/OT Progressing     Description: Short-term Goals: 3 days  1. Patient will perform supine to/from sit with supervision to improve independence and safety with bed mobility.  2. Patient will perform sit to/from stand with a rolling walker with supervision to improve independence and safety with transfers.  3. Patient will ambulate 150 feet with a rolling walker with standby assist to improve independence and safety with gait.  4. Patient will tolerate 15 minutes of physical therapy intervention to improve endurance and activity tolerance.    Long-term goals: 1 weeks  1. Patient will perform supine to/from sit with modified independence to improve independence and safety with bed mobility.  2. Patient will perform sit to/from stand with a rolling walker with modified independence to improve independence and safety with transfers.  3. Patient will ambulate 200 feet with a rolling walker with modified independence to improve  independence and safety with gait.  4. Patient will tolerate 30 minutes of physical therapy intervention to improve endurance and activity tolerance.                        Time Tracking:     PT Received On: 09/12/24  PT Start Time: 1036     PT Stop Time: 1101  PT Total Time (min): 25 min     Billable Minutes: Gait Training 17 and Therapeutic Activity 8    Treatment Type: Treatment  PT/PTA: PT     Number of PTA visits since last PT visit: 0     09/12/2024

## 2024-09-12 NOTE — ASSESSMENT & PLAN NOTE
09/11/24 PT and OT to evaluate and treat       09/12/24 walked 100 feet with therapist yesterday, continue PT and OT

## 2024-09-12 NOTE — PLAN OF CARE
Problem: Adult Inpatient Plan of Care  Goal: Plan of Care Review  9/12/2024 0455 by Mayra Jalloh RN  Outcome: Progressing  9/12/2024 0454 by Mayra Jalloh RN  Outcome: Progressing  Goal: Patient-Specific Goal (Individualized)  9/12/2024 0455 by Mayra Jalloh RN  Outcome: Progressing  9/12/2024 0454 by Mayra Jalloh RN  Outcome: Progressing  Goal: Absence of Hospital-Acquired Illness or Injury  9/12/2024 0455 by Mayra Jalloh RN  Outcome: Progressing  9/12/2024 0454 by Mayra Jalloh RN  Outcome: Progressing  Goal: Optimal Comfort and Wellbeing  9/12/2024 0455 by Mayra Jalloh RN  Outcome: Progressing  9/12/2024 0454 by Mayra Jalloh RN  Outcome: Progressing  Goal: Readiness for Transition of Care  Outcome: Progressing

## 2024-09-12 NOTE — SUBJECTIVE & OBJECTIVE
Interval History: ESBL: ecoli UTI, pneumonia     Review of Systems   Constitutional:  Negative for appetite change, fatigue and fever.   HENT:  Negative for sore throat.    Respiratory:  Positive for cough. Negative for chest tightness, shortness of breath and wheezing.    Cardiovascular:  Negative for leg swelling.   Gastrointestinal:  Negative for abdominal distention, abdominal pain, constipation, diarrhea, nausea and vomiting.   Genitourinary:  Negative for difficulty urinating and dysuria.   Neurological:  Positive for weakness. Negative for headaches.     Objective:     Vital Signs (Most Recent):  Temp: 98.1 °F (36.7 °C) (09/12/24 0717)  Pulse: 72 (09/12/24 0717)  Resp: 18 (09/12/24 0717)  BP: 135/86 (09/12/24 0717)  SpO2: (!) 93 % (09/12/24 0717) Vital Signs (24h Range):  Temp:  [97.1 °F (36.2 °C)-98.4 °F (36.9 °C)] 98.1 °F (36.7 °C)  Pulse:  [72-87] 72  Resp:  [18] 18  SpO2:  [92 %-96 %] 93 %  BP: (115-135)/(75-86) 135/86     Weight: 83.7 kg (184 lb 8 oz)  Body mass index is 31.67 kg/m².    Intake/Output Summary (Last 24 hours) at 9/12/2024 1039  Last data filed at 9/12/2024 0832  Gross per 24 hour   Intake 1740 ml   Output --   Net 1740 ml         Physical Exam  HENT:      Head: Normocephalic.      Mouth/Throat:      Mouth: Mucous membranes are moist.   Eyes:      Pupils: Pupils are equal, round, and reactive to light.   Cardiovascular:      Rate and Rhythm: Normal rate and regular rhythm.      Pulses: Normal pulses.      Heart sounds: Normal heart sounds.   Pulmonary:      Effort: Pulmonary effort is normal.      Breath sounds: Rhonchi present.      Comments: O2 sat is 93% on room air  Abdominal:      General: Bowel sounds are normal. There is no distension.      Palpations: Abdomen is soft. There is no mass.      Tenderness: There is no abdominal tenderness.      Hernia: No hernia is present.   Musculoskeletal:         General: No swelling, tenderness, deformity or signs of injury. Normal range of motion.       Cervical back: Normal range of motion.   Skin:     General: Skin is warm and dry.      Coloration: Skin is not jaundiced or pale.      Findings: No bruising or erythema.   Neurological:      Mental Status: She is alert and oriented to person, place, and time.      Motor: Weakness present.   Psychiatric:         Mood and Affect: Mood normal.             Significant Labs: All pertinent labs within the past 24 hours have been reviewed.  Recent Lab Results         09/12/24  0556        Anion Gap 10       Bands 5       Baso # 0.05       Basophil % 0.7       BUN 15       BUN/CREAT RATIO 16       Calcium 8.6       Chloride 103       CO2 29       Creatinine 0.96       Differential Method Manual       eGFR 69       Eos # 0.48       Eos % 6.3        5       Glucose 118       Hematocrit 36.8       Hemoglobin 11.6       Immature Grans (Abs) 0.33       Immature Granulocytes 4.3       Lymph # 2.00       Lymph % 26.1        29       MCH 27.1       MCHC 31.5       MCV 86.0       Mono # 0.62       Mono % 8.1        9       MPV 9.3       Neutrophils, Abs 4.18       Neutrophils Relative 54.5       nRBC 0.0       NUCLEATED RBC ABSOLUTE 0.00       PLATELET MORPHOLOGY Normal       Platelet Count 214       Potassium 4.2       RBC 4.28       RBC Morphology Normal       RDW 14.9       Segmented Neutrophils, Man % 52       Sodium 138       WBC 7.66               Significant Imaging: I have reviewed all pertinent imaging results/findings within the past 24 hours.

## 2024-09-12 NOTE — ASSESSMENT & PLAN NOTE
Chest xray from 09/07 shows Multifocal pneumonia in upper lobes. Continue zosyn and zithromax . She was started on dextromethorphan-guaifenesin PRN for cough and we will place her on DuoNebs q.6 hours prn .  Blood cultures show no growth.     09/12/24 continue zosyn and zithromax

## 2024-09-12 NOTE — PLAN OF CARE
Problem: Pneumonia  Goal: Fluid Balance  Outcome: Progressing  Intervention: Monitor and Manage Fluid Balance  Flowsheets (Taken 9/12/2024 1026)  Fluid/Electrolyte Management: fluids provided

## 2024-09-12 NOTE — ASSESSMENT & PLAN NOTE
09/11/24 urine culture showed ESBL ecoli  Continue zosyn for a total of 5 days     09/12/24 continue abx - will complete Saturday morning then would like to to dc home

## 2024-09-12 NOTE — PROGRESS NOTES
[facility] - Medical Surgical Unit  Hospital Medicine  Progress Note    Patient Name: Herlinda Valdovinos  MRN: [id number]  Patient Class: IP- Swing   Admission Date: 9/10/2024  Length of Stay: 2 days  Attending Physician: Chuy Daugherty IV, DO  Primary Care Provider: Vini Hernandez NP        Subjective:     Principal Problem:Weakness        HPI:  Patient is a 59-year-old female who presented to [facility] ED for evaluation of cough times 2-3 days.  Reports that she has been treating at home with over-the-counter cough medication and breathing treatments with only minimal improvement which prompted her to seek evaluation in the ED yesterday.  She was ultimately diagnosed with multifocal pneumonia and UTI and started on Levaquin 750 mg IV daily with close monitoring of her kidney function.  GFR was 46 with a creatinine of 1.34 during the ED visit.  Blood in urine cultures pending at this time.  We will continue current antibiotics until cultures are available.  Plans to continue hydration with normal saline at 75 mL/hour.  She does have a known history of CVA and most recently ICH 3-4 years prior but has been tolerating Plavix without any complications so we will perform low-dose DVT prophylaxis with Lovenox 30 mg daily.  We will continue home medications and provide DuoNebs q.6 hours while awake.  Monitor CBC and BMP daily.  Case was discussed with Dr. Poe who was in agreement with this plan.     * No surgery found *       Hospital Course:   09/08/24 Patient awake and alert this morning, reports she is feeling well this morning and reports she has been eating well. Patient denies any fever since admission. Patient's labs stable this morning, WBC 5.76, H/H 10.5/32.8, Na 140, K 4.2, Cl 105, BUN 16, Creat 0.94. Patient is receiving IV Levaquin for pneumonia and will continue with this current treatment that was discussed with Dr. Poe. Discussed discharge planning with patient and she is hopeful  for discharge tomorrow.   09/09/24 Resting in bed. No complaints. States she is feeling better this morning. Slight wheeze. Sat on room air is 92%. Urine culture shows growth of ESBL ecoli. DCD levaquin. Will start merrem 1 gm iv every 8 hours x 5 days. PT and OT evaluation. Hope to dc to swing bed for continued IV abx and therapy.  09/10/24 Resting in bed. Reports feeling better this morning. States she was able to cough up sputum last night- clear. Breath sounds are clear this morning. O2 per nc at 2 liters with sat of 95%. Will start weaning down. Appetite fair. BM yesterday. Continue zosyn for ESBL ecoli UTI.   09/10/24 Has been approved for swing bed . Will Dc from acute to swing.         Above info is from acute care stay. She will admit to swing bed today to continue zosyn  for ESBL ecoli and  zosyn and azithromycin for pneumonia. She is generally weak and in need of strengthening. PT and OT consults placed. Talked with her re code status and she is Full code.       Overview/Hospital Course:  0700 states she wanted to go home. Talked with her re need of abx to treat ESBL ecoli and pneumonia. Told her she would have to sign AMA because she is not medically ready for discharge. She asked if abx could be given through home health.  09/12/24 Awake and resting in bed without complaints. Continues to have occasional cough. Sat on room air is 93%. Afebrile , WBC 7.6. Continue abx for ESBL ecoli. Walked a total of 100 feet with therapist yesterday. Will complete abx on Saturday morning then wants to dc home.    Interval History: ESBL: ecoli UTI, pneumonia     Review of Systems   Constitutional:  Negative for appetite change, fatigue and fever.   HENT:  Negative for sore throat.    Respiratory:  Positive for cough. Negative for chest tightness, shortness of breath and wheezing.    Cardiovascular:  Negative for leg swelling.   Gastrointestinal:  Negative for abdominal distention, abdominal pain, constipation, diarrhea,  nausea and vomiting.   Genitourinary:  Negative for difficulty urinating and dysuria.   Neurological:  Positive for weakness. Negative for headaches.     Objective:     Vital Signs (Most Recent):  Temp: 98.1 °F (36.7 °C) (09/12/24 0717)  Pulse: 72 (09/12/24 0717)  Resp: 18 (09/12/24 0717)  BP: 135/86 (09/12/24 0717)  SpO2: (!) 93 % (09/12/24 0717) Vital Signs (24h Range):  Temp:  [97.1 °F (36.2 °C)-98.4 °F (36.9 °C)] 98.1 °F (36.7 °C)  Pulse:  [72-87] 72  Resp:  [18] 18  SpO2:  [92 %-96 %] 93 %  BP: (115-135)/(75-86) 135/86     Weight: 83.7 kg (184 lb 8 oz)  Body mass index is 31.67 kg/m².    Intake/Output Summary (Last 24 hours) at 9/12/2024 1039  Last data filed at 9/12/2024 0832  Gross per 24 hour   Intake 1740 ml   Output --   Net 1740 ml         Physical Exam  HENT:      Head: Normocephalic.      Mouth/Throat:      Mouth: Mucous membranes are moist.   Eyes:      Pupils: Pupils are equal, round, and reactive to light.   Cardiovascular:      Rate and Rhythm: Normal rate and regular rhythm.      Pulses: Normal pulses.      Heart sounds: Normal heart sounds.   Pulmonary:      Effort: Pulmonary effort is normal.      Breath sounds: Rhonchi present.      Comments: O2 sat is 93% on room air  Abdominal:      General: Bowel sounds are normal. There is no distension.      Palpations: Abdomen is soft. There is no mass.      Tenderness: There is no abdominal tenderness.      Hernia: No hernia is present.   Musculoskeletal:         General: No swelling, tenderness, deformity or signs of injury. Normal range of motion.      Cervical back: Normal range of motion.   Skin:     General: Skin is warm and dry.      Coloration: Skin is not jaundiced or pale.      Findings: No bruising or erythema.   Neurological:      Mental Status: She is alert and oriented to person, place, and time.      Motor: Weakness present.   Psychiatric:         Mood and Affect: Mood normal.             Significant Labs: All pertinent labs within the past  24 hours have been reviewed.  Recent Lab Results         09/12/24  0556        Anion Gap 10       Bands 5       Baso # 0.05       Basophil % 0.7       BUN 15       BUN/CREAT RATIO 16       Calcium 8.6       Chloride 103       CO2 29       Creatinine 0.96       Differential Method Manual       eGFR 69       Eos # 0.48       Eos % 6.3        5       Glucose 118       Hematocrit 36.8       Hemoglobin 11.6       Immature Grans (Abs) 0.33       Immature Granulocytes 4.3       Lymph # 2.00       Lymph % 26.1        29       MCH 27.1       MCHC 31.5       MCV 86.0       Mono # 0.62       Mono % 8.1        9       MPV 9.3       Neutrophils, Abs 4.18       Neutrophils Relative 54.5       nRBC 0.0       NUCLEATED RBC ABSOLUTE 0.00       PLATELET MORPHOLOGY Normal       Platelet Count 214       Potassium 4.2       RBC 4.28       RBC Morphology Normal       RDW 14.9       Segmented Neutrophils, Man % 52       Sodium 138       WBC 7.66               Significant Imaging: I have reviewed all pertinent imaging results/findings within the past 24 hours.    Assessment/Plan:      * Weakness  09/11/24 PT and OT to evaluate and treat       09/12/24 walked 100 feet with therapist yesterday, continue PT and OT     Urinary tract infection due to extended-spectrum beta lactamase (ESBL) producing Escherichia coli    09/11/24 urine culture showed ESBL ecoli  Continue zosyn for a total of 5 days     09/12/24 continue abx - will complete Saturday morning then would like to to dc home    Pneumonia of both upper lobes due to infectious organism    Chest xray from 09/07 shows Multifocal pneumonia in upper lobes. Continue zosyn and zithromax . She was started on dextromethorphan-guaifenesin PRN for cough and we will place her on DuoNebs q.6 hours prn .  Blood cultures show no growth.     09/12/24 continue zosyn and zithromax    Hypertension  Chronic, controlled. Latest blood pressure and vitals reviewed-     Temp:  [98 °F (36.7 °C)-98.5 °F (36.9  °C)]   Pulse:  [67-88]   Resp:  [16-20]   BP: (108-133)/(68-87)   SpO2:  [92 %-96 %] .   Home meds for hypertension were reviewed and noted below.   Hypertension Medications               amLODIPine (NORVASC) 10 MG tablet Take 1 tablet (10 mg total) by mouth once daily.    olmesartan (BENICAR) 40 MG tablet Take 1 tablet (40 mg total) by mouth once daily.            While in the hospital, will manage blood pressure as follows; Continue home antihypertensive regimen    Will utilize p.r.n. blood pressure medication only if patient's blood pressure greater than 180/110 and she develops symptoms such as worsening chest pain or shortness of breath.    Generalized anxiety disorder  Lorazepam 0.5. mg po every 12 hours prn         VTE Risk Mitigation (From admission, onward)           Ordered     enoxaparin injection 40 mg  Every 24 hours         09/10/24 2225                    Discharge Planning   POOL: 9/14/2024     Code Status: Full Code   Is the patient medically ready for discharge?:     Reason for patient still in hospital (select all that apply): Treatment  Discharge Plan A: Home with family                  KEVAN Funk  Department of Hospital Medicine   [facility] - [facility] Surgical Unit

## 2024-09-13 LAB
BACTERIA BLD CULT: NORMAL
BACTERIA BLD CULT: NORMAL

## 2024-09-13 PROCEDURE — 27000221 HC OXYGEN, UP TO 24 HOURS

## 2024-09-13 PROCEDURE — 99900035 HC TECH TIME PER 15 MIN (STAT)

## 2024-09-13 PROCEDURE — 63600175 PHARM REV CODE 636 W HCPCS: Performed by: NURSE PRACTITIONER

## 2024-09-13 PROCEDURE — 94640 AIRWAY INHALATION TREATMENT: CPT

## 2024-09-13 PROCEDURE — 97116 GAIT TRAINING THERAPY: CPT | Mod: CQ

## 2024-09-13 PROCEDURE — 97530 THERAPEUTIC ACTIVITIES: CPT | Mod: CQ

## 2024-09-13 PROCEDURE — 25000242 PHARM REV CODE 250 ALT 637 W/ HCPCS: Performed by: NURSE PRACTITIONER

## 2024-09-13 PROCEDURE — 11000004 HC SNF PRIVATE

## 2024-09-13 PROCEDURE — 25000003 PHARM REV CODE 250: Performed by: FAMILY MEDICINE

## 2024-09-13 PROCEDURE — 94761 N-INVAS EAR/PLS OXIMETRY MLT: CPT

## 2024-09-13 PROCEDURE — 25000003 PHARM REV CODE 250: Performed by: NURSE PRACTITIONER

## 2024-09-13 PROCEDURE — 63700000 PHARM REV CODE 250 ALT 637 W/O HCPCS: Performed by: NURSE PRACTITIONER

## 2024-09-13 RX ADMIN — Medication 1 CAPSULE: at 04:09

## 2024-09-13 RX ADMIN — LOSARTAN POTASSIUM 100 MG: 50 TABLET, FILM COATED ORAL at 08:09

## 2024-09-13 RX ADMIN — ATORVASTATIN CALCIUM 40 MG: 40 TABLET, FILM COATED ORAL at 09:09

## 2024-09-13 RX ADMIN — AZITHROMYCIN DIHYDRATE 250 MG: 250 TABLET, FILM COATED ORAL at 08:09

## 2024-09-13 RX ADMIN — Medication 1 CAPSULE: at 08:09

## 2024-09-13 RX ADMIN — SERTRALINE HYDROCHLORIDE 150 MG: 50 TABLET ORAL at 08:09

## 2024-09-13 RX ADMIN — GABAPENTIN 300 MG: 300 CAPSULE ORAL at 08:09

## 2024-09-13 RX ADMIN — GUAIFENESIN 200 MG: 100 LIQUID ORAL at 09:09

## 2024-09-13 RX ADMIN — PANTOPRAZOLE SODIUM 40 MG: 40 TABLET, DELAYED RELEASE ORAL at 08:09

## 2024-09-13 RX ADMIN — PIPERACILLIN SODIUM AND TAZOBACTAM SODIUM 4.5 G: 4; .5 INJECTION, POWDER, LYOPHILIZED, FOR SOLUTION INTRAVENOUS at 06:09

## 2024-09-13 RX ADMIN — GUAIFENESIN 200 MG: 100 LIQUID ORAL at 04:09

## 2024-09-13 RX ADMIN — GABAPENTIN 300 MG: 300 CAPSULE ORAL at 09:09

## 2024-09-13 RX ADMIN — PIPERACILLIN SODIUM AND TAZOBACTAM SODIUM 4.5 G: 4; .5 INJECTION, POWDER, LYOPHILIZED, FOR SOLUTION INTRAVENOUS at 11:09

## 2024-09-13 RX ADMIN — PIPERACILLIN SODIUM AND TAZOBACTAM SODIUM 4.5 G: 4; .5 INJECTION, POWDER, LYOPHILIZED, FOR SOLUTION INTRAVENOUS at 04:09

## 2024-09-13 RX ADMIN — DOCUSATE SODIUM 100 MG: 100 CAPSULE, LIQUID FILLED ORAL at 08:09

## 2024-09-13 RX ADMIN — AMLODIPINE BESYLATE 10 MG: 5 TABLET ORAL at 08:09

## 2024-09-13 RX ADMIN — CLOPIDOGREL BISULFATE 75 MG: 75 TABLET ORAL at 08:09

## 2024-09-13 RX ADMIN — IPRATROPIUM BROMIDE AND ALBUTEROL SULFATE 3 ML: 2.5; .5 SOLUTION RESPIRATORY (INHALATION) at 05:09

## 2024-09-13 RX ADMIN — LORAZEPAM 0.5 MG: 0.5 TABLET ORAL at 09:09

## 2024-09-13 RX ADMIN — ENOXAPARIN SODIUM 40 MG: 40 INJECTION SUBCUTANEOUS at 04:09

## 2024-09-13 RX ADMIN — CETIRIZINE HYDROCHLORIDE 10 MG: 10 TABLET, FILM COATED ORAL at 08:09

## 2024-09-13 RX ADMIN — Medication 1 CAPSULE: at 11:09

## 2024-09-13 RX ADMIN — DOCUSATE SODIUM 100 MG: 100 CAPSULE, LIQUID FILLED ORAL at 09:09

## 2024-09-13 NOTE — PT/OT/SLP PROGRESS
Physical Therapy Treatment    Patient Name:  Herlinda Valdovinos   MRN:  [id number]    Recommendations:     Discharge Recommendations: Low Intensity Therapy  Discharge Equipment Recommendations: none  Barriers to discharge: None    Assessment:     Herlinda Valdovinos is a 59 y.o. female admitted with a medical diagnosis of Weakness.  She presents with the following impairments/functional limitations: weakness, impaired endurance, impaired self care skills, impaired functional mobility, gait instability, impaired balance, decreased lower extremity function Patient agreeable to PT treatment. Patient with improve gait distance this date. Patient is doing well and states she is ready to go home this weekend.    Rehab Prognosis: Good; patient would benefit from acute skilled PT services to address these deficits and reach maximum level of function.    Recent Surgery: * No surgery found *      Plan:     During this hospitalization, patient to be seen 5 x/week to address the identified rehab impairments via gait training, therapeutic activities, therapeutic exercises and progress toward the following goals:    Plan of Care Expires:   9/17/24    Subjective     Chief Complaint: abdominal soreness from coughing  Patient/Family Comments/goals: Patient states she is ready to return home this weekend  Pain/Comfort:   N/A      Objective:     Communicated with PT Cory Carr prior to session.  Patient found supine with   upon PT entry to room.     General Precautions: Standard, fall  Orthopedic Precautions:    Braces:    Respiratory Status: Room air     Functional Mobility:  Bed Mobility:     Supine to Sit: independence  Sit to Supine: independence  Transfers:     Sit to Stand:  supervision with rolling walker  Gait: Patient ambulated 153 feet using rolling walker and stand by assist; good reciprocal steps; verbal cues for stride length and upright posture      AM-PAC 6 CLICK MOBILITY  Turning over in bed (including adjusting  bedclothes, sheets and blankets)?: 4  Sitting down on and standing up from a chair with arms (e.g., wheelchair, bedside commode, etc.): 4  Moving from lying on back to sitting on the side of the bed?: 4  Moving to and from a bed to a chair (including a wheelchair)?: 4  Need to walk in hospital room?: 4  Climbing 3-5 steps with a railing?: 3  Basic Mobility Total Score: 23       Treatment & Education:  Patient performed bed mobility, transfers, and gait as listed above.  Patient performed 5 sit to stands and able to pull pants up without assistance and without loss of balance.    Patient left supine with call button in reach..    GOALS:   Multidisciplinary Problems       Physical Therapy Goals          Problem: Physical Therapy    Goal Priority Disciplines Outcome Goal Variances Interventions   Physical Therapy Goal     PT, PT/OT Progressing     Description: Short-term Goals: 3 days  1. Patient will perform supine to/from sit with supervision to improve independence and safety with bed mobility.  2. Patient will perform sit to/from stand with a rolling walker with supervision to improve independence and safety with transfers.  3. Patient will ambulate 150 feet with a rolling walker with standby assist to improve independence and safety with gait.  4. Patient will tolerate 15 minutes of physical therapy intervention to improve endurance and activity tolerance.    Long-term goals: 1 weeks  1. Patient will perform supine to/from sit with modified independence to improve independence and safety with bed mobility.  2. Patient will perform sit to/from stand with a rolling walker with modified independence to improve independence and safety with transfers.  3. Patient will ambulate 200 feet with a rolling walker with modified independence to improve independence and safety with gait.  4. Patient will tolerate 30 minutes of physical therapy intervention to improve endurance and activity tolerance.                        Time  Tracking:     PT Received On: 09/13/24  PT Start Time: 1030     PT Stop Time: 1057  PT Total Time (min): 27 min     Billable Minutes: Gait Training 15 and Therapeutic Activity 12       PT/PTA: PTA     Number of PTA visits since last PT visit: 1 09/13/2024

## 2024-09-13 NOTE — PLAN OF CARE
[facility] - [facility]  Discharge Assessment    Primary Care Provider: Vini Hernandez NP     Discharge Assessment (most recent)       BRIEF DISCHARGE ASSESSMENT - 09/13/24 7968          Discharge Planning    Assessment Type Discharge Planning Brief Assessment (P)                      Visited with Ms. Valdovinos, she's excited about discharge on 9/14/2024.  Made her aware of last dose of antibiotics.  She will be discharging home to her family at that time.  No DME needed.

## 2024-09-14 PROCEDURE — 25000242 PHARM REV CODE 250 ALT 637 W/ HCPCS: Performed by: NURSE PRACTITIONER

## 2024-09-14 PROCEDURE — 94640 AIRWAY INHALATION TREATMENT: CPT

## 2024-09-14 PROCEDURE — 63600175 PHARM REV CODE 636 W HCPCS: Performed by: NURSE PRACTITIONER

## 2024-09-14 PROCEDURE — 27000221 HC OXYGEN, UP TO 24 HOURS

## 2024-09-14 PROCEDURE — 99900035 HC TECH TIME PER 15 MIN (STAT)

## 2024-09-14 PROCEDURE — 11000004 HC SNF PRIVATE

## 2024-09-14 PROCEDURE — 25000003 PHARM REV CODE 250: Performed by: NURSE PRACTITIONER

## 2024-09-14 PROCEDURE — 94761 N-INVAS EAR/PLS OXIMETRY MLT: CPT

## 2024-09-14 PROCEDURE — 25000003 PHARM REV CODE 250: Performed by: FAMILY MEDICINE

## 2024-09-14 PROCEDURE — 27000944

## 2024-09-14 RX ORDER — IPRATROPIUM BROMIDE AND ALBUTEROL SULFATE 2.5; .5 MG/3ML; MG/3ML
3 SOLUTION RESPIRATORY (INHALATION) EVERY 6 HOURS
Status: DISCONTINUED | OUTPATIENT
Start: 2024-09-14 | End: 2024-09-15 | Stop reason: HOSPADM

## 2024-09-14 RX ADMIN — SERTRALINE HYDROCHLORIDE 150 MG: 50 TABLET ORAL at 08:09

## 2024-09-14 RX ADMIN — Medication 1 CAPSULE: at 11:09

## 2024-09-14 RX ADMIN — DOCUSATE SODIUM 100 MG: 100 CAPSULE, LIQUID FILLED ORAL at 09:09

## 2024-09-14 RX ADMIN — ENOXAPARIN SODIUM 40 MG: 40 INJECTION SUBCUTANEOUS at 04:09

## 2024-09-14 RX ADMIN — CETIRIZINE HYDROCHLORIDE 10 MG: 10 TABLET, FILM COATED ORAL at 08:09

## 2024-09-14 RX ADMIN — GABAPENTIN 300 MG: 300 CAPSULE ORAL at 08:09

## 2024-09-14 RX ADMIN — ATORVASTATIN CALCIUM 40 MG: 40 TABLET, FILM COATED ORAL at 09:09

## 2024-09-14 RX ADMIN — GABAPENTIN 300 MG: 300 CAPSULE ORAL at 09:09

## 2024-09-14 RX ADMIN — IPRATROPIUM BROMIDE AND ALBUTEROL SULFATE 3 ML: 2.5; .5 SOLUTION RESPIRATORY (INHALATION) at 07:09

## 2024-09-14 RX ADMIN — LOSARTAN POTASSIUM 100 MG: 50 TABLET, FILM COATED ORAL at 08:09

## 2024-09-14 RX ADMIN — IPRATROPIUM BROMIDE AND ALBUTEROL SULFATE 3 ML: 2.5; .5 SOLUTION RESPIRATORY (INHALATION) at 12:09

## 2024-09-14 RX ADMIN — PIPERACILLIN SODIUM AND TAZOBACTAM SODIUM 4.5 G: 4; .5 INJECTION, POWDER, LYOPHILIZED, FOR SOLUTION INTRAVENOUS at 07:09

## 2024-09-14 RX ADMIN — PANTOPRAZOLE SODIUM 40 MG: 40 TABLET, DELAYED RELEASE ORAL at 08:09

## 2024-09-14 RX ADMIN — Medication 1 CAPSULE: at 04:09

## 2024-09-14 RX ADMIN — Medication 1 CAPSULE: at 08:09

## 2024-09-14 RX ADMIN — GUAIFENESIN 200 MG: 100 LIQUID ORAL at 09:09

## 2024-09-14 RX ADMIN — AMLODIPINE BESYLATE 10 MG: 5 TABLET ORAL at 08:09

## 2024-09-14 RX ADMIN — LORAZEPAM 0.5 MG: 0.5 TABLET ORAL at 09:09

## 2024-09-14 RX ADMIN — PIPERACILLIN SODIUM AND TAZOBACTAM SODIUM 4.5 G: 4; .5 INJECTION, POWDER, LYOPHILIZED, FOR SOLUTION INTRAVENOUS at 02:09

## 2024-09-14 RX ADMIN — CLOPIDOGREL BISULFATE 75 MG: 75 TABLET ORAL at 08:09

## 2024-09-14 RX ADMIN — DOCUSATE SODIUM 100 MG: 100 CAPSULE, LIQUID FILLED ORAL at 08:09

## 2024-09-14 NOTE — SUBJECTIVE & OBJECTIVE
Interval History: as noted      Review of Systems   Constitutional:  Positive for activity change and fatigue. Negative for appetite change and fever.   HENT:  Negative for sore throat.    Respiratory:  Positive for cough and wheezing. Negative for chest tightness and shortness of breath.    Cardiovascular:  Negative for chest pain, palpitations and leg swelling.   Gastrointestinal:  Negative for abdominal distention, abdominal pain, constipation, diarrhea, nausea and vomiting.   Genitourinary:  Negative for difficulty urinating and dysuria.   Neurological:  Positive for weakness. Negative for headaches.     Objective:     Vital Signs (Most Recent):  Temp: 99 °F (37.2 °C) (09/14/24 1731)  Pulse: 86 (09/14/24 1731)  Resp: 18 (09/14/24 1251)  BP: 123/77 (09/14/24 1731)  SpO2: (!) 94 % (09/14/24 1731) Vital Signs (24h Range):  Temp:  [98.2 °F (36.8 °C)-99 °F (37.2 °C)] 99 °F (37.2 °C)  Pulse:  [76-98] 86  Resp:  [18-20] 18  SpO2:  [94 %-96 %] 94 %  BP: (110-143)/(73-87) 123/77     Weight: 83.7 kg (184 lb 8 oz)  Body mass index is 31.67 kg/m².    Intake/Output Summary (Last 24 hours) at 9/14/2024 1744  Last data filed at 9/14/2024 1154  Gross per 24 hour   Intake 325.22 ml   Output --   Net 325.22 ml         Physical Exam  HENT:      Head: Normocephalic.      Mouth/Throat:      Mouth: Mucous membranes are moist.   Eyes:      Pupils: Pupils are equal, round, and reactive to light.   Cardiovascular:      Rate and Rhythm: Normal rate and regular rhythm.      Pulses: Normal pulses.      Heart sounds: Normal heart sounds.   Pulmonary:      Effort: Pulmonary effort is normal.      Breath sounds: Wheezing (coarse wheeze in upper lobes) and rhonchi (upper lobes-improved after neb) present.      Comments: O2 sat is 93% on room air  Abdominal:      General: Bowel sounds are normal.      Palpations: Abdomen is soft.      Tenderness: There is no abdominal tenderness.   Musculoskeletal:         General: No swelling or tenderness.  Normal range of motion.      Cervical back: Normal range of motion.      Right lower leg: No edema.      Left lower leg: No edema.   Skin:     General: Skin is warm and dry.      Capillary Refill: Capillary refill takes less than 2 seconds.   Neurological:      Mental Status: She is alert and oriented to person, place, and time.      Motor: Weakness (generalized) present.   Psychiatric:         Mood and Affect: Mood normal.             Significant Labs: All pertinent labs within the past 24 hours have been reviewed.      Significant Imaging: I have reviewed all pertinent imaging results/findings within the past 24 hours.

## 2024-09-14 NOTE — PLAN OF CARE
Rec'd pt lying in bed with eyes closed. Pt arousal easily for morning assessment. Pt AAOX3. Resp even. O2 at 2 liters per nasal cannula. Int intact to left forearm. PT ambulates with walker. Incont of bladder at times. Lungs sounds course. Encourage pt to cough and deep breath. Bed low. SR   Problem: Adult Inpatient Plan of Care  Goal: Plan of Care Review  Outcome: Progressing  Goal: Patient-Specific Goal (Individualized)  Outcome: Progressing  Goal: Absence of Hospital-Acquired Illness or Injury  Outcome: Progressing  Goal: Optimal Comfort and Wellbeing  Outcome: Progressing  Goal: Readiness for Transition of Care  Outcome: Progressing     Problem: Pneumonia  Goal: Fluid Balance  Outcome: Progressing  Goal: Resolution of Infection Signs and Symptoms  Outcome: Progressing  Goal: Effective Oxygenation and Ventilation  Outcome: Progressing   up X 2. CB within reach

## 2024-09-14 NOTE — PROGRESS NOTES
[facility] - [facility] Surgical Unit  Hospital Medicine  Progress Note    Patient Name: Herlinda Valdovinos  MRN: [id number]  Patient Class: IP- Swing   Admission Date: 9/10/2024  Length of Stay: 4 days  Attending Physician: Chuy Daugherty IV, DO  Primary Care Provider: Vini Hernandez NP        Subjective:     Principal Problem:Weakness        HPI:  Patient is a 59-year-old female who presented to [facility] ED for evaluation of cough times 2-3 days.  Reports that she has been treating at home with over-the-counter cough medication and breathing treatments with only minimal improvement which prompted her to seek evaluation in the ED yesterday.  She was ultimately diagnosed with multifocal pneumonia and UTI and started on Levaquin 750 mg IV daily with close monitoring of her kidney function.  GFR was 46 with a creatinine of 1.34 during the ED visit.  Blood in urine cultures pending at this time.  We will continue current antibiotics until cultures are available.  Plans to continue hydration with normal saline at 75 mL/hour.  She does have a known history of CVA and most recently ICH 3-4 years prior but has been tolerating Plavix without any complications so we will perform low-dose DVT prophylaxis with Lovenox 30 mg daily.  We will continue home medications and provide DuoNebs q.6 hours while awake.  Monitor CBC and BMP daily.  Case was discussed with Dr. Poe who was in agreement with this plan.     * No surgery found *       Hospital Course:   09/08/24 Patient awake and alert this morning, reports she is feeling well this morning and reports she has been eating well. Patient denies any fever since admission. Patient's labs stable this morning, WBC 5.76, H/H 10.5/32.8, Na 140, K 4.2, Cl 105, BUN 16, Creat 0.94. Patient is receiving IV Levaquin for pneumonia and will continue with this current treatment that was discussed with Dr. Poe. Discussed discharge planning with patient and she is hopeful  for discharge tomorrow.   09/09/24 Resting in bed. No complaints. States she is feeling better this morning. Slight wheeze. Sat on room air is 92%. Urine culture shows growth of ESBL ecoli. DCD levaquin. Will start merrem 1 gm iv every 8 hours x 5 days. PT and OT evaluation. Hope to dc to swing bed for continued IV abx and therapy.  09/10/24 Resting in bed. Reports feeling better this morning. States she was able to cough up sputum last night- clear. Breath sounds are clear this morning. O2 per nc at 2 liters with sat of 95%. Will start weaning down. Appetite fair. BM yesterday. Continue zosyn for ESBL ecoli UTI.   09/10/24 Has been approved for swing bed . Will Dc from acute to swing.         Above info is from acute care stay. She will admit to swing bed today to continue zosyn  for ESBL ecoli and  zosyn and azithromycin for pneumonia. She is generally weak and in need of strengthening. PT and OT consults placed. Talked with her re code status and she is Full code.       Overview/Hospital Course:  0700 states she wanted to go home. Talked with her re need of abx to treat ESBL ecoli and pneumonia. Told her she would have to sign AMA because she is not medically ready for discharge. She asked if abx could be given through home health.  09/12/24 Awake and resting in bed without complaints. Continues to have occasional cough. Sat on room air is 93%. Afebrile , WBC 7.6. Continue abx for ESBL ecoli. Walked a total of 100 feet with therapist yesterday. Will complete abx on Saturday morning then wants to dc home.  09/14/24 Pt is receiving her final day of dosing with Piperacillin today. Her last dose is scheduled to run over 4 hours starting at 1500. She is afebrile. O2 sats ranging 94-96% in the last 24 hours. Upon assessment this morning, she is alert. She reports that she is feeling some better. She reports prod cough that is now clear sputum as opposed to the green she was having prior to admission. She denies  dyspnea. Her lungs are noted with coarse sounds with some congestion in upper lobes bilat. Duoneb treatments are increased to routine dosing today due to congestion. She is scheduled for discharge today but since her antibiotic dosing will not be completed until 1900 and congestion is noted, it is recommended that she stay one more night. Her case was discussed with Dr. Keane, and he agreed with plan.    Interval History: as noted      Review of Systems   Constitutional:  Positive for activity change and fatigue. Negative for appetite change and fever.   HENT:  Negative for sore throat.    Respiratory:  Positive for cough and wheezing. Negative for chest tightness and shortness of breath.    Cardiovascular:  Negative for chest pain, palpitations and leg swelling.   Gastrointestinal:  Negative for abdominal distention, abdominal pain, constipation, diarrhea, nausea and vomiting.   Genitourinary:  Negative for difficulty urinating and dysuria.   Neurological:  Positive for weakness. Negative for headaches.     Objective:     Vital Signs (Most Recent):  Temp: 99 °F (37.2 °C) (09/14/24 1731)  Pulse: 86 (09/14/24 1731)  Resp: 18 (09/14/24 1251)  BP: 123/77 (09/14/24 1731)  SpO2: (!) 94 % (09/14/24 1731) Vital Signs (24h Range):  Temp:  [98.2 °F (36.8 °C)-99 °F (37.2 °C)] 99 °F (37.2 °C)  Pulse:  [76-98] 86  Resp:  [18-20] 18  SpO2:  [94 %-96 %] 94 %  BP: (110-143)/(73-87) 123/77     Weight: 83.7 kg (184 lb 8 oz)  Body mass index is 31.67 kg/m².    Intake/Output Summary (Last 24 hours) at 9/14/2024 1744  Last data filed at 9/14/2024 1154  Gross per 24 hour   Intake 325.22 ml   Output --   Net 325.22 ml         Physical Exam  HENT:      Head: Normocephalic.      Mouth/Throat:      Mouth: Mucous membranes are moist.   Eyes:      Pupils: Pupils are equal, round, and reactive to light.   Cardiovascular:      Rate and Rhythm: Normal rate and regular rhythm.      Pulses: Normal pulses.      Heart sounds: Normal heart sounds.    Pulmonary:      Effort: Pulmonary effort is normal.      Breath sounds: Wheezing (coarse wheeze in upper lobes) and rhonchi (upper lobes-improved after neb) present.      Comments: O2 sat is 93% on room air  Abdominal:      General: Bowel sounds are normal.      Palpations: Abdomen is soft.      Tenderness: There is no abdominal tenderness.   Musculoskeletal:         General: No swelling or tenderness. Normal range of motion.      Cervical back: Normal range of motion.      Right lower leg: No edema.      Left lower leg: No edema.   Skin:     General: Skin is warm and dry.      Capillary Refill: Capillary refill takes less than 2 seconds.   Neurological:      Mental Status: She is alert and oriented to person, place, and time.      Motor: Weakness (generalized) present.   Psychiatric:         Mood and Affect: Mood normal.             Significant Labs: All pertinent labs within the past 24 hours have been reviewed.      Significant Imaging: I have reviewed all pertinent imaging results/findings within the past 24 hours.    Assessment/Plan:      * Weakness  09/11/24 PT and OT to evaluate and treat       09/12/24 walked 100 feet with therapist yesterday, continue PT and OT     Pneumonia of both upper lobes due to infectious organism    Chest xray from 09/07 shows Multifocal pneumonia in upper lobes. Continue zosyn and zithromax . She was started on dextromethorphan-guaifenesin PRN for cough and we will place her on DuoNebs q.6 hours prn .  Blood cultures show no growth.     09/12/24 continue zosyn and zithromax    Urinary tract infection due to extended-spectrum beta lactamase (ESBL) producing Escherichia coli    09/11/24 urine culture showed ESBL ecoli  Continue zosyn for a total of 5 days     09/12/24 continue abx - will complete Saturday morning then would like to to dc home    Hypertension  Chronic, controlled. Latest blood pressure and vitals reviewed-     Temp:  [98 °F (36.7 °C)-98.5 °F (36.9 °C)]   Pulse:   [67-88]   Resp:  [16-20]   BP: (108-133)/(68-87)   SpO2:  [92 %-96 %] .   Home meds for hypertension were reviewed and noted below.   Hypertension Medications               amLODIPine (NORVASC) 10 MG tablet Take 1 tablet (10 mg total) by mouth once daily.    olmesartan (BENICAR) 40 MG tablet Take 1 tablet (40 mg total) by mouth once daily.            While in the hospital, will manage blood pressure as follows; Continue home antihypertensive regimen    Will utilize p.r.n. blood pressure medication only if patient's blood pressure greater than 180/110 and she develops symptoms such as worsening chest pain or shortness of breath.    Generalized anxiety disorder  Lorazepam 0.5. mg po every 12 hours prn         VTE Risk Mitigation (From admission, onward)           Ordered     enoxaparin injection 40 mg  Every 24 hours         09/10/24 2225                    Discharge Planning   POOL: 9/14/2024     Code Status: Full Code   Is the patient medically ready for discharge?:     Reason for patient still in hospital (select all that apply): Treatment  Discharge Plan A: Home with family                  Sherri Noguera NP  Department of Hospital Medicine   [facility]

## 2024-09-15 VITALS
WEIGHT: 184.5 LBS | BODY MASS INDEX: 31.67 KG/M2 | OXYGEN SATURATION: 95 % | RESPIRATION RATE: 20 BRPM | TEMPERATURE: 99 F | DIASTOLIC BLOOD PRESSURE: 79 MMHG | HEART RATE: 92 BPM | SYSTOLIC BLOOD PRESSURE: 121 MMHG

## 2024-09-15 PROBLEM — B96.29 URINARY TRACT INFECTION DUE TO EXTENDED-SPECTRUM BETA LACTAMASE (ESBL) PRODUCING ESCHERICHIA COLI: Status: RESOLVED | Noted: 2022-10-26 | Resolved: 2024-09-15

## 2024-09-15 PROBLEM — Z16.12 URINARY TRACT INFECTION DUE TO EXTENDED-SPECTRUM BETA LACTAMASE (ESBL) PRODUCING ESCHERICHIA COLI: Status: RESOLVED | Noted: 2022-10-26 | Resolved: 2024-09-15

## 2024-09-15 PROBLEM — N39.0 URINARY TRACT INFECTION DUE TO EXTENDED-SPECTRUM BETA LACTAMASE (ESBL) PRODUCING ESCHERICHIA COLI: Status: RESOLVED | Noted: 2022-10-26 | Resolved: 2024-09-15

## 2024-09-15 PROBLEM — J18.9 PNEUMONIA OF BOTH UPPER LOBES DUE TO INFECTIOUS ORGANISM: Status: RESOLVED | Noted: 2024-09-07 | Resolved: 2024-09-15

## 2024-09-15 PROCEDURE — 27000221 HC OXYGEN, UP TO 24 HOURS

## 2024-09-15 PROCEDURE — 25000003 PHARM REV CODE 250: Performed by: NURSE PRACTITIONER

## 2024-09-15 PROCEDURE — 94761 N-INVAS EAR/PLS OXIMETRY MLT: CPT

## 2024-09-15 PROCEDURE — 25000242 PHARM REV CODE 250 ALT 637 W/ HCPCS: Performed by: NURSE PRACTITIONER

## 2024-09-15 PROCEDURE — 94640 AIRWAY INHALATION TREATMENT: CPT

## 2024-09-15 PROCEDURE — 25000003 PHARM REV CODE 250: Performed by: FAMILY MEDICINE

## 2024-09-15 RX ORDER — ALBUTEROL SULFATE 90 UG/1
2 INHALANT RESPIRATORY (INHALATION) EVERY 4 HOURS PRN
Qty: 18 G | Refills: 0 | Status: SHIPPED | OUTPATIENT
Start: 2024-09-15

## 2024-09-15 RX ORDER — GUAIFENESIN AND DEXTROMETHORPHAN HYDROBROMIDE 1200; 60 MG/1; MG/1
1 TABLET, EXTENDED RELEASE ORAL 2 TIMES DAILY PRN
Qty: 20 TABLET | Refills: 0 | Status: SHIPPED | OUTPATIENT
Start: 2024-09-15 | End: 2024-09-25

## 2024-09-15 RX ORDER — IPRATROPIUM BROMIDE AND ALBUTEROL SULFATE 2.5; .5 MG/3ML; MG/3ML
3 SOLUTION RESPIRATORY (INHALATION)
Qty: 30 EACH | Refills: 0 | Status: SHIPPED | OUTPATIENT
Start: 2024-09-15

## 2024-09-15 RX ADMIN — Medication 1 CAPSULE: at 08:09

## 2024-09-15 RX ADMIN — Medication 1 CAPSULE: at 12:09

## 2024-09-15 RX ADMIN — IPRATROPIUM BROMIDE AND ALBUTEROL SULFATE 3 ML: 2.5; .5 SOLUTION RESPIRATORY (INHALATION) at 07:09

## 2024-09-15 RX ADMIN — DOCUSATE SODIUM 100 MG: 100 CAPSULE, LIQUID FILLED ORAL at 08:09

## 2024-09-15 RX ADMIN — AMLODIPINE BESYLATE 10 MG: 5 TABLET ORAL at 08:09

## 2024-09-15 RX ADMIN — SERTRALINE HYDROCHLORIDE 150 MG: 50 TABLET ORAL at 08:09

## 2024-09-15 RX ADMIN — LOSARTAN POTASSIUM 100 MG: 50 TABLET, FILM COATED ORAL at 08:09

## 2024-09-15 RX ADMIN — PANTOPRAZOLE SODIUM 40 MG: 40 TABLET, DELAYED RELEASE ORAL at 08:09

## 2024-09-15 RX ADMIN — IPRATROPIUM BROMIDE AND ALBUTEROL SULFATE 3 ML: 2.5; .5 SOLUTION RESPIRATORY (INHALATION) at 01:09

## 2024-09-15 RX ADMIN — CLOPIDOGREL BISULFATE 75 MG: 75 TABLET ORAL at 08:09

## 2024-09-15 RX ADMIN — CETIRIZINE HYDROCHLORIDE 10 MG: 10 TABLET, FILM COATED ORAL at 08:09

## 2024-09-15 RX ADMIN — GABAPENTIN 300 MG: 300 CAPSULE ORAL at 08:09

## 2024-09-15 NOTE — DISCHARGE SUMMARY
[facility]  Hospital Medicine  Discharge Summary      Patient Name: Herlinda Valdovinos  MRN: [id number]  DANIEL: [id number]  Patient Class: IP- Swing  Admission Date: 9/10/2024  Hospital Length of Stay: 5 days  Discharge Date and Time:  09/15/2024 10:43 AM  Attending Physician: Chuy Daugherty IV, DO   Discharging Provider: KEVAN Fang  Primary Care Provider: Vini Hernandez NP    Primary Care Team: Networked reference to record PCT     HPI:   Patient is a 59-year-old female who presented to [facility] ED for evaluation of cough times 2-3 days.  Reports that she has been treating at home with over-the-counter cough medication and breathing treatments with only minimal improvement which prompted her to seek evaluation in the ED yesterday.  She was ultimately diagnosed with multifocal pneumonia and UTI and started on Levaquin 750 mg IV daily with close monitoring of her kidney function.  GFR was 46 with a creatinine of 1.34 during the ED visit.  Blood in urine cultures pending at this time.  We will continue current antibiotics until cultures are available.  Plans to continue hydration with normal saline at 75 mL/hour.  She does have a known history of CVA and most recently ICH 3-4 years prior but has been tolerating Plavix without any complications so we will perform low-dose DVT prophylaxis with Lovenox 30 mg daily.  We will continue home medications and provide DuoNebs q.6 hours while awake.  Monitor CBC and BMP daily.  Case was discussed with Dr. Poe who was in agreement with this plan.     * No surgery found *       Hospital Course:   09/08/24 Patient awake and alert this morning, reports she is feeling well this morning and reports she has been eating well. Patient denies any fever since admission. Patient's labs stable this morning, WBC 5.76, H/H 10.5/32.8, Na 140, K 4.2, Cl 105, BUN 16, Creat 0.94. Patient is receiving IV Levaquin for pneumonia and will continue  with this current treatment that was discussed with Dr. Poe. Discussed discharge planning with patient and she is hopeful for discharge tomorrow.   09/09/24 Resting in bed. No complaints. States she is feeling better this morning. Slight wheeze. Sat on room air is 92%. Urine culture shows growth of ESBL ecoli. DCD levaquin. Will start merrem 1 gm iv every 8 hours x 5 days. PT and OT evaluation. Hope to dc to swing bed for continued IV abx and therapy.  09/10/24 Resting in bed. Reports feeling better this morning. States she was able to cough up sputum last night- clear. Breath sounds are clear this morning. O2 per nc at 2 liters with sat of 95%. Will start weaning down. Appetite fair. BM yesterday. Continue zosyn for ESBL ecoli UTI.   09/10/24 Has been approved for swing bed . Will Dc from acute to swing.         Above info is from acute care stay. She will admit to swing bed today to continue zosyn  for ESBL ecoli and  zosyn and azithromycin for pneumonia. She is generally weak and in need of strengthening. PT and OT consults placed. Talked with her re code status and she is Full code.       * No surgery found *      Hospital Course:   0700 states she wanted to go home. Talked with her re need of abx to treat ESBL ecoli and pneumonia. Told her she would have to sign AMA because she is not medically ready for discharge. She asked if abx could be given through home health.  09/12/24 Awake and resting in bed without complaints. Continues to have occasional cough. Sat on room air is 93%. Afebrile , WBC 7.6. Continue abx for ESBL ecoli. Walked a total of 100 feet with therapist yesterday. Will complete abx on Saturday morning then wants to dc home.  09/14/24 Pt is receiving her final day of dosing with Piperacillin today. Her last dose is scheduled to run over 4 hours starting at 1500. She is afebrile. O2 sats ranging 94-96% in the last 24 hours. Upon assessment this morning, she is alert. She reports that she is  feeling some better. She reports prod cough that is now clear sputum as opposed to the green she was having prior to admission. She denies dyspnea. Her lungs are noted with coarse sounds with some congestion in upper lobes bilat. Duoneb treatments are increased to routine dosing today due to congestion. She is scheduled for discharge today but since her antibiotic dosing will not be completed until 1900 and congestion is noted, it is recommended that she stay one more night. Her case was discussed with Dr. Keane, and he agreed with plan.  09/15/24 Patient reports she is feeling much better today.  She has completed her antibiotics and is scheduled to go home.  I have sent her in some [facility] DM for her cough and refilled her albuterol and neb.  I encouraged her to follow-up with a local family doctor or practitioner this week.    Patient counseled on the importance of keeping all hospital follow-up appointments and compliance with medications, therapies or devices as prescribed in order to provide the best health outcomes and decrease the risk of hospital readmission.  Additionally, patient was given written literature regarding their current disease processes and home care recommendations.  Patient given opportunity to ask questions and verbalized understandings of all information discussed.       Goals of Care Treatment Preferences:  Code Status: Full Code      SDOH Screening:  The patient was screened for utility difficulties, food insecurity, transport difficulties, housing insecurity, and interpersonal safety and there were no concerns identified this admission.     Consults:     No new Assessment & Plan notes have been filed under this hospital service since the last note was generated.  Service: Hospital Medicine    Final Active Diagnoses:    Diagnosis Date Noted POA    PRINCIPAL PROBLEM:  Weakness [R53.1] 04/11/2024 Yes    Generalized anxiety disorder [F41.1] 05/20/2021 Yes    Hypertension [I10] 05/20/2021  "Yes      Problems Resolved During this Admission:    Diagnosis Date Noted Date Resolved POA    Pneumonia of both upper lobes due to infectious organism [J18.9] 09/07/2024 09/15/2024 Yes    Urinary tract infection due to extended-spectrum beta lactamase (ESBL) producing Escherichia coli [N39.0, B96.29, Z16.12] 10/26/2022 09/15/2024 Yes       Discharged Condition: good    Disposition: Home or Self Care    Follow Up:   Follow-up Information       Vini Hernandez NP. Schedule an appointment as soon as possible for a visit in 3 day(s).    Specialty: Emergency Medicine  Contact information:  [address] [place] [address] [place] [address]  [phone]                           Patient Instructions:      Diet Cardiac     Notify your health care provider if you experience any of the following:  temperature >100.4     Notify your health care provider if you experience any of the following:  persistent nausea and vomiting or diarrhea     Notify your health care provider if you experience any of the following:  difficulty breathing or increased cough     Activity as tolerated       Significant Diagnostic Studies: Labs: BMP: No results for input(s): "GLU", "NA", "K", "CL", "CO2", "BUN", "CREATININE", "CALCIUM", "MG" in the last 48 hours., CMP No results for input(s): "NA", "K", "CL", "CO2", "GLU", "BUN", "CREATININE", "CALCIUM", "PROT", "ALBUMIN", "BILITOT", "ALKPHOS", "AST", "ALT", "ANIONGAP", "ESTGFRAFRICA", "EGFRNONAA" in the last 48 hours., and CBC No results for input(s): "WBC", "HGB", "HCT", "PLT" in the last 48 hours.    Pending Diagnostic Studies:       None           Medications:  Reconciled Home Medications:      Medication List        START taking these medications      dextromethorphan-guaiFENesin 60-1,200 mg per 12 hr tablet  Commonly known as: MUCINEX DM  Take 1 tablet by mouth 2 (two) times daily as needed (cough). Take with a full glass of water            CHANGE how you take these medications      albuterol 90 " mcg/actuation inhaler  Commonly known as: PROAIR HFA  Inhale 2 puffs into the lungs every 4 (four) hours as needed for Wheezing or Shortness of Breath. Rescue  What changed:   when to take this  reasons to take this     gabapentin 300 MG capsule  Commonly known as: NEURONTIN  Take 1 capsule (300 mg total) by mouth 2 (two) times daily.  What changed: how much to take     LORazepam 0.5 MG tablet  Commonly known as: ATIVAN  Take 1 tablet (0.5 mg total) by mouth every 12 (twelve) hours as needed for Anxiety.  What changed: when to take this            CONTINUE taking these medications      acetaminophen 325 MG tablet  Commonly known as: TYLENOL  Take 650 mg by mouth every 6 (six) hours as needed.     albuterol-ipratropium 2.5 mg-0.5 mg/3 mL nebulizer solution  Commonly known as: DUO-NEB  Take 3 mLs by nebulization every 6 (six) hours while awake. Rescue     amLODIPine 10 MG tablet  Commonly known as: NORVASC  Take 1 tablet (10 mg total) by mouth once daily.     atorvastatin 40 MG tablet  Commonly known as: LIPITOR  Take 1 tablet (40 mg total) by mouth every evening.     cetirizine 10 MG tablet  Commonly known as: ZYRTEC  Take 1 tablet (10 mg total) by mouth once daily.     clopidogreL 75 mg tablet  Commonly known as: PLAVIX  Take 1 tablet (75 mg total) by mouth once daily.     docusate sodium 100 MG capsule  Commonly known as: COLACE  Take 1 capsule (100 mg total) by mouth 2 (two) times daily.     MULTIVITAMIN 50 PLUS Tab  Generic drug: multivitamin-minerals-lutein  Take 1 tablet by mouth once daily.     olmesartan 40 MG tablet  Commonly known as: BENICAR  Take 1 tablet (40 mg total) by mouth once daily.     omega 3-dha-epa-fish oil 1,000 mg (120 mg-180 mg) Cap  Take 1 capsule by mouth once daily.     pantoprazole 40 MG tablet  Commonly known as: PROTONIX  Take 1 tablet (40 mg total) by mouth once daily.     sertraline 50 MG tablet  Commonly known as: ZOLOFT  Take 3 tablets (150 mg total) by mouth once daily.               Indwelling Lines/Drains at time of discharge:   Lines/Drains/Airways       None                   Time spent on the discharge of patient: 30 minutes         KEVAN Fang  Department of Hospital Medicine  [facility]

## 2024-09-15 NOTE — DISCHARGE INSTRUCTIONS
Go home and rest.  Drink plenty of water.  Continue your home meds.  I have refilled your albuterol and neb per your request.  You will schedule a follow-up appointment with your family doctor or practitioner this week.  If you develop shortness of breath or any concerning symptoms, you may need to go to the nearest ER for evaluation.  I also sent you in some Mucinex DM.  This is for your cough.  This will help with the mucous in your chest.  It is very important with this medication that you are drinking plenty of water.

## 2024-09-16 ENCOUNTER — PATIENT OUTREACH (OUTPATIENT)
Dept: ADMINISTRATIVE | Facility: CLINIC | Age: 59
End: 2024-09-16

## 2024-09-16 NOTE — PROGRESS NOTES
C3 nurse attempted to contact Herlinda Valdovinos  for a TCC post hospital discharge follow up call. No answer. Left voicemail with callback information. The patient does not have a scheduled HOSFU appointment. C3 nurse was unable to schedule [facility]FU appointment [facility].

## 2024-09-16 NOTE — PROGRESS NOTES
C3 nurse spoke with Herlinda Valdovinos  for a TCC post hospital discharge follow up call. The patient reports does not have a scheduled HOSFU appointment. C3 nurse was unable to schedule [facility]FU appointment [facility]. Patient advised to contact their PCP to schedule a HOSPFU within 5-7 days.

## 2025-05-23 ENCOUNTER — HOSPITAL ENCOUNTER (EMERGENCY)
Facility: HOSPITAL | Age: 60
Discharge: HOME | End: 2025-05-24
Payer: MEDICARE

## 2025-05-23 DIAGNOSIS — J20.9 ACUTE BRONCHITIS, UNSPECIFIED ORGANISM: Primary | ICD-10-CM

## 2025-05-23 DIAGNOSIS — R05.8 PRODUCTIVE COUGH: ICD-10-CM

## 2025-05-23 LAB
BASOPHILS # BLD AUTO: 0.02 K/UL (ref 0–0.2)
BASOPHILS NFR BLD AUTO: 0.3 % (ref 0–1)
DIFFERENTIAL METHOD BLD: ABNORMAL
EOSINOPHIL # BLD AUTO: 0.19 K/UL (ref 0–0.5)
EOSINOPHIL NFR BLD AUTO: 3.1 % (ref 1–4)
ERYTHROCYTE [DISTWIDTH] IN BLOOD BY AUTOMATED COUNT: 15.1 % (ref 11.5–14.5)
HCT VFR BLD AUTO: 37.8 % (ref 38–47)
HGB BLD-MCNC: 12.9 G/DL (ref 12–16)
IMM GRANULOCYTES # BLD AUTO: 0.01 K/UL (ref 0–0.04)
IMM GRANULOCYTES NFR BLD: 0.2 % (ref 0–0.4)
LYMPHOCYTES # BLD AUTO: 1.2 K/UL (ref 1–4.8)
LYMPHOCYTES NFR BLD AUTO: 19.4 % (ref 27–41)
MCH RBC QN AUTO: 27.9 PG (ref 27–31)
MCHC RBC AUTO-ENTMCNC: 34.1 G/DL (ref 32–36)
MCV RBC AUTO: 81.8 FL (ref 80–96)
MONOCYTES # BLD AUTO: 0.66 K/UL (ref 0–0.8)
MONOCYTES NFR BLD AUTO: 10.6 % (ref 2–6)
MPC BLD CALC-MCNC: 9.8 FL (ref 9.4–12.4)
NEUTROPHILS # BLD AUTO: 4.12 K/UL (ref 1.8–7.7)
NEUTROPHILS NFR BLD AUTO: 66.4 % (ref 53–65)
NRBC # BLD AUTO: 0 X10E3/UL
NRBC, AUTO (.00): 0 %
PLATELET # BLD AUTO: 141 K/UL (ref 150–400)
RBC # BLD AUTO: 4.62 M/UL (ref 4.2–5.4)
WBC # BLD AUTO: 6.2 K/UL (ref 4.5–11)

## 2025-05-23 PROCEDURE — 63600175 PHARM REV CODE 636 W HCPCS: Performed by: NURSE PRACTITIONER

## 2025-05-23 PROCEDURE — 80053 COMPREHEN METABOLIC PANEL: CPT | Performed by: NURSE PRACTITIONER

## 2025-05-23 PROCEDURE — 94640 AIRWAY INHALATION TREATMENT: CPT

## 2025-05-23 PROCEDURE — 94761 N-INVAS EAR/PLS OXIMETRY MLT: CPT

## 2025-05-23 PROCEDURE — 99284 EMERGENCY DEPT VISIT MOD MDM: CPT | Mod: 25

## 2025-05-23 PROCEDURE — 85025 COMPLETE CBC W/AUTO DIFF WBC: CPT | Performed by: NURSE PRACTITIONER

## 2025-05-23 PROCEDURE — 99284 EMERGENCY DEPT VISIT MOD MDM: CPT | Mod: GF,,, | Performed by: NURSE PRACTITIONER

## 2025-05-23 PROCEDURE — 25000242 PHARM REV CODE 250 ALT 637 W/ HCPCS: Performed by: NURSE PRACTITIONER

## 2025-05-23 RX ORDER — IPRATROPIUM BROMIDE AND ALBUTEROL SULFATE 2.5; .5 MG/3ML; MG/3ML
3 SOLUTION RESPIRATORY (INHALATION)
Status: COMPLETED | OUTPATIENT
Start: 2025-05-23 | End: 2025-05-23

## 2025-05-23 RX ORDER — METHYLPREDNISOLONE SOD SUCC 125 MG
125 VIAL (EA) INJECTION
Status: DISCONTINUED | OUTPATIENT
Start: 2025-05-23 | End: 2025-05-23

## 2025-05-23 RX ORDER — PREDNISONE 20 MG/1
40 TABLET ORAL
Status: COMPLETED | OUTPATIENT
Start: 2025-05-23 | End: 2025-05-23

## 2025-05-23 RX ADMIN — IPRATROPIUM BROMIDE AND ALBUTEROL SULFATE 3 ML: 2.5; .5 SOLUTION RESPIRATORY (INHALATION) at 11:05

## 2025-05-23 RX ADMIN — PREDNISONE 40 MG: 20 TABLET ORAL at 11:05

## 2025-05-24 VITALS
RESPIRATION RATE: 18 BRPM | TEMPERATURE: 99 F | SYSTOLIC BLOOD PRESSURE: 99 MMHG | OXYGEN SATURATION: 94 % | BODY MASS INDEX: 31.41 KG/M2 | DIASTOLIC BLOOD PRESSURE: 67 MMHG | WEIGHT: 184 LBS | HEART RATE: 100 BPM | HEIGHT: 64 IN

## 2025-05-24 LAB
ALBUMIN SERPL BCP-MCNC: 3.5 G/DL (ref 3.5–5)
ALBUMIN/GLOB SERPL: 1 {RATIO}
ALP SERPL-CCNC: 120 U/L (ref 40–150)
ALT SERPL W P-5'-P-CCNC: 20 U/L
ANION GAP SERPL CALCULATED.3IONS-SCNC: 15 MMOL/L (ref 7–16)
AST SERPL W P-5'-P-CCNC: 40 U/L (ref 11–45)
BILIRUB SERPL-MCNC: 1 MG/DL
BUN SERPL-MCNC: 20 MG/DL (ref 10–20)
BUN/CREAT SERPL: 17 (ref 6–20)
CALCIUM SERPL-MCNC: 8.9 MG/DL (ref 8.4–10.2)
CHLORIDE SERPL-SCNC: 105 MMOL/L (ref 98–107)
CO2 SERPL-SCNC: 23 MMOL/L (ref 22–29)
CREAT SERPL-MCNC: 1.16 MG/DL (ref 0.55–1.02)
EGFR (NO RACE VARIABLE) (RUSH/TITUS): 54 ML/MIN/1.73M2
GLOBULIN SER-MCNC: 3.6 G/DL (ref 2–4)
GLUCOSE SERPL-MCNC: 169 MG/DL (ref 74–100)
POTASSIUM SERPL-SCNC: 4 MMOL/L (ref 3.5–5.1)
PROT SERPL-MCNC: 7.1 G/DL (ref 6.4–8.3)
SODIUM SERPL-SCNC: 139 MMOL/L (ref 136–145)

## 2025-05-24 PROCEDURE — 25000003 PHARM REV CODE 250: Performed by: NURSE PRACTITIONER

## 2025-05-24 RX ORDER — LEVOFLOXACIN 500 MG/1
500 TABLET, FILM COATED ORAL DAILY
Qty: 10 TABLET | Refills: 0 | Status: SHIPPED | OUTPATIENT
Start: 2025-05-24 | End: 2025-06-03

## 2025-05-24 RX ORDER — IPRATROPIUM BROMIDE AND ALBUTEROL SULFATE 2.5; .5 MG/3ML; MG/3ML
3 SOLUTION RESPIRATORY (INHALATION) EVERY 4 HOURS PRN
Qty: 30 EACH | Refills: 1 | Status: SHIPPED | OUTPATIENT
Start: 2025-05-24

## 2025-05-24 RX ORDER — BENZONATATE 100 MG/1
100 CAPSULE ORAL 3 TIMES DAILY PRN
Qty: 30 CAPSULE | Refills: 0 | Status: SHIPPED | OUTPATIENT
Start: 2025-05-24 | End: 2025-05-31 | Stop reason: CLARIF

## 2025-05-24 RX ORDER — PREDNISONE 20 MG/1
40 TABLET ORAL DAILY
Qty: 10 TABLET | Refills: 0 | Status: SHIPPED | OUTPATIENT
Start: 2025-05-24 | End: 2025-05-29

## 2025-05-24 RX ORDER — GUAIFENESIN AND DEXTROMETHORPHAN HYDROBROMIDE 1200; 60 MG/1; MG/1
1 TABLET, EXTENDED RELEASE ORAL 2 TIMES DAILY PRN
Qty: 20 TABLET | Refills: 0 | Status: SHIPPED | OUTPATIENT
Start: 2025-05-24 | End: 2025-05-24 | Stop reason: ALTCHOICE

## 2025-05-24 RX ORDER — LEVOFLOXACIN 500 MG/1
500 TABLET, FILM COATED ORAL
Status: COMPLETED | OUTPATIENT
Start: 2025-05-24 | End: 2025-05-24

## 2025-05-24 RX ORDER — BENZONATATE 100 MG/1
100 CAPSULE ORAL
Status: COMPLETED | OUTPATIENT
Start: 2025-05-24 | End: 2025-05-24

## 2025-05-24 RX ORDER — GUAIFENESIN 1200 MG/1
1 TABLET, EXTENDED RELEASE ORAL 2 TIMES DAILY
Qty: 20 TABLET | Refills: 0 | Status: SHIPPED | OUTPATIENT
Start: 2025-05-24 | End: 2025-06-03

## 2025-05-24 RX ADMIN — LEVOFLOXACIN 500 MG: 500 TABLET, FILM COATED ORAL at 12:05

## 2025-05-24 RX ADMIN — BENZONATATE 100 MG: 100 CAPSULE ORAL at 12:05

## 2025-05-24 NOTE — DISCHARGE INSTRUCTIONS
Drink plenty of water to stay hydrated.  Take the medications as prescribed.  For the first 2-3 days, I would recommend using the duoneb every 6 hours while awake, then go to every 4 hours as needed for shortness of breath/wheezing.  Rest.  Do not over exert yourself.  If you have ANY worsening of your symptoms, please go to the nearest ER.

## 2025-05-24 NOTE — ED PROVIDER NOTES
Encounter Date: 5/23/2025       History     Chief Complaint   Patient presents with    Shortness of Breath    Cough     Pt presents to the ER for productive cough with green sputum and wheezing for 1-2 days. Denies fever, chills, sweats    The history is provided by the patient.     Review of patient's allergies indicates:   Allergen Reactions    Lamisil [terbinafine] Anaphylaxis    Codeine Itching    Compazine [prochlorperazine] Itching     Past Medical History:   Diagnosis Date    Cerebral hemorrhage     Chronic anxiety     Decreased coordination 3/7/2022    Dehydration     Depression     Dysphagia     Due to and following non-traumatic intracerebral hemorrhage    Hearing loss     History of CVA (cerebrovascular accident)     Hypertension     Hypokalemia 10/20/2022    Insomnia     Intrapontine hemorrhage     Left hemiparesis     Peripheral neuropathy     Stroke     Thyroid disease     Transient cerebral ischemia      Past Surgical History:   Procedure Laterality Date    APPENDECTOMY      CHOLECYSTECTOMY      HYSTERECTOMY      LITHOTRIPSY      Renal lithotripsy    percutaneious insertion of ureteric stent       No family history on file.  Social History[1]  Review of Systems   Constitutional:  Negative for chills, fatigue and fever.   HENT:  Negative for congestion and sore throat.    Respiratory:  Positive for cough and wheezing. Negative for shortness of breath.    Cardiovascular:  Negative for chest pain.   Gastrointestinal:  Negative for nausea.   Genitourinary:  Negative for dysuria.   Musculoskeletal:  Negative for back pain.   Skin:  Negative for rash.   Neurological:  Negative for weakness.   Hematological:  Does not bruise/bleed easily.       Physical Exam     Initial Vitals [05/23/25 2300]   BP Pulse Resp Temp SpO2   120/74 (!) 111 20 98.5 °F (36.9 °C) (!) 93 %      MAP       --         Physical Exam    Nursing note and vitals reviewed.  Constitutional: She appears well-developed and well-nourished. She  is not diaphoretic. She is cooperative.  Non-toxic appearance. No distress. She is not intubated.   Chronic ill appearing   HENT:   Head: Normocephalic and atraumatic.   Eyes: Pupils are equal, round, and reactive to light.   Neck: Neck supple.   Cardiovascular:  Normal rate, regular rhythm, normal heart sounds and intact distal pulses.     Exam reveals no gallop and no friction rub.       No murmur heard.  Pulmonary/Chest: Effort normal. No accessory muscle usage. No apnea, no tachypnea and no bradypnea. She is not intubated. No respiratory distress. She has wheezes (scattered). She has no rhonchi. She has no rales. She exhibits no tenderness.   Musculoskeletal:      Cervical back: Neck supple.     Neurological: She is alert and oriented to person, place, and time.   Skin: Skin is warm and dry. Capillary refill takes less than 2 seconds.   Psychiatric: She has a normal mood and affect.         Medical Screening Exam   See Full Note    ED Course   Procedures  Labs Reviewed   COMPREHENSIVE METABOLIC PANEL - Abnormal       Result Value    Sodium 139      Potassium 4.0      Chloride 105      CO2 23      Anion Gap 15      Glucose 169 (*)     BUN 20      Creatinine 1.16 (*)     BUN/Creatinine Ratio 17      Calcium 8.9      Total Protein 7.1      Albumin 3.5      Globulin 3.6      A/G Ratio 1.0      Bilirubin, Total 1.0      Alk Phos 120      ALT 20      AST 40      eGFR 54 (*)    CBC WITH DIFFERENTIAL - Abnormal    WBC 6.20      RBC 4.62      Hemoglobin 12.9      Hematocrit 37.8 (*)     MCV 81.8      MCH 27.9      MCHC 34.1      RDW 15.1 (*)     Platelet Count 141 (*)     MPV 9.8      Neutrophils % 66.4 (*)     Lymphocytes % 19.4 (*)     Monocytes % 10.6 (*)     Eosinophils % 3.1      Basophils % 0.3      Immature Granulocytes % 0.2      nRBC, Auto 0.0      Neutrophils, Abs 4.12      Lymphocytes, Absolute 1.20      Monocytes, Absolute 0.66      Eosinophils, Absolute 0.19      Basophils, Absolute 0.02      Immature  Granulocytes, Absolute 0.01      nRBC, Absolute 0.00      Diff Type Auto     CBC W/ AUTO DIFFERENTIAL    Narrative:     The following orders were created for panel order CBC auto differential.  Procedure                               Abnormality         Status                     ---------                               -----------         ------                     CBC with Differential[[id number]]       Abnormal            Final result                 Please view results for these tests on the individual orders.          Imaging Results              X-Ray Chest PA And Lateral (Preliminary result)  Result time 05/24/25 00:04:07      Wet Read by Graciela Solorzano FNP (05/24/25 00:04:07, [facility] - Emergency Department, Emergency Medicine)    No acute findings on her CXR per VR                                     Medications   albuterol-ipratropium 2.5 mg-0.5 mg/3 mL nebulizer solution 3 mL (3 mLs Nebulization Given by Other 5/23/25 8427)   predniSONE tablet 40 mg (40 mg Oral Given 5/23/25 2335)   levoFLOXacin tablet 500 mg (500 mg Oral Given 5/24/25 0017)   benzonatate capsule 100 mg (100 mg Oral Given 5/24/25 0021)     Medical Decision Making  Problems Addressed:  Acute bronchitis, unspecified organism: acute illness or injury  Productive cough: self-limited or minor problem    Amount and/or Complexity of Data Reviewed  Labs: ordered. Decision-making details documented in ED Course.  Radiology: ordered and independent interpretation performed. Decision-making details documented in ED Course.    Risk  OTC drugs.  Prescription drug management.               ED Course as of 05/24/25 0023   Fri May 23, 2025   2354 WBC: 6.20 [AG]   Sat May 24, 2025   0004 CXR stable without signs of infiltrate. [AG]   0009 Pulse ox 94% on room air.  The duoneb improved breath sounds.  You can hear the mucous has broken up and she is coughing better.  She reports she feels better.  We discussed treating her as acute  bronchitis.  Given her history of multiple episodes of PNA, I feel she has likely caught this early.  She reports she responds well to Levaquin.  Will send in Levaquin and give her a dose here in the ER.  Will send her in a course of prednisone, mucinex dm and refill her duoneb.  She was given very strict return precautions, and she verbalized understanding.  [AG]      ED Course User Index  [AG] Graciela Solorzano FNP                           Clinical Impression:   Final diagnoses:  [R05.8] Productive cough  [J20.9] Acute bronchitis, unspecified organism (Primary)        ED Disposition Condition    Discharge Stable          ED Prescriptions       Medication Sig Dispense Start Date End Date Auth. Provider    levoFLOXacin (LEVAQUIN) 500 MG tablet Take 1 tablet (500 mg total) by mouth once daily. for 10 days 10 tablet 5/24/2025 6/3/2025 Graciela Solorzano FNP    predniSONE (DELTASONE) 20 MG tablet Take 2 tablets (40 mg total) by mouth once daily. for 5 days 10 tablet 5/24/2025 5/29/2025 Graciela Solorzano FNP    dextromethorphan-guaiFENesin (MUCINEX DM) 60-1,200 mg per 12 hr tablet  (Status: Discontinued) Take 1 tablet by mouth 2 (two) times daily as needed (cough). Take with a full glass of water 20 tablet 5/24/2025 5/24/2025 Graciela Solorzano FNP    albuterol-ipratropium (DUO-NEB) 2.5 mg-0.5 mg/3 mL nebulizer solution Take 3 mLs by nebulization every 4 (four) hours as needed for Wheezing. Rescue 30 each 5/24/2025 -- Graciela Solorzano FNP    benzonatate (TESSALON) 100 MG capsule Take 1 capsule (100 mg total) by mouth 3 (three) times daily as needed for Cough. 30 capsule 5/24/2025 -- Graciela Solorzano FNP    guaiFENesin (MUCINEX) 1,200 mg Ta12 Take 1 tablet by mouth 2 (two) times a day. Take with a full glass of water for 10 days 20 tablet 5/24/2025 6/3/2025 Graciela Solorzano FNP          Follow-up Information       Follow up With Specialties Details Why Contact Info    Dycus, Micheal, FNP-C Family Medicine Schedule an appointment as soon as  possible for a visit in 3 days  [address] [facility] [place] [address]  [phone]               Graciela Solorzano, KEVAN  05/24/25 0017       Graciela Solorzano, MOSES  05/24/25 0019         [1]   Social History  Tobacco Use    Smoking status: Never    Smokeless tobacco: Never   Substance Use Topics    Alcohol use: Never    Drug use: Never        Graciela Solorzano, KEVAN  05/24/25 0023

## 2025-05-24 NOTE — ED TRIAGE NOTES
Presents to er with coughing and wheezing that started yesterday. Used albuterol neb about 3 hours ago. Has had some productive cough that is greenish in color. Denies any temp.

## 2025-05-31 ENCOUNTER — HOSPITAL ENCOUNTER (EMERGENCY)
Facility: HOSPITAL | Age: 60
Discharge: HOME | End: 2025-05-31
Payer: MEDICARE

## 2025-05-31 VITALS
SYSTOLIC BLOOD PRESSURE: 115 MMHG | HEIGHT: 64 IN | BODY MASS INDEX: 31.76 KG/M2 | HEART RATE: 84 BPM | WEIGHT: 186 LBS | DIASTOLIC BLOOD PRESSURE: 89 MMHG | OXYGEN SATURATION: 95 % | RESPIRATION RATE: 18 BRPM | TEMPERATURE: 99 F

## 2025-05-31 DIAGNOSIS — J18.9 PNEUMONIA DUE TO INFECTIOUS ORGANISM, UNSPECIFIED LATERALITY, UNSPECIFIED PART OF LUNG: Primary | ICD-10-CM

## 2025-05-31 DIAGNOSIS — R05.9 COUGH: ICD-10-CM

## 2025-05-31 DIAGNOSIS — R09.89 CHEST CONGESTION: ICD-10-CM

## 2025-05-31 LAB
ALBUMIN SERPL BCP-MCNC: 3.2 G/DL (ref 3.5–5)
ALBUMIN/GLOB SERPL: 0.9 {RATIO}
ALP SERPL-CCNC: 114 U/L (ref 40–150)
ALT SERPL W P-5'-P-CCNC: 12 U/L
ANION GAP SERPL CALCULATED.3IONS-SCNC: 12 MMOL/L (ref 7–16)
AST SERPL W P-5'-P-CCNC: 28 U/L (ref 11–45)
BASOPHILS # BLD AUTO: 0.01 K/UL (ref 0–0.2)
BASOPHILS NFR BLD AUTO: 0.2 % (ref 0–1)
BILIRUB SERPL-MCNC: 1.1 MG/DL
BUN SERPL-MCNC: 21 MG/DL (ref 10–20)
BUN/CREAT SERPL: 15 (ref 6–20)
CALCIUM SERPL-MCNC: 8.7 MG/DL (ref 8.4–10.2)
CHLORIDE SERPL-SCNC: 105 MMOL/L (ref 98–107)
CO2 SERPL-SCNC: 27 MMOL/L (ref 22–29)
CREAT SERPL-MCNC: 1.36 MG/DL (ref 0.55–1.02)
DIFFERENTIAL METHOD BLD: ABNORMAL
EGFR (NO RACE VARIABLE) (RUSH/TITUS): 45 ML/MIN/1.73M2
EOSINOPHIL # BLD AUTO: 0.25 K/UL (ref 0–0.5)
EOSINOPHIL NFR BLD AUTO: 4.3 % (ref 1–4)
ERYTHROCYTE [DISTWIDTH] IN BLOOD BY AUTOMATED COUNT: 15.2 % (ref 11.5–14.5)
GLOBULIN SER-MCNC: 3.5 G/DL (ref 2–4)
GLUCOSE SERPL-MCNC: 111 MG/DL (ref 74–100)
HCT VFR BLD AUTO: 35.7 % (ref 38–47)
HGB BLD-MCNC: 11.7 G/DL (ref 12–16)
IMM GRANULOCYTES # BLD AUTO: 0.01 K/UL (ref 0–0.04)
IMM GRANULOCYTES NFR BLD: 0.2 % (ref 0–0.4)
INFLUENZA A MOLECULAR (OHS): NEGATIVE
INFLUENZA B MOLECULAR (OHS): NEGATIVE
LACTATE SERPL-SCNC: 2 MMOL/L (ref 0.5–2.2)
LYMPHOCYTES # BLD AUTO: 1.82 K/UL (ref 1–4.8)
LYMPHOCYTES NFR BLD AUTO: 31 % (ref 27–41)
MCH RBC QN AUTO: 27.9 PG (ref 27–31)
MCHC RBC AUTO-ENTMCNC: 32.8 G/DL (ref 32–36)
MCV RBC AUTO: 85 FL (ref 80–96)
MONOCYTES # BLD AUTO: 0.48 K/UL (ref 0–0.8)
MONOCYTES NFR BLD AUTO: 8.2 % (ref 2–6)
MPC BLD CALC-MCNC: 9.5 FL (ref 9.4–12.4)
NEUTROPHILS # BLD AUTO: 3.31 K/UL (ref 1.8–7.7)
NEUTROPHILS NFR BLD AUTO: 56.1 % (ref 53–65)
NRBC # BLD AUTO: 0 X10E3/UL
NRBC, AUTO (.00): 0 %
PLATELET # BLD AUTO: 112 K/UL (ref 150–400)
POTASSIUM SERPL-SCNC: 4.2 MMOL/L (ref 3.5–5.1)
PROT SERPL-MCNC: 6.7 G/DL (ref 6.4–8.3)
RBC # BLD AUTO: 4.2 M/UL (ref 4.2–5.4)
SARS-COV-2 RDRP RESP QL NAA+PROBE: NEGATIVE
SODIUM SERPL-SCNC: 140 MMOL/L (ref 136–145)
WBC # BLD AUTO: 5.88 K/UL (ref 4.5–11)

## 2025-05-31 PROCEDURE — 99284 EMERGENCY DEPT VISIT MOD MDM: CPT | Mod: ,,,

## 2025-05-31 PROCEDURE — 25000242 PHARM REV CODE 250 ALT 637 W/ HCPCS

## 2025-05-31 PROCEDURE — 87502 INFLUENZA DNA AMP PROBE: CPT

## 2025-05-31 PROCEDURE — 99284 EMERGENCY DEPT VISIT MOD MDM: CPT | Mod: 25

## 2025-05-31 PROCEDURE — 83605 ASSAY OF LACTIC ACID: CPT

## 2025-05-31 PROCEDURE — 87040 BLOOD CULTURE FOR BACTERIA: CPT

## 2025-05-31 PROCEDURE — 63600175 PHARM REV CODE 636 W HCPCS

## 2025-05-31 PROCEDURE — 87635 SARS-COV-2 COVID-19 AMP PRB: CPT

## 2025-05-31 PROCEDURE — 94640 AIRWAY INHALATION TREATMENT: CPT

## 2025-05-31 PROCEDURE — 96372 THER/PROPH/DIAG INJ SC/IM: CPT

## 2025-05-31 PROCEDURE — 85025 COMPLETE CBC W/AUTO DIFF WBC: CPT

## 2025-05-31 PROCEDURE — 80053 COMPREHEN METABOLIC PANEL: CPT

## 2025-05-31 RX ORDER — DEXAMETHASONE SODIUM PHOSPHATE 4 MG/ML
4 INJECTION, SOLUTION INTRA-ARTICULAR; INTRALESIONAL; INTRAMUSCULAR; INTRAVENOUS; SOFT TISSUE
Status: COMPLETED | OUTPATIENT
Start: 2025-05-31 | End: 2025-05-31

## 2025-05-31 RX ORDER — IPRATROPIUM BROMIDE AND ALBUTEROL SULFATE 2.5; .5 MG/3ML; MG/3ML
3 SOLUTION RESPIRATORY (INHALATION)
Status: COMPLETED | OUTPATIENT
Start: 2025-05-31 | End: 2025-05-31

## 2025-05-31 RX ORDER — CEFTRIAXONE 1 G/1
1 INJECTION, POWDER, FOR SOLUTION INTRAMUSCULAR; INTRAVENOUS
Status: COMPLETED | OUTPATIENT
Start: 2025-05-31 | End: 2025-05-31

## 2025-05-31 RX ADMIN — IPRATROPIUM BROMIDE AND ALBUTEROL SULFATE 3 ML: 2.5; .5 SOLUTION RESPIRATORY (INHALATION) at 05:05

## 2025-05-31 RX ADMIN — CEFTRIAXONE SODIUM 1 G: 1 INJECTION, POWDER, FOR SOLUTION INTRAMUSCULAR; INTRAVENOUS at 06:05

## 2025-05-31 RX ADMIN — DEXAMETHASONE SODIUM PHOSPHATE 4 MG: 4 INJECTION, SOLUTION INTRA-ARTICULAR; INTRALESIONAL; INTRAMUSCULAR; INTRAVENOUS; SOFT TISSUE at 06:05

## 2025-06-07 LAB
BACTERIA BLD CULT: NORMAL
BACTERIA BLD CULT: NORMAL

## 2025-06-28 ENCOUNTER — HOSPITAL ENCOUNTER (EMERGENCY)
Facility: HOSPITAL | Age: 60
Discharge: ANOTHER HOSPITAL | End: 2025-06-29
Payer: MEDICARE

## 2025-06-28 DIAGNOSIS — R53.1 WEAKNESS: ICD-10-CM

## 2025-06-28 DIAGNOSIS — R53.1 LEFT-SIDED WEAKNESS: Primary | ICD-10-CM

## 2025-06-28 DIAGNOSIS — N30.01 ACUTE CYSTITIS WITH HEMATURIA: ICD-10-CM

## 2025-06-28 DIAGNOSIS — N17.9 AKI (ACUTE KIDNEY INJURY): ICD-10-CM

## 2025-06-28 DIAGNOSIS — R21 RASH AND NONSPECIFIC SKIN ERUPTION: ICD-10-CM

## 2025-06-28 LAB
ALBUMIN SERPL BCP-MCNC: 3.9 G/DL (ref 3.5–5)
ALBUMIN/GLOB SERPL: 1 {RATIO}
ALP SERPL-CCNC: 137 U/L (ref 40–150)
ALT SERPL W P-5'-P-CCNC: 17 U/L
AMPHET UR QL SCN: NEGATIVE
ANION GAP SERPL CALCULATED.3IONS-SCNC: 17 MMOL/L (ref 7–16)
APTT PPP: 24.7 SECONDS (ref 25.2–37.3)
AST SERPL W P-5'-P-CCNC: 45 U/L (ref 11–45)
BACTERIA #/AREA URNS HPF: ABNORMAL /HPF
BARBITURATES UR QL SCN: POSITIVE
BASOPHILS # BLD AUTO: 0.03 K/UL (ref 0–0.2)
BASOPHILS NFR BLD AUTO: 0.4 % (ref 0–1)
BENZODIAZ METAB UR QL SCN: NEGATIVE
BILIRUB SERPL-MCNC: 0.6 MG/DL
BILIRUB UR QL STRIP: NEGATIVE
BUN SERPL-MCNC: 43 MG/DL (ref 10–20)
BUN/CREAT SERPL: 27 (ref 6–20)
CALCIUM SERPL-MCNC: 9.4 MG/DL (ref 8.4–10.2)
CANNABINOIDS UR QL SCN: NEGATIVE
CHLORIDE SERPL-SCNC: 104 MMOL/L (ref 98–107)
CLARITY UR: ABNORMAL
CO2 SERPL-SCNC: 21 MMOL/L (ref 22–29)
COCAINE UR QL SCN: NEGATIVE
COLOR UR: YELLOW
CREAT SERPL-MCNC: 1.58 MG/DL (ref 0.55–1.02)
DIFFERENTIAL METHOD BLD: ABNORMAL
EGFR (NO RACE VARIABLE) (RUSH/TITUS): 38 ML/MIN/1.73M2
EOSINOPHIL # BLD AUTO: 0.37 K/UL (ref 0–0.5)
EOSINOPHIL NFR BLD AUTO: 4.8 % (ref 1–4)
ERYTHROCYTE [DISTWIDTH] IN BLOOD BY AUTOMATED COUNT: 16.3 % (ref 11.5–14.5)
GLOBULIN SER-MCNC: 4 G/DL (ref 2–4)
GLUCOSE SERPL-MCNC: 125 MG/DL (ref 74–100)
GLUCOSE SERPL-MCNC: 154 MG/DL (ref 70–105)
GLUCOSE UR STRIP-MCNC: NEGATIVE MG/DL
HCT VFR BLD AUTO: 42.3 % (ref 38–47)
HGB BLD-MCNC: 14.2 G/DL (ref 12–16)
IMM GRANULOCYTES # BLD AUTO: 0.02 K/UL (ref 0–0.04)
IMM GRANULOCYTES NFR BLD: 0.3 % (ref 0–0.4)
INR BLD: 0.99
KETONES UR STRIP-SCNC: NEGATIVE MG/DL
LACTATE SERPL-SCNC: 1.1 MMOL/L (ref 0.5–2.2)
LEUKOCYTE ESTERASE UR QL STRIP: ABNORMAL
LYMPHOCYTES # BLD AUTO: 2.03 K/UL (ref 1–4.8)
LYMPHOCYTES NFR BLD AUTO: 26.4 % (ref 27–41)
MAGNESIUM SERPL-MCNC: 2.1 MG/DL (ref 1.6–2.6)
MCH RBC QN AUTO: 28.2 PG (ref 27–31)
MCHC RBC AUTO-ENTMCNC: 33.6 G/DL (ref 32–36)
MCV RBC AUTO: 83.9 FL (ref 80–96)
MONOCYTES # BLD AUTO: 0.49 K/UL (ref 0–0.8)
MONOCYTES NFR BLD AUTO: 6.4 % (ref 2–6)
MPC BLD CALC-MCNC: 9.7 FL (ref 9.4–12.4)
NEUTROPHILS # BLD AUTO: 4.76 K/UL (ref 1.8–7.7)
NEUTROPHILS NFR BLD AUTO: 61.7 % (ref 53–65)
NITRITE UR QL STRIP: POSITIVE
NRBC # BLD AUTO: 0 X10E3/UL
NRBC, AUTO (.00): 0 %
OPIATES UR QL SCN: NEGATIVE
PCP UR QL SCN: NEGATIVE
PH UR STRIP: 5.5 PH UNITS
PLATELET # BLD AUTO: 182 K/UL (ref 150–400)
POTASSIUM SERPL-SCNC: 4.5 MMOL/L (ref 3.5–5.1)
PROT SERPL-MCNC: 7.9 G/DL (ref 6.4–8.3)
PROT UR QL STRIP: NEGATIVE
PROTHROMBIN TIME: 13.8 SECONDS (ref 11.7–14.7)
RBC # BLD AUTO: 5.04 M/UL (ref 4.2–5.4)
RBC # UR STRIP: ABNORMAL /UL
RBC #/AREA URNS HPF: ABNORMAL /HPF
SARS-COV-2 RDRP RESP QL NAA+PROBE: NEGATIVE
SODIUM SERPL-SCNC: 137 MMOL/L (ref 136–145)
SP GR UR STRIP: 1.01
SQUAMOUS #/AREA URNS LPF: ABNORMAL /LPF
TROPONIN I SERPL HS-MCNC: <2.7 NG/L
UROBILINOGEN UR STRIP-ACNC: 0.2 MG/DL
WBC # BLD AUTO: 7.7 K/UL (ref 4.5–11)
WBC #/AREA URNS HPF: ABNORMAL /HPF

## 2025-06-28 PROCEDURE — 96361 HYDRATE IV INFUSION ADD-ON: CPT

## 2025-06-28 PROCEDURE — 85025 COMPLETE CBC W/AUTO DIFF WBC: CPT

## 2025-06-28 PROCEDURE — 63600175 PHARM REV CODE 636 W HCPCS

## 2025-06-28 PROCEDURE — 83735 ASSAY OF MAGNESIUM: CPT

## 2025-06-28 PROCEDURE — 93010 ELECTROCARDIOGRAM REPORT: CPT | Mod: ,,, | Performed by: HOSPITALIST

## 2025-06-28 PROCEDURE — 25000003 PHARM REV CODE 250

## 2025-06-28 PROCEDURE — 82962 GLUCOSE BLOOD TEST: CPT

## 2025-06-28 PROCEDURE — 85610 PROTHROMBIN TIME: CPT

## 2025-06-28 PROCEDURE — 93005 ELECTROCARDIOGRAM TRACING: CPT

## 2025-06-28 PROCEDURE — 87086 URINE CULTURE/COLONY COUNT: CPT

## 2025-06-28 PROCEDURE — 80053 COMPREHEN METABOLIC PANEL: CPT

## 2025-06-28 PROCEDURE — 96374 THER/PROPH/DIAG INJ IV PUSH: CPT

## 2025-06-28 PROCEDURE — 83605 ASSAY OF LACTIC ACID: CPT

## 2025-06-28 PROCEDURE — 99285 EMERGENCY DEPT VISIT HI MDM: CPT | Mod: ,,,

## 2025-06-28 PROCEDURE — 84484 ASSAY OF TROPONIN QUANT: CPT

## 2025-06-28 PROCEDURE — 80307 DRUG TEST PRSMV CHEM ANLYZR: CPT

## 2025-06-28 PROCEDURE — 81003 URINALYSIS AUTO W/O SCOPE: CPT

## 2025-06-28 PROCEDURE — 85730 THROMBOPLASTIN TIME PARTIAL: CPT

## 2025-06-28 PROCEDURE — 87635 SARS-COV-2 COVID-19 AMP PRB: CPT

## 2025-06-28 PROCEDURE — 99285 EMERGENCY DEPT VISIT HI MDM: CPT | Mod: 25

## 2025-06-28 RX ORDER — SERTRALINE HCL 100 MG
100 TABLET ORAL DAILY
COMMUNITY
End: 2025-06-28

## 2025-06-28 RX ORDER — OLMESARTAN MEDOXOMIL 40 MG/1
40 TABLET ORAL DAILY
Status: ON HOLD | COMMUNITY
Start: 2024-12-26 | End: 2025-06-30

## 2025-06-28 RX ORDER — SERTRALINE HYDROCHLORIDE 50 MG/1
50 TABLET, FILM COATED ORAL DAILY
COMMUNITY
End: 2025-06-28

## 2025-06-28 RX ORDER — SODIUM CHLORIDE 9 MG/ML
1000 INJECTION, SOLUTION INTRAVENOUS
Status: COMPLETED | OUTPATIENT
Start: 2025-06-29 | End: 2025-06-29

## 2025-06-28 RX ORDER — LORAZEPAM 0.5 MG/1
0.5 TABLET ORAL 3 TIMES DAILY
Status: ON HOLD | COMMUNITY
Start: 2025-06-09 | End: 2025-06-30

## 2025-06-28 RX ORDER — GABAPENTIN 300 MG/1
600 CAPSULE ORAL 2 TIMES DAILY
COMMUNITY

## 2025-06-28 RX ORDER — ATORVASTATIN CALCIUM 40 MG/1
40 TABLET, FILM COATED ORAL DAILY
COMMUNITY
Start: 2024-12-26

## 2025-06-28 RX ORDER — MEROPENEM 1 G/1
1 INJECTION, POWDER, FOR SOLUTION INTRAVENOUS
Status: COMPLETED | OUTPATIENT
Start: 2025-06-28 | End: 2025-06-28

## 2025-06-28 RX ORDER — PANTOPRAZOLE SODIUM 40 MG/1
40 TABLET, DELAYED RELEASE ORAL DAILY
COMMUNITY
Start: 2024-12-26

## 2025-06-28 RX ORDER — CLOPIDOGREL BISULFATE 75 MG/1
75 TABLET ORAL DAILY
COMMUNITY

## 2025-06-28 RX ADMIN — MEROPENEM 1 G: 1 INJECTION, POWDER, FOR SOLUTION INTRAVENOUS at 10:06

## 2025-06-28 RX ADMIN — SODIUM CHLORIDE 1000 ML: 9 INJECTION, SOLUTION INTRAVENOUS at 10:06

## 2025-06-29 ENCOUNTER — HOSPITAL ENCOUNTER (INPATIENT)
Facility: HOSPITAL | Age: 60
LOS: 2 days | Discharge: HOME HEALTH | DRG: 069 | End: 2025-07-02
Attending: STUDENT IN AN ORGANIZED HEALTH CARE EDUCATION/TRAINING PROGRAM
Payer: MEDICARE

## 2025-06-29 VITALS
BODY MASS INDEX: 31.24 KG/M2 | DIASTOLIC BLOOD PRESSURE: 81 MMHG | HEIGHT: 64 IN | TEMPERATURE: 98 F | SYSTOLIC BLOOD PRESSURE: 117 MMHG | OXYGEN SATURATION: 97 % | RESPIRATION RATE: 20 BRPM | WEIGHT: 183 LBS | HEART RATE: 95 BPM

## 2025-06-29 DIAGNOSIS — G45.8 ACUTE CEREBROVASCULAR INSUFFICIENCY TRANSIENT FOCAL NEUROLOGIC DEFICIT: Primary | ICD-10-CM

## 2025-06-29 DIAGNOSIS — G60.9 IDIOPATHIC PERIPHERAL NEUROPATHY: ICD-10-CM

## 2025-06-29 DIAGNOSIS — N30.00 ACUTE CYSTITIS WITHOUT HEMATURIA: ICD-10-CM

## 2025-06-29 DIAGNOSIS — R29.818 SUSPECTED SLEEP APNEA: ICD-10-CM

## 2025-06-29 DIAGNOSIS — L98.9 CHRONIC DISORDER OF SKIN: ICD-10-CM

## 2025-06-29 DIAGNOSIS — F41.8 INSOMNIA SECONDARY TO DEPRESSION WITH ANXIETY: ICD-10-CM

## 2025-06-29 DIAGNOSIS — F51.05 INSOMNIA SECONDARY TO DEPRESSION WITH ANXIETY: ICD-10-CM

## 2025-06-29 DIAGNOSIS — I10 ESSENTIAL (PRIMARY) HYPERTENSION: ICD-10-CM

## 2025-06-29 DIAGNOSIS — G45.9 TIA (TRANSIENT ISCHEMIC ATTACK): ICD-10-CM

## 2025-06-29 PROBLEM — B96.20 BACTEREMIA DUE TO ESCHERICHIA COLI: Status: ACTIVE | Noted: 2024-04-10

## 2025-06-29 PROBLEM — R78.81 BACTEREMIA DUE TO ESCHERICHIA COLI: Status: ACTIVE | Noted: 2024-04-10

## 2025-06-29 LAB
ANION GAP SERPL CALCULATED.3IONS-SCNC: 11 MMOL/L (ref 7–16)
BASOPHILS # BLD AUTO: 0.04 K/UL (ref 0–0.2)
BASOPHILS NFR BLD AUTO: 0.6 % (ref 0–1)
BUN SERPL-MCNC: 37 MG/DL (ref 10–20)
BUN/CREAT SERPL: 30 (ref 6–20)
CALCIUM SERPL-MCNC: 8.2 MG/DL (ref 8.4–10.2)
CHLORIDE SERPL-SCNC: 111 MMOL/L (ref 98–107)
CO2 SERPL-SCNC: 19 MMOL/L (ref 22–29)
CREAT SERPL-MCNC: 1.22 MG/DL (ref 0.55–1.02)
DIFFERENTIAL METHOD BLD: ABNORMAL
EGFR (NO RACE VARIABLE) (RUSH/TITUS): 51 ML/MIN/1.73M2
EOSINOPHIL # BLD AUTO: 0.34 K/UL (ref 0–0.5)
EOSINOPHIL NFR BLD AUTO: 5.4 % (ref 1–4)
ERYTHROCYTE [DISTWIDTH] IN BLOOD BY AUTOMATED COUNT: 16.3 % (ref 11.5–14.5)
GLUCOSE SERPL-MCNC: 96 MG/DL (ref 74–100)
HCT VFR BLD AUTO: 38.6 % (ref 38–47)
HGB BLD-MCNC: 12.5 G/DL (ref 12–16)
IMM GRANULOCYTES # BLD AUTO: 0.02 K/UL (ref 0–0.04)
IMM GRANULOCYTES NFR BLD: 0.3 % (ref 0–0.4)
LYMPHOCYTES # BLD AUTO: 2.26 K/UL (ref 1–4.8)
LYMPHOCYTES NFR BLD AUTO: 36 % (ref 27–41)
MCH RBC QN AUTO: 27.5 PG (ref 27–31)
MCHC RBC AUTO-ENTMCNC: 32.4 G/DL (ref 32–36)
MCV RBC AUTO: 84.8 FL (ref 80–96)
MONOCYTES # BLD AUTO: 0.65 K/UL (ref 0–0.8)
MONOCYTES NFR BLD AUTO: 10.4 % (ref 2–6)
MPC BLD CALC-MCNC: 9.5 FL (ref 9.4–12.4)
NEUTROPHILS # BLD AUTO: 2.96 K/UL (ref 1.8–7.7)
NEUTROPHILS NFR BLD AUTO: 47.3 % (ref 53–65)
NRBC # BLD AUTO: 0 X10E3/UL
NRBC, AUTO (.00): 0 %
PLATELET # BLD AUTO: 123 K/UL (ref 150–400)
POTASSIUM SERPL-SCNC: 4.9 MMOL/L (ref 3.5–5.1)
RBC # BLD AUTO: 4.55 M/UL (ref 4.2–5.4)
SODIUM SERPL-SCNC: 136 MMOL/L (ref 136–145)
TROPONIN I SERPL HS-MCNC: 3.6 NG/L
WBC # BLD AUTO: 6.27 K/UL (ref 4.5–11)

## 2025-06-29 PROCEDURE — 96361 HYDRATE IV INFUSION ADD-ON: CPT

## 2025-06-29 PROCEDURE — 80048 BASIC METABOLIC PNL TOTAL CA: CPT

## 2025-06-29 PROCEDURE — G0379 DIRECT REFER HOSPITAL OBSERV: HCPCS

## 2025-06-29 PROCEDURE — 36415 COLL VENOUS BLD VENIPUNCTURE: CPT

## 2025-06-29 PROCEDURE — 25000003 PHARM REV CODE 250

## 2025-06-29 PROCEDURE — G0378 HOSPITAL OBSERVATION PER HR: HCPCS

## 2025-06-29 PROCEDURE — 84484 ASSAY OF TROPONIN QUANT: CPT

## 2025-06-29 PROCEDURE — 85025 COMPLETE CBC W/AUTO DIFF WBC: CPT

## 2025-06-29 PROCEDURE — 99223 1ST HOSP IP/OBS HIGH 75: CPT | Mod: ,,, | Performed by: HOSPITALIST

## 2025-06-29 PROCEDURE — 25000003 PHARM REV CODE 250: Performed by: HOSPITALIST

## 2025-06-29 RX ORDER — LOSARTAN POTASSIUM 100 MG/1
100 TABLET ORAL DAILY
Status: DISCONTINUED | OUTPATIENT
Start: 2025-06-30 | End: 2025-06-29

## 2025-06-29 RX ORDER — ALUMINUM HYDROXIDE, MAGNESIUM HYDROXIDE, AND SIMETHICONE 2400; 240; 2400 MG/30ML; MG/30ML; MG/30ML
30 SUSPENSION ORAL EVERY 6 HOURS PRN
Status: DISCONTINUED | OUTPATIENT
Start: 2025-06-29 | End: 2025-07-02 | Stop reason: HOSPADM

## 2025-06-29 RX ORDER — PANTOPRAZOLE SODIUM 40 MG/1
40 TABLET, DELAYED RELEASE ORAL DAILY
Status: DISCONTINUED | OUTPATIENT
Start: 2025-06-30 | End: 2025-07-02 | Stop reason: HOSPADM

## 2025-06-29 RX ORDER — CLOPIDOGREL BISULFATE 75 MG/1
75 TABLET ORAL ONCE
Status: COMPLETED | OUTPATIENT
Start: 2025-06-29 | End: 2025-06-29

## 2025-06-29 RX ORDER — AMLODIPINE BESYLATE 10 MG/1
10 TABLET ORAL DAILY
Status: DISCONTINUED | OUTPATIENT
Start: 2025-06-30 | End: 2025-06-29

## 2025-06-29 RX ORDER — GABAPENTIN 300 MG/1
600 CAPSULE ORAL 2 TIMES DAILY
Status: DISCONTINUED | OUTPATIENT
Start: 2025-06-29 | End: 2025-07-02 | Stop reason: HOSPADM

## 2025-06-29 RX ORDER — LABETALOL HYDROCHLORIDE 5 MG/ML
10 INJECTION, SOLUTION INTRAVENOUS EVERY 4 HOURS PRN
Status: DISCONTINUED | OUTPATIENT
Start: 2025-06-29 | End: 2025-07-02 | Stop reason: HOSPADM

## 2025-06-29 RX ORDER — TALC
6 POWDER (GRAM) TOPICAL NIGHTLY PRN
Status: DISCONTINUED | OUTPATIENT
Start: 2025-06-29 | End: 2025-07-02 | Stop reason: HOSPADM

## 2025-06-29 RX ORDER — ACETAMINOPHEN 650 MG/1
650 SUPPOSITORY RECTAL EVERY 6 HOURS PRN
Status: DISCONTINUED | OUTPATIENT
Start: 2025-06-29 | End: 2025-07-02 | Stop reason: HOSPADM

## 2025-06-29 RX ORDER — TRAZODONE HYDROCHLORIDE 50 MG/1
50 TABLET ORAL NIGHTLY PRN
Status: DISCONTINUED | OUTPATIENT
Start: 2025-06-29 | End: 2025-07-02 | Stop reason: HOSPADM

## 2025-06-29 RX ORDER — ACETAMINOPHEN 500 MG
1000 TABLET ORAL EVERY 6 HOURS PRN
Status: DISCONTINUED | OUTPATIENT
Start: 2025-06-29 | End: 2025-07-02 | Stop reason: HOSPADM

## 2025-06-29 RX ORDER — ATORVASTATIN CALCIUM 40 MG/1
40 TABLET, FILM COATED ORAL DAILY
Status: DISCONTINUED | OUTPATIENT
Start: 2025-06-30 | End: 2025-07-02 | Stop reason: HOSPADM

## 2025-06-29 RX ORDER — DIPHENHYDRAMINE HCL 25 MG
50 CAPSULE ORAL EVERY 6 HOURS PRN
Status: DISCONTINUED | OUTPATIENT
Start: 2025-06-29 | End: 2025-07-02 | Stop reason: HOSPADM

## 2025-06-29 RX ORDER — GUAIFENESIN AND DEXTROMETHORPHAN HYDROBROMIDE 10; 100 MG/5ML; MG/5ML
10 SYRUP ORAL EVERY 6 HOURS PRN
Status: DISCONTINUED | OUTPATIENT
Start: 2025-06-29 | End: 2025-07-02 | Stop reason: HOSPADM

## 2025-06-29 RX ORDER — DOCUSATE SODIUM 100 MG/1
100 CAPSULE, LIQUID FILLED ORAL 2 TIMES DAILY
Status: DISCONTINUED | OUTPATIENT
Start: 2025-06-29 | End: 2025-06-30

## 2025-06-29 RX ORDER — SERTRALINE HYDROCHLORIDE 50 MG/1
150 TABLET, FILM COATED ORAL DAILY
Status: DISCONTINUED | OUTPATIENT
Start: 2025-06-30 | End: 2025-07-02 | Stop reason: HOSPADM

## 2025-06-29 RX ORDER — ONDANSETRON HYDROCHLORIDE 2 MG/ML
8 INJECTION, SOLUTION INTRAVENOUS EVERY 6 HOURS PRN
Status: DISCONTINUED | OUTPATIENT
Start: 2025-06-29 | End: 2025-07-02 | Stop reason: HOSPADM

## 2025-06-29 RX ORDER — LORAZEPAM 0.5 MG/1
0.5 TABLET ORAL EVERY 8 HOURS PRN
Status: DISCONTINUED | OUTPATIENT
Start: 2025-06-29 | End: 2025-07-02 | Stop reason: HOSPADM

## 2025-06-29 RX ORDER — BISACODYL 5 MG
10 TABLET, DELAYED RELEASE (ENTERIC COATED) ORAL DAILY PRN
Status: DISCONTINUED | OUTPATIENT
Start: 2025-06-29 | End: 2025-07-02 | Stop reason: HOSPADM

## 2025-06-29 RX ORDER — MEROPENEM 1 G/1
1 INJECTION, POWDER, FOR SOLUTION INTRAVENOUS
Status: DISCONTINUED | OUTPATIENT
Start: 2025-06-30 | End: 2025-07-02 | Stop reason: HOSPADM

## 2025-06-29 RX ORDER — DOCUSATE SODIUM 100 MG/1
100 CAPSULE, LIQUID FILLED ORAL 2 TIMES DAILY PRN
Status: DISCONTINUED | OUTPATIENT
Start: 2025-06-29 | End: 2025-06-29

## 2025-06-29 RX ORDER — MICONAZOLE NITRATE 2 G/100G
POWDER TOPICAL 2 TIMES DAILY
Status: DISCONTINUED | OUTPATIENT
Start: 2025-06-30 | End: 2025-07-02 | Stop reason: HOSPADM

## 2025-06-29 RX ADMIN — CLOPIDOGREL 75 MG: 75 TABLET ORAL at 11:06

## 2025-06-29 RX ADMIN — GABAPENTIN 600 MG: 300 CAPSULE ORAL at 11:06

## 2025-06-29 RX ADMIN — SODIUM CHLORIDE 1000 ML: 9 INJECTION, SOLUTION INTRAVENOUS at 12:06

## 2025-06-29 NOTE — ED PROVIDER NOTES
Encounter Date: 6/28/2025       History     Chief Complaint   Patient presents with    Weakness     2 days ago had dizzy spells off, double vision that started yesterday and today she more weak and can barely walk.  She is also c/o painful rash underneath her breast and groin area.     60-year-old female presents to the ED with complaints of fever, generalized weakness, and worsening left-sided weakness x4 days which has caused difficulty with her gait.  She does admit concerns for recurrent UTI as she has had similar symptoms in the past during prior illness courses.  However she does have a prior history of multiple CVAs.  Only other complaint is a rash under her breast and lower abdomen skin folds that has been present for several months and previously evaluated by dermatology.  Reports that it is itchy and sometimes stinging.  In his currently treating with oJse lozano reports no improvement.  Blood pressure 108/79, temperature 97.6°, heart rate 78, respirations 18, and oxygen saturation 94% on room air.    The history is provided by the patient.     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 Tena    LITHOTRIPSY      Renal lithotripsy     No family history on file.  Social History[1]  Review of Systems   Constitutional:  Positive for activity change (decreased) and fatigue. Negative for fever.   HENT:  Negative for congestion and sore throat.    Eyes: Negative.    Respiratory:  Negative for cough and shortness of breath.    Cardiovascular:  Negative for chest pain and palpitations.   Gastrointestinal:  Positive for nausea. Negative for abdominal pain and vomiting.   Endocrine: Negative.    Genitourinary:  Positive for dysuria.   Musculoskeletal:  Positive for gait problem. Negative for back pain, neck pain and neck stiffness.   Skin:  Positive for rash. Negative for color change and pallor.   Allergic/Immunologic: Negative.    Neurological:  Positive for dizziness and weakness. Negative for facial asymmetry and speech difficulty.   Hematological:  Does not bruise/bleed easily.   Psychiatric/Behavioral:  Negative for confusion. The patient is not nervous/anxious.    All other systems reviewed and are negative.      Physical Exam     Initial Vitals [06/28/25 2148]   BP Pulse Resp Temp SpO2   108/79 78 18 97.6 °F (36.4 °C) (!) 94 %      MAP       --         Physical Exam    Nursing note and vitals reviewed.  Constitutional: She appears well-developed and well-nourished. She is not diaphoretic. She has a sickly appearance. No distress.   HENT:   Head: Normocephalic and atraumatic.   Right Ear: External ear normal.   Left Ear: External ear normal. Mouth/Throat: Oropharynx is clear and moist.   Eyes: Conjunctivae and EOM are normal. Pupils are equal, round, and reactive to light.   Neck: Neck supple.   Normal range of motion.   Full passive range of motion without pain.     Cardiovascular:  Normal rate, regular rhythm, normal heart sounds and normal pulses.           Pulmonary/Chest: Effort normal. No respiratory distress. She has no wheezes. She has rhonchi  (mild bilateral). She has no rales. She exhibits no tenderness.   Abdominal: Abdomen is soft. Bowel sounds are normal. She exhibits no distension. There is no abdominal tenderness. There is no rebound and no guarding.   Musculoskeletal:         General: Normal range of motion.      Cervical back: Full passive range of motion without pain, normal range of motion and neck supple.     Neurological: She is alert and oriented to person, place, and time. She exhibits abnormal muscle tone (worse in left upper and lower extremities). Gait abnormal. GCS score is 15. GCS eye subscore is 4. GCS verbal subscore is 5. GCS motor subscore is 6.   Skin: Skin is warm and dry. Capillary refill takes less than 2 seconds. Rash noted.        Psychiatric: She has a normal mood and affect. Her behavior is normal. Judgment and thought content normal.         Medical Screening Exam   See Full Note    ED Course   Procedures  Labs Reviewed   CULTURE, URINE - Abnormal       Result Value    Culture, Urine >100,000 Gram-negative Bacilli (*)    COMPREHENSIVE METABOLIC PANEL - Abnormal    Sodium 137      Potassium 4.5      Chloride 104      CO2 21 (*)     Anion Gap 17 (*)     Glucose 125 (*)     BUN 43 (*)     Creatinine 1.58 (*)     BUN/Creatinine Ratio 27 (*)     Calcium 9.4      Total Protein 7.9      Albumin 3.9      Globulin 4.0      A/G Ratio 1.0      Bilirubin, Total 0.6      Alk Phos 137      ALT 17      AST 45      eGFR 38 (*)    APTT - Abnormal    PTT 24.7 (*)    URINALYSIS, REFLEX TO URINE CULTURE - Abnormal    Color, UA Yellow      Clarity, UA Cloudy      pH, UA 5.5      Leukocytes, UA Small (*)     Nitrites, UA Positive (*)     Protein, UA Negative      Glucose, UA Negative      Ketones, UA Negative      Urobilinogen, UA 0.2      Bilirubin, UA Negative      Blood, UA Trace-Intact (*)     Specific [place], UA 1.015     DRUG SCREEN, URINE (BEAKER) - Abnormal    Barbiturates, Urine Positive (*)     Benzodiazepine, Urine Negative       Opiates, Urine Negative      Phencyclidine, Urine Negative      Amphetamine, Urine Negative      Cannabinoid, Urine Negative      Cocaine, Urine Negative      Narrative:     This screen includes the following classes of drugs at the listed cut-off:    Benzodiazepines 200 ng/ml  Cocaine metabolite 300 ng/ml  Opiates 2000 ng/ml  Barbiturates 200 ng/ml  Amphetamines 500 ng/ml  Marijuana metabs (THC) 50 ng/ml  Phencyclidine (PCP) 25 ng/ml    This is a screening test. If results do not correlate with clinical presentation, then a confirmatory send out test is advised.   CBC WITH DIFFERENTIAL - Abnormal    WBC 7.70      RBC 5.04      Hemoglobin 14.2      Hematocrit 42.3      MCV 83.9      MCH 28.2      MCHC 33.6      RDW 16.3 (*)     Platelet Count 182      MPV 9.7      Neutrophils % 61.7      Lymphocytes % 26.4 (*)     Monocytes % 6.4 (*)     Eosinophils % 4.8 (*)     Basophils % 0.4      Immature Granulocytes % 0.3      nRBC, Auto 0.0      Neutrophils, Abs 4.76      Lymphocytes, Absolute 2.03      Monocytes, Absolute 0.49      Eosinophils, Absolute 0.37      Basophils, Absolute 0.03      Immature Granulocytes, Absolute 0.02      nRBC, Absolute 0.00      Diff Type Auto     URINALYSIS, MICROSCOPIC - Abnormal    WBC, UA 15-25 (*)     RBC, UA 0-3      Bacteria, UA Many (*)     Squamous Epithelial Cells, UA Occasional (*)    BASIC METABOLIC PANEL - Abnormal    Sodium 136      Potassium 4.9      Chloride 111 (*)     CO2 19 (*)     Anion Gap 11      Glucose 96      BUN 37 (*)     Creatinine 1.22 (*)     BUN/Creatinine Ratio 30 (*)     Calcium 8.2 (*)     eGFR 51 (*)    CBC WITH DIFFERENTIAL - Abnormal    WBC 6.27      RBC 4.55      Hemoglobin 12.5      Hematocrit 38.6      MCV 84.8      MCH 27.5      MCHC 32.4      RDW 16.3 (*)     Platelet Count 123 (*)     MPV 9.5      Neutrophils % 47.3 (*)     Lymphocytes % 36.0      Monocytes % 10.4 (*)     Eosinophils % 5.4 (*)     Basophils % 0.6      Immature Granulocytes % 0.3       nRBC, Auto 0.0      Neutrophils, Abs 2.96      Lymphocytes, Absolute 2.26      Monocytes, Absolute 0.65      Eosinophils, Absolute 0.34      Basophils, Absolute 0.04      Immature Granulocytes, Absolute 0.02      nRBC, Absolute 0.00      Diff Type Auto     POCT GLUCOSE MONITORING CONTINUOUS - Abnormal    POC Glucose 154 (*)    MAGNESIUM - Normal    Magnesium 2.1     PROTIME-INR - Normal    PT 13.8      INR 0.99     TROPONIN I - Normal    Troponin I High Sensitivity <2.7     SARS-COV-2 RNA AMPLIFICATION, QUAL - Normal    SARS COV-2 Molecular Negative      Narrative:     Negative SARS-CoV results should not be used as the sole basis for treatment or patient management decisions; negative results should be considered in the context of a patient's recent exposures, history and the presene of clinical signs and symptoms consistent with COVID-19.  Negative results should be treated as presumptive and confirmed by molecular assay, if necessary for patient management.   LACTIC ACID, PLASMA - Normal    Lactic Acid 1.1     TROPONIN I - Normal    Troponin I High Sensitivity 3.6     CBC W/ AUTO DIFFERENTIAL    Narrative:     The following orders were created for panel order CBC auto differential.  Procedure                               Abnormality         Status                     ---------                               -----------         ------                     CBC with Differential[[id number]]       Abnormal            Final result                 Please view results for these tests on the individual orders.   CBC W/ AUTO DIFFERENTIAL    Narrative:     The following orders were created for panel order CBC Auto Differential.  Procedure                               Abnormality         Status                     ---------                               -----------         ------                     CBC with Differential[[id number]]       Abnormal            Final result                 Please view results for these tests  on the individual orders.        ECG Results              EKG 12-lead (Final result)        Collection Time Result Time QRS Duration OHS QTC Calculation    06/28/25 21:46:00 06/30/25 00:13:26 92 448                     Final result by Interface, Lab In [facility] (06/30/25 00:13:31)                   Narrative:    Test Reason : R53.1,    Vent. Rate :  75 BPM     Atrial Rate :  75 BPM     P-R Int : 176 ms          QRS Dur :  92 ms      QT Int : 402 ms       P-R-T Axes :  68 140  70 degrees    QTcB Int : 448 ms    Normal sinus rhythm  Left posterior fascicular block  Inferior infarct ,age undetermined  When compared with ECG of 06-Sep-2024 22:24,  Vent. rate has decreased by  38 bpm  The axis Shifted right  Confirmed by Itzel Jacome ([id number]) on 6/30/2025 12:13:23 AM    Referred By: AAAREFERRAL SELF           Confirmed By: Itzel Jacome                                  Imaging Results              X-Ray Chest AP Portable (Final result)  Result time 06/29/25 00:04:24      Final result by Brian Damon MD (06/29/25 00:04:24)                   Impression:      No acute radiographic abnormality.      Electronically signed by: Brian Damon  Date:    06/29/2025  Time:    00:04               Narrative:    EXAMINATION:  XR CHEST AP PORTABLE    CLINICAL HISTORY:  weakness;    TECHNIQUE:  Single frontal view of the chest was performed.    COMPARISON:  05/31/2025    FINDINGS:  Minimal atelectasis or scarring near the left lung base.    The lungs are otherwise clear, with normal appearance of pulmonary vasculature and no pleural effusion or pneumothorax.    The cardiac silhouette is normal in size. The hilar and mediastinal contours are unremarkable.    Bones are intact.    No significant change.                                       CT Head Without Contrast (Final result)  Result time 06/28/25 23:04:28      Final result by Brian Damon MD (06/28/25 23:04:28)                   Impression:      1. No acute intracranial  process.  2. Involutional changes with chronic microvascular ischemic changes and stable probable remote lacunar infarction of the right lateral basal ganglia and bilateral centrum semiovale.  MRI follow-up may better characterize if there is high clinical suspicion.      Electronically signed by: Brian Arvizu  Date:    06/28/2025  Time:    23:04               Narrative:    EXAMINATION:  CT HEAD WITHOUT CONTRAST    CLINICAL HISTORY:  Dizziness, persistent/recurrent, cardiac or vascular cause suspected;    TECHNIQUE:  Low dose axial CT images obtained throughout the head without intravenous contrast. Sagittal and coronal reconstructions were performed.    COMPARISON:  12/31/2022    FINDINGS:  Intracranial compartment:    Ventricles and sulci are normal in size for age without evidence of hydrocephalus. No extra-axial blood or fluid collections.    Mild involutional changes with moderate probable chronic microvascular ischemic changes in the periventricular white matter.  Stable probable remote lacunar infarction of the right lateral basal ganglia.  Small remote lacunar infarcts of the bilateral centrum semiovale.    No parenchymal mass, hemorrhage, edema or major vascular distribution infarct.    Skull/extracranial contents (limited evaluation): No fracture. Mastoid air cells and paranasal sinuses are essentially clear.                                       Medications   meropenem injection 1 g (1 g Intravenous Given 6/28/25 2256)   sodium chloride 0.9% bolus 1,000 mL 1,000 mL (0 mLs Intravenous Stopped 6/28/25 2354)   0.9% NaCl infusion (0 mLs Intravenous Stopped 6/29/25 1003)     Medical Decision Making  Presents with a fever, generalized weakness, rash, and worsening left-sided weakness affecting her gait over the past 4 days.  Alert and oriented x4.  GCS 15.  Vital signs stable.  Afebrile at current with a temperature 97.6°.  Appreciated moderate left-sided weakness in comparison to the right in both upper and  lower extremities.  Abnormal gait on exam.  Denies chest pain, shortness of breath, or difficulty breathing.  Differentials include new CVA outside of window for thrombolytics or clot retrieval as well as UTI versus urosepsis.  WBC 7.7, normal lactic acid, but urinalysis does show UTI with 15-25 WBCs.  We will provide meropenem 1 g IV in the ED.    Amount and/or Complexity of Data Reviewed  Independent Historian:      Details: 60-year-old female presents to the ED with complaints of fever, generalized weakness, and worsening left-sided weakness x4 days which has caused difficulty with her gait.  She does admit concerns for recurrent UTI as she has had similar symptoms in the past during prior illness courses.  However she does have a prior history of multiple CVAs.  Only other complaint is a rash under her breast and lower abdomen skin folds that has been present for several months and previously evaluated by dermatology.  Reports that it is itchy and sometimes stinging.  In his currently treating with Jose lozano reports no improvement.  Blood pressure 108/79, temperature 97.6°, heart rate 78, respirations 18, and oxygen saturation 94% on room air.  Labs: ordered.     Details: Point of care glucose 154.  CBC shows a WBC of 7.7, hemoglobin of 14.2, hematocrit of 42.3, and a platelet count 182.  Lactic acid normal at 1.1.  PTT shows 24.7.  PT and INR within normal limits.  Magnesium 2.1.  Initial troponin negative at less than 2.7.  Urinalysis shows small leukocytes, positive nitrites, and trace blood but otherwise unremarkable.  Microscopic urinalysis shows 15-25 WBC with many bacteria.  Urine culture sent and pending.  Urine drug screen positive for barbiturates.  COVID negative.  CMP shows CO2 of 21, anion gap 17, glucose of 125, BUN of 43, creatinine of 1.58, BUN/creatinine ratio of 27, and a GFR of 38 but otherwise unremarkable.  Repeat troponin remains negative at 3.6.  Radiology: ordered.     Details: CT  head without contrast shows no acute intracranial process.  Involutional changes with chronic microvascular ischemic changes and stable probable remote lacunar infarction of the right lateral basal ganglia and bilateral centrum semiovale.  MRI follow up may better characterize if there is a high clinical suspicion.    Chest x-ray shows no acute radiographic abnormality.    Risk  Prescription drug management.  Risk Details: All historical, clinical, radiographic, and laboratory findings were reviewed with the patient/family in detail along with the indications for transport to the facility in [place], [place] in order to receive further evaluation and treatment.  All remaining questions and concerns were addressed at this time and the patient/family communicates understanding and agrees to proceed accordingly.  Similarly, all pertinent details of the encounter were discussed with Dr. Jacome who agrees to receive the patient at [facility] for further care as outlined above.  The patient will be transferred by [facility] ambulance services secondary to a need for ongoing hemodynamic monitoring en route.  KEVAN NICHOLAS  12:01 AM               ED Course as of 06/30/25 0948   Sat Jun 28, 2025   2239 Urinalysis shows UTI had prior cultures reveal multi resistance but sensitivity to meropenem.  We will give 1st dose here in the ED. [MC]   Priscilla Jun 29, 2025   0039 Patient accepted to [facility] by Dr. Jacome to the service of Dr. Elkins.  No beds available at this time. [MC]      ED Course User Index  [MC] Good Gonzalez FNP                           Clinical Impression:   Final diagnoses:  [R53.1] Weakness  [R53.1] Left-sided weakness (Primary)  [N30.01] Acute cystitis with hematuria  [R21] Rash and nonspecific skin eruption  [N17.9] EYRN (acute kidney injury)        ED Disposition Condition    Transfer to Another Facility Stable                    [1]   Social History  Tobacco Use    Smoking status: Never     Smokeless tobacco: Never   Substance Use Topics    Alcohol use: Never    Drug use: Never        Good Gonzalez, [facility]  06/30/25 0948

## 2025-06-30 PROBLEM — R29.818 SUSPECTED SLEEP APNEA: Status: ACTIVE | Noted: 2025-06-30

## 2025-06-30 PROBLEM — G60.9 IDIOPATHIC PERIPHERAL NEUROPATHY: Status: ACTIVE | Noted: 2021-05-20

## 2025-06-30 LAB
25(OH)D3 SERPL-MCNC: 12.7 NG/ML (ref 30–80)
ANION GAP SERPL CALCULATED.3IONS-SCNC: 12 MMOL/L (ref 7–16)
AORTIC ROOT ANNULUS: 2.9 CM
AORTIC VALVE CUSP SEPERATION: 2.16 CM
APICAL FOUR CHAMBER EJECTION FRACTION: 61 %
AV INDEX (PROSTH): 0.69
AV MEAN GRADIENT: 3 MMHG
AV PEAK GRADIENT: 5 MMHG
AV VALVE AREA BY VELOCITY RATIO: 2.2 CM²
AV VALVE AREA: 2.4 CM²
AV VELOCITY RATIO: 0.64
BASOPHILS # BLD AUTO: 0.03 K/UL (ref 0–0.2)
BASOPHILS NFR BLD AUTO: 0.6 % (ref 0–1)
BSA FOR ECHO PROCEDURE: 1.9 M2
BUN SERPL-MCNC: 26 MG/DL (ref 10–20)
BUN/CREAT SERPL: 25 (ref 6–20)
CALCIUM SERPL-MCNC: 7.9 MG/DL (ref 8.4–10.2)
CHLORIDE SERPL-SCNC: 111 MMOL/L (ref 98–107)
CO2 SERPL-SCNC: 20 MMOL/L (ref 22–29)
CREAT SERPL-MCNC: 1.02 MG/DL (ref 0.55–1.02)
CV ECHO LV RWT: 0.34 CM
DIFFERENTIAL METHOD BLD: ABNORMAL
DOP CALC AO PEAK VEL: 1.1 M/S
DOP CALC AO VTI: 23.9 CM
DOP CALC LVOT AREA: 3.5 CM2
DOP CALC LVOT DIAMETER: 2.1 CM
DOP CALC LVOT PEAK VEL: 0.7 M/S
DOP CALC LVOT STROKE VOLUME: 57.1 CM3
DOP CALCLVOT PEAK VEL VTI: 16.5 CM
E WAVE DECELERATION TIME: 252 MSEC
E/A RATIO: 1.19
E/E' RATIO: 11 M/S
ECHO LV POSTERIOR WALL: 0.7 CM (ref 0.6–1.1)
EGFR (NO RACE VARIABLE) (RUSH/TITUS): 64 ML/MIN/1.73M2
EJECTION FRACTION: 60 %
EOSINOPHIL # BLD AUTO: 0.24 K/UL (ref 0–0.5)
EOSINOPHIL NFR BLD AUTO: 4.7 % (ref 1–4)
ERYTHROCYTE [DISTWIDTH] IN BLOOD BY AUTOMATED COUNT: 16 % (ref 11.5–14.5)
EST. AVERAGE GLUCOSE BLD GHB EST-MCNC: 134 MG/DL
FRACTIONAL SHORTENING: 34.1 % (ref 28–44)
GLUCOSE SERPL-MCNC: 100 MG/DL (ref 74–100)
HBA1C MFR BLD HPLC: 6.3 %
HCT VFR BLD AUTO: 35.8 % (ref 38–47)
HGB BLD-MCNC: 11.3 G/DL (ref 12–16)
IMM GRANULOCYTES # BLD AUTO: 0.01 K/UL (ref 0–0.04)
IMM GRANULOCYTES NFR BLD: 0.2 % (ref 0–0.4)
INTERVENTRICULAR SEPTUM: 1 CM (ref 0.6–1.1)
IVC DIAMETER: 1.49 CM
LEFT ATRIUM SIZE: 3 CM
LEFT INTERNAL DIMENSION IN SYSTOLE: 2.7 CM (ref 2.1–4)
LEFT VENTRICLE DIASTOLIC VOLUME INDEX: 41.76 ML/M2
LEFT VENTRICLE DIASTOLIC VOLUME: 76 ML
LEFT VENTRICLE END DIASTOLIC VOLUME APICAL 4 CHAMBER: 70.57 ML
LEFT VENTRICLE MASS INDEX: 58 G/M2
LEFT VENTRICLE SYSTOLIC VOLUME INDEX: 14.8 ML/M2
LEFT VENTRICLE SYSTOLIC VOLUME: 27 ML
LEFT VENTRICULAR INTERNAL DIMENSION IN DIASTOLE: 4.1 CM (ref 3.5–6)
LEFT VENTRICULAR MASS: 105.6 G
LV LATERAL E/E' RATIO: 10.8 M/S
LV SEPTAL E/E' RATIO: 12 M/S
LVED V (TEICH): 75.84 ML
LVES V (TEICH): 26.78 ML
LVOT MG: 1.33 MMHG
LVOT MV: 0.56 CM/S
LYMPHOCYTES # BLD AUTO: 1.94 K/UL (ref 1–4.8)
LYMPHOCYTES NFR BLD AUTO: 37.7 % (ref 27–41)
MAGNESIUM SERPL-MCNC: 1.9 MG/DL (ref 1.6–2.6)
MCH RBC QN AUTO: 27.8 PG (ref 27–31)
MCHC RBC AUTO-ENTMCNC: 31.6 G/DL (ref 32–36)
MCV RBC AUTO: 88 FL (ref 80–96)
MONOCYTES # BLD AUTO: 0.46 K/UL (ref 0–0.8)
MONOCYTES NFR BLD AUTO: 8.9 % (ref 2–6)
MPC BLD CALC-MCNC: 10.2 FL (ref 9.4–12.4)
MV PEAK A VEL: 0.91 M/S
MV PEAK E VEL: 1.08 M/S
NEUTROPHILS # BLD AUTO: 2.46 K/UL (ref 1.8–7.7)
NEUTROPHILS NFR BLD AUTO: 47.9 % (ref 53–65)
NRBC # BLD AUTO: 0 X10E3/UL
NRBC, AUTO (.00): 0 %
OHS CV RV/LV RATIO: 0.83 CM
OHS QRS DURATION: 92 MS
OHS QTC CALCULATION: 448 MS
PLATELET # BLD AUTO: 137 K/UL (ref 150–400)
POTASSIUM SERPL-SCNC: 4.7 MMOL/L (ref 3.5–5.1)
PV PEAK GRADIENT: 2 MMHG
PV PEAK VELOCITY: 0.7 M/S
RA MAJOR: 3.67 CM
RA PRESSURE ESTIMATED: 3 MMHG
RBC # BLD AUTO: 4.07 M/UL (ref 4.2–5.4)
RIGHT VENTRICLE DIASTOLIC BASEL DIMENSION: 3.4 CM
RIGHT VENTRICLE DIASTOLIC LENGTH: 6.2 CM
RIGHT VENTRICLE DIASTOLIC MID DIMENSION: 3.4 CM
RIGHT VENTRICULAR LENGTH IN DIASTOLE (APICAL 4-CHAMBER VIEW): 6.15 CM
RV MID DIAMA: 3.36 CM
SODIUM SERPL-SCNC: 138 MMOL/L (ref 136–145)
T4 SERPL-MCNC: 5.4 ΜG/DL (ref 4.9–11.7)
TDI LATERAL: 0.1 M/S
TDI SEPTAL: 0.09 M/S
TDI: 0.1 M/S
TRICUSPID ANNULAR PLANE SYSTOLIC EXCURSION: 1.9 CM
TSH SERPL DL<=0.005 MIU/L-ACNC: 8.06 UIU/ML (ref 0.35–4.94)
WBC # BLD AUTO: 5.14 K/UL (ref 4.5–11)
Z-SCORE OF LEFT VENTRICULAR DIMENSION IN END DIASTOLE: -2.06
Z-SCORE OF LEFT VENTRICULAR DIMENSION IN END SYSTOLE: -1.12

## 2025-06-30 PROCEDURE — 85025 COMPLETE CBC W/AUTO DIFF WBC: CPT | Performed by: HOSPITALIST

## 2025-06-30 PROCEDURE — 97165 OT EVAL LOW COMPLEX 30 MIN: CPT

## 2025-06-30 PROCEDURE — 96376 TX/PRO/DX INJ SAME DRUG ADON: CPT

## 2025-06-30 PROCEDURE — 96361 HYDRATE IV INFUSION ADD-ON: CPT

## 2025-06-30 PROCEDURE — G0378 HOSPITAL OBSERVATION PER HR: HCPCS

## 2025-06-30 PROCEDURE — 99233 SBSQ HOSP IP/OBS HIGH 50: CPT | Mod: ,,,

## 2025-06-30 PROCEDURE — 11000001 HC ACUTE MED/SURG PRIVATE ROOM

## 2025-06-30 PROCEDURE — 36415 COLL VENOUS BLD VENIPUNCTURE: CPT | Performed by: HOSPITALIST

## 2025-06-30 PROCEDURE — 97162 PT EVAL MOD COMPLEX 30 MIN: CPT

## 2025-06-30 PROCEDURE — 82306 VITAMIN D 25 HYDROXY: CPT | Performed by: HOSPITALIST

## 2025-06-30 PROCEDURE — 83735 ASSAY OF MAGNESIUM: CPT | Performed by: HOSPITALIST

## 2025-06-30 PROCEDURE — 83036 HEMOGLOBIN GLYCOSYLATED A1C: CPT | Performed by: HOSPITALIST

## 2025-06-30 PROCEDURE — 96374 THER/PROPH/DIAG INJ IV PUSH: CPT

## 2025-06-30 PROCEDURE — 25000003 PHARM REV CODE 250: Performed by: HOSPITALIST

## 2025-06-30 PROCEDURE — 84443 ASSAY THYROID STIM HORMONE: CPT | Performed by: HOSPITALIST

## 2025-06-30 PROCEDURE — 80048 BASIC METABOLIC PNL TOTAL CA: CPT | Performed by: HOSPITALIST

## 2025-06-30 PROCEDURE — 84436 ASSAY OF TOTAL THYROXINE: CPT | Performed by: HOSPITALIST

## 2025-06-30 PROCEDURE — 63600175 PHARM REV CODE 636 W HCPCS: Performed by: HOSPITALIST

## 2025-06-30 RX ORDER — MUPIROCIN 20 MG/G
OINTMENT TOPICAL DAILY
COMMUNITY
Start: 2025-06-23

## 2025-06-30 RX ORDER — LIDOCAINE HCL JELLY USP, 2% 20 MG/ML
JELLY TOPICAL 2 TIMES DAILY
COMMUNITY
Start: 2025-06-18

## 2025-06-30 RX ORDER — OLMESARTAN MEDOXOMIL AND HYDROCHLOROTHIAZIDE 40/25 40; 25 MG/1; MG/1
1 TABLET ORAL DAILY
COMMUNITY

## 2025-06-30 RX ORDER — FLUCONAZOLE 200 MG/1
200 TABLET ORAL DAILY
Status: ON HOLD | COMMUNITY
Start: 2025-06-03 | End: 2025-07-02 | Stop reason: HOSPADM

## 2025-06-30 RX ORDER — TEMAZEPAM 30 MG/1
30 CAPSULE ORAL NIGHTLY PRN
COMMUNITY
Start: 2025-04-15

## 2025-06-30 RX ORDER — LORAZEPAM 1 MG/1
1 TABLET ORAL 3 TIMES DAILY
COMMUNITY

## 2025-06-30 RX ORDER — SERTRALINE HYDROCHLORIDE 100 MG/1
100 TABLET, FILM COATED ORAL DAILY
Status: ON HOLD | COMMUNITY
End: 2025-06-30

## 2025-06-30 RX ORDER — ENOXAPARIN SODIUM 100 MG/ML
40 INJECTION SUBCUTANEOUS EVERY 24 HOURS
Status: DISCONTINUED | OUTPATIENT
Start: 2025-06-30 | End: 2025-07-02 | Stop reason: HOSPADM

## 2025-06-30 RX ADMIN — SERTRALINE 150 MG: 50 TABLET, FILM COATED ORAL at 08:06

## 2025-06-30 RX ADMIN — MEROPENEM 1 G: 1 INJECTION, POWDER, FOR SOLUTION INTRAVENOUS at 01:06

## 2025-06-30 RX ADMIN — LORAZEPAM 0.5 MG: 0.5 TABLET ORAL at 12:06

## 2025-06-30 RX ADMIN — MICONAZOLE NITRATE ANTIFUNGAL POWDER: 2 POWDER TOPICAL at 08:06

## 2025-06-30 RX ADMIN — GABAPENTIN 600 MG: 300 CAPSULE ORAL at 08:06

## 2025-06-30 RX ADMIN — ATORVASTATIN CALCIUM 40 MG: 40 TABLET, FILM COATED ORAL at 08:06

## 2025-06-30 RX ADMIN — MEROPENEM 1 G: 1 INJECTION, POWDER, FOR SOLUTION INTRAVENOUS at 04:06

## 2025-06-30 RX ADMIN — ACETAMINOPHEN 1000 MG: 500 TABLET ORAL at 05:06

## 2025-06-30 RX ADMIN — MICONAZOLE NITRATE ANTIFUNGAL POWDER: 2 POWDER TOPICAL at 09:06

## 2025-06-30 RX ADMIN — SODIUM CHLORIDE 1000 ML: 9 INJECTION, SOLUTION INTRAVENOUS at 04:06

## 2025-06-30 RX ADMIN — GABAPENTIN 600 MG: 300 CAPSULE ORAL at 09:06

## 2025-06-30 RX ADMIN — MEROPENEM 1 G: 1 INJECTION, POWDER, FOR SOLUTION INTRAVENOUS at 08:06

## 2025-06-30 RX ADMIN — ENOXAPARIN SODIUM 40 MG: 40 INJECTION SUBCUTANEOUS at 04:06

## 2025-06-30 RX ADMIN — PANTOPRAZOLE SODIUM 40 MG: 40 TABLET, DELAYED RELEASE ORAL at 08:06

## 2025-06-30 NOTE — PROGRESS NOTES
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  Patient ambulates with walker.  No foam borders, redness, or open wounds noted to bilateral heels and sacral.  Consult wound care of any findings.      06/30/2025        [1]   Social History  Socioeconomic History    Marital status:    Tobacco Use    Smoking status: Never    Smokeless tobacco: Never   Substance and Sexual Activity    Alcohol use: Never    Drug use: Never     Social Drivers of Health     Financial Resource Strain: Medium Risk (6/30/2025)    Overall Financial Resource Strain (CARDIA)     Difficulty of Paying Living Expenses: Somewhat hard   Food Insecurity: No Food Insecurity (6/30/2025)    Hunger Vital Sign     Worried About Running Out of Food in the Last Year: Never true     Ran Out of Food in the Last Year: Never true   Transportation Needs: No Transportation Needs (6/30/2025)    PRAPARE -  Transportation     Lack of Transportation (Medical): No     Lack of Transportation (Non-Medical): No   Physical Activity: Inactive (6/30/2025)    Exercise Vital Sign     Days of Exercise per Week: 0 days     Minutes of Exercise per Session: 0 min   Stress: Stress Concern Present (6/30/2025)    [employer] [place] [employer] - Occupational Stress Questionnaire     Feeling of Stress : To some extent   Housing Stability: Low Risk  (6/30/2025)    Housing Stability Vital Sign     Unable to Pay for Housing in the Last Year: No     Homeless in the Last Year: No

## 2025-06-30 NOTE — ED NOTES
Pt helped to bedside commode. Helped back to bed with decubitus to left side of buttock bleeding with dressing applied. Reported to oncoming nurse and informed Isabel Li about area.

## 2025-06-30 NOTE — PT/OT/SLP EVAL
"Physical Therapy Evaluation    Patient Name:  Herlinda Valdovinos   MRN:  [id number]    Recommendations:     Discharge Recommendations: Moderate Intensity Therapy   Discharge Equipment Recommendations: none   Barriers to discharge: None    Assessment:     Herlinda Valdovinos is a 60 y.o. female admitted with a medical diagnosis of Acute cerebrovascular insufficiency transient focal neurologic deficit.  She presents with the following impairments/functional limitations: impaired balance, decreased coordination, decreased safety awareness, weakness, impaired endurance, impaired sensation, gait instability. Patient presents with adequate functional mobility. Patient is able to safely transfer oob. Patient appears able to ambulate with rolling walker and cga. Patient presents with foot drop on LLE from previous cva and does not own an orthotic device. Patient will need to reassessed in the AM for gait training and therapeutic exercise.     Rehab Prognosis: Good; patient would benefit from acute skilled PT services to address these deficits and reach maximum level of function.    Recent Surgery: * No surgery found *      Plan:     During this hospitalization, patient to be seen 5 x/week to address the identified rehab impairments via gait training, therapeutic activities, therapeutic exercises and progress toward the following goals:    Plan of Care Expires:  07/30/25    Subjective     Chief Complaint: left sided weakness, balance, and urinary incontinence  Patient/Family Comments/goals: Patient reports falling often. Patient also notes that she wants a purewick because she is "tired of using the diapers". Patient reports not being able to control loss of balance.   Pain/Comfort:  Pain Rating 1: 0/10    Patients cultural, spiritual, Restorationist conflicts given the current situation: no    Living Environment:  Patient lives in a 2 story home with . Patient does not need access to the second floor. Patient does not have an " steps to enter/exit the home. Patient has several railings on the wall in her home.   Prior to admission, patients level of function was minimal assistance needed from .  Equipment used at home: cane, quad, cane, straight, rollator.  DME owned (not currently used): rolling walker and single point cane.  Upon discharge, patient will have assistance from .    Objective:     Patient found HOB elevated with pulse ox (continuous)  upon PT entry to room.    General Precautions: Standard, fall  Orthopedic Precautions:    Braces:    Respiratory Status: Room air    Exams:  Sensation: LLE impaired   RUE ROM: WNL  RUE Strength: WNL  LUE ROM: WNL  LUE Strength: WNL, 4/5 wrist ext, 4+/5 wrist flex, 4+/5 elbow flex/ext  RLE ROM: WNL  RLE Strength: WNL  LLE ROM: WFL  LLE Strength: WFL, 4+/5 ankle PF, 4/5 ankle DF, 4/5 knee flex/ext, LLE foot drop    Functional Mobility:  Bed Mobility:     Supine to Sit: minimum assistance  Transfers:     Sit to Stand:  contact guard assistance with rolling walker  Gait: Patient able to ambulate 40 feet with rolling walker and cga.   Balance: Decreased       AM-PAC 6 CLICK MOBILITY  Total Score:18       Treatment & Education:  Tx plan   Gait training with rolling walker and cga     Patient left on bedside commode with OT and PTA present.    GOALS:   Multidisciplinary Problems       Physical Therapy Goals          Problem: Physical Therapy    Goal Priority Disciplines Outcome Interventions   Physical Therapy Goal     PT, PT/OT Progressing                        DME Justifications:  No DME recommended requiring DME justifications    History:     Past Medical History:   Diagnosis Date    Bacteremia due to Escherichia coli 04/10/2024    Cerebral hemorrhage 2012    bilateral pontine infarct    Chronic disorder of skin     follows with ID and dermatology in [facility]    Essential (primary) hypertension     Hemiparesis affecting left side as late effect of stroke 04/2025    hemorrhagic due  to malignant HTN;  transferred to [facility]    Idiopathic peripheral neuropathy 05/20/2021    Insomnia secondary to depression with anxiety     Ischemic stroke 2012    Nephrolithiasis        Past Surgical History:   Procedure Laterality Date    APPENDECTOMY      CHOLECYSTECTOMY      CYSTOSCOPY W/ URETERAL STENT PLACEMENT      HYSTERECTOMY      LEFT HEART CATHETERIZATION  2014    clean coronaries per Dr. Tena    LITHOTRIPSY      Renal lithotripsy       Time Tracking:     PT Received On: 06/30/25  PT Start Time: 1045     PT Stop Time: 1105  PT Total Time (min): 20 min     Billable Minutes: Evaluation 20      06/30/2025

## 2025-06-30 NOTE — HPI
61 yo F presents to [place] ED with c/o left sided weakness over the last several days.  She has had some nausea but no diarrhea and the weakness for the past two days has been to BLE with difficulty walking.  She is on an ARB only for her BP because after her ICH in 4/21 due to malignant HTN her BP has decreased and she had to be discontinue from norvasc previously because of hypotension.  However, at that time she was found to have ESBL UTI and bacteremia 4/24.  She has been tolerating the ARB up until today when she had some fatigue and lightheadedness with soft BP.      Patient had lactic acid of 1.1 and UA did show possible infection and meropenum started after cultures obtained.  She was given 2L NS and does feel better at this time.  She does have some breakdown on her left buttock from being in the bed this past week.  She is in good spirits.  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  Her massive hemorrhagic stroke was 4/21 and she went to [facility] where they did not have to do a craniotomy and she worked very hard to resolve the LHP she had.  She could only move several fingers on her left side.  She has heroic improvement.  She waited until night before last to come to the ED because she thought perhaps a TIA and was on DAPT, ARB and statin.  No palpitations or chest pain and no h/o atrial fib but patient missed her OP sleep study appointment when Ayana hit and has never rescheduled.  Her  of may years, Livan, is present and says she snores so loud the roof raises and feels she probably does have sleep apnea.  I explained that could also be a cause of recurrent stroke.      CT negative for acute process but does show the old, chronic bilateral stroke changes.  Neuro consult done at [place] and MRI follow up recommended.      Remainder of ROS as below.  Reviewed med recon and updated PMH.  See assessment and  plan below for problem based evaluation

## 2025-06-30 NOTE — PLAN OF CARE
[facility]  Initial Discharge Assessment       Primary Care Provider: Vini Hernandez NP    Admission Diagnosis: TIA (transient ischemic attack) [G45.9]    Admission Date: 6/29/2025  Expected Discharge Date:     Transition of Care Barriers: None    Payor: [facility] MCARE / Plan: [facility] DUAL COMPLETE HMO SNP / Product Type: Medicare Advantage /     Extended Emergency Contact Information  Primary Emergency Contact: VincentMicheal  Mobile Phone: [phone]  Relation: Spouse  Preferred language: English   needed? No    Discharge Plan A: Home Health, Home with family  Discharge Plan B: Home, Long-term acute care facility (LTAC), Rehab, Skilled Nursing Facility      [facility] [address]  [address]  [place] [address]  Phone: [phone] Fax: [phone]      Initial Assessment (most recent)       Adult Discharge Assessment - 06/30/25 0933          Discharge Assessment    Assessment Type Discharge Planning Assessment     Confirmed/corrected address, phone number and insurance Yes     Source of Information patient     Communicated POOL with patient/caregiver Yes     People in Home grandchild(erica);spouse     Name(s) of People in Home Micheal,  [phone]     Do you expect to return to your current living situation? Yes     Do you have help at home or someone to help you manage your care at home? Yes     Who are your caregiver(s) and their phone number(s)? Micheal,  [phone]     Prior to hospitilization cognitive status: Unable to Assess     Current cognitive status: Alert/Oriented     Walking or Climbing Stairs Difficulty yes     Walking or Climbing Stairs ambulation difficulty, requires equipment     Dressing/Bathing Difficulty yes     Dressing/Bathing bathing difficulty, assistance 1 person     Home Accessibility wheelchair accessible     Equipment Currently Used at Home walker,  Subjective shift:     Patient ID: Merissa Mascorro is a 9 y.o. female.    Chief Complaint:    History of Present Illness  Merissa Mascorro {presents/returns:[id number]} to clinic today for evaluation of ***     Social History     Occupational History   • Not on file   Tobacco Use   • Smoking status: Never Smoker   • Smokeless tobacco: Never Used   Vaping Use   • Vaping Use: Never used   Substance and Sexual Activity   • Alcohol use: Not on file   • Drug use: Not on file   • Sexual activity: Not on file      History reviewed. No pertinent past medical history.  History reviewed. No pertinent surgical history.    History reviewed. No pertinent family history.      Review of Systems   Constitutional: Negative for chills, diaphoresis, fever and unexpected weight change.   HENT: Negative for hearing loss, nosebleeds, sore throat and tinnitus.    Eyes: Negative for pain and visual disturbance.   Respiratory: Negative for cough, shortness of breath and wheezing.    Cardiovascular: Negative for chest pain and palpitations.   Gastrointestinal: Negative for abdominal pain, diarrhea, nausea and vomiting.   Endocrine: Negative for cold intolerance, heat intolerance and polydipsia.   Genitourinary: Negative for difficulty urinating, dysuria and hematuria.   Musculoskeletal: Positive for joint swelling and myalgias. Negative for arthralgias.   Skin: Negative for rash and wound.   Allergic/Immunologic: Negative for environmental allergies.   Neurological: Negative for dizziness, syncope and numbness.   Hematological: Does not bruise/bleed easily.   Psychiatric/Behavioral: Negative for dysphoric mood and sleep disturbance. The patient is not nervous/anxious.            Objective:  There were no vitals filed for this visit.  There were no vitals filed for this visit.  There is no height or weight on file to calculate BMI.  ***      Ortho Exam     ***  Imaging:  ***  Assessment:      No diagnosis found.       Plan:          1. Discussed  rolling;rollator;oxygen;cane, quad     Readmission within 30 days? No     Patient currently being followed by outpatient case management? No     Do you currently have service(s) that help you manage your care at home? Yes     Name and Contact number of agency [facility]     Is the pt/caregiver preference to resume services with current agency Yes     Do you take prescription medications? Yes     Do you have prescription coverage? Yes     Do you have any problems affording any of your prescribed medications? No     Who is going to help you get home at discharge? Micheal,  [phone]     How do you get to doctors appointments? family or friend will provide     Are you on dialysis? No     Do you take coumadin? No     Discharge Plan A Home Health;Home with family     Discharge Plan B Home;Long-term acute care facility (LTAC);Rehab;Skilled Nursing Facility     DME Needed Upon Discharge  none     Discharge Plan discussed with: Patient     Transition of Care Barriers None        Physical Activity    On average, how many days per week do you engage in moderate to strenuous exercise (like a brisk walk)? 0 days     On average, how many minutes do you engage in exercise at this level? 0 min        Financial Resource Strain    How hard is it for you to pay for the very basics like food, housing, medical care, and heating? Somewhat hard        Housing Stability    In the last 12 months, was there a time when you were not able to pay the mortgage or rent on time? No     At any time in the past 12 months, were you homeless or living in a shelter (including now)? No        Transportation Needs    In the past 12 months, has lack of transportation kept you from medical appointments or from getting medications? No     In the past 12 months, has lack of transportation kept you from meetings, work, or from getting things needed for daily living? No        Food Insecurity    Within the past 12 months, you worried that your food  treatment options at length with patient at today's visit. ***      Merissa Mascorro *** was in agreement with plan and had all questions answered.     Orders:  No orders of the defined types were placed in this encounter.      Medications:  No orders of the defined types were placed in this encounter.      Followup:  No follow-ups on file.    There are no diagnoses linked to this encounter.      Dictated utilizing Dragon dictation    would run out before you got the money to buy more. Never true     Within the past 12 months, the food you bought just didn't last and you didn't have money to get more. Never true        Stress    Do you feel stress - tense, restless, nervous, or anxious, or unable to sleep at night because your mind is troubled all the time - these days? To some extent        Social Isolation    How often do you feel lonely or isolated from those around you?  Never        Alcohol Use    Q1: How often do you have a drink containing alcohol? Never     Q2: How many drinks containing alcohol do you have on a typical day when you are drinking? Patient does not drink     Q3: How often do you have six or more drinks on one occasion? Never        Utilities    In the past 12 months has the electric, gas, oil, or water company threatened to shut off services in your home? No        Health Literacy    How often do you need to have someone help you when you read instructions, pamphlets, or other written material from your doctor or pharmacy? Never                 CM spoke with pt at bedside. Pt lives at home with her , Micheal, and their 18yo granddaughter and her child. Pt plans to return home when medically ready for d/c. Pt uses DME. Current with [facility] and choice to resume care at d/c. SDOH completed. CM following for anticipated d/c needs.       Met with pt to review discharge recommendation of HH and is agreeable to plan    Patient/family provided list of facilities in-network with patient's payor plan. Providers that are owned, operated, or affiliated with [facility] are included on the list.     Notified that referral sent to below listed facilities from in-network list based on proximity to home/family support:   [facility]- initial paperwork faxed  2.   3.  4.  5. (can send more than 5)    Patient/family instructed to identify preference.    Preferred Facility: (if more than 1, listed in order of descending  preference)  1.  OR  Patient has declined to select a preferred provider and elects placement with the first accepting in network provider that is available to provide services as ordered by the physician       If an additional preferred facility not listed above is identified, additional referral to be sent. If above facilities unable to accept, will send additional referrals to in-network providers.

## 2025-06-30 NOTE — ASSESSMENT & PLAN NOTE
Patient's blood pressure range in the last 24 hours was: BP  Min: 104/80  Max: 138/92.The patient's inpatient anti-hypertensive regimen is listed below:  Current Antihypertensives  labetaloL injection 10 mg, Every 4 hours PRN, Intravenous  , Daily, Oral    Plan  - BP is controlled, no changes needed to their regimen

## 2025-06-30 NOTE — PHARMACY MED REC
".Admission Medication History     The home medication history was taken by Tequila Schneider.    You may go to "Admission" then "Reconcile Home Medications" tabs to review and/or act upon these items.     The home medication list has been updated by the Pharmacy department.   Please read ALL comments highlighted in yellow.   Please address this information as you see fit.    Feel free to contact us if you have any questions or require assistance.    Medications added:  Lorazepam 1 mg  Sertrazline 100 mg  Fluconazole 200 mg  Lidocaine 2% topical gel  Mupirocin 2% ointment  Temazepam 30 mg  Olmesartan/hctz 40-25 mg      Patient reports no longer taking the following medication(s). The medication(s) listed below were removed from the home medication list. Please reorder if appropriate:  Amlodipine 10 mg  Docusate sodium 100 mg  Lorazepam 0.5 mg  Olmesartan 40 mg      Medications listed below were obtained from: Patient/family and Analytic software- [employer]        Current Outpatient Medications on File Prior to Encounter   Medication Sig Dispense Refill Last Dose/Taking    acetaminophen (TYLENOL) 325 MG tablet Take 650 mg by mouth every 6 (six) hours as needed.   6/29/2025    albuterol (PROAIR HFA) 90 mcg/actuation inhaler Inhale 2 puffs into the lungs every 4 (four) hours as needed for Wheezing or Shortness of Breath. Rescue 18 g 0 6/29/2025    albuterol-ipratropium (DUO-NEB) 2.5 mg-0.5 mg/3 mL nebulizer solution Take 3 mLs by nebulization every 4 (four) hours as needed for Wheezing. Rescue 30 each 1 6/29/2025    atorvastatin (LIPITOR) 40 MG tablet Take 40 mg by mouth once daily.   6/29/2025    cetirizine (ZYRTEC) 10 MG tablet Take 1 tablet (10 mg total) by mouth once daily. 30 tablet 0 6/29/2025    clopidogreL (PLAVIX) 75 mg tablet Take 75 mg by mouth once daily.   6/29/2025    fluconazole (DIFLUCAN) 200 MG Tab Take 200 mg by mouth once daily.   Past Week    gabapentin (NEURONTIN) 300 MG capsule Take 600 mg by " mouth 2 (two) times daily.   6/29/2025    JELCAINE STERILE 2 % Gel Apply topically 2 (two) times daily.   Past Week    LORazepam (ATIVAN) 1 MG tablet Take 1 mg by mouth 3 (three) times daily.   6/29/2025    multivitamin-minerals-lutein (MULTIVITAMIN 50 PLUS) Tab Take 1 tablet by mouth once daily. (Patient taking differently: Take 1 tablet by mouth once daily. Taking 1 daily) 30 tablet 11 6/29/2025    mupirocin (BACTROBAN) 2 % ointment Apply topically once daily.   6/29/2025    olmesartan-hydrochlorothiazide (BENICAR HCT) 40-25 mg per tablet Take 1 tablet by mouth once daily.   6/29/2025    omega 3-dha-epa-fish oil 1,000 mg (120 mg-180 mg) Cap Take 1 capsule by mouth once daily. 30 capsule 0 6/29/2025    pantoprazole (PROTONIX) 40 MG tablet Take 40 mg by mouth once daily.   6/29/2025    sertraline (ZOLOFT) 50 MG tablet Take 3 tablets (150 mg total) by mouth once daily. 90 tablet 0 6/29/2025    temazepam (RESTORIL) 30 mg capsule Take 30 mg by mouth nightly as needed.   6/29/2025    [DISCONTINUED] olmesartan (BENICAR) 40 MG tablet Take 40 mg by mouth once daily.   6/29/2025    [DISCONTINUED] sertraline (ZOLOFT) 100 MG tablet Take 100 mg by mouth once daily.   6/29/2025    [DISCONTINUED] amLODIPine (NORVASC) 10 MG tablet Take 1 tablet (10 mg total) by mouth once daily. 90 tablet 3     [DISCONTINUED] docusate sodium (COLACE) 100 MG capsule Take 1 capsule (100 mg total) by mouth 2 (two) times daily.       [DISCONTINUED] hydroCHLOROthiazide (HYDRODIURIL) 12.5 MG Tab Take 1 tablet (12.5 mg total) by mouth once daily. 30 tablet 11     [DISCONTINUED] LORazepam (ATIVAN) 0.5 MG tablet Take 1 tablet (0.5 mg total) by mouth every 12 (twelve) hours as needed for Anxiety. (Patient taking differently: Take 0.5 mg by mouth once daily.) 15 tablet 0     [DISCONTINUED] LORazepam (ATIVAN) 0.5 MG tablet Take 0.5 mg by mouth 3 (three) times daily. Pt states she only takes once a day       [DISCONTINUED] omeprazole  (PRILOSEC) 40 MG capsule Take 40 mg by mouth every evening.            Potential issues to be addressed PRIOR TO DISCHARGE  No issues identified     Tequila Schneider  Medication Access Specialist  EXT. [id number]                   .

## 2025-06-30 NOTE — SUBJECTIVE & OBJECTIVE
Past Medical History:   Diagnosis Date    Bacteremia due to Escherichia coli 04/10/2024    Cerebral hemorrhage 2012    bilateral pontine infarct    Chronic disorder of skin     follows with ID and dermatology in [facility]    Essential (primary) hypertension     Hemiparesis affecting left side as late effect of stroke 04/2025    hemorrhagic due to malignant HTN;  transferred to [facility]    Idiopathic peripheral neuropathy 05/20/2021    Insomnia secondary to depression with anxiety     Ischemic stroke 2012    Nephrolithiasis        Past Surgical History:   Procedure Laterality Date    APPENDECTOMY      CHOLECYSTECTOMY      CYSTOSCOPY W/ URETERAL STENT PLACEMENT      HYSTERECTOMY      LITHOTRIPSY      Renal lithotripsy       Review of patient's allergies indicates:   Allergen Reactions    Lamisil [terbinafine] Anaphylaxis    Codeine Itching    Compazine [prochlorperazine] Itching       Current Facility-Administered Medications on File Prior to Encounter   Medication    [COMPLETED] 0.9% NaCl infusion     Current Outpatient Medications on File Prior to Encounter   Medication Sig    acetaminophen (TYLENOL) 325 MG tablet Take 650 mg by mouth every 6 (six) hours as needed.    albuterol (PROAIR HFA) 90 mcg/actuation inhaler Inhale 2 puffs into the lungs every 4 (four) hours as needed for Wheezing or Shortness of Breath. Rescue    albuterol-ipratropium (DUO-NEB) 2.5 mg-0.5 mg/3 mL nebulizer solution Take 3 mLs by nebulization every 4 (four) hours as needed for Wheezing. Rescue    amLODIPine (NORVASC) 10 MG tablet Take 1 tablet (10 mg total) by mouth once daily.    atorvastatin (LIPITOR) 40 MG tablet Take 40 mg by mouth once daily.    cetirizine (ZYRTEC) 10 MG tablet Take 1 tablet (10 mg total) by mouth once daily.    clopidogreL (PLAVIX) 75 mg tablet Take 75 mg by mouth once.    docusate sodium (COLACE) 100 MG capsule Take 1 capsule (100 mg total) by mouth 2 (two) times daily.    gabapentin (NEURONTIN) 300 MG capsule Take  600 mg by mouth 2 (two) times daily.    LORazepam (ATIVAN) 0.5 MG tablet Take 0.5 mg by mouth 3 (three) times daily. Pt states she only takes once a day    multivitamin-minerals-lutein (MULTIVITAMIN 50 PLUS) Tab Take 1 tablet by mouth once daily. (Patient taking differently: Take 1 tablet by mouth once daily. Taking 1 daily)    olmesartan (BENICAR) 40 MG tablet Take 40 mg by mouth once daily.    omega 3-dha-epa-fish oil 1,000 mg (120 mg-180 mg) Cap Take 1 capsule by mouth once daily.    pantoprazole (PROTONIX) 40 MG tablet Take 40 mg by mouth once daily.    sertraline (ZOLOFT) 50 MG tablet Take 3 tablets (150 mg total) by mouth once daily.    [DISCONTINUED] hydroCHLOROthiazide (HYDRODIURIL) 12.5 MG Tab Take 1 tablet (12.5 mg total) by mouth once daily.    [DISCONTINUED] omeprazole (PRILOSEC) 40 MG capsule Take 40 mg by mouth every evening.     Family History    None       Tobacco Use    Smoking status: Never    Smokeless tobacco: Never   Vaping Use    Vaping status: Not on file   Substance and Sexual Activity    Alcohol use: Never    Drug use: Never    Sexual activity: Not on file     Review of Systems   Constitutional:  Positive for fatigue. Negative for appetite change, chills, fever and unexpected weight change.   HENT:  Negative for congestion, mouth sores, nosebleeds, sinus pain, sore throat and trouble swallowing.    Respiratory:  Negative for apnea, cough, chest tightness and shortness of breath.    Cardiovascular:  Negative for chest pain, palpitations and leg swelling.   Gastrointestinal:  Positive for nausea. Negative for abdominal pain, blood in stool, constipation, diarrhea and vomiting.   Endocrine: Negative for polydipsia and polyuria.   Genitourinary:  Negative for decreased urine volume, difficulty urinating, dysuria and frequency.   Musculoskeletal:  Negative for arthralgias, back pain and neck pain.   Skin:  Positive for rash and wound.   Neurological:  Positive for weakness and light-headedness.  Negative for syncope and headaches.   Hematological:  Does not bruise/bleed easily.   Psychiatric/Behavioral:  Negative for confusion and suicidal ideas.      Objective:     Vital Signs (Most Recent):  Temp: 98.2 °F (36.8 °C) (06/29/25 2348)  Pulse: 75 (06/29/25 2348)  Resp: 18 (06/29/25 2348)  BP: 118/79 (06/29/25 2348)  SpO2: 95 % (06/29/25 2348) Vital Signs (24h Range):  Temp:  [98.1 °F (36.7 °C)-98.6 °F (37 °C)] 98.2 °F (36.8 °C)  Pulse:  [72-95] 75  Resp:  [12-23] 18  SpO2:  [93 %-98 %] 95 %  BP: ()/(64-88) 118/79     Weight: 85.5 kg (188 lb 8 oz)  Body mass index is 36.81 kg/m².     Physical Exam  Vitals and nursing note reviewed. Exam conducted with a chaperone present.   Constitutional:       Appearance: She is ill-appearing.   HENT:      Head: Atraumatic.      Mouth/Throat:      Mouth: Mucous membranes are moist.      Pharynx: Oropharynx is clear.   Eyes:      Conjunctiva/sclera: Conjunctivae normal.      Pupils: Pupils are equal, round, and reactive to light.   Neck:      Vascular: No carotid bruit.   Cardiovascular:      Rate and Rhythm: Normal rate and regular rhythm.      Pulses: Normal pulses.      Heart sounds: Normal heart sounds.   Pulmonary:      Effort: Pulmonary effort is normal.      Breath sounds: Normal breath sounds.   Abdominal:      General: Abdomen is flat. Bowel sounds are normal. There is no distension.      Palpations: Abdomen is soft.      Tenderness: There is no abdominal tenderness. There is no guarding.   Musculoskeletal:         General: No tenderness, deformity or signs of injury. Normal range of motion.      Cervical back: Neck supple.      Right lower leg: No edema.      Left lower leg: No edema.   Skin:     General: Skin is warm and dry.      Capillary Refill: Capillary refill takes less than 2 seconds.      Coloration: Skin is not jaundiced or pale.      Findings: Lesion and rash present. No bruising.      Comments: See below for photos   Neurological:      General: No  focal deficit present.      Mental Status: She is alert and oriented to person, place, and time.   Psychiatric:         Mood and Affect: Mood normal.              CRANIAL NERVES     CN III, IV, VI   Pupils are equal, round, and reactive to light.       Significant Labs: All pertinent labs within the past 24 hours have been reviewed.    Significant Imaging: I have reviewed all pertinent imaging results/findings within the past 24 hours.

## 2025-06-30 NOTE — PT/OT/SLP EVAL
Occupational Therapy   Evaluation    Name: Herlinda Valdovinos  MRN: [id number]  Admitting Diagnosis: Acute cerebrovascular insufficiency transient focal neurologic deficit  Recent Surgery: * No surgery found *      Recommendations:     Discharge Recommendations: Moderate Intensity Therapy  Discharge Equipment Recommendations:  none  Barriers to discharge:       Assessment:     Herlinda Valdovinos is a 60 y.o. female with a medical diagnosis of Acute cerebrovascular insufficiency transient focal neurologic deficit.  She presents with alert and agreeable to treatment. Performance deficits affecting function: weakness, impaired endurance, impaired self care skills, impaired functional mobility, gait instability.      Rehab Prognosis: Good; patient would benefit from acute skilled OT services to address these deficits and reach maximum level of function.       Plan:     Patient to be seen 5 x/week to address the above listed problems via self-care/home management, therapeutic activities, therapeutic exercises  Plan of Care Expires: 08/04/25  Plan of Care Reviewed with: patient    Subjective     Chief Complaint: Acute cerebrovascular insufficiency transient focal neurologic deficit   Patient/Family Comments/goals: Pt plans to return home at discharge    Occupational Profile:  Living Environment: Pt lives in a 2 story home, but stays on the first level with her spouse  Previous level of function: Pt was mod(I) with her self-care. Pt ambulated with a rollator  Roles and Routines: Pt and her spouse take care of themselves and their home. Pt is a former RN.  Equipment Used at Home: rollator, bath bench, cane, straight, cane, quad  Assistance upon Discharge: Pt will have assistance from spouse and home health care verses facility staff    Pain/Comfort:  Pain Rating 1: 0/10  Pain Rating Post-Intervention 1: 0/10    Patients cultural, spiritual, [employer] conflicts given the current situation: no    Objective:     Communicated with:  RN Itzel Louie prior to session.  Patient found ambulatory in room/gomez with RW and PT with pulse ox (continuous) upon OT entry to room.    General Precautions: Standard, fall  Orthopedic Precautions: N/A  Braces: N/A  Respiratory Status: Room air    Occupational Performance:    Bed Mobility:    Patient completed Scooting/Bridging with stand by assistance  Patient completed Sit to Supine with contact guard assistance and minimum assistance    Functional Mobility/Transfers:  Patient completed Sit <> Stand Transfer with stand by assistance  with  rolling walker   Patient completed Bed <> Chair Transfer using Step Transfer technique with stand by assistance and contact guard assistance with rolling walker  Functional Mobility: Pt ambulated with RW and SBA/CGA    Activities of Daily Living:  Grooming: independence with access to supplies  Bathing: Pt bathed  from chair at sink with setup for upper body anterior, max(A) with back, and  setup for upper legs and perineum    Upper Body Dressing: independence      Cognitive/Visual Perceptual:  Cognitive/Psychosocial Skills:     -       Oriented to: Person, Place, Time, and Situation   -       Follows Commands/attention:Follows two-step commands  -       Communication: clear/fluent  -       Safety awareness/insight to disability: intact   -       Mood/Affect/Coping skills/emotional control: Cooperative  Visual/Perceptual:      -Intact      Physical Exam:  Balance:    -       sitting with independence, standing with SBA with RW  Upper Extremity Range of Motion:     -       Right Upper Extremity: WFL  -       Left Upper Extremity: WFL  Upper Extremity Strength:    -       Right Upper Extremity: WFL  -       Left Upper Extremity: WFL   Strength:    -       Right Upper Extremity: WFL  -       Left Upper Extremity: WFL  Gross motor coordination:   WFL    AMPAC 6 Click ADL:  AMPAC Total Score:      Treatment & Education:  Pt educated on OT role/POC.   Importance of OOB activity  with staff assistance.  Importance of sitting up in the chair throughout the day as tolerated, especially for meals   Importance of assisting with self-care activities   All questions/concerns answered within OT scope of practice      Patient left HOB elevated with all lines intact, call button in reach, and RN notified    GOALS:   Multidisciplinary Problems       Occupational Therapy Goals          Problem: Occupational Therapy    Goal Priority Disciplines Outcome Interventions   Occupational Therapy Goal     OT, PT/OT Progressing    Description: STG: (in 1 week)  Pt will perform grooming with setup  Pt will bathe with setup and CGA  Pt will perform UE dressing with setup  Pt will perform LE dressing with setup and CGA  Pt will transfer bed/chair/bsc with RW and SBA  Pt will perform standing task x 4 min with CGA with RW   Pt will tolerate 15 minutes of tx without fatigue      LTG: (in 5 weeks)  1.Restore to max I with self care and mobility.                         History:     Past Medical History:   Diagnosis Date    Bacteremia due to Escherichia coli 04/10/2024    Cerebral hemorrhage 2012    bilateral pontine infarct    Chronic disorder of skin     follows with ID and dermatology in [facility]    Essential (primary) hypertension     Hemiparesis affecting left side as late effect of stroke 04/2025    hemorrhagic due to malignant HTN;  transferred to [facility]    Idiopathic peripheral neuropathy 05/20/2021    Insomnia secondary to depression with anxiety     Ischemic stroke 2012    Nephrolithiasis          Past Surgical History:   Procedure Laterality Date    APPENDECTOMY      CHOLECYSTECTOMY      CYSTOSCOPY W/ URETERAL STENT PLACEMENT      HYSTERECTOMY      LEFT HEART CATHETERIZATION  2014    clean coronaries per Dr. Tena    LITHOTRIPSY      Renal lithotripsy       Time Tracking:     OT Date of Treatment: 06/30/25  OT Start Time: 1057  OT Stop Time: 1140  OT Total Time (min): 43 min    Billable  Minutes:Evaluation low complexity    6/30/2025

## 2025-06-30 NOTE — ASSESSMENT & PLAN NOTE
Renal function improved at [place] with IVF  with recheck BMP in am with CBC and thyroid function studies  Continue meropenum and follow cultures.

## 2025-06-30 NOTE — ASSESSMENT & PLAN NOTE
Patient's blood pressure range in the last 24 hours was: BP  Min: 87/64  Max: 129/88.The patient's inpatient anti-hypertensive regimen is listed below:  Current Antihypertensives  labetaloL injection 10 mg, Every 4 hours PRN, Intravenous    Plan  - BP is controlled, no changes needed to their regimen

## 2025-06-30 NOTE — ASSESSMENT & PLAN NOTE
Following the [facility] protocol for elevated BP during acute stroke and allowing permissive HTN  PT/OT/ST consult and q 4h neurochecks.    Monitor on telemetry for afib and will check 2D US for thrombus.    Will continue antiplatelets and high intensity statin.     Will get MRI Brain without contrast for follow up.      Continue CCB and ARB and statin with her home DAPT  If tele or echo show reason to add full dose anticoagulation will start.    [facility] for DVT prophylaxis.   Will need aggressive risk factor modification and GDMT with her PCP.    Patient with condition that if not treated could result in loss of life or bodily function.  Due to co morbidities this patient has complex medical management.

## 2025-06-30 NOTE — ASSESSMENT & PLAN NOTE
Renal function improved at [place] with IVF  with recheck BMP in am with CBC and thyroid function studies  Continue meropenum and follow cultures.      6/30  - urine micro noted, continue roberto

## 2025-06-30 NOTE — SUBJECTIVE & OBJECTIVE
Past Medical History:   Diagnosis Date    Bacteremia due to Escherichia coli 04/10/2024    Cerebral hemorrhage 2012    bilateral pontine infarct    Chronic disorder of skin     follows with ID and dermatology in [facility]    Essential (primary) hypertension     Hemiparesis affecting left side as late effect of stroke 04/2025    hemorrhagic due to malignant HTN;  transferred to [facility]    Idiopathic peripheral neuropathy 05/20/2021    Insomnia secondary to depression with anxiety     Ischemic stroke 2012    Nephrolithiasis        Past Surgical History:   Procedure Laterality Date    APPENDECTOMY      CHOLECYSTECTOMY      CYSTOSCOPY W/ URETERAL STENT PLACEMENT      HYSTERECTOMY      LEFT HEART CATHETERIZATION  2014    clean coronaries per Dr. Tena    LITHOTRIPSY      Renal lithotripsy       Review of patient's allergies indicates:   Allergen Reactions    Lamisil [terbinafine] Anaphylaxis    Codeine Itching    Compazine [prochlorperazine] Itching       No current facility-administered medications on file prior to encounter.     Current Outpatient Medications on File Prior to Encounter   Medication Sig    acetaminophen (TYLENOL) 325 MG tablet Take 650 mg by mouth every 6 (six) hours as needed.    albuterol (PROAIR HFA) 90 mcg/actuation inhaler Inhale 2 puffs into the lungs every 4 (four) hours as needed for Wheezing or Shortness of Breath. Rescue    albuterol-ipratropium (DUO-NEB) 2.5 mg-0.5 mg/3 mL nebulizer solution Take 3 mLs by nebulization every 4 (four) hours as needed for Wheezing. Rescue    atorvastatin (LIPITOR) 40 MG tablet Take 40 mg by mouth once daily.    cetirizine (ZYRTEC) 10 MG tablet Take 1 tablet (10 mg total) by mouth once daily.    clopidogreL (PLAVIX) 75 mg tablet Take 75 mg by mouth once daily.    fluconazole (DIFLUCAN) 200 MG Tab Take 200 mg by mouth once daily.    gabapentin (NEURONTIN) 300 MG capsule Take 600 mg by mouth 2 (two) times daily.    JELCAINE STERILE 2 % Gel Apply topically 2  (two) times daily.    LORazepam (ATIVAN) 1 MG tablet Take 1 mg by mouth 3 (three) times daily.    multivitamin-minerals-lutein (MULTIVITAMIN 50 PLUS) Tab Take 1 tablet by mouth once daily. (Patient taking differently: Take 1 tablet by mouth once daily. Taking 1 daily)    mupirocin (BACTROBAN) 2 % ointment Apply topically once daily.    olmesartan-hydrochlorothiazide (BENICAR HCT) 40-25 mg per tablet Take 1 tablet by mouth once daily.    omega 3-dha-epa-fish oil 1,000 mg (120 mg-180 mg) Cap Take 1 capsule by mouth once daily.    pantoprazole (PROTONIX) 40 MG tablet Take 40 mg by mouth once daily.    sertraline (ZOLOFT) 50 MG tablet Take 3 tablets (150 mg total) by mouth once daily.    temazepam (RESTORIL) 30 mg capsule Take 30 mg by mouth nightly as needed.    [DISCONTINUED] olmesartan (BENICAR) 40 MG tablet Take 40 mg by mouth once daily.    [DISCONTINUED] sertraline (ZOLOFT) 100 MG tablet Take 100 mg by mouth once daily.    [DISCONTINUED] amLODIPine (NORVASC) 10 MG tablet Take 1 tablet (10 mg total) by mouth once daily.    [DISCONTINUED] docusate sodium (COLACE) 100 MG capsule Take 1 capsule (100 mg total) by mouth 2 (two) times daily.    [DISCONTINUED] hydroCHLOROthiazide (HYDRODIURIL) 12.5 MG Tab Take 1 tablet (12.5 mg total) by mouth once daily.    [DISCONTINUED] LORazepam (ATIVAN) 0.5 MG tablet Take 1 tablet (0.5 mg total) by mouth every 12 (twelve) hours as needed for Anxiety. (Patient taking differently: Take 0.5 mg by mouth once daily.)    [DISCONTINUED] LORazepam (ATIVAN) 0.5 MG tablet Take 0.5 mg by mouth 3 (three) times daily. Pt states she only takes once a day    [DISCONTINUED] omeprazole (PRILOSEC) 40 MG capsule Take 40 mg by mouth every evening.     Family History    None       Tobacco Use    Smoking status: Never    Smokeless tobacco: Never   Vaping Use    Vaping status: Not on file   Substance and Sexual Activity    Alcohol use: Never    Drug use: Never    Sexual activity: Not on file     Review of  Systems   Constitutional:  Positive for fatigue. Negative for appetite change, chills, fever and unexpected weight change.   HENT:  Negative for congestion, mouth sores, nosebleeds, sinus pain, sore throat and trouble swallowing.    Respiratory:  Negative for apnea, cough, chest tightness and shortness of breath.    Cardiovascular:  Negative for chest pain, palpitations and leg swelling.   Gastrointestinal:  Positive for nausea. Negative for abdominal pain, blood in stool, constipation, diarrhea and vomiting.   Endocrine: Negative for polydipsia and polyuria.   Genitourinary:  Negative for decreased urine volume, difficulty urinating, dysuria and frequency.   Musculoskeletal:  Negative for arthralgias, back pain and neck pain.   Skin:  Positive for rash and wound.   Neurological:  Positive for weakness and light-headedness. Negative for syncope and headaches.   Hematological:  Does not bruise/bleed easily.   Psychiatric/Behavioral:  Negative for confusion and suicidal ideas.      Objective:     Vital Signs (Most Recent):  Temp: 98.1 °F (36.7 °C) (06/30/25 1605)  Pulse: 71 (06/30/25 1605)  Resp: 18 (06/30/25 0714)  BP: 122/81 (06/30/25 1605)  SpO2: 97 % (06/30/25 1605) Vital Signs (24h Range):  Temp:  [97.5 °F (36.4 °C)-98.6 °F (37 °C)] 98.1 °F (36.7 °C)  Pulse:  [71-95] 71  Resp:  [16-21] 18  SpO2:  [94 %-97 %] 97 %  BP: (104-138)/(72-92) 122/81     Weight: 85.5 kg (188 lb 8 oz)  Body mass index is 36.81 kg/m².     Physical Exam  Vitals and nursing note reviewed. Exam conducted with a chaperone present.   Constitutional:       Appearance: She is ill-appearing.   HENT:      Head: Atraumatic.      Mouth/Throat:      Mouth: Mucous membranes are moist.      Pharynx: Oropharynx is clear.   Eyes:      Conjunctiva/sclera: Conjunctivae normal.      Pupils: Pupils are equal, round, and reactive to light.   Neck:      Vascular: No carotid bruit.   Cardiovascular:      Rate and Rhythm: Normal rate and regular rhythm.       Pulses: Normal pulses.      Heart sounds: Normal heart sounds.   Pulmonary:      Effort: Pulmonary effort is normal.      Breath sounds: Normal breath sounds.   Abdominal:      General: Abdomen is flat. Bowel sounds are normal. There is no distension.      Palpations: Abdomen is soft.      Tenderness: There is no abdominal tenderness. There is no guarding.   Musculoskeletal:         General: No tenderness, deformity or signs of injury. Normal range of motion.      Cervical back: Neck supple.      Right lower leg: No edema.      Left lower leg: No edema.   Skin:     General: Skin is warm and dry.      Capillary Refill: Capillary refill takes less than 2 seconds.      Coloration: Skin is not jaundiced or pale.      Findings: Lesion and rash present. No bruising.      Comments: See below for photos   Neurological:      General: No focal deficit present.      Mental Status: She is alert and oriented to person, place, and time.   Psychiatric:         Mood and Affect: Mood normal.              CRANIAL NERVES     CN III, IV, VI   Pupils are equal, round, and reactive to light.       Significant Labs: All pertinent labs within the past 24 hours have been reviewed.    Significant Imaging: I have reviewed all pertinent imaging results/findings within the past 24 hours.

## 2025-06-30 NOTE — ASSESSMENT & PLAN NOTE
Patient well controlled.  No S/H plans or ideation.  No hallucinations at present.    Will not consult psychiatry.  Will continue his OP psychotropic medication regimen.  Zoloft and prn ativan

## 2025-06-30 NOTE — PLAN OF CARE
Problem: Physical Therapy  Goal: Physical Therapy Goal  Outcome: Progressing   Short Term Goals  Ambulate CGA - 100 feet with rolling walker assistive device.   Supine to sit contact guard  Sit to stand contact guard  SPT independent with device  5. Independent with HEP    Long Term Goals  Ambulate SBA - 200 feet with rolling walker assistive device.   Supine to sit stand-by  Sit to stand stand-by  SPT independent without device  Needed equipment for home.

## 2025-06-30 NOTE — HOSPITAL COURSE
6/30  - reviewed MRI, no new infarct, noted old hemorrhagic infarct unchanged  - discussed with physical therapy, will need swing bed, social consulted  - urine micro noted, continue rocephin    7/1  - patient does not want SWB admission if she doesn't have to, PT/OT to continue here  - has ESBL + urine with symptomatic acute cystitis, will need 7 days of roberto/ertaepenem, will discuss with patient options for home injections or swingbed with IV abx    7/2  - vital care approved for ertapenem 500 g injection once a day for 5 days for ESBL + UTI  - Intertrigo: continue topical powder, educated on keeping area clean and dry, stop daily fluconazole  - TIA: was already on plavix and high intensity statin, started asa 81 mg daily, follow with pcp in 1 week

## 2025-06-30 NOTE — ASSESSMENT & PLAN NOTE
Following the [facility] protocol for elevated BP during acute stroke and allowing permissive HTN  PT/OT/ST consult and q 4h neurochecks.    Monitor on telemetry for afib and will check 2D US for thrombus.    Will continue antiplatelets and high intensity statin.     Will get MRI Brain without contrast for follow up.      Continue CCB and ARB and statin with her home DAPT  If tele or echo show reason to add full dose anticoagulation will start.    [facility] for DVT prophylaxis.   Will need aggressive risk factor modification and GDMT with her PCP.    Patient with condition that if not treated could result in loss of life or bodily function.  Due to co morbidities this patient has complex medical management.    6/30  - reviewed MRI, no new infarct, noted old hemorrhagic infarct unchanged  - discussed with physical therapy, will need swing bed, social consulted

## 2025-06-30 NOTE — PROGRESS NOTES
"       06/30/25 1613        Wound 06/28/25 2316 Rash Left inferior Breast other (see comments)   Date First Assessed/Time First Assessed: 06/28/25 2316   Primary Wound Type: Rash  Side: Left  Orientation: inferior  Location: Breast  Type: (c) other (see comments)   Wound Image Images linked   Distribution localized   Configuration/Shape linear   Groupings isolated   Borders irregular   Characteristics burning;redness/erythema;pain/discomfort   Color red   Lesion Size    (7x0.6x0.1)   Rash Lesion Size 2.76"   Rash Care cleansed with  (Vashe, applied Aquacel Ag)      Rash noted to abdominal folds and groins, will apply Aquacel Ag to dry areas out  Also Aquacel Ag to left buttock and a foam border for protection.                  "

## 2025-06-30 NOTE — H&P
[facility] Hospital Medicine  History & Physical    Patient Name: Herlinda Valdovinos  MRN: [id number]  Patient Class: OP- Observation  Admission Date: 6/29/2025  Attending Physician: Jose Elkins DO   Primary Care Provider: Vini Hernandez NP         Patient information was obtained from patient, spouse/SO, past medical records, and ER records.     Subjective:     Principal Problem:Acute cerebrovascular insufficiency transient focal neurologic deficit    Chief Complaint: No chief complaint on file.       HPI: 61 yo F presents to [place] ED with c/o left sided weakness over the last several days.  She has had some nausea but no diarrhea and the weakness for the past two days has been to BLE with difficulty walking.  She is on an ARB only for her BP because after her ICH in 4/21 due to malignant HTN her BP has decreased and she had to be discontinue from norvasc previously because of hypotension.  However, at that time she was found to have ESBL UTI and bacteremia 4/24.  She has been tolerating the ARB up until today when she had some fatigue and lightheadedness with soft BP.      Patient had lactic acid of 1.1 and UA did show possible infection and meropenum started after cultures obtained.  She was given 2L NS and does feel better at this time.  She does have some breakdown on her left buttock from being in the bed this past week.  She is in good spirits.  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  Her massive hemorrhagic stroke was 4/21 and she went to [facility] where they did not have to do a craniotomy and she worked very hard to resolve the LHP she had.  She could only move several fingers on her left side.  She has heroic improvement.  She waited until night before last to come to the ED because she thought perhaps a TIA and was on DAPT, ARB and statin.  No palpitations or chest  pain and no h/o atrial fib but patient missed her OP sleep study appointment when Ayana hit and has never rescheduled.  Her  of may years, Livan, is present and says she snores so loud the roof raises and feels she probably does have sleep apnea.  I explained that could also be a cause of recurrent stroke.    CT negative for acute process but does show the old, chronic bilateral stroke changes.  Neuro consult done at [facility] and MRI follow up recommended.    Will check 2D echo for PHTN and/or atrial/LV thrombus as cause of stroke.    Remainder of ROS as below.  Reviewed med recon and updated PMH.  See assessment and plan below for problem based evaluation      Past Medical History:   Diagnosis Date    Bacteremia due to Escherichia coli 04/10/2024    Cerebral hemorrhage 2012    bilateral pontine infarct    Chronic disorder of skin     follows with ID and dermatology in [facility]    Essential (primary) hypertension     Hemiparesis affecting left side as late effect of stroke 04/2025    hemorrhagic due to malignant HTN;  transferred to [facility]    Idiopathic peripheral neuropathy 05/20/2021    Insomnia secondary to depression with anxiety     Ischemic stroke 2012    Nephrolithiasis        Past Surgical History:   Procedure Laterality Date    APPENDECTOMY      CHOLECYSTECTOMY      CYSTOSCOPY W/ URETERAL STENT PLACEMENT      HYSTERECTOMY      LITHOTRIPSY      Renal lithotripsy       Review of patient's allergies indicates:   Allergen Reactions    Lamisil [terbinafine] Anaphylaxis    Codeine Itching    Compazine [prochlorperazine] Itching       Current Facility-Administered Medications on File Prior to Encounter   Medication    [COMPLETED] 0.9% NaCl infusion     Current Outpatient Medications on File Prior to Encounter   Medication Sig    acetaminophen (TYLENOL) 325 MG tablet Take 650 mg by mouth every 6 (six) hours as needed.    albuterol (PROAIR HFA) 90 mcg/actuation inhaler Inhale 2 puffs into the lungs  every 4 (four) hours as needed for Wheezing or Shortness of Breath. Rescue    albuterol-ipratropium (DUO-NEB) 2.5 mg-0.5 mg/3 mL nebulizer solution Take 3 mLs by nebulization every 4 (four) hours as needed for Wheezing. Rescue    amLODIPine (NORVASC) 10 MG tablet Take 1 tablet (10 mg total) by mouth once daily.    atorvastatin (LIPITOR) 40 MG tablet Take 40 mg by mouth once daily.    cetirizine (ZYRTEC) 10 MG tablet Take 1 tablet (10 mg total) by mouth once daily.    clopidogreL (PLAVIX) 75 mg tablet Take 75 mg by mouth once.    docusate sodium (COLACE) 100 MG capsule Take 1 capsule (100 mg total) by mouth 2 (two) times daily.    gabapentin (NEURONTIN) 300 MG capsule Take 600 mg by mouth 2 (two) times daily.    LORazepam (ATIVAN) 0.5 MG tablet Take 0.5 mg by mouth 3 (three) times daily. Pt states she only takes once a day    multivitamin-minerals-lutein (MULTIVITAMIN 50 PLUS) Tab Take 1 tablet by mouth once daily. (Patient taking differently: Take 1 tablet by mouth once daily. Taking 1 daily)    olmesartan (BENICAR) 40 MG tablet Take 40 mg by mouth once daily.    omega 3-dha-epa-fish oil 1,000 mg (120 mg-180 mg) Cap Take 1 capsule by mouth once daily.    pantoprazole (PROTONIX) 40 MG tablet Take 40 mg by mouth once daily.    sertraline (ZOLOFT) 50 MG tablet Take 3 tablets (150 mg total) by mouth once daily.    [DISCONTINUED] hydroCHLOROthiazide (HYDRODIURIL) 12.5 MG Tab Take 1 tablet (12.5 mg total) by mouth once daily.    [DISCONTINUED] omeprazole (PRILOSEC) 40 MG capsule Take 40 mg by mouth every evening.     Family History    None       Tobacco Use    Smoking status: Never    Smokeless tobacco: Never   Vaping Use    Vaping status: Not on file   Substance and Sexual Activity    Alcohol use: Never    Drug use: Never    Sexual activity: Not on file     Review of Systems   Constitutional:  Positive for fatigue. Negative for appetite change, chills, fever and unexpected weight change.   HENT:  Negative for congestion,  mouth sores, nosebleeds, sinus pain, sore throat and trouble swallowing.    Respiratory:  Negative for apnea, cough, chest tightness and shortness of breath.    Cardiovascular:  Negative for chest pain, palpitations and leg swelling.   Gastrointestinal:  Positive for nausea. Negative for abdominal pain, blood in stool, constipation, diarrhea and vomiting.   Endocrine: Negative for polydipsia and polyuria.   Genitourinary:  Negative for decreased urine volume, difficulty urinating, dysuria and frequency.   Musculoskeletal:  Negative for arthralgias, back pain and neck pain.   Skin:  Positive for rash and wound.   Neurological:  Positive for weakness and light-headedness. Negative for syncope and headaches.   Hematological:  Does not bruise/bleed easily.   Psychiatric/Behavioral:  Negative for confusion and suicidal ideas.      Objective:     Vital Signs (Most Recent):  Temp: 98.2 °F (36.8 °C) (06/29/25 2348)  Pulse: 75 (06/29/25 2348)  Resp: 18 (06/29/25 2348)  BP: 118/79 (06/29/25 2348)  SpO2: 95 % (06/29/25 2348) Vital Signs (24h Range):  Temp:  [98.1 °F (36.7 °C)-98.6 °F (37 °C)] 98.2 °F (36.8 °C)  Pulse:  [72-95] 75  Resp:  [12-23] 18  SpO2:  [93 %-98 %] 95 %  BP: ()/(64-88) 118/79     Weight: 85.5 kg (188 lb 8 oz)  Body mass index is 36.81 kg/m².     Physical Exam  Vitals and nursing note reviewed. Exam conducted with a chaperone present.   Constitutional:       Appearance: She is ill-appearing.   HENT:      Head: Atraumatic.      Mouth/Throat:      Mouth: Mucous membranes are moist.      Pharynx: Oropharynx is clear.   Eyes:      Conjunctiva/sclera: Conjunctivae normal.      Pupils: Pupils are equal, round, and reactive to light.   Neck:      Vascular: No carotid bruit.   Cardiovascular:      Rate and Rhythm: Normal rate and regular rhythm.      Pulses: Normal pulses.      Heart sounds: Normal heart sounds.   Pulmonary:      Effort: Pulmonary effort is normal.      Breath sounds: Normal breath sounds.    Abdominal:      General: Abdomen is flat. Bowel sounds are normal. There is no distension.      Palpations: Abdomen is soft.      Tenderness: There is no abdominal tenderness. There is no guarding.   Musculoskeletal:         General: No tenderness, deformity or signs of injury. Normal range of motion.      Cervical back: Neck supple.      Right lower leg: No edema.      Left lower leg: No edema.   Skin:     General: Skin is warm and dry.      Capillary Refill: Capillary refill takes less than 2 seconds.      Coloration: Skin is not jaundiced or pale.      Findings: Lesion and rash present. No bruising.      Comments: See below for photos   Neurological:      General: No focal deficit present.      Mental Status: She is alert and oriented to person, place, and time.   Psychiatric:         Mood and Affect: Mood normal.              CRANIAL NERVES     CN III, IV, VI   Pupils are equal, round, and reactive to light.       Significant Labs: All pertinent labs within the past 24 hours have been reviewed.    Significant Imaging: I have reviewed all pertinent imaging results/findings within the past 24 hours.  Assessment/Plan:     Assessment & Plan  Acute cerebrovascular insufficiency transient focal neurologic deficit  Following the [facility] protocol for elevated BP during acute stroke and allowing permissive HTN  PT/OT/ST consult and q 4h neurochecks.    Monitor on telemetry for afib and will check 2D US for thrombus.    Will continue antiplatelets and high intensity statin.     Will get MRI Brain without contrast for follow up.      Continue CCB and ARB and statin with her home DAPT  If tele or echo show reason to add full dose anticoagulation will start.    [facility] for DVT prophylaxis.   Will need aggressive risk factor modification and GDMT with her PCP.    Patient with condition that if not treated could result in loss of life or bodily function.  Due to co morbidities this patient has complex medical management.    Insomnia  secondary to depression with anxiety  Patient well controlled.  No S/H plans or ideation.  No hallucinations at present.    Will not consult psychiatry.  Will continue his OP psychotropic medication regimen.  Zoloft and prn ativan        Acute cystitis without hematuria  Renal function improved at [facility] with IVF  with recheck BMP in am with CBC and thyroid function studies  Continue meropenum and follow cultures.      Chronic disorder of skin  Photos pending.    Wound nurse consulted.    Nystatin to skin folds under breast and groin.      Idiopathic peripheral neuropathy  Continue home gabapentin    Essential (primary) hypertension  Patient's blood pressure range in the last 24 hours was: BP  Min: 87/64  Max: 129/88.The patient's inpatient anti-hypertensive regimen is listed below:  Current Antihypertensives  labetaloL injection 10 mg, Every 4 hours PRN, Intravenous    Plan  - BP is controlled, no changes needed to their regimen    Nephrolithiasis  No flank pain or hematuria    VTE Risk Mitigation (From admission, onward)           Ordered     enoxaparin injection 40 mg  Every 24 hours         06/30/25 0209                                   On 06/30/2025, patient should be placed in hospital observation services under my care.             Itzel Jacome MD  Department of Hospital Medicine  [facility] - [facility] Tele[facility]

## 2025-06-30 NOTE — PLAN OF CARE
Problem: Occupational Therapy  Goal: Occupational Therapy Goal  Description: STG: (in 1 week)  Pt will perform grooming with setup  Pt will bathe with setup and CGA  Pt will perform UE dressing with setup  Pt will perform LE dressing with setup and CGA  Pt will transfer bed/chair/bsc with RW and SBA  Pt will perform standing task x 4 min with CGA with RW   Pt will tolerate 15 minutes of tx without fatigue      LTG: (in 5 weeks)  1.Restore to max I with self care and mobility.    Outcome: Progressing

## 2025-06-30 NOTE — PROGRESS NOTES
[facility] Hospital Medicine  Progress Note    Patient Name: Herlinda Valdovinos  MRN: [id number]  Patient Class: IP- Inpatient   Admission Date: 6/29/2025  Length of Stay: 0 days  Attending Physician: Shawna Hart MD  Primary Care Provider: Vini Hernandez NP        Subjective     Principal Problem:Acute cerebrovascular insufficiency transient focal neurologic deficit        HPI:  61 yo F presents to [place] ED with c/o left sided weakness over the last several days.  She has had some nausea but no diarrhea and the weakness for the past two days has been to BLE with difficulty walking.  She is on an ARB only for her BP because after her ICH in 4/21 due to malignant HTN her BP has decreased and she had to be discontinue from norvasc previously because of hypotension.  However, at that time she was found to have ESBL UTI and bacteremia 4/24.  She has been tolerating the ARB up until today when she had some fatigue and lightheadedness with soft BP.      Patient had lactic acid of 1.1 and UA did show possible infection and meropenum started after cultures obtained.  She was given 2L NS and does feel better at this time.  She does have some breakdown on her left buttock from being in the bed this past week.  She is in good spirits.  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  Her massive hemorrhagic stroke was 4/21 and she went to [facility] where they did not have to do a craniotomy and she worked very hard to resolve the LHP she had.  She could only move several fingers on her left side.  She has heroic improvement.  She waited until night before last to come to the ED because she thought perhaps a TIA and was on DAPT, ARB and statin.  No palpitations or chest pain and no h/o atrial fib but patient missed her OP sleep study appointment when Aayna hit and has never rescheduled.  Her  of  may years, Livan, is present and says she snores so loud the roof raises and feels she probably does have sleep apnea.  I explained that could also be a cause of recurrent stroke.      CT negative for acute process but does show the old, chronic bilateral stroke changes.  Neuro consult done at [facility] and MRI follow up recommended.      Remainder of ROS as below.  Reviewed med recon and updated PMH.  See assessment and plan below for problem based evaluation      Overview/Hospital Course:  6/30  - reviewed MRI, no new infarct, noted old hemorrhagic infarct unchanged  - discussed with physical therapy, will need swing bed, social consulted  - urine micro noted, continue rocephin    Past Medical History:   Diagnosis Date    Bacteremia due to Escherichia coli 04/10/2024    Cerebral hemorrhage 2012    bilateral pontine infarct    Chronic disorder of skin     follows with ID and dermatology in [facility]    Essential (primary) hypertension     Hemiparesis affecting left side as late effect of stroke 04/2025    hemorrhagic due to malignant HTN;  transferred to [facility]    Idiopathic peripheral neuropathy 05/20/2021    Insomnia secondary to depression with anxiety     Ischemic stroke 2012    Nephrolithiasis        Past Surgical History:   Procedure Laterality Date    APPENDECTOMY      CHOLECYSTECTOMY      CYSTOSCOPY W/ URETERAL STENT PLACEMENT      HYSTERECTOMY      LEFT HEART CATHETERIZATION  2014    clean coronaries per Dr. Tena    LITHOTRIPSY      Renal lithotripsy       Review of patient's allergies indicates:   Allergen Reactions    Lamisil [terbinafine] Anaphylaxis    Codeine Itching    Compazine [prochlorperazine] Itching       No current facility-administered medications on file prior to encounter.     Current Outpatient Medications on File Prior to Encounter   Medication Sig    acetaminophen (TYLENOL) 325 MG tablet Take 650 mg by mouth every 6 (six) hours as needed.    albuterol (PROAIR HFA) 90 mcg/actuation  inhaler Inhale 2 puffs into the lungs every 4 (four) hours as needed for Wheezing or Shortness of Breath. Rescue    albuterol-ipratropium (DUO-NEB) 2.5 mg-0.5 mg/3 mL nebulizer solution Take 3 mLs by nebulization every 4 (four) hours as needed for Wheezing. Rescue    atorvastatin (LIPITOR) 40 MG tablet Take 40 mg by mouth once daily.    cetirizine (ZYRTEC) 10 MG tablet Take 1 tablet (10 mg total) by mouth once daily.    clopidogreL (PLAVIX) 75 mg tablet Take 75 mg by mouth once daily.    fluconazole (DIFLUCAN) 200 MG Tab Take 200 mg by mouth once daily.    gabapentin (NEURONTIN) 300 MG capsule Take 600 mg by mouth 2 (two) times daily.    JELCAINE STERILE 2 % Gel Apply topically 2 (two) times daily.    LORazepam (ATIVAN) 1 MG tablet Take 1 mg by mouth 3 (three) times daily.    multivitamin-minerals-lutein (MULTIVITAMIN 50 PLUS) Tab Take 1 tablet by mouth once daily. (Patient taking differently: Take 1 tablet by mouth once daily. Taking 1 daily)    mupirocin (BACTROBAN) 2 % ointment Apply topically once daily.    olmesartan-hydrochlorothiazide (BENICAR HCT) 40-25 mg per tablet Take 1 tablet by mouth once daily.    omega 3-dha-epa-fish oil 1,000 mg (120 mg-180 mg) Cap Take 1 capsule by mouth once daily.    pantoprazole (PROTONIX) 40 MG tablet Take 40 mg by mouth once daily.    sertraline (ZOLOFT) 50 MG tablet Take 3 tablets (150 mg total) by mouth once daily.    temazepam (RESTORIL) 30 mg capsule Take 30 mg by mouth nightly as needed.    [DISCONTINUED] olmesartan (BENICAR) 40 MG tablet Take 40 mg by mouth once daily.    [DISCONTINUED] sertraline (ZOLOFT) 100 MG tablet Take 100 mg by mouth once daily.    [DISCONTINUED] amLODIPine (NORVASC) 10 MG tablet Take 1 tablet (10 mg total) by mouth once daily.    [DISCONTINUED] docusate sodium (COLACE) 100 MG capsule Take 1 capsule (100 mg total) by mouth 2 (two) times daily.    [DISCONTINUED] hydroCHLOROthiazide (HYDRODIURIL) 12.5 MG Tab Take 1 tablet (12.5 mg total) by mouth  once daily.    [DISCONTINUED] LORazepam (ATIVAN) 0.5 MG tablet Take 1 tablet (0.5 mg total) by mouth every 12 (twelve) hours as needed for Anxiety. (Patient taking differently: Take 0.5 mg by mouth once daily.)    [DISCONTINUED] LORazepam (ATIVAN) 0.5 MG tablet Take 0.5 mg by mouth 3 (three) times daily. Pt states she only takes once a day    [DISCONTINUED] omeprazole (PRILOSEC) 40 MG capsule Take 40 mg by mouth every evening.     Family History    None       Tobacco Use    Smoking status: Never    Smokeless tobacco: Never   Vaping Use    Vaping status: Not on file   Substance and Sexual Activity    Alcohol use: Never    Drug use: Never    Sexual activity: Not on file     Review of Systems   Constitutional:  Positive for fatigue. Negative for appetite change, chills, fever and unexpected weight change.   HENT:  Negative for congestion, mouth sores, nosebleeds, sinus pain, sore throat and trouble swallowing.    Respiratory:  Negative for apnea, cough, chest tightness and shortness of breath.    Cardiovascular:  Negative for chest pain, palpitations and leg swelling.   Gastrointestinal:  Positive for nausea. Negative for abdominal pain, blood in stool, constipation, diarrhea and vomiting.   Endocrine: Negative for polydipsia and polyuria.   Genitourinary:  Negative for decreased urine volume, difficulty urinating, dysuria and frequency.   Musculoskeletal:  Negative for arthralgias, back pain and neck pain.   Skin:  Positive for rash and wound.   Neurological:  Positive for weakness and light-headedness. Negative for syncope and headaches.   Hematological:  Does not bruise/bleed easily.   Psychiatric/Behavioral:  Negative for confusion and suicidal ideas.      Objective:     Vital Signs (Most Recent):  Temp: 98.1 °F (36.7 °C) (06/30/25 1605)  Pulse: 71 (06/30/25 1605)  Resp: 18 (06/30/25 0714)  BP: 122/81 (06/30/25 1605)  SpO2: 97 % (06/30/25 1605) Vital Signs (24h Range):  Temp:  [97.5 °F (36.4 °C)-98.6 °F (37 °C)]  98.1 °F (36.7 °C)  Pulse:  [71-95] 71  Resp:  [16-21] 18  SpO2:  [94 %-97 %] 97 %  BP: (104-138)/(72-92) 122/81     Weight: 85.5 kg (188 lb 8 oz)  Body mass index is 36.81 kg/m².     Physical Exam  Vitals and nursing note reviewed. Exam conducted with a chaperone present.   Constitutional:       Appearance: She is ill-appearing.   HENT:      Head: Atraumatic.      Mouth/Throat:      Mouth: Mucous membranes are moist.      Pharynx: Oropharynx is clear.   Eyes:      Conjunctiva/sclera: Conjunctivae normal.      Pupils: Pupils are equal, round, and reactive to light.   Neck:      Vascular: No carotid bruit.   Cardiovascular:      Rate and Rhythm: Normal rate and regular rhythm.      Pulses: Normal pulses.      Heart sounds: Normal heart sounds.   Pulmonary:      Effort: Pulmonary effort is normal.      Breath sounds: Normal breath sounds.   Abdominal:      General: Abdomen is flat. Bowel sounds are normal. There is no distension.      Palpations: Abdomen is soft.      Tenderness: There is no abdominal tenderness. There is no guarding.   Musculoskeletal:         General: No tenderness, deformity or signs of injury. Normal range of motion.      Cervical back: Neck supple.      Right lower leg: No edema.      Left lower leg: No edema.   Skin:     General: Skin is warm and dry.      Capillary Refill: Capillary refill takes less than 2 seconds.      Coloration: Skin is not jaundiced or pale.      Findings: Lesion and rash present. No bruising.      Comments: See below for photos   Neurological:      General: No focal deficit present.      Mental Status: She is alert and oriented to person, place, and time.   Psychiatric:         Mood and Affect: Mood normal.              CRANIAL NERVES     CN III, IV, VI   Pupils are equal, round, and reactive to light.       Significant Labs: All pertinent labs within the past 24 hours have been reviewed.    Significant Imaging: I have reviewed all pertinent imaging results/findings within  the past 24 hours.      Assessment & Plan  Acute cerebrovascular insufficiency transient focal neurologic deficit  Following the NIH protocol for elevated BP during acute stroke and allowing permissive HTN  PT/OT/ST consult and q 4h neurochecks.    Monitor on telemetry for afib and will check 2D US for thrombus.    Will continue antiplatelets and high intensity statin.     Will get MRI Brain without contrast for follow up.      Continue CCB and ARB and statin with her home DAPT  If tele or echo show reason to add full dose anticoagulation will start.    [facility] for DVT prophylaxis.   Will need aggressive risk factor modification and GDMT with her PCP.    Patient with condition that if not treated could result in loss of life or bodily function.  Due to co morbidities this patient has complex medical management.    6/30  - reviewed MRI, no new infarct, noted old hemorrhagic infarct unchanged  - discussed with physical therapy, will need swing bed, social consulted  Insomnia secondary to depression with anxiety  Patient well controlled.  No S/H plans or ideation.  No hallucinations at present.    Will not consult psychiatry.  Will continue his OP psychotropic medication regimen.  Zoloft and prn ativan        Acute cystitis without hematuria  Renal function improved at [facility] with IVF  with recheck BMP in am with CBC and thyroid function studies  Continue meropenum and follow cultures.      6/30  - urine micro noted, continue roberto  Chronic disorder of skin  Photos pending.    Wound nurse consulted.    Nystatin to skin folds under breast and groin.      Idiopathic peripheral neuropathy  Continue home gabapentin    Essential (primary) hypertension  Patient's blood pressure range in the last 24 hours was: BP  Min: 104/80  Max: 138/92.The patient's inpatient anti-hypertensive regimen is listed below:  Current Antihypertensives  labetaloL injection 10 mg, Every 4 hours PRN, Intravenous  , Daily, Oral    Plan  - BP is  controlled, no changes needed to their regimen    Nephrolithiasis  No flank pain or hematuria    Suspected sleep apnea      VTE Risk Mitigation (From admission, onward)           Ordered     enoxaparin injection 40 mg  Every 24 hours         06/30/25 0209                    Discharge Planning   POOL: 7/7/2025     Code Status: Full Code   Medical Readiness for Discharge Date:   Discharge Plan A: Skilled Nursing Facility                  Please place Justification for DME      Shawna Hart MD  Department of Hospital Medicine   [facility]

## 2025-06-30 NOTE — PLAN OF CARE
[facility] - [facility] Telemetry  Discharge Reassessment    Primary Care Provider: Vini Hernandez NP    Expected Discharge Date: 7/7/2025    Reassessment (most recent)       Discharge Reassessment - 06/30/25 1509          Discharge Reassessment    Assessment Type Discharge Planning Reassessment     Discharge Plan discussed with: Patient     Discharge Plan A Skilled Nursing Facility     Discharge Plan B Home with family;Home;Home Health;Rehab;Long-term acute care facility (LTAC)                   Met with pt to review discharge recommendation of SWB and is agreeable to plan    Patient/family provided list of facilities in-network with patient's payor plan. Providers that are owned, operated, or affiliated with [facility] are included on the list.     Notified that referral sent to below listed facilities from in-network list based on proximity to home/family support:   [facility] notified   2.  3.  4.  5. (can send more than 5)    Patient/family instructed to identify preference.    Preferred Facility: (if more than 1, listed in order of descending preference)  1.  OR  Patient has declined to select a preferred provider and elects placement with the first accepting in network provider that is available to provide services as ordered by the physician       If an additional preferred facility not listed above is identified, additional referral to be sent. If above facilities unable to accept, will send additional referrals to in-network providers.

## 2025-07-01 PROBLEM — G45.9 TIA (TRANSIENT ISCHEMIC ATTACK): Status: ACTIVE | Noted: 2025-07-01

## 2025-07-01 PROBLEM — A49.9 ESBL (EXTENDED SPECTRUM BETA-LACTAMASE) PRODUCING BACTERIA INFECTION: Status: ACTIVE | Noted: 2025-07-01

## 2025-07-01 PROBLEM — Z16.12 ESBL (EXTENDED SPECTRUM BETA-LACTAMASE) PRODUCING BACTERIA INFECTION: Status: ACTIVE | Noted: 2025-07-01

## 2025-07-01 PROBLEM — G45.8 ACUTE CEREBROVASCULAR INSUFFICIENCY TRANSIENT FOCAL NEUROLOGIC DEFICIT: Status: RESOLVED | Noted: 2019-08-07 | Resolved: 2025-07-01

## 2025-07-01 LAB — UA COMPLETE W REFLEX CULTURE PNL UR: ABNORMAL

## 2025-07-01 PROCEDURE — 99232 SBSQ HOSP IP/OBS MODERATE 35: CPT | Mod: ,,,

## 2025-07-01 PROCEDURE — 97116 GAIT TRAINING THERAPY: CPT

## 2025-07-01 PROCEDURE — 25000003 PHARM REV CODE 250: Performed by: HOSPITALIST

## 2025-07-01 PROCEDURE — 97110 THERAPEUTIC EXERCISES: CPT

## 2025-07-01 PROCEDURE — 63600175 PHARM REV CODE 636 W HCPCS: Performed by: HOSPITALIST

## 2025-07-01 PROCEDURE — 11000001 HC ACUTE MED/SURG PRIVATE ROOM

## 2025-07-01 PROCEDURE — 97530 THERAPEUTIC ACTIVITIES: CPT

## 2025-07-01 PROCEDURE — 97535 SELF CARE MNGMENT TRAINING: CPT

## 2025-07-01 RX ADMIN — MEROPENEM 1 G: 1 INJECTION, POWDER, FOR SOLUTION INTRAVENOUS at 08:07

## 2025-07-01 RX ADMIN — GABAPENTIN 600 MG: 300 CAPSULE ORAL at 09:07

## 2025-07-01 RX ADMIN — MEROPENEM 1 G: 1 INJECTION, POWDER, FOR SOLUTION INTRAVENOUS at 12:07

## 2025-07-01 RX ADMIN — MICONAZOLE NITRATE ANTIFUNGAL POWDER: 2 POWDER TOPICAL at 09:07

## 2025-07-01 RX ADMIN — PANTOPRAZOLE SODIUM 40 MG: 40 TABLET, DELAYED RELEASE ORAL at 08:07

## 2025-07-01 RX ADMIN — GABAPENTIN 600 MG: 300 CAPSULE ORAL at 08:07

## 2025-07-01 RX ADMIN — ATORVASTATIN CALCIUM 40 MG: 40 TABLET, FILM COATED ORAL at 08:07

## 2025-07-01 RX ADMIN — MEROPENEM 1 G: 1 INJECTION, POWDER, FOR SOLUTION INTRAVENOUS at 04:07

## 2025-07-01 RX ADMIN — MICONAZOLE NITRATE ANTIFUNGAL POWDER: 2 POWDER TOPICAL at 08:07

## 2025-07-01 RX ADMIN — SERTRALINE 150 MG: 50 TABLET, FILM COATED ORAL at 08:07

## 2025-07-01 RX ADMIN — ENOXAPARIN SODIUM 40 MG: 40 INJECTION SUBCUTANEOUS at 04:07

## 2025-07-01 NOTE — ASSESSMENT & PLAN NOTE
Following the NIH protocol for elevated BP during acute stroke and allowing permissive HTN  PT/OT/ST consult and q 4h neurochecks.    Monitor on telemetry for afib and will check 2D US for thrombus.    Will continue antiplatelets and high intensity statin.     Will get MRI Brain without contrast for follow up.      Continue CCB and ARB and statin with her home DAPT  If tele or echo show reason to add full dose anticoagulation will start.    [facility] for DVT prophylaxis.   Will need aggressive risk factor modification and GDMT with her PCP.    Patient with condition that if not treated could result in loss of life or bodily function.  Due to co morbidities this patient has complex medical management.    6/30  - reviewed MRI, no new infarct, noted old hemorrhagic infarct unchanged  - discussed with physical therapy, will need swing bed, social consulted    7/1  - does not want SWB, will continue PT/OT while here, plavix/statin for now (allergy to asa, will clarify)

## 2025-07-01 NOTE — PLAN OF CARE
[facility] - [facility]  Discharge Reassessment    Primary Care Provider: Vini Hernandez NP    Expected Discharge Date: 7/7/2025    Reassessment (most recent)       Discharge Reassessment - 07/01/25 1330          Discharge Reassessment    Assessment Type Discharge Planning Reassessment     Discharge Plan discussed with: Patient     Discharge Plan A Home with family;Home Health                   Pt states she would now like to d/c home instead of to [facility]. [facility].

## 2025-07-01 NOTE — PROGRESS NOTES
[facility] Tele[facility] Hospital Medicine  Progress Note    Patient Name: Herlinda Valdovinos  MRN: [id number]  Patient Class: IP- Inpatient   Admission Date: 6/29/2025  Length of Stay: 1 days  Attending Physician: Shawna Hart MD  Primary Care Provider: Vini Hernandez NP        Subjective     Principal Problem:Acute cerebrovascular insufficiency transient focal neurologic deficit        HPI:  61 yo F presents to [facility] ED with c/o left sided weakness over the last several days.  She has had some nausea but no diarrhea and the weakness for the past two days has been to BLE with difficulty walking.  She is on an ARB only for her BP because after her ICH in 4/21 due to malignant HTN her BP has decreased and she had to be discontinue from norvasc previously because of hypotension.  However, at that time she was found to have ESBL UTI and bacteremia 4/24.  She has been tolerating the ARB up until today when she had some fatigue and lightheadedness with soft BP.      Patient had lactic acid of 1.1 and UA did show possible infection and meropenum started after cultures obtained.  She was given 2L NS and does feel better at this time.  She does have some breakdown on her left buttock from being in the bed this past week.  She is in good spirits.  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  Her massive hemorrhagic stroke was 4/21 and she went to [facility] where they did not have to do a craniotomy and she worked very hard to resolve the LHP she had.  She could only move several fingers on her left side.  She has heroic improvement.  She waited until night before last to come to the ED because she thought perhaps a TIA and was on DAPT, ARB and statin.  No palpitations or chest pain and no h/o atrial fib but patient missed her OP sleep study appointment when Ayana hit and has never rescheduled.  Her  of  may years, Livan, is present and says she snores so loud the roof raises and feels she probably does have sleep apnea.  I explained that could also be a cause of recurrent stroke.      CT negative for acute process but does show the old, chronic bilateral stroke changes.  Neuro consult done at [place] and MRI follow up recommended.      Remainder of ROS as below.  Reviewed med recon and updated PMH.  See assessment and plan below for problem based evaluation      Overview/Hospital Course:  6/30  - reviewed MRI, no new infarct, noted old hemorrhagic infarct unchanged  - discussed with physical therapy, will need swing bed, social consulted  - urine micro noted, continue rocephin    7/1  - patient does not want SWB admission if she doesn't have to, PT/OT to continue here  - has ESBL + urine with symptomatic acute cystitis, will need 7 days of roberto/ertaepenem, will discuss with patient options for home injections or swingbed with IV abx    Past Medical History:   Diagnosis Date    Bacteremia due to Escherichia coli 04/10/2024    Cerebral hemorrhage 2012    bilateral pontine infarct    Chronic disorder of skin     follows with ID and dermatology in [facility]    Essential (primary) hypertension     Hemiparesis affecting left side as late effect of stroke 04/2025    hemorrhagic due to malignant HTN;  transferred to [facility]    Idiopathic peripheral neuropathy 05/20/2021    Insomnia secondary to depression with anxiety     Ischemic stroke 2012    Nephrolithiasis        Past Surgical History:   Procedure Laterality Date    APPENDECTOMY      CHOLECYSTECTOMY      CYSTOSCOPY W/ URETERAL STENT PLACEMENT      HYSTERECTOMY      LEFT HEART CATHETERIZATION  2014    clean coronaries per Dr. Tena    LITHOTRIPSY      Renal lithotripsy       Review of patient's allergies indicates:   Allergen Reactions    Lamisil [terbinafine] Anaphylaxis    Codeine Itching    Compazine [prochlorperazine] Itching       No current  facility-administered medications on file prior to encounter.     Current Outpatient Medications on File Prior to Encounter   Medication Sig    acetaminophen (TYLENOL) 325 MG tablet Take 650 mg by mouth every 6 (six) hours as needed.    albuterol (PROAIR HFA) 90 mcg/actuation inhaler Inhale 2 puffs into the lungs every 4 (four) hours as needed for Wheezing or Shortness of Breath. Rescue    albuterol-ipratropium (DUO-NEB) 2.5 mg-0.5 mg/3 mL nebulizer solution Take 3 mLs by nebulization every 4 (four) hours as needed for Wheezing. Rescue    atorvastatin (LIPITOR) 40 MG tablet Take 40 mg by mouth once daily.    cetirizine (ZYRTEC) 10 MG tablet Take 1 tablet (10 mg total) by mouth once daily.    clopidogreL (PLAVIX) 75 mg tablet Take 75 mg by mouth once daily.    fluconazole (DIFLUCAN) 200 MG Tab Take 200 mg by mouth once daily.    gabapentin (NEURONTIN) 300 MG capsule Take 600 mg by mouth 2 (two) times daily.    JELCAINE STERILE 2 % Gel Apply topically 2 (two) times daily.    LORazepam (ATIVAN) 1 MG tablet Take 1 mg by mouth 3 (three) times daily.    multivitamin-minerals-lutein (MULTIVITAMIN 50 PLUS) Tab Take 1 tablet by mouth once daily. (Patient taking differently: Take 1 tablet by mouth once daily. Taking 1 daily)    mupirocin (BACTROBAN) 2 % ointment Apply topically once daily.    olmesartan-hydrochlorothiazide (BENICAR HCT) 40-25 mg per tablet Take 1 tablet by mouth once daily.    omega 3-dha-epa-fish oil 1,000 mg (120 mg-180 mg) Cap Take 1 capsule by mouth once daily.    pantoprazole (PROTONIX) 40 MG tablet Take 40 mg by mouth once daily.    sertraline (ZOLOFT) 50 MG tablet Take 3 tablets (150 mg total) by mouth once daily.    temazepam (RESTORIL) 30 mg capsule Take 30 mg by mouth nightly as needed.    [DISCONTINUED] hydroCHLOROthiazide (HYDRODIURIL) 12.5 MG Tab Take 1 tablet (12.5 mg total) by mouth once daily.    [DISCONTINUED] omeprazole (PRILOSEC) 40 MG capsule Take 40 mg by mouth every evening.     Family  History    None       Tobacco Use    Smoking status: Never    Smokeless tobacco: Never   Vaping Use    Vaping status: Not on file   Substance and Sexual Activity    Alcohol use: Never    Drug use: Never    Sexual activity: Not on file     Review of Systems   Constitutional:  Positive for fatigue. Negative for appetite change, chills, fever and unexpected weight change.   HENT:  Negative for congestion, mouth sores, nosebleeds, sinus pain, sore throat and trouble swallowing.    Respiratory:  Negative for apnea, cough, chest tightness and shortness of breath.    Cardiovascular:  Negative for chest pain, palpitations and leg swelling.   Gastrointestinal:  Positive for nausea. Negative for abdominal pain, blood in stool, constipation, diarrhea and vomiting.   Endocrine: Negative for polydipsia and polyuria.   Genitourinary:  Negative for decreased urine volume, difficulty urinating, dysuria and frequency.   Musculoskeletal:  Negative for arthralgias, back pain and neck pain.   Skin:  Positive for rash and wound.   Neurological:  Positive for weakness and light-headedness. Negative for syncope and headaches.   Hematological:  Does not bruise/bleed easily.   Psychiatric/Behavioral:  Negative for confusion and suicidal ideas.      Objective:     Vital Signs (Most Recent):  Temp: 97.9 °F (36.6 °C) (07/01/25 1110)  Pulse: 81 (07/01/25 1110)  Resp: 19 (07/01/25 1110)  BP: 113/80 (07/01/25 1110)  SpO2: (!) 94 % (07/01/25 1110) Vital Signs (24h Range):  Temp:  [97.7 °F (36.5 °C)-98.3 °F (36.8 °C)] 97.9 °F (36.6 °C)  Pulse:  [68-81] 81  Resp:  [18-19] 19  SpO2:  [93 %-97 %] 94 %  BP: (110-136)/(74-86) 113/80     Weight: 85.5 kg (188 lb 7.9 oz)  Body mass index is 36.81 kg/m².     Physical Exam  Vitals and nursing note reviewed. Exam conducted with a chaperone present.   HENT:      Head: Atraumatic.      Mouth/Throat:      Mouth: Mucous membranes are moist.      Pharynx: Oropharynx is clear.   Eyes:      Conjunctiva/sclera:  Conjunctivae normal.      Pupils: Pupils are equal, round, and reactive to light.   Neck:      Vascular: No carotid bruit.   Cardiovascular:      Rate and Rhythm: Normal rate and regular rhythm.      Pulses: Normal pulses.      Heart sounds: Normal heart sounds.   Pulmonary:      Effort: Pulmonary effort is normal.      Breath sounds: Normal breath sounds.   Abdominal:      General: Abdomen is flat. Bowel sounds are normal. There is no distension.      Palpations: Abdomen is soft.      Tenderness: There is no abdominal tenderness. There is no guarding.   Musculoskeletal:         General: No tenderness, deformity or signs of injury. Normal range of motion.      Cervical back: Neck supple.      Right lower leg: No edema.      Left lower leg: No edema.   Skin:     General: Skin is warm and dry.      Capillary Refill: Capillary refill takes less than 2 seconds.      Coloration: Skin is not jaundiced or pale.      Findings: Lesion and rash present. No bruising.      Comments: See below for photos   Neurological:      General: No focal deficit present.      Mental Status: She is alert and oriented to person, place, and time.   Psychiatric:         Mood and Affect: Mood normal.              CRANIAL NERVES     CN III, IV, VI   Pupils are equal, round, and reactive to light.       Significant Labs: All pertinent labs within the past 24 hours have been reviewed.    Significant Imaging: I have reviewed all pertinent imaging results/findings within the past 24 hours.      Assessment & Plan  Insomnia secondary to depression with anxiety  Patient well controlled.  No S/H plans or ideation.  No hallucinations at present.    Will not consult psychiatry.  Will continue his OP psychotropic medication regimen.  Zoloft and prn ativan        Acute cystitis without hematuria  Renal function improved at [facility] with IVF  with recheck BMP in am with CBC and thyroid function studies  Continue meropenum and follow cultures.      6/30  -  urine micro noted, continue roberto  Chronic disorder of skin  Photos pending.    Wound nurse consulted.    Nystatin to skin folds under breast and groin.      Idiopathic peripheral neuropathy  Continue home gabapentin    Essential (primary) hypertension  Patient's blood pressure range in the last 24 hours was: BP  Min: 110/75  Max: 136/86.The patient's inpatient anti-hypertensive regimen is listed below:  Current Antihypertensives  labetaloL injection 10 mg, Every 4 hours PRN, Intravenous  , Daily, Oral    Plan  - BP is controlled, no changes needed to their regimen    Nephrolithiasis  No flank pain or hematuria    Suspected sleep apnea      TIA (transient ischemic attack)    Following the NIH protocol for elevated BP during acute stroke and allowing permissive HTN  PT/OT/ST consult and q 4h neurochecks.    Monitor on telemetry for afib and will check 2D US for thrombus.    Will continue antiplatelets and high intensity statin.     Will get MRI Brain without contrast for follow up.      Continue CCB and ARB and statin with her home DAPT  If tele or echo show reason to add full dose anticoagulation will start.    [facility] for DVT prophylaxis.   Will need aggressive risk factor modification and GDMT with her PCP.    Patient with condition that if not treated could result in loss of life or bodily function.  Due to co morbidities this patient has complex medical management.    6/30  - reviewed MRI, no new infarct, noted old hemorrhagic infarct unchanged  - discussed with physical therapy, will need swing bed, social consulted    7/1  - does not want SWB, will continue PT/OT while here, plavix/statin for now (allergy to asa, will clarify)  ESBL (extended spectrum beta-lactamase) producing bacteria infection  On meropenem, symptomatic UTI, will need IV roberto/erta for 7 days    VTE Risk Mitigation (From admission, onward)           Ordered     enoxaparin injection 40 mg  Every 24 hours         06/30/25 1345                     Discharge Planning   POOL: 7/7/2025     Code Status: Full Code   Medical Readiness for Discharge Date:   Discharge Plan A: Skilled Nursing Facility                  Please place Justification for DME      Shawna Hart MD  Department of Hospital Medicine   [facility] - [facility] Tele[facility]

## 2025-07-01 NOTE — ASSESSMENT & PLAN NOTE
Patient's blood pressure range in the last 24 hours was: BP  Min: 110/75  Max: 136/86.The patient's inpatient anti-hypertensive regimen is listed below:  Current Antihypertensives  labetaloL injection 10 mg, Every 4 hours PRN, Intravenous  , Daily, Oral    Plan  - BP is controlled, no changes needed to their regimen

## 2025-07-01 NOTE — PT/OT/SLP PROGRESS
"Physical Therapy Treatment    Patient Name:  Herlinda Valdovinos   MRN:  [id number]    Recommendations:     Discharge Recommendations: Moderate Intensity Therapy  Discharge Equipment Recommendations: none  Barriers to discharge: None    Assessment:     Herlinda Valdovinos is a 60 y.o. female admitted with a medical diagnosis of Acute cerebrovascular insufficiency transient focal neurologic deficit.  She presents with the following impairments/functional limitations: impaired balance, decreased coordination, decreased safety awareness, weakness, impaired endurance, impaired sensation, gait instability. Patient presents with increased LE strength. Patient is able to tolerate exercise well. Patient has improved functional mobility and foot drop appears less pronounced. Patient close to functional baseline, and okay to go home from a  PT standpoint.     Rehab Prognosis: Good; patient would benefit from acute skilled PT services to address these deficits and reach maximum level of function.    Recent Surgery: * No surgery found *      Plan:     During this hospitalization, patient to be seen 5 x/week to address the identified rehab impairments via gait training, therapeutic activities, therapeutic exercises and progress toward the following goals:    Plan of Care Expires:  08/30/25    Subjective     Chief Complaint: weakness and nausea   Patient/Family Comments/goals: Patient reports having "a lot of gas". Patient also notes that the purewick is much better. Patient reports feeling a lot stronger than she did yesterday. During gait training, patient comments that she is nauseous.    Pain/Comfort:  Pain Rating 1: 0/10  Pain Addressed 1: Cessation of Activity      Objective:      Patient found HOB elevated with PureWick, peripheral IV upon PT entry to room.     General Precautions: Standard, fall  Orthopedic Precautions: N/A  Braces: N/A  Respiratory Status: Room air     Functional Mobility:  Bed Mobility:     Supine to Sit: " stand by assistance  Sit to Supine: stand by assistance  Transfers:     Sit to Stand:  contact guard assistance with rolling walker  Gait: Patient is able to ambulate 50 feet with rolling walker and cga.       AM-PAC 6 CLICK MOBILITY  Turning over in bed (including adjusting bedclothes, sheets and blankets)?: 4  Sitting down on and standing up from a chair with arms (e.g., wheelchair, bedside commode, etc.): 4  Moving from lying on back to sitting on the side of the bed?: 3  Moving to and from a bed to a chair (including a wheelchair)?: 3  Need to walk in hospital room?: 4  Climbing 3-5 steps with a railing?: 3  Basic Mobility Total Score: 21       Treatment & Education:  Tx plan   Therex: aps, saq, slr, mre's (3x10) bilateral LE's   Gait training with rolling walker and cga    Patient left HOB elevated with  present..    GOALS:   Multidisciplinary Problems       Physical Therapy Goals          Problem: Physical Therapy    Goal Priority Disciplines Outcome Interventions   Physical Therapy Goal     PT, PT/OT Progressing                        DME Justifications:  No DME recommended requiring DME justifications    Time Tracking:     PT Received On: 07/01/25  PT Start Time: 1502     PT Stop Time: 1521  PT Total Time (min): 19 min     Billable Minutes: Gait Training 7 and Therapeutic Exercise 12    Treatment Type: Treatment  PT/PTA: PT     Number of PTA visits since last PT visit: 0     07/01/2025

## 2025-07-01 NOTE — SUBJECTIVE & OBJECTIVE
Past Medical History:   Diagnosis Date    Bacteremia due to Escherichia coli 04/10/2024    Cerebral hemorrhage 2012    bilateral pontine infarct    Chronic disorder of skin     follows with ID and dermatology in [facility]    Essential (primary) hypertension     Hemiparesis affecting left side as late effect of stroke 04/2025    hemorrhagic due to malignant HTN;  transferred to [facility]    Idiopathic peripheral neuropathy 05/20/2021    Insomnia secondary to depression with anxiety     Ischemic stroke 2012    Nephrolithiasis        Past Surgical History:   Procedure Laterality Date    APPENDECTOMY      CHOLECYSTECTOMY      CYSTOSCOPY W/ URETERAL STENT PLACEMENT      HYSTERECTOMY      LEFT HEART CATHETERIZATION  2014    clean coronaries per Dr. Tena    LITHOTRIPSY      Renal lithotripsy       Review of patient's allergies indicates:   Allergen Reactions    Lamisil [terbinafine] Anaphylaxis    Codeine Itching    Compazine [prochlorperazine] Itching       No current facility-administered medications on file prior to encounter.     Current Outpatient Medications on File Prior to Encounter   Medication Sig    acetaminophen (TYLENOL) 325 MG tablet Take 650 mg by mouth every 6 (six) hours as needed.    albuterol (PROAIR HFA) 90 mcg/actuation inhaler Inhale 2 puffs into the lungs every 4 (four) hours as needed for Wheezing or Shortness of Breath. Rescue    albuterol-ipratropium (DUO-NEB) 2.5 mg-0.5 mg/3 mL nebulizer solution Take 3 mLs by nebulization every 4 (four) hours as needed for Wheezing. Rescue    atorvastatin (LIPITOR) 40 MG tablet Take 40 mg by mouth once daily.    cetirizine (ZYRTEC) 10 MG tablet Take 1 tablet (10 mg total) by mouth once daily.    clopidogreL (PLAVIX) 75 mg tablet Take 75 mg by mouth once daily.    fluconazole (DIFLUCAN) 200 MG Tab Take 200 mg by mouth once daily.    gabapentin (NEURONTIN) 300 MG capsule Take 600 mg by mouth 2 (two) times daily.    JELCAINE STERILE 2 % Gel Apply topically 2  (two) times daily.    LORazepam (ATIVAN) 1 MG tablet Take 1 mg by mouth 3 (three) times daily.    multivitamin-minerals-lutein (MULTIVITAMIN 50 PLUS) Tab Take 1 tablet by mouth once daily. (Patient taking differently: Take 1 tablet by mouth once daily. Taking 1 daily)    mupirocin (BACTROBAN) 2 % ointment Apply topically once daily.    olmesartan-hydrochlorothiazide (BENICAR HCT) 40-25 mg per tablet Take 1 tablet by mouth once daily.    omega 3-dha-epa-fish oil 1,000 mg (120 mg-180 mg) Cap Take 1 capsule by mouth once daily.    pantoprazole (PROTONIX) 40 MG tablet Take 40 mg by mouth once daily.    sertraline (ZOLOFT) 50 MG tablet Take 3 tablets (150 mg total) by mouth once daily.    temazepam (RESTORIL) 30 mg capsule Take 30 mg by mouth nightly as needed.    [DISCONTINUED] hydroCHLOROthiazide (HYDRODIURIL) 12.5 MG Tab Take 1 tablet (12.5 mg total) by mouth once daily.    [DISCONTINUED] omeprazole (PRILOSEC) 40 MG capsule Take 40 mg by mouth every evening.     Family History    None       Tobacco Use    Smoking status: Never    Smokeless tobacco: Never   Vaping Use    Vaping status: Not on file   Substance and Sexual Activity    Alcohol use: Never    Drug use: Never    Sexual activity: Not on file     Review of Systems   Constitutional:  Positive for fatigue. Negative for appetite change, chills, fever and unexpected weight change.   HENT:  Negative for congestion, mouth sores, nosebleeds, sinus pain, sore throat and trouble swallowing.    Respiratory:  Negative for apnea, cough, chest tightness and shortness of breath.    Cardiovascular:  Negative for chest pain, palpitations and leg swelling.   Gastrointestinal:  Positive for nausea. Negative for abdominal pain, blood in stool, constipation, diarrhea and vomiting.   Endocrine: Negative for polydipsia and polyuria.   Genitourinary:  Negative for decreased urine volume, difficulty urinating, dysuria and frequency.   Musculoskeletal:  Negative for arthralgias, back  pain and neck pain.   Skin:  Positive for rash and wound.   Neurological:  Positive for weakness and light-headedness. Negative for syncope and headaches.   Hematological:  Does not bruise/bleed easily.   Psychiatric/Behavioral:  Negative for confusion and suicidal ideas.      Objective:     Vital Signs (Most Recent):  Temp: 97.9 °F (36.6 °C) (07/01/25 1110)  Pulse: 81 (07/01/25 1110)  Resp: 19 (07/01/25 1110)  BP: 113/80 (07/01/25 1110)  SpO2: (!) 94 % (07/01/25 1110) Vital Signs (24h Range):  Temp:  [97.7 °F (36.5 °C)-98.3 °F (36.8 °C)] 97.9 °F (36.6 °C)  Pulse:  [68-81] 81  Resp:  [18-19] 19  SpO2:  [93 %-97 %] 94 %  BP: (110-136)/(74-86) 113/80     Weight: 85.5 kg (188 lb 7.9 oz)  Body mass index is 36.81 kg/m².     Physical Exam  Vitals and nursing note reviewed. Exam conducted with a chaperone present.   HENT:      Head: Atraumatic.      Mouth/Throat:      Mouth: Mucous membranes are moist.      Pharynx: Oropharynx is clear.   Eyes:      Conjunctiva/sclera: Conjunctivae normal.      Pupils: Pupils are equal, round, and reactive to light.   Neck:      Vascular: No carotid bruit.   Cardiovascular:      Rate and Rhythm: Normal rate and regular rhythm.      Pulses: Normal pulses.      Heart sounds: Normal heart sounds.   Pulmonary:      Effort: Pulmonary effort is normal.      Breath sounds: Normal breath sounds.   Abdominal:      General: Abdomen is flat. Bowel sounds are normal. There is no distension.      Palpations: Abdomen is soft.      Tenderness: There is no abdominal tenderness. There is no guarding.   Musculoskeletal:         General: No tenderness, deformity or signs of injury. Normal range of motion.      Cervical back: Neck supple.      Right lower leg: No edema.      Left lower leg: No edema.   Skin:     General: Skin is warm and dry.      Capillary Refill: Capillary refill takes less than 2 seconds.      Coloration: Skin is not jaundiced or pale.      Findings: Lesion and rash present. No bruising.       Comments: See below for photos   Neurological:      General: No focal deficit present.      Mental Status: She is alert and oriented to person, place, and time.   Psychiatric:         Mood and Affect: Mood normal.              CRANIAL NERVES     CN III, IV, VI   Pupils are equal, round, and reactive to light.       Significant Labs: All pertinent labs within the past 24 hours have been reviewed.    Significant Imaging: I have reviewed all pertinent imaging results/findings within the past 24 hours.

## 2025-07-01 NOTE — PT/OT/SLP PROGRESS
"Occupational Therapy   Treatment    Name: Herlinda Valdovinos  MRN: [id number]  Admitting Diagnosis:  Acute cerebrovascular insufficiency transient focal neurologic deficit       Recommendations:     Discharge Recommendations: Moderate Intensity Therapy  Discharge Equipment Recommendations:  none  Barriers to discharge:  None    Assessment:     Herlinda Valdovinos is a 60 y.o. female with a medical diagnosis of Acute cerebrovascular insufficiency transient focal neurologic deficit.  She presents with alert and agreeable to treatment. Performance deficits affecting function are weakness, impaired endurance, impaired self care skills, impaired functional mobility, gait instability, impaired balance, decreased coordination.     Rehab Prognosis:  Good; patient would benefit from acute skilled OT services to address these deficits and reach maximum level of function.       Plan:     Patient to be seen 5 x/week to address the above listed problems via self-care/home management, therapeutic activities, therapeutic exercises  Plan of Care Expires: 08/04/25  Plan of Care Reviewed with: patient    Subjective     Chief Complaint: Acute cerebrovascular insufficiency transient focal neurologic deficit    Patient/Family Comments/goals: "I thought I was going to go home today, but then the doctor said they found out I have a UTI and now I have to stay and get some antibiotics."  Pain/Comfort:  Pain Rating 1: 0/10  Pain Rating Post-Intervention 1: 0/10    Objective:     Communicated with: FRAN Galo prior to session.  Patient found HOB elevated with PureWick, peripheral IV upon OT entry to room.    General Precautions: Standard, fall    Orthopedic Precautions:N/A  Braces: N/A  Respiratory Status: Room air     Occupational Performance:     Bed Mobility:    Patient completed Scooting/Bridging with modified independence  Patient completed Supine to Sit with modified independence  Patient completed Sit to Supine with stand by assistance "     Functional Mobility/Transfers:  Patient completed Sit <> Stand Transfer with contact guard assistance  with  rolling walker on 2 attempts and min(A) with 1 attempt with RW  Functional Mobility: Pt stepped to HOB with RW and CGA    Activities of Daily Living:  Bathing: pt washed off her anterior upper body and her legs and perineum with warm bath wipes. Pt required assistance to use wipes on her back.    Lower Body Dressing: independence to don socks and SBA/ CGA to change her pull up      [facility] 6 Click ADL: 22    Treatment & Education:  Pt performed mobility and ADL as above.  Pt sat EOB and performed (B) UE ex for 2 x 15 reps of each:   Shoulder flexion with 1# db   Elbow flexion with 2# db   Trunk twists x 10 reps with 2# db   Punches with 1# db   Shoulder extension with red t-band   Elbow extension with red t-band   Shoulder ER with red t-band  Pt performed standing activity with RW and SBA while preforming reaching activity  Pt performed standing with RW with supervision while therapist changed her bedding  Pt sat EOB and performed ADL as above      Patient left HOB elevated with call button in reach, RN notified, and spouse present    GOALS:   Multidisciplinary Problems       Occupational Therapy Goals          Problem: Occupational Therapy    Goal Priority Disciplines Outcome Interventions   Occupational Therapy Goal     OT, PT/OT Progressing    Description: STG: (in 1 week)  Pt will perform grooming with setup  Pt will bathe with setup and CGA  Pt will perform UE dressing with setup  Pt will perform LE dressing with setup and CGA  Pt will transfer bed/chair/bsc with RW and SBA  Pt will perform standing task x 4 min with CGA with RW   Pt will tolerate 15 minutes of tx without fatigue      LTG: (in 5 weeks)  1.Restore to max I with self care and mobility.                             Time Tracking:     OT Date of Treatment: 07/01/25  OT Start Time: 1535  OT Stop Time: 1630  OT Total Time (min): 55  min    Billable Minutes:Self Care/Home Management 15 min  Therapeutic Activity 10 min  Therapeutic Exercise 30 min    OT/HEMAL: OT          7/1/2025

## 2025-07-02 VITALS
TEMPERATURE: 97 F | BODY MASS INDEX: 37.05 KG/M2 | SYSTOLIC BLOOD PRESSURE: 120 MMHG | HEART RATE: 70 BPM | OXYGEN SATURATION: 95 % | RESPIRATION RATE: 18 BRPM | DIASTOLIC BLOOD PRESSURE: 77 MMHG | WEIGHT: 188.69 LBS | HEIGHT: 60 IN

## 2025-07-02 PROCEDURE — 97110 THERAPEUTIC EXERCISES: CPT

## 2025-07-02 PROCEDURE — 63600175 PHARM REV CODE 636 W HCPCS: Performed by: HOSPITALIST

## 2025-07-02 PROCEDURE — 25000003 PHARM REV CODE 250: Performed by: HOSPITALIST

## 2025-07-02 PROCEDURE — 99239 HOSP IP/OBS DSCHRG MGMT >30: CPT | Mod: ,,,

## 2025-07-02 PROCEDURE — 97116 GAIT TRAINING THERAPY: CPT

## 2025-07-02 RX ORDER — MICONAZOLE NITRATE 2 G/100G
POWDER TOPICAL 2 TIMES DAILY
Qty: 60 G | Refills: 0 | Status: SHIPPED | OUTPATIENT
Start: 2025-07-02 | End: 2025-07-16

## 2025-07-02 RX ORDER — ASPIRIN 81 MG/1
81 TABLET ORAL DAILY
Qty: 360 TABLET | Refills: 0 | Status: SHIPPED | OUTPATIENT
Start: 2025-07-02 | End: 2026-07-02

## 2025-07-02 RX ADMIN — MEROPENEM 1 G: 1 INJECTION, POWDER, FOR SOLUTION INTRAVENOUS at 12:07

## 2025-07-02 RX ADMIN — PANTOPRAZOLE SODIUM 40 MG: 40 TABLET, DELAYED RELEASE ORAL at 08:07

## 2025-07-02 RX ADMIN — GABAPENTIN 600 MG: 300 CAPSULE ORAL at 08:07

## 2025-07-02 RX ADMIN — LORAZEPAM 0.5 MG: 0.5 TABLET ORAL at 02:07

## 2025-07-02 RX ADMIN — MICONAZOLE NITRATE ANTIFUNGAL POWDER: 2 POWDER TOPICAL at 08:07

## 2025-07-02 RX ADMIN — MEROPENEM 1 G: 1 INJECTION, POWDER, FOR SOLUTION INTRAVENOUS at 08:07

## 2025-07-02 RX ADMIN — SERTRALINE 150 MG: 50 TABLET, FILM COATED ORAL at 08:07

## 2025-07-02 RX ADMIN — ATORVASTATIN CALCIUM 40 MG: 40 TABLET, FILM COATED ORAL at 08:07

## 2025-07-02 NOTE — NURSING
Patient discharged home. Escorted via wheel chair by transport to private vehicle with personal belongings.

## 2025-07-02 NOTE — ASSESSMENT & PLAN NOTE
Following the NIH protocol for elevated BP during acute stroke and allowing permissive HTN  PT/OT/ST consult and q 4h neurochecks.    Monitor on telemetry for afib and will check 2D US for thrombus.    Will continue antiplatelets and high intensity statin.     Will get MRI Brain without contrast for follow up.      Continue CCB and ARB and statin with her home DAPT  If tele or echo show reason to add full dose anticoagulation will start.    [facility] for DVT prophylaxis.   Will need aggressive risk factor modification and GDMT with her PCP.    Patient with condition that if not treated could result in loss of life or bodily function.  Due to co morbidities this patient has complex medical management.    6/30  - reviewed MRI, no new infarct, noted old hemorrhagic infarct unchanged  - discussed with physical therapy, will need swing bed, social consulted    7/1  - does not want SWB, will continue PT/OT while here, plavix/statin for now (allergy to asa, will clarify)

## 2025-07-02 NOTE — DISCHARGE INSTRUCTIONS
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    [facility] approved for ertapenem 500 g injection once a day for 5 days for ESBL + UTI.  Intertrigo: continue topical powder, educated on keeping area clean and dry, stop daily fluconazole.  TIA: was already on plavix and high intensity statin, started asa 81 mg daily, follow with pcp in 1 week.    Hospitalist Discharge orders  *Notify your healthcare provider if you experience any of the following: temperature >100.4  *Notify your healthcare provider if you experience any of the following: redness, tenderness, or any signs or symptoms of infection (pain, swelling, redness, odor or green/yellow drainage around incision site).  *Notify your healthcare provider if you experience any of the following: difficulty breathing or increased cough.  *Notify your physician if you experience any persistent nausea, vomiting, diarrhea or headache.  *Notify your physician if you experience any of the following: severe uncontrolled pain, worsening rash, increased confusion, weakness, dizziness, lightheadedness, or visual disturbances.

## 2025-07-02 NOTE — DISCHARGE SUMMARY
[facility] - [address] [place] [address] Medicine  Discharge Summary      Patient Name: Herlinda Valdovinos  MRN: [id number]  DANIEL: [id number]  Patient Class: IP- Inpatient  Admission Date: 6/29/2025  Hospital Length of Stay: 2 days  Discharge Date and Time: 07/02/2025 2:39 PM  Attending Physician: Shawna Hart MD   Discharging Provider: Shawna Hart MD  Primary Care Provider: Vini Hernandez NP    Primary Care Team: Networked reference to record PCT     HPI:   59 yo F presents to [place] ED with c/o left sided weakness over the last several days.  She has had some nausea but no diarrhea and the weakness for the past two days has been to BLE with difficulty walking.  She is on an ARB only for her BP because after her ICH in 4/21 due to malignant HTN her BP has decreased and she had to be discontinue from norvasc previously because of hypotension.  However, at that time she was found to have ESBL UTI and bacteremia 4/24.  She has been tolerating the ARB up until today when she had some fatigue and lightheadedness with soft BP.      Patient had lactic acid of 1.1 and UA did show possible infection and meropenum started after cultures obtained.  She was given 2L NS and does feel better at this time.  She does have some breakdown on her left buttock from being in the bed this past week.  She is in good spirits.  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  Her massive hemorrhagic stroke was 4/21 and she went to [facility] where they did not have to do a craniotomy and she worked very hard to resolve the LHP she had.  She could only move several fingers on her left side.  She has heroic improvement.  She waited until night before last to come to the ED because she thought perhaps a TIA and was on DAPT, ARB and statin.  No palpitations or chest pain and no h/o atrial fib but patient missed her OP sleep study appointment  when Ayana hit and has never rescheduled.  Her  of may years, Livan, is present and says she snores so loud the roof raises and feels she probably does have sleep apnea.  I explained that could also be a cause of recurrent stroke.      CT negative for acute process but does show the old, chronic bilateral stroke changes.  Neuro consult done at [facility] and MRI follow up recommended.      Remainder of ROS as below.  Reviewed med recon and updated PMH.  See assessment and plan below for problem based evaluation      * No surgery found *      Hospital Course:   6/30  - reviewed MRI, no new infarct, noted old hemorrhagic infarct unchanged  - discussed with physical therapy, will need swing bed, social consulted  - urine micro noted, continue rocephin    7/1  - patient does not want SWB admission if she doesn't have to, PT/OT to continue here  - has ESBL + urine with symptomatic acute cystitis, will need 7 days of roberto/ertaepenem, will discuss with patient options for home injections or swingbed with IV abx    7/2  - [facility] approved for ertapenem 500 g injection once a day for 5 days for ESBL + UTI  - Intertrigo: continue topical powder, educated on keeping area clean and dry, stop daily fluconazole  - TIA: was already on plavix and high intensity statin, started asa 81 mg daily, follow with pcp in 1 week     Goals of Care Treatment Preferences:  Code Status: Full Code         Consults:   Consults (From admission, onward)          Status Ordering Provider     Inpatient consult to Social Work  Once        Provider:  (Not yet assigned)    Completed TAMIKA MONTES            Assessment & Plan  Insomnia secondary to depression with anxiety  Patient well controlled.  No S/H plans or ideation.  No hallucinations at present.    Will not consult psychiatry.  Will continue his OP psychotropic medication regimen.  Zoloft and prn ativan        Acute cystitis without hematuria  Renal function improved at [facility] with IVF   with recheck BMP in am with CBC and thyroid function studies  Continue meropenum and follow cultures.      6/30  - urine micro noted, continue roberto  Chronic disorder of skin  Photos pending.    Wound nurse consulted.    Nystatin to skin folds under breast and groin.      Idiopathic peripheral neuropathy  Continue home gabapentin    Essential (primary) hypertension  Patient's blood pressure range in the last 24 hours was: BP  Min: 116/77  Max: 125/83.The patient's inpatient anti-hypertensive regimen is listed below:  Current Antihypertensives  labetaloL injection 10 mg, Every 4 hours PRN, Intravenous  , Daily, Oral    Plan  - BP is controlled, no changes needed to their regimen    Nephrolithiasis  No flank pain or hematuria    Suspected sleep apnea      TIA (transient ischemic attack)    Following the NIH protocol for elevated BP during acute stroke and allowing permissive HTN  PT/OT/ST consult and q 4h neurochecks.    Monitor on telemetry for afib and will check 2D US for thrombus.    Will continue antiplatelets and high intensity statin.     Will get MRI Brain without contrast for follow up.      Continue CCB and ARB and statin with her home DAPT  If tele or echo show reason to add full dose anticoagulation will start.    [facility] for DVT prophylaxis.   Will need aggressive risk factor modification and GDMT with her PCP.    Patient with condition that if not treated could result in loss of life or bodily function.  Due to co morbidities this patient has complex medical management.    6/30  - reviewed MRI, no new infarct, noted old hemorrhagic infarct unchanged  - discussed with physical therapy, will need swing bed, social consulted    7/1  - does not want SWB, will continue PT/OT while here, plavix/statin for now (allergy to asa, will clarify)  ESBL (extended spectrum beta-lactamase) producing bacteria infection  On meropenem, symptomatic UTI, will need IV roberto/erta for 7 days    Final Active Diagnoses:    Diagnosis  Date Noted POA    TIA (transient ischemic attack) [G45.9] 07/01/2025 Yes    ESBL (extended spectrum beta-lactamase) producing bacteria infection [A49.9, Z16.12] 07/01/2025 Yes    Acute cystitis without hematuria [N30.00] 06/29/2025 Yes    Suspected sleep apnea [R29.818] 06/30/2025 Yes    Chronic disorder of skin [L98.9]  Yes    Essential (primary) hypertension [I10]  Yes    Nephrolithiasis [N20.0]  Yes    Insomnia secondary to depression with anxiety [F51.05, F41.8]  Yes    Idiopathic peripheral neuropathy [G60.9] 05/20/2021 Yes      Problems Resolved During this Admission:    Diagnosis Date Noted Date Resolved POA    PRINCIPAL PROBLEM:  Acute cerebrovascular insufficiency transient focal neurologic deficit [G45.8] 08/07/2019 07/01/2025 Yes       Discharged Condition: stable    Disposition: Home or Self Care    Follow Up:   Contact information for follow-up providers       Vini Hernandez, NP Follow up in 1 week(s).    Specialties: Emergency Medicine, Family Medicine  Contact information:  [address] [facility] [address]  [phone]                       Contact information for after-discharge care       Dialysis/Infusion       [facility] .    Service: Home Infusion and Injection  Contact information:  [address] [place] [address]  [phone]                     Home Medical Care       [facility] .    Service: Home Health Services  Contact information:  [address] [place] [address]  [phone]                                 Patient Instructions:   No discharge procedures on file.    Significant Diagnostic Studies: N/A    Pending Diagnostic Studies:       None           Medications:  Reconciled Home Medications:      Medication List        START taking these medications      aspirin 81 MG EC tablet  Commonly known as: ECOTRIN  Take 1 tablet (81 mg total) by mouth once daily.     miconazole NITRATE 2 % 2 % top  powder  Commonly known as: MICOTIN  Apply topically 2 (two) times daily. for 14 days            CHANGE how you take these medications      MULTIVITAMIN 50 PLUS Tab  Generic drug: multivitamin-minerals-lutein  Take 1 tablet by mouth once daily.  What changed: additional instructions            CONTINUE taking these medications      acetaminophen 325 MG tablet  Commonly known as: TYLENOL  Take 650 mg by mouth every 6 (six) hours as needed.     albuterol 90 mcg/actuation inhaler  Commonly known as: PROAIR HFA  Inhale 2 puffs into the lungs every 4 (four) hours as needed for Wheezing or Shortness of Breath. Rescue     albuterol-ipratropium 2.5 mg-0.5 mg/3 mL nebulizer solution  Commonly known as: DUO-NEB  Take 3 mLs by nebulization every 4 (four) hours as needed for Wheezing. Rescue     atorvastatin 40 MG tablet  Commonly known as: LIPITOR  Take 40 mg by mouth once daily.     cetirizine 10 MG tablet  Commonly known as: ZYRTEC  Take 1 tablet (10 mg total) by mouth once daily.     JELCAINE STERILE 2 % Gel  Generic drug: lidocaine HCL  Apply topically 2 (two) times daily.     LORazepam 1 MG tablet  Commonly known as: ATIVAN  Take 1 mg by mouth 3 (three) times daily.     mupirocin 2 % ointment  Commonly known as: BACTROBAN  Apply topically once daily.     NEURONTIN 300 MG capsule  Generic drug: gabapentin  Take 600 mg by mouth 2 (two) times daily.     olmesartan-hydrochlorothiazide 40-25 mg per tablet  Commonly known as: BENICAR HCT  Take 1 tablet by mouth once daily.     omega 3-dha-epa-fish oil 1,000 (120-180) mg Cap  Take 1 capsule by mouth once daily.     PLAVIX 75 mg tablet  Generic drug: clopidogreL  Take 75 mg by mouth once daily.     PROTONIX 40 MG tablet  Generic drug: pantoprazole  Take 40 mg by mouth once daily.     sertraline 50 MG tablet  Commonly known as: ZOLOFT  Take 3 tablets (150 mg total) by mouth once daily.     temazepam 30 mg capsule  Commonly known as: RESTORIL  Take 30 mg by mouth nightly as  needed.            STOP taking these medications      fluconazole 200 MG Tab  Commonly known as: DIFLUCAN              Indwelling Lines/Drains at time of discharge:   Lines/Drains/Airways       Drain  Duration             Female External Urinary Catheter w/ Suction 07/02/25 0230 <1 day                        Time spent on the discharge of patient: 40 minutes         Shawna Hart MD  Department of Hospital Medicine  [facility]

## 2025-07-02 NOTE — PLAN OF CARE
Consult for Ertapenem injection 500 mg once a day for 5 days. Ss spoke with danny with [facility] and drug has to be ordered from [facility]. Order faxed to [facility]. Rn and dr aware. Ss informed bud with [facility]. Following.      2pm- medication was approved from [facility] $54.90. pt aware and agreeable. [facility] informed.

## 2025-07-02 NOTE — PLAN OF CARE
[facility] Tele[facility]  Discharge Final Note    Primary Care Provider: Vini Hernandez NP    Expected Discharge Date: 7/2/2025    Final Discharge Note (most recent)       Final Note - 07/02/25 1434          Final Note    Assessment Type Final Discharge Note     Anticipated Discharge Disposition Home-Health Care Svc        Post-Acute Status    Post-Acute Authorization Home Health;IV Infusion     Home Health Status Set-up Complete/Auth obtained     IV Infusion Status Set-up Complete/Auth obtained     Patient choice form signed by patient/caregiver List with quality metrics by geographic area provided;List from CMS Compare;List from System Post-Acute Care     Discharge Delays None known at this time                 Pt d/[facility] today with meds through [facility]. [facility] to deliver meds to room. Pt and rn aware.    Important Message from Medicare  Important Message from Medicare regarding Discharge Appeal Rights: Given to patient/caregiver, Explained to patient/caregiver, Signed/date by patient/caregiver     Date IMM was signed: 07/01/25  Time IMM was signed: 1030    After-discharge care                Dialysis/Infusion       [facility]   Service: Home Infusion and Injection    [address]  [place] [address]   Phone: [phone]                 Home Medical Care       [facility]   Service: Home Health Services    [address]  [facility] [address]   Phone: [phone]

## 2025-07-02 NOTE — ASSESSMENT & PLAN NOTE
Patient's blood pressure range in the last 24 hours was: BP  Min: 116/77  Max: 125/83.The patient's inpatient anti-hypertensive regimen is listed below:  Current Antihypertensives  labetaloL injection 10 mg, Every 4 hours PRN, Intravenous  , Daily, Oral    Plan  - BP is controlled, no changes needed to their regimen

## 2025-07-02 NOTE — PT/OT/SLP PROGRESS
Physical Therapy Treatment    Patient Name:  Herlinda Valdovinos   MRN:  [id number]    Recommendations:     Discharge Recommendations: Moderate Intensity Therapy  Discharge Equipment Recommendations: none  Barriers to discharge: None    Assessment:     Herlinda Valdovinos is a 60 y.o. female admitted with a medical diagnosis of Acute cerebrovascular insufficiency transient focal neurologic deficit.  She presents with the following impairments/functional limitations: impaired balance, decreased coordination, decreased safety awareness, weakness, impaired endurance, impaired sensation, gait instability. Patient appears to have improved LLE strength and functional mobility. Patient had increased ambulation distance compared to yesterday and  tolerated exercises very well today. Patient tried to use the restroom during treatment and was unable to go. Patient will continue to be reassessed for further gait training and therapeutic exercise. Patient is good to go from a PT perspective.     Rehab Prognosis: Good; patient would benefit from acute skilled PT services to address these deficits and reach maximum level of function.    Recent Surgery: * No surgery found *      Plan:     During this hospitalization, patient to be seen 5 x/week to address the identified rehab impairments via gait training, therapeutic activities, therapeutic exercises and progress toward the following goals:    Plan of Care Expires:  08/30/25    Subjective     Chief Complaint: Weakness and foot drop   Patient/Family Comments/goals: Patient reports needing to go to the restroom for a BM since she hasn't gone since Sunday. Patient also reports not wanting to leave [facility]. Patient notes that foot drop gets worse as she fatigues.   Pain/Comfort:  Pain Rating 1: 0/10  Pain Addressed 1: Cessation of Activity      Objective:     Patient found HOB elevated with PureWick, peripheral IV upon PT entry to room.     General Precautions: Standard, fall  Orthopedic  Precautions: N/A  Braces: N/A  Respiratory Status: Room air     Functional Mobility:  Bed Mobility:     Supine to Sit: minimum assistance  Sit to Supine: contact guard assistance  Transfers:     Sit to Stand:  contact guard assistance with rolling walker  Gait: Patient ambulated 120 feet today with rolling walker and cga.       AM-PAC 6 CLICK MOBILITY  Turning over in bed (including adjusting bedclothes, sheets and blankets)?: 4  Sitting down on and standing up from a chair with arms (e.g., wheelchair, bedside commode, etc.): 4  Moving from lying on back to sitting on the side of the bed?: 3  Moving to and from a bed to a chair (including a wheelchair)?: 3  Need to walk in hospital room?: 4  Climbing 3-5 steps with a railing?: 3  Basic Mobility Total Score: 21       Treatment & Education:  Tx plan   Therex: MRE's (knee extension), LAQ's, Seated Marches (3x10 for bilateral LE's)  Gait training with rolling walker and cga     Patient left HOB elevated with call button in reach..    GOALS:   Multidisciplinary Problems       Physical Therapy Goals          Problem: Physical Therapy    Goal Priority Disciplines Outcome Interventions   Physical Therapy Goal     PT, PT/OT Progressing                        DME Justifications:  No DME recommended requiring DME justifications    Time Tracking:     PT Received On: 07/02/25  PT Start Time: 1028     PT Stop Time: 1054  PT Total Time (min): 26 min     Billable Minutes: Gait Training 7 and Therapeutic Exercise 15    Treatment Type: Treatment  PT/PTA: PT     Number of PTA visits since last PT visit: 0     07/02/2025

## 2025-07-21 ENCOUNTER — LAB REQUISITION (OUTPATIENT)
Dept: LAB | Facility: HOSPITAL | Age: 60
End: 2025-07-21
Payer: MEDICARE

## 2025-07-21 DIAGNOSIS — N30.00 ACUTE CYSTITIS WITHOUT HEMATURIA: ICD-10-CM

## 2025-07-21 DIAGNOSIS — I10 ESSENTIAL (PRIMARY) HYPERTENSION: ICD-10-CM

## 2025-07-21 LAB
ANION GAP SERPL CALCULATED.3IONS-SCNC: 16 MMOL/L (ref 7–16)
BACTERIA #/AREA URNS HPF: ABNORMAL /HPF
BASOPHILS # BLD AUTO: 0.03 K/UL (ref 0–0.2)
BASOPHILS NFR BLD AUTO: 0.4 % (ref 0–1)
BILIRUB UR QL STRIP: NEGATIVE
BUN SERPL-MCNC: 26 MG/DL (ref 10–20)
BUN/CREAT SERPL: 21 (ref 6–20)
CALCIUM SERPL-MCNC: 9 MG/DL (ref 8.4–10.2)
CHLORIDE SERPL-SCNC: 107 MMOL/L (ref 98–107)
CLARITY UR: ABNORMAL
CO2 SERPL-SCNC: 25 MMOL/L (ref 22–29)
COLOR UR: YELLOW
CREAT SERPL-MCNC: 1.22 MG/DL (ref 0.55–1.02)
DIFFERENTIAL METHOD BLD: ABNORMAL
EGFR (NO RACE VARIABLE) (RUSH/TITUS): 51 ML/MIN/1.73M2
EOSINOPHIL # BLD AUTO: 0.7 K/UL (ref 0–0.5)
EOSINOPHIL NFR BLD AUTO: 9.5 % (ref 1–4)
ERYTHROCYTE [DISTWIDTH] IN BLOOD BY AUTOMATED COUNT: 17 % (ref 11.5–14.5)
GLUCOSE SERPL-MCNC: 111 MG/DL (ref 74–100)
GLUCOSE UR STRIP-MCNC: NEGATIVE MG/DL
HCT VFR BLD AUTO: 38.8 % (ref 38–47)
HGB BLD-MCNC: 12.6 G/DL (ref 12–16)
IMM GRANULOCYTES # BLD AUTO: 0.02 K/UL (ref 0–0.04)
IMM GRANULOCYTES NFR BLD: 0.3 % (ref 0–0.4)
KETONES UR STRIP-SCNC: NEGATIVE MG/DL
LEUKOCYTE ESTERASE UR QL STRIP: ABNORMAL
LYMPHOCYTES # BLD AUTO: 1.9 K/UL (ref 1–4.8)
LYMPHOCYTES NFR BLD AUTO: 25.7 % (ref 27–41)
MCH RBC QN AUTO: 27.9 PG (ref 27–31)
MCHC RBC AUTO-ENTMCNC: 32.5 G/DL (ref 32–36)
MCV RBC AUTO: 86 FL (ref 80–96)
MONOCYTES # BLD AUTO: 0.53 K/UL (ref 0–0.8)
MONOCYTES NFR BLD AUTO: 7.2 % (ref 2–6)
MPC BLD CALC-MCNC: 10.3 FL (ref 9.4–12.4)
NEUTROPHILS # BLD AUTO: 4.2 K/UL (ref 1.8–7.7)
NEUTROPHILS NFR BLD AUTO: 56.9 % (ref 53–65)
NITRITE UR QL STRIP: NEGATIVE
NRBC # BLD AUTO: 0 X10E3/UL
NRBC, AUTO (.00): 0 %
PH UR STRIP: 5.5 PH UNITS
PLATELET # BLD AUTO: 198 K/UL (ref 150–400)
POTASSIUM SERPL-SCNC: 4.2 MMOL/L (ref 3.5–5.1)
PROT UR QL STRIP: NEGATIVE
RBC # BLD AUTO: 4.51 M/UL (ref 4.2–5.4)
RBC # UR STRIP: NEGATIVE /UL
RBC #/AREA URNS HPF: ABNORMAL /HPF
SODIUM SERPL-SCNC: 144 MMOL/L (ref 136–145)
SP GR UR STRIP: 1.01
SQUAMOUS #/AREA URNS LPF: ABNORMAL /LPF
TRANS CELLS #/AREA URNS LPF: ABNORMAL /LPF
UROBILINOGEN UR STRIP-ACNC: 0.2 MG/DL
WBC # BLD AUTO: 7.38 K/UL (ref 4.5–11)
WBC #/AREA URNS HPF: ABNORMAL /HPF

## 2025-07-21 PROCEDURE — 87086 URINE CULTURE/COLONY COUNT: CPT | Performed by: NURSE PRACTITIONER

## 2025-07-21 PROCEDURE — 85025 COMPLETE CBC W/AUTO DIFF WBC: CPT | Performed by: NURSE PRACTITIONER

## 2025-07-21 PROCEDURE — 81003 URINALYSIS AUTO W/O SCOPE: CPT | Performed by: NURSE PRACTITIONER

## 2025-07-21 PROCEDURE — 80048 BASIC METABOLIC PNL TOTAL CA: CPT | Performed by: NURSE PRACTITIONER

## 2025-07-24 LAB — UA COMPLETE W REFLEX CULTURE PNL UR: ABNORMAL
